# Patient Record
Sex: MALE | Race: BLACK OR AFRICAN AMERICAN | Employment: UNEMPLOYED | URBAN - METROPOLITAN AREA
[De-identification: names, ages, dates, MRNs, and addresses within clinical notes are randomized per-mention and may not be internally consistent; named-entity substitution may affect disease eponyms.]

---

## 2017-02-14 ENCOUNTER — HOSPITAL ENCOUNTER (OUTPATIENT)
Dept: LAB | Age: 57
Discharge: HOME | End: 2017-02-14

## 2017-02-14 PROCEDURE — 99001 SPECIMEN HANDLING PT-LAB: CPT | Performed by: INTERNAL MEDICINE

## 2017-05-02 ENCOUNTER — HOSPITAL ENCOUNTER (OUTPATIENT)
Dept: LAB | Age: 57
Discharge: HOME | End: 2017-05-02

## 2017-05-02 LAB — SENTARA SPECIMEN COL,SENBCF: NORMAL

## 2017-05-02 PROCEDURE — 99001 SPECIMEN HANDLING PT-LAB: CPT | Performed by: ORTHOPAEDIC SURGERY

## 2017-05-26 ENCOUNTER — OFFICE VISIT (OUTPATIENT)
Dept: ORTHOPEDIC SURGERY | Age: 57
End: 2017-05-26

## 2017-05-26 VITALS
DIASTOLIC BLOOD PRESSURE: 92 MMHG | BODY MASS INDEX: 37.74 KG/M2 | RESPIRATION RATE: 18 BRPM | HEIGHT: 69 IN | WEIGHT: 254.8 LBS | TEMPERATURE: 98.2 F | SYSTOLIC BLOOD PRESSURE: 157 MMHG | HEART RATE: 104 BPM

## 2017-05-26 DIAGNOSIS — M43.16 SPONDYLOLISTHESIS OF LUMBAR REGION: ICD-10-CM

## 2017-05-26 DIAGNOSIS — M71.30 SYNOVIAL CYST: ICD-10-CM

## 2017-05-26 DIAGNOSIS — M48.061 LUMBAR SPINAL STENOSIS: Primary | ICD-10-CM

## 2017-05-26 DIAGNOSIS — M51.26 HNP (HERNIATED NUCLEUS PULPOSUS), LUMBAR: ICD-10-CM

## 2017-05-26 NOTE — PROGRESS NOTES
[address]  [address], [address]  [address]  Phone: [phone]  Fax: [phone]  INITIAL CONSULTATION  Patient: Hemal Sands                MRN: [id number]       SSN: [id number]  YOB: 1960        AGE: 64 y.o. SEX: male  Body mass index is 37.63 kg/(m^2). PCP: Terese Gould MD  05/26/17    Chief Complaint   Patient presents with    Back Pain     back pain eval    Referral / Consult         HISTORY OF PRESENT ILLNESS, RADIOGRAPHS, and PLAN:         HISTORY OF PRESENT ILLNESS:  Mr. Lucía Dodson is seen today at the request of Dr. Lázaro Lowry and Dr. Stephen Fry. Mr. Lucía Dodson is a 59-year-old gentleman with a history of disability from a heart attack in 2008. He has morbid obesity at 255 pounds and 59 in height. He has severe, primarily back pain, bilateral low back pain. He has some aching left lower extremity pain. The pain has been progressive and unresponsive to conservative treatment, physical therapy, medications, and epidural steroids. He comes in today looking for further interventions. He is currently on Norco and Gabapentin. He has had no bowel or bladder dysfunction, or fevers, chills, or night sweats. He is on multiple medications, which are in the chart. He has hypertension, diabetes, GERD, depression, osteoarthritis of the knee and hip, and positive PPD. He does not smoke or drink. PHYSICAL EXAMINATION:  His physical exam demonstrates a morbidly obese male in moderate distress. He has negative straight leg raise, negative femoral stress test, and good strength of his EHL, tib/ant, hamstrings, and quads. He has limited painful range of motion of his lumbar spine and mild pain with range of motion of the hips. He has a supple neck and normal upper extremities. He has normal reflexes of the Achilles and patella. He has 2+ pulses and soft abdomen.       RADIOGRAPHS:  X-rays of the lumbar spine demonstrate a grade I mobile, degenerative spondylolisthesis at L4-5, degenerative collapse at L5-S1. MRI of the lumbar spine demonstrates synovitis at L4-5 with spinal stenosis, synovitis with left-sided synovial cyst, small, as well as a left-sided L5-S1 disc protrusion. ASSESSMENT/PLAN: We have a patient with primarily mechanical low back pain and degenerative spondylolisthesis at L4-5 and some stenotic symptoms. He has some left-sided radicular symptoms due, I believe, to disc protrusion at L5-S1, as well as synovitis, left greater than right at L4-5. He is morbidly obese. I discussed the matter at length with him. He has failed conservative treatment. He would like surgical intervention. He is a difficult surgical candidate. Because of his obesity, classically, I would want to perform an L4 to the sacrum posterior decompression and instrumented fusion. It will be difficult on his surgery from a posterior approach just from his size and obesity. Given the nature of his symptoms with his primary mechanical back pain, which I think is primarily coming from the L4-5 segment, I think a minimally invasive approach that may be prudent for him would be an isolated L4-5 extreme lateral interbody fusion. I think this could allow us to provide him stability and decompression of the L4-5 segment, which would resolve the majority of his symptoms. If that resolved his symptoms well enough, we could leave well enough alone at that. If not, we could proceed with a posterior decompression with instrumentation. I discussed the risks, benefits, complications, and alternatives to surgery. The patient wishes to proceed. We will proceed with an extreme lateral interbody and fusion at L4-5 once the appropriate approvals and clearances take place. cc: Melchor Zuniga M.D.            Past Medical History:   Diagnosis Date    Arthritis     Coronary atherosclerosis of native coronary artery     S/P PCI with GE in 12/09    Diabetes ([facility] [address]     IDDM    Electrolyte and fluid disorders not elsewhere classified     Essential hypertension, benign     GERD (gastroesophageal reflux disease)     Heroin abuse 05/26/16    Psychiatrist Dr. González Thurston History of heart attack     Hyperlipidemia     Intermediate coronary syndrome [facility])     MDD (major depressive disorder) 5/26/16    Psychiatrist Dr. González Thurston Myocardial infarction [facility])     Obesity, unspecified     Old myocardial infarction     Other and unspecified hyperlipidemia     Pancreatitis     Positive PPD     Sleep apnea     Transfusion history     Before 1992       Family History   Problem Relation Age of Onset    Heart Attack Father     Coronary Artery Disease Mother     Diabetes Mother     Cancer Sister      breast cancer and lung cancer       Current Outpatient Prescriptions   Medication Sig Dispense Refill    diclofenac EC (VOLTAREN) 75 mg EC tablet Take 1 Tab by mouth two (2) times a day. 60 Tab 5    gabapentin (NEURONTIN) 600 mg tablet Take 1 Tab by mouth three (3) times daily. (Patient taking differently: Take 800 mg by mouth three (3) times daily.) 90 Tab 1    HYDROcodone-acetaminophen (NORCO) 7.5-325 mg per tablet Take 1 Tab by mouth every eight (8) hours as needed for Pain. Max Daily Amount: 3 Tabs. 10 Tab 0    insulin aspart (NOVOLOG FLEXPEN) 100 unit/mL flexpen 55 Units by SubCUTAneous route.  Polyethylene Glycol 3350 powd 1 Cap by Does Not Apply route.  insulin detemir (LEVEMIR) 100 unit/mL injection 63 Units by SubCUTAneous route nightly.  cyclobenzaprine (FLEXERIL) 10 mg tablet Take  by mouth three (3) times daily as needed for Muscle Spasm(s).  doxazosin (CARDURA) 2 mg tablet Take 2 mg by mouth daily.  amLODIPine (NORVASC) 10 mg tablet Take  by mouth daily.  simvastatin (ZOCOR) 10 mg tablet Take  by mouth nightly.       hydrochlorothiazide (HYDRODIURIL) 50 mg tablet Take 25 mg by mouth daily.      levothyroxine (SYNTHROID) 75 mcg tablet Take  by mouth Daily (before breakfast).  furosemide (LASIX) 40 mg tablet Take  by mouth daily.  ranitidine (ZANTAC) 150 mg tablet Take 150 mg by mouth two (2) times a day.  quinapril (ACCUPRIL) 40 mg tablet Take 40 mg by mouth nightly.  metFORMIN (GLUCOPHAGE) 1,000 mg tablet Take 1,000 mg by mouth two (2) times daily (with meals).  omeprazole (PRILOSEC) 20 mg capsule Take 20 mg by mouth daily.  traZODone (DESYREL) 150 mg tablet Take 150 mg by mouth nightly.  clopidogrel (PLAVIX) 75 mg tablet Take  by mouth daily.  ondansetron hcl (ZOFRAN, AS HYDROCHLORIDE,) 4 mg tablet Take 1 Tab by mouth every eight (8) hours as needed for Nausea. 20 Each 0    ondansetron (ZOFRAN ODT) 4 mg disintegrating tablet Take 1 Tab by mouth every eight (8) hours as needed for Nausea. 15 Tab 0    cloNIDine HCl (CATAPRES) 0.1 mg tablet Take 1 Tab by mouth two (2) times daily as needed. Indications: OPIOID WITHDRAWAL SYMPTOMS 6 Tab 0    ALPRAZolam (XANAX) 1 mg tablet Take  by mouth.  DULoxetine (CYMBALTA) 60 mg capsule Take 1 Cap by mouth daily.  30 Cap 2       Allergies   Allergen Reactions    Compazine [Prochlorperazine] Anaphylaxis     \"Tightness around throat\"       Past Surgical History:   Procedure Laterality Date    CARDIAC SURG PROCEDURE UNLIST      2 [facility]    HX CORONARY STENT PLACEMENT         Past Medical History:   Diagnosis Date    Arthritis     Coronary atherosclerosis of native coronary artery     S/P PCI with GE in 12/09    Diabetes ([facility] [address]     IDDM    Electrolyte and fluid disorders not elsewhere classified     Essential hypertension, benign     GERD (gastroesophageal reflux disease)     Heroin abuse 05/26/16    Psychiatrist Dr. Ramila Villalobos History of heart attack     Hyperlipidemia     Intermediate coronary syndrome ([facility] [address]     MDD (major depressive disorder) 5/26/16    Psychiatrist  Vicky Wright     Myocardial infarction ([facility] [address]     Obesity, unspecified     Old myocardial infarction     Other and unspecified hyperlipidemia     Pancreatitis     Positive PPD     Sleep apnea     Transfusion history     Before 1992       Social History     Social History    Marital status:      Spouse name: N/A    Number of children: N/A    Years of education: N/A     Occupational History    Not on file. Social History Main Topics    Smoking status: Never Smoker    Smokeless tobacco: Not on file    Alcohol use No    Drug use: No    Sexual activity: Not on file     Other Topics Concern    Not on file     Social History Narrative       REVIEW OF SYSTEMS:   CONSTITUTIONAL SYMPTOMS:  Negative. EYES:  Negative. EARS, NOSE, THROAT AND MOUTH:  Negative. CARDIOVASCULAR:  Negative. RESPIRATORY:  Negative. GENITOURINARY: Negative. GASTROINTESTINAL:  Negative. INTEGUMENTARY (SKIN AND/OR BREAST):  Negative. MUSCULOSKELETAL: Per HPI.   ENDOCRINE/RHEUMATOLOGIC:  Negative. NEUROLOGICAL:  Per HPI. HEMATOLOGIC/LYMPHATIC:  Negative. ALLERGIC/IMMUNOLOGIC:  Negative. PSYCHIATRIC:  Negative. PHYSICAL EXAMINATION:   Visit Vitals    BP (!) 157/92    Pulse (!) 104    Temp 98.2 °F (36.8 °C) (Oral)    Resp 18    Ht 5' 9\" (1.753 m)    Wt 254 lb 12.8 oz (115.6 kg)    BMI 37.63 kg/m2    PAIN SCALE: 7/10    CONSTITUTIONAL: The patient is in no apparent distress and is alert and oriented x 3. NEUROLOGICAL: Alert and oriented x 3. Ambulation without assistive device. FWB. EXTREMITIES:  See musculoskeletal.  MUSCULOSKELETAL:   Head and Neck:  Negative for misalignment, asymmetry, crepitation, defects, tenderness masses or effusions.  Left Upper Extremity: Inspection, percussion and palpation preformed. Ritters sign is negative.  Right Upper Extremity: Inspection, percussion and palpation preformed. Ritters sign is negative.    Spine, Ribs and Pelvis: Back pain with radiating LLE pain. Inspection, percussion and palpation preformed. Negative for misalignment, asymmetry, crepitation, defects, tenderness masses or effusions.  Left Lower Extremity: Radiating pain. Intermittent mild paresthesia. Inspection, percussion and palpation preformed. Negative straight leg raise.  Right Lower Extremity: Inspection, percussion and palpation preformed. Negative straight leg raise. SPINE EXAM:     Lumbar spine: No rash, ecchymosis, or gross obliquity. No focal atrophy is noted. ASSESSMENT    ICD-10-CM ICD-9-CM    1. Lumbar spinal stenosis M48.06 724.02 AMB POC XRAY, SPINE, LUMBOSACRAL; 4+   2. HNP (herniated nucleus pulposus), lumbar M51.26 722.10 AMB POC XRAY, SPINE, LUMBOSACRAL; 4+   3. Spondylolisthesis of lumbar region M43.16 738.4 AMB POC XRAY, SPINE, LUMBOSACRAL; 4+   4. Synovial cyst M71.30 727.40 AMB POC XRAY, SPINE, LUMBOSACRAL; 4+       Written by Qian Gracia, as dictated by Vanesa Flynn MD.    I, Dr. Vanesa Flynn MD, confirm that all documentation is accurate.

## 2017-05-26 NOTE — PROGRESS NOTES
[address] [facility] [facility]   Pre-Surgical Worksheet    Patient: Avtar Ngo                         MRN: [id number]     Age:  64 y.o.,      Sex: male    YOB: 1960           CESAR: May 26, 2017  PCP: Mary Weaver MD    Allergies   Allergen Reactions    Compazine [Prochlorperazine] Anaphylaxis     \"Tightness around throat\"         ICD-10-CM ICD-9-CM    1. Lumbar spinal stenosis M48.06 724.02 AMB POC XRAY, SPINE, LUMBOSACRAL; 4+   2. HNP (herniated nucleus pulposus), lumbar M51.26 722.10 AMB POC XRAY, SPINE, LUMBOSACRAL; 4+   3. Spondylolisthesis of lumbar region M43.16 738.4 AMB POC XRAY, SPINE, LUMBOSACRAL; 4+   4. Synovial cyst M71.30 727.40 AMB POC XRAY, SPINE, LUMBOSACRAL; 4+       Surgery: L4/5 XLIF. Pain Assessment   Pain Assessment  5/26/2017   Location of Pain Back   Location Modifiers -   Severity of Pain 7   Quality of Pain Aching; Throbbing   Quality of Pain Comment -   Duration of Pain Persistent   Frequency of Pain Constant   Aggravating Factors -   Aggravating Factors Comment -   Limiting Behavior -   Relieving Factors -   Result of Injury -       Visit Vitals    BP (!) 157/92    Pulse (!) 104    Temp 98.2 °F (36.8 °C) (Oral)    Resp 18    Ht 5' 9\" (1.753 m)    Wt 254 lb 12.8 oz (115.6 kg)    BMI 37.63 kg/m2       ADL Limits: Patient states pain stays at a 9. Has difficulty completing tasks without extended breaks, or assistance. Spine Surgery?: No: ? When ? Christine Pretty Where? .    Spinal Injections?: Yes  When 2017. Where Dr. Bob Gay. Physical Therapy?: No: ? When ? [facility] Pretty Where? .    NSAID's?: Yes    Pain Medications?: Yes  Type: Percocet. In Pain Management: NO, Where: ?    Current Outpatient Prescriptions   Medication Sig    diclofenac EC (VOLTAREN) 75 mg EC tablet Take 1 Tab by mouth two (2) times a day.  gabapentin (NEURONTIN) 600 mg tablet Take 1 Tab by mouth three (3) times daily.  (Patient taking differently: Take 800 mg by mouth three (3) times daily.)  HYDROcodone-acetaminophen (NORCO) 7.5-325 mg per tablet Take 1 Tab by mouth every eight (8) hours as needed for Pain. Max Daily Amount: 3 Tabs.  insulin aspart (NOVOLOG FLEXPEN) 100 unit/mL flexpen 55 Units by SubCUTAneous route.  Polyethylene Glycol 3350 powd 1 Cap by Does Not Apply route.  insulin detemir (LEVEMIR) 100 unit/mL injection 63 Units by SubCUTAneous route nightly.  cyclobenzaprine (FLEXERIL) 10 mg tablet Take  by mouth three (3) times daily as needed for Muscle Spasm(s).  doxazosin (CARDURA) 2 mg tablet Take 2 mg by mouth daily.  amLODIPine (NORVASC) 10 mg tablet Take  by mouth daily.  simvastatin (ZOCOR) 10 mg tablet Take  by mouth nightly.  hydrochlorothiazide (HYDRODIURIL) 50 mg tablet Take 25 mg by mouth daily.  levothyroxine (SYNTHROID) 75 mcg tablet Take  by mouth Daily (before breakfast).  furosemide (LASIX) 40 mg tablet Take  by mouth daily.  ranitidine (ZANTAC) 150 mg tablet Take 150 mg by mouth two (2) times a day.  quinapril (ACCUPRIL) 40 mg tablet Take 40 mg by mouth nightly.  metFORMIN (GLUCOPHAGE) 1,000 mg tablet Take 1,000 mg by mouth two (2) times daily (with meals).  omeprazole (PRILOSEC) 20 mg capsule Take 20 mg by mouth daily.  traZODone (DESYREL) 150 mg tablet Take 150 mg by mouth nightly.  clopidogrel (PLAVIX) 75 mg tablet Take  by mouth daily.  ondansetron hcl (ZOFRAN, AS HYDROCHLORIDE,) 4 mg tablet Take 1 Tab by mouth every eight (8) hours as needed for Nausea.  ondansetron (ZOFRAN ODT) 4 mg disintegrating tablet Take 1 Tab by mouth every eight (8) hours as needed for Nausea.  cloNIDine HCl (CATAPRES) 0.1 mg tablet Take 1 Tab by mouth two (2) times daily as needed. Indications: OPIOID WITHDRAWAL SYMPTOMS    ALPRAZolam (XANAX) 1 mg tablet Take  by mouth.  DULoxetine (CYMBALTA) 60 mg capsule Take 1 Cap by mouth daily. No current facility-administered medications for this visit.         Past Medical History: Diagnosis Date    Arthritis     Coronary atherosclerosis of native coronary artery     S/P PCI with GE in 12/09    Diabetes ([facility] [address]     IDDM    Electrolyte and fluid disorders not elsewhere classified     Essential hypertension, benign     GERD (gastroesophageal reflux disease)     Heroin abuse 05/26/16    Psychiatrist Dr. Liberty Mccarthy History of heart attack     Hyperlipidemia     Intermediate coronary syndrome [facility])     MDD (major depressive disorder) 5/26/16    Psychiatrist Dr. Liberty Mccarthy Myocardial infarction [facility])     Obesity, unspecified     Old myocardial infarction     Other and unspecified hyperlipidemia     Pancreatitis     Positive PPD     Sleep apnea     Transfusion history     Before 1992       Past Surgical History:   Procedure Laterality Date    CARDIAC SURG PROCEDURE UNLIST      [facility]    HX CORONARY STENT PLACEMENT         Social History     Social History    Marital status:      Spouse name: N/A    Number of children: N/A    Years of education: N/A     Social History Main Topics    Smoking status: Never Smoker    Smokeless tobacco: None    Alcohol use No    Drug use: No    Sexual activity: Not Asked     Other Topics Concern    None     Social History Narrative

## 2017-05-30 ENCOUNTER — TELEPHONE (OUTPATIENT)
Dept: ORTHOPEDIC SURGERY | Age: 57
End: 2017-05-30

## 2017-05-30 NOTE — TELEPHONE ENCOUNTER
Pt wife Hallie Stern called in states that the pt PCP is Mallory Cao.      Pt can be reached at [phone]

## 2017-05-31 ENCOUNTER — OFFICE VISIT (OUTPATIENT)
Dept: CARDIOLOGY CLINIC | Age: 57
End: 2017-05-31

## 2017-05-31 VITALS
HEART RATE: 91 BPM | DIASTOLIC BLOOD PRESSURE: 95 MMHG | SYSTOLIC BLOOD PRESSURE: 155 MMHG | HEIGHT: 69 IN | BODY MASS INDEX: 37.62 KG/M2 | WEIGHT: 254 LBS

## 2017-05-31 DIAGNOSIS — Z95.5 HISTORY OF CORONARY ARTERY STENT PLACEMENT: ICD-10-CM

## 2017-05-31 DIAGNOSIS — I10 ESSENTIAL HYPERTENSION: Chronic | ICD-10-CM

## 2017-05-31 DIAGNOSIS — Z01.810 PRE-OPERATIVE CARDIOVASCULAR EXAMINATION: ICD-10-CM

## 2017-05-31 DIAGNOSIS — Z79.4 TYPE 2 DIABETES MELLITUS WITHOUT COMPLICATION, WITH LONG-TERM CURRENT USE OF INSULIN (HCC): Chronic | ICD-10-CM

## 2017-05-31 DIAGNOSIS — E11.9 TYPE 2 DIABETES MELLITUS WITHOUT COMPLICATION, WITH LONG-TERM CURRENT USE OF INSULIN (HCC): Chronic | ICD-10-CM

## 2017-05-31 DIAGNOSIS — I25.10 CORONARY ARTERY DISEASE INVOLVING NATIVE CORONARY ARTERY OF NATIVE HEART WITHOUT ANGINA PECTORIS: Primary | ICD-10-CM

## 2017-05-31 RX ORDER — CARVEDILOL 6.25 MG/1
6.25 TABLET ORAL 2 TIMES DAILY WITH MEALS
Qty: 60 TAB | Refills: 6 | Status: ON HOLD | OUTPATIENT
Start: 2017-05-31 | End: 2017-09-10

## 2017-05-31 NOTE — MR AVS SNAPSHOT
Visit Information Date & Time Provider Department Dept. Phone Encounter #  
 5/31/2017 11:15 AM Wanda Mohamud MD [facility] [address] [place] [address] [id number] Follow-up Instructions Return for F/u after tests, follow up with Dr Marine Gupta. Your Appointments 6/14/2017  1:15 PM  
PHYSICAL PRE OP with Balwinder Le MD  
[facility]ists at [facility] (--) Appt Note: pre op clearance,  
 [address] [address]  
[address] [facility] [address] [id number]  
  
    
 7/3/2017 10:30 AM  
POST OP with Leatha Mccormick NP  
[facility] ([address] [place] [address]) Appt Note: surg 6-19  
 [address] [place] [address]  
[phone]  
  
   
 [address]  
  
    
 8/4/2017  1:15 PM  
POST OP with Kindra Larkin MD  
[facility] [address] [place] [address]) Appt Note: 6 wk fu surg 6-19  
 [address] [place] [address]  
[phone]  
  
   
 [address] [facility] [address] Upcoming Health Maintenance Date Due Hepatitis C Screening 1960 FOOT EXAM Q1 9/5/1970 EYE EXAM RETINAL OR DILATED Q1 9/5/1970 Pneumococcal 19-64 Medium Risk (1 of 1 - PPSV23) 9/5/1979 DTaP/Tdap/Td series (1 - Tdap) 9/5/1981 FOBT Q 1 YEAR AGE 50-75 9/5/2010 HEMOGLOBIN A1C Q6M 2/15/2017 INFLUENZA AGE 9 TO ADULT 8/1/2017 MICROALBUMIN Q1 8/15/2017 LIPID PANEL Q1 8/15/2017 Allergies as of 5/31/2017  Review Complete On: 5/31/2017 By: Wanda Mohamud MD  
  
 Severity Noted Reaction Type Reactions Compazine [Prochlorperazine] High   Anaphylaxis \"Tightness around throat\" Current Immunizations  Never Reviewed No immunizations on file. Not reviewed this visit You Were Diagnosed With   
  
 Codes Comments Coronary artery disease involving native coronary artery of native heart without angina pectoris    -  Primary ICD-10-CM: I25.10 ICD-9-CM: 414.01 History of coronary artery stent placement     ICD-10-CM: Z95.5 ICD-9-CM: V45.82 om1 
4/2009 and 12/2009 Essential hypertension     ICD-10-CM: I10 
ICD-9-CM: 401.9 Type 2 diabetes mellitus without complication, with long-term current use of insulin (HCC)     ICD-10-CM: E11.9, Z79.4 ICD-9-CM: 250.00, V58.67 Pre-operative cardiovascular examination     ICD-10-CM: Z01.810 ICD-9-CM: V72.81 Vitals BP Pulse Height(growth percentile) Weight(growth percentile) BMI Smoking Status (!) 155/95 91 5' 9\" (1.753 m) 254 lb (115.2 kg) 37.51 kg/m2 Never Smoker Vitals History BMI and BSA Data Body Mass Index Body Surface Area  
 37.51 kg/m 2 2.37 m 2 Preferred Pharmacy Pharmacy Name [facility] [address] [phone] Your Updated Medication List  
  
   
This list is accurate as of: 5/31/17 11:56 AM.  Always use your most recent med list.  
  
  
  
  
 carvedilol 6.25 mg tablet Commonly known as:  Yvonne Mckeon Take 1 Tab by mouth two (2) times daily (with meals). clopidogrel 75 mg Tab Commonly known as:  PLAVIX Take  by mouth daily. cyclobenzaprine 10 mg tablet Commonly known as:  FLEXERIL Take  by mouth three (3) times daily as needed for Muscle Spasm(s). diclofenac EC 75 mg EC tablet Commonly known as:  VOLTAREN Take 1 Tab by mouth two (2) times a day. doxazosin 2 mg tablet Commonly known as:  CARDURA Take 2 mg by mouth daily. DULoxetine 60 mg capsule Commonly known as:  CYMBALTA Take 1 Cap by mouth daily. furosemide 40 mg tablet Commonly known as:  LASIX Take  by mouth daily. gabapentin 600 mg tablet Commonly known as:  NEURONTIN Take 1 Tab by mouth three (3) times daily. HYDROcodone-acetaminophen 7.5-325 mg per tablet Commonly known as:  Jenna Dus Take 1 Tab by mouth every eight (8) hours as needed for Pain. Max Daily Amount: 3 Tabs. LEVEMIR 100 unit/mL injection Generic drug:  insulin detemir 63 Units by SubCUTAneous route nightly. levothyroxine 75 mcg tablet Commonly known as:  SYNTHROID Take  by mouth Daily (before breakfast). metFORMIN 1,000 mg tablet Commonly known as:  GLUCOPHAGE Take 1,000 mg by mouth two (2) times daily (with meals). NovoLOG Flexpen 100 unit/mL Inpn Generic drug:  insulin aspart 55 Units by SubCUTAneous route. omeprazole 20 mg capsule Commonly known as:  PRILOSEC Take 20 mg by mouth daily. ondansetron 4 mg disintegrating tablet Commonly known as:  ZOFRAN ODT Take 1 Tab by mouth every eight (8) hours as needed for Nausea. ondansetron hcl 4 mg tablet Commonly known as:  ZOFRAN (AS HYDROCHLORIDE) Take 1 Tab by mouth every eight (8) hours as needed for Nausea. Polyethylene Glycol 3350 Powd 1 Cap by Does Not Apply route. quinapril 40 mg tablet Commonly known as:  ACCUPRIL Take 40 mg by mouth nightly. raNITIdine 150 mg tablet Commonly known as:  ZANTAC Take 150 mg by mouth two (2) times a day. simvastatin 10 mg tablet Commonly known as:  ZOCOR Take  by mouth nightly. traZODone 150 mg tablet Commonly known as:  Irish Never Take 150 mg by mouth nightly. Prescriptions Sent to Pharmacy Refills  
 carvedilol (COREG) 6.25 mg tablet 6 Sig: Take 1 Tab by mouth two (2) times daily (with meals). Class: Normal  
 [facility] [address] Ph #: [phone] Route: Oral  
  
Follow-up Instructions Return for F/u after tests, follow up with Dr Jozef Lancaster. To-Do List   
 06/07/2017 Nursing:  SCHEDULE NUCLEAR STUDY Please provide this summary of care documentation to your next provider. Your primary care clinician is listed as Evans Yap. If you have any questions after today's visit, please call [phone].

## 2017-05-31 NOTE — LETTER
Dominga Coles 1960 5/31/2017 Dear Oscar Sky., MD 
 
I had the pleasure of evaluating  Mr. Deborah Devlin in office today. Below are the relevant portions of my assessment and plan of care. ICD-10-CM ICD-9-CM 1. Coronary artery disease involving native coronary artery of native heart without angina pectoris I25.10 414.01 SCHEDULE NUCLEAR STUDY 2. History of coronary artery stent placement Z95.5 V45.82 SCHEDULE NUCLEAR STUDY  
 om1 
4/2009 and 12/2009 3. Essential hypertension I10 401.9 4. Type 2 diabetes mellitus without complication, with long-term current use of insulin (HCC) E11.9 250.00   
 Z79.4 V58.67 5. Pre-operative cardiovascular examination Z01.810 V72.81 Current Outpatient Prescriptions Medication Sig Dispense Refill  carvedilol (COREG) 6.25 mg tablet Take 1 Tab by mouth two (2) times daily (with meals). 60 Tab 6  
 ondansetron (ZOFRAN ODT) 4 mg disintegrating tablet Take 1 Tab by mouth every eight (8) hours as needed for Nausea. 15 Tab 0  
 DULoxetine (CYMBALTA) 60 mg capsule Take 1 Cap by mouth daily. 30 Cap 2  
 diclofenac EC (VOLTAREN) 75 mg EC tablet Take 1 Tab by mouth two (2) times a day. 60 Tab 5  
 gabapentin (NEURONTIN) 600 mg tablet Take 1 Tab by mouth three (3) times daily. (Patient taking differently: Take 800 mg by mouth three (3) times daily.) 90 Tab 1  
 HYDROcodone-acetaminophen (NORCO) 7.5-325 mg per tablet Take 1 Tab by mouth every eight (8) hours as needed for Pain. Max Daily Amount: 3 Tabs. 10 Tab 0  
 insulin aspart (NOVOLOG FLEXPEN) 100 unit/mL flexpen 55 Units by SubCUTAneous route.  Polyethylene Glycol 3350 powd 1 Cap by Does Not Apply route.  insulin detemir (LEVEMIR) 100 unit/mL injection 63 Units by SubCUTAneous route nightly.  cyclobenzaprine (FLEXERIL) 10 mg tablet Take  by mouth three (3) times daily as needed for Muscle Spasm(s).  doxazosin (CARDURA) 2 mg tablet Take 2 mg by mouth daily.  simvastatin (ZOCOR) 10 mg tablet Take  by mouth nightly.  levothyroxine (SYNTHROID) 75 mcg tablet Take  by mouth Daily (before breakfast).  furosemide (LASIX) 40 mg tablet Take  by mouth daily.  ranitidine (ZANTAC) 150 mg tablet Take 150 mg by mouth two (2) times a day.  quinapril (ACCUPRIL) 40 mg tablet Take 40 mg by mouth nightly.  metFORMIN (GLUCOPHAGE) 1,000 mg tablet Take 1,000 mg by mouth two (2) times daily (with meals).  omeprazole (PRILOSEC) 20 mg capsule Take 20 mg by mouth daily.  traZODone (DESYREL) 150 mg tablet Take 150 mg by mouth nightly.  clopidogrel (PLAVIX) 75 mg tablet Take  by mouth daily.  ondansetron hcl (ZOFRAN, AS HYDROCHLORIDE,) 4 mg tablet Take 1 Tab by mouth every eight (8) hours as needed for Nausea. 20 Each 0 Orders Placed This Encounter  SCHEDULE NUCLEAR STUDY  
  lexiscan stress test  
  Standing Status:   Future Standing Expiration Date:   5/31/2018  carvedilol (COREG) 6.25 mg tablet Sig: Take 1 Tab by mouth two (2) times daily (with meals). Dispense:  60 Tab Refill:  6 If you have questions, please do not hesitate to call me. I look forward to following Mr. Darylene Champ along with you. Sincerely, Antonio Osborne MD

## 2017-05-31 NOTE — PROGRESS NOTES
HISTORY OF PRESENT ILLNESS  Falguni Cleaning is a 64 y.o. male. HPI Comments: Patient with cad,old mi,pci in 2009 here for evaluation for back surgery  Followed by dr Chau Mendoza in past  On follow up patient denies any chest pains,sob, palpitation or other significant symptoms. New Patient   The history is provided by the patient. Pertinent negatives include no chest pain, no abdominal pain, no headaches and no shortness of breath. Hypertension   The history is provided by the patient. This is a chronic problem. The problem occurs constantly. The problem has not changed since onset. Pertinent negatives include no chest pain, no abdominal pain, no headaches and no shortness of breath. Pre-op Exam   The history is provided by the patient. This is a new problem. Pertinent negatives include no chest pain, no abdominal pain, no headaches and no shortness of breath. Review of Systems   Constitutional: Negative for chills and fever. HENT: Negative for nosebleeds. Eyes: Negative for blurred vision and double vision. Respiratory: Negative for cough, hemoptysis, sputum production, shortness of breath and wheezing. Cardiovascular: Negative for chest pain, palpitations, orthopnea, claudication, leg swelling and PND. Gastrointestinal: Negative for abdominal pain, heartburn, nausea and vomiting. Musculoskeletal: Positive for back pain. Negative for myalgias. Skin: Negative for rash. Neurological: Negative for dizziness, weakness and headaches. Endo/Heme/Allergies: Does not bruise/bleed easily.      Family History   Problem Relation Age of Onset    Heart Attack Father     Coronary Artery Disease Mother     Diabetes Mother     Cancer Sister      breast cancer and lung cancer       Past Medical History:   Diagnosis Date    Arthritis     Coronary atherosclerosis of native coronary artery     S/P PCI with GE in 12/09    Diabetes ([facility] [address]     IDDM    Electrolyte and fluid disorders not elsewhere classified     Essential hypertension, benign     GERD (gastroesophageal reflux disease)     Heroin abuse 05/26/16    Psychiatrist Dr. Carlee Shah History of heart attack     Hyperlipidemia     Intermediate coronary syndrome [facility])     MDD (major depressive disorder) 5/26/16    Psychiatrist Dr. Carlee Shah Myocardial infarction [facility])     Obesity, unspecified     Old myocardial infarction     Other and unspecified hyperlipidemia     Pancreatitis     Positive PPD     Sleep apnea     Transfusion history     Before 1992       Past Surgical History:   Procedure Laterality Date    CARDIAC SURG PROCEDURE UNLIST      2 stents Dr. Nelson Read Carid[facility]    HX CORONARY STENT PLACEMENT         Social History   Substance Use Topics    Smoking status: Never Smoker    Smokeless tobacco: Not on file    Alcohol use No       Allergies   Allergen Reactions    Compazine [Prochlorperazine] Anaphylaxis     \"Tightness around throat\"       Prior to Admission medications    Medication Sig Start Date End Date Taking? Authorizing Provider   carvedilol (COREG) 6.25 mg tablet Take 1 Tab by mouth two (2) times daily (with meals). 5/31/17  Yes Wesley Hughes MD   ondansetron (ZOFRAN ODT) 4 mg disintegrating tablet Take 1 Tab by mouth every eight (8) hours as needed for Nausea. 8/16/16  Yes Chantel Paz MD   DULoxetine (CYMBALTA) 60 mg capsule Take 1 Cap by mouth daily. 8/15/16  Yes Sudhir Omer MD   diclofenac EC (VOLTAREN) 75 mg EC tablet Take 1 Tab by mouth two (2) times a day. 7/26/16  Yes Jose Dotson MD   gabapentin (NEURONTIN) 600 mg tablet Take 1 Tab by mouth three (3) times daily. Patient taking differently: Take 800 mg by mouth three (3) times daily. 7/26/16  Yes Jose Dotson MD   HYDROcodone-acetaminophen [facility]) 7.5-325 mg per tablet Take 1 Tab by mouth every eight (8) hours as needed for Pain. Max Daily Amount: 3 Tabs.  7/24/16  Yes Mc Cooper NP   insulin aspart (NOVOLOG FLEXPEN) 100 unit/mL flexpen 55 Units by SubCUTAneous route. Yes Historical Provider   Polyethylene Glycol 3350 powd 1 Cap by Does Not Apply route. Yes Historical Provider   insulin detemir (LEVEMIR) 100 unit/mL injection 63 Units by SubCUTAneous route nightly. Yes Historical Provider   cyclobenzaprine (FLEXERIL) 10 mg tablet Take  by mouth three (3) times daily as needed for Muscle Spasm(s). Yes Historical Provider   doxazosin (CARDURA) 2 mg tablet Take 2 mg by mouth daily. Yes Historical Provider   simvastatin (ZOCOR) 10 mg tablet Take  by mouth nightly. Yes Historical Provider   levothyroxine (SYNTHROID) 75 mcg tablet Take  by mouth Daily (before breakfast). Yes Historical Provider   furosemide (LASIX) 40 mg tablet Take  by mouth daily. Yes Historical Provider   ranitidine (ZANTAC) 150 mg tablet Take 150 mg by mouth two (2) times a day. Yes Historical Provider   quinapril (ACCUPRIL) 40 mg tablet Take 40 mg by mouth nightly. Yes Historical Provider   metFORMIN (GLUCOPHAGE) 1,000 mg tablet Take 1,000 mg by mouth two (2) times daily (with meals). Yes Historical Provider   omeprazole (PRILOSEC) 20 mg capsule Take 20 mg by mouth daily. Yes Historical Provider   traZODone (DESYREL) 150 mg tablet Take 150 mg by mouth nightly. Yes Historical Provider   clopidogrel (PLAVIX) 75 mg tablet Take  by mouth daily. Yes Historical Provider   ondansetron hcl (ZOFRAN, AS HYDROCHLORIDE,) 4 mg tablet Take 1 Tab by mouth every eight (8) hours as needed for Nausea. 8/10/15  Yes Nancy Arizmendi MD         Visit Vitals    BP (!) 155/95    Pulse 91    Ht 5' 9\" (1.753 m)    Wt 115.2 kg (254 lb)    BMI 37.51 kg/m2         Physical Exam   Constitutional: He is oriented to person, place, and time. He appears well-developed and well-nourished. HENT:   Head: Normocephalic and atraumatic. Eyes: Conjunctivae are normal.   Neck: Neck supple. No JVD present. No tracheal deviation present.  No thyromegaly present. Cardiovascular: Normal rate, regular rhythm and normal heart sounds. Exam reveals no gallop and no friction rub. No murmur heard. Pulmonary/Chest: Breath sounds normal. No respiratory distress. He has no wheezes. He has no rales. He exhibits no tenderness. Abdominal: Soft. There is no tenderness. Musculoskeletal: He exhibits no edema. Neurological: He is alert and oriented to person, place, and time. Skin: Skin is warm and dry. Psychiatric: He has a normal mood and affect. Mr. Mike Perry has a reminder for a \"due or due soon\" health maintenance. I have asked that he contact his primary care provider for follow-up on this health maintenance. CARDIOLOGY STUDIES 4/1/2010 12/1/2009 11/1/2009 4/1/2009 4/1/2008   Myocardial Perfusion Scan Result abn scan, reversible ischemia in inferoapical area & mild fixed defect in anterobasal area, EF 42% abn scan, reversible ischemia in inferolateral area - - -   Cardiac Cath Result LVEDP 17, EF 55% - - - -   Cardiac Cath with PCI Result - 30% in-stent restenosis of native circumflex, 90% OM-1, Cypher stent on OM-1 - - 99% in midcircumflex involving bifurcation with OM-1, Vision stent in mid circumflex;   Echocardiogram - Complete Result - - EF 65% EF 55% -         Assessment         ICD-10-CM ICD-9-CM    1. Coronary artery disease involving native coronary artery of native heart without angina pectoris I25.10 414.01 SCHEDULE NUCLEAR STUDY   2. History of coronary artery stent placement Z95.5 V45.82 SCHEDULE NUCLEAR STUDY    om1  4/2009 and 12/2009   3. Essential hypertension I10 401.9    4. Type 2 diabetes mellitus without complication, with long-term current use of insulin (HCC) E11.9 250.00     Z79.4 V58.67    5.  Pre-operative cardiovascular examination Z01.810 V72.81      5/2017-will set up nuclear stress test to assess for ischemia-clearence based on that  Dr Pearson [place] old patient  Add coreg  Medications Discontinued During This Encounter Medication Reason    hydrochlorothiazide (HYDRODIURIL) 50 mg tablet Not A Current Medication    amLODIPine (NORVASC) 10 mg tablet Not A Current Medication    ALPRAZolam (XANAX) 1 mg tablet Not A Current Medication    cloNIDine HCl (CATAPRES) 0.1 mg tablet Not A Current Medication       Orders Placed This Encounter    SCHEDULE NUCLEAR STUDY     lexiscan stress test     Standing Status:   Future     Standing Expiration Date:   5/31/2018    carvedilol (COREG) 6.25 mg tablet     Sig: Take 1 Tab by mouth two (2) times daily (with meals). Dispense:  60 Tab     Refill:  6       Follow-up Disposition:  Return for F/u after tests, follow up with Dr José Luis Cat.

## 2017-06-07 ENCOUNTER — CLINICAL SUPPORT (OUTPATIENT)
Dept: CARDIOLOGY CLINIC | Age: 57
End: 2017-06-07

## 2017-06-07 DIAGNOSIS — I25.10 CORONARY ARTERY DISEASE INVOLVING NATIVE CORONARY ARTERY OF NATIVE HEART WITHOUT ANGINA PECTORIS: Primary | ICD-10-CM

## 2017-06-07 DIAGNOSIS — Z95.5 HISTORY OF CORONARY ARTERY STENT PLACEMENT: ICD-10-CM

## 2017-06-07 DIAGNOSIS — I10 ESSENTIAL HYPERTENSION: Chronic | ICD-10-CM

## 2017-06-07 NOTE — PROGRESS NOTES
[facility]  [address], [address] [place] [address], [place], [address] [place] [address]  [phone] [place]  [phone] [place]       Name: Hemal Sands         MRN#: [id number]        YOB: 1960   Gender: male Ht:5'9 \"Wt:254 lbs       . Date of Rest/Stress Images: 6/7/2017   Referring Physician: Joselo Pham MD  Ordering Physician: Cristian Yoo. Darshan Cervantes MD, [facility] [place]  Technologist: Dora Uribe. ROSAURA Hatch, C.N.M.T  Diagnosis:  1. Coronary artery disease involving native coronary artery of native heart without angina pectoris    2. History of coronary artery stent placement    3. Essential hypertension          Rest/Stress Myoview SPECT Myocardial Perfusion Imaging with  Lexiscan Stress and gated SPECT Imaging      PROCEDURE:      Myocardial perfusion imaging was performed at rest approximately 30 mins following the intravenous injection,(Right hand ) of 11.9 mCi of Tc99m Myoview for evaluation of myocardial function and perfusion at rest.    Baseline Data:    Baseline EKG reveals sinus rhythm, poor R-wave progression. Baseline heart rate is 69. Baseline blood pressure is 130/72. Procedure: The patient was injected with 0.4 mg IV Lexiscan over a 30 second period. The patient had no significant symptoms. Heart rate increased from baseline to a heart rate of 90. Blood pressure increased to 162/98. Electrocardiogram showed no significant ST-T changes or arrhythmia during the procedure. Diagnosis:   1. Negative EKG portion of Lexiscan stress test.   2. Nuclear imaging report to follow. Pharmacological:  Patient was injected with . 4 mg/mL with Lexiscan intravenously over a period 10 to 20 sec. After pharmacologic stress, the patient was injected intravenously with 35.8 mCi of Tc99m Myoview. Gating post stress tomographic imaging performed approximately 45 minutes post tracer injection.  The data was reconstructed in the short, horizontal long and vertical long axis views and tomographic slices were generated. NUCLEAR IMAGING:    Findings:   1. Stress images reveal normal Myoview distrubution in all the LV segments in short axis, vertical and horizontal long axis views. 2. Resting images have a normal uptake. 3. Gated images reveal normal wall motion and the ejection fraction is calculated to be 64%. Conclusion:   1. Normal perfusion scan. 2. Normal wall motion and ejection fraction. 3. Low risk scan. Thank you for the referral.    E-signed and Interpreting Physician:    Aleksandr Buckley.  Gato Garg MD, [facility] [place]     Date of interpretation: 6/7/2017  Date of final report: 6/7/2017

## 2017-06-12 ENCOUNTER — HOSPITAL ENCOUNTER (OUTPATIENT)
Dept: PREADMISSION TESTING | Age: 57
Discharge: HOME | End: 2017-06-12
Payer: MEDICAID

## 2017-06-12 DIAGNOSIS — Z01.818 PRE-OP EXAM: ICD-10-CM

## 2017-06-12 LAB
ALBUMIN SERPL BCP-MCNC: 3.7 G/DL (ref 3.4–5)
ALBUMIN/GLOB SERPL: 0.9 {RATIO} (ref 0.8–1.7)
ALP SERPL-CCNC: 127 U/L (ref 45–117)
ALT SERPL-CCNC: 44 U/L (ref 16–61)
ANION GAP BLD CALC-SCNC: 8 MMOL/L (ref 3–18)
AST SERPL W P-5'-P-CCNC: 39 U/L (ref 15–37)
BILIRUB SERPL-MCNC: 0.3 MG/DL (ref 0.2–1)
BUN SERPL-MCNC: 17 MG/DL (ref 7–18)
BUN/CREAT SERPL: 10 (ref 12–20)
CALCIUM SERPL-MCNC: 9.1 MG/DL (ref 8.5–10.1)
CHLORIDE SERPL-SCNC: 101 MMOL/L (ref 100–108)
CO2 SERPL-SCNC: 29 MMOL/L (ref 21–32)
CREAT SERPL-MCNC: 1.63 MG/DL (ref 0.6–1.3)
ERYTHROCYTE [DISTWIDTH] IN BLOOD BY AUTOMATED COUNT: 12.8 % (ref 11.6–14.5)
GLOBULIN SER CALC-MCNC: 4.1 G/DL (ref 2–4)
GLUCOSE SERPL-MCNC: 279 MG/DL (ref 74–99)
HCT VFR BLD AUTO: 40.8 % (ref 36–48)
HGB BLD-MCNC: 13.1 G/DL (ref 13–16)
MCH RBC QN AUTO: 30.1 PG (ref 24–34)
MCHC RBC AUTO-ENTMCNC: 32.1 G/DL (ref 31–37)
MCV RBC AUTO: 93.8 FL (ref 74–97)
PLATELET # BLD AUTO: 259 K/UL (ref 135–420)
PMV BLD AUTO: 12.2 FL (ref 9.2–11.8)
POTASSIUM SERPL-SCNC: 3.6 MMOL/L (ref 3.5–5.5)
PROT SERPL-MCNC: 7.8 G/DL (ref 6.4–8.2)
RBC # BLD AUTO: 4.35 M/UL (ref 4.7–5.5)
SODIUM SERPL-SCNC: 138 MMOL/L (ref 136–145)
WBC # BLD AUTO: 10 K/UL (ref 4.6–13.2)

## 2017-06-12 PROCEDURE — 85027 COMPLETE CBC AUTOMATED: CPT | Performed by: ORTHOPAEDIC SURGERY

## 2017-06-12 PROCEDURE — 80053 COMPREHEN METABOLIC PANEL: CPT | Performed by: ORTHOPAEDIC SURGERY

## 2017-06-12 PROCEDURE — 36415 COLL VENOUS BLD VENIPUNCTURE: CPT | Performed by: ORTHOPAEDIC SURGERY

## 2017-06-12 PROCEDURE — 86900 BLOOD TYPING SEROLOGIC ABO: CPT | Performed by: ORTHOPAEDIC SURGERY

## 2017-06-12 RX ORDER — OXYCODONE AND ACETAMINOPHEN 7.5; 325 MG/1; MG/1
1 TABLET ORAL
COMMUNITY
End: 2017-07-05 | Stop reason: ALTCHOICE

## 2017-06-13 ENCOUNTER — TELEPHONE (OUTPATIENT)
Dept: ORTHOPEDIC SURGERY | Age: 57
End: 2017-06-13

## 2017-06-13 LAB
ABO + RH BLD: NORMAL
BLOOD GROUP ANTIBODIES SERPL: NORMAL
SPECIMEN EXP DATE BLD: NORMAL

## 2017-06-13 NOTE — TELEPHONE ENCOUNTER
Rene Nye (not on [employer]) called in states that the pt needs pain medication for now and after Sx. Pt has upcoming Sx 06-19-/17 with Dr. Pal Cornell.   Pt can be reached at [phone]

## 2017-06-14 NOTE — TELEPHONE ENCOUNTER
Pt has called back today and understands that he is the only one that can be spoken to since no one is on the [address] [place] [address] St. Pt states he would like to speak to Dr. Pal Cornell prior to Monday 06/19/17 when he has [address].      Pt can be reached at [phone]

## 2017-06-15 ENCOUNTER — TELEPHONE (OUTPATIENT)
Dept: CARDIOLOGY CLINIC | Age: 57
End: 2017-06-15

## 2017-06-15 NOTE — TELEPHONE ENCOUNTER
Dr. Yung Simmons called from Dr. Teresa Clifford office stating that patient is scheduled to have back surgery on 6/19/17. Patient saw Dr. Haris Espinoza last on 5/31/17 and had nuclear done and was to follow up with you after testing patient follow up is scheduled for august.Can patient be cleared from the nuclear or does patient need to be brought into the office. Please Advise.  Truman Mejia [phone] (ph), [phone] (fax)

## 2017-06-15 NOTE — TELEPHONE ENCOUNTER
Cleared from cardiac view with the understanding that patient will have mild risk for this surgery,   this clearance could have been done by message earlier on the day of test

## 2017-06-15 NOTE — TELEPHONE ENCOUNTER
Returned call to pt. Pt is requesting enough Percocet to get him through until he has surgery which is scheduled for 06/19/2017. [facility] 7.5-325mg [facility].  Filled as follows:    06/05/2017  Written on 06/04/2017 OXYCODON-ACETAMINOPHEN 7.5-325 45.0 15/day  JR BOW     05/04/2017  Written on 05/04/2017 OXYCODON-ACETAMINOPHEN 7.5-325 90.0 30/day  JR BOW      03/31/2017  Written on 03/31/2017 OXYCODON-ACETAMINOPHEN 7.5-325 90.0 30/day   JR BOW      03/02/2017  Written on 03/02/2017 OXYCODON-ACETAMINOPHEN 7.5-325 90.0 30/day  JR BOW     02/02/2017 Written on 02/02/2017 OXYCODON-ACETAMINOPHEN 7.5-325 90.0 30/day Cyndi Lilly

## 2017-06-15 NOTE — LETTER
6/15/2017 1:50 PM 
 
Mr. Hemal Sands [address] Dear Dr. Kumari Duty: 
 
Re: Hemal Sands : 1960 Mr. Lucía Dodson is cleared from a cardiac standpoint with mild risk for back surgery scheduled on 17. If you have any questions or any further assistance is needed please contact our office. Sincerely, Luis Angel Humphrey MD 
  
cc:  Joselo Pham MD

## 2017-06-15 NOTE — TELEPHONE ENCOUNTER
It appears he should have enough medication to get him to Monday 6/19. Please let him know we will give him a rx prior to discharge from the hospital for post op pain.

## 2017-06-17 ENCOUNTER — ANESTHESIA EVENT (OUTPATIENT)
Dept: SURGERY | Age: 57
DRG: 304 | End: 2017-06-17
Payer: MEDICAID

## 2017-06-19 ENCOUNTER — APPOINTMENT (OUTPATIENT)
Dept: GENERAL RADIOLOGY | Age: 57
DRG: 304 | End: 2017-06-19
Attending: ORTHOPAEDIC SURGERY
Payer: MEDICAID

## 2017-06-19 ENCOUNTER — ANESTHESIA (OUTPATIENT)
Dept: SURGERY | Age: 57
DRG: 304 | End: 2017-06-19
Payer: MEDICAID

## 2017-06-19 ENCOUNTER — HOSPITAL ENCOUNTER (INPATIENT)
Age: 57
LOS: 2 days | Discharge: HOME HEALTH | DRG: 304 | End: 2017-06-21
Attending: ORTHOPAEDIC SURGERY | Admitting: ORTHOPAEDIC SURGERY
Payer: MEDICAID

## 2017-06-19 PROBLEM — M48.061 SPINAL STENOSIS AT L4-L5 LEVEL: Status: ACTIVE | Noted: 2017-06-19

## 2017-06-19 LAB
AMPHET UR QL SCN: NEGATIVE
ANION GAP BLD CALC-SCNC: 10 MMOL/L (ref 3–18)
BARBITURATES UR QL SCN: NEGATIVE
BENZODIAZ UR QL: NEGATIVE
BUN SERPL-MCNC: 12 MG/DL (ref 7–18)
BUN/CREAT SERPL: 10 (ref 12–20)
CALCIUM SERPL-MCNC: 9 MG/DL (ref 8.5–10.1)
CANNABINOIDS UR QL SCN: NEGATIVE
CHLORIDE SERPL-SCNC: 103 MMOL/L (ref 100–108)
CO2 SERPL-SCNC: 26 MMOL/L (ref 21–32)
COCAINE UR QL SCN: NEGATIVE
CREAT SERPL-MCNC: 1.24 MG/DL (ref 0.6–1.3)
GLUCOSE BLD STRIP.AUTO-MCNC: 235 MG/DL (ref 70–110)
GLUCOSE BLD STRIP.AUTO-MCNC: 276 MG/DL (ref 70–110)
GLUCOSE BLD STRIP.AUTO-MCNC: 306 MG/DL (ref 70–110)
GLUCOSE BLD STRIP.AUTO-MCNC: 323 MG/DL (ref 70–110)
GLUCOSE SERPL-MCNC: 293 MG/DL (ref 74–99)
HDSCOM,HDSCOM: ABNORMAL
METHADONE UR QL: NEGATIVE
OPIATES UR QL: POSITIVE
PCP UR QL: NEGATIVE
POTASSIUM SERPL-SCNC: 3.9 MMOL/L (ref 3.5–5.5)
SODIUM SERPL-SCNC: 139 MMOL/L (ref 136–145)

## 2017-06-19 PROCEDURE — 76210000006 HC OR PH I REC 0.5 TO 1 HR: Performed by: ORTHOPAEDIC SURGERY

## 2017-06-19 PROCEDURE — C1763 CONN TISS, NON-HUMAN: HCPCS | Performed by: ORTHOPAEDIC SURGERY

## 2017-06-19 PROCEDURE — 80307 DRUG TEST PRSMV CHEM ANLYZR: CPT | Performed by: ANESTHESIOLOGY

## 2017-06-19 PROCEDURE — 0SG00A0 FUSION OF LUMBAR VERTEBRAL JOINT WITH INTERBODY FUSION DEVICE, ANTERIOR APPROACH, ANTERIOR COLUMN, OPEN APPROACH: ICD-10-PCS | Performed by: ORTHOPAEDIC SURGERY

## 2017-06-19 PROCEDURE — 77030013079 HC BLNKT BAIR HGGR 3M -A: Performed by: ANESTHESIOLOGY

## 2017-06-19 PROCEDURE — 77030012890: Performed by: ORTHOPAEDIC SURGERY

## 2017-06-19 PROCEDURE — 74011250636 HC RX REV CODE- 250/636

## 2017-06-19 PROCEDURE — 74011250636 HC RX REV CODE- 250/636: Performed by: NURSE ANESTHETIST, CERTIFIED REGISTERED

## 2017-06-19 PROCEDURE — 74011000250 HC RX REV CODE- 250: Performed by: ORTHOPAEDIC SURGERY

## 2017-06-19 PROCEDURE — 77030003029 HC SUT VCRL J&J -B: Performed by: ORTHOPAEDIC SURGERY

## 2017-06-19 PROCEDURE — 77030027703 HC MAXCESS 4 KT DISP NUVA -H: Performed by: ORTHOPAEDIC SURGERY

## 2017-06-19 PROCEDURE — 0ST20ZZ RESECTION OF LUMBAR VERTEBRAL DISC, OPEN APPROACH: ICD-10-PCS | Performed by: ORTHOPAEDIC SURGERY

## 2017-06-19 PROCEDURE — 74011250636 HC RX REV CODE- 250/636: Performed by: ANESTHESIOLOGY

## 2017-06-19 PROCEDURE — 74011250637 HC RX REV CODE- 250/637: Performed by: ORTHOPAEDIC SURGERY

## 2017-06-19 PROCEDURE — 74011636637 HC RX REV CODE- 636/637: Performed by: ANESTHESIOLOGY

## 2017-06-19 PROCEDURE — 80048 BASIC METABOLIC PNL TOTAL CA: CPT | Performed by: ORTHOPAEDIC SURGERY

## 2017-06-19 PROCEDURE — 97161 PT EVAL LOW COMPLEX 20 MIN: CPT

## 2017-06-19 PROCEDURE — 74011250636 HC RX REV CODE- 250/636: Performed by: NURSE PRACTITIONER

## 2017-06-19 PROCEDURE — 77030008283: Performed by: ORTHOPAEDIC SURGERY

## 2017-06-19 PROCEDURE — 77030030409 HC DIL SPN KT M5 DISP NUVA -G: Performed by: ORTHOPAEDIC SURGERY

## 2017-06-19 PROCEDURE — 77030020782 HC GWN BAIR PAWS FLX 3M -B: Performed by: ORTHOPAEDIC SURGERY

## 2017-06-19 PROCEDURE — 77030032490 HC SLV COMPR SCD KNE COVD -B: Performed by: ORTHOPAEDIC SURGERY

## 2017-06-19 PROCEDURE — 36415 COLL VENOUS BLD VENIPUNCTURE: CPT | Performed by: ORTHOPAEDIC SURGERY

## 2017-06-19 PROCEDURE — 77030020268 HC MISC GENERAL SUPPLY: Performed by: ORTHOPAEDIC SURGERY

## 2017-06-19 PROCEDURE — 74011250637 HC RX REV CODE- 250/637: Performed by: ANESTHESIOLOGY

## 2017-06-19 PROCEDURE — 77030008683 HC TU ET CUF COVD -A: Performed by: ANESTHESIOLOGY

## 2017-06-19 PROCEDURE — 77030011640 HC PAD GRND REM COVD -A: Performed by: ORTHOPAEDIC SURGERY

## 2017-06-19 PROCEDURE — 74011636637 HC RX REV CODE- 636/637: Performed by: ORTHOPAEDIC SURGERY

## 2017-06-19 PROCEDURE — 74011000250 HC RX REV CODE- 250

## 2017-06-19 PROCEDURE — 74011250636 HC RX REV CODE- 250/636: Performed by: ORTHOPAEDIC SURGERY

## 2017-06-19 PROCEDURE — 77030027138 HC INCENT SPIROMETER -A

## 2017-06-19 PROCEDURE — 77030018836 HC SOL IRR NACL ICUM -A: Performed by: ORTHOPAEDIC SURGERY

## 2017-06-19 PROCEDURE — 76060000034 HC ANESTHESIA 1.5 TO 2 HR: Performed by: ORTHOPAEDIC SURGERY

## 2017-06-19 PROCEDURE — 77030028990 HC ADH TISS DERMFLX CHMP -B: Performed by: ORTHOPAEDIC SURGERY

## 2017-06-19 PROCEDURE — 74011636637 HC RX REV CODE- 636/637: Performed by: NURSE ANESTHETIST, CERTIFIED REGISTERED

## 2017-06-19 PROCEDURE — 76010000153 HC OR TIME 1.5 TO 2 HR: Performed by: ORTHOPAEDIC SURGERY

## 2017-06-19 PROCEDURE — 65270000029 HC RM PRIVATE

## 2017-06-19 PROCEDURE — 77030034850: Performed by: ORTHOPAEDIC SURGERY

## 2017-06-19 PROCEDURE — 82962 GLUCOSE BLOOD TEST: CPT

## 2017-06-19 DEVICE — IMPLANTABLE DEVICE: Type: IMPLANTABLE DEVICE | Site: SPINE LUMBAR | Status: FUNCTIONAL

## 2017-06-19 DEVICE — GRAFT BONE SUB 5ML PUTTY CA PHOS SYNTH GRAN BIOABSRB ATTRAX: Type: IMPLANTABLE DEVICE | Site: SPINE LUMBAR | Status: FUNCTIONAL

## 2017-06-19 RX ORDER — MAGNESIUM SULFATE 100 %
4 CRYSTALS MISCELLANEOUS AS NEEDED
Status: DISCONTINUED | OUTPATIENT
Start: 2017-06-19 | End: 2017-06-21 | Stop reason: HOSPADM

## 2017-06-19 RX ORDER — SODIUM CHLORIDE 0.9 % (FLUSH) 0.9 %
5-10 SYRINGE (ML) INJECTION AS NEEDED
Status: DISCONTINUED | OUTPATIENT
Start: 2017-06-19 | End: 2017-06-21 | Stop reason: HOSPADM

## 2017-06-19 RX ORDER — MIDAZOLAM HYDROCHLORIDE 1 MG/ML
INJECTION, SOLUTION INTRAMUSCULAR; INTRAVENOUS AS NEEDED
Status: DISCONTINUED | OUTPATIENT
Start: 2017-06-19 | End: 2017-06-19 | Stop reason: HOSPADM

## 2017-06-19 RX ORDER — HYDROMORPHONE HYDROCHLORIDE 1 MG/ML
2 INJECTION, SOLUTION INTRAMUSCULAR; INTRAVENOUS; SUBCUTANEOUS
Status: DISCONTINUED | OUTPATIENT
Start: 2017-06-19 | End: 2017-06-20

## 2017-06-19 RX ORDER — SODIUM CHLORIDE, SODIUM LACTATE, POTASSIUM CHLORIDE, CALCIUM CHLORIDE 600; 310; 30; 20 MG/100ML; MG/100ML; MG/100ML; MG/100ML
50 INJECTION, SOLUTION INTRAVENOUS CONTINUOUS
Status: DISCONTINUED | OUTPATIENT
Start: 2017-06-19 | End: 2017-06-19 | Stop reason: HOSPADM

## 2017-06-19 RX ORDER — ONDANSETRON 2 MG/ML
INJECTION INTRAMUSCULAR; INTRAVENOUS AS NEEDED
Status: DISCONTINUED | OUTPATIENT
Start: 2017-06-19 | End: 2017-06-19 | Stop reason: HOSPADM

## 2017-06-19 RX ORDER — HYDROMORPHONE HYDROCHLORIDE 2 MG/ML
INJECTION, SOLUTION INTRAMUSCULAR; INTRAVENOUS; SUBCUTANEOUS AS NEEDED
Status: DISCONTINUED | OUTPATIENT
Start: 2017-06-19 | End: 2017-06-19 | Stop reason: HOSPADM

## 2017-06-19 RX ORDER — ONDANSETRON 4 MG/1
4 TABLET, FILM COATED ORAL
Status: DISCONTINUED | OUTPATIENT
Start: 2017-06-19 | End: 2017-06-21 | Stop reason: HOSPADM

## 2017-06-19 RX ORDER — DEXTROSE 50 % IN WATER (D50W) INTRAVENOUS SYRINGE
25-50 AS NEEDED
Status: DISCONTINUED | OUTPATIENT
Start: 2017-06-19 | End: 2017-06-21 | Stop reason: HOSPADM

## 2017-06-19 RX ORDER — ONDANSETRON 2 MG/ML
4 INJECTION INTRAMUSCULAR; INTRAVENOUS ONCE
Status: COMPLETED | OUTPATIENT
Start: 2017-06-19 | End: 2017-06-19

## 2017-06-19 RX ORDER — CARVEDILOL 6.25 MG/1
6.25 TABLET ORAL 2 TIMES DAILY WITH MEALS
Status: DISCONTINUED | OUTPATIENT
Start: 2017-06-19 | End: 2017-06-21 | Stop reason: HOSPADM

## 2017-06-19 RX ORDER — SUCCINYLCHOLINE CHLORIDE 20 MG/ML
INJECTION INTRAMUSCULAR; INTRAVENOUS AS NEEDED
Status: DISCONTINUED | OUTPATIENT
Start: 2017-06-19 | End: 2017-06-19 | Stop reason: HOSPADM

## 2017-06-19 RX ORDER — PROPOFOL 10 MG/ML
INJECTION, EMULSION INTRAVENOUS AS NEEDED
Status: DISCONTINUED | OUTPATIENT
Start: 2017-06-19 | End: 2017-06-19 | Stop reason: HOSPADM

## 2017-06-19 RX ORDER — INSULIN LISPRO 100 [IU]/ML
INJECTION, SOLUTION INTRAVENOUS; SUBCUTANEOUS EVERY 6 HOURS
Status: DISCONTINUED | OUTPATIENT
Start: 2017-06-19 | End: 2017-06-20

## 2017-06-19 RX ORDER — CYCLOBENZAPRINE HCL 10 MG
10 TABLET ORAL
Status: DISCONTINUED | OUTPATIENT
Start: 2017-06-19 | End: 2017-06-21 | Stop reason: HOSPADM

## 2017-06-19 RX ORDER — GABAPENTIN 400 MG/1
400 CAPSULE ORAL 3 TIMES DAILY
Status: DISCONTINUED | OUTPATIENT
Start: 2017-06-19 | End: 2017-06-21 | Stop reason: HOSPADM

## 2017-06-19 RX ORDER — NALOXONE HYDROCHLORIDE 0.4 MG/ML
0.4 INJECTION, SOLUTION INTRAMUSCULAR; INTRAVENOUS; SUBCUTANEOUS AS NEEDED
Status: DISCONTINUED | OUTPATIENT
Start: 2017-06-19 | End: 2017-06-21 | Stop reason: HOSPADM

## 2017-06-19 RX ORDER — SODIUM CHLORIDE 0.9 % (FLUSH) 0.9 %
5-10 SYRINGE (ML) INJECTION EVERY 8 HOURS
Status: DISCONTINUED | OUTPATIENT
Start: 2017-06-19 | End: 2017-06-21 | Stop reason: HOSPADM

## 2017-06-19 RX ORDER — FAMOTIDINE 20 MG/1
20 TABLET, FILM COATED ORAL ONCE
Status: COMPLETED | OUTPATIENT
Start: 2017-06-19 | End: 2017-06-19

## 2017-06-19 RX ORDER — SODIUM CHLORIDE 0.9 % (FLUSH) 0.9 %
5-10 SYRINGE (ML) INJECTION EVERY 8 HOURS
Status: DISCONTINUED | OUTPATIENT
Start: 2017-06-19 | End: 2017-06-19 | Stop reason: HOSPADM

## 2017-06-19 RX ORDER — DEXAMETHASONE SODIUM PHOSPHATE 4 MG/ML
INJECTION, SOLUTION INTRA-ARTICULAR; INTRALESIONAL; INTRAMUSCULAR; INTRAVENOUS; SOFT TISSUE AS NEEDED
Status: DISCONTINUED | OUTPATIENT
Start: 2017-06-19 | End: 2017-06-19 | Stop reason: HOSPADM

## 2017-06-19 RX ORDER — OXYCODONE AND ACETAMINOPHEN 10; 325 MG/1; MG/1
1 TABLET ORAL
Status: DISCONTINUED | OUTPATIENT
Start: 2017-06-19 | End: 2017-06-21 | Stop reason: HOSPADM

## 2017-06-19 RX ORDER — GLYCOPYRROLATE 0.2 MG/ML
INJECTION INTRAMUSCULAR; INTRAVENOUS AS NEEDED
Status: DISCONTINUED | OUTPATIENT
Start: 2017-06-19 | End: 2017-06-19 | Stop reason: HOSPADM

## 2017-06-19 RX ORDER — CEFAZOLIN SODIUM 2 G/50ML
2 SOLUTION INTRAVENOUS ONCE
Status: COMPLETED | OUTPATIENT
Start: 2017-06-19 | End: 2017-06-19

## 2017-06-19 RX ORDER — FENTANYL CITRATE 50 UG/ML
INJECTION, SOLUTION INTRAMUSCULAR; INTRAVENOUS AS NEEDED
Status: DISCONTINUED | OUTPATIENT
Start: 2017-06-19 | End: 2017-06-19 | Stop reason: HOSPADM

## 2017-06-19 RX ORDER — LEVOTHYROXINE SODIUM 75 UG/1
75 TABLET ORAL
Status: DISCONTINUED | OUTPATIENT
Start: 2017-06-20 | End: 2017-06-21 | Stop reason: HOSPADM

## 2017-06-19 RX ORDER — VANCOMYCIN HYDROCHLORIDE 1 G/20ML
INJECTION, POWDER, LYOPHILIZED, FOR SOLUTION INTRAVENOUS AS NEEDED
Status: DISCONTINUED | OUTPATIENT
Start: 2017-06-19 | End: 2017-06-19 | Stop reason: HOSPADM

## 2017-06-19 RX ORDER — HYDROMORPHONE HYDROCHLORIDE 1 MG/ML
1 INJECTION, SOLUTION INTRAMUSCULAR; INTRAVENOUS; SUBCUTANEOUS
Status: DISCONTINUED | OUTPATIENT
Start: 2017-06-19 | End: 2017-06-19

## 2017-06-19 RX ORDER — LIDOCAINE HYDROCHLORIDE 20 MG/ML
INJECTION, SOLUTION EPIDURAL; INFILTRATION; INTRACAUDAL; PERINEURAL AS NEEDED
Status: DISCONTINUED | OUTPATIENT
Start: 2017-06-19 | End: 2017-06-19 | Stop reason: HOSPADM

## 2017-06-19 RX ORDER — SODIUM CHLORIDE 0.9 % (FLUSH) 0.9 %
5-10 SYRINGE (ML) INJECTION AS NEEDED
Status: DISCONTINUED | OUTPATIENT
Start: 2017-06-19 | End: 2017-06-19 | Stop reason: HOSPADM

## 2017-06-19 RX ORDER — LORAZEPAM 2 MG/ML
1 INJECTION INTRAMUSCULAR
Status: DISCONTINUED | OUTPATIENT
Start: 2017-06-19 | End: 2017-06-21 | Stop reason: HOSPADM

## 2017-06-19 RX ORDER — INSULIN LISPRO 100 [IU]/ML
INJECTION, SOLUTION INTRAVENOUS; SUBCUTANEOUS ONCE
Status: COMPLETED | OUTPATIENT
Start: 2017-06-19 | End: 2017-06-19

## 2017-06-19 RX ORDER — BUPIVACAINE HYDROCHLORIDE AND EPINEPHRINE 5; 5 MG/ML; UG/ML
INJECTION, SOLUTION EPIDURAL; INTRACAUDAL; PERINEURAL AS NEEDED
Status: DISCONTINUED | OUTPATIENT
Start: 2017-06-19 | End: 2017-06-19 | Stop reason: HOSPADM

## 2017-06-19 RX ORDER — EPHEDRINE SULFATE/0.9% NACL/PF 25 MG/5 ML
SYRINGE (ML) INTRAVENOUS AS NEEDED
Status: DISCONTINUED | OUTPATIENT
Start: 2017-06-19 | End: 2017-06-19 | Stop reason: HOSPADM

## 2017-06-19 RX ORDER — DOXAZOSIN 1 MG/1
2 TABLET ORAL DAILY
Status: DISCONTINUED | OUTPATIENT
Start: 2017-06-20 | End: 2017-06-21 | Stop reason: HOSPADM

## 2017-06-19 RX ORDER — QUINAPRIL 10 MG/1
40 TABLET ORAL
Status: DISCONTINUED | OUTPATIENT
Start: 2017-06-19 | End: 2017-06-21 | Stop reason: HOSPADM

## 2017-06-19 RX ORDER — FUROSEMIDE 40 MG/1
40 TABLET ORAL DAILY
Status: DISCONTINUED | OUTPATIENT
Start: 2017-06-20 | End: 2017-06-21 | Stop reason: HOSPADM

## 2017-06-19 RX ORDER — MAGNESIUM SULFATE 100 %
4 CRYSTALS MISCELLANEOUS AS NEEDED
Status: DISCONTINUED | OUTPATIENT
Start: 2017-06-19 | End: 2017-06-19 | Stop reason: HOSPADM

## 2017-06-19 RX ORDER — HYDROMORPHONE HYDROCHLORIDE 2 MG/ML
0.5 INJECTION, SOLUTION INTRAMUSCULAR; INTRAVENOUS; SUBCUTANEOUS
Status: DISCONTINUED | OUTPATIENT
Start: 2017-06-19 | End: 2017-06-19 | Stop reason: HOSPADM

## 2017-06-19 RX ORDER — ALBUTEROL SULFATE 0.83 MG/ML
2.5 SOLUTION RESPIRATORY (INHALATION) AS NEEDED
Status: DISCONTINUED | OUTPATIENT
Start: 2017-06-19 | End: 2017-06-19 | Stop reason: HOSPADM

## 2017-06-19 RX ORDER — CLOPIDOGREL BISULFATE 75 MG/1
75 TABLET ORAL DAILY
Status: DISCONTINUED | OUTPATIENT
Start: 2017-06-20 | End: 2017-06-21 | Stop reason: HOSPADM

## 2017-06-19 RX ORDER — ONDANSETRON 2 MG/ML
4 INJECTION INTRAMUSCULAR; INTRAVENOUS
Status: DISCONTINUED | OUTPATIENT
Start: 2017-06-19 | End: 2017-06-21 | Stop reason: HOSPADM

## 2017-06-19 RX ORDER — DIAZEPAM 5 MG/1
5 TABLET ORAL
Status: DISCONTINUED | OUTPATIENT
Start: 2017-06-19 | End: 2017-06-21 | Stop reason: HOSPADM

## 2017-06-19 RX ORDER — METFORMIN HYDROCHLORIDE 500 MG/1
1000 TABLET ORAL 2 TIMES DAILY WITH MEALS
Status: DISCONTINUED | OUTPATIENT
Start: 2017-06-19 | End: 2017-06-20

## 2017-06-19 RX ORDER — CEFAZOLIN SODIUM 2 G/50ML
2 SOLUTION INTRAVENOUS EVERY 8 HOURS
Status: DISCONTINUED | OUTPATIENT
Start: 2017-06-19 | End: 2017-06-21 | Stop reason: HOSPADM

## 2017-06-19 RX ORDER — DIPHENHYDRAMINE HYDROCHLORIDE 50 MG/ML
12.5 INJECTION, SOLUTION INTRAMUSCULAR; INTRAVENOUS
Status: DISCONTINUED | OUTPATIENT
Start: 2017-06-19 | End: 2017-06-19 | Stop reason: HOSPADM

## 2017-06-19 RX ORDER — KETOROLAC TROMETHAMINE 30 MG/ML
INJECTION, SOLUTION INTRAMUSCULAR; INTRAVENOUS AS NEEDED
Status: DISCONTINUED | OUTPATIENT
Start: 2017-06-19 | End: 2017-06-19 | Stop reason: HOSPADM

## 2017-06-19 RX ORDER — NALOXONE HYDROCHLORIDE 0.4 MG/ML
0.4 INJECTION, SOLUTION INTRAMUSCULAR; INTRAVENOUS; SUBCUTANEOUS AS NEEDED
Status: DISCONTINUED | OUTPATIENT
Start: 2017-06-19 | End: 2017-06-19 | Stop reason: HOSPADM

## 2017-06-19 RX ORDER — DEXTROSE 50 % IN WATER (D50W) INTRAVENOUS SYRINGE
25-50 AS NEEDED
Status: DISCONTINUED | OUTPATIENT
Start: 2017-06-19 | End: 2017-06-19 | Stop reason: HOSPADM

## 2017-06-19 RX ORDER — DIPHENHYDRAMINE HYDROCHLORIDE 50 MG/ML
12.5 INJECTION, SOLUTION INTRAMUSCULAR; INTRAVENOUS
Status: DISCONTINUED | OUTPATIENT
Start: 2017-06-19 | End: 2017-06-21 | Stop reason: HOSPADM

## 2017-06-19 RX ADMIN — GABAPENTIN 400 MG: 400 CAPSULE ORAL at 21:18

## 2017-06-19 RX ADMIN — HYDROMORPHONE HYDROCHLORIDE 2 MG: 1 INJECTION, SOLUTION INTRAMUSCULAR; INTRAVENOUS; SUBCUTANEOUS at 22:25

## 2017-06-19 RX ADMIN — GLYCOPYRROLATE 0.2 MG: 0.2 INJECTION INTRAMUSCULAR; INTRAVENOUS at 11:45

## 2017-06-19 RX ADMIN — MIDAZOLAM HYDROCHLORIDE 2 MG: 1 INJECTION, SOLUTION INTRAMUSCULAR; INTRAVENOUS at 11:45

## 2017-06-19 RX ADMIN — Medication 20 MG: at 12:24

## 2017-06-19 RX ADMIN — FAMOTIDINE 20 MG: 20 TABLET, FILM COATED ORAL at 10:40

## 2017-06-19 RX ADMIN — SODIUM CHLORIDE, SODIUM LACTATE, POTASSIUM CHLORIDE, AND CALCIUM CHLORIDE: 600; 310; 30; 20 INJECTION, SOLUTION INTRAVENOUS at 11:45

## 2017-06-19 RX ADMIN — SODIUM CHLORIDE, SODIUM LACTATE, POTASSIUM CHLORIDE, AND CALCIUM CHLORIDE: 600; 310; 30; 20 INJECTION, SOLUTION INTRAVENOUS at 12:39

## 2017-06-19 RX ADMIN — FENTANYL CITRATE 100 MCG: 50 INJECTION, SOLUTION INTRAMUSCULAR; INTRAVENOUS at 11:53

## 2017-06-19 RX ADMIN — FENTANYL CITRATE 50 MCG: 50 INJECTION, SOLUTION INTRAMUSCULAR; INTRAVENOUS at 12:48

## 2017-06-19 RX ADMIN — LIDOCAINE HYDROCHLORIDE 100 MG: 20 INJECTION, SOLUTION EPIDURAL; INFILTRATION; INTRACAUDAL; PERINEURAL at 11:53

## 2017-06-19 RX ADMIN — Medication 10 ML: at 21:14

## 2017-06-19 RX ADMIN — SODIUM CHLORIDE, SODIUM LACTATE, POTASSIUM CHLORIDE, AND CALCIUM CHLORIDE 50 ML/HR: 600; 310; 30; 20 INJECTION, SOLUTION INTRAVENOUS at 14:13

## 2017-06-19 RX ADMIN — HYDROMORPHONE HYDROCHLORIDE 1 MG: 2 INJECTION, SOLUTION INTRAMUSCULAR; INTRAVENOUS; SUBCUTANEOUS at 12:27

## 2017-06-19 RX ADMIN — CEFAZOLIN SODIUM 2 G: 2 SOLUTION INTRAVENOUS at 18:10

## 2017-06-19 RX ADMIN — ONDANSETRON 4 MG: 2 INJECTION INTRAMUSCULAR; INTRAVENOUS at 14:35

## 2017-06-19 RX ADMIN — PROPOFOL 100 MG: 10 INJECTION, EMULSION INTRAVENOUS at 12:00

## 2017-06-19 RX ADMIN — Medication 10 MG: at 12:22

## 2017-06-19 RX ADMIN — KETOROLAC TROMETHAMINE 30 MG: 30 INJECTION, SOLUTION INTRAMUSCULAR; INTRAVENOUS at 13:33

## 2017-06-19 RX ADMIN — PROPOFOL 100 MG: 10 INJECTION, EMULSION INTRAVENOUS at 13:23

## 2017-06-19 RX ADMIN — OXYCODONE HYDROCHLORIDE AND ACETAMINOPHEN 1 TABLET: 10; 325 TABLET ORAL at 16:16

## 2017-06-19 RX ADMIN — OXYCODONE HYDROCHLORIDE AND ACETAMINOPHEN 1 TABLET: 10; 325 TABLET ORAL at 21:12

## 2017-06-19 RX ADMIN — PROPOFOL 200 MG: 10 INJECTION, EMULSION INTRAVENOUS at 11:53

## 2017-06-19 RX ADMIN — ONDANSETRON 4 MG: 2 INJECTION INTRAMUSCULAR; INTRAVENOUS at 11:45

## 2017-06-19 RX ADMIN — INSULIN LISPRO 12 UNITS: 100 INJECTION, SOLUTION INTRAVENOUS; SUBCUTANEOUS at 10:48

## 2017-06-19 RX ADMIN — METFORMIN HYDROCHLORIDE 1000 MG: 500 TABLET, FILM COATED ORAL at 18:01

## 2017-06-19 RX ADMIN — FENTANYL CITRATE 50 MCG: 50 INJECTION, SOLUTION INTRAMUSCULAR; INTRAVENOUS at 13:23

## 2017-06-19 RX ADMIN — HYDROMORPHONE HYDROCHLORIDE 0.5 MG: 2 INJECTION, SOLUTION INTRAMUSCULAR; INTRAVENOUS; SUBCUTANEOUS at 14:27

## 2017-06-19 RX ADMIN — Medication 10 ML: at 10:40

## 2017-06-19 RX ADMIN — Medication 10 ML: at 18:11

## 2017-06-19 RX ADMIN — HYDROMORPHONE HYDROCHLORIDE 0.5 MG: 2 INJECTION, SOLUTION INTRAMUSCULAR; INTRAVENOUS; SUBCUTANEOUS at 14:19

## 2017-06-19 RX ADMIN — HYDROMORPHONE HYDROCHLORIDE 1 MG: 1 INJECTION, SOLUTION INTRAMUSCULAR; INTRAVENOUS; SUBCUTANEOUS at 18:01

## 2017-06-19 RX ADMIN — FENTANYL CITRATE 50 MCG: 50 INJECTION, SOLUTION INTRAMUSCULAR; INTRAVENOUS at 13:49

## 2017-06-19 RX ADMIN — CARVEDILOL 6.25 MG: 6.25 TABLET, FILM COATED ORAL at 16:15

## 2017-06-19 RX ADMIN — DEXAMETHASONE SODIUM PHOSPHATE 8 MG: 4 INJECTION, SOLUTION INTRA-ARTICULAR; INTRALESIONAL; INTRAMUSCULAR; INTRAVENOUS; SOFT TISSUE at 11:59

## 2017-06-19 RX ADMIN — GABAPENTIN 400 MG: 400 CAPSULE ORAL at 16:15

## 2017-06-19 RX ADMIN — SODIUM CHLORIDE, SODIUM LACTATE, POTASSIUM CHLORIDE, AND CALCIUM CHLORIDE 50 ML/HR: 600; 310; 30; 20 INJECTION, SOLUTION INTRAVENOUS at 10:40

## 2017-06-19 RX ADMIN — INSULIN LISPRO 8 UNITS: 100 INJECTION, SOLUTION INTRAVENOUS; SUBCUTANEOUS at 17:52

## 2017-06-19 RX ADMIN — INSULIN LISPRO 6 UNITS: 100 INJECTION, SOLUTION INTRAVENOUS; SUBCUTANEOUS at 14:10

## 2017-06-19 RX ADMIN — HYDROMORPHONE HYDROCHLORIDE 0.5 MG: 2 INJECTION, SOLUTION INTRAMUSCULAR; INTRAVENOUS; SUBCUTANEOUS at 14:12

## 2017-06-19 RX ADMIN — CEFAZOLIN SODIUM 2 G: 2 SOLUTION INTRAVENOUS at 11:45

## 2017-06-19 RX ADMIN — TRAZODONE HYDROCHLORIDE 150 MG: 100 TABLET ORAL at 21:18

## 2017-06-19 RX ADMIN — LORAZEPAM 1 MG: 2 INJECTION INTRAMUSCULAR; INTRAVENOUS at 20:10

## 2017-06-19 RX ADMIN — SUCCINYLCHOLINE CHLORIDE 140 MG: 20 INJECTION INTRAMUSCULAR; INTRAVENOUS at 11:53

## 2017-06-19 NOTE — ANESTHESIA PREPROCEDURE EVALUATION
Anesthetic History   No history of anesthetic complications            Review of Systems / Medical History  Patient summary reviewed and pertinent labs reviewed    Pulmonary  Within defined limits      Sleep apnea: CPAP           Neuro/Psych   Within defined limits           Cardiovascular    Hypertension          Past MI, CAD and cardiac stents    Exercise tolerance: >4 METS     GI/Hepatic/Renal  Within defined limits   GERD: poorly controlled           Endo/Other    Diabetes: type 2, using insulin    Morbid obesity and arthritis     Other Findings   Comments:   Risk Factors for Postoperative nausea/vomiting:       History of postoperative nausea/vomiting? NO       Female? NO       Motion sickness? NO       Intended opioid administration for postoperative analgesia? YES      Smoking Abstinence  Current Smoker? NO  Elective Surgery? YES  Seen preoperatively by anesthesiologist or proxy prior to day of surgery? YES  Pt abstained from smoking 24 hours prior to anesthesia?  YES               Physical Exam    Airway  Mallampati: II  TM Distance: 4 - 6 cm  Neck ROM: normal range of motion   Mouth opening: Normal     Cardiovascular    Rhythm: regular  Rate: normal         Dental    Dentition: Poor dentition     Pulmonary  Breath sounds clear to auscultation               Abdominal  GI exam deferred       Other Findings            Anesthetic Plan    ASA: 3  Anesthesia type: general          Induction: Intravenous  Anesthetic plan and risks discussed with: Patient

## 2017-06-19 NOTE — PROGRESS NOTES
Patient arrived from [facility] in stable condition at [phone], no apparent distress noted, no neurological deficit. Assist patient to the bathroom. Patient tolerate activity well. Patient denies any acute distress, SOB and chest pain. Patient complain of lower back pain, will medicate for pain. Patient in stable condition, vitals WNL. Instruct patient to use the ICS, patient demonstrate appropriately. Call bell within reach, will continue to monitor.

## 2017-06-19 NOTE — PROGRESS NOTES
Problem: Mobility Impaired (Adult and Pediatric)  Goal: *Acute Goals and Plan of Care (Insert Text)  STGs to be addressed within 3 days:  1. Bed mobility: Supine to sit to supine S with HR for meals. 2. Activity tolerance: Tolerate up in chair 1-2 hrs for ADLs. 3. Transfers: Sit to stand to chair S with LRAD for ADLs. LTGs to be addressed within 7 days:  1. Standing/Ambulation Balance: Increase to Good with LRAD for safe transfers and gait. 2. Ambulation: Ambulate > 200 ft. S with LRAD for home mobility. 3. Patient Education: Independent with HEP for home safety. 4. Stairs: Up/Down 3 steps CGA with HR for home entry. Outcome: Progressing Towards Goal  PHYSICAL THERAPY EVALUATION     Patient: Seda Jovel (38 y.o. male)  Date: 6/19/2017  Primary Diagnosis: Lumbar spinal stenosis [M48.06]  Spondylolisthesis, lumbar region [M43.16]  Procedure(s) (LRB):  L4/5 EXTREME LATERAL INTERBODY FUSION/C-ARM/NUVASIVE (N/A) Day of Surgery   Precautions:   Fall, Back      ASSESSMENT :  Based on the objective data described below, the patient presents to PT s/p L4-L5 extreme lateral interbody fusion with decreased functional mobility with regard to bed mobility, transfers, gait, and overall tolerance for activity. Patient reports that he was independent with ADL's PTA, occ difficulties with donning/doffing socks/shoes, ambulatory without assistive device. Patient reports that L LE PTA was more painful than R LE. Today, patient received sidelying, transitioned into sitting EoB min A vc's for maintaining log roll. Patient transitioned into standing CGA, able to ambulate ~ 25 ft CGA with RW, vc's for maintaining CoG over Bernadette within RW. Patient returned to sidelying position, placed pillow between legs for comfort. Patient would benefit from PT to address above impairments and assist with discharge planning. Patient will benefit from skilled intervention to address the above impairments.   Patients rehabilitation potential is considered to be Good  Factors which may influence rehabilitation potential include:   [X]         None noted  [ ]         Mental ability/status  [ ]         Medical condition  [ ]         Home/family situation and support systems  [ ]         Safety awareness  [ ]         Pain tolerance/management  [ ]         Other:        PLAN :  Recommendations and Planned Interventions:  [X]           Bed Mobility Training             [X]    Neuromuscular Re-Education  [X]           Transfer Training                   [ ]    Orthotic/Prosthetic Training  [X]           Gait Training                          [ ]    Modalities  [X]           Therapeutic Exercises          [ ]    Edema Management/Control  [X]           Therapeutic Activities            [X]    Patient and Family Training/Education  [ ]           Other (comment):     Frequency/Duration: Patient will be followed by physical therapy twice daily to address goals. Discharge Recommendations: Home Health  Further Equipment Recommendations for Discharge: To be determined       SUBJECTIVE:   Patient stated What do I need to do?       OBJECTIVE DATA SUMMARY:       Past Medical History:   Diagnosis Date    Arthritis      Coronary atherosclerosis of native coronary artery       S/P PCI with GE in 12/09    Diabetes ([facility] [address]       IDDM    Electrolyte and fluid disorders not elsewhere classified      Essential hypertension, benign      GERD (gastroesophageal reflux disease)      Heroin abuse 05/26/16     Psychiatrist Dr. Genny Hill History of heart attack      Hyperlipidemia      Intermediate coronary syndrome [facility])      MDD (major depressive disorder) 5/26/16     Psychiatrist Dr. Genny Hill Myocardial infarction [facility])      Obesity, unspecified      Old myocardial infarction      Other and unspecified hyperlipidemia      Pancreatitis      Positive PPD      Sleep apnea       uses Cpap machine    Transfusion history Before 1992     Past Surgical History:   Procedure Laterality Date    HX APPENDECTOMY        HX CORONARY STENT PLACEMENT   2010     2 stents     Barriers to Learning/Limitations: None  Compensate with: N/A  Prior Level of Function/Home Situation:   Home Situation  Home Environment: Private residence  # Steps to Enter: 3  Rails to Enter: Yes  Hand Rails : Right  One/Two Story Residence: One story  Living Alone: No  Support Systems: Family member(s)  Patient Expects to be Discharged to[de-identified] Private residence  Current DME Used/Available at Home: [place], straight, Walker, rolling, CPAP  Tub or Shower Type: Tub/Shower combination  Critical Behavior:  Neurologic State: Alert; Appropriate for age  Psychosocial  Patient Behaviors: Calm; Cooperative  Family  Behaviors: Appropriate for situation;Calm; Cooperative;Supportive  Purposeful Interaction: Yes  Pt Identified Daily Priority: Clinical issues (comment)  Caring Interventions: Reassure  Reassure: Informing; Acceptance;Caring rounds  Strength:    Strength: Within functional limits (B LE)  Tone & Sensation:   Tone: Normal (B LE )  Sensation: Intact (B LE intact to LT)   Range Of Motion:  AROM: Within functional limits (B LE)  Functional Mobility:  Bed Mobility:  Rolling: Contact guard assistance  Supine to Sit: Minimum assistance; Additional time  Sit to Supine: Minimum assistance  Scooting: Contact guard assistance  Transfers:  Sit to Stand: Contact guard assistance  Stand to Sit: Contact guard assistance  Balance:   Sitting: Intact  Standing: Impaired; With support (with RW)  Standing - Static: Good;Constant support (with RW)  Standing - Dynamic : Fair (with RW)  Ambulation/Gait Training:  Distance (ft): 25 Feet (ft)  Assistive Device: Walker, rolling  Ambulation - Level of Assistance: Contact guard assistance  Base of Support: Widened  Speed/Little: Pace decreased (<100 feet/min)  Interventions: Verbal cues; Safety awareness training  Stairs:  Stairs - Level of Assistance: (N/A)  Pain:  Pain Scale 1: Numeric (0 - 10)  Pain Intensity 1: 7 (Pre/post)  Pain Location 1: Back  Pain Orientation 1: Lateral;Lower  Pain Description 1: Aching  Pain Intervention(s) 1: Repositioned  Activity Tolerance:   Good  Please refer to the flowsheet for vital signs taken during this treatment. After treatment:   [ ] Patient left in no apparent distress sitting up in chair  [ ] Patient left sitting on EOB  [X] Patient left in no apparent distress in bed  [ ] Patient declined to be OOB at this time due to   [X] Call bell left within reach  [X] Nursing notified(MARGOTH Salinas)  [X] Caregiver present  [ ] Bed alarm activated      COMMUNICATION/EDUCATION:   [X]         Fall prevention education was provided and the patient/caregiver indicated understanding. [X]         Patient/family have participated as able in goal setting and plan of care. [X]         Patient/family agree to work toward stated goals and plan of care. [ ]         Patient understands intent and goals of therapy, but is neutral about his/her participation. [ ]         Patient is unable to participate in goal setting and plan of care. Thank you for this referral.  Thomas Macdonald, PT   Time Calculation: 13 mins      G-codes:  Mobility  Current  CJ= 20-39%   Goal  CI= 1-19%. The severity rating is based on the Level of Assistance required for Functional Mobility and ADLs.      Eval Complexity: History: MEDIUM  Complexity : 1-2 comorbidities / personal factors will impact the outcome/ POC Exam:MEDIUM Complexity : 3 Standardized tests and measures addressing body structure, function, activity limitation and / or participation in recreation  Presentation: LOW Complexity : Stable, uncomplicated Overall Complexity:LOW

## 2017-06-19 NOTE — ANESTHESIA POSTPROCEDURE EVALUATION
Post-Anesthesia Evaluation and Assessment    Patient: Yang Beasley MRN: [id number]  SSN: [id number]    YOB: 1960  Age: 64 y.o. Sex: male       Cardiovascular Function/Vital Signs  Visit Vitals    /84    Pulse 75    Temp 36.4 °C (97.6 °F)    Resp 13    Ht 5' 9\" (1.753 m)    Wt 115.2 kg (254 lb)    SpO2 96%    BMI 37.51 kg/m2       Patient is status post general anesthesia for Procedure(s):  L4/5 EXTREME LATERAL INTERBODY FUSION/C-ARM/NUVASIVE. Nausea/Vomiting: None    Postoperative hydration reviewed and adequate. Pain:  Pain Scale 1: Numeric (0 - 10) (06/19/17 1436)  Pain Intensity 1: 3 (06/19/17 1436)   Managed    Neurological Status:   Neuro (WDL): Within Defined Limits (06/19/17 1351)   At baseline    Mental Status and Level of Consciousness: Arousable    Pulmonary Status:   O2 Device: Nasal cannula (06/19/17 1359)   Adequate oxygenation and airway patent    Complications related to anesthesia: None    Post-anesthesia assessment completed.  No concerns    Signed By: Lorena Rosales MD     June 19, 2017

## 2017-06-19 NOTE — IP AVS SNAPSHOT
[facility] ([place] 
 
 
 [address] Patient: Gayle Irwin MRN: [id number] DSL:[id number] You are allergic to the following Allergen Reactions Compazine (Prochlorperazine) Anaphylaxis \"Tightness around throat\" Recent Documentation Height Weight BMI Smoking Status 1.753 m 115.2 kg 37.51 kg/m2 Never Smoker Emergency Contacts Name Discharge Info Relation Home Work Mobile [address] CAREGIVER [3] Sister [23] [phone] Magda Sanford  Sister [23] [phone] About your hospitalization You were admitted on:  June 19, 2017 You last received care in the:  [facility] [address] You were discharged on:  June 21, 2017 Unit phone number:  [phone] Why you were hospitalized Your primary diagnosis was:  Not on File Your diagnoses also included:  Spinal Stenosis At L4-L5 Level, Lumbar Spinal Stenosis, Spondylolisthesis Of Lumbar Region Providers Seen During Your Hospitalizations Provider Role Specialty Primary office phone Jac Dang MD Attending Provider Orthopedic Surgery [phone] Your Primary Care Physician (PCP) Primary Care Physician Office Phone Office Fax [address] [phone] Follow-up Information Follow up With Details Comments Contact Info Milady Wood MD  Spoke with the office the patient can do a walk in appt on next Wednesday from 09:30 until 1:00pm Dr. Wing Camacho is out of the office next so they donot have any appts. [address] [facility] [address] [place] [id number] 
[phone] Romelia Holguin NP On 7/5/2017 July 5, 2017 @ 09:30 with Romelia Holguin NP. . [address] [facility] [address] [phone] Your Appointments Wednesday July 05, 2017  9:30 AM EDT Follow Up with Romelia Holguin NP  
 [facility] ([address]) [address] [facility] [address]  
[phone] Thursday August 03, 2017  8:15 AM EDT Office Visit with Lorraine Barnes MD  
[facility]) [address] [facility], [address] [facility] [address]  
[phone] Friday August 04, 2017  1:15 PM EDT  
POST OP with Conrado Solares MD  
[address] [facility], [address] [facility] [address]) [address] [facility] [address]  
[phone] Current Discharge Medication List  
  
CONTINUE these medications which have CHANGED Dose & Instructions Dispensing Information Comments Morning Noon Evening Bedtime  
 gabapentin 600 mg tablet Commonly known as:  NEURONTIN What changed:  how much to take Your last dose was: Your next dose is:    
   
   
 Dose:  600 mg Take 1 Tab by mouth three (3) times daily. Quantity:  90 Tab Refills:  1  
     
   
   
   
  
 * PERCOCET 7.5-325 mg per tablet Generic drug:  oxyCODONE-acetaminophen What changed:  Another medication with the same name was added. Make sure you understand how and when to take each. Your last dose was: Your next dose is:    
   
   
 Dose:  1 Tab Take 1 Tab by mouth every four (4) hours as needed for Pain. Refills:  0  
     
   
   
   
  
 * oxyCODONE-acetaminophen  mg per tablet Commonly known as:  PERCOCET 10 What changed: You were already taking a medication with the same name, and this prescription was added. Make sure you understand how and when to take each. Your last dose was: Your next dose is:    
   
   
 Dose:  1 Tab Take 1 Tab by mouth every six (6) hours as needed. Max Daily Amount: 4 Tabs. Quantity:  60 Tab Refills:  0  
     
   
   
   
  
 * Notice:   This list has 2 medication(s) that are the same as other medications prescribed for you. Read the directions carefully, and ask your doctor or other care provider to review them with you. CONTINUE these medications which have NOT CHANGED Dose & Instructions Dispensing Information Comments Morning Noon Evening Bedtime  
 carvedilol 6.25 mg tablet Commonly known as:  Manuelitoej Monzon Your last dose was: Your next dose is:    
   
   
 Dose:  6.25 mg Take 1 Tab by mouth two (2) times daily (with meals). Quantity:  60 Tab Refills:  6  
     
   
   
   
  
 clopidogrel 75 mg Tab Commonly known as:  PLAVIX Your last dose was: Your next dose is: Take  by mouth daily. Refills:  0  
     
   
   
   
  
 cyclobenzaprine 10 mg tablet Commonly known as:  FLEXERIL Your last dose was: Your next dose is: Take  by mouth three (3) times daily as needed for Muscle Spasm(s). Refills:  0  
     
   
   
   
  
 doxazosin 2 mg tablet Commonly known as:  CARDURA Your last dose was: Your next dose is:    
   
   
 Dose:  2 mg Take 2 mg by mouth daily. Refills:  0  
     
   
   
   
  
 furosemide 40 mg tablet Commonly known as:  LASIX Your last dose was: Your next dose is: Take  by mouth daily. Refills:  0 LEVEMIR 100 unit/mL injection Generic drug:  insulin detemir Your last dose was: Your next dose is:    
   
   
 Dose:  63 Units 63 Units by SubCUTAneous route nightly. Refills:  0  
     
   
   
   
  
 levothyroxine 75 mcg tablet Commonly known as:  SYNTHROID Your last dose was: Your next dose is: Take  by mouth Daily (before breakfast). Refills:  0  
     
   
   
   
  
 metFORMIN 1,000 mg tablet Commonly known as:  GLUCOPHAGE Your last dose was: Your next dose is:    
   
   
 Dose:  1000 mg Take 1,000 mg by mouth two (2) times daily (with meals). Refills:  0 NovoLOG Flexpen 100 unit/mL Inpn Generic drug:  insulin aspart Your last dose was: Your next dose is:    
   
   
 Dose:  65 Units 65 Units by SubCUTAneous route Before breakfast, lunch, and dinner. Refills:  0  
     
   
   
   
  
 omeprazole 20 mg capsule Commonly known as:  PRILOSEC Your last dose was: Your next dose is:    
   
   
 Dose:  20 mg Take 20 mg by mouth daily. Refills:  0  
     
   
   
   
  
 ondansetron hcl 4 mg tablet Commonly known as:  ZOFRAN (AS HYDROCHLORIDE) Your last dose was: Your next dose is:    
   
   
 Dose:  4 mg Take 1 Tab by mouth every eight (8) hours as needed for Nausea. Quantity:  20 Each Refills:  0 Polyethylene Glycol 3350 Powd Your last dose was: Your next dose is:    
   
   
 Dose:  1 Cap 1 Cap by Does Not Apply route. Refills:  0  
     
   
   
   
  
 quinapril 40 mg tablet Commonly known as:  ACCUPRIL Your last dose was: Your next dose is:    
   
   
 Dose:  40 mg Take 40 mg by mouth nightly. Refills:  0  
     
   
   
   
  
 raNITIdine 150 mg tablet Commonly known as:  ZANTAC Your last dose was: Your next dose is:    
   
   
 Dose:  150 mg Take 150 mg by mouth two (2) times a day. Refills:  0  
     
   
   
   
  
 simvastatin 10 mg tablet Commonly known as:  ZOCOR Your last dose was: Your next dose is: Take  by mouth nightly. Refills:  0  
     
   
   
   
  
 traZODone 150 mg tablet Commonly known as:  Viraj Burt Your last dose was: Your next dose is:    
   
   
 Dose:  150 mg Take 150 mg by mouth nightly. Refills:  0 STOP taking these medications   
 diclofenac EC 75 mg EC tablet Commonly known as:  VOLTAREN Where to Get Your Medications Information on where to get these meds will be given to you by the nurse or doctor. ! Ask your nurse or doctor about these medications  
  oxyCODONE-acetaminophen  mg per tablet Discharge Instructions Lumbar Spinal Stenosis: Care Instructions Your Care Instructions Stenosis in the spine is a narrowing of the canal that is around the spinal cord and nerve roots in your back. It can happen as part of aging. Sometimes bone and other tissue grow into this canal and press on the nerves that branch out from the spinal cord. This can cause pain, numbness, and weakness. When it happens in the lower part of your back, it is called lumbar spinal stenosis. It can cause problems in the legs, feet, and rear end (buttocks). You may be able to get relief from the symptoms of spinal stenosis by taking pain medicine. Your doctor may suggest physical therapy and exercises to keep your spine strong and flexible. Some people try steroid shots to reduce swelling. If pain and numbness in your legs are still so bad that you cannot do your normal activities, you may need surgery. Follow-up care is a key part of your treatment and safety. Be sure to make and go to all appointments, and call your doctor if you are having problems. It's also a good idea to know your test results and keep a list of the medicines you take. How can you care for yourself at home? · Take an over-the-counter pain medicine, such as acetaminophen (Tylenol), ibuprofen (Advil, Motrin), or naproxen (Aleve). Be safe with medicines. Read and follow all instructions on the label. · Do not take two or more pain medicines at the same time unless the doctor told you to. Many pain medicines have acetaminophen, which is Tylenol. Too much acetaminophen (Tylenol) can be harmful. · Stay at a healthy weight. Being overweight puts extra strain on your spine. · Change positions often when you sit or stand. This can ease pain.  It may also reduce pressure on the spinal cord and its nerves. · Avoid doing things that make your symptoms worse. Walking downhill and standing for a long time may cause pain. · Stretch and strengthen your back muscles as your doctor or physical therapist recommends. If your doctor says it is okay to do them, these exercises may help. ¨ Lie on your back with your knees bent. Gently pull one bent knee to your chest. Put that foot back on the floor, and then pull the other knee to your chest. 
¨ Do pelvic tilts. Lie on your back with your knees bent. Tighten your stomach muscles. Pull your belly button (navel) in and up toward your ribs. You should feel like your back is pressing to the floor and your hips and pelvis are slightly lifting off the floor. Hold for 6 seconds while breathing smoothly. ¨ Stand with your back flat against a wall. Slowly slide down until your knees are slightly bent. Hold for 10 seconds, then slide back up the wall. · Remove or change anything in your house that may cause you to fall. Keep walkways clear of clutter, electrical cords, and throw rugs. When should you call for help? Call 911 anytime you think you may need emergency care. For example, call if: 
· You are unable to move a leg at all. Call your doctor now or seek immediate medical care if: 
· You have new or worse symptoms in your legs, belly, or buttocks. Symptoms may include: ¨ Numbness or tingling. ¨ Weakness. ¨ Pain. · You lose bladder or bowel control. Watch closely for changes in your health, and be sure to contact your doctor if: 
· You are not getting better as expected. Where can you learn more? Go to http://kaila-carlitos.info/. Janee Wisdom in the search box to learn more about \"Lumbar Spinal Stenosis: Care Instructions. \" Current as of: March 21, 2017 Content Version: 11.3 © 2606-3757 [employer].  Care instructions adapted under license by [address] (which disclaims liability or warranty for this information). If you have questions about a medical condition or this instruction, always ask your healthcare professional. [address] any warranty or liability for your use of this information. Patient armband removed and shredded MyChart Activation Thank you for requesting access to Aqua Access. Please follow the instructions below to securely access and download your online medical record. Aqua Access allows you to send messages to your doctor, view your test results, renew your prescriptions, schedule appointments, and more. How Do I Sign Up? 1. In your internet browser, go to [email or web address] 
2. Click on the First Time User? Click Here link in the Sign In box. You will be redirect to the New Member Sign Up page. 3. Enter your Aqua Access Access Code exactly as it appears below. You will not need to use this code after youve completed the sign-up process. If you do not sign up before the expiration date, you must request a new code. Aqua Access Access Code: Activation code not generated Current Aqua Access Status: Patient Declined (This is the date your [employer] access code will ) 4. Enter the last four digits of your Social Security Number (xxxx) and Date of Birth (mm/dd/yyyy) as indicated and click Submit. You will be taken to the next sign-up page. 5. Create a Aqua Access ID. This will be your Aqua Access login ID and cannot be changed, so think of one that is secure and easy to remember. 6. Create a Aqua Access password. You can change your password at any time. 7. Enter your Password Reset Question and Answer. This can be used at a later time if you forget your password. 8. Enter your e-mail address. You will receive e-mail notification when new information is available in [address] 9. Click Sign Up. You can now view and download portions of your medical record. 10. Click the Download Summary menu link to download a portable copy of your medical information. Additional Information If you have questions, please visit the Frequently Asked Questions section of the [employer] website at [email or web address] Remember, MyChart is NOT to be used for urgent needs. For medical emergencies, dial 911. DISCHARGE SUMMARY from Nurse The following personal items are in your possession at time of discharge: 
 
Dental Appliances: None Visual Aid: None Home Medications: None Jewelry: None Clothing: Pants, Shirt, Footwear, Socks, Undergarments Other Valuables: None PATIENT INSTRUCTIONS: 
 
After general anesthesia or intravenous sedation, for 24 hours or while taking prescription Narcotics: · Limit your activities · Do not drive and operate hazardous machinery · Do not make important personal or business decisions · Do  not drink alcoholic beverages · If you have not urinated within 8 hours after discharge, please contact your surgeon on call. Report the following to your surgeon: 
· Excessive pain, swelling, redness or odor of or around the surgical area · Temperature over 100.5 · Nausea and vomiting lasting longer than 4 hours or if unable to take medications · Any signs of decreased circulation or nerve impairment to extremity: change in color, persistent  numbness, tingling, coldness or increase pain · Any questions What to do at Home: 
Recommended activity: Activity as tolerated If you experience any of the following symptoms uncontrolled pain,drainage and swelling from site, numbness or weakness in limbs, short of breath, chest pains, nausea, vomiting, diarrhea please follow up with PCP. *  Please give a list of your current medications to your Primary Care Provider.  
 
*  Please update this list whenever your medications are discontinued, doses are 
 changed, or new medications (including over-the-counter products) are added. *  Please carry medication information at all times in case of emergency situations. These are general instructions for a healthy lifestyle: No smoking/ No tobacco products/ Avoid exposure to second hand smoke Surgeon General's Warning:  Quitting smoking now greatly reduces serious risk to your health. Obesity, smoking, and sedentary lifestyle greatly increases your risk for illness A healthy diet, regular physical exercise & weight monitoring are important for maintaining a healthy lifestyle You may be retaining fluid if you have a history of heart failure or if you experience any of the following symptoms:  Weight gain of 3 pounds or more overnight or 5 pounds in a week, increased swelling in our hands or feet or shortness of breath while lying flat in bed. Please call your doctor as soon as you notice any of these symptoms; do not wait until your next office visit. Recognize signs and symptoms of STROKE: 
 
F-face looks uneven A-arms unable to move or move unevenly S-speech slurred or non-existent T-time-call 911 as soon as signs and symptoms begin-DO NOT go Back to bed or wait to see if you get better-TIME IS BRAIN. Warning Signs of HEART ATTACK Call 911 if you have these symptoms: 
? Chest discomfort. Most heart attacks involve discomfort in the center of the chest that lasts more than a few minutes, or that goes away and comes back. It can feel like uncomfortable pressure, squeezing, fullness, or pain. ? Discomfort in other areas of the upper body. Symptoms can include pain or discomfort in one or both arms, the back, neck, jaw, or stomach. ? Shortness of breath with or without chest discomfort. ? Other signs may include breaking out in a cold sweat, nausea, or lightheadedness. Don't wait more than five minutes to call [address]!  Fast action can save your life. Calling 911 is almost always the fastest way to get lifesaving treatment. Emergency Medical Services staff can begin treatment when they arrive  up to an hour sooner than if someone gets to the hospital by car. The discharge information has been reviewed with the patient. The patient verbalized understanding. Discharge medications reviewed with the patient and appropriate educational materials and side effects teaching were provided. Discharge Orders None General Information Please provide this summary of care documentation to your next provider. Patient Signature:  ____________________________________________________________ Date:  ____________________________________________________________  
  
[facility] Signature:  ____________________________________________________________ Date:  ____________________________________________________________

## 2017-06-19 NOTE — PROGRESS NOTES
Patient noncompliance with his back precaution. Refuse to call for assistance, walk while SDC hook on SCD machine. Patient has bee educated several times on fall precaution, family were educated as well.

## 2017-06-19 NOTE — OP NOTES
[address]    Name:  Cat Issa  MR#:  [id number]  :  1960  Account #:  [de-identified]  Date of Adm:  2017  Date of Surgery:  2017      ESTIMATED BLOOD LOSS: 5-10 mL. SPECIMENS REMOVED: None. ANESTHESIA:      SURGEON: Vitaly Vazquez MD    PREOPERATIVE DIAGNOSIS: Degenerative spinal stenosis,  degenerative spondylolisthesis, morbid obesity. POSTOPERATIVE DIAGNOSIS: Degenerative spinal stenosis,  degenerative spondylolisthesis, morbid obesity. PROCEDURES PERFORMED: Extreme lateral interbody fusion at L4-  5, PEEK cage NuVasive NeuroVision intraoperative monitoring,  demineralized bone matrix. FINDINGS: We were able to restore disk space height, alignment,  lordosis, and stability to the segment. DESCRIPTION OF PROCEDURE: Following induction of endotracheal  anesthesia, the patient was placed in a lateral decubitus position, left  side up. The patient was prepped and draped in the usual fashion. An  incision was made overlying the L4-L5 disk space utilizing fluoroscopic  guidance. Blunt finger dissection was taken down to the retroperitoneal  space. Palpation of the psoas muscle and the L4-L5 interval was done. Graded dilation tubes and a guide ere placed into the disk space with  fluoroscopic guidance with NeuroVision intraoperative monitoring. Graded dilation was done with placement of the MaXcess tabletop  fixed  retractor. Direct visualization of the disk space was done with probing  of the interval to ensure no neurologic elements were in the field. Clean runs were had throughout the procedure. Under fluoroscopic  guidance, an annulotomy was done, a radical diskectomy done across  the 4-5 disk space with endplate preparation and debulking of the L4-5  disk.  Once this radical diskectomy was done with endplate preparation,  graded trials and sizers were done and ultimately a 12 mm by 50  lordotic implant was selected, which best created fit, fill, restoration of  lordosis, disk space height. It was filled with demineralized bone  matrix, tamped firmly into place with excellent restoration of anatomic  parameters. It was tightly filled and had great inherent stability, I did  not feel supplemental instrumentation would be required. Vancomycin  powder was placed within the incision and the skin closed with a  subcuticular suture and Dermabond. Sterile dressing placed upon the  wound. All counts were correct. Estimated blood loss 5-10 mL. There  were no complications. There were no specimens. The patient went  to recovery in good and stable condition.         Frandy Cheung MD    [facility] / [facility]  D:  06/19/2017   13:20  T:  06/19/2017   13:57  Job #:  [id number]

## 2017-06-19 NOTE — PROGRESS NOTES
Bedside and Verbal shift change report given to [facility] (oncoming nurse) by Ruel Gonzalez RN (offgoing nurse). Report included the following information SBAR, Kardex, MAR and Recent Results.     SITUATION:    Code Status: Prior   Reason for Admission: Lumbar spinal stenosis [M48.06]   Spondylolisthesis, lumbar region [facility] day: 0   Problem List:       Hospital Problems  Date Reviewed: 6/19/2017          Codes Class Noted POA    Spinal stenosis at L4-L5 level ICD-10-CM: M48.06  ICD-9-CM: 724.02  6/19/2017 Unknown        Lumbar spinal stenosis ICD-10-CM: M48.06  ICD-9-CM: 724.02  5/26/2017 Unknown        Spondylolisthesis of lumbar region ICD-10-CM: M43.16  ICD-9-CM: 738.4  5/26/2017 Unknown              BACKGROUND:    Past Medical History:   Past Medical History:   Diagnosis Date    Arthritis     Coronary atherosclerosis of native coronary artery     S/P PCI with GE in 12/09    Diabetes ([facility] [address]     IDDM    Electrolyte and fluid disorders not elsewhere classified     Essential hypertension, benign     GERD (gastroesophageal reflux disease)     Heroin abuse 05/26/16    Psychiatrist Dr. Jasso Crane History of heart attack     Hyperlipidemia     Intermediate coronary syndrome ([facility] [address]     MDD (major depressive disorder) 5/26/16    Psychiatrist Dr. Jasso Crane Myocardial infarction [facility])     Obesity, unspecified     Old myocardial infarction     Other and unspecified hyperlipidemia     Pancreatitis     Positive PPD     Sleep apnea     uses Cpap machine    Transfusion history     Before 1992         Patient taking anticoagulants no     ASSESSMENT:    Changes in Assessment Throughout Shift: NO     Patient has Central Line: no Reasons if yes: NO   Patient has Van Cath: yes Reasons if yes: YES, Surgical procedure      Last Vitals:     Vitals:    06/19/17 1436 06/19/17 1448 06/19/17 1521 06/19/17 1615   BP:  151/89 142/88 142/88   Pulse: 75 77  77   Resp: 13 15 16 Temp:  97.3 °F (36.3 °C) 97.3 °F (36.3 °C)    SpO2: 96% 94% 94%    Weight:       Height:            IV and DRAINS (will only show if present)   Peripheral IV 06/19/17 Right Forearm-Site Assessment: Clean, dry, & intact     WOUND (if present)   Wound Type:  none, surgical procedure   Dressing present Dressing Present : No   Wound Concerns/Notes:  none     PAIN    Pain Assessment    Pain Intensity 1: 10 (06/19/17 1802)    Pain Location 1: Back    Pain Intervention(s) 1: Repositioned    Patient Stated Pain Goal: 4  o Interventions for Pain:  none, MEDS  o Intervention effective: yes  o Time of last intervention: SEE MAR   o Reassessment Completed: yes      Last 3 Weights:  Last 3 Recorded Weights in this Encounter    06/12/17 1405 06/19/17 1033   Weight: 113.4 kg (250 lb) 115.2 kg (254 lb)     Weight change:      INTAKE/OUPUT    Current Shift:      Last three shifts: 06/18 0701 - 06/19 1900  In: 1600 [I.V.:1600]  Out: 1700 [Urine:1650]     LAB RESULTS   No results for input(s): WBC, HGB, HCT, PLT, HGBEXT, HCTEXT, PLTEXT in the last 72 hours. Recent Labs      06/19/17   1631   NA  139   K  3.9   GLU  293*   BUN  12   CREA  1.24   CA  9.0       RECOMMENDATIONS AND DISCHARGE PLANNING     1. Pending tests/procedures/ Plan of Care or Other Needs: LABS     2. Discharge plan for patient and Needs/Barriers: HOME    3. Estimated Discharge Date: 6/21/17 Posted on Whiteboard in [facility]: yes      4. The patient's care plan was reviewed with the oncoming nurse. \"HEALS\" SAFETY CHECK      Fall Risk    Total Score: 3    Safety Measures: Safety Measures: Bed/Chair-Wheels locked, Call light within reach, Fall prevention (comment), Gripper socks, Bed in low position    A safety check occurred in the patient's room between off going nurse and oncoming nurse listed above.     The safety check included the below items  Area Items   H  High Alert Medications - Verify all high alert medication drips (heparin, PCA, etc.)   E  Equipment - Suction is set up for ALL patients (with steven)  - Red plugs utilized for all equipment (IV pumps, etc.)  - WOWs wiped down at end of shift.  - Room stocked with oxygen, suction, and other unit-specific supplies   A  Alarms - Bed alarm is set for fall risk patients  - Ensure chair alarm is in place and activated if patient is up in a chair   L  Lines - Check IV for any infiltration  - Van bag is empty if patient has a Van   - Tubing and IV bags are labeled   S  Safety   - Room is clean, patient is clean, and equipment is clean. - Hallways are clear from equipment besides carts. - Fall bracelet on for fall risk patients  - Ensure room is clear and free of clutter  - Suction is set up for ALL patients (with steven)  - Hallways are clear from equipment besides carts.    - Isolation precautions followed, supplies available outside room, sign posted     Sofiya Tesfaye RN

## 2017-06-19 NOTE — PROGRESS NOTES
OR today  L4/5 XLIF  VSS  States left leg pain improved  Dressing dry and intact  + sleep apnea, but denies use of CPAP at home  Neuro intact    Devan Hardy, NP

## 2017-06-19 NOTE — PERIOP NOTES
TRANSFER - OUT REPORT:    Verbal report given to Sofiya RN on [employer]  being transferred to  for routine post - op       Report consisted of patients Situation, Background, Assessment and   Recommendations(SBAR). Information from the following report(s) SBAR and MAR was reviewed with the receiving nurse. Lines:   Peripheral IV 06/19/17 Right Forearm (Active)   Site Assessment Clean, dry, & intact 6/19/2017  1:48 PM   Phlebitis Assessment 0 6/19/2017  1:48 PM   Infiltration Assessment 0 6/19/2017  1:48 PM   Dressing Status Clean, dry, & intact 6/19/2017  1:48 PM   Dressing Type Tape;Transparent 6/19/2017  1:48 PM   Hub Color/Line Status Pink; Infusing 6/19/2017  1:48 PM        Opportunity for questions and clarification was provided.       Patient transported with:   O2 @ 2 liters

## 2017-06-19 NOTE — BRIEF OP NOTE
BRIEF OPERATIVE NOTE    Date of Procedure: 6/19/2017   Preoperative Diagnosis: Lumbar spinal stenosis [M48.06]  Spondylolisthesis, lumbar region [M43.16]  Postoperative Diagnosis: * No post-op diagnosis entered *    Procedure(s):  L4/5 EXTREME LATERAL INTERBODY FUSION/C-ARM/NUVASIVE  Surgeon(s) and Role:     * Jaqueline Tee MD - Primary         Assistant Staff:       Surgical Staff:  Circ-1: Giovany Arriola RN  Circ-Relief: Nikole Gonzáles RN  Radiology Technician: Kayla Farfan  Scrub Tech-1: Meg Kidd  Surg Asst-1: Shania Strauss  Event Time In   Incision Start 1230   Incision Close      Anesthesia: General   Estimated Blood Loss: 5  Specimens: * No specimens in log *   Findings: stenosis   Complications: 0  Implants:   Implant Name Type Inv.  Item Serial No.  Lot No. LRB No. Used Action   GRAFT PTTY BNE SYNTH 5CC -- ATTRAX - [id number]   GRAFT PTTY BNE SYNTH 5CC -- ATTRAX   NUVASIVE [id number] N/A 1 Implanted

## 2017-06-19 NOTE — INTERVAL H&P NOTE
H&P Update:  Steff Henson was seen and examined. History and physical has been reviewed. The patient has been examined.  There have been no significant clinical changes since the completion of the originally dated History and Physical.    Signed By: Megha Kasper MD     June 19, 2017 10:54 AM

## 2017-06-20 ENCOUNTER — APPOINTMENT (OUTPATIENT)
Dept: CT IMAGING | Age: 57
DRG: 304 | End: 2017-06-20
Attending: ORTHOPAEDIC SURGERY
Payer: MEDICAID

## 2017-06-20 LAB
ALBUMIN SERPL BCP-MCNC: 3.4 G/DL (ref 3.4–5)
ALBUMIN/GLOB SERPL: 0.8 {RATIO} (ref 0.8–1.7)
ALP SERPL-CCNC: 82 U/L (ref 45–117)
ALT SERPL-CCNC: 34 U/L (ref 16–61)
ANION GAP BLD CALC-SCNC: 11 MMOL/L (ref 3–18)
AST SERPL W P-5'-P-CCNC: 30 U/L (ref 15–37)
BILIRUB SERPL-MCNC: 0.7 MG/DL (ref 0.2–1)
BUN SERPL-MCNC: 15 MG/DL (ref 7–18)
BUN/CREAT SERPL: 12 (ref 12–20)
CALCIUM SERPL-MCNC: 8.9 MG/DL (ref 8.5–10.1)
CHLORIDE SERPL-SCNC: 100 MMOL/L (ref 100–108)
CO2 SERPL-SCNC: 31 MMOL/L (ref 21–32)
CREAT SERPL-MCNC: 1.27 MG/DL (ref 0.6–1.3)
ERYTHROCYTE [DISTWIDTH] IN BLOOD BY AUTOMATED COUNT: 12.6 % (ref 11.6–14.5)
EST. AVERAGE GLUCOSE BLD GHB EST-MCNC: 220 MG/DL
GLOBULIN SER CALC-MCNC: 4.2 G/DL (ref 2–4)
GLUCOSE BLD STRIP.AUTO-MCNC: 243 MG/DL (ref 70–110)
GLUCOSE BLD STRIP.AUTO-MCNC: 249 MG/DL (ref 70–110)
GLUCOSE BLD STRIP.AUTO-MCNC: 259 MG/DL (ref 70–110)
GLUCOSE BLD STRIP.AUTO-MCNC: 303 MG/DL (ref 70–110)
GLUCOSE SERPL-MCNC: 287 MG/DL (ref 74–99)
HBA1C MFR BLD: 9.3 % (ref 4.2–5.6)
HCT VFR BLD AUTO: 40.6 % (ref 36–48)
HGB BLD-MCNC: 13.1 G/DL (ref 13–16)
MCH RBC QN AUTO: 29.6 PG (ref 24–34)
MCHC RBC AUTO-ENTMCNC: 32.3 G/DL (ref 31–37)
MCV RBC AUTO: 91.6 FL (ref 74–97)
PLATELET # BLD AUTO: 259 K/UL (ref 135–420)
PMV BLD AUTO: 12.1 FL (ref 9.2–11.8)
POTASSIUM SERPL-SCNC: 3.9 MMOL/L (ref 3.5–5.5)
PROT SERPL-MCNC: 7.6 G/DL (ref 6.4–8.2)
RBC # BLD AUTO: 4.43 M/UL (ref 4.7–5.5)
SODIUM SERPL-SCNC: 142 MMOL/L (ref 136–145)
WBC # BLD AUTO: 15.8 K/UL (ref 4.6–13.2)

## 2017-06-20 PROCEDURE — 74177 CT ABD & PELVIS W/CONTRAST: CPT

## 2017-06-20 PROCEDURE — 85027 COMPLETE CBC AUTOMATED: CPT | Performed by: ORTHOPAEDIC SURGERY

## 2017-06-20 PROCEDURE — 65270000029 HC RM PRIVATE

## 2017-06-20 PROCEDURE — 74011250637 HC RX REV CODE- 250/637: Performed by: ORTHOPAEDIC SURGERY

## 2017-06-20 PROCEDURE — 83036 HEMOGLOBIN GLYCOSYLATED A1C: CPT | Performed by: NURSE PRACTITIONER

## 2017-06-20 PROCEDURE — 97165 OT EVAL LOW COMPLEX 30 MIN: CPT

## 2017-06-20 PROCEDURE — 74011636637 HC RX REV CODE- 636/637: Performed by: ORTHOPAEDIC SURGERY

## 2017-06-20 PROCEDURE — 74011250636 HC RX REV CODE- 250/636: Performed by: ORTHOPAEDIC SURGERY

## 2017-06-20 PROCEDURE — 36415 COLL VENOUS BLD VENIPUNCTURE: CPT | Performed by: ORTHOPAEDIC SURGERY

## 2017-06-20 PROCEDURE — 74011636320 HC RX REV CODE- 636/320: Performed by: ORTHOPAEDIC SURGERY

## 2017-06-20 PROCEDURE — 82962 GLUCOSE BLOOD TEST: CPT

## 2017-06-20 PROCEDURE — 80053 COMPREHEN METABOLIC PANEL: CPT | Performed by: ORTHOPAEDIC SURGERY

## 2017-06-20 RX ORDER — INSULIN LISPRO 100 [IU]/ML
INJECTION, SOLUTION INTRAVENOUS; SUBCUTANEOUS
Status: DISCONTINUED | OUTPATIENT
Start: 2017-06-20 | End: 2017-06-21 | Stop reason: HOSPADM

## 2017-06-20 RX ADMIN — MEPERIDINE HYDROCHLORIDE 100 MG: 100 INJECTION, SOLUTION INTRAMUSCULAR; INTRAVENOUS; SUBCUTANEOUS at 07:36

## 2017-06-20 RX ADMIN — MEPERIDINE HYDROCHLORIDE 100 MG: 100 INJECTION, SOLUTION INTRAMUSCULAR; INTRAVENOUS; SUBCUTANEOUS at 11:38

## 2017-06-20 RX ADMIN — GABAPENTIN 400 MG: 400 CAPSULE ORAL at 10:47

## 2017-06-20 RX ADMIN — GABAPENTIN 400 MG: 400 CAPSULE ORAL at 16:35

## 2017-06-20 RX ADMIN — OXYCODONE HYDROCHLORIDE AND ACETAMINOPHEN 1 TABLET: 10; 325 TABLET ORAL at 23:06

## 2017-06-20 RX ADMIN — INSULIN LISPRO 12 UNITS: 100 INJECTION, SOLUTION INTRAVENOUS; SUBCUTANEOUS at 11:36

## 2017-06-20 RX ADMIN — HYDROMORPHONE HYDROCHLORIDE 2 MG: 1 INJECTION, SOLUTION INTRAMUSCULAR; INTRAVENOUS; SUBCUTANEOUS at 01:58

## 2017-06-20 RX ADMIN — Medication 10 ML: at 14:08

## 2017-06-20 RX ADMIN — CYCLOBENZAPRINE HYDROCHLORIDE 10 MG: 10 TABLET, FILM COATED ORAL at 10:47

## 2017-06-20 RX ADMIN — Medication 10 ML: at 06:25

## 2017-06-20 RX ADMIN — GABAPENTIN 400 MG: 400 CAPSULE ORAL at 23:06

## 2017-06-20 RX ADMIN — ONDANSETRON 4 MG: 2 INJECTION INTRAMUSCULAR; INTRAVENOUS at 01:49

## 2017-06-20 RX ADMIN — OXYCODONE HYDROCHLORIDE AND ACETAMINOPHEN 1 TABLET: 10; 325 TABLET ORAL at 19:27

## 2017-06-20 RX ADMIN — DOXAZOSIN 2 MG: 1 TABLET ORAL at 10:51

## 2017-06-20 RX ADMIN — FUROSEMIDE 40 MG: 40 TABLET ORAL at 10:49

## 2017-06-20 RX ADMIN — ONDANSETRON 4 MG: 2 INJECTION INTRAMUSCULAR; INTRAVENOUS at 19:17

## 2017-06-20 RX ADMIN — CEFAZOLIN SODIUM 2 G: 2 SOLUTION INTRAVENOUS at 10:49

## 2017-06-20 RX ADMIN — INSULIN LISPRO 6 UNITS: 100 INJECTION, SOLUTION INTRAVENOUS; SUBCUTANEOUS at 00:02

## 2017-06-20 RX ADMIN — HYDROMORPHONE HYDROCHLORIDE 2 MG: 1 INJECTION, SOLUTION INTRAMUSCULAR; INTRAVENOUS; SUBCUTANEOUS at 05:54

## 2017-06-20 RX ADMIN — MEPERIDINE HYDROCHLORIDE 100 MG: 100 INJECTION, SOLUTION INTRAMUSCULAR; INTRAVENOUS; SUBCUTANEOUS at 16:32

## 2017-06-20 RX ADMIN — LORAZEPAM 1 MG: 2 INJECTION INTRAMUSCULAR; INTRAVENOUS at 03:54

## 2017-06-20 RX ADMIN — CARVEDILOL 6.25 MG: 6.25 TABLET, FILM COATED ORAL at 16:35

## 2017-06-20 RX ADMIN — ONDANSETRON 4 MG: 2 INJECTION INTRAMUSCULAR; INTRAVENOUS at 23:10

## 2017-06-20 RX ADMIN — ONDANSETRON 4 MG: 2 INJECTION INTRAMUSCULAR; INTRAVENOUS at 11:15

## 2017-06-20 RX ADMIN — CARVEDILOL 6.25 MG: 6.25 TABLET, FILM COATED ORAL at 10:51

## 2017-06-20 RX ADMIN — CEFAZOLIN SODIUM 2 G: 2 SOLUTION INTRAVENOUS at 03:00

## 2017-06-20 RX ADMIN — INSULIN LISPRO 6 UNITS: 100 INJECTION, SOLUTION INTRAVENOUS; SUBCUTANEOUS at 21:41

## 2017-06-20 RX ADMIN — LEVOTHYROXINE SODIUM 75 MCG: 75 TABLET ORAL at 10:47

## 2017-06-20 RX ADMIN — QUINAPRIL 40 MG: 10 TABLET ORAL at 23:06

## 2017-06-20 RX ADMIN — TRAZODONE HYDROCHLORIDE 150 MG: 100 TABLET ORAL at 23:06

## 2017-06-20 RX ADMIN — INSULIN LISPRO 6 UNITS: 100 INJECTION, SOLUTION INTRAVENOUS; SUBCUTANEOUS at 16:35

## 2017-06-20 RX ADMIN — IOPAMIDOL 97 ML: 612 INJECTION, SOLUTION INTRAVENOUS at 08:27

## 2017-06-20 RX ADMIN — CLOPIDOGREL BISULFATE 75 MG: 75 TABLET ORAL at 10:47

## 2017-06-20 RX ADMIN — INSULIN LISPRO 6 UNITS: 100 INJECTION, SOLUTION INTRAVENOUS; SUBCUTANEOUS at 06:15

## 2017-06-20 NOTE — DIABETES MGMT
Diabetes Patient/Family Education Record    Factors That  May Influence Patients Ability  to Learn or  Comply With  Recommendations:    []   Language barrier    []   Cultural needs   []   Motivation    []   Cognitive limitation    []   Physical   []   Education    []   Physiological factors   []   Hearing/vision/speaking impairment   []   Jainism beliefs    []   Financial factors   []  Other:   [x]  No factors identified at this time. Person Instructed:   [x]   Patient   []   Family   []  Other     Preference for Learning:   [x]   Verbal   [x]   Written   []  Demonstration     Level of Comprehension & Competence:    [x]  Good                                      [] Fair                                     []  Poor                             []  Needs Reinforcement   [x]  Teachback completed    Education Component:   [x]  Medication management, including how to administer insulin (if appropriate) and potential medication interactions:  Patient stated that he is taking the following diabetes meds at home:  Levemir insulin 60 units daily at bedtime. Aspart (novolog) 60 units 3x daily before meals. [x]  Nutritional management including the role of carbohydrate intake   []  Exercise   [x]  Signs, symptoms, and treatment of hyperglycemia and hypoglycemia   [x] Treatment of hyperglycemia and hypoglycemia   [x]  Importance of blood glucose monitoring and how to obtain a blood glucose meter    []  Instruction on use of blood glucose meter   [x]  Discuss the importance of HbA1C monitoring and provide patient with results: 7.7% (08/15/2016). Requested A1C 06/20/2017 and pending result.    []  Sick day guidelines   []  Proper use and disposal of lancets, needles, syringes or insulin pens (if appropriate)   []  Potential long-term complications (retinopathy, kidney disease, neuropathy, heart disease, stroke, vascular disease, foot care)   [x] Provide emergency contact number and contact number for more information [x]  Goal:  Patient/family will demonstrate understanding of Diabetes Self Management Skills by: 06/27/2017  Plan for post-discharge education or self-management support:    [x] Outpatient class schedule provided            [] Patient Declined    [] Scheduled for outpatient classes (date) _______         Prosper Verma RN

## 2017-06-20 NOTE — DIABETES MGMT
Glycemic Control Plan of Care    BG above target range. Recommendation(s):  1.) [employer], [address], and obtained order to add levemir insulin 30 units daily, first dose 06/20/2017. Cont monitoring and adjust insulin dose if BG still above target range. 2.) Modified correctional lispro insulin to very resistant dose. Assessment:   Patient is 64year old with past medical history including type 2 diabetes mellitus, hypertension, MI, CAD with cardiac stents, morbid obesity, GERD, and sleep apnea on Cpap - was admitted on 06/19/2017 for procedure. Noted:  Lumbar spinal stenosis status post l4/5 extreme lateral interbody fusion on 06/19/2017. Type 2 diabetes mellitus pending A1C result. Completed assessment of home diabetes management with patient. Patient experiencing nausea and vomiting. Most recent blood glucose values:    Results for Rajinder Sandoval (MRN [id number]) as of 6/20/2017 11:55   Ref. Range 6/19/2017 10:38 6/19/2017 14:04 6/19/2017 17:32 6/19/2017 22:20   GLUCOSE,FAST - POC Latest Ref Range: 70 - 110 mg/dL 306 (H) 235 (H) 323 (H) 276 (H)     Results for Rajinder Sandoval (MRN [id number]) as of 6/20/2017 11:55   Ref. Range 6/20/2017 06:02 6/20/2017 11:31   GLUCOSE,FAST - POC Latest Ref Range: 70 - 110 mg/dL 259 (H) 303 (H)     Current A1C: 7.7% (08/15/2016). Pending result of A1C ordered 06/20/2017/    Current hospital diabetes medications:  Basal levemir insulin 30 units daily, first dose ordered 06/20/2017  Correctional lispro insulin ACHS. Very resistant dose. Total daily dose insulin requirement previous day: 06/19/2017  Lispro: 26 units    Home diabetes medications: Patient stated on 06/20/2017 that he is taking the following diabetes meds at home:  Levemir insulin 60 units daily at bedtime. Aspart (novolog) 60 units 3x daily before meals. Diet: Diabetic consistent carb regular.     Goals:  Blood glucose will be within target range of  mg/dL by 06/23/2017    Education:  __X_  Refer to Diabetes Education Record: 06/20/2017             ___  Education not indicated at this time    Renita Ramírez RN

## 2017-06-20 NOTE — PROGRESS NOTES
Attempted PT, pt in pain, receiving meds from nurse. Will continue to follow.  Jhonatan Del Rosario, PTA

## 2017-06-20 NOTE — PROGRESS NOTES
Problem: Self Care Deficits Care Plan (Adult)  Goal: *Acute Goals and Plan of Care (Insert Text)  Occupational Therapy Goals  Initiated 6/20/2017 within 7 day(s). 1. Patient will perform lower body bathing with supervision/set-up with AE  2. Patient will perform lower body dressing with supervision/set-up with AE  Outcome: Progressing Towards Goal  OCCUPATIONAL THERAPY EVALUATION     Patient: Ilir Watt (72 y.o. male)  Date: 6/20/2017  Primary Diagnosis: Lumbar spinal stenosis [M48.06]  Spondylolisthesis, lumbar region [M43.16]  Procedure(s) (LRB):  L4/5 EXTREME LATERAL INTERBODY FUSION/C-ARM/NUVASIVE (N/A) 1 Day Post-Op   Precautions:   Fall, Spinal      ASSESSMENT :  Based on the objective data described below, the patient was mod I with bed mobility. Patient educated on log roll technique, patient was able to implement with min verbal cues. Patient needed supervision with functional mobility/simulated BSC transfer. Patient had WFL BUE AROM and  strength. Patient educated on lumbar precautions as it relates to self care tasks; patient educated of use/function of AE for LE self care tasks to adhere to spinal precautions; patient receptive, but requests to complete AE training tomorrow, secondary to pain. Patient left comfortable in no distress in bed. Patient will benefit from skilled intervention to address the above impairments.   Patients rehabilitation potential is considered to be Good  Factors which may influence rehabilitation potential include:   [ ]             None noted  [ ]             Mental ability/status  [X]             Medical condition  [ ]             Home/family situation and support systems  [ ]             Safety awareness  [ ]             Pain tolerance/management  [ ]             Other:        PLAN :  Recommendations and Planned Interventions:  [ ]               Self Care Training                  [ ]        Therapeutic Activities  [ ]               Functional Mobility Training    [ ]        Cognitive Retraining  [ ]               Therapeutic Exercises           [ ]        Endurance Activities  [ ]               Balance Training                   [ ]        Neuromuscular Re-Education  [ ]               Visual/Perceptual Training     [ ]   Home Safety Training  [X]               Patient Education                 [ ]        Family Training/Education  [X]               Other (comment): AE training     Frequency/Duration: Patient will be followed by occupational therapy 1-2 times per day/4-7 days per week to address goals. Discharge Recommendations: Home Health  Further Equipment Recommendations for Discharge: shower chair       PATIENT COMPLEXITY      Eval Complexity: History: LOW Complexity : Brief history review ; Examination: LOW Complexity : 1-3 performance deficits relating to physical, cognitive , or psychosocial skils that result in activity limitations and / or participation restrictions ; Decision Making:LOW Complexity : No comorbidities that affect functional and no verbal or physical assistance needed to complete eval tasks  Assessment: Low Complexity       G-CODES:      Self Care  Current  CJ= 20-39%   Goal  CI= 1-19%. The severity rating is based on the Level of Assistance required for Functional Mobility and ADLs. SUBJECTIVE:   Patient stated I can.       OBJECTIVE DATA SUMMARY:       Past Medical History:   Diagnosis Date    Arthritis      Coronary atherosclerosis of native coronary artery       S/P PCI with GE in 12/09    Diabetes ([facility] [address]       IDDM    Electrolyte and fluid disorders not elsewhere classified      Essential hypertension, benign      GERD (gastroesophageal reflux disease)      Heroin abuse 05/26/16     Psychiatrist Dr. Stephy Sharif History of heart attack      Hyperlipidemia      Intermediate coronary syndrome [facility])      MDD (major depressive disorder) 5/26/16     Psychiatrist Dr. Octavio Bosch infarction ([facility] [address]      Obesity, unspecified      Old myocardial infarction      Other and unspecified hyperlipidemia      Pancreatitis      Positive PPD      Sleep apnea       uses Cpap machine    Transfusion history       Before 1992     Past Surgical History:   Procedure Laterality Date    HX APPENDECTOMY        HX CORONARY STENT PLACEMENT   2010     2 stents     Prior Level of Function/Home Situation: Patient reported he was independent in basic self care tasks and functional mobility PTA. Home Situation  Home Environment: Private residence  # Steps to Enter: 3  Rails to Enter: Yes  Hand Rails : Right  One/Two Story Residence: One story  Living Alone: No  Support Systems: Family member(s)  Patient Expects to be Discharged to[de-identified] Private residence  Current DME Used/Available at Home: None  Tub or Shower Type: Tub/Shower combination  [X]  Right hand dominant          [ ]  Left hand dominant  Cognitive/Behavioral Status:  Neurologic State: Alert  Orientation Level: Oriented X4  Cognition: Follows commands     Skin: No skin changes noted     Edema: No edema noted     Vision/Perceptual:       Acuity: Within Defined Limits       Coordination:  Coordination: Within functional limits (UBEs)       Balance:  Sitting: Intact  Standing: Impaired; Without support  Standing - Static: Good  Standing - Dynamic : Fair     Strength:  Strength:  Within functional limits ( strength)     Tone & Sensation:  Tone: Normal (BUEs)  Sensation: Intact (BUEs)     Range of Motion:  AROM: Within functional limits (BUEs)     Functional Mobility and Transfers for ADLs:  Bed Mobility:  Rolling: Modified independent (log roll technique)  Supine to Sit: Modified independent  Sit to Supine: Modified independent  Scooting: Modified independent  Transfers:  Sit to Stand: Supervision              Toilet Transfer : Supervision (simulated BSC transfer)     ADL Assessment:(clicnail judgement based on functional mobility)  Feeding: Independent Oral Facial Hygiene/Grooming: Supervision     Bathing: Maximum assistance (LE's not assessed secondary to spinal precautions)     Upper Body Dressing: Supervision     Lower Body Dressing: Maximum assistance (LE';s not assessed secondary to spinal precautions)     Toileting: Supervision      Pain:  Pt reports 7/10 pain or discomfort prior to treatment. Pt reports 7/10 pain or discomfort post treatment. Activity Tolerance:   Fair     Please refer to the flowsheet for vital signs taken during this treatment. After treatment:   [ ] Patient left in no apparent distress sitting up in chair  [X] Patient left in no apparent distress in bed  [X] Call bell left within reach  [ ] Nursing notified  [ ] Caregiver present  [ ] Bed alarm activated      COMMUNICATION/EDUCATION:   [ ] Home safety education was provided and the patient/caregiver indicated understanding. [X] Patient/family have participated as able in goal setting and plan of care. [X] Patient/family agree to work toward stated goals and plan of care. [ ] Patient understands intent and goals of therapy, but is neutral about his/her participation. [ ] Patient is unable to participate in goal setting and plan of care.      Thank you for this referral.  Rayray Pro, OTR/L  Time Calculation: 17 mins

## 2017-06-20 NOTE — ROUTINE PROCESS
Bedside and Verbal shift change report given to Madison GORDON (oncoming nurse) by Jade Salinas RN (offgoing nurse). Report included the following information SBAR, Kardex, MAR and Recent Results.     SITUATION:    Code Status: Prior   Reason for Admission: Lumbar spinal stenosis [M48.06]   Spondylolisthesis, lumbar region [facility] day: 1   Problem List:       Hospital Problems  Date Reviewed: 6/19/2017          Codes Class Noted POA    Spinal stenosis at L4-L5 level ICD-10-CM: M48.06  ICD-9-CM: 724.02  6/19/2017 Unknown        Lumbar spinal stenosis ICD-10-CM: M48.06  ICD-9-CM: 724.02  5/26/2017 Unknown        Spondylolisthesis of lumbar region ICD-10-CM: M43.16  ICD-9-CM: 738.4  5/26/2017 Unknown              BACKGROUND:    Past Medical History:   Past Medical History:   Diagnosis Date    Arthritis     Coronary atherosclerosis of native coronary artery     S/P PCI with GE in 12/09    Diabetes ([facility] [address]     IDDM    Electrolyte and fluid disorders not elsewhere classified     Essential hypertension, benign     GERD (gastroesophageal reflux disease)     Heroin abuse 05/26/16    Psychiatrist Dr. Carlee Shah History of heart attack     Hyperlipidemia     Intermediate coronary syndrome ([facility] [address]     MDD (major depressive disorder) 5/26/16    Psychiatrist Dr. Carlee Shah Myocardial infarction [facility])     Obesity, unspecified     Old myocardial infarction     Other and unspecified hyperlipidemia     Pancreatitis     Positive PPD     Sleep apnea     uses Cpap machine    Transfusion history     Before 1992         Patient taking anticoagulants no     ASSESSMENT:    Changes in Assessment Throughout Shift: no     Patient has Central Line: no Reasons if yes: no   Patient has Van Cath: no Reasons if yes: no      Last Vitals:     Vitals:    06/19/17 1615 06/19/17 2021 06/19/17 2319 06/20/17 0358   BP: 142/88 144/88 (!) 149/96 (!) 154/92   Pulse: 77 91 90 84   Resp:  18 18 18 Temp:  98.8 °F (37.1 °C) 98.4 °F (36.9 °C) 98.7 °F (37.1 °C)   SpO2:  93% 94% 94%   Weight:       Height:            IV and DRAINS (will only show if present)   Peripheral IV 06/19/17 Right Forearm-Site Assessment: Clean, dry, & intact     WOUND (if present)   Wound Type:  none   Dressing present Dressing Present : No   Wound Concerns/Notes:  none     PAIN    Pain Assessment    Pain Intensity 1: 10 (06/20/17 0736)    Pain Location 1: Back    Pain Intervention(s) 1: Medication (see MAR)    Patient Stated Pain Goal: 4  o Interventions for Pain:  none  o Intervention effective: no  o Time of last intervention: 0554  o Reassessment Completed: no      Last 3 Weights:  Last 3 Recorded Weights in this Encounter    06/12/17 1405 06/19/17 1033   Weight: 113.4 kg (250 lb) 115.2 kg (254 lb)     Weight change:      INTAKE/OUPUT    Current Shift:      Last three shifts: 06/18 1901 - 06/20 0700  In: 1950 [P.O.:350; I.V.:1600]  Out: 3150 [Urine:3100]     LAB RESULTS     Recent Labs      06/20/17   0405   WBC  15.8*   HGB  13.1   HCT  40.6   PLT  259        Recent Labs      06/19/17   1631   NA  139   K  3.9   GLU  293*   BUN  12   CREA  1.24   CA  9.0       RECOMMENDATIONS AND DISCHARGE PLANNING     1. Pending tests/procedures/ Plan of Care or Other Needs: Ct Scan,     2. Discharge plan for patient and Needs/Barriers: home    3. Estimated Discharge Date: 6/2117 Posted on Whiteboard in Patients Room: no      4. The patient's care plan was reviewed with the oncoming nurse. \"HEALS\" SAFETY CHECK      Fall Risk    Total Score: 3    Safety Measures: Safety Measures: Bed in low position, Call light within reach    A safety check occurred in the patient's room between off going nurse and oncoming nurse listed above.     The safety check included the below items  Area Items   H  High Alert Medications - Verify all high alert medication drips (heparin, PCA, etc.)   E  Equipment - Suction is set up for ALL patients (with steven)  - Red plugs utilized for all equipment (IV pumps, etc.)  - WOWs wiped down at end of shift.  - Room stocked with oxygen, suction, and other unit-specific supplies   A  Alarms - Bed alarm is set for fall risk patients  - Ensure chair alarm is in place and activated if patient is up in a chair   L  Lines - Check IV for any infiltration  - Van bag is empty if patient has a Van   - Tubing and IV bags are labeled   S  Safety   - Room is clean, patient is clean, and equipment is clean. - Hallways are clear from equipment besides carts. - Fall bracelet on for fall risk patients  - Ensure room is clear and free of clutter  - Suction is set up for ALL patients (with steven)  - Hallways are clear from equipment besides carts.    - Isolation precautions followed, supplies available outside room, sign posted     Apple Randall RN

## 2017-06-20 NOTE — PROGRESS NOTES
vss afeb 154/94 p 84  Co severe pain in back and in lower abdomen  Abdomen soft non tender +bs  Back no spasm  neuro intact   Ambulating  voiding  Wants demerol not dilaudid  Wbc 15K    Plan  Make npo  Unusual amount of pain  Objectively looks well  Will check lytes  Check CT abd/pelvis

## 2017-06-20 NOTE — PROGRESS NOTES
PO day #1  Extreme lateral interbody fusion at L4-  5, PEEK cage [employer] intraoperative monitoring,  demineralized bone matrix. VSS  C/o nausea with some improvement   CT abdomen okay  States abdominal pain improved, but still has not eaten yet  States left leg pain improved  DM team managing DM  PT deferred by pt, will try again tomorrow.   Neuro intact    jO Wallace, NP

## 2017-06-20 NOTE — PROGRESS NOTES
Bedside and Verbal shift change report given to Rachael Holder RN (oncoming nurse) by Reema Ward (offgoing nurse). Report included the following information SBAR, Kardex, MAR and Recent Results. SITUATION:    Code Status: Prior   Reason for Admission: Lumbar spinal stenosis [M48.06]   Spondylolisthesis, lumbar region [facility] day: 1   Problem List:       Hospital Problems  Date Reviewed: 6/19/2017          Codes Class Noted POA    Spinal stenosis at L4-L5 level ICD-10-CM: M48.06  ICD-9-CM: 724.02  6/19/2017 Unknown        Lumbar spinal stenosis ICD-10-CM: M48.06  ICD-9-CM: 724.02  5/26/2017 Unknown        Spondylolisthesis of lumbar region ICD-10-CM: M43.16  ICD-9-CM: 738.4  5/26/2017 Unknown              BACKGROUND:    Past Medical History:   Past Medical History:   Diagnosis Date    Arthritis     Coronary atherosclerosis of native coronary artery     S/P PCI with GE in 12/09    Diabetes ([facility] [address]     IDDM    Electrolyte and fluid disorders not elsewhere classified     Essential hypertension, benign     GERD (gastroesophageal reflux disease)     Heroin abuse 05/26/16    Psychiatrist Dr. Zeeshan Oakley History of heart attack     Hyperlipidemia     Intermediate coronary syndrome ([facility] [address]     MDD (major depressive disorder) 5/26/16    Psychiatrist Dr. Zeeshan Oakley Myocardial infarction [facility])     Obesity, unspecified     Old myocardial infarction     Other and unspecified hyperlipidemia     Pancreatitis     Positive PPD     Sleep apnea     uses Cpap machine    Transfusion history     Before 1992         Patient taking anticoagulants yes     ASSESSMENT:    Changes in Assessment Throughout Shift: no     Patient has Central Line: no Reasons if yes: .  Patient has Van Cath: no Reasons if yes: .       Last Vitals:     Vitals:    06/20/17 0358 06/20/17 0926 06/20/17 1134 06/20/17 1618   BP: (!) 154/92 146/89 139/85 159/87   Pulse: 84 79 74 75   Resp: 18 18 18 18   Temp: 98.7 °F (37.1 °C) 98.7 °F (37.1 °C) 97.4 °F (36.3 °C) 99.7 °F (37.6 °C)   SpO2: 94% 93% 94% 95%   Weight:       Height:            IV and DRAINS (will only show if present)   Peripheral IV 06/19/17 Right Forearm-Site Assessment: Clean, dry, & intact     WOUND (if present)   Wound Type:  none   Dressing present Dressing Present : Yes   Wound Concerns/Notes:  none     PAIN    Pain Assessment    Pain Intensity 1: 0 (06/20/17 1732)    Pain Location 1: Back, Abdomen    Pain Intervention(s) 1: Medication (see MAR)    Patient Stated Pain Goal: 4  o Interventions for Pain:  See MAR  o Intervention effective: yes  o Time of last intervention: See MAR   o Reassessment Completed: yes      Last 3 Weights:  Last 3 Recorded Weights in this Encounter    06/12/17 1405 06/19/17 1033   Weight: 113.4 kg (250 lb) 115.2 kg (254 lb)     Weight change:      INTAKE/OUPUT    Current Shift: 06/20 0701 - 06/20 1900  In: -   Out: 200 [Urine:200]    Last three shifts: 06/18 1901 - 06/20 0700  In: 1950 [P.O.:350; I.V.:1600]  Out: 3150 [Urine:3100]     LAB RESULTS     Recent Labs      06/20/17   0405   WBC  15.8*   HGB  13.1   HCT  40.6   PLT  259        Recent Labs      06/20/17   1010  06/19/17   1631   NA  142  139   K  3.9  3.9   GLU  287*  293*   BUN  15  12   CREA  1.27  1.24   CA  8.9  9.0       RECOMMENDATIONS AND DISCHARGE PLANNING     1. Pending tests/procedures/ Plan of Care or Other Needs: no     2. Discharge plan for patient and Needs/Barriers: home    3. Estimated Discharge Date: 6-21-17 Posted on Whiteboard in [facility]: yes      4. The patient's care plan was reviewed with the oncoming nurse. \"HEALS\" SAFETY CHECK      Fall Risk    Total Score: 3    Safety Measures: Safety Measures: Bed in low position, Call light within reach    A safety check occurred in the patient's room between off going nurse and oncoming nurse listed above.     The safety check included the below items  Area Items   H  High Alert Medications - Verify all high alert medication drips (heparin, PCA, etc.)   E  Equipment - Suction is set up for ALL patients (with steven)  - Red plugs utilized for all equipment (IV pumps, etc.)  - WOWs wiped down at end of shift.  - Room stocked with oxygen, suction, and other unit-specific supplies   A  Alarms - Bed alarm is set for fall risk patients  - Ensure chair alarm is in place and activated if patient is up in a chair   L  Lines - Check IV for any infiltration  - Van bag is empty if patient has a Van   - Tubing and IV bags are labeled   S  Safety   - Room is clean, patient is clean, and equipment is clean. - Hallways are clear from equipment besides carts. - Fall bracelet on for fall risk patients  - Ensure room is clear and free of clutter  - Suction is set up for ALL patients (with steven)  - Hallways are clear from equipment besides carts.    - Isolation precautions followed, supplies available outside room, sign posted     Dearl Cervantes

## 2017-06-20 NOTE — ROUTINE PROCESS
2112 Patient ambulates to the bathroom twice to move his bowels but no BM passing gas only. Patient complaining 10/10 incisional and back pain, medicated with percocet 10-325mg po. Dressing to left flank dry and clean. Patient able to move all extremities. Will continues to monitor. 2200 Patient still complaining a lot of pain 10/10 ,pain med no effective, Dr Brandon Lora notified ,new order received. 2230 Patient had dilaudid 2mg iv for pain. 0150 patient vomited 150cc of yellow liquids ,gave zofran 4 mg iv and medicated with dilaudid 2mg for pain.

## 2017-06-20 NOTE — PROGRESS NOTES
Rounded on patient. Discussed with patient the importance of ambulating with assistance. Reinforced using incentive spirometer and doing ankle pumps when up in chair. Encouraged patient to take pain medication so that they can get OOB and ambulate to help recovery. Patient NPO due to abdominal pain per Dr Petra Marquez. Patient and wife informed and all fluids removed from patient reach. Tatiana JUSTIN notified that patient NPO now and would like some pain medication. Patient verbalizing understanding. Dressing dry and intact. Call light in reach. Patient asked if there is anything they need or questions they have.       Orthopedic

## 2017-06-21 ENCOUNTER — HOME HEALTH ADMISSION (OUTPATIENT)
Dept: HOME HEALTH SERVICES | Facility: HOME HEALTH | Age: 57
End: 2017-06-21
Payer: MEDICAID

## 2017-06-21 VITALS
OXYGEN SATURATION: 94 % | HEIGHT: 69 IN | HEART RATE: 76 BPM | BODY MASS INDEX: 37.62 KG/M2 | WEIGHT: 254 LBS | SYSTOLIC BLOOD PRESSURE: 128 MMHG | DIASTOLIC BLOOD PRESSURE: 82 MMHG | RESPIRATION RATE: 18 BRPM | TEMPERATURE: 99 F

## 2017-06-21 LAB — GLUCOSE BLD STRIP.AUTO-MCNC: 281 MG/DL (ref 70–110)

## 2017-06-21 PROCEDURE — 82962 GLUCOSE BLOOD TEST: CPT

## 2017-06-21 PROCEDURE — 74011636637 HC RX REV CODE- 636/637: Performed by: NURSE PRACTITIONER

## 2017-06-21 PROCEDURE — 97116 GAIT TRAINING THERAPY: CPT

## 2017-06-21 PROCEDURE — 74011636637 HC RX REV CODE- 636/637: Performed by: ORTHOPAEDIC SURGERY

## 2017-06-21 PROCEDURE — 74011250636 HC RX REV CODE- 250/636: Performed by: ORTHOPAEDIC SURGERY

## 2017-06-21 PROCEDURE — 97535 SELF CARE MNGMENT TRAINING: CPT

## 2017-06-21 PROCEDURE — 74011250637 HC RX REV CODE- 250/637: Performed by: ORTHOPAEDIC SURGERY

## 2017-06-21 RX ORDER — OXYCODONE AND ACETAMINOPHEN 10; 325 MG/1; MG/1
1 TABLET ORAL
Qty: 60 TAB | Refills: 0 | Status: SHIPPED | OUTPATIENT
Start: 2017-06-21 | End: 2017-07-05 | Stop reason: SDUPTHER

## 2017-06-21 RX ADMIN — INSULIN LISPRO 9 UNITS: 100 INJECTION, SOLUTION INTRAVENOUS; SUBCUTANEOUS at 08:57

## 2017-06-21 RX ADMIN — OXYCODONE HYDROCHLORIDE AND ACETAMINOPHEN 1 TABLET: 10; 325 TABLET ORAL at 06:48

## 2017-06-21 RX ADMIN — DOXAZOSIN 2 MG: 1 TABLET ORAL at 08:55

## 2017-06-21 RX ADMIN — OXYCODONE HYDROCHLORIDE AND ACETAMINOPHEN 1 TABLET: 10; 325 TABLET ORAL at 09:35

## 2017-06-21 RX ADMIN — FUROSEMIDE 40 MG: 40 TABLET ORAL at 08:55

## 2017-06-21 RX ADMIN — Medication 10 ML: at 05:23

## 2017-06-21 RX ADMIN — GABAPENTIN 400 MG: 400 CAPSULE ORAL at 08:55

## 2017-06-21 RX ADMIN — OXYCODONE HYDROCHLORIDE AND ACETAMINOPHEN 1 TABLET: 10; 325 TABLET ORAL at 02:50

## 2017-06-21 RX ADMIN — CLOPIDOGREL BISULFATE 75 MG: 75 TABLET ORAL at 08:55

## 2017-06-21 RX ADMIN — INSULIN DETEMIR 30 UNITS: 100 INJECTION, SOLUTION SUBCUTANEOUS at 09:02

## 2017-06-21 RX ADMIN — CEFAZOLIN SODIUM 2 G: 2 SOLUTION INTRAVENOUS at 03:00

## 2017-06-21 RX ADMIN — LEVOTHYROXINE SODIUM 75 MCG: 75 TABLET ORAL at 08:55

## 2017-06-21 RX ADMIN — CARVEDILOL 6.25 MG: 6.25 TABLET, FILM COATED ORAL at 08:55

## 2017-06-21 NOTE — PROGRESS NOTES
Rounded on patient. Discussed with patient the importance of ambulating with assistance. Reinforced using incentive spirometer and doing ankle pumps when up in chair. Encouraged patient to take pain medication so that they can get OOB and ambulate to help recovery. Reviewed the importance of eating a healthy diet and not skipping meals for healing. Patient verbalizing understanding. Discussed with patient importance of eating small meals while sitting up to help with digestion due to feeling of fullness and some abdominal pain reported post eating. Patient encouraged to sit up for an hour after eating. Wife at bedside verbalizing understanding. Dressing dry and intact. Call light in reach. Patient asked if there is anything they need or questions they have.       Orthopedic

## 2017-06-21 NOTE — PROGRESS NOTES
vss afeb  Incisional pain  Abdominal pain resolving- cw patient chronic recurrent abdominal issues- ie gastroparesis related to dm  Walking well  Dc home fu 2 weeks

## 2017-06-21 NOTE — DISCHARGE INSTRUCTIONS
Lumbar Spinal Stenosis: Care Instructions  Your Care Instructions    Stenosis in the spine is a narrowing of the canal that is around the spinal cord and nerve roots in your back. It can happen as part of aging. Sometimes bone and other tissue grow into this canal and press on the nerves that branch out from the spinal cord. This can cause pain, numbness, and weakness. When it happens in the lower part of your back, it is called lumbar spinal stenosis. It can cause problems in the legs, feet, and rear end (buttocks). You may be able to get relief from the symptoms of spinal stenosis by taking pain medicine. Your doctor may suggest physical therapy and exercises to keep your spine strong and flexible. Some people try steroid shots to reduce swelling. If pain and numbness in your legs are still so bad that you cannot do your normal activities, you may need surgery. Follow-up care is a key part of your treatment and safety. Be sure to make and go to all appointments, and call your doctor if you are having problems. It's also a good idea to know your test results and keep a list of the medicines you take. How can you care for yourself at home? · Take an over-the-counter pain medicine, such as acetaminophen (Tylenol), ibuprofen (Advil, Motrin), or naproxen (Aleve). Be safe with medicines. Read and follow all instructions on the label. · Do not take two or more pain medicines at the same time unless the doctor told you to. Many pain medicines have acetaminophen, which is Tylenol. Too much acetaminophen (Tylenol) can be harmful. · Stay at a healthy weight. Being overweight puts extra strain on your spine. · Change positions often when you sit or stand. This can ease pain. It may also reduce pressure on the spinal cord and its nerves. · Avoid doing things that make your symptoms worse. Walking downhill and standing for a long time may cause pain.   · Stretch and strengthen your back muscles as your doctor or physical therapist recommends. If your doctor says it is okay to do them, these exercises may help. ¨ Lie on your back with your knees bent. Gently pull one bent knee to your chest. Put that foot back on the floor, and then pull the other knee to your chest.  ¨ Do pelvic tilts. Lie on your back with your knees bent. Tighten your stomach muscles. Pull your belly button (navel) in and up toward your ribs. You should feel like your back is pressing to the floor and your hips and pelvis are slightly lifting off the floor. Hold for 6 seconds while breathing smoothly. ¨ Stand with your back flat against a wall. Slowly slide down until your knees are slightly bent. Hold for 10 seconds, then slide back up the wall. · Remove or change anything in your house that may cause you to fall. Keep walkways clear of clutter, electrical cords, and throw rugs. When should you call for help? Call 911 anytime you think you may need emergency care. For example, call if:  · You are unable to move a leg at all. Call your doctor now or seek immediate medical care if:  · You have new or worse symptoms in your legs, belly, or buttocks. Symptoms may include:  ¨ Numbness or tingling. ¨ Weakness. ¨ Pain. · You lose bladder or bowel control. Watch closely for changes in your health, and be sure to contact your doctor if:  · You are not getting better as expected. Where can you learn more? Go to http://kaila-carlitos.info/. Piper Altman in the search box to learn more about \"Lumbar Spinal Stenosis: Care Instructions. \"  Current as of: March 21, 2017  Content Version: 11.3  © 3032-3772 [employer]. Care instructions adapted under license by [employer] (which disclaims liability or warranty for this information).  If you have questions about a medical condition or this instruction, always ask your healthcare professional. [place] [address] any warranty or liability for your use of this information. Patient armband removed and shredded  MyChart Activation    Thank you for requesting access to Spire. Please follow the instructions below to securely access and download your online medical record. Spire allows you to send messages to your doctor, view your test results, renew your prescriptions, schedule appointments, and more. How Do I Sign Up? 1. In your internet browser, go to [email or web address]  2. Click on the First Time User? Click Here link in the Sign In box. You will be redirect to the New Member Sign Up page. 3. Enter your Spire Access Code exactly as it appears below. You will not need to use this code after youve completed the sign-up process. If you do not sign up before the expiration date, you must request a new code. Abacastt Access Code: Activation code not generated  Current Spire Status: Patient Declined (This is the date your Spire access code will )    4. Enter the last four digits of your Social Security Number (xxxx) and Date of Birth (mm/dd/yyyy) as indicated and click Submit. You will be taken to the next sign-up page. 5. Create a Spire ID. This will be your Spire login ID and cannot be changed, so think of one that is secure and easy to remember. 6. Create a Spire password. You can change your password at any time. 7. Enter your Password Reset Question and Answer. This can be used at a later time if you forget your password. 8. Enter your e-mail address. You will receive e-mail notification when new information is available in [address]. 9. Click Sign Up. You can now view and download portions of your medical record. 10. Click the Download Summary menu link to download a portable copy of your medical information. Additional Information    If you have questions, please visit the Frequently Asked Questions section of the Spire website at [email or web address] Remember, Spire is NOT to be used for urgent needs. For medical emergencies, dial 911. DISCHARGE SUMMARY from Nurse    The following personal items are in your possession at time of discharge:    Dental Appliances: None  Visual Aid: None     Home Medications: None  Jewelry: None  Clothing: Pants, Shirt, Footwear, Socks, Undergarments  Other Valuables: None             PATIENT INSTRUCTIONS:    After general anesthesia or intravenous sedation, for 24 hours or while taking prescription Narcotics:  · Limit your activities  · Do not drive and operate hazardous machinery  · Do not make important personal or business decisions  · Do  not drink alcoholic beverages  · If you have not urinated within 8 hours after discharge, please contact your surgeon on call. Report the following to your surgeon:  · Excessive pain, swelling, redness or odor of or around the surgical area  · Temperature over 100.5  · Nausea and vomiting lasting longer than 4 hours or if unable to take medications  · Any signs of decreased circulation or nerve impairment to extremity: change in color, persistent  numbness, tingling, coldness or increase pain  · Any questions        What to do at Home:  Recommended activity: Activity as tolerated    If you experience any of the following symptoms uncontrolled pain,drainage and swelling from site, numbness or weakness in limbs, short of breath, chest pains, nausea, vomiting, diarrhea please follow up with PCP. *  Please give a list of your current medications to your Primary Care Provider. *  Please update this list whenever your medications are discontinued, doses are      changed, or new medications (including over-the-counter products) are added. *  Please carry medication information at all times in case of emergency situations.           These are general instructions for a healthy lifestyle:    No smoking/ No tobacco products/ Avoid exposure to second hand smoke    Surgeon General's Warning:  Quitting smoking now greatly reduces serious risk to your health. Obesity, smoking, and sedentary lifestyle greatly increases your risk for illness    A healthy diet, regular physical exercise & weight monitoring are important for maintaining a healthy lifestyle    You may be retaining fluid if you have a history of heart failure or if you experience any of the following symptoms:  Weight gain of 3 pounds or more overnight or 5 pounds in a week, increased swelling in our hands or feet or shortness of breath while lying flat in bed. Please call your doctor as soon as you notice any of these symptoms; do not wait until your next office visit. Recognize signs and symptoms of STROKE:    F-face looks uneven    A-arms unable to move or move unevenly    S-speech slurred or non-existent    T-time-call 911 as soon as signs and symptoms begin-DO NOT go       Back to bed or wait to see if you get better-TIME IS BRAIN. Warning Signs of HEART ATTACK     Call 911 if you have these symptoms:   Chest discomfort. Most heart attacks involve discomfort in the center of the chest that lasts more than a few minutes, or that goes away and comes back. It can feel like uncomfortable pressure, squeezing, fullness, or pain.  Discomfort in other areas of the upper body. Symptoms can include pain or discomfort in one or both arms, the back, neck, jaw, or stomach.  Shortness of breath with or without chest discomfort.  Other signs may include breaking out in a cold sweat, nausea, or lightheadedness. Don't wait more than five minutes to call 911 - MINUTES MATTER! Fast action can save your life. Calling 911 is almost always the fastest way to get lifesaving treatment. Emergency Medical Services staff can begin treatment when they arrive -- up to an hour sooner than if someone gets to the hospital by car. The discharge information has been reviewed with the patient. The patient verbalized understanding.     Discharge medications reviewed with the patient and appropriate educational materials and side effects teaching were provided.

## 2017-06-21 NOTE — ROUTINE PROCESS
Bedside and Verbal shift change report given to Pastora Kinsey RN (oncoming nurse) by Jasvir Alvarez RN (offgoing nurse). Report included the following information SBAR, Kardex, MAR and Recent Results.     SITUATION:    Code Status: Prior   Reason for Admission: Lumbar spinal stenosis [M48.06]   Spondylolisthesis, lumbar region [facility] day: 1   Problem List:       Hospital Problems  Date Reviewed: 6/19/2017          Codes Class Noted POA    Spinal stenosis at L4-L5 level ICD-10-CM: M48.06  ICD-9-CM: 724.02  6/19/2017 Unknown        Lumbar spinal stenosis ICD-10-CM: M48.06  ICD-9-CM: 724.02  5/26/2017 Unknown        Spondylolisthesis of lumbar region ICD-10-CM: M43.16  ICD-9-CM: 738.4  5/26/2017 Unknown              BACKGROUND:    Past Medical History:   Past Medical History:   Diagnosis Date    Arthritis     Coronary atherosclerosis of native coronary artery     S/P PCI with GE in 12/09    Diabetes ([facility] [address]     IDDM    Electrolyte and fluid disorders not elsewhere classified     Essential hypertension, benign     GERD (gastroesophageal reflux disease)     Heroin abuse 05/26/16    Psychiatrist Dr. Bhumi Thompson History of heart attack     Hyperlipidemia     Intermediate coronary syndrome ([facility] [address]     MDD (major depressive disorder) 5/26/16    Psychiatrist Dr. Bhumi Thompson [facility]ocardial infarction [facility])     Obesity, unspecified     Old myocardial infarction     Other and unspecified hyperlipidemia     Pancreatitis     Positive PPD     Sleep apnea     uses Cpap machine    Transfusion history     Before 1992         Patient taking anticoagulants yes     ASSESSMENT:    Changes in Assessment Throughout Shift: nausea     Patient has Central Line: no Reasons if yes:    Patient has Van Cath: no Reasons if yes:       Last Vitals:     Vitals:    06/20/17 0926 06/20/17 1134 06/20/17 1618 06/20/17 2032   BP: 146/89 139/85 159/87 (!) 154/92   Pulse: 79 74 75 65   Resp: 18 18 18 16 Temp: 98.7 °F (37.1 °C) 97.4 °F (36.3 °C) 99.7 °F (37.6 °C) 100.3 °F (37.9 °C)   SpO2: 93% 94% 95% 93%   Weight:       Height:            IV and DRAINS (will only show if present)   Peripheral IV 06/19/17 Right Forearm-Site Assessment: Clean, dry, & intact     WOUND (if present)   Wound Type:  back   Dressing present Dressing Present : Yes   Wound Concerns/Notes:  none     PAIN    Pain Assessment    Pain Intensity 1: 0 (06/20/17 2032)    Pain Location 1: Back    Pain Intervention(s) 1: Medication (see MAR)    Patient Stated Pain Goal: 2  o Interventions for Pain:  See mar  o Intervention effective: yes  o Time of last intervention: see mar   o Reassessment Completed: yes      Last 3 Weights:  Last 3 Recorded Weights in this Encounter    06/12/17 1405 06/19/17 1033   Weight: 113.4 kg (250 lb) 115.2 kg (254 lb)     Weight change:      INTAKE/OUPUT    Current Shift: 06/20 1901 - 06/21 0700  In: 120 [P.O.:120]  Out: 200 [Urine:200]    Last three shifts: 06/19 0701 - 06/20 1900  In: 1950 [P.O.:350; I.V.:1600]  Out: 3350 [Urine:3300]     LAB RESULTS     Recent Labs      06/20/17   0405   WBC  15.8*   HGB  13.1   HCT  40.6   PLT  259        Recent Labs      06/20/17   1010  06/19/17   1631   NA  142  139   K  3.9  3.9   GLU  287*  293*   BUN  15  12   CREA  1.27  1.24   CA  8.9  9.0       RECOMMENDATIONS AND DISCHARGE PLANNING     1. Pending tests/procedures/ Plan of Care or Other Needs: none     2. Discharge plan for patient and Needs/Barriers: home    3. Estimated Discharge Date: 1 day Posted on Whiteboard in [facility]: yes      4. The patient's care plan was reviewed with the oncoming nurse. \"HEALS\" SAFETY CHECK      Fall Risk    Total Score: 3    Safety Measures: Safety Measures: Bed/Chair-Wheels locked, Bed in low position, Call light within reach, Side rails X 3    A safety check occurred in the patient's room between off going nurse and oncoming nurse listed above.     The safety check included the below items  Area Items   H  High Alert Medications - Verify all high alert medication drips (heparin, PCA, etc.)   E  Equipment - Suction is set up for ALL patients (with steven)  - Red plugs utilized for all equipment (IV pumps, etc.)  - WOWs wiped down at end of shift.  - Room stocked with oxygen, suction, and other unit-specific supplies   A  Alarms - Bed alarm is set for fall risk patients  - Ensure chair alarm is in place and activated if patient is up in a chair   L  Lines - Check IV for any infiltration  - Van bag is empty if patient has a Van   - Tubing and IV bags are labeled   S  Safety   - Room is clean, patient is clean, and equipment is clean. - Hallways are clear from equipment besides carts. - Fall bracelet on for fall risk patients  - Ensure room is clear and free of clutter  - Suction is set up for ALL patients (with steven)  - Hallways are clear from equipment besides carts.    - Isolation precautions followed, supplies available outside room, sign posted     Yenifer Hill RN

## 2017-06-21 NOTE — PROGRESS NOTES
Pt alert and oriented. Vitals within normal limits. Lungs clears. Bowel sounds present. No nausea or vomiting this morning. Drsg to left flank is dry, clean and intact. Pt has no numbness or tingling to upper or lower extremities. Pt is stable. Call bell within reach. Family at bedside.

## 2017-06-21 NOTE — ROUTINE PROCESS
Pt was given discharge instruction and prescriptions. Verbalizes understanding, Iv is taken out, armband removed and shredded.

## 2017-06-21 NOTE — DISCHARGE SUMMARY
Discharge  Summary     Patient: Dominga Coles MRN: [id number]  SSN: [id number]    YOB: 1960  Age: 64 y.o.   Sex: male       Admit Date: 6/19/2017    Discharge Date: 6/21/2017      Admission Diagnoses: Lumbar spinal stenosis [M48.06]  Spondylolisthesis, lumbar region [M43.16]    Discharge Diagnoses:   Problem List as of 6/21/2017  Date Reviewed: 6/19/2017          Codes Class Noted - Resolved    Spinal stenosis at L4-L5 level ICD-10-CM: M48.06  ICD-9-CM: 724.02  6/19/2017 - Present        Coronary artery disease involving native coronary artery of native heart without angina pectoris ICD-10-CM: I25.10  ICD-9-CM: 414.01  5/31/2017 - Present        History of coronary artery stent placement ICD-10-CM: Z95.5  ICD-9-CM: V45.82  5/31/2017 - Present    Overview Signed 5/31/2017 11:54 AM by Dorian Joyce MD     om1  4/2009 and 12/2009             Synovial cyst ICD-10-CM: M71.30  ICD-9-CM: 727.40  5/26/2017 - Present        HNP (herniated nucleus pulposus), lumbar ICD-10-CM: M51.26  ICD-9-CM: 722.10  5/26/2017 - Present        Lumbar spinal stenosis ICD-10-CM: M48.06  ICD-9-CM: 724.02  5/26/2017 - Present        Spondylolisthesis of lumbar region ICD-10-CM: M43.16  ICD-9-CM: 738.4  5/26/2017 - Present        Positive PPD ICD-10-CM: R76.11  ICD-9-CM: 795.51  Unknown - Present        History of heart attack ICD-10-CM: I25.2  ICD-9-CM: 807  Unknown - Present        Pain in left lower leg ICD-10-CM: M79.662  ICD-9-CM: 729.5  7/24/2016 - Present        Primary osteoarthritis of left knee ICD-10-CM: M17.12  ICD-9-CM: 715.16  7/24/2016 - Present        Localized adiposity of abdomen ICD-10-CM: E65  ICD-9-CM: 278.1  7/24/2016 - Present        [facility] (Chronic) ICD-10-CM: E11.9  ICD-9-CM: 250.00  8/14/2015 - Present        Hypertension (Chronic) ICD-10-CM: I10  ICD-9-CM: 401.9  8/14/2015 - Present        GERD (gastroesophageal reflux disease) (Chronic) ICD-10-CM: K21.9  ICD-9-CM: 530.81  8/14/2015 - Present        Depression (Chronic) ICD-10-CM: F32.9  ICD-9-CM: 975  8/14/2015 - Present               Discharge Condition: Good    Procedure: Extreme lateral interbody fusion at L4-  5, PEEK cage [employer] intraoperative monitoring,  demineralized bone matrix. Hospital Course: Reported unusually high pain in low-back and abdomen post-op w/ WBC 15.8, Electrolytes checked-Negative. Abdominal CT ordered, showed probable atelectasis, adrenal nodule (chronic), small inguinal hernia, non-specific esophagitis, and Hepatic steatosis. Hx. Of Gastroparesis and nausea from poorly controlled DM. Otherwise, tolerated surgical intervention well. VSS Throughout. Neuro intact . Incision dry and intact, tolerating PO intake, voiding adequately. Ambulatory with physical therapy . Disposition: home    Discharge Medications:   Cannot display discharge medications since this patient is not currently admitted. Follow-up Appointments   Procedures    FOLLOW UP VISIT Appointment in: Two Weeks     Standing Status:   Standing     Number of Occurrences:   1     Order Specific Question:   Appointment in     Answer:    Two Weeks       Signed By: Leatha Mccormick NP     June 21, 2017

## 2017-06-21 NOTE — PROGRESS NOTES
Problem: Self Care Deficits Care Plan (Adult)  Goal: *Acute Goals and Plan of Care (Insert Text)  Occupational Therapy Goals  Initiated 6/20/2017 within 7 day(s). 1. Patient will perform lower body bathing with supervision/set-up with AE  2. Patient will perform lower body dressing with supervision/set-up with AE   Outcome: Resolved/Met Date Met:  06/21/17  OCCUPATIONAL THERAPY TREATMENT/DISCHARGE     Patient: Jane Stephenson (89 y.o. male)  Date: 6/21/2017  Diagnosis: Lumbar spinal stenosis [M48.06]  Spondylolisthesis, lumbar region [M43.16] <principal problem not specified>  Procedure(s) (LRB):  L4/5 EXTREME LATERAL INTERBODY FUSION/C-ARM/NUVASIVE (N/A) 2 Days Post-Op  Precautions: Fall, Spinal  Chart, occupational therapy assessment, plan of care, and goals were reviewed. ASSESSMENT:  Pt is sitting up at the EOB upon entry getting ready to don shorts. Pt was educated on the importance of following spinal precautions during self-care routine, pt verbalized understanding. Pt was able to recall all spinal precautions, required min VCs t relate them to ADLs. Pt was educated on AE for LB ADLs, issued reacher, sock aid, long handled sponge, long handled shoe horn for the patient. Pt was able to perform all aspects of LB ADLs w/Supervised A for proper use of the AE, while following spinal precautions. Pt is pleased w/his level of independence for LB ADLs, reports he will use the AE to ensure following precautions. Pt left sitting at EOB w/family member present. Pt has met all his goals, we will DC from skilled OT at this time. Progression toward goals:  [X]          Improving appropriately and progressing toward goals  [ ]          Improving slowly and progressing toward goals  [ ]          Not making progress toward goals and plan of care will be adjusted       PLAN:  Patient will be discharged from occupational therapy at this time.   Rationale for discharge:  [X] Goals Achieved  [ ] Andie Delacruz  [ ] Patient not participating in therapy  [ ] Other:  Discharge Recommendations:  Home Health  Further Equipment Recommendations for Discharge:  shower chair         G-CODES:      Self Care  Current  CI= 1-19%   Goal  CI= 1-19%   D/C  CI= 1-19%. The severity rating is based on the Other Levels of Assistance for ADLs and functional mobility      SUBJECTIVE:   Patient stated I was wandering how to put this on without bending. I see now.       OBJECTIVE DATA SUMMARY:   Cognitive/Behavioral Status:  Neurologic State: Alert  Orientation Level: Oriented X4  Cognition: Follows commands  Safety/Judgement: Awareness of environment, Fall prevention  Functional Mobility and Transfers for ADLs:              Transfers:  Sit to Stand: Modified independent   Balance:  Sitting: Intact  Standing: Impaired; With support  Standing - Static: Good  Standing - Dynamic : Fair  ADL Intervention:   Lower Body Bathing  Perineal  : Supervision/set-up  Lower Body : Supervision/set-up  Adaptive Equipment: Long handled sponge  Lower Body Dressing Assistance  Pants With Button/Zipper: Supervision/set-up  Socks: Supervision/set-up  Slip on Shoes with Back: Supervision/set-up  Adaptive Equipment Used: Reacher;Long handled shoe horn;Sock aid  Pain:  Pt reports 5/10 pain or discomfort prior to treatment. Pt reports 5/10 pain or discomfort post treatment. Activity Tolerance:    Fair  Please refer to the flowsheet for vital signs taken during this treatment.   After treatment:   [ ]  Patient left in no apparent distress sitting up in chair  [X]  Patient left in no apparent distress in bed  [X]  Call bell left within reach  [X]  Nursing notified  [X]  Caregiver present  [ ]  Bed alarm activated     ALEJA Rubalcava  Time Calculation: 15 mins

## 2017-06-21 NOTE — ROUTINE PROCESS
Bedside and Verbal shift change report given to [facility] (oncoming nurse) by Kevin Lamb RN (offgoing nurse). Report included the following information SBAR, Kardex, MAR and Recent Results.     SITUATION:    Code Status: Prior   Reason for Admission: Lumbar spinal stenosis [M48.06]   Spondylolisthesis, lumbar region [facility] day: 2   Problem List:       Hospital Problems  Date Reviewed: 6/19/2017          Codes Class Noted POA    Spinal stenosis at L4-L5 level ICD-10-CM: M48.06  ICD-9-CM: 724.02  6/19/2017 Unknown        Lumbar spinal stenosis ICD-10-CM: M48.06  ICD-9-CM: 724.02  5/26/2017 Unknown        Spondylolisthesis of lumbar region ICD-10-CM: M43.16  ICD-9-CM: 738.4  5/26/2017 Unknown              BACKGROUND:    Past Medical History:   Past Medical History:   Diagnosis Date    Arthritis     Coronary atherosclerosis of native coronary artery     S/P PCI with GE in 12/09    Diabetes ([facility]     IDDM    Electrolyte and fluid disorders not elsewhere classified     Essential hypertension, benign     GERD (gastroesophageal reflux disease)     Heroin abuse 05/26/16    Psychiatrist Dr. Bhumi Thompson History of heart attack     Hyperlipidemia     Intermediate coronary syndrome ([facility]     MDD (major depressive disorder) 5/26/16    Psychiatrist Dr. Bhumi Thompson Myocardial infarction [facility])     Obesity, unspecified     Old myocardial infarction     Other and unspecified hyperlipidemia     Pancreatitis     Positive PPD     Sleep apnea     uses Cpap machine    Transfusion history     Before 1992         Patient taking anticoagulants no     ASSESSMENT:    Changes in Assessment Throughout Shift: no     Patient has Central Line: no Reasons if yes: no   Patient has Van Cath: no Reasons if yes: no      Last Vitals:     Vitals:    06/20/17 1618 06/20/17 2032 06/21/17 0000 06/21/17 0400   BP: 159/87 (!) 154/92 146/84 165/85   Pulse: 75 65 71 64   Resp: 18 16 18 18 Temp: 99.7 °F (37.6 °C) 100.3 °F (37.9 °C) 99 °F (37.2 °C) 99.7 °F (37.6 °C)   SpO2: 95% 93% 91% 99%   Weight:       Height:            IV and DRAINS (will only show if present)   Peripheral IV 06/19/17 Right Forearm-Site Assessment: Clean, dry, & intact     WOUND (if present)   Wound Type:  none   Dressing present Dressing Present : Yes   Wound Concerns/Notes:  none     PAIN    Pain Assessment    Pain Intensity 1: 5 (06/21/17 0648)    Pain Location 1: Abdomen, Back    Pain Intervention(s) 1: Medication (see MAR)    Patient Stated Pain Goal: 2  o Interventions for Pain:  none  o Intervention effective: no  o Time of last intervention: no   o Reassessment Completed: no      Last 3 Weights:  Last 3 Recorded Weights in this Encounter    06/12/17 1405 06/19/17 1033   Weight: 113.4 kg (250 lb) 115.2 kg (254 lb)     Weight change:      INTAKE/OUPUT    Current Shift:      Last three shifts: 06/19 1901 - 06/21 0700  In: 470 [P.O.:470]  Out: 1850 [Urine:1850]     LAB RESULTS     Recent Labs      06/20/17   0405   WBC  15.8*   HGB  13.1   HCT  40.6   PLT  259        Recent Labs      06/20/17   1010  06/19/17   1631   NA  142  139   K  3.9  3.9   GLU  287*  293*   BUN  15  12   CREA  1.27  1.24   CA  8.9  9.0       RECOMMENDATIONS AND DISCHARGE PLANNING     1. Pending tests/procedures/ Plan of Care or Other Needs: PT OT     2. Discharge plan for patient and Needs/Barriers: Home    3. Estimated Discharge Date: 6/21/17 Posted on Whiteboard in Patients Room: no      4. The patient's care plan was reviewed with the oncoming nurse. \"HEALS\" SAFETY CHECK      Fall Risk    Total Score: 3    Safety Measures: Safety Measures: Bed in low position, Call light within reach    A safety check occurred in the patient's room between off going nurse and oncoming nurse listed above.     The safety check included the below items  Area Items   H  High Alert Medications - Verify all high alert medication drips (heparin, PCA, etc.) E  Equipment - Suction is set up for ALL patients (with steven)  - Red plugs utilized for all equipment (IV pumps, etc.)  - WOWs wiped down at end of shift.  - Room stocked with oxygen, suction, and other unit-specific supplies   A  Alarms - Bed alarm is set for fall risk patients  - Ensure chair alarm is in place and activated if patient is up in a chair   L  Lines - Check IV for any infiltration  - Van bag is empty if patient has a Van   - Tubing and IV bags are labeled   S  Safety   - Room is clean, patient is clean, and equipment is clean. - Hallways are clear from equipment besides carts. - Fall bracelet on for fall risk patients  - Ensure room is clear and free of clutter  - Suction is set up for ALL patients (with steven)  - Hallways are clear from equipment besides carts.    - Isolation precautions followed, supplies available outside room, sign posted     Deloise Gowers, RN

## 2017-06-21 NOTE — DIABETES MGMT
Glycemic Control Plan of Care    BG above target range. Patient received 30 units of levemir this morning. Noted patient for discharge to home today. He is on levemir insulin 60 units daily at bedtime prior to admission. Recommendation(s):  1.) Discussed insulin with patient and he verbalized understanding to only take 30 units of levemir this evening since he received 30 units this morning before his disch. Assessment:   Patient is 64year old with past medical history including type 2 diabetes mellitus, hypertension, MI, CAD with cardiac stents, morbid obesity, GERD, and sleep apnea on Cpap - was admitted on 06/19/2017 for procedure. Noted:  Lumbar spinal stenosis status post l4/5 extreme lateral interbody fusion on 06/19/2017. Type 2 diabetes mellitus pending A1C result. Patient stated that he is still feeling nauseous and noted that he was not able to eat much of his breakfast this morning. Noted patient consumed <25% of the regular diet. Discussed A1C of 9.3% (6/20/2017) with patient and he verbalized understanding that it is elevated. The recommended A1C for most people with diabetes is <7%. He is planning to follow-up with his primary medical provider post disch and review his diabetes treatment plan. Most recent blood glucose values:    Results for [facility] (MRN [id number]) as of 6/21/2017 09:31   Ref. Range 6/20/2017 06:02 6/20/2017 11:31 6/20/2017 16:22 6/20/2017 21:39   GLUCOSE,FAST - POC Latest Ref Range: 70 - 110 mg/dL 259 (H) 303 (H) 249 (H) 243 (H)     Results for Merline [facility]ico (MRN [id number]) as of 6/21/2017 09:31   Ref. Range 6/21/2017 06:55   GLUCOSE,FAST - POC Latest Ref Range: 70 - 110 mg/dL 281 (H)     Current A1C: 9.3% (06/20/2017) is equivalent to average blood glucose of 220 mg/dL during the past 2-3 months    Current hospital diabetes medications:  Basal levemir insulin 30 units daily. Correctional lispro insulin ACHS. Very resistant dose.     Total daily dose insulin requirement previous day: 06/20/2017  Levemir: None. Lispro: 36 units    Home diabetes medications: Patient stated on 06/20/2017 that he is taking the following diabetes meds at home:  Levemir insulin 60 units daily at bedtime. Aspart (novolog) 60 units 3x daily before meals. Diet: Regular.     Goals:  Blood glucose will be within target range of  mg/dL by 06/23/2017    Education:  __X_  Refer to Diabetes Education Record: 06/20/2017             ___  Education not indicated at this time    Chriss Lowry RN

## 2017-06-21 NOTE — PROGRESS NOTES
Problem: Mobility Impaired (Adult and Pediatric)  Goal: *Acute Goals and Plan of Care (Insert Text)  STGs to be addressed within 3 days:  1. Bed mobility: Supine to sit to supine S with HR for meals. 2. Activity tolerance: Tolerate up in chair 1-2 hrs for ADLs. 3. Transfers: Sit to stand to chair S with LRAD for ADLs. LTGs to be addressed within 7 days:  1. Standing/Ambulation Balance: Increase to Good with LRAD for safe transfers and gait. 2. Ambulation: Ambulate > 200 ft. S with LRAD for home mobility. 3. Patient Education: Independent with HEP for home safety. 4. Stairs: Up/Down 3 steps CGA with HR for home entry. Outcome: Progressing Towards Goal  PHYSICAL THERAPY TREATMENT     Patient: Seda Jovel (89 y.o. male)  Date: 6/21/2017  Diagnosis: Lumbar spinal stenosis [M48.06]  Spondylolisthesis, lumbar region [M43.16] <principal problem not specified>  Procedure(s) (LRB):  L4/5 EXTREME LATERAL INTERBODY FUSION/C-ARM/NUVASIVE (N/A) 2 Days Post-Op  Precautions: Fall, Spinal  Chart, physical therapy assessment, plan of care and goals were reviewed. ASSESSMENT:  Pt demonstrates good bed mobility, transfers and independent gait. Pt able to verbalize lumbar precautions, educated pt and spouse on safety measures in the home, car transfers, strategies for compliance with precautions, body mechanics. Progression toward goals:  [X]      Improving appropriately and progressing toward goals  [ ]      Improving slowly and progressing toward goals  [ ]      Not making progress toward goals and plan of care will be adjusted       PLAN:  Patient continues to benefit from skilled intervention to address the above impairments. Continue treatment per established plan of care. Discharge Recommendations:  Home Health  Further Equipment Recommendations for Discharge:  N/A       SUBJECTIVE:   Patient stated I am feeling better today.       OBJECTIVE DATA SUMMARY:   Critical Behavior:  Neurologic State: Alert  Orientation Level: Oriented X4  Cognition: Follows commands  Safety/Judgement: Awareness of environment, Fall prevention  Functional Mobility Training:  Bed Mobility:  Rolling: Modified independent  Supine to Sit: Modified independent  Scooting: Modified independent  Transfers:  Sit to Stand: Modified independent  Stand to Sit: Modified independent  Balance:  Sitting: Intact  Standing: Intact  Standing - Static: Good  Standing - Dynamic : Fair  Ambulation/Gait Training:  Distance (ft): 300 Feet (ft)  Ambulation - Level of Assistance: Independent  Gait Abnormalities: Decreased step clearance;Trunk sway increased  Base of Support: Widened  Speed/Little: Pace decreased (<100 feet/min)  Stairs:  Number of Stairs Trained: 4  Stairs - Level of Assistance: Modified independent  Therapeutic Exercises: Ankle pumps  Pain:  Pt pain was reported as  5 pre-treatment. Pt pain was reported as 5 post-treatment. Intervention: position, pt just had meds     Activity Tolerance:   Good   Please refer to the flowsheet for vital signs taken during this treatment.   After treatment:   [X] Patient left in no apparent distress sitting up in chair  [ ] Patient left in no apparent distress in bed  [X] Call bell left within reach  [ ] Nursing notified  [X] Caregiver present  [ ] Bed alarm activated      Sylwia Kruse PTA   Time Calculation: 16 mins

## 2017-06-21 NOTE — HOME CARE
[facility] received referral for PT - [facility]** - temporary address verified - client will be staying with his sister Juni Peraza post discharge - DME: per client, he has a glucometer and CPAP at home - no PT recommendations noted prior to discharge - OT has recommended a shower chair that patient will have to pay for out of pocket due to [facility] will not [facility] it - client told this nurse he has been walking without an AD. Client discharged and referral processed for [facility] 6/22/17 - ENMANUEL Guerrier LPN

## 2017-06-21 NOTE — PROGRESS NOTES
2030-Pt with nausea and emesis. Bowel sounds absent. Taking PO meds for back/incisional pain. Pt has residual numbness to feet , states pre-op condition R/T diabetes.

## 2017-06-21 NOTE — PROGRESS NOTES
Care Management Interventions  PCP Verified by CM: Yes  Mode of Transport at Discharge: BLS  Transition of Care Consult (CM Consult): [address]: Yes  MyChart Signup: No  Discharge Durable Medical Equipment: No  Physical Therapy Consult: Yes  Occupational Therapy Consult: Yes  Speech Therapy Consult: No  Current Support Network: Other (Lives with someone. )  Confirm Follow Up Transport: Family  Plan discussed with Pt/Family/Caregiver: Yes  Freedom of Choice Offered: Yes  Discharge Location  Discharge Placement: Home with home health     Late Entry:  6/20/17  63 y/o male admitted with lumbar spinal stenosis. Pt states he has a CPAP Machine but hasn't needed any HH in the past. He states he lives with someone and plans on going to his sister's house at discharge. CM to follow.

## 2017-06-21 NOTE — PROGRESS NOTES
Care Management Interventions  PCP Verified by CM: Yes  Mode of Transport at Discharge: BLS  Transition of Care Consult (CM Consult): [address]: Yes  MyChart Signup: No  Discharge Durable Medical Equipment: No  Physical Therapy Consult: Yes  Occupational Therapy Consult: Yes  Speech Therapy Consult: No  Current Support Network: Other (Lives with someone. )  Confirm Follow Up Transport: Family  Plan discussed with Pt/Family/Caregiver: Yes  Freedom of Choice Offered: Yes  Discharge Location  Discharge Placement: Home with home health     [address] given to pt and he chose [facility]. [facility] notified. He will be staying at his sister's house. His sister is Dion Alejandro. Her address is:  [address] [place] [address]. Her phone number is [phone]. [facility] aware that he will be staying with his sister.

## 2017-06-21 NOTE — ROUTINE PROCESS
0000 Patient asleep but easily aroused, complaining 6/10 back and abdominal pain, and nausea passing flautus. Patient medicated with percocet 1 tab and zofran 4 mg for nausea. 0600 Patient ambulated around the hallway. Percocet 1 tab was given for pain. Neuro intact.

## 2017-06-22 ENCOUNTER — HOME CARE VISIT (OUTPATIENT)
Dept: SCHEDULING | Facility: HOME HEALTH | Age: 57
End: 2017-06-22
Payer: MEDICAID

## 2017-06-22 PROCEDURE — G0151 HHCP-SERV OF PT,EA 15 MIN: HCPCS

## 2017-06-22 PROCEDURE — 400013 HH SOC

## 2017-06-23 ENCOUNTER — HOME CARE VISIT (OUTPATIENT)
Dept: HOME HEALTH SERVICES | Facility: HOME HEALTH | Age: 57
End: 2017-06-23
Payer: MEDICAID

## 2017-06-23 VITALS
TEMPERATURE: 98.3 F | HEART RATE: 56 BPM | DIASTOLIC BLOOD PRESSURE: 80 MMHG | SYSTOLIC BLOOD PRESSURE: 140 MMHG | OXYGEN SATURATION: 98 %

## 2017-06-24 ENCOUNTER — HOME CARE VISIT (OUTPATIENT)
Dept: SCHEDULING | Facility: HOME HEALTH | Age: 57
End: 2017-06-24
Payer: MEDICAID

## 2017-06-24 PROCEDURE — G0157 HHC PT ASSISTANT EA 15: HCPCS

## 2017-06-26 ENCOUNTER — HOME CARE VISIT (OUTPATIENT)
Dept: HOME HEALTH SERVICES | Facility: HOME HEALTH | Age: 57
End: 2017-06-26
Payer: MEDICAID

## 2017-06-26 PROCEDURE — G0157 HHC PT ASSISTANT EA 15: HCPCS

## 2017-06-27 ENCOUNTER — HOME CARE VISIT (OUTPATIENT)
Dept: SCHEDULING | Facility: HOME HEALTH | Age: 57
End: 2017-06-27
Payer: MEDICAID

## 2017-06-27 PROCEDURE — G0157 HHC PT ASSISTANT EA 15: HCPCS

## 2017-06-27 PROCEDURE — A6255 ABSORPT DRG >16<=48 IN W/BDR: HCPCS

## 2017-06-28 ENCOUNTER — HOME CARE VISIT (OUTPATIENT)
Dept: SCHEDULING | Facility: HOME HEALTH | Age: 57
End: 2017-06-28
Payer: MEDICAID

## 2017-06-28 PROCEDURE — G0157 HHC PT ASSISTANT EA 15: HCPCS

## 2017-06-29 ENCOUNTER — HOME CARE VISIT (OUTPATIENT)
Dept: SCHEDULING | Facility: HOME HEALTH | Age: 57
End: 2017-06-29
Payer: MEDICAID

## 2017-06-29 VITALS
TEMPERATURE: 97.6 F | HEART RATE: 82 BPM | OXYGEN SATURATION: 96 % | SYSTOLIC BLOOD PRESSURE: 144 MMHG | RESPIRATION RATE: 20 BRPM | DIASTOLIC BLOOD PRESSURE: 72 MMHG

## 2017-06-29 PROCEDURE — G0299 HHS/HOSPICE OF RN EA 15 MIN: HCPCS

## 2017-06-29 PROCEDURE — G0157 HHC PT ASSISTANT EA 15: HCPCS

## 2017-06-30 ENCOUNTER — HOME CARE VISIT (OUTPATIENT)
Dept: SCHEDULING | Facility: HOME HEALTH | Age: 57
End: 2017-06-30
Payer: MEDICAID

## 2017-06-30 VITALS — OXYGEN SATURATION: 93 % | DIASTOLIC BLOOD PRESSURE: 80 MMHG | SYSTOLIC BLOOD PRESSURE: 138 MMHG | HEART RATE: 78 BPM

## 2017-06-30 VITALS — HEART RATE: 80 BPM | SYSTOLIC BLOOD PRESSURE: 136 MMHG | OXYGEN SATURATION: 98 % | DIASTOLIC BLOOD PRESSURE: 78 MMHG

## 2017-07-01 VITALS — OXYGEN SATURATION: 92 % | SYSTOLIC BLOOD PRESSURE: 140 MMHG | HEART RATE: 80 BPM | DIASTOLIC BLOOD PRESSURE: 78 MMHG

## 2017-07-05 ENCOUNTER — OFFICE VISIT (OUTPATIENT)
Dept: ORTHOPEDIC SURGERY | Age: 57
End: 2017-07-05

## 2017-07-05 VITALS
SYSTOLIC BLOOD PRESSURE: 160 MMHG | HEART RATE: 80 BPM | RESPIRATION RATE: 14 BRPM | WEIGHT: 250 LBS | TEMPERATURE: 97.5 F | BODY MASS INDEX: 37.03 KG/M2 | DIASTOLIC BLOOD PRESSURE: 90 MMHG | HEIGHT: 69 IN

## 2017-07-05 DIAGNOSIS — Z98.1 S/P LUMBAR FUSION: ICD-10-CM

## 2017-07-05 DIAGNOSIS — Z51.81 ENCOUNTER FOR THERAPEUTIC DRUG MONITORING: ICD-10-CM

## 2017-07-05 DIAGNOSIS — E11.41 DIABETIC MONONEUROPATHY ASSOCIATED WITH TYPE 2 DIABETES MELLITUS (HCC): ICD-10-CM

## 2017-07-05 DIAGNOSIS — Z98.1 S/P LUMBAR FUSION: Primary | ICD-10-CM

## 2017-07-05 DIAGNOSIS — M79.2 NEURITIS: ICD-10-CM

## 2017-07-05 PROBLEM — E11.40 DIABETIC NEUROPATHY (HCC): Status: ACTIVE | Noted: 2017-07-05

## 2017-07-05 RX ORDER — PREGABALIN 75 MG/1
CAPSULE ORAL
Qty: 120 CAP | Refills: 0 | Status: SHIPPED | OUTPATIENT
Start: 2017-07-05 | End: 2019-12-27

## 2017-07-05 RX ORDER — NALOXONE HYDROCHLORIDE 2 MG/.4ML
2 INJECTION, SOLUTION INTRAMUSCULAR; SUBCUTANEOUS
Qty: 2 DEVICE | Refills: 0 | Status: ON HOLD | OUTPATIENT
Start: 2017-07-05 | End: 2017-09-10

## 2017-07-05 RX ORDER — OXYCODONE AND ACETAMINOPHEN 10; 325 MG/1; MG/1
1 TABLET ORAL
Qty: 120 TAB | Refills: 0 | Status: SHIPPED | OUTPATIENT
Start: 2017-07-05 | End: 2017-09-11

## 2017-07-05 NOTE — PROGRESS NOTES
[address]  [address], [address]  [place], [address] [place] [address] Street  Phone: [phone]  Fax: [phone]  PROGRESS NOTE  Patient: Amy Romeo                MRN: [id number]       SSN: [id number]  YOB: 1960        AGE: 64 y.o. SEX: male  Body mass index is 36.92 kg/(m^2). PCP: Rolf Patino MD  07/05/17    Chief Complaint   Patient presents with    Back Pain    Surgical Follow-up       HISTORY OF PRESENT ILLNESS:  Amy Romeo is a 64 y.o.  male with history of low-back and BLE pain with the LLE worse than RLE. Pain is aching, stabbing and throbbing and radiating to BLE in the L4 distribution from back to thighs to foot. Patient had XLIF L4/5 surgery on 6/19/17. Pain is worse with standing, walking and affects recreational activities. Pain is better with relaxation and pain medication. Pt states he has been using Percocet 10/325mg 4x day and Gabapentin 800mg TID with minimal, moderate relief. He states that his Gabapentin has not been working. He is a diabetic and also has diabetic neuropathy in addition to his radicular pain. He is going to wean off the Gabapentin over the next 1-2 weeks and then taper up on the Lyrica. Pain in his BLE has not changed since surgery. Reports that his BLE pain is the same and has not gotten worse or better. He is a diabetic w/ his sugars in the 200s, we will avoid steroids for now. He is a long-term pain management patient that was taking Norco 7.5/325 4xday given by his PCP until he started seeing Dr. Rui Montero another orthopedic Dr. Since his surgery Dr. Rui Montero has DC him from his pain management. We will continue to increase his pain medication to the 10/325mg until he is recovered from his surgery at which time he will be referred to pain management or go back to his PCP for his pain medications. We will get a UDS and PA today.  Patient denies any bladder/bowel dysfunction, new onset weakness, or other neurological deficits. Pt has been prescribed Percocet 10/325mg 4xday at this visit. Pt is disabled and does not work. Pt wishes to continue with evaluation of postoperative care. ASSESSMENT   Mari Merida was seen today for back pain and surgical follow-up. Diagnoses and all orders for this visit:    S/P lumbar fusion  -     DRUG SCREEN UR - W/ CONFIRM; Future    Diabetic mononeuropathy associated with type 2 diabetes mellitus ([facility] [address]    Neuritis    Encounter for therapeutic drug monitoring  -     DRUG SCREEN UR - W/ CONFIRM; Future    Other orders  -     naloxone (EVZIO) 2 mg/0.4 mL auto-injector; 0.4 mL by IntraMUSCular route once as needed for Overdose for up to 1 dose. -     oxyCODONE-acetaminophen (PERCOCET 10)  mg per tablet; Take 1 Tab by mouth every six (6) hours as needed. Max Daily Amount: 4 Tabs. -     pregabalin (LYRICA) 75 mg capsule; 1 cap bid x 1 week, then 2 caps bid       IMPRESSION AND PLAN:.     1) Pt was given information on lumbar post-operative and wound care. 2) Reviewed activity and to avoid NSAIDs x 3 months  3) UDS, Narcan, and PA  4) Taper off Neurontin, start Lyrica  4) Mr. Pamalee Opitz has a reminder for a \"due or due soon\" health maintenance. I have asked that he contact his primary care provider, Gavino Echevarria MD, for follow-up on this health maintenance. 5) We have informed the patient to notify us for immediate appointment if he has any worsening neurogical symptoms or if an emergency situation presents, then call 911  6)  demonstrated consistency with prescribing. 7) Pt will follow-up in 4 weeks. SUBJECTIVE    Smoking Status Non-smoker    Pain Scale: 10 - Worst pain ever/10    Pain Assessment  7/5/2017   Location of Pain Back;Leg   Location Modifiers Left;Right   Severity of Pain 10   Quality of Pain Throbbing; Sharp;Dull;Aching;Burning   Quality of Pain Comment -   Duration of Pain -   Frequency of Pain Constant   Aggravating Factors - Aggravating Factors Comment -   Limiting Behavior -   Relieving Factors -   Result of Injury -           REVIEW OF SYSTEMS  Constitutional: Negative for fever, chills, or weight change. Respiratory: Negative for cough or shortness of breath. Cardiovascular: Negative for chest pain or palpitations. Gastrointestinal: Negative for incontinence, acid reflux, change in bowel habits, or constipation. Genitourinary: Negative for incontinence, dysuria and flank pain. Musculoskeletal: Positive for low- back and  pain. See HPI. Skin: Negative for rash. Neurological:BLE radiculopathy. See HPI. Endo/Heme/Allergies: Negative. Psychiatric/Behavioral: Negative. PHYSICAL EXAMINATION  Visit Vitals    /90 (BP 1 Location: Left arm, BP Patient Position: Sitting)    Pulse 80    Temp 97.5 °F (36.4 °C) (Oral)    Resp 14    Ht 5' 9\" (1.753 m)    Wt 250 lb (113.4 kg)    BMI 36.92 kg/m2         Accompanied by Self. Constitutional:  Well developed, well nourished, in no acute distress. Psychiatric: Affect and mood are appropriate. Integumentary: No rashes or abrasions noted on exposed areas. Wound: Left lower abdomen/flank Incision healing well, no drainage, no erythema, no hernia, no seroma, no swelling, no dehiscence, incision well approximated. Cardiovascular/Peripheral Vascular: +2 radial & pedal pulses. No peripheral edema is noted. Lymphatic:  No evidence of lymphedema. No cervical lymphadenopathy. SPINE/MUSCULOSKELETAL EXAM    Lumbar spine:  No rash, ecchymosis, or gross obliquity. No fasciculations. No focal atrophy is noted. Range of motion is decreased with flexion, extension . Tenderness to palpation at the left sciatic notch. Sensation grossly intact to light touch.     Straight leg raise positive on left      MOTOR:     Hip Flex Quads Hamstrings Ankle DF EHL Ankle PF   Right +4/5 +4/5 +4/5 +4/5 +4/5 +4/5   Left +4/5 +4/5 +4/5 +4/5 +4/5 +4/5         Mild antalgic gait, favors left    Ambulation without assistive device. FWB. IMAGING: will need x-rays at next visit    PAST MEDICAL HISTORY   Past Medical History:   Diagnosis Date    Arthritis     Coronary atherosclerosis of native coronary artery     S/P PCI with GE in 12/09    Diabetes ([facility] [address]     IDDM    Electrolyte and fluid disorders not elsewhere classified     Essential hypertension, benign     GERD (gastroesophageal reflux disease)     Heroin abuse 05/26/16    Psychiatrist Dr. Jerzy Villalta History of heart attack     Hyperlipidemia     Intermediate coronary syndrome [facility])     MDD (major depressive disorder) 5/26/16    Psychiatrist Dr. Thang Cristina infarction [facility])     Obesity, unspecified     Old myocardial infarction     Other and unspecified hyperlipidemia     Pancreatitis     Positive PPD     Sleep apnea     uses Cpap machine    Transfusion history     Before 1992       Past Surgical History:   Procedure Laterality Date    HX APPENDECTOMY      HX CORONARY STENT PLACEMENT  2010    2 stents   . MEDICATIONS      Current Outpatient Prescriptions   Medication Sig Dispense Refill    naloxone (EVZIO) 2 mg/0.4 mL auto-injector 0.4 mL by IntraMUSCular route once as needed for Overdose for up to 1 dose. 2 Device 0    oxyCODONE-acetaminophen (PERCOCET 10)  mg per tablet Take 1 Tab by mouth every six (6) hours as needed. Max Daily Amount: 4 Tabs. 120 Tab 0    pregabalin (LYRICA) 75 mg capsule 1 cap bid x 1 week, then 2 caps bid 120 Cap 0    hydroCHLOROthiazide (HYDRODIURIL) 50 mg tablet Take 50 mg by mouth daily.  amitriptyline (ELAVIL) 50 mg tablet Take 50 mg by mouth nightly.  carvedilol (COREG) 6.25 mg tablet Take 1 Tab by mouth two (2) times daily (with meals). 60 Tab 6    insulin aspart (NOVOLOG FLEXPEN) 100 unit/mL flexpen 65 Units by SubCUTAneous route Before breakfast, lunch, and dinner.       Polyethylene Glycol 3350 powd Take 1 Cap by mouth as needed (for constipation ).  insulin detemir (LEVEMIR) 100 unit/mL injection 63 Units by SubCUTAneous route nightly.  cyclobenzaprine (FLEXERIL) 10 mg tablet Take  by mouth three (3) times daily as needed for Muscle Spasm(s).  doxazosin (CARDURA) 2 mg tablet Take 2 mg by mouth daily.  simvastatin (ZOCOR) 10 mg tablet Take 10 mg by mouth nightly.  levothyroxine (SYNTHROID) 75 mcg tablet Take  by mouth Daily (before breakfast).  furosemide (LASIX) 40 mg tablet Take 40 mg by mouth daily.  ranitidine (ZANTAC) 150 mg tablet Take 150 mg by mouth two (2) times a day.  quinapril (ACCUPRIL) 40 mg tablet Take 40 mg by mouth nightly.  metFORMIN (GLUCOPHAGE) 1,000 mg tablet Take 1,000 mg by mouth two (2) times daily (with meals).  traZODone (DESYREL) 150 mg tablet Take 150 mg by mouth nightly.  clopidogrel (PLAVIX) 75 mg tablet Take 75 mg by mouth daily.  ondansetron hcl (ZOFRAN, AS HYDROCHLORIDE,) 4 mg tablet Take 1 Tab by mouth every eight (8) hours as needed for Nausea. 20 Each 0    omeprazole (PRILOSEC) 20 mg capsule Take 20 mg by mouth daily. ALLERGIES    Allergies   Allergen Reactions    Compazine [Prochlorperazine] Anaphylaxis     \"Tightness around throat\"          SOCIAL HISTORY    Social History     Social History    Marital status:      Spouse name: N/A    Number of children: N/A    Years of education: N/A     Occupational History    Not on file.      Social History Main Topics    Smoking status: Never Smoker    Smokeless tobacco: Never Used    Alcohol use No    Drug use: No    Sexual activity: Not on file     Other Topics Concern    Not on file     Social History Narrative       FAMILY HISTORY    Family History   Problem Relation Age of Onset    Heart Attack Father     Coronary Artery Disease Mother     Diabetes Mother     Cancer Sister      breast cancer and lung cancer         Jose Reinoso NP

## 2017-07-05 NOTE — PATIENT INSTRUCTIONS
Lumbar Spinal Fusion: What to Expect at Home  Your Recovery    After surgery, you can expect your back to feel stiff and sore. You may have trouble sitting or standing in one position for very long and may need pain medicine in the weeks after your surgery. It may take 4 to 6 weeks to get back to doing simple activities, such as light housework. It may take 6 months to a year for your back to get better completely. You may need to wear a back brace while your back heals. And your doctor may have you go to physical therapy. If your job doesn't require physical labor, you will probably be able to go back to work after 1 or 2 months. If your job involves light physical labor, it may take 3 to 6 months. Most people whose jobs involved heavy labor can never return to those jobs. This care sheet gives you a general idea about how long it will take for you to recover. But each person recovers at a different pace. Follow the steps below to get better as quickly as possible. How can you care for yourself at home? Activity  · Rest when you feel tired. Getting enough sleep will help you recover. · Try to walk each day. Start by walking a little more than you did the day before. Bit by bit, increase the amount you walk. Walking boosts blood flow and helps prevent pneumonia and constipation. Walking may also decrease your muscle soreness after surgery. · If advised by your doctor, you may need to avoid lifting anything that would cause excessive strain on your back. This may include a child, heavy grocery bags and milk containers, a heavy briefcase or backpack, cat litter or dog food bags, or a vacuum . · Avoid strenuous activities, such as bicycle riding, jogging, weight lifting, or aerobic exercise, until your doctor says it is okay. · Do not drive for 2 to 4 weeks after your surgery or until your doctor says it is okay.   · Avoid riding in a car for more than 30 minutes at a time for 2 to 4 weeks after surgery. If you must ride in a car for a longer distance, stop often to walk and stretch your legs. · Try to change your position about every 30 minutes while sitting or standing. This will help decrease your back pain while you are healing. · You will probably need to take at least 4 to 6 weeks off from work. It depends on the type of work you do and how you feel. · You may have sex as soon as you feel able, but avoid positions that put stress on your back or cause pain. Diet  · You can eat your normal diet. If your stomach is upset, try bland, low-fat foods like plain rice, broiled chicken, toast, and yogurt. · Drink plenty of fluids (unless your doctor tells you not to). · You may notice that your bowel movements are not regular right after your surgery. This is common. Try to avoid constipation and straining with bowel movements. You may want to take a fiber supplement every day. If you have not had a bowel movement after a couple of days, ask your doctor about taking a mild laxative. Medicines  · Be safe with medicines. Take pain medicines exactly as directed. ¨ If the doctor gave you a prescription medicine for pain, take it as prescribed. ¨ If you are not taking a prescription pain medicine, ask your doctor if you can take an over-the-counter medicine. · If your doctor prescribed antibiotics, take them as directed. Do not stop taking them just because you feel better. You need to take the full course of antibiotics. · If you think your pain medicine is making you sick to your stomach:  ¨ Take your medicine after meals (unless your doctor has told you not to). ¨ Ask your doctor for a different pain medicine. Incision care  · You will be given specific instructions about how to care for the cuts (incisions) the doctor made. The instructions will depend on the type of materials used to close the cut. Exercise  · Do back exercises as instructed by your doctor.   · Your doctor may advise you to work with a physical therapist to improve the strength and flexibility of your back. Other instructions  · To reduce stiffness and help sore muscles, use a warm water bottle, a heating pad set on low, or a warm cloth on your back. Do not put heat right over the incision. Do not go to sleep with a heating pad on your skin. Follow-up care is a key part of your treatment and safety. Be sure to make and go to all appointments, and call your doctor if you are having problems. It's also a good idea to know your test results and keep a list of the medicines you take. When should you call for help? Call 911 anytime you think you may need emergency care. For example, call if:  · You passed out (lost consciousness). · You have sudden chest pain and shortness of breath, or you cough up blood. · You are unable to move a leg at all. Call your doctor now or seek immediate medical care if:  · You have pain that does not get better after you take pain pills. · You have new or worse symptoms in your legs or buttocks. Symptoms may include:  ¨ Numbness or tingling. ¨ Weakness. ¨ Pain. · You lose bladder or bowel control. · You have loose stitches, or your incision comes open. · You have blood or fluid draining from the incision. · You have signs of infection, such as:  ¨ Increased pain, swelling, warmth, or redness. ¨ Pus draining from the incision. ¨ A fever. Watch closely for any changes in your health, and be sure to contact your doctor if:  · You do not have a bowel movement after taking a laxative. · You are not getting better as expected. Where can you learn more? Go to http://kaila-carlitos.info/. Enter Q736 in the search box to learn more about \"Lumbar Spinal Fusion: What to Expect at Home. \"  Current as of: March 21, 2017  Content Version: 11.3  © 8343-0444 [employer], [employer].  Care instructions adapted under license by [employer] (which disclaims liability or warranty for this information). If you have questions about a medical condition or this instruction, always ask your healthcare professional. [place] [address] any warranty or liability for your use of this information.

## 2017-07-06 ENCOUNTER — TELEPHONE (OUTPATIENT)
Dept: ORTHOPEDIC SURGERY | Age: 57
End: 2017-07-06

## 2017-07-06 NOTE — TELEPHONE ENCOUNTER
Radha Ngo (Key: [id number])    [employer] has received your information, and the request will be reviewed. You may close this dialog, return to your dashboard, and perform other tasks. You will receive an electronic determination in [employer]. You can see the latest determination by locating this request on your dashboard or by reopening this request. Lizbeth Hall will also receive a faxed copy of the determination. If you have any questions please contact [employer] at [phone]. If you need assistance, please chat with [employer] or call us at [phone].

## 2017-07-07 NOTE — TELEPHONE ENCOUNTER
Received a fax from [email or web address] stating a prior auth request for Lyrica has been approved as of 07/06/2017. The approval is good until 07/06/2018.

## 2017-07-11 ENCOUNTER — DOCUMENTATION ONLY (OUTPATIENT)
Dept: ORTHOPEDIC SURGERY | Age: 57
End: 2017-07-11

## 2017-07-11 NOTE — LETTER
7/12/2017 9:40 AM 
 
Mr. Genoveva Greene [facility] [address] 
 
 
 
 
 
07/12/17 Genoveva Jimenez [facility] [address] Dear Mr. Genoveva Salcido We have received the results of your recent drug screening done in our office on 7/5/17. The results have come back with evidence of strong opioid  drugs in your system that we did not prescribe; therefore we will no longer provide you with any controlled substances, including opioid pain medication. We are enclosing a copy of the urine drug screen that was done in our office. We will be happy to continue to treat you for your spine health needs; however, should you choose to find a new physician we will provide you with a copy of your medical records. You will be required to sign a release for us to do so. Do not hesitate to contact our office should you have any questions.  
 
 
Thank you, 
 
 
 
 
Sunshine Lovett MD

## 2017-07-11 NOTE — PROGRESS NOTES
Reviewed UDS done on 7/5/17 which demonstrates no percocet and positive for methadone and morphine. Called pt to discuss, but he was not home. Left message with wife to have him call here and she agreed to do so.

## 2017-07-12 ENCOUNTER — TELEPHONE (OUTPATIENT)
Dept: ORTHOPEDIC SURGERY | Age: 57
End: 2017-07-12

## 2017-07-12 DIAGNOSIS — Z98.1 S/P LUMBAR FUSION: Primary | ICD-10-CM

## 2017-07-12 NOTE — TELEPHONE ENCOUNTER
From previous encounter:    July 7, 2017   Me        1:43 PM   Note      Received a fax from [employer] stating a prior auth request for Lyrica has been approved as of 07/06/2017. The approval is good until 07/06/2018.

## 2017-07-12 NOTE — TELEPHONE ENCOUNTER
Pt seen 07/05/2017 by ADOLFO Alston. XLIF L4/5 06/19/2017. Upcoming appt 08/04/2017 with Dr. Edel Cantor.

## 2017-07-12 NOTE — PROGRESS NOTES
I called the patient again and spoke with him. I informed him of his UDS being negative for percocet and positive for methadone, codeine and morphine. He states he was taking pain medication that his aunt gave him for pain. I explained to him that taking other's medication is dangerous and that taking methadone can be deadly. I also explained to him that we would not be able to give him further opioid pain medication due to this and he verbalized understanding. He asked if he should still come to his appt and I told him, yes, that we would continue his spine care, but just could not give him further opioids. He verbalized understanding.

## 2017-07-12 NOTE — TELEPHONE ENCOUNTER
Mr. Saloni He is requesting a toilet extension as there toilet is very low and he has difficulty getting up and down. Or a potty chair.  Just had surgery 06/19/17,

## 2017-07-12 NOTE — TELEPHONE ENCOUNTER
Mr. Seymour Luna is requesting a toilet extension as there toilet is very low and he has difficulty getting up and down. Or a potty chair. Just had surgery 06/19/17,  Please call his insurance to get authorization. Also will need to get authorization for the Lyrica.  Please call him back at [phone]

## 2017-07-13 NOTE — TELEPHONE ENCOUNTER
Called pt and informed that Lyrica was approved by insurance and approval letter was faxed to [facility]. Pt also informed the NP has put in an order for the toilet extension. Called [facility] to inquire if MARIA DE JESUS Zendejas is accepted, confirmed. Faxed to [facility] at [phone], fax confirmation received.

## 2017-07-14 NOTE — TELEPHONE ENCOUNTER
Darling Hoover from [facility] states they do not carry the toilet extension and she has forwarded the order to [facility]. Darling Hoover can be reached at [phone] if need be.

## 2017-09-07 ENCOUNTER — APPOINTMENT (OUTPATIENT)
Dept: GENERAL RADIOLOGY | Age: 57
DRG: 351 | End: 2017-09-07
Attending: EMERGENCY MEDICINE
Payer: MEDICAID

## 2017-09-07 ENCOUNTER — HOSPITAL ENCOUNTER (INPATIENT)
Age: 57
LOS: 4 days | Discharge: HOME | DRG: 351 | End: 2017-09-11
Attending: EMERGENCY MEDICINE | Admitting: INTERNAL MEDICINE
Payer: MEDICAID

## 2017-09-07 ENCOUNTER — APPOINTMENT (OUTPATIENT)
Dept: CT IMAGING | Age: 57
DRG: 351 | End: 2017-09-07
Attending: EMERGENCY MEDICINE
Payer: MEDICAID

## 2017-09-07 DIAGNOSIS — R11.2 NAUSEA AND VOMITING, INTRACTABILITY OF VOMITING NOT SPECIFIED, UNSPECIFIED VOMITING TYPE: Primary | ICD-10-CM

## 2017-09-07 DIAGNOSIS — E86.0 DEHYDRATION: ICD-10-CM

## 2017-09-07 PROBLEM — J98.11 ATELECTASIS: Status: ACTIVE | Noted: 2017-09-07

## 2017-09-07 PROBLEM — E87.6 HYPOKALEMIA: Status: ACTIVE | Noted: 2017-09-07

## 2017-09-07 PROBLEM — E03.9 ACQUIRED HYPOTHYROIDISM: Status: ACTIVE | Noted: 2017-09-07

## 2017-09-07 PROBLEM — K31.84 GASTROPARESIS: Status: ACTIVE | Noted: 2017-09-07

## 2017-09-07 PROBLEM — N17.9 ARF (ACUTE RENAL FAILURE) (HCC): Status: ACTIVE | Noted: 2017-09-07

## 2017-09-07 PROBLEM — R10.9 ABDOMINAL PAIN: Status: ACTIVE | Noted: 2017-09-07

## 2017-09-07 LAB
ALBUMIN SERPL-MCNC: 3.9 G/DL (ref 3.4–5)
ALBUMIN/GLOB SERPL: 0.7 {RATIO} (ref 0.8–1.7)
ALP SERPL-CCNC: 146 U/L (ref 45–117)
ALT SERPL-CCNC: 25 U/L (ref 16–61)
ANION GAP SERPL CALC-SCNC: 12 MMOL/L (ref 3–18)
APPEARANCE UR: ABNORMAL
AST SERPL-CCNC: 15 U/L (ref 15–37)
BACTERIA URNS QL MICRO: ABNORMAL /HPF
BASOPHILS # BLD: 0 K/UL (ref 0–0.1)
BASOPHILS NFR BLD: 0 % (ref 0–2)
BILIRUB DIRECT SERPL-MCNC: 0.2 MG/DL (ref 0–0.2)
BILIRUB SERPL-MCNC: 0.5 MG/DL (ref 0.2–1)
BILIRUB UR QL: NEGATIVE
BUN SERPL-MCNC: 21 MG/DL (ref 7–18)
BUN/CREAT SERPL: 14 (ref 12–20)
CALCIUM SERPL-MCNC: 9.7 MG/DL (ref 8.5–10.1)
CHLORIDE SERPL-SCNC: 100 MMOL/L (ref 100–108)
CK MB CFR SERPL CALC: 0.7 % (ref 0–4)
CK MB SERPL-MCNC: 1.7 NG/ML (ref 5–25)
CK SERPL-CCNC: 253 U/L (ref 39–308)
CO2 SERPL-SCNC: 26 MMOL/L (ref 21–32)
COLOR UR: YELLOW
CREAT SERPL-MCNC: 1.51 MG/DL (ref 0.6–1.3)
DIFFERENTIAL METHOD BLD: ABNORMAL
EOSINOPHIL # BLD: 0 K/UL (ref 0–0.4)
EOSINOPHIL NFR BLD: 0 % (ref 0–5)
EPITH CASTS URNS QL MICRO: ABNORMAL /LPF (ref 0–5)
ERYTHROCYTE [DISTWIDTH] IN BLOOD BY AUTOMATED COUNT: 13.6 % (ref 11.6–14.5)
GLOBULIN SER CALC-MCNC: 5.6 G/DL (ref 2–4)
GLUCOSE SERPL-MCNC: 288 MG/DL (ref 74–99)
GLUCOSE UR STRIP.AUTO-MCNC: >1000 MG/DL
HCT VFR BLD AUTO: 39.2 % (ref 36–48)
HGB BLD-MCNC: 13 G/DL (ref 13–16)
HGB UR QL STRIP: NEGATIVE
KETONES UR QL STRIP.AUTO: 40 MG/DL
LEUKOCYTE ESTERASE UR QL STRIP.AUTO: NEGATIVE
LIPASE SERPL-CCNC: 96 U/L (ref 73–393)
LYMPHOCYTES # BLD: 1.2 K/UL (ref 0.9–3.6)
LYMPHOCYTES NFR BLD: 6 % (ref 21–52)
MAGNESIUM SERPL-MCNC: 1.8 MG/DL (ref 1.6–2.6)
MCH RBC QN AUTO: 30.2 PG (ref 24–34)
MCHC RBC AUTO-ENTMCNC: 33.2 G/DL (ref 31–37)
MCV RBC AUTO: 91 FL (ref 74–97)
MONOCYTES # BLD: 0.6 K/UL (ref 0.05–1.2)
MONOCYTES NFR BLD: 3 % (ref 3–10)
NEUTS SEG # BLD: 18.5 K/UL (ref 1.8–8)
NEUTS SEG NFR BLD: 91 % (ref 40–73)
NITRITE UR QL STRIP.AUTO: NEGATIVE
PH UR STRIP: >8.5 [PH] (ref 5–8)
PLATELET # BLD AUTO: 350 K/UL (ref 135–420)
PMV BLD AUTO: 11 FL (ref 9.2–11.8)
POTASSIUM SERPL-SCNC: 3.4 MMOL/L (ref 3.5–5.5)
PROT SERPL-MCNC: 9.5 G/DL (ref 6.4–8.2)
PROT UR STRIP-MCNC: 30 MG/DL
RBC # BLD AUTO: 4.31 M/UL (ref 4.7–5.5)
RBC #/AREA URNS HPF: NEGATIVE /HPF (ref 0–5)
SODIUM SERPL-SCNC: 138 MMOL/L (ref 136–145)
SP GR UR REFRACTOMETRY: 1.03 (ref 1–1.03)
TROPONIN I SERPL-MCNC: 0.07 NG/ML (ref 0–0.04)
UROBILINOGEN UR QL STRIP.AUTO: 1 EU/DL (ref 0.2–1)
WBC # BLD AUTO: 20.3 K/UL (ref 4.6–13.2)
WBC URNS QL MICRO: ABNORMAL /HPF (ref 0–4)

## 2017-09-07 PROCEDURE — 96361 HYDRATE IV INFUSION ADD-ON: CPT

## 2017-09-07 PROCEDURE — 99285 EMERGENCY DEPT VISIT HI MDM: CPT

## 2017-09-07 PROCEDURE — 93005 ELECTROCARDIOGRAM TRACING: CPT

## 2017-09-07 PROCEDURE — 74177 CT ABD & PELVIS W/CONTRAST: CPT

## 2017-09-07 PROCEDURE — 83735 ASSAY OF MAGNESIUM: CPT | Performed by: EMERGENCY MEDICINE

## 2017-09-07 PROCEDURE — 74011636320 HC RX REV CODE- 636/320: Performed by: EMERGENCY MEDICINE

## 2017-09-07 PROCEDURE — 85025 COMPLETE CBC W/AUTO DIFF WBC: CPT | Performed by: EMERGENCY MEDICINE

## 2017-09-07 PROCEDURE — 74011250636 HC RX REV CODE- 250/636: Performed by: EMERGENCY MEDICINE

## 2017-09-07 PROCEDURE — 80076 HEPATIC FUNCTION PANEL: CPT | Performed by: EMERGENCY MEDICINE

## 2017-09-07 PROCEDURE — 74011000250 HC RX REV CODE- 250: Performed by: EMERGENCY MEDICINE

## 2017-09-07 PROCEDURE — 74011000250 HC RX REV CODE- 250: Performed by: INTERNAL MEDICINE

## 2017-09-07 PROCEDURE — 84484 ASSAY OF TROPONIN QUANT: CPT | Performed by: EMERGENCY MEDICINE

## 2017-09-07 PROCEDURE — 71010 XR CHEST SNGL V: CPT

## 2017-09-07 PROCEDURE — 96374 THER/PROPH/DIAG INJ IV PUSH: CPT

## 2017-09-07 PROCEDURE — 65270000029 HC RM PRIVATE

## 2017-09-07 PROCEDURE — 74011250636 HC RX REV CODE- 250/636: Performed by: INTERNAL MEDICINE

## 2017-09-07 PROCEDURE — 83690 ASSAY OF LIPASE: CPT | Performed by: EMERGENCY MEDICINE

## 2017-09-07 PROCEDURE — 74011250636 HC RX REV CODE- 250/636

## 2017-09-07 PROCEDURE — 96375 TX/PRO/DX INJ NEW DRUG ADDON: CPT

## 2017-09-07 PROCEDURE — 81001 URINALYSIS AUTO W/SCOPE: CPT | Performed by: EMERGENCY MEDICINE

## 2017-09-07 PROCEDURE — 80048 BASIC METABOLIC PNL TOTAL CA: CPT | Performed by: EMERGENCY MEDICINE

## 2017-09-07 PROCEDURE — 96376 TX/PRO/DX INJ SAME DRUG ADON: CPT

## 2017-09-07 PROCEDURE — 87040 BLOOD CULTURE FOR BACTERIA: CPT | Performed by: EMERGENCY MEDICINE

## 2017-09-07 RX ORDER — INSULIN LISPRO 100 [IU]/ML
INJECTION, SOLUTION INTRAVENOUS; SUBCUTANEOUS
Status: DISCONTINUED | OUTPATIENT
Start: 2017-09-08 | End: 2017-09-11 | Stop reason: HOSPADM

## 2017-09-07 RX ORDER — MORPHINE SULFATE 4 MG/ML
4 INJECTION, SOLUTION INTRAMUSCULAR; INTRAVENOUS
Status: COMPLETED | OUTPATIENT
Start: 2017-09-07 | End: 2017-09-07

## 2017-09-07 RX ORDER — INSULIN LISPRO 100 [IU]/ML
INJECTION, SOLUTION INTRAVENOUS; SUBCUTANEOUS EVERY 6 HOURS
Status: DISCONTINUED | OUTPATIENT
Start: 2017-09-08 | End: 2017-09-07

## 2017-09-07 RX ORDER — METOCLOPRAMIDE 10 MG/1
10 TABLET ORAL
Status: DISCONTINUED | OUTPATIENT
Start: 2017-09-07 | End: 2017-09-07

## 2017-09-07 RX ORDER — METOCLOPRAMIDE HYDROCHLORIDE 5 MG/ML
10 INJECTION INTRAMUSCULAR; INTRAVENOUS
Status: DISCONTINUED | OUTPATIENT
Start: 2017-09-07 | End: 2017-09-08

## 2017-09-07 RX ORDER — METOCLOPRAMIDE HYDROCHLORIDE 5 MG/ML
10 INJECTION INTRAMUSCULAR; INTRAVENOUS
Status: COMPLETED | OUTPATIENT
Start: 2017-09-07 | End: 2017-09-07

## 2017-09-07 RX ORDER — DIPHENHYDRAMINE HYDROCHLORIDE 50 MG/ML
25 INJECTION, SOLUTION INTRAMUSCULAR; INTRAVENOUS ONCE
Status: COMPLETED | OUTPATIENT
Start: 2017-09-07 | End: 2017-09-07

## 2017-09-07 RX ORDER — LORAZEPAM 2 MG/ML
1 INJECTION INTRAMUSCULAR ONCE
Status: COMPLETED | OUTPATIENT
Start: 2017-09-07 | End: 2017-09-07

## 2017-09-07 RX ORDER — ONDANSETRON 2 MG/ML
4 INJECTION INTRAMUSCULAR; INTRAVENOUS
Status: COMPLETED | OUTPATIENT
Start: 2017-09-07 | End: 2017-09-07

## 2017-09-07 RX ORDER — FAMOTIDINE 10 MG/ML
20 INJECTION INTRAVENOUS
Status: COMPLETED | OUTPATIENT
Start: 2017-09-07 | End: 2017-09-07

## 2017-09-07 RX ORDER — MORPHINE SULFATE 2 MG/ML
2 INJECTION, SOLUTION INTRAMUSCULAR; INTRAVENOUS
Status: DISCONTINUED | OUTPATIENT
Start: 2017-09-07 | End: 2017-09-08

## 2017-09-07 RX ORDER — MAGNESIUM SULFATE 100 %
16 CRYSTALS MISCELLANEOUS AS NEEDED
Status: DISCONTINUED | OUTPATIENT
Start: 2017-09-07 | End: 2017-09-11 | Stop reason: HOSPADM

## 2017-09-07 RX ORDER — ONDANSETRON 2 MG/ML
INJECTION INTRAMUSCULAR; INTRAVENOUS
Status: COMPLETED
Start: 2017-09-07 | End: 2017-09-07

## 2017-09-07 RX ORDER — FAMOTIDINE 10 MG/ML
20 INJECTION INTRAVENOUS EVERY 12 HOURS
Status: DISCONTINUED | OUTPATIENT
Start: 2017-09-07 | End: 2017-09-09

## 2017-09-07 RX ORDER — LORAZEPAM 2 MG/ML
INJECTION INTRAMUSCULAR
Status: COMPLETED
Start: 2017-09-07 | End: 2017-09-07

## 2017-09-07 RX ORDER — DEXTROSE 50 % IN WATER (D50W) INTRAVENOUS SYRINGE
25-50 AS NEEDED
Status: DISCONTINUED | OUTPATIENT
Start: 2017-09-07 | End: 2017-09-11 | Stop reason: HOSPADM

## 2017-09-07 RX ADMIN — LORAZEPAM 1 MG: 2 INJECTION INTRAMUSCULAR at 21:24

## 2017-09-07 RX ADMIN — LORAZEPAM 1 MG: 2 INJECTION INTRAMUSCULAR; INTRAVENOUS at 21:24

## 2017-09-07 RX ADMIN — METRONIDAZOLE 250 MG: 500 INJECTION, SOLUTION INTRAVENOUS at 23:00

## 2017-09-07 RX ADMIN — Medication 2 MG: at 23:50

## 2017-09-07 RX ADMIN — IOPAMIDOL 80 ML: 612 INJECTION, SOLUTION INTRAVENOUS at 20:56

## 2017-09-07 RX ADMIN — FAMOTIDINE 20 MG: 10 INJECTION, SOLUTION INTRAVENOUS at 18:47

## 2017-09-07 RX ADMIN — FAMOTIDINE 20 MG: 10 INJECTION, SOLUTION INTRAVENOUS at 23:50

## 2017-09-07 RX ADMIN — ONDANSETRON 4 MG: 2 INJECTION INTRAMUSCULAR; INTRAVENOUS at 20:11

## 2017-09-07 RX ADMIN — ONDANSETRON 4 MG: 2 SOLUTION INTRAMUSCULAR; INTRAVENOUS at 20:11

## 2017-09-07 RX ADMIN — METOCLOPRAMIDE 10 MG: 5 INJECTION, SOLUTION INTRAMUSCULAR; INTRAVENOUS at 20:37

## 2017-09-07 RX ADMIN — SODIUM CHLORIDE 1000 ML: 900 INJECTION, SOLUTION INTRAVENOUS at 18:47

## 2017-09-07 RX ADMIN — ONDANSETRON 4 MG: 2 INJECTION INTRAMUSCULAR; INTRAVENOUS at 18:48

## 2017-09-07 RX ADMIN — SODIUM CHLORIDE 1000 ML: 900 INJECTION, SOLUTION INTRAVENOUS at 20:37

## 2017-09-07 RX ADMIN — METOCLOPRAMIDE 10 MG: 5 INJECTION, SOLUTION INTRAMUSCULAR; INTRAVENOUS at 23:50

## 2017-09-07 RX ADMIN — Medication 4 MG: at 18:48

## 2017-09-07 RX ADMIN — DIPHENHYDRAMINE HYDROCHLORIDE 25 MG: 50 INJECTION, SOLUTION INTRAMUSCULAR; INTRAVENOUS at 20:36

## 2017-09-07 NOTE — IP AVS SNAPSHOT
[address] Patient: Mariano Pruett MRN: [id number] ZMC:[id number] Current Discharge Medication List  
  
START taking these medications Dose & Instructions Dispensing Information Comments Morning Noon Evening Bedtime  
 acetaminophen 325 mg tablet Commonly known as:  TYLENOL Your last dose was: Your next dose is:    
   
   
 Dose:  650 mg Take 2 Tabs by mouth every six (6) hours as needed. Indications: Pain, take it with bentyl Quantity:  30 Tab Refills:  0  
     
   
   
   
  
 albuterol 90 mcg/actuation inhaler Commonly known as:  PROVENTIL HFA, VENTOLIN HFA, PROAIR HFA Your last dose was: Your next dose is:    
   
   
 2 puffs every 6 hours x 5 days then every 6 hours as needed for shortness of breath and/or wheezing Quantity:  1 Inhaler Refills:  0  
     
   
   
   
  
 amoxicillin-clavulanate 875-125 mg per tablet Commonly known as:  AUGMENTIN Your last dose was: Your next dose is:    
   
   
 Dose:  1 Tab Take 1 Tab by mouth every twelve (12) hours for 5 days. Quantity:  10 Tab Refills:  0  
     
   
   
   
  
 dicyclomine 10 mg capsule Commonly known as:  BENTYL Your last dose was: Your next dose is:    
   
   
 Dose:  10 mg Take 1 Cap by mouth three (3) times daily as needed. Indications: abdominal pain, take it with tylenol Quantity:  20 Cap Refills:  0  
     
   
   
   
  
 pantoprazole 40 mg tablet Commonly known as:  PROTONIX Your last dose was: Your next dose is:    
   
   
 Dose:  40 mg Take 1 Tab by mouth Daily (before breakfast). Quantity:  30 Tab Refills:  0  
     
   
   
   
  
 polyethylene glycol 17 gram packet Commonly known as:  Lawerance Amas Replaces:  Polyethylene Glycol 3350 Powd Your last dose was: Your next dose is:    
   
   
 Dose:  17 g Take 1 Packet by mouth daily. Quantity:  30 Packet Refills:  0 CONTINUE these medications which have CHANGED Dose & Instructions Dispensing Information Comments Morning Noon Evening Bedtime  
 hydroCHLOROthiazide 50 mg tablet Commonly known as:  HYDRODIURIL What changed:  how much to take Your last dose was: Your next dose is:    
   
   
 Dose:  25 mg Take 0.5 Tabs by mouth daily. Quantity:  15 Tab Refills:  0  
     
   
   
   
  
 insulin detemir 100 unit/mL injection Commonly known as:  LEVEMIR What changed:  how much to take Your last dose was: Your next dose is:    
   
   
 Dose:  30 Units 30 Units by SubCUTAneous route nightly. Quantity:  10 mL Refills:  0  
     
   
   
   
  
 quinapril 10 mg tablet Commonly known as:  ACCUPRIL What changed:   
- medication strength 
- how much to take Your last dose was: Your next dose is:    
   
   
 Dose:  10 mg Take 1 Tab by mouth nightly. Quantity:  30 Tab Refills:  0 CONTINUE these medications which have NOT CHANGED Dose & Instructions Dispensing Information Comments Morning Noon Evening Bedtime  
 amitriptyline 50 mg tablet Commonly known as:  ELAVIL Your last dose was: Your next dose is:    
   
   
 Dose:  50 mg Take 50 mg by mouth nightly. Refills:  0  
     
   
   
   
  
 amLODIPine 10 mg tablet Commonly known as:  Erenest Ozzy Your last dose was: Your next dose is:    
   
   
 Dose:  10 mg Take 10 mg by mouth daily. Refills:  0  
     
   
   
   
  
 clopidogrel 75 mg Tab Commonly known as:  PLAVIX Your last dose was: Your next dose is:    
   
   
 Dose:  75 mg Take 75 mg by mouth daily. Refills:  0  
     
   
   
   
  
 metFORMIN 1,000 mg tablet Commonly known as:  GLUCOPHAGE Your last dose was: Your next dose is: Dose:  1000 mg Take 1,000 mg by mouth two (2) times daily (with meals). Refills:  0  
     
   
   
   
  
 pregabalin 75 mg capsule Commonly known as:  Winthrop Jewel Your last dose was: Your next dose is:    
   
   
 1 cap bid x 1 week, then 2 caps bid Quantity:  120 Cap Refills:  0  
     
   
   
   
  
 simvastatin 10 mg tablet Commonly known as:  ZOCOR Your last dose was: Your next dose is:    
   
   
 Dose:  10 mg Take 10 mg by mouth nightly. Refills:  0 STOP taking these medications   
 levothyroxine 75 mcg tablet Commonly known as:  SYNTHROID NovoLOG Flexpen 100 unit/mL Inpn Generic drug:  insulin aspart  
   
  
 omeprazole 20 mg capsule Commonly known as:  PRILOSEC  
   
  
 ondansetron hcl 4 mg tablet Commonly known as:  ZOFRAN (AS HYDROCHLORIDE) oxyCODONE-acetaminophen  mg per tablet Commonly known as:  PERCOCET 10 Polyethylene Glycol 3350 Powd Replaced by:  polyethylene glycol 17 gram packet  
   
  
 raNITIdine 150 mg tablet Commonly known as:  ZANTAC Where to Get Your Medications Information on where to get these meds will be given to you by the nurse or doctor. ! Ask your nurse or doctor about these medications  
  acetaminophen 325 mg tablet  
 albuterol 90 mcg/actuation inhaler  
 amoxicillin-clavulanate 875-125 mg per tablet  
 dicyclomine 10 mg capsule  
 hydroCHLOROthiazide 50 mg tablet  
 insulin detemir 100 unit/mL injection  
 pantoprazole 40 mg tablet  
 polyethylene glycol 17 gram packet  
 quinapril 10 mg tablet

## 2017-09-07 NOTE — ED PROVIDER NOTES
HPI Comments: 5:59 PM Margaret Severance is a 62 y.o. male with a history of IDDM presenting to the ED via EMS with complaints of vomiting that started 2 days ago along with sweats, fever, and chills. The patient reported of having similar symptoms before. He also reports abdominal pain that feels similar to that of when he had pancreatitis. Notes PSHx ofappendectomy. Took medication yesterday for DM but not today. The patient does not use alcohol. No other complaints at this time. PCP: Sabas Cole MD      The history is provided by the patient. Past Medical History:   Diagnosis Date    Arthritis     Coronary atherosclerosis of native coronary artery     S/P PCI with GE in 12/09    Diabetes ([facility] [address]     IDDM    Electrolyte and fluid disorders not elsewhere classified     Essential hypertension, benign     GERD (gastroesophageal reflux disease)     Heroin abuse 05/26/16    Psychiatrist Dr. Farzana Vides History of heart attack     Hyperlipidemia     Intermediate coronary syndrome [facility])     MDD (major depressive disorder) 5/26/16    Psychiatrist Dr. Farzana Vides Myocardial infarction [facility])     Obesity, unspecified     Old myocardial infarction     Other and unspecified hyperlipidemia     Pancreatitis     Positive PPD     Sleep apnea     uses Cpap machine    Transfusion history     Before 1992       Past Surgical History:   Procedure Laterality Date    HX APPENDECTOMY      HX CORONARY STENT PLACEMENT  2010    2 stents         Family History:   Problem Relation Age of Onset    Heart Attack Father     Coronary Artery Disease Mother     Diabetes Mother     Cancer Sister      breast cancer and lung cancer       Social History     Social History    Marital status:      Spouse name: N/A    Number of children: N/A    Years of education: N/A     Occupational History    Not on file.      Social History Main Topics    Smoking status: Never Smoker  Smokeless tobacco: Never Used    Alcohol use No    Drug use: No    Sexual activity: Not on file     Other Topics Concern    Not on file     Social History Narrative         ALLERGIES: Compazine [prochlorperazine]    Review of Systems   Constitutional: Positive for chills, diaphoresis and fever. Negative for fatigue. HENT: Negative for congestion, sore throat and trouble swallowing. Eyes: Negative for pain, redness, itching and visual disturbance. Respiratory: Negative for cough, chest tightness and shortness of breath. Cardiovascular: Negative for chest pain, palpitations and leg swelling. Gastrointestinal: Positive for abdominal pain, nausea and vomiting. Negative for diarrhea. Genitourinary: Negative for decreased urine volume, dysuria and flank pain. Musculoskeletal: Negative for arthralgias, back pain, gait problem and myalgias. Skin: Negative for color change, rash and wound. Neurological: Negative for dizziness, weakness, numbness and headaches. Vitals:    09/07/17 1830 09/07/17 1832 09/07/17 1845 09/07/17 1915   BP:    140/87   Pulse: (!) 105  (!) 107 95   Resp: 21  24 23   Temp:    99 °F (37.2 °C)   SpO2: 100% 97%  99%            Physical Exam   Constitutional: He appears well-developed and well-nourished. No distress. Moderate distress   HENT:   Head: Normocephalic and atraumatic. Mouth/Throat: Oropharynx is clear and moist.   Eyes: Conjunctivae and EOM are normal. Pupils are equal, round, and reactive to light. Neck: Normal range of motion. Neck supple. Cardiovascular: Normal rate, regular rhythm and normal heart sounds. No murmur heard. Pulmonary/Chest: Effort normal and breath sounds normal.   Abdominal: Soft. Bowel sounds are normal. He exhibits no distension. There is tenderness in the periumbilical area. There is no rigidity and no guarding. Musculoskeletal: Normal range of motion. He exhibits no edema or deformity.    Lymphadenopathy:     He has no cervical adenopathy. Neurological: He is alert. He exhibits normal muscle tone. Coordination normal.   Skin: Skin is warm and dry. No rash noted. No erythema. Psychiatric: He has a normal mood and affect. His behavior is normal.   Nursing note and vitals reviewed. MDM  Number of Diagnoses or Management Options  Dehydration:   Nausea and vomiting, intractability of vomiting not specified, unspecified vomiting type:   Diagnosis management comments: Intractable nausea and vomiting, ? Diabetic gastroparesis. Nonischemic EKG, no CP or SOB, therefore slight trpn elevation likely from dehydration and renal insufficiency. No acute findings on CT. No evidence of focal bacterial infection.   Will require admission for continued IVF and IV antiemetics    ED Course       Procedures      Vitals:  Patient Vitals for the past 12 hrs:   Temp Pulse Resp BP SpO2   09/07/17 1915 99 °F (37.2 °C) 95 23 140/87 99 %   09/07/17 1845 - (!) 107 24 - -   09/07/17 1832 - - - - 97 %   09/07/17 1830 - (!) 105 21 - 100 %   09/07/17 1815 - (!) 104 16 - 100 %   09/07/17 1805 98.6 °F (37 °C) - - - -   09/07/17 1756 - (!) 103 20 (!) 136/110 97 %       Medications Ordered:  Medications   sodium chloride 0.9 % bolus infusion 1,000 mL (1,000 mL IntraVENous New Bag 9/7/17 2037)   sodium chloride 0.9 % bolus infusion 1,000 mL (0 mL IntraVENous IV Completed 9/7/17 2038)   ondansetron (ZOFRAN) injection 4 mg (4 mg IntraVENous Given 9/7/17 1848)   morphine injection 4 mg (4 mg IntraVENous Given 9/7/17 1848)   famotidine (PF) (PEPCID) injection 20 mg (20 mg IntraVENous Given 9/7/17 1847)   ondansetron (ZOFRAN) injection 4 mg (4 mg IntraVENous Given 9/7/17 2011)   metoclopramide HCl (REGLAN) injection 10 mg (10 mg IntraVENous Given 9/7/17 2037)   diphenhydrAMINE (BENADRYL) injection 25 mg (25 mg IntraVENous Given 9/7/17 2036)   iopamidol (ISOVUE 300) 61 % contrast injection  mL (80 mL IntraVENous Given 9/7/17 2056)   LORazepam (ATIVAN) injection 1 mg (1 mg IntraVENous Given 9/7/17 2124)       Lab Findings:  Recent Results (from the past 12 hour(s))   EKG, 12 LEAD, SUBSEQUENT    Collection Time: 09/07/17  6:11 PM   Result Value Ref Range    Ventricular Rate 103 BPM    Atrial Rate 89 BPM    QRS Duration 78 ms    Q-T Interval 474 ms    QTC Calculation (Bezet) 620 ms    Calculated R Axis 7 degrees    Calculated T Axis 27 degrees    Diagnosis       Accelerated Junctional rhythm  Inferior infarct (cited on or before 27-OCT-2010)  Abnormal ECG  When compared with ECG of 16-AUG-2016 09:37,  Junctional rhythm has replaced Sinus rhythm  Minimal criteria for Anterior infarct are no longer present  Nonspecific T wave abnormality, worse in Anterolateral leads  QT has lengthened     CBC WITH AUTOMATED DIFF    Collection Time: 09/07/17  6:32 PM   Result Value Ref Range    WBC 20.3 (H) 4.6 - 13.2 K/uL    RBC 4.31 (L) 4.70 - 5.50 M/uL    HGB 13.0 13.0 - 16.0 g/dL    HCT 39.2 36.0 - 48.0 %    MCV 91.0 74.0 - 97.0 FL    MCH 30.2 24.0 - 34.0 PG    MCHC 33.2 31.0 - 37.0 g/dL    RDW 13.6 11.6 - 14.5 %    PLATELET 741 468 - 429 K/uL    MPV 11.0 9.2 - 11.8 FL    NEUTROPHILS 91 (H) 40 - 73 %    LYMPHOCYTES 6 (L) 21 - 52 %    MONOCYTES 3 3 - 10 %    EOSINOPHILS 0 0 - 5 %    BASOPHILS 0 0 - 2 %    ABS. NEUTROPHILS 18.5 (H) 1.8 - 8.0 K/UL    ABS. LYMPHOCYTES 1.2 0.9 - 3.6 K/UL    ABS. MONOCYTES 0.6 0.05 - 1.2 K/UL    ABS. EOSINOPHILS 0.0 0.0 - 0.4 K/UL    ABS.  BASOPHILS 0.0 0.0 - 0.1 K/UL    DF AUTOMATED     METABOLIC PANEL, BASIC    Collection Time: 09/07/17  6:32 PM   Result Value Ref Range    Sodium 138 136 - 145 mmol/L    Potassium 3.4 (L) 3.5 - 5.5 mmol/L    Chloride 100 100 - 108 mmol/L    CO2 26 21 - 32 mmol/L    Anion gap 12 3.0 - 18 mmol/L    Glucose 288 (H) 74 - 99 mg/dL    BUN 21 (H) 7.0 - 18 MG/DL    Creatinine 1.51 (H) 0.6 - 1.3 MG/DL    BUN/Creatinine ratio 14 12 - 20      GFR est AA 58 (L) >60 ml/min/1.73m2    GFR est non-AA 48 (L) >60 ml/min/1.73m2    Calcium 9.7 8.5 - 10.1 MG/DL   LIPASE    Collection Time: 09/07/17  6:32 PM   Result Value Ref Range    Lipase 96 73 - 393 U/L   HEPATIC FUNCTION PANEL    Collection Time: 09/07/17  6:32 PM   Result Value Ref Range    Protein, total 9.5 (H) 6.4 - 8.2 g/dL    Albumin 3.9 3.4 - 5.0 g/dL    Globulin 5.6 (H) 2.0 - 4.0 g/dL    A-G Ratio 0.7 (L) 0.8 - 1.7      Bilirubin, total 0.5 0.2 - 1.0 MG/DL    Bilirubin, direct 0.2 0.0 - 0.2 MG/DL    Alk. phosphatase 146 (H) 45 - 117 U/L    AST (SGOT) 15 15 - 37 U/L    ALT (SGPT) 25 16 - 61 U/L   MAGNESIUM    Collection Time: 09/07/17  6:32 PM   Result Value Ref Range    Magnesium 1.8 1.6 - 2.6 mg/dL   CARDIAC PANEL,(CK, CKMB & TROPONIN)    Collection Time: 09/07/17  6:32 PM   Result Value Ref Range     39 - 308 U/L    CK - MB 1.7 <3.6 ng/ml    CK-MB Index 0.7 0.0 - 4.0 %    Troponin-I, Qt. 0.07 (H) 0.0 - 0.045 NG/ML   URINALYSIS W/ RFLX MICROSCOPIC    Collection Time: 09/07/17  7:00 PM   Result Value Ref Range    Color YELLOW      Appearance CLOUDY      Specific gravity 1.029 1.005 - 1.030      pH (UA) >8.5 (H) 5.0 - 8.0    Protein 30 (A) NEG mg/dL    Glucose >1000 (A) NEG mg/dL    Ketone 40 (A) NEG mg/dL    Bilirubin NEGATIVE  NEG      Blood NEGATIVE  NEG      Urobilinogen 1.0 0.2 - 1.0 EU/dL    Nitrites NEGATIVE  NEG      Leukocyte Esterase NEGATIVE  NEG     URINE MICROSCOPIC ONLY    Collection Time: 09/07/17  7:00 PM   Result Value Ref Range    WBC 0 to 2 0 - 4 /hpf    RBC NEGATIVE  0 - 5 /hpf    Epithelial cells FEW 0 - 5 /lpf    Bacteria FEW (A) NEG /hpf       EKG Interpretation by ED physician:    18:12: Sinus tach 103, no acute ST changes. Questionable prolonged QTC. X-ray, CT or radiology findings or impressions:  CT ABD PELV W CONT   Final Result      CT A/P:  1. Small fatty containing periumbilical hernia appears stable with no  inflammation or bowel obstruction. No acute abnormality to explain patient's  symptom.   2. Ectopic upper pelvic right kidney shows again 1 cm exophytic cystic lesion,  not clear seen as simple cyst. This lesion was not seen on prior CT in 2013. Recommend renal ultrasound correlation on nonurgent base. 3. Interval resolution of extensive retroperitoneal air foci since the prior CT. 4. Hepatic steatosis seems to be improved. 5. Nodular bilateral adrenal thickening appears stable over 11 years as benign  hyperplasia or adenomas. 6. Improved bibasilar atelectasis. Progress notes, consult notes, or additional procedure notes:    9:51 PM No acute surgical findings on CT. Patient feeling improved but still vomiting. Suspect leukocytosis due to vomiting/dehydration vs possible viral GI infection. Likely has component of gastroparesis. Discussed with hospitalist Dr. Isaak Sousa, will accept admission to general medical bed      Reevaluation of the patient:   Feeling improved after antiemetics    Diagnosis:   1. Nausea and vomiting, intractability of vomiting not specified, unspecified vomiting type    2. Dehydration        Disposition: Admit    Follow-up Information     None           Patient's Medications   Start Taking    No medications on file   Continue Taking    AMITRIPTYLINE (ELAVIL) 50 MG TABLET    Take 50 mg by mouth nightly. CARVEDILOL (COREG) 6.25 MG TABLET    Take 1 Tab by mouth two (2) times daily (with meals). CLOPIDOGREL (PLAVIX) 75 MG TABLET    Take 75 mg by mouth daily. CYCLOBENZAPRINE (FLEXERIL) 10 MG TABLET    Take  by mouth three (3) times daily as needed for Muscle Spasm(s). DOXAZOSIN (CARDURA) 2 MG TABLET    Take 2 mg by mouth daily. FUROSEMIDE (LASIX) 40 MG TABLET    Take 40 mg by mouth daily. HYDROCHLOROTHIAZIDE (HYDRODIURIL) 50 MG TABLET    Take 50 mg by mouth daily. INSULIN ASPART (NOVOLOG FLEXPEN) 100 UNIT/ML FLEXPEN    65 Units by SubCUTAneous route Before breakfast, lunch, and dinner. INSULIN DETEMIR (LEVEMIR) 100 UNIT/ML INJECTION    63 Units by SubCUTAneous route nightly.     LEVOTHYROXINE (SYNTHROID) 75 MCG TABLET    Take  by mouth Daily (before breakfast). METFORMIN (GLUCOPHAGE) 1,000 MG TABLET    Take 1,000 mg by mouth two (2) times daily (with meals). NALOXONE (EVZIO) 2 MG/0.4 ML AUTO-INJECTOR    0.4 mL by IntraMUSCular route once as needed for Overdose for up to 1 dose. OMEPRAZOLE (PRILOSEC) 20 MG CAPSULE    Take 20 mg by mouth daily. ONDANSETRON HCL (ZOFRAN, AS HYDROCHLORIDE,) 4 MG TABLET    Take 1 Tab by mouth every eight (8) hours as needed for Nausea. OXYCODONE-ACETAMINOPHEN (PERCOCET 10)  MG PER TABLET    Take 1 Tab by mouth every six (6) hours as needed. Max Daily Amount: 4 Tabs. POLYETHYLENE GLYCOL 3350 POWD    Take 1 Cap by mouth as needed (for constipation ). PREGABALIN (LYRICA) 75 MG CAPSULE    1 cap bid x 1 week, then 2 caps bid    QUINAPRIL (ACCUPRIL) 40 MG TABLET    Take 40 mg by mouth nightly. RANITIDINE (ZANTAC) 150 MG TABLET    Take 150 mg by mouth two (2) times a day. SIMVASTATIN (ZOCOR) 10 MG TABLET    Take 10 mg by mouth nightly. TRAZODONE (DESYREL) 150 MG TABLET    Take 150 mg by mouth nightly. These Medications have changed    No medications on file   Stop Taking    No medications on file       [place] [facility] acting as a scribe for and in the presence of Andrez Clark MD      September 07, 2017 at 6:07 PM       Provider Attestation:      I personally performed the services described in the documentation, reviewed the documentation, as recorded by the scribe in my presence, and it accurately and completely records my words and actions.  September 07, 2017 at 6:07 PM - Andrez Clark MD

## 2017-09-07 NOTE — ED TRIAGE NOTES
Patient arrived via EMS c/o abdominal pain, nausea and vomiting x2 days. Patient states he has not been able to eat much. Patient allergies updated in chart.

## 2017-09-07 NOTE — ED NOTES
Bedside shift change report given to Shelly Veras (oncoming nurse) by Marilee Ferreira (offgoing nurse). Report included the following information SBAR, ED Summary, Intake/Output and Med Rec Status.

## 2017-09-07 NOTE — IP AVS SNAPSHOT
Deedee Dickinson 
 
 
 [address] [facility] [address] [facility] Patient: Wei Guzman MRN: [id number] PVL:[id number]/[id number] You are allergic to the following Allergen Reactions Compazine (Prochlorperazine) Anaphylaxis \"Tightness around throat\" Recent Documentation Height Weight BMI Smoking Status 1.753 m 98.9 kg 32.21 kg/m2 Never Smoker Unresulted Labs Order Current Status CULTURE, BLOOD Preliminary result CULTURE, BLOOD Preliminary result Emergency Contacts Name Discharge Info Relation Home Work Mobile [address] CAREGIVER [3] Sister [23] [phone] [facility] DISCHARGE CAREGIVER [3] Sister [23] [phone] Rosa Miller  Other Relative [6] [phone] About your hospitalization You were admitted on:  September 7, 2017 You last received care in the:  [address] [place] [address] You were discharged on:  September 11, 2017 Unit phone number:  [phone] Why you were hospitalized Your primary diagnosis was:  Not on File Your diagnoses also included:  Nausea And Vomiting, Diabetes (Hcc), Gastroparesis, Hypertension, Diabetic Neuropathy (Hcc), Hypokalemia, Arf (Acute Renal Failure) (Hcc), Atelectasis, Abdominal Pain, Acquired Hypothyroidism Providers Seen During Your Hospitalizations Provider Role Specialty Primary office phone Marco Antonio Suero MD Attending Provider Emergency Medicine [phone] Aleksey Abreu MD Attending Provider Internal Medicine [phone] Farrukh Miller MD Attending Provider Internal Medicine [phone] Your Primary Care Physician (PCP) Primary Care Physician Office Phone Office Fax Alicia Holguin, [address] [place] [address] [phone] Follow-up Information Follow up With Details Comments Contact Info Milan Benitez MD On 9/13/2017 @1100am [address] [facility] [address] 
 [address] [address] 
[phone] Your Appointments Thursday September 14, 2017  9:30 AM EDT Office Visit with Lisbet Delatorre MD  
[facility]) [address] [place], [place] [address] [phone] Current Discharge Medication List  
  
START taking these medications Dose & Instructions Dispensing Information Comments Morning Noon Evening Bedtime  
 acetaminophen 325 mg tablet Commonly known as:  TYLENOL Your last dose was: Your next dose is:    
   
   
 Dose:  650 mg Take 2 Tabs by mouth every six (6) hours as needed. Indications: Pain, take it with bentyl Quantity:  30 Tab Refills:  0  
     
   
   
   
  
 albuterol 90 mcg/actuation inhaler Commonly known as:  PROVENTIL HFA, VENTOLIN HFA, PROAIR HFA Your last dose was: Your next dose is:    
   
   
 2 puffs every 6 hours x 5 days then every 6 hours as needed for shortness of breath and/or wheezing Quantity:  1 Inhaler Refills:  0  
     
   
   
   
  
 amoxicillin-clavulanate 875-125 mg per tablet Commonly known as:  AUGMENTIN Your last dose was: Your next dose is:    
   
   
 Dose:  1 Tab Take 1 Tab by mouth every twelve (12) hours for 5 days. Quantity:  10 Tab Refills:  0  
     
   
   
   
  
 dicyclomine 10 mg capsule Commonly known as:  BENTYL Your last dose was: Your next dose is:    
   
   
 Dose:  10 mg Take 1 Cap by mouth three (3) times daily as needed. Indications: abdominal pain, take it with tylenol Quantity:  20 Cap Refills:  0  
     
   
   
   
  
 pantoprazole 40 mg tablet Commonly known as:  PROTONIX Your last dose was: Your next dose is:    
   
   
 Dose:  40 mg Take 1 Tab by mouth Daily (before breakfast). Quantity:  30 Tab Refills:  0  
     
   
   
   
  
 polyethylene glycol 17 gram packet Commonly known as:  Edith Salas Replaces:  Polyethylene Glycol 3350 Powd Your last dose was: Your next dose is:    
   
   
 Dose:  17 g Take 1 Packet by mouth daily. Quantity:  30 Packet Refills:  0 CONTINUE these medications which have CHANGED Dose & Instructions Dispensing Information Comments Morning Noon Evening Bedtime  
 hydroCHLOROthiazide 50 mg tablet Commonly known as:  HYDRODIURIL What changed:  how much to take Your last dose was: Your next dose is:    
   
   
 Dose:  25 mg Take 0.5 Tabs by mouth daily. Quantity:  15 Tab Refills:  0  
     
   
   
   
  
 insulin detemir 100 unit/mL injection Commonly known as:  LEVEMIR What changed:  how much to take Your last dose was: Your next dose is:    
   
   
 Dose:  30 Units 30 Units by SubCUTAneous route nightly. Quantity:  10 mL Refills:  0  
     
   
   
   
  
 quinapril 10 mg tablet Commonly known as:  ACCUPRIL What changed:   
- medication strength 
- how much to take Your last dose was: Your next dose is:    
   
   
 Dose:  10 mg Take 1 Tab by mouth nightly. Quantity:  30 Tab Refills:  0 CONTINUE these medications which have NOT CHANGED Dose & Instructions Dispensing Information Comments Morning Noon Evening Bedtime  
 amitriptyline 50 mg tablet Commonly known as:  ELAVIL Your last dose was: Your next dose is:    
   
   
 Dose:  50 mg Take 50 mg by mouth nightly. Refills:  0  
     
   
   
   
  
 amLODIPine 10 mg tablet Commonly known as:  [place] Cordial Your last dose was: Your next dose is:    
   
   
 Dose:  10 mg Take 10 mg by mouth daily. Refills:  0  
     
   
   
   
  
 clopidogrel 75 mg Tab Commonly known as:  PLAVIX Your last dose was: Your next dose is:    
   
   
 Dose:  75 mg Take 75 mg by mouth daily. Refills:  0  
     
   
   
   
  
 metFORMIN 1,000 mg tablet Commonly known as:  GLUCOPHAGE Your last dose was: Your next dose is:    
   
   
 Dose:  1000 mg Take 1,000 mg by mouth two (2) times daily (with meals). Refills:  0  
     
   
   
   
  
 pregabalin 75 mg capsule Commonly known as:  [place]ory [place] Your last dose was: Your next dose is:    
   
   
 1 cap bid x 1 week, then 2 caps bid Quantity:  120 Cap Refills:  0  
     
   
   
   
  
 simvastatin 10 mg tablet Commonly known as:  ZOCOR Your last dose was: Your next dose is:    
   
   
 Dose:  10 mg Take 10 mg by mouth nightly. Refills:  0 STOP taking these medications   
 levothyroxine 75 mcg tablet Commonly known as:  SYNTHROID NovoLOG Flexpen 100 unit/mL Inpn Generic drug:  insulin aspart  
   
  
 omeprazole 20 mg capsule Commonly known as:  PRILOSEC  
   
  
 ondansetron hcl 4 mg tablet Commonly known as:  ZOFRAN (AS HYDROCHLORIDE) oxyCODONE-acetaminophen  mg per tablet Commonly known as:  PERCOCET 10 Polyethylene Glycol 3350 Powd Replaced by:  polyethylene glycol 17 gram packet  
   
  
 raNITIdine 150 mg tablet Commonly known as:  ZANTAC Where to Get Your Medications Information on where to get these meds will be given to you by the nurse or doctor. ! Ask your nurse or doctor about these medications  
  acetaminophen 325 mg tablet  
 albuterol 90 mcg/actuation inhaler  
 amoxicillin-clavulanate 875-125 mg per tablet  
 dicyclomine 10 mg capsule  
 hydroCHLOROthiazide 50 mg tablet  
 insulin detemir 100 unit/mL injection  
 pantoprazole 40 mg tablet  
 polyethylene glycol 17 gram packet  
 quinapril 10 mg tablet Discharge Instructions Abdominal Pain: Care Instructions Your Care Instructions Abdominal pain has many possible causes.  Some aren't serious and get better on their own in a few days. Others need more testing and treatment. If your pain continues or gets worse, you need to be rechecked and may need more tests to find out what is wrong. You may need surgery to correct the problem. Don't ignore new symptoms, such as fever, nausea and vomiting, urination problems, pain that gets worse, and dizziness. These may be signs of a more serious problem. Your doctor may have recommended a follow-up visit in the next 8 to 12 hours. If you are not getting better, you may need more tests or treatment. The doctor has checked you carefully, but problems can develop later. If you notice any problems or new symptoms, get medical treatment right away. Follow-up care is a key part of your treatment and safety. Be sure to make and go to all appointments, and call your doctor if you are having problems. It's also a good idea to know your test results and keep a list of the medicines you take. How can you care for yourself at home? · Rest until you feel better. · To prevent dehydration, drink plenty of fluids, enough so that your urine is light yellow or clear like water. Choose water and other caffeine-free clear liquids until you feel better. If you have kidney, heart, or liver disease and have to limit fluids, talk with your doctor before you increase the amount of fluids you drink. · If your stomach is upset, eat mild foods, such as rice, dry toast or crackers, bananas, and applesauce. Try eating several small meals instead of two or three large ones. · Wait until 48 hours after all symptoms have gone away before you have spicy foods, alcohol, and drinks that contain caffeine. · Do not eat foods that are high in fat. · Avoid anti-inflammatory medicines such as aspirin, ibuprofen (Advil, Motrin), and naproxen (Aleve). These can cause stomach upset. Talk to your doctor if you take daily aspirin for another health problem. When should you call for help? Call 911 anytime you think you may need emergency care. For example, call if: 
· You passed out (lost consciousness). · You pass maroon or very bloody stools. · You vomit blood or what looks like coffee grounds. · You have new, severe belly pain. Call your doctor now or seek immediate medical care if: 
· Your pain gets worse, especially if it becomes focused in one area of your belly. · You have a new or higher fever. · Your stools are black and look like tar, or they have streaks of blood. · You have unexpected vaginal bleeding. · You have symptoms of a urinary tract infection. These may include: 
¨ Pain when you urinate. ¨ Urinating more often than usual. 
¨ Blood in your urine. · You are dizzy or lightheaded, or you feel like you may faint. Watch closely for changes in your health, and be sure to contact your doctor if: 
· You are not getting better after 1 day (24 hours). Where can you learn more? Go to http://kaila-carlitos.info/. Enter F386 in the search box to learn more about \"Abdominal Pain: Care Instructions. \" Current as of: March 20, 2017 Content Version: 11.3 © 3040-7646 [employer]. Care instructions adapted under license by [employer] (which disclaims liability or warranty for this information). If you have questions about a medical condition or this instruction, always ask your healthcare professional. [place] [address] any warranty or liability for your use of this information. Dehydration: Care Instructions Your Care Instructions Dehydration happens when your body loses too much fluid. This might happen when you do not drink enough water or you lose large amounts of fluids from your body because of diarrhea, vomiting, or sweating. Severe dehydration can be life-threatening.  
Water and minerals called electrolytes help put your body fluids back in balance. Learn the early signs of fluid loss, and drink more fluids to prevent dehydration. Follow-up care is a key part of your treatment and safety. Be sure to make and go to all appointments, and call your doctor if you are having problems. It's also a good idea to know your test results and keep a list of the medicines you take. How can you care for yourself at home? · To prevent dehydration, drink plenty of fluids, enough so that your urine is light yellow or clear like water. Choose water and other caffeine-free clear liquids until you feel better. If you have kidney, heart, or liver disease and have to limit fluids, talk with your doctor before you increase the amount of fluids you drink. · If you do not feel like eating or drinking, try taking small sips of water, sports drinks, or other rehydration drinks. · Get plenty of rest. 
To prevent dehydration · Add more fluids to your diet and daily routine, unless your doctor has told you not to. · During hot weather, drink more fluids. Drink even more fluids if you exercise a lot. Stay away from drinks with alcohol or caffeine. · Watch for the symptoms of dehydration. These include: ¨ A dry, sticky mouth. ¨ Dark yellow urine, and not much of it. ¨ Dry and sunken eyes. ¨ Feeling very tired. · Learn what problems can lead to dehydration. These include: ¨ Diarrhea, fever, and vomiting. ¨ Any illness with a fever, such as pneumonia or the flu. ¨ Activities that cause heavy sweating, such as endurance races and heavy outdoor work in hot or humid weather. ¨ Alcohol or drug abuse or withdrawal. 
¨ Certain medicines, such as cold and allergy pills (antihistamines), diet pills (diuretics), and laxatives. ¨ Certain diseases, such as diabetes, cancer, and heart or kidney disease. When should you call for help? Call 911 anytime you think you may need emergency care. For example, call if: 
· You passed out (lost consciousness). Call your doctor now or seek immediate medical care if: 
· You are confused and cannot think clearly. · You are dizzy or lightheaded, or you feel like you may faint. · You have signs of needing more fluids. You have sunken eyes and a dry mouth, and you pass only a little dark urine. · You cannot keep fluids down. Watch closely for changes in your health, and be sure to contact your doctor if: 
· You are not making tears. · Your skin is very dry and sags slowly back into place after you pinch it. · Your mouth and eyes are very dry. Where can you learn more? Go to http://kaila-carlitos.info/. Enter Y474 in the search box to learn more about \"Dehydration: Care Instructions. \" Current as of: March 20, 2017 Content Version: 11.3 © 0952-0374 [employer]. Care instructions adapted under license by [employer] (which disclaims liability or warranty for this information). If you have questions about a medical condition or this instruction, always ask your healthcare professional. [place] [address] any warranty or liability for your use of this information. Electrolyte Imbalance: Care Instructions Your Care Instructions Electrolytes are minerals in your blood. They include sodium, potassium, calcium, and magnesium. When they are not at the right levels, you can feel very ill. You may not know what is causing it, but you know something is wrong. You may feel weak or numb, have muscle spasms, or twitch. Your heart may beat fast. Symptoms are different with each mineral. Too much is as bad as too little. Minerals help keep your body working as it should. Vomiting, diarrhea, and fever can cause you to lose minerals. A problem with your kidneys can tip a mineral out of balance. So can taking certain medicines. Your doctor may do more tests. He or she may change your medicine and diet.  If you are low in one or more minerals, they may be given through a tube into your vein (IV). Your doctor may have you take or drink special fluids or pills. If your kidneys are failing, your blood may be filtered. This is called dialysis. It can put the minerals back in balance. Follow-up care is a key part of your treatment and safety. Be sure to make and go to all appointments, and call your doctor if you are having problems. It's also a good idea to know your test results and keep a list of the medicines you take. How can you care for yourself at home? · Take your medicines exactly as prescribed. Call your doctor if you have any problems with your medicines. You will get more details on the specific medicines your doctor prescribes. · Do not take any medicine without talking to your doctor first. This includes prescription, over-the-counter, and herbal medicines. · If you have kidney, heart, or liver disease and have to limit fluids, talk with your doctor before you increase the amount of fluids you drink. · Your doctor or dietitian may give you a diet plan to help balance your minerals. Follow the diet carefully. When should you call for help? Call 911 anytime you think you may need emergency care. For example, call if: 
· You passed out (lost consciousness). · Your heart seems to be speeding up and then slowing down or skipping beats. Call your doctor now or seek immediate medical care if: 
· You are very tired and have no energy. · You have trouble thinking or concentrating. Watch closely for changes in your health, and be sure to contact your doctor if: 
· You want advice on what to do to keep your minerals in balance. · You do not get better as expected. Where can you learn more? Go to http://kaila-carlitos.info/. Enter K257 in the search box to learn more about \"Electrolyte Imbalance: Care Instructions. \" Current as of: July 26, 2016 Content Version: 11.3 © 4910-7090 [employer].  Care instructions adapted under license by Michael Mcgowan (which disclaims liability or warranty for this information). If you have questions about a medical condition or this instruction, always ask your healthcare professional. [address] any warranty or liability for your use of this information. Discharge Orders Procedure Order Date Status Priority Quantity Spec Type Associated Dx DIET DIABETIC CONSISTENT CARB Regular; AHA-LOW-CHOL FAT 09/11/17 1542 Normal Routine 1 Questions: Texture:  Regular Cardiac:  AHA-LOW-CHOL FAT METABOLIC PANEL, BASIC 56/32/19 1546 Future Routine 1 Blood Comments:  On 9/15/17. Call report to PCP Reason: hypokalemia Webcentrix Announcement We are excited to announce that we are making your provider's discharge notes available to you in Webcentrix. You will see these notes when they are completed and signed by the physician that discharged you from your recent hospital stay. If you have any questions or concerns about any information you see in Webcentrix, please call the Health Information Department where you were seen or reach out to your Primary Care Provider for more information about your plan of care. Introducing [facility]! Dear Navya Raymond: Thank you for requesting a Webcentrix account. Our records indicate that you have previously registered for a Webcentrix account but its currently inactive. Please call our Webcentrix support line at [phone]. Additional Information If you have questions, please visit the Frequently Asked Questions section of the Webcentrix website at https://[employer]/. Remember, Webcentrix is NOT to be used for urgent needs. For medical emergencies, dial 911. Now available from your iPhone and Android! General Information Please provide this summary of care documentation to your next provider.  
  
  
    
    
 Patient Signature: ____________________________________________________________ Date:  ____________________________________________________________  
  
Krista Oka Provider Signature:  ____________________________________________________________ Date:  ____________________________________________________________

## 2017-09-08 ENCOUNTER — APPOINTMENT (OUTPATIENT)
Dept: GENERAL RADIOLOGY | Age: 57
DRG: 351 | End: 2017-09-08
Attending: INTERNAL MEDICINE
Payer: MEDICAID

## 2017-09-08 LAB
ANION GAP SERPL CALC-SCNC: 7 MMOL/L (ref 3–18)
ATRIAL RATE: 89 BPM
BUN SERPL-MCNC: 21 MG/DL (ref 7–18)
BUN/CREAT SERPL: 18 (ref 12–20)
CALCIUM SERPL-MCNC: 8.2 MG/DL (ref 8.5–10.1)
CALCULATED R AXIS, ECG10: 7 DEGREES
CALCULATED T AXIS, ECG11: 27 DEGREES
CHLORIDE SERPL-SCNC: 106 MMOL/L (ref 100–108)
CO2 SERPL-SCNC: 28 MMOL/L (ref 21–32)
CREAT SERPL-MCNC: 1.15 MG/DL (ref 0.6–1.3)
DIAGNOSIS, 93000: NORMAL
ERYTHROCYTE [DISTWIDTH] IN BLOOD BY AUTOMATED COUNT: 13.9 % (ref 11.6–14.5)
GLUCOSE BLD STRIP.AUTO-MCNC: 135 MG/DL (ref 70–110)
GLUCOSE BLD STRIP.AUTO-MCNC: 160 MG/DL (ref 70–110)
GLUCOSE BLD STRIP.AUTO-MCNC: 162 MG/DL (ref 70–110)
GLUCOSE BLD STRIP.AUTO-MCNC: 178 MG/DL (ref 70–110)
GLUCOSE BLD STRIP.AUTO-MCNC: 196 MG/DL (ref 70–110)
GLUCOSE SERPL-MCNC: 188 MG/DL (ref 74–99)
HBA1C MFR BLD: 6.6 % (ref 4.2–5.6)
HCT VFR BLD AUTO: 33.6 % (ref 36–48)
HGB BLD-MCNC: 10.6 G/DL (ref 13–16)
MCH RBC QN AUTO: 29 PG (ref 24–34)
MCHC RBC AUTO-ENTMCNC: 31.5 G/DL (ref 31–37)
MCV RBC AUTO: 92.1 FL (ref 74–97)
PLATELET # BLD AUTO: 318 K/UL (ref 135–420)
PMV BLD AUTO: 11.1 FL (ref 9.2–11.8)
POTASSIUM SERPL-SCNC: 3.5 MMOL/L (ref 3.5–5.5)
Q-T INTERVAL, ECG07: 474 MS
QRS DURATION, ECG06: 78 MS
QTC CALCULATION (BEZET), ECG08: 620 MS
RBC # BLD AUTO: 3.65 M/UL (ref 4.7–5.5)
SODIUM SERPL-SCNC: 141 MMOL/L (ref 136–145)
TSH SERPL DL<=0.05 MIU/L-ACNC: 4.05 UIU/ML (ref 0.36–3.74)
VENTRICULAR RATE, ECG03: 103 BPM
WBC # BLD AUTO: 18 K/UL (ref 4.6–13.2)

## 2017-09-08 PROCEDURE — 85027 COMPLETE CBC AUTOMATED: CPT | Performed by: INTERNAL MEDICINE

## 2017-09-08 PROCEDURE — 80048 BASIC METABOLIC PNL TOTAL CA: CPT | Performed by: INTERNAL MEDICINE

## 2017-09-08 PROCEDURE — 93005 ELECTROCARDIOGRAM TRACING: CPT

## 2017-09-08 PROCEDURE — 65660000004 HC RM CVT STEPDOWN

## 2017-09-08 PROCEDURE — 71010 XR CHEST SNGL V: CPT

## 2017-09-08 PROCEDURE — 74011636637 HC RX REV CODE- 636/637: Performed by: INTERNAL MEDICINE

## 2017-09-08 PROCEDURE — 36415 COLL VENOUS BLD VENIPUNCTURE: CPT | Performed by: INTERNAL MEDICINE

## 2017-09-08 PROCEDURE — 74011250636 HC RX REV CODE- 250/636: Performed by: INTERNAL MEDICINE

## 2017-09-08 PROCEDURE — 74011000250 HC RX REV CODE- 250: Performed by: INTERNAL MEDICINE

## 2017-09-08 PROCEDURE — 84443 ASSAY THYROID STIM HORMONE: CPT | Performed by: INTERNAL MEDICINE

## 2017-09-08 PROCEDURE — 74011250637 HC RX REV CODE- 250/637: Performed by: INTERNAL MEDICINE

## 2017-09-08 PROCEDURE — 83036 HEMOGLOBIN GLYCOSYLATED A1C: CPT | Performed by: INTERNAL MEDICINE

## 2017-09-08 PROCEDURE — 74000 XR ABD (KUB): CPT

## 2017-09-08 PROCEDURE — 82962 GLUCOSE BLOOD TEST: CPT

## 2017-09-08 PROCEDURE — 74011000258 HC RX REV CODE- 258: Performed by: INTERNAL MEDICINE

## 2017-09-08 RX ORDER — ONDANSETRON 2 MG/ML
4 INJECTION INTRAMUSCULAR; INTRAVENOUS
Status: DISCONTINUED | OUTPATIENT
Start: 2017-09-08 | End: 2017-09-11 | Stop reason: HOSPADM

## 2017-09-08 RX ORDER — METRONIDAZOLE 500 MG/100ML
500 INJECTION, SOLUTION INTRAVENOUS EVERY 8 HOURS
Status: DISCONTINUED | OUTPATIENT
Start: 2017-09-08 | End: 2017-09-08

## 2017-09-08 RX ORDER — METOCLOPRAMIDE HYDROCHLORIDE 5 MG/ML
5 INJECTION INTRAMUSCULAR; INTRAVENOUS EVERY 6 HOURS
Status: DISCONTINUED | OUTPATIENT
Start: 2017-09-08 | End: 2017-09-08

## 2017-09-08 RX ORDER — METOCLOPRAMIDE HYDROCHLORIDE 5 MG/ML
5 INJECTION INTRAMUSCULAR; INTRAVENOUS EVERY 6 HOURS
Status: DISCONTINUED | OUTPATIENT
Start: 2017-09-08 | End: 2017-09-09

## 2017-09-08 RX ORDER — CLONIDINE HYDROCHLORIDE 0.1 MG/1
0.1 TABLET ORAL 2 TIMES DAILY
Status: DISCONTINUED | OUTPATIENT
Start: 2017-09-08 | End: 2017-09-09

## 2017-09-08 RX ORDER — HYDROMORPHONE HYDROCHLORIDE 1 MG/ML
1 INJECTION, SOLUTION INTRAMUSCULAR; INTRAVENOUS; SUBCUTANEOUS
Status: DISCONTINUED | OUTPATIENT
Start: 2017-09-08 | End: 2017-09-09

## 2017-09-08 RX ORDER — SODIUM CHLORIDE 9 MG/ML
100 INJECTION, SOLUTION INTRAVENOUS CONTINUOUS
Status: DISCONTINUED | OUTPATIENT
Start: 2017-09-08 | End: 2017-09-09

## 2017-09-08 RX ORDER — IPRATROPIUM BROMIDE AND ALBUTEROL SULFATE 2.5; .5 MG/3ML; MG/3ML
3 SOLUTION RESPIRATORY (INHALATION)
Status: DISCONTINUED | OUTPATIENT
Start: 2017-09-08 | End: 2017-09-08

## 2017-09-08 RX ORDER — LEVOFLOXACIN 5 MG/ML
750 INJECTION, SOLUTION INTRAVENOUS EVERY 24 HOURS
Status: DISCONTINUED | OUTPATIENT
Start: 2017-09-08 | End: 2017-09-08

## 2017-09-08 RX ADMIN — INSULIN DETEMIR 30 UNITS: 100 INJECTION, SOLUTION SUBCUTANEOUS at 00:30

## 2017-09-08 RX ADMIN — SODIUM CHLORIDE 100 ML/HR: 900 INJECTION, SOLUTION INTRAVENOUS at 16:00

## 2017-09-08 RX ADMIN — FAMOTIDINE 20 MG: 10 INJECTION, SOLUTION INTRAVENOUS at 08:44

## 2017-09-08 RX ADMIN — CLONIDINE HYDROCHLORIDE 0.1 MG: 0.1 TABLET ORAL at 19:54

## 2017-09-08 RX ADMIN — HYDROMORPHONE HYDROCHLORIDE 1 MG: 1 INJECTION, SOLUTION INTRAMUSCULAR; INTRAVENOUS; SUBCUTANEOUS at 16:59

## 2017-09-08 RX ADMIN — INSULIN LISPRO 2 UNITS: 100 INJECTION, SOLUTION INTRAVENOUS; SUBCUTANEOUS at 12:12

## 2017-09-08 RX ADMIN — INSULIN DETEMIR 20 UNITS: 100 INJECTION, SOLUTION SUBCUTANEOUS at 22:34

## 2017-09-08 RX ADMIN — ONDANSETRON 4 MG: 2 INJECTION INTRAMUSCULAR; INTRAVENOUS at 12:33

## 2017-09-08 RX ADMIN — FAMOTIDINE 20 MG: 10 INJECTION, SOLUTION INTRAVENOUS at 20:49

## 2017-09-08 RX ADMIN — Medication 2 MG: at 08:41

## 2017-09-08 RX ADMIN — METRONIDAZOLE 250 MG: 500 INJECTION, SOLUTION INTRAVENOUS at 12:35

## 2017-09-08 RX ADMIN — Medication 2 MG: at 04:05

## 2017-09-08 RX ADMIN — Medication 2 MG: at 12:14

## 2017-09-08 RX ADMIN — LEVOTHYROXINE SODIUM ANHYDROUS 38 MCG: 100 INJECTION, POWDER, LYOPHILIZED, FOR SOLUTION INTRAVENOUS at 04:06

## 2017-09-08 RX ADMIN — PIPERACILLIN AND TAZOBACTAM 3.38 G: 3; .375 INJECTION, POWDER, LYOPHILIZED, FOR SOLUTION INTRAVENOUS; PARENTERAL at 19:53

## 2017-09-08 RX ADMIN — INSULIN LISPRO 2 UNITS: 100 INJECTION, SOLUTION INTRAVENOUS; SUBCUTANEOUS at 08:45

## 2017-09-08 RX ADMIN — INSULIN LISPRO 2 UNITS: 100 INJECTION, SOLUTION INTRAVENOUS; SUBCUTANEOUS at 16:30

## 2017-09-08 RX ADMIN — ONDANSETRON 4 MG: 2 INJECTION INTRAMUSCULAR; INTRAVENOUS at 04:32

## 2017-09-08 RX ADMIN — METOCLOPRAMIDE 5 MG: 5 INJECTION, SOLUTION INTRAMUSCULAR; INTRAVENOUS at 20:06

## 2017-09-08 RX ADMIN — HYDROMORPHONE HYDROCHLORIDE 1 MG: 1 INJECTION, SOLUTION INTRAMUSCULAR; INTRAVENOUS; SUBCUTANEOUS at 20:49

## 2017-09-08 RX ADMIN — SODIUM CHLORIDE 100 ML/HR: 900 INJECTION, SOLUTION INTRAVENOUS at 19:49

## 2017-09-08 RX ADMIN — METRONIDAZOLE 250 MG: 500 INJECTION, SOLUTION INTRAVENOUS at 06:26

## 2017-09-08 NOTE — DIABETES MGMT
Diabetes Patient/Family Education Record    Factors That  May Influence Patients Ability  to Learn or  Comply With  Recommendations:    []   Language barrier    []   Cultural needs   []   Motivation    []   Cognitive limitation    []   Physical   [x]   Education    []   Physiological factors   []   Hearing/vision/speaking impairment   []   Sikh beliefs    []   Financial factors   []  Other:   []  No factors identified at this time. Person Instructed:   [x]   Patient   []   Family   []  Other     Preference for Learning:   [x]   Verbal   []   Written   []  Demonstration     Level of Comprehension & Competence:    [x]  Good                                      [] Fair                                     []  Poor                             []  Needs Reinforcement   [x]  Teachback completed    Education Component:   [x]  Medication management, including how to administer insulin (if appropriate) and potential medication interactions: Patient stated that he is on Levemir insulin 63 units daily at bedtime at home. Metformin 1000 mg twice daily with food. [x]  Nutritional management including the role of carbohydrate intake: Patient stated that he is following nutrition recommendation for diabetes most of the time: serving size/portion control of carbs (starches, fruits, dairy), and it is rare if has small sweets. [x]  Exercise: Patient is able to tolerate walking exercise when not feeling sick. [x]  Signs, symptoms, and treatment of hyperglycemia and hypoglycemia   [] Treatment of hyperglycemia and hypoglycemia   [x]  Importance of blood glucose monitoring and how to obtain a blood glucose meter: Patient stated that he has BG meter and testing supplies at home. He is not checking blood sugar regularly as recommended by primary medical provider. Discussed importance of home BG checks and encouraged patient. He agreed.    []  Instruction on use of blood glucose meter   [x]  Discuss the importance of HbA1C monitoring and provide patient with results: 6.6% (09/08/2017) is equivalent to average blood glucose of 143 mg/dL during the past 2-3 months.   []  Sick day guidelines   []  Proper use and disposal of lancets, needles, syringes or insulin pens (if appropriate)   []  Potential long-term complications (retinopathy, kidney disease, neuropathy, heart disease, stroke, vascular disease, foot care)   [x] Provide emergency contact number and contact number for more information    [x]  Goal:  Patient/family will demonstrate understanding of Diabetes Self Management Skills by: 09/15/2017  Plan for post-discharge education or self-management support:    [] Outpatient class schedule provided            [x] Patient Declined: Patient not interested at this time.     [] Scheduled for outpatient classes (date) _______         Lenny Quintero RN

## 2017-09-08 NOTE — DIABETES MGMT
Glycemic Control Plan of Care    No POC BG report for 2017. POC BG report on 2017 at time of review: 178, 196 mg/dL. Patient is currently on Levemir insulin 30 units daily at bedtime. Patient stated that he is on Levemir insulin 63 units daily at bedtime (home). Discussed BG monitoring with patient and insulin dose adjustment as needed based. Recommendation(s):  1.) Continue monitoring and adjust basal levemir dose as needed if POC BG above target range. 2.) Modify correctional lispro insulin to very resistant dose when two POC BG readings > 200 mg/dL within 24 hour period. 3.) Change diet to diabetic consistent carb when ready to advance to solid. Assessment:  Patient is 62year old with past medical history including insulin dependent diabetes mellitus, diabetic neuropathy, gastroparesis, GERD, hypertension, CAD with stent, and hypothyroidism - was admitted on 2017 with c/o nausea, vomiting for the past 2 days, and abdominal pain. Noted:  Gastroparesis. IDDM with current A1C of 6.6% (2017)    Most recent blood glucose values:    No POC BG 2017    Results for Felipe  (MRN [id number]) as of 2017 11:43   Ref. Range 2017 00:32 2017 07:59   GLUCOSE,FAST - POC Latest Ref Range: 70 - 110 mg/dL 178 (H) 196 (H)     Current A1C: 6.6% (2017) is equivalent to average blood glucose of 143 mg/dL during the past 2-3 months. Current hospital diabetes medications:  Basal levemir insulin 30 units daily at bedtime. Correctional lispro insulin ACHS. Normal sensitivity dose. Total daily dose insulin requirement previous day: 2017  Lantus: (30 units HS dose not given until after midnight, 2017). Home diabetes medications: As stated by patient on 2017  Levemir insulin 63 units daily at bedtime. Metformin 1000 mg twice daily with food. Diet: Clear liquid; no concentrated sweets.     Goals:  Blood glucose will be within target range of  mg/dL by 09/11/2017    Education:  _X__  Refer to Diabetes Education Record: 09/08/2017             ___  Education not indicated at this time    Rea Lester RN

## 2017-09-08 NOTE — ED NOTES
Hourly rounding-Pt resting on stretcher, side rails up, call bell in reach, vitals stable, pt updated on plan of care, pt continues to have N/V

## 2017-09-08 NOTE — PROGRESS NOTES
SUBJECTIVE:    Lower abdominal pain and intermittent nausea and vomiting for last 3 days. No chest pain or SOB. Cough present. No back pain. Was on percocet at home per patient and lives with sister. OBJECTIVE:    Visit Vitals    BP (!) 154/94 (BP 1 Location: Left arm, BP Patient Position: At rest)    Pulse (!) 120    Temp 98.3 °F (36.8 °C)    Resp 17    Wt 113.4 kg (250 lb)    SpO2 99%    BMI 36.92 kg/m2     HEENT: No pallor. No sinus tenderness  Neck; no JVD  CVS: RRR, HR is 90s on my exam  RS: Diminished BS in bases, no wheezes  Chest: no subcutaneous emphysema   GI: tenderness periumbilical area, ND, BS+  Extremities: no pedal edema  CNS: Moves all extremities  General: Awake, NAD    ASSESSMENT:    1. Abdominal pain and nausea with vomiting ? Unknown etiology  2. pneumoperitoneum on CXR  3. Right kidney lesion  4. Leukocytosis  5. HTN  6. DM  7. Recent lumbar surgery    PLAN:    Add Zosyn  CT report reviewed - nothing acute to explain his symptoms, normal lipase.    Surgery consulted as well as GI  Pulmonology consulted for abnormal CXR  Will repeat CXR and KUB  Asked nursing to move patient close to unit and check an EKG  IVF, NPO, IV Reglan and Pepcid  Clonidine BID    Total time greater than 35 minutes    CMP:   Lab Results   Component Value Date/Time     09/08/2017 06:10 AM    K 3.5 09/08/2017 06:10 AM     09/08/2017 06:10 AM    CO2 28 09/08/2017 06:10 AM    AGAP 7 09/08/2017 06:10 AM     (H) 09/08/2017 06:10 AM    BUN 21 (H) 09/08/2017 06:10 AM    CREA 1.15 09/08/2017 06:10 AM    GFRAA >60 09/08/2017 06:10 AM    GFRNA >60 09/08/2017 06:10 AM    CA 8.2 (L) 09/08/2017 06:10 AM    MG 1.8 09/07/2017 06:32 PM    ALB 3.9 09/07/2017 06:32 PM    TP 9.5 (H) 09/07/2017 06:32 PM    GLOB 5.6 (H) 09/07/2017 06:32 PM    AGRAT 0.7 (L) 09/07/2017 06:32 PM    SGOT 15 09/07/2017 06:32 PM    ALT 25 09/07/2017 06:32 PM     CBC:   Lab Results   Component Value Date/Time    WBC 18.0 (H) 09/08/2017 06:10 AM    HGB 10.6 (L) 09/08/2017 06:10 AM    HCT 33.6 (L) 09/08/2017 06:10 AM     09/08/2017 06:10 AM

## 2017-09-08 NOTE — ED NOTES
Hourly rounding-Pt resting on stretcher, side rails up, call bell in reach, vitals stable, pt updated on plan of care, no issues or complaints at this time. Pt has stopped vomiting was able to drink gingerale without vomiting, pt resting on hospital bed at this time.

## 2017-09-08 NOTE — ED NOTES
Hourly rounding-Pt resting on stretcher, side rails up, call bell in reach, vitals stable, pt updated on plan of care, pt having N/V again

## 2017-09-08 NOTE — H&P
History & Physical    Patient: Henny Gillis MRN: [id number]  CSN: [id number]    YOB: 1960  Age: 62 y.o. Sex: male      DOA: 9/7/2017    Chief Complaint:   Chief Complaint   Patient presents with    Vomiting    Nausea    Abdominal Pain          HPI:     Henny Gillis is a 62 y.o.  male presented to ER with c/o intractable N/V x 2 days. He states he woke up in a cold sweat and then experienced emesis without blood ever since. No prior h/o of similar events. PMHx is sig for DM Type II prior VuP6q=7.3 in June 2017. Upon arrival to ER he was tachycardiac. Labs sig for leukocytosis 20K with left shift,  ARF Cr=1.5 (baseline Cr =1.27), glucose 288 without acidemia and UA clr. CT abd sig for small fatty containing periumbilical hernia appears stable with no inflammation or bowel obstruction. No acute abnormality to explain patient's symptom. He has been cultured and started on empiric ABx Flagyl.      Past Medical History:   Diagnosis Date    Arthritis     Coronary atherosclerosis of native coronary artery     S/P PCI with GE in 12/09    Diabetes ([facility] [address]     IDDM    Electrolyte and fluid disorders not elsewhere classified     Essential hypertension, benign     GERD (gastroesophageal reflux disease)     Heroin abuse 05/26/16    Psychiatrist Dr. Aguilar Peres History of heart attack     Hyperlipidemia     Intermediate coronary syndrome [facility])     MDD (major depressive disorder) 5/26/16    Psychiatrist Dr. Aguilar Peres Myocardial infarction [facility])     Obesity, unspecified     Old myocardial infarction     Other and unspecified hyperlipidemia     Pancreatitis     Positive PPD     Sleep apnea     uses Cpap machine    Transfusion history     Before 1992       Past Surgical History:   Procedure Laterality Date    HX APPENDECTOMY      HX CORONARY STENT PLACEMENT  2010    2 stents       Family History   Problem Relation Age of Onset    Heart Attack Father     Coronary Artery Disease Mother     Diabetes Mother     Cancer Sister      breast cancer and lung cancer       Social History     Social History    Marital status:      Spouse name: N/A    Number of children: N/A    Years of education: N/A     Social History Main Topics    Smoking status: Never Smoker    Smokeless tobacco: Never Used    Alcohol use No    Drug use: No    Sexual activity: Not on file     Other Topics Concern    Not on file     Social History Narrative       Prior to Admission medications    Medication Sig Start Date End Date Taking? Authorizing Provider   naloxone (EVZIO) 2 mg/0.4 mL auto-injector 0.4 mL by IntraMUSCular route once as needed for Overdose for up to 1 dose. 7/5/17   Naomi Silver NP   oxyCODONE-acetaminophen (PERCOCET 10)  mg per tablet Take 1 Tab by mouth every six (6) hours as needed. Max Daily Amount: 4 Tabs. 7/5/17   Naomi Silver NP   pregabalin (LYRICA) 75 mg capsule 1 cap bid x 1 week, then 2 caps bid 7/5/17   Naomi Silver NP   hydroCHLOROthiazide (HYDRODIURIL) 50 mg tablet Take 50 mg by mouth daily. Trevon Jackson MD   amitriptyline (ELAVIL) 50 mg tablet Take 50 mg by mouth nightly. Trevon Jackson MD   carvedilol (COREG) 6.25 mg tablet Take 1 Tab by mouth two (2) times daily (with meals). 5/31/17   Sahara Chahal MD   insulin aspart (NOVOLOG FLEXPEN) 100 unit/mL flexpen 65 Units by SubCUTAneous route Before breakfast, lunch, and dinner. Historical Provider   Polyethylene Glycol 3350 powd Take 1 Cap by mouth as needed (for constipation ). Historical Provider   insulin detemir (LEVEMIR) 100 unit/mL injection 63 Units by SubCUTAneous route nightly. Historical Provider   cyclobenzaprine (FLEXERIL) 10 mg tablet Take  by mouth three (3) times daily as needed for Muscle Spasm(s). Historical Provider   doxazosin (CARDURA) 2 mg tablet Take 2 mg by mouth daily.     Historical Provider   simvastatin (ZOCOR) 10 mg tablet Take 10 mg by mouth nightly. Historical Provider   levothyroxine (SYNTHROID) 75 mcg tablet Take  by mouth Daily (before breakfast). Historical Provider   furosemide (LASIX) 40 mg tablet Take 40 mg by mouth daily. Historical Provider   ranitidine (ZANTAC) 150 mg tablet Take 150 mg by mouth two (2) times a day. Historical Provider   quinapril (ACCUPRIL) 40 mg tablet Take 40 mg by mouth nightly. Historical Provider   metFORMIN (GLUCOPHAGE) 1,000 mg tablet Take 1,000 mg by mouth two (2) times daily (with meals). Historical Provider   omeprazole (PRILOSEC) 20 mg capsule Take 20 mg by mouth daily. Historical Provider   traZODone (DESYREL) 150 mg tablet Take 150 mg by mouth nightly. Historical Provider   clopidogrel (PLAVIX) 75 mg tablet Take 75 mg by mouth daily. Historical Provider   ondansetron hcl (ZOFRAN, AS HYDROCHLORIDE,) 4 mg tablet Take 1 Tab by mouth every eight (8) hours as needed for Nausea. 8/10/15   Karen Aguilar MD       Allergies   Allergen Reactions    Compazine [Prochlorperazine] Anaphylaxis     \"Tightness around throat\"         Review of Systems  GENERAL: Patient alert, awake and oriented times 3, able to communicate full sentences and not in distress. HEENT: No change in vision, no earache, tinnitus, sore throat or sinus congestion. NECK: No pain or stiffness. PULMONARY: No shortness of breath, cough or wheeze. Cardiovascular: no pnd or orthopnea, no CP  GASTROINTESTINAL: No abdominal pain, +nausea, vomiting no diarrhea, melena or bright red blood per rectum. GENITOURINARY: No urinary frequency, urgency, hesitancy or dysuria. MUSCULOSKELETAL: No joint or muscle pain, no back pain, no recent trauma. DERMATOLOGIC: No rash, no itching, no lesions. ENDOCRINE: No polyuria, polydipsia, no heat or cold intolerance. No recent change in weight. HEMATOLOGICAL: No anemia or easy bruising or bleeding.    NEUROLOGIC: No headache, seizures, numbness, tingling or weakness. Physical Exam:     Physical Exam:  Visit Vitals    BP (!) 165/97 (BP 1 Location: Left arm, BP Patient Position: At rest)    Pulse 82    Temp 98.7 °F (37.1 °C)    Resp 16    Wt 113.4 kg (250 lb)    SpO2 97%    BMI 36.92 kg/m2      O2 Device: Room air    Temp (24hrs), Av.8 °F (37.1 °C), Min:98.6 °F (37 °C), Max:99 °F (37.2 °C)     190 -  0700  In: 2937 [P.O.:480; I.V.:1050]  Out: 600 [Urine:600]        General:  Alert, cooperative, no distress, appears stated age. Head: Normocephalic, without obvious abnormality, atraumatic. Eyes:  Conjunctivae/corneas clear. PERRL, EOMs intact. Nose: Nares normal. No drainage or sinus tenderness. Neck: Supple, symmetrical, trachea midline, no adenopathy, thyroid: no enlargement, no carotid bruit and no JVD. Lungs:   Clear to auscultation bilaterally. Heart:  Regular rate and rhythm, S1, S2 normal.     Abdomen: Obese, Soft, non-tender. Bowel sounds normal.    Extremities: Extremities normal, atraumatic, no cyanosis or edema. Pulses: 2+ and symmetric all extremities. Skin:  No rashes or lesions   Neurologic: AAOx3, No focal motor or sensory deficit. Labs Reviewed:    Lab results reviewed. For significant abnormal values and values requiring intervention, see assessment and plan. CT and EKG    Procedures/imaging: see electronic medical records for all procedures/Xrays and details which were not copied into this note but were reviewed prior to creation of Plan      Assessment/Plan     Active Problems:    Diabetes ([facility] [address] (2015)      Hypertension (2015)      Diabetic neuropathy ([address] (2017)      Nausea and vomiting (2017)      Gastroparesis (2017)      Hypokalemia (2017)      ARF (acute renal failure) ([address] (2017)      Atelectasis (2017)      Abdominal pain (2017)      Acquired hypothyroidism (2017)    Pt's symptoms are consistent with gastroparesis.  Cont IVF hydration, reglan/zofrn and tight glucose control. Resumed half of his home dose of Levemir. Ordered clr liquid diet until his symptoms resolve. Follow BS with accuchecks qac/qhs with SSI coverage. Resume Levemir 60 units when tolerating po diet. Added PPI and converted synthroid to IV formulation. Holding all other po medication. Replaced K, Mg WNL. Cont ABx for now and f/u cultures. DVT/GI Prophylaxis: SCD's    Discussed with patient at bedside about hospital admission and my plan care, he understood and agree with my plan care.     Dakota Davis MD  9/8/2017 6:00 AM

## 2017-09-08 NOTE — ED NOTES
Received bedside report from Valerie Arias, [facility]. Pt alert and oriented actively vomiting at this time, c/o 10/10 abdominal pain.

## 2017-09-08 NOTE — ROUTINE PROCESS
Bedside and Verbal shift change report given to Jose Layton RN  (oncoming nurse) by Brandin Lester RN (offgoing nurse). Report included the following information SBAR, Kardex, ED Summary, Procedure Summary, Intake/Output, MAR and Recent Results.

## 2017-09-08 NOTE — ED NOTES
Hourly rounding-Pt resting on stretcher, side rails up, call bell in reach, vitals stable, pt updated on plan of care, npt continues to have N/V

## 2017-09-08 NOTE — ED NOTES
TRANSFER - OUT REPORT:    Verbal report given to Rose Valdez RN (name) on Tere Pompa  being transferred to CVT step down overflow (unit) for routine progression of care       Report consisted of patients Situation, Background, Assessment and   Recommendations(SBAR). Information from the following report(s) SBAR was reviewed with the receiving nurse. Lines:   Peripheral IV 09/07/17 Right Antecubital (Active)   Site Assessment Clean, dry, & intact 9/7/2017  6:32 PM   Phlebitis Assessment 0 9/7/2017  6:32 PM   Infiltration Assessment 0 9/7/2017  6:32 PM   Dressing Status Clean, dry, & intact 9/7/2017  6:32 PM   Dressing Type 4 X 4;Tape;Transparent 9/7/2017  6:32 PM   Hub Color/Line Status Pink;Flushed;Patent 9/7/2017  6:32 PM   Action Taken Blood drawn 9/7/2017  6:32 PM        Opportunity for questions and clarification was provided.       Patient transported with:   [employer]

## 2017-09-08 NOTE — ED NOTES
Hourly rounding-Pt resting on stretcher, side rails up, call bell in reach, vitals stable, pt updated on plan of care, pt continues to have N/V, doctor made aware.

## 2017-09-08 NOTE — PROGRESS NOTES
Care Management Interventions  PCP Verified by CM: Yes (DR. Danelle Jewell)  Palliative Care Consult (Criteria: CHF and RRAT>21): No  Reason for No Palliative Care Consult: Other (see comment) (NO ORDER)  Mode of Transport at Discharge: Other (see comment) (FAMILY WILL TRANSPORT)  Transition of Care Consult (CM Consult): Discharge Planning  [facility] ([address]- [phone])  Confirm Follow Up Transport: Family  Plan discussed with Pt/Family/Caregiver: Yes (PT LIVES WITH FAMILY WITH A PLAN TO RETURN TO THIS ARRANGEMENT AT HIS DISCHARGE)  Discharge Location  Discharge Placement: Home with family assistance  Pt gave verbal permission to have treating Chris And medical care team to speak with his sister, with whom he lives with Lakeisha Ross [phone].

## 2017-09-08 NOTE — PROGRESS NOTES
2064  Received report. Patient resting in bed with no acute distress noted. Denies pain. Call light within reach. 1102  No acute distress noted. 0840   C/o intermittent,aching abdominal pain. Pain given. 0908   At this time, patient asleep. 1230  Patient c/o N/V. 10 ml of emesis noted. Zofran given IV.    1340   No acute distress noted. Call light within reach. Bedside and Verbal shift change report given to Radha [address] [address] (oncoming nurse) by César Hancock (offgoing nurse). Report included the following information SBAR and Kardex.

## 2017-09-08 NOTE — CONSULTS
[facility]  Pulmonary, Critical Care, and Sleep Medicine    Initial Patient Consult    Name: Santos Sarmiento MRN: [id number]   : 1960 Hospital: [address] [place] [address]    Date: 2017        IMPRESSION:   · Likely Pneumomediastinum on CXR- boerhaave's syndrome-secondary to retching   · Likely Aspiration Pneumonia- lungs with diffuse rhonchi, pt notes choking on vomit, areas of possible pneumonia/atelectasis on CT   · Intractable Nausea/Vomitting with leukocytosis- gastroparesis? · Acute Kidney Injury- resolved   · Type II Diabetes- previously uncontrolled   · CAD s/p PCI   · Hypertension   · GERD   · Hx of CHRISTINA- uses CPAP at home  · Hx of Heroin Abuse   · Obesity   · Depression        RECOMMENDATIONS:   · Monitor pneumomediastinum closely, Repeat CXR- pending read. · PRN oxygen to maintain oxygen saturation > 92%. · Avoid CPAP or Bipap use. Avoid cough stimulation and retching. D/C duo-nebz. · Continue ABX per primary team. Follow-up blood cultures. · Aspiration Precautions. HOB > 30 degrees. · Gastroparesis management per primary team.   · Monitor closely for acute decompensation. · Diet per primary team.   · DVT, SUP Prophylaxis   · Discussed with Dr. Paty Martinez, Thank you for the consultation. Will monitor closely. Subjective: This patient has been seen and evaluated at the request of Dr. Joe Lemon for possible pneumomediastinum. Santos Sarmiento is a 62 y.o.  male with pmhx of DM, CAD, heroin abuse, HTN, and CHRISTINA who presented to the [facility] ER with intractable N/V x 2 days. Pt notes associated \"cold sweats\" and fevers that awakened him from sleep. (+) weakness. Pt notes choking on his vomit today and has developed a cough with green sputum production. Still experiencing cold sweats. Notes 6/10 abdominal pain that does not radiate. Nothing makes it better or worse.  Denies hemoptysis, chest pain, BURTON, shortness of breath at rest, palpitations, hematemesis, peripheral edema, syncope, dizziness, hematuria, dysuria, diarrhea, rashes, constipation, melena, PRBPR, and weight loss. Denies sick contacts, recent travel, or raw food consumption. Denies tobacco, drug, and alcohol use. Upon arrival to the ER, pt was tachycardic and has a leukocytosis, SHYLA, hyperglycemia. Pt was given IVF and started on levaquin and flagyl. CT Abd showed small fatty containing periumbilical hernia appears stable with no inflammation or bowel obstruction. Pt has no questions or concerns at this time. Dr. Guillermo Rodriguez spoke with sisters. Past Medical History:   Diagnosis Date    Arthritis     Coronary atherosclerosis of native coronary artery     S/P PCI with GE in 12/09    Diabetes ([facility] [address]     IDDM    Electrolyte and fluid disorders not elsewhere classified     Essential hypertension, benign     GERD (gastroesophageal reflux disease)     Heroin abuse 05/26/16    Psychiatrist Dr. Henny Saucedo History of heart attack     Hyperlipidemia     Intermediate coronary syndrome [facility])     MDD (major depressive disorder) 5/26/16    Psychiatrist Dr. Morley Hallmark infarction [facility])     Obesity, unspecified     Old myocardial infarction     Other and unspecified hyperlipidemia     Pancreatitis     Positive PPD     Sleep apnea     uses Cpap machine    Transfusion history     Before 1992      Past Surgical History:   Procedure Laterality Date    HX APPENDECTOMY      HX CORONARY STENT PLACEMENT  2010    2 stents      Prior to Admission medications    Medication Sig Start Date End Date Taking? Authorizing Provider   naloxone (EVZIO) 2 mg/0.4 mL auto-injector 0.4 mL by IntraMUSCular route once as needed for Overdose for up to 1 dose. 7/5/17   Cliff Thao NP   oxyCODONE-acetaminophen (PERCOCET 10)  mg per tablet Take 1 Tab by mouth every six (6) hours as needed. Max Daily Amount: 4 Tabs.  7/5/17   Cliff Thao NP   pregabalin (LYRICA) 75 mg capsule 1 cap bid x 1 week, then 2 caps bid 7/5/17   Juana Del Cid NP   hydroCHLOROthiazide (HYDRODIURIL) 50 mg tablet Take 50 mg by mouth daily. Macy Khan MD   amitriptyline (ELAVIL) 50 mg tablet Take 50 mg by mouth nightly. Macy Khan MD   carvedilol (COREG) 6.25 mg tablet Take 1 Tab by mouth two (2) times daily (with meals). 5/31/17   Tila Carias MD   insulin aspart (NOVOLOG FLEXPEN) 100 unit/mL flexpen 65 Units by SubCUTAneous route Before breakfast, lunch, and dinner. Historical Provider   Polyethylene Glycol 3350 powd Take 1 Cap by mouth as needed (for constipation ). Historical Provider   insulin detemir (LEVEMIR) 100 unit/mL injection 63 Units by SubCUTAneous route nightly. Historical Provider   cyclobenzaprine (FLEXERIL) 10 mg tablet Take  by mouth three (3) times daily as needed for Muscle Spasm(s). Historical Provider   doxazosin (CARDURA) 2 mg tablet Take 2 mg by mouth daily. Historical Provider   simvastatin (ZOCOR) 10 mg tablet Take 10 mg by mouth nightly. Historical Provider   levothyroxine (SYNTHROID) 75 mcg tablet Take  by mouth Daily (before breakfast). Historical Provider   furosemide (LASIX) 40 mg tablet Take 40 mg by mouth daily. Historical Provider   ranitidine (ZANTAC) 150 mg tablet Take 150 mg by mouth two (2) times a day. Historical Provider   quinapril (ACCUPRIL) 40 mg tablet Take 40 mg by mouth nightly. Historical Provider   metFORMIN (GLUCOPHAGE) 1,000 mg tablet Take 1,000 mg by mouth two (2) times daily (with meals). Historical Provider   omeprazole (PRILOSEC) 20 mg capsule Take 20 mg by mouth daily. Historical Provider   traZODone (DESYREL) 150 mg tablet Take 150 mg by mouth nightly. Historical Provider   clopidogrel (PLAVIX) 75 mg tablet Take 75 mg by mouth daily. Historical Provider   ondansetron hcl (ZOFRAN, AS HYDROCHLORIDE,) 4 mg tablet Take 1 Tab by mouth every eight (8) hours as needed for Nausea. 8/10/15   Britney Garcia MD     Allergies   Allergen Reactions    Compazine [Prochlorperazine] Anaphylaxis     \"Tightness around throat\"      Social History   Substance Use Topics    Smoking status: Never Smoker    Smokeless tobacco: Never Used    Alcohol use No      Family History   Problem Relation Age of Onset    Heart Attack Father     Coronary Artery Disease Mother     Diabetes Mother     Cancer Sister      breast cancer and lung cancer        Current Facility-Administered Medications   Medication Dose Route Frequency    metroNIDAZOLE 250 mg in sodium chloride IVPB  250 mg IntraVENous Q6H    insulin detemir (LEVEMIR) injection 30 Units  30 Units SubCUTAneous QHS    insulin lispro (HUMALOG) injection   SubCUTAneous AC&HS    famotidine (PF) (PEPCID) injection 20 mg  20 mg IntraVENous Q12H    levothyroxine (SYNTHROID) injection 38 mcg  38 mcg IntraVENous DAILY       Review of Systems:  Constitutional: positive for fevers, chills and sweats, negative for fatigue, anorexia and weight loss  Eyes: negative for visual disturbance, irritation and redness  Ears, nose, mouth, throat, and face: negative for hearing loss, nasal congestion, epistaxis and voice change  Respiratory: positive for cough or sputum, negative for hemoptysis, wheezing or dyspnea on exertion  Cardiovascular: negative for chest pain, chest pressure/discomfort, palpitations, irregular heart beats, orthopnea, lower extremity edema  Gastrointestinal: positive for reflux symptoms, nausea, vomiting and abdominal pain, negative for melena, diarrhea and constipation  Genitourinary:negative for frequency, dysuria and hematuria  Integument/breast: negative for rash, skin lesion(s) and pruritus  Hematologic/lymphatic: negative for easy bruising, bleeding and petechiae  Musculoskeletal:negative for arthralgias, stiff joints, neck pain and back pain  Neurological: negative for headaches, dizziness, paresthesia and gait problems  Behavioral/Psych: positive for depression, negative for anxiety  Endocrine: positive for diabetic symptoms including polyuria, polydipsia and polyphagia    Objective:   Vital Signs:    Visit Vitals    BP (!) 154/94 (BP 1 Location: Left arm, BP Patient Position: At rest)    Pulse (!) 120    Temp 98.3 °F (36.8 °C)    Resp 17    Wt 113.4 kg (250 lb)    SpO2 99%    BMI 36.92 kg/m2       O2 Device: Room air       Temp (24hrs), Av.6 °F (37 °C), Min:98.2 °F (36.8 °C), Max:99 °F (37.2 °C)       Intake/Output:   Last shift:       07 -  1900  In: 120 [P.O.:120]  Out: 475 [Urine:475]  Last 3 shifts:  190 -  0700  In: 2758 [P.O.:480; I.V.:1050]  Out: 600 [Urine:600]    Intake/Output Summary (Last 24 hours) at 17 1500  Last data filed at 17 1332   Gross per 24 hour   Intake             1650 ml   Output             1075 ml   Net              575 ml      Physical Exam:   General:  A&O x 4 in NAD. Appears uncomfortable. Head:  Normocephalic, without obvious abnormality, atraumatic. Eyes:  Conjunctivae/corneas clear. PERRL, EOMs intact. Nose: Nares normal. Septum midline. Mucosa normal. No drainage or sinus tenderness. Throat: Lips, mucosa, and tongue normal. Teeth and gums normal.   Neck: Supple, symmetrical, trachea midline, no adenopathy. Lungs:   Symmetric rise and fall of chest with no increased WOB. Diffuse harsh expiratory rhonchi with loud upper airway rhonchi. Partially clear with cough. No rales or wheezing. Chest wall:  No tenderness or deformity. Heart:  Regular rate and rhythm, S1, S2 normal, no murmur, click, rub or gallop. Abdomen:   Soft, TTP in LLQ and RLL. No rebound tenderness. Bowel sounds normal. No masses,  No organomegaly. Extremities: Extremities normal, atraumatic, no cyanosis or edema. Pulses: 2+ and symmetric all extremities.    Skin: Skin color, texture, turgor normal. No rashes or lesions   Lymph nodes: Cervical, supraclavicular, and axillary nodes normal.   Neurologic: Grossly nonfocal     Data review:     Recent Results (from the past 24 hour(s))   EKG, 12 LEAD, SUBSEQUENT    Collection Time: 09/07/17  6:11 PM   Result Value Ref Range    Ventricular Rate 103 BPM    Atrial Rate 89 BPM    QRS Duration 78 ms    Q-T Interval 474 ms    QTC Calculation (Bezet) 620 ms    Calculated R Axis 7 degrees    Calculated T Axis 27 degrees    Diagnosis       Sinus tachycardia  Inferior infarct (cited on or before 27-OCT-2010)  Abnormal ECG  When compared with ECG of 16-AUG-2016 09:37,  Junctional rhythm has replaced Sinus rhythm  Minimal criteria for Anterior infarct are no longer present  Nonspecific T wave abnormality, worse in Anterolateral leads  QT has lengthened  Confirmed by Jeniffer Medel ([id number]) on 9/8/2017 8:19:58 AM     CBC WITH AUTOMATED DIFF    Collection Time: 09/07/17  6:32 PM   Result Value Ref Range    WBC 20.3 (H) 4.6 - 13.2 K/uL    RBC 4.31 (L) 4.70 - 5.50 M/uL    HGB 13.0 13.0 - 16.0 g/dL    HCT 39.2 36.0 - 48.0 %    MCV 91.0 74.0 - 97.0 FL    MCH 30.2 24.0 - 34.0 PG    MCHC 33.2 31.0 - 37.0 g/dL    RDW 13.6 11.6 - 14.5 %    PLATELET 192 543 - 671 K/uL    MPV 11.0 9.2 - 11.8 FL    NEUTROPHILS 91 (H) 40 - 73 %    LYMPHOCYTES 6 (L) 21 - 52 %    MONOCYTES 3 3 - 10 %    EOSINOPHILS 0 0 - 5 %    BASOPHILS 0 0 - 2 %    ABS. NEUTROPHILS 18.5 (H) 1.8 - 8.0 K/UL    ABS. LYMPHOCYTES 1.2 0.9 - 3.6 K/UL    ABS. MONOCYTES 0.6 0.05 - 1.2 K/UL    ABS. EOSINOPHILS 0.0 0.0 - 0.4 K/UL    ABS.  BASOPHILS 0.0 0.0 - 0.1 K/UL    DF AUTOMATED     METABOLIC PANEL, BASIC    Collection Time: 09/07/17  6:32 PM   Result Value Ref Range    Sodium 138 136 - 145 mmol/L    Potassium 3.4 (L) 3.5 - 5.5 mmol/L    Chloride 100 100 - 108 mmol/L    CO2 26 21 - 32 mmol/L    Anion gap 12 3.0 - 18 mmol/L    Glucose 288 (H) 74 - 99 mg/dL    BUN 21 (H) 7.0 - 18 MG/DL    Creatinine 1.51 (H) 0.6 - 1.3 MG/DL    BUN/Creatinine ratio 14 12 - 20      GFR est AA 58 (L) >60 ml/min/1.73m2    GFR est non-AA 48 (L) >60 ml/min/1.73m2    Calcium 9.7 8.5 - 10.1 MG/DL   LIPASE    Collection Time: 09/07/17  6:32 PM   Result Value Ref Range    Lipase 96 73 - 393 U/L   HEPATIC FUNCTION PANEL    Collection Time: 09/07/17  6:32 PM   Result Value Ref Range    Protein, total 9.5 (H) 6.4 - 8.2 g/dL    Albumin 3.9 3.4 - 5.0 g/dL    Globulin 5.6 (H) 2.0 - 4.0 g/dL    A-G Ratio 0.7 (L) 0.8 - 1.7      Bilirubin, total 0.5 0.2 - 1.0 MG/DL    Bilirubin, direct 0.2 0.0 - 0.2 MG/DL    Alk.  phosphatase 146 (H) 45 - 117 U/L    AST (SGOT) 15 15 - 37 U/L    ALT (SGPT) 25 16 - 61 U/L   MAGNESIUM    Collection Time: 09/07/17  6:32 PM   Result Value Ref Range    Magnesium 1.8 1.6 - 2.6 mg/dL   CARDIAC PANEL,(CK, CKMB & TROPONIN)    Collection Time: 09/07/17  6:32 PM   Result Value Ref Range     39 - 308 U/L    CK - MB 1.7 <3.6 ng/ml    CK-MB Index 0.7 0.0 - 4.0 %    Troponin-I, Qt. 0.07 (H) 0.0 - 0.045 NG/ML   URINALYSIS W/ RFLX MICROSCOPIC    Collection Time: 09/07/17  7:00 PM   Result Value Ref Range    Color YELLOW      Appearance CLOUDY      Specific gravity 1.029 1.005 - 1.030      pH (UA) >8.5 (H) 5.0 - 8.0    Protein 30 (A) NEG mg/dL    Glucose >1000 (A) NEG mg/dL    Ketone 40 (A) NEG mg/dL    Bilirubin NEGATIVE  NEG      Blood NEGATIVE  NEG      Urobilinogen 1.0 0.2 - 1.0 EU/dL    Nitrites NEGATIVE  NEG      Leukocyte Esterase NEGATIVE  NEG     URINE MICROSCOPIC ONLY    Collection Time: 09/07/17  7:00 PM   Result Value Ref Range    WBC 0 to 2 0 - 4 /hpf    RBC NEGATIVE  0 - 5 /hpf    Epithelial cells FEW 0 - 5 /lpf    Bacteria FEW (A) NEG /hpf   CULTURE, BLOOD    Collection Time: 09/07/17 11:00 PM   Result Value Ref Range    Special Requests: NO SPECIAL REQUESTS      Culture result: NO GROWTH AFTER 4 HOURS     CULTURE, BLOOD    Collection Time: 09/07/17 11:20 PM   Result Value Ref Range    Special Requests: NO SPECIAL REQUESTS      Culture result: NO GROWTH AFTER 4 HOURS     GLUCOSE, POC    Collection Time: 09/08/17 12:32 AM Result Value Ref Range    Glucose (POC) 178 (H) 70 - 110 mg/dL   CBC W/O DIFF    Collection Time: 09/08/17  6:10 AM   Result Value Ref Range    WBC 18.0 (H) 4.6 - 13.2 K/uL    RBC 3.65 (L) 4.70 - 5.50 M/uL    HGB 10.6 (L) 13.0 - 16.0 g/dL    HCT 33.6 (L) 36.0 - 48.0 %    MCV 92.1 74.0 - 97.0 FL    MCH 29.0 24.0 - 34.0 PG    MCHC 31.5 31.0 - 37.0 g/dL    RDW 13.9 11.6 - 14.5 %    PLATELET 172 814 - 334 K/uL    MPV 11.1 9.2 - 42.5 FL   METABOLIC PANEL, BASIC    Collection Time: 09/08/17  6:10 AM   Result Value Ref Range    Sodium 141 136 - 145 mmol/L    Potassium 3.5 3.5 - 5.5 mmol/L    Chloride 106 100 - 108 mmol/L    CO2 28 21 - 32 mmol/L    Anion gap 7 3.0 - 18 mmol/L    Glucose 188 (H) 74 - 99 mg/dL    BUN 21 (H) 7.0 - 18 MG/DL    Creatinine 1.15 0.6 - 1.3 MG/DL    BUN/Creatinine ratio 18 12 - 20      GFR est AA >60 >60 ml/min/1.73m2    GFR est non-AA >60 >60 ml/min/1.73m2    Calcium 8.2 (L) 8.5 - 10.1 MG/DL   HEMOGLOBIN A1C W/O EAG    Collection Time: 09/08/17  6:10 AM   Result Value Ref Range    Hemoglobin A1c 6.6 (H) 4.2 - 5.6 %   TSH 3RD GENERATION    Collection Time: 09/08/17  6:10 AM   Result Value Ref Range    TSH 4.05 (H) 0.36 - 3.74 uIU/mL   GLUCOSE, POC    Collection Time: 09/08/17  7:59 AM   Result Value Ref Range    Glucose (POC) 196 (H) 70 - 110 mg/dL   GLUCOSE, POC    Collection Time: 09/08/17 12:02 PM   Result Value Ref Range    Glucose (POC) 162 (H) 70 - 110 mg/dL     Imaging:  I have personally reviewed the patients radiographs and have reviewed the reports:  CT Abd/Pelvis 09/07/17:   1. Small fatty containing periumbilical hernia appears stable with no  inflammation or bowel obstruction. No acute abnormality to explain patient's  symptom. 2. Ectopic upper pelvic right kidney shows again 1 cm exophytic cystic lesion,  not clear seen as simple cyst. This lesion was not seen on prior CT in 2013. Recommend renal ultrasound correlation on nonurgent base.   3. Interval resolution of extensive retroperitoneal air foci since the prior CT. 4. Hepatic steatosis seems to be improved. 5. Nodular bilateral adrenal thickening appears stable over 11 years as benign  hyperplasia or adenomas. 6. Improved bibasilar atelectasis  CXR 09/07/17: Impression: No convincing acute finding. However there is a nonspecific lucency  overlying the right upper mediastinum that is much more apparent. There is a  chance this may represent pneumomediastinum depending on how vigorously the  patient has been vomiting. Please correlate clinically. A follow-up study is  recommended to ensure resolution. If there is high clinical suspicion for  pneumomediastinum, a noncontrast chest CT could definitively evaluate.     Corby Garcia PA-C

## 2017-09-09 ENCOUNTER — APPOINTMENT (OUTPATIENT)
Dept: ULTRASOUND IMAGING | Age: 57
DRG: 351 | End: 2017-09-09
Attending: INTERNAL MEDICINE
Payer: MEDICAID

## 2017-09-09 LAB
ALBUMIN SERPL-MCNC: 2.9 G/DL (ref 3.4–5)
ALBUMIN/GLOB SERPL: 0.6 {RATIO} (ref 0.8–1.7)
ALP SERPL-CCNC: 109 U/L (ref 45–117)
ALT SERPL-CCNC: 20 U/L (ref 16–61)
ANION GAP SERPL CALC-SCNC: 8 MMOL/L (ref 3–18)
AST SERPL-CCNC: 19 U/L (ref 15–37)
BASOPHILS # BLD: 0 K/UL (ref 0–0.1)
BASOPHILS NFR BLD: 0 % (ref 0–2)
BILIRUB SERPL-MCNC: 0.6 MG/DL (ref 0.2–1)
BUN SERPL-MCNC: 17 MG/DL (ref 7–18)
BUN/CREAT SERPL: 16 (ref 12–20)
CALCIUM SERPL-MCNC: 8.3 MG/DL (ref 8.5–10.1)
CHLORIDE SERPL-SCNC: 106 MMOL/L (ref 100–108)
CO2 SERPL-SCNC: 26 MMOL/L (ref 21–32)
CREAT SERPL-MCNC: 1.09 MG/DL (ref 0.6–1.3)
DIFFERENTIAL METHOD BLD: ABNORMAL
EOSINOPHIL # BLD: 0.1 K/UL (ref 0–0.4)
EOSINOPHIL NFR BLD: 0 % (ref 0–5)
ERYTHROCYTE [DISTWIDTH] IN BLOOD BY AUTOMATED COUNT: 13.5 % (ref 11.6–14.5)
GLOBULIN SER CALC-MCNC: 4.6 G/DL (ref 2–4)
GLUCOSE BLD STRIP.AUTO-MCNC: 147 MG/DL (ref 70–110)
GLUCOSE BLD STRIP.AUTO-MCNC: 151 MG/DL (ref 70–110)
GLUCOSE BLD STRIP.AUTO-MCNC: 173 MG/DL (ref 70–110)
GLUCOSE BLD STRIP.AUTO-MCNC: 186 MG/DL (ref 70–110)
GLUCOSE SERPL-MCNC: 154 MG/DL (ref 74–99)
HCT VFR BLD AUTO: 33.1 % (ref 36–48)
HGB BLD-MCNC: 10.6 G/DL (ref 13–16)
LYMPHOCYTES # BLD: 2.2 K/UL (ref 0.9–3.6)
LYMPHOCYTES NFR BLD: 17 % (ref 21–52)
MAGNESIUM SERPL-MCNC: 2 MG/DL (ref 1.6–2.6)
MCH RBC QN AUTO: 29.3 PG (ref 24–34)
MCHC RBC AUTO-ENTMCNC: 32 G/DL (ref 31–37)
MCV RBC AUTO: 91.4 FL (ref 74–97)
MONOCYTES # BLD: 1.1 K/UL (ref 0.05–1.2)
MONOCYTES NFR BLD: 8 % (ref 3–10)
NEUTS SEG # BLD: 9.7 K/UL (ref 1.8–8)
NEUTS SEG NFR BLD: 75 % (ref 40–73)
PLATELET # BLD AUTO: 277 K/UL (ref 135–420)
PMV BLD AUTO: 10.8 FL (ref 9.2–11.8)
POTASSIUM SERPL-SCNC: 3.1 MMOL/L (ref 3.5–5.5)
PROT SERPL-MCNC: 7.5 G/DL (ref 6.4–8.2)
RBC # BLD AUTO: 3.62 M/UL (ref 4.7–5.5)
SODIUM SERPL-SCNC: 140 MMOL/L (ref 136–145)
WBC # BLD AUTO: 13.1 K/UL (ref 4.6–13.2)

## 2017-09-09 PROCEDURE — 74011250636 HC RX REV CODE- 250/636: Performed by: INTERNAL MEDICINE

## 2017-09-09 PROCEDURE — 74011636637 HC RX REV CODE- 636/637: Performed by: INTERNAL MEDICINE

## 2017-09-09 PROCEDURE — 74011250637 HC RX REV CODE- 250/637: Performed by: INTERNAL MEDICINE

## 2017-09-09 PROCEDURE — 74011000258 HC RX REV CODE- 258: Performed by: INTERNAL MEDICINE

## 2017-09-09 PROCEDURE — 82962 GLUCOSE BLOOD TEST: CPT

## 2017-09-09 PROCEDURE — 85025 COMPLETE CBC W/AUTO DIFF WBC: CPT | Performed by: INTERNAL MEDICINE

## 2017-09-09 PROCEDURE — 65660000000 HC RM CCU STEPDOWN

## 2017-09-09 PROCEDURE — 77030027138 HC INCENT SPIROMETER -A

## 2017-09-09 PROCEDURE — 74011000250 HC RX REV CODE- 250: Performed by: INTERNAL MEDICINE

## 2017-09-09 PROCEDURE — 36415 COLL VENOUS BLD VENIPUNCTURE: CPT | Performed by: INTERNAL MEDICINE

## 2017-09-09 PROCEDURE — 76770 US EXAM ABDO BACK WALL COMP: CPT

## 2017-09-09 PROCEDURE — 83735 ASSAY OF MAGNESIUM: CPT | Performed by: INTERNAL MEDICINE

## 2017-09-09 PROCEDURE — 80053 COMPREHEN METABOLIC PANEL: CPT | Performed by: INTERNAL MEDICINE

## 2017-09-09 PROCEDURE — 94640 AIRWAY INHALATION TREATMENT: CPT

## 2017-09-09 RX ORDER — IPRATROPIUM BROMIDE AND ALBUTEROL SULFATE 2.5; .5 MG/3ML; MG/3ML
3 SOLUTION RESPIRATORY (INHALATION)
Status: DISCONTINUED | OUTPATIENT
Start: 2017-09-09 | End: 2017-09-09

## 2017-09-09 RX ORDER — POTASSIUM CHLORIDE 20 MEQ/1
40 TABLET, EXTENDED RELEASE ORAL EVERY 4 HOURS
Status: COMPLETED | OUTPATIENT
Start: 2017-09-09 | End: 2017-09-09

## 2017-09-09 RX ORDER — QUINAPRIL 10 MG/1
10 TABLET ORAL
Status: DISCONTINUED | OUTPATIENT
Start: 2017-09-09 | End: 2017-09-11 | Stop reason: HOSPADM

## 2017-09-09 RX ORDER — IPRATROPIUM BROMIDE AND ALBUTEROL SULFATE 2.5; .5 MG/3ML; MG/3ML
3 SOLUTION RESPIRATORY (INHALATION)
Status: DISCONTINUED | OUTPATIENT
Start: 2017-09-09 | End: 2017-09-10

## 2017-09-09 RX ORDER — OXYCODONE AND ACETAMINOPHEN 5; 325 MG/1; MG/1
1-2 TABLET ORAL
Status: DISCONTINUED | OUTPATIENT
Start: 2017-09-09 | End: 2017-09-11

## 2017-09-09 RX ORDER — SIMVASTATIN 10 MG/1
10 TABLET, FILM COATED ORAL
Status: DISCONTINUED | OUTPATIENT
Start: 2017-09-09 | End: 2017-09-11 | Stop reason: HOSPADM

## 2017-09-09 RX ORDER — HYDROMORPHONE HYDROCHLORIDE 2 MG/ML
1 INJECTION, SOLUTION INTRAMUSCULAR; INTRAVENOUS; SUBCUTANEOUS
Status: DISCONTINUED | OUTPATIENT
Start: 2017-09-09 | End: 2017-09-10

## 2017-09-09 RX ORDER — CARVEDILOL 3.12 MG/1
3.12 TABLET ORAL 2 TIMES DAILY WITH MEALS
Status: DISCONTINUED | OUTPATIENT
Start: 2017-09-09 | End: 2017-09-10

## 2017-09-09 RX ORDER — METOCLOPRAMIDE 5 MG/1
5 TABLET ORAL
Status: DISPENSED | OUTPATIENT
Start: 2017-09-09 | End: 2017-09-10

## 2017-09-09 RX ORDER — QUINAPRIL 10 MG/1
10 TABLET ORAL
Status: DISCONTINUED | OUTPATIENT
Start: 2017-09-09 | End: 2017-09-09 | Stop reason: SDUPTHER

## 2017-09-09 RX ORDER — DICYCLOMINE HYDROCHLORIDE 10 MG/1
10 CAPSULE ORAL
Status: DISCONTINUED | OUTPATIENT
Start: 2017-09-09 | End: 2017-09-11 | Stop reason: HOSPADM

## 2017-09-09 RX ORDER — PANTOPRAZOLE SODIUM 40 MG/1
40 TABLET, DELAYED RELEASE ORAL
Status: DISCONTINUED | OUTPATIENT
Start: 2017-09-09 | End: 2017-09-11 | Stop reason: HOSPADM

## 2017-09-09 RX ORDER — CLOPIDOGREL BISULFATE 75 MG/1
75 TABLET ORAL DAILY
Status: DISCONTINUED | OUTPATIENT
Start: 2017-09-10 | End: 2017-09-11 | Stop reason: HOSPADM

## 2017-09-09 RX ORDER — LEVOTHYROXINE SODIUM 75 UG/1
75 TABLET ORAL
Status: DISCONTINUED | OUTPATIENT
Start: 2017-09-10 | End: 2017-09-11 | Stop reason: HOSPADM

## 2017-09-09 RX ORDER — METOCLOPRAMIDE HYDROCHLORIDE 5 MG/ML
10 INJECTION INTRAMUSCULAR; INTRAVENOUS EVERY 6 HOURS
Status: COMPLETED | OUTPATIENT
Start: 2017-09-09 | End: 2017-09-09

## 2017-09-09 RX ADMIN — HYDROMORPHONE HYDROCHLORIDE 1 MG: 2 INJECTION, SOLUTION INTRAMUSCULAR; INTRAVENOUS; SUBCUTANEOUS at 17:41

## 2017-09-09 RX ADMIN — ONDANSETRON 4 MG: 2 INJECTION INTRAMUSCULAR; INTRAVENOUS at 17:49

## 2017-09-09 RX ADMIN — QUINAPRIL 10 MG: 10 TABLET ORAL at 22:05

## 2017-09-09 RX ADMIN — SODIUM CHLORIDE 100 ML/HR: 900 INJECTION, SOLUTION INTRAVENOUS at 06:24

## 2017-09-09 RX ADMIN — CLONIDINE HYDROCHLORIDE 0.1 MG: 0.1 TABLET ORAL at 10:02

## 2017-09-09 RX ADMIN — INSULIN LISPRO 2 UNITS: 100 INJECTION, SOLUTION INTRAVENOUS; SUBCUTANEOUS at 17:45

## 2017-09-09 RX ADMIN — HYDROMORPHONE HYDROCHLORIDE 1 MG: 1 INJECTION, SOLUTION INTRAMUSCULAR; INTRAVENOUS; SUBCUTANEOUS at 07:19

## 2017-09-09 RX ADMIN — INSULIN DETEMIR 30 UNITS: 100 INJECTION, SOLUTION SUBCUTANEOUS at 22:06

## 2017-09-09 RX ADMIN — IPRATROPIUM BROMIDE AND ALBUTEROL SULFATE 3 ML: .5; 3 SOLUTION RESPIRATORY (INHALATION) at 14:06

## 2017-09-09 RX ADMIN — PIPERACILLIN AND TAZOBACTAM 3.38 G: 3; .375 INJECTION, POWDER, LYOPHILIZED, FOR SOLUTION INTRAVENOUS; PARENTERAL at 01:43

## 2017-09-09 RX ADMIN — PIPERACILLIN AND TAZOBACTAM 3.38 G: 3; .375 INJECTION, POWDER, LYOPHILIZED, FOR SOLUTION INTRAVENOUS; PARENTERAL at 10:03

## 2017-09-09 RX ADMIN — SIMVASTATIN 10 MG: 10 TABLET, FILM COATED ORAL at 22:05

## 2017-09-09 RX ADMIN — POTASSIUM CHLORIDE 40 MEQ: 20 TABLET, EXTENDED RELEASE ORAL at 14:09

## 2017-09-09 RX ADMIN — LEVOTHYROXINE SODIUM ANHYDROUS 38 MCG: 100 INJECTION, POWDER, LYOPHILIZED, FOR SOLUTION INTRAVENOUS at 10:03

## 2017-09-09 RX ADMIN — PIPERACILLIN AND TAZOBACTAM 3.38 G: 3; .375 INJECTION, POWDER, LYOPHILIZED, FOR SOLUTION INTRAVENOUS; PARENTERAL at 14:12

## 2017-09-09 RX ADMIN — METOCLOPRAMIDE HYDROCHLORIDE 5 MG: 5 TABLET ORAL at 17:44

## 2017-09-09 RX ADMIN — CARVEDILOL 3.12 MG: 3.12 TABLET, FILM COATED ORAL at 17:44

## 2017-09-09 RX ADMIN — PANTOPRAZOLE SODIUM 40 MG: 40 TABLET, DELAYED RELEASE ORAL at 10:02

## 2017-09-09 RX ADMIN — INSULIN LISPRO 2 UNITS: 100 INJECTION, SOLUTION INTRAVENOUS; SUBCUTANEOUS at 01:00

## 2017-09-09 RX ADMIN — HYDROMORPHONE HYDROCHLORIDE 1 MG: 1 INJECTION, SOLUTION INTRAMUSCULAR; INTRAVENOUS; SUBCUTANEOUS at 03:33

## 2017-09-09 RX ADMIN — METOCLOPRAMIDE 5 MG: 5 INJECTION, SOLUTION INTRAMUSCULAR; INTRAVENOUS at 06:21

## 2017-09-09 RX ADMIN — ONDANSETRON 4 MG: 2 INJECTION INTRAMUSCULAR; INTRAVENOUS at 00:46

## 2017-09-09 RX ADMIN — ONDANSETRON 4 MG: 2 INJECTION INTRAMUSCULAR; INTRAVENOUS at 07:21

## 2017-09-09 RX ADMIN — METOCLOPRAMIDE 10 MG: 5 INJECTION, SOLUTION INTRAMUSCULAR; INTRAVENOUS at 12:02

## 2017-09-09 RX ADMIN — POTASSIUM CHLORIDE 40 MEQ: 20 TABLET, EXTENDED RELEASE ORAL at 17:42

## 2017-09-09 RX ADMIN — HYDROMORPHONE HYDROCHLORIDE 1 MG: 1 INJECTION, SOLUTION INTRAMUSCULAR; INTRAVENOUS; SUBCUTANEOUS at 12:01

## 2017-09-09 RX ADMIN — HYDROMORPHONE HYDROCHLORIDE 1 MG: 2 INJECTION, SOLUTION INTRAMUSCULAR; INTRAVENOUS; SUBCUTANEOUS at 22:05

## 2017-09-09 RX ADMIN — HYDROMORPHONE HYDROCHLORIDE 1 MG: 1 INJECTION, SOLUTION INTRAMUSCULAR; INTRAVENOUS; SUBCUTANEOUS at 00:46

## 2017-09-09 RX ADMIN — LACTULOSE 20 G: 20 SOLUTION ORAL at 17:44

## 2017-09-09 RX ADMIN — LACTULOSE 20 G: 20 SOLUTION ORAL at 14:11

## 2017-09-09 RX ADMIN — METOCLOPRAMIDE 5 MG: 5 INJECTION, SOLUTION INTRAMUSCULAR; INTRAVENOUS at 01:42

## 2017-09-09 RX ADMIN — PIPERACILLIN AND TAZOBACTAM 3.38 G: 3; .375 INJECTION, POWDER, LYOPHILIZED, FOR SOLUTION INTRAVENOUS; PARENTERAL at 20:53

## 2017-09-09 NOTE — PROGRESS NOTES
[facility]. Pulmonary, Critical Care, and Sleep Medicine    Name: Margaret Severance MRN: [id number]   : 1960 Hospital: [address] [place] [address] Dr   Date: 2017  Admission Date: 2017     Chart and notes reviewed. Data reviewed. I have evaluated all findings. [x]I have reviewed the flowsheet and previous days notes. - Patient doing well. - Denies any N/V.  - No chest pain or SOB. ROS:A comprehensive review of systems was negative except for that written in the HPI. Events and notes from last 24 hours reviewed. Care plan discussed on multidisciplinary rounds.   Patient Active Problem List   Diagnosis Code    Diabetes ([facility] [address] E11.9    Hypertension I10    GERD (gastroesophageal reflux disease) K21.9    Depression F32.9    Pain in left lower leg M79.662    Primary osteoarthritis of left knee M17.12    Localized adiposity of abdomen E65    Positive PPD R76.11    History of heart attack I25.2    Synovial cyst M71.30    HNP (herniated nucleus pulposus), lumbar M51.26    Lumbar spinal stenosis M48.06    Spondylolisthesis of lumbar region M43.16    Coronary artery disease involving native coronary artery of native heart without angina pectoris I25.10    History of coronary artery stent placement Z95.5    Spinal stenosis at L4-L5 level M48.06    S/P lumbar fusion Z98.1    Diabetic neuropathy (HCC) E11.40    Neuritis M79.2    Nausea and vomiting R11.2    Gastroparesis K31.84    Hypokalemia E87.6    ARF (acute renal failure) (HCC) N17.9    Atelectasis J98.11    Abdominal pain R10.9    Acquired hypothyroidism E03.9       Vital Signs:  Visit Vitals    BP (!) 153/92    Pulse 60    Temp 98.4 °F (36.9 °C)    Resp 17    Ht 5' 9\" (1.753 m)    Wt 111.4 kg (245 lb 11.2 oz)    SpO2 96%    BMI 36.28 kg/m2       O2 Device: Room air   O2 Flow Rate (L/min): 2 l/min   Temp (24hrs), Av.5 °F (36.9 °C), Min:98 °F (36.7 °C), Max:99.7 °F (37.6 °C) Intake/Output:   Last shift:      09/09 0701 - 09/09 1900  In: 360 [P.O.:360]  Out: 500 [Urine:500]  Last 3 shifts: 09/07 1901 - 09/09 0700  In: 2724 [P.O.:1020; I.V.:2350]  Out: 2375 [Urine:2375]    Intake/Output Summary (Last 24 hours) at 09/09/17 1432  Last data filed at 09/09/17 1321   Gross per 24 hour   Intake             1960 ml   Output             1500 ml   Net              460 ml      Ventilator Settings:                Current Facility-Administered Medications   Medication Dose Route Frequency    pantoprazole (PROTONIX) tablet 40 mg  40 mg Oral ACB    potassium chloride (K-DUR, KLOR-CON) SR tablet 40 mEq  40 mEq Oral Q4H    metoclopramide HCl (REGLAN) tablet 5 mg  5 mg Oral TID WITH MEALS    [START ON 9/10/2017] levothyroxine (SYNTHROID) tablet 75 mcg  75 mcg Oral ACB    [START ON 9/10/2017] clopidogrel (PLAVIX) tablet 75 mg  75 mg Oral DAILY    carvedilol (COREG) tablet 3.125 mg  3.125 mg Oral BID WITH MEALS    simvastatin (ZOCOR) tablet 10 mg  10 mg Oral QHS    insulin detemir (LEVEMIR) injection 30 Units  30 Units SubCUTAneous QHS    quinapril (ACCUPRIL) tablet 10 mg  10 mg Oral QHS    lactulose (CHRONULAC) solution 20 g  30 mL Oral Q4H    . Please Enter Patient's Height   1 Each Other Rx Dosing/Monitoring    piperacillin-tazobactam (ZOSYN) 3.375 g in 0.9% sodium chloride (MBP/ADV) 100 mL MBP  3.375 g IntraVENous Q6H    insulin lispro (HUMALOG) injection   SubCUTAneous AC&HS       Telemetry:     Physical Exam:   General: in no apparent distress, well developed and well nourished, non-toxic, in no respiratory distress and acyanotic, alert, oriented times 3 and afebrile   HEENT: Normal, PERRLA, EOMI, fundi benign   Neck: No abnormally enlarged lymph nodes.    Chest: normal, increased AP diameter   Lungs: normal air entry, no dullness/ tenderness/ rash   Heart: Regular rate and rhythm   Abdomen: non distended, bowel sounds normoactive, tympanic, abdomen is soft without significant tenderness, masses, organomegaly or guarding   Extremity: negative   Neuro: alert   Skin: Skin color, texture, turgor normal. No rashes or lesions   DATA:  MAR reviewed and pertinent medications noted or modified as needed    Labs:  Recent Labs      09/09/17   0453  09/08/17   0610  09/07/17   1832   WBC  13.1  18.0*  20.3*   HGB  10.6*  10.6*  13.0   HCT  33.1*  33.6*  39.2   PLT  277  318  350     Recent Labs      09/09/17   0453  09/08/17   0610  09/07/17   1832   NA  140  141  138   K  3.1*  3.5  3.4*   CL  106  106  100   CO2  26  28  26   GLU  154*  188*  288*   BUN  17  21*  21*   CREA  1.09  1.15  1.51*   CA  8.3*  8.2*  9.7   MG  2.0   --   1.8   ALB  2.9*   --   3.9   SGOT  19   --   15   ALT  20   --   25     No results for input(s): PH, PCO2, PO2, HCO3, FIO2 in the last 72 hours. No results for input(s): FIO2I, IFO2, HCO3I, IHCO3, HCOPOC, PCO2I, PCOPOC, IPHI, PHI, PHPOC, PO2I, PO2POC in the last 72 hours. No lab exists for component: IPOC2  Imaging:  [x]                           I have personally reviewed the patients radiographs and reports      IMPRESSION:   -  Pneumomediastinum on CXR - however not very convinced of this finding on repeat CT chest.   -  Possible aspiration pneumonitis. -  Acute hypoxic respiratory failure secondary to above. -  Acute Kidney Injury- resolved   - Type II Diabetes- previously uncontrolled   - CAD s/p PCI   - Hypertension   - GERD   - Hx of CHRISTINA- uses CPAP at home  - Hx of Heroin Abuse   - Obesity   - Depression        PLAN:   - Weaned off the oxygen completely. - Noted resolution of pneumomediastinum on follow up CXR.   - Discontinued nebz. PRNs only if needed. - Consider discontinuing Zosyn and start Augmentin and complete a course of 7 days in total.    - Aspiration precautions   - Other management as per the primary team.   - Pulmonary medicine will sign off.        Best practice :    Glycemic control  IHI ICU bundles:    MVentilated patients- VAP bundle , aim to keep peak plateau pressure 17-53PR H2O  Sress ulcer prophylaxis  DVT prophylaxis  Need for Lines, cardoso assessed. Goals of care were discussed with the patient.        Claudette Mins, MD

## 2017-09-09 NOTE — CONSULTS
[facility]   [email or web address]      Impression:   1. Mild anemia, no overt blood loss. On Plavix. 2. Abd pain, n/v - chronic, recurrent   3. No evidence for mediastinal free air seen on mult f/u imaging      Plan:     1. PPI daily along w antiemetics. No EGD until off Plavix, can consider as OP. Chief Complaint: n/v      HPI:  Hansel Ruth is a 62 y.o. male who is being seen on consult for mild anemia and n/v. Denies bleeding. Has had EGD in past he says, unclear of findings. PMH:   Past Medical History:   Diagnosis Date    Arthritis     Coronary atherosclerosis of native coronary artery     S/P PCI with GE in 12/09    Diabetes ([facility] [address]     IDDM    Electrolyte and fluid disorders not elsewhere classified     Essential hypertension, benign     GERD (gastroesophageal reflux disease)     Heroin abuse 05/26/16    Psychiatrist Dr. Melo Bynum History of heart attack     Hyperlipidemia     Intermediate coronary syndrome [facility])     MDD (major depressive disorder) 5/26/16    Psychiatrist Dr. Melo Bynum Myocardial infarction [facility])     Obesity, unspecified     Old myocardial infarction     Other and unspecified hyperlipidemia     Pancreatitis     Positive PPD     Sleep apnea     uses Cpap machine    Transfusion history     Before 1992       PSH:   Past Surgical History:   Procedure Laterality Date    HX APPENDECTOMY      HX CORONARY STENT PLACEMENT  2010    2 stents       Social HX:   Social History     Social History    Marital status:      Spouse name: N/A    Number of children: N/A    Years of education: N/A     Occupational History    Not on file.      Social History Main Topics    Smoking status: Never Smoker    Smokeless tobacco: Never Used    Alcohol use No    Drug use: No    Sexual activity: Not on file     Other Topics Concern    Not on file     Social History Narrative       FHX:   Family History   Problem Relation Age of Onset    Heart Attack Father     Coronary Artery Disease Mother     Diabetes Mother     Cancer Sister      breast cancer and lung cancer       Allergy:   Allergies   Allergen Reactions    Compazine [Prochlorperazine] Anaphylaxis     \"Tightness around throat\"       Home Medications:     Prescriptions Prior to Admission   Medication Sig    naloxone (EVZIO) 2 mg/0.4 mL auto-injector 0.4 mL by IntraMUSCular route once as needed for Overdose for up to 1 dose.  oxyCODONE-acetaminophen (PERCOCET 10)  mg per tablet Take 1 Tab by mouth every six (6) hours as needed. Max Daily Amount: 4 Tabs.  pregabalin (LYRICA) 75 mg capsule 1 cap bid x 1 week, then 2 caps bid    hydroCHLOROthiazide (HYDRODIURIL) 50 mg tablet Take 50 mg by mouth daily.  amitriptyline (ELAVIL) 50 mg tablet Take 50 mg by mouth nightly.  carvedilol (COREG) 6.25 mg tablet Take 1 Tab by mouth two (2) times daily (with meals).  insulin aspart (NOVOLOG FLEXPEN) 100 unit/mL flexpen 65 Units by SubCUTAneous route Before breakfast, lunch, and dinner.  Polyethylene Glycol 3350 powd Take 1 Cap by mouth as needed (for constipation ).  insulin detemir (LEVEMIR) 100 unit/mL injection 63 Units by SubCUTAneous route nightly.  cyclobenzaprine (FLEXERIL) 10 mg tablet Take  by mouth three (3) times daily as needed for Muscle Spasm(s).  doxazosin (CARDURA) 2 mg tablet Take 2 mg by mouth daily.  simvastatin (ZOCOR) 10 mg tablet Take 10 mg by mouth nightly.  levothyroxine (SYNTHROID) 75 mcg tablet Take  by mouth Daily (before breakfast).  furosemide (LASIX) 40 mg tablet Take 40 mg by mouth daily.  ranitidine (ZANTAC) 150 mg tablet Take 150 mg by mouth two (2) times a day.  quinapril (ACCUPRIL) 40 mg tablet Take 40 mg by mouth nightly.  metFORMIN (GLUCOPHAGE) 1,000 mg tablet Take 1,000 mg by mouth two (2) times daily (with meals).     omeprazole (PRILOSEC) 20 mg capsule Take 20 mg by mouth daily.    traZODone (DESYREL) 150 mg tablet Take 150 mg by mouth nightly.  clopidogrel (PLAVIX) 75 mg tablet Take 75 mg by mouth daily.  ondansetron hcl (ZOFRAN, AS HYDROCHLORIDE,) 4 mg tablet Take 1 Tab by mouth every eight (8) hours as needed for Nausea. Review of Systems:     Constitutional: No fevers, chills, weight loss, fatigue. Skin: No rashes, pruritis, jaundice, ulcerations, erythema. HENT: No headaches, nosebleeds, sinus pressure, rhinorrhea, sore throat. Eyes: No visual changes, blurred vision, eye pain, photophobia, jaundice. Cardiovascular: No chest pain, heart palpitations. Respiratory: No cough, SOB, wheezing, chest discomfort, orthopnea. Gastrointestinal: n/v   Genitourinary: No dysuria, bleeding, discharge, pyuria. Musculoskeletal: No weakness, arthralgias, wasting. Endo: No sweats. Heme: No bruising, easy bleeding. Allergies: As noted. Neurological: Cranial nerves intact. Alert and oriented. Gait not assessed. Psychiatric:  No anxiety, depression, hallucinations. Visit Vitals    BP (!) 155/100    Pulse 70    Temp 98.6 °F (37 °C)    Resp 16    Ht 5' 9\" (1.753 m)    Wt 111.4 kg (245 lb 11.2 oz)    SpO2 94%    BMI 36.28 kg/m2       Physical Assessment:     constitutional: appearance: well developed, normal habitus, no deformities, in no acute distress. skin: inspection: no rashes, ulcers, icterus or other lesions; no clubbing or telangiectasias. palpation: no induration or subcutaneos nodules. eyes: inspection: normal conjunctivae and lids; no jaundice pupils: symmetrical, normoreactive to light, normal accommodation and size. ENMT: mouth: normal oral mucosa,lips and gums; good dentition. respiratory: effort: normal chest excursion; no intercostal retraction or accessory muscle use. cardiovascular: abdominal aorta: normal size and position; no bruits. palpation: PMI of normal size and position; normal rhythm; no thrill or murmurs. abdominal: abdomen: normal consistency; no tenderness or masses. hernias: no hernias appreciated. liver: normal size and consistency. spleen: not palpable. rectal: hemoccult/guaiac: not performed. musculoskeletal: digits and nails: not assessed. neurologic: cranial nerves: II-XII normal.   psychiatric: judgement/insight: within normal limits. memory: within normal limits for recent and remote events. mood and affect: no evidence of depression, anxiety or agitation. orientation: oriented to time, space and person. Basic Metabolic Profile   Recent Labs      09/09/17   0453   NA  140   K  3.1*   CL  106   CO2  26   BUN  17   GLU  154*   CA  8.3*   MG  2.0         CBC w/Diff    Recent Labs      09/09/17   0453   WBC  13.1   RBC  3.62*   HGB  10.6*   HCT  33.1*   MCV  91.4   MCH  29.3   MCHC  32.0   RDW  13.5   PLT  277    Recent Labs      09/09/17   0453   GRANS  75*   LYMPH  17*   EOS  0        Hepatic Function   Recent Labs      09/09/17 0453  09/07/17   1832   ALB  2.9*  3.9   TP  7.5  9.5*   TBILI  0.6  0.5   SGOT  19  15   AP  109  146*   LPSE   --   96          Rocky Baker MD, M.D. [facility], [address] [place] [address]  [email or web address]

## 2017-09-09 NOTE — PROGRESS NOTES
SUBJECTIVE:    Feeling better. Sitting up in chair. No nausea or vomiting. Had a BM. Off and on abdominal spasms. No CP/SOB. OBJECTIVE:    Visit Vitals    BP (!) 153/92    Pulse 60    Temp 98.4 °F (36.9 °C)    Resp 17    Ht 5' 9\" (1.753 m)    Wt 111.4 kg (245 lb 11.2 oz)    SpO2 98%    BMI 36.28 kg/m2     CVS: RRR. RS: Diminished BS in bases, no wheezes  GI: NO tenderness periumbilical area, ND, BS+  Extremities: no pedal edema  CNS: Moves all extremities  General: Awake, NAD    ASSESSMENT:    1. Abdominal pain and nausea with vomiting ? Unknown etiology, clinically resolving  2. pneumoperitoneum on CXR, resolved   3. Right kidney lesion  4. Leukocytosis, resolved  5. HTN  6. DM  7. Recent lumbar surgery  8. Hypothyroidism  9. GERD  10. HYPOKALEMIA   11.  MILD PROTEIN MALNUTRITION     PLAN:    Advanced diet  Surgery and GI input noted  Restart home medications  D/c oxygen and check spo2 on RA  Add nebs and IS  Replete K  PT/OT/Dietician consult  Transfer patient to medical floor  [facility] for pain   Talked to sisters    Total time greater than 30 minutes    CMP:   Lab Results   Component Value Date/Time     09/09/2017 04:53 AM    K 3.1 (L) 09/09/2017 04:53 AM     09/09/2017 04:53 AM    CO2 26 09/09/2017 04:53 AM    AGAP 8 09/09/2017 04:53 AM     (H) 09/09/2017 04:53 AM    BUN 17 09/09/2017 04:53 AM    CREA 1.09 09/09/2017 04:53 AM    GFRAA >60 09/09/2017 04:53 AM    GFRNA >60 09/09/2017 04:53 AM    CA 8.3 (L) 09/09/2017 04:53 AM    MG 2.0 09/09/2017 04:53 AM    ALB 2.9 (L) 09/09/2017 04:53 AM    TP 7.5 09/09/2017 04:53 AM    GLOB 4.6 (H) 09/09/2017 04:53 AM    AGRAT 0.6 (L) 09/09/2017 04:53 AM    SGOT 19 09/09/2017 04:53 AM    ALT 20 09/09/2017 04:53 AM     CBC:   Lab Results   Component Value Date/Time    WBC 13.1 09/09/2017 04:53 AM    HGB 10.6 (L) 09/09/2017 04:53 AM    HCT 33.1 (L) 09/09/2017 04:53 AM     09/09/2017 04:53 AM

## 2017-09-09 NOTE — PROGRESS NOTES
Surgery  Looks better  AF VSS  Moving his bowels  Min abd pain  Doubt intra-abdominal pathology  CT and CXR images reviewed   available as needed

## 2017-09-09 NOTE — PROGRESS NOTES
Pt c/o abdominal pain and it's too early for pain meds. Dr Noris Jerez made aware and no further order rec'd. Explained to pt pain meds will be given in due time. Repositioned pt for comfort. 2150 Pt's O2 Sat 87% O2 2L NC applied and Sat 96%. Will continue to monitor pt.    0715 Medicated for abdominal pain and nausea as ordered. Will continue to monitor pt.    0920 Bedside shift change report given to [id number] Darwin Vo (oncoming nurse) by Angela Baron (offgoing nurse). Report given with SBAR, Kardex, Intake/Output, MAR and Recent Results.

## 2017-09-10 ENCOUNTER — APPOINTMENT (OUTPATIENT)
Dept: CT IMAGING | Age: 57
DRG: 351 | End: 2017-09-10
Attending: INTERNAL MEDICINE
Payer: MEDICAID

## 2017-09-10 LAB
ANION GAP SERPL CALC-SCNC: 9 MMOL/L (ref 3–18)
ATRIAL RATE: 61 BPM
BASOPHILS # BLD: 0 K/UL (ref 0–0.1)
BASOPHILS NFR BLD: 0 % (ref 0–2)
BUN SERPL-MCNC: 16 MG/DL (ref 7–18)
BUN/CREAT SERPL: 16 (ref 12–20)
CALCIUM SERPL-MCNC: 8.7 MG/DL (ref 8.5–10.1)
CALCULATED P AXIS, ECG09: 77 DEGREES
CALCULATED R AXIS, ECG10: -12 DEGREES
CALCULATED T AXIS, ECG11: -9 DEGREES
CHLORIDE SERPL-SCNC: 102 MMOL/L (ref 100–108)
CO2 SERPL-SCNC: 27 MMOL/L (ref 21–32)
CREAT SERPL-MCNC: 0.99 MG/DL (ref 0.6–1.3)
CREAT SERPL-MCNC: 1.03 MG/DL (ref 0.6–1.3)
DIAGNOSIS, 93000: NORMAL
DIFFERENTIAL METHOD BLD: ABNORMAL
EOSINOPHIL # BLD: 0.2 K/UL (ref 0–0.4)
EOSINOPHIL NFR BLD: 2 % (ref 0–5)
ERYTHROCYTE [DISTWIDTH] IN BLOOD BY AUTOMATED COUNT: 13.1 % (ref 11.6–14.5)
GLUCOSE BLD STRIP.AUTO-MCNC: 102 MG/DL (ref 70–110)
GLUCOSE BLD STRIP.AUTO-MCNC: 103 MG/DL (ref 70–110)
GLUCOSE BLD STRIP.AUTO-MCNC: 118 MG/DL (ref 70–110)
GLUCOSE BLD STRIP.AUTO-MCNC: 128 MG/DL (ref 70–110)
GLUCOSE SERPL-MCNC: 145 MG/DL (ref 74–99)
HCT VFR BLD AUTO: 35.3 % (ref 36–48)
HGB BLD-MCNC: 11.2 G/DL (ref 13–16)
LYMPHOCYTES # BLD: 2 K/UL (ref 0.9–3.6)
LYMPHOCYTES NFR BLD: 15 % (ref 21–52)
MCH RBC QN AUTO: 29.2 PG (ref 24–34)
MCHC RBC AUTO-ENTMCNC: 31.7 G/DL (ref 31–37)
MCV RBC AUTO: 91.9 FL (ref 74–97)
MONOCYTES # BLD: 0.9 K/UL (ref 0.05–1.2)
MONOCYTES NFR BLD: 7 % (ref 3–10)
NEUTS SEG # BLD: 10.5 K/UL (ref 1.8–8)
NEUTS SEG NFR BLD: 76 % (ref 40–73)
P-R INTERVAL, ECG05: 218 MS
PLATELET # BLD AUTO: 270 K/UL (ref 135–420)
PMV BLD AUTO: 10.6 FL (ref 9.2–11.8)
POTASSIUM SERPL-SCNC: 3.6 MMOL/L (ref 3.5–5.5)
Q-T INTERVAL, ECG07: 416 MS
QRS DURATION, ECG06: 86 MS
QTC CALCULATION (BEZET), ECG08: 418 MS
RBC # BLD AUTO: 3.84 M/UL (ref 4.7–5.5)
SODIUM SERPL-SCNC: 138 MMOL/L (ref 136–145)
VENTRICULAR RATE, ECG03: 61 BPM
WBC # BLD AUTO: 13.7 K/UL (ref 4.6–13.2)

## 2017-09-10 PROCEDURE — 65660000000 HC RM CCU STEPDOWN

## 2017-09-10 PROCEDURE — 74011250637 HC RX REV CODE- 250/637: Performed by: INTERNAL MEDICINE

## 2017-09-10 PROCEDURE — 74011000250 HC RX REV CODE- 250: Performed by: INTERNAL MEDICINE

## 2017-09-10 PROCEDURE — 74177 CT ABD & PELVIS W/CONTRAST: CPT

## 2017-09-10 PROCEDURE — 97161 PT EVAL LOW COMPLEX 20 MIN: CPT

## 2017-09-10 PROCEDURE — 36415 COLL VENOUS BLD VENIPUNCTURE: CPT | Performed by: INTERNAL MEDICINE

## 2017-09-10 PROCEDURE — 94640 AIRWAY INHALATION TREATMENT: CPT

## 2017-09-10 PROCEDURE — 74011636637 HC RX REV CODE- 636/637: Performed by: INTERNAL MEDICINE

## 2017-09-10 PROCEDURE — 74011250636 HC RX REV CODE- 250/636: Performed by: INTERNAL MEDICINE

## 2017-09-10 PROCEDURE — 74011250636 HC RX REV CODE- 250/636: Performed by: HOSPITALIST

## 2017-09-10 PROCEDURE — 74011250636 HC RX REV CODE- 250/636

## 2017-09-10 PROCEDURE — 74011000258 HC RX REV CODE- 258: Performed by: INTERNAL MEDICINE

## 2017-09-10 PROCEDURE — 80048 BASIC METABOLIC PNL TOTAL CA: CPT | Performed by: INTERNAL MEDICINE

## 2017-09-10 PROCEDURE — 74011636320 HC RX REV CODE- 636/320: Performed by: INTERNAL MEDICINE

## 2017-09-10 PROCEDURE — 82962 GLUCOSE BLOOD TEST: CPT

## 2017-09-10 PROCEDURE — 85025 COMPLETE CBC W/AUTO DIFF WBC: CPT | Performed by: INTERNAL MEDICINE

## 2017-09-10 RX ORDER — AMLODIPINE BESYLATE 10 MG/1
10 TABLET ORAL DAILY
COMMUNITY
End: 2019-12-27

## 2017-09-10 RX ORDER — SODIUM CHLORIDE 9 MG/ML
75 INJECTION, SOLUTION INTRAVENOUS CONTINUOUS
Status: DISCONTINUED | OUTPATIENT
Start: 2017-09-10 | End: 2017-09-11

## 2017-09-10 RX ORDER — IPRATROPIUM BROMIDE AND ALBUTEROL SULFATE 2.5; .5 MG/3ML; MG/3ML
3 SOLUTION RESPIRATORY (INHALATION)
Status: DISCONTINUED | OUTPATIENT
Start: 2017-09-10 | End: 2017-09-11

## 2017-09-10 RX ORDER — METOCLOPRAMIDE 10 MG/1
10 TABLET ORAL 3 TIMES DAILY
Status: COMPLETED | OUTPATIENT
Start: 2017-09-10 | End: 2017-09-11

## 2017-09-10 RX ORDER — HYDROMORPHONE HYDROCHLORIDE 2 MG/ML
2 INJECTION, SOLUTION INTRAMUSCULAR; INTRAVENOUS; SUBCUTANEOUS
Status: DISCONTINUED | OUTPATIENT
Start: 2017-09-10 | End: 2017-09-11

## 2017-09-10 RX ORDER — HYDROCHLOROTHIAZIDE 50 MG/1
50 TABLET ORAL DAILY
Status: ON HOLD | COMMUNITY
End: 2017-09-11

## 2017-09-10 RX ORDER — HYDROMORPHONE HYDROCHLORIDE 1 MG/ML
INJECTION, SOLUTION INTRAMUSCULAR; INTRAVENOUS; SUBCUTANEOUS
Status: COMPLETED
Start: 2017-09-10 | End: 2017-09-10

## 2017-09-10 RX ORDER — AMLODIPINE BESYLATE 5 MG/1
5 TABLET ORAL DAILY
Status: DISCONTINUED | OUTPATIENT
Start: 2017-09-10 | End: 2017-09-11 | Stop reason: HOSPADM

## 2017-09-10 RX ADMIN — HYDROMORPHONE HYDROCHLORIDE 1 MG: 2 INJECTION, SOLUTION INTRAMUSCULAR; INTRAVENOUS; SUBCUTANEOUS at 01:55

## 2017-09-10 RX ADMIN — PIPERACILLIN AND TAZOBACTAM 3.38 G: 3; .375 INJECTION, POWDER, LYOPHILIZED, FOR SOLUTION INTRAVENOUS; PARENTERAL at 01:55

## 2017-09-10 RX ADMIN — QUINAPRIL 10 MG: 10 TABLET ORAL at 21:44

## 2017-09-10 RX ADMIN — HYDROMORPHONE HYDROCHLORIDE 2 MG: 2 INJECTION, SOLUTION INTRAMUSCULAR; INTRAVENOUS; SUBCUTANEOUS at 12:48

## 2017-09-10 RX ADMIN — METOCLOPRAMIDE HYDROCHLORIDE 5 MG: 5 TABLET ORAL at 09:06

## 2017-09-10 RX ADMIN — METOCLOPRAMIDE 10 MG: 10 TABLET ORAL at 17:03

## 2017-09-10 RX ADMIN — HYDROMORPHONE HYDROCHLORIDE 2 MG: 2 INJECTION, SOLUTION INTRAMUSCULAR; INTRAVENOUS; SUBCUTANEOUS at 22:52

## 2017-09-10 RX ADMIN — INSULIN DETEMIR 30 UNITS: 100 INJECTION, SOLUTION SUBCUTANEOUS at 21:45

## 2017-09-10 RX ADMIN — OXYCODONE HYDROCHLORIDE AND ACETAMINOPHEN 1 TABLET: 5; 325 TABLET ORAL at 09:27

## 2017-09-10 RX ADMIN — AMLODIPINE BESYLATE 5 MG: 5 TABLET ORAL at 17:03

## 2017-09-10 RX ADMIN — HYDROMORPHONE HYDROCHLORIDE 2 MG: 2 INJECTION, SOLUTION INTRAMUSCULAR; INTRAVENOUS; SUBCUTANEOUS at 18:57

## 2017-09-10 RX ADMIN — IPRATROPIUM BROMIDE AND ALBUTEROL SULFATE 3 ML: .5; 3 SOLUTION RESPIRATORY (INHALATION) at 22:22

## 2017-09-10 RX ADMIN — LEVOTHYROXINE SODIUM 75 MCG: 75 TABLET ORAL at 07:40

## 2017-09-10 RX ADMIN — PANTOPRAZOLE SODIUM 40 MG: 40 TABLET, DELAYED RELEASE ORAL at 09:06

## 2017-09-10 RX ADMIN — PIPERACILLIN AND TAZOBACTAM 3.38 G: 3; .375 INJECTION, POWDER, LYOPHILIZED, FOR SOLUTION INTRAVENOUS; PARENTERAL at 21:44

## 2017-09-10 RX ADMIN — IOPAMIDOL 100 ML: 612 INJECTION, SOLUTION INTRAVENOUS at 15:04

## 2017-09-10 RX ADMIN — HYDROMORPHONE HYDROCHLORIDE 1 MG: 1 INJECTION, SOLUTION INTRAMUSCULAR; INTRAVENOUS; SUBCUTANEOUS at 02:54

## 2017-09-10 RX ADMIN — DIATRIZOATE MEGLUMINE AND DIATRIZOATE SODIUM 30 ML: 600; 100 SOLUTION ORAL; RECTAL at 12:49

## 2017-09-10 RX ADMIN — OXYCODONE HYDROCHLORIDE AND ACETAMINOPHEN 1 TABLET: 5; 325 TABLET ORAL at 09:06

## 2017-09-10 RX ADMIN — CLOPIDOGREL BISULFATE 75 MG: 75 TABLET ORAL at 09:06

## 2017-09-10 RX ADMIN — HYDROMORPHONE HYDROCHLORIDE 2 MG: 2 INJECTION, SOLUTION INTRAMUSCULAR; INTRAVENOUS; SUBCUTANEOUS at 05:55

## 2017-09-10 RX ADMIN — DICYCLOMINE HYDROCHLORIDE 10 MG: 10 CAPSULE ORAL at 17:53

## 2017-09-10 RX ADMIN — SIMVASTATIN 10 MG: 10 TABLET, FILM COATED ORAL at 21:44

## 2017-09-10 RX ADMIN — SODIUM CHLORIDE 75 ML/HR: 900 INJECTION, SOLUTION INTRAVENOUS at 17:02

## 2017-09-10 RX ADMIN — METOCLOPRAMIDE 10 MG: 10 TABLET ORAL at 12:49

## 2017-09-10 RX ADMIN — PIPERACILLIN AND TAZOBACTAM 3.38 G: 3; .375 INJECTION, POWDER, LYOPHILIZED, FOR SOLUTION INTRAVENOUS; PARENTERAL at 09:09

## 2017-09-10 RX ADMIN — PIPERACILLIN AND TAZOBACTAM 3.38 G: 3; .375 INJECTION, POWDER, LYOPHILIZED, FOR SOLUTION INTRAVENOUS; PARENTERAL at 17:02

## 2017-09-10 NOTE — PROGRESS NOTES
[facility] [place] 1947, [address] [facility]  [email or web address]         Impression/Plan:  1. Mild anemia, no overt blood loss. On Plavix. 2. Abd pain, n/v - chronic, recurrent        Chief Complaint: none      Subjective:  Seems minimally improved, no bleeding. Still w n/v despite Reglan.         Eyes: conjunctiva normal, EOM normal   Neck: ROM normal, supple and trachea normal   Cardiovascular: heart normal, intact distal pulses, normal rate and regular rhythm   Pulmonary/Chest Wall: breath sounds normal and effort normal   Abdominal: appearance normal, bowel sounds normal and soft, non-acute, non-tender                Visit Vitals    /88 (BP 1 Location: Left arm, BP Patient Position: At rest)    Pulse (!) 53    Temp 98 °F (36.7 °C)    Resp 19    Ht 5' 9\" (1.753 m)    Wt 111.4 kg (245 lb 11.2 oz)    SpO2 98%    BMI 36.28 kg/m2           Intake/Output Summary (Last 24 hours) at 09/10/17 0939  Last data filed at 09/10/17 3348   Gross per 24 hour   Intake             1000 ml   Output             1050 ml   Net              -50 ml       CBC w/Diff    Lab Results   Component Value Date/Time    WBC 13.1 09/09/2017 04:53 AM    RBC 3.62 (L) 09/09/2017 04:53 AM    HGB 10.6 (L) 09/09/2017 04:53 AM    HCT 33.1 (L) 09/09/2017 04:53 AM    MCV 91.4 09/09/2017 04:53 AM    MCH 29.3 09/09/2017 04:53 AM    MCHC 32.0 09/09/2017 04:53 AM    RDW 13.5 09/09/2017 04:53 AM     09/09/2017 04:53 AM    Lab Results   Component Value Date/Time    GRANS 75 (H) 09/09/2017 04:53 AM    LYMPH 17 (L) 09/09/2017 04:53 AM    EOS 0 09/09/2017 04:53 AM    BASOS 0 09/09/2017 04:53 AM      Basic Metabolic Profile   Recent Labs      09/09/17   0453   NA  140   K  3.1*   CL  106   CO2  26   BUN  17   CA  8.3*   MG  2.0        Hepatic Function    Lab Results   Component Value Date/Time    ALB 2.9 (L) 09/09/2017 04:53 AM    TP 7.5 09/09/2017 04:53 AM     09/09/2017 04:53 AM    Lab Results   Component Value Date/Time    SGOT 19 09/09/2017 04:53 AM        [email or web address](CULT:3)                 )Zofia Galvan MD, MD  [facility] 1947, [facility]  [email or web address]

## 2017-09-10 NOTE — PROGRESS NOTES
Problem: Mobility Impaired (Adult and Pediatric)  Goal: *Acute Goals and Plan of Care (Insert Text)  Physical Therapy Goals  Initiated 9/10/2017 and to be accomplished within 7 day(s)  2. Patient will transfer from bed to chair and chair to bed with modified independence using the least restrictive device. 3. Patient will perform sit to stand with independence. 4. Patient will ambulate with modified independence for 200 feet with the least restrictive device. 5. Patient will ascend/descend 4 stairs with unilateral handrail(s) and LRAD with modified independence. PHYSICAL THERAPY EVALUATION     Patient: Geovanna Chao (46 y.o. male)  Date: 9/10/2017  Primary Diagnosis: Nausea and vomiting        Precautions:          ASSESSMENT :  Based on the objective data described below, the patient presents with decreased endurance and functional activities s/p admission for abdominal pain and nausea/vomiting. Pt had recent lumbar fusion in June 2017 but does not complain of back pain. Pt received in supine in bed and agreeable to PT session. Sister present for history taking but left during treatment session. Pt able to complete bed mobility independently with good gross strength. Pt ambulated x50 feet with rolling walker and CGA, pt requested to return to room after 25 feet. Pt taken with transport services for abdominal x-ray upon returning to room. Pt would benefit from ambulation trial with cane and from stair training before returning home     Patient will benefit from skilled intervention to address the above impairments.   Patients rehabilitation potential is considered to be Good  Factors which may influence rehabilitation potential include:   [ ]         None noted  [ ]         Mental ability/status  [ ]         Medical condition  [ ]         Home/family situation and support systems  [ ]         Safety awareness  [ ]         Pain tolerance/management  [ ]         Other:        PLAN :  Recommendations and Planned Interventions:  [X]           Bed Mobility Training             [ ]    Neuromuscular Re-Education  [X]           Transfer Training                   [ ]    Orthotic/Prosthetic Training  [X]           Gait Training                          [ ]    Modalities  [X]           Therapeutic Exercises          [ ]    Edema Management/Control  [X]           Therapeutic Activities            [X]    Patient and Family Training/Education  [ ]           Other (comment):     Frequency/Duration: Patient will be followed by physical therapy 1-2 times per day/4-7 times per week to address goals. Discharge Recommendations: Home Health and Outpatient  Further Equipment Recommendations for Discharge: cane vs rolling walker       SUBJECTIVE:   Patient stated Can I go back now? Leia Martell      OBJECTIVE DATA SUMMARY:       Past Medical History:   Diagnosis Date    Arthritis      Coronary atherosclerosis of native coronary artery       S/P PCI with GE in 12/09    Diabetes ([facility] [address]       IDDM    Electrolyte and fluid disorders not elsewhere classified      Essential hypertension, benign      GERD (gastroesophageal reflux disease)      Heroin abuse 05/26/16     Psychiatrist Dr. Ashley Perez History of heart attack      Hyperlipidemia      Intermediate coronary syndrome ([facility] [address]      MDD (major depressive disorder) 5/26/16     Psychiatrist Dr. Ashley Perez Myocardial infarction [facility])      Obesity, unspecified      Old myocardial infarction      Other and unspecified hyperlipidemia      Pancreatitis      Positive PPD      Sleep apnea       uses Cpap machine    Transfusion history       Before 1992     Past Surgical History:   Procedure Laterality Date    HX APPENDECTOMY        HX CORONARY STENT PLACEMENT   2010     2 stents     Barriers to Learning/Limitations: None  Compensate with: N/A  Prior Level of Function/Home Situation: Pt reports living with his sister who is available to help if needed.  He lives in a 1 story house with 3 LARA with bilateral handrails. He reports using a cane intermittently, that he should be using it more. He is driving but does not work at this time. Strength:    Strength: Within functional limits without complaints of abdominal or back pain  Tone & Sensation:   Tone: Normal  Sensation: Intact   Range Of Motion:  AROM: Within functional limits  Functional Mobility:  Bed Mobility:  Rolling: Independent  Supine to Sit: Independent   Good speed and quality of movement     Transfers:  Sit to Stand: Contact guard assistance  Stand to Sit: Stand-by asssistance   Needing verbal cues to wait for PT, eager to sit up and stand  Balance:   Sitting: Intact  Standing: Impaired; Without support  Standing - Static: Good  Standing - Dynamic : Fair  Ambulation/Gait Training:  Distance (ft): 50 Feet (ft)  Assistive Device: Walker, rolling  Ambulation - Level of Assistance: Contact guard assistance   Good mechanics during ambulation, may benefit more from cane for AD  Stairs:   Did not have time due to pt required for transport to x-ray        Pain:  Pain Scale 1: Numeric (0 - 10)      0/10 though reported he knew he would start hurting soon           Activity Tolerance:   Fair activity tolerance wanting to return to bed after 25 feet of ambulation. Please refer to the flowsheet for vital signs taken during this treatment. After treatment:   [X] Patient left with transport personnel and nursing assistant for transport to x-ray  [ ] Patient left sitting on EOB  [ ] Patient left in no apparent distress in bed  [ ] Call bell left within reach  [ ] Nursing notified)  [ ] Caregiver present  [ ] Bed alarm activated         COMMUNICATION/EDUCATION:   [X]         Fall prevention education was provided and the patient/caregiver indicated understanding. [X]         Patient/family have participated as able in goal setting and plan of care.   [X]         Patient/family agree to work toward stated goals and plan of care.  [ ]         Patient understands intent and goals of therapy, but is neutral about his/her participation. [ ]         Patient is unable to participate in goal setting and plan of care. Thank you for this referral.  Yaritza Cannon, PT   Time Calculation: 14 mins      Eval Complexity: History: MEDIUM  Complexity : 1-2 comorbidities / personal factors will impact the outcome/ POC Exam:MEDIUM Complexity : 3 Standardized tests and measures addressing body structure, function, activity limitation and / or participation in recreation  Presentation: LOW Complexity : Stable, uncomplicated  Overall Complexity:LOW       Mobility  Current  CJ= 20-39%   Goal  CI= 1-19%. The severity rating is based on the Level of Assistance required for Functional Mobility and ADLs.

## 2017-09-10 NOTE — PROGRESS NOTES
PT needs new creatinine for CT abd/pelvis with contrast ordered today. Per Radiology protocol labs must be within 24 hours of contrast injection.

## 2017-09-10 NOTE — PROGRESS NOTES
Problem: Falls - Risk of  Goal: *Absence of Falls  Document Basil Fall Risk and appropriate interventions in the flowsheet.    Outcome: Progressing Towards Goal  Fall Risk Interventions:              Medication Interventions: Patient to call before getting OOB     Elimination Interventions: Call light in reach

## 2017-09-10 NOTE — ROUTINE PROCESS
Bedside shift report given to [facility] on coming nurse by Samantha Reich RN including SBAR, KARDEX and SBAR

## 2017-09-10 NOTE — PROGRESS NOTES
[facility]. Pulmonary, Critical Care, and Sleep Medicine    Name: Bill Jara MRN: [id number]   : 1960 Hospital: [facility]   Date: 9/10/2017  Admission Date: 2017     Chart and notes reviewed. Data reviewed. I have evaluated all findings. [x]I have reviewed the flowsheet and previous days notes. - Patient is complaining of abdominal pain. - Denies any N/V.  - No chest pain or SOB. ROS:A comprehensive review of systems was negative except for that written in the HPI. Events and notes from last 24 hours reviewed. Care plan discussed on multidisciplinary rounds.   Patient Active Problem List   Diagnosis Code    Diabetes ([facility] [address] E11.9    Hypertension I10    GERD (gastroesophageal reflux disease) K21.9    Depression F32.9    Pain in left lower leg M79.662    Primary osteoarthritis of left knee M17.12    Localized adiposity of abdomen E65    Positive PPD R76.11    History of heart attack I25.2    Synovial cyst M71.30    HNP (herniated nucleus pulposus), lumbar M51.26    Lumbar spinal stenosis M48.06    Spondylolisthesis of lumbar region M43.16    Coronary artery disease involving native coronary artery of native heart without angina pectoris I25.10    History of coronary artery stent placement Z95.5    Spinal stenosis at L4-L5 level M48.06    S/P lumbar fusion Z98.1    Diabetic neuropathy (HCC) E11.40    Neuritis M79.2    Nausea and vomiting R11.2    Gastroparesis K31.84    Hypokalemia E87.6    ARF (acute renal failure) (HCC) N17.9    Atelectasis J98.11    Abdominal pain R10.9    Acquired hypothyroidism E03.9       Vital Signs:  Visit Vitals    /88 (BP 1 Location: Left arm, BP Patient Position: At rest)    Pulse (!) 53    Temp 98 °F (36.7 °C)    Resp 19    Ht 5' 9\" (1.753 m)    Wt 111.4 kg (245 lb 11.2 oz)    SpO2 98%    BMI 36.28 kg/m2       O2 Device: Room air   O2 Flow Rate (L/min): 2 l/min   Temp (24hrs), Av.7 °F (37.1 °C), Min:98 °F (36.7 °C), Max:99.8 °F (37.7 °C)       Intake/Output:   Last shift:         Last 3 shifts:  1901 - 09/10 0700  In: 2360 [P.O.:660; I.V.:1700]  Out: 1750 [Urine:1750]    Intake/Output Summary (Last 24 hours) at 09/10/17 1004  Last data filed at 09/10/17 7252   Gross per 24 hour   Intake              900 ml   Output             1050 ml   Net             -150 ml      Ventilator Settings:                Current Facility-Administered Medications   Medication Dose Route Frequency    0.9% sodium chloride infusion  75 mL/hr IntraVENous CONTINUOUS    metoclopramide HCl (REGLAN) tablet 10 mg  10 mg Oral TID    diatrizoate meglumine-d.sodium (MD-GASTROVIEW,GASTROGRAFIN) 66-10 % contrast solution 30 mL  30 mL Oral ONCE    pantoprazole (PROTONIX) tablet 40 mg  40 mg Oral ACB    metoclopramide HCl (REGLAN) tablet 5 mg  5 mg Oral TID WITH MEALS    levothyroxine (SYNTHROID) tablet 75 mcg  75 mcg Oral ACB    clopidogrel (PLAVIX) tablet 75 mg  75 mg Oral DAILY    carvedilol (COREG) tablet 3.125 mg  3.125 mg Oral BID WITH MEALS    simvastatin (ZOCOR) tablet 10 mg  10 mg Oral QHS    insulin detemir (LEVEMIR) injection 30 Units  30 Units SubCUTAneous QHS    quinapril (ACCUPRIL) tablet 10 mg  10 mg Oral QHS    . Please Enter Patient's Height   1 Each Other Rx Dosing/Monitoring    piperacillin-tazobactam (ZOSYN) 3.375 g in 0.9% sodium chloride (MBP/ADV) 100 mL MBP  3.375 g IntraVENous Q6H    insulin lispro (HUMALOG) injection   SubCUTAneous AC&HS       Telemetry:     Physical Exam:   General: in no apparent distress, well developed and well nourished, non-toxic, in no respiratory distress and acyanotic, alert, oriented times 3 and afebrile   HEENT: Normal, PERRLA, EOMI, fundi benign   Neck: No abnormally enlarged lymph nodes.    Chest: normal, increased AP diameter   Lungs: normal air entry, no dullness/ tenderness/ rash   Heart: Regular rate and rhythm   Abdomen: lower abdominal pain    Extremity: negative   Neuro: alert   Skin: Skin color, texture, turgor normal. No rashes or lesions   DATA:  MAR reviewed and pertinent medications noted or modified as needed    Labs:  Recent Labs      09/09/17   0453  09/08/17   0610  09/07/17   1832   WBC  13.1  18.0*  20.3*   HGB  10.6*  10.6*  13.0   HCT  33.1*  33.6*  39.2   PLT  277  318  350     Recent Labs      09/09/17   0453  09/08/17   0610  09/07/17   1832   NA  140  141  138   K  3.1*  3.5  3.4*   CL  106  106  100   CO2  26  28  26   GLU  154*  188*  288*   BUN  17  21*  21*   CREA  1.09  1.15  1.51*   CA  8.3*  8.2*  9.7   MG  2.0   --   1.8   ALB  2.9*   --   3.9   SGOT  19   --   15   ALT  20   --   25     No results for input(s): PH, PCO2, PO2, HCO3, FIO2 in the last 72 hours. No results for input(s): FIO2I, IFO2, HCO3I, IHCO3, HCOPOC, PCO2I, PCOPOC, IPHI, PHI, PHPOC, PO2I, PO2POC in the last 72 hours. No lab exists for component: IPOC2  Imaging:  [x]                           I have personally reviewed the patients radiographs and reports      IMPRESSION:   -  Pneumomediastinum on CXR - however not very convinced of this finding on repeat CXR   -  Possible aspiration pneumonitis. -  Acute hypoxic respiratory failure secondary to above. -  Acute Kidney Injury- resolved   - Type II Diabetes- previously uncontrolled   - CAD s/p PCI   - Hypertension   - GERD   - Hx of CHRISTINA- uses CPAP at home  - Hx of Heroin Abuse   - Obesity   - Depression        PLAN:   - Weaned off the oxygen completely. - Noted resolution of pneumomediastinum on follow up CXR.   - Discontinued nebz. PRNs only if needed. - Continue Zosyn for now. - Aspiration precautions   - Other management as per the primary team.   - Pulmonary medicine will sign off. Best practice :    Sress ulcer prophylaxis  DVT prophylaxis  Need for Lines, cardoso assessed. Goals of care were discussed with the patient.        Miriam Dawson MD

## 2017-09-10 NOTE — PROGRESS NOTES
SUBJECTIVE:    Feeling okay. Off and on abdominal spasms. No CP/SOB. No N/V.     OBJECTIVE:    Visit Vitals    /88 (BP 1 Location: Left arm, BP Patient Position: At rest)    Pulse (!) 53    Temp 98 °F (36.7 °C)    Resp 19    Ht 5' 9\" (1.753 m)    Wt 111.4 kg (245 lb 11.2 oz)    SpO2 98%    BMI 36.28 kg/m2     CVS: RRR. RS: Diminished BS in bases, no wheezes  GI: NO tenderness periumbilical area, ND, BS+  Extremities: no pedal edema  CNS: Moves all extremities  General: Awake, NAD    ASSESSMENT:    1. Abdominal pain and nausea with vomiting ? Unknown etiology, clinically resolving  2. pneumoperitoneum on CXR, resolved   3. Right kidney lesion  4. Leukocytosis, better  5. HTN  6. DM  7. Recent lumbar surgery  8. Hypothyroidism  9. GERD  10. HYPOKALEMIA   11. MILD PROTEIN MALNUTRITION   12.  Sinus bradycardia    PLAN:    CT with PO contrast  Will d/c coreg if HR is still running low by tomorrow  Cont current management    Total time greater than 30 minutes    CMP:   Lab Results   Component Value Date/Time     09/10/2017 10:15 AM    K 3.6 09/10/2017 10:15 AM     09/10/2017 10:15 AM    CO2 27 09/10/2017 10:15 AM    AGAP 9 09/10/2017 10:15 AM     (H) 09/10/2017 10:15 AM    BUN 16 09/10/2017 10:15 AM    CREA 0.99 09/10/2017 10:15 AM    GFRAA >60 09/10/2017 10:15 AM    GFRNA >60 09/10/2017 10:15 AM    CA 8.7 09/10/2017 10:15 AM     CBC:   Lab Results   Component Value Date/Time    WBC 13.7 (H) 09/10/2017 10:15 AM    HGB 11.2 (L) 09/10/2017 10:15 AM    HCT 35.3 (L) 09/10/2017 10:15 AM     09/10/2017 10:15 AM

## 2017-09-11 ENCOUNTER — APPOINTMENT (OUTPATIENT)
Dept: CT IMAGING | Age: 57
DRG: 351 | End: 2017-09-11
Attending: INTERNAL MEDICINE
Payer: MEDICAID

## 2017-09-11 VITALS
RESPIRATION RATE: 18 BRPM | DIASTOLIC BLOOD PRESSURE: 102 MMHG | WEIGHT: 218.1 LBS | HEART RATE: 81 BPM | HEIGHT: 69 IN | OXYGEN SATURATION: 97 % | SYSTOLIC BLOOD PRESSURE: 154 MMHG | BODY MASS INDEX: 32.3 KG/M2 | TEMPERATURE: 99 F

## 2017-09-11 LAB
ANION GAP SERPL CALC-SCNC: 9 MMOL/L (ref 3–18)
BASOPHILS # BLD: 0 K/UL (ref 0–0.1)
BASOPHILS NFR BLD: 0 % (ref 0–2)
BUN SERPL-MCNC: 13 MG/DL (ref 7–18)
BUN/CREAT SERPL: 13 (ref 12–20)
CALCIUM SERPL-MCNC: 8.1 MG/DL (ref 8.5–10.1)
CHLORIDE SERPL-SCNC: 103 MMOL/L (ref 100–108)
CO2 SERPL-SCNC: 26 MMOL/L (ref 21–32)
CREAT SERPL-MCNC: 0.98 MG/DL (ref 0.6–1.3)
DIFFERENTIAL METHOD BLD: ABNORMAL
EOSINOPHIL # BLD: 0.3 K/UL (ref 0–0.4)
EOSINOPHIL NFR BLD: 2 % (ref 0–5)
ERYTHROCYTE [DISTWIDTH] IN BLOOD BY AUTOMATED COUNT: 13.1 % (ref 11.6–14.5)
GLUCOSE BLD STRIP.AUTO-MCNC: 112 MG/DL (ref 70–110)
GLUCOSE BLD STRIP.AUTO-MCNC: 119 MG/DL (ref 70–110)
GLUCOSE SERPL-MCNC: 87 MG/DL (ref 74–99)
HCT VFR BLD AUTO: 34.4 % (ref 36–48)
HGB BLD-MCNC: 11 G/DL (ref 13–16)
LYMPHOCYTES # BLD: 2.8 K/UL (ref 0.9–3.6)
LYMPHOCYTES NFR BLD: 22 % (ref 21–52)
MCH RBC QN AUTO: 29.3 PG (ref 24–34)
MCHC RBC AUTO-ENTMCNC: 32 G/DL (ref 31–37)
MCV RBC AUTO: 91.5 FL (ref 74–97)
MONOCYTES # BLD: 1 K/UL (ref 0.05–1.2)
MONOCYTES NFR BLD: 8 % (ref 3–10)
NEUTS SEG # BLD: 8.5 K/UL (ref 1.8–8)
NEUTS SEG NFR BLD: 68 % (ref 40–73)
PLATELET # BLD AUTO: 256 K/UL (ref 135–420)
PMV BLD AUTO: 11.1 FL (ref 9.2–11.8)
POTASSIUM SERPL-SCNC: 3.1 MMOL/L (ref 3.5–5.5)
RBC # BLD AUTO: 3.76 M/UL (ref 4.7–5.5)
SODIUM SERPL-SCNC: 138 MMOL/L (ref 136–145)
T4 FREE SERPL-MCNC: 1.3 NG/DL (ref 0.7–1.5)
TSH SERPL DL<=0.05 MIU/L-ACNC: 4.54 UIU/ML (ref 0.36–3.74)
WBC # BLD AUTO: 12.7 K/UL (ref 4.6–13.2)

## 2017-09-11 PROCEDURE — 36415 COLL VENOUS BLD VENIPUNCTURE: CPT | Performed by: INTERNAL MEDICINE

## 2017-09-11 PROCEDURE — 84443 ASSAY THYROID STIM HORMONE: CPT | Performed by: INTERNAL MEDICINE

## 2017-09-11 PROCEDURE — 85025 COMPLETE CBC W/AUTO DIFF WBC: CPT | Performed by: INTERNAL MEDICINE

## 2017-09-11 PROCEDURE — 82962 GLUCOSE BLOOD TEST: CPT

## 2017-09-11 PROCEDURE — 84439 ASSAY OF FREE THYROXINE: CPT | Performed by: INTERNAL MEDICINE

## 2017-09-11 PROCEDURE — 97116 GAIT TRAINING THERAPY: CPT

## 2017-09-11 PROCEDURE — 74011250636 HC RX REV CODE- 250/636: Performed by: INTERNAL MEDICINE

## 2017-09-11 PROCEDURE — 74011250637 HC RX REV CODE- 250/637: Performed by: INTERNAL MEDICINE

## 2017-09-11 PROCEDURE — 80048 BASIC METABOLIC PNL TOTAL CA: CPT | Performed by: INTERNAL MEDICINE

## 2017-09-11 PROCEDURE — 94640 AIRWAY INHALATION TREATMENT: CPT

## 2017-09-11 PROCEDURE — 74011000250 HC RX REV CODE- 250: Performed by: INTERNAL MEDICINE

## 2017-09-11 PROCEDURE — 74011000258 HC RX REV CODE- 258: Performed by: INTERNAL MEDICINE

## 2017-09-11 PROCEDURE — 74011250636 HC RX REV CODE- 250/636: Performed by: HOSPITALIST

## 2017-09-11 RX ORDER — IPRATROPIUM BROMIDE AND ALBUTEROL SULFATE 2.5; .5 MG/3ML; MG/3ML
3 SOLUTION RESPIRATORY (INHALATION)
Status: DISCONTINUED | OUTPATIENT
Start: 2017-09-11 | End: 2017-09-11 | Stop reason: HOSPADM

## 2017-09-11 RX ORDER — POLYETHYLENE GLYCOL 3350 17 G/17G
17 POWDER, FOR SOLUTION ORAL DAILY
Qty: 30 PACKET | Refills: 0 | Status: SHIPPED | OUTPATIENT
Start: 2017-09-11

## 2017-09-11 RX ORDER — PANTOPRAZOLE SODIUM 40 MG/1
40 TABLET, DELAYED RELEASE ORAL
Qty: 30 TAB | Refills: 0 | Status: SHIPPED | OUTPATIENT
Start: 2017-09-11 | End: 2018-03-29

## 2017-09-11 RX ORDER — QUINAPRIL 10 MG/1
10 TABLET ORAL
Qty: 30 TAB | Refills: 0 | Status: SHIPPED | OUTPATIENT
Start: 2017-09-11 | End: 2017-09-14 | Stop reason: SDUPTHER

## 2017-09-11 RX ORDER — ACETAMINOPHEN 325 MG/1
650 TABLET ORAL
Status: DISCONTINUED | OUTPATIENT
Start: 2017-09-11 | End: 2017-09-11 | Stop reason: HOSPADM

## 2017-09-11 RX ORDER — DICYCLOMINE HYDROCHLORIDE 10 MG/1
10 CAPSULE ORAL
Qty: 20 CAP | Refills: 0 | Status: SHIPPED | OUTPATIENT
Start: 2017-09-11 | End: 2018-03-24

## 2017-09-11 RX ORDER — ACETAMINOPHEN 325 MG/1
650 TABLET ORAL
Qty: 30 TAB | Refills: 0 | Status: ON HOLD
Start: 2017-09-11 | End: 2018-03-29

## 2017-09-11 RX ORDER — AMOXICILLIN AND CLAVULANATE POTASSIUM 875; 125 MG/1; MG/1
1 TABLET, FILM COATED ORAL EVERY 12 HOURS
Status: DISCONTINUED | OUTPATIENT
Start: 2017-09-11 | End: 2017-09-11 | Stop reason: HOSPADM

## 2017-09-11 RX ORDER — POTASSIUM CHLORIDE 20 MEQ/1
40 TABLET, EXTENDED RELEASE ORAL
Status: COMPLETED | OUTPATIENT
Start: 2017-09-11 | End: 2017-09-11

## 2017-09-11 RX ORDER — AMOXICILLIN AND CLAVULANATE POTASSIUM 875; 125 MG/1; MG/1
1 TABLET, FILM COATED ORAL EVERY 12 HOURS
Qty: 10 TAB | Refills: 0 | Status: SHIPPED | OUTPATIENT
Start: 2017-09-11 | End: 2017-09-16

## 2017-09-11 RX ORDER — HYDROCHLOROTHIAZIDE 50 MG/1
25 TABLET ORAL DAILY
Qty: 15 TAB | Refills: 0 | Status: SHIPPED
Start: 2017-09-11 | End: 2019-12-27

## 2017-09-11 RX ORDER — POTASSIUM CHLORIDE 20 MEQ/1
40 TABLET, EXTENDED RELEASE ORAL ONCE
Status: COMPLETED | OUTPATIENT
Start: 2017-09-11 | End: 2017-09-11

## 2017-09-11 RX ORDER — POTASSIUM CHLORIDE 1.5 G/1.77G
40 POWDER, FOR SOLUTION ORAL
Status: DISCONTINUED | OUTPATIENT
Start: 2017-09-11 | End: 2017-09-11 | Stop reason: HOSPADM

## 2017-09-11 RX ORDER — ALBUTEROL SULFATE 90 UG/1
AEROSOL, METERED RESPIRATORY (INHALATION)
Qty: 1 INHALER | Refills: 0 | Status: SHIPPED | OUTPATIENT
Start: 2017-09-11 | End: 2021-02-11

## 2017-09-11 RX ADMIN — METOCLOPRAMIDE 10 MG: 10 TABLET ORAL at 09:48

## 2017-09-11 RX ADMIN — PIPERACILLIN AND TAZOBACTAM 3.38 G: 3; .375 INJECTION, POWDER, LYOPHILIZED, FOR SOLUTION INTRAVENOUS; PARENTERAL at 09:48

## 2017-09-11 RX ADMIN — AMLODIPINE BESYLATE 5 MG: 5 TABLET ORAL at 09:48

## 2017-09-11 RX ADMIN — CLOPIDOGREL BISULFATE 75 MG: 75 TABLET ORAL at 09:47

## 2017-09-11 RX ADMIN — IPRATROPIUM BROMIDE AND ALBUTEROL SULFATE 3 ML: .5; 3 SOLUTION RESPIRATORY (INHALATION) at 10:30

## 2017-09-11 RX ADMIN — HYDROMORPHONE HYDROCHLORIDE 2 MG: 2 INJECTION, SOLUTION INTRAMUSCULAR; INTRAVENOUS; SUBCUTANEOUS at 10:49

## 2017-09-11 RX ADMIN — PIPERACILLIN AND TAZOBACTAM 3.38 G: 3; .375 INJECTION, POWDER, LYOPHILIZED, FOR SOLUTION INTRAVENOUS; PARENTERAL at 15:28

## 2017-09-11 RX ADMIN — HYDROMORPHONE HYDROCHLORIDE 2 MG: 2 INJECTION, SOLUTION INTRAMUSCULAR; INTRAVENOUS; SUBCUTANEOUS at 02:51

## 2017-09-11 RX ADMIN — PIPERACILLIN AND TAZOBACTAM 3.38 G: 3; .375 INJECTION, POWDER, LYOPHILIZED, FOR SOLUTION INTRAVENOUS; PARENTERAL at 02:51

## 2017-09-11 RX ADMIN — POTASSIUM CHLORIDE 40 MEQ: 20 TABLET, EXTENDED RELEASE ORAL at 09:56

## 2017-09-11 RX ADMIN — LEVOTHYROXINE SODIUM 75 MCG: 75 TABLET ORAL at 07:30

## 2017-09-11 RX ADMIN — HYDROMORPHONE HYDROCHLORIDE 2 MG: 2 INJECTION, SOLUTION INTRAMUSCULAR; INTRAVENOUS; SUBCUTANEOUS at 06:54

## 2017-09-11 RX ADMIN — PANTOPRAZOLE SODIUM 40 MG: 40 TABLET, DELAYED RELEASE ORAL at 09:47

## 2017-09-11 RX ADMIN — POTASSIUM CHLORIDE 40 MEQ: 20 TABLET, EXTENDED RELEASE ORAL at 15:27

## 2017-09-11 NOTE — ROUTINE PROCESS
Bedside shift report given to Natanael Gunn RN on coming nurse by Taylor Fabian RN including SBAR, KARDEX and SBAR

## 2017-09-11 NOTE — PROGRESS NOTES
[email or web address]  [phone]       Impression  1. Intractable N/V-abdominal pain-resolved. Abdominal CT scan. No intra abdominal abnormalities  2. DM  3. CAD s/p PCI  4. GERD- 11/2010 EGD grade 2 esophagitis  5. CHRISTINA- CPAP  6. Anemia- no overt GI blood loss. On plavix    Plan:  1. Cont PPI  2. Cont to monitor H/H transfuse as indicated. Continue to monitor for overt GI hemorrhage  Await further recommendation from attending    Chief Complaint: N/V     Subjective:  Pt uneventful overnight.  Pt denies abdominal pain        Eyes: conjunctiva normal, EOM normal   ENT: Mucous membranes moist, no lesion or thrush   Cardiovascular:  normal rate and regular rhythm, no murmur   Pulmonary/Chest Wall: breath sounds normal and effort normal   Abdominal:  soft, non-acute, non-tender, no appreciable mass or hepatosplenomegaly, no appreciable ascites       Visit Vitals    /90 (BP 1 Location: Left arm, BP Patient Position: At rest)    Pulse 61    Temp 98.1 °F (36.7 °C)    Resp 18    Ht 5' 9\" (1.753 m)    Wt 98.9 kg (218 lb 1.6 oz)    SpO2 98%    BMI 32.21 kg/m2           Intake/Output Summary (Last 24 hours) at 09/11/17 1115  Last data filed at 09/11/17 0647   Gross per 24 hour   Intake              400 ml   Output              625 ml   Net             -225 ml       CBC w/Diff    Lab Results   Component Value Date/Time    WBC 12.7 09/11/2017 03:56 AM    RBC 3.76 (L) 09/11/2017 03:56 AM    HGB 11.0 (L) 09/11/2017 03:56 AM    HCT 34.4 (L) 09/11/2017 03:56 AM    MCV 91.5 09/11/2017 03:56 AM    MCH 29.3 09/11/2017 03:56 AM    MCHC 32.0 09/11/2017 03:56 AM    RDW 13.1 09/11/2017 03:56 AM     09/11/2017 03:56 AM    Lab Results   Component Value Date/Time    GRANS 68 09/11/2017 03:56 AM    LYMPH 22 09/11/2017 03:56 AM    EOS 2 09/11/2017 03:56 AM    BASOS 0 09/11/2017 03:56 AM      Basic Metabolic Profile   Recent Labs      09/11/17   0356   09/09/17   0453   NA  138   < >  140   K  3.1*   < >  3.1*   CL  103   < > 106   CO2  26   < >  26   BUN  13   < >  17   CA  8.1*   < >  8.3*   MG   --    --   2.0    < > = values in this interval not displayed. Hepatic Function    Lab Results   Component Value Date/Time    ALB 2.9 (L) 09/09/2017 04:53 AM    TP 7.5 09/09/2017 04:53 AM     09/09/2017 04:53 AM    Lab Results   Component Value Date/Time    SGOT 19 09/09/2017 04:53 AM          Sahil Liz, NP  [facility], [facility]  [email or web address]

## 2017-09-11 NOTE — PROGRESS NOTES
Problem: Mobility Impaired (Adult and Pediatric)  Goal: *Acute Goals and Plan of Care (Insert Text)  Physical Therapy Goals  Initiated 9/10/2017 and to be accomplished within 7 day(s)  2. Patient will transfer from bed to chair and chair to bed with modified independence using the least restrictive device. 3. Patient will perform sit to stand with modified independence. 4. Patient will ambulate with modified independence for 200 feet with the least restrictive device. 5. Patient will ascend/descend 4 stairs with unilateral handrail(s) and LRAD with modified independence. Outcome: Resolved/Met Date Met:  09/11/17  PHYSICAL THERAPY TREATMENT/DISCHARGE     Patient: Naun Taylor (30 y.o. male)  Date: 9/11/2017  Diagnosis: Nausea and vomiting <principal problem not specified>       Precautions:  Fall  Chart, physical therapy assessment, plan of care and goals were reviewed. ASSESSMENT:  Pt presents today alert and agreeable to therapy and was supine in bed. Pt transferred OOB and ambulated 210ft independently though demonstrates antalgic gait. Pt balance is intact and experiences no LOB. PT inquired if use of RW offers patient any increased stability of decrease in pain and pt reports he prefers ambulating without walker. Pt navigated 4 steps with HR at modified independence and transferred back to sitting EOB in room at conclusion of session. Pt has met all goals and is at functional baseline and is agreeable to D/C from PT at this time. Progression toward goals:  [X]      Goals met  [ ]      Improving appropriately and progressing toward goals  [ ]      Improving slowly and progressing toward goals  [ ]      Not making progress toward goals and plan of care will be adjusted       PLAN:  Patient will be discharged from physical therapy at this time.   Rationale for discharge:  [X] Goals Achieved  [ ] Latrelle Pace  [ ] Patient not participating in therapy  [ ] Other:  Discharge Recommendations: Outpatient  Further Equipment Recommendations for Discharge:  N/A       G-CODES:   Mobility  Current  CI= 1-19%   Goal  CI= 1-19%. The severity rating is based on the Level of Assistance required for Functional Mobility and ADLs. SUBJECTIVE:   Patient stated I'm doing fine. I get looser as I walk.       OBJECTIVE DATA SUMMARY:   Critical Behavior:  Neurologic State: Alert  Orientation Level: Oriented X4  Cognition: Appropriate decision making     Functional Mobility Training:  Bed Mobility:  Rolling: Independent  Supine to Sit: Independent   Transfers:  Sit to Stand: Modified independent  Stand to Sit: Modified independent      Balance:  Sitting: Intact  Standing: Intact  Ambulation/Gait Training:  Distance (ft): 210 Feet (ft)  Assistive Device:  (None)  Ambulation - Level of Assistance: Independent   Gait Abnormalities: Antalgic;Decreased step clearance (Pt reports cramping in R leg)   Speed/Little: Slow   Interventions: Verbal cues   Stairs:  Number of Stairs Trained: 4  Stairs - Level of Assistance: Modified independent              Rail Use: Both     Pain:  Pt reports 0/10 pain or discomfort prior to treatment. Pt reports 0/10 pain or discomfort post treatment. Activity Tolerance:   Pt tolerated activity well and denied dizziness, SOB, or chest pain. Please refer to the flowsheet for vital signs taken during this treatment.   After treatment:   [ ] Patient left in no apparent distress sitting up in chair  [X] Patient left in no apparent distress in bed  [X] Call bell left within reach  [ ] Nursing notified  [ ] Caregiver present  [ ] Bed alarm activated  Maria Del Carmen Guzman PT   Time Calculation: 9 mins

## 2017-09-11 NOTE — DISCHARGE SUMMARY
PATIENT DISCHARGE INSTRUCTIONS      PATIENT DISCHARGE INSTRUCTIONS    [facility] / [id number] : 1960    Admitted 2017 Discharged: 2017     Dictated # [id number]    · It is important that you take the medication exactly as they are prescribed. · Keep your medication in the bottles provided by the pharmacist and keep a list of the medication names, dosages, and times to be taken in your wallet. · Do not take other medications without consulting your doctor. What to do at Home    Recommended Diet: Cardiac Diet and Diabetic Diet    Recommended Activity: Activity as tolerated    Current Discharge Medication List      START taking these medications    Details   dicyclomine (BENTYL) 10 mg capsule Take 1 Cap by mouth three (3) times daily as needed. Indications: abdominal pain, take it with tylenol  Qty: 20 Cap, Refills: 0      pantoprazole (PROTONIX) 40 mg tablet Take 1 Tab by mouth Daily (before breakfast). Qty: 30 Tab, Refills: 0      albuterol (PROVENTIL HFA, VENTOLIN HFA, PROAIR HFA) 90 mcg/actuation inhaler 2 puffs every 6 hours x 5 days then every 6 hours as needed for shortness of breath and/or wheezing  Qty: 1 Inhaler, Refills: 0      acetaminophen (TYLENOL) 325 mg tablet Take 2 Tabs by mouth every six (6) hours as needed. Indications: Pain, take it with bentyl  Qty: 30 Tab, Refills: 0      amoxicillin-clavulanate (AUGMENTIN) 875-125 mg per tablet Take 1 Tab by mouth every twelve (12) hours for 5 days. Qty: 10 Tab, Refills: 0      polyethylene glycol (MIRALAX) 17 gram packet Take 1 Packet by mouth daily. Qty: 30 Packet, Refills: 0         CONTINUE these medications which have CHANGED    Details   insulin detemir (LEVEMIR) 100 unit/mL injection 30 Units by SubCUTAneous route nightly. Qty: 10 mL, Refills: 0      hydroCHLOROthiazide (HYDRODIURIL) 50 mg tablet Take 0.5 Tabs by mouth daily.   Qty: 15 Tab, Refills: 0      quinapril (ACCUPRIL) 10 mg tablet Take 1 Tab by mouth nightly. Qty: 30 Tab, Refills: 0         CONTINUE these medications which have NOT CHANGED    Details   amLODIPine (NORVASC) 10 mg tablet Take 10 mg by mouth daily. pregabalin (LYRICA) 75 mg capsule 1 cap bid x 1 week, then 2 caps bid  Qty: 120 Cap, Refills: 0      amitriptyline (ELAVIL) 50 mg tablet Take 50 mg by mouth nightly. simvastatin (ZOCOR) 10 mg tablet Take 10 mg by mouth nightly. metFORMIN (GLUCOPHAGE) 1,000 mg tablet Take 1,000 mg by mouth two (2) times daily (with meals). clopidogrel (PLAVIX) 75 mg tablet Take 75 mg by mouth daily.          STOP taking these medications       oxyCODONE-acetaminophen (PERCOCET 10)  mg per tablet Comments:   Reason for Stopping:         insulin aspart (NOVOLOG FLEXPEN) 100 unit/mL flexpen Comments:   Reason for Stopping:         Polyethylene Glycol 3350 powd Comments:   Reason for Stopping:         levothyroxine (SYNTHROID) 75 mcg tablet Comments:   Reason for Stopping:         ranitidine (ZANTAC) 150 mg tablet Comments:   Reason for Stopping:         omeprazole (PRILOSEC) 20 mg capsule Comments:   Reason for Stopping:         ondansetron hcl (ZOFRAN, AS HYDROCHLORIDE,) 4 mg tablet Comments:   Reason for Stopping:                 Signed By: Prerna Fields MD     September 11, 2017

## 2017-09-11 NOTE — ROUTINE PROCESS
IDR/SLIDR Summary          Patient: Ana Henry MRN: [id number]    Age: 62 y.o. YOB: 1960 Room/Bed: Formerly named [facility]/   Admit Diagnosis: Nausea and vomiting  Principal Diagnosis: <principal problem not specified>   Goals: decrease in pain  Readmission: NO  Quality Measure: Not applicable  VTE Prophylaxis: Not needed  Influenza Vaccine screening completed? NO  Pneumococcal Vaccine screening completed? NO  Mobility needs: No   Nutrition plan:No  Consults:Case Management    Financial concerns:No  Escalated to ? NO  RRAT Score: 19   Interventions:Home Health  Testing due for pt today?  NO  LOS: 4 days Expected length of stay 6 days  Discharge plan: home   PCP: Daniella Mcbride MD  Transportation needs: No    Days before discharge:two or more days before discharge   Discharge disposition: Home    Signed:     Severino James  9/11/2017  3:18 AM

## 2017-09-11 NOTE — PROGRESS NOTES
SUBJECTIVE:    Walking in room without any issues. No more pain. No N/V/D. No chest pain or SOB. OBJECTIVE:    Visit Vitals    BP (!) 154/102 (BP 1 Location: Left arm, BP Patient Position: At rest)    Pulse 81    Temp 99 °F (37.2 °C)    Resp 18    Ht 5' 9\" (1.753 m)    Wt 98.9 kg (218 lb 1.6 oz)    SpO2 97%    BMI 32.21 kg/m2     CVS: RRR. RS: Diminished BS in bases, no wheezes  GI: NO tenderness periumbilical area, ND, BS+  Extremities: no pedal edema  CNS: Moves all extremities  General: Awake, NAD    ASSESSMENT:    1. Abdominal pain and nausea with vomiting ? Unknown etiology, clinically resolving  2. pneumoperitoneum on CXR, resolved   3. Right kidney lesion  4. Leukocytosis, better  5. HTN  6. DM  7. Recent lumbar surgery  8. Hypothyroidism  9. GERD  10. HYPOKALEMIA   11. MILD PROTEIN MALNUTRITION   12. Sinus bradycardia    PLAN:    CT with PO contrast REPORT REVIEWED  Cont current management  Discharge patient home. Total time greater than 30 minutes    CMP:   Lab Results   Component Value Date/Time     09/11/2017 03:56 AM    K 3.1 (L) 09/11/2017 03:56 AM     09/11/2017 03:56 AM    CO2 26 09/11/2017 03:56 AM    AGAP 9 09/11/2017 03:56 AM    GLU 87 09/11/2017 03:56 AM    BUN 13 09/11/2017 03:56 AM    CREA 0.98 09/11/2017 03:56 AM    GFRAA >60 09/11/2017 03:56 AM    GFRNA >60 09/11/2017 03:56 AM    CA 8.1 (L) 09/11/2017 03:56 AM     CBC:   Lab Results   Component Value Date/Time    WBC 12.7 09/11/2017 03:56 AM    HGB 11.0 (L) 09/11/2017 03:56 AM    HCT 34.4 (L) 09/11/2017 03:56 AM     09/11/2017 03:56 AM     Current Discharge Medication List      START taking these medications    Details   dicyclomine (BENTYL) 10 mg capsule Take 1 Cap by mouth three (3) times daily as needed. Indications: abdominal pain, take it with tylenol  Qty: 20 Cap, Refills: 0      pantoprazole (PROTONIX) 40 mg tablet Take 1 Tab by mouth Daily (before breakfast).   Qty: 30 Tab, Refills: 0 albuterol (PROVENTIL HFA, VENTOLIN HFA, PROAIR HFA) 90 mcg/actuation inhaler 2 puffs every 6 hours x 5 days then every 6 hours as needed for shortness of breath and/or wheezing  Qty: 1 Inhaler, Refills: 0      acetaminophen (TYLENOL) 325 mg tablet Take 2 Tabs by mouth every six (6) hours as needed. Indications: Pain, take it with bentyl  Qty: 30 Tab, Refills: 0      amoxicillin-clavulanate (AUGMENTIN) 875-125 mg per tablet Take 1 Tab by mouth every twelve (12) hours for 5 days. Qty: 10 Tab, Refills: 0      polyethylene glycol (MIRALAX) 17 gram packet Take 1 Packet by mouth daily. Qty: 30 Packet, Refills: 0         CONTINUE these medications which have CHANGED    Details   insulin detemir (LEVEMIR) 100 unit/mL injection 30 Units by SubCUTAneous route nightly. Qty: 10 mL, Refills: 0      hydroCHLOROthiazide (HYDRODIURIL) 50 mg tablet Take 0.5 Tabs by mouth daily. Qty: 15 Tab, Refills: 0      quinapril (ACCUPRIL) 10 mg tablet Take 1 Tab by mouth nightly. Qty: 30 Tab, Refills: 0         CONTINUE these medications which have NOT CHANGED    Details   amLODIPine (NORVASC) 10 mg tablet Take 10 mg by mouth daily. pregabalin (LYRICA) 75 mg capsule 1 cap bid x 1 week, then 2 caps bid  Qty: 120 Cap, Refills: 0      amitriptyline (ELAVIL) 50 mg tablet Take 50 mg by mouth nightly. simvastatin (ZOCOR) 10 mg tablet Take 10 mg by mouth nightly. metFORMIN (GLUCOPHAGE) 1,000 mg tablet Take 1,000 mg by mouth two (2) times daily (with meals). clopidogrel (PLAVIX) 75 mg tablet Take 75 mg by mouth daily.          STOP taking these medications       oxyCODONE-acetaminophen (PERCOCET 10)  mg per tablet Comments:   Reason for Stopping:         insulin aspart (NOVOLOG FLEXPEN) 100 unit/mL flexpen Comments:   Reason for Stopping:         Polyethylene Glycol 3350 powd Comments:   Reason for Stopping:         levothyroxine (SYNTHROID) 75 mcg tablet Comments:   Reason for Stopping:         ranitidine (ZANTAC) 150 mg tablet Comments:   Reason for Stopping:         omeprazole (PRILOSEC) 20 mg capsule Comments:   Reason for Stopping:         ondansetron hcl (ZOFRAN, AS HYDROCHLORIDE,) 4 mg tablet Comments:   Reason for Stopping:

## 2017-09-11 NOTE — DISCHARGE INSTRUCTIONS
Abdominal Pain: Care Instructions  Your Care Instructions    Abdominal pain has many possible causes. Some aren't serious and get better on their own in a few days. Others need more testing and treatment. If your pain continues or gets worse, you need to be rechecked and may need more tests to find out what is wrong. You may need surgery to correct the problem. Don't ignore new symptoms, such as fever, nausea and vomiting, urination problems, pain that gets worse, and dizziness. These may be signs of a more serious problem. Your doctor may have recommended a follow-up visit in the next 8 to 12 hours. If you are not getting better, you may need more tests or treatment. The doctor has checked you carefully, but problems can develop later. If you notice any problems or new symptoms, get medical treatment right away. Follow-up care is a key part of your treatment and safety. Be sure to make and go to all appointments, and call your doctor if you are having problems. It's also a good idea to know your test results and keep a list of the medicines you take. How can you care for yourself at home? · Rest until you feel better. · To prevent dehydration, drink plenty of fluids, enough so that your urine is light yellow or clear like water. Choose water and other caffeine-free clear liquids until you feel better. If you have kidney, heart, or liver disease and have to limit fluids, talk with your doctor before you increase the amount of fluids you drink. · If your stomach is upset, eat mild foods, such as rice, dry toast or crackers, bananas, and applesauce. Try eating several small meals instead of two or three large ones. · Wait until 48 hours after all symptoms have gone away before you have spicy foods, alcohol, and drinks that contain caffeine. · Do not eat foods that are high in fat. · Avoid anti-inflammatory medicines such as aspirin, ibuprofen (Advil, Motrin), and naproxen (Aleve).  These can cause stomach upset. Talk to your doctor if you take daily aspirin for another health problem. When should you call for help? Call 911 anytime you think you may need emergency care. For example, call if:  · You passed out (lost consciousness). · You pass maroon or very bloody stools. · You vomit blood or what looks like coffee grounds. · You have new, severe belly pain. Call your doctor now or seek immediate medical care if:  · Your pain gets worse, especially if it becomes focused in one area of your belly. · You have a new or higher fever. · Your stools are black and look like tar, or they have streaks of blood. · You have unexpected vaginal bleeding. · You have symptoms of a urinary tract infection. These may include:  ¨ Pain when you urinate. ¨ Urinating more often than usual.  ¨ Blood in your urine. · You are dizzy or lightheaded, or you feel like you may faint. Watch closely for changes in your health, and be sure to contact your doctor if:  · You are not getting better after 1 day (24 hours). Where can you learn more? Go to http://k[employer]ace[employer] Logisticscarlitos.info/. Enter I179 in the search box to learn more about \"Abdominal Pain: Care Instructions. \"  Current as of: March 20, 2017  Content Version: 11.3  © 2909-7883 [employer]. Care instructions adapted under license by [employer] (which disclaims liability or warranty for this information). If you have questions about a medical condition or this instruction, always ask your healthcare professional. [place] [address] any warranty or liability for your use of this information. Dehydration: Care Instructions  Your Care Instructions  Dehydration happens when your body loses too much fluid. This might happen when you do not drink enough water or you lose large amounts of fluids from your body because of diarrhea, vomiting, or sweating. Severe dehydration can be life-threatening.   Water and minerals called electrolytes help put your body fluids back in balance. Learn the early signs of fluid loss, and drink more fluids to prevent dehydration. Follow-up care is a key part of your treatment and safety. Be sure to make and go to all appointments, and call your doctor if you are having problems. It's also a good idea to know your test results and keep a list of the medicines you take. How can you care for yourself at home? · To prevent dehydration, drink plenty of fluids, enough so that your urine is light yellow or clear like water. Choose water and other caffeine-free clear liquids until you feel better. If you have kidney, heart, or liver disease and have to limit fluids, talk with your doctor before you increase the amount of fluids you drink. · If you do not feel like eating or drinking, try taking small sips of water, sports drinks, or other rehydration drinks. · Get plenty of rest.  To prevent dehydration  · Add more fluids to your diet and daily routine, unless your doctor has told you not to. · During hot weather, drink more fluids. Drink even more fluids if you exercise a lot. Stay away from drinks with alcohol or caffeine. · Watch for the symptoms of dehydration. These include:  ¨ A dry, sticky mouth. ¨ Dark yellow urine, and not much of it. ¨ Dry and sunken eyes. ¨ Feeling very tired. · Learn what problems can lead to dehydration. These include:  ¨ Diarrhea, fever, and vomiting. ¨ Any illness with a fever, such as pneumonia or the flu. ¨ Activities that cause heavy sweating, such as endurance races and heavy outdoor work in hot or humid weather. ¨ Alcohol or drug abuse or withdrawal.  ¨ Certain medicines, such as cold and allergy pills (antihistamines), diet pills (diuretics), and laxatives. ¨ Certain diseases, such as diabetes, cancer, and heart or kidney disease. When should you call for help? Call 911 anytime you think you may need emergency care.  For example, call if:  · You passed out (lost consciousness). Call your doctor now or seek immediate medical care if:  · You are confused and cannot think clearly. · You are dizzy or lightheaded, or you feel like you may faint. · You have signs of needing more fluids. You have sunken eyes and a dry mouth, and you pass only a little dark urine. · You cannot keep fluids down. Watch closely for changes in your health, and be sure to contact your doctor if:  · You are not making tears. · Your skin is very dry and sags slowly back into place after you pinch it. · Your mouth and eyes are very dry. Where can you learn more? Go to http://kaila-carlitos.info/. Enter X877 in the search box to learn more about \"Dehydration: Care Instructions. \"  Current as of: March 20, 2017  Content Version: 11.3  © 5075-1721 [employer]. Care instructions adapted under license by [employer] (which disclaims liability or warranty for this information). If you have questions about a medical condition or this instruction, always ask your healthcare professional. [address] any warranty or liability for your use of this information. Electrolyte Imbalance: Care Instructions  Your Care Instructions  Electrolytes are minerals in your blood. They include sodium, potassium, calcium, and magnesium. When they are not at the right levels, you can feel very ill. You may not know what is causing it, but you know something is wrong. You may feel weak or numb, have muscle spasms, or twitch. Your heart may beat fast. Symptoms are different with each mineral. Too much is as bad as too little. Minerals help keep your body working as it should. Vomiting, diarrhea, and fever can cause you to lose minerals. A problem with your kidneys can tip a mineral out of balance. So can taking certain medicines. Your doctor may do more tests. He or she may change your medicine and diet.  If you are low in one or more minerals, they may be given through a tube into your vein (IV). Your doctor may have you take or drink special fluids or pills. If your kidneys are failing, your blood may be filtered. This is called dialysis. It can put the minerals back in balance. Follow-up care is a key part of your treatment and safety. Be sure to make and go to all appointments, and call your doctor if you are having problems. It's also a good idea to know your test results and keep a list of the medicines you take. How can you care for yourself at home? · Take your medicines exactly as prescribed. Call your doctor if you have any problems with your medicines. You will get more details on the specific medicines your doctor prescribes. · Do not take any medicine without talking to your doctor first. This includes prescription, over-the-counter, and herbal medicines. · If you have kidney, heart, or liver disease and have to limit fluids, talk with your doctor before you increase the amount of fluids you drink. · Your doctor or dietitian may give you a diet plan to help balance your minerals. Follow the diet carefully. When should you call for help? Call 911 anytime you think you may need emergency care. For example, call if:  · You passed out (lost consciousness). · Your heart seems to be speeding up and then slowing down or skipping beats. Call your doctor now or seek immediate medical care if:  · You are very tired and have no energy. · You have trouble thinking or concentrating. Watch closely for changes in your health, and be sure to contact your doctor if:  · You want advice on what to do to keep your minerals in balance. · You do not get better as expected. Where can you learn more? Go to http://kaila-carlitos.info/. Enter Y249 in the search box to learn more about \"Electrolyte Imbalance: Care Instructions. \"  Current as of: July 26, 2016  Content Version: 11.3  © 4970-2482 [employer].  Care instructions adapted under license by [employer] (which disclaims liability or warranty for this information). If you have questions about a medical condition or this instruction, always ask your healthcare professional. [address] any warranty or liability for your use of this information.

## 2017-09-11 NOTE — PROGRESS NOTES
Patient discharged home today with family. Iv and tele box removed. Discharge instructions reviewed with patient, prescriptions and lab orders given. Patient education on abdominal pain and electrolyte imbalance with teach back, patient accepted information. Patient left with no issues or complaints at time of discharge.

## 2017-09-11 NOTE — PROGRESS NOTES
[facility]  Pulmonary, Critical Care, and Sleep Medicine    Name: Ba García MRN: [id number]   : 1960 Hospital: [address] [place] [address] Dr   Date: 2017        IMPRESSION:   · No evidence of mediastinal pathology after 2 days of vomiting, abnormal lucency on CXR at apex  · Mild bibasilar atelectasis on CT, likely obesity-related and is chronic  · ? history of CHRISTINA, obesity; patient did not recall CPAP, I did not see a prior polysomnography in the chart dating back to ; narcotic meds are likely to worsen this. · Positive PPD  · History of retroperitoneal air 2017 after L4-L5 interbody disc spacer placement, resolved on current abd CT.      Patient Active Problem List   Diagnosis Code    Diabetes ([facility] [address] E11.9    Hypertension I10    GERD (gastroesophageal reflux disease) K21.9    Depression F32.9    Pain in left lower leg M79.662    Primary osteoarthritis of left knee M17.12    Localized adiposity of abdomen E65    Positive PPD R76.11    History of heart attack I25.2    Synovial cyst M71.30    HNP (herniated nucleus pulposus), lumbar M51.26    Lumbar spinal stenosis M48.06    Spondylolisthesis of lumbar region M43.16    Coronary artery disease involving native coronary artery of native heart without angina pectoris I25.10    History of coronary artery stent placement Z95.5    Spinal stenosis at L4-L5 level M48.06    S/P lumbar fusion Z98.1    Diabetic neuropathy ([facility] E11.40    Neuritis M79.2    Nausea and vomiting R11.2    Gastroparesis K31.84    Hypokalemia E87.6    ARF (acute renal failure) ([facility]) N17.9    Atelectasis J98.11    Abdominal pain R10.9    Acquired hypothyroidism E03.9      PLAN:   · Complete current antibiotic course x total of 10 days IV/PO  · Incentive spirometry, mobilize for atelectasis management  · GI follow up and EGD as outpatient per Dr. Evon Tenorio for possible esophageal stricture  · CT chest without contrast today for additional evaluation of R apical lucency, history of +PPD  · Discuss CHRISTINA further with patient to determine whether he would be a candidate for PSG/CPAP     Subjective/Interval History:   Patient denied any further vomiting since . No fevers, chills or sweats. Has chronic reflux, and will be following up with GI. Denies cough or dyspnea. Is not sure of CPAP or CHRISTINA    ROS:Pertinent items are noted in HPI. Objective:   Vital Signs:    Visit Vitals    /88 (BP 1 Location: Left arm, BP Patient Position: At rest)    Pulse (!) 59    Temp 98.6 °F (37 °C)    Resp 18    Ht 5' 9\" (1.753 m)    Wt 98.9 kg (218 lb 1.6 oz)    SpO2 98%    BMI 32.21 kg/m2       O2 Device: Room air   O2 Flow Rate (L/min): 2 l/min   Temp (24hrs), Av.6 °F (37 °C), Min:97.6 °F (36.4 °C), Max:99.4 °F (37.4 °C)       Intake/Output:   Last shift:         Last 3 shifts:  1901 -  0700  In: 700 [I.V.:700]  Out: 1175 [Urine:1175]    Intake/Output Summary (Last 24 hours) at 17 1401  Last data filed at 17 0647   Gross per 24 hour   Intake              400 ml   Output              625 ml   Net             -225 ml        Physical Exam:    General: in no apparent distress, obese   HEENT: Normal; large tongue   Neck: No abnormally enlarged lymph nodes.    Chest: normal, with no air crunch noted with palpation   Lungs: normal air entry, no wheezes, rales or ronchi   Heart: Regular rate and rhythm   Abdomen: abdomen is soft and nondistended   Extremity: negative   Neuro: alert, poor historian, seemed to have (?selective) difficulty with recall (Pain med contract  noted) Skin: Skin color, texture, turgor normal. No rashes or lesions    DATA:  Labs:  Recent Labs      17   0356  09/10/17   1015  17   0453   WBC  12.7  13.7*  13.1   HGB  11.0*  11.2*  10.6*   HCT  34.4*  35.3*  33.1*   PLT  256  270  277     Recent Labs      17   0356  09/10/17   1015  17   0453   NA  138  138  140   K  3.1* 3.6  3.1*   CL  103  102  106   CO2  26  27  26   GLU  87  145*  154*   BUN  13  16  17   CREA  0.98  1.03  0.99  1.09   CA  8.1*  8.7  8.3*   MG   --    --   2.0   ALB   --    --   2.9*   SGOT   --    --   19   ALT   --    --   20     No results for input(s): PH, PCO2, PO2, HCO3, FIO2 in the last 72 hours. Imaging:  [x]I have personally reviewed the patients radiographs    Moderate complexity decision making was performed during the evaluation of this patient at low to moderate risk for decompensation with multiple organ involvement     Above mentioned total time spent on reviewing the case/medical record/data/notes/EMR/patient examination/documentation/coordinating care with nurse/consultants, exclusive of procedures with complex decision making performed and > 50% time spent in face to face evaluation.     Ba Mack MD   9/11/2017 2:24 PM

## 2017-09-12 NOTE — DISCHARGE SUMMARY
[address]  [address] [place] [place] SUMMARY    Name:  Vik Wilson  MR#:  [id number]  :  1960  Account #:  [de-identified]  Date of Adm:  2017  Date of Discharge:  2017      DISPOSITION: Discharged to home. DISCHARGE CONDITION: Stable. DISCHARGE DIAGNOSES:  1. Abdominal pain and nausea with vomiting, now resolved clinically. 2. Abdominal spasm. 3. Pneumoperitoneum. 4. Questionable narcotic pain medication seeking behavior. 5. Right kidney lesions. 6. Leukocytosis, resolved. 7. Hypertension. 8. Diabetes mellitus. 9. Recent lumbar surgery. 10. Hypothyroidism. 11. Gastroesophageal reflux disease. 12. Hypokalemia. 13. Sinus bradycardia, resolved. 14. Mild protein calorie malnutrition. DISCHARGE MEDICATIONS:  1. Bentyl 10 mg 3 times a day as needed for abdominal pain/spasm  and was advised to take it with Tylenol. 2. Tylenol 500 mg every 6 hours p.r.n. for pain. Needs to take it with  Bentyl. 3. Protonix 40 mg daily. 4. Augmentin 875 mg b.i.d. for 5 days. 5. MiraLax 1 packet p.o. daily, hold it for diarrhea. 6. Albuterol inhaler 2 puffs every 6 hours for 5 days and then every 6  hours as needed for shortness of breath. 7. Insulin Levemir 30 units subcutaneous daily. 8. Metformin 1000 mg b.i.d.  9. Accupril 10 mg daily. 10. Hydrochlorothiazide 25 mg daily. 11. Norvasc 10 mg daily. 12. Lyrica 75 mg b.i.d. if he was taking it. 13. Amitriptyline 50 mg daily. 14. Zocor 10 mg daily, if he was taking it. 15. Plavix 75 mg daily. IMAGING AND PROCEDURES:  1. CT abdomen and pelvis with contrast done in the emergency room  reported the patient having small fatty umbilical hernia, ectopic upper  pelvic right kidney, interval resolution of extensive retroperitoneal air,  hepatic steatosis, and improved bibasilar atelectasis. 2. Chest x-ray was done in the emergency room, showed some  pneumoperitoneum.   3. Repeat chest x-ray was done next day, showed no  pneumoperitoneum, other than bibasilar atelectasis. 4. KUB was done which showed normal bowel gas patten. 5. CT abdomen and pelvis with and without contrast as well as p.o.  contrast was done. Again on 09/10/2017 reported no acute  intraabdominal abnormality. Bibasilar atelectasis present, stable  adrenal nodularity. Small umbilical hernia without incarceration. 6. Renal ultrasound was done, showed normal size kidney. No  hydronephrosis. 7. Blood culture x2 remained negative. CONSULTANTS:  1. GI with Dr. Dontrell Joyce and group. 2. Pulmonary with Dr. Danielle Anaya and group. 3. Surgery with Dr. Sherly Nye and group. HOSPITAL COURSE: The patient was admitted to the hospital with a  complaint of abdominal pain, nausea and vomiting. Please refer to  hospital admission H and P done by Dr. Jefferson Vargas for further details. The  patient had a CT abdomen and pelvis done in the emergency room,  did not show any specific etiology that can explain his abdominal pain. However, the patient was found out to have some bibasilar atelectasis  and some pneumoperitoneum and some free air in the abdomen  initially. Surgery was consulted, KUB and chest x-ray were repeated. KUB did not show any free air as well as the chest x-ray did not show  any pneumoperitoneum. Pulmonary was also consulted. The patient  was continued on some pain medications and IV fluid. He had some  leukocytosis which was monitored and had a blood culture done, which  was negative. He did remain afebrile. He was treated with Zosyn and  will be changed to Augmentin on the day of discharge. The patient was  seen also by GI, did not recommend any intervention at this point other  than continuing PPI and outpatient followup. The patient was tolerating  diet and medications very well. His abdominal pain was fine.  It seems  like patient had abdominal spasm; however, to rule out completely we  did another CT scan with the p.o. contrast, which did not show any  etiology that can explain his pain. The patient will be discharged home  on Bentyl/Tylenol combinations that can help with his spasm. He will  be continued on PPI. He will be given albuterol inhaler and antibiotic  for bibasilar atelectasis. He was saturating 96 to 98% on room air. He was walking without any  problem. He will be discharged home with the above-mentioned  medications. The patient had some hypokalemia, which will be treated  appropriately. DISCHARGE INSTRUCTIONS:  1. DIET: ADA and cardiac diet. 2. Activity as tolerated. 3. Follow with the PCP in 3-5 days. 4. Follow with Dr. Andreia Damico and group in 2 weeks. 5. Follow with Dr. Meagan Aguilar and group in 2-3 weeks. 6. The patient does not know half of his medication, so I have told him  to continue whatever he was taking at home and needs to talk to his  primary care physician. He verbalized understanding about it. Total time greater than 35 minutes.         MD WILEY Peralta / Rodney  D:  09/11/2017   16:33  T:  09/12/2017   10:06  Job #:  [id number]

## 2017-09-14 ENCOUNTER — OFFICE VISIT (OUTPATIENT)
Dept: CARDIOLOGY CLINIC | Age: 57
End: 2017-09-14

## 2017-09-14 VITALS
HEIGHT: 69 IN | DIASTOLIC BLOOD PRESSURE: 87 MMHG | WEIGHT: 217 LBS | BODY MASS INDEX: 32.14 KG/M2 | SYSTOLIC BLOOD PRESSURE: 142 MMHG | HEART RATE: 64 BPM

## 2017-09-14 DIAGNOSIS — I10 ESSENTIAL HYPERTENSION: Chronic | ICD-10-CM

## 2017-09-14 DIAGNOSIS — E78.5 HYPERLIPIDEMIA, UNSPECIFIED HYPERLIPIDEMIA TYPE: ICD-10-CM

## 2017-09-14 DIAGNOSIS — Z95.5 HISTORY OF CORONARY ARTERY STENT PLACEMENT: ICD-10-CM

## 2017-09-14 DIAGNOSIS — I25.2 HISTORY OF HEART ATTACK: ICD-10-CM

## 2017-09-14 DIAGNOSIS — I25.10 CORONARY ARTERY DISEASE INVOLVING NATIVE CORONARY ARTERY OF NATIVE HEART WITHOUT ANGINA PECTORIS: Primary | ICD-10-CM

## 2017-09-14 LAB
BACTERIA SPEC CULT: NORMAL
BACTERIA SPEC CULT: NORMAL
SERVICE CMNT-IMP: NORMAL
SERVICE CMNT-IMP: NORMAL

## 2017-09-14 RX ORDER — QUINAPRIL 20 MG/1
20 TABLET ORAL
Qty: 90 TAB | Refills: 1 | Status: SHIPPED | OUTPATIENT
Start: 2017-09-14 | End: 2018-03-24

## 2017-09-14 NOTE — PROGRESS NOTES
HISTORY OF PRESENT ILLNESS  Nahun Islas is a 62 y.o. male. HPI Comments: F/u  cad,old mi,pci in 2009, hld, htn    On follow up patient denies any chest pains,sob, palpitation or other significant symptoms. Hypertension   The history is provided by the patient. This is a chronic problem. The problem occurs constantly. The problem has not changed since onset. Review of Systems   Constitutional: Negative for chills and fever. HENT: Negative for nosebleeds. Eyes: Negative for blurred vision and double vision. Respiratory: Negative for cough, hemoptysis, sputum production and wheezing. Cardiovascular: Negative for palpitations, orthopnea, claudication, leg swelling and PND. Gastrointestinal: Negative for heartburn, nausea and vomiting. Musculoskeletal: Positive for back pain. Negative for myalgias. Skin: Negative for rash. Neurological: Negative for dizziness and weakness. Endo/Heme/Allergies: Does not bruise/bleed easily.      Family History   Problem Relation Age of Onset    Heart Attack Father     Coronary Artery Disease Mother     Diabetes Mother     Cancer Sister      breast cancer and lung cancer       Past Medical History:   Diagnosis Date    Arthritis     Coronary atherosclerosis of native coronary artery     S/P PCI with GE in 12/09    Diabetes ([facility] [address]     IDDM    Electrolyte and fluid disorders not elsewhere classified     Essential hypertension, benign     GERD (gastroesophageal reflux disease)     Heroin abuse 05/26/16    Psychiatrist Dr. Aba Gonzales History of heart attack     Hyperlipidemia     Intermediate coronary syndrome [facility])     MDD (major depressive disorder) 5/26/16    Psychiatrist Dr. Aba Gonzales Myocardial infarction [facility])     Obesity, unspecified     Old myocardial infarction     Other and unspecified hyperlipidemia     Pancreatitis     Positive PPD     Sleep apnea     uses Cpap machine    Transfusion history Before 1992       Past Surgical History:   Procedure Laterality Date    HX APPENDECTOMY      HX CORONARY STENT PLACEMENT  2010    2 stents       Social History   Substance Use Topics    Smoking status: Never Smoker    Smokeless tobacco: Never Used    Alcohol use No       Allergies   Allergen Reactions    Compazine [Prochlorperazine] Anaphylaxis     \"Tightness around throat\"       Prior to Admission medications    Medication Sig Start Date End Date Taking? Authorizing Provider   insulin detemir (LEVEMIR) 100 unit/mL injection 30 Units by SubCUTAneous route nightly. 9/11/17  Yes Carol Hurt MD   hydroCHLOROthiazide (HYDRODIURIL) 50 mg tablet Take 0.5 Tabs by mouth daily. 9/11/17  Yes Carol Hurt MD   dicyclomine (BENTYL) 10 mg capsule Take 1 Cap by mouth three (3) times daily as needed. Indications: abdominal pain, take it with tylenol 9/11/17  Yes Carol Hurt MD   pantoprazole (PROTONIX) 40 mg tablet Take 1 Tab by mouth Daily (before breakfast). 9/11/17  Yes Carol Hurt MD   albuterol (PROVENTIL HFA, VENTOLIN HFA, PROAIR HFA) 90 mcg/actuation inhaler 2 puffs every 6 hours x 5 days then every 6 hours as needed for shortness of breath and/or wheezing 9/11/17  Yes Carol Hurt MD   acetaminophen (TYLENOL) 325 mg tablet Take 2 Tabs by mouth every six (6) hours as needed. Indications: Pain, take it with bentyl 9/11/17  Yes Carol Hurt MD   amoxicillin-clavulanate (AUGMENTIN) 875-125 mg per tablet Take 1 Tab by mouth every twelve (12) hours for 5 days. 9/11/17 9/16/17 Yes Carol Hurt MD   quinapril (ACCUPRIL) 10 mg tablet Take 1 Tab by mouth nightly. 9/11/17  Yes Carol Hurt MD   polyethylene glycol (MIRALAX) 17 gram packet Take 1 Packet by mouth daily. 9/11/17  Yes Carol Hurt MD   amLODIPine (NORVASC) 10 mg tablet Take 10 mg by mouth daily.    Yes Historical Provider   pregabalin (LYRICA) 75 mg capsule 1 cap bid x 1 week, then 2 caps bid 7/5/17  Yes Phi Lara, NP amitriptyline (ELAVIL) 50 mg tablet Take 50 mg by mouth nightly. Yes Macy Khan MD   simvastatin (ZOCOR) 10 mg tablet Take 10 mg by mouth nightly. Yes Historical Provider   metFORMIN (GLUCOPHAGE) 1,000 mg tablet Take 1,000 mg by mouth two (2) times daily (with meals). Yes Historical Provider   clopidogrel (PLAVIX) 75 mg tablet Take 75 mg by mouth daily. Yes Historical Provider         Visit Vitals    /87    Pulse 64    Ht 5' 9\" (1.753 m)    Wt 98.4 kg (217 lb)    BMI 32.05 kg/m2         Physical Exam   Constitutional: He is oriented to person, place, and time. He appears well-developed and well-nourished. obese   HENT:   Head: Normocephalic and atraumatic. Eyes: Conjunctivae are normal.   Neck: Neck supple. No JVD present. No tracheal deviation present. No thyromegaly present. Cardiovascular: Normal rate, regular rhythm and normal heart sounds. Exam reveals no gallop and no friction rub. No murmur heard. Pulmonary/Chest: Breath sounds normal. No respiratory distress. He has no wheezes. He has no rales. He exhibits no tenderness. Abdominal: Soft. There is no tenderness. Musculoskeletal: He exhibits no edema. Neurological: He is alert and oriented to person, place, and time. Skin: Skin is warm and dry. Psychiatric: He has a normal mood and affect. Mr. Saloni He has a reminder for a \"due or due soon\" health maintenance. I have asked that he contact his primary care provider for follow-up on this health maintenance.     CARDIOLOGY STUDIES 4/1/2010 12/1/2009 11/1/2009 4/1/2009 4/1/2008   Myocardial Perfusion Scan Result abn scan, reversible ischemia in inferoapical area & mild fixed defect in anterobasal area, EF 42% abn scan, reversible ischemia in inferolateral area - - -   Cardiac Cath Result LVEDP 17, EF 55% - - - -   Cardiac Cath with PCI Result - 30% in-stent restenosis of native circumflex, 90% OM-1, Cypher stent on OM-1 - - 99% in midcircumflex involving bifurcation with OM-1, Vision stent in mid circumflex;   Echocardiogram - Complete Result - - EF 65% EF 55% -   Some recent data might be hidden         Assessment         Diagnoses and all orders for this visit:    1. Coronary artery disease involving native coronary artery of native heart without angina pectoris  Comments:  Stable symptoms      2. Essential hypertension  Comments:  9/17 incr acei    Orders:  -     quinapril (ACCUPRIL) 20 mg tablet; Take 1 Tab by mouth nightly. Indications: hypertension  -     METABOLIC PANEL, BASIC; Future    3. History of heart attack  Comments:  2009 approx; s/p PCI      4. History of coronary artery stent placement  Comments:  om1  4/2009 and 12/2009    5. Hyperlipidemia, unspecified hyperlipidemia type  Comments:  [facility] lipids  Orders:  -     LIPID PANEL; Future  -     HEPATIC FUNCTION PANEL; Future        Pertinent laboratory and test data reviewed and discussed with patient.   See patient instructions also for other medical advice given    Medications Discontinued During This Encounter   Medication Reason    quinapril (ACCUPRIL) 10 mg tablet Reorder       Follow-up Disposition:  Return in about 6 months (around 3/14/2018), or if symptoms worsen or fail to improve, for post test.

## 2017-09-14 NOTE — LETTER
Ana Henry 1960 9/14/2017 Dear Daniella France., MD 
 
I had the pleasure of evaluating  Mr. Inga Kohli in office today. Below are the relevant portions of my assessment and plan of care. ICD-10-CM ICD-9-CM 1. Coronary artery disease involving native coronary artery of native heart without angina pectoris I25.10 414.01 Stable symptoms 2. Essential hypertension I10 401.9 quinapril (ACCUPRIL) 20 mg tablet METABOLIC PANEL, BASIC  
 8/46 incr acei 3. History of heart attack I25.2 412   
 2009 approx; s/p PCI 4. History of coronary artery stent placement Z95.5 V45.82   
 om1 
4/2009 and 12/2009 5. Hyperlipidemia, unspecified hyperlipidemia type E78.5 272.4 LIPID PANEL  
   HEPATIC FUNCTION PANEL  
 chk lipids Current Outpatient Prescriptions Medication Sig Dispense Refill  quinapril (ACCUPRIL) 20 mg tablet Take 1 Tab by mouth nightly. Indications: hypertension 90 Tab 1  
 insulin detemir (LEVEMIR) 100 unit/mL injection 30 Units by SubCUTAneous route nightly. 10 mL 0  
 hydroCHLOROthiazide (HYDRODIURIL) 50 mg tablet Take 0.5 Tabs by mouth daily. 15 Tab 0  
 dicyclomine (BENTYL) 10 mg capsule Take 1 Cap by mouth three (3) times daily as needed. Indications: abdominal pain, take it with tylenol 20 Cap 0  
 pantoprazole (PROTONIX) 40 mg tablet Take 1 Tab by mouth Daily (before breakfast). 30 Tab 0  
 albuterol (PROVENTIL HFA, VENTOLIN HFA, PROAIR HFA) 90 mcg/actuation inhaler 2 puffs every 6 hours x 5 days then every 6 hours as needed for shortness of breath and/or wheezing 1 Inhaler 0  
 acetaminophen (TYLENOL) 325 mg tablet Take 2 Tabs by mouth every six (6) hours as needed. Indications: Pain, take it with bentyl 30 Tab 0  
 amoxicillin-clavulanate (AUGMENTIN) 875-125 mg per tablet Take 1 Tab by mouth every twelve (12) hours for 5 days. 10 Tab 0  
 polyethylene glycol (MIRALAX) 17 gram packet Take 1 Packet by mouth daily.  30 Packet 0  
  amLODIPine (NORVASC) 10 mg tablet Take 10 mg by mouth daily.  pregabalin (LYRICA) 75 mg capsule 1 cap bid x 1 week, then 2 caps bid 120 Cap 0  
 amitriptyline (ELAVIL) 50 mg tablet Take 50 mg by mouth nightly.  simvastatin (ZOCOR) 10 mg tablet Take 10 mg by mouth nightly.  metFORMIN (GLUCOPHAGE) 1,000 mg tablet Take 1,000 mg by mouth two (2) times daily (with meals).  clopidogrel (PLAVIX) 75 mg tablet Take 75 mg by mouth daily. Orders Placed This Encounter  METABOLIC PANEL, BASIC Standing Status:   Future Standing Expiration Date:   12/15/2017  LIPID PANEL Standing Status:   Future Standing Expiration Date:   12/15/2017  
 HEPATIC FUNCTION PANEL Standing Status:   Future Standing Expiration Date:   12/15/2017  quinapril (ACCUPRIL) 20 mg tablet Sig: Take 1 Tab by mouth nightly. Indications: hypertension Dispense:  90 Tab Refill:  1 If you have questions, please do not hesitate to call me. I look forward to following Cornel Todd Onur along with you. Sincerely, Yue Perez MD

## 2017-09-14 NOTE — PROGRESS NOTES
6/17 Nuclear Stress Test   Narrative   1. Negative EKG portion of Lexiscan stress test.   2. Nuclear imaging report to follow. Impression   Findings:   1. Stress images reveal normal Myoview distrubution in all the LV segments in short axis, vertical and horizontal long axis views. 2. Resting images have a normal uptake. 3. Gated images reveal normal wall motion and the ejection fraction is calculated to be 64%. Conclusion:   1. Normal perfusion scan. 2. Normal wall motion and ejection fraction. 3. Low risk scan.

## 2017-09-14 NOTE — MR AVS SNAPSHOT
Visit Information Date & Time Provider Department Dept. Phone Encounter #  
 9/14/2017  9:30 AM Bryanna Monsalve MD [facility] [id number] Follow-up Instructions Return in about 6 months (around 3/14/2018), or if symptoms worsen or fail to improve, for post test.  
  
Your Appointments 3/1/2018  9:00 AM  
ESTABLISHED PATIENT with Bryanna Monsalve MD  
[facility]) Appt Note: 6 months post labs [address] [facility], [address] [facility] [address]  
[address], [address] [place] [address] Upcoming Health Maintenance Date Due Hepatitis C Screening 1960 FOOT EXAM Q1 9/5/1970 EYE EXAM RETINAL OR DILATED Q1 9/5/1970 Pneumococcal 19-64 Highest Risk (1 of 3 - PCV13) 9/5/1979 DTaP/Tdap/Td series (1 - Tdap) 9/5/1981 FOBT Q 1 YEAR AGE 50-75 9/5/2010 INFLUENZA AGE 9 TO ADULT 8/1/2017 MICROALBUMIN Q1 8/15/2017 LIPID PANEL Q1 8/15/2017 HEMOGLOBIN A1C Q6M 3/8/2018 Allergies as of 9/14/2017  Review Complete On: 9/14/2017 By: Bryanna Monsalve MD  
  
 Severity Noted Reaction Type Reactions Compazine [Prochlorperazine] High   Anaphylaxis \"Tightness around throat\" Current Immunizations  Never Reviewed No immunizations on file. Not reviewed this visit You Were Diagnosed With   
  
 Codes Comments Coronary artery disease involving native coronary artery of native heart without angina pectoris    -  Primary ICD-10-CM: I25.10 ICD-9-CM: 414.01 Stable symptoms Essential hypertension     ICD-10-CM: I10 
ICD-9-CM: 401.9 9/17 incr acei History of heart attack     ICD-10-CM: I25.2 ICD-9-CM: 412 2009 approx; s/p PCI History of coronary artery stent placement     ICD-10-CM: Z95.5 ICD-9-CM: V45.82 om1 
4/2009 and 12/2009 Hyperlipidemia, unspecified hyperlipidemia type     ICD-10-CM: E78.5 ICD-9-CM: 272.4 chk lipids Vitals BP Pulse Height(growth percentile) Weight(growth percentile) BMI Smoking Status 142/87 64 5' 9\" (1.753 m) 217 lb (98.4 kg) 32.05 kg/m2 Never Smoker Vitals History BMI and BSA Data Body Mass Index Body Surface Area 32.05 kg/m 2 2.19 m 2 Preferred Pharmacy Pharmacy Name [facility], [address] [place] [address] [phone] Your Updated Medication List  
  
   
This list is accurate as of: 9/14/17 10:32 AM.  Always use your most recent med list.  
  
  
  
  
 acetaminophen 325 mg tablet Commonly known as:  TYLENOL Take 2 Tabs by mouth every six (6) hours as needed. Indications: Pain, take it with bentyl  
  
 albuterol 90 mcg/actuation inhaler Commonly known as:  PROVENTIL HFA, VENTOLIN HFA, PROAIR HFA  
2 puffs every 6 hours x 5 days then every 6 hours as needed for shortness of breath and/or wheezing  
  
 amitriptyline 50 mg tablet Commonly known as:  ELAVIL Take 50 mg by mouth nightly. amLODIPine 10 mg tablet Commonly known as:  Shantell [place] Take 10 mg by mouth daily. amoxicillin-clavulanate 875-125 mg per tablet Commonly known as:  AUGMENTIN Take 1 Tab by mouth every twelve (12) hours for 5 days. clopidogrel 75 mg Tab Commonly known as:  PLAVIX Take 75 mg by mouth daily. dicyclomine 10 mg capsule Commonly known as:  BENTYL Take 1 Cap by mouth three (3) times daily as needed. Indications: abdominal pain, take it with tylenol  
  
 hydroCHLOROthiazide 50 mg tablet Commonly known as:  HYDRODIURIL Take 0.5 Tabs by mouth daily. insulin detemir 100 unit/mL injection Commonly known as:  LEVEMIR  
30 Units by SubCUTAneous route nightly. metFORMIN 1,000 mg tablet Commonly known as:  GLUCOPHAGE Take 1,000 mg by mouth two (2) times daily (with meals). pantoprazole 40 mg tablet Commonly known as:  PROTONIX Take 1 Tab by mouth Daily (before breakfast). polyethylene glycol 17 gram packet Commonly known as:  Dejon [place] Take 1 Packet by mouth daily. pregabalin 75 mg capsule Commonly known as:  LYRICA  
1 cap bid x 1 week, then 2 caps bid  
  
 quinapril 20 mg tablet Commonly known as:  ACCUPRIL Take 1 Tab by mouth nightly. Indications: hypertension  
  
 simvastatin 10 mg tablet Commonly known as:  ZOCOR Take 10 mg by mouth nightly. Prescriptions Sent to Pharmacy Refills  
 quinapril (ACCUPRIL) 20 mg tablet 1 Sig: Take 1 Tab by mouth nightly. Indications: hypertension Class: Normal  
 [facility] [address] #: [phone] Route: Oral  
  
Follow-up Instructions Return in about 6 months (around 3/14/2018), or if symptoms worsen or fail to improve, for post test.  
  
To-Do List   
 Around 09/24/2017 Lab:  HEPATIC FUNCTION PANEL Around 09/24/2017 Lab:  LIPID PANEL Around 09/24/2017 Lab:  METABOLIC PANEL, BASIC Patient Instructions Medications Discontinued During This Encounter Medication Reason  quinapril (ACCUPRIL) 10 mg tablet Reorder Orders Placed This Encounter  METABOLIC PANEL, BASIC Standing Status:   Future Standing Expiration Date:   12/15/2017  LIPID PANEL Standing Status:   Future Standing Expiration Date:   12/15/2017  
 HEPATIC FUNCTION PANEL Standing Status:   Future Standing Expiration Date:   12/15/2017  quinapril (ACCUPRIL) 20 mg tablet Sig: Take 1 Tab by mouth nightly. Indications: hypertension Dispense:  90 Tab Refill:  1 Coronary Artery Disease: Care Instructions Your Care Instructions The heart is a muscle, and like any muscle, it needs blood to work well.  Coronary artery disease occurs when the arteries that bring oxygen-rich blood to your heart have a buildup of plaquedeposits of fats and other substances. Plaque can reduce blood flow to the heart muscle. This can cause angina symptoms such as chest pain or pressure. A heart attack can happen if blood flow is completely blocked. You can do a lot to improve your health and prevent a heart attack. Eating healthy food, not smoking, getting regular exercise, and taking your medicine are the main things you can do every day to stay healthy. Follow-up care is a key part of your treatment and safety. Be sure to make and go to all appointments, and call your doctor if you are having problems. It's also a good idea to know your test results and keep a list of the medicines you take. How can you care for yourself at home? Medicines · Be safe with medicines. Take your medicines exactly as prescribed. Call your doctor if you think you are having a problem with your medicine. You will get more details on the specific medicines your doctor prescribes. You may need several medicines. ¨ Angiotensin-converting enzyme (ACE) inhibitors, angiotensin II receptor blockers (ARBs), beta-blockers, and statins can help prevent a heart attack. ACE inhibitors, ARBs, and beta-blockers help lower your blood pressure. Statins help lower cholesterol, which is a type of fat that can clog your arteries. ¨ Nitrates can help make chest pain happen less often. ¨ Aspirin and other blood thinners help prevent heart attacks and strokes. · If your doctor has given you nitroglycerin for angina symptoms (such as chest pain or pressure) keep it with you at all times. If you have symptoms, sit down and rest, and take the first dose of nitroglycerin as directed. If your symptoms get worse or are not getting better within 5 minutes, call 911 right away. Stay on the phone. The emergency  will give you further instructions. · Be sure to tell your doctor about any angina symptoms that you have had, even if they went away. · Do not take any over-the-counter medicines, vitamins, or herbal products without talking to your doctor first. 
Lifestyle Ask your doctor if a cardiac rehab program is right for you. Cardiac rehab can help you make lifestyle changes. In cardiac rehab, a team of health professionals provides education and support to help you make new, healthy habits. · Do not smoke. Avoid secondhand smoke too. Smoking can increase your risk of a heart attack or stroke. If you need help quitting, talk to your doctor about stop-smoking programs and medicines. These can increase your chances of quitting for good. · Eat a heart-healthy diet that is high in fiber and low in saturated fat and sodium. ¨ Learn what a serving is. A \"serving\" and a \"portion\" are not always the same thing. Make sure that you are not eating larger portions than recommended. For example, a serving of pasta is ½ cup. A serving size of meat is 2 to 3 ounces; a 3-ounce serving is about the size of a deck of cards. ¨ Eat a variety of grain products every day. Include whole-grain foods such as oats, whole wheat bread, and brown rice. ¨ Eat fish, skinless poultry, lean meats, and soy products such as tofu instead of high-fat meats. Cut out all visible fat when you prepare meat. Eat at least 2 servings of fish a week. ¨ Eat a variety of fruit and vegetables every day. They have lots of nutrients that help protect against heart disease, and they have littleif anyfat. Keep carrots, celery, and other veggies handy for snacks. Buy fruit that is in season and store it where you can see it so that you will be tempted to eat it. Cook dishes that have a lot of veggies in them, such as stir-fried dishes and soups. ¨ Read food labels and try to avoid saturated fat and trans fat. They increase your risk of heart disease. Bake, broil, grill, or steam foods instead of frying them. Use olive or canola oil when you cook.  Try cholesterol-lowering spreads, such as Benecol or Take Control. ¨ Limit sodium. Your doctor may recommend that you limit sodium to less than 1,500 mg a day. ¨ Limit processed foods, including cookies and crackers. ¨ Limit drinks and foods with added sugar. ¨ Choose low-fat or fat-free milk and dairy products. ¨ Limit alcohol to 2 drinks a day for men and 1 drink a day for women. Too much alcohol can cause health problems. · If your doctor recommends it, get more exercise. Walking is a good choice. Bit by bit, increase the amount you walk every day. Try for at least 30 minutes on most days of the week. You also may want to swim, bike, or do other activities. · Stay at a healthy weight. Lose weight if you need to. · Talk to your family, friends, or a therapist about your feelings. It is normal to feel upset about having this disease and to feel afraid of having a heart attack. Talking openly about your feelings can help you cope. If you think you have symptoms of depression, talk to your doctor. · Avoid colds and flu. Get a pneumococcal vaccine shot. If you have had one before, ask your doctor whether you need another dose. Get a flu vaccine every year. If you must be around people with colds or flu, wash your hands often. When should you call for help? Call 911 anytime you think you may need emergency care. For example, call if: 
· You have symptoms of a heart attack. These may include: ¨ Chest pain or pressure, or a strange feeling in the chest. 
¨ Sweating. ¨ Shortness of breath. ¨ Nausea or vomiting. ¨ Pain, pressure, or a strange feeling in the back, neck, jaw, or upper belly or in one or both shoulders or arms. ¨ Lightheadedness or sudden weakness. ¨ A fast or irregular heartbeat. After you call 911, the  may tell you to chew 1 adult-strength or 2 to 4 low-dose aspirin. Wait for an ambulance. Do not try to drive yourself. · You have angina symptoms (such as chest pain or pressure) that do not go away with rest or are not getting better within 5 minutes after you take a dose of nitroglycerin. · You passed out (lost consciousness). Call your doctor now or seek immediate medical care if: 
· You are having angina symptoms, such as chest pain or pressure, more often than usual, or they are different or worse than usual. 
· You have new or increased shortness of breath. · You are dizzy or lightheaded, or you feel like you may faint. Watch closely for changes in your health, and be sure to contact your doctor if you have any problems. Where can you learn more? Go to http://kaila-carlitos.info/. Enter U808 in the search box to learn more about \"Coronary Artery Disease: Care Instructions. \" Current as of: December 28, 2016 Content Version: 11.3 © 0855-9084 [employer]. Care instructions adapted under license by [employer] (which disclaims liability or warranty for this information). If you have questions about a medical condition or this instruction, always ask your healthcare professional. [place] [address] any warranty or liability for your use of this information. Introducing [facility]! Dear Farhan Vigil: Thank you for requesting a [employer] account. Our records indicate that you have previously registered for a [employer] account but its currently inactive. Please call our [employer] support line at [phone]. Additional Information If you have questions, please visit the Frequently Asked Questions section of the [employer] website at [email or web address] Remember, [employer] is NOT to be used for urgent needs. For medical emergencies, dial 911. Now available from your iPhone and Android! Please provide this summary of care documentation to your next provider. Your primary care clinician is listed as Clair Wayne. If you have any questions after today's visit, please call [phone].

## 2017-09-14 NOTE — PATIENT INSTRUCTIONS
Medications Discontinued During This Encounter   Medication Reason    quinapril (ACCUPRIL) 10 mg tablet Reorder       Orders Placed This Encounter    METABOLIC PANEL, BASIC     Standing Status:   Future     Standing Expiration Date:   12/15/2017    LIPID PANEL     Standing Status:   Future     Standing Expiration Date:   12/15/2017    HEPATIC FUNCTION PANEL     Standing Status:   Future     Standing Expiration Date:   12/15/2017    quinapril (ACCUPRIL) 20 mg tablet     Sig: Take 1 Tab by mouth nightly. Indications: hypertension     Dispense:  90 Tab     Refill:  1          Coronary Artery Disease: Care Instructions  Your Care Instructions    The heart is a muscle, and like any muscle, it needs blood to work well. Coronary artery disease occurs when the arteries that bring oxygen-rich blood to your heart have a buildup of plaque--deposits of fats and other substances. Plaque can reduce blood flow to the heart muscle. This can cause angina symptoms such as chest pain or pressure. A heart attack can happen if blood flow is completely blocked. You can do a lot to improve your health and prevent a heart attack. Eating healthy food, not smoking, getting regular exercise, and taking your medicine are the main things you can do every day to stay healthy. Follow-up care is a key part of your treatment and safety. Be sure to make and go to all appointments, and call your doctor if you are having problems. It's also a good idea to know your test results and keep a list of the medicines you take. How can you care for yourself at home? Medicines  · Be safe with medicines. Take your medicines exactly as prescribed. Call your doctor if you think you are having a problem with your medicine. You will get more details on the specific medicines your doctor prescribes. You may need several medicines.   ¨ Angiotensin-converting enzyme (ACE) inhibitors, angiotensin II receptor blockers (ARBs), beta-blockers, and statins can help prevent a heart attack. ACE inhibitors, ARBs, and beta-blockers help lower your blood pressure. Statins help lower cholesterol, which is a type of fat that can clog your arteries. ¨ Nitrates can help make chest pain happen less often. ¨ Aspirin and other blood thinners help prevent heart attacks and strokes. · If your doctor has given you nitroglycerin for angina symptoms (such as chest pain or pressure) keep it with you at all times. If you have symptoms, sit down and rest, and take the first dose of nitroglycerin as directed. If your symptoms get worse or are not getting better within 5 minutes, call 911 right away. Stay on the phone. The emergency  will give you further instructions. · Be sure to tell your doctor about any angina symptoms that you have had, even if they went away. · Do not take any over-the-counter medicines, vitamins, or herbal products without talking to your doctor first.  Lifestyle  Ask your doctor if a cardiac rehab program is right for you. Cardiac rehab can help you make lifestyle changes. In cardiac rehab, a team of health professionals provides education and support to help you make new, healthy habits. · Do not smoke. Avoid secondhand smoke too. Smoking can increase your risk of a heart attack or stroke. If you need help quitting, talk to your doctor about stop-smoking programs and medicines. These can increase your chances of quitting for good. · Eat a heart-healthy diet that is high in fiber and low in saturated fat and sodium. ¨ Learn what a serving is. A \"serving\" and a \"portion\" are not always the same thing. Make sure that you are not eating larger portions than recommended. For example, a serving of pasta is ½ cup. A serving size of meat is 2 to 3 ounces; a 3-ounce serving is about the size of a deck of cards. ¨ Eat a variety of grain products every day. Include whole-grain foods such as oats, whole wheat bread, and brown rice.   ¨ Eat fish, skinless poultry, lean meats, and soy products such as tofu instead of high-fat meats. Cut out all visible fat when you prepare meat. Eat at least 2 servings of fish a week. ¨ Eat a variety of fruit and vegetables every day. They have lots of nutrients that help protect against heart disease, and they have little--if any--fat. Keep carrots, celery, and other veggies handy for snacks. Buy fruit that is in season and store it where you can see it so that you will be tempted to eat it. Cook dishes that have a lot of veggies in them, such as stir-fried dishes and soups. ¨ Read food labels and try to avoid saturated fat and trans fat. They increase your risk of heart disease. Bake, broil, grill, or steam foods instead of frying them. Use olive or canola oil when you cook. Try cholesterol-lowering spreads, such as Benecol or Take Control. ¨ Limit sodium. Your doctor may recommend that you limit sodium to less than 1,500 mg a day. ¨ Limit processed foods, including cookies and crackers. ¨ Limit drinks and foods with added sugar. ¨ Choose low-fat or fat-free milk and dairy products. ¨ Limit alcohol to 2 drinks a day for men and 1 drink a day for women. Too much alcohol can cause health problems. · If your doctor recommends it, get more exercise. Walking is a good choice. Bit by bit, increase the amount you walk every day. Try for at least 30 minutes on most days of the week. You also may want to swim, bike, or do other activities. · Stay at a healthy weight. Lose weight if you need to. · Talk to your family, friends, or a therapist about your feelings. It is normal to feel upset about having this disease and to feel afraid of having a heart attack. Talking openly about your feelings can help you cope. If you think you have symptoms of depression, talk to your doctor. · Avoid colds and flu. Get a pneumococcal vaccine shot. If you have had one before, ask your doctor whether you need another dose. Get a flu vaccine every year.  If you must be around people with colds or flu, wash your hands often. When should you call for help? Call 911 anytime you think you may need emergency care. For example, call if:  · You have symptoms of a heart attack. These may include:  ¨ Chest pain or pressure, or a strange feeling in the chest.  ¨ Sweating. ¨ Shortness of breath. ¨ Nausea or vomiting. ¨ Pain, pressure, or a strange feeling in the back, neck, jaw, or upper belly or in one or both shoulders or arms. ¨ Lightheadedness or sudden weakness. ¨ A fast or irregular heartbeat. After you call 911, the  may tell you to chew 1 adult-strength or 2 to 4 low-dose aspirin. Wait for an ambulance. Do not try to drive yourself. · You have angina symptoms (such as chest pain or pressure) that do not go away with rest or are not getting better within 5 minutes after you take a dose of nitroglycerin. · You passed out (lost consciousness). Call your doctor now or seek immediate medical care if:  · You are having angina symptoms, such as chest pain or pressure, more often than usual, or they are different or worse than usual.  · You have new or increased shortness of breath. · You are dizzy or lightheaded, or you feel like you may faint. Watch closely for changes in your health, and be sure to contact your doctor if you have any problems. Where can you learn more? Go to http://kaila-carlitos.info/. Enter S740 in the search box to learn more about \"Coronary Artery Disease: Care Instructions. \"  Current as of: December 28, 2016  Content Version: 11.3  © 2989-1261 [employer]. Care instructions adapted under license by [employer] (which disclaims liability or warranty for this information). If you have questions about a medical condition or this instruction, always ask your healthcare professional. [address] any warranty or liability for your use of this information.

## 2017-09-24 DIAGNOSIS — E78.5 HYPERLIPIDEMIA, UNSPECIFIED HYPERLIPIDEMIA TYPE: ICD-10-CM

## 2017-09-24 DIAGNOSIS — I10 ESSENTIAL HYPERTENSION: Chronic | ICD-10-CM

## 2018-03-14 ENCOUNTER — HOSPITAL ENCOUNTER (OUTPATIENT)
Dept: LAB | Age: 58
Discharge: HOME | End: 2018-03-14

## 2018-03-14 PROCEDURE — 99001 SPECIMEN HANDLING PT-LAB: CPT | Performed by: INTERNAL MEDICINE

## 2018-03-20 ENCOUNTER — HOSPITAL ENCOUNTER (INPATIENT)
Age: 58
LOS: 3 days | Discharge: HOME | DRG: 710 | End: 2018-03-24
Attending: EMERGENCY MEDICINE | Admitting: INTERNAL MEDICINE
Payer: MEDICAID

## 2018-03-20 DIAGNOSIS — Z09 S/P UMBILICAL HERNIA REPAIR, FOLLOW-UP EXAM: Primary | ICD-10-CM

## 2018-03-20 LAB — LACTATE BLD-SCNC: 5.8 MMOL/L (ref 0.4–2)

## 2018-03-20 PROCEDURE — 99285 EMERGENCY DEPT VISIT HI MDM: CPT

## 2018-03-20 PROCEDURE — 96374 THER/PROPH/DIAG INJ IV PUSH: CPT

## 2018-03-20 PROCEDURE — 83690 ASSAY OF LIPASE: CPT

## 2018-03-20 PROCEDURE — 74011250636 HC RX REV CODE- 250/636: Performed by: PHYSICIAN ASSISTANT

## 2018-03-20 PROCEDURE — 85025 COMPLETE CBC W/AUTO DIFF WBC: CPT

## 2018-03-20 PROCEDURE — 87804 INFLUENZA ASSAY W/OPTIC: CPT

## 2018-03-20 PROCEDURE — 96375 TX/PRO/DX INJ NEW DRUG ADDON: CPT

## 2018-03-20 PROCEDURE — 93005 ELECTROCARDIOGRAM TRACING: CPT

## 2018-03-20 PROCEDURE — 36415 COLL VENOUS BLD VENIPUNCTURE: CPT | Performed by: INTERNAL MEDICINE

## 2018-03-20 PROCEDURE — 96361 HYDRATE IV INFUSION ADD-ON: CPT

## 2018-03-20 PROCEDURE — 83605 ASSAY OF LACTIC ACID: CPT

## 2018-03-20 PROCEDURE — 83036 HEMOGLOBIN GLYCOSYLATED A1C: CPT | Performed by: INTERNAL MEDICINE

## 2018-03-20 PROCEDURE — 80053 COMPREHEN METABOLIC PANEL: CPT

## 2018-03-20 PROCEDURE — 82553 CREATINE MB FRACTION: CPT

## 2018-03-20 PROCEDURE — 87040 BLOOD CULTURE FOR BACTERIA: CPT

## 2018-03-20 RX ORDER — ONDANSETRON 2 MG/ML
4 INJECTION INTRAMUSCULAR; INTRAVENOUS
Status: COMPLETED | OUTPATIENT
Start: 2018-03-20 | End: 2018-03-20

## 2018-03-20 RX ORDER — SODIUM CHLORIDE 0.9 % (FLUSH) 0.9 %
5-10 SYRINGE (ML) INJECTION AS NEEDED
Status: DISCONTINUED | OUTPATIENT
Start: 2018-03-20 | End: 2018-03-24 | Stop reason: HOSPADM

## 2018-03-20 RX ORDER — MORPHINE SULFATE 2 MG/ML
2 INJECTION, SOLUTION INTRAMUSCULAR; INTRAVENOUS
Status: COMPLETED | OUTPATIENT
Start: 2018-03-20 | End: 2018-03-20

## 2018-03-20 RX ADMIN — Medication 2 MG: at 23:54

## 2018-03-20 RX ADMIN — ONDANSETRON 4 MG: 2 INJECTION INTRAMUSCULAR; INTRAVENOUS at 23:34

## 2018-03-20 RX ADMIN — SODIUM CHLORIDE 1000 ML: 900 INJECTION, SOLUTION INTRAVENOUS at 23:37

## 2018-03-20 NOTE — IP AVS SNAPSHOT
[address] [facility] 
 
 
 [address] [facility] [place] [address] [place] [address] Patient: Yung Hurley MRN: [id number] SDI:[id number] About your hospitalization You were admitted on:  March 21, 2018 You last received care in the:  [facility] [id number] [place] [address] You were discharged on:  March 24, 2018 Why you were hospitalized Your primary diagnosis was:  Not on File Your diagnoses also included:  Abdominal Pain, Sepsis (Hcc) Follow-up Information Follow up With Details Comments Contact Info Charmaine Evans MD   [address] [facility] [address] [place] [address] 
[phone] Jakob Goldberg MD On 4/5/2018 @1:00PM [address] [address] [place] [address] 
[phone] Your Scheduled Appointments Monday April 09, 2018 11:15 AM EDT Office Visit with Arnol Chase MD  
[facility]) [address] [place] [address], [place] [address]  
[phone] Discharge Orders None A check hilary indicates which time of day the medication should be taken. My Medications START taking these medications Instructions Each Dose to Equal  
 Morning Noon Evening Bedtime  
 ondansetron 4 mg disintegrating tablet Commonly known as:  ZOFRAN ODT Your last dose was: Your next dose is: Take 1 Tab by mouth every eight (8) hours as needed for Nausea. 4 mg OTHER Your last dose was: Your next dose is:    
   
   
 Check CBC, CMP, Mg in 3 days, results to PCP immediately, Diagnosis- DM2  
     
   
   
   
  
 OTHER Your last dose was: Your next dose is: No lifting weights > 15 lbs for two weeks. OTHER Your last dose was: Your next dose is:    
   
   
 Incentive spirometry- use as directed oxyCODONE-acetaminophen 7.5-325 mg per tablet Commonly known as:  PERCOCET 7.5 Your last dose was: Your next dose is: Take 1 Tab by mouth every four (4) hours as needed. Max Daily Amount: 6 Tabs. Indications: Pain 1 Tab CHANGE how you take these medications Instructions Each Dose to Equal  
 Morning Noon Evening Bedtime  
 insulin detemir U-100 100 unit/mL injection Commonly known as:  LEVEMIR U-100 INSULIN What changed:  how much to take Your last dose was: Your next dose is:    
   
   
 15 Units by SubCUTAneous route nightly. 15 Units CONTINUE taking these medications Instructions Each Dose to Equal  
 Morning Noon Evening Bedtime  
 acetaminophen 325 mg tablet Commonly known as:  TYLENOL Your last dose was: Your next dose is: Take 2 Tabs by mouth every six (6) hours as needed. Indications: Pain, take it with bentyl 650 mg  
    
   
   
   
  
 albuterol 90 mcg/actuation inhaler Commonly known as:  PROVENTIL HFA, VENTOLIN HFA, PROAIR HFA Your last dose was: Your next dose is:    
   
   
 2 puffs every 6 hours x 5 days then every 6 hours as needed for shortness of breath and/or wheezing  
     
   
   
   
  
 amitriptyline 50 mg tablet Commonly known as:  ELAVIL Your last dose was: Your next dose is: Take 50 mg by mouth nightly. 50 mg  
    
   
   
   
  
 amLODIPine 10 mg tablet Commonly known as:  Tylene Leno Your last dose was: Your next dose is: Take 10 mg by mouth daily. 10 mg  
    
   
   
   
  
 clopidogrel 75 mg Tab Commonly known as:  PLAVIX Your last dose was: Your next dose is: Take 75 mg by mouth daily. 75 mg  
    
   
   
   
  
 hydroCHLOROthiazide 50 mg tablet Commonly known as:  HYDRODIURIL Your last dose was: Your next dose is: Take 0.5 Tabs by mouth daily. 25 mg  
    
   
   
   
  
 pantoprazole 40 mg tablet Commonly known as:  PROTONIX Your last dose was: Your next dose is: Take 1 Tab by mouth Daily (before breakfast). 40 mg  
    
   
   
   
  
 polyethylene glycol 17 gram packet Commonly known as:  Krishna Arayao Your last dose was: Your next dose is: Take 1 Packet by mouth daily. 17 g  
    
   
   
   
  
 pregabalin 75 mg capsule Commonly known as:  Farshad Kim Your last dose was: Your next dose is:    
   
   
 1 cap bid x 1 week, then 2 caps bid  
     
   
   
   
  
 simvastatin 10 mg tablet Commonly known as:  ZOCOR Your last dose was: Your next dose is: Take 10 mg by mouth nightly. 10 mg  
    
   
   
   
  
  
STOP taking these medications   
 dicyclomine 10 mg capsule Commonly known as:  BENTYL  
   
  
 metFORMIN 1,000 mg tablet Commonly known as:  GLUCOPHAGE  
   
  
 quinapril 20 mg tablet Commonly known as:  ACCUPRIL Where to Get Your Medications Information on where to get these meds will be given to you by the nurse or doctor. ! Ask your nurse or doctor about these medications  
  insulin detemir U-100 100 unit/mL injection  
 ondansetron 4 mg disintegrating tablet OTHER  
 OTHER  
 OTHER  
 oxyCODONE-acetaminophen 7.5-325 mg per tablet Discharge Instructions Patient armband removed and given to patient to take home. Patient was informed of the privacy risks if armband lost or stolen DISCHARGE SUMMARY from Nurse PATIENT INSTRUCTIONS: 
 
 
F-face looks uneven A-arms unable to move or move unevenly S-speech slurred or non-existent T-time-call 911 as soon as signs and symptoms begin-DO NOT go Back to bed or wait to see if you get better-TIME IS BRAIN. Warning Signs of HEART ATTACK Call 911 if you have these symptoms: 
? Chest discomfort. Most heart attacks involve discomfort in the center of the chest that lasts more than a few minutes, or that goes away and comes back. It can feel like uncomfortable pressure, squeezing, fullness, or pain. ? Discomfort in other areas of the upper body. Symptoms can include pain or discomfort in one or both arms, the back, neck, jaw, or stomach. ? Shortness of breath with or without chest discomfort. ? Other signs may include breaking out in a cold sweat, nausea, or lightheadedness. Don't wait more than five minutes to call [address]! Fast action can save your life. Calling 911 is almost always the fastest way to get lifesaving treatment. Emergency Medical Services staff can begin treatment when they arrive  up to an hour sooner than if someone gets to the hospital by car. The discharge information has been reviewed with the patient. The patient verbalized understanding. Discharge medications reviewed with the patient and appropriate educational materials and side effects teaching were provided. ___________________________________________________________________________________________________________________________________ Crunch Accountinghart Activation Thank you for requesting access to [employer]The[employer]. Please follow the instructions below to securely access and download your online medical record. BeTheBeast allows you to send messages to your doctor, view your test results, renew your prescriptions, schedule appointments, and more. How Do I Sign Up? 1. In your internet browser, go to [email or web address] 
2. Click on the First Time User? Click Here link in the Sign In box.  You will be redirect to the New Member Sign Up page. 3. Enter your Revert.IO Access Code exactly as it appears below. You will not need to use this code after youve completed the sign-up process. If you do not sign up before the expiration date, you must request a new code. Revert.IO Access Code: [email or web address] Expires: 2018  8:58 AM (This is the date your Revert.IO access code will ) 4. Enter the last four digits of your Social Security Number (xxxx) and Date of Birth (mm/dd/yyyy) as indicated and click Submit. You will be taken to the next sign-up page. 5. Create a United Keyst ID. This will be your Revert.IO login ID and cannot be changed, so think of one that is secure and easy to remember. 6. Create a Revert.IO password. You can change your password at any time. 7. Enter your Password Reset Question and Answer. This can be used at a later time if you forget your password. 8. Enter your e-mail address. You will receive e-mail notification when new information is available in [address] 9. Click Sign Up. You can now view and download portions of your medical record. 10. Click the Download Summary menu link to download a portable copy of your medical information. Additional Information If you have questions, please visit the Frequently Asked Questions section of the Revert.IO website at [email or web address] [employer]. Enforcer e[employer]aching/Jail Education Solutionshart/. Remember, Revert.IO is NOT to be used for urgent needs. For medical emergencies, dial 911. Abdominal Pain: Care Instructions Your Care Instructions Abdominal pain has many possible causes. Some aren't serious and get better on their own in a few days. Others need more testing and treatment. If your pain continues or gets worse, you need to be rechecked and may need more tests to find out what is wrong. You may need surgery to correct the problem.  
Don't ignore new symptoms, such as fever, nausea and vomiting, urination problems, pain that gets worse, and dizziness. These may be signs of a more serious problem. Your doctor may have recommended a follow-up visit in the next 8 to 12 hours. If you are not getting better, you may need more tests or treatment. The doctor has checked you carefully, but problems can develop later. If you notice any problems or new symptoms, get medical treatment right away. Follow-up care is a key part of your treatment and safety. Be sure to make and go to all appointments, and call your doctor if you are having problems. It's also a good idea to know your test results and keep a list of the medicines you take. How can you care for yourself at home? · Rest until you feel better. · To prevent dehydration, drink plenty of fluids, enough so that your urine is light yellow or clear like water. Choose water and other caffeine-free clear liquids until you feel better. If you have kidney, heart, or liver disease and have to limit fluids, talk with your doctor before you increase the amount of fluids you drink. · If your stomach is upset, eat mild foods, such as rice, dry toast or crackers, bananas, and applesauce. Try eating several small meals instead of two or three large ones. · Wait until 48 hours after all symptoms have gone away before you have spicy foods, alcohol, and drinks that contain caffeine. · Do not eat foods that are high in fat. · Avoid anti-inflammatory medicines such as aspirin, ibuprofen (Advil, Motrin), and naproxen (Aleve). These can cause stomach upset. Talk to your doctor if you take daily aspirin for another health problem. When should you call for help? Call 911 anytime you think you may need emergency care. For example, call if: 
? · You passed out (lost consciousness). ? · You pass maroon or very bloody stools. ? · You vomit blood or what looks like coffee grounds. ? · You have new, severe belly pain. ?Call your doctor now or seek immediate medical care if: ? · Your pain gets worse, especially if it becomes focused in one area of your belly. ? · You have a new or higher fever. ? · Your stools are black and look like tar, or they have streaks of blood. ? · You have unexpected vaginal bleeding. ? · You have symptoms of a urinary tract infection. These may include: 
¨ Pain when you urinate. ¨ Urinating more often than usual. 
¨ Blood in your urine. ? · You are dizzy or lightheaded, or you feel like you may faint. ? Watch closely for changes in your health, and be sure to contact your doctor if: 
? · You are not getting better after 1 day (24 hours). Where can you learn more? Go to http://kaila-carlitos.info/. Enter Q875 in the search box to learn more about \"Abdominal Pain: Care Instructions. \" Current as of: March 20, 2017 Content Version: 11.4 © 6562-9672 Inspire. Care instructions adapted under license by [employer] (which disclaims liability or warranty for this information). If you have questions about a medical condition or this instruction, always ask your healthcare professional. [place] [address] any warranty or liability for your use of this information. Introducing [facility]! Carmelita Fothergill introduces Primedic patient portal. Now you can access parts of your medical record, email your doctor's office, and request medication refills online. 1. In your internet browser, go to [email or web address] 2. Click on the First Time User? Click Here link in the Sign In box. You will see the New Member Sign Up page. 3. Enter your Primedic Access Code exactly as it appears below. You will not need to use this code after youve completed the sign-up process. If you do not sign up before the expiration date, you must request a new code. · Primedic Access Code: [email or web address] Expires: 6/12/2018  8:58 AM 
 
 4. Enter the last four digits of your Social Security Number (xxxx) and Date of Birth (mm/dd/yyyy) as indicated and click Submit. You will be taken to the next sign-up page. 5. Create a Sensory Medical ID. This will be your [employer] login ID and cannot be changed, so think of one that is secure and easy to remember. 6. Create a S[employer] Medical password. You can change your password at any time. 7. Enter your Password Reset Question and Answer. This can be used at a later time if you forget your password. 8. Enter your e-mail address. You will receive e-mail notification when new information is available in [address] 9. Click Sign Up. You can now view and download portions of your medical record. 10. Click the Download Summary menu link to download a portable copy of your medical information. If you have questions, please visit the Frequently Asked Questions section of the S[employer] website. Remember, [employer] is NOT to be used for urgent needs. For medical emergencies, dial 911. Now available from your iPhone and Android! Unresulted Labs-Please follow up with your PCP about these lab tests Order Current Status CULTURE, BLOOD Preliminary result CULTURE, BLOOD Preliminary result CULTURE, BLOOD Preliminary result CULTURE, BLOOD Preliminary result Providers Seen During Your Hospitalization Provider Specialty Primary office phone Xavi Crisostomo MD Emergency Medicine [phone] Almaz Bradley MD Internal Medicine [phone] Your Primary Care Physician (PCP) Primary Care Physician Office Phone Office Fax Maribel Wilson, [address] [place] [address] [phone] You are allergic to the following Allergen Reactions Compazine (Prochlorperazine) Anaphylaxis \"Tightness around throat\" Recent Documentation Height Weight BMI Smoking Status 1.75 m 106.1 kg 34.66 kg/m2 Never Smoker Emergency Contacts Name Discharge Info Relation Home Work Mobile [address] CAREGIVER [3] Sister [23] [phone] [facility] DISCHARGE CAREGIVER [3] Sister [23] [phone] Patient Belongings The following personal items are in your possession at time of discharge: 
  Dental Appliances: None         Home Medications: None   Jewelry: None  Clothing: Jacket/Coat, Pants, Shirt, Socks, Undergarments, Footwear    Other Valuables: Cell Phone Discharge Instructions Attachments/References LAPAROSCOPY: POST-OP (ENGLISH) Patient Handouts Laparoscopy: What to Expect at [facility] Your Recovery After laparoscopic surgery, you are likely to have pain for the next several days. You may also feel like you have the flu. You may have a low fever and feel tired and sick to your stomach. This is common. You should feel better after 1 to 2 weeks. This care sheet gives you a general idea about how long it will take for you to recover. But each person recovers at a different pace. Follow the steps below to get better as quickly as possible. How can you care for yourself at home? Activity ? · Rest when you feel tired. Getting enough sleep will help you recover. ? · Try to walk each day. Start by walking a little more than you did the day before. Bit by bit, increase the amount you walk. Walking boosts blood flow and helps prevent pneumonia and constipation. ? · Avoid strenuous activities, such as bicycle riding, jogging, weight lifting, or aerobic exercise, until your doctor says it is okay. ? · Avoid lifting anything that would make you strain. This may include a child, heavy grocery bags and milk containers, a heavy briefcase or backpack, cat litter or dog food bags, or a vacuum . ? · You may also have pain in your shoulder. The pain usually lasts about 1 or 2 days.   
? · You may drive when you are no longer taking pain medicine and can quickly move your foot from the gas pedal to the brake. You must also be able to sit comfortably for a long period of time, even if you do not plan to go far. You might get caught in traffic. ? · You will probably need to take 2 weeks off from work. It depends on the type of work you do and how you feel. ? · You may shower 24 to 48 hours after surgery, if your doctor okays it. Pat the cut (incision) dry. Do not take a bath for the first 2 weeks, or until your doctor tells you it is okay. Diet ? · If your stomach is upset, try bland, low-fat foods such as plain rice, broiled chicken, toast, and yogurt. ? · Drink plenty of fluids (enough so that your urine is light yellow or clear like water) to prevent dehydration. Choose water and other caffeine-free clear liquids. If you have kidney, heart, or liver disease and have to limit fluids, talk with your doctor before you increase the amount of fluids you drink. ? · You may notice that your bowel movements are not regular right after your surgery. This is common. Avoid constipation and straining with bowel movements. You may want to take a fiber supplement every day. If you have not had a bowel movement after a couple of days, ask your doctor about taking a mild laxative. Medicines ? · Your doctor will tell you if and when you can restart your medicines. He or she will also give you instructions about taking any new medicines. ? · If you take blood thinners, such as warfarin (Coumadin), clopidogrel (Plavix), or aspirin, be sure to talk to your doctor. He or she will tell you if and when to start taking those medicines again. Make sure that you understand exactly what your doctor wants you to do. ? · Take pain medicines exactly as directed. ¨ If the doctor gave you a prescription medicine for pain, take it as prescribed. ¨ If you are not taking a prescription pain medicine, ask your doctor if you can take an over-the-counter medicine. ? · If your doctor prescribed antibiotics, take them as directed. Do not stop taking them just because you feel better. You need to take the full course of antibiotics. ? · If you think your pain medicine is making you sick to your stomach: 
¨ Take your medicine after meals (unless your doctor has told you not to). ¨ Ask your doctor for a different pain medicine. Incision care ? · If you have strips of tape on the incision, leave the tape on for a week or until it falls off.  
? · Wash the area daily with warm, soapy water and pat it dry. Don't use hydrogen peroxide or alcohol, which can slow healing. You may cover the area with a gauze bandage if it weeps or rubs against clothing. Change the bandage every day. Follow-up care is a key part of your treatment and safety. Be sure to make and go to all appointments, and call your doctor if you are having problems. It's also a good idea to know your test results and keep a list of the medicines you take. When should you call for help? Call 911 anytime you think you may need emergency care. For example, call if: 
? · You passed out (lost consciousness). ? · You are short of breath. ?Call your doctor now or seek immediate medical care if: 
? · You have pain that does not get better after you take pain medicine. ? · You have loose stitches, or your incision comes open. ? · Bright red blood has soaked through your bandage. ? · You have signs of infection, such as: 
¨ Increased pain, swelling, warmth, or redness. ¨ Red streaks leading from the incision. ¨ Pus draining from the incision. ¨ A fever. ? · You are sick to your stomach or cannot keep fluids down. ? · You have signs of a blood clot in your leg (called a deep vein thrombosis), such as: 
¨ Pain in your calf, back of the knee, thigh, or groin. ¨ Redness and swelling in your leg or groin. ? · You cannot pass stools or gas. ?Watch closely for any changes in your health, and be sure to contact your doctor if you have any problems. Where can you learn more? Go to http://kaila-carlitos.info/. Enter Y039 in the search box to learn more about \"Laparoscopy: What to Expect at Home. \" Current as of: May 12, 2017 Content Version: 11.4 © 2855-3073 HealthDarwin, [employer]. Care instructions adapted under license by [facility] (which disclaims liability or warranty for this information). If you have questions about a medical condition or this instruction, always ask your healthcare professional. [address] any warranty or liability for your use of this information. Please provide this summary of care documentation to your next provider. Signatures-by signing, you are acknowledging that this After Visit Summary has been reviewed with you and you have received a copy. Patient Signature:  ____________________________________________________________ Date:  ____________________________________________________________  
  
Kathi Gamboa Provider Signature:  ____________________________________________________________ Date:  ____________________________________________________________

## 2018-03-20 NOTE — IP AVS SNAPSHOT
[facility] 
 
 
 [address] Patient: Beverly Devries MRN: [id number] G:3/5/3104 A check hilary indicates which time of day the medication should be taken. My Medications START taking these medications Instructions Each Dose to Equal  
 Morning Noon Evening Bedtime  
 ondansetron 4 mg disintegrating tablet Commonly known as:  ZOFRAN ODT Your last dose was: Your next dose is: Take 1 Tab by mouth every eight (8) hours as needed for Nausea. 4 mg OTHER Your last dose was: Your next dose is:    
   
   
 Check CBC, CMP, Mg in 3 days, results to PCP immediately, Diagnosis- DM2  
     
   
   
   
  
 OTHER Your last dose was: Your next dose is: No lifting weights > 15 lbs for two weeks. OTHER Your last dose was: Your next dose is:    
   
   
 Incentive spirometry- use as directed  
     
   
   
   
  
 oxyCODONE-acetaminophen 7.5-325 mg per tablet Commonly known as:  PERCOCET 7.5 Your last dose was: Your next dose is: Take 1 Tab by mouth every four (4) hours as needed. Max Daily Amount: 6 Tabs. Indications: Pain 1 Tab CHANGE how you take these medications Instructions Each Dose to Equal  
 Morning Noon Evening Bedtime  
 insulin detemir U-100 100 unit/mL injection Commonly known as:  LEVEMIR U-100 INSULIN What changed:  how much to take Your last dose was: Your next dose is:    
   
   
 15 Units by SubCUTAneous route nightly. 15 Units CONTINUE taking these medications Instructions Each Dose to Equal  
 Morning Noon Evening Bedtime  
 acetaminophen 325 mg tablet Commonly known as:  TYLENOL Your last dose was: Your next dose is: Take 2 Tabs by mouth every six (6) hours as needed. Indications: Pain, take it with bentyl 650 mg  
    
   
   
   
  
 albuterol 90 mcg/actuation inhaler Commonly known as:  PROVENTIL HFA, VENTOLIN HFA, PROAIR HFA Your last dose was: Your next dose is:    
   
   
 2 puffs every 6 hours x 5 days then every 6 hours as needed for shortness of breath and/or wheezing  
     
   
   
   
  
 amitriptyline 50 mg tablet Commonly known as:  ELAVIL Your last dose was: Your next dose is: Take 50 mg by mouth nightly. 50 mg  
    
   
   
   
  
 amLODIPine 10 mg tablet Commonly known as:  Bulmaro Free Your last dose was: Your next dose is: Take 10 mg by mouth daily. 10 mg  
    
   
   
   
  
 clopidogrel 75 mg Tab Commonly known as:  PLAVIX Your last dose was: Your next dose is: Take 75 mg by mouth daily. 75 mg  
    
   
   
   
  
 hydroCHLOROthiazide 50 mg tablet Commonly known as:  HYDRODIURIL Your last dose was: Your next dose is: Take 0.5 Tabs by mouth daily. 25 mg  
    
   
   
   
  
 pantoprazole 40 mg tablet Commonly known as:  PROTONIX Your last dose was: Your next dose is: Take 1 Tab by mouth Daily (before breakfast). 40 mg  
    
   
   
   
  
 polyethylene glycol 17 gram packet Commonly known as:  [place] Ou Your last dose was: Your next dose is: Take 1 Packet by mouth daily. 17 g  
    
   
   
   
  
 pregabalin 75 mg capsule Commonly known as:  Glosridhar Bacca Your last dose was: Your next dose is:    
   
   
 1 cap bid x 1 week, then 2 caps bid  
     
   
   
   
  
 simvastatin 10 mg tablet Commonly known as:  ZOCOR Your last dose was: Your next dose is: Take 10 mg by mouth nightly. 10 mg  
    
   
   
   
  
  
STOP taking these medications dicyclomine 10 mg capsule Commonly known as:  BENTYL  
   
  
 metFORMIN 1,000 mg tablet Commonly known as:  GLUCOPHAGE  
   
  
 quinapril 20 mg tablet Commonly known as:  ACCUPRIL Where to Get Your Medications Information on where to get these meds will be given to you by the nurse or doctor. ! Ask your nurse or doctor about these medications  
  insulin detemir U-100 100 unit/mL injection  
 ondansetron 4 mg disintegrating tablet OTHER  
 OTHER  
 OTHER  
 oxyCODONE-acetaminophen 7.5-325 mg per tablet

## 2018-03-21 ENCOUNTER — APPOINTMENT (OUTPATIENT)
Dept: CT IMAGING | Age: 58
DRG: 710 | End: 2018-03-21
Attending: PHYSICIAN ASSISTANT
Payer: MEDICAID

## 2018-03-21 ENCOUNTER — APPOINTMENT (OUTPATIENT)
Dept: GENERAL RADIOLOGY | Age: 58
DRG: 710 | End: 2018-03-21
Attending: INTERNAL MEDICINE
Payer: MEDICAID

## 2018-03-21 ENCOUNTER — APPOINTMENT (OUTPATIENT)
Dept: GENERAL RADIOLOGY | Age: 58
DRG: 710 | End: 2018-03-21
Attending: PHYSICIAN ASSISTANT
Payer: MEDICAID

## 2018-03-21 PROBLEM — A41.9 SEPSIS (HCC): Status: ACTIVE | Noted: 2018-03-21

## 2018-03-21 LAB
ALBUMIN SERPL-MCNC: 4.1 G/DL (ref 3.4–5)
ALBUMIN/GLOB SERPL: 0.8 {RATIO} (ref 0.8–1.7)
ALP SERPL-CCNC: 124 U/L (ref 45–117)
ALT SERPL-CCNC: 43 U/L (ref 16–61)
AMPHET UR QL SCN: NEGATIVE
ANION GAP SERPL CALC-SCNC: 17 MMOL/L (ref 3–18)
APPEARANCE UR: CLEAR
AST SERPL-CCNC: 19 U/L (ref 15–37)
ATRIAL RATE: 96 BPM
BACTERIA URNS QL MICRO: NEGATIVE /HPF
BARBITURATES UR QL SCN: NEGATIVE
BASOPHILS # BLD: 0 K/UL (ref 0–0.1)
BASOPHILS NFR BLD: 0 % (ref 0–3)
BENZODIAZ UR QL: NEGATIVE
BILIRUB SERPL-MCNC: 0.5 MG/DL (ref 0.2–1)
BILIRUB UR QL: NEGATIVE
BUN SERPL-MCNC: 17 MG/DL (ref 7–18)
BUN/CREAT SERPL: 9 (ref 12–20)
CALCIUM SERPL-MCNC: 9.9 MG/DL (ref 8.5–10.1)
CALCULATED P AXIS, ECG09: 19 DEGREES
CALCULATED R AXIS, ECG10: -2 DEGREES
CALCULATED T AXIS, ECG11: -23 DEGREES
CANNABINOIDS UR QL SCN: NEGATIVE
CHLORIDE SERPL-SCNC: 94 MMOL/L (ref 100–108)
CK MB CFR SERPL CALC: 0.8 % (ref 0–4)
CK MB SERPL-MCNC: 1.5 NG/ML (ref 5–25)
CK SERPL-CCNC: 180 U/L (ref 39–308)
CO2 SERPL-SCNC: 27 MMOL/L (ref 21–32)
COCAINE UR QL SCN: NEGATIVE
COLOR UR: YELLOW
CREAT SERPL-MCNC: 1.91 MG/DL (ref 0.6–1.3)
DIAGNOSIS, 93000: NORMAL
DIFFERENTIAL METHOD BLD: ABNORMAL
EOSINOPHIL # BLD: 0 K/UL (ref 0–0.4)
EOSINOPHIL NFR BLD: 0 % (ref 0–5)
EPITH CASTS URNS QL MICRO: NORMAL /LPF (ref 0–5)
ERYTHROCYTE [DISTWIDTH] IN BLOOD BY AUTOMATED COUNT: 13.5 % (ref 11.6–14.5)
EST. AVERAGE GLUCOSE BLD GHB EST-MCNC: 180 MG/DL
FLUAV AG NPH QL IA: NEGATIVE
FLUBV AG NOSE QL IA: NEGATIVE
GLOBULIN SER CALC-MCNC: 5.4 G/DL (ref 2–4)
GLUCOSE BLD STRIP.AUTO-MCNC: 192 MG/DL (ref 70–110)
GLUCOSE BLD STRIP.AUTO-MCNC: 255 MG/DL (ref 70–110)
GLUCOSE BLD STRIP.AUTO-MCNC: 263 MG/DL (ref 70–110)
GLUCOSE BLD STRIP.AUTO-MCNC: 319 MG/DL (ref 70–110)
GLUCOSE SERPL-MCNC: 379 MG/DL (ref 74–99)
GLUCOSE UR STRIP.AUTO-MCNC: >1000 MG/DL
HBA1C MFR BLD: 7.9 % (ref 4.2–5.6)
HCT VFR BLD AUTO: 46 % (ref 36–48)
HDSCOM,HDSCOM: ABNORMAL
HGB BLD-MCNC: 14.9 G/DL (ref 13–16)
HGB UR QL STRIP: NEGATIVE
KETONES UR QL STRIP.AUTO: 40 MG/DL
LACTATE BLD-SCNC: 1.7 MMOL/L (ref 0.4–2)
LACTATE BLD-SCNC: 3.6 MMOL/L (ref 0.4–2)
LACTATE SERPL-SCNC: 1.9 MMOL/L (ref 0.4–2)
LACTATE SERPL-SCNC: 3.2 MMOL/L (ref 0.4–2)
LEUKOCYTE ESTERASE UR QL STRIP.AUTO: NEGATIVE
LIPASE SERPL-CCNC: 172 U/L (ref 73–393)
LYMPHOCYTES # BLD: 1.2 K/UL (ref 0.8–3.5)
LYMPHOCYTES NFR BLD: 7 % (ref 20–51)
MCH RBC QN AUTO: 29.4 PG (ref 24–34)
MCHC RBC AUTO-ENTMCNC: 32.4 G/DL (ref 31–37)
MCV RBC AUTO: 90.9 FL (ref 74–97)
METHADONE UR QL: NEGATIVE
MONOCYTES # BLD: 0.5 K/UL (ref 0–1)
MONOCYTES NFR BLD: 3 % (ref 2–9)
NEUTS BAND NFR BLD MANUAL: 3 % (ref 0–5)
NEUTS SEG # BLD: 14.9 K/UL (ref 1.8–8)
NEUTS SEG NFR BLD: 87 % (ref 42–75)
NITRITE UR QL STRIP.AUTO: NEGATIVE
OPIATES UR QL: POSITIVE
P-R INTERVAL, ECG05: 212 MS
PCP UR QL: NEGATIVE
PH UR STRIP: 8 [PH] (ref 5–8)
PLATELET # BLD AUTO: 331 K/UL (ref 135–420)
PLATELET COMMENTS,PCOM: ABNORMAL
PMV BLD AUTO: 12.1 FL (ref 9.2–11.8)
POTASSIUM SERPL-SCNC: 3.5 MMOL/L (ref 3.5–5.5)
PROT SERPL-MCNC: 9.5 G/DL (ref 6.4–8.2)
PROT UR STRIP-MCNC: 30 MG/DL
Q-T INTERVAL, ECG07: 352 MS
QRS DURATION, ECG06: 80 MS
QTC CALCULATION (BEZET), ECG08: 444 MS
RBC # BLD AUTO: 5.06 M/UL (ref 4.7–5.5)
RBC #/AREA URNS HPF: NEGATIVE /HPF (ref 0–5)
RBC MORPH BLD: ABNORMAL
SODIUM SERPL-SCNC: 138 MMOL/L (ref 136–145)
SP GR UR REFRACTOMETRY: >1.03 (ref 1–1.03)
TROPONIN I SERPL-MCNC: <0.02 NG/ML (ref 0–0.04)
UROBILINOGEN UR QL STRIP.AUTO: 0.2 EU/DL (ref 0.2–1)
VENTRICULAR RATE, ECG03: 96 BPM
WBC # BLD AUTO: 17.1 K/UL (ref 4.6–13.2)
WBC MORPH BLD: ABNORMAL
WBC URNS QL MICRO: NORMAL /HPF (ref 0–5)

## 2018-03-21 PROCEDURE — 74011636637 HC RX REV CODE- 636/637: Performed by: EMERGENCY MEDICINE

## 2018-03-21 PROCEDURE — 83605 ASSAY OF LACTIC ACID: CPT | Performed by: EMERGENCY MEDICINE

## 2018-03-21 PROCEDURE — 80307 DRUG TEST PRSMV CHEM ANLYZR: CPT | Performed by: INTERNAL MEDICINE

## 2018-03-21 PROCEDURE — 74176 CT ABD & PELVIS W/O CONTRAST: CPT

## 2018-03-21 PROCEDURE — 74022 RADEX COMPL AQT ABD SERIES: CPT

## 2018-03-21 PROCEDURE — 74011250636 HC RX REV CODE- 250/636: Performed by: PHYSICIAN ASSISTANT

## 2018-03-21 PROCEDURE — 83605 ASSAY OF LACTIC ACID: CPT

## 2018-03-21 PROCEDURE — 82962 GLUCOSE BLOOD TEST: CPT

## 2018-03-21 PROCEDURE — 81001 URINALYSIS AUTO W/SCOPE: CPT

## 2018-03-21 PROCEDURE — 74011000250 HC RX REV CODE- 250: Performed by: EMERGENCY MEDICINE

## 2018-03-21 PROCEDURE — 74011250636 HC RX REV CODE- 250/636: Performed by: INTERNAL MEDICINE

## 2018-03-21 PROCEDURE — 74011250636 HC RX REV CODE- 250/636: Performed by: EMERGENCY MEDICINE

## 2018-03-21 PROCEDURE — 74011636637 HC RX REV CODE- 636/637: Performed by: INTERNAL MEDICINE

## 2018-03-21 PROCEDURE — 87086 URINE CULTURE/COLONY COUNT: CPT | Performed by: INTERNAL MEDICINE

## 2018-03-21 PROCEDURE — 83605 ASSAY OF LACTIC ACID: CPT | Performed by: INTERNAL MEDICINE

## 2018-03-21 PROCEDURE — 74011000250 HC RX REV CODE- 250: Performed by: PHYSICIAN ASSISTANT

## 2018-03-21 PROCEDURE — 74011250636 HC RX REV CODE- 250/636

## 2018-03-21 PROCEDURE — 71045 X-RAY EXAM CHEST 1 VIEW: CPT

## 2018-03-21 PROCEDURE — 84154 ASSAY OF PSA FREE: CPT | Performed by: INTERNAL MEDICINE

## 2018-03-21 PROCEDURE — 65660000000 HC RM CCU STEPDOWN

## 2018-03-21 PROCEDURE — 74011000258 HC RX REV CODE- 258: Performed by: INTERNAL MEDICINE

## 2018-03-21 PROCEDURE — 96376 TX/PRO/DX INJ SAME DRUG ADON: CPT

## 2018-03-21 PROCEDURE — 96361 HYDRATE IV INFUSION ADD-ON: CPT

## 2018-03-21 PROCEDURE — 96375 TX/PRO/DX INJ NEW DRUG ADDON: CPT

## 2018-03-21 RX ORDER — FENTANYL 25 UG/1
1 PATCH TRANSDERMAL
Status: DISCONTINUED | OUTPATIENT
Start: 2018-03-21 | End: 2018-03-21

## 2018-03-21 RX ORDER — MORPHINE SULFATE 10 MG/ML
5 INJECTION, SOLUTION INTRAMUSCULAR; INTRAVENOUS
Status: DISCONTINUED | OUTPATIENT
Start: 2018-03-21 | End: 2018-03-22 | Stop reason: RX

## 2018-03-21 RX ORDER — LEVOFLOXACIN 5 MG/ML
750 INJECTION, SOLUTION INTRAVENOUS EVERY 24 HOURS
Status: DISCONTINUED | OUTPATIENT
Start: 2018-03-21 | End: 2018-03-24

## 2018-03-21 RX ORDER — FENTANYL CITRATE 50 UG/ML
50 INJECTION, SOLUTION INTRAMUSCULAR; INTRAVENOUS
Status: DISCONTINUED | OUTPATIENT
Start: 2018-03-21 | End: 2018-03-21

## 2018-03-21 RX ORDER — SODIUM CHLORIDE 0.9 % (FLUSH) 0.9 %
5-10 SYRINGE (ML) INJECTION AS NEEDED
Status: DISCONTINUED | OUTPATIENT
Start: 2018-03-21 | End: 2018-03-24 | Stop reason: HOSPADM

## 2018-03-21 RX ORDER — ACETAMINOPHEN 325 MG/1
650 TABLET ORAL
Status: DISCONTINUED | OUTPATIENT
Start: 2018-03-21 | End: 2018-03-24 | Stop reason: HOSPADM

## 2018-03-21 RX ORDER — MAGNESIUM SULFATE 100 %
4 CRYSTALS MISCELLANEOUS AS NEEDED
Status: DISCONTINUED | OUTPATIENT
Start: 2018-03-21 | End: 2018-03-24 | Stop reason: HOSPADM

## 2018-03-21 RX ORDER — MORPHINE SULFATE 2 MG/ML
4 INJECTION, SOLUTION INTRAMUSCULAR; INTRAVENOUS ONCE
Status: COMPLETED | OUTPATIENT
Start: 2018-03-21 | End: 2018-03-21

## 2018-03-21 RX ORDER — ONDANSETRON 2 MG/ML
INJECTION INTRAMUSCULAR; INTRAVENOUS
Status: COMPLETED
Start: 2018-03-21 | End: 2018-03-21

## 2018-03-21 RX ORDER — HEPARIN SODIUM 5000 [USP'U]/ML
5000 INJECTION, SOLUTION INTRAVENOUS; SUBCUTANEOUS EVERY 8 HOURS
Status: DISCONTINUED | OUTPATIENT
Start: 2018-03-21 | End: 2018-03-24 | Stop reason: HOSPADM

## 2018-03-21 RX ORDER — MORPHINE SULFATE 10 MG/ML
5 INJECTION, SOLUTION INTRAMUSCULAR; INTRAVENOUS
Status: DISCONTINUED | OUTPATIENT
Start: 2018-03-21 | End: 2018-03-21 | Stop reason: SDUPTHER

## 2018-03-21 RX ORDER — NALOXONE HYDROCHLORIDE 0.4 MG/ML
0.4 INJECTION, SOLUTION INTRAMUSCULAR; INTRAVENOUS; SUBCUTANEOUS AS NEEDED
Status: DISCONTINUED | OUTPATIENT
Start: 2018-03-21 | End: 2018-03-24 | Stop reason: HOSPADM

## 2018-03-21 RX ORDER — DEXTROSE 50 % IN WATER (D50W) INTRAVENOUS SYRINGE
25-50 AS NEEDED
Status: DISCONTINUED | OUTPATIENT
Start: 2018-03-21 | End: 2018-03-24 | Stop reason: HOSPADM

## 2018-03-21 RX ORDER — FAMOTIDINE 10 MG/ML
20 INJECTION INTRAVENOUS EVERY 12 HOURS
Status: DISCONTINUED | OUTPATIENT
Start: 2018-03-21 | End: 2018-03-21

## 2018-03-21 RX ORDER — INSULIN LISPRO 100 [IU]/ML
INJECTION, SOLUTION INTRAVENOUS; SUBCUTANEOUS
Status: DISCONTINUED | OUTPATIENT
Start: 2018-03-21 | End: 2018-03-24 | Stop reason: HOSPADM

## 2018-03-21 RX ORDER — SODIUM CHLORIDE 0.9 % (FLUSH) 0.9 %
5-10 SYRINGE (ML) INJECTION EVERY 8 HOURS
Status: DISCONTINUED | OUTPATIENT
Start: 2018-03-21 | End: 2018-03-24 | Stop reason: HOSPADM

## 2018-03-21 RX ORDER — ONDANSETRON 2 MG/ML
4 INJECTION INTRAMUSCULAR; INTRAVENOUS
Status: DISCONTINUED | OUTPATIENT
Start: 2018-03-21 | End: 2018-03-24 | Stop reason: HOSPADM

## 2018-03-21 RX ORDER — MORPHINE SULFATE 10 MG/ML
5 INJECTION, SOLUTION INTRAMUSCULAR; INTRAVENOUS
Status: DISCONTINUED | OUTPATIENT
Start: 2018-03-21 | End: 2018-03-21

## 2018-03-21 RX ORDER — MORPHINE SULFATE 4 MG/ML
4 INJECTION INTRAVENOUS
Status: COMPLETED | OUTPATIENT
Start: 2018-03-21 | End: 2018-03-21

## 2018-03-21 RX ORDER — MORPHINE SULFATE 4 MG/ML
INJECTION INTRAVENOUS
Status: DISPENSED
Start: 2018-03-21 | End: 2018-03-21

## 2018-03-21 RX ORDER — DIPHENHYDRAMINE HYDROCHLORIDE 50 MG/ML
12.5 INJECTION, SOLUTION INTRAMUSCULAR; INTRAVENOUS
Status: DISCONTINUED | OUTPATIENT
Start: 2018-03-21 | End: 2018-03-24 | Stop reason: HOSPADM

## 2018-03-21 RX ORDER — HYDROMORPHONE HYDROCHLORIDE 1 MG/ML
1 INJECTION, SOLUTION INTRAMUSCULAR; INTRAVENOUS; SUBCUTANEOUS
Status: DISCONTINUED | OUTPATIENT
Start: 2018-03-21 | End: 2018-03-21

## 2018-03-21 RX ORDER — SODIUM CHLORIDE 9 MG/ML
100 INJECTION, SOLUTION INTRAVENOUS CONTINUOUS
Status: DISCONTINUED | OUTPATIENT
Start: 2018-03-21 | End: 2018-03-23

## 2018-03-21 RX ADMIN — ONDANSETRON 4 MG: 2 INJECTION, SOLUTION INTRAMUSCULAR; INTRAVENOUS at 21:30

## 2018-03-21 RX ADMIN — Medication 4 MG: at 20:25

## 2018-03-21 RX ADMIN — SODIUM CHLORIDE 20 MG: 9 INJECTION INTRAMUSCULAR; INTRAVENOUS; SUBCUTANEOUS at 20:27

## 2018-03-21 RX ADMIN — INSULIN LISPRO 8 UNITS: 100 INJECTION, SOLUTION INTRAVENOUS; SUBCUTANEOUS at 12:51

## 2018-03-21 RX ADMIN — INSULIN LISPRO 6 UNITS: 100 INJECTION, SOLUTION INTRAVENOUS; SUBCUTANEOUS at 08:23

## 2018-03-21 RX ADMIN — INSULIN LISPRO 3 UNITS: 100 INJECTION, SOLUTION INTRAVENOUS; SUBCUTANEOUS at 23:00

## 2018-03-21 RX ADMIN — MORPHINE SULFATE 5 MG: 10 INJECTION INTRAMUSCULAR; INTRAVENOUS; SUBCUTANEOUS at 22:27

## 2018-03-21 RX ADMIN — FENTANYL CITRATE 50 MCG: 50 INJECTION, SOLUTION INTRAMUSCULAR; INTRAVENOUS at 15:51

## 2018-03-21 RX ADMIN — ONDANSETRON 4 MG: 2 INJECTION INTRAMUSCULAR; INTRAVENOUS at 21:30

## 2018-03-21 RX ADMIN — HEPARIN SODIUM 5000 UNITS: 5000 INJECTION, SOLUTION INTRAVENOUS; SUBCUTANEOUS at 15:55

## 2018-03-21 RX ADMIN — INSULIN LISPRO 9 UNITS: 100 INJECTION, SOLUTION INTRAVENOUS; SUBCUTANEOUS at 18:48

## 2018-03-21 RX ADMIN — Medication 10 ML: at 13:13

## 2018-03-21 RX ADMIN — MORPHINE SULFATE 5 MG: 10 INJECTION INTRAMUSCULAR; INTRAVENOUS; SUBCUTANEOUS at 12:00

## 2018-03-21 RX ADMIN — INSULIN DETEMIR 10 UNITS: 100 INJECTION, SOLUTION SUBCUTANEOUS at 22:57

## 2018-03-21 RX ADMIN — VANCOMYCIN HYDROCHLORIDE 2250 MG: 1 INJECTION, POWDER, LYOPHILIZED, FOR SOLUTION INTRAVENOUS at 10:21

## 2018-03-21 RX ADMIN — SODIUM CHLORIDE 1000 MG: 900 INJECTION, SOLUTION INTRAVENOUS at 15:59

## 2018-03-21 RX ADMIN — LEVOFLOXACIN 750 MG: 5 INJECTION, SOLUTION INTRAVENOUS at 08:24

## 2018-03-21 RX ADMIN — MORPHINE SULFATE 5 MG: 10 INJECTION INTRAMUSCULAR; INTRAVENOUS; SUBCUTANEOUS at 08:21

## 2018-03-21 RX ADMIN — SODIUM CHLORIDE 20 MG: 9 INJECTION INTRAMUSCULAR; INTRAVENOUS; SUBCUTANEOUS at 12:02

## 2018-03-21 RX ADMIN — SODIUM CHLORIDE 100 ML/HR: 900 INJECTION, SOLUTION INTRAVENOUS at 08:00

## 2018-03-21 RX ADMIN — Medication 10 ML: at 08:30

## 2018-03-21 RX ADMIN — PIPERACILLIN SODIUM AND TAZOBACTAM SODIUM 4.5 G: 4; .5 INJECTION, POWDER, LYOPHILIZED, FOR SOLUTION INTRAVENOUS at 11:52

## 2018-03-21 RX ADMIN — PIPERACILLIN SODIUM AND TAZOBACTAM SODIUM 4.5 G: 4; .5 INJECTION, POWDER, LYOPHILIZED, FOR SOLUTION INTRAVENOUS at 22:56

## 2018-03-21 RX ADMIN — Medication 10 ML: at 22:57

## 2018-03-21 RX ADMIN — MORPHINE SULFATE 4 MG: 4 INJECTION INTRAVENOUS at 00:18

## 2018-03-21 RX ADMIN — PIPERACILLIN SODIUM AND TAZOBACTAM SODIUM 4.5 G: 4; .5 INJECTION, POWDER, LYOPHILIZED, FOR SOLUTION INTRAVENOUS at 17:21

## 2018-03-21 RX ADMIN — SODIUM CHLORIDE 3402 ML: 900 INJECTION, SOLUTION INTRAVENOUS at 00:03

## 2018-03-21 RX ADMIN — Medication 1 G: at 00:12

## 2018-03-21 RX ADMIN — HEPARIN SODIUM 5000 UNITS: 5000 INJECTION, SOLUTION INTRAVENOUS; SUBCUTANEOUS at 08:24

## 2018-03-21 RX ADMIN — HEPARIN SODIUM 5000 UNITS: 5000 INJECTION, SOLUTION INTRAVENOUS; SUBCUTANEOUS at 23:00

## 2018-03-21 RX ADMIN — MORPHINE SULFATE 5 MG: 10 INJECTION INTRAMUSCULAR; INTRAVENOUS; SUBCUTANEOUS at 18:49

## 2018-03-21 NOTE — DIABETES MGMT
Diabetes Patient/Family Education Record    Factors That  May Influence Patients Ability  to Learn or  Comply with Recommendations   []   Language barrier    []   Cultural needs   []   Motivation    []   Cognitive limitation    []   Physical   [x]   Education: Patient also accepted diabetes class schedule and plan to attend. []   Physiological factors   []   Hearing/vision/speaking impairment   []   Sikhism beliefs    []   Financial factors   []  Other:   []  No factors identified at this time. Person Instructed:   [x]   Patient   []   Family   []  Other     Preference for Learning:   [x]   Verbal   [x]   Written   []  Demonstration     Level of Comprehension & Competence:    [x]  Good                                      [] Fair                                     []  Poor                             []  Needs Reinforcement   [x]  Teachback completed    Education Component:   [x]  Medication management, including how to administer insulin (if appropriate) and potential medication interactions: Patient stated that he is taking the following prescribed diabetes meds at home:  Levemir insulin 30 units daily at bedtime. Assessed patient's ability to draw and inject insulin - and no issues and concerns identified after patient verbally described correct procedure. Patient also verbalized understanding that this is a long acting insulin which must be taken daily. Metformin 1000 mg twice daily with food. [x]  Nutritional management: Educated patient about the importance of eating 3 meals daily and serving size/portion control of carbs (starches, fruits, dairy) and limiting intake of concentrated sweets including regular beverages and snacks like cookies, pies, cakes, ice cream.   [x]  Exercise: Patient stated that he's able to tolerate walking exercise when not feeling sick.    [x]  Signs, symptoms, and treatment of hyperglycemia and hypoglycemia   [x] Prevention, recognition and treatment of hyperglycemia and hypoglycemia   [x]  Importance of blood glucose monitoring and how to obtain a blood glucose meter: Patient stated that he has BG meter and testing supplies, but is not checking his blood sugar regularly as recommended by his medical provider. Educated patient about the importance of home BG monitoring and encouraged it. Patient verbalized understanding and agreed. []  Instruction on use of the blood glucose meter   [x]  Discuss the importance of HbA1C monitorin.9% (2018) is equivalent to average blood glucose of 180 mg/dL during the past 2-3 months. Educated patient about A1c and he verbalized understanding. Encouraged patient to continue to follow his diabetes treatment to help achieve and maintain recommended A1c of <7%. Also discussed list of potential long-term complications due to uncontrolled diabetes. See list below.    []  Sick day guidelines   []  Proper use and disposal of lancets, needles, syringes or insulin pens (if appropriate)   [x]  Potential long-term complications (retinopathy, kidney disease, neuropathy, foot care)   [x] Information about whom to contact in case of emergency or for more information    [x]  Goal:  Patient/family will demonstrate understanding of Diabetes Self Management Skills by: 2018  Plan for post-discharge education or self-management support:    [x] Outpatient class schedule provided            [] Patient Declined    [] Scheduled for outpatient classes (date) _______     Trina Palm RN  pgr [phone]

## 2018-03-21 NOTE — PROGRESS NOTES
[facility] Hospitalist Group  Progress Note    Patient: Tri Iraheta Age: 62 y.o. : 1960 MR#: [id number] SSN: [id number]  Date: 3/21/2018     Subjective:     Pt reports coming to hospital for acute onset abd pain after one hour of having a BM yesterday. Had associated nausea and vomiting. Per pt, vomitus was green liquid. Has no nausea or vomiting since admission, however has severe abd pain not relieved with Morphine. Denies any chest pain, SOB. Assessment/Plan:   1. Abdominal pain: CT scan neg, GI consulted. Surgery consulted. Check mesenteric US. Pain control. Per pharmacy, Dilaudid is not available, equivalent is Fentanyl, however was discontinued d/t SE, and being on 4th floor. Restart Morphine at 5mg (increased from 4mg). CT abd/pelvis in the am.  Follow renal function. 2. Sepsis of unknown origin: Cont Levaquin/Zosyn/Vanco, follow blood and urine cx  3. Nausea/Vomiting: cont. Zofran  4. Lactic Acidosis: cont IVF  5. Diabetes Type 2: Cont. SSI and Levemir  6. Hx of Umbillical Hernia/Groin hernia: Follow pelvic US  7. Hx of pneumoperitoneum  8. Hypertension: cont BP meds  9. Abdominal spasm: pain control. 10. SHYLA: likely contrast induced: Cont IV hydration, check BMP in the am    Goals of care:  Full code  Disposition:   [x] Case management referral    Case discussed with:  [x]Patient  []Family  [x]Nursing  [x]Case Management  DVT Prophylaxis:  []Lovenox  [x]Hep SQ  []SCDs  []Coumadin   []On Heparin gtt    Objective:   VS:   Visit Vitals    /86 (BP 1 Location: Right arm, BP Patient Position: Head of bed elevated (Comment degrees))    Pulse 75    Temp 99.2 °F (37.3 °C)    Resp 18    Ht 5' 8.9\" (1.75 m)    Wt 113.4 kg (250 lb)    SpO2 93%    BMI 37.03 kg/m2      Tmax/24hrs: Temp (24hrs), Av °F (37.2 °C), Min:98.4 °F (36.9 °C), Max:99.5 °F (37.5 °C)    Intake/Output Summary (Last 24 hours) at 18 1645  Last data filed at 18 1620   Gross per 24 hour   Intake                0 ml   Output              200 ml   Net             -200 ml       General:  Awake, alert, NAD  Cardiovascular:  RRR  Pulmonary:  CTA  GI:  Lower mid abd TTP, normal BS. All 4 quadrants and epigastrium are non-tender. No umbilical hernia noted. Extremities: no edema or cyanosis      Labs:    Recent Results (from the past 24 hour(s))   HEMOGLOBIN A1C WITH EAG    Collection Time: 03/20/18 11:15 PM   Result Value Ref Range    Hemoglobin A1c 7.9 (H) 4.2 - 5.6 %    Est. average glucose 180 mg/dL   INFLUENZA A & B AG (RAPID TEST)    Collection Time: 03/20/18 11:21 PM   Result Value Ref Range    Influenza A Antigen NEGATIVE  NEG      Influenza B Antigen NEGATIVE  NEG     POC LACTIC ACID    Collection Time: 03/20/18 11:36 PM   Result Value Ref Range    Lactic Acid (POC) 5.8 (HH) 0.4 - 2.0 mmol/L   CBC WITH AUTOMATED DIFF    Collection Time: 03/20/18 11:40 PM   Result Value Ref Range    WBC 17.1 (H) 4.6 - 13.2 K/uL    RBC 5.06 4.70 - 5.50 M/uL    HGB 14.9 13.0 - 16.0 g/dL    HCT 46.0 36.0 - 48.0 %    MCV 90.9 74.0 - 97.0 FL    MCH 29.4 24.0 - 34.0 PG    MCHC 32.4 31.0 - 37.0 g/dL    RDW 13.5 11.6 - 14.5 %    PLATELET 441 403 - 596 K/uL    MPV 12.1 (H) 9.2 - 11.8 FL    NEUTROPHILS 87 (H) 42 - 75 %    BAND NEUTROPHILS 3 0 - 5 %    LYMPHOCYTES 7 (L) 20 - 51 %    MONOCYTES 3 2 - 9 %    EOSINOPHILS 0 0 - 5 %    BASOPHILS 0 0 - 3 %    ABS. NEUTROPHILS 14.9 (H) 1.8 - 8.0 K/UL    ABS. LYMPHOCYTES 1.2 0.8 - 3.5 K/UL    ABS. MONOCYTES 0.5 0 - 1.0 K/UL    ABS. EOSINOPHILS 0.0 0.0 - 0.4 K/UL    ABS.  BASOPHILS 0.0 0.0 - 0.1 K/UL    PLATELET COMMENTS LARGE PLATELETS      RBC COMMENTS POLYCHROMASIA  1+        WBC COMMENTS REACTIVE LYMPHS      DF MANUAL     METABOLIC PANEL, COMPREHENSIVE    Collection Time: 03/20/18 11:40 PM   Result Value Ref Range    Sodium 138 136 - 145 mmol/L    Potassium 3.5 3.5 - 5.5 mmol/L    Chloride 94 (L) 100 - 108 mmol/L    CO2 27 21 - 32 mmol/L    Anion gap 17 3.0 - 18 mmol/L    Glucose 379 (H) 74 - 99 mg/dL    BUN 17 7.0 - 18 MG/DL    Creatinine 1.91 (H) 0.6 - 1.3 MG/DL    BUN/Creatinine ratio 9 (L) 12 - 20      GFR est AA 44 (L) >60 ml/min/1.73m2    GFR est non-AA 37 (L) >60 ml/min/1.73m2    Calcium 9.9 8.5 - 10.1 MG/DL    Bilirubin, total 0.5 0.2 - 1.0 MG/DL    ALT (SGPT) 43 16 - 61 U/L    AST (SGOT) 19 15 - 37 U/L    Alk. phosphatase 124 (H) 45 - 117 U/L    Protein, total 9.5 (H) 6.4 - 8.2 g/dL    Albumin 4.1 3.4 - 5.0 g/dL    Globulin 5.4 (H) 2.0 - 4.0 g/dL    A-G Ratio 0.8 0.8 - 1.7     LIPASE    Collection Time: 03/20/18 11:40 PM   Result Value Ref Range    Lipase 172 73 - 393 U/L   CARDIAC PANEL,(CK, CKMB & TROPONIN)    Collection Time: 03/20/18 11:40 PM   Result Value Ref Range     39 - 308 U/L    CK - MB 1.5 <3.6 ng/ml    CK-MB Index 0.8 0.0 - 4.0 %    Troponin-I, Qt. <0.02 0.0 - 0.045 NG/ML   CULTURE, BLOOD    Collection Time: 03/20/18 11:40 PM   Result Value Ref Range    Special Requests: NO SPECIAL REQUESTS      Culture result: NO GROWTH AFTER 6 HOURS     EKG, 12 LEAD, INITIAL    Collection Time: 03/20/18 11:43 PM   Result Value Ref Range    Ventricular Rate 96 BPM    Atrial Rate 96 BPM    P-R Interval 212 ms    QRS Duration 80 ms    Q-T Interval 352 ms    QTC Calculation (Bezet) 444 ms    Calculated P Axis 19 degrees    Calculated R Axis -2 degrees    Calculated T Axis -23 degrees    Diagnosis       Sinus rhythm with 1st degree AV block  Low voltage QRS  Inferior infarct (cited on or before 27-OCT-2010)  Abnormal ECG  When compared with ECG of 08-SEP-2017 21:07,  Vent.  rate has increased BY  35 BPM  Confirmed by Timbo Dixon MD, [facility] ([id number]) on 3/21/2018 10:57:01 AM     CULTURE, BLOOD    Collection Time: 03/21/18 12:00 AM   Result Value Ref Range    Special Requests: NO SPECIAL REQUESTS      Culture result: NO GROWTH AFTER 6 HOURS     URINALYSIS W/ RFLX MICROSCOPIC    Collection Time: 03/21/18 12:51 AM   Result Value Ref Range    Color YELLOW      Appearance CLEAR Specific gravity >1.030 (H) 1.005 - 1.030    pH (UA) 8.0 5.0 - 8.0      Protein 30 (A) NEG mg/dL    Glucose >1000 (A) NEG mg/dL    Ketone 40 (A) NEG mg/dL    Bilirubin NEGATIVE  NEG      Blood NEGATIVE  NEG      Urobilinogen 0.2 0.2 - 1.0 EU/dL    Nitrites NEGATIVE  NEG      Leukocyte Esterase NEGATIVE  NEG     URINE MICROSCOPIC ONLY    Collection Time: 03/21/18 12:51 AM   Result Value Ref Range    WBC 0 to 1 0 - 5 /hpf    RBC NEGATIVE  0 - 5 /hpf    Epithelial cells FEW 0 - 5 /lpf    Bacteria NEGATIVE  NEG /hpf   DRUG SCREEN, URINE    Collection Time: 03/21/18 12:51 AM   Result Value Ref Range    BENZODIAZEPINES NEGATIVE  NEG      BARBITURATES NEGATIVE  NEG      THC (TH-CANNABINOL) NEGATIVE  NEG      OPIATES POSITIVE (A) NEG      PCP(PHENCYCLIDINE) NEGATIVE  NEG      COCAINE NEGATIVE  NEG      AMPHETAMINES NEGATIVE  NEG      METHADONE NEGATIVE  NEG      HDSCOM (NOTE)    POC LACTIC ACID    Collection Time: 03/21/18  2:44 AM   Result Value Ref Range    Lactic Acid (POC) 3.6 (HH) 0.4 - 2.0 mmol/L   POC LACTIC ACID    Collection Time: 03/21/18  6:43 AM   Result Value Ref Range    Lactic Acid (POC) 1.7 0.4 - 2.0 mmol/L   GLUCOSE, POC    Collection Time: 03/21/18  8:14 AM   Result Value Ref Range    Glucose (POC) 263 (H) 70 - 110 mg/dL   LACTIC ACID    Collection Time: 03/21/18 10:35 AM   Result Value Ref Range    Lactic acid 3.2 (HH) 0.4 - 2.0 MMOL/L   GLUCOSE, POC    Collection Time: 03/21/18 12:44 PM   Result Value Ref Range    Glucose (POC) 319 (H) 70 - 110 mg/dL       Signed By: Jesus Ca PA-C     March 21, 2018 4:45 PM

## 2018-03-21 NOTE — PROGRESS NOTES
Care Management Interventions  PCP Verified by CM: Yes (Dr. Queenie Velez)  Mode of Transport at Discharge: Other (see comment) (family)  Transition of Care Consult (CM Consult): Discharge Planning  Current Support Network: Relative's Home (lives with sister)  Confirm Follow Up Transport: Family     Pt is 62 y. o admitted for Sepsis, Abdominal Pain. Pt is alert and oriented and alone in room. Pt reports he is on disability and lives with his sister Agustin Cos [phone]. Pt states he is independent with ADLs and able to ambulate without problems even though he has a cane at home from when he had back surgery last June. Pt states he hopes to go home after discharge from the hospital and he has a ride home after discharge.

## 2018-03-21 NOTE — PROGRESS NOTES
Sepsis Re-Assessment Documentation:       Vital Signs  Level of Consciousness: Alert  Temp: 98.4 °F (36.9 °C)  Temp Source: Oral  Pulse (Heart Rate): 87  Heart Rate Source: Monitor  Resp Rate: 17  BP: (!) 158/96  MAP (Monitor): 118  MAP (Calculated): 117  BP 1 Location: Right arm  BP 1 Method: Automatic  BP Patient Position: Head of bed elevated (Comment degrees)  MEWS Score: 1  Capillary refill- normal  Peripheral pulses are 1+  Awake, alert, oriented, follows commands  Cardiovascular:  S1S2+, RRR  Pulmonary:  CTA b/l  GI:  Soft, BS+, + tenderness to palpation in lower abdomen, ND  Extremities:  No edema    Lily Simental MD

## 2018-03-21 NOTE — DIABETES MGMT
Glycemic Control Plan of Care    POC BG report on 03/20/2018: None. POC BG report on 03/21/2018 at time of review: 263 mg/dL. Completed assessment of home diabetes management and education with patient, 03/21/2018. Patient reported home diabetes meds: levemir insulin 30 units daily at bedtime and metformin 1000 mg BID with food. IVF: NS at 100 ml/hr, continuous infusion until d/c'd. Patient on clear liquid diet at time of review. Noted pending surg consult. Recommendation(s):  1.) Patient is currently on correctional lispro insulin. Called Dr. Ashley Noel and reported that patient is on (home) Levemir insulin 30 units daily at bedtime. 2.) Modify clear liquid diet to include no concentrated sweets. Obtained order from Dr. Ashley Noel.    Assessment:  Patient is 62year old with past medical history including IDDM, gastroparesis,  hypertension, GERD, positive PPD, heart attack, CAD with stent, acute renal failure, hypothyroidism, umbilical hernia, and pneumoperitoneum 9/2017 - was admitted on 03/20/2018 with report of periumbilical pain associated with nausea and vomiting. Noted:  Abdominal pain. Sepsis. IDDM with current A1c of 7.9% (03/20/2018)    Most recent blood glucose values: [facility] report 03/2018: 379 mg/dL. No POC BG report for 03/20/2018. Results for Laila Stevenson (MRN [id number]) as of 3/21/2018 10:39   Ref. Range 3/21/2018 08:14   GLUCOSE,FAST - POC Latest Ref Range: 70 - 110 mg/dL 263 (H)     Current A1C: 7.9% (03/20/2018) is equivalent to average blood glucose of 180 mg/dL during the past 2-3 months. Current hospital diabetes medications:  Correctional lispro insulin ACHS. Normal sensitivity dose. Ordered starting 03/21/2018. Total daily dose insulin requirement previous day: 03/20/2018  None. Home diabetes medications: Patient reported on 03/21/2018:  Levemir insulin 30 units daily at bedtime. Metformin 1000 mg twice daily with food.     Diet: Clear liquid; no concentrated sweets. Goals:  Blood glucose will be within target range of  mg/dL by 03/24/2018.     Education:  _X__  Refer to Diabetes Education Record: 03/21/2018             ___  Education not indicated at this time    Babs Valdovinos RN [facility]  Pager: [phone]

## 2018-03-21 NOTE — CONSULTS
[email or web address]  [phone]    Impression:   1. Abdominal pain-   -Ct abdomen- no acute abdomen or pelvic process   -11/2010- EGD- grade 2 esophagitis. Medium HH  -11/2010 colo- polyp. Overdue for colonoscopy. May be done OP  2. Intractable Nausea and vomiting-   3. Leukocytosis  4. DM  5. H/O pneumoperitoneum last september      Plan:     1. Cont supportive measures  2. Ordered  Mesenteric doppler, concerned for vascular insufficiency    Chief Complaint: abdominal pain, nausea and vomiting      HPI:  Kilo Simon is a 62 y.o. male who is being seen on consult for acute onset lower abdominal pain with nausea and vomiting. Pt states pain started yesterday after BM. Presently nausea and vomiting resolved. Full history noted above. PMH:   Past Medical History:   Diagnosis Date    Arthritis     Coronary atherosclerosis of native coronary artery     S/P PCI with GE in 12/09    Diabetes ([facility] [address]     IDDM    Electrolyte and fluid disorders not elsewhere classified     Essential hypertension, benign     GERD (gastroesophageal reflux disease)     Heroin abuse 05/26/16    Psychiatrist Dr. Tasha Ochoa History of heart attack     Hyperlipidemia     Intermediate coronary syndrome [facility])     MDD (major depressive disorder) 5/26/16    Psychiatrist Dr. Tasha Ochoa Myocardial infarction     Obesity, unspecified     Old myocardial infarction     Other and unspecified hyperlipidemia     Pancreatitis     Positive PPD     Sleep apnea     uses Cpap machine    Transfusion history     Before 1992       PSH:   Past Surgical History:   Procedure Laterality Date    HX APPENDECTOMY      HX CORONARY STENT PLACEMENT  2010    2 stents       Social HX:   Social History     Social History    Marital status:      Spouse name: N/A    Number of children: N/A    Years of education: N/A     Occupational History    Not on file.      Social History Main Topics    Smoking status: Never Smoker  Smokeless tobacco: Never Used    Alcohol use No    Drug use: No    Sexual activity: Not on file     Other Topics Concern    Not on file     Social History Narrative       FHX:   Family History   Problem Relation Age of Onset    Heart Attack Father     Coronary Artery Disease Mother     Diabetes Mother     Cancer Sister      breast cancer and lung cancer       Allergy:   Allergies   Allergen Reactions    Compazine [Prochlorperazine] Anaphylaxis     \"Tightness around throat\"       Home Medications:     Prescriptions Prior to Admission   Medication Sig    quinapril (ACCUPRIL) 20 mg tablet Take 1 Tab by mouth nightly. Indications: hypertension    insulin detemir (LEVEMIR) 100 unit/mL injection 30 Units by SubCUTAneous route nightly.  hydroCHLOROthiazide (HYDRODIURIL) 50 mg tablet Take 0.5 Tabs by mouth daily.  dicyclomine (BENTYL) 10 mg capsule Take 1 Cap by mouth three (3) times daily as needed. Indications: abdominal pain, take it with tylenol    pantoprazole (PROTONIX) 40 mg tablet Take 1 Tab by mouth Daily (before breakfast).  albuterol (PROVENTIL HFA, VENTOLIN HFA, PROAIR HFA) 90 mcg/actuation inhaler 2 puffs every 6 hours x 5 days then every 6 hours as needed for shortness of breath and/or wheezing    acetaminophen (TYLENOL) 325 mg tablet Take 2 Tabs by mouth every six (6) hours as needed. Indications: Pain, take it with bentyl    polyethylene glycol (MIRALAX) 17 gram packet Take 1 Packet by mouth daily.  amLODIPine (NORVASC) 10 mg tablet Take 10 mg by mouth daily.  pregabalin (LYRICA) 75 mg capsule 1 cap bid x 1 week, then 2 caps bid    amitriptyline (ELAVIL) 50 mg tablet Take 50 mg by mouth nightly.  simvastatin (ZOCOR) 10 mg tablet Take 10 mg by mouth nightly.  metFORMIN (GLUCOPHAGE) 1,000 mg tablet Take 1,000 mg by mouth two (2) times daily (with meals).  clopidogrel (PLAVIX) 75 mg tablet Take 75 mg by mouth daily.        Review of Systems:     A fourteen point review of systems was obtained, and was negative except per HPI. Visit Vitals    BP (!) 158/96 (BP 1 Location: Right arm, BP Patient Position: Head of bed elevated (Comment degrees))    Pulse 87    Temp 98.4 °F (36.9 °C)    Resp 17    Wt 113.4 kg (250 lb)    SpO2 93%    BMI 36.92 kg/m2       Physical Assessment:     constitutional: appearance: well developed, well nourished, in no acute distress. skin: no rashes, jaundice  eyes: inspection: normal conjunctivae and lids; no scleral icterus pupils: equal, round, reactive to light; extraocular movements intact  ENMT: mouth: mucous membranes moist, no thrush  neck:  no masses or tenderness; normal range of motion  respiratory: breath sounds clear, no wheeze/rales/rhonchi  cardiovascular: normal rate, regular rhythm without murmur  abdominal: soft, bowel sounds present, non-tender to palpation without rebound or guarding; no appreciable mass; no appreciable hepatosplenomegaly; no appreciable fluid wave  extremities: no clubbing, cyanosis, edema  neurologic:  Cranial nerves II-XII grossly intact; no asterixis   psychiatric:  oriented to time, space and person. Basic Metabolic Profile   Recent Labs      03/20/18   2340   NA  138   K  3.5   CL  94*   CO2  27   BUN  17   GLU  379*   CA  9.9         CBC w/Diff    Recent Labs      03/20/18   2340   WBC  17.1*   RBC  5.06   HGB  14.9   HCT  46.0   MCV  90.9   MCH  29.4   MCHC  32.4   RDW  13.5   PLT  331    Recent Labs      03/20/18   2340   GRANS  87*   LYMPH  7*   EOS  0        Hepatic Function   Recent Labs      03/20/18   2340   ALB  4.1   TP  9.5*   TBILI  0.5   SGOT  19   AP  124*   LPSE  [address], NP  [facility] 1947, [facility]  [email or web address]

## 2018-03-21 NOTE — H&P
History & Physical    Patient: Denson Krabbe MRN: [id number]  CSN: [id number]    YOB: 1960  Age: 62 y.o. Sex: male      DOA: 3/20/2018    Chief Complaint: No chief complaint on file. HPI:     Denson Krabbe is a 62 y.o.   male who presents with acute onset of abdominal pain periumbilical assoiated with nausea and vomiting  Denies any trauma has hx of diabetes poorly controlled no nidus  Hx of pneumoperitoneum last September  Patient states since 1990's has intermittent abdominal pain   Known umbilical hernia   No change in bowel habit or diet  No constipation  In ER CT done without IV contrast creatine elevated  No focal findings  Intractable pain and nausea required several rounds of Morphine/zofran to get comfortable       Past Medical History:   Diagnosis Date    Arthritis     Coronary atherosclerosis of native coronary artery     S/P PCI with GE in 12/09    Diabetes ([facility] [address]     IDDM    Electrolyte and fluid disorders not elsewhere classified     Essential hypertension, benign     GERD (gastroesophageal reflux disease)     Heroin abuse 05/26/16    Psychiatrist Dr. Brigida Kessler History of heart attack     Hyperlipidemia     Intermediate coronary syndrome ([facility] [address]     MDD (major depressive disorder) 5/26/16    Psychiatrist Dr. Brigida Kessler Myocardial infarction     Obesity, unspecified     Old myocardial infarction     Other and unspecified hyperlipidemia     Pancreatitis     Positive PPD     Sleep apnea     uses Cpap machine    Transfusion history     Before 1992       Past Surgical History:   Procedure Laterality Date    HX APPENDECTOMY      HX CORONARY STENT PLACEMENT  2010    2 stents       Family History   Problem Relation Age of Onset    Heart Attack Father     Coronary Artery Disease Mother     Diabetes Mother     Cancer Sister      breast cancer and lung cancer       Social History     Social History    Marital status:      Spouse name: N/A    Number of children: N/A    Years of education: N/A     Social History Main Topics    Smoking status: Never Smoker    Smokeless tobacco: Never Used    Alcohol use No    Drug use: No    Sexual activity: Not Asked     Other Topics Concern    None     Social History Narrative       Prior to Admission medications    Medication Sig Start Date End Date Taking? Authorizing Provider   quinapril (ACCUPRIL) 20 mg tablet Take 1 Tab by mouth nightly. Indications: hypertension 9/14/17   Kameron Fermin MD   insulin detemir (LEVEMIR) 100 unit/mL injection 30 Units by SubCUTAneous route nightly. 9/11/17   Florentino Martin MD   hydroCHLOROthiazide (HYDRODIURIL) 50 mg tablet Take 0.5 Tabs by mouth daily. 9/11/17   Florentino Martin MD   dicyclomine (BENTYL) 10 mg capsule Take 1 Cap by mouth three (3) times daily as needed. Indications: abdominal pain, take it with tylenol 9/11/17   Florentino Martin MD   pantoprazole (PROTONIX) 40 mg tablet Take 1 Tab by mouth Daily (before breakfast). 9/11/17   Florentino Martin MD   albuterol (PROVENTIL HFA, VENTOLIN HFA, PROAIR HFA) 90 mcg/actuation inhaler 2 puffs every 6 hours x 5 days then every 6 hours as needed for shortness of breath and/or wheezing 9/11/17   Flroentino Martin MD   acetaminophen (TYLENOL) 325 mg tablet Take 2 Tabs by mouth every six (6) hours as needed. Indications: Pain, take it with bentyl 9/11/17   Florentino Martin MD   polyethylene glycol (MIRALAX) 17 gram packet Take 1 Packet by mouth daily. 9/11/17   Florentino Martin MD   amLODIPine (NORVASC) 10 mg tablet Take 10 mg by mouth daily. Historical Provider   pregabalin (LYRICA) 75 mg capsule 1 cap bid x 1 week, then 2 caps bid 7/5/17   Klever Devries NP   amitriptyline (ELAVIL) 50 mg tablet Take 50 mg by mouth nightly. Maki Farmer MD   simvastatin (ZOCOR) 10 mg tablet Take 10 mg by mouth nightly.     Historical Provider   metFORMIN (GLUCOPHAGE) 1,000 mg tablet Take 1,000 mg by mouth two (2) times daily (with meals). Historical Provider   clopidogrel (PLAVIX) 75 mg tablet Take 75 mg by mouth daily. Historical Provider       Allergies   Allergen Reactions    Compazine [Prochlorperazine] Anaphylaxis     \"Tightness around throat\"         Review of Systems  GENERAL: Patient alert, awake and oriented times 3, able to communicate full sentences and not in distress. HEENT: No change in vision, no earache, tinnitus, sore throat or sinus congestion. NECK: No pain or stiffness. PULMONARY: No shortness of breath, cough or wheeze. Cardiovascular: no pnd or orthopnea, no CP  GASTROINTESTINAL:+ abdominal pain, +nausea, +vomiting + diarrhea, melena or bright red blood per rectum. GENITOURINARY: No urinary frequency, urgency, hesitancy or dysuria. MUSCULOSKELETAL: No joint or muscle pain, no back pain, no recent trauma. DERMATOLOGIC: No rash, no itching, no lesions. ENDOCRINE: No polyuria, polydipsia, no heat or cold intolerance. No recent change in weight. HEMATOLOGICAL: No anemia or easy bruising or bleeding. NEUROLOGIC: No headache, seizures, numbness, tingling or weakness. Physical Exam:     Physical Exam:  Visit Vitals    /90 (BP 1 Location: Right arm, BP Patient Position: At rest)    Pulse 96    Temp 99.5 °F (37.5 °C)    Resp 29    Wt 113.4 kg (250 lb)    SpO2 100%    BMI 36.92 kg/m2           Temp (24hrs), Av.5 °F (37.5 °C), Min:99.5 °F (37.5 °C), Max:99.5 °F (37.5 °C)             General:  Alert, cooperative, no distress, appears stated age. Head: Normocephalic, without obvious abnormality, atraumatic. Eyes:  Conjunctivae/corneas clear. PERRL, EOMs intact. Nose: Nares normal. No drainage or sinus tenderness. Neck: Supple, symmetrical, trachea midline, no adenopathy, thyroid: no enlargement, no carotid bruit and no JVD. Lungs:   Clear to auscultation bilaterally.    Heart:  Regular rate and rhythm, S1, S2 normal. Abdomen: Soft, non-tender. Bowel sounds normal. umbillical hernia   ? Right groin hernia surgical scar weakness and tender    Extremities: Extremities normal, atraumatic, no cyanosis or edema. Pulses: 2+ and symmetric all extremities. Skin:  No rashes or lesions   Neurologic: AAOx3, No focal motor or sensory deficit. Labs Reviewed:  Recent Results (from the past 24 hour(s))   INFLUENZA A & B AG (RAPID TEST)    Collection Time: 03/20/18 11:21 PM   Result Value Ref Range    Influenza A Antigen NEGATIVE  NEG      Influenza B Antigen NEGATIVE  NEG     POC LACTIC ACID    Collection Time: 03/20/18 11:36 PM   Result Value Ref Range    Lactic Acid (POC) 5.8 (HH) 0.4 - 2.0 mmol/L   CBC WITH AUTOMATED DIFF    Collection Time: 03/20/18 11:40 PM   Result Value Ref Range    WBC 17.1 (H) 4.6 - 13.2 K/uL    RBC 5.06 4.70 - 5.50 M/uL    HGB 14.9 13.0 - 16.0 g/dL    HCT 46.0 36.0 - 48.0 %    MCV 90.9 74.0 - 97.0 FL    MCH 29.4 24.0 - 34.0 PG    MCHC 32.4 31.0 - 37.0 g/dL    RDW 13.5 11.6 - 14.5 %    PLATELET 480 496 - 877 K/uL    MPV 12.1 (H) 9.2 - 11.8 FL    NEUTROPHILS 87 (H) 42 - 75 %    BAND NEUTROPHILS 3 0 - 5 %    LYMPHOCYTES 7 (L) 20 - 51 %    MONOCYTES 3 2 - 9 %    EOSINOPHILS 0 0 - 5 %    BASOPHILS 0 0 - 3 %    ABS. NEUTROPHILS 14.9 (H) 1.8 - 8.0 K/UL    ABS. LYMPHOCYTES 1.2 0.8 - 3.5 K/UL    ABS. MONOCYTES 0.5 0 - 1.0 K/UL    ABS. EOSINOPHILS 0.0 0.0 - 0.4 K/UL    ABS.  BASOPHILS 0.0 0.0 - 0.1 K/UL    PLATELET COMMENTS LARGE PLATELETS      RBC COMMENTS POLYCHROMASIA  1+        WBC COMMENTS REACTIVE LYMPHS      DF MANUAL     METABOLIC PANEL, COMPREHENSIVE    Collection Time: 03/20/18 11:40 PM   Result Value Ref Range    Sodium 138 136 - 145 mmol/L    Potassium 3.5 3.5 - 5.5 mmol/L    Chloride 94 (L) 100 - 108 mmol/L    CO2 27 21 - 32 mmol/L    Anion gap 17 3.0 - 18 mmol/L    Glucose 379 (H) 74 - 99 mg/dL    BUN 17 7.0 - 18 MG/DL    Creatinine 1.91 (H) 0.6 - 1.3 MG/DL    BUN/Creatinine ratio 9 (L) 12 - 20 GFR est AA 44 (L) >60 ml/min/1.73m2    GFR est non-AA 37 (L) >60 ml/min/1.73m2    Calcium 9.9 8.5 - 10.1 MG/DL    Bilirubin, total 0.5 0.2 - 1.0 MG/DL    ALT (SGPT) 43 16 - 61 U/L    AST (SGOT) 19 15 - 37 U/L    Alk. phosphatase 124 (H) 45 - 117 U/L    Protein, total 9.5 (H) 6.4 - 8.2 g/dL    Albumin 4.1 3.4 - 5.0 g/dL    Globulin 5.4 (H) 2.0 - 4.0 g/dL    A-G Ratio 0.8 0.8 - 1.7     LIPASE    Collection Time: 03/20/18 11:40 PM   Result Value Ref Range    Lipase 172 73 - 393 U/L   CARDIAC PANEL,(CK, CKMB & TROPONIN)    Collection Time: 03/20/18 11:40 PM   Result Value Ref Range     39 - 308 U/L    CK - MB 1.5 <3.6 ng/ml    CK-MB Index 0.8 0.0 - 4.0 %    Troponin-I, Qt. <0.02 0.0 - 0.045 NG/ML   EKG, 12 LEAD, INITIAL    Collection Time: 03/20/18 11:43 PM   Result Value Ref Range    Ventricular Rate 96 BPM    Atrial Rate 96 BPM    P-R Interval 212 ms    QRS Duration 80 ms    Q-T Interval 352 ms    QTC Calculation (Bezet) 444 ms    Calculated P Axis 19 degrees    Calculated R Axis -2 degrees    Calculated T Axis -23 degrees    Diagnosis       Sinus rhythm with 1st degree AV block  Low voltage QRS  Inferior infarct (cited on or before 27-OCT-2010)  Abnormal ECG  When compared with ECG of 08-SEP-2017 21:07,  Vent.  rate has increased BY  35 BPM     URINALYSIS W/ RFLX MICROSCOPIC    Collection Time: 03/21/18 12:51 AM   Result Value Ref Range    Color YELLOW      Appearance CLEAR      Specific gravity >1.030 (H) 1.005 - 1.030    pH (UA) 8.0 5.0 - 8.0      Protein 30 (A) NEG mg/dL    Glucose >1000 (A) NEG mg/dL    Ketone 40 (A) NEG mg/dL    Bilirubin NEGATIVE  NEG      Blood NEGATIVE  NEG      Urobilinogen 0.2 0.2 - 1.0 EU/dL    Nitrites NEGATIVE  NEG      Leukocyte Esterase NEGATIVE  NEG     URINE MICROSCOPIC ONLY    Collection Time: 03/21/18 12:51 AM   Result Value Ref Range    WBC 0 to 1 0 - 5 /hpf    RBC NEGATIVE  0 - 5 /hpf    Epithelial cells FEW 0 - 5 /lpf    Bacteria NEGATIVE  NEG /hpf   POC LACTIC ACID Collection Time: 03/21/18  2:44 AM   Result Value Ref Range    Lactic Acid (POC) 3.6 (HH) 0.4 - 2.0 mmol/L     All lab results for the last 24 hours reviewed. Recent Results (from the past 24 hour(s))   INFLUENZA A & B AG (RAPID TEST)    Collection Time: 03/20/18 11:21 PM   Result Value Ref Range    Influenza A Antigen NEGATIVE  NEG      Influenza B Antigen NEGATIVE  NEG     POC LACTIC ACID    Collection Time: 03/20/18 11:36 PM   Result Value Ref Range    Lactic Acid (POC) 5.8 (HH) 0.4 - 2.0 mmol/L   CBC WITH AUTOMATED DIFF    Collection Time: 03/20/18 11:40 PM   Result Value Ref Range    WBC 17.1 (H) 4.6 - 13.2 K/uL    RBC 5.06 4.70 - 5.50 M/uL    HGB 14.9 13.0 - 16.0 g/dL    HCT 46.0 36.0 - 48.0 %    MCV 90.9 74.0 - 97.0 FL    MCH 29.4 24.0 - 34.0 PG    MCHC 32.4 31.0 - 37.0 g/dL    RDW 13.5 11.6 - 14.5 %    PLATELET 179 907 - 699 K/uL    MPV 12.1 (H) 9.2 - 11.8 FL    NEUTROPHILS 87 (H) 42 - 75 %    BAND NEUTROPHILS 3 0 - 5 %    LYMPHOCYTES 7 (L) 20 - 51 %    MONOCYTES 3 2 - 9 %    EOSINOPHILS 0 0 - 5 %    BASOPHILS 0 0 - 3 %    ABS. NEUTROPHILS 14.9 (H) 1.8 - 8.0 K/UL    ABS. LYMPHOCYTES 1.2 0.8 - 3.5 K/UL    ABS. MONOCYTES 0.5 0 - 1.0 K/UL    ABS. EOSINOPHILS 0.0 0.0 - 0.4 K/UL    ABS.  BASOPHILS 0.0 0.0 - 0.1 K/UL    PLATELET COMMENTS LARGE PLATELETS      RBC COMMENTS POLYCHROMASIA  1+        WBC COMMENTS REACTIVE LYMPHS      DF MANUAL     METABOLIC PANEL, COMPREHENSIVE    Collection Time: 03/20/18 11:40 PM   Result Value Ref Range    Sodium 138 136 - 145 mmol/L    Potassium 3.5 3.5 - 5.5 mmol/L    Chloride 94 (L) 100 - 108 mmol/L    CO2 27 21 - 32 mmol/L    Anion gap 17 3.0 - 18 mmol/L    Glucose 379 (H) 74 - 99 mg/dL    BUN 17 7.0 - 18 MG/DL    Creatinine 1.91 (H) 0.6 - 1.3 MG/DL    BUN/Creatinine ratio 9 (L) 12 - 20      GFR est AA 44 (L) >60 ml/min/1.73m2    GFR est non-AA 37 (L) >60 ml/min/1.73m2    Calcium 9.9 8.5 - 10.1 MG/DL    Bilirubin, total 0.5 0.2 - 1.0 MG/DL    ALT (SGPT) 43 16 - 61 U/L    AST (SGOT) 19 15 - 37 U/L    Alk. phosphatase 124 (H) 45 - 117 U/L    Protein, total 9.5 (H) 6.4 - 8.2 g/dL    Albumin 4.1 3.4 - 5.0 g/dL    Globulin 5.4 (H) 2.0 - 4.0 g/dL    A-G Ratio 0.8 0.8 - 1.7     LIPASE    Collection Time: 03/20/18 11:40 PM   Result Value Ref Range    Lipase 172 73 - 393 U/L   CARDIAC PANEL,(CK, CKMB & TROPONIN)    Collection Time: 03/20/18 11:40 PM   Result Value Ref Range     39 - 308 U/L    CK - MB 1.5 <3.6 ng/ml    CK-MB Index 0.8 0.0 - 4.0 %    Troponin-I, Qt. <0.02 0.0 - 0.045 NG/ML   EKG, 12 LEAD, INITIAL    Collection Time: 03/20/18 11:43 PM   Result Value Ref Range    Ventricular Rate 96 BPM    Atrial Rate 96 BPM    P-R Interval 212 ms    QRS Duration 80 ms    Q-T Interval 352 ms    QTC Calculation (Bezet) 444 ms    Calculated P Axis 19 degrees    Calculated R Axis -2 degrees    Calculated T Axis -23 degrees    Diagnosis       Sinus rhythm with 1st degree AV block  Low voltage QRS  Inferior infarct (cited on or before 27-OCT-2010)  Abnormal ECG  When compared with ECG of 08-SEP-2017 21:07,  Vent.  rate has increased BY  35 BPM     URINALYSIS W/ RFLX MICROSCOPIC    Collection Time: 03/21/18 12:51 AM   Result Value Ref Range    Color YELLOW      Appearance CLEAR      Specific gravity >1.030 (H) 1.005 - 1.030    pH (UA) 8.0 5.0 - 8.0      Protein 30 (A) NEG mg/dL    Glucose >1000 (A) NEG mg/dL    Ketone 40 (A) NEG mg/dL    Bilirubin NEGATIVE  NEG      Blood NEGATIVE  NEG      Urobilinogen 0.2 0.2 - 1.0 EU/dL    Nitrites NEGATIVE  NEG      Leukocyte Esterase NEGATIVE  NEG     URINE MICROSCOPIC ONLY    Collection Time: 03/21/18 12:51 AM   Result Value Ref Range    WBC 0 to 1 0 - 5 /hpf    RBC NEGATIVE  0 - 5 /hpf    Epithelial cells FEW 0 - 5 /lpf    Bacteria NEGATIVE  NEG /hpf   POC LACTIC ACID    Collection Time: 03/21/18  2:44 AM   Result Value Ref Range    Lactic Acid (POC) 3.6 (HH) 0.4 - 2.0 mmol/L    and EKG    Procedures/imaging: see electronic medical records for all procedures/Xrays and details which were not copied into this note but were reviewed prior to creation of Plan  CT Results  (Last 48 hours)               03/21/18 0527  CT ABD PELV WO CONT Final result    Impression:  Impression:       No CT evidence of an acute abdominal or pelvic process. Chronic incidental findings are stable and include hepatic steatosis, right   adrenal nodule, fat-containing umbilical hernia and an exophytic right renal   cyst.       Stable postoperative appearance at L4-5. Narrative:  CT abdomen and pelvis without contrast           HISTORY: Abdominal pain with nausea and vomiting. COMPARISON: 9/10/2017       TECHNIQUE: 5mm contiguous axial images from the lungs bases to the anal verge. No intravenous contrast was administered. All CT scans at this facility are performed using dose optimization technique as   appropriate to a performed exam, to include automated exposure control,   adjustment of the MA and/or kV according to patient size (including appropriate   matching for site-specific examinations) or use of  iterative reconstruction   technique. ABDOMEN FINDINGS: Thin linear strands of subsegmental atelectasis at the lung   bases. Hepatic steatosis. Gallbladder, spleen and pancreas are unremarkable. Stable right adrenal nodule. Left adrenal is mildly thickened but without focal   nodularity. Ptotic position of the right kidney. Small exophytic cyst is stable. No renal calculi or hydronephrosis. Ureters are nondilated. Normal caliber   aorta. No pneumoperitoneum or ascites. Bowel is unremarkable. Small   fat-containing umbilical hernia. PELVIC FINDINGS:  Bladder looks normal. No pelvic mass or adenopathy. No free   fluid. Bowel is unremarkable. No acute osseous abnormality. L4-5 interbody   fusion device has subsided into the partially collapsed L5 vertebral body. No   evidence of solid osseous fusion across the disc space.  Disc space narrowing and vacuum phenomenon at L5-S1. Assessment/Plan     Active Problems:    Abdominal pain (9/7/2017)Nausea Vomitting? IVF and zofran  ?etiology      Sepsis ([address] (3/21/2018) Broad Abx  Unknown source pan culture     DM  Sliding Scale Insulin    Umbillical Hernia/Groin hernia  Check ultrasound   surgical consult called in will see later on today  recomends repeat CT with IV contrast once hydrated        DVT/GI Prophylaxis: Hep SQ and  Discussed with patient at bedside about hospital admission and my plan care, who understood and agree with my plan care.     Tiffany Obrien MD  3/21/2018 5:45 AM

## 2018-03-21 NOTE — ED PROVIDER NOTES
EMERGENCY DEPARTMENT HISTORY AND PHYSICAL EXAM    Date: 3/20/2018  Patient Name: Chiara Helton    History of Presenting Illness     CC: Abdominal pain       History Provided By: Patient    Chief Complaint: Nausea, vomiting, abdominal pain   Duration: 1 day   Timing:  Acute  Location: Periumbilical   Quality: Cramping  Severity: 10 out of 10  Modifying Factors: none   Associated Symptoms: none       Additional History (Context): Chiara Helton is a 62 y.o. male presents via ems with a history of chronic abdominal pain, pancreatitis, MI, GERD, hyperlipidemia, MDD, DM, sleep apnea, who presents for a 1 day history of chills n/v and abdominal pain. He states it started suddenly after a BM, has tried nothing for this. PCP: Brody Bhatia MD    Current Facility-Administered Medications   Medication Dose Route Frequency Provider Last Rate Last Dose    sodium chloride 0.9 % bolus infusion 1,000 mL  1,000 mL IntraVENous ONCE [place], [place]        ondansetron [facility]) injection 4 mg  4 mg IntraVENous NOW [place]         Current Outpatient Prescriptions   Medication Sig Dispense Refill    quinapril (ACCUPRIL) 20 mg tablet Take 1 Tab by mouth nightly. Indications: hypertension 90 Tab 1    insulin detemir (LEVEMIR) 100 unit/mL injection 30 Units by SubCUTAneous route nightly. 10 mL 0    hydroCHLOROthiazide (HYDRODIURIL) 50 mg tablet Take 0.5 Tabs by mouth daily. 15 Tab 0    dicyclomine (BENTYL) 10 mg capsule Take 1 Cap by mouth three (3) times daily as needed. Indications: abdominal pain, take it with tylenol 20 Cap 0    pantoprazole (PROTONIX) 40 mg tablet Take 1 Tab by mouth Daily (before breakfast).  30 Tab 0    albuterol (PROVENTIL HFA, VENTOLIN HFA, PROAIR HFA) 90 mcg/actuation inhaler 2 puffs every 6 hours x 5 days then every 6 hours as needed for shortness of breath and/or wheezing 1 Inhaler 0    acetaminophen (TYLENOL) 325 mg tablet Take 2 Tabs by mouth every six (6) hours as needed. Indications: Pain, take it with bentyl 30 Tab 0    polyethylene glycol (MIRALAX) 17 gram packet Take 1 Packet by mouth daily. 30 Packet 0    amLODIPine (NORVASC) 10 mg tablet Take 10 mg by mouth daily.  pregabalin (LYRICA) 75 mg capsule 1 cap bid x 1 week, then 2 caps bid 120 Cap 0    amitriptyline (ELAVIL) 50 mg tablet Take 50 mg by mouth nightly.  simvastatin (ZOCOR) 10 mg tablet Take 10 mg by mouth nightly.  metFORMIN (GLUCOPHAGE) 1,000 mg tablet Take 1,000 mg by mouth two (2) times daily (with meals).  clopidogrel (PLAVIX) 75 mg tablet Take 75 mg by mouth daily.          Past History     Past Medical History:  Past Medical History:   Diagnosis Date    Arthritis     Coronary atherosclerosis of native coronary artery     S/P PCI with GE in 12/09    Diabetes ([facility] [address]     IDDM    Electrolyte and fluid disorders not elsewhere classified     Essential hypertension, benign     GERD (gastroesophageal reflux disease)     Heroin abuse 05/26/16    Psychiatrist Dr. Kristel Chamberlain History of heart attack     Hyperlipidemia     Intermediate coronary syndrome [facility])     MDD (major depressive disorder) 5/26/16    Psychiatrist Dr. Kristel Chamberlain Myocardial infarction     Obesity, unspecified     Old myocardial infarction     Other and unspecified hyperlipidemia     Pancreatitis     Positive PPD     Sleep apnea     uses Cpap machine    Transfusion history     Before 1992       Past Surgical History:  Past Surgical History:   Procedure Laterality Date    HX APPENDECTOMY      HX CORONARY STENT PLACEMENT  2010    2 stents       Family History:  Family History   Problem Relation Age of Onset    Heart Attack Father     Coronary Artery Disease Mother     Diabetes Mother     Cancer Sister      breast cancer and lung cancer       Social History:  Social History   Substance Use Topics    Smoking status: Never Smoker    Smokeless tobacco: Never Used    Alcohol use No Allergies: Allergies   Allergen Reactions    Compazine [Prochlorperazine] Anaphylaxis     \"Tightness around throat\"         Review of Systems   Review of Systems   Constitutional: Positive for chills. Negative for fever. HENT: Negative for congestion, rhinorrhea and sore throat. Respiratory: Negative for cough and shortness of breath. Cardiovascular: Negative for chest pain. Gastrointestinal: Positive for abdominal pain, nausea and vomiting. Negative for blood in stool, constipation and diarrhea. Genitourinary: Negative for dysuria, frequency and hematuria. Musculoskeletal: Negative for back pain and myalgias. Skin: Negative for rash and wound. Neurological: Negative for dizziness and headaches. All other systems reviewed and are negative. All Other Systems Negative  Physical Exam   There were no vitals filed for this visit. Physical Exam   Constitutional: He is oriented to person, place, and time. He appears well-developed and well-nourished. No distress. HENT:   Head: Normocephalic and atraumatic. Eyes: Conjunctivae are normal.   Neck: Normal range of motion. Neck supple. Cardiovascular: Normal rate, regular rhythm and normal heart sounds. Pulmonary/Chest: Effort normal and breath sounds normal. No respiratory distress. He exhibits no tenderness. Abdominal: Soft. Bowel sounds are normal. He exhibits no distension. There is tenderness in the periumbilical area and left lower quadrant. There is no rigidity, no rebound, no guarding, no CVA tenderness, no tenderness at McBurney's point and negative Orellana's sign. Musculoskeletal: Normal range of motion. He exhibits no edema or deformity. Neurological: He is alert and oriented to person, place, and time. Skin: Skin is warm and dry. He is not diaphoretic. Psychiatric: He has a normal mood and affect. His behavior is normal.   Nursing note and vitals reviewed.         Diagnostic Study Results     Labs -  Lactic elevated at 5.8, WBC 17.1     Radiologic Studies -   No orders to display     CT Results  (Last 48 hours)               03/26/18 1255  CT ABD PELV W CONT Final result    Impression:  IMPRESSION:       No CT finding for an acute intra-abdominal or pelvic process. Small amount of fluid, fat stranding and tiny foci of air at the umbilicus which   may be related to postsurgical change. Superimposed infection in this area not   excluded. No bowel obstruction. Hepatic steatosis. Stable right adrenal nodule. Stable right renal cyst.       Narrative:  CT ABDOMEN/PELVIS WITH CONTRAST       CPT CODE: 20619,66300       HISTORY: One hour of abdominal pain and vomiting. History of coronary artery   disease and coronary artery stent. History of appendectomy. History of liver   wedge biopsy and umbilical repair 5/77/1073. COMPARISON: 3/21/2018. TECHNIQUE: 5 mm helical scans were obtained of the abdomen and pelvis after   uneventful administration of intravenous contrast. 100 cc of Isovue-300 was   given. Coronal and sagittal reformations. CT dose reduction was achieved through   use of a standardized protocol tailored for this examination and automatic   exposure control for dose modulation. Marge Mad FINDINGS:        The visualized lung bases are clear. Small hiatal hernia       Diffuse low-attenuation of the liver suggests hepatic steatosis. No   splenomegaly. Homogeneous enhancement the pancreas. The gallbladder is without stones or sludge. Stable right adrenal nodule. No left adrenal nodule. The kidneys enhance symmetrically. Low-lying right kidney. Exophytic structure   of the mid right kidney measures 1.1 cm stable with water attenuation compatible   with a cyst.  No hydronephrosis. No free intraperitoneal air or free fluid. Reduction of fat in the umbilical hernia.  There is thick walled low-attenuation   area at the base of the umbilicus measuring 2.7 x 3.7 cm on image 6 T2. This is   also associated with tiny foci of air. Bandlike subcutaneous fat stranding in   the left and right ventricle subcutaneous fat. No abdominal or pelvic lymphadenopathy. The small and large bowel are normal in caliber. Appendix not visualized in   keeping with reported surgical resection. The bladder is under distended and therefore incompletely evaluated. No   prostatic enlargement. No calcifications in the abdominal aorta. No aneurysmal dilatation. Degenerative change in the spine and facets. Postsurgical change. CXR Results  (Last 48 hours)    None            Medical Decision Making   I am the first provider for this patient. I reviewed the vital signs, available nursing notes, past medical history, past surgical history, family history and social history. Vital Signs-Reviewed the patient's vital signs. Pulse Oximetry Analysis -  100 % RA    Records Reviewed: Nursing Notes and Old Medical Records    Procedures:  Procedures none     Provider Notes (Medical Decision Making):     Differential: Gastroparesis, appendicitis, SBO, pyelonephritis, UTI, hernia     Plan: Will order basic labs, lipase, pain meds, fluids, lactic and ct of abd and pelvis       MED RECONCILIATION:  Current Facility-Administered Medications   Medication Dose Route Frequency    sodium chloride 0.9 % bolus infusion 1,000 mL  1,000 mL IntraVENous ONCE    ondansetron (ZOFRAN) injection 4 mg  4 mg IntraVENous NOW     Current Outpatient Prescriptions   Medication Sig    quinapril (ACCUPRIL) 20 mg tablet Take 1 Tab by mouth nightly. Indications: hypertension    insulin detemir (LEVEMIR) 100 unit/mL injection 30 Units by SubCUTAneous route nightly.  hydroCHLOROthiazide (HYDRODIURIL) 50 mg tablet Take 0.5 Tabs by mouth daily.  dicyclomine (BENTYL) 10 mg capsule Take 1 Cap by mouth three (3) times daily as needed.  Indications: abdominal pain, take it with tylenol    pantoprazole (PROTONIX) 40 mg tablet Take 1 Tab by mouth Daily (before breakfast).  albuterol (PROVENTIL HFA, VENTOLIN HFA, PROAIR HFA) 90 mcg/actuation inhaler 2 puffs every 6 hours x 5 days then every 6 hours as needed for shortness of breath and/or wheezing    acetaminophen (TYLENOL) 325 mg tablet Take 2 Tabs by mouth every six (6) hours as needed. Indications: Pain, take it with bentyl    polyethylene glycol (MIRALAX) 17 gram packet Take 1 Packet by mouth daily.  amLODIPine (NORVASC) 10 mg tablet Take 10 mg by mouth daily.  pregabalin (LYRICA) 75 mg capsule 1 cap bid x 1 week, then 2 caps bid    amitriptyline (ELAVIL) 50 mg tablet Take 50 mg by mouth nightly.  simvastatin (ZOCOR) 10 mg tablet Take 10 mg by mouth nightly.  metFORMIN (GLUCOPHAGE) 1,000 mg tablet Take 1,000 mg by mouth two (2) times daily (with meals).  clopidogrel (PLAVIX) 75 mg tablet Take 75 mg by mouth daily. Disposition:  Admit    Discussed concern for Sepsis with unknown source with Dr. Rona Hunter who agrees with admission. Diagnosis     Clinical Impression:   1. Sepsis with unknown source  2. Abdominal pain, generalized  3.  Nausea with vomiting

## 2018-03-21 NOTE — PROGRESS NOTES
06:15  Patient admitted in computer to [address] [facility] 474. Patient currently in ED. Per ED charge, Carlos Magaña, report is being given to 4N RN, Negro Torerz.

## 2018-03-21 NOTE — PROGRESS NOTES
03/21/18 1200   Critical Result Types   Type of Critical Result Laboratory   Critical Lab Result Types   Critical Lab Value Lactic Acid   Lactic Acid Value 3.2   Notification Information   Notified By (Name) [address] [place]    Time of Critical Result Notification 3102   Verbal Readback Provided Yes

## 2018-03-21 NOTE — ROUTINE PROCESS
TRANSFER - IN REPORT:    Verbal report received from [place], RN(name) on Eric Buchanan  being received from ED(unit) for routine progression of care      Report consisted of patients Situation, Background, Assessment and   Recommendations(SBAR). Information from the following report(s) SBAR, Kardex, ED Summary, MAR, Recent Results and Cardiac Rhythm NSR was reviewed with the receiving nurse. Opportunity for questions and clarification was provided. Assessment completed upon patients arrival to unit and care assumed.

## 2018-03-22 ENCOUNTER — ANESTHESIA EVENT (OUTPATIENT)
Dept: SURGERY | Age: 58
DRG: 710 | End: 2018-03-22
Payer: MEDICAID

## 2018-03-22 ENCOUNTER — APPOINTMENT (OUTPATIENT)
Dept: ULTRASOUND IMAGING | Age: 58
DRG: 710 | End: 2018-03-22
Attending: INTERNAL MEDICINE
Payer: MEDICAID

## 2018-03-22 ENCOUNTER — ANESTHESIA (OUTPATIENT)
Dept: SURGERY | Age: 58
DRG: 710 | End: 2018-03-22
Payer: MEDICAID

## 2018-03-22 LAB
ALBUMIN SERPL-MCNC: 3.1 G/DL (ref 3.4–5)
ALBUMIN/GLOB SERPL: 0.8 {RATIO} (ref 0.8–1.7)
ALP SERPL-CCNC: 76 U/L (ref 45–117)
ALT SERPL-CCNC: 26 U/L (ref 16–61)
ANION GAP SERPL CALC-SCNC: 7 MMOL/L (ref 3–18)
AST SERPL-CCNC: 17 U/L (ref 15–37)
BACTERIA SPEC CULT: NORMAL
BASOPHILS # BLD: 0 K/UL (ref 0–0.1)
BASOPHILS NFR BLD: 0 % (ref 0–2)
BILIRUB SERPL-MCNC: 0.5 MG/DL (ref 0.2–1)
BUN SERPL-MCNC: 13 MG/DL (ref 7–18)
BUN/CREAT SERPL: 11 (ref 12–20)
CALCIUM SERPL-MCNC: 8.2 MG/DL (ref 8.5–10.1)
CHLORIDE SERPL-SCNC: 105 MMOL/L (ref 100–108)
CO2 SERPL-SCNC: 30 MMOL/L (ref 21–32)
CREAT SERPL-MCNC: 1.2 MG/DL (ref 0.6–1.3)
DATE LAST DOSE: ABNORMAL
DIFFERENTIAL METHOD BLD: ABNORMAL
EOSINOPHIL # BLD: 0 K/UL (ref 0–0.4)
EOSINOPHIL NFR BLD: 0 % (ref 0–5)
ERYTHROCYTE [DISTWIDTH] IN BLOOD BY AUTOMATED COUNT: 13.7 % (ref 11.6–14.5)
GLOBULIN SER CALC-MCNC: 3.9 G/DL (ref 2–4)
GLUCOSE BLD STRIP.AUTO-MCNC: 146 MG/DL (ref 70–110)
GLUCOSE BLD STRIP.AUTO-MCNC: 196 MG/DL (ref 70–110)
GLUCOSE BLD STRIP.AUTO-MCNC: 204 MG/DL (ref 70–110)
GLUCOSE SERPL-MCNC: 182 MG/DL (ref 74–99)
HCT VFR BLD AUTO: 35.9 % (ref 36–48)
HGB BLD-MCNC: 11 G/DL (ref 13–16)
INR PPP: 1.2 (ref 0.8–1.2)
LIPASE SERPL-CCNC: 81 U/L (ref 73–393)
LYMPHOCYTES # BLD: 2 K/UL (ref 0.9–3.6)
LYMPHOCYTES NFR BLD: 13 % (ref 21–52)
MAGNESIUM SERPL-MCNC: 1.7 MG/DL (ref 1.6–2.6)
MCH RBC QN AUTO: 28.9 PG (ref 24–34)
MCHC RBC AUTO-ENTMCNC: 30.6 G/DL (ref 31–37)
MCV RBC AUTO: 94.2 FL (ref 74–97)
MONOCYTES # BLD: 1 K/UL (ref 0.05–1.2)
MONOCYTES NFR BLD: 7 % (ref 3–10)
NEUTS SEG # BLD: 12.1 K/UL (ref 1.8–8)
NEUTS SEG NFR BLD: 80 % (ref 40–73)
PLATELET # BLD AUTO: 251 K/UL (ref 135–420)
PMV BLD AUTO: 11 FL (ref 9.2–11.8)
POTASSIUM SERPL-SCNC: 3.1 MMOL/L (ref 3.5–5.5)
POTASSIUM SERPL-SCNC: 3.6 MMOL/L (ref 3.5–5.5)
PROT SERPL-MCNC: 7 G/DL (ref 6.4–8.2)
PROTHROMBIN TIME: 14.4 SEC (ref 11.5–15.2)
PSA FREE MFR SERPL: 27.5 %
PSA FREE SERPL-MCNC: 0.22 NG/ML
PSA SERPL-MCNC: 0.8 NG/ML (ref 0–4)
RBC # BLD AUTO: 3.81 M/UL (ref 4.7–5.5)
REPORTED DOSE,DOSE: ABNORMAL UNITS
REPORTED DOSE/TIME,TMG: 2030
SERVICE CMNT-IMP: NORMAL
SODIUM SERPL-SCNC: 142 MMOL/L (ref 136–145)
VANCOMYCIN SERPL-MCNC: 10 UG/ML (ref 5–40)
VANCOMYCIN TROUGH SERPL-MCNC: 6.1 UG/ML (ref 10–20)
WBC # BLD AUTO: 15 K/UL (ref 4.6–13.2)

## 2018-03-22 PROCEDURE — 74011250636 HC RX REV CODE- 250/636: Performed by: PHYSICIAN ASSISTANT

## 2018-03-22 PROCEDURE — 36415 COLL VENOUS BLD VENIPUNCTURE: CPT | Performed by: INTERNAL MEDICINE

## 2018-03-22 PROCEDURE — 74011636637 HC RX REV CODE- 636/637: Performed by: EMERGENCY MEDICINE

## 2018-03-22 PROCEDURE — 85025 COMPLETE CBC W/AUTO DIFF WBC: CPT | Performed by: INTERNAL MEDICINE

## 2018-03-22 PROCEDURE — 74011250637 HC RX REV CODE- 250/637: Performed by: SURGERY

## 2018-03-22 PROCEDURE — 80202 ASSAY OF VANCOMYCIN: CPT | Performed by: EMERGENCY MEDICINE

## 2018-03-22 PROCEDURE — 74011250637 HC RX REV CODE- 250/637: Performed by: INTERNAL MEDICINE

## 2018-03-22 PROCEDURE — 84132 ASSAY OF SERUM POTASSIUM: CPT | Performed by: INTERNAL MEDICINE

## 2018-03-22 PROCEDURE — 76705 ECHO EXAM OF ABDOMEN: CPT

## 2018-03-22 PROCEDURE — 74011250636 HC RX REV CODE- 250/636: Performed by: SURGERY

## 2018-03-22 PROCEDURE — 80202 ASSAY OF VANCOMYCIN: CPT | Performed by: INTERNAL MEDICINE

## 2018-03-22 PROCEDURE — 85610 PROTHROMBIN TIME: CPT | Performed by: INTERNAL MEDICINE

## 2018-03-22 PROCEDURE — 82962 GLUCOSE BLOOD TEST: CPT

## 2018-03-22 PROCEDURE — 74011250636 HC RX REV CODE- 250/636: Performed by: EMERGENCY MEDICINE

## 2018-03-22 PROCEDURE — 83735 ASSAY OF MAGNESIUM: CPT | Performed by: INTERNAL MEDICINE

## 2018-03-22 PROCEDURE — 74011000258 HC RX REV CODE- 258: Performed by: INTERNAL MEDICINE

## 2018-03-22 PROCEDURE — 74011250636 HC RX REV CODE- 250/636: Performed by: INTERNAL MEDICINE

## 2018-03-22 PROCEDURE — 83690 ASSAY OF LIPASE: CPT | Performed by: INTERNAL MEDICINE

## 2018-03-22 PROCEDURE — 65660000000 HC RM CCU STEPDOWN

## 2018-03-22 PROCEDURE — 74011000250 HC RX REV CODE- 250: Performed by: EMERGENCY MEDICINE

## 2018-03-22 PROCEDURE — 93975 VASCULAR STUDY: CPT

## 2018-03-22 RX ORDER — MORPHINE SULFATE 4 MG/ML
5 INJECTION INTRAVENOUS
Status: DISCONTINUED | OUTPATIENT
Start: 2018-03-22 | End: 2018-03-24 | Stop reason: HOSPADM

## 2018-03-22 RX ORDER — POTASSIUM CHLORIDE 7.45 MG/ML
10 INJECTION INTRAVENOUS
Status: COMPLETED | OUTPATIENT
Start: 2018-03-22 | End: 2018-03-22

## 2018-03-22 RX ORDER — INSULIN GLARGINE 100 [IU]/ML
10 INJECTION, SOLUTION SUBCUTANEOUS
Status: DISCONTINUED | OUTPATIENT
Start: 2018-03-22 | End: 2018-03-24 | Stop reason: HOSPADM

## 2018-03-22 RX ORDER — SODIUM CHLORIDE 9 MG/ML
100 INJECTION, SOLUTION INTRAVENOUS CONTINUOUS
Status: DISCONTINUED | OUTPATIENT
Start: 2018-03-22 | End: 2018-03-24 | Stop reason: HOSPADM

## 2018-03-22 RX ORDER — POTASSIUM CHLORIDE 20 MEQ/1
20 TABLET, EXTENDED RELEASE ORAL
Status: COMPLETED | OUTPATIENT
Start: 2018-03-22 | End: 2018-03-22

## 2018-03-22 RX ORDER — MAGNESIUM SULFATE 1 G/100ML
1 INJECTION INTRAVENOUS ONCE
Status: DISCONTINUED | OUTPATIENT
Start: 2018-03-22 | End: 2018-03-22

## 2018-03-22 RX ORDER — MAGNESIUM SULFATE 1 G/100ML
1 INJECTION INTRAVENOUS ONCE
Status: COMPLETED | OUTPATIENT
Start: 2018-03-22 | End: 2018-03-22

## 2018-03-22 RX ADMIN — HEPARIN SODIUM 5000 UNITS: 5000 INJECTION, SOLUTION INTRAVENOUS; SUBCUTANEOUS at 23:01

## 2018-03-22 RX ADMIN — POTASSIUM CHLORIDE 10 MEQ: 7.46 INJECTION, SOLUTION INTRAVENOUS at 11:52

## 2018-03-22 RX ADMIN — SODIUM CHLORIDE 1000 MG: 900 INJECTION, SOLUTION INTRAVENOUS at 22:03

## 2018-03-22 RX ADMIN — MORPHINE SULFATE 5 MG: 4 INJECTION INTRAVENOUS at 10:40

## 2018-03-22 RX ADMIN — SODIUM CHLORIDE 100 ML/HR: 900 INJECTION, SOLUTION INTRAVENOUS at 19:34

## 2018-03-22 RX ADMIN — PIPERACILLIN SODIUM AND TAZOBACTAM SODIUM 4.5 G: 4; .5 INJECTION, POWDER, LYOPHILIZED, FOR SOLUTION INTRAVENOUS at 20:52

## 2018-03-22 RX ADMIN — HEPARIN SODIUM 5000 UNITS: 5000 INJECTION, SOLUTION INTRAVENOUS; SUBCUTANEOUS at 17:15

## 2018-03-22 RX ADMIN — HEPARIN SODIUM 5000 UNITS: 5000 INJECTION, SOLUTION INTRAVENOUS; SUBCUTANEOUS at 06:47

## 2018-03-22 RX ADMIN — INSULIN LISPRO 6 UNITS: 100 INJECTION, SOLUTION INTRAVENOUS; SUBCUTANEOUS at 12:08

## 2018-03-22 RX ADMIN — LEVOFLOXACIN 750 MG: 5 INJECTION, SOLUTION INTRAVENOUS at 06:47

## 2018-03-22 RX ADMIN — PIPERACILLIN SODIUM AND TAZOBACTAM SODIUM 4.5 G: 4; .5 INJECTION, POWDER, LYOPHILIZED, FOR SOLUTION INTRAVENOUS at 10:42

## 2018-03-22 RX ADMIN — MORPHINE SULFATE 5 MG: 4 INJECTION INTRAVENOUS at 04:47

## 2018-03-22 RX ADMIN — INSULIN LISPRO 3 UNITS: 100 INJECTION, SOLUTION INTRAVENOUS; SUBCUTANEOUS at 22:06

## 2018-03-22 RX ADMIN — SODIUM CHLORIDE 100 ML/HR: 900 INJECTION, SOLUTION INTRAVENOUS at 00:50

## 2018-03-22 RX ADMIN — PIPERACILLIN SODIUM AND TAZOBACTAM SODIUM 4.5 G: 4; .5 INJECTION, POWDER, LYOPHILIZED, FOR SOLUTION INTRAVENOUS at 17:28

## 2018-03-22 RX ADMIN — SODIUM CHLORIDE 20 MG: 9 INJECTION INTRAMUSCULAR; INTRAVENOUS; SUBCUTANEOUS at 10:37

## 2018-03-22 RX ADMIN — Medication 10 ML: at 22:12

## 2018-03-22 RX ADMIN — MORPHINE SULFATE 5 MG: 4 INJECTION INTRAVENOUS at 17:13

## 2018-03-22 RX ADMIN — MORPHINE SULFATE 5 MG: 4 INJECTION INTRAVENOUS at 22:57

## 2018-03-22 RX ADMIN — SODIUM CHLORIDE 20 MG: 9 INJECTION INTRAMUSCULAR; INTRAVENOUS; SUBCUTANEOUS at 22:04

## 2018-03-22 RX ADMIN — ONDANSETRON 4 MG: 2 INJECTION, SOLUTION INTRAMUSCULAR; INTRAVENOUS at 22:05

## 2018-03-22 RX ADMIN — Medication 10 ML: at 06:53

## 2018-03-22 RX ADMIN — ACETAMINOPHEN 650 MG: 325 TABLET ORAL at 00:30

## 2018-03-22 RX ADMIN — PIPERACILLIN SODIUM AND TAZOBACTAM SODIUM 4.5 G: 4; .5 INJECTION, POWDER, LYOPHILIZED, FOR SOLUTION INTRAVENOUS at 04:49

## 2018-03-22 RX ADMIN — MORPHINE SULFATE 5 MG: 4 INJECTION INTRAVENOUS at 13:17

## 2018-03-22 RX ADMIN — VANCOMYCIN HYDROCHLORIDE 2000 MG: 10 INJECTION, POWDER, LYOPHILIZED, FOR SOLUTION INTRAVENOUS at 13:09

## 2018-03-22 RX ADMIN — Medication 10 ML: at 13:12

## 2018-03-22 RX ADMIN — SODIUM CHLORIDE 100 ML/HR: 900 INJECTION, SOLUTION INTRAVENOUS at 20:54

## 2018-03-22 RX ADMIN — POTASSIUM CHLORIDE 10 MEQ: 7.46 INJECTION, SOLUTION INTRAVENOUS at 14:13

## 2018-03-22 RX ADMIN — POTASSIUM CHLORIDE 10 MEQ: 7.46 INJECTION, SOLUTION INTRAVENOUS at 13:12

## 2018-03-22 RX ADMIN — POTASSIUM CHLORIDE 20 MEQ: 20 TABLET, EXTENDED RELEASE ORAL at 10:37

## 2018-03-22 RX ADMIN — MAGNESIUM SULFATE IN DEXTROSE 1 G: 10 INJECTION, SOLUTION INTRAVENOUS at 11:49

## 2018-03-22 RX ADMIN — MORPHINE SULFATE 5 MG: 4 INJECTION INTRAVENOUS at 19:31

## 2018-03-22 RX ADMIN — INSULIN GLARGINE 10 UNITS: 100 INJECTION, SOLUTION SUBCUTANEOUS at 22:03

## 2018-03-22 RX ADMIN — DIPHENHYDRAMINE HYDROCHLORIDE 12.5 MG: 50 INJECTION, SOLUTION INTRAMUSCULAR; INTRAVENOUS at 22:05

## 2018-03-22 RX ADMIN — MORPHINE SULFATE 5 MG: 4 INJECTION INTRAVENOUS at 02:45

## 2018-03-22 RX ADMIN — MORPHINE SULFATE 5 MG: 4 INJECTION INTRAVENOUS at 00:31

## 2018-03-22 RX ADMIN — MORPHINE SULFATE 5 MG: 4 INJECTION INTRAVENOUS at 20:53

## 2018-03-22 NOTE — CONSULTS
Surgery  Consult dictated #[id number]    Scheduled for Diagnostic laparoscopy tomorrow afternoon if mesenteric ultrasound study in unrewarding. I will also plan on a laparoscopic liver Bx (steatosis seen on multiple liver imaging) and umbilical hernia repair with mesh. I do NOT believe this is the cause of his chronic pain. I believe his dehydration has been resolved with intravenous infusions as he Lactic acid level is now normal.    Follow up labs ordered for tomorrow already.

## 2018-03-22 NOTE — PROGRESS NOTES
conducted an initial consultation and Spiritual Assessment for Abeba Moran, who is a 62 y. o.,male. Patients Primary Language is: [place]. According to the patients EMR Sikhism Affiliation is: Scot Talley.     The reason the Patient came to the hospital is:   Patient Active Problem List    Diagnosis Date Noted    Sepsis ([facility] [address] 03/21/2018    Nausea and vomiting 09/07/2017    Gastroparesis 09/07/2017    Hypokalemia 09/07/2017    ARF (acute renal failure) ([address] 09/07/2017    Atelectasis 09/07/2017    Abdominal pain 09/07/2017    Acquired hypothyroidism 09/07/2017    S/P lumbar fusion 07/05/2017    Diabetic neuropathy ([facility] [address] 07/05/2017    Neuritis 07/05/2017    Spinal stenosis at L4-L5 level 06/19/2017    Coronary artery disease involving native coronary artery of native heart without angina pectoris 05/31/2017    History of coronary artery stent placement 05/31/2017    Synovial cyst 05/26/2017    HNP (herniated nucleus pulposus), lumbar 05/26/2017    Lumbar spinal stenosis 05/26/2017    Spondylolisthesis of lumbar region 05/26/2017    Positive PPD     History of heart attack     Pain in left lower leg 07/24/2016    Primary osteoarthritis of left knee 07/24/2016    Localized adiposity of abdomen 07/24/2016    Diabetes ([facility] [address] 08/14/2015    Hypertension 08/14/2015    GERD (gastroesophageal reflux disease) 08/14/2015    Depression 08/14/2015        The  provided the following Interventions:  Initiated a relationship of care and support. Explored issues of red, belief, spirituality and Hinduism/ritual needs while hospitalized. Listened empathically to patient who shared stories about life, career, and health challenges. Provided information about Spiritual Care Services. Offered prayer and assurance of continued prayers on patient's behalf. Chart reviewed.     The following outcomes where achieved:  Patient shared limited information about both their medical narrative and spiritual journey/beliefs.  confirmed Patient's Jain Affiliation. Patient processed feeling about current hospitalization. Patient expressed gratitude for 's visit. Assessment:  Patient does not have any Adventism/cultural needs that will affect patients preferences in health care. There are no spiritual or Adventism issues which require intervention at this time. Plan:  Chaplains will continue to follow and will provide pastoral care on an as needed/requested basis.  recommends bedside caregivers page  on duty if patient shows signs of acute spiritual or emotional distress.       Eduarda Niño, [address] [place] [address]  Spiritual Care  [phone]

## 2018-03-22 NOTE — PROGRESS NOTES
Problem: Falls - Risk of  Goal: *Absence of Falls  Document Basil Fall Risk and appropriate interventions in the flowsheet.    Outcome: Progressing Towards Goal  Fall Risk Interventions:            Medication Interventions: Evaluate medications/consider consulting pharmacy, Patient to call before getting OOB, Teach patient to arise slowly

## 2018-03-22 NOTE — PROGRESS NOTES
Dr. Yina Marshall was informed of patient's c/o pain, he states \"20/10\". New order was received and he said that he will come to see patient when he has time.

## 2018-03-22 NOTE — PROGRESS NOTES
Had spoken to Dr. Tiffanie Davies regarding Fentanyl patch being D/C'd. She was told that Dr. Jarvis Lopez was going to increase the frequency of Morphine 5 mg IV to every 2 hours PRN but did not do so when told that there was an order for Fentanyl patch. Dr. Tiffanie Davies said to change Morphine 5 mg IV every 2 hours PRN.

## 2018-03-22 NOTE — ROUTINE PROCESS
Bedside shift change report [facility] (oncoming nurse) by Antwan Oropeza RN   (offgoing nurse). Report included the following information SBAR, Kardex, MAR and Recent Results.

## 2018-03-22 NOTE — PROCEDURES
[facility]  *** FINAL REPORT ***    Name: Jordan Ward  MRN: [id number]    Inpatient  : 05 Sep 1960  HIS Order #: [id number]  [address] [facility] Visit #: [id number]  Date: 22 Mar 2018    TYPE OF TEST: Visceral Arterial Duplex    REASON FOR TEST  Abdominal pain, Hyperlipidemia, NOS, Hypertension, unspecified    Aortic PSV:  97.0 cm/s  Diameter AP: 1.8 cm   TV: 1.8 cm    Mesenteric:-                  Prox   Mid   Dist Ratio Stenosis          Aneurysm                  ----- ----- ----- ----- ----------------- ------------  SMA:            222.0 128.0  89.0  2.3 Normal  Celiac:         126.0               1.3  Hepatic:        123.0               1.3  Splenic:         79.0               0. 8  PATRICIA:                                    Not visualized  :    INTERPRETATION/FINDINGS  Duplex images were obtained using 2-D gray scale, color flow, and  spectral Doppler analysis. MESENTERIC:  1. No significant stenosis identified in the celiac, splenic, hepatic  or superior mesenteric arteries. 2. Unable to visualize the inferior mesenteric artery. 3. No significant plaquing, focal stenosis or aneurysm noted in the  abdominal aorta. ADDITIONAL COMMENTS    I have personally reviewed the data relevant to the interpretation of  this  study. TECHNOLOGIST: IVANA Ryan RVS  Signed: 2018 09:25 AM    PHYSICIAN: Valdo Delacruz D.O.   Signed: 2018 04:37 PM

## 2018-03-22 NOTE — PERIOP NOTES
Received Report from [address] [place] [address] rn, and I gave Report to Andie Hurtado (PACU)

## 2018-03-22 NOTE — ROUTINE PROCESS
Bedside and Verbal shift change report given to Milan Ashby RN (oncoming nurse) by Carlyn Salazar RN (offgoing nurse). Report included the following information SBAR, Kardex, MAR and Recent Results.     SITUATION:  Code Status: Full Code  Reason for Admission: Sepsis [facility])  Abdominal pain  [address] day: 1  Problem List:       Hospital Problems  Date Reviewed: 9/14/2017          Codes Class Noted POA    Sepsis ([facility] ICD-10-CM: A41.9  ICD-9-CM: 038.9, 995.91  3/21/2018 Unknown        Abdominal pain ICD-10-CM: R10.9  ICD-9-CM: 789.00  9/7/2017 Unknown              BACKGROUND:   Past Medical History:   Past Medical History:   Diagnosis Date    Arthritis     Coronary atherosclerosis of native coronary artery     S/P PCI with GE in 12/09    Diabetes ([facility] [address]     IDDM    Electrolyte and fluid disorders not elsewhere classified     Essential hypertension, benign     GERD (gastroesophageal reflux disease)     Heroin abuse 05/26/16    Psychiatrist Dr. Kristel Chamberlain History of heart attack     Hyperlipidemia     Intermediate coronary syndrome ([facility] [address]     MDD (major depressive disorder) 5/26/16    Psychiatrist Dr. Kristel Chamberlain Myocardial infarction     Obesity, unspecified     Old myocardial infarction     Other and unspecified hyperlipidemia     Pancreatitis     Positive PPD     Sleep apnea     uses Cpap machine    Transfusion history     Before 1992      Patient taking anticoagulants yes    Patient has a defibrillator: no    History of shots YES for example, flu, pneumonia, tetanus   Isolation History NO for example, MRSA, CDiff    ASSESSMENT:  Changes in Assessment Throughout Shift: none  Significant Changes in 24 hours (for example, RR/code, fall)  Patient has Central Line: no   Patient has Van Cath: no   PT  IV Patency  OR Checklist  Pending Tests    Last Vitals:  Vitals w/ MEWS Score (last day)     Date/Time MEWS Score Pulse Resp Temp BP Level of Consciousness SpO2 03/22/18 0300 1 68 17 99.2 °F (37.3 °C) 153/85 Alert 94 %    03/21/18 2353 1 73 16 100.2 °F (37.9 °C) 151/84 Alert 98 %    03/21/18 2001 1 70 17 98.6 °F (37 °C) (!)  159/91 Alert 99 %    03/21/18 1855 -- -- -- -- -- Alert --    03/21/18 1616 1 75 18 99.2 °F (37.3 °C) 170/86 Alert 93 %    03/21/18 1552 -- -- -- -- -- Alert --    03/21/18 1245 1 82 17 99.2 °F (37.3 °C) 158/90 Alert 93 %    03/21/18 1201 -- -- -- -- -- Alert --    03/21/18 0821 -- -- -- -- -- Alert --    03/21/18 0800 1 87 17 98.4 °F (36.9 °C) (!)  158/96 Alert 93 %    03/21/18 0530 0 93 14 98.8 °F (37.1 °C) (!)  168/103 Alert 92 %    03/21/18 0515 -- 94 12 -- (!)  161/104 -- 92 %    03/21/18 0500 -- 95 15 -- (!)  163/98 -- 90 %    03/21/18 0445 -- 93 11 -- (!)  172/102 -- 94 %    03/21/18 0430 -- 93 -- -- (!)  164/103 -- 92 %    03/21/18 0415 -- 88 -- -- (!)  155/96 -- 92 %    03/21/18 0400 -- 93 22 -- 133/89 -- 93 %            PAIN    Pain Assessment    Pain Intensity 1: 10 (03/22/18 0447)    Pain Location 1: Abdomen    Pain Intervention(s) 1: Medication (see MAR)    Patient Stated Pain Goal: 0  Intervention effective: yes  Time of last intervention: 0445 Reassessment Completed: yes   Other actions taken for pain: Distraction    Last 3 Weights:  Last 3 Recorded Weights in this Encounter    03/20/18 2349 03/21/18 2032   Weight: 113.4 kg (250 lb) 109 kg (240 lb 6.4 oz)   Weight change: -4.355 kg (-9 lb 9.6 oz)    INTAKE/OUPUT    Current Shift: 03/21 1901 - 03/22 0700  In: 30 [P.O.:30]  Out: 1900 [Urine:1900]    Last three shifts: 03/20 0701 - 03/21 1900  In: 1600 [I.V.:1600]  Out: 1200 [Urine:1200]    RECOMMENDATIONS AND DISCHARGE PLANNING  Patient needs and requests: Pain Control    Pending tests/procedures: Diagnostic Laparoscopy     Discharge plan for patient: Home    Discharge planning Needs or Barriers: none    Estimated Discharge Date: 3/26/2018 Posted on Whiteboard in Patients Room: yes       \"HEALS\" SAFETY CHECK  A safety check occurred in the patient's room between off going nurse and oncoming nurse listed above. The safety check included the below items:    H  High Alert Medications Verify all high alert medication drips (heparin, PCA, etc.)  E  Equipment Suction is set up for ALL patients (with steven)  Red plugs utilized for all equipment (IV pumps, etc.)  WOWs wiped down at end of shift. Room stocked with oxygen, suction, and other unit-specific supplies  A  Alarms Bed alarm is set for fall risk patients  Ensure chair alarm is in place and activated if patient is up in a chair  L  Lines Check IV for any infiltration  Van bag is empty if patient has a Van   Tubing and IV bags are labeled  S  Safety  Room is clean, patient is clean, and equipment is clean. Hallways are clear from equipment besides carts. Fall bracelet on for fall risk patients  Ensure room is clear and free of clutter  Suction is set up for ALL patients (with steven)  Hallways are clear from equipment besides carts.    Isolation precautions followed, supplies available outside room, sign posted    Anirudh Bryson RN

## 2018-03-22 NOTE — ROUTINE PROCESS
8863 assumed care of pt, after bedside verbal report was given by off going nurse, pt resting in bed awake, pt's sister at bedside, no acute distress noted, will monitor for changes    0842 pt off unit for vascular study and ultra sound,    1034 pt back from test, no acute distress noted, will continue to monitor     1208 pt sitting up in the bed awake with family at bedside, no distress noted, will monitor     1518 pt resting in bed quietly, no changes noted    1951 Bedside and Verbal shift change report given to Ghassan peter RN (oncoming nurse) by MARGOTH Clarke (offgoing nurse). Report included the following information SBAR, Kardex and MAR.

## 2018-03-22 NOTE — PROGRESS NOTES
Surgeon    Pt going to ultrasound now. I told him I will do a Lap liver Bx too because of his obesity and steatosis on imaging. I answered all his questions. I see his Cr has improved to 1.20. He has hypokalemia K 3.1 and three doses of KCL iv 10 mmole ordered. I have order single K dur 20 mmole too. Do not think the 30 mmole is enough. Wt was 240.4 labs  => BMI 36.5,  This plus uncontrolled diabetes is Morbid obesity.   1 gram Mag sulfate too for level 1.7- low normal.

## 2018-03-22 NOTE — PROGRESS NOTES
[email or web address]  [phone]       Impression  1. Abdominal pain-   -Ct abdomen- no acute abdomen or pelvic process   -11/2010- EGD- grade 2 esophagitis. Medium HH  -11/2010 colo- polyp. Overdue for colonoscopy. May be done OP  -mesenteric doppler- No significant stenosis identified in the celiac, splenic, hepatic  or superior mesenteric arteries. Unable to visualize the inferior mesenteric artery. No significant plaquing, focal stenosis or aneurysm noted in the  abdominal aorta. 2. Intractable Nausea and vomiting-   3. Leukocytosis  4. DM  5. H/O pneumoperitoneum last september     Plan:  1. Pt for diagnostic laparoscopy, laparoscopic liver biopsy today  2. Cont supportive care    Chief Complaint: abdominal pain    Subjective:  Pt uneventful overnight.  Pt abdominal pain improved        Eyes: conjunctiva normal, EOM normal   ENT: Mucous membranes moist, no lesion or thrush   Cardiovascular:  normal rate and regular rhythm, no murmur   Pulmonary/Chest Wall: breath sounds normal and effort normal   Abdominal:  soft, non-acute, non-tender, no appreciable mass or hepatosplenomegaly, no appreciable ascites       Visit Vitals    BP (!) 167/106 (BP 1 Location: Left arm, BP Patient Position: At rest)  Comment: nurse Williemae Gitelman was notified    Pulse 84    Temp 98.4 °F (36.9 °C)    Resp 18    Ht 5' 8.9\" (1.75 m)    Wt 109 kg (240 lb 6.4 oz)    SpO2 95%    BMI 35.61 kg/m2           Intake/Output Summary (Last 24 hours) at 03/22/18 1117  Last data filed at 03/22/18 0901   Gross per 24 hour   Intake             1480 ml   Output             2900 ml   Net            -1420 ml       CBC w/Diff    Lab Results   Component Value Date/Time    WBC 15.0 (H) 03/22/2018 04:30 AM    RBC 3.81 (L) 03/22/2018 04:30 AM    HGB 11.0 (L) 03/22/2018 04:30 AM    HCT 35.9 (L) 03/22/2018 04:30 AM    MCV 94.2 03/22/2018 04:30 AM    MCH 28.9 03/22/2018 04:30 AM    MCHC 30.6 (L) 03/22/2018 04:30 AM    RDW 13.7 03/22/2018 04:30 AM     03/22/2018 04:30 AM    Lab Results   Component Value Date/Time    GRANS 80 (H) 03/22/2018 04:30 AM    LYMPH 13 (L) 03/22/2018 04:30 AM    EOS 0 03/22/2018 04:30 AM    BANDS 3 03/20/2018 11:40 PM    BASOS 0 03/22/2018 04:30 AM      Basic Metabolic Profile   Recent Labs      03/22/18   0430   NA  142   K  3.1*   CL  105   CO2  30   BUN  13   CA  8.2*   MG  1.7        Hepatic Function    Lab Results   Component Value Date/Time    ALB 3.1 (L) 03/22/2018 04:30 AM    TP 7.0 03/22/2018 04:30 AM    AP 76 03/22/2018 04:30 AM    Lab Results   Component Value Date/Time    SGOT 17 03/22/2018 04:30 AM          Angelica Moe NP  [facility] [id number]  [email or web address]

## 2018-03-22 NOTE — PROGRESS NOTES
Progress Note    Patient: Beverly Devries MRN: [id number]  SSN: [id number]    YOB: 1960  Age: 62 y.o. Sex: male      Admit Date: 3/20/2018    LOS: 1 day Surgeon    Subjective:     Diagnostic laparoscopy for this afternoon postponed until tomorrow afternoon. Mr. Sahara Larose was case 2 of 2 to be done my me this afternoon. I have been bumped by an emergency case from the ER. I do NOT want to do his surgery at midnight. He is unchanged. I order a diabetic diet for him this evening, he says he cannot eat it because of the abdominal pain. He will drink ginger ale only. Objective:     Vitals:    03/22/18 0300 03/22/18 0841 03/22/18 1136 03/22/18 1600   BP: 153/85 (!) 167/106 161/88 (!) 165/98   Pulse: 68 84 68 77   Resp: 17 18 20 18   Temp: 99.2 °F (37.3 °C) 98.4 °F (36.9 °C) 98.6 °F (37 °C) 98.5 °F (36.9 °C)   SpO2: 94% 95% 96% 97%   Weight:       Height:            Intake and Output:  Current Shift: 03/22 0701 - 03/22 1900  In: 240 [P.O.:240]  Out: 300 [Urine:300]  Last three shifts: 03/20 1901 - 03/22 0700  In: 1630 [P.O.:30; I.V.:1600]  Out: 3100 [Urine:3100]    Physical Exam:   Unchanged      Lab/Data Review:  Recent Results (from the past 12 hour(s))   GLUCOSE, POC    Collection Time: 03/22/18 11:40 AM   Result Value Ref Range    Glucose (POC) 204 (H) 70 - 110 mg/dL   VANCOMYCIN, TROUGH    Collection Time: 03/22/18 12:47 PM   Result Value Ref Range    Vancomycin,trough 6.1 (L) 10.0 - 20.0 ug/mL    Reported dose date: 20180323      Reported dose time: 2030      Reported dose: 1000 MG UNITS   POTASSIUM    Collection Time: 03/22/18  3:45 PM   Result Value Ref Range    Potassium 3.6 3.5 - 5.5 mmol/L   GLUCOSE, POC    Collection Time: 03/22/18  4:01 PM   Result Value Ref Range    Glucose (POC) 146 (H) 70 - 110 mg/dL         Ref.  Range 3/22/2018 04:30   WBC Latest Ref Range: 4.6 - 13.2 K/uL 15.0 (H)   RBC Latest Ref Range: 4.70 - 5.50 M/uL 3.81 (L)   HGB Latest Ref Range: 13.0 - 16.0 g/dL 11.0 (L)   HCT Latest Ref Range: 36.0 - 48.0 % 35.9 (L)   MCV Latest Ref Range: 74.0 - 97.0 FL 94.2   MCH Latest Ref Range: 24.0 - 34.0 PG 28.9   MCHC Latest Ref Range: 31.0 - 37.0 g/dL 30.6 (L)   RDW Latest Ref Range: 11.6 - 14.5 % 13.7   PLATELET Latest Ref Range: 135 - 420 K/uL 251   MPV Latest Ref Range: 9.2 - 11.8 FL 11.0   NEUTROPHILS Latest Ref Range: 40 - 73 % 80 (H)   LYMPHOCYTES Latest Ref Range: 21 - 52 % 13 (L)   MONOCYTES Latest Ref Range: 3 - 10 % 7   EOSINOPHILS Latest Ref Range: 0 - 5 % 0   BASOPHILS Latest Ref Range: 0 - 2 % 0   DF Latest Units:   AUTOMATED   ABS. NEUTROPHILS Latest Ref Range: 1.8 - 8.0 K/UL 12.1 (H)   ABS. LYMPHOCYTES Latest Ref Range: 0.9 - 3.6 K/UL 2.0   ABS. MONOCYTES Latest Ref Range: 0.05 - 1.2 K/UL 1.0   ABS. EOSINOPHILS Latest Ref Range: 0.0 - 0.4 K/UL 0.0   ABS.  BASOPHILS Latest Ref Range: 0.0 - 0.1 K/UL 0.0   INR Latest Ref Range: 0.8 - 1.2   1.2   Prothrombin time Latest Ref Range: 11.5 - 15.2 sec 14.4   Sodium Latest Ref Range: 136 - 145 mmol/L 142   Potassium Latest Ref Range: 3.5 - 5.5 mmol/L 3.1 (L)   Chloride Latest Ref Range: 100 - 108 mmol/L 105   CO2 Latest Ref Range: 21 - 32 mmol/L 30   Anion gap Latest Ref Range: 3.0 - 18 mmol/L 7   Glucose Latest Ref Range: 74 - 99 mg/dL 182 (H)   BUN Latest Ref Range: 7.0 - 18 MG/DL 13   Creatinine Latest Ref Range: 0.6 - 1.3 MG/DL 1.20   BUN/Creatinine ratio Latest Ref Range: 12 - 20   11 (L)   Calcium Latest Ref Range: 8.5 - 10.1 MG/DL 8.2 (L)   Magnesium Latest Ref Range: 1.6 - 2.6 mg/dL 1.7   GFR est non-AA Latest Ref Range: >60 ml/min/1.73m2 >60   GFR est AA Latest Ref Range: >60 ml/min/1.73m2 >60   Bilirubin, total Latest Ref Range: 0.2 - 1.0 MG/DL 0.5   Protein, total Latest Ref Range: 6.4 - 8.2 g/dL 7.0   Albumin Latest Ref Range: 3.4 - 5.0 g/dL 3.1 (L)   Globulin Latest Ref Range: 2.0 - 4.0 g/dL 3.9   A-G Ratio Latest Ref Range: 0.8 - 1.7   0.8   ALT (SGPT) Latest Ref Range: 16 - 61 U/L 26   AST Latest Ref Range: 15 - 37 U/L 17   Alk. phosphatase Latest Ref Range: 45 - 117 U/L 76   Lipase Latest Ref Range: 73 - 393 U/L 81     INTERPRETATION/FINDINGS  Duplex images were obtained using 2-D gray scale, color flow, and  spectral Doppler analysis. MESENTERIC:  1. No significant stenosis identified in the celiac, splenic, hepatic  or superior mesenteric arteries. 2. Unable to visualize the inferior mesenteric artery. 3. No significant plaquing, focal stenosis or aneurysm noted in the  abdominal aorta.       Assessment:     Active Problems:    Abdominal pain (9/7/2017)      Sepsis ([address] (3/21/2018)        Plan:     NPO after midnight  Chemistry panel in AM to check K after replacement.     Signed By: Max Kunz MD     March 22, 2018

## 2018-03-22 NOTE — CONSULTS
[address] [facility]    Alma Calzada  MR#: [id number]  : 1960  ACCOUNT #: [de-identified]   DATE OF SERVICE: 2018    CONSULTATION SUBMITTED BY HOSPITALIST:  Dr. Wagner De Luna. PRIMARY CARE PHYSICIAN:  Dr. Louise Sainz.    Nurse practitioner, Darin Bear, gastroenterology and liver specialist in [facility]  and her supervising physician. I do not see in her note who that is. I do not know which gastroenterologist to send it to. REASON FOR CONSULTATION:  Chronic abdominal pain, lactic acidosis. HISTORY OF PRESENT ILLNESS:  The patient is an unfortunate 59-year-old morbidly obese gentleman who was admitted early this morning to [facility] after presenting to the emergency room with abdominal pain. He had an abdominal CAT scan without IV contrast due to his elevated creatinine of 1.91 (baseline is around 1.0). It was with oral contrast; however, that CAT scan reportedly showed no signs of bowel obstruction and no acute changes. However, I was called by the admitting hospitalist, due to this abdominal pain as well as an unexplained lactic acid level of 5.8, initially last evening 2018 at 23:36. He also had a leukocytosis of 17.1 thousand, and he was empirically started on intravenous Zosyn and vancomycin. Upon speaking with the patient, he reports that he has been \"admitted over 100 times to this hospital and [facility] for abdominal pain. \"  The records I could review do not support this. However, he reports that this same pain has been going on literally for years and years. It is sometimes associated with nausea and vomiting. He has not been having any fevers. He has no hematochezia. The pain is generally steady and persistent in his lower central abdomen. His only past surgery has been an open appendectomy as well as back surgery last year for chronic pain, which he reports was only partially successful.   Records show that indeed Dr. Adelia Hess did do an extreme lateral interbody fusion at L4-L5, PEEK cage  [employer] neuro vision intraoperative monitoring, demineralized bone matrix. This was performed on 06/19/2017. His diagnoses were degenerative spinal stenosis, degenerative spine spondylolisthesis and morbid obesity. Review of the records also show multiple abdominal CAT scans in the past, going as far back as 2010. In the past, in 2017, there were three separate CAT scans in June and 2 in September all for persistent lower pelvic pain. They note no acute abnormalities. They all note a small fat containing umbilical hernia, stable adrenal nodularity, a ptotic right kidney with a simple appearing renal cyst as well as a \"slight extension of prostatic tissue into base of bladder  similar to previous studies\". I honestly am unsure of what that exactly means as patient reports no bladder or prostatic surgery. PAST MEDICAL HISTORY:  Quite extensive and includes the arthritis of his spine, coronary artery atherosclerotic disease, uncontrolled insulin-dependent diabetes, reflux disease, history of heroin abuse and history of major depressive disorder under the care of psychiatrist, Dr. Brayden Olivier. He has had a myocardial infarction. He is morbidly obese. He has hyperlipidemia, a history of pancreatitis. Positive PPD. Sleep apnea. He does use a CPAP machine. PAST SURGICAL HISTORY:  Includes the spine surgery as mentioned above. He had a coronary stent in 2010 as well as an open appendectomy in the distant past.    SOCIAL HISTORY:  He is . He has never smoked. He does not drink alcohol. He is on disability for psychiatric and pain problems.     CURRENT MEDICATIONS:  Includes Percocet 7.5/325 three times a day, quinapril 20 mg a day, Lantus 30 units nightly, hydrochlorothiazide 25 mg a day, Bentyl three times a day, Protonix 40 mg a day, albuterol inhaler, MiraLax daily, amlodipine 10 mg a day, Lyrica 75 mg twice a day, Elavil 50 mg at night, Zocor 10 mg at night, Glucophage 1000 mg twice a day and Plavix 75 mg daily. REVIEW OF SYSTEMS:  He reports no weight change. He reports he was not weighed upon admission to this hospital and they simply asked him how much he weighed. He reports no headaches. No swallowing problems. He does have intermittent nausea, no vomiting. Abdominal pain is constant always in the lower abdomen. It is sometimes made worse with overeating. It is not made better with bowel movements. He reports no blood in his stools. PHYSICAL EXAMINATION:  GENERAL:  He is obese appearing, but in no acute distress gentleman. VITAL SIGNS:  Most recent blood pressure was 170/86, pulse 75, temperature was 99.2, respirations 18, 93% on room air. HEAD AND NECK:  Extraocular movement of his eyes. Pupils are normal.  Open mouth exam:  Only the soft and hard palate could be seen. NECK:  Short and thick. There is no adenopathy. LUNGS:  Clear. HEART:  Rate is regular. ABDOMEN:  Shows a well-healed Tu-Narinder incision in the right lower quadrant. He has obvious widened umbilicus with a small, nontender hernia which is felt. He has some mild tenderness in his lower suprapubic area with no rebound, no guarding. No obvious inguinal hernias. On inguinal exam, his testes are bilaterally descended. He has a normal uncircumcised penis. EXTREMITIES:  His lower extremities show no peripheral edema. He has very thin narrow legs for his wide abdomen, which is quite protuberant and obese. LABORATORY DATA:  From today,  his point of care glucose has ranged from 119 to 319, most recently 255. Lactic acid has been decreasing with IV hydration. It was 3.6 early this morning and 4 hours later it was normal at 1.7. There has been no repeat CBC since his admission last evening. He had a white blood cell count of 17.1, hematocrit 46.   I note that this hematocrit is markedly elevated since his previous laboratory studies in September 2017 when his hematocrit was 34.4. I imagine he was markedly dehydrated at that time. His chemistries likewise from last evening showed a glucose of 397, a creatinine of 1.91. His baseline is closer to 1.0, BUN was 17, CO2 was 27. Protein was 4.1, although this might be falsely elevated if he was dehydrated. His liver function tests were normal with an AST of 19 an ALT of 43. Lipase is normal at 172. Patient has also been consulted by the gastroenterology service and they have scheduled him for a mesenteric ultrasound test tomorrow. The CAT scans that I have reviewed they also indicate hepatic steatosis. ASSESSMENT AND PLAN:  This is a gentleman with  chronic abdominal pain with a new acute flare associated with marked dehydration, lactic acidosis and acute kidney injury. This is resolving as his lactate is now normal with IV hydration. He does have a small umbilical hernia defect; however, this is not the cause of his pain. I have been asked to consult regarding possible vascular compromise to his intestines as a cause of this chronic abdominal pain. The only way I can assess this is with the diagnostic laparoscopy. Apparently, this has never been done in the past on this gentleman. It may or may not reveal anything rewarding. I will plan on a concurrent umbilical hernia repair with mesh as well as a laparoscopic liver biopsy given his steatosis on imaging. He may well have steatohepatitis as well due to his morbid obesity. I had a talk with him about his obesity and I have recommended he eat no rice, bread, pasta or potatoes whatsoever. I have also recommended he drink no fruit juice as this is very sugar laden. He particularly is fond of apple juice. I told him in no edin terms that his diabetes is under very poor control and that he needs to lose weight.   I do see an A1c done during this admission was markedly elevated at 7.9 indicating poor glucose control. I have also ordered he be weighed by a bedside scale and documented in the chart. He understands these things and desires to proceed. We have also talked about the possible risks and complications of any laparoscopic surgery including bleeding, infection, damage to organs and bowel upon entry of trocars. He is also aware that no cause of his pain may be found. We have talked about the need to convert to an open operation in an emergency procedure as well. With all this in mind, he desires to proceed with the laparoscopic diagnostic procedure. I will of course await the results of the mesenteric arteriogram studies. If they are abnormal, then I will consider other options prior to any surgery. ADDENDUM:   Wt 240.4    Ht 5' 8\"  BMI 36.5  Per ADA recommendations metabolic/bariatric surgery is recommended for morbid obesity and uncontrolled diabetes.     MD MARYAN Kidd / NAVA  D: 03/21/2018 20:54     T: 03/21/2018 21:53  JOB #: [id number]  CC: Giovanny Martinez MD  CC: Shelley Hamilton MD  CC: Carrie Barcenas MD  CC: Marleny PARTIDA-C

## 2018-03-22 NOTE — PROGRESS NOTES
[facility]  Progress Note    Patient: Evelyn Kim Age: 62 y.o. : 1960 MR#: [id number] SSN: [id number]  Date: 3/22/2018     Subjective:     Pt reports feeling much better today. States his pain is controlled. Denies any fever, chills, nausea or vomiting. Addendum: surgery rescheduled to tomorrow d/t Emergent surgery in ED. Assessment/Plan:   1. Abdominal pain: CT scan neg, GI consulted. Surgery consulted with plan for diagnostic laparoscopy, Lap liver Bx, with umbilical hernia repair tomorrow. Mesenteric US w/ no stenosis or plaquing noted. Pain control. 2. Sepsis of unknown origin: Cont Levaquin/Zosyn/Vanco, follow blood and urine cx - ngtd  3. Nausea/Vomiting: resolved. cont. Zofran prn  4. Lactic Acidosis: resolved with IVF  5. Diabetes Type 2: Cont. SSI and Levemir  6. Hx of Umbillical Hernia/Groin hernia: abd US reviewed. No umbilical hernia entrapment. 7. Hx of pneumoperitoneum  8. Hypertension: cont BP meds  9. Abdominal spasm: pain control. 10. SHYLA: likely contrast induced: resolved. Cont IV hydration. Add  Goals of care: Full code   Disposition:  [x]PT/OT ordered   [x] Case management referral    Case discussed with:  [x]Patient  [x]Family  [x]Nursing  [x]Case Management  DVT Prophylaxis:  []Lovenox  [x]Hep SQ  []SCDs  []Coumadin   []On Heparin gtt    Objective:   VS:   Visit Vitals    BP (!) 165/98 (BP 1 Location: Left arm, BP Patient Position: Sitting)    Pulse 77    Temp 98.5 °F (36.9 °C)    Resp 18    Ht 5' 8.9\" (1.75 m)    Wt 109 kg (240 lb 6.4 oz)    SpO2 97%    BMI 35.61 kg/m2      Tmax/24hrs: Temp (24hrs), Av.9 °F (37.2 °C), Min:98.4 °F (36.9 °C), Max:100.2 °F (37.9 °C)    Intake/Output Summary (Last 24 hours) at 18 1652  Last data filed at 18 1600   Gross per 24 hour   Intake           431.67 ml   Output             2200 ml   Net         -1768.33 ml       General:  Awake, alert, NAD  Cardiovascular: RRR  Pulmonary:  CTA  GI:  mildly tender Lower mid abd, normal BS. All 4 quadrants and epigastrium are non-tender. No umbilical hernia noted. Extremities: no edema or cyanosis      Labs:    Recent Results (from the past 24 hour(s))   LACTIC ACID    Collection Time: 03/21/18  6:30 PM   Result Value Ref Range    Lactic acid 1.9 0.4 - 2.0 MMOL/L   GLUCOSE, POC    Collection Time: 03/21/18  6:35 PM   Result Value Ref Range    Glucose (POC) 255 (H) 70 - 110 mg/dL   GLUCOSE, POC    Collection Time: 03/21/18  9:33 PM   Result Value Ref Range    Glucose (POC) 192 (H) 70 - 110 mg/dL   LIPASE    Collection Time: 03/22/18  4:30 AM   Result Value Ref Range    Lipase 81 73 - 237 U/L   METABOLIC PANEL, COMPREHENSIVE    Collection Time: 03/22/18  4:30 AM   Result Value Ref Range    Sodium 142 136 - 145 mmol/L    Potassium 3.1 (L) 3.5 - 5.5 mmol/L    Chloride 105 100 - 108 mmol/L    CO2 30 21 - 32 mmol/L    Anion gap 7 3.0 - 18 mmol/L    Glucose 182 (H) 74 - 99 mg/dL    BUN 13 7.0 - 18 MG/DL    Creatinine 1.20 0.6 - 1.3 MG/DL    BUN/Creatinine ratio 11 (L) 12 - 20      GFR est AA >60 >60 ml/min/1.73m2    GFR est non-AA >60 >60 ml/min/1.73m2    Calcium 8.2 (L) 8.5 - 10.1 MG/DL    Bilirubin, total 0.5 0.2 - 1.0 MG/DL    ALT (SGPT) 26 16 - 61 U/L    AST (SGOT) 17 15 - 37 U/L    Alk.  phosphatase 76 45 - 117 U/L    Protein, total 7.0 6.4 - 8.2 g/dL    Albumin 3.1 (L) 3.4 - 5.0 g/dL    Globulin 3.9 2.0 - 4.0 g/dL    A-G Ratio 0.8 0.8 - 1.7     CBC WITH AUTOMATED DIFF    Collection Time: 03/22/18  4:30 AM   Result Value Ref Range    WBC 15.0 (H) 4.6 - 13.2 K/uL    RBC 3.81 (L) 4.70 - 5.50 M/uL    HGB 11.0 (L) 13.0 - 16.0 g/dL    HCT 35.9 (L) 36.0 - 48.0 %    MCV 94.2 74.0 - 97.0 FL    MCH 28.9 24.0 - 34.0 PG    MCHC 30.6 (L) 31.0 - 37.0 g/dL    RDW 13.7 11.6 - 14.5 %    PLATELET 384 312 - 430 K/uL    MPV 11.0 9.2 - 11.8 FL    NEUTROPHILS 80 (H) 40 - 73 %    LYMPHOCYTES 13 (L) 21 - 52 %    MONOCYTES 7 3 - 10 %    EOSINOPHILS 0 0 - 5 % BASOPHILS 0 0 - 2 %    ABS. NEUTROPHILS 12.1 (H) 1.8 - 8.0 K/UL    ABS. LYMPHOCYTES 2.0 0.9 - 3.6 K/UL    ABS. MONOCYTES 1.0 0.05 - 1.2 K/UL    ABS. EOSINOPHILS 0.0 0.0 - 0.4 K/UL    ABS.  BASOPHILS 0.0 0.0 - 0.1 K/UL    DF AUTOMATED     VANCOMYCIN, RANDOM    Collection Time: 03/22/18  4:30 AM   Result Value Ref Range    Vancomycin, random 10.0 5.0 - 40.0 UG/ML   MAGNESIUM    Collection Time: 03/22/18  4:30 AM   Result Value Ref Range    Magnesium 1.7 1.6 - 2.6 mg/dL   PROTHROMBIN TIME + INR    Collection Time: 03/22/18  4:30 AM   Result Value Ref Range    Prothrombin time 14.4 11.5 - 15.2 sec    INR 1.2 0.8 - 1.2     GLUCOSE, POC    Collection Time: 03/22/18 11:40 AM   Result Value Ref Range    Glucose (POC) 204 (H) 70 - 110 mg/dL   VANCOMYCIN, TROUGH    Collection Time: 03/22/18 12:47 PM   Result Value Ref Range    Vancomycin,trough 6.1 (L) 10.0 - 20.0 ug/mL    Reported dose date: [id number]      Reported dose time: 2030      Reported dose: 1000 MG UNITS   GLUCOSE, POC    Collection Time: 03/22/18  4:01 PM   Result Value Ref Range    Glucose (POC) 146 (H) 70 - 110 mg/dL       Signed By: Isidro Chung PA-C     March 22, 2018 4:52 PM

## 2018-03-22 NOTE — DIABETES MGMT
Glycemic Control Plan of Care    POC BG range on 03/21/2018: 192-319 mg/dL. POC BG report on 03/22/2018 at time of review: 204 mg/dL. 03/21/2018: Patient reported home diabetes meds: levemir insulin 30 units daily at bedtime and metformin 1000 mg BID with food. Patient is currently NPO for diagnostic laparoscopy, laparoscopic liver biopsy later today. Noted order for levemir insulin 10 units daily at bedtime. I verified this with patient and stated that he did not bring his levemir insulin to this hospital admission. Also verified with pharmacy that levemir is not in the hospital formulary. Recommendation(s):  1.) Obtained verbal order from Dr. Brian Orr to change levemir to lantus insulin. 2.) Continue monitoring and consider increasing basal lantus as need to 20 units if BG still above target range. 3.) Completed assessment of home diabetes management and education with patient, 03/21/2018. Assessment:  Patient is 62year old with past medical history including IDDM, gastroparesis,  hypertension, GERD, positive PPD, heart attack, CAD with stent, acute renal failure, hypothyroidism, umbilical hernia, and pneumoperitoneum 9/2017 - was admitted on 03/20/2018 with report of periumbilical pain associated with nausea and vomiting. Noted:  Abdominal pain. Sepsis. IDDM with current A1c of 7.9% (03/20/2018)    Most recent blood glucose values: FBG report 03/2018: 379 mg/dL. Results for Serafin Vitale (MRN [id number]) as of 3/22/2018 13:37   Ref. Range 3/21/2018 08:14 3/21/2018 12:44 3/21/2018 18:35 3/21/2018 21:33   GLUCOSE,FAST - POC Latest Ref Range: 70 - 110 mg/dL 263 (H) 319 (H) 255 (H) 192 (H)     Results for Serafin Vitale (MRN [id number]) as of 3/22/2018 13:37   Ref. Range 3/22/2018 11:40   GLUCOSE,FAST - POC Latest Ref Range: 70 - 110 mg/dL 204 (H)     Current A1C: 7.9% (03/20/2018) is equivalent to average blood glucose of 180 mg/dL during the past 2-3 months.     Current hospital diabetes medications:  Lantus insulin 10 units daily at bedtime. Correctional lispro insulin ACHS. Very resistant dose. Total daily dose insulin requirement previous day: 03/21/2018  Levemir: 10 units (verified that patient likely received lantus insulin instead of levemir insulin). Lispro: 26 units    Home diabetes medications: Patient reported on 03/21/2018:  Levemir insulin 30 units daily at bedtime. Metformin 1000 mg twice daily with food. Diet: NPO for procedure. Goals:  Blood glucose will be within target range of  mg/dL by 03/25/2018.     Education:  _X__  Refer to Diabetes Education Record: 03/21/2018             ___  Education not indicated at this time    Sheree Houston RN [facility]  Pager: [phone]

## 2018-03-23 LAB
ANION GAP SERPL CALC-SCNC: 8 MMOL/L (ref 3–18)
BASOPHILS # BLD: 0 K/UL (ref 0–0.06)
BASOPHILS NFR BLD: 0 % (ref 0–2)
BUN SERPL-MCNC: 13 MG/DL (ref 7–18)
BUN/CREAT SERPL: 11 (ref 12–20)
CALCIUM SERPL-MCNC: 8.1 MG/DL (ref 8.5–10.1)
CHLORIDE SERPL-SCNC: 104 MMOL/L (ref 100–108)
CO2 SERPL-SCNC: 28 MMOL/L (ref 21–32)
CREAT SERPL-MCNC: 1.17 MG/DL (ref 0.6–1.3)
DIFFERENTIAL METHOD BLD: ABNORMAL
EOSINOPHIL # BLD: 0.1 K/UL (ref 0–0.4)
EOSINOPHIL NFR BLD: 0 % (ref 0–5)
ERYTHROCYTE [DISTWIDTH] IN BLOOD BY AUTOMATED COUNT: 13.3 % (ref 11.6–14.5)
GLUCOSE BLD STRIP.AUTO-MCNC: 126 MG/DL (ref 70–110)
GLUCOSE BLD STRIP.AUTO-MCNC: 127 MG/DL (ref 70–110)
GLUCOSE BLD STRIP.AUTO-MCNC: 149 MG/DL (ref 70–110)
GLUCOSE BLD STRIP.AUTO-MCNC: 150 MG/DL (ref 70–110)
GLUCOSE SERPL-MCNC: 160 MG/DL (ref 74–99)
HCT VFR BLD AUTO: 35.3 % (ref 36–48)
HGB BLD-MCNC: 11.2 G/DL (ref 13–16)
LYMPHOCYTES # BLD: 2.2 K/UL (ref 0.9–3.6)
LYMPHOCYTES NFR BLD: 20 % (ref 21–52)
MAGNESIUM SERPL-MCNC: 2 MG/DL (ref 1.6–2.6)
MCH RBC QN AUTO: 29.5 PG (ref 24–34)
MCHC RBC AUTO-ENTMCNC: 31.7 G/DL (ref 31–37)
MCV RBC AUTO: 92.9 FL (ref 74–97)
MONOCYTES # BLD: 1.1 K/UL (ref 0.05–1.2)
MONOCYTES NFR BLD: 10 % (ref 3–10)
NEUTS SEG # BLD: 7.9 K/UL (ref 1.8–8)
NEUTS SEG NFR BLD: 70 % (ref 40–73)
PLATELET # BLD AUTO: 229 K/UL (ref 135–420)
PMV BLD AUTO: 10.6 FL (ref 9.2–11.8)
POTASSIUM SERPL-SCNC: 3.1 MMOL/L (ref 3.5–5.5)
RBC # BLD AUTO: 3.8 M/UL (ref 4.7–5.5)
SODIUM SERPL-SCNC: 140 MMOL/L (ref 136–145)
WBC # BLD AUTO: 11.3 K/UL (ref 4.6–13.2)

## 2018-03-23 PROCEDURE — 74011250636 HC RX REV CODE- 250/636: Performed by: SURGERY

## 2018-03-23 PROCEDURE — 74011636637 HC RX REV CODE- 636/637: Performed by: EMERGENCY MEDICINE

## 2018-03-23 PROCEDURE — 0FB14ZX EXCISION OF RIGHT LOBE LIVER, PERCUTANEOUS ENDOSCOPIC APPROACH, DIAGNOSTIC: ICD-10-PCS | Performed by: SURGERY

## 2018-03-23 PROCEDURE — 0WUF4JZ SUPPLEMENT ABDOMINAL WALL WITH SYNTHETIC SUBSTITUTE, PERCUTANEOUS ENDOSCOPIC APPROACH: ICD-10-PCS | Performed by: SURGERY

## 2018-03-23 PROCEDURE — C9290 INJ, BUPIVACAINE LIPOSOME: HCPCS | Performed by: SURGERY

## 2018-03-23 PROCEDURE — 77030035051: Performed by: SURGERY

## 2018-03-23 PROCEDURE — 36415 COLL VENOUS BLD VENIPUNCTURE: CPT | Performed by: EMERGENCY MEDICINE

## 2018-03-23 PROCEDURE — 74011000250 HC RX REV CODE- 250: Performed by: EMERGENCY MEDICINE

## 2018-03-23 PROCEDURE — 83735 ASSAY OF MAGNESIUM: CPT | Performed by: PHYSICIAN ASSISTANT

## 2018-03-23 PROCEDURE — 74011000250 HC RX REV CODE- 250

## 2018-03-23 PROCEDURE — 77030009411 HC ELECTRD PRB J&J -C: Performed by: SURGERY

## 2018-03-23 PROCEDURE — 74011250637 HC RX REV CODE- 250/637: Performed by: SURGERY

## 2018-03-23 PROCEDURE — 77030011640 HC PAD GRND REM COVD -A: Performed by: SURGERY

## 2018-03-23 PROCEDURE — 80048 BASIC METABOLIC PNL TOTAL CA: CPT | Performed by: EMERGENCY MEDICINE

## 2018-03-23 PROCEDURE — 77030013252 HC APPL DUPLOSPRY BAXT -B: Performed by: SURGERY

## 2018-03-23 PROCEDURE — 77030010507 HC ADH SKN DERMBND J&J -B: Performed by: SURGERY

## 2018-03-23 PROCEDURE — 85025 COMPLETE CBC W/AUTO DIFF WBC: CPT | Performed by: PHYSICIAN ASSISTANT

## 2018-03-23 PROCEDURE — 77030018846 HC SOL IRR STRL H20 ICUM -A: Performed by: SURGERY

## 2018-03-23 PROCEDURE — 77030035030 HC NDL INSUF VERES 150ML DISP COVD -B: Performed by: SURGERY

## 2018-03-23 PROCEDURE — 76060000034 HC ANESTHESIA 1.5 TO 2 HR: Performed by: SURGERY

## 2018-03-23 PROCEDURE — 76210000063 HC OR PH I REC FIRST 0.5 HR: Performed by: SURGERY

## 2018-03-23 PROCEDURE — 77030002933 HC SUT MCRYL J&J -A: Performed by: SURGERY

## 2018-03-23 PROCEDURE — 88313 SPECIAL STAINS GROUP 2: CPT | Performed by: SURGERY

## 2018-03-23 PROCEDURE — 77030010515 HC APPL ENDOCLP LIG J&J -B: Performed by: SURGERY

## 2018-03-23 PROCEDURE — 74011250636 HC RX REV CODE- 250/636: Performed by: INTERNAL MEDICINE

## 2018-03-23 PROCEDURE — 74011250636 HC RX REV CODE- 250/636

## 2018-03-23 PROCEDURE — 77030034668 HC DEV CLOS PUNC DISP ANCH -B: Performed by: SURGERY

## 2018-03-23 PROCEDURE — 77030020782 HC GWN BAIR PAWS FLX 3M -B: Performed by: SURGERY

## 2018-03-23 PROCEDURE — 77030020255 HC SOL INJ LR 1000ML BG: Performed by: SURGERY

## 2018-03-23 PROCEDURE — 77030035045 HC TRCR ENDOSC VRSPRT BLDLSS COVD -B: Performed by: SURGERY

## 2018-03-23 PROCEDURE — 65660000000 HC RM CCU STEPDOWN

## 2018-03-23 PROCEDURE — 77030035048 HC TRCR ENDOSC OPTCL COVD -B: Performed by: SURGERY

## 2018-03-23 PROCEDURE — 77030003665 HC NDL SPN BBMI -A: Performed by: SURGERY

## 2018-03-23 PROCEDURE — 77030013189 HC SLV COMPR SCD HUNT -B: Performed by: SURGERY

## 2018-03-23 PROCEDURE — 74011250636 HC RX REV CODE- 250/636: Performed by: EMERGENCY MEDICINE

## 2018-03-23 PROCEDURE — 82962 GLUCOSE BLOOD TEST: CPT

## 2018-03-23 PROCEDURE — 77030034154 HC SHR COAG HARM ACE J&J -F: Performed by: SURGERY

## 2018-03-23 PROCEDURE — 77030008023 HC RELD SUT ENDOSC COVD -B: Performed by: SURGERY

## 2018-03-23 PROCEDURE — 74011000258 HC RX REV CODE- 258: Performed by: INTERNAL MEDICINE

## 2018-03-23 PROCEDURE — 77030018876 HC APPL CLP LIG4 COVD -B: Performed by: SURGERY

## 2018-03-23 PROCEDURE — 74011000258 HC RX REV CODE- 258: Performed by: SURGERY

## 2018-03-23 PROCEDURE — 77030013532 HC SEAL FBRN TISSL RDYTUSE 4ML BAXT -C: Performed by: SURGERY

## 2018-03-23 PROCEDURE — 88305 TISSUE EXAM BY PATHOLOGIST: CPT | Performed by: SURGERY

## 2018-03-23 PROCEDURE — 77030009932 HC PRB FT CTRL J&J -B: Performed by: SURGERY

## 2018-03-23 PROCEDURE — 88307 TISSUE EXAM BY PATHOLOGIST: CPT | Performed by: SURGERY

## 2018-03-23 PROCEDURE — 77030025927 HC STPLR FIX SECURSTRP J&J -F: Performed by: SURGERY

## 2018-03-23 PROCEDURE — 76010000153 HC OR TIME 1.5 TO 2 HR: Performed by: SURGERY

## 2018-03-23 PROCEDURE — C1781 MESH (IMPLANTABLE): HCPCS | Performed by: SURGERY

## 2018-03-23 PROCEDURE — 77030034850: Performed by: SURGERY

## 2018-03-23 PROCEDURE — 77030002912 HC SUT ETHBND J&J -A: Performed by: SURGERY

## 2018-03-23 DEVICE — MESH VENTRALEX ST MED --: Type: IMPLANTABLE DEVICE | Site: ABDOMEN | Status: FUNCTIONAL

## 2018-03-23 RX ORDER — SODIUM CHLORIDE 0.9 % (FLUSH) 0.9 %
5-10 SYRINGE (ML) INJECTION EVERY 8 HOURS
Status: DISCONTINUED | OUTPATIENT
Start: 2018-03-23 | End: 2018-03-23 | Stop reason: HOSPADM

## 2018-03-23 RX ORDER — NEOSTIGMINE METHYLSULFATE 5 MG/5 ML
SYRINGE (ML) INTRAVENOUS AS NEEDED
Status: DISCONTINUED | OUTPATIENT
Start: 2018-03-23 | End: 2018-03-23 | Stop reason: HOSPADM

## 2018-03-23 RX ORDER — AMLODIPINE BESYLATE 10 MG/1
10 TABLET ORAL DAILY
Status: DISCONTINUED | OUTPATIENT
Start: 2018-03-24 | End: 2018-03-24 | Stop reason: HOSPADM

## 2018-03-23 RX ORDER — ROCURONIUM BROMIDE 10 MG/ML
INJECTION, SOLUTION INTRAVENOUS AS NEEDED
Status: DISCONTINUED | OUTPATIENT
Start: 2018-03-23 | End: 2018-03-23 | Stop reason: HOSPADM

## 2018-03-23 RX ORDER — POTASSIUM CHLORIDE 20 MEQ/1
20 TABLET, EXTENDED RELEASE ORAL
Status: COMPLETED | OUTPATIENT
Start: 2018-03-23 | End: 2018-03-23

## 2018-03-23 RX ORDER — SODIUM CHLORIDE, SODIUM LACTATE, POTASSIUM CHLORIDE, CALCIUM CHLORIDE 600; 310; 30; 20 MG/100ML; MG/100ML; MG/100ML; MG/100ML
INJECTION, SOLUTION INTRAVENOUS
Status: DISCONTINUED | OUTPATIENT
Start: 2018-03-23 | End: 2018-03-23 | Stop reason: HOSPADM

## 2018-03-23 RX ORDER — SODIUM CHLORIDE, SODIUM LACTATE, POTASSIUM CHLORIDE, CALCIUM CHLORIDE 600; 310; 30; 20 MG/100ML; MG/100ML; MG/100ML; MG/100ML
75 INJECTION, SOLUTION INTRAVENOUS CONTINUOUS
Status: DISCONTINUED | OUTPATIENT
Start: 2018-03-23 | End: 2018-03-23 | Stop reason: HOSPADM

## 2018-03-23 RX ORDER — ONDANSETRON 2 MG/ML
INJECTION INTRAMUSCULAR; INTRAVENOUS AS NEEDED
Status: DISCONTINUED | OUTPATIENT
Start: 2018-03-23 | End: 2018-03-23 | Stop reason: HOSPADM

## 2018-03-23 RX ORDER — SODIUM CHLORIDE 0.9 % (FLUSH) 0.9 %
5-10 SYRINGE (ML) INJECTION AS NEEDED
Status: DISCONTINUED | OUTPATIENT
Start: 2018-03-23 | End: 2018-03-23 | Stop reason: HOSPADM

## 2018-03-23 RX ORDER — SODIUM CHLORIDE 9 MG/ML
INJECTION, SOLUTION INTRAVENOUS
Status: DISCONTINUED | OUTPATIENT
Start: 2018-03-23 | End: 2018-03-23 | Stop reason: HOSPADM

## 2018-03-23 RX ORDER — MORPHINE SULFATE 10 MG/ML
INJECTION, SOLUTION INTRAMUSCULAR; INTRAVENOUS AS NEEDED
Status: DISCONTINUED | OUTPATIENT
Start: 2018-03-23 | End: 2018-03-23 | Stop reason: HOSPADM

## 2018-03-23 RX ORDER — OXYCODONE AND ACETAMINOPHEN 7.5; 325 MG/1; MG/1
1 TABLET ORAL
Status: DISCONTINUED | OUTPATIENT
Start: 2018-03-23 | End: 2018-03-24 | Stop reason: HOSPADM

## 2018-03-23 RX ORDER — MIDAZOLAM HYDROCHLORIDE 1 MG/ML
INJECTION, SOLUTION INTRAMUSCULAR; INTRAVENOUS AS NEEDED
Status: DISCONTINUED | OUTPATIENT
Start: 2018-03-23 | End: 2018-03-23 | Stop reason: HOSPADM

## 2018-03-23 RX ORDER — FENTANYL CITRATE 50 UG/ML
INJECTION, SOLUTION INTRAMUSCULAR; INTRAVENOUS AS NEEDED
Status: DISCONTINUED | OUTPATIENT
Start: 2018-03-23 | End: 2018-03-23 | Stop reason: HOSPADM

## 2018-03-23 RX ORDER — KETOROLAC TROMETHAMINE 30 MG/ML
INJECTION, SOLUTION INTRAMUSCULAR; INTRAVENOUS AS NEEDED
Status: DISCONTINUED | OUTPATIENT
Start: 2018-03-23 | End: 2018-03-23 | Stop reason: HOSPADM

## 2018-03-23 RX ORDER — GLYCOPYRROLATE 0.2 MG/ML
INJECTION INTRAMUSCULAR; INTRAVENOUS AS NEEDED
Status: DISCONTINUED | OUTPATIENT
Start: 2018-03-23 | End: 2018-03-23 | Stop reason: HOSPADM

## 2018-03-23 RX ORDER — PROPOFOL 10 MG/ML
INJECTION, EMULSION INTRAVENOUS AS NEEDED
Status: DISCONTINUED | OUTPATIENT
Start: 2018-03-23 | End: 2018-03-23 | Stop reason: HOSPADM

## 2018-03-23 RX ORDER — INSULIN LISPRO 100 [IU]/ML
INJECTION, SOLUTION INTRAVENOUS; SUBCUTANEOUS ONCE
Status: DISCONTINUED | OUTPATIENT
Start: 2018-03-23 | End: 2018-03-23 | Stop reason: HOSPADM

## 2018-03-23 RX ORDER — MORPHINE SULFATE 10 MG/ML
5 INJECTION, SOLUTION INTRAMUSCULAR; INTRAVENOUS
Status: COMPLETED | OUTPATIENT
Start: 2018-03-23 | End: 2018-03-24

## 2018-03-23 RX ORDER — LIDOCAINE HYDROCHLORIDE 20 MG/ML
INJECTION, SOLUTION EPIDURAL; INFILTRATION; INTRACAUDAL; PERINEURAL AS NEEDED
Status: DISCONTINUED | OUTPATIENT
Start: 2018-03-23 | End: 2018-03-23 | Stop reason: HOSPADM

## 2018-03-23 RX ORDER — SODIUM CHLORIDE, SODIUM LACTATE, POTASSIUM CHLORIDE, CALCIUM CHLORIDE 600; 310; 30; 20 MG/100ML; MG/100ML; MG/100ML; MG/100ML
100 INJECTION, SOLUTION INTRAVENOUS CONTINUOUS
Status: DISCONTINUED | OUTPATIENT
Start: 2018-03-23 | End: 2018-03-24 | Stop reason: HOSPADM

## 2018-03-23 RX ADMIN — INSULIN GLARGINE 10 UNITS: 100 INJECTION, SOLUTION SUBCUTANEOUS at 22:16

## 2018-03-23 RX ADMIN — ONDANSETRON 4 MG: 2 INJECTION INTRAMUSCULAR; INTRAVENOUS at 19:52

## 2018-03-23 RX ADMIN — POTASSIUM CHLORIDE 20 MEQ: 20 TABLET, EXTENDED RELEASE ORAL at 09:27

## 2018-03-23 RX ADMIN — POTASSIUM CHLORIDE 20 MEQ: 20 TABLET, EXTENDED RELEASE ORAL at 12:12

## 2018-03-23 RX ADMIN — MORPHINE SULFATE 5 MG: 4 INJECTION INTRAVENOUS at 16:44

## 2018-03-23 RX ADMIN — Medication 10 ML: at 22:18

## 2018-03-23 RX ADMIN — MORPHINE SULFATE 5 MG: 4 INJECTION INTRAVENOUS at 10:15

## 2018-03-23 RX ADMIN — LEVOFLOXACIN 750 MG: 5 INJECTION, SOLUTION INTRAVENOUS at 06:00

## 2018-03-23 RX ADMIN — KETOROLAC TROMETHAMINE 30 MG: 30 INJECTION, SOLUTION INTRAMUSCULAR; INTRAVENOUS at 19:52

## 2018-03-23 RX ADMIN — MORPHINE SULFATE 5 MG: 4 INJECTION INTRAVENOUS at 08:23

## 2018-03-23 RX ADMIN — PIPERACILLIN SODIUM AND TAZOBACTAM SODIUM 4.5 G: 4; .5 INJECTION, POWDER, LYOPHILIZED, FOR SOLUTION INTRAVENOUS at 02:44

## 2018-03-23 RX ADMIN — SODIUM CHLORIDE 1000 MG: 900 INJECTION, SOLUTION INTRAVENOUS at 09:27

## 2018-03-23 RX ADMIN — PIPERACILLIN SODIUM AND TAZOBACTAM SODIUM 4.5 G: 4; .5 INJECTION, POWDER, LYOPHILIZED, FOR SOLUTION INTRAVENOUS at 22:06

## 2018-03-23 RX ADMIN — MORPHINE SULFATE 5 MG: 4 INJECTION INTRAVENOUS at 12:12

## 2018-03-23 RX ADMIN — SODIUM CHLORIDE 100 ML/HR: 900 INJECTION, SOLUTION INTRAVENOUS at 06:42

## 2018-03-23 RX ADMIN — SODIUM CHLORIDE, SODIUM LACTATE, POTASSIUM CHLORIDE, CALCIUM CHLORIDE: 600; 310; 30; 20 INJECTION, SOLUTION INTRAVENOUS at 18:54

## 2018-03-23 RX ADMIN — MORPHINE SULFATE 4 MG: 10 INJECTION, SOLUTION INTRAMUSCULAR; INTRAVENOUS at 19:30

## 2018-03-23 RX ADMIN — SODIUM CHLORIDE 20 MG: 9 INJECTION INTRAMUSCULAR; INTRAVENOUS; SUBCUTANEOUS at 22:07

## 2018-03-23 RX ADMIN — Medication 10 ML: at 14:00

## 2018-03-23 RX ADMIN — PROPOFOL 200 MG: 10 INJECTION, EMULSION INTRAVENOUS at 19:02

## 2018-03-23 RX ADMIN — SODIUM CHLORIDE 20 MG: 9 INJECTION INTRAMUSCULAR; INTRAVENOUS; SUBCUTANEOUS at 07:54

## 2018-03-23 RX ADMIN — MORPHINE SULFATE 5 MG: 4 INJECTION INTRAVENOUS at 00:58

## 2018-03-23 RX ADMIN — GLYCOPYRROLATE 0.4 MG: 0.2 INJECTION INTRAMUSCULAR; INTRAVENOUS at 20:00

## 2018-03-23 RX ADMIN — POTASSIUM CHLORIDE 20 MEQ: 20 TABLET, EXTENDED RELEASE ORAL at 10:07

## 2018-03-23 RX ADMIN — FENTANYL CITRATE 100 MCG: 50 INJECTION, SOLUTION INTRAMUSCULAR; INTRAVENOUS at 19:23

## 2018-03-23 RX ADMIN — SODIUM CHLORIDE 1000 MG: 900 INJECTION, SOLUTION INTRAVENOUS at 22:45

## 2018-03-23 RX ADMIN — PIPERACILLIN SODIUM AND TAZOBACTAM SODIUM 4.5 G: 4; .5 INJECTION, POWDER, LYOPHILIZED, FOR SOLUTION INTRAVENOUS at 10:16

## 2018-03-23 RX ADMIN — MORPHINE SULFATE 5 MG: 4 INJECTION INTRAVENOUS at 06:00

## 2018-03-23 RX ADMIN — MORPHINE SULFATE 5 MG: 4 INJECTION INTRAVENOUS at 02:43

## 2018-03-23 RX ADMIN — SODIUM CHLORIDE, SODIUM LACTATE, POTASSIUM CHLORIDE, AND CALCIUM CHLORIDE 100 ML/HR: 600; 310; 30; 20 INJECTION, SOLUTION INTRAVENOUS at 09:32

## 2018-03-23 RX ADMIN — MIDAZOLAM HYDROCHLORIDE 4 MG: 1 INJECTION, SOLUTION INTRAMUSCULAR; INTRAVENOUS at 18:50

## 2018-03-23 RX ADMIN — SODIUM CHLORIDE: 9 INJECTION, SOLUTION INTRAVENOUS at 20:15

## 2018-03-23 RX ADMIN — Medication 3 MG: at 20:00

## 2018-03-23 RX ADMIN — ROCURONIUM BROMIDE 30 MG: 10 INJECTION, SOLUTION INTRAVENOUS at 19:02

## 2018-03-23 RX ADMIN — INSULIN LISPRO 3 UNITS: 100 INJECTION, SOLUTION INTRAVENOUS; SUBCUTANEOUS at 22:17

## 2018-03-23 RX ADMIN — MORPHINE SULFATE 5 MG: 4 INJECTION INTRAVENOUS at 22:20

## 2018-03-23 RX ADMIN — PROPOFOL 50 MG: 10 INJECTION, EMULSION INTRAVENOUS at 19:43

## 2018-03-23 RX ADMIN — HEPARIN SODIUM 5000 UNITS: 5000 INJECTION, SOLUTION INTRAVENOUS; SUBCUTANEOUS at 22:09

## 2018-03-23 RX ADMIN — Medication 10 ML: at 06:04

## 2018-03-23 RX ADMIN — MORPHINE SULFATE 3 MG: 10 INJECTION, SOLUTION INTRAMUSCULAR; INTRAVENOUS at 20:13

## 2018-03-23 RX ADMIN — FENTANYL CITRATE 100 MCG: 50 INJECTION, SOLUTION INTRAMUSCULAR; INTRAVENOUS at 18:54

## 2018-03-23 RX ADMIN — LIDOCAINE HYDROCHLORIDE 100 MG: 20 INJECTION, SOLUTION EPIDURAL; INFILTRATION; INTRACAUDAL; PERINEURAL at 19:02

## 2018-03-23 NOTE — PROGRESS NOTES
[facility] Hospitalist Group  Progress Note    Patient: Brooklynn Liz Age: 62 y.o. : 1960 MR#: [id number] SSN: [id number]  Date: 3/23/2018     Subjective:     Pt reports \"5/10\" pain in the mid lower abdomen. Reports nausea and vomiting yesterday after drinking ginger ale. No N/V today. Denies any chest pain, SOB. Assessment/Plan:   1. Abdominal pain: CT scan neg, GI consulted. Surgery consulted with plan for diagnostic laparoscopy, Lap liver Bx, with umbilical hernia repair today. Mesenteric US w/ no stenosis or plaquing noted. Pain control. 2. Sepsis of unknown origin: Cont Levaquin/Zosyn/Vanco, follow blood and urine cx - ngtd  3. Nausea/Vomiting: resolved. cont. Zofran prn  4. Lactic Acidosis: resolved with IVF  5. Diabetes Type 2: Cont. SSI and Levemir  6. Hx of Umbillical Hernia/Groin hernia: abd US reviewed. No umbilical hernia entrapment. 7. Hx of pneumoperitoneum  8. Hypertension: worsening with IVF, start home dose Amlodipine in the am  9. Abdominal spasm: morphine prn  10. SHYLA: likely contrast induced: resolved. Cont IV hydration     Goals of care: Full code   Disposition:  [x]PT/OT ordered   [x] Case management referral    Case discussed with:  [x]Patient  [x]Family  [x]Nursing  [x]Case Management  DVT Prophylaxis:  []Lovenox  [x]Hep SQ  []SCDs  []Coumadin   []On Heparin gtt    Objective:   VS:   Visit Vitals    BP (!) 160/107 (BP 1 Location: Left arm, BP Patient Position: At rest)  Comment: nurse Idania Mrash was notified    Pulse (!) 58    Temp 97.3 °F (36.3 °C)    Resp 18    Ht 5' 8.9\" (1.75 m)    Wt 106.1 kg (234 lb)    SpO2 96%    BMI 34.66 kg/m2      Tmax/24hrs: Temp (24hrs), Av.3 °F (36.8 °C), Min:97.3 °F (36.3 °C), Max:99.1 °F (37.3 °C)    Intake/Output Summary (Last 24 hours) at 18 1616  Last data filed at 18 1333   Gross per 24 hour   Intake             2530 ml   Output              750 ml   Net             1780 ml       General:  Awake, alert, NAD  Cardiovascular:  RRR  Pulmonary:  CTA  GI: tender Lower mid abd, normal BS. All 4 quadrants and epigastrium are non-tender. No umbilical hernia noted. Extremities: no edema or cyanosis      Labs:    Recent Results (from the past 24 hour(s))   GLUCOSE, POC    Collection Time: 03/22/18 10:02 PM   Result Value Ref Range    Glucose (POC) 196 (H) 70 - 079 mg/dL   METABOLIC PANEL, BASIC    Collection Time: 03/23/18  4:07 AM   Result Value Ref Range    Sodium 140 136 - 145 mmol/L    Potassium 3.1 (L) 3.5 - 5.5 mmol/L    Chloride 104 100 - 108 mmol/L    CO2 28 21 - 32 mmol/L    Anion gap 8 3.0 - 18 mmol/L    Glucose 160 (H) 74 - 99 mg/dL    BUN 13 7.0 - 18 MG/DL    Creatinine 1.17 0.6 - 1.3 MG/DL    BUN/Creatinine ratio 11 (L) 12 - 20      GFR est AA >60 >60 ml/min/1.73m2    GFR est non-AA >60 >60 ml/min/1.73m2    Calcium 8.1 (L) 8.5 - 10.1 MG/DL   GLUCOSE, POC    Collection Time: 03/23/18  8:39 AM   Result Value Ref Range    Glucose (POC) 127 (H) 70 - 110 mg/dL   CBC WITH AUTOMATED DIFF    Collection Time: 03/23/18 10:36 AM   Result Value Ref Range    WBC 11.3 4.6 - 13.2 K/uL    RBC 3.80 (L) 4.70 - 5.50 M/uL    HGB 11.2 (L) 13.0 - 16.0 g/dL    HCT 35.3 (L) 36.0 - 48.0 %    MCV 92.9 74.0 - 97.0 FL    MCH 29.5 24.0 - 34.0 PG    MCHC 31.7 31.0 - 37.0 g/dL    RDW 13.3 11.6 - 14.5 %    PLATELET 541 241 - 946 K/uL    MPV 10.6 9.2 - 11.8 FL    NEUTROPHILS 70 40 - 73 %    LYMPHOCYTES 20 (L) 21 - 52 %    MONOCYTES 10 3 - 10 %    EOSINOPHILS 0 0 - 5 %    BASOPHILS 0 0 - 2 %    ABS. NEUTROPHILS 7.9 1.8 - 8.0 K/UL    ABS. LYMPHOCYTES 2.2 0.9 - 3.6 K/UL    ABS. MONOCYTES 1.1 0.05 - 1.2 K/UL    ABS. EOSINOPHILS 0.1 0.0 - 0.4 K/UL    ABS.  BASOPHILS 0.0 0.0 - 0.06 K/UL    DF AUTOMATED     MAGNESIUM    Collection Time: 03/23/18 10:36 AM   Result Value Ref Range    Magnesium 2.0 1.6 - 2.6 mg/dL   GLUCOSE, POC    Collection Time: 03/23/18 11:47 AM   Result Value Ref Range    Glucose (POC) 149 (H) 70 - 110 mg/dL GLUCOSE, POC    Collection Time: 03/23/18  3:46 PM   Result Value Ref Range    Glucose (POC) 126 (H) 70 - 110 mg/dL       Signed By: Ricky Carrera PA-C     March 23, 2018 4:16 PM

## 2018-03-23 NOTE — PROGRESS NOTES
[email or web address]  [phone]       Impression  1. Abdominal pain-   -Ct abdomen- no acute abdomen or pelvic process   -11/2010- EGD- grade 2 esophagitis. Medium HH  -11/2010 colo- polyp. Overdue for colonoscopy. May be done OP  -mesenteric doppler- No significant stenosis identified in the celiac, splenic, hepatic  or superior mesenteric arteries. Unable to visualize the inferior mesenteric artery. No significant plaquing, focal stenosis or aneurysm noted in the  abdominal aorta. 2. Intractable Nausea and vomiting-   3. Leukocytosis  4. DM  5. H/O pneumoperitoneum last september     Plan:  1. Pt for diagnostic laparoscopy, laparoscopic liver biopsy today  2. Cont supportive care    Chief Complaint: abdominal pain    Subjective:  Pt uneventful overnight.  Pt abdominal pain has improved        Eyes: conjunctiva normal, EOM normal   ENT: Mucous membranes moist, no lesion or thrush   Cardiovascular:  normal rate and regular rhythm, no murmur   Pulmonary/Chest Wall: breath sounds normal and effort normal   Abdominal:  soft, non-acute, non-tender, no appreciable mass or hepatosplenomegaly, no appreciable ascites       Visit Vitals    /78 (BP 1 Location: Left arm, BP Patient Position: At rest)    Pulse 69    Temp 98.7 °F (37.1 °C)    Resp 20    Ht 5' 8.9\" (1.75 m)    Wt 109 kg (240 lb 6.4 oz)    SpO2 97%    BMI 35.61 kg/m2           Intake/Output Summary (Last 24 hours) at 03/23/18 1125  Last data filed at 03/23/18 6032   Gross per 24 hour   Intake             2530 ml   Output              650 ml   Net             1880 ml       CBC w/Diff    Lab Results   Component Value Date/Time    WBC 15.0 (H) 03/22/2018 04:30 AM    RBC 3.81 (L) 03/22/2018 04:30 AM    HGB 11.0 (L) 03/22/2018 04:30 AM    HCT 35.9 (L) 03/22/2018 04:30 AM    MCV 94.2 03/22/2018 04:30 AM    MCH 28.9 03/22/2018 04:30 AM    MCHC 30.6 (L) 03/22/2018 04:30 AM    RDW 13.7 03/22/2018 04:30 AM     03/22/2018 04:30 AM    Lab Results Component Value Date/Time    GRANS 80 (H) 03/22/2018 04:30 AM    LYMPH 13 (L) 03/22/2018 04:30 AM    EOS 0 03/22/2018 04:30 AM    BANDS 3 03/20/2018 11:40 PM    BASOS 0 03/22/2018 04:30 AM      Basic Metabolic Profile   Recent Labs      03/23/18   0407   03/22/18   0430   NA  140   --   142   K  3.1*   < >  3.1*   CL  104   --   105   CO2  28   --   30   BUN  13   --   13   CA  8.1*   --   8.2*   MG   --    --   1.7    < > = values in this interval not displayed. Hepatic Function    Lab Results   Component Value Date/Time    ALB 3.1 (L) 03/22/2018 04:30 AM    TP 7.0 03/22/2018 04:30 AM    AP 76 03/22/2018 04:30 AM    Lab Results   Component Value Date/Time    SGOT 17 03/22/2018 04:30 AM          Juanita Musa NP  [facility], [facility]  [email or web address]

## 2018-03-23 NOTE — ANESTHESIA PREPROCEDURE EVALUATION
Anesthetic History     PONV          Review of Systems / Medical History  Patient summary reviewed and pertinent labs reviewed    Pulmonary  Within defined limits      Sleep apnea: CPAP           Neuro/Psych         Psychiatric history     Cardiovascular  Within defined limits  Hypertension          CAD and cardiac stents    Exercise tolerance: >4 METS     GI/Hepatic/Renal  Within defined limits   GERD           Endo/Other  Within defined limits  Diabetes: type 2  Hypothyroidism  Obesity and arthritis     Other Findings   Comments: Documentation of current medication  Current medications obtained, documented and obtained? YES      Risk Factors for Postoperative nausea/vomiting:       History of postoperative nausea/vomiting? NO       Female? NO       Motion sickness? NO       Intended opioid administration for postoperative analgesia? YES      Smoking Abstinence:  Current Smoker? NO  Elective Surgery? YES  Seen preoperatively by anesthesiologist or proxy prior to day of surgery? YES  Pt abstained from smoking 24 hours prior to anesthesia?  YES    Preventive care/screening for High Blood Pressure:  Aged 18 years and older: YES  Screened for high blood pressure: YES  Patients with high blood pressure referred to primary care provider   for BP management: YES                     Physical Exam    Airway  Mallampati: II  TM Distance: 4 - 6 cm  Neck ROM: normal range of motion   Mouth opening: Normal     Cardiovascular    Rhythm: regular  Rate: normal         Dental    Dentition: Poor dentition  Comments: Right upper tooth decayed, teeth in poor condition overall   Pulmonary  Breath sounds clear to auscultation               Abdominal  GI exam deferred       Other Findings            Anesthetic Plan    ASA: 3  Anesthesia type: general          Induction: Intravenous  Anesthetic plan and risks discussed with: Patient

## 2018-03-23 NOTE — PROGRESS NOTES
Problem: Falls - Risk of  Goal: *Absence of Falls  Document Basil Fall Risk and appropriate interventions in the flowsheet.    Outcome: Progressing Towards Goal  Fall Risk Interventions:            Medication Interventions: Evaluate medications/consider consulting pharmacy, Patient to call before getting OOB         History of Falls Interventions: Door open when patient unattended

## 2018-03-23 NOTE — DIABETES MGMT
Glycemic Control Plan of Care    Home diabetes meds: Levemir 30 units daily at bedtime and Metformin 1000 mg BID. Completed assessment of home diabetes management and education with patient, 03/21/2018. POC BG range on 03/22/2018: 146-204 mg/dL. POC BG report on 03/23/2018 at time of review: 127, 149 mg/dL. Patient is currently NPO for diagnostic laparoscopy, laparoscopic liver biopsy later today. Recommendation(s):  1.) Continue on current insulin orders/dose: basal and correctional.  2.) Continue to monitor blood glucose pattern and adjust basal lantus insulin dose as needed. Assessment:  Patient is 62year old with past medical history including IDDM, gastroparesis,  hypertension, GERD, positive PPD, heart attack, CAD with stent, acute renal failure, hypothyroidism, umbilical hernia, and pneumoperitoneum 9/2017 - was admitted on 03/20/2018 with report of periumbilical pain associated with nausea and vomiting. Noted:  Abdominal pain. Sepsis. IDDM with current A1c of 7.9% (03/20/2018)    Most recent blood glucose values: FBG report 03/2018: 379 mg/dL. Results for Robert Cease (MRN [id number]) as of 3/23/2018 12:26   Ref. Range 3/22/2018 11:40 3/22/2018 16:01 3/22/2018 22:02   GLUCOSE,FAST - POC Latest Ref Range: 70 - 110 mg/dL 204 (H) 146 (H) 196 (H)     Results for Robert Cease (MRN [id number]) as of 3/23/2018 12:26   Ref. Range 3/23/2018 08:39 3/23/2018 11:47   GLUCOSE,FAST - POC Latest Ref Range: 70 - 110 mg/dL 127 (H) 149 (H)     Current A1C: 7.9% (03/20/2018) is equivalent to average blood glucose of 180 mg/dL during the past 2-3 months. Current hospital diabetes medications:  Lantus insulin 10 units daily at bedtime. Correctional lispro insulin ACHS. Very resistant dose.     Total daily dose insulin requirement previous day: 03/22/2018  Lantus: 10 units  Lispro: 9 units  TDD: 19 of insulin    Home diabetes medications: Patient reported on 03/21/2018:  Shantel Lindsey insulin 30 units daily at bedtime. Metformin 1000 mg twice daily with food. Diet: NPO for procedure. Goals:  Blood glucose will be within target range of  mg/dL by 03/26/2018.     Education:  _X__  Refer to Diabetes Education Record: 03/21/2018             ___  Education not indicated at this time    Cyril Tellez RN [facility]  Pager: [phone]

## 2018-03-23 NOTE — ROUTINE PROCESS
Bedside and Verbal shift change report given to Ameena RN (oncoming nurse) by Santiago Sood RN (offgoing nurse). Report included the following information SBAR, Kardex, MAR and Recent Results.     SITUATION:  Code Status: Full Code  Reason for Admission: Sepsis [facility])  Abdominal pain  [address] day: 2  Problem List:       Hospital Problems  Date Reviewed: 3/23/2018          Codes Class Noted POA    Sepsis ([facility] ICD-10-CM: A41.9  ICD-9-CM: 038.9, 995.91  3/21/2018 Unknown        Abdominal pain ICD-10-CM: R10.9  ICD-9-CM: 789.00  9/7/2017 Unknown              BACKGROUND:   Past Medical History:   Past Medical History:   Diagnosis Date    Arthritis     Coronary atherosclerosis of native coronary artery     S/P PCI with GE in 12/09    Diabetes ([facility]     IDDM    Electrolyte and fluid disorders not elsewhere classified     Essential hypertension, benign     GERD (gastroesophageal reflux disease)     Heroin abuse 05/26/16    Psychiatrist Dr. Hilda Soto History of heart attack     Hyperlipidemia     Intermediate coronary syndrome ([facility]     MDD (major depressive disorder) 5/26/16    Psychiatrist Dr. Hilda Soto Myocardial infarction     Obesity, unspecified     Old myocardial infarction     Other and unspecified hyperlipidemia     Pancreatitis     Positive PPD     Sleep apnea     uses Cpap machine    Transfusion history     Before 1992      Patient taking anticoagulants yes    Patient has a defibrillator: no    History of shots YES for example, flu, pneumonia, tetanus   Isolation History NO for example, MRSA, CDiff    ASSESSMENT:  Changes in Assessment Throughout Shift: none  Significant Changes in 24 hours (for example, RR/code, fall)  Patient has Central Line: no   Patient has Van Cath: no   PT  IV Patency  OR Checklist  Pending Tests    Last Vitals:  Vitals w/ MEWS Score (last day)     Date/Time MEWS Score Pulse Resp Temp BP Level of Consciousness SpO2 03/23/18 1645 -- -- -- -- -- Alert --    03/23/18 1525 1 (!)  58 18 97.3 °F (36.3 °C) (!)  160/107 Alert 96 %    03/23/18 1116 1 69 20 98.7 °F (37.1 °C) 147/78 Alert 97 %    03/23/18 1020 -- -- -- -- -- Alert --    03/23/18 0856 1 (!)  59 18 97.9 °F (36.6 °C) 132/77 Alert 97 %    03/23/18 0412 1 66 20 98.6 °F (37 °C) (!)  150/98 Alert 95 %    03/22/18 2300 1 60 20 98 °F (36.7 °C) 164/61 Alert 96 %    03/22/18 2101 1 74 18 99.1 °F (37.3 °C) (!)  157/97 Alert 96 %    03/22/18 1600 1 77 18 98.5 °F (36.9 °C) (!)  165/98 Alert 97 %    03/22/18 1136 1 68 20 98.6 °F (37 °C) 161/88 Alert 96 %    03/22/18 0841 1 84 18 98.4 °F (36.9 °C) (!)  167/106 Alert 95 %    03/22/18 0300 1 68 17 99.2 °F (37.3 °C) 153/85 Alert 94 %            PAIN    Pain Assessment    Pain Intensity 1: 5 (03/23/18 1645)    Pain Location 1: Abdomen    Pain Intervention(s) 1: Medication (see MAR)    Patient Stated Pain Goal: 0  Intervention effective: yes  Time of last intervention: 0445 Reassessment Completed: yes   Other actions taken for pain: Distraction    Last 3 Weights:  Last 3 Recorded Weights in this Encounter    03/20/18 2349 03/21/18 2032 03/23/18 1211   Weight: 113.4 kg (250 lb) 109 kg (240 lb 6.4 oz) 106.1 kg (234 lb)   Weight change:     INTAKE/OUPUT    Current Shift:      Last three shifts: 03/22 0701 - 03/23 1900  In: 7197 [P.O.:240; I.V.:2290]  Out: 1325 [Urine:1325]    RECOMMENDATIONS AND DISCHARGE PLANNING  Patient needs and requests: Pain Control    Pending tests/procedures: Diagnostic Laparoscopy     Discharge plan for patient: Home    Discharge planning Needs or Barriers: none    Estimated Discharge Date: 3/26/2018 Posted on Whiteboard in Patients Room: yes       \"HEALS\" SAFETY CHECK  A safety check occurred in the patient's room between off going nurse and oncoming nurse listed above.     The safety check included the below items:    H  High Alert Medications Verify all high alert medication drips (heparin, PCA, etc.)  E  Equipment Suction is set up for ALL patients (with steven)  Red plugs utilized for all equipment (IV pumps, etc.)  WOWs wiped down at end of shift. Room stocked with oxygen, suction, and other unit-specific supplies  A  Alarms Bed alarm is set for fall risk patients  Ensure chair alarm is in place and activated if patient is up in a chair  L  Lines Check IV for any infiltration  Van bag is empty if patient has a Van   Tubing and IV bags are labeled  S  Safety  Room is clean, patient is clean, and equipment is clean. Hallways are clear from equipment besides carts. Fall bracelet on for fall risk patients  Ensure room is clear and free of clutter  Suction is set up for ALL patients (with steven)  Hallways are clear from equipment besides carts.    Isolation precautions followed, supplies available outside room, sign posted    Carson Ball RN

## 2018-03-23 NOTE — PROGRESS NOTES
Surgeon    No change in lower abdominal pain  Says he cannot eat but continues to drink up until MN ok no vomiting    Patient Vitals for the past 12 hrs:   Temp Pulse Resp BP SpO2   03/23/18 0412 98.6 °F (37 °C) 66 20 (!) 150/98 95 %   03/22/18 2300 98 °F (36.7 °C) 60 20 164/61 96 %   03/22/18 2101 99.1 °F (37.3 °C) 74 18 (!) 157/97 96 %     Recent Results (from the past 24 hour(s))   GLUCOSE, POC    Collection Time: 03/22/18 11:40 AM   Result Value Ref Range    Glucose (POC) 204 (H) 70 - 110 mg/dL   VANCOMYCIN, TROUGH    Collection Time: 03/22/18 12:47 PM   Result Value Ref Range    Vancomycin,trough 6.1 (L) 10.0 - 20.0 ug/mL    Reported dose date: 20180323      Reported dose time: 2030      Reported dose: 1000 MG UNITS   POTASSIUM    Collection Time: 03/22/18  3:45 PM   Result Value Ref Range    Potassium 3.6 3.5 - 5.5 mmol/L   GLUCOSE, POC    Collection Time: 03/22/18  4:01 PM   Result Value Ref Range    Glucose (POC) 146 (H) 70 - 110 mg/dL   GLUCOSE, POC    Collection Time: 03/22/18 10:02 PM   Result Value Ref Range    Glucose (POC) 196 (H) 70 - 724 mg/dL   METABOLIC PANEL, BASIC    Collection Time: 03/23/18  4:07 AM   Result Value Ref Range    Sodium 140 136 - 145 mmol/L    Potassium 3.1 (L) 3.5 - 5.5 mmol/L    Chloride 104 100 - 108 mmol/L    CO2 28 21 - 32 mmol/L    Anion gap 8 3.0 - 18 mmol/L    Glucose 160 (H) 74 - 99 mg/dL    BUN 13 7.0 - 18 MG/DL    Creatinine 1.17 0.6 - 1.3 MG/DL    BUN/Creatinine ratio 11 (L) 12 - 20      GFR est AA >60 >60 ml/min/1.73m2    GFR est non-AA >60 >60 ml/min/1.73m2    Calcium 8.1 (L) 8.5 - 10.1 MG/DL     Abdomen is soft no guarding, mild tender lower abdomen    hypokalemia-  Replace orally kDur 20 meq X 3 doses with sip water Q 2 hrs.   No acidosis  Cr now normal.    Change NS to LR stay at 100 ml/hr

## 2018-03-24 VITALS
SYSTOLIC BLOOD PRESSURE: 118 MMHG | HEIGHT: 69 IN | TEMPERATURE: 97.2 F | BODY MASS INDEX: 34.66 KG/M2 | DIASTOLIC BLOOD PRESSURE: 73 MMHG | HEART RATE: 95 BPM | RESPIRATION RATE: 18 BRPM | OXYGEN SATURATION: 97 % | WEIGHT: 234 LBS

## 2018-03-24 LAB
ANION GAP SERPL CALC-SCNC: 8 MMOL/L (ref 3–18)
BASOPHILS # BLD: 0 K/UL (ref 0–0.1)
BASOPHILS NFR BLD: 0 % (ref 0–2)
BUN SERPL-MCNC: 14 MG/DL (ref 7–18)
BUN/CREAT SERPL: 11 (ref 12–20)
CALCIUM SERPL-MCNC: 7.7 MG/DL (ref 8.5–10.1)
CHLORIDE SERPL-SCNC: 105 MMOL/L (ref 100–108)
CO2 SERPL-SCNC: 27 MMOL/L (ref 21–32)
CREAT SERPL-MCNC: 1.25 MG/DL (ref 0.6–1.3)
DIFFERENTIAL METHOD BLD: ABNORMAL
EOSINOPHIL # BLD: 0.1 K/UL (ref 0–0.4)
EOSINOPHIL NFR BLD: 1 % (ref 0–5)
ERYTHROCYTE [DISTWIDTH] IN BLOOD BY AUTOMATED COUNT: 13.2 % (ref 11.6–14.5)
GLUCOSE BLD STRIP.AUTO-MCNC: 107 MG/DL (ref 70–110)
GLUCOSE BLD STRIP.AUTO-MCNC: 167 MG/DL (ref 70–110)
GLUCOSE BLD STRIP.AUTO-MCNC: 169 MG/DL (ref 70–110)
GLUCOSE SERPL-MCNC: 155 MG/DL (ref 74–99)
HCT VFR BLD AUTO: 36.1 % (ref 36–48)
HGB BLD-MCNC: 10.7 G/DL (ref 13–16)
LYMPHOCYTES # BLD: 2.7 K/UL (ref 0.9–3.6)
LYMPHOCYTES NFR BLD: 24 % (ref 21–52)
MCH RBC QN AUTO: 28.3 PG (ref 24–34)
MCHC RBC AUTO-ENTMCNC: 29.6 G/DL (ref 31–37)
MCV RBC AUTO: 95.5 FL (ref 74–97)
MONOCYTES # BLD: 0.9 K/UL (ref 0.05–1.2)
MONOCYTES NFR BLD: 8 % (ref 3–10)
NEUTS SEG # BLD: 7.7 K/UL (ref 1.8–8)
NEUTS SEG NFR BLD: 67 % (ref 40–73)
PLATELET # BLD AUTO: 223 K/UL (ref 135–420)
PMV BLD AUTO: 11.1 FL (ref 9.2–11.8)
POTASSIUM SERPL-SCNC: 3.2 MMOL/L (ref 3.5–5.5)
RBC # BLD AUTO: 3.78 M/UL (ref 4.7–5.5)
SODIUM SERPL-SCNC: 140 MMOL/L (ref 136–145)
WBC # BLD AUTO: 11.4 K/UL (ref 4.6–13.2)

## 2018-03-24 PROCEDURE — 85025 COMPLETE CBC W/AUTO DIFF WBC: CPT | Performed by: EMERGENCY MEDICINE

## 2018-03-24 PROCEDURE — 74011000250 HC RX REV CODE- 250: Performed by: EMERGENCY MEDICINE

## 2018-03-24 PROCEDURE — 74011250637 HC RX REV CODE- 250/637: Performed by: PHYSICIAN ASSISTANT

## 2018-03-24 PROCEDURE — 80048 BASIC METABOLIC PNL TOTAL CA: CPT | Performed by: EMERGENCY MEDICINE

## 2018-03-24 PROCEDURE — 36415 COLL VENOUS BLD VENIPUNCTURE: CPT | Performed by: EMERGENCY MEDICINE

## 2018-03-24 PROCEDURE — 74011250637 HC RX REV CODE- 250/637: Performed by: SURGERY

## 2018-03-24 PROCEDURE — 74011250636 HC RX REV CODE- 250/636: Performed by: EMERGENCY MEDICINE

## 2018-03-24 PROCEDURE — 74011250636 HC RX REV CODE- 250/636: Performed by: NURSE ANESTHETIST, CERTIFIED REGISTERED

## 2018-03-24 PROCEDURE — 74011000258 HC RX REV CODE- 258: Performed by: INTERNAL MEDICINE

## 2018-03-24 PROCEDURE — 74011636637 HC RX REV CODE- 636/637: Performed by: EMERGENCY MEDICINE

## 2018-03-24 PROCEDURE — 82962 GLUCOSE BLOOD TEST: CPT

## 2018-03-24 PROCEDURE — 74011250636 HC RX REV CODE- 250/636: Performed by: INTERNAL MEDICINE

## 2018-03-24 RX ORDER — SODIUM CHLORIDE 0.9 % (FLUSH) 0.9 %
5-10 SYRINGE (ML) INJECTION AS NEEDED
Status: DISCONTINUED | OUTPATIENT
Start: 2018-03-24 | End: 2018-03-24 | Stop reason: HOSPADM

## 2018-03-24 RX ORDER — FENTANYL CITRATE 50 UG/ML
50 INJECTION, SOLUTION INTRAMUSCULAR; INTRAVENOUS
Status: DISCONTINUED | OUTPATIENT
Start: 2018-03-24 | End: 2018-03-24 | Stop reason: HOSPADM

## 2018-03-24 RX ORDER — DEXTROSE 50 % IN WATER (D50W) INTRAVENOUS SYRINGE
25-50 AS NEEDED
Status: DISCONTINUED | OUTPATIENT
Start: 2018-03-24 | End: 2018-03-24 | Stop reason: HOSPADM

## 2018-03-24 RX ORDER — SODIUM CHLORIDE, SODIUM LACTATE, POTASSIUM CHLORIDE, CALCIUM CHLORIDE 600; 310; 30; 20 MG/100ML; MG/100ML; MG/100ML; MG/100ML
125 INJECTION, SOLUTION INTRAVENOUS CONTINUOUS
Status: DISCONTINUED | OUTPATIENT
Start: 2018-03-24 | End: 2018-03-24 | Stop reason: HOSPADM

## 2018-03-24 RX ORDER — OXYCODONE AND ACETAMINOPHEN 7.5; 325 MG/1; MG/1
1 TABLET ORAL
Qty: 12 TAB | Refills: 0 | Status: ON HOLD | OUTPATIENT
Start: 2018-03-24 | End: 2018-03-29

## 2018-03-24 RX ORDER — ONDANSETRON 4 MG/1
4 TABLET, ORALLY DISINTEGRATING ORAL
Qty: 6 TAB | Refills: 0 | Status: SHIPPED | OUTPATIENT
Start: 2018-03-24 | End: 2019-12-27

## 2018-03-24 RX ORDER — DIPHENHYDRAMINE HYDROCHLORIDE 50 MG/ML
12.5 INJECTION, SOLUTION INTRAMUSCULAR; INTRAVENOUS
Status: DISCONTINUED | OUTPATIENT
Start: 2018-03-24 | End: 2018-03-24 | Stop reason: HOSPADM

## 2018-03-24 RX ORDER — MAGNESIUM SULFATE 100 %
4 CRYSTALS MISCELLANEOUS AS NEEDED
Status: DISCONTINUED | OUTPATIENT
Start: 2018-03-24 | End: 2018-03-24 | Stop reason: HOSPADM

## 2018-03-24 RX ORDER — POTASSIUM CHLORIDE 20 MEQ/1
20 TABLET, EXTENDED RELEASE ORAL
Status: COMPLETED | OUTPATIENT
Start: 2018-03-24 | End: 2018-03-24

## 2018-03-24 RX ORDER — INSULIN LISPRO 100 [IU]/ML
INJECTION, SOLUTION INTRAVENOUS; SUBCUTANEOUS ONCE
Status: DISCONTINUED | OUTPATIENT
Start: 2018-03-24 | End: 2018-03-24 | Stop reason: HOSPADM

## 2018-03-24 RX ORDER — ONDANSETRON 2 MG/ML
4 INJECTION INTRAMUSCULAR; INTRAVENOUS ONCE
Status: DISCONTINUED | OUTPATIENT
Start: 2018-03-24 | End: 2018-03-24 | Stop reason: HOSPADM

## 2018-03-24 RX ADMIN — Medication 10 ML: at 06:08

## 2018-03-24 RX ADMIN — PIPERACILLIN SODIUM AND TAZOBACTAM SODIUM 4.5 G: 4; .5 INJECTION, POWDER, LYOPHILIZED, FOR SOLUTION INTRAVENOUS at 03:50

## 2018-03-24 RX ADMIN — SODIUM CHLORIDE 20 MG: 9 INJECTION INTRAMUSCULAR; INTRAVENOUS; SUBCUTANEOUS at 08:13

## 2018-03-24 RX ADMIN — LEVOFLOXACIN 750 MG: 5 INJECTION, SOLUTION INTRAVENOUS at 06:06

## 2018-03-24 RX ADMIN — HEPARIN SODIUM 5000 UNITS: 5000 INJECTION, SOLUTION INTRAVENOUS; SUBCUTANEOUS at 06:07

## 2018-03-24 RX ADMIN — AMLODIPINE BESYLATE 10 MG: 10 TABLET ORAL at 08:14

## 2018-03-24 RX ADMIN — SODIUM CHLORIDE 1000 MG: 900 INJECTION, SOLUTION INTRAVENOUS at 09:46

## 2018-03-24 RX ADMIN — MORPHINE SULFATE 5 MG: 10 INJECTION INTRAMUSCULAR; INTRAVENOUS; SUBCUTANEOUS at 11:51

## 2018-03-24 RX ADMIN — Medication 10 ML: at 13:00

## 2018-03-24 RX ADMIN — MORPHINE SULFATE 5 MG: 4 INJECTION INTRAVENOUS at 02:36

## 2018-03-24 RX ADMIN — PIPERACILLIN SODIUM AND TAZOBACTAM SODIUM 4.5 G: 4; .5 INJECTION, POWDER, LYOPHILIZED, FOR SOLUTION INTRAVENOUS at 08:14

## 2018-03-24 RX ADMIN — MORPHINE SULFATE 5 MG: 10 INJECTION INTRAMUSCULAR; INTRAVENOUS; SUBCUTANEOUS at 08:11

## 2018-03-24 RX ADMIN — POTASSIUM CHLORIDE 20 MEQ: 20 TABLET, EXTENDED RELEASE ORAL at 11:52

## 2018-03-24 RX ADMIN — INSULIN LISPRO 3 UNITS: 100 INJECTION, SOLUTION INTRAVENOUS; SUBCUTANEOUS at 12:59

## 2018-03-24 NOTE — OP NOTES
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 POSTOPERATIVE DIAGNOSES:   1. Nephroptosis on the right- this may be cause of chronic intermittent pain, no other source found. 2. Hepatic steatosis. 3. Umbilical hernia. 4. Morbid obesity. PROCEDURE PERFORMED:  1. Diagnostic laparoscopy. 2.  Laparoscopic umbilical hernia repair with mesh. 3.  Laparoscopic wedge biopsy of the liver. ANESTHESIA:  General endotracheal.    SURGEON:  Nehal Kwan MD    ASSISTANT:  Jami Verma. SPECIMEN SUBMITTED:  Wedge biopsy of liver. ESTIMATED BLOOD LOSS: 5 mL. COMPLICATIONS:  None. IMPLANTS:  Mesh Ventralex, standard medium, 2.5 inch diameter. INDICATION FOR PROCEDURE:  The patient was admitted on 18 with lower abdominal pain. He had a leukocytosis and was dehydrated with an elevated lactic acid. This has since corrected with IV hydration. However, he reports a longstanding over many, many years of intermittent low pelvic pains. He has had multiple abdominal CT scans in the past which have showed hepatic steatosis. This gentleman is morbidly obese with a weight of 240.4 pounds and a BMI of 36.5. He has poorly controlled diabetes. The CT scan also showed a known nephroptosis of the right kidney low in his pelvis. I was asked to evaluate him for possible bowel ischemia. He has undergone a mesenteric ultrasound which shows normal celiac and SMA and they could not see his PATRICIA signal flow. The CT scans also showed a small fat-containing umbilical hernia, but this was not the area of his pain nor was it an area of tenderness on physical exam.    DESCRIPTION OF PROCEDURE:  After consent was obtained, he was brought to main operating room.   He has been on broad spectrum antibiotics by his medical team; specifically, he has been on Levaquin, Zosyn and vancomycin. All blood cultures have had no growth and urine culture on admission had no growth as well. Once on the operating room table, sequential compressive devices were applied and activated in the lower extremities. General endotracheal anesthesia was administered. An orogastric tube was placed as was a Van catheter. His abdomen was prepped and draped in the usual sterile fashion. Surgical timeout was completed. Given his known small umbilical hernia, a small amount of Exparel, long-acting liposome Marcaine was infiltrated at the superior rim of his umbilicus. A small vertical midline incision was made through the superior rim of the umbilicus and a blunt tipped clamp was placed through the fascial defect and in the abdomen. We then placed a 12 mm bladeless trocar into his abdomen. No Veress needle was needed. Pneumoperitoneum was then rapidly created. Inspection with a 10 mm 30-degree laparoscope showed no evidence of insertion trauma. Three additional 5 mm bladeless trocars were inserted, 1 on the right, 1 on the left laterally and 1 superiorly in the midline. First step was to inspect his liver which did appear abnormal with a pink yellow discoloration all consistent with hepatic steatosis. His gallbladder, however, was shiv's egg blue, which is, of course, normal.  The next step was to lift his very heavy fatty omentum into the upper abdomen, exposing the small bowel. There were no adhesions in the right lower quadrant related to his past right lower quadrant open appendectomy scar. We found the cecum and terminal ileum and hand-over-hand ran his small bowel in its entirety from the terminal ileum to the ligament of Treitz. There were no abnormalities. There were no Meckel's. There was no evidence of creeping fat to suggest an inflammatory bowel disease.   What we also did see was a very large fat bulge in the pelvis retroperitoneum, all consistent with nephroptosis which was known from his CT scans. Pictures were taken with the marking ruler in place. The marking ruler was then removed from the abdomen. We also inspected his groins and found no significant inguinal hernias. The next step was to perform a laparoscopic wedge biopsy of liver to assess his hepatic steatosis and possible hepatic steatohepatitis. Using a Harmonic scalpel, a small edge of the right lateral lobe of the liver was taken immediately next to the falciform ligament. This was removed and submitted in formalin for permanent processing. Next, using Harmonic scalpel, the peritoneal fat around the umbilicus was removed using the Harmonic scalpel. This was to allow good placement and fixation of the mesh directly to the fascia. The medium-sized Bard Davol Ventralex mesh, this is the 2.5 inch diameter mesh, was folded and inserted into the abdomen with the strap attached. The strap was then grasped by an instrument from the umbilical site. To do this, we switched to a 5 mm 30-degree laparoscope and pulled so that the mesh abutted the anterior abdominal wall. Then, using a SecureStrap tacker, which is dissolvable tacks, this was affixed to the anterior abdominal wall circumferentially. Pneumoperitoneum was then released and 2 interrupted zero Ethibond sutures were used to fashion the strap to the anterior abdominal wall. The excess strap was then cut off. Pneumoperitoneum was released by the remaining 5 mm trocars and they too were removed. All subcutaneous tissue was irrigated with saline. All skin incisions were closed with 4-0 Monocryl suture. Dermabond was used to dress the incisions. The remainder of the Exparel was infiltrated around the umbilical hernia site. He tolerated the procedure well with no complications. Blood loss was thought to be 5 mL. There were no complications.       DAINA MADRIGAL Dorothy Acevedo MD       [address] / [address]  D: 03/23/2018 20:44     T: 03/24/2018 07:59  JOB #: [id number]  CC: Osmin Islas MD  CC: Darwin Gregg MD

## 2018-03-24 NOTE — BRIEF OP NOTE
BRIEF OPERATIVE NOTE    Date of Procedure: 3/23/2018   Preoperative Diagnosis: ABDOMINAL PAIN low in pelvis, fatty liver on CT imaging, small umbilical hernia, morbid obesity  Postoperative Diagnosis: Nephroptosis on right,Hepatic steatosis, umbilical hernia,  morbid obesity  Procedure(s):  DIAGNOSTIC LAPAROSCOPY  Laparoscopic UMBILICAL HERNIA REPAIR with mesh  Lap LIVER wedge BIOPSY  Surgeon(s) and Role:     * Jakob Goldberg MD - Primary         Assistant Staff: None      Surgical Staff:  Circ-1: Leeann Singleton RN  Scrub Tech-1: Antoine Lara  Surg Asst-1: Lucero Conti  Event Time In   Incision Start 1919   Incision Close 2018     Anesthesia: General   Estimated Blood Loss: 5 ml,   1000 ml IV fluids  Specimens:   ID Type Source Tests Collected by Time Destination   1 : Liver biopsy Preservative Liver  Jakob Goldberg MD 3/23/2018 1949 Pathology      Findings: yellowish pink liver c/w hepatic steatosis, normal gallbladder, small bowel normal in it's entire length,no inguinal hernias, small ubilical hernia containg fat, large fat mound in right retroperitoneum due to right nephroptosis,  Pictures taken   Complications: None. Implants:   Implant Name Type Inv. Item Serial No.  Lot No. LRB No. Used Action   MESH [employer] --  - [id number]   [address] [id number] N/A 1 Implanted     I question whether or not the right nephroptosis is causing his intermittent pelvic pains and a urology consult is recommended. I am aware this is a controversial condition.     OP note  [id number]

## 2018-03-24 NOTE — ROUTINE PROCESS
Bedside and Verbal shift change report given to MARGOTH Goff (oncoming nurse) by Lake Guy RN (offgoing nurse). Report included the following information SBAR, Kardex, MAR and Recent Results.     SITUATION:  Code Status: Full Code  Reason for Admission: Sepsis [facility])  Abdominal pain  [address] day: 3  Problem List:       Hospital Problems  Date Reviewed: 3/23/2018          Codes Class Noted POA    Sepsis ([facility] ICD-10-CM: A41.9  ICD-9-CM: 038.9, 995.91  3/21/2018 Unknown        Abdominal pain ICD-10-CM: R10.9  ICD-9-CM: 789.00  9/7/2017 Unknown              BACKGROUND:   Past Medical History:   Past Medical History:   Diagnosis Date    Arthritis     Coronary atherosclerosis of native coronary artery     S/P PCI with GE in 12/09    Diabetes ([facility]     IDDM    Electrolyte and fluid disorders not elsewhere classified     Essential hypertension, benign     GERD (gastroesophageal reflux disease)     Heroin abuse 05/26/16    Psychiatrist Dr. Jordy Vásquez History of heart attack     Hyperlipidemia     Intermediate coronary syndrome ([facility]     MDD (major depressive disorder) 5/26/16    Psychiatrist Dr. Jordy Vásquez Myocardial infarction     Obesity, unspecified     Old myocardial infarction     Other and unspecified hyperlipidemia     Pancreatitis     Positive PPD     Sleep apnea     uses Cpap machine    Transfusion history     Before 1992      Patient taking anticoagulants yes    Patient has a defibrillator: no    History of shots YES for example, flu, pneumonia, tetanus   Isolation History NO for example, MRSA, CDiff    ASSESSMENT:  Changes in Assessment Throughout Shift: none  Significant Changes in 24 hours (for example, RR/code, fall)  Patient has Central Line: no   Patient has Van Cath: no   PT  IV Patency  OR Checklist  Pending Tests    Last Vitals:  Vitals w/ MEWS Score (last day)     Date/Time MEWS Score Pulse Resp Temp BP Level of Consciousness SpO2 03/24/18 0811 -- -- -- -- -- Alert --    03/24/18 0400 1 (!)  54 20 97.8 °F (36.6 °C) 127/76 Alert 96 %    03/24/18 0236 -- -- -- -- -- Alert --    03/24/18 0000 1 61 18 96.7 °F (35.9 °C) 144/82 Alert 97 %    03/23/18 2220 -- -- -- -- -- Alert --    03/23/18 2127 -- 69 18 98.6 °F (37 °C) 135/83 -- 90 %    03/23/18 2104 -- 72 14 -- 143/88 -- 93 %    03/23/18 2055 -- 78 18 -- 133/80 -- 93 %    03/23/18 2044 -- 80 19 -- 151/90 -- 98 %    03/23/18 2037 -- 79 20 98.4 °F (36.9 °C) 149/86 -- 99 %    03/23/18 2034 -- 88 28 98.4 °F (36.9 °C) 149/86 -- 98 %    03/23/18 1645 -- -- -- -- -- Alert --    03/23/18 1525 1 (!)  58 18 97.3 °F (36.3 °C) (!)  160/107 Alert 96 %    03/23/18 1116 1 69 20 98.7 °F (37.1 °C) 147/78 Alert 97 %    03/23/18 1020 -- -- -- -- -- Alert --    03/23/18 0856 1 (!)  59 18 97.9 °F (36.6 °C) 132/77 Alert 97 %    03/23/18 0412 1 66 20 98.6 °F (37 °C) (!)  150/98 Alert 95 %            PAIN    Pain Assessment    Pain Intensity 1: 7 (03/24/18 0811)    Pain Location 1: Abdomen    Pain Intervention(s) 1: Medication (see MAR)    Patient Stated Pain Goal: 0  Intervention effective: yes  Time of last intervention: 0445 Reassessment Completed: yes   Other actions taken for pain: Distraction    Last 3 Weights:  Last 3 Recorded Weights in this Encounter    03/20/18 2349 03/21/18 2032 03/23/18 1211   Weight: 113.4 kg (250 lb) 109 kg (240 lb 6.4 oz) 106.1 kg (234 lb)   Weight change:     INTAKE/OUPUT    Current Shift:      Last three shifts: 03/22 1901 - 03/24 0700  In: 3290 [I.V.:3290]  Out: 1280 [Urine:1275]    RECOMMENDATIONS AND DISCHARGE PLANNING  Patient needs and requests: Pain Control    Pending tests/procedures: Diagnostic Laparoscopy     Discharge plan for patient: Home    Discharge planning Needs or Barriers: none    Estimated Discharge Date: 3/26/2018 Posted on Whiteboard in Patients Room: yes       \"HEALS\" SAFETY CHECK  A safety check occurred in the patient's room between off going nurse and oncoming nurse listed above. The safety check included the below items:    H  High Alert Medications Verify all high alert medication drips (heparin, PCA, etc.)  E  Equipment Suction is set up for ALL patients (with steven)  Red plugs utilized for all equipment (IV pumps, etc.)  WOWs wiped down at end of shift. Room stocked with oxygen, suction, and other unit-specific supplies  A  Alarms Bed alarm is set for fall risk patients  Ensure chair alarm is in place and activated if patient is up in a chair  L  Lines Check IV for any infiltration  Van bag is empty if patient has a Van   Tubing and IV bags are labeled  S  Safety  Room is clean, patient is clean, and equipment is clean. Hallways are clear from equipment besides carts. Fall bracelet on for fall risk patients  Ensure room is clear and free of clutter  Suction is set up for ALL patients (with steven)  Hallways are clear from equipment besides carts.    Isolation precautions followed, supplies available outside room, sign posted    Grayson Wisdom RN

## 2018-03-24 NOTE — PROGRESS NOTES
Progress Note    Patient: Scott Jovel MRN: [id number]  SSN: [id number]    YOB: 1960  Age: 62 y.o.   Sex: male      Admit Date: 3/20/2018    LOS: 3 days  POD#1 s/p Diagnostic laperoscopy, Lap liver wedge Bx and Lap umbilical hernia repair with mesh    Subjective:     No new complaints, pain no too bad    Objective:     Vitals:    03/23/18 2127 03/24/18 0000 03/24/18 0400 03/24/18 0800   BP: 135/83 144/82 127/76 144/89   Pulse: 69 61 (!) 54 (!) 55   Resp: 18 18 20 18   Temp: 98.6 °F (37 °C) 96.7 °F (35.9 °C) 97.8 °F (36.6 °C) 97.5 °F (36.4 °C)   SpO2: 90% 97% 96% 98%   Weight:       Height:            Intake and Output:  Current Shift:    Last three shifts: 03/22 1901 - 03/24 0700  In: 3290 [I.V.:3290]  Out: 1280 [Urine:1275]    Physical Exam:   Looks well  Abdomen is soft and all trochar sites clean and dry with expected mild tenderness by each site    Lab/Data Review:  Recent Results (from the past 12 hour(s))   METABOLIC PANEL, BASIC    Collection Time: 03/24/18  3:47 AM   Result Value Ref Range    Sodium 140 136 - 145 mmol/L    Potassium 3.2 (L) 3.5 - 5.5 mmol/L    Chloride 105 100 - 108 mmol/L    CO2 27 21 - 32 mmol/L    Anion gap 8 3.0 - 18 mmol/L    Glucose 155 (H) 74 - 99 mg/dL    BUN 14 7.0 - 18 MG/DL    Creatinine 1.25 0.6 - 1.3 MG/DL    BUN/Creatinine ratio 11 (L) 12 - 20      GFR est AA >60 >60 ml/min/1.73m2    GFR est non-AA 60 (L) >60 ml/min/1.73m2    Calcium 7.7 (L) 8.5 - 10.1 MG/DL   CBC WITH AUTOMATED DIFF    Collection Time: 03/24/18  3:47 AM   Result Value Ref Range    WBC 11.4 4.6 - 13.2 K/uL    RBC 3.78 (L) 4.70 - 5.50 M/uL    HGB 10.7 (L) 13.0 - 16.0 g/dL    HCT 36.1 36.0 - 48.0 %    MCV 95.5 74.0 - 97.0 FL    MCH 28.3 24.0 - 34.0 PG    MCHC 29.6 (L) 31.0 - 37.0 g/dL    RDW 13.2 11.6 - 14.5 %    PLATELET 285 690 - 779 K/uL    MPV 11.1 9.2 - 11.8 FL    NEUTROPHILS 67 40 - 73 %    LYMPHOCYTES 24 21 - 52 %    MONOCYTES 8 3 - 10 %    EOSINOPHILS 1 0 - 5 %    BASOPHILS 0 0 - 2 %    ABS. NEUTROPHILS 7.7 1.8 - 8.0 K/UL    ABS. LYMPHOCYTES 2.7 0.9 - 3.6 K/UL    ABS. MONOCYTES 0.9 0.05 - 1.2 K/UL    ABS. EOSINOPHILS 0.1 0.0 - 0.4 K/UL    ABS. BASOPHILS 0.0 0.0 - 0.1 K/UL    DF AUTOMATED     GLUCOSE, POC    Collection Time: 03/24/18  8:47 AM   Result Value Ref Range    Glucose (POC) 107 70 - 110 mg/dL        Pathology on liver Bx is pending    Assessment:     Active Problems:    Abdominal pain (9/7/2017)      Sepsis ([address] (3/21/2018)    I believe his chronic abdominal pain may be related to his right ptotic kidney-I have recommended an outpatient urology consult handout provided I am aware surgical treatment is controversial- intraoperative pictures showed to patient and I explained the finding. Morbid obesity- metabolic/bariatric surgery is recommended, ADA recommendations handout provided too. He has been asked to call his insurance provider and ask if he has coverage. We can talk more at outpatient follow up. No complication from surgery. WBC normal, normal differential- no ongoing infection    Hypokalimia    Plan:     Ok to go home from my standpoint. No diet restrictions, I do recommend he lose weight. Business card provided, follow up in my office in 2 weeks. No lifting > 15 lbs for two weeks. OK to shower starting tonight. Prescription for small supply of Percocet on chart. Stop all antibiotics  On dose of KCL 20 meq Anjelica Rizo and explained my accessment/recommendations.     Signed By: Martha Shahid MD     March 24, 2018

## 2018-03-24 NOTE — PROGRESS NOTES
Patient sitting in bed in NAD, awake, alert, feels better. Tolerated diet, wants to go home. Sister at bedside. O2 sats on RA with ambulation were 93-95%. D/w patient and surgeon. D/w Dr Millie Sharpe earlier today. D/w RN.  Home today

## 2018-03-24 NOTE — ANESTHESIA POSTPROCEDURE EVALUATION
Post-Anesthesia Evaluation and Assessment    Patient: Urvashi Blackwood MRN: [id number]  SSN: [id number]    YOB: 1960  Age: 62 y.o. Sex: male       Cardiovascular Function/Vital Signs  Visit Vitals    /80    Pulse 78    Temp 36.9 °C (98.4 °F)    Resp 18    Ht 5' 8.9\" (1.75 m)    Wt 106.1 kg (234 lb)    SpO2 93%    BMI 34.66 kg/m2       Patient is status post general anesthesia for Procedure(s):  DIAGNOSTIC LAPAROSCOPY/UMBILICAL HERNIA REPAIR/LIVER BIOPSY/PT . Nausea/Vomiting: None    Postoperative hydration reviewed and adequate. Pain:  Pain Scale 1: Visual (03/23/18 2034)  Pain Intensity 1: 0 (03/23/18 2034)   Managed    Neurological Status:   Neuro (WDL): Within Defined Limits (03/23/18 2034)  Neuro  LUE Motor Response: Purposeful (03/23/18 2034)  LLE Motor Response: Purposeful (03/23/18 2034)  RUE Motor Response: Purposeful (03/23/18 2034)  RLE Motor Response: Purposeful (03/23/18 2034)   At baseline    Mental Status and Level of Consciousness: Arousable    Pulmonary Status:   O2 Device: Room air (03/23/18 2047)   Adequate oxygenation and airway patent    Complications related to anesthesia: None    Post-anesthesia assessment completed.  No concerns    Signed By: Huy Obregon MD     March 23, 2018

## 2018-03-24 NOTE — PROGRESS NOTES
Problem: Falls - Risk of  Goal: *Absence of Falls  Document Basil Fall Risk and appropriate interventions in the flowsheet.    Outcome: Progressing Towards Goal  Fall Risk Interventions:            Medication Interventions: Patient to call before getting OOB         History of Falls Interventions: Door open when patient unattended

## 2018-03-24 NOTE — DISCHARGE INSTRUCTIONS
Patient armband removed and given to patient to take home. Patient was informed of the privacy risks if armband lost or stolen  DISCHARGE SUMMARY from Nurse    PATIENT INSTRUCTIONS:    After general anesthesia or intravenous sedation, for 24 hours or while taking prescription Narcotics:  · Limit your activities  · Do not drive and operate hazardous machinery  · Do not make important personal or business decisions  · Do  not drink alcoholic beverages  · If you have not urinated within 8 hours after discharge, please contact your surgeon on call. Report the following to your surgeon:  · Excessive pain, swelling, redness or odor of or around the surgical area  · Temperature over 100.5  · Nausea and vomiting lasting longer than 4 hours or if unable to take medications  · Any signs of decreased circulation or nerve impairment to extremity: change in color, persistent  numbness, tingling, coldness or increase pain  · Any questions    What to do at Home:  Recommended activity: Activity as tolerated and no driving for today    If you experience any of the following symptoms Nausea,vomiting, diarrhea, chest pain, shortness of breath  , please follow up with PCP. *  Please give a list of your current medications to your Primary Care Provider. *  Please update this list whenever your medications are discontinued, doses are      changed, or new medications (including over-the-counter products) are added. *  Please carry medication information at all times in case of emergency situations. These are general instructions for a healthy lifestyle:    No smoking/ No tobacco products/ Avoid exposure to second hand smoke  Surgeon General's Warning:  Quitting smoking now greatly reduces serious risk to your health.     Obesity, smoking, and sedentary lifestyle greatly increases your risk for illness    A healthy diet, regular physical exercise & weight monitoring are important for maintaining a healthy lifestyle    You may be retaining fluid if you have a history of heart failure or if you experience any of the following symptoms:  Weight gain of 3 pounds or more overnight or 5 pounds in a week, increased swelling in our hands or feet or shortness of breath while lying flat in bed. Please call your doctor as soon as you notice any of these symptoms; do not wait until your next office visit. Recognize signs and symptoms of STROKE:    F-face looks uneven    A-arms unable to move or move unevenly    S-speech slurred or non-existent    T-time-call 911 as soon as signs and symptoms begin-DO NOT go       Back to bed or wait to see if you get better-TIME IS BRAIN. Warning Signs of HEART ATTACK     Call 911 if you have these symptoms:   Chest discomfort. Most heart attacks involve discomfort in the center of the chest that lasts more than a few minutes, or that goes away and comes back. It can feel like uncomfortable pressure, squeezing, fullness, or pain.  Discomfort in other areas of the upper body. Symptoms can include pain or discomfort in one or both arms, the back, neck, jaw, or stomach.  Shortness of breath with or without chest discomfort.  Other signs may include breaking out in a cold sweat, nausea, or lightheadedness. Don't wait more than five minutes to call 911 - MINUTES MATTER! Fast action can save your life. Calling 911 is almost always the fastest way to get lifesaving treatment. Emergency Medical Services staff can begin treatment when they arrive -- up to an hour sooner than if someone gets to the hospital by car. The discharge information has been reviewed with the patient. The patient verbalized understanding. Discharge medications reviewed with the patient and appropriate educational materials and side effects teaching were provided.   ___________________________________________________________________________________________________________________________________    MyChart Activation    Thank you for requesting access to [facility]. Please follow the instructions below to securely access and download your online medical record. [facility] allows you to send messages to your doctor, view your test results, renew your prescriptions, schedule appointments, and more. How Do I Sign Up? 1. In your internet browser, go to [email or web address]  2. Click on the First Time User? Click Here link in the Sign In box. You will be redirect to the New Member Sign Up page. 3. Enter your [facility] Access Code exactly as it appears below. You will not need to use this code after youve completed the sign-up process. If you do not sign up before the expiration date, you must request a new code. [facility] Access Code: [email or web address]  Expires: 2018  8:58 AM (This is the date your [facility] access code will )    4. Enter the last four digits of your Social Security Number (xxxx) and Date of Birth (mm/dd/yyyy) as indicated and click Submit. You will be taken to the next sign-up page. 5. Create a [facility] ID. This will be your [facility] login ID and cannot be changed, so think of one that is secure and easy to remember. 6. Create a [facility] password. You can change your password at any time. 7. Enter your Password Reset Question and Answer. This can be used at a later time if you forget your password. 8. Enter your e-mail address. You will receive e-mail notification when new information is available in [address] 9. Click Sign Up. You can now view and download portions of your medical record. 10. Click the Download Summary menu link to download a portable copy of your medical information. Additional Information    If you have questions, please visit the Frequently Asked Questions section of the [facility] website at [email or web address] Remember, [facility] is NOT to be used for urgent needs. For medical emergencies, dial 911.         Abdominal Pain: Care Instructions  Your Care Instructions    Abdominal pain has many possible causes. Some aren't serious and get better on their own in a few days. Others need more testing and treatment. If your pain continues or gets worse, you need to be rechecked and may need more tests to find out what is wrong. You may need surgery to correct the problem. Don't ignore new symptoms, such as fever, nausea and vomiting, urination problems, pain that gets worse, and dizziness. These may be signs of a more serious problem. Your doctor may have recommended a follow-up visit in the next 8 to 12 hours. If you are not getting better, you may need more tests or treatment. The doctor has checked you carefully, but problems can develop later. If you notice any problems or new symptoms, get medical treatment right away. Follow-up care is a key part of your treatment and safety. Be sure to make and go to all appointments, and call your doctor if you are having problems. It's also a good idea to know your test results and keep a list of the medicines you take. How can you care for yourself at home? · Rest until you feel better. · To prevent dehydration, drink plenty of fluids, enough so that your urine is light yellow or clear like water. Choose water and other caffeine-free clear liquids until you feel better. If you have kidney, heart, or liver disease and have to limit fluids, talk with your doctor before you increase the amount of fluids you drink. · If your stomach is upset, eat mild foods, such as rice, dry toast or crackers, bananas, and applesauce. Try eating several small meals instead of two or three large ones. · Wait until 48 hours after all symptoms have gone away before you have spicy foods, alcohol, and drinks that contain caffeine. · Do not eat foods that are high in fat. · Avoid anti-inflammatory medicines such as aspirin, ibuprofen (Advil, Motrin), and naproxen (Aleve). These can cause stomach upset.  Talk to your doctor if you take daily aspirin for another health problem. When should you call for help? Call 911 anytime you think you may need emergency care. For example, call if:  ? · You passed out (lost consciousness). ? · You pass maroon or very bloody stools. ? · You vomit blood or what looks like coffee grounds. ? · You have new, severe belly pain. ?Call your doctor now or seek immediate medical care if:  ? · Your pain gets worse, especially if it becomes focused in one area of your belly. ? · You have a new or higher fever. ? · Your stools are black and look like tar, or they have streaks of blood. ? · You have unexpected vaginal bleeding. ? · You have symptoms of a urinary tract infection. These may include:  ¨ Pain when you urinate. ¨ Urinating more often than usual.  ¨ Blood in your urine. ? · You are dizzy or lightheaded, or you feel like you may faint. ? Watch closely for changes in your health, and be sure to contact your doctor if:  ? · You are not getting better after 1 day (24 hours). Where can you learn more? Go to http://kaila-carlitos.info/. Enter B333 in the search box to learn more about \"Abdominal Pain: Care Instructions. \"  Current as of: March 20, 2017  Content Version: 11.4  © 1923-5646 [employer]. Care instructions adapted under license by [employer] (which disclaims liability or warranty for this information). If you have questions about a medical condition or this instruction, always ask your healthcare professional. [place] [address] any warranty or liability for your use of this information.

## 2018-03-24 NOTE — DISCHARGE SUMMARY
[facility]  [facility]       Patient: Scott Jovel Age: 62 y.o. : 1960 MR#: [id number] SSN: [id number]  PCP on record: Benji Villa MD  Admit date: 3/20/2018  Discharge date: 3/24/2018    Disposition:    []Home   []Home with Home Health   []SNF/NH   []Rehab   [x]Home with family   []Alternate Facility:____________________    Discharge Diagnoses:                             1. Abdominal pain  2. Sepsis of unknown origin  3. Nausea/Vomiting  4. Lactic Acidosis  5. Diabetes Type 2  6. Hx of Umbillical Hernia/Groin hernia  7. Hx of pneumoperitoneum  8. Hypertension  9. Abdominal spasm  10. SHYLA  11. Hypokalemia  12. Morbid Obesity  13. Hepatic Steatosis    Discharge Medications:           Edit or Add Discharge Orders           My Medications       STOP taking these medications      dicyclomine 10 mg capsule   Commonly known as: BENTYL            metFORMIN 1,000 mg tablet   Commonly known as: GLUCOPHAGE            quinapril 20 mg tablet   Commonly known as: ACCUPRIL              TAKE these medications as instructed         Instructions Each Dose to Equal    Morning Noon Evening     acetaminophen 325 mg tablet   Commonly known as: TYLENOL       Your last dose was:        Your next dose is:             Take 2 Tabs by mouth every six (6) hours as needed. Indications: Pain, take it with bentyl    650 mg                             albuterol 90 mcg/actuation inhaler   Commonly known as: PROVENTIL HFA, VENTOLIN HFA, PROAIR HFA       Your last dose was:        Your next dose is:             2 puffs every 6 hours x 5 days then every 6 hours as needed for shortness of breath and/or wheezing                              amitriptyline 50 mg tablet   Commonly known as: ELAVIL       Your last dose was:        Your next dose is:             Take 50 mg by mouth nightly.     50 mg                             amLODIPine 10 mg tablet   Commonly known as: NORVASC     Your last dose was:        Your next dose is:             Take 10 mg by mouth daily. 10 mg                             clopidogrel 75 mg Tab   Commonly known as: PLAVIX       Your last dose was:        Your next dose is:             Take 75 mg by mouth daily. 75 mg                             hydroCHLOROthiazide 50 mg tablet   Commonly known as: HYDRODIURIL       Your last dose was:        Your next dose is:             Take 0.5 Tabs by mouth daily. 25 mg                             insulin detemir U-100 100 unit/mL injection   Commonly known as: LEVEMIR U-100 INSULIN       Your last dose was:        Your next dose is:             15 Units by SubCUTAneous route nightly. 15 Units                             ondansetron 4 mg disintegrating tablet   Commonly known as: ZOFRAN ODT       Your last dose was:        Your next dose is:             Take 1 Tab by mouth every eight (8) hours as needed for Nausea. 4 mg                             OTHER       Your last dose was:        Your next dose is:             Check CBC, CMP, Mg in 3 days, results to PCP immediately, Diagnosis- DM2                              OTHER       Your last dose was:        Your next dose is:             No lifting weights > 15 lbs for two weeks.                              OTHER       Your last dose was:        Your next dose is:             Incentive spirometry- use as directed                              oxyCODONE-acetaminophen 7.5-325 mg per tablet   Commonly known as: PERCOCET 7.5       Your last dose was:        Your next dose is:             Take 1 Tab by mouth every four (4) hours as needed. Max Daily Amount: 6 Tabs. Indications: Pain    1 Tab                             pantoprazole 40 mg tablet   Commonly known as: PROTONIX       Your last dose was:        Your next dose is:             Take 1 Tab by mouth Daily (before breakfast).     40 mg                             polyethylene glycol 17 gram packet   Commonly known as: MIRALAX       Your last dose was:        Your next dose is:             Take 1 Packet by mouth daily. 17 g                             pregabalin 75 mg capsule   Commonly known as: LYRICA       Your last dose was:        Your next dose is:             1 cap bid x 1 week, then 2 caps bid                              simvastatin 10 mg tablet   Commonly known as: ZOCOR       Your last dose was:        Your next dose is:             Take 10 mg by mouth                       Consults:  General Surgery, Gastroenterology  -   Procedures: Diagnostic laperoscopy, Lap liver wedge Bx and Lap umbilical hernia repair with mesh;      -     Significant Diagnostic Studies: CT abdomen and pelvis without contrast   IMPRESSION  Impression:     No CT evidence of an acute abdominal or pelvic process.     Chronic incidental findings are stable and include hepatic steatosis, right  adrenal nodule, fat-containing umbilical hernia and an exophytic right renal  cyst.     Stable postoperative appearance at L4-5.  [facility] Course by Problem   1. Abdominal pain: Pt was admitted due to acute on chronic abd pain with nausea/vomiting after a BM. CT abd/pelvis scan done and neg for acute process, GI followed while inpatient. Mesenteric US w/ no stenosis or plaquing noted. Surgery consulted and diagnostic laparoscopy, Lap liver Bx, with umbilical hernia repair surgery done while inpt on 3/23/18. In surgery, Nephroptosis on the right kidney low in his pelvis was found and was deemed for possible reason for cause of chronic intermittent pain, and no other source was found. Pt was advised to follow up with Urology as OP. 2. Sepsis of unknown origin: Pt was managed with Levaquin/Zosyn/Vanco,  blood and urine cx  were neg to date. Per surgery recs, all abx were stopped at time of discharge. 3. Nausea/Vomiting: managed with Zofran prn. No nausea or vomiting at time of discharge. 4. Lactic Acidosis POA: resolved with IVF while inpt  5. Diabetes Type 2: managed with SSI and Levemir  6. Hx of Umbillical Hernia/Groin hernia: abd US reviewed. No umbilical hernia entrapment. Umbilical hernia repaired with mesh at time of surgery. 7. Hx of pneumoperitoneum  8. Hypertension: worsening with IVF while inpt, start home dose Amlodipine and BP improved. 9. Abdominal spasm: Pt pain was managed with morphine prn  10. SHYLA: likely contrast induced: resolved with IV hydration  11. Hypokalemia: K+ was replaced while inpt. Mg wnl. 12. Morbid Obesity 106kg: pt was advised to loose weight. 13. Hepatic Steatosis: laparoscopic wedge biopsy of liver was obtained during surgery on 3/23/18. Today's examination of the patient revealed:     Subjective:   Pt reports some pain in the mid lower abdomen. No N/V today. Denies any chest pain, SOB. Per nursing, pt tolerated his food with mild abd pain but no N/V afterwards.      Objective:   VS:   Visit Vitals    /73 (BP 1 Location: Right arm, BP Patient Position: Head of bed elevated (Comment degrees))    Pulse 95    Temp 97.2 °F (36.2 °C)    Resp 18    Ht 5' 8.9\" (1.75 m)    Wt 106.1 kg (234 lb)    SpO2 97%    BMI 34.66 kg/m2      Tmax/24hrs: Temp (24hrs), Av °F (36.7 °C), Min:96.7 °F (35.9 °C), Max:99.2 °F (37.3 °C)     Input/Output:   Intake/Output Summary (Last 24 hours) at 18 1545  Last data filed at 18 2019   Gross per 24 hour   Intake             1000 ml   Output              530 ml   Net              470 ml       General:  Awake, alert, NAD  Cardiovascular:  RRR  Pulmonary:  CTA  GI: tender Lower mid abd, normal BS. All 4 quadrants and epigastrium are non-tender. No umbilical hernia noted. Laparoscopic sites are clean and intact.    Extremities: no edema or cyanosis     Labs:    Recent Results (from the past 24 hour(s))   GLUCOSE, POC    Collection Time: 18  3:46 PM   Result Value Ref Range    Glucose (POC) 126 (H) 70 - 110 mg/dL   GLUCOSE, POC    Collection Time: 03/23/18  9:23 PM   Result Value Ref Range    Glucose (POC) 150 (H) 70 - 120 mg/dL   METABOLIC PANEL, BASIC    Collection Time: 03/24/18  3:47 AM   Result Value Ref Range    Sodium 140 136 - 145 mmol/L    Potassium 3.2 (L) 3.5 - 5.5 mmol/L    Chloride 105 100 - 108 mmol/L    CO2 27 21 - 32 mmol/L    Anion gap 8 3.0 - 18 mmol/L    Glucose 155 (H) 74 - 99 mg/dL    BUN 14 7.0 - 18 MG/DL    Creatinine 1.25 0.6 - 1.3 MG/DL    BUN/Creatinine ratio 11 (L) 12 - 20      GFR est AA >60 >60 ml/min/1.73m2    GFR est non-AA 60 (L) >60 ml/min/1.73m2    Calcium 7.7 (L) 8.5 - 10.1 MG/DL   CBC WITH AUTOMATED DIFF    Collection Time: 03/24/18  3:47 AM   Result Value Ref Range    WBC 11.4 4.6 - 13.2 K/uL    RBC 3.78 (L) 4.70 - 5.50 M/uL    HGB 10.7 (L) 13.0 - 16.0 g/dL    HCT 36.1 36.0 - 48.0 %    MCV 95.5 74.0 - 97.0 FL    MCH 28.3 24.0 - 34.0 PG    MCHC 29.6 (L) 31.0 - 37.0 g/dL    RDW 13.2 11.6 - 14.5 %    PLATELET 603 418 - 785 K/uL    MPV 11.1 9.2 - 11.8 FL    NEUTROPHILS 67 40 - 73 %    LYMPHOCYTES 24 21 - 52 %    MONOCYTES 8 3 - 10 %    EOSINOPHILS 1 0 - 5 %    BASOPHILS 0 0 - 2 %    ABS. NEUTROPHILS 7.7 1.8 - 8.0 K/UL    ABS. LYMPHOCYTES 2.7 0.9 - 3.6 K/UL    ABS. MONOCYTES 0.9 0.05 - 1.2 K/UL    ABS. EOSINOPHILS 0.1 0.0 - 0.4 K/UL    ABS. BASOPHILS 0.0 0.0 - 0.1 K/UL    DF AUTOMATED     GLUCOSE, POC    Collection Time: 03/24/18  8:47 AM   Result Value Ref Range    Glucose (POC) 107 70 - 110 mg/dL   GLUCOSE, POC    Collection Time: 03/24/18 11:28 AM   Result Value Ref Range    Glucose (POC) 169 (H) 70 - 110 mg/dL   GLUCOSE, POC    Collection Time: 03/24/18 12:45 PM   Result Value Ref Range    Glucose (POC) 167 (H) 70 - 110 mg/dL     Additional Data Reviewed:     Condition:   Follow-up Appointments:   1. Your PCP: Maki Shay MD, within 7-10days  2. Your General Surgeon: Shruti Gonzalez MD within 1-2 weeks  3.  Your Urologist: Peter Singh MD within 1-2 weeks    >30 minutes spent coordinating this discharge (review instructions/follow-up, prescriptions, preparing report for sign off)    Signed:  Ajay Alan PA-C  3/24/2018  3:45 PM

## 2018-03-24 NOTE — PROGRESS NOTES
[id number]  [phone]       Impression  1. Abdominal pain- Ct abdomen- no acute abdomen or pelvic process. S/p laparoscopy with liver bx-no obvious source seen. -11/2010- EGD- grade 2 esophagitis. Medium HH  -11/2010 colo- polyp. Overdue for colonoscopy. May be done OP-pt aware of need for this.   -mesenteric doppler- No significant stenosis identified in the celiac, splenic, hepatic  or superior mesenteric arteries. Unable to visualize the inferior mesenteric artery. No significant plaquing, focal stenosis or aneurysm noted in the  abdominal aorta. 2. Intractable Nausea and vomiting-improved. Can consider op gastric emptying study  3. Leukocytosis  4. DM  5. Fatty liver-s/p liver bx at the time of laparoscopy yesterday. Plan:  1. Stable for discharge from my perspective. Have given my card to patient to make follow up appointment. He is aware that he is overdue for a colonoscopy for polyp surveillance. Will sign off. Chief Complaint: abdominal pain    Subjective:  Pt uneventful overnight.  Pt abdominal pain has improved        Eyes: conjunctiva normal, EOM normal   ENT: Mucous membranes moist, no lesion or thrush   Cardiovascular:  normal rate and regular rhythm, no murmur   Pulmonary/Chest Wall: breath sounds normal and effort normal   Abdominal:  soft, non-acute, non-tender, no appreciable mass or hepatosplenomegaly, no appreciable ascites       Visit Vitals    /85 (BP 1 Location: Right arm, BP Patient Position: Head of bed elevated (Comment degrees))    Pulse 67    Temp 99.2 °F (37.3 °C)    Resp 18    Ht 5' 8.9\" (1.75 m)    Wt 106.1 kg (234 lb)    SpO2 95%    BMI 34.66 kg/m2           Intake/Output Summary (Last 24 hours) at 03/24/18 1340  Last data filed at 03/23/18 2019   Gross per 24 hour   Intake             1000 ml   Output              530 ml   Net              470 ml       CBC w/Diff    Lab Results   Component Value Date/Time    WBC 11.4 03/24/2018 03:47 AM    RBC 3.78 (L) 03/24/2018 03:47 AM    HGB 10.7 (L) 03/24/2018 03:47 AM    HCT 36.1 03/24/2018 03:47 AM    MCV 95.5 03/24/2018 03:47 AM    MCH 28.3 03/24/2018 03:47 AM    MCHC 29.6 (L) 03/24/2018 03:47 AM    RDW 13.2 03/24/2018 03:47 AM     03/24/2018 03:47 AM    Lab Results   Component Value Date/Time    GRANS 67 03/24/2018 03:47 AM    LYMPH 24 03/24/2018 03:47 AM    EOS 1 03/24/2018 03:47 AM    BANDS 3 03/20/2018 11:40 PM    BASOS 0 03/24/2018 03:47 AM      Basic Metabolic Profile   Recent Labs      03/24/18   0347  03/23/18   1036   NA  140   --    K  3.2*   --    CL  105   --    CO2  27   --    BUN  14   --    CA  7.7*   --    MG   --   2.0        Hepatic Function    Lab Results   Component Value Date/Time    ALB 3.1 (L) 03/22/2018 04:30 AM    TP 7.0 03/22/2018 04:30 AM    AP 76 03/22/2018 04:30 AM    Lab Results   Component Value Date/Time    SGOT 17 03/22/2018 04:30 AM          Siria Reinoso MD  [facility]  [email or web address]

## 2018-03-26 ENCOUNTER — HOSPITAL ENCOUNTER (EMERGENCY)
Age: 58
Discharge: HOME | End: 2018-03-26
Attending: EMERGENCY MEDICINE
Payer: MEDICAID

## 2018-03-26 ENCOUNTER — APPOINTMENT (OUTPATIENT)
Dept: CT IMAGING | Age: 58
End: 2018-03-26
Attending: EMERGENCY MEDICINE
Payer: MEDICAID

## 2018-03-26 ENCOUNTER — APPOINTMENT (OUTPATIENT)
Dept: GENERAL RADIOLOGY | Age: 58
End: 2018-03-26
Attending: EMERGENCY MEDICINE
Payer: MEDICAID

## 2018-03-26 VITALS
HEIGHT: 69 IN | TEMPERATURE: 98.7 F | WEIGHT: 235 LBS | DIASTOLIC BLOOD PRESSURE: 92 MMHG | BODY MASS INDEX: 34.8 KG/M2 | OXYGEN SATURATION: 97 % | SYSTOLIC BLOOD PRESSURE: 164 MMHG | RESPIRATION RATE: 18 BRPM | HEART RATE: 88 BPM

## 2018-03-26 DIAGNOSIS — G89.29 CHRONIC ABDOMINAL PAIN: Primary | ICD-10-CM

## 2018-03-26 DIAGNOSIS — R10.9 CHRONIC ABDOMINAL PAIN: Primary | ICD-10-CM

## 2018-03-26 DIAGNOSIS — R11.2 NON-INTRACTABLE VOMITING WITH NAUSEA, UNSPECIFIED VOMITING TYPE: ICD-10-CM

## 2018-03-26 LAB
ALBUMIN SERPL-MCNC: 3.5 G/DL (ref 3.4–5)
ALBUMIN/GLOB SERPL: 0.7 {RATIO} (ref 0.8–1.7)
ALP SERPL-CCNC: 89 U/L (ref 45–117)
ALT SERPL-CCNC: 29 U/L (ref 16–61)
ANION GAP SERPL CALC-SCNC: 12 MMOL/L (ref 3–18)
APPEARANCE UR: CLEAR
AST SERPL-CCNC: 19 U/L (ref 15–37)
BASOPHILS # BLD: 0 K/UL (ref 0–0.1)
BASOPHILS NFR BLD: 0 % (ref 0–2)
BILIRUB SERPL-MCNC: 0.7 MG/DL (ref 0.2–1)
BILIRUB UR QL: NEGATIVE
BUN SERPL-MCNC: 12 MG/DL (ref 7–18)
BUN/CREAT SERPL: 8 (ref 12–20)
CALCIUM SERPL-MCNC: 9.4 MG/DL (ref 8.5–10.1)
CHLORIDE SERPL-SCNC: 98 MMOL/L (ref 100–108)
CO2 SERPL-SCNC: 28 MMOL/L (ref 21–32)
COLOR UR: YELLOW
CREAT SERPL-MCNC: 1.53 MG/DL (ref 0.6–1.3)
DIFFERENTIAL METHOD BLD: ABNORMAL
EOSINOPHIL # BLD: 0.2 K/UL (ref 0–0.4)
EOSINOPHIL NFR BLD: 1 % (ref 0–5)
ERYTHROCYTE [DISTWIDTH] IN BLOOD BY AUTOMATED COUNT: 13.1 % (ref 11.6–14.5)
GLOBULIN SER CALC-MCNC: 4.7 G/DL (ref 2–4)
GLUCOSE SERPL-MCNC: 270 MG/DL (ref 74–99)
GLUCOSE UR STRIP.AUTO-MCNC: 500 MG/DL
HCT VFR BLD AUTO: 43.3 % (ref 36–48)
HGB BLD-MCNC: 14 G/DL (ref 13–16)
HGB UR QL STRIP: NEGATIVE
KETONES UR QL STRIP.AUTO: 40 MG/DL
LACTATE BLD-SCNC: 2.3 MMOL/L (ref 0.4–2)
LACTATE BLD-SCNC: 3.1 MMOL/L (ref 0.4–2)
LEUKOCYTE ESTERASE UR QL STRIP.AUTO: NEGATIVE
LIPASE SERPL-CCNC: 239 U/L (ref 73–393)
LYMPHOCYTES # BLD: 1.6 K/UL (ref 0.9–3.6)
LYMPHOCYTES NFR BLD: 12 % (ref 21–52)
MAGNESIUM SERPL-MCNC: 1.8 MG/DL (ref 1.6–2.6)
MCH RBC QN AUTO: 29.6 PG (ref 24–34)
MCHC RBC AUTO-ENTMCNC: 32.3 G/DL (ref 31–37)
MCV RBC AUTO: 91.5 FL (ref 74–97)
MONOCYTES # BLD: 1.1 K/UL (ref 0.05–1.2)
MONOCYTES NFR BLD: 8 % (ref 3–10)
NEUTS SEG # BLD: 11.1 K/UL (ref 1.8–8)
NEUTS SEG NFR BLD: 79 % (ref 40–73)
NITRITE UR QL STRIP.AUTO: NEGATIVE
PH UR STRIP: >8.5 [PH] (ref 5–8)
PLATELET # BLD AUTO: 289 K/UL (ref 135–420)
PMV BLD AUTO: 11.5 FL (ref 9.2–11.8)
POTASSIUM SERPL-SCNC: 2.8 MMOL/L (ref 3.5–5.5)
PROT SERPL-MCNC: 8.2 G/DL (ref 6.4–8.2)
PROT UR STRIP-MCNC: NEGATIVE MG/DL
RBC # BLD AUTO: 4.73 M/UL (ref 4.7–5.5)
SODIUM SERPL-SCNC: 138 MMOL/L (ref 136–145)
SP GR UR REFRACTOMETRY: 1.01 (ref 1–1.03)
UROBILINOGEN UR QL STRIP.AUTO: 0.2 EU/DL (ref 0.2–1)
WBC # BLD AUTO: 14.1 K/UL (ref 4.6–13.2)

## 2018-03-26 PROCEDURE — 96365 THER/PROPH/DIAG IV INF INIT: CPT

## 2018-03-26 PROCEDURE — 96375 TX/PRO/DX INJ NEW DRUG ADDON: CPT

## 2018-03-26 PROCEDURE — 74011250636 HC RX REV CODE- 250/636: Performed by: EMERGENCY MEDICINE

## 2018-03-26 PROCEDURE — 74022 RADEX COMPL AQT ABD SERIES: CPT

## 2018-03-26 PROCEDURE — 83690 ASSAY OF LIPASE: CPT

## 2018-03-26 PROCEDURE — 96366 THER/PROPH/DIAG IV INF ADDON: CPT

## 2018-03-26 PROCEDURE — 96361 HYDRATE IV INFUSION ADD-ON: CPT

## 2018-03-26 PROCEDURE — 74177 CT ABD & PELVIS W/CONTRAST: CPT

## 2018-03-26 PROCEDURE — 81003 URINALYSIS AUTO W/O SCOPE: CPT

## 2018-03-26 PROCEDURE — 99285 EMERGENCY DEPT VISIT HI MDM: CPT

## 2018-03-26 PROCEDURE — 74011636320 HC RX REV CODE- 636/320: Performed by: EMERGENCY MEDICINE

## 2018-03-26 PROCEDURE — 74011250637 HC RX REV CODE- 250/637: Performed by: EMERGENCY MEDICINE

## 2018-03-26 PROCEDURE — 83735 ASSAY OF MAGNESIUM: CPT

## 2018-03-26 PROCEDURE — 83605 ASSAY OF LACTIC ACID: CPT

## 2018-03-26 PROCEDURE — 96376 TX/PRO/DX INJ SAME DRUG ADON: CPT

## 2018-03-26 PROCEDURE — 85025 COMPLETE CBC W/AUTO DIFF WBC: CPT

## 2018-03-26 PROCEDURE — 80053 COMPREHEN METABOLIC PANEL: CPT

## 2018-03-26 RX ORDER — ONDANSETRON 2 MG/ML
4 INJECTION INTRAMUSCULAR; INTRAVENOUS
Status: COMPLETED | OUTPATIENT
Start: 2018-03-26 | End: 2018-03-26

## 2018-03-26 RX ORDER — MORPHINE SULFATE 4 MG/ML
4 INJECTION INTRAVENOUS
Status: COMPLETED | OUTPATIENT
Start: 2018-03-26 | End: 2018-03-26

## 2018-03-26 RX ORDER — POTASSIUM CHLORIDE 7.45 MG/ML
10 INJECTION INTRAVENOUS
Status: COMPLETED | OUTPATIENT
Start: 2018-03-26 | End: 2018-03-26

## 2018-03-26 RX ORDER — MORPHINE SULFATE 2 MG/ML
8 INJECTION, SOLUTION INTRAMUSCULAR; INTRAVENOUS
Status: COMPLETED | OUTPATIENT
Start: 2018-03-26 | End: 2018-03-26

## 2018-03-26 RX ORDER — POTASSIUM CHLORIDE 20 MEQ/1
40 TABLET, EXTENDED RELEASE ORAL
Status: COMPLETED | OUTPATIENT
Start: 2018-03-26 | End: 2018-03-26

## 2018-03-26 RX ADMIN — POTASSIUM CHLORIDE 40 MEQ: 20 TABLET, EXTENDED RELEASE ORAL at 13:53

## 2018-03-26 RX ADMIN — MORPHINE SULFATE 4 MG: 4 INJECTION INTRAVENOUS at 13:55

## 2018-03-26 RX ADMIN — SODIUM CHLORIDE 1000 ML: 900 INJECTION, SOLUTION INTRAVENOUS at 07:57

## 2018-03-26 RX ADMIN — IOPAMIDOL 100 ML: 612 INJECTION, SOLUTION INTRAVENOUS at 12:49

## 2018-03-26 RX ADMIN — ONDANSETRON 4 MG: 2 INJECTION, SOLUTION INTRAMUSCULAR; INTRAVENOUS at 11:03

## 2018-03-26 RX ADMIN — POTASSIUM CHLORIDE 10 MEQ: 7.46 INJECTION, SOLUTION INTRAVENOUS at 09:07

## 2018-03-26 RX ADMIN — MORPHINE SULFATE 4 MG: 4 INJECTION INTRAVENOUS at 07:50

## 2018-03-26 RX ADMIN — Medication 8 MG: at 11:48

## 2018-03-26 RX ADMIN — ONDANSETRON 4 MG: 2 INJECTION, SOLUTION INTRAMUSCULAR; INTRAVENOUS at 07:49

## 2018-03-26 NOTE — ED PROVIDER NOTES
EMERGENCY DEPARTMENT HISTORY AND PHYSICAL EXAM    7:23 AM      Date: 3/26/2018  Patient Name: Yung Hurley    History of Presenting Illness     Chief Complaint   Patient presents with    Abdominal Pain    Vomiting         History Provided By: Patient    Chief Complaint: Abdominal Pain   Duration: 2 Hours  Timing:  Constant and Worsening  Location: Diffused throughout abdomen   Quality: Sharp  Severity: 10 out of 10  Modifying Factors: Pain worsens with palpation   Associated Symptoms: nausea and vomiting      Additional History (Context): Yung Hurley is a 62 y.o. male with PMHx significant for HTN, DM, HLD, MI, GERD, appendectomy and heroin abuse presenting to the ED with c/o constant, worsening sharp diffused abdominal pain the began 2 hours ago. Pt was admitted for similar episode 6 days ago and was discharged from the hospital 2 days ago for same sx. States they performed an laparoscopy and did not find any abnormalities. Notes he has not had any recurrent sx since discharged until 2 hours ago, however reports he has not filled the medications he was prescribed at discharge. Denies any CP, SOB, fever, chill, diarrhea, hematemesis or urinary sx. Associated sx include nausea and vomiting. Severity is 10/10. Notes pain worsens with palpation. Last BM was today PTA. No other sx or complaints given at this time. PCP: Charmaine Evans MD    Current Outpatient Prescriptions   Medication Sig Dispense Refill    oxyCODONE-acetaminophen (PERCOCET 7.5) 7.5-325 mg per tablet Take 1 Tab by mouth every four (4) hours as needed. Max Daily Amount: 6 Tabs. Indications: Pain 12 Tab 0    ondansetron (ZOFRAN ODT) 4 mg disintegrating tablet Take 1 Tab by mouth every eight (8) hours as needed for Nausea. 6 Tab 0    OTHER Check CBC, CMP, Mg in 3 days, results to PCP immediately, Diagnosis- DM2 1 Each 0    OTHER No lifting weights > 15 lbs for two weeks.  1 Each 0    insulin detemir U-100 (LEVEMIR U-100 INSULIN) 100 unit/mL injection 15 Units by SubCUTAneous route nightly. 10 mL 0    OTHER Incentive spirometry- use as directed 1 Each 0    hydroCHLOROthiazide (HYDRODIURIL) 50 mg tablet Take 0.5 Tabs by mouth daily. 15 Tab 0    pantoprazole (PROTONIX) 40 mg tablet Take 1 Tab by mouth Daily (before breakfast). 30 Tab 0    albuterol (PROVENTIL HFA, VENTOLIN HFA, PROAIR HFA) 90 mcg/actuation inhaler 2 puffs every 6 hours x 5 days then every 6 hours as needed for shortness of breath and/or wheezing 1 Inhaler 0    acetaminophen (TYLENOL) 325 mg tablet Take 2 Tabs by mouth every six (6) hours as needed. Indications: Pain, take it with bentyl 30 Tab 0    polyethylene glycol (MIRALAX) 17 gram packet Take 1 Packet by mouth daily. 30 Packet 0    amLODIPine (NORVASC) 10 mg tablet Take 10 mg by mouth daily.  pregabalin (LYRICA) 75 mg capsule 1 cap bid x 1 week, then 2 caps bid 120 Cap 0    amitriptyline (ELAVIL) 50 mg tablet Take 50 mg by mouth nightly.  simvastatin (ZOCOR) 10 mg tablet Take 10 mg by mouth nightly.  clopidogrel (PLAVIX) 75 mg tablet Take 75 mg by mouth daily.          Past History     Past Medical History:  Past Medical History:   Diagnosis Date    Arthritis     Coronary atherosclerosis of native coronary artery     S/P PCI with GE in 12/09    Diabetes ([facility] [address]     IDDM    Electrolyte and fluid disorders not elsewhere classified     Essential hypertension, benign     GERD (gastroesophageal reflux disease)     Heroin abuse 05/26/16    Psychiatrist Dr. Brigida Kessler History of heart attack     Hyperlipidemia     Intermediate coronary syndrome [facility])     MDD (major depressive disorder) 5/26/16    Psychiatrist Dr. Brigida Kessler Myocardial infarction     Obesity, unspecified     Old myocardial infarction     Other and unspecified hyperlipidemia     Pancreatitis     Positive PPD     Sleep apnea     uses Cpap machine    Transfusion history     Before 1992 Past Surgical History:  Past Surgical History:   Procedure Laterality Date    HX APPENDECTOMY      HX CORONARY STENT PLACEMENT  2010    2 stents       Family History:  Family History   Problem Relation Age of Onset    Heart Attack Father     Coronary Artery Disease Mother     Diabetes Mother     Cancer Sister      breast cancer and lung cancer       Social History:  Social History   Substance Use Topics    Smoking status: Never Smoker    Smokeless tobacco: Never Used    Alcohol use No       Allergies: Allergies   Allergen Reactions    Compazine [Prochlorperazine] Anaphylaxis     \"Tightness around throat\"         Review of Systems       Review of Systems   Constitutional: Negative for activity change, chills and fever. HENT: Negative for congestion, ear pain, sore throat and trouble swallowing. Eyes: Negative for visual disturbance. Respiratory: Negative for cough, shortness of breath and wheezing. Cardiovascular: Negative for chest pain, palpitations and leg swelling. Gastrointestinal: Positive for abdominal pain, nausea and vomiting. Negative for diarrhea. Genitourinary: Negative for decreased urine volume, dysuria, frequency and urgency. Musculoskeletal: Negative for arthralgias and joint swelling. Skin: Negative for rash. Neurological: Negative for weakness, numbness and headaches. Psychiatric/Behavioral: Negative for agitation and confusion. All other systems reviewed and are negative. Physical Exam     Visit Vitals    BP (!) 164/92 (BP 1 Location: Left arm, BP Patient Position: Standing)    Pulse 88    Temp 98.7 °F (37.1 °C)    Resp 18    Ht 5' 9\" (1.753 m)    Wt 106.6 kg (235 lb)    SpO2 97%    BMI 34.7 kg/m2         Physical Exam   Constitutional: He is oriented to person, place, and time. He appears well-developed and well-nourished. He is cooperative. No distress.    Obese  male wearing sweatpants in moderate distress, with emesis bag at bedside   HENT:   Head: Normocephalic and atraumatic. Mouth/Throat: Oropharynx is clear and moist. No oropharyngeal exudate. Eyes: Conjunctivae and EOM are normal. Right eye exhibits no discharge. Left eye exhibits no discharge. No scleral icterus. Neck: Normal range of motion and phonation normal. Neck supple. No JVD present. No thyromegaly present. Cardiovascular: Normal rate, regular rhythm, S1 normal, S2 normal, normal heart sounds and intact distal pulses. Exam reveals no gallop and no friction rub. No murmur heard. Pulmonary/Chest: Effort normal and breath sounds normal. No accessory muscle usage. No respiratory distress. He has no wheezes. He has no rhonchi. He has no rales. Abdominal: Soft. Normal appearance and bowel sounds are normal. He exhibits no distension and no pulsatile midline mass. There is tenderness. There is no rebound and no guarding. Obese abdomen   Soft, non distended   Old RLQ surgical incision, well healed   Small port incision with dermabond above the umbilicus, no erythema or drainage   Diffused abdominal tenderness to palpation   No guarding or rebound   Musculoskeletal: Normal range of motion. He exhibits no edema or deformity. Lymphadenopathy:        Head (right side): No submandibular adenopathy present. He has no cervical adenopathy. Neurological: He is alert and oriented to person, place, and time. Moves all extremities. No obvious focal deficits or facial asymmetry. Skin: Skin is warm and dry. No rash noted. Psychiatric: He has a normal mood and affect. His speech is normal and behavior is normal.   Nursing note and vitals reviewed.         Diagnostic Study Results     Labs -  Recent Results (from the past 12 hour(s))   POC LACTIC ACID    Collection Time: 03/26/18  7:49 AM   Result Value Ref Range    Lactic Acid (POC) 3.1 (HH) 0.4 - 2.0 mmol/L   POC LACTIC ACID    Collection Time: 03/26/18 11:24 AM   Result Value Ref Range    Lactic Acid (POC) 2.3 (HH) 0.4 - 2.0 mmol/L   URINALYSIS W/ RFLX MICROSCOPIC    Collection Time: 03/26/18 11:25 AM   Result Value Ref Range    Color YELLOW      Appearance CLEAR      Specific gravity 1.012 1.005 - 1.030      pH (UA) >8.5 (H) 5.0 - 8.0    Protein NEGATIVE  NEG mg/dL    Glucose 500 (A) NEG mg/dL    Ketone 40 (A) NEG mg/dL    Bilirubin NEGATIVE  NEG      Blood NEGATIVE  NEG      Urobilinogen 0.2 0.2 - 1.0 EU/dL    Nitrites NEGATIVE  NEG      Leukocyte Esterase NEGATIVE  NEG         Radiologic Studies -   Xr Abd Acute W 1 V Chest    Result Date: 3/26/2018  Abdomen, supine and upright AP views with upright AP view of chest, 3 views: INDICATION: Abdominal pain and vomiting. History of CAD, hypertension, hyperlipidemia, obesity, diabetes, and sleep apnea. COMPARISON STUDY: Chest x-ray on 3/21/2018, 9/8/2017. X-ray of abdomen on 9/8/2017. FINDINGS: On the chest x-ray lungs are mildly hypoventilated but otherwise clear. Cardiac size is borderline. Pulmonary vascularity is not engorged. CP angles are sharp bilaterally. No evidence of pneumoperitoneum. On the supine AP view of abdomen and there is demonstration of moderate gas or air in stomach. No obvious abnormal gas in intestine. On the upright view there is no abnormal fluid level in large or small bowel. Single prominent fluid level is noted in stomach. No evidence of pneumoperitoneum. IMPRESSION: Moderate air or gas in stomach with prominent fluid level. No abnormal gas or fluid level in small or large bowel. There is no definable acute cardiac pulmonary process. Clear lungs bilaterally. Ct Abd Pelv W Cont    Result Date: 3/26/2018  CT ABDOMEN/PELVIS WITH CONTRAST CPT CODE: 71591,08055 HISTORY: One hour of abdominal pain and vomiting. History of coronary artery disease and coronary artery stent. History of appendectomy. History of liver wedge biopsy and umbilical repair 8/85/4191. COMPARISON: 3/21/2018.  TECHNIQUE: 5 mm helical scans were obtained of the abdomen and pelvis after uneventful administration of intravenous contrast. 100 cc of Isovue-300 was given. Coronal and sagittal reformations. CT dose reduction was achieved through use of a standardized protocol tailored for this examination and automatic exposure control for dose modulation. Miri Mustard FINDINGS: The visualized lung bases are clear. Small hiatal hernia Diffuse low-attenuation of the liver suggests hepatic steatosis. No splenomegaly. Homogeneous enhancement the pancreas. The gallbladder is without stones or sludge. Stable right adrenal nodule. No left adrenal nodule. The kidneys enhance symmetrically. Low-lying right kidney. Exophytic structure of the mid right kidney measures 1.1 cm stable with water attenuation compatible with a cyst.  No hydronephrosis. No free intraperitoneal air or free fluid. Reduction of fat in the umbilical hernia. There is thick walled low-attenuation area at the base of the umbilicus measuring 2.7 x 3.7 cm on image 6 T2. This is also associated with tiny foci of air. Bandlike subcutaneous fat stranding in the left and right ventricle subcutaneous fat. No abdominal or pelvic lymphadenopathy. The small and large bowel are normal in caliber. Appendix not visualized in keeping with reported surgical resection. The bladder is under distended and therefore incompletely evaluated. No prostatic enlargement. No calcifications in the abdominal aorta. No aneurysmal dilatation. Degenerative change in the spine and facets. Postsurgical change. IMPRESSION: No CT finding for an acute intra-abdominal or pelvic process. Small amount of fluid, fat stranding and tiny foci of air at the umbilicus which may be related to postsurgical change. Superimposed infection in this area not excluded. No bowel obstruction. Hepatic steatosis. Stable right adrenal nodule. Stable right renal cyst.        Medical Decision Making   I am the first provider for this patient.     I reviewed the vital signs, available nursing notes, past medical history, past surgical history, family history and social history. Vital Signs-Reviewed the patient's vital signs. Pulse Oximetry Analysis -  100% on room air, stable     Cardiac Monitor:  Rate: 89   Rhythm:  Normal Sinus Rhythm     Records Reviewed: Nursing Notes and Old Medical Records (Time of Review: 7:23 AM)      Provider Notes (Medical Decision Making):   ASSESSMENT / PLAN:          57y/o AAM, PMHx of obesity, DM, HTN, CAD (s/p PCI), appendectomy many years ago, Chronic Abd Pain who presents via EMS for abd pain and n/v that started this am. Was discharged 2dys ago form this hospital after large workup of same condition including EGD, CT Scan Abd/pelvis, Mesenteric US, and exploratory lapraroscopy. Really no source found. Had small umbilical hernia which was repaired. He has a low lying kidney in his right side and the thought was maybe this is cause of the chronic pain, but it was unclear so he was scheduled to see Urology as oupt to discuss further options. DC home w/some pain meds and antiemtics which he hasnt filled. He denies any etoh or NSAID use, woke up with this pain this am. Vomited several times, no blood, some clear/green fluid. Had normal BM this am.         On arrival obese AAM, appears uncomfortable but nontoxic, pleasant. Vitals normal other than /94. HEENT, lung, CV beinign. Abd obese but soft, normal BS, well healed RLQ appyscar, small port in mid/upper abd w/dermabond, c/d/I, no erythema. Diffse abd pain mild/mod, no rebound/guarding, no masses or pulsations. This really seems like continued chronic unexplained abd pain. DDx wide though to include gastritis, pancreatitis, hepatitis, gastroparesis, uti. Didhave recent surgery, so obstruction, adhesions, bowel perf possible but seems less likely.    -CBC, CMP, Lipase, Lactate  -UA  -Acute Series (low threshould for CT imaging)  -IV Zofran  -IV Morphine  -Reassess  -May need conitjued outpt f/u with Urology and possibly GI to get gastric emptying study as well. Donell Soto MD  EM-IM Physician          ED Course: Progress Notes, Reevaluation, and Consults:    11:40 AM: WBCs of 14, rest of CBC normal. CMP with a potassium of 2.8 (will repleat PO and IV). Mild elevation of glucose at 270. LFTs and Lipase normal. Creatinine at baseline 1.5. UA shows no signs of infection. XR abd acute shows air fluid level in the stomach. Lactate 3.1, went down to 2.3 after IV fluids. Pt still has pain, will continue with IV pain medications and CT abd pelv with PO and IV contrast.     1:46 PM: CT abd pelv is unrevealing, shows some small inflammatory changes at his surgical site, but nothing else acute. Lactate has decrease. Pt is sitting up at bedside, states he is feeling considerably better and no longer has any nausea or vomiting. I think this is likely an exacerbation of his chronic pain and is clearly related to his surgery. He is not tender in this area not does skin show any signs of infection. Pt will f/u with PCP tomorrow and with the surgeon next week and is scheduling GI and urology f/u as well. He has prescriptions for Zofran and Percocet that he has not filled yet. Pt is comfortable being discharged home, both sisters at bedside are also comfortable with plan. Diagnosis     Clinical Impression:   1. Chronic abdominal pain    2.  Non-intractable vomiting with nausea, unspecified vomiting type        Disposition: Discharge     Follow-up Information     Follow up With Details Comments Contact Kristie Max., MD Call in 1 day  [facility] [address]      [facility] EMERGENCY DEPT  As needed, If symptoms worsen [address] [address]  [phone]           Discharge Medication List as of 3/26/2018  1:48 PM      CONTINUE these medications which have NOT CHANGED    Details   oxyCODONE-acetaminophen (PERCOCET 7.5) 7.5-325 mg per tablet Take 1 Tab by mouth every four (4) hours as needed. Max Daily Amount: 6 Tabs. Indications: Pain, Print, Disp-12 Tab, R-0      ondansetron (ZOFRAN ODT) 4 mg disintegrating tablet Take 1 Tab by mouth every eight (8) hours as needed for Nausea. , Print, Disp-6 Tab, R-0      !! OTHER Check CBC, CMP, Mg in 3 days, results to PCP immediately, Diagnosis- DM2, Print, Disp-1 Each, R-0      !! OTHER No lifting weights > 15 lbs for two weeks. , Print, Disp-1 Each, R-0      insulin detemir U-100 (LEVEMIR U-100 INSULIN) 100 unit/mL injection 15 Units by SubCUTAneous route nightly., No Print, Disp-10 mL, R-0      !! OTHER Incentive spirometry- use as directed, Print, Disp-1 Each, R-0      hydroCHLOROthiazide (HYDRODIURIL) 50 mg tablet Take 0.5 Tabs by mouth daily. , No Print, Disp-15 Tab, R-0      pantoprazole (PROTONIX) 40 mg tablet Take 1 Tab by mouth Daily (before breakfast). , Print, Disp-30 Tab, R-0      albuterol (PROVENTIL HFA, VENTOLIN HFA, PROAIR HFA) 90 mcg/actuation inhaler 2 puffs every 6 hours x 5 days then every 6 hours as needed for shortness of breath and/or wheezing, Print, Disp-1 Inhaler, R-0      acetaminophen (TYLENOL) 325 mg tablet Take 2 Tabs by mouth every six (6) hours as needed. Indications: Pain, take it with bentyl, No Print, Disp-30 Tab, R-0      polyethylene glycol (MIRALAX) 17 gram packet Take 1 Packet by mouth daily. , Print, Disp-30 Packet, R-0      amLODIPine (NORVASC) 10 mg tablet Take 10 mg by mouth daily. , Historical Med      pregabalin (LYRICA) 75 mg capsule 1 cap bid x 1 week, then 2 caps bid, Print, Disp-120 Cap, R-0      amitriptyline (ELAVIL) 50 mg tablet Take 50 mg by mouth nightly., Historical Med      simvastatin (ZOCOR) 10 mg tablet Take 10 mg by mouth nightly., Historical Med      clopidogrel (PLAVIX) 75 mg tablet Take 75 mg by mouth daily. , Historical Med       !! - Potential duplicate medications found. Please discuss with provider. _______________________________    Attestations:  Eugenia [address]annon [address] acting as a scribe for and in the presence of Emely Rao MD      March 26, 2018 at [facility]       Provider Attestation:      I personally performed the services described in the documentation, reviewed the documentation, as recorded by the scribe in my presence, and it accurately and completely records my words and actions.  March 26, 2018 at 7:23 AM - Emely Rao MD    _______________________________

## 2018-03-26 NOTE — ED TRIAGE NOTES
Pt arrived via EMS alert & oriented times 4. Pt c/o abd pain & vomiting that started 1 hour ago. Pt had a similar episode 6 days ago seen here & no diagnosis.

## 2018-03-26 NOTE — DISCHARGE INSTRUCTIONS
Abdominal Pain: Care Instructions  Your Care Instructions    Abdominal pain has many possible causes. Some aren't serious and get better on their own in a few days. Others need more testing and treatment. If your pain continues or gets worse, you need to be rechecked and may need more tests to find out what is wrong. You may need surgery to correct the problem. Don't ignore new symptoms, such as fever, nausea and vomiting, urination problems, pain that gets worse, and dizziness. These may be signs of a more serious problem. Your doctor may have recommended a follow-up visit in the next 8 to 12 hours. If you are not getting better, you may need more tests or treatment. The doctor has checked you carefully, but problems can develop later. If you notice any problems or new symptoms, get medical treatment right away. Follow-up care is a key part of your treatment and safety. Be sure to make and go to all appointments, and call your doctor if you are having problems. It's also a good idea to know your test results and keep a list of the medicines you take. How can you care for yourself at home? · Rest until you feel better. · To prevent dehydration, drink plenty of fluids, enough so that your urine is light yellow or clear like water. Choose water and other caffeine-free clear liquids until you feel better. If you have kidney, heart, or liver disease and have to limit fluids, talk with your doctor before you increase the amount of fluids you drink. · If your stomach is upset, eat mild foods, such as rice, dry toast or crackers, bananas, and applesauce. Try eating several small meals instead of two or three large ones. · Wait until 48 hours after all symptoms have gone away before you have spicy foods, alcohol, and drinks that contain caffeine. · Do not eat foods that are high in fat. · Avoid anti-inflammatory medicines such as aspirin, ibuprofen (Advil, Motrin), and naproxen (Aleve).  These can cause stomach upset. Talk to your doctor if you take daily aspirin for another health problem. When should you call for help? Call 911 anytime you think you may need emergency care. For example, call if:  ? · You passed out (lost consciousness). ? · You pass maroon or very bloody stools. ? · You vomit blood or what looks like coffee grounds. ? · You have new, severe belly pain. ?Call your doctor now or seek immediate medical care if:  ? · Your pain gets worse, especially if it becomes focused in one area of your belly. ? · You have a new or higher fever. ? · Your stools are black and look like tar, or they have streaks of blood. ? · You have unexpected vaginal bleeding. ? · You have symptoms of a urinary tract infection. These may include:  ¨ Pain when you urinate. ¨ Urinating more often than usual.  ¨ Blood in your urine. ? · You are dizzy or lightheaded, or you feel like you may faint. ? Watch closely for changes in your health, and be sure to contact your doctor if:  ? · You are not getting better after 1 day (24 hours). Where can you learn more? Go to http://kaila-carlitos.info/. Enter S536 in the search box to learn more about \"Abdominal Pain: Care Instructions. \"  Current as of: March 20, 2017  Content Version: 11.4  © 7457-0756 "[employer]. Care instructions adapted under license by [employer] (which disclaims liability or warranty for this information). If you have questions about a medical condition or this instruction, always ask your healthcare professional. [place] [address] any warranty or liability for your use of this information. Chronic Pain: Care Instructions  Your Care Instructions    Chronic pain is pain that lasts a long time (months or even years) and may or may not have a clear cause. It is different from acute pain, which usually does have a clear cause-like an injury or illness-and gets better over time. Chronic pain:  · Lasts over time but may vary from day to day. · Does not go away despite efforts to end it. · May disrupt your sleep and lead to fatigue. · May cause depression or anxiety. · May make your muscles tense, causing more pain. · Can disrupt your work, hobbies, home life, and relationships with friends and family. Chronic pain is a very real condition. It is not just in your head. Treatment can help and usually includes several methods used together, such as medicines, physical therapy, exercise, and other treatments. Learning how to relax and changing negative thought patterns can also help you cope. Chronic pain is complex. Taking an active role in your treatment will help you better manage your pain. Tell your doctor if you have trouble dealing with your pain. You may have to try several things before you find what works best for you. Follow-up care is a key part of your treatment and safety. Be sure to make and go to all appointments, and call your doctor if you are having problems. It's also a good idea to know your test results and keep a list of the medicines you take. How can you care for yourself at home? · Pace yourself. Break up large jobs into smaller tasks. Save harder tasks for days when you have less pain, or go back and forth between hard tasks and easier ones. Take rest breaks. · Relax, and reduce stress. Relaxation techniques such as deep breathing or meditation can help. · Keep moving. Gentle, daily exercise can help reduce pain over the long run. Try low- or no-impact exercises such as walking, swimming, and stationary biking. Do stretches to stay flexible. · Try heat, cold packs, and massage. · Get enough sleep. Chronic pain can make you tired and drain your energy. Talk with your doctor if you have trouble sleeping because of pain. · Think positive. Your thoughts can affect your pain level.  Do things that you enjoy to distract yourself when you have pain instead of focusing on the pain. See a movie, read a book, listen to music, or spend time with a friend. · If you think you are depressed, talk to your doctor about treatment. · Keep a daily pain diary. Record how your moods, thoughts, sleep patterns, activities, and medicine affect your pain. You may find that your pain is worse during or after certain activities or when you are feeling a certain emotion. Having a record of your pain can help you and your doctor find the best ways to treat your pain. · Take pain medicines exactly as directed. ¨ If the doctor gave you a prescription medicine for pain, take it as prescribed. ¨ If you are not taking a prescription pain medicine, ask your doctor if you can take an over-the-counter medicine. Reducing constipation caused by pain medicine  · Include fruits, vegetables, beans, and whole grains in your diet each day. These foods are high in fiber. · Drink plenty of fluids, enough so that your urine is light yellow or clear like water. If you have kidney, heart, or liver disease and have to limit fluids, talk with your doctor before you increase the amount of fluids you drink. · If your doctor recommends it, get more exercise. Walking is a good choice. Bit by bit, increase the amount you walk every day. Try for at least 30 minutes on most days of the week. · Schedule time each day for a bowel movement. A daily routine may help. Take your time and do not strain when having a bowel movement. When should you call for help? Call your doctor now or seek immediate medical care if:  ? · Your pain gets worse or is out of control. ? · You feel down or blue, or you do not enjoy things like you once did. You may be depressed, which is common in people with chronic pain. Depression can be treated. ? · You have vomiting or cramps for more than 2 hours. ? Watch closely for changes in your health, and be sure to contact your doctor if:  ? · You cannot sleep because of pain.    ? · You are very worried or anxious about your pain. ? · You have trouble taking your pain medicine. ? · You have any concerns about your pain medicine. ? · You have trouble with bowel movements, such as:  ¨ No bowel movement in 3 days. ¨ Blood in the anal area, in your stool, or on the toilet paper. ¨ Diarrhea for more than 24 hours. Where can you learn more? Go to http://kaila-carlitos.info/. Enter N004 in the search box to learn more about \"Chronic Pain: Care Instructions. \"  Current as of: October 14, 2016  Content Version: 11.4  © 0213-2393 [employer]. Care instructions adapted under license by [employer] (which disclaims liability or warranty for this information). If you have questions about a medical condition or this instruction, always ask your healthcare professional. [address] any warranty or liability for your use of this information.

## 2018-03-27 ENCOUNTER — HOSPITAL ENCOUNTER (INPATIENT)
Age: 58
LOS: 1 days | Discharge: HOME | DRG: 243 | End: 2018-03-29
Attending: EMERGENCY MEDICINE | Admitting: HOSPITALIST
Payer: MEDICAID

## 2018-03-27 ENCOUNTER — APPOINTMENT (OUTPATIENT)
Dept: GENERAL RADIOLOGY | Age: 58
DRG: 243 | End: 2018-03-27
Attending: EMERGENCY MEDICINE
Payer: MEDICAID

## 2018-03-27 ENCOUNTER — APPOINTMENT (OUTPATIENT)
Dept: CT IMAGING | Age: 58
DRG: 243 | End: 2018-03-27
Attending: EMERGENCY MEDICINE
Payer: MEDICAID

## 2018-03-27 DIAGNOSIS — Z09 S/P UMBILICAL HERNIA REPAIR, FOLLOW-UP EXAM: ICD-10-CM

## 2018-03-27 LAB
ALBUMIN SERPL-MCNC: 3.5 G/DL (ref 3.4–5)
ALBUMIN/GLOB SERPL: 0.7 {RATIO} (ref 0.8–1.7)
ALP SERPL-CCNC: 88 U/L (ref 45–117)
ALT SERPL-CCNC: 26 U/L (ref 16–61)
AMPHET UR QL SCN: NEGATIVE
ANION GAP SERPL CALC-SCNC: 13 MMOL/L (ref 3–18)
APPEARANCE UR: CLEAR
AST SERPL-CCNC: 16 U/L (ref 15–37)
BACTERIA SPEC CULT: NORMAL
BACTERIA URNS QL MICRO: NEGATIVE /HPF
BARBITURATES UR QL SCN: NEGATIVE
BASOPHILS # BLD: 0 K/UL (ref 0–0.1)
BASOPHILS NFR BLD: 0 % (ref 0–2)
BENZODIAZ UR QL: NEGATIVE
BILIRUB DIRECT SERPL-MCNC: 0.1 MG/DL (ref 0–0.2)
BILIRUB SERPL-MCNC: 0.5 MG/DL (ref 0.2–1)
BILIRUB UR QL: NEGATIVE
BUN SERPL-MCNC: 14 MG/DL (ref 7–18)
BUN/CREAT SERPL: 10 (ref 12–20)
CALCIUM SERPL-MCNC: 8.7 MG/DL (ref 8.5–10.1)
CANNABINOIDS UR QL SCN: NEGATIVE
CHLORIDE SERPL-SCNC: 95 MMOL/L (ref 100–108)
CK MB CFR SERPL CALC: 0.9 % (ref 0–4)
CK MB SERPL-MCNC: 1.3 NG/ML (ref 5–25)
CK SERPL-CCNC: 138 U/L (ref 39–308)
CO2 SERPL-SCNC: 27 MMOL/L (ref 21–32)
COCAINE UR QL SCN: NEGATIVE
COLOR UR: YELLOW
CREAT SERPL-MCNC: 1.46 MG/DL (ref 0.6–1.3)
DIFFERENTIAL METHOD BLD: ABNORMAL
EOSINOPHIL # BLD: 0 K/UL (ref 0–0.4)
EOSINOPHIL NFR BLD: 0 % (ref 0–5)
EPITH CASTS URNS QL MICRO: NORMAL /LPF (ref 0–5)
ERYTHROCYTE [DISTWIDTH] IN BLOOD BY AUTOMATED COUNT: 13.3 % (ref 11.6–14.5)
GLOBULIN SER CALC-MCNC: 4.8 G/DL (ref 2–4)
GLUCOSE SERPL-MCNC: 276 MG/DL (ref 74–99)
GLUCOSE UR STRIP.AUTO-MCNC: >1000 MG/DL
HCT VFR BLD AUTO: 41.5 % (ref 36–48)
HDSCOM,HDSCOM: ABNORMAL
HGB BLD-MCNC: 13.2 G/DL (ref 13–16)
HGB UR QL STRIP: NEGATIVE
KETONES UR QL STRIP.AUTO: 80 MG/DL
LACTATE BLD-SCNC: 1.9 MMOL/L (ref 0.4–2)
LACTATE BLD-SCNC: 2.5 MMOL/L (ref 0.4–2)
LEUKOCYTE ESTERASE UR QL STRIP.AUTO: NEGATIVE
LIPASE SERPL-CCNC: 123 U/L (ref 73–393)
LYMPHOCYTES # BLD: 1.6 K/UL (ref 0.9–3.6)
LYMPHOCYTES NFR BLD: 9 % (ref 21–52)
MAGNESIUM SERPL-MCNC: 1.8 MG/DL (ref 1.6–2.6)
MCH RBC QN AUTO: 29.3 PG (ref 24–34)
MCHC RBC AUTO-ENTMCNC: 31.8 G/DL (ref 31–37)
MCV RBC AUTO: 92.2 FL (ref 74–97)
METHADONE UR QL: NEGATIVE
MONOCYTES # BLD: 1.2 K/UL (ref 0.05–1.2)
MONOCYTES NFR BLD: 7 % (ref 3–10)
NEUTS SEG # BLD: 13.9 K/UL (ref 1.8–8)
NEUTS SEG NFR BLD: 84 % (ref 40–73)
NITRITE UR QL STRIP.AUTO: NEGATIVE
OPIATES UR QL: POSITIVE
PCP UR QL: NEGATIVE
PH UR STRIP: >8.5 [PH] (ref 5–8)
PLATELET # BLD AUTO: 320 K/UL (ref 135–420)
PMV BLD AUTO: 11.7 FL (ref 9.2–11.8)
POTASSIUM SERPL-SCNC: 2.6 MMOL/L (ref 3.5–5.5)
PROT SERPL-MCNC: 8.3 G/DL (ref 6.4–8.2)
PROT UR STRIP-MCNC: 30 MG/DL
RBC # BLD AUTO: 4.5 M/UL (ref 4.7–5.5)
RBC #/AREA URNS HPF: NORMAL /HPF (ref 0–5)
SERVICE CMNT-IMP: NORMAL
SODIUM SERPL-SCNC: 135 MMOL/L (ref 136–145)
SP GR UR REFRACTOMETRY: >1.03 (ref 1–1.03)
TROPONIN I SERPL-MCNC: <0.02 NG/ML (ref 0–0.04)
UROBILINOGEN UR QL STRIP.AUTO: 0.2 EU/DL (ref 0.2–1)
WBC # BLD AUTO: 16.7 K/UL (ref 4.6–13.2)
WBC URNS QL MICRO: NORMAL /HPF (ref 0–4)

## 2018-03-27 PROCEDURE — 83605 ASSAY OF LACTIC ACID: CPT

## 2018-03-27 PROCEDURE — 80076 HEPATIC FUNCTION PANEL: CPT | Performed by: PHYSICIAN ASSISTANT

## 2018-03-27 PROCEDURE — 83690 ASSAY OF LIPASE: CPT | Performed by: PHYSICIAN ASSISTANT

## 2018-03-27 PROCEDURE — 96365 THER/PROPH/DIAG IV INF INIT: CPT

## 2018-03-27 PROCEDURE — 83735 ASSAY OF MAGNESIUM: CPT | Performed by: PHYSICIAN ASSISTANT

## 2018-03-27 PROCEDURE — 87040 BLOOD CULTURE FOR BACTERIA: CPT | Performed by: EMERGENCY MEDICINE

## 2018-03-27 PROCEDURE — 74022 RADEX COMPL AQT ABD SERIES: CPT

## 2018-03-27 PROCEDURE — 74011000250 HC RX REV CODE- 250: Performed by: EMERGENCY MEDICINE

## 2018-03-27 PROCEDURE — 96361 HYDRATE IV INFUSION ADD-ON: CPT

## 2018-03-27 PROCEDURE — 93005 ELECTROCARDIOGRAM TRACING: CPT

## 2018-03-27 PROCEDURE — 74177 CT ABD & PELVIS W/CONTRAST: CPT

## 2018-03-27 PROCEDURE — 96375 TX/PRO/DX INJ NEW DRUG ADDON: CPT

## 2018-03-27 PROCEDURE — 96376 TX/PRO/DX INJ SAME DRUG ADON: CPT

## 2018-03-27 PROCEDURE — 80307 DRUG TEST PRSMV CHEM ANLYZR: CPT | Performed by: EMERGENCY MEDICINE

## 2018-03-27 PROCEDURE — 99285 EMERGENCY DEPT VISIT HI MDM: CPT

## 2018-03-27 PROCEDURE — 80048 BASIC METABOLIC PNL TOTAL CA: CPT | Performed by: PHYSICIAN ASSISTANT

## 2018-03-27 PROCEDURE — 81001 URINALYSIS AUTO W/SCOPE: CPT | Performed by: PHYSICIAN ASSISTANT

## 2018-03-27 PROCEDURE — 74011250636 HC RX REV CODE- 250/636: Performed by: EMERGENCY MEDICINE

## 2018-03-27 PROCEDURE — 74011636320 HC RX REV CODE- 636/320: Performed by: EMERGENCY MEDICINE

## 2018-03-27 PROCEDURE — 74011250637 HC RX REV CODE- 250/637: Performed by: EMERGENCY MEDICINE

## 2018-03-27 PROCEDURE — 85025 COMPLETE CBC W/AUTO DIFF WBC: CPT | Performed by: PHYSICIAN ASSISTANT

## 2018-03-27 PROCEDURE — 82553 CREATINE MB FRACTION: CPT | Performed by: EMERGENCY MEDICINE

## 2018-03-27 RX ORDER — MORPHINE SULFATE 4 MG/ML
4 INJECTION, SOLUTION INTRAMUSCULAR; INTRAVENOUS
Status: DISCONTINUED | OUTPATIENT
Start: 2018-03-27 | End: 2018-03-27

## 2018-03-27 RX ORDER — MORPHINE SULFATE 2 MG/ML
4 INJECTION, SOLUTION INTRAMUSCULAR; INTRAVENOUS
Status: COMPLETED | OUTPATIENT
Start: 2018-03-27 | End: 2018-03-27

## 2018-03-27 RX ORDER — MORPHINE SULFATE 4 MG/ML
4 INJECTION INTRAVENOUS
Status: DISCONTINUED | OUTPATIENT
Start: 2018-03-27 | End: 2018-03-28

## 2018-03-27 RX ORDER — POTASSIUM CHLORIDE 1.5 G/1.77G
20 POWDER, FOR SOLUTION ORAL 2 TIMES DAILY WITH MEALS
Status: DISCONTINUED | OUTPATIENT
Start: 2018-03-27 | End: 2018-03-29 | Stop reason: HOSPADM

## 2018-03-27 RX ORDER — POTASSIUM CHLORIDE 7.45 MG/ML
10 INJECTION INTRAVENOUS
Status: DISPENSED | OUTPATIENT
Start: 2018-03-27 | End: 2018-03-27

## 2018-03-27 RX ORDER — SODIUM CHLORIDE 9 MG/ML
125 INJECTION, SOLUTION INTRAVENOUS CONTINUOUS
Status: DISCONTINUED | OUTPATIENT
Start: 2018-03-27 | End: 2018-03-29 | Stop reason: HOSPADM

## 2018-03-27 RX ORDER — FAMOTIDINE 10 MG/ML
20 INJECTION INTRAVENOUS
Status: COMPLETED | OUTPATIENT
Start: 2018-03-27 | End: 2018-03-27

## 2018-03-27 RX ORDER — POTASSIUM CHLORIDE 7.45 MG/ML
10 INJECTION INTRAVENOUS
Status: COMPLETED | OUTPATIENT
Start: 2018-03-27 | End: 2018-03-28

## 2018-03-27 RX ORDER — ONDANSETRON 2 MG/ML
4 INJECTION INTRAMUSCULAR; INTRAVENOUS
Status: COMPLETED | OUTPATIENT
Start: 2018-03-27 | End: 2018-03-27

## 2018-03-27 RX ADMIN — SODIUM CHLORIDE 125 ML/HR: 900 INJECTION, SOLUTION INTRAVENOUS at 16:10

## 2018-03-27 RX ADMIN — Medication 4 MG: at 18:40

## 2018-03-27 RX ADMIN — Medication 4 MG: at 20:47

## 2018-03-27 RX ADMIN — POTASSIUM CHLORIDE 20 MEQ: 1.5 POWDER, FOR SOLUTION ORAL at 18:42

## 2018-03-27 RX ADMIN — Medication 2 G: at 16:51

## 2018-03-27 RX ADMIN — IOPAMIDOL 100 ML: 612 INJECTION, SOLUTION INTRAVENOUS at 22:49

## 2018-03-27 RX ADMIN — ONDANSETRON 4 MG: 2 SOLUTION INTRAMUSCULAR; INTRAVENOUS at 16:04

## 2018-03-27 RX ADMIN — MORPHINE SULFATE 4 MG: 4 INJECTION INTRAVENOUS at 17:58

## 2018-03-27 RX ADMIN — SODIUM CHLORIDE 1000 ML: 900 INJECTION, SOLUTION INTRAVENOUS at 16:02

## 2018-03-27 RX ADMIN — POTASSIUM CHLORIDE 10 MEQ: 7.46 INJECTION, SOLUTION INTRAVENOUS at 23:10

## 2018-03-27 RX ADMIN — FAMOTIDINE 20 MG: 10 INJECTION, SOLUTION INTRAVENOUS at 16:06

## 2018-03-27 RX ADMIN — DIATRIZOATE MEGLUMINE AND DIATRIZOATE SODIUM 30 ML: 660; 100 LIQUID ORAL; RECTAL at 20:56

## 2018-03-27 RX ADMIN — MORPHINE SULFATE 4 MG: 4 INJECTION INTRAVENOUS at 23:17

## 2018-03-27 NOTE — ED NOTES
I performed a brief evaluation, including history and physical, of the patient here in triage and I have determined that pt will need further treatment and evaluation from the main side ER physician. I have placed initial orders to help in expediting patients care.      March 27, 2018 at 3:37 PM - DEVORA Zepeda        Visit Vitals    /83    Pulse 86    Temp 98.8 °F (37.1 °C)    Resp 18    Ht 5' 9\" (1.753 m)    Wt 106.6 kg (235 lb)    SpO2 98%    BMI 34.7 kg/m2

## 2018-03-27 NOTE — ED NOTES
Verbal report given to Dorinda Blanco RN (name) on Bobo Buckley being transferred to N/A (unit) for routine progression of care    Report consisted of patient's Situation, Background, Assessment and Recommendations (SBAR)    Information from the following report(s)  SBAR, ED Summary, MAR and Recent Results was reviewed with the receiving nurse. Opportunity for questions and clarification was provided.     Patient transported with:  N/A    Last Filed Values:  Temp: 98.8 °F (37.1 °C) (03/27/18 1535)  Pulse (Heart Rate): 86 (03/27/18 1535)  Resp Rate: 18 (03/27/18 1535)  O2 Sat (%): 98 % (03/27/18 1535)  BP: 137/83 (03/27/18 1535)  MAP (Calculated): 101 (03/27/18 1535)  Level of Consciousness: Alert (03/27/18 1535)      Lab Results   Component Value Date/Time    WBC 16.7 (H) 03/27/2018 03:58 PM       Repeat LA:  Time Due n/a already completed    Blood Cultures Drawn:  yes    Fluid Resuscitation:  Total needed 1000, Status completed, amount 1000    All Antibiotics Started:  yes, Dose Due 1000    VS x 2 post-fluid resuscitation:   no    Vasopressor Infusion:  no    Provider Reassessment needed and notified:  yes , Due now     Additional Interventions/Comments:  None

## 2018-03-27 NOTE — IP AVS SNAPSHOT
[address] [facility] 
 
 
 [address] [facility] [address] [place] [address] Patient: Tere Pompa MRN: [id number] [id number] About your hospitalization You were admitted on:  March 28, 2018 You last received care in the:  [facility] [id number] [place] [address] You were discharged on:  March 29, 2018 Why you were hospitalized Your primary diagnosis was:  Not on File Your diagnoses also included:  Vomiting, Chronic Abdominal Pain Follow-up Information Follow up With Details Comments Contact Info Evelyn Michelle MD  The  will call you tomorrow with an appointment date and time. You must follow up within 5-7 days. 600 [facility] [address] [facility] [id number] [phone] Andrews Carty MD  The  will call you tomorrow with an appointment date and time. You must follow up in 1-2 weeks. [address] [place] [address] [address] 200 [facility] 
[phone] Your Scheduled Appointments Monday April 09, 2018 11:15 AM EDT Office Visit with Annette Hdz MD  
[facility] On license of [facility]) [address] [facility], [address] [place] [address]  
[phone] Discharge Orders None A check hilary indicates which time of day the medication should be taken. My Medications START taking these medications Instructions Each Dose to Equal  
 Morning Noon Evening Bedtime  
 amoxicillin 500 mg capsule Commonly known as:  AMOXIL Your last dose was: Your next dose is: Take 2 Caps by mouth every twelve (12) hours for 14 days. 1000 mg  
    
   
   
   
  
 levoFLOXacin 750 mg tablet Commonly known as:  Deshaun Ruiz Start taking on:  3/30/2018 Your last dose was: Your next dose is: Take 1 Tab by mouth every twenty-four (24) hours for 14 days. 750 mg  
    
   
   
   
  
 metoclopramide HCl 5 mg tablet Commonly known as:  REGLAN Your last dose was: Your next dose is: Take 1 Tab by mouth Before breakfast, lunch, dinner and at bedtime for 14 days. 5 mg  
    
   
   
   
  
 metroNIDAZOLE 500 mg tablet Commonly known as:  FLAGYL Your last dose was: Your next dose is: Take 1 Tab by mouth every twelve (12) hours for 14 days. 500 mg  
    
   
   
   
  
 potassium chloride 20 mEq packet Commonly known as:  KLOR-CON Your last dose was: Your next dose is: Take 1 Packet by mouth two (2) times daily (with meals) for 7 days. 20 mEq  
    
   
   
   
  
 sucralfate 100 mg/mL suspension Commonly known as:  Harrison Sleek Your last dose was: Your next dose is: Take 10 mL by mouth Before breakfast, lunch, dinner and at bedtime. 1 g CHANGE how you take these medications Instructions Each Dose to Equal  
 Morning Noon Evening Bedtime  
 pantoprazole 40 mg tablet Commonly known as:  PROTONIX What changed:  when to take this Your last dose was: Your next dose is: Take 1 Tab by mouth Before breakfast and dinner. 40 mg CONTINUE taking these medications Instructions Each Dose to Equal  
 Morning Noon Evening Bedtime  
 acetaminophen 325 mg tablet Commonly known as:  TYLENOL Your last dose was: Your next dose is: Take 2 Tabs by mouth every six (6) hours as needed. Indications: Pain, take it with bentyl; do not exceed 3 g tylenol daily 650 mg  
    
   
   
   
  
 albuterol 90 mcg/actuation inhaler Commonly known as:  PROVENTIL HFA, VENTOLIN HFA, PROAIR HFA Your last dose was: Your next dose is:    
   
   
 2 puffs every 6 hours x 5 days then every 6 hours as needed for shortness of breath and/or wheezing  
     
   
   
   
  
 amitriptyline 50 mg tablet Commonly known as:  ELAVIL Your last dose was: Your next dose is: Take 50 mg by mouth nightly. 50 mg  
    
   
   
   
  
 amLODIPine 10 mg tablet Commonly known as:  Zeina Gan Your last dose was: Your next dose is: Take 10 mg by mouth daily. 10 mg  
    
   
   
   
  
 clopidogrel 75 mg Tab Commonly known as:  PLAVIX Your last dose was: Your next dose is: Take 75 mg by mouth daily. 75 mg  
    
   
   
   
  
 hydroCHLOROthiazide 50 mg tablet Commonly known as:  HYDRODIURIL Your last dose was: Your next dose is: Take 0.5 Tabs by mouth daily. 25 mg  
    
   
   
   
  
 insulin detemir U-100 100 unit/mL injection Commonly known as:  LEVEMIR U-100 INSULIN Your last dose was: Your next dose is:    
   
   
 15 Units by SubCUTAneous route nightly. 15 Units  
    
   
   
   
  
 ondansetron 4 mg disintegrating tablet Commonly known as:  ZOFRAN ODT Your last dose was: Your next dose is: Take 1 Tab by mouth every eight (8) hours as needed for Nausea. 4 mg OTHER Your last dose was: Your next dose is: No lifting weights > 15 lbs for two weeks. OTHER Your last dose was: Your next dose is:    
   
   
 Incentive spirometry- use as directed  
     
   
   
   
  
 oxyCODONE-acetaminophen 7.5-325 mg per tablet Commonly known as:  PERCOCET 7.5 Your last dose was: Your next dose is: Take 1 Tab by mouth every four (4) hours as needed. Max Daily Amount: 6 Tabs. Indications: Pain 1 Tab  
    
   
   
   
  
 polyethylene glycol 17 gram packet Commonly known as:  Yelena Cancel Your last dose was: Your next dose is: Take 1 Packet by mouth daily. 17 g  
    
   
   
   
  
 pregabalin 75 mg capsule Commonly known as:  Fausto Casanova Your last dose was: Your next dose is:    
   
   
 1 cap bid x 1 week, then 2 caps bid  
     
   
   
   
  
 simvastatin 10 mg tablet Commonly known as:  ZOCOR Your last dose was: Your next dose is: Take 10 mg by mouth nightly. 10 mg  
    
   
   
   
  
  
STOP taking these medications OTHER Where to Get Your Medications Information on where to get these meds will be given to you by the nurse or doctor. ! Ask your nurse or doctor about these medications  
  acetaminophen 325 mg tablet  
 amoxicillin 500 mg capsule  
 levoFLOXacin 750 mg tablet  
 metoclopramide HCl 5 mg tablet  
 metroNIDAZOLE 500 mg tablet  
 oxyCODONE-acetaminophen 7.5-325 mg per tablet  
 pantoprazole 40 mg tablet  
 potassium chloride 20 mEq packet  
 sucralfate 100 mg/mL suspension Opioid Education Prescription Opioids: What You Need to Know: 
 
 
 
F-face looks uneven A-arms unable to move or move unevenly S-speech slurred or non-existent T-time-call 911 as soon as signs and symptoms begin-DO NOT go Back to bed or wait to see if you get better-TIME IS BRAIN. Warning Signs of HEART ATTACK Call 911 if you have these symptoms: 
? Chest discomfort.  Most heart attacks involve discomfort in the center of the chest that lasts more than a few minutes, or that goes away and comes back. It can feel like uncomfortable pressure, squeezing, fullness, or pain. ? Discomfort in other areas of the upper body. Symptoms can include pain or discomfort in one or both arms, the back, neck, jaw, or stomach. ? Shortness of breath with or without chest discomfort. ? Other signs may include breaking out in a cold sweat, nausea, or lightheadedness. Don't wait more than five minutes to call [address]! Fast action can save your life. Calling 911 is almost always the fastest way to get lifesaving treatment. Emergency Medical Services staff can begin treatment when they arrive  up to an hour sooner than if someone gets to the hospital by car. The discharge information has been reviewed with the patient. The patient verbalized understanding. Discharge medications reviewed with the patient and appropriate educational materials and side effects teaching were provided. Patient armband removed and shredded 
 
___________________________________________________________________________________________________________________________________ Abdominal Pain: Care Instructions Your Care Instructions Abdominal pain has many possible causes. Some aren't serious and get better on their own in a few days. Others need more testing and treatment. If your pain continues or gets worse, you need to be rechecked and may need more tests to find out what is wrong. You may need surgery to correct the problem. Don't ignore new symptoms, such as fever, nausea and vomiting, urination problems, pain that gets worse, and dizziness. These may be signs of a more serious problem. Your doctor may have recommended a follow-up visit in the next 8 to 12 hours. If you are not getting better, you may need more tests or treatment. The doctor has checked you carefully, but problems can develop later.  If you notice any problems or new symptoms, get medical treatment right away. Follow-up care is a key part of your treatment and safety. Be sure to make and go to all appointments, and call your doctor if you are having problems. It's also a good idea to know your test results and keep a list of the medicines you take. How can you care for yourself at home? · Rest until you feel better. · To prevent dehydration, drink plenty of fluids, enough so that your urine is light yellow or clear like water. Choose water and other caffeine-free clear liquids until you feel better. If you have kidney, heart, or liver disease and have to limit fluids, talk with your doctor before you increase the amount of fluids you drink. · If your stomach is upset, eat mild foods, such as rice, dry toast or crackers, bananas, and applesauce. Try eating several small meals instead of two or three large ones. · Wait until 48 hours after all symptoms have gone away before you have spicy foods, alcohol, and drinks that contain caffeine. · Do not eat foods that are high in fat. · Avoid anti-inflammatory medicines such as aspirin, ibuprofen (Advil, Motrin), and naproxen (Aleve). These can cause stomach upset. Talk to your doctor if you take daily aspirin for another health problem. When should you call for help? Call 911 anytime you think you may need emergency care. For example, call if: 
? · You passed out (lost consciousness). ? · You pass maroon or very bloody stools. ? · You vomit blood or what looks like coffee grounds. ? · You have new, severe belly pain. ?Call your doctor now or seek immediate medical care if: 
? · Your pain gets worse, especially if it becomes focused in one area of your belly. ? · You have a new or higher fever. ? · Your stools are black and look like tar, or they have streaks of blood. ? · You have unexpected vaginal bleeding. ? · You have symptoms of a urinary tract infection. These may include: ¨ Pain when you urinate. ¨ Urinating more often than usual. 
¨ Blood in your urine. ? · You are dizzy or lightheaded, or you feel like you may faint. ? Watch closely for changes in your health, and be sure to contact your doctor if: 
? · You are not getting better after 1 day (24 hours). Where can you learn more? Go to http://kaila-carlitos.info/. Enter D678 in the search box to learn more about \"Abdominal Pain: Care Instructions. \" Current as of: March 20, 2017 Content Version: 11.4 © 3664-9075 [employer] Nines. Care instructions adapted under license by Open Range Communications (which disclaims liability or warranty for this information). If you have questions about a medical condition or this instruction, always ask your healthcare professional. [address] any warranty or liability for your use of this information. Nausea and Vomiting: Care Instructions Your Care Instructions When you are nauseated, you may feel weak and sweaty and notice a lot of saliva in your mouth. Nausea often leads to vomiting. Most of the time you do not need to worry about nausea and vomiting, but they can be signs of other illnesses. Two common causes of nausea and vomiting are stomach flu and food poisoning. Nausea and vomiting from viral stomach flu will usually start to improve within 24 hours. Nausea and vomiting from food poisoning may last from 12 to 48 hours. The doctor has checked you carefully, but problems can develop later. If you notice any problems or new symptoms, get medical treatment right away. Follow-up care is a key part of your treatment and safety. Be sure to make and go to all appointments, and call your doctor if you are having problems. It's also a good idea to know your test results and keep a list of the medicines you take. How can you care for yourself at home?  
· To prevent dehydration, drink plenty of fluids, enough so that your urine is light yellow or clear like water. Choose water and other caffeine-free clear liquids until you feel better. If you have kidney, heart, or liver disease and have to limit fluids, talk with your doctor before you increase the amount of fluids you drink. · Rest in bed until you feel better. · When you are able to eat, try clear soups, mild foods, and liquids until all symptoms are gone for 12 to 48 hours. Other good choices include dry toast, crackers, cooked cereal, and gelatin dessert, such as Jell-O. When should you call for help? Call 911 anytime you think you may need emergency care. For example, call if: 
? · You passed out (lost consciousness). ?Call your doctor now or seek immediate medical care if: 
? · You have symptoms of dehydration, such as: ¨ Dry eyes and a dry mouth. ¨ Passing only a little dark urine. ¨ Feeling thirstier than usual.  
? · You have new or worsening belly pain. ? · You have a new or higher fever. ? · You vomit blood or what looks like coffee grounds. ? Watch closely for changes in your health, and be sure to contact your doctor if: 
? · You have ongoing nausea and vomiting. ? · Your vomiting is getting worse. ? · Your vomiting lasts longer than 2 days. ? · You are not getting better as expected. Where can you learn more? Go to http://kaila-carlitos.info/. Enter 25 [id number] in the search box to learn more about \"Nausea and Vomiting: Care Instructions. \" Current as of: March 20, 2017 Content Version: 11.4 © 4561-8779 [employer]bo [employer]. Care instructions adapted under license by FreeBrie (which disclaims liability or warranty for this information). If you have questions about a medical condition or this instruction, always ask your healthcare professional. [place] [address] any warranty or liability for your use of this information. Discharge Instructions Patient: Roxanne Castillo MRN: [id number]  CSN: [id number] YOB: 1960  Age: 62 y.o. Sex: male DOA: 3/27/2018 LOS:  LOS: 1 day   Discharge Date: DIET:  Diabetic Diet ACTIVITY: Activity as tolerated, no restrictions ADDITIONAL INFORMATION: If you experience any of the following symptoms but not limited to Fever, chills, nausea, vomiting, diarrhea, change in mentation, falling, bleeding, shortness of breath, chest pain, please call your primary care physician or return to the emergency room if you cannot get hold of your doctor:  
 
FOLLOW UP CARE: 
pcp 5-7 days GI in 2 weeks Terell Lau NP 
3/29/2018 10:17 AM 
 
 
 
 
 
 
  
  
  
Telemedicine Clinic Announcement We are excited to announce that we are making your provider's discharge notes available to you in [facility]. You will see these notes when they are completed and signed by the physician that discharged you from your recent hospital stay. If you have any questions or concerns about any information you see in Tele[facility]icine Clinic, please call the Health Information Department where you were seen or reach out to your Primary Care Provider for more information about your plan of care. Introducing [facility]! Barbi Delgado introduces Tele[facility]icine Clinic patient portal. Now you can access parts of your medical record, email your doctor's office, and request medication refills online. 1. In your internet browser, go to [email or web address] 2. Click on the First Time User? Click Here link in the Sign In box. You will see the New Member Sign Up page. 3. Enter your Telemedicine Clinic Access Code exactly as it appears below. You will not need to use this code after youve completed the sign-up process. If you do not sign up before the expiration date, you must request a new code. · [facility] Access Code: [email or web address] Expires: 6/12/2018  8:58 AM 
 
4.  Enter the last four digits of your Social Security Number (xxxx) and Date of Birth (mm/dd/yyyy) as indicated and click Submit. You will be taken to the next sign-up page. 5. Create a SquadMailt ID. This will be your Sudiksha login ID and cannot be changed, so think of one that is secure and easy to remember. 6. Create a SquadMailt password. You can change your password at any time. 7. Enter your Password Reset Question and Answer. This can be used at a later time if you forget your password. 8. Enter your e-mail address. You will receive e-mail notification when new information is available in [address] 9. Click Sign Up. You can now view and download portions of your medical record. 10. Click the Download Summary menu link to download a portable copy of your medical information. If you have questions, please visit the Frequently Asked Questions section of the Sudiksha website. Remember, Sudiksha is NOT to be used for urgent needs. For medical emergencies, dial 911. Now available from your iPhone and Android! Introducing Harish Harden As a Kaila Song patient, I wanted to make you aware of our electronic visit tool called Harish VicentewilliamLevel 5 Networks. HUNT Mobile Ads 24/7 allows you to connect within minutes with a medical provider 24 hours a day, seven days a week via a mobile device or tablet or logging into a secure website from your computer. You can access Harish Harden from anywhere in the [place]. A virtual visit might be right for you when you have a simple condition and feel like you just dont want to get out of bed, or cant get away from work for an appointment, when your regular Harkraymond Absio provider is not available (evenings, weekends or holidays), or when youre out of town and need minor care. Electronic visits cost only $49 and if the HUNT Mobile Ads 24/7 provider determines a prescription is needed to treat your condition, one can be electronically transmitted to a nearby pharmacy*. Please take a moment to enroll today if you have not already done so. The enrollment process is free and takes just a few minutes. To enroll, please download the Soompi 24/7 jon to your tablet or phone, or visit [email or web address] to enroll on your computer. And, as an [address] [place] [address] patient with a [facility] account, the results of your visits will be scanned into your electronic medical record and your primary care provider will be able to view the scanned results. We urge you to continue to see your regular Soompi provider for your ongoing medical care. And while your primary care provider may not be the one available when you seek a OpenSesame virtual visit, the peace of mind you get from getting a real diagnosis real time can be priceless. For more information on OpenSesame, view our Frequently Asked Questions (FAQs) at [email or web address]. Sincerely, 
 
Paola Garcia MD 
Chief Medical Officer [employer] *:  certain medications cannot be prescribed via OpenSesame Unresulted Labs-Please follow up with your PCP about these lab tests Order Current Status CULTURE, BLOOD Preliminary result CULTURE, BLOOD Preliminary result H PYLORI ABS, IGA AND IGG Preliminary result Providers Seen During Your Hospitalization Provider Specialty Primary office phone Brionna Aguayo MD Emergency Medicine [phone] Karen Argueta MD Internal Medicine [phone] Rene Chase MD Family Practice [phone] Immunizations Administered for This Admission Name Date Influenza Vaccine (Quad) PF 3/29/2018 Your Primary Care Physician (PCP) Primary Care Physician Office Phone Office Fax Jennifer Davies, [address] [place] [address] [phone] You are allergic to the following Allergen Reactions Compazine (Prochlorperazine) Anaphylaxis \"Tightness around throat\" Recent Documentation Height Weight BMI Smoking Status 1.753 m 101.9 kg 33.17 kg/m2 Never Smoker Emergency Contacts Name Discharge Info Relation Home Work Mobile [address] CAREGIVER [3] Sister [23] [phone] [facility] DISCHARGE CAREGIVER [3] Sister [23] [phone] Patient Belongings The following personal items are in your possession at time of discharge: 
  Dental Appliances: None  Visual Aid: None      Home Medications: None   Jewelry: None  Clothing: Pants, Shirt, Footwear, At bedside    Other Valuables: Cell Phone Discharge Instructions Attachments/References GASTROPARESIS (ENGLISH) INFECTION: H. PYLORI BACTERIAL (ENGLISH) AMOXICILLIN (BY MOUTH) (ENGLISH) LEVOFLOXACIN (BY MOUTH) (ENGLISH) METOCLOPRAMIDE (BY MOUTH) (ENGLISH) METRONIDAZOLE (BY MOUTH) (ENGLISH) MEFS - POTASSIUM CHLORIDE (K-DUR, K-SOL, K-TAB, BOB MUR) - (BY MOUTH) (ENGLISH) SUCRALFATE (BY MOUTH) (ENGLISH) Patient Handouts Gastroparesis: Care Instructions Your Care Instructions When you have gastroparesis, your stomach takes a lot longer to empty. This delay can cause belly pain, bloating, and belching. It also can cause hiccups, heartburn, nausea or vomiting. You may not feel like eating. These symptoms may come and go. They most often occur during and after meals. You may feel full after only a few bites of food. This condition occurs when the nerves to the stomach don't work properly. Diabetes is the most common cause of this nerve damage. Gastroparesis can make it harder to control your blood sugar levels. But keeping your blood sugar levels under control may help with your symptoms. Parkinson's disease, stroke, and some medicines can also cause this condition. Home treatment can often help. Follow-up care is a key part of your treatment and safety.  Be sure to make and go to all appointments, and call your doctor if you are having problems. It's also a good idea to know your test results and keep a list of the medicines you take. How can you care for yourself at home? · Eat several small meals each day rather than three large meals. · Eat foods that are low in fiber and fat. · If your doctor suggests it, take medicines that help the stomach empty more quickly. These are called motility agents. When should you call for help? Call your doctor now or seek immediate medical care if: 
? · You are vomiting. ? · You have new or worse belly pain. ? · You have a fever. ? · You cannot pass stools or gas. ? Watch closely for changes in your health, and be sure to contact your doctor if you have any problems. Where can you learn more? Go to http://kaila-carlitos.info/. Enter M106 in the search box to learn more about \"Gastroparesis: Care Instructions. \" Current as of: May 12, 2017 Content Version: 11.4 © 0415-0887 Genetics Squared. Care instructions adapted under license by CleverSet (which disclaims liability or warranty for this information). If you have questions about a medical condition or this instruction, always ask your healthcare professional. [place] [address] any warranty or liability for your use of this information. H. Pylori Bacterial Infection: Care Instructions Your Care Instructions Your test shows the presence of Helicobacter pylori ( H. pylori), a kind of bacterium that lives in the lining of the stomach. Many people have H. pylori in their stomachs and do not develop problems. But sometimes H. pylori causes an upset stomach or a sore (ulcer) in the stomach lining. Most stomach ulcers are caused by H. pylori. Symptoms of an ulcer include gnawing or burning pain in the belly that can last minutes or hours.  Eating food or taking antacids helps relieve the pain, but the symptoms may come back after a while. Antibiotic medicine can cure an H. pylori infection. Follow-up care is a key part of your treatment and safety. Be sure to make and go to all appointments, and call your doctor if you are having problems. It's also a good idea to know your test results and keep a list of the medicines you take. How can you care for yourself at home? · Take your antibiotics as directed. Do not stop taking them just because you feel better. You need to take the full course of antibiotics. · If your doctor prescribes other medicine, take it exactly as prescribed. Call your doctor if you think you are having a problem with your medicine. You will get more details on the specific medicine your doctor prescribes. · Eat a healthy, balanced diet. ¨ Eat smaller meals, and eat more often. Be sure to eat at least three meals a day. ¨ Avoid heavily spiced or greasy foods. ¨ Do not drink beverages that have caffeine if they bother your stomach. These include coffee, tea, and soda. · Do not smoke. Smoking slows the healing of your ulcer and can make an ulcer come back. If you need help quitting, talk to your doctor about stop-smoking programs and medicines. These can increase your chances of quitting for good. · Limit how much alcohol you drink. Alcohol can slow healing of an ulcer and can make your symptoms worse. · Wash your hands after going to the bathroom. · Avoid aspirin, ibuprofen, or other anti-inflammatory medicines, because they can irritate the stomach. If you need pain medicine, try acetaminophen (Tylenol). When should you call for help? Call 911 anytime you think you may need emergency care. For example, call if: 
? · You vomit blood or what looks like coffee grounds. ? · Your stools are maroon or very bloody. ?Call your doctor now or seek immediate medical care if: 
? · You have new or worse belly pain. ? · You are vomiting. ? · Your stools are black and look like tar, or they have streaks of blood. ? Watch closely for changes in your health, and be sure to contact your doctor if: 
? · You do not get better as expected. Where can you learn more? Go to http://kaila-carlitos.info/. Enter P628 in the search box to learn more about \"H. Pylori Bacterial Infection: Care Instructions. \" Current as of: May 12, 2017 Content Version: 11.4 © 5179-7370 [employer]. Care instructions adapted under license by [employer] (which disclaims liability or warranty for this information). If you have questions about a medical condition or this instruction, always ask your healthcare professional. [address] any warranty or liability for your use of this information. Amoxicillin (By mouth) Amoxicillin (n-hvl-r-REGGIE-in) Treats infections or stomach ulcers. This medicine is a penicillin antibiotic. Brand Name(s): Moxatag, Omeclamox-John, Prevpac, Triple Therapy There may be other brand names for this medicine. When This Medicine Should Not Be Used: This medicine is not right for everyone. You should not use it if you had an allergic reaction to amoxicillin, any type of penicillin, or a cephalosporin antibiotic. How to Use This Medicine:  
Capsule, Liquid, Tablet, Chewable Tablet, Long Acting Tablet · Your doctor will tell you how much medicine to use. Do not use more than directed. · Chewable tablet: You must chew the tablet before you swallow it. You may crush the tablet and mix the medicine with a small amount of food to make it easier to swallow. · Oral liquid: Shake well just before each use. · Measure the oral liquid medicine with a marked measuring spoon, oral syringe, or medicine cup. You may mix the oral liquid with a baby formula, milk, fruit juice, water, ginger ale, or another cold drink. Be sure your child drinks all of the mixture right away. · Tablet for suspension: Place the tablet in a small drinking glass, and add 2 teaspoons of water. Do not use any other liquid. Gently stir or swirl the water in the glass until the tablet is completely dissolved. Drink all of this mixture right away. Add more water to the glass and drink all of it to make sure you get all of the medicine. Do not chew or swallow the tablet for suspension. · Take all of the medicine in your prescription to clear up your infection, even if you feel better after the first few doses. · Take a dose as soon as you remember. If it is almost time for your next dose, wait until then and take a regular dose. Do not take extra medicine to make up for a missed dose. · Store the tablets, capsules, and tablets for suspension at room temperature, away from heat, moisture, and direct light. · Store the oral liquid in the refrigerator. Do not freeze. Throw away any unused medicine after 14 days. Drugs and Foods to Avoid: Ask your doctor or pharmacist before using any other medicine, including over-the-counter medicines, vitamins, and herbal products. · Some medicines can affect how amoxicillin works. Tell your doctor if you are also using any of the following: ¨ Allopurinol ¨ Probenecid ¨ Birth control pills ¨ A blood thinner Warnings While Using This Medicine: · Tell your doctor if you are pregnant or breastfeeding, or if you have kidney disease, allergies, or a condition called phenylketonuria (PKU). Tell your doctor if you are on dialysis. · This medicine can cause diarrhea. Call your doctor if the diarrhea becomes severe, does not stop, or is bloody. Do not take any medicine to stop diarrhea until you have talked to your doctor. Diarrhea can occur 2 months or more after you stop taking this medicine. · Tell any doctor or dentist who treats you that you are using this medicine. This medicine may affect certain medical test results. · Call your doctor if your symptoms do not improve or if they get worse. · Use this medicine to treat only the infection your doctor has prescribed it for. Do not use this medicine for any infection or condition that has not been checked by a doctor. This medicine will not treat the flu or the common cold. · Keep all medicine out of the reach of children. Never share your medicine with anyone. Possible Side Effects While Using This Medicine:  
Call your doctor right away if you notice any of these side effects: · Allergic reaction: Itching or hives, swelling in your face or hands, swelling or tingling in your mouth or throat, chest tightness, trouble breathing · Blistering, peeling, or red skin rash · Diarrhea that may contain blood, stomach cramps, fever If you notice these less serious side effects, talk with your doctor: · Mild diarrhea, nausea, or vomiting · Mild skin rash If you notice other side effects that you think are caused by this medicine, tell your doctor. Call your doctor for medical advice about side effects. You may report side effects to FDA at 9-260-FDA-4562 © 2017 [address] Information is for End User's use only and may not be sold, redistributed or otherwise used for commercial purposes. The above information is an  only. It is not intended as medical advice for individual conditions or treatments. Talk to your doctor, nurse or pharmacist before following any medical regimen to see if it is safe and effective for you. Levofloxacin (By mouth) Levofloxacin (cvs-uvc-VBXP-a-sin) Treats infections. This medicine is a quinolone antibiotic. Brand Name(s): Levaquin There may be other brand names for this medicine. When This Medicine Should Not Be Used: This medicine is not right for everyone. Do not use it if you had an allergic reaction to levofloxacin or to similar medicines. How to Use This Medicine:  
Liquid, Tablet · Your doctor will tell you how much medicine to use. Do not use more than directed. Take your medicine at the same time each day. · Tablet: Take it with or without food. · Liquid: Take it 1 hour before or 2 hours after you eat. Measure the oral liquid medicine with a marked measuring spoon, oral syringe, or medicine cup. · Take all of the medicine in your prescription to clear up your infection, even if you feel better after the first few doses. · Drink extra fluids so you will urinate more often and help prevent kidney problems. · This medicine should come with a Medication Guide. Ask your pharmacist for a copy if you do not have one. · Missed dose: Take a dose as soon as you remember. If it is almost time for your next dose, wait until then and take a regular dose. Do not take extra medicine to make up for a missed dose. · Store the medicine in a closed container at room temperature, away from heat, moisture, and direct light. Drugs and Foods to Avoid: Ask your doctor or pharmacist before using any other medicine, including over-the-counter medicines, vitamins, and herbal products. · Some foods and medicines can affect how levofloxacin works. Tell your doctor if you are using any of the following: ¨ Theophylline ¨ Blood thinner (including warfarin) ¨ Diabetes medicine ¨ Medicine for heart rhythm problems (including amiodarone, procainamide, quinidine, sotalol) ¨ NSAID pain or arthritis medicine (including aspirin, celecoxib, diclofenac, ibuprofen, naproxen) ¨ Steroid medicine (including hydrocortisone, methylprednisolone, prednisone) · Take levofloxacin at least 2 hours before or 2 hours after you take antacids that contain magnesium or aluminum, zinc, or iron supplements, sucralfate, or didanosine. Warnings While Using This Medicine: · Tell your doctor if you are pregnant or breastfeeding, or if you have kidney disease, liver disease, diabetes, heart disease, myasthenia gravis, or a history of heart rhythm problems (such as QT prolongation) or seizures. Tell your doctor if you have ever had tendon or joint problems, including rheumatoid arthritis, or if you have received a transplant. · This medicine may cause the following problems: 
¨ Tendinitis and tendon rupture (may happen after treatment ends) ¨ Liver damage ¨ Nerve damage in the arms or legs ¨ Heart rhythm changes ¨ Changes in blood sugar levels · This medicine may make you feel dizzy or lightheaded. Do not drive or do anything else that could be dangerous until you know how this medicine affects you. · This medicine can cause diarrhea. Call your doctor if the diarrhea becomes severe, does not stop, or is bloody. Do not take any medicine to stop diarrhea until you have talked to your doctor. Diarrhea can occur 2 months or more after you stop taking this medicine. · Tell any doctor or dentist who treats you that you are using this medicine. This medicine may affect certain medical test results. · This medicine may make your skin more sensitive to sunlight. Wear sunscreen. Do not use sunlamps or tanning beds. · Call your doctor if your symptoms do not improve or if they get worse. · Keep all medicine out of the reach of children. Never share your medicine with anyone. Possible Side Effects While Using This Medicine:  
Call your doctor right away if you notice any of these side effects: · Allergic reaction: Itching or hives, swelling in your face or hands, swelling or tingling in your mouth or throat, chest tightness, trouble breathing · Blistering, peeling, red skin rash · Change in how much or how often you urinate · Dark urine or pale stools, nausea, vomiting, loss of appetite, stomach pain, yellow skin or eyes · Diarrhea that may contain blood · Fainting, dizziness, or lightheadedness · Fast, slow, or uneven heartbeat, chest pain · Numbness, tingling, or burning pain in your hands, arms, legs, or feet · Pain, stiffness, swelling, or bruises around your ankle, leg, shoulder, or other joint · Seizures, severe headache, unusual thoughts or behaviors, trouble sleeping, feeling anxious, confused, or depressed, seeing, hearing, or feeling things that are not there · Unusual bleeding, bruising, or weakness If you notice these less serious side effects, talk with your doctor: · Mild headache or nausea If you notice other side effects that you think are caused by this medicine, tell your doctor. Call your doctor for medical advice about side effects. You may report side effects to FDA at 8-393-AAL-3907 © 2017 [address] Information is for End User's use only and may not be sold, redistributed or otherwise used for commercial purposes. The above information is an  only. It is not intended as medical advice for individual conditions or treatments. Talk to your doctor, nurse or pharmacist before following any medical regimen to see if it is safe and effective for you. Metoclopramide (By mouth) Metoclopramide (met-oh-KLOE-pra-mide) Relieves symptoms of gastroesophageal reflux disease (GERD). Also relieves symptoms of gastroparesis in patients with diabetes. Brand Name(s): , Reglan There may be other brand names for this medicine. When This Medicine Should Not Be Used: You should not use this medicine if you have had an allergic reaction to metoclopramide. You should not use this medicine if you have epilepsy (seizures), bleeding or a blockage in the stomach or intestines, or a pheochromocytoma (adrenal gland tumor). How to Use This Medicine:  
Liquid, Tablet, Dissolving Tablet · Take your medicine as directed. Your dose may need to be changed several times to find what works best for you. · Take this medicine on an empty stomach, 30 minutes before each meal and at bedtime, unless your doctor tells you differently. · Make sure your hands are dry before you handle the disintegrating tablet. Peel back the foil from the blister pack, then remove the tablet. Do not push the tablet through the foil. Place the tablet in your mouth. After it has melted, swallow or take a drink of water. · Measure the oral liquid medicine with a marked measuring spoon, oral syringe, or medicine cup. · This medicine is not for long-term use. · This medicine should come with a Medication Guide. Ask your pharmacist for a copy if you do not have one. If a dose is missed: · Take a dose as soon as you remember. If it is almost time for your next dose, wait until then and take a regular dose. Do not take extra medicine to make up for a missed dose. How to Store and Dispose of This Medicine: · Store the medicine in a closed container at room temperature, away from heat, moisture, and direct light. Do not freeze. · Ask your pharmacist, doctor, or health caregiver about the best way to dispose of any outdated medicine or medicine no longer needed. · Keep all medicine out of the reach of children. Never share your medicine with anyone. Drugs and Foods to Avoid: Ask your doctor or pharmacist before using any other medicine, including over-the-counter medicines, vitamins, and herbal products. · Make sure your doctor knows if you are also using acetaminophen (Tylenol®), cyclosporine (Gengraf®, Neoral®, Sandimmune®), digoxin (Lanoxin®), levodopa ([place] Florina), or tetracycline (Sumycin®). Tell your doctor if you are also using an MAO inhibitor [MAOI] (such as isocarboxazid, selegiline, tranylcypromine, Eldepryl®, Marplan®, Nardil®, or Parnate®), narcotic pain killers, or medicine for depression. · Tell your doctor if you use anything else that makes you sleepy. Some examples are allergy medicine, narcotic pain medicine, and alcohol. · If you use insulin for diabetes, ask your doctor if you need to adjust your dose while using metoclopramide. · Do not drink alcohol while you are using this medicine. Warnings While Using This Medicine: · Make sure your doctor knows if you are pregnant or breastfeeding, or if you have kidney disease, liver disease, heart disease, congestive heart failure, heart rhythm problems, diabetes, Parkinson's disease, high blood pressure, or a history of depression, or had recent surgery on your stomach. · This medicine may cause tardive dyskinesia (a movement disorder). Check with your doctor right away if you have any of the following symptoms while taking this medicine: lip smacking or puckering, puffing of the cheeks, rapid or worm-like movements of the tongue, uncontrolled chewing movements, or uncontrolled movements of the arms and legs. The risk of tardive dyskinesia is higher if you take this medicine longer than 12 weeks. Treatment for longer than 12 weeks should be avoided in all but rare cases. · This medicine may make you dizzy or drowsy. Avoid driving, using machines, or doing anything else that could be dangerous if you are not alert. · Check with your doctor right away if you have any of the following symptoms while using this medicine: convulsions (seizures); difficulty with breathing; a fast heartbeat; a high fever; high or low blood pressure; increased sweating; loss of bladder control; severe muscle stiffness; unusually pale skin; or tiredness. These could be symptoms of a serious condition called neuroleptic malignant syndrome (NMS). · Do not stop using this medicine suddenly. Your doctor will need to slowly decrease your dose before you stop it completely. · Your doctor will check your progress and the effects of this medicine at regular visits. Keep all appointments. Possible Side Effects While Using This Medicine:  
Call your doctor right away if you notice any of these side effects: · Allergic reaction: Itching or hives, swelling in your face or hands, swelling or tingling in your mouth or throat, chest tightness, trouble breathing · Depression or thoughts of hurting oneself. · Fast, slow, or uneven heartbeat. · Lightheadedness or fainting. · Problems with balance or walking. · Seizures. · Severe muscle stiffness, tremors, or twitching. · Swelling in your hands, arms, legs, or feet. · Trouble breathing. · Twitching or muscle movements you cannot control. · Uncontrolled movement of your face, tongue, eyes, neck, or head. · Yellowing of your skin or the whites of your eyes If you notice these less serious side effects, talk with your doctor: · Breast swelling or tenderness. · Constipation, diarrhea, nausea, or stomach cramps. · Headache. · Irregular menstrual periods. · Problems having sex. · Restlessness, confusion, or trouble sleeping. · Skin rash or itching. If you notice other side effects that you think are caused by this medicine, tell your doctor. Call your doctor for medical advice about side effects. You may report side effects to FDA at 9-687-FDA-7527 © 2017 [address] Information is for End User's use only and may not be sold, redistributed or otherwise used for commercial purposes. The above information is an  only. It is not intended as medical advice for individual conditions or treatments. Talk to your doctor, nurse or pharmacist before following any medical regimen to see if it is safe and effective for you. Metronidazole (By mouth) Metronidazole (met-lashell-LISA-da-zole) Treats bacterial infections. Brand Name(s): Flagyl, Flagyl 375 There may be other brand names for this medicine. When This Medicine Should Not Be Used: This medicine is not right for everyone. Do not use if you had an allergic reaction to metronidazole or similar medicines. Do not use this medicine to treat trichomoniasis if you are in the first 3 months of pregnancy. How to Use This Medicine: Capsule, Tablet, Long Acting Tablet · Take this medicine as directed, and take it at the same time each day. · Capsule or Tablet: Take with food or milk to avoid stomach upset. · Extended-release tablet: Take it on an empty stomach, 1 hour before or 2 hours after a meal. 
· Swallow the extended-release tablet whole. Do not crush, break, or chew it. · Take all of the medicine in your prescription to clear up your infection, even if you feel better after the first few doses. · Missed dose: Take a dose as soon as you remember. If it is almost time for your next dose, wait until then and take a regular dose. Do not take extra medicine to make up for a missed dose. · Store the medicine in a closed container at room temperature, away from heat, moisture, and direct light. Drugs and Foods to Avoid: Ask your doctor or pharmacist before using any other medicine, including over-the-counter medicines, vitamins, and herbal products. · Do not use this medicine if you have taken disulfiram within the last 2 weeks. Do not drink alcohol or use medicine that contains alcohol or propylene glycol while using this medicine and for at least 3 days after you have finished metronidazole treatment. · Some foods and medicines can affect how metronidazole works. Tell your doctor if you are using busulfan, cimetidine, lithium, phenobarbital, phenytoin, or warfarin or another blood thinner. Warnings While Using This Medicine: · Tell your doctor if you are pregnant or breastfeeding, or if you have kidney disease, liver disease, blood or bone marrow problems, oral thrush, yeast infection, or a history of seizures. · This medicine may cause the following problems: 
¨ Brain or nervous system problems, including seizures, meningitis, or vision problems · Trichomoniasis treatment:  Your doctor may want to also treat your sexual partner, even if he or she has no symptoms.  Also, you may want to use a condom during sexual intercourse. These measures will help keep you from getting the infection back again from your partner. If you have any questions, ask your doctor. · Call your doctor if your symptoms do not improve or if they get worse. · Your doctor will do lab tests at regular visits to check on the effects of this medicine. Keep all appointments. · Tell any doctor or dentist who treats you that you are using this medicine. This medicine may affect certain medical test results. · Keep all medicine out of the reach of children. Never share your medicine with anyone. Possible Side Effects While Using This Medicine:  
Call your doctor right away if you notice any of these side effects: · Allergic reaction: Itching or hives, swelling in your face or hands, swelling or tingling in your mouth or throat, chest tightness, trouble breathing · Confusion, drowsiness, fever, headache, loss of appetite, nausea or vomiting, stiff neck or back · Dizziness, problems with muscle control, clumsiness, shakiness, trouble talking · Fever, chills, cough, sore throat, and body aches · Numbness, tingling, or burning pain in your hands, arms, legs, or feet · Seizures If you notice these less serious side effects, talk with your doctor: · Mild nausea · Unusual or unpleasant taste in your mouth If you notice other side effects that you think are caused by this medicine, tell your doctor. Call your doctor for medical advice about side effects. You may report side effects to FDA at 4-644-FDA-4606 © 2017 [facility] Information is for End User's use only and may not be sold, redistributed or otherwise used for commercial purposes. The above information is an  only. It is not intended as medical advice for individual conditions or treatments. Talk to your doctor, nurse or pharmacist before following any medical regimen to see if it is safe and effective for you. Potassium Chloride (K-Dur, K-Sol, K-Tab, Santy Mur) - (By mouth) Why this medicine is used:  
Prevents and treats low potassium levels in the blood. Contact a nurse or doctor right away if you have: · Uneven heartbeat, trouble breathing · Confusion, weakness, numbness in your hands, feet, or lips · Bloody or black, tarry stools Common side effects: · Mild nausea, diarrhea, gas © 2017 [facility] Information is for End User's use only and may not be sold, redistributed or otherwise used for commercial purposes. Sucralfate (By mouth) Sucralfate (nst-AEMV-jhhk) Treats ulcers. Brand Name(s): Carafate There may be other brand names for this medicine. When This Medicine Should Not Be Used: This medicine is not right for everyone. Do not use it if you had an allergic reaction to sucralfate. How to Use This Medicine:  
Liquid, Tablet · Your doctor will tell you how much medicine to use. Do not use more than directed. · It is best to take this medicine on an empty stomach. · Do not stop taking the medicine unless your doctor tells you to, even if you feel better. · Tablet: Swallow with a glass of water. Take it 1 hour before meals and at bedtime. · Oral liquid: Measure the oral liquid medicine with a marked measuring spoon, oral syringe, or medicine cup. Shake it well before each use. · Missed dose: Take a dose as soon as you remember. If it is almost time for your next dose, wait until then and take a regular dose. Do not take extra medicine to make up for a missed dose. · Store the medicine in a closed container at room temperature, away from heat, moisture, and direct light. Do not freeze the oral liquid. Drugs and Foods to Avoid: Ask your doctor or pharmacist before using any other medicine, including over-the-counter medicines, vitamins, and herbal products. · Some medicines can affect how sucralfate works.  Tell your doctor if you are using any of the following: ¨ Cimetidine, digoxin, levothyroxine, phenytoin, quinidine, ranitidine, theophylline ¨ Medicine to treat infection (including fluoroquinolones, ketoconazole, tetracycline) · Take antacids more than one-half hour before or after taking sucralfate oral liquid. · Take cimetidine, ciprofloxacin, digoxin, norfloxacin, ofloxacin, or ranitidine 2 hours before you take sucralfate oral liquid. Warnings While Using This Medicine: · Tell your doctor if you are pregnant or breastfeeding, or if you have kidney disease or diabetes. · This medicine may cause the following problems: ¨ Blood clot in the lungs or brain, which could be life-threatening ¨ High blood sugar · Your doctor will check your progress and the effects of this medicine at regular visits. Keep all appointments. · Keep all medicine out of the reach of children. Never share your medicine with anyone. Possible Side Effects While Using This Medicine:  
Call your doctor right away if you notice any of these side effects: · Allergic reaction: Itching or hives, swelling in your face or hands, swelling or tingling in your mouth or throat, chest tightness, trouble breathing · Chest pain, trouble breathing, coughing up blood · Increased hunger or thirst, change in how much or how often you urinate, unusual weight loss · Numbness or weakness in your arm or leg, or on one side of your body · Pain in your lower leg (calf) · Sudden or severe headache, problems with vision, speech, or walking If you notice these less serious side effects, talk with your doctor: · Constipation · Dizziness · Dry mouth · Mild stomach cramps, diarrhea · Stomach upset, passing gas If you notice other side effects that you think are caused by this medicine, tell your doctor. Call your doctor for medical advice about side effects. You may report side effects to FDA at 6-127-BZE-1951 © 2017 [address] Information is for End User's use only and may not be sold, redistributed or otherwise used for commercial purposes. The above information is an  only. It is not intended as medical advice for individual conditions or treatments. Talk to your doctor, nurse or pharmacist before following any medical regimen to see if it is safe and effective for you. Please provide this summary of care documentation to your next provider. Signatures-by signing, you are acknowledging that this After Visit Summary has been reviewed with you and you have received a copy. Patient Signature:  ____________________________________________________________ Date:  ____________________________________________________________  
  
Ervin Brigido Provider Signature:  ____________________________________________________________ Date:  ____________________________________________________________

## 2018-03-27 NOTE — IP AVS SNAPSHOT
[address] Patient: Henny Gillis MRN: [id number] [facility]:[id number] A check hilary indicates which time of day the medication should be taken. My Medications START taking these medications Instructions Each Dose to Equal  
 Morning Noon Evening Bedtime  
 amoxicillin 500 mg capsule Commonly known as:  AMOXIL Your last dose was: Your next dose is: Take 2 Caps by mouth every twelve (12) hours for 14 days. 1000 mg  
    
   
   
   
  
 levoFLOXacin 750 mg tablet Commonly known as:  Lethea Model Start taking on:  3/30/2018 Your last dose was: Your next dose is: Take 1 Tab by mouth every twenty-four (24) hours for 14 days. 750 mg  
    
   
   
   
  
 metoclopramide HCl 5 mg tablet Commonly known as:  REGLAN Your last dose was: Your next dose is: Take 1 Tab by mouth Before breakfast, lunch, dinner and at bedtime for 14 days. 5 mg  
    
   
   
   
  
 metroNIDAZOLE 500 mg tablet Commonly known as:  FLAGYL Your last dose was: Your next dose is: Take 1 Tab by mouth every twelve (12) hours for 14 days. 500 mg  
    
   
   
   
  
 potassium chloride 20 mEq packet Commonly known as:  KLOR-CON Your last dose was: Your next dose is: Take 1 Packet by mouth two (2) times daily (with meals) for 7 days. 20 mEq  
    
   
   
   
  
 sucralfate 100 mg/mL suspension Commonly known as:  Cirilo Dec Your last dose was: Your next dose is: Take 10 mL by mouth Before breakfast, lunch, dinner and at bedtime. 1 g CHANGE how you take these medications Instructions Each Dose to Equal  
 Morning Noon Evening Bedtime  
 pantoprazole 40 mg tablet Commonly known as:  PROTONIX What changed:  when to take this Your last dose was: Your next dose is: Take 1 Tab by mouth Before breakfast and dinner. 40 mg CONTINUE taking these medications Instructions Each Dose to Equal  
 Morning Noon Evening Bedtime  
 acetaminophen 325 mg tablet Commonly known as:  TYLENOL Your last dose was: Your next dose is: Take 2 Tabs by mouth every six (6) hours as needed. Indications: Pain, take it with bentyl; do not exceed 3 g tylenol daily 650 mg  
    
   
   
   
  
 albuterol 90 mcg/actuation inhaler Commonly known as:  PROVENTIL HFA, VENTOLIN HFA, PROAIR HFA Your last dose was: Your next dose is:    
   
   
 2 puffs every 6 hours x 5 days then every 6 hours as needed for shortness of breath and/or wheezing  
     
   
   
   
  
 amitriptyline 50 mg tablet Commonly known as:  ELAVIL Your last dose was: Your next dose is: Take 50 mg by mouth nightly. 50 mg  
    
   
   
   
  
 amLODIPine 10 mg tablet Commonly known as:  Myles Kay Your last dose was: Your next dose is: Take 10 mg by mouth daily. 10 mg  
    
   
   
   
  
 clopidogrel 75 mg Tab Commonly known as:  PLAVIX Your last dose was: Your next dose is: Take 75 mg by mouth daily. 75 mg  
    
   
   
   
  
 hydroCHLOROthiazide 50 mg tablet Commonly known as:  HYDRODIURIL Your last dose was: Your next dose is: Take 0.5 Tabs by mouth daily. 25 mg  
    
   
   
   
  
 insulin detemir U-100 100 unit/mL injection Commonly known as:  LEVEMIR U-100 INSULIN Your last dose was: Your next dose is:    
   
   
 15 Units by SubCUTAneous route nightly. 15 Units  
    
   
   
   
  
 ondansetron 4 mg disintegrating tablet Commonly known as:  ZOFRAN ODT Your last dose was: Your next dose is: Take 1 Tab by mouth every eight (8) hours as needed for Nausea. 4 mg OTHER Your last dose was: Your next dose is: No lifting weights > 15 lbs for two weeks. OTHER Your last dose was: Your next dose is:    
   
   
 Incentive spirometry- use as directed  
     
   
   
   
  
 oxyCODONE-acetaminophen 7.5-325 mg per tablet Commonly known as:  PERCOCET 7.5 Your last dose was: Your next dose is: Take 1 Tab by mouth every four (4) hours as needed. Max Daily Amount: 6 Tabs. Indications: Pain 1 Tab  
    
   
   
   
  
 polyethylene glycol 17 gram packet Commonly known as:  Oralee Donath Your last dose was: Your next dose is: Take 1 Packet by mouth daily. 17 g  
    
   
   
   
  
 pregabalin 75 mg capsule Commonly known as:  Charlesetta Oms Your last dose was: Your next dose is:    
   
   
 1 cap bid x 1 week, then 2 caps bid  
     
   
   
   
  
 simvastatin 10 mg tablet Commonly known as:  ZOCOR Your last dose was: Your next dose is: Take 10 mg by mouth nightly. 10 mg  
    
   
   
   
  
  
STOP taking these medications OTHER Where to Get Your Medications Information on where to get these meds will be given to you by the nurse or doctor. ! Ask your nurse or doctor about these medications  
  acetaminophen 325 mg tablet  
 amoxicillin 500 mg capsule  
 levoFLOXacin 750 mg tablet  
 metoclopramide HCl 5 mg tablet  
 metroNIDAZOLE 500 mg tablet  
 oxyCODONE-acetaminophen 7.5-325 mg per tablet  
 pantoprazole 40 mg tablet  
 potassium chloride 20 mEq packet  
 sucralfate 100 mg/mL suspension

## 2018-03-27 NOTE — ED PROVIDER NOTES
EMERGENCY DEPARTMENT HISTORY AND PHYSICAL EXAM    3:56 PM      Date: 3/27/2018  Patient Name: Ron Foster    History of Presenting Illness     Chief Complaint   Patient presents with    Abdominal Pain    Vomiting         History Provided By: Patient    Chief Complaint: abdominal pain   Duration:  Weeks  Timing:  Constant  Location: abdomen  Quality: Sharp  Severity: Severe  Modifying Factors: says pressing on the abdomen exacerbates the pain. Associated Symptoms: Nausea and vomiting      Additional History (Context): Ron Foster is a 62 y.o. male with pacreatitis, hyperlipidemia, MI (08), GERD, HLD, heroin abuse, HTN, and arthiritis. who presents with intermittent chronic abdominal pain with nausea and vomiting that has been bothering him for the past 2 weeks. The pt  reports that he has been dealing with these exact symptoms since the 90s; he has spells of months or even a full year with out pain then it will come back suddenly. The pt had laprascopic surgery done by Dr. Brian Staples; says he was told his right kidney sit lower and \" some yellow on his liver\". Also was seen in the ED yesterday for the same sx but still has not had any improvement. He has zofran at home that he has been using with out relief. Says whatever he eats or drinks come right back up. Had a normal BM this morning. Reports he gets pain meication for his chronic back pain. He gets 56 percocets monthly form his PCP; says his PCP cut back on his pain medication from 120 a month some months ago; says his pain did get worse after that but it was never this bad. Hx of appendectomy. Hx of MI in 08; had some stents placed. Denies any other symptoms. No other complaints or concerns in the ED.     PCP: Stephanie Holt., MD    Current Facility-Administered Medications   Medication Dose Route Frequency Provider Last Rate Last Dose    0.9% sodium chloride infusion  125 mL/hr IntraVENous CONTINUOUS Media MD Will 125 mL/hr at 03/27/18 1610 125 mL/hr at 03/27/18 1610    morphine 4 mg  4 mg IntraVENous Q2H PRN Kelly Moore MD   4 mg at 03/27/18 1758    cefepime (MAXIPIME) 2 g in sterile water (preservative free) 10 mL IV syringe  2 g IntraVENous Q12H Kelly Moore MD   2 g at 03/27/18 1651    potassium chloride (KLOR-CON) packet 20 mEq  20 mEq Oral BID WITH MEALS Kelly Moore MD   20 mEq at 03/27/18 1842    potassium chloride 10 mEq in 100 ml IVPB  10 mEq IntraVENous Arturo Salomon MD        morphine injection 4 mg  4 mg IntraVENous NOW Kelly Moore MD        diatrizoate meglumine-d.sodium (MD-GASTROVIEW,GASTROGRAFIN) 66-10 % contrast solution 30 mL  30 mL Oral ONCE Kelly Moore MD         Current Outpatient Prescriptions   Medication Sig Dispense Refill    oxyCODONE-acetaminophen (PERCOCET 7.5) 7.5-325 mg per tablet Take 1 Tab by mouth every four (4) hours as needed. Max Daily Amount: 6 Tabs. Indications: Pain 12 Tab 0    ondansetron (ZOFRAN ODT) 4 mg disintegrating tablet Take 1 Tab by mouth every eight (8) hours as needed for Nausea. 6 Tab 0    OTHER Check CBC, CMP, Mg in 3 days, results to PCP immediately, Diagnosis- DM2 1 Each 0    OTHER No lifting weights > 15 lbs for two weeks. 1 Each 0    insulin detemir U-100 (LEVEMIR U-100 INSULIN) 100 unit/mL injection 15 Units by SubCUTAneous route nightly. 10 mL 0    OTHER Incentive spirometry- use as directed 1 Each 0    hydroCHLOROthiazide (HYDRODIURIL) 50 mg tablet Take 0.5 Tabs by mouth daily. 15 Tab 0    pantoprazole (PROTONIX) 40 mg tablet Take 1 Tab by mouth Daily (before breakfast). 30 Tab 0    albuterol (PROVENTIL HFA, VENTOLIN HFA, PROAIR HFA) 90 mcg/actuation inhaler 2 puffs every 6 hours x 5 days then every 6 hours as needed for shortness of breath and/or wheezing 1 Inhaler 0    acetaminophen (TYLENOL) 325 mg tablet Take 2 Tabs by mouth every six (6) hours as needed.  Indications: Pain, take it with bentyl 30 Tab 0    polyethylene glycol (MIRALAX) 17 gram packet Take 1 Packet by mouth daily. 30 Packet 0    amLODIPine (NORVASC) 10 mg tablet Take 10 mg by mouth daily.  pregabalin (LYRICA) 75 mg capsule 1 cap bid x 1 week, then 2 caps bid 120 Cap 0    amitriptyline (ELAVIL) 50 mg tablet Take 50 mg by mouth nightly.  simvastatin (ZOCOR) 10 mg tablet Take 10 mg by mouth nightly.  clopidogrel (PLAVIX) 75 mg tablet Take 75 mg by mouth daily. Past History     Past Medical History:  Past Medical History:   Diagnosis Date    Arthritis     Coronary atherosclerosis of native coronary artery     S/P PCI with GE in 12/09    Diabetes ([facility] [address]     IDDM    Electrolyte and fluid disorders not elsewhere classified     Essential hypertension, benign     GERD (gastroesophageal reflux disease)     Heroin abuse 05/26/16    Psychiatrist Dr. Carmen Jade History of heart attack     Hyperlipidemia     Intermediate coronary syndrome [facility])     MDD (major depressive disorder) 5/26/16    Psychiatrist Dr. Carmen Jade Myocardial infarction     Obesity, unspecified     Old myocardial infarction     Other and unspecified hyperlipidemia     Pancreatitis     Positive PPD     Sleep apnea     uses Cpap machine    Transfusion history     Before 1992       Past Surgical History:  Past Surgical History:   Procedure Laterality Date    HX APPENDECTOMY      HX CORONARY STENT PLACEMENT  2010    2 stents       Family History:  Family History   Problem Relation Age of Onset    Heart Attack Father     Coronary Artery Disease Mother     Diabetes Mother     Cancer Sister      breast cancer and lung cancer       Social History:  Social History   Substance Use Topics    Smoking status: Never Smoker    Smokeless tobacco: Never Used    Alcohol use No       Allergies:   Allergies   Allergen Reactions    Compazine [Prochlorperazine] Anaphylaxis     \"Tightness around throat\"         Review of Systems Review of Systems   Constitutional: Negative for activity change, fatigue and fever. HENT: Negative for congestion and rhinorrhea. Eyes: Negative for visual disturbance. Respiratory: Negative for shortness of breath. Cardiovascular: Negative for chest pain and palpitations. Gastrointestinal: Positive for abdominal pain, nausea and vomiting. Negative for diarrhea. Genitourinary: Negative for dysuria and hematuria. Musculoskeletal: Negative for back pain. Skin: Negative for rash. Neurological: Negative for dizziness, weakness and light-headedness. All other systems reviewed and are negative. Physical Exam     Visit Vitals    /83    Pulse 86    Temp 98.8 °F (37.1 °C)    Resp 18    Ht 5' 9\" (1.753 m)    Wt 106.6 kg (235 lb)    SpO2 98%    BMI 34.7 kg/m2         Physical Exam   Constitutional: He is oriented to person, place, and time. He appears well-developed and well-nourished. No distress. HENT:   Head: Normocephalic and atraumatic. Right Ear: External ear normal.   Left Ear: External ear normal.   Nose: Nose normal.   Mouth/Throat: Oropharynx is clear and moist.   Eyes: Conjunctivae and EOM are normal. No scleral icterus. Neck: Normal range of motion. Neck supple. No JVD present. No tracheal deviation present. No thyromegaly present. Cardiovascular: Normal rate, regular rhythm, normal heart sounds and intact distal pulses. Exam reveals no gallop and no friction rub. No murmur heard. Pulmonary/Chest: Effort normal and breath sounds normal. He exhibits no tenderness. Abdominal: Soft. Bowel sounds are normal. He exhibits no distension. There is tenderness. There is no rebound and no guarding. Mild distension, infraumbilical pain, ecchymosis at sx sites, no guarding   Musculoskeletal: Normal range of motion. He exhibits no edema or tenderness. Lymphadenopathy:     He has no cervical adenopathy.    Neurological: He is alert and oriented to person, place, and time. No cranial nerve deficit. Coordination normal.   No sensory loss, Gait normal, Motor 5/5   Skin: Skin is warm and dry. Psychiatric: He has a normal mood and affect. His behavior is normal. Judgment and thought content normal.   Nursing note and vitals reviewed. Diagnostic Study Results     Labs -  Recent Results (from the past 12 hour(s))   CBC WITH AUTOMATED DIFF    Collection Time: 03/27/18  3:58 PM   Result Value Ref Range    WBC 16.7 (H) 4.6 - 13.2 K/uL    RBC 4.50 (L) 4.70 - 5.50 M/uL    HGB 13.2 13.0 - 16.0 g/dL    HCT 41.5 36.0 - 48.0 %    MCV 92.2 74.0 - 97.0 FL    MCH 29.3 24.0 - 34.0 PG    MCHC 31.8 31.0 - 37.0 g/dL    RDW 13.3 11.6 - 14.5 %    PLATELET 039 182 - 248 K/uL    MPV 11.7 9.2 - 11.8 FL    NEUTROPHILS 84 (H) 40 - 73 %    LYMPHOCYTES 9 (L) 21 - 52 %    MONOCYTES 7 3 - 10 %    EOSINOPHILS 0 0 - 5 %    BASOPHILS 0 0 - 2 %    ABS. NEUTROPHILS 13.9 (H) 1.8 - 8.0 K/UL    ABS. LYMPHOCYTES 1.6 0.9 - 3.6 K/UL    ABS. MONOCYTES 1.2 0.05 - 1.2 K/UL    ABS. EOSINOPHILS 0.0 0.0 - 0.4 K/UL    ABS.  BASOPHILS 0.0 0.0 - 0.1 K/UL    DF AUTOMATED     METABOLIC PANEL, BASIC    Collection Time: 03/27/18  3:58 PM   Result Value Ref Range    Sodium 135 (L) 136 - 145 mmol/L    Potassium 2.6 (LL) 3.5 - 5.5 mmol/L    Chloride 95 (L) 100 - 108 mmol/L    CO2 27 21 - 32 mmol/L    Anion gap 13 3.0 - 18 mmol/L    Glucose 276 (H) 74 - 99 mg/dL    BUN 14 7.0 - 18 MG/DL    Creatinine 1.46 (H) 0.6 - 1.3 MG/DL    BUN/Creatinine ratio 10 (L) 12 - 20      GFR est AA >60 >60 ml/min/1.73m2    GFR est non-AA 50 (L) >60 ml/min/1.73m2    Calcium 8.7 8.5 - 10.1 MG/DL   LIPASE    Collection Time: 03/27/18  3:58 PM   Result Value Ref Range    Lipase 123 73 - 393 U/L   HEPATIC FUNCTION PANEL    Collection Time: 03/27/18  3:58 PM   Result Value Ref Range    Protein, total 8.3 (H) 6.4 - 8.2 g/dL    Albumin 3.5 3.4 - 5.0 g/dL    Globulin 4.8 (H) 2.0 - 4.0 g/dL    A-G Ratio 0.7 (L) 0.8 - 1.7      Bilirubin, total 0.5 0.2 - 1.0 MG/DL    Bilirubin, direct 0.1 0.0 - 0.2 MG/DL    Alk.  phosphatase 88 45 - 117 U/L    AST (SGOT) 16 15 - 37 U/L    ALT (SGPT) 26 16 - 61 U/L   MAGNESIUM    Collection Time: 03/27/18  3:58 PM   Result Value Ref Range    Magnesium 1.8 1.6 - 2.6 mg/dL   CARDIAC PANEL,(CK, CKMB & TROPONIN)    Collection Time: 03/27/18  3:58 PM   Result Value Ref Range     39 - 308 U/L    CK - MB 1.3 <3.6 ng/ml    CK-MB Index 0.9 0.0 - 4.0 %    Troponin-I, Qt. <0.02 0.0 - 0.045 NG/ML   URINALYSIS W/ RFLX MICROSCOPIC    Collection Time: 03/27/18  4:00 PM   Result Value Ref Range    Color YELLOW      Appearance CLEAR      Specific gravity >1.030 (H) 1.005 - 1.030    pH (UA) >8.5 (H) 5.0 - 8.0    Protein 30 (A) NEG mg/dL    Glucose >1000 (A) NEG mg/dL    Ketone 80 (A) NEG mg/dL    Bilirubin NEGATIVE  NEG      Blood NEGATIVE  NEG      Urobilinogen 0.2 0.2 - 1.0 EU/dL    Nitrites NEGATIVE  NEG      Leukocyte Esterase NEGATIVE  NEG     DRUG SCREEN, URINE    Collection Time: 03/27/18  4:00 PM   Result Value Ref Range    BENZODIAZEPINES NEGATIVE  NEG      BARBITURATES NEGATIVE  NEG      THC (TH-CANNABINOL) NEGATIVE  NEG      OPIATES POSITIVE (A) NEG      PCP(PHENCYCLIDINE) NEGATIVE  NEG      COCAINE NEGATIVE  NEG      AMPHETAMINES NEGATIVE  NEG      METHADONE NEGATIVE  NEG      HDSCOM (NOTE)    POC LACTIC ACID    Collection Time: 03/27/18  4:00 PM   Result Value Ref Range    Lactic Acid (POC) 2.5 (HH) 0.4 - 2.0 mmol/L   URINE MICROSCOPIC ONLY    Collection Time: 03/27/18  4:00 PM   Result Value Ref Range    WBC 0 to 2 0 - 4 /hpf    RBC NONE 0 - 5 /hpf    Epithelial cells FEW 0 - 5 /lpf    Bacteria NEGATIVE  NEG /hpf   EKG, 12 LEAD, INITIAL    Collection Time: 03/27/18  4:11 PM   Result Value Ref Range    Ventricular Rate 80 BPM    Atrial Rate 80 BPM    P-R Interval 198 ms    QRS Duration 82 ms    Q-T Interval 384 ms    QTC Calculation (Bezet) 442 ms    Calculated P Axis 16 degrees    Calculated R Axis -15 degrees    Calculated T Axis 142 degrees    Diagnosis       Poor data quality, interpretation may be adversely affected  Normal sinus rhythm with sinus arrhythmia  Inferior infarct (cited on or before 27-OCT-2010)  T wave abnormality, consider lateral ischemia  Abnormal ECG  When compared with ECG of 20-MAR-2018 23:43,  Nonspecific T wave abnormality, improved in Inferior leads  Inverted T waves have replaced nonspecific T wave abnormality in Lateral   leads     POC LACTIC ACID    Collection Time: 03/27/18  7:08 PM   Result Value Ref Range    Lactic Acid (POC) 1.9 0.4 - 2.0 mmol/L       Radiologic Studies -   XR ABD ACUTE W 1 V CHEST    (Results Pending)   CT ABD PELV W CONT    (Results Pending)     XR preliminary reading done by Dr. Vanesa Ho MD; pt xray shows non specific bowel gas pattern. Medical Decision Making   I am the first provider for this patient. I reviewed the vital signs, available nursing notes, past medical history, past surgical history, family history and social history. Vital Signs-Reviewed the patient's vital signs. Pulse Oximetry Analysis -  98 on room air (Interpretation)normal    EKG: Interpreted by the EP. Time Interpreted: 16:11    Rate: 80   Rhythm: Normal Sinus Rhythm LAD   Interpretation: T wave inversion inferiorly. Flat T waves anteriorly. No STEMI    Records Reviewed: Nursing Notes and Old Medical Records (Time of Review: 3:56 PM)    ED Course: Progress Notes, Reevaluation, and Consults:  6:54 PM, 3/27/2018   Consult:  Discussed care with Dr. Judie Garces, surgery. Standard discussion; including history of patients chief complaint, available diagnostic results, and treatment course. Asked that Dr. Darling Bailey be called directly regarding the case. 6:55 PM Attempted to call Dr. Darling Bailey on his cell phone; no answer. No return call yet but given the rising WBC, lactate elevation, and continued NV will proceed with CT AP while awaiting call back.  Fe Iniguez DO 7:56 PM      7:55 PM - I suspect that this patient has an active infection. 7:55 PM - The patient met criteria for severe sepsis at this time. PROVIDER SEPSIS PHYSICAL EXAM EVAL  Vital signs reviewed (see nursing documentation for further details):  Vitals:    03/27/18 1535 03/27/18 1700   BP: 137/83    Pulse: 86    Resp: 18    Temp: 98.8 °F (37.1 °C)    SpO2: 98% 98%       Cardiac exam:Regular Rate    Pulmonary exam:Normal    Peripheral pulses:Diminished    Capillary refill:Normal    Skin exam:pink    Exam performed byEmeterio Canales MD    Provider Notes (Medical Decision Making): Pt is a 58yo male with a hx of recurrent abdominal pain, chronic pain with gradual decrease in opiate prescriptions, depression, CAD with stenting, opiate dependence presents with persistent nausea and vomiting. Pt was seen yesterday and had a CT AP noting some fluid/inflammation around the umbilicus. It was thought to be mostly post surgical.  Pt was discharged from the hospital after a 4 day stay on 3/24. He has not been able to tolerate PO since then per patient. He denies any diarrhea, fever or chills. His lactate is elevated today and has been elevated during previous visits. Will initiate supportive care, hydrate, empiric abx for now while awaiting the full evaluation, AXR then reevaluate. Susan Jean DO 4:17 PM    8:28 PM : Pt care transferred to Dr. Azeb Cunningham  ,ED provider. History of patient complaint(s), available diagnostic reports and current treatment plan has been discussed thoroughly. Bedside rounding on patient occured : yes . Intended disposition of patient : TBD  Pending diagnostics reports and/or labs (please list): CT AP  I was the primary provider for this patient. Diagnosis       Follow-up Information     None           Patient's Medications   Start Taking    No medications on file   Continue Taking    ACETAMINOPHEN (TYLENOL) 325 MG TABLET    Take 2 Tabs by mouth every six (6) hours as needed. Indications: Pain, take it with bentyl    ALBUTEROL (PROVENTIL HFA, VENTOLIN HFA, PROAIR HFA) 90 MCG/ACTUATION INHALER    2 puffs every 6 hours x 5 days then every 6 hours as needed for shortness of breath and/or wheezing    AMITRIPTYLINE (ELAVIL) 50 MG TABLET    Take 50 mg by mouth nightly. AMLODIPINE (NORVASC) 10 MG TABLET    Take 10 mg by mouth daily. CLOPIDOGREL (PLAVIX) 75 MG TABLET    Take 75 mg by mouth daily. HYDROCHLOROTHIAZIDE (HYDRODIURIL) 50 MG TABLET    Take 0.5 Tabs by mouth daily. INSULIN DETEMIR U-100 (LEVEMIR U-100 INSULIN) 100 UNIT/ML INJECTION    15 Units by SubCUTAneous route nightly. ONDANSETRON (ZOFRAN ODT) 4 MG DISINTEGRATING TABLET    Take 1 Tab by mouth every eight (8) hours as needed for Nausea. OTHER    Check CBC, CMP, Mg in 3 days, results to PCP immediately, Diagnosis- DM2    OTHER    No lifting weights > 15 lbs for two weeks. OTHER    Incentive spirometry- use as directed    OXYCODONE-ACETAMINOPHEN (PERCOCET 7.5) 7.5-325 MG PER TABLET    Take 1 Tab by mouth every four (4) hours as needed. Max Daily Amount: 6 Tabs. Indications: Pain    PANTOPRAZOLE (PROTONIX) 40 MG TABLET    Take 1 Tab by mouth Daily (before breakfast). POLYETHYLENE GLYCOL (MIRALAX) 17 GRAM PACKET    Take 1 Packet by mouth daily. PREGABALIN (LYRICA) 75 MG CAPSULE    1 cap bid x 1 week, then 2 caps bid    SIMVASTATIN (ZOCOR) 10 MG TABLET    Take 10 mg by mouth nightly. These Medications have changed    No medications on file   Stop Taking    No medications on file     _______________________________    Attestations:  [address] acting as a scribe for and in the presence of Isma Maxwell MD      March 27, 2018 at 3:56 PM       Provider Attestation:      I personally performed the services described in the documentation, reviewed the documentation, as recorded by the scribe in my presence, and it accurately and completely records my words and actions.  March 27, 2018 at 3:56 PM - Vera Brennan MD    _______________________________

## 2018-03-28 PROBLEM — G89.29 CHRONIC ABDOMINAL PAIN: Status: ACTIVE | Noted: 2018-03-28

## 2018-03-28 PROBLEM — R10.9 CHRONIC ABDOMINAL PAIN: Status: ACTIVE | Noted: 2018-03-28

## 2018-03-28 PROBLEM — R11.10 VOMITING: Status: ACTIVE | Noted: 2018-03-28

## 2018-03-28 LAB
ANION GAP SERPL CALC-SCNC: 12 MMOL/L (ref 3–18)
ATRIAL RATE: 80 BPM
BASOPHILS # BLD: 0 K/UL (ref 0–0.06)
BASOPHILS NFR BLD: 0 % (ref 0–2)
BUN SERPL-MCNC: 11 MG/DL (ref 7–18)
BUN/CREAT SERPL: 10 (ref 12–20)
CALCIUM SERPL-MCNC: 8.4 MG/DL (ref 8.5–10.1)
CALCULATED P AXIS, ECG09: 16 DEGREES
CALCULATED R AXIS, ECG10: -15 DEGREES
CALCULATED T AXIS, ECG11: 142 DEGREES
CHLORIDE SERPL-SCNC: 101 MMOL/L (ref 100–108)
CO2 SERPL-SCNC: 27 MMOL/L (ref 21–32)
CREAT SERPL-MCNC: 1.11 MG/DL (ref 0.6–1.3)
DIAGNOSIS, 93000: NORMAL
DIFFERENTIAL METHOD BLD: ABNORMAL
EOSINOPHIL # BLD: 0.1 K/UL (ref 0–0.4)
EOSINOPHIL NFR BLD: 0 % (ref 0–5)
ERYTHROCYTE [DISTWIDTH] IN BLOOD BY AUTOMATED COUNT: 13.7 % (ref 11.6–14.5)
GLUCOSE BLD STRIP.AUTO-MCNC: 170 MG/DL (ref 70–110)
GLUCOSE BLD STRIP.AUTO-MCNC: 225 MG/DL (ref 70–110)
GLUCOSE SERPL-MCNC: 204 MG/DL (ref 74–99)
HCT VFR BLD AUTO: 36.8 % (ref 36–48)
HGB BLD-MCNC: 11.9 G/DL (ref 13–16)
LYMPHOCYTES # BLD: 1.8 K/UL (ref 0.9–3.6)
LYMPHOCYTES NFR BLD: 14 % (ref 21–52)
MAGNESIUM SERPL-MCNC: 1.9 MG/DL (ref 1.6–2.6)
MCH RBC QN AUTO: 29.8 PG (ref 24–34)
MCHC RBC AUTO-ENTMCNC: 32.3 G/DL (ref 31–37)
MCV RBC AUTO: 92 FL (ref 74–97)
MONOCYTES # BLD: 1.1 K/UL (ref 0.05–1.2)
MONOCYTES NFR BLD: 8 % (ref 3–10)
NEUTS SEG # BLD: 10.1 K/UL (ref 1.8–8)
NEUTS SEG NFR BLD: 78 % (ref 40–73)
P-R INTERVAL, ECG05: 198 MS
PLATELET # BLD AUTO: 275 K/UL (ref 135–420)
PMV BLD AUTO: 11.3 FL (ref 9.2–11.8)
POTASSIUM SERPL-SCNC: 3 MMOL/L (ref 3.5–5.5)
Q-T INTERVAL, ECG07: 384 MS
QRS DURATION, ECG06: 82 MS
QTC CALCULATION (BEZET), ECG08: 442 MS
RBC # BLD AUTO: 4 M/UL (ref 4.7–5.5)
SODIUM SERPL-SCNC: 140 MMOL/L (ref 136–145)
VENTRICULAR RATE, ECG03: 80 BPM
WBC # BLD AUTO: 13.1 K/UL (ref 4.6–13.2)

## 2018-03-28 PROCEDURE — 82962 GLUCOSE BLOOD TEST: CPT

## 2018-03-28 PROCEDURE — 83735 ASSAY OF MAGNESIUM: CPT | Performed by: NURSE PRACTITIONER

## 2018-03-28 PROCEDURE — 74011000250 HC RX REV CODE- 250: Performed by: EMERGENCY MEDICINE

## 2018-03-28 PROCEDURE — 74011250637 HC RX REV CODE- 250/637: Performed by: NURSE PRACTITIONER

## 2018-03-28 PROCEDURE — 36415 COLL VENOUS BLD VENIPUNCTURE: CPT | Performed by: NURSE PRACTITIONER

## 2018-03-28 PROCEDURE — 80048 BASIC METABOLIC PNL TOTAL CA: CPT | Performed by: NURSE PRACTITIONER

## 2018-03-28 PROCEDURE — 96366 THER/PROPH/DIAG IV INF ADDON: CPT

## 2018-03-28 PROCEDURE — 74011250637 HC RX REV CODE- 250/637: Performed by: INTERNAL MEDICINE

## 2018-03-28 PROCEDURE — 86677 HELICOBACTER PYLORI ANTIBODY: CPT | Performed by: INTERNAL MEDICINE

## 2018-03-28 PROCEDURE — 65270000029 HC RM PRIVATE

## 2018-03-28 PROCEDURE — 74011250636 HC RX REV CODE- 250/636: Performed by: EMERGENCY MEDICINE

## 2018-03-28 PROCEDURE — 85025 COMPLETE CBC W/AUTO DIFF WBC: CPT | Performed by: NURSE PRACTITIONER

## 2018-03-28 PROCEDURE — 96367 TX/PROPH/DG ADDL SEQ IV INF: CPT

## 2018-03-28 PROCEDURE — 74011250637 HC RX REV CODE- 250/637: Performed by: EMERGENCY MEDICINE

## 2018-03-28 PROCEDURE — 74011250636 HC RX REV CODE- 250/636: Performed by: HOSPITALIST

## 2018-03-28 PROCEDURE — 74011250637 HC RX REV CODE- 250/637: Performed by: HOSPITALIST

## 2018-03-28 RX ORDER — ACETAMINOPHEN 325 MG/1
650 TABLET ORAL
Status: DISCONTINUED | OUTPATIENT
Start: 2018-03-28 | End: 2018-03-29 | Stop reason: HOSPADM

## 2018-03-28 RX ORDER — CLOPIDOGREL BISULFATE 75 MG/1
75 TABLET ORAL DAILY
Status: DISCONTINUED | OUTPATIENT
Start: 2018-03-28 | End: 2018-03-29 | Stop reason: HOSPADM

## 2018-03-28 RX ORDER — AMLODIPINE BESYLATE 10 MG/1
10 TABLET ORAL DAILY
Status: DISCONTINUED | OUTPATIENT
Start: 2018-03-28 | End: 2018-03-29 | Stop reason: HOSPADM

## 2018-03-28 RX ORDER — PANTOPRAZOLE SODIUM 40 MG/1
40 TABLET, DELAYED RELEASE ORAL
Status: DISCONTINUED | OUTPATIENT
Start: 2018-03-28 | End: 2018-03-29 | Stop reason: HOSPADM

## 2018-03-28 RX ORDER — ONDANSETRON 2 MG/ML
4 INJECTION INTRAMUSCULAR; INTRAVENOUS
Status: DISCONTINUED | OUTPATIENT
Start: 2018-03-28 | End: 2018-03-29 | Stop reason: HOSPADM

## 2018-03-28 RX ORDER — HYDROCHLOROTHIAZIDE 25 MG/1
25 TABLET ORAL DAILY
Status: DISCONTINUED | OUTPATIENT
Start: 2018-03-28 | End: 2018-03-29 | Stop reason: HOSPADM

## 2018-03-28 RX ORDER — INSULIN GLARGINE 100 [IU]/ML
15 INJECTION, SOLUTION SUBCUTANEOUS
Status: DISCONTINUED | OUTPATIENT
Start: 2018-03-28 | End: 2018-03-29 | Stop reason: HOSPADM

## 2018-03-28 RX ORDER — AMITRIPTYLINE HYDROCHLORIDE 50 MG/1
50 TABLET, FILM COATED ORAL
Status: DISCONTINUED | OUTPATIENT
Start: 2018-03-28 | End: 2018-03-29 | Stop reason: HOSPADM

## 2018-03-28 RX ORDER — MORPHINE SULFATE 4 MG/ML
4 INJECTION INTRAVENOUS
Status: DISCONTINUED | OUTPATIENT
Start: 2018-03-28 | End: 2018-03-29 | Stop reason: HOSPADM

## 2018-03-28 RX ORDER — SIMVASTATIN 10 MG/1
10 TABLET, FILM COATED ORAL
Status: DISCONTINUED | OUTPATIENT
Start: 2018-03-28 | End: 2018-03-29 | Stop reason: HOSPADM

## 2018-03-28 RX ORDER — HEPARIN SODIUM 5000 [USP'U]/ML
5000 INJECTION, SOLUTION INTRAVENOUS; SUBCUTANEOUS EVERY 8 HOURS
Status: DISCONTINUED | OUTPATIENT
Start: 2018-03-28 | End: 2018-03-29 | Stop reason: HOSPADM

## 2018-03-28 RX ORDER — SODIUM CHLORIDE 9 MG/ML
100 INJECTION, SOLUTION INTRAVENOUS CONTINUOUS
Status: DISCONTINUED | OUTPATIENT
Start: 2018-03-28 | End: 2018-03-28

## 2018-03-28 RX ORDER — SUCRALFATE 1 G/10ML
1 SUSPENSION ORAL
Status: DISCONTINUED | OUTPATIENT
Start: 2018-03-28 | End: 2018-03-29 | Stop reason: HOSPADM

## 2018-03-28 RX ORDER — PANTOPRAZOLE SODIUM 40 MG/1
40 TABLET, DELAYED RELEASE ORAL
Status: DISCONTINUED | OUTPATIENT
Start: 2018-03-28 | End: 2018-03-28

## 2018-03-28 RX ORDER — PREGABALIN 75 MG/1
75 CAPSULE ORAL 2 TIMES DAILY
Status: DISCONTINUED | OUTPATIENT
Start: 2018-03-28 | End: 2018-03-29 | Stop reason: HOSPADM

## 2018-03-28 RX ADMIN — HEPARIN SODIUM 5000 UNITS: 5000 INJECTION, SOLUTION INTRAVENOUS; SUBCUTANEOUS at 10:28

## 2018-03-28 RX ADMIN — CLOPIDOGREL BISULFATE 75 MG: 75 TABLET ORAL at 10:27

## 2018-03-28 RX ADMIN — POTASSIUM CHLORIDE 10 MEQ: 7.46 INJECTION, SOLUTION INTRAVENOUS at 01:37

## 2018-03-28 RX ADMIN — SODIUM CHLORIDE 100 ML/HR: 900 INJECTION, SOLUTION INTRAVENOUS at 09:09

## 2018-03-28 RX ADMIN — MORPHINE SULFATE 4 MG: 4 INJECTION INTRAVENOUS at 05:08

## 2018-03-28 RX ADMIN — PREGABALIN 75 MG: 75 CAPSULE ORAL at 09:00

## 2018-03-28 RX ADMIN — Medication 2 G: at 05:08

## 2018-03-28 RX ADMIN — POTASSIUM CHLORIDE 10 MEQ: 7.46 INJECTION, SOLUTION INTRAVENOUS at 00:07

## 2018-03-28 RX ADMIN — PREGABALIN 75 MG: 75 CAPSULE ORAL at 18:03

## 2018-03-28 RX ADMIN — AMLODIPINE BESYLATE 10 MG: 10 TABLET ORAL at 10:27

## 2018-03-28 RX ADMIN — PANTOPRAZOLE SODIUM 40 MG: 40 TABLET, DELAYED RELEASE ORAL at 16:47

## 2018-03-28 RX ADMIN — SUCRALFATE 1 G: 1 SUSPENSION ORAL at 16:49

## 2018-03-28 RX ADMIN — HYDROCHLOROTHIAZIDE 25 MG: 25 TABLET ORAL at 10:27

## 2018-03-28 RX ADMIN — MORPHINE SULFATE 4 MG: 4 INJECTION INTRAVENOUS at 14:47

## 2018-03-28 RX ADMIN — POTASSIUM CHLORIDE 20 MEQ: 1.5 POWDER, FOR SOLUTION ORAL at 16:49

## 2018-03-28 RX ADMIN — PANTOPRAZOLE SODIUM 40 MG: 40 TABLET, DELAYED RELEASE ORAL at 10:27

## 2018-03-28 RX ADMIN — MORPHINE SULFATE 4 MG: 4 INJECTION INTRAVENOUS at 09:08

## 2018-03-28 RX ADMIN — HEPARIN SODIUM 5000 UNITS: 5000 INJECTION, SOLUTION INTRAVENOUS; SUBCUTANEOUS at 14:46

## 2018-03-28 RX ADMIN — POTASSIUM CHLORIDE 20 MEQ: 1.5 POWDER, FOR SOLUTION ORAL at 10:27

## 2018-03-28 RX ADMIN — Medication 2 G: at 16:51

## 2018-03-28 NOTE — CONSULTS
[email or web address]  [phone]    Impression:   1. Abdominal pain-  2. Lactic acidosis  -Ct abdomen- no acute abdomen or pelvic process   -11/2010- EGD- grade 2 esophagitis. Medium HH  -11/2010 colo- polyp. Overdue for colonoscopy. May be done OP  -mesenteric doppler- No significant stenosis identified in the celiac, splenic, hepatic  or superior mesenteric arteries. Unable to visualize the inferior mesenteric artery. No significant plaquing, focal stenosis or aneurysm noted in the  abdominal aorta. -3/23/18- Diagnostic laparoscopy- Nephroptosis on right,Hepatic steatosis, umbilical hernia,  morbid obesity  -umbilical hernia repair with mesh. Liver biopsy   -3/28/18 CT scan- Esophageal thickening, suggestive of esophagitis. This could be the source  of the abdominal pain. Similar appearance of umbilical region rim-enhancing hypodensity. Post-surgical change vs. infectious process. 2. Intractable Nausea and vomiting-   3. Leukocytosis- WBC 16.7 today  4. DM  5. H/O pneumoperitoneum last september     Plan:     1. Cont PPI. Add carafate  2. Cont supportive care  Await further recommendation from attending    Chief Complaint: abdominal pain      HPI:  Charu Em is a 62 y.o. male who is being seen on consult for abdominal pain. Pt admitted for chronic abdominal pain and lactic acidosis. Pt was recently d/c from hospital for this. Pt states lower abdominal pain persists associated with nausea and vomiting. Pt states percocet at home ineffective. Pt cont to c/o severe abdominal pain not controlled on morphine.  Full history listed above    PMH:   Past Medical History:   Diagnosis Date    Arthritis     Coronary atherosclerosis of native coronary artery     S/P PCI with GE in 12/09    Diabetes ([facility] [address]     IDDM    Electrolyte and fluid disorders not elsewhere classified     Essential hypertension, benign     GERD (gastroesophageal reflux disease)     Heroin abuse 05/26/16    Psychiatrist Dr. Rosa Sloan Raymond Felder History of heart attack     Hyperlipidemia     Intermediate coronary syndrome ([facility] [address]     MDD (major depressive disorder) 5/26/16    Psychiatrist Dr. Felipe Muhammad Myocardial infarction     Obesity, unspecified     Old myocardial infarction     Other and unspecified hyperlipidemia     Pancreatitis     Positive PPD     Sleep apnea     uses Cpap machine    Transfusion history     Before 1992       PSH:   Past Surgical History:   Procedure Laterality Date    HX APPENDECTOMY      HX CORONARY STENT PLACEMENT  2010    2 stents       Social HX:   Social History     Social History    Marital status:      Spouse name: N/A    Number of children: N/A    Years of education: N/A     Occupational History    Not on file. Social History Main Topics    Smoking status: Never Smoker    Smokeless tobacco: Never Used    Alcohol use No    Drug use: No    Sexual activity: Not on file     Other Topics Concern    Not on file     Social History Narrative       FHX:   Family History   Problem Relation Age of Onset    Heart Attack Father     Coronary Artery Disease Mother     Diabetes Mother     Cancer Sister      breast cancer and lung cancer       Allergy:   Allergies   Allergen Reactions    Compazine [Prochlorperazine] Anaphylaxis     \"Tightness around throat\"       Home Medications:     Prescriptions Prior to Admission   Medication Sig    oxyCODONE-acetaminophen (PERCOCET 7.5) 7.5-325 mg per tablet Take 1 Tab by mouth every four (4) hours as needed. Max Daily Amount: 6 Tabs. Indications: Pain    ondansetron (ZOFRAN ODT) 4 mg disintegrating tablet Take 1 Tab by mouth every eight (8) hours as needed for Nausea.  OTHER Check CBC, CMP, Mg in 3 days, results to PCP immediately, Diagnosis- DM2    OTHER No lifting weights > 15 lbs for two weeks.  insulin detemir U-100 (LEVEMIR U-100 INSULIN) 100 unit/mL injection 15 Units by SubCUTAneous route nightly.     OTHER Incentive spirometry- use as directed    hydroCHLOROthiazide (HYDRODIURIL) 50 mg tablet Take 0.5 Tabs by mouth daily.  pantoprazole (PROTONIX) 40 mg tablet Take 1 Tab by mouth Daily (before breakfast).  albuterol (PROVENTIL HFA, VENTOLIN HFA, PROAIR HFA) 90 mcg/actuation inhaler 2 puffs every 6 hours x 5 days then every 6 hours as needed for shortness of breath and/or wheezing    acetaminophen (TYLENOL) 325 mg tablet Take 2 Tabs by mouth every six (6) hours as needed. Indications: Pain, take it with bentyl    polyethylene glycol (MIRALAX) 17 gram packet Take 1 Packet by mouth daily.  amLODIPine (NORVASC) 10 mg tablet Take 10 mg by mouth daily.  pregabalin (LYRICA) 75 mg capsule 1 cap bid x 1 week, then 2 caps bid    amitriptyline (ELAVIL) 50 mg tablet Take 50 mg by mouth nightly.  simvastatin (ZOCOR) 10 mg tablet Take 10 mg by mouth nightly.  clopidogrel (PLAVIX) 75 mg tablet Take 75 mg by mouth daily. Review of Systems:     A fourteen point review of systems was obtained, and was negative except per HPI. Visit Vitals    /84 (BP 1 Location: Left arm, BP Patient Position: At rest)    Pulse 71    Temp 97.8 °F (36.6 °C)    Resp 16    Ht 5' 9\" (1.753 m)    Wt 106.6 kg (235 lb)    SpO2 98%    BMI 34.7 kg/m2       Physical Assessment:     constitutional: appearance: well developed, well nourished, in no acute distress.    skin: no rashes, jaundice  eyes: inspection: normal conjunctivae and lids; no scleral icterus pupils: equal, round, reactive to light; extraocular movements intact  ENMT: mouth: mucous membranes moist, no thrush  neck:  no masses or tenderness; normal range of motion  respiratory: breath sounds clear, no wheeze/rales/rhonchi  cardiovascular: normal rate, regular rhythm without murmur  abdominal: soft, bowel sounds present, non-tender to palpation without rebound or guarding; no appreciable mass; no appreciable hepatosplenomegaly; no appreciable fluid wave  extremities: no clubbing, cyanosis, edema  neurologic:  Cranial nerves II-XII grossly intact; no asterixis   psychiatric:  oriented to time, space and person. Basic Metabolic Profile   Recent Labs      03/27/18   1558   NA  135*   K  2.6*   CL  95*   CO2  27   BUN  14   GLU  276*   CA  8.7   MG  1.8         CBC w/Diff    Recent Labs      03/27/18   1558   WBC  16.7*   RBC  4.50*   HGB  13.2   HCT  41.5   MCV  92.2   MCH  29.3   MCHC  31.8   RDW  13.3   PLT  320    Recent Labs      03/27/18   1558   GRANS  84*   LYMPH  9*   EOS  0        Hepatic Function   Recent Labs      03/27/18   1558   ALB  3.5   TP  8.3*   TBILI  0.5   SGOT  16   AP  88   LPSE  [address] [facility], NP  [facility]  [email or web address]

## 2018-03-28 NOTE — ED NOTES
8:28 PM : Pt care transferred from Dr. Luz Owens  ,ED provider. History of patient complaint(s), available diagnostic reports and current treatment plan has been discussed thoroughly. Bedside rounding on patient occured : yes . Intended disposition of patient : TBD  Pending diagnostics reports and/or labs (please list): CT AP    CT Abd/Pelv with contrast  1. Esophageal thickening, suggestive of esophagitis. This could be the source  of the abdominal pain. 2.  Similar appearance of umbilical region rim-enhancing hypodensity. Post-surgical change vs. infectious process. 3.  Stable adrenal nodules. 4.  Stable right renal cyst.  As interpreted by radiology. Consult:  Discussed care with Dr. Sonia Kumari, Hospitalist. Standard discussion; including history of patients chief complaint, available diagnostic results, and treatment course. Agrees to admit. 2:14 AM, 3/27/2018     Scribe Attestation     Maylin Vogel acting as a scribe for and in the presence of Bart Watters MD      March 27, 2018 at 9:16 PM       Provider Attestation:      I personally performed the services described in the documentation, reviewed the documentation, as recorded by the scribe in my presence, and it accurately and completely records my words and actions.  March 27, 2018 at 9:16 PM - Bart Watters MD

## 2018-03-28 NOTE — ROUTINE PROCESS
Bedside and Verbal shift change report given to Godfrey Nguyen RN (oncoming nurse) by Jose R Arellano RN (offgoing nurse). Report included the following information SBAR, Kardex, MAR and Recent Results.     SITUATION:  Code Status: Full Code  Reason for Admission: Vomiting  Chronic abdominal pain  Hospital day: 0  Problem List:       Hospital Problems  Date Reviewed: 3/23/2018          Codes Class Noted POA    Vomiting ICD-10-CM: R11.10  ICD-9-CM: 787.03  3/28/2018 Unknown        Chronic abdominal pain ICD-10-CM: R10.9, G89.29  ICD-9-CM: 789.00, 338.29  3/28/2018 Unknown              BACKGROUND:   Past Medical History:   Past Medical History:   Diagnosis Date    Arthritis     Coronary atherosclerosis of native coronary artery     S/P PCI with GE in 12/09    Diabetes ([facility] [address]     IDDM    Electrolyte and fluid disorders not elsewhere classified     Essential hypertension, benign     GERD (gastroesophageal reflux disease)     Heroin abuse 05/26/16    Psychiatrist Dr. Sheree Dempsey History of heart attack     Hyperlipidemia     Intermediate coronary syndrome ([facility] [address]     MDD (major depressive disorder) 5/26/16    Psychiatrist Dr. Sheree Dempsey Myocardial infarction     Obesity, unspecified     Old myocardial infarction     Other and unspecified hyperlipidemia     Pancreatitis     Positive PPD     Sleep apnea     uses Cpap machine    Transfusion history     Before 1992      Patient taking anticoagulants yes    Patient has a defibrillator: no    History of shots YES for example, flu, pneumonia, tetanus   Isolation History NO for example, MRSA, CDiff    ASSESSMENT:  Changes in Assessment Throughout Shift: None  Significant Changes in 24 hours (for example, RR/code, fall)  Patient has Central Line: no Reasons if yes: N/A  Patient has Van Cath: no Reasons if yes: N/A   Mobility Issues  PT  IV Patency  OR Checklist  Pending Tests    Last Vitals:  Vitals w/ MEWS Score (last day)     Date/Time MEWS Score Pulse Resp Temp BP Level of Consciousness SpO2    03/28/18 0421 0 74 12 98.7 °F (37.1 °C) 156/86 Alert 93 %    03/28/18 0245 -- 71 11 -- 150/80 -- --    03/28/18 0230 -- 71 8 -- 151/81 -- --    03/28/18 0200 -- 72 13 -- 149/90 -- --    03/28/18 0145 -- 70 (!)  7 -- (!)  143/95 -- --    03/28/18 0115 -- 69 8 -- 152/79 -- --    03/28/18 0100 -- 69 9 -- 141/81 -- --    03/28/18 0045 -- 70 10 -- 149/76 -- --    03/28/18 0030 -- 71 9 -- 145/75 -- --    03/28/18 0000 -- 73 9 -- (!)  155/95 -- --    03/27/18 2345 -- 72 11 -- 141/69 -- --    03/27/18 2315 -- 72 9 -- 151/87 -- --    03/27/18 2052 1 71 20 99.9 °F (37.7 °C) (!)  164/96 Alert 99 %    03/27/18 1700 -- -- -- -- -- -- 98 %    03/27/18 1535 1 86 18 98.8 °F (37.1 °C) 137/83 Alert 98 %            PAIN    Pain Assessment    Pain Intensity 1: 8 (03/28/18 0508)    Pain Location 1: Abdomen    Pain Intervention(s) 1: Medication (see MAR)    Patient Stated Pain Goal: 0  Intervention effective: yes  Time of last intervention: 0508 Reassessment Completed: yes   Other actions taken for pain: None    Last 3 Weights:  Last 3 Recorded Weights in this Encounter    03/27/18 1535   Weight: 106.6 kg (235 lb)   Weight change:     INTAKE/OUPUT    Current Shift:      Last three shifts:      RECOMMENDATIONS AND DISCHARGE PLANNING  Patient needs and requests: Pain control    Pending tests/procedures: TBD     Discharge plan for patient: Home    Discharge planning Needs or Barriers: TBD    Estimated Discharge Date: TBD Posted on Whiteboard in Patients Room: no       \"HEALS\" SAFETY CHECK  A safety check occurred in the patient's room between off going nurse and oncoming nurse listed above. The safety check included the below items:    H  High Alert Medications Verify all high alert medication drips (heparin, PCA, etc.)  E  Equipment Suction is set up for ALL patients (with yanker)  Red plugs utilized for all equipment (IV pumps, etc.)  WOWs wiped down at end of shift.   Room stocked with oxygen, suction, and other unit-specific supplies  A  Alarms Bed alarm is set for fall risk patients  Ensure chair alarm is in place and activated if patient is up in a chair  L  Lines Check IV for any infiltration  Van bag is empty if patient has a Van   Tubing and IV bags are labeled  S  Safety  Room is clean, patient is clean, and equipment is clean. Hallways are clear from equipment besides carts. Fall bracelet on for fall risk patients  Ensure room is clear and free of clutter  Suction is set up for ALL patients (with steven)  Hallways are clear from equipment besides carts.    Isolation precautions followed, supplies available outside room, sign posted    Jarvis Del Real RN

## 2018-03-28 NOTE — PROGRESS NOTES
Patient resting in bed with eyes closed. Respirations even and unlabored. Side rails up times 2. Call bell in reach. Will monitor.

## 2018-03-28 NOTE — PROGRESS NOTES
Patient given po meds per orders. Tolerated without distress. Call bell in reach. Side rails up times 2. Will monitor.

## 2018-03-28 NOTE — H&P
HISTORY & PHYSICAL            Patient: Nahun Isals MRN: [id number]  CSN: [id number]    YOB: 1960  Age: 62 y.o. Sex: male    DOA: 3/27/2018 LOS:  LOS: 0 days        DOA: 3/27/2018        Assessment/Plan     Active Problems:    Vomiting (3/28/2018)      Chronic abdominal pain (3/28/2018)        Plan:  1. Chronic Abd Pain - pain control with IV Morphine , pt appears to be drug seeker , recently had surg done - Laproscopy -- by Dr. Oleta Claude for chronic abd pain but no clear etiology - was seen in the ER 3/26 - discharged & now comes again   2. Lactic acidosis - unclear etiology - IVF , monitor   3. N&V - Zofran prn   4. T2DM - resume insulin, monitor BS   5. HTN - home meds resumed, monitor BP   6.  Diabetic neuropathy - resume lyrica   DVT Px - Heparin   FC           Severity of Signs & Symptoms -- Moderate  Risk of adverse events -- Moderate  Current Medical Rx Plan - As Above   Patient history & comorbidities - Per HPI  Discharge Plan -- Home            HPI:     Nahun Islas is a 62 y.o. male who is being admitted 2y to chronic abd pain & lactic acidosis -- pt was recently operated on Dr Oleta Claude - for this chronic Abd pain - no clear etiology -- was seen in the ER on 3/26 for pain again , given IV meds & discharged   Now comes back again for the same sx -- lactic acid slightly elevated, unclear etiology -- IVF , pain control   Will admit for further eval       Past Medical History:   Diagnosis Date    Arthritis     Coronary atherosclerosis of native coronary artery     S/P PCI with GE in 12/09    Diabetes ([facility] [address]     IDDM    Electrolyte and fluid disorders not elsewhere classified     Essential hypertension, benign     GERD (gastroesophageal reflux disease)     Heroin abuse 05/26/16    Psychiatrist Dr. Troncoso Level History of heart attack     Hyperlipidemia     Intermediate coronary syndrome ([facility] [address]     MDD (major depressive disorder) 5/26/16    Psychiatrist  Vicky Wright     Myocardial infarction     Obesity, unspecified     Old myocardial infarction     Other and unspecified hyperlipidemia     Pancreatitis     Positive PPD     Sleep apnea     uses Cpap machine    Transfusion history     Before 1992       Past Surgical History:   Procedure Laterality Date    HX APPENDECTOMY      HX CORONARY STENT PLACEMENT  2010    2 stents       Family History   Problem Relation Age of Onset    Heart Attack Father     Coronary Artery Disease Mother     Diabetes Mother     Cancer Sister      breast cancer and lung cancer       Social History     Social History    Marital status:      Spouse name: N/A    Number of children: N/A    Years of education: N/A     Social History Main Topics    Smoking status: Never Smoker    Smokeless tobacco: Never Used    Alcohol use No    Drug use: No    Sexual activity: Not on file     Other Topics Concern    Not on file     Social History Narrative       Prior to Admission medications    Medication Sig Start Date End Date Taking? Authorizing Provider   oxyCODONE-acetaminophen (PERCOCET 7.5) 7.5-325 mg per tablet Take 1 Tab by mouth every four (4) hours as needed. Max Daily Amount: 6 Tabs. Indications: Pain 3/24/18   Joi Sanchez MD   ondansetron (ZOFRAN ODT) 4 mg disintegrating tablet Take 1 Tab by mouth every eight (8) hours as needed for Nausea. 3/24/18   Anjum Rockwell MD   OTHER Check CBC, CMP, Mg in 3 days, results to PCP immediately, Diagnosis- DM2 3/24/18   MD MAREK Baker No lifting weights > 15 lbs for two weeks. 3/24/18   Anjum Rockwell MD   insulin detemir U-100 (LEVEMIR U-100 INSULIN) 100 unit/mL injection 15 Units by SubCUTAneous route nightly. 3/24/18   MD MAREK Baker Incentive spirometry- use as directed 3/24/18   Anjum Rockwell MD   hydroCHLOROthiazide (HYDRODIURIL) 50 mg tablet Take 0.5 Tabs by mouth daily.  9/11/17   Scot Pitts MD   pantoprazole (PROTONIX) 40 mg tablet Take 1 Tab by mouth Daily (before breakfast). 9/11/17   Leana Gray MD   albuterol (PROVENTIL HFA, VENTOLIN HFA, PROAIR HFA) 90 mcg/actuation inhaler 2 puffs every 6 hours x 5 days then every 6 hours as needed for shortness of breath and/or wheezing 9/11/17   Leana Gray MD   acetaminophen (TYLENOL) 325 mg tablet Take 2 Tabs by mouth every six (6) hours as needed. Indications: Pain, take it with bentyl 9/11/17   Leana Gray MD   polyethylene glycol (MIRALAX) 17 gram packet Take 1 Packet by mouth daily. 9/11/17   Leana Gray MD   amLODIPine (NORVASC) 10 mg tablet Take 10 mg by mouth daily. Historical Provider   pregabalin (LYRICA) 75 mg capsule 1 cap bid x 1 week, then 2 caps bid 7/5/17   Ina Iglesias NP   amitriptyline (ELAVIL) 50 mg tablet Take 50 mg by mouth nightly. Scarlett Araujo MD   simvastatin (ZOCOR) 10 mg tablet Take 10 mg by mouth nightly. Historical Provider   clopidogrel (PLAVIX) 75 mg tablet Take 75 mg by mouth daily. Historical Provider       Allergies   Allergen Reactions    Compazine [Prochlorperazine] Anaphylaxis     \"Tightness around throat\"       Review of Systems  A comprehensive review of systems was negative except for that written in the History of Present Illness. Physical Exam:      Visit Vitals    /84 (BP 1 Location: Left arm, BP Patient Position: At rest)    Pulse 71    Temp 97.8 °F (36.6 °C)    Resp 16    Ht 5' 9\" (1.753 m)    Wt 106.6 kg (235 lb)    SpO2 98%    BMI 34.7 kg/m2       Physical Exam:    Gen: In general, this is an obese male in no acute distress  HEENT: Sclerae nonicteric. Oral mucous membranes moist. Dentition poor  Neck: Supple with midline trachea. CV: RRR without murmur or rub appreciated. Resp:Respirations are unlabored without use of accessory muscles. Lung fields bilaterally without wheezes or rhonchi.    Abd: Soft, nontender, nondistended, small port incision above umbilicus with dermabond Extrem: Extremities are warm, without cyanosis or clubbing. No pitting pretibial edema. Skin: Warm, no visible rashes. Neuro: Patient is alert, oriented, and cooperative. No obvious focal defects. Moves all 4 extremities. Labs Reviewed:    Recent Results (from the past 24 hour(s))   CULTURE, BLOOD    Collection Time: 03/27/18  3:38 PM   Result Value Ref Range    Special Requests: NO SPECIAL REQUESTS      Culture result: NO GROWTH AFTER 14 HOURS     CBC WITH AUTOMATED DIFF    Collection Time: 03/27/18  3:58 PM   Result Value Ref Range    WBC 16.7 (H) 4.6 - 13.2 K/uL    RBC 4.50 (L) 4.70 - 5.50 M/uL    HGB 13.2 13.0 - 16.0 g/dL    HCT 41.5 36.0 - 48.0 %    MCV 92.2 74.0 - 97.0 FL    MCH 29.3 24.0 - 34.0 PG    MCHC 31.8 31.0 - 37.0 g/dL    RDW 13.3 11.6 - 14.5 %    PLATELET 866 966 - 300 K/uL    MPV 11.7 9.2 - 11.8 FL    NEUTROPHILS 84 (H) 40 - 73 %    LYMPHOCYTES 9 (L) 21 - 52 %    MONOCYTES 7 3 - 10 %    EOSINOPHILS 0 0 - 5 %    BASOPHILS 0 0 - 2 %    ABS. NEUTROPHILS 13.9 (H) 1.8 - 8.0 K/UL    ABS. LYMPHOCYTES 1.6 0.9 - 3.6 K/UL    ABS. MONOCYTES 1.2 0.05 - 1.2 K/UL    ABS. EOSINOPHILS 0.0 0.0 - 0.4 K/UL    ABS.  BASOPHILS 0.0 0.0 - 0.1 K/UL    DF AUTOMATED     METABOLIC PANEL, BASIC    Collection Time: 03/27/18  3:58 PM   Result Value Ref Range    Sodium 135 (L) 136 - 145 mmol/L    Potassium 2.6 (LL) 3.5 - 5.5 mmol/L    Chloride 95 (L) 100 - 108 mmol/L    CO2 27 21 - 32 mmol/L    Anion gap 13 3.0 - 18 mmol/L    Glucose 276 (H) 74 - 99 mg/dL    BUN 14 7.0 - 18 MG/DL    Creatinine 1.46 (H) 0.6 - 1.3 MG/DL    BUN/Creatinine ratio 10 (L) 12 - 20      GFR est AA >60 >60 ml/min/1.73m2    GFR est non-AA 50 (L) >60 ml/min/1.73m2    Calcium 8.7 8.5 - 10.1 MG/DL   LIPASE    Collection Time: 03/27/18  3:58 PM   Result Value Ref Range    Lipase 123 73 - 393 U/L   HEPATIC FUNCTION PANEL    Collection Time: 03/27/18  3:58 PM   Result Value Ref Range    Protein, total 8.3 (H) 6.4 - 8.2 g/dL    Albumin 3.5 3.4 - 5.0 g/dL Globulin 4.8 (H) 2.0 - 4.0 g/dL    A-G Ratio 0.7 (L) 0.8 - 1.7      Bilirubin, total 0.5 0.2 - 1.0 MG/DL    Bilirubin, direct 0.1 0.0 - 0.2 MG/DL    Alk.  phosphatase 88 45 - 117 U/L    AST (SGOT) 16 15 - 37 U/L    ALT (SGPT) 26 16 - 61 U/L   MAGNESIUM    Collection Time: 03/27/18  3:58 PM   Result Value Ref Range    Magnesium 1.8 1.6 - 2.6 mg/dL   CULTURE, BLOOD    Collection Time: 03/27/18  3:58 PM   Result Value Ref Range    Special Requests: NO SPECIAL REQUESTS      Culture result: NO GROWTH AFTER 14 HOURS     CARDIAC PANEL,(CK, CKMB & TROPONIN)    Collection Time: 03/27/18  3:58 PM   Result Value Ref Range     39 - 308 U/L    CK - MB 1.3 <3.6 ng/ml    CK-MB Index 0.9 0.0 - 4.0 %    Troponin-I, Qt. <0.02 0.0 - 0.045 NG/ML   URINALYSIS W/ RFLX MICROSCOPIC    Collection Time: 03/27/18  4:00 PM   Result Value Ref Range    Color YELLOW      Appearance CLEAR      Specific gravity >1.030 (H) 1.005 - 1.030    pH (UA) >8.5 (H) 5.0 - 8.0    Protein 30 (A) NEG mg/dL    Glucose >1000 (A) NEG mg/dL    Ketone 80 (A) NEG mg/dL    Bilirubin NEGATIVE  NEG      Blood NEGATIVE  NEG      Urobilinogen 0.2 0.2 - 1.0 EU/dL    Nitrites NEGATIVE  NEG      Leukocyte Esterase NEGATIVE  NEG     DRUG SCREEN, URINE    Collection Time: 03/27/18  4:00 PM   Result Value Ref Range    BENZODIAZEPINES NEGATIVE  NEG      BARBITURATES NEGATIVE  NEG      THC (TH-CANNABINOL) NEGATIVE  NEG      OPIATES POSITIVE (A) NEG      PCP(PHENCYCLIDINE) NEGATIVE  NEG      COCAINE NEGATIVE  NEG      AMPHETAMINES NEGATIVE  NEG      METHADONE NEGATIVE  NEG      HDSCOM (NOTE)    POC LACTIC ACID    Collection Time: 03/27/18  4:00 PM   Result Value Ref Range    Lactic Acid (POC) 2.5 (HH) 0.4 - 2.0 mmol/L   URINE MICROSCOPIC ONLY    Collection Time: 03/27/18  4:00 PM   Result Value Ref Range    WBC 0 to 2 0 - 4 /hpf    RBC NONE 0 - 5 /hpf    Epithelial cells FEW 0 - 5 /lpf    Bacteria NEGATIVE  NEG /hpf   EKG, 12 LEAD, INITIAL    Collection Time: 03/27/18  4:11 PM Result Value Ref Range    Ventricular Rate 80 BPM    Atrial Rate 80 BPM    P-R Interval 198 ms    QRS Duration 82 ms    Q-T Interval 384 ms    QTC Calculation (Bezet) 442 ms    Calculated P Axis 16 degrees    Calculated R Axis -15 degrees    Calculated T Axis 142 degrees    Diagnosis       Poor data quality, interpretation may be adversely affected  Normal sinus rhythm with sinus arrhythmia  Inferior infarct (cited on or before 27-OCT-2010)  T wave abnormality, consider lateral ischemia  Abnormal ECG  When compared with ECG of 20-MAR-2018 23:43,  Nonspecific T wave abnormality, improved in Inferior leads  Inverted T waves have replaced nonspecific T wave abnormality in Lateral   leads     POC LACTIC ACID    Collection Time: 03/27/18  7:08 PM   Result Value Ref Range    Lactic Acid (POC) 1.9 0.4 - 2.0 mmol/L   GLUCOSE, POC    Collection Time: 03/28/18  7:57 AM   Result Value Ref Range    Glucose (POC) 170 (H) 70 - 110 mg/dL       Imaging Reviewed:    CT Abd & Pelvis     IMPRESSION:     1.  Esophageal thickening, suggestive of esophagitis. This could be the source  of the abdominal pain.     2. Similar appearance of umbilical region rim-enhancing hypodensity.    Post-surgical change vs. infectious process.     3.  Stable adrenal nodules.      4.  Stable right renal cyst.          Zina Faust MD  3/28/2018, 5:46 AM

## 2018-03-28 NOTE — DIABETES MGMT
Glycemic Control Plan of Care    IDDM with current A1c of 7.9% (2018). See most recent assessment of home diabetes management and education from prior admission, 2018. Home diabetes med: Levemir 15 QHS. Seen patient this afternoon and stated that he did not take prescribed lantus insulin at home. Attempted to further assess but he would not explain why he elected not to take diabetes meds at home. IVF: NS at 125 ml/hr, continuous infusion until d/c'd. Recommendation(s):  1.) Continue inpatient glycemic monitoring and intervention. Assessment:  Patient is 62year old with past medical history including IDDM, gastroparesis, hypertension, GERD, positive PPD, heart attack, CAD with stenting, acute renal failure hypothyroidism, umbilical hernia, and pneumoperitoneum 2017. Patient recently discharged on 3/24/2018 after diagnostic laparoscopy, laparoscopic liver biopsy and laparoscopic umbilical hernia repair with mesh. He was readmitted on 2018 with report of abdominal pain and vomiting. Noted:  Abdominal pain, associated leukocytosis suggestive of infectious process. Nausea and vomiting suspect element of gastroparesis. IDDM with current A1c of 7.9% (2018)    Most recent blood glucose values:    No POC BG report for 2018. Results for Felipe  (MRN [id number]) as of 3/28/2018 15:07   Ref. Range 3/28/2018 07:57 3/28/2018 11:54   GLUCOSE,FAST - POC Latest Ref Range: 70 - 110 mg/dL 170 (H) 225 (H)     Current A1C: 7.9% (2018) is equivalent to average blood glucose of 180 mg/dL during the past 2-3 months. Current hospital diabetes medications:  Basal lantus insulin 15 units daily at bedtime. Total daily dose insulin requirement previous day: 2018  None. Home diabetes medications: Patient stated on 2018:  Levemir insulin 15 units daily at bedtime. Diet: Clear liquid.     Goals:  Blood glucose will be within target range of  mg/dL by 03/31/2018.     Education:  _X__  Refer to Diabetes Education Record: See recent hosp admission notes including educ entered on 03/21/2018.             ___  Education not indicated at this time    Maribeth Vinson RN [facility]  Pager: [phone]

## 2018-03-28 NOTE — ROUTINE PROCESS
TRANSFER - OUT REPORT:    Verbal report given to Esther Loyd RN on Tere Pompa  being transferred to [facility] (unit) for routine progression of care       Report consisted of patients Situation, Background, Assessment and   Recommendations(SBAR). Information from the following report(s) SBAR and ED Summary was reviewed with the receiving nurse. Lines:   Peripheral IV 03/27/18 Right Antecubital (Active)   Site Assessment Clean, dry, & intact 3/28/2018  2:55 AM   Phlebitis Assessment 0 3/28/2018  2:55 AM   Infiltration Assessment 0 3/28/2018  2:55 AM   Dressing Status Clean, dry, & intact 3/28/2018  2:55 AM   Dressing Type Transparent 3/28/2018  2:55 AM   Hub Color/Line Status Pink;Flushed;Patent 3/28/2018  2:55 AM        Opportunity for questions and clarification was provided.       Patient transported with:   [facility]

## 2018-03-29 VITALS
WEIGHT: 224.6 LBS | SYSTOLIC BLOOD PRESSURE: 124 MMHG | HEIGHT: 69 IN | RESPIRATION RATE: 18 BRPM | DIASTOLIC BLOOD PRESSURE: 76 MMHG | HEART RATE: 73 BPM | BODY MASS INDEX: 33.27 KG/M2 | TEMPERATURE: 99.3 F | OXYGEN SATURATION: 96 %

## 2018-03-29 LAB
ALBUMIN SERPL-MCNC: 3.1 G/DL (ref 3.4–5)
ALBUMIN/GLOB SERPL: 0.7 {RATIO} (ref 0.8–1.7)
ALP SERPL-CCNC: 78 U/L (ref 45–117)
ALT SERPL-CCNC: 21 U/L (ref 16–61)
ANION GAP SERPL CALC-SCNC: 10 MMOL/L (ref 3–18)
APTT PPP: 28.5 SEC (ref 23–36.4)
AST SERPL-CCNC: 17 U/L (ref 15–37)
BILIRUB SERPL-MCNC: 0.5 MG/DL (ref 0.2–1)
BUN SERPL-MCNC: 9 MG/DL (ref 7–18)
BUN/CREAT SERPL: 8 (ref 12–20)
CALCIUM SERPL-MCNC: 8.3 MG/DL (ref 8.5–10.1)
CHLORIDE SERPL-SCNC: 100 MMOL/L (ref 100–108)
CHOLEST SERPL-MCNC: 138 MG/DL
CO2 SERPL-SCNC: 27 MMOL/L (ref 21–32)
CREAT SERPL-MCNC: 1.19 MG/DL (ref 0.6–1.3)
ERYTHROCYTE [DISTWIDTH] IN BLOOD BY AUTOMATED COUNT: 13.5 % (ref 11.6–14.5)
GLOBULIN SER CALC-MCNC: 4.3 G/DL (ref 2–4)
GLUCOSE BLD STRIP.AUTO-MCNC: 140 MG/DL (ref 70–110)
GLUCOSE BLD STRIP.AUTO-MCNC: 173 MG/DL (ref 70–110)
GLUCOSE BLD STRIP.AUTO-MCNC: 184 MG/DL (ref 70–110)
GLUCOSE SERPL-MCNC: 146 MG/DL (ref 74–99)
HCT VFR BLD AUTO: 39.3 % (ref 36–48)
HDLC SERPL-MCNC: 38 MG/DL (ref 40–60)
HDLC SERPL: 3.6 {RATIO} (ref 0–5)
HGB BLD-MCNC: 12.6 G/DL (ref 13–16)
LDLC SERPL CALC-MCNC: 63.4 MG/DL (ref 0–100)
LIPID PROFILE,FLP: ABNORMAL
MCH RBC QN AUTO: 29.9 PG (ref 24–34)
MCHC RBC AUTO-ENTMCNC: 32.1 G/DL (ref 31–37)
MCV RBC AUTO: 93.3 FL (ref 74–97)
PLATELET # BLD AUTO: 281 K/UL (ref 135–420)
PMV BLD AUTO: 11.2 FL (ref 9.2–11.8)
POTASSIUM SERPL-SCNC: 2.6 MMOL/L (ref 3.5–5.5)
PROT SERPL-MCNC: 7.4 G/DL (ref 6.4–8.2)
RBC # BLD AUTO: 4.21 M/UL (ref 4.7–5.5)
SODIUM SERPL-SCNC: 137 MMOL/L (ref 136–145)
TRIGL SERPL-MCNC: 183 MG/DL (ref ?–150)
VLDLC SERPL CALC-MCNC: 36.6 MG/DL
WBC # BLD AUTO: 11.2 K/UL (ref 4.6–13.2)

## 2018-03-29 PROCEDURE — 74011250637 HC RX REV CODE- 250/637: Performed by: INTERNAL MEDICINE

## 2018-03-29 PROCEDURE — 74011250637 HC RX REV CODE- 250/637: Performed by: HOSPITALIST

## 2018-03-29 PROCEDURE — 74011250637 HC RX REV CODE- 250/637: Performed by: EMERGENCY MEDICINE

## 2018-03-29 PROCEDURE — 90686 IIV4 VACC NO PRSV 0.5 ML IM: CPT | Performed by: HOSPITALIST

## 2018-03-29 PROCEDURE — 82962 GLUCOSE BLOOD TEST: CPT

## 2018-03-29 PROCEDURE — 80061 LIPID PANEL: CPT | Performed by: HOSPITALIST

## 2018-03-29 PROCEDURE — 74011250636 HC RX REV CODE- 250/636: Performed by: HOSPITALIST

## 2018-03-29 PROCEDURE — 74011000250 HC RX REV CODE- 250: Performed by: EMERGENCY MEDICINE

## 2018-03-29 PROCEDURE — 74011636637 HC RX REV CODE- 636/637: Performed by: HOSPITALIST

## 2018-03-29 PROCEDURE — 74011250637 HC RX REV CODE- 250/637: Performed by: NURSE PRACTITIONER

## 2018-03-29 PROCEDURE — 74011250636 HC RX REV CODE- 250/636: Performed by: EMERGENCY MEDICINE

## 2018-03-29 PROCEDURE — 85027 COMPLETE CBC AUTOMATED: CPT | Performed by: HOSPITALIST

## 2018-03-29 PROCEDURE — 36415 COLL VENOUS BLD VENIPUNCTURE: CPT | Performed by: HOSPITALIST

## 2018-03-29 PROCEDURE — 74011000258 HC RX REV CODE- 258: Performed by: HOSPITALIST

## 2018-03-29 PROCEDURE — 74011000250 HC RX REV CODE- 250: Performed by: HOSPITALIST

## 2018-03-29 PROCEDURE — 85730 THROMBOPLASTIN TIME PARTIAL: CPT | Performed by: HOSPITALIST

## 2018-03-29 PROCEDURE — 90471 IMMUNIZATION ADMIN: CPT

## 2018-03-29 PROCEDURE — 80053 COMPREHEN METABOLIC PANEL: CPT | Performed by: HOSPITALIST

## 2018-03-29 RX ORDER — LEVOFLOXACIN 750 MG/1
750 TABLET ORAL EVERY 24 HOURS
Qty: 14 TAB | Refills: 0 | Status: SHIPPED | OUTPATIENT
Start: 2018-03-30 | End: 2018-04-13

## 2018-03-29 RX ORDER — OXYCODONE AND ACETAMINOPHEN 7.5; 325 MG/1; MG/1
1 TABLET ORAL
Qty: 20 TAB | Refills: 0 | Status: ON HOLD | OUTPATIENT
Start: 2018-03-29 | End: 2019-12-27 | Stop reason: SDUPTHER

## 2018-03-29 RX ORDER — POTASSIUM CHLORIDE 7.45 MG/ML
10 INJECTION INTRAVENOUS
Status: DISCONTINUED | OUTPATIENT
Start: 2018-03-29 | End: 2018-03-29 | Stop reason: SDUPTHER

## 2018-03-29 RX ORDER — METOCLOPRAMIDE 5 MG/1
5 TABLET ORAL
Qty: 56 TAB | Refills: 0 | Status: SHIPPED | OUTPATIENT
Start: 2018-03-29 | End: 2018-04-12

## 2018-03-29 RX ORDER — LEVOFLOXACIN 750 MG/1
750 TABLET ORAL EVERY 24 HOURS
Status: DISCONTINUED | OUTPATIENT
Start: 2018-03-29 | End: 2018-03-29 | Stop reason: HOSPADM

## 2018-03-29 RX ORDER — SUCRALFATE 1 G/10ML
1 SUSPENSION ORAL
Qty: 414 ML | Refills: 0 | Status: SHIPPED | OUTPATIENT
Start: 2018-03-29 | End: 2019-12-27

## 2018-03-29 RX ORDER — ACETAMINOPHEN 325 MG/1
650 TABLET ORAL
Qty: 30 TAB | Refills: 0 | Status: SHIPPED
Start: 2018-03-29 | End: 2022-03-04

## 2018-03-29 RX ORDER — AMOXICILLIN 250 MG/1
1000 CAPSULE ORAL EVERY 12 HOURS
Status: DISCONTINUED | OUTPATIENT
Start: 2018-03-29 | End: 2018-03-29 | Stop reason: HOSPADM

## 2018-03-29 RX ORDER — METRONIDAZOLE 500 MG/1
500 TABLET ORAL EVERY 12 HOURS
Qty: 28 TAB | Refills: 0 | Status: SHIPPED | OUTPATIENT
Start: 2018-03-29 | End: 2018-04-12

## 2018-03-29 RX ORDER — PANTOPRAZOLE SODIUM 40 MG/1
40 TABLET, DELAYED RELEASE ORAL
Qty: 60 TAB | Refills: 0 | Status: SHIPPED | OUTPATIENT
Start: 2018-03-29

## 2018-03-29 RX ORDER — AMOXICILLIN 500 MG/1
1000 CAPSULE ORAL EVERY 12 HOURS
Qty: 56 CAP | Refills: 0 | Status: SHIPPED | OUTPATIENT
Start: 2018-03-29 | End: 2018-04-12

## 2018-03-29 RX ORDER — POTASSIUM CHLORIDE 1.5 G/1.77G
20 POWDER, FOR SOLUTION ORAL 2 TIMES DAILY WITH MEALS
Qty: 14 PACKET | Refills: 0 | Status: SHIPPED | OUTPATIENT
Start: 2018-03-29 | End: 2018-04-05

## 2018-03-29 RX ORDER — METRONIDAZOLE 500 MG/1
500 TABLET ORAL EVERY 12 HOURS
Status: DISCONTINUED | OUTPATIENT
Start: 2018-03-29 | End: 2018-03-29 | Stop reason: HOSPADM

## 2018-03-29 RX ADMIN — MORPHINE SULFATE 4 MG: 4 INJECTION INTRAVENOUS at 00:28

## 2018-03-29 RX ADMIN — PREGABALIN 75 MG: 75 CAPSULE ORAL at 17:18

## 2018-03-29 RX ADMIN — LIDOCAINE HYDROCHLORIDE: 10 INJECTION, SOLUTION EPIDURAL; INFILTRATION; INTRACAUDAL; PERINEURAL at 11:49

## 2018-03-29 RX ADMIN — LIDOCAINE HYDROCHLORIDE: 10 INJECTION, SOLUTION EPIDURAL; INFILTRATION; INTRACAUDAL; PERINEURAL at 10:27

## 2018-03-29 RX ADMIN — MORPHINE SULFATE 4 MG: 4 INJECTION INTRAVENOUS at 04:34

## 2018-03-29 RX ADMIN — INFLUENZA VIRUS VACCINE 0.5 ML: 15; 15; 15; 15 SUSPENSION INTRAMUSCULAR at 17:18

## 2018-03-29 RX ADMIN — AMLODIPINE BESYLATE 10 MG: 10 TABLET ORAL at 08:46

## 2018-03-29 RX ADMIN — SUCRALFATE 1 G: 1 SUSPENSION ORAL at 10:45

## 2018-03-29 RX ADMIN — METRONIDAZOLE 500 MG: 500 TABLET ORAL at 14:24

## 2018-03-29 RX ADMIN — SIMVASTATIN 10 MG: 10 TABLET, FILM COATED ORAL at 00:28

## 2018-03-29 RX ADMIN — MORPHINE SULFATE 4 MG: 4 INJECTION INTRAVENOUS at 08:47

## 2018-03-29 RX ADMIN — HEPARIN SODIUM 5000 UNITS: 5000 INJECTION, SOLUTION INTRAVENOUS; SUBCUTANEOUS at 00:29

## 2018-03-29 RX ADMIN — SODIUM CHLORIDE 125 ML/HR: 900 INJECTION, SOLUTION INTRAVENOUS at 02:43

## 2018-03-29 RX ADMIN — LIDOCAINE HYDROCHLORIDE: 10 INJECTION, SOLUTION EPIDURAL; INFILTRATION; INTRACAUDAL; PERINEURAL at 12:36

## 2018-03-29 RX ADMIN — INSULIN GLARGINE 15 UNITS: 100 INJECTION, SOLUTION SUBCUTANEOUS at 00:30

## 2018-03-29 RX ADMIN — SUCRALFATE 1 G: 1 SUSPENSION ORAL at 15:40

## 2018-03-29 RX ADMIN — HEPARIN SODIUM 5000 UNITS: 5000 INJECTION, SOLUTION INTRAVENOUS; SUBCUTANEOUS at 17:18

## 2018-03-29 RX ADMIN — LIDOCAINE HYDROCHLORIDE: 10 INJECTION, SOLUTION EPIDURAL; INFILTRATION; INTRACAUDAL; PERINEURAL at 09:36

## 2018-03-29 RX ADMIN — Medication 2 G: at 17:23

## 2018-03-29 RX ADMIN — PREGABALIN 75 MG: 75 CAPSULE ORAL at 08:46

## 2018-03-29 RX ADMIN — POTASSIUM CHLORIDE 20 MEQ: 1.5 POWDER, FOR SOLUTION ORAL at 10:41

## 2018-03-29 RX ADMIN — HYDROCHLOROTHIAZIDE 25 MG: 25 TABLET ORAL at 08:46

## 2018-03-29 RX ADMIN — Medication 2 G: at 04:35

## 2018-03-29 RX ADMIN — ONDANSETRON 4 MG: 2 INJECTION, SOLUTION INTRAMUSCULAR; INTRAVENOUS at 04:34

## 2018-03-29 RX ADMIN — AMITRIPTYLINE HYDROCHLORIDE 50 MG: 25 TABLET, FILM COATED ORAL at 00:26

## 2018-03-29 RX ADMIN — ONDANSETRON 4 MG: 2 INJECTION, SOLUTION INTRAMUSCULAR; INTRAVENOUS at 10:46

## 2018-03-29 RX ADMIN — CLOPIDOGREL BISULFATE 75 MG: 75 TABLET ORAL at 10:41

## 2018-03-29 RX ADMIN — MORPHINE SULFATE 4 MG: 4 INJECTION INTRAVENOUS at 13:16

## 2018-03-29 RX ADMIN — LEVOFLOXACIN 750 MG: 750 TABLET, FILM COATED ORAL at 13:10

## 2018-03-29 RX ADMIN — LIDOCAINE HYDROCHLORIDE: 10 INJECTION, SOLUTION EPIDURAL; INFILTRATION; INTRACAUDAL; PERINEURAL at 08:44

## 2018-03-29 RX ADMIN — LIDOCAINE HYDROCHLORIDE: 10 INJECTION, SOLUTION EPIDURAL; INFILTRATION; INTRACAUDAL; PERINEURAL at 06:54

## 2018-03-29 RX ADMIN — AMOXICILLIN 1000 MG: 250 CAPSULE ORAL at 15:40

## 2018-03-29 RX ADMIN — HEPARIN SODIUM 5000 UNITS: 5000 INJECTION, SOLUTION INTRAVENOUS; SUBCUTANEOUS at 08:47

## 2018-03-29 RX ADMIN — PANTOPRAZOLE SODIUM 40 MG: 40 TABLET, DELAYED RELEASE ORAL at 16:49

## 2018-03-29 RX ADMIN — PANTOPRAZOLE SODIUM 40 MG: 40 TABLET, DELAYED RELEASE ORAL at 08:46

## 2018-03-29 RX ADMIN — SUCRALFATE 1 G: 1 SUSPENSION ORAL at 00:27

## 2018-03-29 NOTE — DISCHARGE SUMMARY
[facility]  Discharge Summary       Patient: Dwaine Otero Age: 62 y.o. : 1960 MR#: [id number] SSN: [id number]  PCP on record: Farhad Kang MD  Admit date: 3/27/2018  Discharge date: 3/29/2018    Disposition:    []Home   []Home with Home Health   []SNF/NH   []Rehab   [x]Home with family   []Alternate Facility:____________________    Discharge Diagnoses:                             1. Abdominal pain   2. Nausea and vomiting    3. History of esophagitis (Grade 2)  4. Leukocytosis   5. Type 2 DM  6. Personal history of colon polyps  7. Hypokalemia     Discharge Medications:     Current Discharge Medication List      START taking these medications    Details   sucralfate (CARAFATE) 100 mg/mL suspension Take 10 mL by mouth Before breakfast, lunch, dinner and at bedtime. Qty: 414 mL, Refills: 0      potassium chloride (KLOR-CON) 20 mEq packet Take 1 Packet by mouth two (2) times daily (with meals) for 7 days. Qty: 14 Packet, Refills: 0      amoxicillin (AMOXIL) 500 mg capsule Take 2 Caps by mouth every twelve (12) hours for 14 days. Qty: 56 Cap, Refills: 0      levoFLOXacin (LEVAQUIN) 750 mg tablet Take 1 Tab by mouth every twenty-four (24) hours for 14 days. Qty: 14 Tab, Refills: 0      metroNIDAZOLE (FLAGYL) 500 mg tablet Take 1 Tab by mouth every twelve (12) hours for 14 days. Qty: 28 Tab, Refills: 0      metoclopramide HCl (REGLAN) 5 mg tablet Take 1 Tab by mouth Before breakfast, lunch, dinner and at bedtime for 14 days. Qty: 56 Tab, Refills: 0         CONTINUE these medications which have CHANGED    Details   oxyCODONE-acetaminophen (PERCOCET 7.5) 7.5-325 mg per tablet Take 1 Tab by mouth every four (4) hours as needed. Max Daily Amount: 6 Tabs.  Indications: Pain  Qty: 20 Tab, Refills: 0    Associated Diagnoses: S/P umbilical hernia repair, follow-up exam      pantoprazole (PROTONIX) 40 mg tablet Take 1 Tab by mouth Before breakfast and dinner. Qty: 60 Tab, Refills: 0      acetaminophen (TYLENOL) 325 mg tablet Take 2 Tabs by mouth every six (6) hours as needed. Indications: Pain, take it with bentyl; do not exceed 3 g tylenol daily  Qty: 30 Tab, Refills: 0         CONTINUE these medications which have NOT CHANGED    Details   ondansetron (ZOFRAN ODT) 4 mg disintegrating tablet Take 1 Tab by mouth every eight (8) hours as needed for Nausea. Qty: 6 Tab, Refills: 0      OTHER No lifting weights > 15 lbs for two weeks. Qty: 1 Each, Refills: 0      insulin detemir U-100 (LEVEMIR U-100 INSULIN) 100 unit/mL injection 15 Units by SubCUTAneous route nightly. Qty: 10 mL, Refills: 0      OTHER Incentive spirometry- use as directed  Qty: 1 Each, Refills: 0      hydroCHLOROthiazide (HYDRODIURIL) 50 mg tablet Take 0.5 Tabs by mouth daily. Qty: 15 Tab, Refills: 0      albuterol (PROVENTIL HFA, VENTOLIN HFA, PROAIR HFA) 90 mcg/actuation inhaler 2 puffs every 6 hours x 5 days then every 6 hours as needed for shortness of breath and/or wheezing  Qty: 1 Inhaler, Refills: 0      polyethylene glycol (MIRALAX) 17 gram packet Take 1 Packet by mouth daily. Qty: 30 Packet, Refills: 0      amLODIPine (NORVASC) 10 mg tablet Take 10 mg by mouth daily. pregabalin (LYRICA) 75 mg capsule 1 cap bid x 1 week, then 2 caps bid  Qty: 120 Cap, Refills: 0      amitriptyline (ELAVIL) 50 mg tablet Take 50 mg by mouth nightly. simvastatin (ZOCOR) 10 mg tablet Take 10 mg by mouth nightly. clopidogrel (PLAVIX) 75 mg tablet Take 75 mg by mouth daily.      - Potential duplicate medications found. Please discuss with provider. Consults:    - Gastroenterology    Procedures:  -   None    Significant Diagnostic Studies:   -  XR ABD ACUTE W 1 V CHEST on 03/27/2018  IMPRESSION:     1. No clearly acute findings. See details above. CT ABD PELV W CONT on 03/27/2018  IMPRESSION:     1.  Esophageal thickening, suggestive of esophagitis.   This could be the source  of the abdominal pain.     2. Similar appearance of umbilical region rim-enhancing hypodensity. Post-surgical change vs. infectious process.     3.  Stable adrenal nodules.      4.  Stable right renal cyst.    Hospital Course by Problem   1. Abdominal pain - Unclear etiology with recent extensive GI workup while inpatient including laparascopic surgery, r/o mesenteric ischemia. Gastroenterology followed along with patient this admission as well with recommendation for outpatient EGD which would require off plavix for 5 day duration. Follow up as outpatient for coordination, encouraged patient to continue with plavix until directed to hold per GI.    2. Nausea and vomiting - Potential element of gastroparesis and potential infectious process of which plan was reviewed with Dr. Kevin Mohr with plan to trial resumption of low dose reglan and antibiotic course for H. Pylori treatment provided to patient. 3. History of esophagitis (Grade 2) - Per EGD 11/2010 and CT scan suggestive of on 03/27. Continue carafate and PPI BID per GI recommendations. Likely not cause of abdominal pain per GI. 4. Leukocytosis - admitted with WBC of 16.7 and patient was placed on cefepime, WBC trending down to 13.1 and 11.2 subsequently. Blood cultures remain without growth. Will continue treatment for H.pylori upon discharge. 5. DM - A1c 7.9% on 3/20/18, patient was continued on home regimen on discharge. Blood sugars at goal despite poor intake. 6. Personal history of colon polyps - due for surveilance, follow with GI as outpatient  7. Hypokalemia - noted while inpatient with potassium of 2.6 on 03/26. Patient received replacement while admitted with 50meq of IV and 20meq of oral replacement on day of discharge. Continue replacement orally on discharge and follow up in outpatient setting for recheck of BMP as appropriate.       Today's examination of the patient revealed:     Subjective:   Reports lower abdominal pain ongoing for the past couple years, reports worsening over the past couple weeks. States concerned to eat as worried will worsen abd pain. Denies nausea, no constipation. Partial relief (short term) with morphine. States he takes percocet PRN for back pain. Objective:   VS:   Visit Vitals    /76 (BP 1 Location: Left arm, BP Patient Position: At rest)    Pulse 73    Temp 99.3 °F (37.4 °C)    Resp 18    Ht 5' 9\" (1.753 m)    Wt 101.9 kg (224 lb 9.6 oz)    SpO2 96%    BMI 33.17 kg/m2      Tmax/24hrs: Temp (24hrs), Av.8 °F (37.1 °C), Min:98.3 °F (36.8 °C), Max:99.3 °F (37.4 °C)     Input/Output:   Intake/Output Summary (Last 24 hours) at 18 1605  Last data filed at 18 1049   Gross per 24 hour   Intake                0 ml   Output             2550 ml   Net            -2550 ml       General:  Alert, NAD  Cardiovascular:  RRR  Pulmonary:  LSC throughout; respiratory effort WNL  GI:  +BS in all four quadrants, soft, non-tender  Extremities:  No edema; 2+ dorsalis pedis pulses bilaterally  Additional:      Labs:    Recent Results (from the past 24 hour(s))   GLUCOSE, POC    Collection Time: 18 12:03 AM   Result Value Ref Range    Glucose (POC) 140 (H) 70 - 615 mg/dL   METABOLIC PANEL, COMPREHENSIVE    Collection Time: 18  2:23 AM   Result Value Ref Range    Sodium 137 136 - 145 mmol/L    Potassium 2.6 (LL) 3.5 - 5.5 mmol/L    Chloride 100 100 - 108 mmol/L    CO2 27 21 - 32 mmol/L    Anion gap 10 3.0 - 18 mmol/L    Glucose 146 (H) 74 - 99 mg/dL    BUN 9 7.0 - 18 MG/DL    Creatinine 1.19 0.6 - 1.3 MG/DL    BUN/Creatinine ratio 8 (L) 12 - 20      GFR est AA >60 >60 ml/min/1.73m2    GFR est non-AA >60 >60 ml/min/1.73m2    Calcium 8.3 (L) 8.5 - 10.1 MG/DL    Bilirubin, total 0.5 0.2 - 1.0 MG/DL    ALT (SGPT) 21 16 - 61 U/L    AST (SGOT) 17 15 - 37 U/L    Alk.  phosphatase 78 45 - 117 U/L    Protein, total 7.4 6.4 - 8.2 g/dL    Albumin 3.1 (L) 3.4 - 5.0 g/dL    Globulin 4.3 (H) 2.0 - 4.0 g/dL A-G Ratio 0.7 (L) 0.8 - 1.7     LIPID PANEL    Collection Time: 03/29/18  2:23 AM   Result Value Ref Range    LIPID PROFILE          Cholesterol, total 138 <200 MG/DL    Triglyceride 183 (H) <150 MG/DL    HDL Cholesterol 38 (L) 40 - 60 MG/DL    LDL, calculated 63.4 0 - 100 MG/DL    VLDL, calculated 36.6 MG/DL    CHOL/HDL Ratio 3.6 0 - 5.0     CBC W/O DIFF    Collection Time: 03/29/18  2:23 AM   Result Value Ref Range    WBC 11.2 4.6 - 13.2 K/uL    RBC 4.21 (L) 4.70 - 5.50 M/uL    HGB 12.6 (L) 13.0 - 16.0 g/dL    HCT 39.3 36.0 - 48.0 %    MCV 93.3 74.0 - 97.0 FL    MCH 29.9 24.0 - 34.0 PG    MCHC 32.1 31.0 - 37.0 g/dL    RDW 13.5 11.6 - 14.5 %    PLATELET 084 999 - 589 K/uL    MPV 11.2 9.2 - 11.8 FL   PTT    Collection Time: 03/29/18  2:23 AM   Result Value Ref Range    aPTT 28.5 23.0 - 36.4 SEC   GLUCOSE, POC    Collection Time: 03/29/18  8:11 AM   Result Value Ref Range    Glucose (POC) 184 (H) 70 - 110 mg/dL   GLUCOSE, POC    Collection Time: 03/29/18 12:12 PM   Result Value Ref Range    Glucose (POC) 173 (H) 70 - 110 mg/dL     Additional Data Reviewed:     Condition:   Follow-up Appointments:   pcp 5-7 days GI in 1-2 weeks    >30 minutes spent coordinating this discharge (review instructions/follow-up, prescriptions, preparing report for sign off)    Signed:  Judah Franklin NP  3/29/2018  4:05 PM

## 2018-03-29 NOTE — PROGRESS NOTES
Care Management Interventions  PCP Verified by CM: Yes  Current Support Network: [place], Lives with Caregiver, Family Lives Nearby  Confirm Follow Up Transport: Family  Plan discussed with Pt/Family/Caregiver: Yes     Pt is a 62year old male admitted for vomiting chronic abdominal pain. Pt states that he resides with his sister Cherelle Matta ([phone]) and his plan is to return home at discharge. Pt indicates being totally independent with ADLs and ambulation. Family will assist with transportation home.

## 2018-03-29 NOTE — ROUTINE PROCESS
Bedside and Verbal shift change report given to Corby Sterling RN (oncoming nurse) by Darryn Del Toro RN (offgoing nurse). Report included the following information SBAR, Kardex, MAR and Recent Results.     SITUATION:  Code Status: Full Code  Reason for Admission: Vomiting  Chronic abdominal pain  Hospital day: 1  Problem List:       Hospital Problems  Date Reviewed: 3/23/2018          Codes Class Noted POA    Vomiting ICD-10-CM: R11.10  ICD-9-CM: 787.03  3/28/2018 Unknown        Chronic abdominal pain ICD-10-CM: R10.9, G89.29  ICD-9-CM: 789.00, 338.29  3/28/2018 Unknown              BACKGROUND:   Past Medical History:   Past Medical History:   Diagnosis Date    Arthritis     Coronary atherosclerosis of native coronary artery     S/P PCI with GE in 12/09    Diabetes ([facility] [address]     IDDM    Electrolyte and fluid disorders not elsewhere classified     Essential hypertension, benign     GERD (gastroesophageal reflux disease)     Heroin abuse 05/26/16    Psychiatrist Dr. Aleah Baldwin History of heart attack     Hyperlipidemia     Intermediate coronary syndrome ([facility] [address]     MDD (major depressive disorder) 5/26/16    Psychiatrist Dr. Aleah Baldwin Myocardial infarction     Obesity, unspecified     Old myocardial infarction     Other and unspecified hyperlipidemia     Pancreatitis     Positive PPD     Sleep apnea     uses Cpap machine    Transfusion history     Before 1992      Patient taking anticoagulants yes    Patient has a defibrillator: no    History of shots YES for example, flu, pneumonia, tetanus   Isolation History NO for example, MRSA, CDiff    ASSESSMENT:  Changes in Assessment Throughout Shift: None  Significant Changes in 24 hours (for example, RR/code, fall)  Patient has Central Line: no Reasons if yes: N/A  Patient has Van Cath: no Reasons if yes: N/A   Mobility Issues  PT  IV Patency  OR Checklist  Pending Tests    Last Vitals:  Vitals w/ MEWS Score (last day)     Date/Time MEWS Score Pulse Resp Temp BP Level of Consciousness SpO2    03/29/18 0415 1 82 18 99.1 °F (37.3 °C) (!)  150/97 Alert 99 %    03/29/18 0013 1 65 18 98.9 °F (37.2 °C) 137/90 Alert 95 %    03/28/18 2000 1 70 18 98.5 °F (36.9 °C) 140/85 Alert 97 %    03/28/18 1556 1 76 18 98.4 °F (36.9 °C) 166/82 Alert 96 %    03/28/18 1157 1 75 16 99.1 °F (37.3 °C) 131/84 Alert 99 %    03/28/18 0758 1 71 16 97.8 °F (36.6 °C) 150/84 Alert 98 %    03/28/18 0421 0 74 12 98.7 °F (37.1 °C) 156/86 Alert 93 %    03/28/18 0245 -- 71 11 -- 150/80 -- --    03/28/18 0230 -- 71 8 -- 151/81 -- --    03/28/18 0200 -- 72 13 -- 149/90 -- --    03/28/18 0145 -- 70 (!)  7 -- (!)  143/95 -- --    03/28/18 0115 -- 69 8 -- 152/79 -- --    03/28/18 0100 -- 69 9 -- 141/81 -- --    03/28/18 0045 -- 70 10 -- 149/76 -- --    03/28/18 0030 -- 71 9 -- 145/75 -- --    03/28/18 0000 -- 73 9 -- (!)  155/95 -- --            PAIN    Pain Assessment    Pain Intensity 1: 2 (03/29/18 0515)    Pain Location 1: Abdomen    Pain Intervention(s) 1: Medication (see MAR)    Patient Stated Pain Goal: 0  Intervention effective: yes  Time of last intervention: 0434 Reassessment Completed: yes   Other actions taken for pain: None    Last 3 Weights:  Last 3 Recorded Weights in this Encounter    03/27/18 1535   Weight: 106.6 kg (235 lb)   Weight change:     INTAKE/OUPUT    Current Shift: 03/28 1901 - 03/29 0700  In: -   Out: 1700 [Urine:1700]    Last three shifts:      RECOMMENDATIONS AND DISCHARGE PLANNING  Patient needs and requests: Pain control, nausea    Pending tests/procedures: Hold Plavix for EGD(5 days)    Discharge plan for patient: TBD    Discharge planning Needs or Barriers: TBD    Estimated Discharge Date: TBD Posted on Whiteboard in Patients Room: no       \"HEALS\" SAFETY CHECK  A safety check occurred in the patient's room between off going nurse and oncoming nurse listed above.     The safety check included the below items:    H  High Alert Medications Verify all high alert medication drips (heparin, PCA, etc.)  E  Equipment Suction is set up for ALL patients (with steven)  Red plugs utilized for all equipment (IV pumps, etc.)  WOWs wiped down at end of shift. Room stocked with oxygen, suction, and other unit-specific supplies  A  Alarms Bed alarm is set for fall risk patients  Ensure chair alarm is in place and activated if patient is up in a chair  L  Lines Check IV for any infiltration  Van bag is empty if patient has a Van   Tubing and IV bags are labeled  S  Safety  Room is clean, patient is clean, and equipment is clean. Hallways are clear from equipment besides carts. Fall bracelet on for fall risk patients  Ensure room is clear and free of clutter  Suction is set up for ALL patients (with steven)  Hallways are clear from equipment besides carts.    Isolation precautions followed, supplies available outside room, sign posted    Nickola Krabbe, RN

## 2018-03-29 NOTE — DISCHARGE INSTRUCTIONS
DISCHARGE SUMMARY from Nurse    PATIENT INSTRUCTIONS:    After general anesthesia or intravenous sedation, for 24 hours or while taking prescription Narcotics:  · Limit your activities  · Do not drive and operate hazardous machinery  · Do not make important personal or business decisions  · Do  not drink alcoholic beverages  · If you have not urinated within 8 hours after discharge, please contact your surgeon on call. Report the following to your surgeon:  · Excessive pain, swelling, redness or odor of or around the surgical area  · Temperature over 100.5  · Nausea and vomiting lasting longer than 4 hours or if unable to take medications  · Any signs of decreased circulation or nerve impairment to extremity: change in color, persistent  numbness, tingling, coldness or increase pain  · Any questions    What to do at Home:  Recommended activity: Activity as tolerated. If you experience any of the following symptoms chest pain, difficulty breathing, changes in mental status, increased weakness, please call 911. *  Please give a list of your current medications to your Primary Care Provider. *  Please update this list whenever your medications are discontinued, doses are      changed, or new medications (including over-the-counter products) are added. *  Please carry medication information at all times in case of emergency situations. These are general instructions for a healthy lifestyle:    No smoking/ No tobacco products/ Avoid exposure to second hand smoke  Surgeon General's Warning:  Quitting smoking now greatly reduces serious risk to your health.     Obesity, smoking, and sedentary lifestyle greatly increases your risk for illness    A healthy diet, regular physical exercise & weight monitoring are important for maintaining a healthy lifestyle    You may be retaining fluid if you have a history of heart failure or if you experience any of the following symptoms:  Weight gain of 3 pounds or more overnight or 5 pounds in a week, increased swelling in our hands or feet or shortness of breath while lying flat in bed. Please call your doctor as soon as you notice any of these symptoms; do not wait until your next office visit. Recognize signs and symptoms of STROKE:    F-face looks uneven    A-arms unable to move or move unevenly    S-speech slurred or non-existent    T-time-call 911 as soon as signs and symptoms begin-DO NOT go       Back to bed or wait to see if you get better-TIME IS BRAIN. Warning Signs of HEART ATTACK     Call 911 if you have these symptoms:   Chest discomfort. Most heart attacks involve discomfort in the center of the chest that lasts more than a few minutes, or that goes away and comes back. It can feel like uncomfortable pressure, squeezing, fullness, or pain.  Discomfort in other areas of the upper body. Symptoms can include pain or discomfort in one or both arms, the back, neck, jaw, or stomach.  Shortness of breath with or without chest discomfort.  Other signs may include breaking out in a cold sweat, nausea, or lightheadedness. Don't wait more than five minutes to call 911 - MINUTES MATTER! Fast action can save your life. Calling 911 is almost always the fastest way to get lifesaving treatment. Emergency Medical Services staff can begin treatment when they arrive -- up to an hour sooner than if someone gets to the hospital by car. The discharge information has been reviewed with the patient. The patient verbalized understanding. Discharge medications reviewed with the patient and appropriate educational materials and side effects teaching were provided. Patient armband removed and shredded    ___________________________________________________________________________________________________________________________________       Abdominal Pain: Care Instructions  Your Care Instructions    Abdominal pain has many possible causes.  Some aren't serious and get better on their own in a few days. Others need more testing and treatment. If your pain continues or gets worse, you need to be rechecked and may need more tests to find out what is wrong. You may need surgery to correct the problem. Don't ignore new symptoms, such as fever, nausea and vomiting, urination problems, pain that gets worse, and dizziness. These may be signs of a more serious problem. Your doctor may have recommended a follow-up visit in the next 8 to 12 hours. If you are not getting better, you may need more tests or treatment. The doctor has checked you carefully, but problems can develop later. If you notice any problems or new symptoms, get medical treatment right away. Follow-up care is a key part of your treatment and safety. Be sure to make and go to all appointments, and call your doctor if you are having problems. It's also a good idea to know your test results and keep a list of the medicines you take. How can you care for yourself at home? · Rest until you feel better. · To prevent dehydration, drink plenty of fluids, enough so that your urine is light yellow or clear like water. Choose water and other caffeine-free clear liquids until you feel better. If you have kidney, heart, or liver disease and have to limit fluids, talk with your doctor before you increase the amount of fluids you drink. · If your stomach is upset, eat mild foods, such as rice, dry toast or crackers, bananas, and applesauce. Try eating several small meals instead of two or three large ones. · Wait until 48 hours after all symptoms have gone away before you have spicy foods, alcohol, and drinks that contain caffeine. · Do not eat foods that are high in fat. · Avoid anti-inflammatory medicines such as aspirin, ibuprofen (Advil, Motrin), and naproxen (Aleve). These can cause stomach upset. Talk to your doctor if you take daily aspirin for another health problem. When should you call for help?   Call [phone] anytime you think you may need emergency care. For example, call if:  ? · You passed out (lost consciousness). ? · You pass maroon or very bloody stools. ? · You vomit blood or what looks like coffee grounds. ? · You have new, severe belly pain. ?Call your doctor now or seek immediate medical care if:  ? · Your pain gets worse, especially if it becomes focused in one area of your belly. ? · You have a new or higher fever. ? · Your stools are black and look like tar, or they have streaks of blood. ? · You have unexpected vaginal bleeding. ? · You have symptoms of a urinary tract infection. These may include:  ¨ Pain when you urinate. ¨ Urinating more often than usual.  ¨ Blood in your urine. ? · You are dizzy or lightheaded, or you feel like you may faint. ? Watch closely for changes in your health, and be sure to contact your doctor if:  ? · You are not getting better after 1 day (24 hours). Where can you learn more? Go to http://kaila-carlitos.info/. Enter A427 in the search box to learn more about \"Abdominal Pain: Care Instructions. \"  Current as of: March 20, 2017  Content Version: 11.4  © 3743-2822 [employer]. Care instructions adapted under license by [employer] (which disclaims liability or warranty for this information). If you have questions about a medical condition or this instruction, always ask your healthcare professional. [place] [address] any warranty or liability for your use of this information. Nausea and Vomiting: Care Instructions  Your Care Instructions    When you are nauseated, you may feel weak and sweaty and notice a lot of saliva in your mouth. Nausea often leads to vomiting. Most of the time you do not need to worry about nausea and vomiting, but they can be signs of other illnesses. Two common causes of nausea and vomiting are stomach flu and food poisoning.  Nausea and vomiting from viral stomach flu will usually start to improve within 24 hours. Nausea and vomiting from food poisoning may last from 12 to 48 hours. The doctor has checked you carefully, but problems can develop later. If you notice any problems or new symptoms, get medical treatment right away. Follow-up care is a key part of your treatment and safety. Be sure to make and go to all appointments, and call your doctor if you are having problems. It's also a good idea to know your test results and keep a list of the medicines you take. How can you care for yourself at home? · To prevent dehydration, drink plenty of fluids, enough so that your urine is light yellow or clear like water. Choose water and other caffeine-free clear liquids until you feel better. If you have kidney, heart, or liver disease and have to limit fluids, talk with your doctor before you increase the amount of fluids you drink. · Rest in bed until you feel better. · When you are able to eat, try clear soups, mild foods, and liquids until all symptoms are gone for 12 to 48 hours. Other good choices include dry toast, crackers, cooked cereal, and gelatin dessert, such as Jell-O. When should you call for help? Call 911 anytime you think you may need emergency care. For example, call if:  ? · You passed out (lost consciousness). ?Call your doctor now or seek immediate medical care if:  ? · You have symptoms of dehydration, such as:  ¨ Dry eyes and a dry mouth. ¨ Passing only a little dark urine. ¨ Feeling thirstier than usual.   ? · You have new or worsening belly pain. ? · You have a new or higher fever. ? · You vomit blood or what looks like coffee grounds. ? Watch closely for changes in your health, and be sure to contact your doctor if:  ? · You have ongoing nausea and vomiting. ? · Your vomiting is getting worse. ? · Your vomiting lasts longer than 2 days. ? · You are not getting better as expected. Where can you learn more?   Go to http://franck.info/. Enter 25 [id number] in the search box to learn more about \"Nausea and Vomiting: Care Instructions. \"  Current as of: March 20, 2017  Content Version: 11.4  © 8580-6808 [employer]. Care instructions adapted under license by [employer] (which disclaims liability or warranty for this information). If you have questions about a medical condition or this instruction, always ask your healthcare professional. [place] [address] any warranty or liability for your use of this information. Discharge Instructions    Patient: Ryan Fay MRN: [id number]  CSN: [id number]    YOB: 1960  Age: 62 y.o.   Sex: male    DOA: 3/27/2018 LOS:  LOS: 1 day   Discharge Date:      DIET:  Diabetic Diet    ACTIVITY: Activity as tolerated, no restrictions    ADDITIONAL INFORMATION: If you experience any of the following symptoms but not limited to Fever, chills, nausea, vomiting, diarrhea, change in mentation, falling, bleeding, shortness of breath, chest pain, please call your primary care physician or return to the emergency room if you cannot get hold of your doctor:     FOLLOW UP CARE:  pcp 5-7 days GI in 2 weeks      Patricia Gann NP  3/29/2018 10:17 AM

## 2018-03-29 NOTE — ROUTINE PROCESS
3710 assumed care of pt after bedside verbal report was given by off going nurse, pt resting in bed awake, normal saline infusing at 125 ml/hr, no acute distress, will monitor    0847 pt given morphine 4 mg IVP for pain, will monitor (see MAR)    1236 pt resting in bed quietly, no acute distress noted, will monitor     1830 pt discharged to home, instructions given to pt on medications, follow up appointment, activity

## 2018-03-29 NOTE — PROGRESS NOTES
[email or web address]  [phone]       Impression:  1. Abdominal pain - intractable with unclear etiology; recent laparoscopy without identifiable etiology; mesenteric doppler 3/22/18 without significant narrowing; CT on admission suggestive of esophagitis as well as unchanged rim-enhancing hypodensity in umbilical region - post-surgical change vs. infectious process. It would be unusual for esophagitis to cause lower abdominal pain. Associated leukocytosis is suggestive of infectious process. 2. Nausea and vomiting - suspect at least an element of gastroparesis +/- infectious process  3. History of esophagitis (Grade 2) - EGD 11/2010  4. Leukocytosis - improved on cefepime  5. DM - A1c 7.9% on 3/20/18  6. Lactic acidosis - improved quickly with hydration, suspect dehydration as precipitating factor  7. Personal history of colon polyps - due for surveillance  8. NAFLD - no cirrhosis or fibrosis on intra-operative liver biopsy 3/23/18  9. Chronic antiplatelet therapy on Plavix  10. H pylori serology positive (IgG positive, IgA pending)     Plan:  1. Optimize reflux therapy -- increased PPI to BID dosing, addition of carafate  2. Will start therapy for H pylori (positive IgG) - amoxicillin 1000 mg BID + metronidazole 500 mg BID + levofloxacin 750 mg daily -- all x 14 days  3. Maintain hydration  4. Continue empiric antibiotic therapy  5. Manuel maier    Gail Siegelbhavesh would proceed with EGD, however his Plavix therapy precludes intervention, limiting effectiveness of procedure and increasing risk. Optimizing reflux therapy is our best option in this setting. Will require EGD and colonoscopy as outpatient, after holding Plavix x 5 days. Should symptoms persist without identifiable alternate etiology, consider diagnostic EGD. OK with discharge from GI perspective. Discussed completing 14 day course of therapy for H pylori with patient and following up in our office in 2-4 weeks.      Susan Jean MD  [facility] [address] [place] [address]  Cell - [phone]  [email or web address]      Chief Complaint: abdominal pain    Subjective:  Feeling better, requesting to go home; mild lower abdominal pain, triggered by eating, but tolerating diet        Eyes: conjunctiva normal, EOM normal   ENT: Mucous membranes moist, no lesion or thrush   Cardiovascular:  normal rate and regular rhythm, no murmur   Pulmonary/Chest Wall: breath sounds normal and effort normal   Abdominal:  obese, soft, non-acute, non-tender, no appreciable mass or hepatosplenomegaly, no appreciable ascites       Visit Vitals    /89 (BP 1 Location: Left arm, BP Patient Position: At rest)    Pulse 73    Temp 98.3 °F (36.8 °C)    Resp 18    Ht 5' 9\" (1.753 m)    Wt 101.9 kg (224 lb 9.6 oz)    SpO2 97%    BMI 33.17 kg/m2           Intake/Output Summary (Last 24 hours) at 03/29/18 1041  Last data filed at 03/29/18 0813   Gross per 24 hour   Intake                0 ml   Output             2325 ml   Net            -2325 ml       CBC w/Diff    Lab Results   Component Value Date/Time    WBC 11.2 03/29/2018 02:23 AM    RBC 4.21 (L) 03/29/2018 02:23 AM    HGB 12.6 (L) 03/29/2018 02:23 AM    HCT 39.3 03/29/2018 02:23 AM    MCV 93.3 03/29/2018 02:23 AM    MCH 29.9 03/29/2018 02:23 AM    MCHC 32.1 03/29/2018 02:23 AM    RDW 13.5 03/29/2018 02:23 AM     03/29/2018 02:23 AM    Lab Results   Component Value Date/Time    GRANS 78 (H) 03/28/2018 10:00 AM    LYMPH 14 (L) 03/28/2018 10:00 AM    EOS 0 03/28/2018 10:00 AM    BANDS 3 03/20/2018 11:40 PM    BASOS 0 03/28/2018 10:00 AM      Basic Metabolic Profile   Recent Labs      03/29/18   0223  03/28/18   1000   NA  137  140   K  2.6*  3.0*   CL  100  101   CO2  27  27   BUN  9  11   CA  8.3*  8.4*   MG   --   1.9        Hepatic Function    Lab Results   Component Value Date/Time    ALB 3.1 (L) 03/29/2018 02:23 AM    TP 7.4 03/29/2018 02:23 AM    AP 78 03/29/2018 02:23 AM    Lab Results   Component Value Date/Time    SGOT 17 03/29/2018 02:23 AM          Abbie Bruno MD  [facility] [address]  cell - [phone]  [email or web address]

## 2018-03-29 NOTE — PROGRESS NOTES
Follow up with patient - he is noted to have chronic abdominal pain with concern for esophagitis on CT. Patient had extensive workup upon last hospitalization. GI consulted and appreciated, add carafate. Recheck leukocytes improved, afebrile. Will follow for further clinical progress. Would have to be off plavix x 5 days to pursue endoscopy s/p PCI in 2009 with chronic plavix. Follow up with patient on symptoms / labs / fever curve for further workup as appropriate.    Signed By: Sukh Villegas NP     March 28, 2018

## 2018-03-29 NOTE — DIABETES MGMT
Glycemic Control Plan of Care    IDDM with current A1c of 7.9% (03/20/2018). See most recent assessment of home diabetes management and education from prior admission, 03/21/2018.  03/28/2018: Home diabetes med: Levemir 15 QHS. Patient reported that he did not take prescribed lantus insulin at home. Attempted to further assess but he would not explain why he elected not to take diabetes meds at home. Educated patient and encouraged medication compliance to prevent complications of diabetes. POC BG range on 03/28/2018: 170-225 mg/dL. POC BG report on 03/29/2018 at time of review: 140, 184 mg/dL. Noted patient for disch to home today, 03/9/2018. Recommendation(s):  1.) Continue inpatient glycemic monitoring and intervention until disch. Assessment:  Patient is 62year old with past medical history including IDDM, gastroparesis, hypertension, GERD, positive PPD, heart attack, CAD with stenting, acute renal failure hypothyroidism, umbilical hernia, and pneumoperitoneum 9/2017. Patient recently discharged on 3/24/2018 after diagnostic laparoscopy, laparoscopic liver biopsy and laparoscopic umbilical hernia repair with mesh. He was readmitted on 03/27/2018 with report of abdominal pain and vomiting. Noted:  Abdominal pain, associated leukocytosis suggestive of infectious process. Nausea and vomiting suspect element of gastroparesis. IDDM with current A1c of 7.9% (03/20/2018)    Most recent blood glucose values:    Results for Kath Hernandez (MRN [id number]) as of 3/29/2018 11:31   Ref. Range 3/28/2018 07:57 3/28/2018 11:54   GLUCOSE,FAST - POC Latest Ref Range: 70 - 110 mg/dL 170 (H) 225 (H)     Results for Kath Hernandez (MRN [id number]) as of 3/29/2018 11:31   Ref.  Range 3/29/2018 00:03 3/29/2018 08:11   GLUCOSE,FAST - POC Latest Ref Range: 70 - 110 mg/dL 140 (H) 184 (H)     Current A1C: 7.9% (03/20/2018) is equivalent to average blood glucose of 180 mg/dL during the past 2-3 months. Current hospital diabetes medications:  Basal lantus insulin 15 units daily at bedtime. Total daily dose insulin requirement previous day: 03/28/2018  Lantus: 15 units    Home diabetes medications: Patient stated on 03/28/2018:  Levemir insulin 15 units daily at bedtime. Diet: Diabetic consistent carb regular; 2 Gm NA. Goals:  Blood glucose will be within target range of  mg/dL by 04/01/2018.     Education:  _X__  Refer to Diabetes Education Record: See recent hosp admission notes including educ entered on 03/21/2018.             ___  Education not indicated at this time    Marisabel Tapia RN [facility]  Pager: [phone]

## 2018-03-30 LAB
H PYLORI IGA SER-ACNC: 9.9 UNITS (ref 0–8.9)
H PYLORI IGG SER IA-ACNC: 1.09 INDEX VALUE (ref 0–0.79)

## 2018-08-06 ENCOUNTER — HOSPITAL ENCOUNTER (OUTPATIENT)
Dept: LAB | Age: 58
Discharge: HOME | End: 2018-08-06

## 2018-08-06 PROCEDURE — 99001 SPECIMEN HANDLING PT-LAB: CPT | Performed by: INTERNAL MEDICINE

## 2019-03-29 ENCOUNTER — HOSPITAL ENCOUNTER (OUTPATIENT)
Dept: LAB | Age: 59
Discharge: HOME | End: 2019-03-29

## 2019-03-29 LAB — XX-LABCORP SPECIMEN COL,LCBCF: NORMAL

## 2019-03-29 PROCEDURE — 99001 SPECIMEN HANDLING PT-LAB: CPT

## 2019-04-15 ENCOUNTER — HOSPITAL ENCOUNTER (OUTPATIENT)
Dept: LAB | Age: 59
Discharge: HOME | End: 2019-04-15

## 2019-04-15 LAB — XX-LABCORP SPECIMEN COL,LCBCF: NORMAL

## 2019-04-15 PROCEDURE — 99001 SPECIMEN HANDLING PT-LAB: CPT

## 2019-06-26 ENCOUNTER — HOSPITAL ENCOUNTER (OUTPATIENT)
Dept: LAB | Age: 59
Discharge: HOME | End: 2019-06-26

## 2019-06-26 LAB — XX-LABCORP SPECIMEN COL,LCBCF: NORMAL

## 2019-06-26 PROCEDURE — 99001 SPECIMEN HANDLING PT-LAB: CPT

## 2019-08-05 ENCOUNTER — HOSPITAL ENCOUNTER (OUTPATIENT)
Dept: GENERAL RADIOLOGY | Age: 59
Discharge: HOME | End: 2019-08-05
Payer: MEDICAID

## 2019-08-05 DIAGNOSIS — Z11.1 ENCOUNTER FOR PPD TEST: ICD-10-CM

## 2019-08-05 PROCEDURE — 71046 X-RAY EXAM CHEST 2 VIEWS: CPT

## 2019-09-12 ENCOUNTER — APPOINTMENT (OUTPATIENT)
Dept: GENERAL RADIOLOGY | Age: 59
End: 2019-09-12
Attending: NURSE PRACTITIONER
Payer: MEDICAID

## 2019-09-12 ENCOUNTER — HOSPITAL ENCOUNTER (EMERGENCY)
Age: 59
Discharge: LEFT BEFORE DISCHARGE | End: 2019-09-13
Attending: EMERGENCY MEDICINE
Payer: MEDICAID

## 2019-09-12 VITALS
SYSTOLIC BLOOD PRESSURE: 135 MMHG | HEART RATE: 77 BPM | OXYGEN SATURATION: 95 % | RESPIRATION RATE: 18 BRPM | TEMPERATURE: 98.4 F | WEIGHT: 252 LBS | BODY MASS INDEX: 37.21 KG/M2 | DIASTOLIC BLOOD PRESSURE: 78 MMHG

## 2019-09-12 DIAGNOSIS — Z53.20 LEFT BEFORE TREATMENT COMPLETED: Primary | ICD-10-CM

## 2019-09-12 PROCEDURE — 75810000275 HC EMERGENCY DEPT VISIT NO LEVEL OF CARE

## 2019-09-12 RX ORDER — CEPHALEXIN 250 MG/1
500 CAPSULE ORAL
Status: DISCONTINUED | OUTPATIENT
Start: 2019-09-12 | End: 2019-09-13 | Stop reason: HOSPADM

## 2019-09-13 NOTE — ED TRIAGE NOTES
Pt is a DM. Pt states he looked down today and his toes were bleeding. Pt unsure of what happened. Pt has laceration behind toes on left side.

## 2019-09-18 ENCOUNTER — HOSPITAL ENCOUNTER (OUTPATIENT)
Dept: LAB | Age: 59
Discharge: HOME | End: 2019-09-18

## 2019-09-18 LAB — XX-LABCORP SPECIMEN COL,LCBCF: NORMAL

## 2019-09-18 PROCEDURE — 99001 SPECIMEN HANDLING PT-LAB: CPT

## 2019-11-19 ENCOUNTER — HOSPITAL ENCOUNTER (OUTPATIENT)
Dept: MRI IMAGING | Age: 59
Discharge: HOME | End: 2019-11-19
Attending: PODIATRIST
Payer: MEDICAID

## 2019-11-19 DIAGNOSIS — M86.9 OSTEOMYELITIS OF ANKLE AND FOOT (HCC): ICD-10-CM

## 2019-11-19 PROCEDURE — 73718 MRI LOWER EXTREMITY W/O DYE: CPT

## 2019-12-24 ENCOUNTER — HOSPITAL ENCOUNTER (INPATIENT)
Age: 59
LOS: 3 days | Discharge: HOME | DRG: 422 | End: 2019-12-27
Attending: INTERNAL MEDICINE | Admitting: INTERNAL MEDICINE
Payer: MEDICAID

## 2019-12-24 DIAGNOSIS — Z09 S/P UMBILICAL HERNIA REPAIR, FOLLOW-UP EXAM: ICD-10-CM

## 2019-12-24 PROBLEM — I10 ORTHOSTATIC HYPERTENSION: Status: ACTIVE | Noted: 2019-12-24

## 2019-12-24 PROBLEM — R55 SYNCOPE AND COLLAPSE: Status: ACTIVE | Noted: 2019-12-24

## 2019-12-24 PROCEDURE — 65660000000 HC RM CCU STEPDOWN

## 2019-12-24 PROCEDURE — 74011250636 HC RX REV CODE- 250/636: Performed by: INTERNAL MEDICINE

## 2019-12-24 RX ORDER — MAGNESIUM SULFATE 100 %
4 CRYSTALS MISCELLANEOUS AS NEEDED
Status: DISCONTINUED | OUTPATIENT
Start: 2019-12-24 | End: 2019-12-27 | Stop reason: HOSPADM

## 2019-12-24 RX ORDER — SODIUM CHLORIDE 9 MG/ML
75 INJECTION, SOLUTION INTRAVENOUS CONTINUOUS
Status: DISCONTINUED | OUTPATIENT
Start: 2019-12-25 | End: 2019-12-26

## 2019-12-24 RX ORDER — SIMVASTATIN 10 MG/1
10 TABLET, FILM COATED ORAL
Status: DISCONTINUED | OUTPATIENT
Start: 2019-12-25 | End: 2019-12-27 | Stop reason: HOSPADM

## 2019-12-24 RX ORDER — ONDANSETRON 2 MG/ML
4 INJECTION INTRAMUSCULAR; INTRAVENOUS
Status: DISCONTINUED | OUTPATIENT
Start: 2019-12-24 | End: 2019-12-27 | Stop reason: HOSPADM

## 2019-12-24 RX ORDER — DEXTROSE MONOHYDRATE 100 MG/ML
125-250 INJECTION, SOLUTION INTRAVENOUS AS NEEDED
Status: DISCONTINUED | OUTPATIENT
Start: 2019-12-24 | End: 2019-12-27 | Stop reason: HOSPADM

## 2019-12-24 RX ORDER — NALOXONE HYDROCHLORIDE 0.4 MG/ML
0.4 INJECTION, SOLUTION INTRAMUSCULAR; INTRAVENOUS; SUBCUTANEOUS AS NEEDED
Status: DISCONTINUED | OUTPATIENT
Start: 2019-12-24 | End: 2019-12-27 | Stop reason: HOSPADM

## 2019-12-24 RX ORDER — METHADONE HYDROCHLORIDE 10 MG/1
40 TABLET ORAL
Status: DISCONTINUED | OUTPATIENT
Start: 2019-12-25 | End: 2019-12-27 | Stop reason: HOSPADM

## 2019-12-24 RX ORDER — PANTOPRAZOLE SODIUM 40 MG/1
40 TABLET, DELAYED RELEASE ORAL
Status: DISCONTINUED | OUTPATIENT
Start: 2019-12-25 | End: 2019-12-27 | Stop reason: HOSPADM

## 2019-12-24 RX ORDER — ACETAMINOPHEN 325 MG/1
650 TABLET ORAL
Status: DISCONTINUED | OUTPATIENT
Start: 2019-12-24 | End: 2019-12-27 | Stop reason: HOSPADM

## 2019-12-24 RX ORDER — INSULIN LISPRO 100 [IU]/ML
INJECTION, SOLUTION INTRAVENOUS; SUBCUTANEOUS
Status: DISCONTINUED | OUTPATIENT
Start: 2019-12-25 | End: 2019-12-27 | Stop reason: HOSPADM

## 2019-12-24 RX ORDER — IPRATROPIUM BROMIDE AND ALBUTEROL SULFATE 2.5; .5 MG/3ML; MG/3ML
3 SOLUTION RESPIRATORY (INHALATION)
Status: DISCONTINUED | OUTPATIENT
Start: 2019-12-24 | End: 2019-12-27 | Stop reason: HOSPADM

## 2019-12-24 RX ORDER — OXYCODONE AND ACETAMINOPHEN 5; 325 MG/1; MG/1
1 TABLET ORAL
Status: DISCONTINUED | OUTPATIENT
Start: 2019-12-24 | End: 2019-12-24

## 2019-12-24 RX ORDER — ENOXAPARIN SODIUM 100 MG/ML
40 INJECTION SUBCUTANEOUS EVERY 24 HOURS
Status: DISCONTINUED | OUTPATIENT
Start: 2019-12-25 | End: 2019-12-27 | Stop reason: HOSPADM

## 2019-12-24 RX ORDER — POLYETHYLENE GLYCOL 3350 17 G/17G
17 POWDER, FOR SOLUTION ORAL DAILY PRN
Status: DISCONTINUED | OUTPATIENT
Start: 2019-12-24 | End: 2019-12-27 | Stop reason: HOSPADM

## 2019-12-24 RX ORDER — SODIUM CHLORIDE 9 MG/ML
1000 INJECTION, SOLUTION INTRAVENOUS ONCE
Status: COMPLETED | OUTPATIENT
Start: 2019-12-25 | End: 2019-12-24

## 2019-12-24 RX ORDER — CLOPIDOGREL BISULFATE 75 MG/1
75 TABLET ORAL DAILY
Status: DISCONTINUED | OUTPATIENT
Start: 2019-12-25 | End: 2019-12-27 | Stop reason: HOSPADM

## 2019-12-24 RX ORDER — INSULIN GLARGINE 100 [IU]/ML
10 INJECTION, SOLUTION SUBCUTANEOUS DAILY
Status: DISCONTINUED | OUTPATIENT
Start: 2019-12-25 | End: 2019-12-25

## 2019-12-24 RX ORDER — PREGABALIN 75 MG/1
75 CAPSULE ORAL 2 TIMES DAILY
Status: DISCONTINUED | OUTPATIENT
Start: 2019-12-25 | End: 2019-12-24

## 2019-12-24 RX ORDER — OXYCODONE AND ACETAMINOPHEN 5; 325 MG/1; MG/1
1 TABLET ORAL
Status: DISCONTINUED | OUTPATIENT
Start: 2019-12-24 | End: 2019-12-27 | Stop reason: HOSPADM

## 2019-12-24 RX ORDER — DOCUSATE SODIUM 100 MG/1
100 CAPSULE, LIQUID FILLED ORAL
Status: DISCONTINUED | OUTPATIENT
Start: 2019-12-24 | End: 2019-12-27 | Stop reason: HOSPADM

## 2019-12-24 RX ADMIN — SODIUM CHLORIDE 1000 ML: 900 INJECTION, SOLUTION INTRAVENOUS at 23:59

## 2019-12-25 PROBLEM — I95.1 ORTHOSTATIC HYPOTENSION: Status: ACTIVE | Noted: 2019-12-25

## 2019-12-25 PROBLEM — E11.9 DM (DIABETES MELLITUS) (HCC): Status: ACTIVE | Noted: 2019-12-25

## 2019-12-25 LAB
ALBUMIN SERPL-MCNC: 2.6 G/DL (ref 3.4–5)
ALBUMIN SERPL-MCNC: 2.7 G/DL (ref 3.4–5)
ALBUMIN/GLOB SERPL: 0.6 {RATIO} (ref 0.8–1.7)
ALBUMIN/GLOB SERPL: 0.7 {RATIO} (ref 0.8–1.7)
ALP SERPL-CCNC: 84 U/L (ref 45–117)
ALP SERPL-CCNC: 85 U/L (ref 45–117)
ALT SERPL-CCNC: 32 U/L (ref 16–61)
ALT SERPL-CCNC: 33 U/L (ref 16–61)
ANION GAP SERPL CALC-SCNC: 9 MMOL/L (ref 3–18)
ANION GAP SERPL CALC-SCNC: 9 MMOL/L (ref 3–18)
APTT PPP: 26.2 SEC (ref 23–36.4)
AST SERPL-CCNC: 22 U/L (ref 10–38)
AST SERPL-CCNC: 26 U/L (ref 10–38)
ATRIAL RATE: 77 BPM
BASOPHILS # BLD: 0 K/UL (ref 0–0.1)
BASOPHILS # BLD: 0 K/UL (ref 0–0.1)
BASOPHILS NFR BLD: 0 % (ref 0–2)
BASOPHILS NFR BLD: 0 % (ref 0–2)
BILIRUB SERPL-MCNC: 0.9 MG/DL (ref 0.2–1)
BILIRUB SERPL-MCNC: 1.1 MG/DL (ref 0.2–1)
BUN SERPL-MCNC: 24 MG/DL (ref 7–18)
BUN SERPL-MCNC: 28 MG/DL (ref 7–18)
BUN/CREAT SERPL: 15 (ref 12–20)
BUN/CREAT SERPL: 15 (ref 12–20)
CALCIUM SERPL-MCNC: 7.9 MG/DL (ref 8.5–10.1)
CALCIUM SERPL-MCNC: 8.3 MG/DL (ref 8.5–10.1)
CALCULATED P AXIS, ECG09: 79 DEGREES
CALCULATED R AXIS, ECG10: -2 DEGREES
CALCULATED T AXIS, ECG11: 44 DEGREES
CHLORIDE SERPL-SCNC: 97 MMOL/L (ref 100–111)
CHLORIDE SERPL-SCNC: 97 MMOL/L (ref 100–111)
CK MB CFR SERPL CALC: 1.1 % (ref 0–4)
CK MB CFR SERPL CALC: 1.2 % (ref 0–4)
CK MB CFR SERPL CALC: 1.3 % (ref 0–4)
CK MB SERPL-MCNC: 1 NG/ML (ref 5–25)
CK MB SERPL-MCNC: 1.2 NG/ML (ref 5–25)
CK MB SERPL-MCNC: 1.5 NG/ML (ref 5–25)
CK SERPL-CCNC: 116 U/L (ref 39–308)
CK SERPL-CCNC: 93 U/L (ref 39–308)
CK SERPL-CCNC: 97 U/L (ref 39–308)
CO2 SERPL-SCNC: 30 MMOL/L (ref 21–32)
CO2 SERPL-SCNC: 32 MMOL/L (ref 21–32)
CREAT SERPL-MCNC: 1.59 MG/DL (ref 0.6–1.3)
CREAT SERPL-MCNC: 1.93 MG/DL (ref 0.6–1.3)
DIAGNOSIS, 93000: NORMAL
DIFFERENTIAL METHOD BLD: ABNORMAL
DIFFERENTIAL METHOD BLD: ABNORMAL
EOSINOPHIL # BLD: 0.2 K/UL (ref 0–0.4)
EOSINOPHIL # BLD: 0.2 K/UL (ref 0–0.4)
EOSINOPHIL NFR BLD: 2 % (ref 0–5)
EOSINOPHIL NFR BLD: 2 % (ref 0–5)
ERYTHROCYTE [DISTWIDTH] IN BLOOD BY AUTOMATED COUNT: 12.8 % (ref 11.6–14.5)
ERYTHROCYTE [DISTWIDTH] IN BLOOD BY AUTOMATED COUNT: 12.9 % (ref 11.6–14.5)
EST. AVERAGE GLUCOSE BLD GHB EST-MCNC: 286 MG/DL
GLOBULIN SER CALC-MCNC: 4 G/DL (ref 2–4)
GLOBULIN SER CALC-MCNC: 4.3 G/DL (ref 2–4)
GLUCOSE BLD STRIP.AUTO-MCNC: 283 MG/DL (ref 70–110)
GLUCOSE BLD STRIP.AUTO-MCNC: 326 MG/DL (ref 70–110)
GLUCOSE BLD STRIP.AUTO-MCNC: 340 MG/DL (ref 70–110)
GLUCOSE BLD STRIP.AUTO-MCNC: 347 MG/DL (ref 70–110)
GLUCOSE SERPL-MCNC: 269 MG/DL (ref 74–99)
GLUCOSE SERPL-MCNC: 323 MG/DL (ref 74–99)
HBA1C MFR BLD: 11.6 % (ref 4.2–5.6)
HCT VFR BLD AUTO: 38.6 % (ref 36–48)
HCT VFR BLD AUTO: 40 % (ref 36–48)
HGB BLD-MCNC: 12.4 G/DL (ref 13–16)
HGB BLD-MCNC: 12.5 G/DL (ref 13–16)
INR PPP: 1.2 (ref 0.8–1.2)
LYMPHOCYTES # BLD: 3 K/UL (ref 0.9–3.6)
LYMPHOCYTES # BLD: 3.3 K/UL (ref 0.9–3.6)
LYMPHOCYTES NFR BLD: 33 % (ref 21–52)
LYMPHOCYTES NFR BLD: 34 % (ref 21–52)
MAGNESIUM SERPL-MCNC: 2.1 MG/DL (ref 1.6–2.6)
MCH RBC QN AUTO: 29.3 PG (ref 24–34)
MCH RBC QN AUTO: 30 PG (ref 24–34)
MCHC RBC AUTO-ENTMCNC: 31.3 G/DL (ref 31–37)
MCHC RBC AUTO-ENTMCNC: 32.1 G/DL (ref 31–37)
MCV RBC AUTO: 93.2 FL (ref 74–97)
MCV RBC AUTO: 93.7 FL (ref 74–97)
MONOCYTES # BLD: 1 K/UL (ref 0.05–1.2)
MONOCYTES # BLD: 1.1 K/UL (ref 0.05–1.2)
MONOCYTES NFR BLD: 11 % (ref 3–10)
MONOCYTES NFR BLD: 11 % (ref 3–10)
NEUTS SEG # BLD: 4.8 K/UL (ref 1.8–8)
NEUTS SEG # BLD: 5.5 K/UL (ref 1.8–8)
NEUTS SEG NFR BLD: 53 % (ref 40–73)
NEUTS SEG NFR BLD: 54 % (ref 40–73)
P-R INTERVAL, ECG05: 220 MS
PHOSPHATE SERPL-MCNC: 2.2 MG/DL (ref 2.5–4.9)
PLATELET # BLD AUTO: 239 K/UL (ref 135–420)
PLATELET # BLD AUTO: 247 K/UL (ref 135–420)
PMV BLD AUTO: 11.8 FL (ref 9.2–11.8)
PMV BLD AUTO: 12.2 FL (ref 9.2–11.8)
POTASSIUM SERPL-SCNC: 3.2 MMOL/L (ref 3.5–5.5)
POTASSIUM SERPL-SCNC: 3.4 MMOL/L (ref 3.5–5.5)
PROT SERPL-MCNC: 6.7 G/DL (ref 6.4–8.2)
PROT SERPL-MCNC: 6.9 G/DL (ref 6.4–8.2)
PROTHROMBIN TIME: 14.5 SEC (ref 11.5–15.2)
Q-T INTERVAL, ECG07: 374 MS
QRS DURATION, ECG06: 84 MS
QTC CALCULATION (BEZET), ECG08: 423 MS
RBC # BLD AUTO: 4.14 M/UL (ref 4.7–5.5)
RBC # BLD AUTO: 4.27 M/UL (ref 4.7–5.5)
SODIUM SERPL-SCNC: 136 MMOL/L (ref 136–145)
SODIUM SERPL-SCNC: 138 MMOL/L (ref 136–145)
TROPONIN I SERPL-MCNC: <0.02 NG/ML (ref 0–0.04)
VENTRICULAR RATE, ECG03: 77 BPM
WBC # BLD AUTO: 10.1 K/UL (ref 4.6–13.2)
WBC # BLD AUTO: 8.9 K/UL (ref 4.6–13.2)

## 2019-12-25 PROCEDURE — 74011250637 HC RX REV CODE- 250/637: Performed by: INTERNAL MEDICINE

## 2019-12-25 PROCEDURE — 90686 IIV4 VACC NO PRSV 0.5 ML IM: CPT | Performed by: INTERNAL MEDICINE

## 2019-12-25 PROCEDURE — 83735 ASSAY OF MAGNESIUM: CPT

## 2019-12-25 PROCEDURE — 85025 COMPLETE CBC W/AUTO DIFF WBC: CPT

## 2019-12-25 PROCEDURE — 83036 HEMOGLOBIN GLYCOSYLATED A1C: CPT

## 2019-12-25 PROCEDURE — 85610 PROTHROMBIN TIME: CPT

## 2019-12-25 PROCEDURE — 85730 THROMBOPLASTIN TIME PARTIAL: CPT

## 2019-12-25 PROCEDURE — 82550 ASSAY OF CK (CPK): CPT

## 2019-12-25 PROCEDURE — 36415 COLL VENOUS BLD VENIPUNCTURE: CPT

## 2019-12-25 PROCEDURE — 90471 IMMUNIZATION ADMIN: CPT

## 2019-12-25 PROCEDURE — 74011250637 HC RX REV CODE- 250/637: Performed by: EMERGENCY MEDICINE

## 2019-12-25 PROCEDURE — 80053 COMPREHEN METABOLIC PANEL: CPT

## 2019-12-25 PROCEDURE — 93005 ELECTROCARDIOGRAM TRACING: CPT

## 2019-12-25 PROCEDURE — 74011636637 HC RX REV CODE- 636/637: Performed by: EMERGENCY MEDICINE

## 2019-12-25 PROCEDURE — 65660000000 HC RM CCU STEPDOWN

## 2019-12-25 PROCEDURE — 82962 GLUCOSE BLOOD TEST: CPT

## 2019-12-25 PROCEDURE — 74011636637 HC RX REV CODE- 636/637: Performed by: INTERNAL MEDICINE

## 2019-12-25 PROCEDURE — 74011250636 HC RX REV CODE- 250/636: Performed by: INTERNAL MEDICINE

## 2019-12-25 PROCEDURE — 84100 ASSAY OF PHOSPHORUS: CPT

## 2019-12-25 RX ORDER — INSULIN GLARGINE 100 [IU]/ML
25 INJECTION, SOLUTION SUBCUTANEOUS DAILY
Status: DISCONTINUED | OUTPATIENT
Start: 2019-12-26 | End: 2019-12-26

## 2019-12-25 RX ORDER — INSULIN GLARGINE 100 [IU]/ML
15 INJECTION, SOLUTION SUBCUTANEOUS
Status: COMPLETED | OUTPATIENT
Start: 2019-12-25 | End: 2019-12-25

## 2019-12-25 RX ADMIN — PANTOPRAZOLE SODIUM 40 MG: 40 TABLET, DELAYED RELEASE ORAL at 16:04

## 2019-12-25 RX ADMIN — SIMVASTATIN 10 MG: 10 TABLET, FILM COATED ORAL at 21:33

## 2019-12-25 RX ADMIN — METHADONE HYDROCHLORIDE 40 MG: 10 TABLET ORAL at 16:04

## 2019-12-25 RX ADMIN — ENOXAPARIN SODIUM 40 MG: 40 INJECTION SUBCUTANEOUS at 00:01

## 2019-12-25 RX ADMIN — INSULIN LISPRO 6 UNITS: 100 INJECTION, SOLUTION INTRAVENOUS; SUBCUTANEOUS at 09:49

## 2019-12-25 RX ADMIN — INSULIN LISPRO 8 UNITS: 100 INJECTION, SOLUTION INTRAVENOUS; SUBCUTANEOUS at 21:34

## 2019-12-25 RX ADMIN — INSULIN LISPRO 8 UNITS: 100 INJECTION, SOLUTION INTRAVENOUS; SUBCUTANEOUS at 16:03

## 2019-12-25 RX ADMIN — INSULIN GLARGINE 15 UNITS: 100 INJECTION, SOLUTION SUBCUTANEOUS at 16:03

## 2019-12-25 RX ADMIN — SODIUM CHLORIDE 75 ML/HR: 900 INJECTION, SOLUTION INTRAVENOUS at 05:19

## 2019-12-25 RX ADMIN — PANTOPRAZOLE SODIUM 40 MG: 40 TABLET, DELAYED RELEASE ORAL at 09:47

## 2019-12-25 RX ADMIN — INSULIN GLARGINE 10 UNITS: 100 INJECTION, SOLUTION SUBCUTANEOUS at 09:47

## 2019-12-25 RX ADMIN — INSULIN LISPRO 8 UNITS: 100 INJECTION, SOLUTION INTRAVENOUS; SUBCUTANEOUS at 12:10

## 2019-12-25 RX ADMIN — CLOPIDOGREL BISULFATE 75 MG: 75 TABLET ORAL at 09:47

## 2019-12-25 RX ADMIN — SIMVASTATIN 10 MG: 10 TABLET, FILM COATED ORAL at 00:01

## 2019-12-25 RX ADMIN — INFLUENZA VIRUS VACCINE 0.5 ML: 15; 15; 15; 15 SUSPENSION INTRAMUSCULAR at 06:54

## 2019-12-25 NOTE — CONSULTS
[facility]      CARDIOLOGY CONSULT NOTE    Date of  Admission: 12/24/2019 10:48 PM   Primary Care Physician:  Luciano Babcock MD  Consult requested by : Dr. Diamond Ocean:     1. Syncope: In the setting of hypotension and elevated BUN, likely dehydration and hypovolemia. Improving with IV fluids already with improvement in renal indices. Watch on telemetry. MI is already ruled out. Watch orthostatics. This could also be due to excessive pain medications that he takes. He was advised to taper off as possible. He understands and agrees. 2. CAD: Stable on medications which should be continued. Check a 2D echo. 3. History of PCI: OM1 stent in 2009. 4. Hypertension: Will need medications as the blood pressure goes up. Will avoid diuretics. 5. Hyperlipidemia: Continue statins. 6. Severe obesity: Patient understands the importance of weight loss. We will follow-up with you. Thanks for your kind referral.         Assessment:     Hospital Problems  Date Reviewed: 12/24/2019          Codes Class Noted POA    DM (diabetes mellitus) ([facility] ICD-10-CM: E11.9  ICD-9-CM: 250.00  12/25/2019 Unknown        Orthostatic hypotension ICD-10-CM: I95.1  ICD-9-CM: 458.0  12/25/2019 Unknown        * (Principal) Syncope and collapse ICD-10-CM: R55  ICD-9-CM: 780.2  12/24/2019 Unknown        Coronary artery disease involving native coronary artery of native heart without angina pectoris ICD-10-CM: I25.10  ICD-9-CM: 414.01  5/31/2017 Yes    Overview Signed 9/14/2017 10:20 AM by Sarahy Posey MD     Stable symptoms             Essential hypertension (Chronic) ICD-10-CM: I10  ICD-9-CM: 401.9  8/14/2015 Unknown    Overview Signed 9/14/2017 10:22 AM by Sarahy Posey MD     9/17 incr acei             GERD (gastroesophageal reflux disease) (Chronic) ICD-10-CM: K21.9  ICD-9-CM: 530.81  8/14/2015 Yes                   History of Present Illness:      This is a 61 y.o. male admitted for Syncope and collapse [R55]. 22-year-old severely obese -American male on multiple pain medications admitted with syncopal episodes for 3 days before the admission. Patient states that prior to that he was doing well. He denied any chest pain, shortness of breath, palpitations, pedal edema. He gets dizzy and relatively passes out suddenly for a few seconds. Past history is significant for PCI with a drug-eluting stent in OM1 in 2009.      Past Medical History:     Past Medical History:   Diagnosis Date    Arthritis     Coronary atherosclerosis of native coronary artery     S/P PCI with GE in 12/09    [facility]     IDDM    Electrolyte and fluid disorders not elsewhere classified     Essential hypertension, benign     GERD (gastroesophageal reflux disease)     Heroin abuse ([facility] 05/26/16    Psychiatrist Dr. Connee Najjar History of heart attack     Hyperlipidemia     Intermediate coronary syndrome ([facility]     MDD (major depressive disorder) 5/26/16    Psychiatrist Dr. Connee Najjar [facility]ocardial infarction [facility])     Obesity, unspecified     Old myocardial infarction     Other and unspecified hyperlipidemia     Pancreatitis     Positive PPD     Sleep apnea     uses Cpap machine    Transfusion history     Before 1992      Past Surgical History:   Procedure Laterality Date    HX APPENDECTOMY      HX CORONARY STENT PLACEMENT  2010    2 stents        Social History:     Social History     Socioeconomic History    Marital status:      Spouse name: Not on file    Number of children: Not on file    Years of education: Not on file    Highest education level: Not on file   Tobacco Use    Smoking status: Never Smoker    Smokeless tobacco: Never Used   Substance and Sexual Activity    Alcohol use: No    Drug use: No        Family History:     Family History   Problem Relation Age of Onset    Heart Attack Father     Coronary Artery Disease Mother     Diabetes Mother    Adriana Landaverde Cancer Sister         breast cancer and lung cancer        Medications: Allergies   Allergen Reactions    Compazine [Prochlorperazine] Anaphylaxis     \"Tightness around throat\"        Current Facility-Administered Medications   Medication Dose Route Frequency    albuterol-ipratropium (DUO-NEB) 2.5 MG-0.5 MG/3 ML  3 mL Nebulization Q6H PRN    clopidogrel (PLAVIX) tablet 75 mg  75 mg Oral DAILY    pantoprazole (PROTONIX) tablet 40 mg  40 mg Oral ACB&D    simvastatin (ZOCOR) tablet 10 mg  10 mg Oral QHS    polyethylene glycol (MIRALAX) packet 17 g  17 g Oral DAILY PRN    acetaminophen (TYLENOL) tablet 650 mg  650 mg Oral Q6H PRN    naloxone (NARCAN) injection 0.4 mg  0.4 mg IntraVENous PRN    ondansetron (ZOFRAN) injection 4 mg  4 mg IntraVENous Q6H PRN    docusate sodium (COLACE) capsule 100 mg  100 mg Oral BID PRN    enoxaparin (LOVENOX) injection 40 mg  40 mg SubCUTAneous Q24H    insulin lispro (HUMALOG) injection   SubCUTAneous AC&HS    glucose chewable tablet 16 g  4 Tab Oral PRN    glucagon (GLUCAGEN) injection 1 mg  1 mg IntraMUSCular PRN    dextrose 10% infusion 125-250 mL  125-250 mL IntraVENous PRN    insulin glargine (LANTUS) injection 10 Units  10 Units SubCUTAneous DAILY    0.9% sodium chloride infusion  75 mL/hr IntraVENous CONTINUOUS    [Held by provider] methadone (DOLOPHINE) tablet 40 mg  40 mg Oral 7am    oxyCODONE-acetaminophen (PERCOCET) 5-325 mg per tablet 1 Tab  1 Tab Oral Q8H PRN        Review Of Systems:     No history of stroke, seizures, bleeding disorders. No recent fever cough cold vomiting diarrhea hematuria dysuria skin rashes or headaches. No chronic lung liver or kidney disease known to the patient.      Physical Exam:     Visit Vitals  /74 (BP 1 Location: Left arm, BP Patient Position: At rest)   Pulse 70   Temp 98.6 °F (37 °C)   Resp 18   Ht 5' 9.02\" (1.753 m)   Wt 105.9 kg (233 lb 6.4 oz)   SpO2 95%   BMI 34.45 kg/m²     BP Readings from Last 3 Encounters:   12/25/19 146/74   09/12/19 135/78   03/29/18 124/76     Pulse Readings from Last 3 Encounters:   12/25/19 70   09/12/19 77   03/29/18 73     Wt Readings from Last 3 Encounters:   12/25/19 105.9 kg (233 lb 6.4 oz)   09/12/19 114.3 kg (252 lb)   03/29/18 101.9 kg (224 lb 9.6 oz)           Intake/Output Summary (Last 24 hours) at 12/25/2019 1158  Last data filed at 12/25/2019 2418  Gross per 24 hour   Intake 560 ml   Output 600 ml   Net -40 ml         TELE: Sinus rhythm    General: alert, well developed and in no apparent distress  HENT: Normocephalic, atraumatic. Normal external eye. Neck :  no bruit, JVD difficult to assess due to obesity  Cardiac:  regular rate and rhythm, S1, S2 normal, no murmur, click, rub or gallop  Chest/Lungs:chest clear, no wheezing, rales, normal symmetric air entry  Abdomen: Soft, nontender, no masses  Extremities:  No c/c/e, peripheral pulses present  Neurological: grossly intact. No focal abnormalities, moves all extremities well. Psychiatric : The patient is awake, alert and oriented x3. Superfeedr Guadalupe is interactive and appropriate.      Data Review:     Basic Metabolic Profile   @JolieBox(US:8,FEXZRVLPU:8,QEZIKCFD:3,UI8:3,ITJ:1,XVNDK:8,LRAEAJH:4,CPFNEER:5,XRFDADCKJ:9,PJSBLYGEHB:5)@     CBC w/Diff    @LABRCNTR(WBC:3,HEMOGLOBIN:3,HCT:3,PLATELET:3)@ @MIKE(DDUCD:3,CTMJ:0,KPJNGJLIVNC:5)@     Cardiac Enzymes   @LABRCNTR(cpk:3,ckmb:3,ckmbindx:3,tropquant:3)@     Coagulation   @LABRCNTR(pt:3,inr:3,aptt:3)@     Recent Results (from the past 48 hour(s))   CBC WITH AUTOMATED DIFF    Collection Time: 12/25/19 12:27 AM   Result Value Ref Range    WBC 10.1 4.6 - 13.2 K/uL    RBC 4.14 (L) 4.70 - 5.50 M/uL    HGB 12.4 (L) 13.0 - 16.0 g/dL    HCT 38.6 36.0 - 48.0 %    MCV 93.2 74.0 - 97.0 FL    MCH 30.0 24.0 - 34.0 PG    MCHC 32.1 31.0 - 37.0 g/dL    RDW 12.8 11.6 - 14.5 %    PLATELET 970 820 - 785 K/uL    MPV 11.8 9.2 - 11.8 FL    NEUTROPHILS 54 40 - 73 %    LYMPHOCYTES 33 21 - 52 % MONOCYTES 11 (H) 3 - 10 %    EOSINOPHILS 2 0 - 5 %    BASOPHILS 0 0 - 2 %    ABS. NEUTROPHILS 5.5 1.8 - 8.0 K/UL    ABS. LYMPHOCYTES 3.3 0.9 - 3.6 K/UL    ABS. MONOCYTES 1.1 0.05 - 1.2 K/UL    ABS. EOSINOPHILS 0.2 0.0 - 0.4 K/UL    ABS. BASOPHILS 0.0 0.0 - 0.1 K/UL    DF AUTOMATED     METABOLIC PANEL, COMPREHENSIVE    Collection Time: 12/25/19 12:27 AM   Result Value Ref Range    Sodium 138 136 - 145 mmol/L    Potassium 3.4 (L) 3.5 - 5.5 mmol/L    Chloride 97 (L) 100 - 111 mmol/L    CO2 32 21 - 32 mmol/L    Anion gap 9 3.0 - 18 mmol/L    Glucose 323 (H) 74 - 99 mg/dL    BUN 28 (H) 7.0 - 18 MG/DL    Creatinine 1.93 (H) 0.6 - 1.3 MG/DL    BUN/Creatinine ratio 15 12 - 20      GFR est AA 43 (L) >60 ml/min/1.73m2    GFR est non-AA 36 (L) >60 ml/min/1.73m2    Calcium 8.3 (L) 8.5 - 10.1 MG/DL    Bilirubin, total 1.1 (H) 0.2 - 1.0 MG/DL    ALT (SGPT) 33 16 - 61 U/L    AST (SGOT) 26 10 - 38 U/L    Alk.  phosphatase 84 45 - 117 U/L    Protein, total 6.9 6.4 - 8.2 g/dL    Albumin 2.6 (L) 3.4 - 5.0 g/dL    Globulin 4.3 (H) 2.0 - 4.0 g/dL    A-G Ratio 0.6 (L) 0.8 - 1.7     PROTHROMBIN TIME + INR    Collection Time: 12/25/19 12:27 AM   Result Value Ref Range    Prothrombin time 14.5 11.5 - 15.2 sec    INR 1.2 0.8 - 1.2     PTT    Collection Time: 12/25/19 12:27 AM   Result Value Ref Range    aPTT 26.2 23.0 - 36.4 SEC   CARDIAC PANEL,(CK, CKMB & TROPONIN)    Collection Time: 12/25/19 12:27 AM   Result Value Ref Range     39 - 308 U/L    CK - MB 1.5 <3.6 ng/ml    CK-MB Index 1.3 0.0 - 4.0 %    Troponin-I, QT <0.02 0.0 - 0.045 NG/ML   HEMOGLOBIN A1C WITH EAG    Collection Time: 12/25/19 12:27 AM   Result Value Ref Range    Hemoglobin A1c 11.6 (H) 4.2 - 5.6 %    Est. average glucose 286 mg/dL   MAGNESIUM    Collection Time: 12/25/19 12:27 AM   Result Value Ref Range    Magnesium 2.1 1.6 - 2.6 mg/dL   PHOSPHORUS    Collection Time: 12/25/19 12:27 AM   Result Value Ref Range    Phosphorus 2.2 (L) 2.5 - 4.9 MG/DL   CBC WITH AUTOMATED DIFF    Collection Time: 12/25/19  5:15 AM   Result Value Ref Range    WBC 8.9 4.6 - 13.2 K/uL    RBC 4.27 (L) 4.70 - 5.50 M/uL    HGB 12.5 (L) 13.0 - 16.0 g/dL    HCT 40.0 36.0 - 48.0 %    MCV 93.7 74.0 - 97.0 FL    MCH 29.3 24.0 - 34.0 PG    MCHC 31.3 31.0 - 37.0 g/dL    RDW 12.9 11.6 - 14.5 %    PLATELET 927 113 - 844 K/uL    MPV 12.2 (H) 9.2 - 11.8 FL    NEUTROPHILS 53 40 - 73 %    LYMPHOCYTES 34 21 - 52 %    MONOCYTES 11 (H) 3 - 10 %    EOSINOPHILS 2 0 - 5 %    BASOPHILS 0 0 - 2 %    ABS. NEUTROPHILS 4.8 1.8 - 8.0 K/UL    ABS. LYMPHOCYTES 3.0 0.9 - 3.6 K/UL    ABS. MONOCYTES 1.0 0.05 - 1.2 K/UL    ABS. EOSINOPHILS 0.2 0.0 - 0.4 K/UL    ABS. BASOPHILS 0.0 0.0 - 0.1 K/UL    DF AUTOMATED     METABOLIC PANEL, COMPREHENSIVE    Collection Time: 12/25/19  5:15 AM   Result Value Ref Range    Sodium 136 136 - 145 mmol/L    Potassium 3.2 (L) 3.5 - 5.5 mmol/L    Chloride 97 (L) 100 - 111 mmol/L    CO2 30 21 - 32 mmol/L    Anion gap 9 3.0 - 18 mmol/L    Glucose 269 (H) 74 - 99 mg/dL    BUN 24 (H) 7.0 - 18 MG/DL    Creatinine 1.59 (H) 0.6 - 1.3 MG/DL    BUN/Creatinine ratio 15 12 - 20      GFR est AA 54 (L) >60 ml/min/1.73m2    GFR est non-AA 45 (L) >60 ml/min/1.73m2    Calcium 7.9 (L) 8.5 - 10.1 MG/DL    Bilirubin, total 0.9 0.2 - 1.0 MG/DL    ALT (SGPT) 32 16 - 61 U/L    AST (SGOT) 22 10 - 38 U/L    Alk.  phosphatase 85 45 - 117 U/L    Protein, total 6.7 6.4 - 8.2 g/dL    Albumin 2.7 (L) 3.4 - 5.0 g/dL    Globulin 4.0 2.0 - 4.0 g/dL    A-G Ratio 0.7 (L) 0.8 - 1.7     CARDIAC PANEL,(CK, CKMB & TROPONIN)    Collection Time: 12/25/19  5:15 AM   Result Value Ref Range    CK 97 39 - 308 U/L    CK - MB 1.2 <3.6 ng/ml    CK-MB Index 1.2 0.0 - 4.0 %    Troponin-I, QT <0.02 0.0 - 0.045 NG/ML   GLUCOSE, POC    Collection Time: 12/25/19  7:24 AM   Result Value Ref Range    Glucose (POC) 283 (H) 70 - 110 mg/dL   GLUCOSE, POC    Collection Time: 12/25/19 11:22 AM   Result Value Ref Range    Glucose (POC) 326 (H) 70 - 110 mg/dL         Intake/Output Summary (Last 24 hours) at 12/25/2019 1158  Last data filed at 12/25/2019 2148  Gross per 24 hour   Intake 560 ml   Output 600 ml   Net -40 ml         Cardiographics:     EKG Results     Procedure 720 Value Units Date/Time    EKG, 12 LEAD, SUBSEQUENT [[id number]]     Order Status:  Sent     EKG, 12 LEAD, INITIAL [[id number]]     Order Status:  Sent       EKG done today shows sinus rhythm, old inferior MI, first-degree AV block, diffuse nonspecific T wave changes which are chronic. No acute ST-T changes are seen. XR Results (most recent):  Results from Hospital Encounter encounter on 08/05/19   XR CHEST PA LAT    Narrative EXAMINATION: Chest 2 views    INDICATION: Positive PPD test    COMPARISON: 3/27/2018    FINDINGS: Frontal and lateral views of the chest obtained. Low lung volumes  limits evaluation. No consolidation. Mediastinal silhouette and pulmonary  vasculature unremarkable. Minimal midlung and lower lung streaky densities. No  definite pneumothorax. There is some crowding of the ribs in the right upper  chest, similar to multiple priors. Impression IMPRESSION:    No clearly acute findings. A few streaky densities, likely atelectasis. See  details above.                         Signed By: Adelnia Dominguez MD     December 25, 2019

## 2019-12-25 NOTE — H&P
History & Physical    Patient: Radha Franco MRN: [id number]  CSN: [id number]    YOB: 1960  Age: 61 y.o. Sex: male      DOA: 12/24/2019    Chief Complaint: syncope       HPI:     Radha Franco is a 61 y.o.  male who has PMH of multiple morbidities including CAD s/p stent placement, HTN, DM, chronic pain syndrome and currently on methadone 40 mg daily as he states. Patient originally presented to [facility] ER with a chief complaint of multiple syncopal episodes lasting 10 to 20 seconds each and not preceded by any warning signs. Patient asked to be transferred to [facility] for continuation of care  Patient was hypotensive in ER he received 2 L of IV fluids  Patient seen and examined on arrival, has no complaints, feels dizzy when he stands up and showed orthostatic hypotension especially in diastolic blood pressure. Patient states that he has lost his appetite in the last 2 to 3 days and was not eating much and living on fluids mostly. Patient also states that he takes multiple pain meds including Lyrica, Percocet and methadone. Patient continued to be mildly hypotensive and is receiving another bolus of IV fluids at this time. He states that he develops his syncopal episodes all of a sudden and that he had 3 episodes since yesterday. He adds that it happens to him while he is walking and suddenly finds himself on the floor and his family helps him up really quickly as he seems to gain consciousness was then 5 to 10 seconds after the episode. Denies loss of bowel control and he is alert and oriented immediately after waking up and had no seizure-like activities  Denies CP, SOB, fever, chills, nausea, vomiting, back pain, neck pain, HA, dizziness, lightheadedness, or any other associated sx. No other complaints or concerns at this time.       Past Medical History:   Diagnosis Date    Arthritis     Coronary atherosclerosis of native coronary artery     S/P PCI with GE in 12/09    Diabetes ([facility]     IDDM    Electrolyte and fluid disorders not elsewhere classified     Essential hypertension, benign     GERD (gastroesophageal reflux disease)     Heroin abuse ([facility] 05/26/16    Psychiatrist Dr. Camille Aguila History of heart attack     Hyperlipidemia     Intermediate coronary syndrome [facility])     MDD (major depressive disorder) 5/26/16    Psychiatrist Dr. Camille Aguila Myocardial infarction [facility])     Obesity, unspecified     Old myocardial infarction     Other and unspecified hyperlipidemia     Pancreatitis     Positive PPD     Sleep apnea     uses Cpap machine    Transfusion history     Before 1992       Past Surgical History:   Procedure Laterality Date    HX APPENDECTOMY      HX CORONARY STENT PLACEMENT  2010    2 stents       Family History   Problem Relation Age of Onset    Heart Attack Father     Coronary Artery Disease Mother     Diabetes Mother     Cancer Sister         breast cancer and lung cancer       Social History     Socioeconomic History    Marital status:      Spouse name: Not on file    Number of children: Not on file    Years of education: Not on file    Highest education level: Not on file   Tobacco Use    Smoking status: Never Smoker    Smokeless tobacco: Never Used   Substance and Sexual Activity    Alcohol use: No    Drug use: No       Prior to Admission medications    Medication Sig Start Date End Date Taking? Authorizing Provider   sucralfate (CARAFATE) 100 mg/mL suspension Take 10 mL by mouth Before breakfast, lunch, dinner and at bedtime. 3/29/18   Henny Mckinley NP   oxyCODONE-acetaminophen (PERCOCET 7.5) 7.5-325 mg per tablet Take 1 Tab by mouth every four (4) hours as needed. Max Daily Amount: 6 Tabs. Indications: Pain 3/29/18   Juani GAMING NP   pantoprazole (PROTONIX) 40 mg tablet Take 1 Tab by mouth Before breakfast and dinner.  3/29/18   Henny Mckinley NP acetaminophen (TYLENOL) 325 mg tablet Take 2 Tabs by mouth every six (6) hours as needed. Indications: Pain, take it with bentyl; do not exceed 3 g tylenol daily 3/29/18   Sylvie GAMING NP   ondansetron (ZOFRAN ODT) 4 mg disintegrating tablet Take 1 Tab by mouth every eight (8) hours as needed for Nausea. 3/24/18   Shashank Cruz MD   OTHER No lifting weights > 15 lbs for two weeks. 3/24/18   Shashank Cruz MD   insulin detemir U-100 (LEVEMIR U-100 INSULIN) 100 unit/mL injection 15 Units by SubCUTAneous route nightly. 3/24/18   Shashank Cruz MD   OTHER Incentive spirometry- use as directed 3/24/18   Shashank Cruz MD   hydroCHLOROthiazide (HYDRODIURIL) 50 mg tablet Take 0.5 Tabs by mouth daily. 9/11/17   George Alexander MD   albuterol (PROVENTIL HFA, VENTOLIN HFA, PROAIR HFA) 90 mcg/actuation inhaler 2 puffs every 6 hours x 5 days then every 6 hours as needed for shortness of breath and/or wheezing 9/11/17   George Alexander MD   polyethylene glycol (MIRALAX) 17 gram packet Take 1 Packet by mouth daily. 9/11/17   George Alexander MD   amLODIPine (NORVASC) 10 mg tablet Take 10 mg by mouth daily. Provider, Historical   pregabalin (LYRICA) 75 mg capsule 1 cap bid x 1 week, then 2 caps bid 7/5/17   Jeferson Nunez NP   amitriptyline (ELAVIL) 50 mg tablet Take 50 mg by mouth nightly. Enid Cain MD   simvastatin (ZOCOR) 10 mg tablet Take 10 mg by mouth nightly. Provider, Historical   clopidogrel (PLAVIX) 75 mg tablet Take 75 mg by mouth daily. Provider, Historical       Allergies   Allergen Reactions    Compazine [Prochlorperazine] Anaphylaxis     \"Tightness around throat\"         Review of Systems  GENERAL: Patient alert, awake and oriented times 3, able to communicate full sentences and not in distress. HEENT: No change in vision, no earache, tinnitus, sore throat or sinus congestion. NECK: No pain or stiffness. PULMONARY: No shortness of breath, cough or wheeze. Cardiovascular: no pnd / orthopnea, no CP  GASTROINTESTINAL: No abdominal pain, nausea, vomiting or diarrhea, melena or bright red blood per rectum. Positive for loss of appetite  GENITOURINARY: No urinary frequency, urgency, hesitancy or dysuria. MUSCULOSKELETAL: No joint or muscle pain, no back pain, no recent trauma. DERMATOLOGIC: No rash, no itching, no lesions. ENDOCRINE: No polyuria, polydipsia, no heat or cold intolerance. No recent change in weight. HEMATOLOGICAL: No anemia or easy bruising or bleeding. NEUROLOGIC: No headache, seizures, numbness, tingling or weakness. Physical Exam:     Physical Exam:  Visit Vitals  /79 (BP 1 Location: Right arm, BP Patient Position: At rest;Lying left side)   Pulse 81   Temp 98.3 °F (36.8 °C)   Resp 18   SpO2 93%           Temp (24hrs), Av.4 °F (36.9 °C), Min:98.3 °F (36.8 °C), Max:98.5 °F (36.9 °C)     190 -  0700  In: -   Out: 300 [Urine:300]   No intake/output data recorded. General:  Alert, cooperative, no distress, appears stated age. Head: Normocephalic, without obvious abnormality, atraumatic. Eyes:  Conjunctivae/corneas clear. PERRL, EOMs intact. Nose: Nares normal. No drainage or sinus tenderness. Neck: Supple, symmetrical, trachea midline, no adenopathy, thyroid: no enlargement, no carotid bruit and no JVD. Lungs:   Clear to auscultation bilaterally. Heart:  Regular rate and rhythm, S1, S2 normal.     Abdomen: Soft, non-tender. Bowel sounds normal.    Extremities: Extremities normal, atraumatic, no cyanosis or edema. Pulses: 2+ and symmetric all extremities. Skin:  No rashes or lesions   Neurologic: AAOx3, No focal motor or sensory deficit. Labs Reviewed:    Lab results reviewed. For significant abnormal values and values requiring intervention, see assessment and plan.   CXR and EKG    Procedures/imaging: see electronic medical records for all procedures/Xrays and details which were not copied into this note but were reviewed prior to creation of Plan      Assessment/Plan     Principal Problem:    Syncope and collapse (12/24/2019)    Active Problems:    Essential hypertension (8/14/2015)      Overview: 9/17 incr acei      GERD (gastroesophageal reflux disease) (8/14/2015)      Coronary artery disease involving native coronary artery of native heart without angina pectoris (5/31/2017)      Overview: Stable symptoms      Orthostatic hypertension (12/24/2019)      DM (diabetes mellitus) ([facility] (12/25/2019)       Patient is being admitted for multiple syncopal episodes rule out arrhythmias  History of chronic pain syndrome on multiple meds  Syncopal episode could be secondary to pain meds and hypotension secondary to blood pressure meds and loss of appetite  Has orthostatic hypotension on admission  No EKG changes  History of DM on insulin  History of CAD status post stent placement on Plavix since  History of gastroparesis secondary to diabetes and polyneuropathy    Admitted to telemetry floor  Series of cardiac enzymes  Echo  Cardiology consult in a.m.   Holding Lyrica and decrease frequency of Percocet to every 8 hours  Continue methadone after bringing ID card  IV fluids  Monitor orthostatics every 6 hours while awake  Lantus and SSI and monitor for hypoglycemic episodes  Hold all blood pressure meds at this time and and as needed  Home meds are reconciled    DVT/GI Prophylaxis: Lovenox    Plan of care is discussed in details with Patient/Family at bedside and agreed upon    James Liz MD  12/25/2019 11:08 PM

## 2019-12-25 NOTE — PROGRESS NOTES
[facility]  Progress Note    Patient: Evelyn Rubio Age: 61 y.o. : 1960 MR#: [id number] SSN: [id number]  Date/Time: 2019    Subjective:     Patient sitting in bed in NAD, feels much better. No CP, no sob, no dizziness, no fevers, no headache, no abdominal pain, no rash  Wife at bedside      Assessment/Plan:     - Syncope, likley 2/2 dehydration  - SHYLA at admission ,likely 2/2 diuretics  - CAD  - h/o HTN  - Uncontrolled DM2 with hyperglycemia  - obesity, Body mass index is 34.45 kg/m².    - dyslipidemia  - On chronic methadone, has note from clinic- ( RN has made a copy and placed in Chart )  -Chronic back pain  - CHRISTINA    PLAN  Iv fluids, avoid diuretics  Monitor orthostatics  Cardio following  Counseled patient to not drive until cleared by cardiology- patient verbalized understanding  Resume preadmission methadone  Counseled judicious use of opoid pain meds  Increase lantus, ssi  CPAP QHS  D/w patient and wife  Full code  [address] soon    Case discussed with:  [x]Patient  [x]Family  [x]Nursing  []Case Management  DVT Prophylaxis:  []Lovenox  []Hep SQ  [x]SCDs  []Coumadin   []On Heparin gtt    Objective:   VS:   Visit Vitals  /74 (BP 1 Location: Left arm, BP Patient Position: At rest)   Pulse 70   Temp 98.6 °F (37 °C)   Resp 18   Ht 5' 9.02\" (1.753 m) Comment: previous encounter   Wt 105.9 kg (233 lb 6.4 oz)   SpO2 95%   BMI 34.45 kg/m²      Tmax/24hrs: Temp (24hrs), Av.5 °F (36.9 °C), Min:98.3 °F (36.8 °C), Max:98.6 °F (37 °C)    Input/Output:     Intake/Output Summary (Last 24 hours) at 2019 1354  Last data filed at 2019 6308  Gross per 24 hour   Intake 560 ml   Output 600 ml   Net -40 ml       General:  Awake, alert  Cardiovascular:  S1S2+, RRR  Pulmonary:  CTA b/l  GI:  Soft, BS+, NT, ND, obese  Extremities:  No edema      Labs:    Recent Results (from the past 24 hour(s))   CBC WITH AUTOMATED DIFF    Collection Time: 12/25/19 12:27 AM   Result Value Ref Range    WBC 10.1 4.6 - 13.2 K/uL    RBC 4.14 (L) 4.70 - 5.50 M/uL    HGB 12.4 (L) 13.0 - 16.0 g/dL    HCT 38.6 36.0 - 48.0 %    MCV 93.2 74.0 - 97.0 FL    MCH 30.0 24.0 - 34.0 PG    MCHC 32.1 31.0 - 37.0 g/dL    RDW 12.8 11.6 - 14.5 %    PLATELET 662 475 - 994 K/uL    MPV 11.8 9.2 - 11.8 FL    NEUTROPHILS 54 40 - 73 %    LYMPHOCYTES 33 21 - 52 %    MONOCYTES 11 (H) 3 - 10 %    EOSINOPHILS 2 0 - 5 %    BASOPHILS 0 0 - 2 %    ABS. NEUTROPHILS 5.5 1.8 - 8.0 K/UL    ABS. LYMPHOCYTES 3.3 0.9 - 3.6 K/UL    ABS. MONOCYTES 1.1 0.05 - 1.2 K/UL    ABS. EOSINOPHILS 0.2 0.0 - 0.4 K/UL    ABS. BASOPHILS 0.0 0.0 - 0.1 K/UL    DF AUTOMATED     METABOLIC PANEL, COMPREHENSIVE    Collection Time: 12/25/19 12:27 AM   Result Value Ref Range    Sodium 138 136 - 145 mmol/L    Potassium 3.4 (L) 3.5 - 5.5 mmol/L    Chloride 97 (L) 100 - 111 mmol/L    CO2 32 21 - 32 mmol/L    Anion gap 9 3.0 - 18 mmol/L    Glucose 323 (H) 74 - 99 mg/dL    BUN 28 (H) 7.0 - 18 MG/DL    Creatinine 1.93 (H) 0.6 - 1.3 MG/DL    BUN/Creatinine ratio 15 12 - 20      GFR est AA 43 (L) >60 ml/min/1.73m2    GFR est non-AA 36 (L) >60 ml/min/1.73m2    Calcium 8.3 (L) 8.5 - 10.1 MG/DL    Bilirubin, total 1.1 (H) 0.2 - 1.0 MG/DL    ALT (SGPT) 33 16 - 61 U/L    AST (SGOT) 26 10 - 38 U/L    Alk.  phosphatase 84 45 - 117 U/L    Protein, total 6.9 6.4 - 8.2 g/dL    Albumin 2.6 (L) 3.4 - 5.0 g/dL    Globulin 4.3 (H) 2.0 - 4.0 g/dL    A-G Ratio 0.6 (L) 0.8 - 1.7     PROTHROMBIN TIME + INR    Collection Time: 12/25/19 12:27 AM   Result Value Ref Range    Prothrombin time 14.5 11.5 - 15.2 sec    INR 1.2 0.8 - 1.2     PTT    Collection Time: 12/25/19 12:27 AM   Result Value Ref Range    aPTT 26.2 23.0 - 36.4 SEC   CARDIAC PANEL,(CK, CKMB & TROPONIN)    Collection Time: 12/25/19 12:27 AM   Result Value Ref Range     39 - 308 U/L    CK - MB 1.5 <3.6 ng/ml    CK-MB Index 1.3 0.0 - 4.0 %    Troponin-I, QT <0.02 0.0 - 0.045 NG/ML   HEMOGLOBIN A1C WITH EAG    Collection Time: 12/25/19 12:27 AM   Result Value Ref Range    Hemoglobin A1c 11.6 (H) 4.2 - 5.6 %    Est. average glucose 286 mg/dL   MAGNESIUM    Collection Time: 12/25/19 12:27 AM   Result Value Ref Range    Magnesium 2.1 1.6 - 2.6 mg/dL   PHOSPHORUS    Collection Time: 12/25/19 12:27 AM   Result Value Ref Range    Phosphorus 2.2 (L) 2.5 - 4.9 MG/DL   CBC WITH AUTOMATED DIFF    Collection Time: 12/25/19  5:15 AM   Result Value Ref Range    WBC 8.9 4.6 - 13.2 K/uL    RBC 4.27 (L) 4.70 - 5.50 M/uL    HGB 12.5 (L) 13.0 - 16.0 g/dL    HCT 40.0 36.0 - 48.0 %    MCV 93.7 74.0 - 97.0 FL    MCH 29.3 24.0 - 34.0 PG    MCHC 31.3 31.0 - 37.0 g/dL    RDW 12.9 11.6 - 14.5 %    PLATELET 230 394 - 098 K/uL    MPV 12.2 (H) 9.2 - 11.8 FL    NEUTROPHILS 53 40 - 73 %    LYMPHOCYTES 34 21 - 52 %    MONOCYTES 11 (H) 3 - 10 %    EOSINOPHILS 2 0 - 5 %    BASOPHILS 0 0 - 2 %    ABS. NEUTROPHILS 4.8 1.8 - 8.0 K/UL    ABS. LYMPHOCYTES 3.0 0.9 - 3.6 K/UL    ABS. MONOCYTES 1.0 0.05 - 1.2 K/UL    ABS. EOSINOPHILS 0.2 0.0 - 0.4 K/UL    ABS. BASOPHILS 0.0 0.0 - 0.1 K/UL    DF AUTOMATED     METABOLIC PANEL, COMPREHENSIVE    Collection Time: 12/25/19  5:15 AM   Result Value Ref Range    Sodium 136 136 - 145 mmol/L    Potassium 3.2 (L) 3.5 - 5.5 mmol/L    Chloride 97 (L) 100 - 111 mmol/L    CO2 30 21 - 32 mmol/L    Anion gap 9 3.0 - 18 mmol/L    Glucose 269 (H) 74 - 99 mg/dL    BUN 24 (H) 7.0 - 18 MG/DL    Creatinine 1.59 (H) 0.6 - 1.3 MG/DL    BUN/Creatinine ratio 15 12 - 20      GFR est AA 54 (L) >60 ml/min/1.73m2    GFR est non-AA 45 (L) >60 ml/min/1.73m2    Calcium 7.9 (L) 8.5 - 10.1 MG/DL    Bilirubin, total 0.9 0.2 - 1.0 MG/DL    ALT (SGPT) 32 16 - 61 U/L    AST (SGOT) 22 10 - 38 U/L    Alk.  phosphatase 85 45 - 117 U/L    Protein, total 6.7 6.4 - 8.2 g/dL    Albumin 2.7 (L) 3.4 - 5.0 g/dL    Globulin 4.0 2.0 - 4.0 g/dL    A-G Ratio 0.7 (L) 0.8 - 1.7     CARDIAC PANEL,(CK, CKMB & TROPONIN)    Collection Time: 12/25/19  5:15 AM   Result Value Ref Range    CK 97 39 - 308 U/L    CK - MB 1.2 <3.6 ng/ml    CK-MB Index 1.2 0.0 - 4.0 %    Troponin-I, QT <0.02 0.0 - 0.045 NG/ML   GLUCOSE, POC    Collection Time: 12/25/19  7:24 AM   Result Value Ref Range    Glucose (POC) 283 (H) 70 - 110 mg/dL   GLUCOSE, POC    Collection Time: 12/25/19 11:22 AM   Result Value Ref Range    Glucose (POC) 326 (H) 70 - 110 mg/dL   EKG, 12 LEAD, SUBSEQUENT    Collection Time: 12/25/19 11:57 AM   Result Value Ref Range    Ventricular Rate 77 BPM    Atrial Rate 77 BPM    P-R Interval 220 ms    QRS Duration 84 ms    Q-T Interval 374 ms    QTC Calculation (Bezet) 423 ms    Calculated P Axis 79 degrees    Calculated R Axis -2 degrees    Calculated T Axis 44 degrees    Diagnosis       Sinus rhythm with 1st degree AV block  Low voltage QRS  Possible Inferior infarct (cited on or before 27-OCT-2010)  Abnormal ECG  When compared with ECG of 27-MAR-2018 16:11,  No significant change was found  Confirmed by Janes Berger MD, Baylee Davis ([id number]) on 12/25/2019 12:33:58 PM       Additional Data Reviewed:      Signed By: Ryder Harris MD     December 25, 2019

## 2019-12-26 ENCOUNTER — APPOINTMENT (OUTPATIENT)
Dept: NON INVASIVE DIAGNOSTICS | Age: 59
DRG: 422 | End: 2019-12-26
Attending: INTERNAL MEDICINE
Payer: MEDICAID

## 2019-12-26 LAB
ANION GAP SERPL CALC-SCNC: 8 MMOL/L (ref 3–18)
ATRIAL RATE: 68 BPM
BUN SERPL-MCNC: 15 MG/DL (ref 7–18)
BUN/CREAT SERPL: 10 (ref 12–20)
CALCIUM SERPL-MCNC: 7.9 MG/DL (ref 8.5–10.1)
CALCULATED R AXIS, ECG10: -21 DEGREES
CALCULATED T AXIS, ECG11: 114 DEGREES
CHLORIDE SERPL-SCNC: 99 MMOL/L (ref 100–111)
CO2 SERPL-SCNC: 32 MMOL/L (ref 21–32)
CREAT SERPL-MCNC: 1.5 MG/DL (ref 0.6–1.3)
DIAGNOSIS, 93000: NORMAL
ECHO AO ROOT DIAM: 3.12 CM
ECHO LA VOL BP: 54.23 ML (ref 18–58)
ECHO LA VOL/BSA BIPLANE: 23.8 ML/M2 (ref 16–28)
ECHO LV E' LATERAL VELOCITY: 9.36 CM/S
ECHO LV E' SEPTAL VELOCITY: 7.33 CM/S
ECHO RV TAPSE: 3.18 CM (ref 1.5–2)
GLUCOSE BLD STRIP.AUTO-MCNC: 202 MG/DL (ref 70–110)
GLUCOSE BLD STRIP.AUTO-MCNC: 219 MG/DL (ref 70–110)
GLUCOSE BLD STRIP.AUTO-MCNC: 286 MG/DL (ref 70–110)
GLUCOSE BLD STRIP.AUTO-MCNC: 307 MG/DL (ref 70–110)
GLUCOSE SERPL-MCNC: 266 MG/DL (ref 74–99)
MAGNESIUM SERPL-MCNC: 1.8 MG/DL (ref 1.6–2.6)
P-R INTERVAL, ECG05: 248 MS
POTASSIUM SERPL-SCNC: 2.9 MMOL/L (ref 3.5–5.5)
Q-T INTERVAL, ECG07: 418 MS
QRS DURATION, ECG06: 92 MS
QTC CALCULATION (BEZET), ECG08: 444 MS
SODIUM SERPL-SCNC: 139 MMOL/L (ref 136–145)
TSH SERPL DL<=0.05 MIU/L-ACNC: 2.94 UIU/ML (ref 0.36–3.74)
VENTRICULAR RATE, ECG03: 68 BPM

## 2019-12-26 PROCEDURE — 74011250637 HC RX REV CODE- 250/637: Performed by: INTERNAL MEDICINE

## 2019-12-26 PROCEDURE — 97161 PT EVAL LOW COMPLEX 20 MIN: CPT

## 2019-12-26 PROCEDURE — 93306 TTE W/DOPPLER COMPLETE: CPT

## 2019-12-26 PROCEDURE — 93005 ELECTROCARDIOGRAM TRACING: CPT

## 2019-12-26 PROCEDURE — 65660000000 HC RM CCU STEPDOWN

## 2019-12-26 PROCEDURE — 74011636637 HC RX REV CODE- 636/637: Performed by: EMERGENCY MEDICINE

## 2019-12-26 PROCEDURE — 74011250636 HC RX REV CODE- 250/636: Performed by: INTERNAL MEDICINE

## 2019-12-26 PROCEDURE — 74011000250 HC RX REV CODE- 250: Performed by: INTERNAL MEDICINE

## 2019-12-26 PROCEDURE — 74011250637 HC RX REV CODE- 250/637: Performed by: EMERGENCY MEDICINE

## 2019-12-26 PROCEDURE — 74011000258 HC RX REV CODE- 258: Performed by: INTERNAL MEDICINE

## 2019-12-26 PROCEDURE — 82962 GLUCOSE BLOOD TEST: CPT

## 2019-12-26 PROCEDURE — 83735 ASSAY OF MAGNESIUM: CPT

## 2019-12-26 PROCEDURE — 84443 ASSAY THYROID STIM HORMONE: CPT

## 2019-12-26 PROCEDURE — 80048 BASIC METABOLIC PNL TOTAL CA: CPT

## 2019-12-26 PROCEDURE — 36415 COLL VENOUS BLD VENIPUNCTURE: CPT

## 2019-12-26 RX ORDER — INSULIN GLARGINE 100 [IU]/ML
30 INJECTION, SOLUTION SUBCUTANEOUS DAILY
Status: DISCONTINUED | OUTPATIENT
Start: 2019-12-27 | End: 2019-12-27 | Stop reason: HOSPADM

## 2019-12-26 RX ORDER — METOPROLOL TARTRATE 25 MG/1
25 TABLET, FILM COATED ORAL 2 TIMES DAILY
Status: DISCONTINUED | OUTPATIENT
Start: 2019-12-26 | End: 2019-12-27

## 2019-12-26 RX ORDER — POTASSIUM CHLORIDE 20 MEQ/1
40 TABLET, EXTENDED RELEASE ORAL EVERY 4 HOURS
Status: COMPLETED | OUTPATIENT
Start: 2019-12-26 | End: 2019-12-26

## 2019-12-26 RX ADMIN — PANTOPRAZOLE SODIUM 40 MG: 40 TABLET, DELAYED RELEASE ORAL at 17:52

## 2019-12-26 RX ADMIN — SIMVASTATIN 10 MG: 10 TABLET, FILM COATED ORAL at 22:01

## 2019-12-26 RX ADMIN — ENOXAPARIN SODIUM 40 MG: 40 INJECTION SUBCUTANEOUS at 00:31

## 2019-12-26 RX ADMIN — INSULIN LISPRO 6 UNITS: 100 INJECTION, SOLUTION INTRAVENOUS; SUBCUTANEOUS at 17:58

## 2019-12-26 RX ADMIN — CLOPIDOGREL BISULFATE 75 MG: 75 TABLET ORAL at 09:34

## 2019-12-26 RX ADMIN — POTASSIUM CHLORIDE 40 MEQ: 20 TABLET, EXTENDED RELEASE ORAL at 12:28

## 2019-12-26 RX ADMIN — POTASSIUM CHLORIDE 40 MEQ: 20 TABLET, EXTENDED RELEASE ORAL at 09:34

## 2019-12-26 RX ADMIN — INSULIN GLARGINE 25 UNITS: 100 INJECTION, SOLUTION SUBCUTANEOUS at 12:38

## 2019-12-26 RX ADMIN — INSULIN LISPRO 9 UNITS: 100 INJECTION, SOLUTION INTRAVENOUS; SUBCUTANEOUS at 12:29

## 2019-12-26 RX ADMIN — POTASSIUM CHLORIDE: 2 INJECTION, SOLUTION, CONCENTRATE INTRAVENOUS at 09:35

## 2019-12-26 RX ADMIN — PANTOPRAZOLE SODIUM 40 MG: 40 TABLET, DELAYED RELEASE ORAL at 09:34

## 2019-12-26 RX ADMIN — INSULIN LISPRO 12 UNITS: 100 INJECTION, SOLUTION INTRAVENOUS; SUBCUTANEOUS at 22:01

## 2019-12-26 RX ADMIN — METHADONE HYDROCHLORIDE 40 MG: 10 TABLET ORAL at 09:34

## 2019-12-26 RX ADMIN — INSULIN LISPRO 6 UNITS: 100 INJECTION, SOLUTION INTRAVENOUS; SUBCUTANEOUS at 09:35

## 2019-12-26 RX ADMIN — SODIUM CHLORIDE 75 ML/HR: 900 INJECTION, SOLUTION INTRAVENOUS at 06:28

## 2019-12-26 RX ADMIN — METOPROLOL TARTRATE 25 MG: 25 TABLET ORAL at 17:52

## 2019-12-26 RX ADMIN — METOPROLOL TARTRATE 25 MG: 25 TABLET ORAL at 12:28

## 2019-12-26 NOTE — PROGRESS NOTES
NUTRITION    Nutrition: Diabetic Diet Education    RECOMMENDATIONS / PLAN:     No additional nutritional interventions indicated at this time. Please consult nutrition if further nutrtion intervention is needed. Will re-screen this patient per policy. NUTRITION INTERVENTIONS:     [x] Nutrition counseling/education  [x] Collaboration and referral of nutrition care: RN asked this writer to speak with pt on diabetic diet    ASSESSMENT:     Diet: DIET DIABETIC CONSISTENT CARB Regular  Po intake:  [x]  Good    []  Fair    []  Poor  Weight History:  [] No unintentional weight loss PTA    [x] Had weight change: wt fluctuations; recent weight gain PTA     Nutrition Risk: None identified at this time     Provided Education On:  Diabetic diet education  Person(s) Instructed:  [x]  Patient    [] Family    [] Other:  Education Methods Used:  [x] Explanation    [x] Handout    [] Other:   Patients Readiness:  [x] Serafin Holder    [x] Acceptance    [] Non-Acceptance    [] Refused  Learning Limitations:   [x] None identified    [] Identified:  Level of Comprehension:  [x] Good    [] Needs Reinforcement     Additional Concerns/Comments: Pt with hx of diabetes; A1c 11.9%, having episodes of hyperglycemia. Discussed diabetic diet and CHO counting with pt. Discussed meal planning in regard to CHO counting. Discussed appropriate portions of CHO servings. Educational handouts provided/reviewed with pt. Questions answered to his satisfaction. Pt verbalized understanding the information provided. Also provided class schedule and contact information for outpatient diabetes classes offered by [facility]. Pt receptive to all information provided; reported has meter at home and knows how to use it.  Pt discussed with RN and [facility]      Follow-up education indicated:   [x] No    [] Yes  Discharge needs: [x] None identified    [] Identified and addressed    Sandro Kelsey, [address] [place] [address]  Pager: [phone]

## 2019-12-26 NOTE — PROGRESS NOTES
Problem: Falls - Risk of  Goal: *Absence of Falls  Description  Document Domingo Vera Fall Risk and appropriate interventions in the flowsheet. Note: Fall Risk Interventions:            Medication Interventions: Patient to call before getting OOB         History of Falls Interventions:  Investigate reason for fall

## 2019-12-26 NOTE — PHYSICIAN ADVISORY
[facility]     Physician Advisor Recommendation      The information in this document is a recommendation to be used for utilization review and utilization management purposes only. This recommendation is not an order. The recommendation is made based on the information reviewed at the time of the referral, is pursuant to the [place] [facility] Conditions of Participation (42 CFR Part 482), and is neither a judgment nor an assessment with regard to the appropriateness or quality of clinical care. Nothing in this document may be used to limit, alter, or affect clinical services provided to the patient named below. The provider of services is ultimately responsible for the submission of a claim that has met all requirements for correct coding, billing, and reimbursement. Letter of Status Determination:    Recommend Changing patient status from   INPATIENT to OBSERVATION      Pt Name:  Landry Garcia   MR#  [id number]   [facility]#   [id number]   [address] [place] [address]  202/01  @ [place] [address]277 center   Hospitalization date  12/24/2019 10:48 PM   Current Attending Physician  Sky Smith MD   Principal diagnosis  Syncope and collapse      Clinicals    Landry Garcia is a 61 y.o.  male who has PMH of multiple morbidities including CAD s/p stent placement, HTN, DM, chronic pain syndrome and currently on methadone 40 mg daily as he states. Patient originally presented to [facility] ER with a chief complaint of multiple syncopal episodes lasting 10 to 20 seconds each and not preceded by any warning signs. Patient asked to be transferred to [facility] for continuation of care  Patient was hypotensive in ER he received 2 L of IV fluids  Patient seen and examined on arrival, has no complaints, feels dizzy when he stands up and showed orthostatic hypotension especially in diastolic blood pressure.   Patient states that he has lost his appetite in the last 2 to 3 days and was not eating much and living on fluids mostly. Patient also states that he takes multiple pain meds including Lyrica, Percocet and methadone. Patient continued to be mildly hypotensive and is receiving another bolus of IV fluids at this time. He states that he develops his syncopal episodes all of a sudden and that he had 3 episodes since yesterday. He adds that it happens to him while he is walking and suddenly finds himself on the floor and his family helps him up really quickly as he seems to gain consciousness was then 5 to 10 seconds after the episode. Denies loss of bowel control and he is alert and oriented immediately after waking up and had no seizure-like activities  Denies CP, SOB, fever, chills, nausea, vomiting, back pain, neck pain, HA, dizziness, lightheadedness, or any other associated sx. No other complaints or concerns at this time. Assessment/Plan:      - Syncope, likley 2/2 dehydration  - Orthostatic hypotension  - SHYLA at admission ,likely 2/2 diuretics  - CAD  - h/o HTN  - Uncontrolled DM2 with hyperglycemia  - obesity, Body mass index is 37.21 kg/m².    - dyslipidemia  - On chronic methadone, has note from clinic- ( RN has made a copy and placed in Chart )  -Chronic back pain  - CHRISTINA       Interqual criteria  Apply for syncope    STATUS DETERMINATION  On the basis of review of available clinical data, documentation in the chart  It is our recommendation that the patient's status is changed from INPATIENT to OBSERVATION         The final decision of the patient's hospitalization status depends on the attending physician's judgment                Additional comments     Insurance  Payor: Debbie Irvin / Plan: Graeme Sanchez / Product Type: Managed Care Medicaid /           Veronika Fuentes MBA   Physician [address] [place]ICAID[place] [employer] Phone:     Subscriber: Lucas Al Subscriber#: [id number]    Group#: [id number] Precert#:

## 2019-12-26 NOTE — DIABETES MGMT
GLYCEMIC CONTROL AND NUTRITION    Assessment/Recommendations:  Lab glucose this am 266 mg/dl  Noted basal and corrective insulin coverage ordered for pt  Advanced corrective insulin to very insulin resistant dosing. Will continue inpatient monitoring. Most recent blood glucose values:  Results for Julieta Schaeffer (MRN [id number]) as of 12/26/2019 12:42   Ref. Range 12/25/2019 11:22 12/25/2019 16:02 12/25/2019 20:41 12/26/2019 08:01 12/26/2019 12:11   GLUCOSE,FAST - POC Latest Ref Range: 70 - 110 mg/dL 326 (H) 340 (H) 347 (H) 219 (H) 286 (H)     Current A1C of 11.6 % is equivalent to average blood glucose of 286 mg/dl over the past 2-3 months. Current hospital diabetes medications:   Lantus 25 units daily  Lispro corrective insulin coverage AC&HS  Previous day's insulin requirements:   lantus 25 units  Lispro 30 units corrective insulin coverage  Home diabetes medications:  Per pta med list.  Will confirm with pt. Levemir 15 units every night  Diet:    Diabetic consistent carb  Education:  ____Refer to Diabetes Education Record             ____Education not indicated at this time  Pt not in room at time of visit.   Will follow-up    Marcello Moscoso, [address] [facility] CDE  Ext [phone]

## 2019-12-26 NOTE — PROGRESS NOTES
Reason for Admission:  Syncope and collapse [R55]                 RRAT Score:    15            Plan for utilizing home health:    no                      Likelihood of Readmission:   Moderate                         Do you (patient/family) have any concerns for transition/discharge?  no    Transition of Care Plan:       Initial assessment completed with patient. Cognitive status of patient: oriented to time, place, person and situation. Face sheet information confirmed:  yes. The patient designates Sister Aletha Cotto [phone] to participate in his discharge plan and to receive any needed information. This patient lives in a single family home with patient and sister. Patient is able to navigate steps as needed. Prior to hospitalization, patient was considered to be independent with ADLs/IADLS : yes . Patient has a current ACP document on file: yes  The patient and sister will be available to transport patient home upon discharge. The patient already has none reported, and CPAP medical equipment available in the home. Patient is not currently active with home health. Patient has not stayed in a skilled nursing facility or rehab. This patient is on dialysis :no     Freedom of choice signed: no. Currently, the discharge plan is Home. The patient states that he can obtain his medications from the pharmacy, and take his medications as directed. Patient's current insurance is medicaid      Care Management Interventions  PCP Verified by CM:  Yes  Mode of Transport at Discharge: Self  Physical Therapy Consult: Yes  Occupational Therapy Consult: Yes  Current Support Network: Relative's Home  Confirm Follow Up Transport: Family  The Plan for Transition of Care is Related to the Following Treatment Goals : home  Discharge Location  Discharge Placement: Home        Higinio Real RN BSN  Care Manager  [phone]

## 2019-12-26 NOTE — PROGRESS NOTES
[facility] Hospitalist Group  Progress Note    Patient: Griselda Grier Age: 61 y.o. : 1960 MR#: [id number] SSN: [id number]  Date/Time: 2019    Subjective:     Patient sitting in bed in NAD, awake, alert, denies cp or sob or dizziness    Assessment/Plan:     - Syncope, likley 2/2 dehydration  - Orthostatic hypotension  - SHYLA at admission ,likely 2/2 diuretics  - CAD  - h/o HTN  - Uncontrolled DM2 with hyperglycemia  - obesity, Body mass index is 37.21 kg/m². - dyslipidemia  - On chronic methadone, has note from clinic- ( RN has made a copy and placed in Chart )  -Chronic back pain  - CHRISTINA    PLAN  avoid diuretics  Monitor orthostatics  Cardio following, BB added  Counseled patient to not drive until cleared by cardiology. On preadmission methadone  Lantus, ssi  Patient noncompliant with CPAP- counseled  D/w patient  Full code  Dispo - home when ready  D/w Dr Celso Dumont.  Given orthostasis will continue to monitor inpatient    Case discussed with:  [x]Patient  [x]Family  [x]Nursing  []Case Management  DVT Prophylaxis:  []Lovenox  []Hep SQ  [x]SCDs  []Coumadin   []On Heparin gtt    Objective:   VS:   Visit Vitals  /73   Pulse 81   Temp 97 °F (36.1 °C)   Resp 16   Ht 5' 9\" (1.753 m)   Wt 114.3 kg (252 lb)   SpO2 97%   BMI 37.21 kg/m²      Tmax/24hrs: Temp (24hrs), Av.8 °F (36.6 °C), Min:97 °F (36.1 °C), Max:98.4 °F (36.9 °C)    Input/Output:     Intake/Output Summary (Last 24 hours) at 2019 1416  Last data filed at 2019 1304  Gross per 24 hour   Intake 240 ml   Output 550 ml   Net -310 ml       General:  Awake, alert  Cardiovascular:  S1S2+, RRR  Pulmonary:  CTA b/l  GI:  Soft, BS+, NT, ND, obese  Extremities:  No edema        Labs:    Recent Results (from the past 24 hour(s))   GLUCOSE, POC    Collection Time: 19  4:02 PM   Result Value Ref Range    Glucose (POC) 340 (H) 70 - 110 mg/dL   GLUCOSE, POC    Collection Time: 19  8:41 PM   Result Value Ref Range    Glucose (POC) 347 (H) 70 - 501 mg/dL   METABOLIC PANEL, BASIC    Collection Time: 12/26/19  5:19 AM   Result Value Ref Range    Sodium 139 136 - 145 mmol/L    Potassium 2.9 (LL) 3.5 - 5.5 mmol/L    Chloride 99 (L) 100 - 111 mmol/L    CO2 32 21 - 32 mmol/L    Anion gap 8 3.0 - 18 mmol/L    Glucose 266 (H) 74 - 99 mg/dL    BUN 15 7.0 - 18 MG/DL    Creatinine 1.50 (H) 0.6 - 1.3 MG/DL    BUN/Creatinine ratio 10 (L) 12 - 20      GFR est AA 58 (L) >60 ml/min/1.73m2    GFR est non-AA 48 (L) >60 ml/min/1.73m2    Calcium 7.9 (L) 8.5 - 10.1 MG/DL   MAGNESIUM    Collection Time: 12/26/19  5:19 AM   Result Value Ref Range    Magnesium 1.8 1.6 - 2.6 mg/dL   TSH 3RD GENERATION    Collection Time: 12/26/19  5:19 AM   Result Value Ref Range    TSH 2.94 0.36 - 3.74 uIU/mL   EKG, 12 LEAD, SUBSEQUENT    Collection Time: 12/26/19  7:47 AM   Result Value Ref Range    Ventricular Rate 68 BPM    Atrial Rate 68 BPM    P-R Interval 248 ms    QRS Duration 92 ms    Q-T Interval 418 ms    QTC Calculation (Bezet) 444 ms    Calculated R Axis -21 degrees    Calculated T Axis 114 degrees    Diagnosis       Sinus rhythm with marked sinus arrhythmia with 1st degree AV block  Low voltage QRS  Inferior infarct (cited on or before 27-OCT-2010)  Cannot rule out Anterior infarct , age undetermined  Abnormal ECG  When compared with ECG of 25-DEC-2019 11:57,  Minimal criteria for Anterior infarct are now present  Confirmed by Cassie Cannon ([id number]) on 12/26/2019 8:18:42 AM     GLUCOSE, POC    Collection Time: 12/26/19  8:01 AM   Result Value Ref Range    Glucose (POC) 219 (H) 70 - 110 mg/dL   ECHO ADULT COMPLETE    Collection Time: 12/26/19 11:03 AM   Result Value Ref Range    LA Volume 54.23 18 - 58 mL    LV E' Lateral Velocity 9.36 cm/s    LV E' Septal Velocity 7.33 cm/s    Tapse 3.18 (A) 1.5 - 2.0 cm    Ao Root D 3.12 cm    LA Vol Index 23.80 16 - 28 ml/m2   GLUCOSE, POC    Collection Time: 12/26/19 12:11 PM   Result Value Ref Range Glucose (POC) 286 (H) 70 - 110 mg/dL     Additional Data Reviewed:      Signed By: Juan Lezama MD     December 26, 2019

## 2019-12-26 NOTE — PROGRESS NOTES
OT order received and chart reviewed. Spoke with patient in his room with pt reporting he is at functional baseline for ADLs. Patient reports he is independent with basic self care tasks/ADLs and is ready for d/c home. Educated pt on the role of OT and if there's ever a change in ADL performance to notify his MD to re-order OT, if needed. Skilled OT is not indicated at this time; will complete the order.       Thank you for the referral.    Nathan Grimes MS, OTR/L

## 2019-12-26 NOTE — PROGRESS NOTES
PHYSICAL THERAPY EVALUATION AND DISCHARGE    Patient: Shannan Dow (24 y.o. male)  Date: 12/26/2019  Primary Diagnosis: Syncope and collapse [R55]        Precautions: falls     PLOF: Pt reports he was independent with ADL's and mobility. He lives with family in single level home     ASSESSMENT  Pt received sitting EOB and agreeable to PT evaluation. He reports most of his symptoms( dizziness/lightheadedness) have resolved and is eager to go home. Pt  is mod(I) with functional mobility. Educated pt on changing positions slowly and safety with transfers. Pt denies SOB/ dizziness during session. Based on the objective data described below, the patient presents with good strength, god balance, and no difficulty with mobility. Patient does not require further skilled intervention, therefore will be discharged from therapy caseload. PLAN :  Recommendations and Planned Interventions:   No formal PT needs identified at this time. Discharge Recommendations: None  Further Equipment Recommendations for Discharge: N/A     SUBJECTIVE:   Patient stated I hope I can go home today.  Kaela Whatley    OBJECTIVE DATA SUMMARY:     Past Medical History:   Diagnosis Date    Arthritis     Coronary atherosclerosis of native coronary artery     S/P PCI with GE in 12/09    Diabetes ([facility] [address]     IDDM    Electrolyte and fluid disorders not elsewhere classified     Essential hypertension, benign     GERD (gastroesophageal reflux disease)     Heroin abuse ([facility] [address] 05/26/16    Psychiatrist Dr. Erika Maldonado     History of heart attack     Hyperlipidemia     Intermediate coronary syndrome [facility])     MDD (major depressive disorder) 5/26/16    Psychiatrist Dr. Erika Maldonado     Myocardial infarction [facility])     Obesity, unspecified     Old myocardial infarction     Other and unspecified hyperlipidemia     Pancreatitis     Positive PPD     Sleep apnea     uses Cpap machine    Transfusion history     Before 1992     Past Surgical History: Procedure Laterality Date    HX APPENDECTOMY      HX CORONARY STENT PLACEMENT  2010    2 stents     Barriers to Learning/Limitations: None  Compensate with: N/A  Home Situation:   Home Situation  Home Environment: Private residence  # Steps to Enter: 0  One/Two Story Residence: One story  Living Alone: No  Support Systems: Family member(s)  Patient Expects to be Discharged to[de-identified] Private residence  Current DME Used/Available at Home: None    Strength:    Strength: Within functional limits    Tone & Sensation:   Tone: Normal  Sensation: Intact     Range Of Motion:  AROM: Within functional limits    Functional Mobility:    Transfers:  Sit to Stand: Modified independent  Stand to Sit: Modified independent    Balance:   Sitting: Intact  Standing: Impaired; Without support  Standing - Static: Good  Standing - Dynamic : Good    Ambulation/Gait Training:  Distance (ft): 100 Feet (ft)  Assistive Device: (none)  Ambulation - Level of Assistance: Modified independent  Gait Description (WDL): Exceptions to WDL  Base of Support: Widened       Activity Tolerance:   Good  Please refer to the flowsheet for vital signs taken during this treatment. After treatment:   [x]         Patient left in no apparent distress sitting up EOB bed  []         Patient left in no apparent distress in bed  [x]         Call bell left within reach  []         Nursing notified  []         Caregiver present  []         Bed alarm activated  []         SCDs applied    COMMUNICATION/EDUCATION:   [x]         Role of Physical Therapy in the acute care setting. [x]         Fall prevention education was provided and the patient/caregiver indicated understanding. []         Patient/family have participated as able in goal setting and plan of care. []         Patient/family agree to work toward stated goals and plan of care. []         Patient understands intent and goals of therapy, but is neutral about his/her participation.   []         Patient is unable to participate in goal setting/plan of care: ongoing with therapy staff.  []         Other:     Thank you for this referral.  Toyin Hurst, PT   Time Calculation: 8 mins      Eval Complexity: History: LOW Complexity : Zero comorbidities / personal factors that will impact the outcome / POCExam:LOW Complexity : 1-2 Standardized tests and measures addressing body structure, function, activity limitation and / or participation in recreation  Presentation: LOW Complexity : Stable, uncomplicated  Clinical Decision Making:Low Complexity    Overall Complexity:LOW

## 2019-12-26 NOTE — PROGRESS NOTES
[facility]      CARDIOLOGY PROGRESS NOTE  RECS:  1. Syncope, orthostatic hypotension: Persisting despite volume correction. Supine blood pressure is elevated significantly. Will start low-dose metoprolol and avoid diuretics and vasodilators as possible. 2. CAD: Stable on medications which should be continued. Check a 2D echo. 3. History of PCI: OM1 stent in 2009. 4. Hypertension: Watch as beta-blockers are started. Will avoid diuretics. 5. Hyperlipidemia: Continue statins. 6. Severe obesity: Patient understands the importance of weight loss.       Out of bed and ambulate.       EKG Results     Procedure 720 Value Units Date/Time    EKG, 12 LEAD, SUBSEQUENT [[id number]] Collected:  12/26/19 0747    Order Status:  Completed Updated:  12/26/19 0818     Ventricular Rate 68 BPM      Atrial Rate 68 BPM      P-R Interval 248 ms      QRS Duration 92 ms      Q-T Interval 418 ms      QTC Calculation (Bezet) 444 ms      Calculated R Axis -21 degrees      Calculated T Axis 114 degrees      Diagnosis --     Sinus rhythm with marked sinus arrhythmia with 1st degree AV block  Low voltage QRS  Inferior infarct (cited on or before 27-OCT-2010)  Cannot rule out Anterior infarct , age undetermined  Abnormal ECG  When compared with ECG of 25-DEC-2019 11:57,  Minimal criteria for Anterior infarct are now present  Confirmed by Martinez Bradshaw ([id number]) on 12/26/2019 8:18:42 AM      EKG, 12 LEAD, SUBSEQUENT [[id number]] Collected:  12/25/19 1157    Order Status:  Completed Updated:  12/25/19 1234     Ventricular Rate 77 BPM      Atrial Rate 77 BPM      P-R Interval 220 ms      QRS Duration 84 ms      Q-T Interval 374 ms      QTC Calculation (Bezet) 423 ms      Calculated P Axis 79 degrees      Calculated R Axis -2 degrees      Calculated T Axis 44 degrees      Diagnosis --     Sinus rhythm with 1st degree AV block  Low voltage QRS  Possible Inferior infarct (cited on or before 27-OCT-2010)  Abnormal ECG  When compared with ECG of 27-MAR-2018 16:11,  No significant change was found  Confirmed by Janes Berger MD, Baylee Davis ([id number]) on 12/25/2019 12:33:58 PM      EKG, 12 LEAD, INITIAL [[id number]]     Order Status:  Canceled         XR Results (most recent):  Results from Hospital Encounter encounter on 08/05/19   XR CHEST PA LAT    Narrative EXAMINATION: Chest 2 views    INDICATION: Positive PPD test    COMPARISON: 3/27/2018    FINDINGS: Frontal and lateral views of the chest obtained. Low lung volumes  limits evaluation. No consolidation. Mediastinal silhouette and pulmonary  vasculature unremarkable. Minimal midlung and lower lung streaky densities. No  definite pneumothorax. There is some crowding of the ribs in the right upper  chest, similar to multiple priors. Impression IMPRESSION:    No clearly acute findings. A few streaky densities, likely atelectasis. See  details above.                       ASSESSMENT:  Hospital Problems  Date Reviewed: 12/24/2019          Codes Class Noted POA    DM [facility]di[facility] mellitus[facility] ICD-10-CM: E11.9  ICD-9-CM: 250.00  12/25/2019 Unknown        Orthostatic hypotension ICD-10-CM: I95.1  ICD-9-CM: 458.0  12/25/2019 Unknown        * (Principal) Syncope and collapse ICD-10-CM: R55  ICD-9-CM: 780.2  12/24/2019 Unknown        Coronary artery disease involving native coronary artery of native heart without angina pectoris ICD-10-CM: I25.10  ICD-9-CM: 414.01  5/31/2017 Yes    Overview Signed 9/14/2017 10:20 AM by Abel Soto MD     Stable symptoms             Essential hypertension (Chronic) ICD-10-CM: I10  ICD-9-CM: 401.9  8/14/2015 Unknown    Overview Signed 9/14/2017 10:22 AM by Abel Soto MD     9/17 incr acei             GERD (gastroesophageal reflux disease) (Chronic) ICD-10-CM: K21.9  ICD-9-CM: 530.81  8/14/2015 Yes                SUBJECTIVE:  No chest pain  No shortness of breath    OBJECTIVE:    VS:   Visit Vitals  /77 (BP Patient Position: Supine)   Pulse 79 Temp 97 °F (36.1 °C)   Resp 16   Ht 5' 9.02\" (1.753 m)   Wt 106.4 kg (234 lb 8 oz)   SpO2 96%   BMI 34.61 kg/m²         Intake/Output Summary (Last 24 hours) at 12/26/2019 1014  Last data filed at 12/25/2019 2141  Gross per 24 hour   Intake --   Output 1050 ml   Net -1050 ml     TELE: normal sinus rhythm    General: alert and in no apparent distress  HENT: Normocephalic, atraumatic. Normal external eye. Neck :  no bruit, no JVD, JVD difficult to assess due to obesity  Cardiac:  regular rate and rhythm, S1, S2 normal, no murmur, click, rub or gallop  Chest/Lungs:chest clear, no wheezing, rales, normal symmetric air entry  Abdomen: Soft, nontender, no masses  Extremities:  No c/c/edema, peripheral pulses present      Labs: Results:       Chemistry Recent Labs     12/26/19  0519 12/25/19  0515 12/25/19  0027   * 269* 323*    136 138   K 2.9* 3.2* 3.4*   CL 99* 97* 97*   CO2 32 30 32   BUN 15 24* 28*   CREA 1.50* 1.59* 1.93*   CA 7.9* 7.9* 8.3*   MG 1.8  --  2.1   PHOS  --   --  2.2*   AGAP 8 9 9   BUCR 10* 15 15   AP  --  85 84   TP  --  6.7 6.9   ALB  --  2.7* 2.6*   GLOB  --  4.0 4.3*   AGRAT  --  0.7* 0.6*      CBC w/Diff Recent Labs     12/25/19  0515 12/25/19  0027   WBC 8.9 10.1   RBC 4.27* 4.14*   HGB 12.5* 12.4*   HCT 40.0 38.6    239   GRANS 53 54   LYMPH 34 33   EOS 2 2      Cardiac Enzymes Recent Labs     12/25/19  1329 12/25/19  0515   CPK 93 97   CKND1 1.1 1.2      Coagulation Recent Labs     12/25/19  0027   PTP 14.5   INR 1.2   APTT 26.2       Lipid Panel Lab Results   Component Value Date/Time    Cholesterol, total 138 03/29/2018 02:23 AM    HDL Cholesterol 38 (L) 03/29/2018 02:23 AM    LDL, calculated 63.4 03/29/2018 02:23 AM    VLDL, calculated 36.6 03/29/2018 02:23 AM    Triglyceride 183 (H) 03/29/2018 02:23 AM    CHOL/HDL Ratio 3.6 03/29/2018 02:23 AM      BNP No results for input(s): BNPP in the last 72 hours.    Liver Enzymes Recent Labs     12/25/19  0515   TP 6.7   ALB 2.7*   AP 85   SGOT 22      Digoxin    Thyroid Studies Lab Results   Component Value Date/Time    TSH 2.94 12/26/2019 05:19 AM              Ghazal Eller MD     Contact numbers:   5 PM to 9 AM: Answering service at [phone]   9 AM to 5 PM: Pager [phone]; if no response, please call through answering service or office.   Office number is [phone] or [phone]

## 2019-12-27 VITALS
HEART RATE: 66 BPM | RESPIRATION RATE: 18 BRPM | SYSTOLIC BLOOD PRESSURE: 120 MMHG | BODY MASS INDEX: 35.6 KG/M2 | WEIGHT: 240.4 LBS | TEMPERATURE: 98.4 F | HEIGHT: 69 IN | DIASTOLIC BLOOD PRESSURE: 77 MMHG | OXYGEN SATURATION: 93 %

## 2019-12-27 LAB
ATRIAL RATE: 61 BPM
CALCULATED P AXIS, ECG09: 61 DEGREES
CALCULATED T AXIS, ECG11: 21 DEGREES
DIAGNOSIS, 93000: NORMAL
GLUCOSE BLD STRIP.AUTO-MCNC: 205 MG/DL (ref 70–110)
GLUCOSE BLD STRIP.AUTO-MCNC: 249 MG/DL (ref 70–110)
P-R INTERVAL, ECG05: 256 MS
Q-T INTERVAL, ECG07: 424 MS
QRS DURATION, ECG06: 86 MS
QTC CALCULATION (BEZET), ECG08: 426 MS
VENTRICULAR RATE, ECG03: 61 BPM

## 2019-12-27 PROCEDURE — 82962 GLUCOSE BLOOD TEST: CPT

## 2019-12-27 PROCEDURE — 74011250637 HC RX REV CODE- 250/637: Performed by: EMERGENCY MEDICINE

## 2019-12-27 PROCEDURE — 74011636637 HC RX REV CODE- 636/637: Performed by: EMERGENCY MEDICINE

## 2019-12-27 PROCEDURE — 93005 ELECTROCARDIOGRAM TRACING: CPT

## 2019-12-27 PROCEDURE — 74011250637 HC RX REV CODE- 250/637: Performed by: INTERNAL MEDICINE

## 2019-12-27 PROCEDURE — 74011250636 HC RX REV CODE- 250/636: Performed by: INTERNAL MEDICINE

## 2019-12-27 RX ORDER — METOPROLOL TARTRATE 25 MG/1
25 TABLET, FILM COATED ORAL EVERY 12 HOURS
Qty: 60 TAB | Refills: 0 | Status: SHIPPED | OUTPATIENT
Start: 2019-12-27 | End: 2021-02-05

## 2019-12-27 RX ORDER — OXYCODONE AND ACETAMINOPHEN 7.5; 325 MG/1; MG/1
1 TABLET ORAL
Qty: 6 TAB | Refills: 0 | Status: SHIPPED
Start: 2019-12-27 | End: 2019-12-30

## 2019-12-27 RX ORDER — METOPROLOL TARTRATE 50 MG/1
50 TABLET ORAL EVERY 12 HOURS
Status: DISCONTINUED | OUTPATIENT
Start: 2019-12-27 | End: 2019-12-27 | Stop reason: HOSPADM

## 2019-12-27 RX ADMIN — ENOXAPARIN SODIUM 40 MG: 40 INJECTION SUBCUTANEOUS at 01:55

## 2019-12-27 RX ADMIN — INSULIN LISPRO 6 UNITS: 100 INJECTION, SOLUTION INTRAVENOUS; SUBCUTANEOUS at 08:55

## 2019-12-27 RX ADMIN — METHADONE HYDROCHLORIDE 40 MG: 10 TABLET ORAL at 08:47

## 2019-12-27 RX ADMIN — OXYCODONE HYDROCHLORIDE AND ACETAMINOPHEN 1 TABLET: 5; 325 TABLET ORAL at 01:54

## 2019-12-27 RX ADMIN — INSULIN GLARGINE 30 UNITS: 100 INJECTION, SOLUTION SUBCUTANEOUS at 09:58

## 2019-12-27 RX ADMIN — CLOPIDOGREL BISULFATE 75 MG: 75 TABLET ORAL at 08:47

## 2019-12-27 RX ADMIN — METOPROLOL TARTRATE 25 MG: 25 TABLET ORAL at 08:48

## 2019-12-27 RX ADMIN — INSULIN LISPRO 6 UNITS: 100 INJECTION, SOLUTION INTRAVENOUS; SUBCUTANEOUS at 12:06

## 2019-12-27 RX ADMIN — PANTOPRAZOLE SODIUM 40 MG: 40 TABLET, DELAYED RELEASE ORAL at 08:47

## 2019-12-27 NOTE — DIABETES MGMT
Diabetes Patient/Family Education Record  Factors That  May Influence Patients Ability  to Learn or  Comply with Recommendations   []   Language barrier    []   Cultural needs   []   Motivation    []   Cognitive limitation    []   Physical   []   Education    []   Physiological factors   []   Hearing/vision/speaking impairment   []   Pentecostalism beliefs    []   Financial factors   []  Other:   [x]  No factors identified at this time. Person Instructed:   [x]   Patient   []   Family   []  Other     Preference for Learning:   [x]   Verbal   [x]   Written   []  Demonstration     Level of Comprehension & Competence:    []  Good                                      [x] Fair                                     []  Poor                             []  Needs Reinforcement   [x]  Teachback completed    Education Component:   [x]  Medication management, including how to administer insulin (if appropriate) and potential medication interactions Levemir insulin 80 units twice daily and Novolog flexpen 40 units with meals. Reports following up with Dr. Ruddy Modi for insulin adjustments.     [x]  Nutritional management -obtain usual meal pattern Pt reports he needs to work on his diet, received education from clinical dietitian, reinforced importance of healthy eating   []  Exercise   [x]  Signs, symptoms, and treatment of hyperglycemia and hypoglycemia   [x] Prevention, recognition and treatment of hyperglycemia and hypoglycemia   [x]  Importance of blood glucose monitoring and how to obtain a blood glucose meter    []  Instruction on use of the blood glucose meter   [x]  Discuss the importance of HbA1C monitoring    []  Sick day guidelines   []  Proper use and disposal of lancets, needles, syringes or insulin pens (if appropriate)   [x]  Potential long-term complications (retinopathy, kidney disease, neuropathy, foot care)   [] Information about whom to contact in case of emergency or for more information    [x]  Goal:  Patient/family will demonstrate understanding of Diabetes Self Management Skills by: 01/03/19  Plan for post-discharge education or self-management support:    [x] Outpatient class schedule provided            [] Patient Declined    [] Scheduled for outpatient classes (date) _______  Verify:  Does patient understand how diabetes medications work? yes  Does patient know what their most recent A1c is? 11.6%  Does patient monitor glucose at home? yes  Does patient have difficulty obtaining diabetes medications or testing supplies?  None reported       Dave Blue RD, CDE

## 2019-12-27 NOTE — PROGRESS NOTES
conducted an initial consultation and Spiritual Assessment for Alida Croft, who is a 61 y.o.,male. Patient's Primary Language is: [place]. According to the patient's EMR Jainism Affiliation is: [facility].     The reason the Patient came to the hospital is:   Patient Active Problem List    Diagnosis Date Noted    DM (diabetes mellitus) ([facility] [address] 12/25/2019    Orthostatic hypotension 12/25/2019    Syncope and collapse 12/24/2019    Vomiting 03/28/2018    Chronic abdominal pain 03/28/2018    Sepsis ([facility] [address] 03/21/2018    Nausea and vomiting 09/07/2017    Gastroparesis 09/07/2017    Hypokalemia 09/07/2017    [facility]F [facility]acute renal failure) [facility] 09/07/2017    Atelectasis 09/07/2017    Abdominal pain 09/07/2017    Acquired hypothyroidism 09/07/2017    S/P lumbar fusion 07/05/2017    Diabetic neuropathy (HCC) 07/05/2017    Neuritis 07/05/2017    Spinal stenosis at L4-L5 level 06/19/2017    Coronary artery disease involving native coronary artery of native heart without angina pectoris 05/31/2017    History of coronary artery stent placement 05/31/2017    Synovial cyst 05/26/2017    HNP (herniated nucleus pulposus), lumbar 05/26/2017    Lumbar spinal stenosis 05/26/2017    Spondylolisthesis of lumbar region 05/26/2017    Positive PPD     History of heart attack     Pain in left lower leg 07/24/2016    Primary osteoarthritis of left knee 07/24/2016    Localized adiposity of abdomen 07/24/2016    Diabetes ([facility] [address] 08/14/2015    Essential hypertension 08/14/2015    GERD (gastroesophageal reflux disease) 08/14/2015    Depression 08/14/2015        The  provided the following Interventions:  Initiated a relationship of care and support. Explored issues of red, belief, spirituality and Yazidi/ritual needs while hospitalized. Listened empathically. Provided chaplaincy education. Provided information about Spiritual Care Services.   Offered prayer and assurance of continued prayers on patient's behalf. Chart reviewed. The following outcomes where achieved:  Patient shared limited information about both their medical narrative and spiritual journey/beliefs.  confirmed Patient's Restorationism Affiliation. Patient processed feeling about current hospitalization. Patient expressed gratitude for 's visit. Assessment:  Patient does not have any Episcopal/cultural needs that will affect patient's preferences in health care. There are no spiritual or Episcopal issues which require intervention at this time. Plan:  Chaplains will continue to follow and will provide pastoral care on an as needed/requested basis.  recommends bedside caregivers page  on duty if patient shows signs of acute spiritual or emotional distress.     [id number]   [phone]

## 2019-12-27 NOTE — PROGRESS NOTES
[facility]      CARDIOLOGY PROGRESS NOTE  RECS:  1. Syncope, orthostatic hypotension: Improving but minor changes still persisting. Increase metoprolol to 50 twice daily. Avoid diuretics and vasodilators as possible. 2. CAD: Stable on medications which should be continued. Check a 2D echo. 3. History of PCI: OM1 stent in 2009. 4. Hypertension: Watch as beta-blockers are started. Will avoid diuretics. 5. Hyperlipidemia: Continue statins. 6. Severe obesity: Patient understands the importance of weight loss.       Out of bed and ambulate. Okay for discharge from cardiac standpoint. Follow-up in 2 to 4 weeks in office with me.       EKG Results     Procedure 720 Value Units Date/Time    EKG, 12 LEAD, SUBSEQUENT [[id number]] Collected:  12/27/19 0721    Order Status:  Completed Updated:  12/27/19 0724     Ventricular Rate 61 BPM      Atrial Rate 61 BPM      P-R Interval 256 ms      QRS Duration 86 ms      Q-T Interval 424 ms      QTC Calculation (Bezet) 426 ms      Calculated P Axis 61 degrees      Calculated T Axis 21 degrees      Diagnosis --     Sinus rhythm with marked sinus arrhythmia with 1st degree AV block  Low voltage QRS  Cannot rule out Inferior infarct (cited on or before 27-OCT-2010)  Abnormal ECG  When compared with ECG of 26-DEC-2019 07:47,  Minimal criteria for Anterior infarct are no longer present  Nonspecific T wave abnormality, improved in Anterolateral leads      EKG, 12 LEAD, SUBSEQUENT [[id number]] Collected:  12/26/19 0747    Order Status:  Completed Updated:  12/26/19 0818     Ventricular Rate 68 BPM      Atrial Rate 68 BPM      P-R Interval 248 ms      QRS Duration 92 ms      Q-T Interval 418 ms      QTC Calculation (Bezet) 444 ms      Calculated R Axis -21 degrees      Calculated T Axis 114 degrees      Diagnosis --     Sinus rhythm with marked sinus arrhythmia with 1st degree AV block  Low voltage QRS  Inferior infarct (cited on or before 27-OCT-2010)  Cannot rule out Anterior infarct , age undetermined  Abnormal ECG  When compared with ECG of 25-DEC-2019 11:57,  Minimal criteria for Anterior infarct are now present  Confirmed by Sinai Pacheco ([id number]) on 12/26/2019 8:18:42 AM      EKG, 12 LEAD, SUBSEQUENT [[id number]] Collected:  12/25/19 1157    Order Status:  Completed Updated:  12/25/19 1234     Ventricular Rate 77 BPM      Atrial Rate 77 BPM      P-R Interval 220 ms      QRS Duration 84 ms      Q-T Interval 374 ms      QTC Calculation (Bezet) 423 ms      Calculated P Axis 79 degrees      Calculated R Axis -2 degrees      Calculated T Axis 44 degrees      Diagnosis --     Sinus rhythm with 1st degree AV block  Low voltage QRS  Possible Inferior infarct (cited on or before 27-OCT-2010)  Abnormal ECG  When compared with ECG of 27-MAR-2018 16:11,  No significant change was found  Confirmed by Min Quinn MD, Javad Hand ([id number]) on 12/25/2019 12:33:58 PM      EKG, 12 LEAD, INITIAL [[id number]]     Order Status:  Canceled         XR Results (most recent):  Results from Hospital Encounter encounter on 08/05/19   XR CHEST PA LAT    Narrative EXAMINATION: Chest 2 views    INDICATION: Positive PPD test    COMPARISON: 3/27/2018    FINDINGS: Frontal and lateral views of the chest obtained. Low lung volumes  limits evaluation. No consolidation. Mediastinal silhouette and pulmonary  vasculature unremarkable. Minimal midlung and lower lung streaky densities. No  definite pneumothorax. There is some crowding of the ribs in the right upper  chest, similar to multiple priors. Impression IMPRESSION:    No clearly acute findings. A few streaky densities, likely atelectasis. See  details above.                       ASSESSMENT:  Hospital Problems  Date Reviewed: 12/24/2019          Codes Class Noted POA    DM (diabetes mellitus) ([facility] [address] ICD-10-CM: E11.9  ICD-9-CM: 250.00  12/25/2019 Unknown        Orthostatic hypotension ICD-10-CM: I95.1  ICD-9-CM: 458.0  12/25/2019 Unknown * (Principal) Syncope and collapse ICD-10-CM: R55  ICD-9-CM: 780.2  12/24/2019 Unknown        Coronary artery disease involving native coronary artery of native heart without angina pectoris ICD-10-CM: I25.10  ICD-9-CM: 414.01  5/31/2017 Yes    Overview Signed 9/14/2017 10:20 AM by Rocio Corrales MD     Stable symptoms             Essential hypertension (Chronic) ICD-10-CM: I10  ICD-9-CM: 401.9  8/14/2015 Unknown    Overview Signed 9/14/2017 10:22 AM by Rocio Corrales MD     9/17 incr acei             GERD (gastroesophageal reflux disease) (Chronic) ICD-10-CM: K21.9  ICD-9-CM: 530.81  8/14/2015 Yes                SUBJECTIVE:  No chest pain  No shortness of breath  No dizziness    OBJECTIVE:    VS:   Visit Vitals  /78 (BP 1 Location: Right arm, BP Patient Position: Standing)   Pulse 60   Temp 97.2 °F (36.2 °C)   Resp 18   Ht 5' 9\" (1.753 m)   Wt 109 kg (240 lb 6.4 oz)   SpO2 98%   BMI 35.50 kg/m²         Intake/Output Summary (Last 24 hours) at 12/27/2019 0942  Last data filed at 12/27/2019 0528  Gross per 24 hour   Intake 403 ml   Output 200 ml   Net 203 ml     TELE: normal sinus rhythm    General: alert and in no apparent distress, morbidly obese  HENT: Normocephalic, atraumatic. Normal external eye.   Neck :  no bruit, no JVD, JVD difficult to assess due to obesity  Cardiac:  regular rate and rhythm, S1, S2 normal, no murmur, click, rub or gallop  Chest/Lungs:chest clear, no wheezing, rales, normal symmetric air entry  Abdomen: Soft, nontender, no masses  Extremities:  No c/c/edema, peripheral pulses present      Labs: Results:       Chemistry Recent Labs     12/26/19  0519 12/25/19  0515 12/25/19  0027   * 269* 323*    136 138   K 2.9* 3.2* 3.4*   CL 99* 97* 97*   CO2 32 30 32   BUN 15 24* 28*   CREA 1.50* 1.59* 1.93*   CA 7.9* 7.9* 8.3*   MG 1.8  --  2.1   PHOS  --   --  2.2*   AGAP 8 9 9   BUCR 10* 15 15   AP  --  85 84   TP  --  6.7 6.9   ALB  --  2.7* 2.6*   GLOB  --  4.0 4.3* AGRAT  --  0.7* 0.6*      CBC w/Diff Recent Labs     12/25/19  0515 12/25/19  0027   WBC 8.9 10.1   RBC 4.27* 4.14*   HGB 12.5* 12.4*   HCT 40.0 38.6    239   GRANS 53 54   LYMPH 34 33   EOS 2 2      Cardiac Enzymes Recent Labs     12/25/19  1329 12/25/19  0515   CPK 93 97   CKND1 1.1 1.2      Coagulation Recent Labs     12/25/19  0027   PTP 14.5   INR 1.2   APTT 26.2       Lipid Panel Lab Results   Component Value Date/Time    Cholesterol, total 138 03/29/2018 02:23 AM    HDL Cholesterol 38 (L) 03/29/2018 02:23 AM    LDL, calculated 63.4 03/29/2018 02:23 AM    VLDL, calculated 36.6 03/29/2018 02:23 AM    Triglyceride 183 (H) 03/29/2018 02:23 AM    CHOL/HDL Ratio 3.6 03/29/2018 02:23 AM      BNP No results for input(s): BNPP in the last 72 hours. Liver Enzymes Recent Labs     12/25/19 0515   TP 6.7   ALB 2.7*   AP 85   SGOT 22      Digoxin    Thyroid Studies Lab Results   Component Value Date/Time    TSH 2.94 12/26/2019 05:19 AM              Viviana Rodriguez MD     Contact numbers:   5 PM to 9 AM: Answering service at [phone]   9 AM to 5 PM: Pager [phone]; if no response, please call through answering service or office.   Office number is [phone] or [phone]

## 2019-12-27 NOTE — DISCHARGE INSTRUCTIONS
Admit date   12/24/19    Discharge date   12/27/19    Discharge diagnosis    Syncope relating to dehydration  Uncontrolled DM2 with hyperglycemia    Diet:  Diabetic Diet      DISCHARGE SUMMARY from Nurse PATIENT INSTRUCTIONS:    After general anesthesia or intravenous sedation, for 24 hours or while taking prescription Narcotics:  · Limit your activities  · Do not drive and operate hazardous machinery  · Do not make important personal or business decisions  · Do  not drink alcoholic beverages  · If you have not urinated within 8 hours after discharge, please contact your surgeon on call. Report the following to your surgeon:  · Excessive pain, swelling, redness or odor of or around the surgical area  · Temperature over 100.5  · Nausea and vomiting lasting longer than 4 hours or if unable to take medications  · Any signs of decreased circulation or nerve impairment to extremity: change in color, persistent  numbness, tingling, coldness or increase pain  · Any questions    What to do at Home:    Recommended activity: Activity as tolerated,     If you experience any of the following symptoms chest pain, shortness of breath or dizziness/fainting, please follow up with your primary care provider or return to the ER. *  Please give a list of your current medications to your Primary Care Provider. *  Please update this list whenever your medications are discontinued, doses are      changed, or new medications (including over-the-counter products) are added. *  Please carry medication information at all times in case of emergency situations. These are general instructions for a healthy lifestyle:    No smoking/ No tobacco products/ Avoid exposure to second hand smoke  Surgeon General's Warning:  Quitting smoking now greatly reduces serious risk to your health.     Obesity, smoking, and sedentary lifestyle greatly increases your risk for illness    A healthy diet, regular physical exercise & weight monitoring are important for maintaining a healthy lifestyle    You may be retaining fluid if you have a history of heart failure or if you experience any of the following symptoms:  Weight gain of 3 pounds or more overnight or 5 pounds in a week, increased swelling in our hands or feet or shortness of breath while lying flat in bed. Please call your doctor as soon as you notice any of these symptoms; do not wait until your next office visit. The discharge information has been reviewed with the patient. The patient verbalized understanding. Discharge medications reviewed with the patient and appropriate educational materials and side effects teaching were provided.   ___________________________________________________________________________________________________________________________________

## 2019-12-27 NOTE — DIABETES MGMT
GLYCEMIC CONTROL PLAN OF CARE    Assessment/Recommendations:  Pt is a 61year old male with a past medical history significant for diabetes, hypertension, GERD, hyperlipidemia, pancreatitis, and MI who presented with syncope. Blood glucose elevated above targets, noted Lantus insulin increased. Continue to adjust daily as needed to reach glucose targets. Consider addition of prandial Lispro insulin 3 units TID with meals. Most recent blood glucose values:  12/26/2019 12:11 12/26/2019 16:26 12/26/2019 20:52 12/27/2019 08:29   286 (H) 202 (H) 307 (H) 205 (H)     Current A1C of 11.6% is equivalent to average blood glucose of 286 mg/dl over the past 2-3 months.     Current hospital diabetes medications:   Lantus insulin 30 units daily   Correctional Lispro insulin 4 times daily ACHS    Previous day's insulin requirements:   25 units of Lantus insulin   33 units of Lispro insulin     Home diabetes medications:  Levemir insulin, pt reports taking 80 units BID  Novolog insulin 40 units with meals    Diet: Diabetic consistent carbohydrate      Education:  __x__Refer to Diabetes Education Record             ____Education not indicated at this time      Kushal Laboy RD, CDE

## 2019-12-27 NOTE — ROUTINE PROCESS
Bedside and Verbal shift change report given to [address] (oncoming nurse) by Stephy Dodd (offgoing nurse). Report included the following information SBAR, Kardex, MAR, Recent Results and Cardiac Rhythm SR. Patient alert and oriented, quietly resting with call light in reach.
Bedside and Verbal shift change report given to Brenna Sutherland RN (oncoming nurse) by Tio El RN (offgoing nurse). Report included the following information SBAR, Kardex, Intake/Output, Recent Results and Cardiac Rhythm NSR. Patient alert and resting in bed. No complaints at this time.
Bedside and Verbal shift change report given to Jose F Cueto RN (oncoming nurse) by Dannie Ku RN (offgoing nurse). Report included the following information SBAR, Kardex, Intake/Output and Recent Results.
Bedside and verbal report received from [address] [place] [address] (offgoing nurse). Report included the following information; SBAR, MAR, LABS, Intake/output, Kardex, and summary of care. Patient resting in bed. No noted complaints of pain or sob. Call light and phone in reach. Urinal at bedside. Will continue to monitor.
Bedside and verbal report received from [address] [place] [address] (offgoing nurse). Report included the following information; SBAR, MAR, LABS, Intake/output, Kardex, and summary of care. Patient alert and sitting up in the chair with a visitor. No complaints of pain or sob. Call bell and phone in reach. Will continue to monitor.
TRANSFER - IN REPORT:    Telephone report received from AMBERLY Mantilla RN(name) on Landry Garcia  being received from [email or web address] for routine progression of care      Report consisted of patients Situation, Background, Assessment and   Recommendations(SBAR). Information from the following report(s) SBAR, Kardex, ED Summary, [facility] and Recent Results was reviewed with the receiving nurse. Opportunity for questions and clarification was provided. 7619 Assessment completed upon patients arrival to unit and care assumed. Dr Talha Cage notified of patient's arrival and location. [phone] [facility] Dr Talha Cage in, assessed patient.  Orthostatic B/P recorded and viewed by MD.
Angina of effort    CAD (coronary artery disease) of bypass graft    CAD S/P percutaneous coronary angioplasty    Essential hypertension, benign    Gastric motility disorder    GERD (gastroesophageal reflux disease)    Hx of benign prostatic hypertrophy    Hyperlipidemia, unspecified hyperlipidemia type    Small bowel obstruction due to adhesions

## 2019-12-27 NOTE — DISCHARGE SUMMARY
[address] [place] [address] Dr Wiley Luu [facility], [address]     DISCHARGE SUMMARY    Name: Haley Alcantar MRN: [id number]   Age / Sex: 61 y.o. / male CSN: [id number]   YOB: 1960 Length of Stay: 3 days   Admit Date: 12/24/2019 Discharge Date:        PRIMARY CARE PHYSICIAN: Rene Muir MD      DISCHARGE DIAGNOSES:    - Syncope, likley 2/2 dehydration  - Orthostatic hypotension  - SHYLA at admission ,likely 2/2 diuretics  - CAD  - h/o HTN  - Uncontrolled DM2 with hyperglycemia  - obesity, Body mass index is 37.21 kg/m². - dyslipidemia  - On chronic methadone, has note from clinic- ( RN has made a copy and placed in Chart )  -Chronic back pain  - CHRISTINA      CONSULTS CALLED: cardiology      PROCEDURES DONE: none      [facility]: This is a 68-year-old male with known past medical history of coronary artery disease and hypertension and type 2 diabetes who presented to the ED with syncopal episode. Patient is on chronic methadone and follows with the methadone clinic. Patient was evaluated and was admitted. Patient was noted to be dehydrated and had acute kidney injury. This was felt to be secondary to diuretics. Patient was started on IV fluids. Orthostatics were monitored and patient was noted to be orthostatic. Blood pressures were monitored. Cardiology was consulted. Renal function showed improvement. Patient's orthostatic hypotension also showed improvement. Patient was counseled regarding judicious pain medication use. Patient was counseled regarding compliance with medications and regular follow-up with physicians and following physician instructions. Patient was advised to not drive until cleared by cardiology. Patient was mobilized. Cardiology cleared the patient for discharge. Discharge plans were discussed with the patient and patient was discharged home.       MEDICATIONS ON DISCHARGE:    Current Discharge Medication List      START taking these medications    Details   metoprolol tartrate (LOPRESSOR) 25 mg tablet Take 1 Tab by mouth every twelve (12) hours. Qty: 60 Tab, Refills: 0         CONTINUE these medications which have CHANGED    Details   oxyCODONE-acetaminophen (PERCOCET 7.5) 7.5-325 mg per tablet Take 1 Tab by mouth every eight (8) hours as needed for Pain for up to 3 days. Max Daily Amount: 3 Tabs. Indications: pain  Qty: 6 Tab, Refills: 0    Associated Diagnoses: S/P umbilical hernia repair, follow-up exam      insulin detemir U-100 (LEVEMIR U-100 INSULIN) 100 unit/mL injection 20 Units by SubCUTAneous route two (2) times a day. Qty: 10 mL, Refills: 0      !! OTHER Resume Preadmission Methadone from clinic  Qty: 1 Each, Refills: 0      !! OTHER Graded compression stockings b/l LE- use as directed  Qty: 1 Each, Refills: 0      !! OTHER Check CBC, CMP, Mg in 3 days, results to PCP immediately, Diagnosis- syncope  Qty: 1 Each, Refills: 0      !! OTHER No Driving Until cleared by Cardiology to do so  Qty: 1 Each, Refills: 0       !! - Potential duplicate medications found. Please discuss with provider. CONTINUE these medications which have NOT CHANGED    Details   pantoprazole (PROTONIX) 40 mg tablet Take 1 Tab by mouth Before breakfast and dinner. Qty: 60 Tab, Refills: 0      acetaminophen (TYLENOL) 325 mg tablet Take 2 Tabs by mouth every six (6) hours as needed. Indications: Pain, take it with bentyl; do not exceed 3 g tylenol daily  Qty: 30 Tab, Refills: 0      !! OTHER Incentive spirometry- use as directed  Qty: 1 Each, Refills: 0      albuterol (PROVENTIL HFA, VENTOLIN HFA, PROAIR HFA) 90 mcg/actuation inhaler 2 puffs every 6 hours x 5 days then every 6 hours as needed for shortness of breath and/or wheezing  Qty: 1 Inhaler, Refills: 0      polyethylene glycol (MIRALAX) 17 gram packet Take 1 Packet by mouth daily. Qty: 30 Packet, Refills: 0      simvastatin (ZOCOR) 10 mg tablet Take 10 mg by mouth nightly.       clopidogrel (PLAVIX) 75 mg tablet Take 75 mg by mouth daily. !! - Potential duplicate medications found. Please discuss with provider. STOP taking these medications       sucralfate (CARAFATE) 100 mg/mL suspension Comments:   Reason for Stopping:         ondansetron (ZOFRAN ODT) 4 mg disintegrating tablet Comments:   Reason for Stopping:         hydroCHLOROthiazide (HYDRODIURIL) 50 mg tablet Comments:   Reason for Stopping:         amLODIPine (NORVASC) 10 mg tablet Comments:   Reason for Stopping:         pregabalin (LYRICA) 75 mg capsule Comments:   Reason for Stopping:         amitriptyline (ELAVIL) 50 mg tablet Comments:   Reason for Stopping:                 DISCHARGE VITAL SIGNS:  Visit Vitals  /77 (BP 1 Location: Right arm, BP Patient Position: Sitting)   Pulse 66   Temp 98.4 °F (36.9 °C)   Resp 18   Ht 5' 9\" (1.753 m)   Wt 109 kg (240 lb 6.4 oz)   SpO2 93%   BMI 35.50 kg/m²       DISCHARGE PHYSICAL EXAMINATION:    General:  Awake, alert  Cardiovascular:  S1S2+, RRR  Pulmonary:  CTA b/l  GI:  Soft, BS+, NT, ND, obese  Extremities:  No edema      CONDITION ON DISCHARGE: Stable. DISPOSITION: home      FOLLOW-UP RECOMMENDATIONS:   Follow-up Information     Follow up With Specialties Details Why Omkar Antonio MD Pediatrics   [facility] [id number] [phone]            OTHER INSTRUCTIONS:        TIME SPENT ON DISCHARGE ACTIVITIES: More than 35 minutes. Dragon medical dictation software was used for portions of this report. Unintended errors may occur.       Signed:  Gage Simental MD      12/27/2019

## 2019-12-27 NOTE — PROGRESS NOTES
Problem: Falls - Risk of  Goal: *Absence of Falls  Description  Document Amy Balderas Fall Risk and appropriate interventions in the flowsheet.   Note: Fall Risk Interventions:            Medication Interventions: Evaluate medications/consider consulting pharmacy         History of Falls Interventions: Consult care management for discharge planning

## 2020-02-01 ENCOUNTER — HOSPITAL ENCOUNTER (OUTPATIENT)
Dept: LAB | Age: 60
Discharge: HOME | End: 2020-02-01

## 2020-02-01 LAB — XX-LABCORP SPECIMEN COL,LCBCF: NORMAL

## 2020-02-01 PROCEDURE — 99001 SPECIMEN HANDLING PT-LAB: CPT

## 2020-06-12 ENCOUNTER — HOSPITAL ENCOUNTER (OUTPATIENT)
Dept: LAB | Age: 60
Discharge: HOME | End: 2020-06-12

## 2020-06-12 LAB — XX-LABCORP SPECIMEN COL,LCBCF: NORMAL

## 2020-06-12 PROCEDURE — 99001 SPECIMEN HANDLING PT-LAB: CPT

## 2020-09-01 ENCOUNTER — APPOINTMENT (OUTPATIENT)
Dept: GENERAL RADIOLOGY | Age: 60
DRG: 137 | End: 2020-09-01
Attending: EMERGENCY MEDICINE
Payer: MEDICAID

## 2020-09-01 ENCOUNTER — HOSPITAL ENCOUNTER (INPATIENT)
Age: 60
LOS: 3 days | Discharge: LEFT BEFORE DISCHARGE | DRG: 137 | End: 2020-09-04
Attending: EMERGENCY MEDICINE | Admitting: INTERNAL MEDICINE
Payer: MEDICAID

## 2020-09-01 ENCOUNTER — APPOINTMENT (OUTPATIENT)
Dept: CT IMAGING | Age: 60
DRG: 137 | End: 2020-09-01
Attending: EMERGENCY MEDICINE
Payer: MEDICAID

## 2020-09-01 DIAGNOSIS — J18.9 MULTIFOCAL PNEUMONIA: ICD-10-CM

## 2020-09-01 DIAGNOSIS — R41.82 ALTERED MENTAL STATUS, UNSPECIFIED ALTERED MENTAL STATUS TYPE: Primary | ICD-10-CM

## 2020-09-01 DIAGNOSIS — U07.1 PNEUMONIA DUE TO COVID-19 VIRUS: ICD-10-CM

## 2020-09-01 DIAGNOSIS — J12.82 PNEUMONIA DUE TO COVID-19 VIRUS: ICD-10-CM

## 2020-09-01 DIAGNOSIS — E86.0 DEHYDRATION: ICD-10-CM

## 2020-09-01 DIAGNOSIS — R50.9 ACUTE FEBRILE ILLNESS: ICD-10-CM

## 2020-09-01 LAB
ALBUMIN SERPL-MCNC: 3 G/DL (ref 3.4–5)
ALBUMIN/GLOB SERPL: 0.5 {RATIO} (ref 0.8–1.7)
ALP SERPL-CCNC: 111 U/L (ref 45–117)
ALT SERPL-CCNC: 42 U/L (ref 16–61)
ANION GAP SERPL CALC-SCNC: 9 MMOL/L (ref 3–18)
APPEARANCE UR: CLEAR
AST SERPL-CCNC: 55 U/L (ref 10–38)
ATRIAL RATE: 87 BPM
BACTERIA URNS QL MICRO: NEGATIVE /HPF
BASOPHILS # BLD: 0 K/UL (ref 0–0.06)
BASOPHILS NFR BLD: 0 % (ref 0–3)
BILIRUB SERPL-MCNC: 1.4 MG/DL (ref 0.2–1)
BILIRUB UR QL: ABNORMAL
BUN SERPL-MCNC: 20 MG/DL (ref 7–18)
BUN/CREAT SERPL: 14 (ref 12–20)
CALCIUM SERPL-MCNC: 8.9 MG/DL (ref 8.5–10.1)
CALCULATED P AXIS, ECG09: 80 DEGREES
CALCULATED R AXIS, ECG10: -9 DEGREES
CALCULATED T AXIS, ECG11: -53 DEGREES
CHLORIDE SERPL-SCNC: 97 MMOL/L (ref 100–111)
CO2 SERPL-SCNC: 31 MMOL/L (ref 21–32)
COLOR UR: ABNORMAL
CREAT SERPL-MCNC: 1.45 MG/DL (ref 0.6–1.3)
CRP SERPL-MCNC: 6.2 MG/DL (ref 0–0.3)
D DIMER PPP FEU-MCNC: 1.11 UG/ML(FEU)
DIAGNOSIS, 93000: NORMAL
DIFFERENTIAL METHOD BLD: ABNORMAL
EOSINOPHIL # BLD: 0.1 K/UL (ref 0–0.4)
EOSINOPHIL NFR BLD: 1 % (ref 0–5)
EPITH CASTS URNS QL MICRO: NEGATIVE /LPF (ref 0–5)
ERYTHROCYTE [DISTWIDTH] IN BLOOD BY AUTOMATED COUNT: 13.4 % (ref 11.6–14.5)
EST. AVERAGE GLUCOSE BLD GHB EST-MCNC: 301 MG/DL
FERRITIN SERPL-MCNC: 1213 NG/ML (ref 8–388)
GLOBULIN SER CALC-MCNC: 6.1 G/DL (ref 2–4)
GLUCOSE BLD STRIP.AUTO-MCNC: 248 MG/DL (ref 70–110)
GLUCOSE BLD STRIP.AUTO-MCNC: 359 MG/DL (ref 70–110)
GLUCOSE SERPL-MCNC: 371 MG/DL (ref 74–99)
GLUCOSE UR STRIP.AUTO-MCNC: >1000 MG/DL
HBA1C MFR BLD: 12.1 % (ref 4.2–5.6)
HCT VFR BLD AUTO: 39.8 % (ref 36–48)
HGB BLD-MCNC: 12.7 G/DL (ref 13–16)
HGB UR QL STRIP: NEGATIVE
HYALINE CASTS URNS QL MICRO: ABNORMAL /LPF (ref 0–2)
KETONES UR QL STRIP.AUTO: 40 MG/DL
LACTATE BLD-SCNC: 1.69 MMOL/L (ref 0.4–2)
LACTATE BLD-SCNC: 2.14 MMOL/L (ref 0.4–2)
LEUKOCYTE ESTERASE UR QL STRIP.AUTO: NEGATIVE
LYMPHOCYTES # BLD: 1 K/UL (ref 0.8–3.5)
LYMPHOCYTES NFR BLD: 10 % (ref 20–51)
MCH RBC QN AUTO: 30 PG (ref 24–34)
MCHC RBC AUTO-ENTMCNC: 31.9 G/DL (ref 31–37)
MCV RBC AUTO: 93.9 FL (ref 74–97)
METAMYELOCYTES NFR BLD MANUAL: 1 %
MONOCYTES # BLD: 0.9 K/UL (ref 0–1)
MONOCYTES NFR BLD: 9 % (ref 2–9)
MUCOUS THREADS URNS QL MICRO: ABNORMAL /LPF
NEUTS BAND NFR BLD MANUAL: 7 % (ref 0–5)
NEUTS SEG # BLD: 8 K/UL (ref 1.8–8)
NEUTS SEG NFR BLD: 72 % (ref 42–75)
NITRITE UR QL STRIP.AUTO: NEGATIVE
P-R INTERVAL, ECG05: 192 MS
PH UR STRIP: 5 [PH] (ref 5–8)
PLATELET # BLD AUTO: 265 K/UL (ref 135–420)
PLATELET COMMENTS,PCOM: ABNORMAL
PMV BLD AUTO: 11.8 FL (ref 9.2–11.8)
POTASSIUM SERPL-SCNC: 4 MMOL/L (ref 3.5–5.5)
PROCALCITONIN SERPL-MCNC: 0.15 NG/ML
PROT SERPL-MCNC: 9.1 G/DL (ref 6.4–8.2)
PROT UR STRIP-MCNC: 100 MG/DL
Q-T INTERVAL, ECG07: 334 MS
QRS DURATION, ECG06: 82 MS
QTC CALCULATION (BEZET), ECG08: 401 MS
RBC # BLD AUTO: 4.24 M/UL (ref 4.7–5.5)
RBC #/AREA URNS HPF: NEGATIVE /HPF (ref 0–5)
RBC MORPH BLD: ABNORMAL
SODIUM SERPL-SCNC: 137 MMOL/L (ref 136–145)
SP GR UR REFRACTOMETRY: >1.03 (ref 1–1.03)
UROBILINOGEN UR QL STRIP.AUTO: 4 EU/DL (ref 0.2–1)
VENTRICULAR RATE, ECG03: 87 BPM
WBC # BLD AUTO: 10.1 K/UL (ref 4.6–13.2)
WBC URNS QL MICRO: ABNORMAL /HPF (ref 0–4)

## 2020-09-01 PROCEDURE — 96361 HYDRATE IV INFUSION ADD-ON: CPT

## 2020-09-01 PROCEDURE — 80053 COMPREHEN METABOLIC PANEL: CPT

## 2020-09-01 PROCEDURE — 96365 THER/PROPH/DIAG IV INF INIT: CPT

## 2020-09-01 PROCEDURE — 87040 BLOOD CULTURE FOR BACTERIA: CPT

## 2020-09-01 PROCEDURE — 85025 COMPLETE CBC W/AUTO DIFF WBC: CPT

## 2020-09-01 PROCEDURE — 85379 FIBRIN DEGRADATION QUANT: CPT

## 2020-09-01 PROCEDURE — 74011250636 HC RX REV CODE- 250/636: Performed by: EMERGENCY MEDICINE

## 2020-09-01 PROCEDURE — 74011250637 HC RX REV CODE- 250/637: Performed by: NURSE PRACTITIONER

## 2020-09-01 PROCEDURE — 83605 ASSAY OF LACTIC ACID: CPT

## 2020-09-01 PROCEDURE — 70450 CT HEAD/BRAIN W/O DYE: CPT

## 2020-09-01 PROCEDURE — 87086 URINE CULTURE/COLONY COUNT: CPT

## 2020-09-01 PROCEDURE — 99284 EMERGENCY DEPT VISIT MOD MDM: CPT

## 2020-09-01 PROCEDURE — 82962 GLUCOSE BLOOD TEST: CPT

## 2020-09-01 PROCEDURE — 87635 SARS-COV-2 COVID-19 AMP PRB: CPT

## 2020-09-01 PROCEDURE — 81001 URINALYSIS AUTO W/SCOPE: CPT

## 2020-09-01 PROCEDURE — 65270000029 HC RM PRIVATE

## 2020-09-01 PROCEDURE — 93005 ELECTROCARDIOGRAM TRACING: CPT

## 2020-09-01 PROCEDURE — 82728 ASSAY OF FERRITIN: CPT

## 2020-09-01 PROCEDURE — 74011000258 HC RX REV CODE- 258: Performed by: EMERGENCY MEDICINE

## 2020-09-01 PROCEDURE — 71045 X-RAY EXAM CHEST 1 VIEW: CPT

## 2020-09-01 PROCEDURE — 96367 TX/PROPH/DG ADDL SEQ IV INF: CPT

## 2020-09-01 PROCEDURE — 83036 HEMOGLOBIN GLYCOSYLATED A1C: CPT

## 2020-09-01 PROCEDURE — 80307 DRUG TEST PRSMV CHEM ANLYZR: CPT

## 2020-09-01 PROCEDURE — 96366 THER/PROPH/DIAG IV INF ADDON: CPT

## 2020-09-01 PROCEDURE — 86140 C-REACTIVE PROTEIN: CPT

## 2020-09-01 PROCEDURE — 84145 PROCALCITONIN (PCT): CPT

## 2020-09-01 PROCEDURE — 74011636637 HC RX REV CODE- 636/637: Performed by: NURSE PRACTITIONER

## 2020-09-01 RX ORDER — ACETAMINOPHEN 325 MG/1
650 TABLET ORAL
Status: DISCONTINUED | OUTPATIENT
Start: 2020-09-01 | End: 2020-09-04 | Stop reason: HOSPADM

## 2020-09-01 RX ORDER — PANTOPRAZOLE SODIUM 40 MG/1
40 TABLET, DELAYED RELEASE ORAL
Status: DISCONTINUED | OUTPATIENT
Start: 2020-09-01 | End: 2020-09-04 | Stop reason: HOSPADM

## 2020-09-01 RX ORDER — LEVOFLOXACIN 5 MG/ML
750 INJECTION, SOLUTION INTRAVENOUS EVERY 24 HOURS
Status: DISCONTINUED | OUTPATIENT
Start: 2020-09-01 | End: 2020-09-02

## 2020-09-01 RX ORDER — CLOPIDOGREL BISULFATE 75 MG/1
75 TABLET ORAL DAILY
Status: DISCONTINUED | OUTPATIENT
Start: 2020-09-02 | End: 2020-09-04 | Stop reason: HOSPADM

## 2020-09-01 RX ORDER — POLYETHYLENE GLYCOL 3350 17 G/17G
17 POWDER, FOR SOLUTION ORAL DAILY PRN
Status: DISCONTINUED | OUTPATIENT
Start: 2020-09-01 | End: 2020-09-04 | Stop reason: HOSPADM

## 2020-09-01 RX ORDER — SODIUM CHLORIDE 0.9 % (FLUSH) 0.9 %
5-40 SYRINGE (ML) INJECTION EVERY 8 HOURS
Status: DISCONTINUED | OUTPATIENT
Start: 2020-09-01 | End: 2020-09-04 | Stop reason: HOSPADM

## 2020-09-01 RX ORDER — VANCOMYCIN 2 GRAM/500 ML IN 0.9 % SODIUM CHLORIDE INTRAVENOUS
2000 ONCE
Status: COMPLETED | OUTPATIENT
Start: 2020-09-01 | End: 2020-09-01

## 2020-09-01 RX ORDER — MAGNESIUM SULFATE 100 %
4 CRYSTALS MISCELLANEOUS AS NEEDED
Status: DISCONTINUED | OUTPATIENT
Start: 2020-09-01 | End: 2020-09-04 | Stop reason: HOSPADM

## 2020-09-01 RX ORDER — ONDANSETRON 2 MG/ML
4 INJECTION INTRAMUSCULAR; INTRAVENOUS
Status: DISCONTINUED | OUTPATIENT
Start: 2020-09-01 | End: 2020-09-04 | Stop reason: HOSPADM

## 2020-09-01 RX ORDER — INSULIN GLARGINE 100 [IU]/ML
20 INJECTION, SOLUTION SUBCUTANEOUS 2 TIMES DAILY
Status: DISCONTINUED | OUTPATIENT
Start: 2020-09-01 | End: 2020-09-02

## 2020-09-01 RX ORDER — METOPROLOL TARTRATE 25 MG/1
25 TABLET, FILM COATED ORAL EVERY 12 HOURS
Status: DISCONTINUED | OUTPATIENT
Start: 2020-09-01 | End: 2020-09-04 | Stop reason: HOSPADM

## 2020-09-01 RX ORDER — ENOXAPARIN SODIUM 100 MG/ML
40 INJECTION SUBCUTANEOUS DAILY
Status: DISCONTINUED | OUTPATIENT
Start: 2020-09-02 | End: 2020-09-04 | Stop reason: HOSPADM

## 2020-09-01 RX ORDER — ONDANSETRON 4 MG/1
4 TABLET, ORALLY DISINTEGRATING ORAL
Status: DISCONTINUED | OUTPATIENT
Start: 2020-09-01 | End: 2020-09-04 | Stop reason: HOSPADM

## 2020-09-01 RX ORDER — ATORVASTATIN CALCIUM 10 MG/1
10 TABLET, FILM COATED ORAL
Status: DISCONTINUED | OUTPATIENT
Start: 2020-09-01 | End: 2020-09-04 | Stop reason: HOSPADM

## 2020-09-01 RX ORDER — INSULIN LISPRO 100 [IU]/ML
INJECTION, SOLUTION INTRAVENOUS; SUBCUTANEOUS
Status: DISCONTINUED | OUTPATIENT
Start: 2020-09-01 | End: 2020-09-04 | Stop reason: HOSPADM

## 2020-09-01 RX ORDER — METHADONE HYDROCHLORIDE 10 MG/1
30 TABLET ORAL DAILY
Status: DISCONTINUED | OUTPATIENT
Start: 2020-09-02 | End: 2020-09-04 | Stop reason: HOSPADM

## 2020-09-01 RX ORDER — DEXTROSE 50 % IN WATER (D50W) INTRAVENOUS SYRINGE
25-50 AS NEEDED
Status: DISCONTINUED | OUTPATIENT
Start: 2020-09-01 | End: 2020-09-04 | Stop reason: HOSPADM

## 2020-09-01 RX ORDER — SODIUM CHLORIDE 0.9 % (FLUSH) 0.9 %
5-10 SYRINGE (ML) INJECTION AS NEEDED
Status: DISCONTINUED | OUTPATIENT
Start: 2020-09-01 | End: 2020-09-04 | Stop reason: HOSPADM

## 2020-09-01 RX ORDER — ACETAMINOPHEN 650 MG/1
650 SUPPOSITORY RECTAL
Status: DISCONTINUED | OUTPATIENT
Start: 2020-09-01 | End: 2020-09-04 | Stop reason: HOSPADM

## 2020-09-01 RX ORDER — SODIUM CHLORIDE 0.9 % (FLUSH) 0.9 %
5-40 SYRINGE (ML) INJECTION AS NEEDED
Status: DISCONTINUED | OUTPATIENT
Start: 2020-09-01 | End: 2020-09-04 | Stop reason: HOSPADM

## 2020-09-01 RX ADMIN — LEVOFLOXACIN 750 MG: 5 INJECTION, SOLUTION INTRAVENOUS at 12:12

## 2020-09-01 RX ADMIN — INSULIN GLARGINE 20 UNITS: 100 INJECTION, SOLUTION SUBCUTANEOUS at 18:18

## 2020-09-01 RX ADMIN — VANCOMYCIN HYDROCHLORIDE 2000 MG: 10 INJECTION, POWDER, LYOPHILIZED, FOR SOLUTION INTRAVENOUS at 14:00

## 2020-09-01 RX ADMIN — SODIUM CHLORIDE 270 ML: 900 INJECTION, SOLUTION INTRAVENOUS at 13:30

## 2020-09-01 RX ADMIN — SODIUM CHLORIDE 1000 ML: 900 INJECTION, SOLUTION INTRAVENOUS at 12:16

## 2020-09-01 RX ADMIN — SODIUM CHLORIDE 1000 ML: 900 INJECTION, SOLUTION INTRAVENOUS at 12:30

## 2020-09-01 RX ADMIN — SODIUM CHLORIDE 1000 ML: 900 INJECTION, SOLUTION INTRAVENOUS at 11:35

## 2020-09-01 RX ADMIN — PIPERACILLIN AND TAZOBACTAM 3.38 G: 3; .375 INJECTION, POWDER, LYOPHILIZED, FOR SOLUTION INTRAVENOUS at 18:24

## 2020-09-01 NOTE — ED TRIAGE NOTES
Pt presents lethargic and when asked what brings him in to the ED pt reports \" I don't know my nephew brought me, pt denies any pain chest pain, SOB or difficulty. Pt does admit that the has not been eating for the past 2 weeks.  Pt does admit loss of taste and smell X 2 weeks denies any known sick contacts

## 2020-09-01 NOTE — ED PROVIDER NOTES
EMERGENCY DEPARTMENT HISTORY AND PHYSICAL EXAM      Date: 9/1/2020  Patient Name: Carlos Daugherty    History of Presenting Illness     Chief Complaint   Patient presents with    Anorexia       History Provided By: Patient, Patient's Sister and EMS    Chief Complaint: Malaise, fatigue, anorexia, and fever      Additional History (Context): Carlos Daugherty is a 61 y.o. male who presents with a few days of not eating well and generalized malaise and fatigue, nausea with no vomiting, and today with new fever and new confusion. Patient lives at home with family, normally is awake and alert and interacting appropriately, however now is confused as to the situation. Answers some questions appropriately, however others with nonsensical answers. Denies any chest pain, shortness of breath, sputum, abdominal pain, urinary symptoms, rash, joint pain, muscle aches, headache. PCP: Cele Bal MD    Current Facility-Administered Medications   Medication Dose Route Frequency Provider Last Rate Last Dose    sodium chloride (NS) flush 5-10 mL  5-10 mL IntraVENous PRN Adalgisa Terrellt, MD        piperacillin-tazobactam (ZOSYN) 4.5 g in 0.9% sodium chloride (MBP/ADV) 100 mL MBP  4.5 g IntraVENous Q6H Hildegarde Xandert, MD        levoFLOXacin (LEVAQUIN) 750 mg in D5W IVPB  750 mg IntraVENous Q24H Hildegarde Covert,  mL/hr at 09/01/20 1212 750 mg at 09/01/20 1212    sodium chloride 0.9 % bolus infusion 1,000 mL  1,000 mL IntraVENous ONCE Adalgisa Osorio MD        Followed by   Jose sodium chloride 0.9 % bolus infusion 270 mL  270 mL IntraVENous ONCE Hildegarde Xandert, MD        vancomycin (VANCOCIN) 2000 mg in  ml infusion  2,000 mg IntraVENous ONCE Adalgisa Osorio MD         Current Outpatient Medications   Medication Sig Dispense Refill    methadone HCl (METHADONE PO) Take  by mouth.  metoprolol tartrate (LOPRESSOR) 25 mg tablet Take 1 Tab by mouth every twelve (12) hours.  60 Tab 0    insulin detemir U-100 (LEVEMIR U-100 INSULIN) 100 unit/mL injection 20 Units by SubCUTAneous route two (2) times a day. 10 mL 0    OTHER Resume Preadmission Methadone from clinic 1 Each 0    OTHER Graded compression stockings b/l LE- use as directed 1 Each 0    OTHER Check CBC, CMP, Mg in 3 days, results to PCP immediately, Diagnosis- syncope 1 Each 0    OTHER No Driving Until cleared by Cardiology to do so 1 Each 0    pantoprazole (PROTONIX) 40 mg tablet Take 1 Tab by mouth Before breakfast and dinner. 60 Tab 0    acetaminophen (TYLENOL) 325 mg tablet Take 2 Tabs by mouth every six (6) hours as needed. Indications: Pain, take it with bentyl; do not exceed 3 g tylenol daily 30 Tab 0    OTHER Incentive spirometry- use as directed 1 Each 0    albuterol (PROVENTIL HFA, VENTOLIN HFA, PROAIR HFA) 90 mcg/actuation inhaler 2 puffs every 6 hours x 5 days then every 6 hours as needed for shortness of breath and/or wheezing 1 Inhaler 0    polyethylene glycol (MIRALAX) 17 gram packet Take 1 Packet by mouth daily. 30 Packet 0    simvastatin (ZOCOR) 10 mg tablet Take 10 mg by mouth nightly.  clopidogrel (PLAVIX) 75 mg tablet Take 75 mg by mouth daily.          Past History     Past Medical History:  Past Medical History:   Diagnosis Date    Arthritis     Coronary atherosclerosis of native coronary artery     S/P PCI with GE in 12/09    Diabetes ([facility] [address]     IDDM    Electrolyte and fluid disorders not elsewhere classified     Essential hypertension, benign     GERD (gastroesophageal reflux disease)     Heroin abuse ([facility] [address] 05/26/16    Psychiatrist Dr. Saskia Blake History of heart attack     Hyperlipidemia     Intermediate coronary syndrome [facility])     MDD (major depressive disorder) 5/26/16    Psychiatrist Dr. Saskia Blkae Myocardial infarction [facility])     Obesity, unspecified     Old myocardial infarction     Other and unspecified hyperlipidemia     Pancreatitis     Positive PPD     Sleep apnea uses Cpap machine    Transfusion history     Before 1992       Past Surgical History:  Past Surgical History:   Procedure Laterality Date    HX APPENDECTOMY      HX CORONARY STENT PLACEMENT  2010    2 stents       Family History:  Family History   Problem Relation Age of Onset    Heart Attack Father     Coronary Artery Disease Mother     Diabetes Mother     Cancer Sister         breast cancer and lung cancer       Social History:  Social History     Tobacco Use    Smoking status: Never Smoker    Smokeless tobacco: Never Used   Substance Use Topics    Alcohol use: No    Drug use: No       Allergies: Allergies   Allergen Reactions    Compazine [Prochlorperazine] Anaphylaxis     \"Tightness around throat\"         Review of Systems   Review of Systems   Unable to perform ROS: Mental status change       Physical Exam     Vitals:    09/01/20 1048 09/01/20 1134   BP: 123/87    Pulse: 91    Resp: 19    Temp: (!) 101.8 °F (38.8 °C)    SpO2: 95%    Weight: 108.9 kg (240 lb)    Height: 5' 9\" (1.753 m) 5' 9.02\" (1.753 m)     Physical Exam  Vitals signs and nursing note reviewed. Constitutional:       General: He is not in acute distress. Appearance: He is well-developed. He is obese. He is not diaphoretic. Comments: Somnolent but awakens to gentle voice stimuli. Remains awake and conversant per 10 to 10 seconds, however then drifts off to sleep. Answers some questions appropriately, however with other questions gives nonsensical answers. HENT:      Head: Normocephalic and atraumatic. Nose: Nose normal.      Mouth/Throat:      Mouth: Mucous membranes are moist.      Pharynx: Oropharynx is clear. Eyes:      Extraocular Movements: Extraocular movements intact. Conjunctiva/sclera: Conjunctivae normal.      Pupils: Pupils are equal, round, and reactive to light. Neck:      Musculoskeletal: Normal range of motion and neck supple.    Cardiovascular:      Rate and Rhythm: Normal rate and regular rhythm. Heart sounds: Normal heart sounds. No murmur. Pulmonary:      Effort: Pulmonary effort is normal.      Breath sounds: Normal breath sounds. No wheezing. Abdominal:      General: Bowel sounds are normal.      Palpations: Abdomen is soft. Tenderness: There is no abdominal tenderness. Musculoskeletal: Normal range of motion. General: No tenderness. Skin:     General: Skin is warm and dry. Capillary Refill: Capillary refill takes less than 2 seconds. Neurological:      General: No focal deficit present. Mental Status: He is disoriented. Comments: When asked the date, states that it is the first.  When asked which month, states that it is January. When asked what the year is, states that it is September. Repeats the same answers upon multiple questioning. States that the president of [employer] [facility] is [employer]. Diagnostic Study Results     Labs -     Recent Results (from the past 12 hour(s))   CBC WITH AUTOMATED DIFF    Collection Time: 09/01/20 10:57 AM   Result Value Ref Range    WBC 10.1 4.6 - 13.2 K/uL    RBC 4.24 (L) 4.70 - 5.50 M/uL    HGB 12.7 (L) 13.0 - 16.0 g/dL    HCT 39.8 36.0 - 48.0 %    MCV 93.9 74.0 - 97.0 FL    MCH 30.0 24.0 - 34.0 PG    MCHC 31.9 31.0 - 37.0 g/dL    RDW 13.4 11.6 - 14.5 %    PLATELET 830 627 - 454 K/uL    MPV 11.8 9.2 - 11.8 FL    NEUTROPHILS 72 42 - 75 %    BAND NEUTROPHILS 7 (H) 0 - 5 %    LYMPHOCYTES 10 (L) 20 - 51 %    MONOCYTES 9 2 - 9 %    EOSINOPHILS 1 0 - 5 %    BASOPHILS 0 0 - 3 %    METAMYELOCYTES 1 (H) 0 %    ABS. NEUTROPHILS 8.0 1.8 - 8.0 K/UL    ABS. LYMPHOCYTES 1.0 0.8 - 3.5 K/UL    ABS. MONOCYTES 0.9 0 - 1.0 K/UL    ABS. EOSINOPHILS 0.1 0.0 - 0.4 K/UL    ABS.  BASOPHILS 0.0 0.0 - 0.06 K/UL    DF MANUAL      PLATELET COMMENTS ADEQUATE PLATELETS      RBC COMMENTS STOMATOCYTES  1+       METABOLIC PANEL, COMPREHENSIVE    Collection Time: 09/01/20 10:57 AM   Result Value Ref Range    Sodium 137 136 - 145 mmol/L Potassium 4.0 3.5 - 5.5 mmol/L    Chloride 97 (L) 100 - 111 mmol/L    CO2 31 21 - 32 mmol/L    Anion gap 9 3.0 - 18 mmol/L    Glucose 371 (H) 74 - 99 mg/dL    BUN 20 (H) 7.0 - 18 MG/DL    Creatinine 1.45 (H) 0.6 - 1.3 MG/DL    BUN/Creatinine ratio 14 12 - 20      GFR est AA >60 >60 ml/min/1.73m2    GFR est non-AA 50 (L) >60 ml/min/1.73m2    Calcium 8.9 8.5 - 10.1 MG/DL    Bilirubin, total 1.4 (H) 0.2 - 1.0 MG/DL    ALT (SGPT) 42 16 - 61 U/L    AST (SGOT) 55 (H) 10 - 38 U/L    Alk.  phosphatase 111 45 - 117 U/L    Protein, total 9.1 (H) 6.4 - 8.2 g/dL    Albumin 3.0 (L) 3.4 - 5.0 g/dL    Globulin 6.1 (H) 2.0 - 4.0 g/dL    A-G Ratio 0.5 (L) 0.8 - 1.7     GLUCOSE, POC    Collection Time: 09/01/20 10:58 AM   Result Value Ref Range    Glucose (POC) 359 (H) 70 - 110 mg/dL   POC LACTIC ACID    Collection Time: 09/01/20 11:06 AM   Result Value Ref Range    Lactic Acid (POC) 1.69 0.40 - 2.00 mmol/L   EKG, 12 LEAD, INITIAL    Collection Time: 09/01/20 11:14 AM   Result Value Ref Range    Ventricular Rate 87 BPM    Atrial Rate 87 BPM    P-R Interval 192 ms    QRS Duration 82 ms    Q-T Interval 334 ms    QTC Calculation (Bezet) 401 ms    Calculated P Axis 80 degrees    Calculated R Axis -9 degrees    Calculated T Axis -53 degrees    Diagnosis       Normal sinus rhythm  Low voltage QRS  Inferior infarct (cited on or before 27-OCT-2010)  T wave abnormality, consider lateral ischemia  Abnormal ECG  When compared with ECG of 27-DEC-2019 07:21,  OH interval has decreased  Nonspecific T wave abnormality, worse in Inferior leads  Nonspecific T wave abnormality, worse in Anterolateral leads     URINALYSIS W/ RFLX MICROSCOPIC    Collection Time: 09/01/20  2:25 PM   Result Value Ref Range    Color DARK YELLOW      Appearance CLEAR      Specific gravity >1.030 (H) 1.005 - 1.030    pH (UA) 5.0 5.0 - 8.0      Protein 100 (A) NEG mg/dL    Glucose >1,000 (A) NEG mg/dL    Ketone 40 (A) NEG mg/dL    Bilirubin SMALL (A) NEG      Blood Negative NEG      Urobilinogen 4.0 (H) 0.2 - 1.0 EU/dL    Nitrites Negative NEG      Leukocyte Esterase Negative NEG     POC LACTIC ACID    Collection Time: 09/01/20  2:40 PM   Result Value Ref Range    Lactic Acid (POC) 2.14 (HH) 0.40 - 2.00 mmol/L       Radiologic Studies -   CT HEAD WO CONT   Final Result   IMPRESSION:   1. No hemorrhage identified. No evidence of acute intracranial pathology. XR CHEST PORT   Final Result   IMPRESSION:      Patchy interstitial/streaky opacities suspicious for multifocal pneumonia or   edema. Radiographic follow-up recommended. CT Results  (Last 48 hours)               09/01/20 1313  CT HEAD WO CONT Final result    Impression:  IMPRESSION:   1. No hemorrhage identified. No evidence of acute intracranial pathology. Narrative:  CT HEAD UNENHANCED       CPT code: 42989       INDICATION: Altered mental status. Essential hypertension. No other relevant   clinical information provided. TECHNIQUE: 5 mm collimation axial images obtained from the skull base through   the vertex without administration of nonionic intravenous contrast.        All CT scans at this facility are performed using dose optimization technique as   appropriate to this specific exam, to include automated exposure control,   adjustment of the mA and/or KP according to patient size or use of iterative   reconstruction techniques. COMPARISON: Prior exam 11/19/2015       FINDINGS:    No suggestion of acute hemorrhage. No extraaxial fluid collections. No mass lesion appreciated within the limitations of a noncontrast exam.    No evidence for acute infarct involving any major vascular distribution. MRI much more sensitive for the detection of edema or ischemia in the acute   setting. No midline shift of structures. Partially included paranasal sinuses are clear. Calvarium intact to the extent included.    Suspect component of congenital nonunion of the C1 arch, incompletely included in the field-of-view. CXR Results  (Last 48 hours)               09/01/20 1158  XR CHEST PORT Final result    Impression:  IMPRESSION:       Patchy interstitial/streaky opacities suspicious for multifocal pneumonia or   edema. Radiographic follow-up recommended. Narrative:  EXAMINATION: Chest single view       INDICATION: Sepsis       COMPARISON: 8/5/2019       FINDINGS: Single frontal view the chest obtained. Under penetration limits   evaluation. Mediastinal silhouette normal in size. Perihilar and beyond patchy   interstitial/streaky opacities. No definite pneumothorax identified. No obvious   acute osseous findings. Medical Decision Making   I am the first provider for this patient. I reviewed the vital signs, available nursing notes, past medical history, past surgical history, family history and social history. Vital Signs-Reviewed the patient's vital signs. EKG: Interpreted by the EP. Time Interpreted: 11:14 AM   Rate: 87 bpm   Rhythm: Normal sinus rhythm   Interpretation: Nonspecific T wave changes, borderline low voltage    Records Reviewed: Nursing Notes and Old Medical Records    11:30am - I suspect that this patient has an active infection. 11:30 am - The patient met criteria for severe sepsis at this time. PROVIDER SEPSIS PHYSICAL EXAM EVAL  Vital signs reviewed (see nursing documentation for further details):  Vitals:    09/01/20 1048   BP: 123/87   Pulse: 91   Resp: 19   Temp: (!) 101.8 °F (38.8 °C)   SpO2: 95%       Cardiac exam:Regular Rate    Pulmonary exam:Clear Lungs    Peripheral pulses:Normal    Capillary refill:Normal    Skin exam:pink    Exam performed by Ranjan Garcia MD    ED Course:   Patient remained confused during his emergency department stay, however more awake and alert after fluid bolus and antibiotics.     Critical Care Time:  The services I provided to this patient were to treat and/or prevent clinically significant deterioration that could result in the failure of one or more body systems and/or organ systems due to acute febrile illness, altered mental status, multifocal pneumonia, dehydration. Services included the following:  -reviewing nursing notes and old charts  -vital sign assessments  -direct patient care  -medication orders and management  -interpreting and reviewing diagnostic studies/labs  -re-evaluations  -documentation time    Aggregate critical care time was 50 minutes, which includes only time during which I was engaged in work directly related to the patient's care as described above, whether I was at bedside or elsewhere in the Emergency Department. It did not include time spent performing other reported procedures or the services of residents, students, nurses, or advance practice providers. Geovanna Tan MD  2:51 PM      Disposition:  Admit    Provider Notes (Medical Decision Making):   3 days of malaise and fatigue and anorexia and poor oral intake with associated dehydration. As a multifocal pneumonia on chest x-ray. Met sepsis criteria upon arrival, also given broad-spectrum antibiotics as well as fluid bolus. Will need repeat lactate to ensure that is clearing. Given altered mental status/delirium, will admit for IV antibiotics and close monitoring. Reassuring head CT with no evidence of acute intracranial pathology, nonfocal neurologic exam save his confusion. For Hospitalized Patients:    1. Hospitalization Decision Time:  The decision to hospitalize the patient was made by Dr. Shelby Oneil at 2:45pm on 9/1/2020    Diagnosis     Clinical Impression:   1. Altered mental status, unspecified altered mental status type    2. Acute febrile illness    3. Multifocal pneumonia    4.  Dehydration

## 2020-09-01 NOTE — ED NOTES
Patient ambulated to room 11 without nurse by his side. Continues to have steady gait. Patient was changed into a gown.

## 2020-09-01 NOTE — ED NOTES
Pts nephew Betha Hashimoto; states his sister can be contacted Thalia Almanzar can be contacted at  [phone] and pt lives with her)reports that the pt is talking to him but does not make sense, and has not eaten in 3 days. He reports pt no acting himself.  At baseline pt is active and alert and oriented

## 2020-09-01 NOTE — ED NOTES
Patient seems a bit confused. Speech is garbled. Showing no signs of distress. Patient drifted off to sleep while in room.

## 2020-09-01 NOTE — H&P
History & Physical    Patient: Masha Perez MRN: [id number]  CSN: [id number]    YOB: 1960  Age: 61 y.o. Sex: male      DOA: 9/1/2020  CC: AMS    PCP: Ayad Diaz MD       HPI:     Masha Perez is a 61 y.o. male who presents with AMS from home. Family member brought him to the ER from home. Currently HPI is limited from the patient. He is unable to recall events from today. States 7 or more days of not feeling well with decreased in appetite. Not eating at all. No taste or smell. Patient is speaking clearly but repeating himself. He is having difficulty gathering his thought. Oriented with some confusion on why he is in the ER. In ER sepsis protocol initiated. Fever on arrival 101.8F. Elevated lactic acid 2.14. Elevated liver enzymes. Hyperglycemia on . IVF fluid bolus per protocol. IV abx per protocol. Suspect COVID-19 virus infection. CT head impression no acute process. XR impression multifocal PNA.      Past Medical History:   Diagnosis Date    Arthritis     Coronary atherosclerosis of native coronary artery     S/P PCI with GE in 12/09    Diabetes ([facility] [address]     IDDM    Electrolyte and fluid disorders not elsewhere classified     Essential hypertension, benign     GERD (gastroesophageal reflux disease)     Heroin abuse ([facility] [address] 05/26/16    Psychiatrist Dr. Ernesto Mireles History of heart attack     Hyperlipidemia     Intermediate coronary syndrome [facility])     MDD (major depressive disorder) 5/26/16    Psychiatrist Dr. Santiago Landry infarction [facility])     Obesity, unspecified     Old myocardial infarction     Other and unspecified hyperlipidemia     Pancreatitis     Positive PPD     Sleep apnea     uses Cpap machine    Transfusion history     Before 1992       Past Surgical History:   Procedure Laterality Date    HX APPENDECTOMY      HX CORONARY STENT PLACEMENT  2010    2 stents       Family History   Problem Relation Age of Onset    Heart Attack Father     Coronary Artery Disease Mother     Diabetes Mother     Cancer Sister         breast cancer and lung cancer       Social History     Socioeconomic History    Marital status:      Spouse name: Not on file    Number of children: Not on file    Years of education: Not on file    Highest education level: Not on file   Tobacco Use    Smoking status: Never Smoker    Smokeless tobacco: Never Used   Substance and Sexual Activity    Alcohol use: No    Drug use: No       Prior to Admission medications    Medication Sig Start Date End Date Taking? Authorizing Provider   methadone HCl (METHADONE PO) Take  by mouth. Yes Other, MD Yamil   metoprolol tartrate (LOPRESSOR) 25 mg tablet Take 1 Tab by mouth every twelve (12) hours. 12/27/19   Papo Woods MD   insulin detemir U-100 (LEVEMIR U-100 INSULIN) 100 unit/mL injection 20 Units by SubCUTAneous route two (2) times a day. 12/27/19   Papo Woods MD   OTHER Resume Preadmission Methadone from clinic 12/27/19   Papo Woods MD   OTHER Graded compression stockings b/l LE- use as directed 12/27/19   Papo Woods MD   OTHER Check CBC, CMP, Mg in 3 days, results to PCP immediately, Diagnosis- syncope 12/27/19   Papo Woods MD   OTHER No Driving Until cleared by Cardiology to do so 12/27/19   Papo Woods MD   pantoprazole (PROTONIX) 40 mg tablet Take 1 Tab by mouth Before breakfast and dinner. 3/29/18   Erik Palomino NP   acetaminophen (TYLENOL) 325 mg tablet Take 2 Tabs by mouth every six (6) hours as needed.  Indications: Pain, take it with bentyl; do not exceed 3 g tylenol daily 3/29/18   Erik Palomino NP   OTHER Incentive spirometry- use as directed 3/24/18   Papo Woods MD   albuterol (PROVENTIL HFA, VENTOLIN HFA, PROAIR HFA) 90 mcg/actuation inhaler 2 puffs every 6 hours x 5 days then every 6 hours as needed for shortness of breath and/or wheezing 9/11/17   Sarita Sheehan MD polyethylene glycol (MIRALAX) 17 gram packet Take 1 Packet by mouth daily. 9/11/17   Ángela Brandt MD   simvastatin (ZOCOR) 10 mg tablet Take 10 mg by mouth nightly. Provider, Historical   clopidogrel (PLAVIX) 75 mg tablet Take 75 mg by mouth daily. Provider, Historical       Allergies   Allergen Reactions    Compazine [Prochlorperazine] Anaphylaxis     \"Tightness around throat\"              Physical Exam:      Visit Vitals  /87 (BP 1 Location: Left arm, BP Patient Position: Sitting)   Pulse 91   Temp (!) 101.8 °F (38.8 °C)   Resp 19   Ht 5' 9.02\" (1.753 m)   Wt 108.9 kg (240 lb)   SpO2 95%   BMI 35.43 kg/m²       Physical Exam:  General:  Alert, NAD  Cardiovascular:  RRR  Pulmonary:  LSC throughout; respiratory effort WNL  GI:  +BS in all four quadrants, soft, non-tender  Extremities:  No edema; 2+ dorsalis pedis pulses bilaterally  Neuro: alert and oriented x3  Ambulated well to the bathroom    Lab/Data Review:  Labs: Results:       Chemistry Recent Labs     09/01/20  1057   *      K 4.0   CL 97*   CO2 31   BUN 20*   CREA 1.45*   CA 8.9   AGAP 9   BUCR 14      TP 9.1*   ALB 3.0*   GLOB 6.1*   AGRAT 0.5*      CBC w/Diff Recent Labs     09/01/20  1057   WBC 10.1   RBC 4.24*   HGB 12.7*   HCT 39.8      GRANS 72   LYMPH 10*   EOS 1      Coagulation No results for input(s): PTP, INR, APTT, INREXT in the last 72 hours. Iron/Ferritin No results for input(s): IRON in the last 72 hours. No lab exists for component: TIBCCALC   BNP No results for input(s): BNPP in the last 72 hours. Cardiac Enzymes No results for input(s): CPK, CKND1, TAMERA in the last 72 hours.     No lab exists for component: CKRMB, TROIP   Liver Enzymes Recent Labs     09/01/20  1057   TP 9.1*   ALB 3.0*         Thyroid Studies Lab Results   Component Value Date/Time    TSH 2.94 12/26/2019 05:19 AM          All Micro Results     Procedure Component Value Units Date/Time    CULTURE, URINE [[id number]] Collected:  09/01/20 1425    Order Status:  Completed Specimen:  Urine from Clean catch Updated:  09/01/20 1438    CULTURE, BLOOD 2nd DRAW (required for [facility]) [[id number]] Collected:  09/01/20 1109    Order Status:  Completed Specimen:  Blood Updated:  09/01/20 1254    CULTURE, BLOOD [[id number]] Collected:  09/01/20 1130    Order Status:  Canceled Specimen:  Blood     CULTURE, BLOOD [[id number]] Collected:  09/01/20 1057    Order Status:  Completed Specimen:  Blood Updated:  09/01/20 1108          Imaging Reviewed:  CT Results (most recent):  Results from Hospital Encounter encounter on 09/01/20   CT HEAD WO CONT    Narrative CT HEAD UNENHANCED    CPT code: 45618    INDICATION: Altered mental status. Essential hypertension. No other relevant  clinical information provided. TECHNIQUE: 5 mm collimation axial images obtained from the skull base through  the vertex without administration of nonionic intravenous contrast.      All CT scans at this facility are performed using dose optimization technique as  appropriate to this specific exam, to include automated exposure control,  adjustment of the mA and/or KP according to patient size or use of iterative  reconstruction techniques. COMPARISON: Prior exam 11/19/2015    FINDINGS:   No suggestion of acute hemorrhage. No extraaxial fluid collections. No mass lesion appreciated within the limitations of a noncontrast exam.   No evidence for acute infarct involving any major vascular distribution. MRI much more sensitive for the detection of edema or ischemia in the acute  setting. No midline shift of structures. Partially included paranasal sinuses are clear. Calvarium intact to the extent included. 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 Impression IMPRESSION:  1. No hemorrhage identified. No evidence of acute intracranial pathology.      XR Results (most recent):  Results from Hospital Encounter encounter on 09/01/20   XR CHEST PORT    Narrative EXAMINATION: Chest single view    INDICATION: Sepsis    COMPARISON: 8/5/2019    FINDINGS: Single frontal view the chest obtained. Under penetration limits  evaluation. Mediastinal silhouette normal in size. Perihilar and beyond patchy  interstitial/streaky opacities. No definite pneumothorax identified. No obvious  acute osseous findings. Impression IMPRESSION:    Patchy interstitial/streaky opacities suspicious for multifocal pneumonia or  edema. Radiographic follow-up recommended. Assessment:   Active Problems:    Altered mental status (9/1/2020)      Multifocal pneumonia (9/1/2020)    DMT2 with hyperglycemia. A1c 12.1  Elevated liver enzymes    HTN  DLD  CKD3  Chronic pain  Methadone daily use  Obesity        Plan:   1. COVID-19 pending. Droplet precautions. Monitor inflammatory markers  No steroids at this time. No respiratory distress or need for oxygen. RA currently. 2. Continue IV abx initiated with sepsis protocol. Follow cultures  3. Diabetes management consult. POC glucose, SSI, Lantus  4. Continue home medications. Continue methadone. 5. Monitor metabolic panel and renal function    6. Designated  sister [phone]. Patient was on the phone with his sister and updated on admission.    7. Code status, full code           [facility], NP  9/1/2020, 5:03 PM

## 2020-09-02 LAB
ALBUMIN SERPL-MCNC: 2.9 G/DL (ref 3.4–5)
ALBUMIN/GLOB SERPL: 0.4 {RATIO} (ref 0.8–1.7)
ALP SERPL-CCNC: 109 U/L (ref 45–117)
ALT SERPL-CCNC: 44 U/L (ref 16–61)
AMPHET UR QL SCN: NEGATIVE
ANION GAP SERPL CALC-SCNC: 7 MMOL/L (ref 3–18)
AST SERPL-CCNC: 54 U/L (ref 10–38)
BACTERIA SPEC CULT: NORMAL
BARBITURATES UR QL SCN: NEGATIVE
BASOPHILS # BLD: 0 K/UL (ref 0–0.06)
BASOPHILS NFR BLD: 0 % (ref 0–3)
BENZODIAZ UR QL: NEGATIVE
BILIRUB DIRECT SERPL-MCNC: 0.7 MG/DL (ref 0–0.2)
BILIRUB SERPL-MCNC: 1.2 MG/DL (ref 0.2–1)
BUN SERPL-MCNC: 16 MG/DL (ref 7–18)
BUN/CREAT SERPL: 11 (ref 12–20)
CALCIUM SERPL-MCNC: 8.7 MG/DL (ref 8.5–10.1)
CANNABINOIDS UR QL SCN: NEGATIVE
CHLORIDE SERPL-SCNC: 102 MMOL/L (ref 100–111)
CO2 SERPL-SCNC: 30 MMOL/L (ref 21–32)
COCAINE UR QL SCN: NEGATIVE
CREAT SERPL-MCNC: 1.48 MG/DL (ref 0.6–1.3)
CRP SERPL-MCNC: 8.6 MG/DL (ref 0–0.3)
D DIMER PPP FEU-MCNC: 1.47 UG/ML(FEU)
DIFFERENTIAL METHOD BLD: ABNORMAL
EOSINOPHIL # BLD: 0 K/UL (ref 0–0.4)
EOSINOPHIL NFR BLD: 0 % (ref 0–5)
ERYTHROCYTE [DISTWIDTH] IN BLOOD BY AUTOMATED COUNT: 13.5 % (ref 11.6–14.5)
FERRITIN SERPL-MCNC: 1100 NG/ML (ref 8–388)
GLOBULIN SER CALC-MCNC: 6.6 G/DL (ref 2–4)
GLUCOSE BLD STRIP.AUTO-MCNC: 182 MG/DL (ref 70–110)
GLUCOSE BLD STRIP.AUTO-MCNC: 264 MG/DL (ref 70–110)
GLUCOSE BLD STRIP.AUTO-MCNC: 308 MG/DL (ref 70–110)
GLUCOSE BLD STRIP.AUTO-MCNC: 331 MG/DL (ref 70–110)
GLUCOSE BLD STRIP.AUTO-MCNC: 345 MG/DL (ref 70–110)
GLUCOSE SERPL-MCNC: 335 MG/DL (ref 74–99)
HCT VFR BLD AUTO: 42.3 % (ref 36–48)
HDSCOM,HDSCOM: ABNORMAL
HGB BLD-MCNC: 13.2 G/DL (ref 13–16)
LACTATE SERPL-SCNC: 2.3 MMOL/L (ref 0.4–2)
LDH SERPL L TO P-CCNC: 370 U/L (ref 81–234)
LYMPHOCYTES # BLD: 0.8 K/UL (ref 0.8–3.5)
LYMPHOCYTES NFR BLD: 10 % (ref 20–51)
MCH RBC QN AUTO: 29.8 PG (ref 24–34)
MCHC RBC AUTO-ENTMCNC: 31.2 G/DL (ref 31–37)
MCV RBC AUTO: 95.5 FL (ref 74–97)
METHADONE UR QL: POSITIVE
MONOCYTES # BLD: 0.6 K/UL (ref 0–1)
MONOCYTES NFR BLD: 7 % (ref 2–9)
NEUTS SEG # BLD: 6.6 K/UL (ref 1.8–8)
NEUTS SEG NFR BLD: 83 % (ref 42–75)
OPIATES UR QL: POSITIVE
PCP UR QL: NEGATIVE
PLATELET # BLD AUTO: 280 K/UL (ref 135–420)
PLATELET COMMENTS,PCOM: ABNORMAL
PMV BLD AUTO: 12 FL (ref 9.2–11.8)
POTASSIUM SERPL-SCNC: 4 MMOL/L (ref 3.5–5.5)
PROCALCITONIN SERPL-MCNC: 3.48 NG/ML
PROT SERPL-MCNC: 9.5 G/DL (ref 6.4–8.2)
RBC # BLD AUTO: 4.43 M/UL (ref 4.7–5.5)
RBC MORPH BLD: ABNORMAL
SERVICE CMNT-IMP: NORMAL
SODIUM SERPL-SCNC: 139 MMOL/L (ref 136–145)
WBC # BLD AUTO: 8 K/UL (ref 4.6–13.2)

## 2020-09-02 PROCEDURE — 74011000258 HC RX REV CODE- 258: Performed by: EMERGENCY MEDICINE

## 2020-09-02 PROCEDURE — 74011250636 HC RX REV CODE- 250/636: Performed by: INTERNAL MEDICINE

## 2020-09-02 PROCEDURE — 83605 ASSAY OF LACTIC ACID: CPT

## 2020-09-02 PROCEDURE — 85025 COMPLETE CBC W/AUTO DIFF WBC: CPT

## 2020-09-02 PROCEDURE — 80048 BASIC METABOLIC PNL TOTAL CA: CPT

## 2020-09-02 PROCEDURE — 80076 HEPATIC FUNCTION PANEL: CPT

## 2020-09-02 PROCEDURE — 74011000250 HC RX REV CODE- 250: Performed by: INTERNAL MEDICINE

## 2020-09-02 PROCEDURE — 74011636637 HC RX REV CODE- 636/637: Performed by: INTERNAL MEDICINE

## 2020-09-02 PROCEDURE — 74011250636 HC RX REV CODE- 250/636: Performed by: NURSE PRACTITIONER

## 2020-09-02 PROCEDURE — 36415 COLL VENOUS BLD VENIPUNCTURE: CPT

## 2020-09-02 PROCEDURE — 74011636637 HC RX REV CODE- 636/637: Performed by: NURSE PRACTITIONER

## 2020-09-02 PROCEDURE — 86140 C-REACTIVE PROTEIN: CPT

## 2020-09-02 PROCEDURE — 85379 FIBRIN DEGRADATION QUANT: CPT

## 2020-09-02 PROCEDURE — 74011250636 HC RX REV CODE- 250/636: Performed by: EMERGENCY MEDICINE

## 2020-09-02 PROCEDURE — 82962 GLUCOSE BLOOD TEST: CPT

## 2020-09-02 PROCEDURE — 84145 PROCALCITONIN (PCT): CPT

## 2020-09-02 PROCEDURE — 65270000029 HC RM PRIVATE

## 2020-09-02 PROCEDURE — 83615 LACTATE (LD) (LDH) ENZYME: CPT

## 2020-09-02 PROCEDURE — HZ81ZZZ MEDICATION MANAGEMENT FOR SUBSTANCE ABUSE TREATMENT, METHADONE MAINTENANCE: ICD-10-PCS | Performed by: INTERNAL MEDICINE

## 2020-09-02 PROCEDURE — 74011250637 HC RX REV CODE- 250/637: Performed by: INTERNAL MEDICINE

## 2020-09-02 PROCEDURE — 82728 ASSAY OF FERRITIN: CPT

## 2020-09-02 PROCEDURE — 74011250637 HC RX REV CODE- 250/637: Performed by: NURSE PRACTITIONER

## 2020-09-02 RX ORDER — INSULIN GLARGINE 100 [IU]/ML
25 INJECTION, SOLUTION SUBCUTANEOUS 2 TIMES DAILY
Status: DISCONTINUED | OUTPATIENT
Start: 2020-09-02 | End: 2020-09-03

## 2020-09-02 RX ORDER — VANCOMYCIN HYDROCHLORIDE
1250
Status: DISCONTINUED | OUTPATIENT
Start: 2020-09-02 | End: 2020-09-02

## 2020-09-02 RX ORDER — DOXYCYCLINE 100 MG/1
100 CAPSULE ORAL EVERY 12 HOURS
Status: DISCONTINUED | OUTPATIENT
Start: 2020-09-02 | End: 2020-09-03

## 2020-09-02 RX ADMIN — ENOXAPARIN SODIUM 40 MG: 40 INJECTION SUBCUTANEOUS at 09:37

## 2020-09-02 RX ADMIN — CEFTRIAXONE SODIUM 1 G: 1 INJECTION, POWDER, FOR SOLUTION INTRAMUSCULAR; INTRAVENOUS at 14:56

## 2020-09-02 RX ADMIN — DOXYCYCLINE HYCLATE 100 MG: 100 CAPSULE ORAL at 14:56

## 2020-09-02 RX ADMIN — LEVOFLOXACIN 750 MG: 5 INJECTION, SOLUTION INTRAVENOUS at 13:40

## 2020-09-02 RX ADMIN — METOPROLOL TARTRATE 25 MG: 25 TABLET, FILM COATED ORAL at 21:25

## 2020-09-02 RX ADMIN — INSULIN LISPRO 8 UNITS: 100 INJECTION, SOLUTION INTRAVENOUS; SUBCUTANEOUS at 03:28

## 2020-09-02 RX ADMIN — ATORVASTATIN CALCIUM 10 MG: 10 TABLET, FILM COATED ORAL at 21:25

## 2020-09-02 RX ADMIN — CLOPIDOGREL BISULFATE 75 MG: 75 TABLET ORAL at 09:37

## 2020-09-02 RX ADMIN — PANTOPRAZOLE 40 MG: 40 TABLET, DELAYED RELEASE ORAL at 07:46

## 2020-09-02 RX ADMIN — INSULIN LISPRO 12 UNITS: 100 INJECTION, SOLUTION INTRAVENOUS; SUBCUTANEOUS at 12:00

## 2020-09-02 RX ADMIN — INSULIN GLARGINE 25 UNITS: 100 INJECTION, SOLUTION SUBCUTANEOUS at 18:37

## 2020-09-02 RX ADMIN — DOXYCYCLINE HYCLATE 100 MG: 100 CAPSULE ORAL at 21:25

## 2020-09-02 RX ADMIN — ACETAMINOPHEN 650 MG: 325 TABLET ORAL at 23:30

## 2020-09-02 RX ADMIN — INSULIN LISPRO 8 UNITS: 100 INJECTION, SOLUTION INTRAVENOUS; SUBCUTANEOUS at 06:51

## 2020-09-02 RX ADMIN — PANTOPRAZOLE 40 MG: 40 TABLET, DELAYED RELEASE ORAL at 16:30

## 2020-09-02 RX ADMIN — ONDANSETRON 4 MG: 4 TABLET, ORALLY DISINTEGRATING ORAL at 09:43

## 2020-09-02 RX ADMIN — METHADONE HYDROCHLORIDE 30 MG: 10 TABLET ORAL at 09:36

## 2020-09-02 RX ADMIN — INSULIN LISPRO 3 UNITS: 100 INJECTION, SOLUTION INTRAVENOUS; SUBCUTANEOUS at 21:25

## 2020-09-02 RX ADMIN — PIPERACILLIN AND TAZOBACTAM 3.38 G: 3; .375 INJECTION, POWDER, LYOPHILIZED, FOR SOLUTION INTRAVENOUS at 06:43

## 2020-09-02 RX ADMIN — INSULIN GLARGINE 25 UNITS: 100 INJECTION, SOLUTION SUBCUTANEOUS at 09:36

## 2020-09-02 RX ADMIN — INSULIN LISPRO 9 UNITS: 100 INJECTION, SOLUTION INTRAVENOUS; SUBCUTANEOUS at 16:36

## 2020-09-02 RX ADMIN — SODIUM CHLORIDE 1000 MG: 900 INJECTION, SOLUTION INTRAVENOUS at 03:28

## 2020-09-02 NOTE — CONSULTS
Comprehensive Nutrition Assessment    Type and Reason for Visit: Initial, Positive nutrition screen, Consult    Nutrition Recommendations/Plan:  - Increase frequency of Glucerna Shake to TID. - Monitor diet tolerance and encourage po intake. Nutrition Assessment:  Admitted with AMS. COVID rule out pending and consent obtained for remote assessment via telephone. Reports 0% intake today at breakfast but states he did eat a little bit of lunch and consumed the Glucerna Shake. Malnutrition Assessment:  Malnutrition Status: At risk for malnutrition (specify)(poor intake PTA and since admission)      Nutrition History and Allergies: Hx of CAD, DM, heroin abuse, HLD, MI & pancreatitis. Loss of taste & smell with decreased appetite x 2 weeks PTA. States he was eating \"a little but not much. \" -12# x year per chart. NKFA. Estimated Daily Nutrient Needs:  Energy (kcal):  1538-7798  Protein (g):         Fluid (ml/day):  0373-5186    Nutrition Related Findings:  BM PTA. Trace edema. Some confusion noted. Recent BG levels 308-345 mg/dL, 25 units lantus & SSI. Denies N/V at this time, stating he is feeling \"better\". Last reported emesis event early this morning around 4am      Wounds:    Partial thickness, Diabetic ulcer       Current Nutrition Therapies:   DIET NUTRITIONAL SUPPLEMENTS Lunch, Dinner; Glucerna Shake  DIET DIABETIC WITH OPTIONS Consistent Carb 1800kcal; Regular    Anthropometric Measures:  · Height:  5' 9.02\" (175.3 cm)  · Current Body Wt:  108.9 kg (240 lb)   · Admission Body Wt:  240 lb    · Usual Body Wt:  250 lb(per chart)     · Ideal Body Wt:  160 lbs:  150 %   · Adjusted Body Weight:   ; Weight Adjustment for:     · Adjusted BMI:       · BMI Category:  Obese class 2 (BMI 35.0-39. 9)       Nutrition Diagnosis:   · Inadequate energy intake related to cognitive or neurological impairment(decreased appetite) as evidenced by intake 0-25%, poor intake prior to admission    Nutrition Interventions: Food and/or Nutrient Delivery: Continue current diet, Modify oral nutrition supplement  Nutrition Education and Counseling: Education not indicated  Coordination of Nutrition Care: Continued inpatient monitoring    Goals:  PO nutrition intake will meet >75% of patient estimated nutritional needs within the next 7 days. Nutrition Monitoring and Evaluation:   Food/Nutrient Intake Outcomes: Diet advancement/tolerance, Food and nutrient intake, Supplement intake  Physical Signs/Symptoms Outcomes: Biochemical data, Nausea/vomiting, Meal time behavior, Nutrition focused physical findings    Discharge Planning:     Too soon to determine     Electronically signed by Kushal Vu RD on 9/2/2020 at 3:48 PM    Contact: [phone]

## 2020-09-02 NOTE — DIABETES MGMT
Glycemic Control Plan of Care    Recommendation(s):  Increase lantus from 20 to 25 units BID - order obtained. Corrective lispro was advanced to very insulin resistant. Assessment:  Consult received. Admitted overnight with AMS, sepsis, r/o Covid infection. Spoke with patients sister, Miss Moy Tao, and confirmed his levemir dose. She states he has a glucometer but was monitoring BG \"very seldom\". She also stated he has h/o wound to his left foot for several months and she does not think he kept his follow up appointment. Per notes, patient continues confused - will continue to follow     Most recent blood glucose values:        Current A1C:  12.1% for average  mg/dl     Lab Results   Component Value Date/Time    Hemoglobin A1c 12.1 (H) 09/01/2020 10:57 AM     Current hospital diabetes medications:  Lantus 25 units BID, corrective lispro, very insulin resistant, 4 times daily.     Total daily dose insulin requirement previous day:  Admitted overnight     Home diabetes medications:  levemir 20 units BID     Goals:  Blood glucose will be within target range of  mg/dL by  9/5/2020     Education:  ___  Refer to Diabetes Education Record             _X__  Education not indicated at this time - will follow up when patient more oriented     Sofia Gamboa MPH RN CDE  [phone]

## 2020-09-02 NOTE — PROGRESS NOTES
Reason for Admission:  Multifocal pneumonia [J18.9]  Altered mental status, unspecified altered mental status type [R41.82]                 RUR Score:    14%            Plan for utilizing home health:    TBD, depending on progress                      Likelihood of Readmission:   LOW                         Transition of Care Plan:              Initial assessment completed with sisterMayra. Cognitive status of patient: not assessed. Face sheet information confirmed:  yes. The patient designates sisters Alice Bearden and Princess Albert to participate in his discharge plan and to receive any needed information. This patient lives in a single family home with sister. Patient is able to navigate steps as needed. Prior to hospitalization, patient was considered to be independent with ADLs/IADLS : yes . Patient has a current ACP document on file: no  The sister will be available to transport patient home upon discharge. The patient already has [facility] available in the home. Patient is not currently active with home health. Patient has not stayed in a skilled nursing facility or rehab. This patient is on dialysis :no    Currently, the discharge plan is TBD. The patient states that he can obtain his medications from the pharmacy, and take his medications as directed. Patient's current insurance is Medicaid. Care Management Interventions  PCP Verified by CM: Yes  Mode of Transport at Discharge: Self  Transition of Care Consult (CM Consult): Discharge Planning  Current Support Network:  Other(lives with his sisters)  Confirm Follow Up Transport: Family  Discharge Location  Discharge Placement: Unable to determine at this time        Thomas Isidro RN - Outcomes Manager  [phone]

## 2020-09-02 NOTE — PROGRESS NOTES
Verbal shift change report given to Hyacinth Rubin (oncoming nurse) by Jonny Landry (offgoing nurse). Report included the following information SBAR, OR Summary, Procedure Summary, Intake/Output, MAR, Recent Results and Med Rec Status.

## 2020-09-02 NOTE — CONSULTS
Infectious Disease Consultation Note        Reason: community acquired pneumonia, suspected covid-19 pneumonia    Current abx Prior abx   Ceftriaxone, doxycyline sine 9/1/20      Lines:       Assessment :      61 y.o. male with h/o type uncontrolled type 2 DM (last hgbA1C 12.1 on 9/1/20) , CAD who presented to [facility] on 9/1/20 with AMS     Chest x-ray 9/1-multifocal infiltrates    Labs on 9/3-ferritin 806, CRP 4.6, d-dimer 1.45    Clinical presentation consistent with bilateral pneumonia-community-acquired pneumonia versus COVID-19 pneumonia. History, radiographic features, elevated d-dimer likely suggest COVID-19 pneumonia. Elevated procalcitonin 9/2-monitor for superimposed bacterial infection    Clinically stable. No significant hypoxia. Still with fevers. Uncontrolled type 2 diabetes, elevated inflammatory markers puts patient at high risk of clinical decompensation. Hence will start low-dose steroids. Altered mental status on admission-likely metabolic encephalopathy secondary to pneumonia-improving    Management complicated by lack of IV access. Recommendations:    1. Discontinue ceftriaxone, doxycycline. Start p.o. Cefpodoxime, azithromycin-monitor QTC as patient is on methadone. Risk of prolongation of QTC  2. Obtain procalcitonin today. Monitor inflammatory markers-ferritin, CRP, d-dimer, procalcitonin daily  3. Start Decadron 6 mg p.o. every 24 hours  4. Anticoagulation per primary team  5. Follow-up COVID-19 test results  6. Monitor clinically to determine further plan of care      Thank you for consultation request. Above plan was discussed in details with patient, and dr Sudha Clark. Please call me if any further questions or concerns. Will continue to participate in the care of this patient. HPI:    61 y.o. male with h/o type 2 DM, CAD who presented to [facility] on 9/1/20 with AMS . Family member brought him to the ER from home. Currently HPI is limited from the patient.  He is unable to recall events from today. States 7 or more days of not feeling well with decreased in appetite. Not eating at all. No taste or smell. In ER sepsis protocol initiated. Fever on arrival 101.8F. Elevated lactic acid 2.14. Elevated liver enzymes. Hyperglycemia on . IVF fluid bolus per protocol. IV abx per protocol. Suspect COVID-19 virus infection. CT head impression no acute process. XR impression multifocal PNA. I have been consulted for further recommendations. Patient states that he feels better today compared to day of admission. Wants to go home. Improved appetite. Denies increasing chest pain, shortness of breath. No known contact with anyone with COVID-19 infection. Past Medical History:   Diagnosis Date    Arthritis     Coronary atherosclerosis of native coronary artery     S/P PCI with GE in 12/09    Diabetes ([facility] [address]     IDDM    Electrolyte and fluid disorders not elsewhere classified     Essential hypertension, benign     GERD (gastroesophageal reflux disease)     Heroin abuse ([facility] [address] 05/26/16    Psychiatrist Dr. Jim Solis History of heart attack     Hyperlipidemia     Intermediate coronary syndrome [facility])     MDD (major depressive disorder) 5/26/16    Psychiatrist Dr. Jim Solis Myocardial infarction [facility])     Obesity, unspecified     Old myocardial infarction     Other and unspecified hyperlipidemia     Pancreatitis     Positive PPD     Sleep apnea     uses Cpap machine    Transfusion history     Before 1992       Past Surgical History:   Procedure Laterality Date    HX APPENDECTOMY      HX CORONARY STENT PLACEMENT  2010    2 stents       home Medication List    Details   methadone HCl (METHADONE PO) Take  by mouth.      metoprolol tartrate (LOPRESSOR) 25 mg tablet Take 1 Tab by mouth every twelve (12) hours.   Qty: 60 Tab, Refills: 0      insulin detemir U-100 (LEVEMIR U-100 INSULIN) 100 unit/mL injection 20 Units by SubCUTAneous route two (2) times a day.  Qty: 10 mL, Refills: 0      !! OTHER Resume Preadmission Methadone from clinic  Qty: 1 Each, Refills: 0      !! OTHER Graded compression stockings b/l LE- use as directed  Qty: 1 Each, Refills: 0      !! OTHER Check CBC, CMP, Mg in 3 days, results to PCP immediately, Diagnosis- syncope  Qty: 1 Each, Refills: 0      !! OTHER No Driving Until cleared by Cardiology to do so  Qty: 1 Each, Refills: 0      pantoprazole (PROTONIX) 40 mg tablet Take 1 Tab by mouth Before breakfast and dinner. Qty: 60 Tab, Refills: 0      acetaminophen (TYLENOL) 325 mg tablet Take 2 Tabs by mouth every six (6) hours as needed. Indications: Pain, take it with bentyl; do not exceed 3 g tylenol daily  Qty: 30 Tab, Refills: 0      !! OTHER Incentive spirometry- use as directed  Qty: 1 Each, Refills: 0      albuterol (PROVENTIL HFA, VENTOLIN HFA, PROAIR HFA) 90 mcg/actuation inhaler 2 puffs every 6 hours x 5 days then every 6 hours as needed for shortness of breath and/or wheezing  Qty: 1 Inhaler, Refills: 0      polyethylene glycol (MIRALAX) 17 gram packet Take 1 Packet by mouth daily. Qty: 30 Packet, Refills: 0      simvastatin (ZOCOR) 10 mg tablet Take 10 mg by mouth nightly. clopidogrel (PLAVIX) 75 mg tablet Take 75 mg by mouth daily. !! - Potential duplicate medications found. Please discuss with provider.           Current Facility-Administered Medications   Medication Dose Route Frequency    insulin glargine (LANTUS) injection 25 Units  25 Units SubCUTAneous BID    vancomycin (VANCOCIN) 1250 mg in  ml infusion  1,250 mg IntraVENous Q18H    [START ON 9/3/2020] Vancomycin Lab Information  1 Each Other Once per day on Thu    sodium chloride (NS) flush 5-10 mL  5-10 mL IntraVENous PRN    levoFLOXacin (LEVAQUIN) 750 mg in D5W IVPB  750 mg IntraVENous Q24H    insulin lispro (HUMALOG) injection   SubCUTAneous AC&HS    glucose chewable tablet 16 g  4 Tab Oral PRN    glucagon (GLUCAGEN) injection 1 mg  1 mg IntraMUSCular PRN    dextrose (D50W) injection syrg 12.5-25 g  25-50 mL IntraVENous PRN    piperacillin-tazobactam (ZOSYN) 3.375 g in 0.9% sodium chloride (MBP/ADV) 100 mL MBP  3.375 g IntraVENous Q6H    metoprolol tartrate (LOPRESSOR) tablet 25 mg  25 mg Oral Q12H    pantoprazole (PROTONIX) tablet 40 mg  40 mg Oral ACB&D    atorvastatin (LIPITOR) tablet 10 mg  10 mg Oral QHS    methadone (DOLOPHINE) tablet 30 mg  30 mg Oral DAILY    clopidogreL (PLAVIX) tablet 75 mg  75 mg Oral DAILY    sodium chloride (NS) flush 5-40 mL  5-40 mL IntraVENous Q8H    sodium chloride (NS) flush 5-40 mL  5-40 mL IntraVENous PRN    acetaminophen (TYLENOL) tablet 650 mg  650 mg Oral Q6H PRN    Or    acetaminophen (TYLENOL) suppository 650 mg  650 mg Rectal Q6H PRN    polyethylene glycol (MIRALAX) packet 17 g  17 g Oral DAILY PRN    enoxaparin (LOVENOX) injection 40 mg  40 mg SubCUTAneous DAILY    ondansetron (ZOFRAN ODT) tablet 4 mg  4 mg Oral Q8H PRN    Or    ondansetron (ZOFRAN) injection 4 mg  4 mg IntraVENous Q6H PRN       Allergies: Compazine [prochlorperazine]    Family History   Problem Relation Age of Onset    Heart Attack Father     Coronary Artery Disease Mother     Diabetes Mother     Cancer Sister         breast cancer and lung cancer     Social History     Socioeconomic History    Marital status:      Spouse name: Not on file    Number of children: Not on file    Years of education: Not on file    Highest education level: Not on file   Occupational History    Not on file   Social Needs    Financial resource strain: Not on file    Food insecurity     Worry: Not on file     Inability: Not on file    Transportation needs     Medical: Not on file     Non-medical: Not on file   Tobacco Use    Smoking status: Never Smoker    Smokeless tobacco: Never Used   Substance and Sexual Activity    Alcohol use: No    Drug use: No    Sexual activity: Not on file   Lifestyle    Physical activity Days per week: Not on file     Minutes per session: Not on file    Stress: Not on file   Relationships    Social connections     Talks on phone: Not on file     Gets together: Not on file     Attends Muslim service: Not on file     Active member of club or organization: Not on file     Attends meetings of clubs or organizations: Not on file     Relationship status: Not on file    Intimate partner violence     Fear of current or ex partner: Not on file     Emotionally abused: Not on file     Physically abused: Not on file     Forced sexual activity: Not on file   Other Topics Concern    Not on file   Social History Narrative    Not on file     Social History     Tobacco Use   Smoking Status Never Smoker   Smokeless Tobacco Never Used        Temp (24hrs), Av.5 °F (36.9 °C), Min:97.3 °F (36.3 °C), Max:100 °F (37.8 °C)    Visit Vitals  /85   Pulse 66   Temp 97.6 °F (36.4 °C)   Resp 20   Ht 5' 9.02\" (1.753 m)   Wt 108.9 kg (240 lb)   SpO2 97%   BMI 35.43 kg/m²       ROS: 12 point ROS obtained in details.  Pertinent positives as mentioned in HPI,   otherwise negative    Physical Exam:    General:  Alert, NAD  Cardiovascular:  RRR  Pulmonary:  LSC throughout; respiratory effort WNL  GI:  +BS in all four quadrants, soft, non-tender  Extremities:  No edema; 2+ dorsalis pedis pulses bilaterally  Neuro: alert and oriented x3  Skin: no rash/ulcers noted  Labs: Results:   Chemistry Recent Labs     20  0351 20  1057   * 371*    137   K 4.0 4.0    97*   CO2 30 31   BUN 16 20*   CREA 1.48* 1.45*   CA 8.7 8.9   AGAP 7 9   BUCR 11* 14    111   TP 9.5* 9.1*   ALB 2.9* 3.0*   GLOB 6.6* 6.1*   AGRAT 0.4* 0.5*      CBC w/Diff Recent Labs     20  0351 20  1057   WBC 8.0 10.1   RBC 4.43* 4.24*   HGB 13.2 12.7*   HCT 42.3 39.8    265   GRANS 83* 72   LYMPH 10* 10*   EOS 0 1      Microbiology Recent Labs     20  1109 20  1057   CULT NO GROWTH AFTER 19 HOURS NO GROWTH AFTER 20 HOURS          RADIOLOGY:    All available imaging studies/reports in [facility] for this admission were reviewed    Dr. Aminah Anderson, Infectious Disease Specialist  [phone]  September 2, 2020  1:14 PM

## 2020-09-02 NOTE — PROGRESS NOTES
[facility] Hospitalist Group  Progress Note    Patient: Catarina Box Age: 61 y.o. : 1960 MR#: [id number] SSN: [id number]  Date: 2020     Subjective:   Alert and oriented X 3. With somewhat disorganized thought process. He had X 1 episode of nausea and vomiting. Reporting decreased appetite. States he has not slept or eaten in 13 days. States he does not \"feel right in the head. \" like \"screws are messed up. \"   Denies SOB, CP. Overnight events: fevers resolved. He did not eat breakfast. Uncontrolled glucose readings. Assessment/Plan:     1. Acute metabolic encephalopathy   2. COVID pneumonia vs bacterial pneumonia  3. CKD- at baseline. 4. DMT2 with hyperglycemia. A1c 12.1  5. HTN on BB  6. HLD on statin   7. Chronic back pain  8. Obesity. 9. Hx Heroin abuse on Methadone. 10. Hx major depressive disorder   11. Hx CAD s/p PCI 2019. On DAPT    PLAN  1. Covid 19 results pending. No hypoxia. No tachycardia. No respiratory distress. ID consulted. Patient is currently on vanomycin, zosyn, and levaquin- will de-escalate abx regimen as blood cultures with ngtd, no leukocytosis. No fevers. Continue to follow inflammatory markers which remain elevated. SLP consulted. Continue incentive spirometer, bronchial hygiene protocol. 2.  CT head with no acute pathology. No focal neuro deficits. Patient with hx of psychiatric disorder once followed by psych. Will consult psych. 3. Diabetic educator following. Insulin regimen adjusted today- increased to 25 units BID. Close monitor. Will continue to adjust as warranted. 4. Continue home regimens.      Additional Notes:      Case discussed with:  [x]Patient  [x]Family  [x]Nursing  [x]Case Management  DVT Prophylaxis:  [x]Lovenox  []Hep SQ  []SCDs  []Coumadin   []On Heparin gtt    Objective:   VS:   Visit Vitals  /85   Pulse 66   Temp 97.6 °F (36.4 °C)   Resp 20   Ht 5' 9.02\" (1.753 m)   Wt 108.9 kg (240 lb)   SpO2 97%   BMI 35.43 kg/m²      Tmax/24hrs: Temp (24hrs), Av.5 °F (36.9 °C), Min:97.3 °F (36.3 °C), Max:100 °F (37.8 °C)      Intake/Output Summary (Last 24 hours) at 2020 1201  Last data filed at 2020 0428  Gross per 24 hour   Intake 250 ml   Output    Net 250 ml       General:  Alert, NAD  Cardiovascular:  RRR  Pulmonary:  Diminished BLL  GI:  +BS in all four quadrants, soft, non-tender  Extremities:  No edema; 2+ dorsalis pedis pulses bilaterally  Neuro: alert and oriented x 3    Family contact: 12:31 PM I have discussed plan of care with sister Love Heads who is in agreement with plan of care as outlined. She has no further questions at this time.      Labs:    Recent Results (from the past 24 hour(s))   URINALYSIS W/ RFLX MICROSCOPIC    Collection Time: 20  2:25 PM   Result Value Ref Range    Color DARK YELLOW      Appearance CLEAR      Specific gravity >1.030 (H) 1.005 - 1.030    pH (UA) 5.0 5.0 - 8.0      Protein 100 (A) NEG mg/dL    Glucose >1,000 (A) NEG mg/dL    Ketone 40 (A) NEG mg/dL    Bilirubin SMALL (A) NEG      Blood Negative NEG      Urobilinogen 4.0 (H) 0.2 - 1.0 EU/dL    Nitrites Negative NEG      Leukocyte Esterase Negative NEG     URINE MICROSCOPIC ONLY    Collection Time: 20  2:25 PM   Result Value Ref Range    WBC 0 to 2 0 - 4 /hpf    RBC Negative 0 - 5 /hpf    Epithelial cells Negative 0 - 5 /lpf    Bacteria Negative NEG /hpf    Mucus FEW (A) NEG /lpf    Hyaline cast 1 to 3 0 - 2 /lpf   DRUG SCREEN, URINE    Collection Time: 20  2:25 PM   Result Value Ref Range    BENZODIAZEPINES Negative NEG      BARBITURATES Negative NEG      THC (TH-CANNABINOL) Negative NEG      OPIATES Positive (A) NEG      PCP(PHENCYCLIDINE) Negative NEG      COCAINE Negative NEG      AMPHETAMINES Negative NEG      METHADONE Positive (A) NEG      HDSCOM (NOTE)    POC LACTIC ACID    Collection Time: 20  2:40 PM   Result Value Ref Range    Lactic Acid (POC) 2.14 (HH) 0.40 - 2.00 mmol/L GLUCOSE, POC    Collection Time: 09/01/20  7:20 PM   Result Value Ref Range    Glucose (POC) 248 (H) 70 - 110 mg/dL   SARS-COV-2    Collection Time: 09/01/20  7:32 PM   Result Value Ref Range    SARS-CoV-2 PENDING     Specimen source Nasopharyngeal      Specimen type NP Swab     GLUCOSE, POC    Collection Time: 09/02/20  3:10 AM   Result Value Ref Range    Glucose (POC) 331 (H) 70 - 255 mg/dL   METABOLIC PANEL, BASIC    Collection Time: 09/02/20  3:51 AM   Result Value Ref Range    Sodium 139 136 - 145 mmol/L    Potassium 4.0 3.5 - 5.5 mmol/L    Chloride 102 100 - 111 mmol/L    CO2 30 21 - 32 mmol/L    Anion gap 7 3.0 - 18 mmol/L    Glucose 335 (H) 74 - 99 mg/dL    BUN 16 7.0 - 18 MG/DL    Creatinine 1.48 (H) 0.6 - 1.3 MG/DL    BUN/Creatinine ratio 11 (L) 12 - 20      GFR est AA 59 (L) >60 ml/min/1.73m2    GFR est non-AA 49 (L) >60 ml/min/1.73m2    Calcium 8.7 8.5 - 10.1 MG/DL   HEPATIC FUNCTION PANEL    Collection Time: 09/02/20  3:51 AM   Result Value Ref Range    Protein, total 9.5 (H) 6.4 - 8.2 g/dL    Albumin 2.9 (L) 3.4 - 5.0 g/dL    Globulin 6.6 (H) 2.0 - 4.0 g/dL    A-G Ratio 0.4 (L) 0.8 - 1.7      Bilirubin, total 1.2 (H) 0.2 - 1.0 MG/DL    Bilirubin, direct 0.7 (H) 0.0 - 0.2 MG/DL    Alk. phosphatase 109 45 - 117 U/L    AST (SGOT) 54 (H) 10 - 38 U/L    ALT (SGPT) 44 16 - 61 U/L   CBC WITH AUTOMATED DIFF    Collection Time: 09/02/20  3:51 AM   Result Value Ref Range    WBC 8.0 4.6 - 13.2 K/uL    RBC 4.43 (L) 4.70 - 5.50 M/uL    HGB 13.2 13.0 - 16.0 g/dL    HCT 42.3 36.0 - 48.0 %    MCV 95.5 74.0 - 97.0 FL    MCH 29.8 24.0 - 34.0 PG    MCHC 31.2 31.0 - 37.0 g/dL    RDW 13.5 11.6 - 14.5 %    PLATELET 194 021 - 135 K/uL    MPV 12.0 (H) 9.2 - 11.8 FL    NEUTROPHILS 83 (H) 42 - 75 %    LYMPHOCYTES 10 (L) 20 - 51 %    MONOCYTES 7 2 - 9 %    EOSINOPHILS 0 0 - 5 %    BASOPHILS 0 0 - 3 %    ABS. NEUTROPHILS 6.6 1.8 - 8.0 K/UL    ABS. LYMPHOCYTES 0.8 0.8 - 3.5 K/UL    ABS. MONOCYTES 0.6 0 - 1.0 K/UL    ABS. EOSINOPHILS 0.0 0.0 - 0.4 K/UL    ABS.  BASOPHILS 0.0 0.0 - 0.06 K/UL    DF AUTOMATED      PLATELET COMMENTS ADEQUATE PLATELETS      RBC COMMENTS ANISOCYTOSIS  1+       FERRITIN    Collection Time: 09/02/20  3:51 AM   Result Value Ref Range    Ferritin 1,100 (H) 8 - 388 NG/ML   C REACTIVE PROTEIN, QT    Collection Time: 09/02/20  3:51 AM   Result Value Ref Range    C-Reactive protein 8.6 (H) 0 - 0.3 mg/dL   LD    Collection Time: 09/02/20  3:51 AM   Result Value Ref Range     (H) 81 - 234 U/L   D DIMER    Collection Time: 09/02/20  3:51 AM   Result Value Ref Range    D DIMER 1.47 (H) <0.46 ug/ml(FEU)   PROCALCITONIN    Collection Time: 09/02/20  3:51 AM   Result Value Ref Range    Procalcitonin 3.48 ng/mL   GLUCOSE, POC    Collection Time: 09/02/20  6:48 AM   Result Value Ref Range    Glucose (POC) 308 (H) 70 - 110 mg/dL   GLUCOSE, POC    Collection Time: 09/02/20 10:54 AM   Result Value Ref Range    Glucose (POC) 345 (H) 70 - 110 mg/dL       Signed By: Tori Dodd NP     September 2, 2020

## 2020-09-02 NOTE — PROGRESS NOTES
Physician Progress Note      Severo Mead  CSN #:                  [id number]  :                       1960  ADMIT DATE:       2020 10:53 AM  DISCH DATE:  RESPONDING  PROVIDER #:        Krupa Sanchez NP          QUERY TEXT:    Dear hospitalist,    Pt admitted with pneumonia and has heroin abuse documented. Noted heroin dependence documented on previous admission dated 3/27/18. Pt is currently receiving Methadone. Please clarify opiate usage in the medical record: The medical record reflects the following:  Risk Factors: methadone treatment  Clinical Indicators: previous documentation of opiate dependence dated 3/27/18 ; current record states heroin abuse on Methadone  Treatment: receiving Methadone    Please call me for any questions  Thank you,  Sarita Heart RN, [facility]  [phone]    REFERENCE:  Opioid Use:  Harmful drug/substance use characterized by mental, behavioral, and physical disorders due to drug/substance use when dependency or abuse is not documented  - Use identifies that the patient consumes a drug/substance on a regular basis, taken by his/her own initiative, even though the drug/substance is known to be a detriment to one?s health. - Use shows no obvious clinical manifestations. - Increased absences from work or school  - Concerned about losing access to the drug/substance. Patient states use of the drug/substance helps to alter his/her  mood, emotional feelings, and state of consciousness    Opioid Abuse:  Characterized by a recurring misuse of a drug/substance in excess with identifiable harmful and dysfunctional behaviors and negative consequences for health, psycho-social state, and employment. It lacks the criteria of dependency.   - Recurrent use resulting in failure to fulfill major role obligation at work, home, or school  - Recurrent use in physically hazardous situations  - Recurrent substance related legal problems  Continued use despite persistent or recurrent social oar interpersonal problems caused or exacerbated by substance    Opioid Dependence:  Chronic disorder characterized by use of large amounts of or frequent use of a drug/substance in which the individual becomes physically and mentally dependent upon to function. Long-term consequences are physical, psychological, and behavioral.  Criterion denoting dependence is increased tolerance and continued use despite impairment of health, social life, and job performance. Cessation results in withdrawal symptoms.  - Tolerance (marked increase in amount; marked decrease in effect  - Characteristic withdrawal symptoms; substance taken to relieve withdrawal  - Substance taken in larger amount and for longer period than intended  Persistent desire or repeated unsuccessful attempt to quit  Much time/activity to obtain, use, recover  Important social, occupational, or recreational activities given up or reduced  Use continues despite knowledge of adverse consequences (e.g., failure to fulfill role obligation, use when physically hazardous)  Options provided:  -- Opiate dependence  -- Opiate abuse  -- Other - I will add my own diagnosis  -- Disagree - Not applicable / Not valid  -- Disagree - Clinically unable to determine / Unknown  -- Refer to Clinical Documentation Reviewer    PROVIDER RESPONSE TEXT:    This patient is opiate dependent.     Query created by: Marissa Esparza on 9/2/2020 1:34 PM      Electronically signed by:  Irma Aquino NP 9/2/2020 3:03 PM

## 2020-09-02 NOTE — PROGRESS NOTES
Problem: Diabetes Self-Management  Goal: *Disease process and treatment process  Description: Define diabetes and identify own type of diabetes; list 3 options for treating diabetes. Outcome: Progressing Towards Goal  Goal: *Incorporating nutritional management into lifestyle  Description: Describe effect of type, amount and timing of food on blood glucose; list 3 methods for planning meals. Outcome: Not Progressing Towards Goal  Goal: *Incorporating physical activity into lifestyle  Description: State effect of exercise on blood glucose levels. Outcome: Not Progressing Towards Goal  Goal: *Developing strategies to promote health/change behavior  Description: Define the ABC's of diabetes; identify appropriate screenings, schedule and personal plan for screenings. Outcome: Not Progressing Towards Goal  Goal: *Using medications safely  Description: State effect of diabetes medications on diabetes; name diabetes medication taking, action and side effects. Outcome: Progressing Towards Goal  Goal: *Monitoring blood glucose, interpreting and using results  Description: Identify recommended blood glucose targets  and personal targets. Outcome: Progressing Towards Goal  Goal: *Prevention, detection, treatment of acute complications  Description: List symptoms of hyper- and hypoglycemia; describe how to treat low blood sugar and actions for lowering  high blood glucose level. Outcome: Not Progressing Towards Goal  Goal: *Prevention, detection and treatment of chronic complications  Description: Define the natural course of diabetes and describe the relationship of blood glucose levels to long term complications of diabetes.   Outcome: Not Progressing Towards Goal  Goal: *Developing strategies to address psychosocial issues  Description: Describe feelings about living with diabetes; identify support needed and support network  Outcome: Progressing Towards Goal  Goal: *Insulin pump training  Outcome: Progressing Towards Goal  Goal: *Sick day guidelines  Outcome: Progressing Towards Goal  Goal: *Patient Specific Goal (EDIT GOAL, INSERT TEXT)  Outcome: Progressing Towards Goal     Problem: Patient Education: Go to Patient Education Activity  Goal: Patient/Family Education  Outcome: Progressing Towards Goal     Problem: Falls - Risk of  Goal: *Absence of Falls  Description: Document Jim Koby Fall Risk and appropriate interventions in the flowsheet. Outcome: Progressing Towards Goal  Note: Fall Risk Interventions:            Medication Interventions: Assess postural VS orthostatic hypotension, Patient to call before getting OOB, Teach patient to arise slowly, Bed/chair exit alarm                   Problem: Patient Education: Go to Patient Education Activity  Goal: Patient/Family Education  Outcome: Progressing Towards Goal     Problem: Pressure Injury - Risk of  Goal: *Prevention of pressure injury  Description: Document Jer Scale and appropriate interventions in the flowsheet.   Outcome: Progressing Towards Goal  Note: Pressure Injury Interventions:  Sensory Interventions: Assess changes in LOC, Check visual cues for pain, Keep linens dry and wrinkle-free, Float heels, Minimize linen layers         Activity Interventions: Assess need for specialty bed, Increase time out of bed, Pressure redistribution bed/mattress(bed type)         Nutrition Interventions: Document food/fluid/supplement intake, Offer support with meals,snacks and hydration                     Problem: Patient Education: Go to Patient Education Activity  Goal: Patient/Family Education  Outcome: Progressing Towards Goal     Problem: Nutrition Deficit  Goal: *Optimize nutritional status  Outcome: Not Progressing Towards Goal

## 2020-09-03 LAB
ALBUMIN SERPL-MCNC: 2.3 G/DL (ref 3.4–5)
ALBUMIN/GLOB SERPL: 0.4 {RATIO} (ref 0.8–1.7)
ALP SERPL-CCNC: 83 U/L (ref 45–117)
ALT SERPL-CCNC: 32 U/L (ref 16–61)
ANION GAP SERPL CALC-SCNC: 5 MMOL/L (ref 3–18)
AST SERPL-CCNC: 36 U/L (ref 10–38)
BASOPHILS # BLD: 0 K/UL (ref 0–0.06)
BASOPHILS NFR BLD: 0 % (ref 0–3)
BILIRUB DIRECT SERPL-MCNC: 0.4 MG/DL (ref 0–0.2)
BILIRUB SERPL-MCNC: 0.6 MG/DL (ref 0.2–1)
BUN SERPL-MCNC: 15 MG/DL (ref 7–18)
BUN/CREAT SERPL: 11 (ref 12–20)
CALCIUM SERPL-MCNC: 8.3 MG/DL (ref 8.5–10.1)
CHLORIDE SERPL-SCNC: 107 MMOL/L (ref 100–111)
CO2 SERPL-SCNC: 31 MMOL/L (ref 21–32)
CREAT SERPL-MCNC: 1.32 MG/DL (ref 0.6–1.3)
CRP SERPL-MCNC: 4.6 MG/DL (ref 0–0.3)
D DIMER PPP FEU-MCNC: 1.45 UG/ML(FEU)
DIFFERENTIAL METHOD BLD: ABNORMAL
EOSINOPHIL # BLD: 0.2 K/UL (ref 0–0.4)
EOSINOPHIL NFR BLD: 2 % (ref 0–5)
ERYTHROCYTE [DISTWIDTH] IN BLOOD BY AUTOMATED COUNT: 13.5 % (ref 11.6–14.5)
FERRITIN SERPL-MCNC: 808 NG/ML (ref 8–388)
GLOBULIN SER CALC-MCNC: 5.3 G/DL (ref 2–4)
GLUCOSE BLD STRIP.AUTO-MCNC: 147 MG/DL (ref 70–110)
GLUCOSE BLD STRIP.AUTO-MCNC: 175 MG/DL (ref 70–110)
GLUCOSE BLD STRIP.AUTO-MCNC: 205 MG/DL (ref 70–110)
GLUCOSE BLD STRIP.AUTO-MCNC: 253 MG/DL (ref 70–110)
GLUCOSE SERPL-MCNC: 158 MG/DL (ref 74–99)
HCT VFR BLD AUTO: 36.7 % (ref 36–48)
HGB BLD-MCNC: 11.1 G/DL (ref 13–16)
LDH SERPL L TO P-CCNC: 253 U/L (ref 81–234)
LYMPHOCYTES # BLD: 2 K/UL (ref 0.8–3.5)
LYMPHOCYTES NFR BLD: 21 % (ref 20–51)
MCH RBC QN AUTO: 29 PG (ref 24–34)
MCHC RBC AUTO-ENTMCNC: 30.2 G/DL (ref 31–37)
MCV RBC AUTO: 95.8 FL (ref 74–97)
MONOCYTES # BLD: 1.4 K/UL (ref 0–1)
MONOCYTES NFR BLD: 15 % (ref 2–9)
NEUTS BAND NFR BLD MANUAL: 3 % (ref 0–5)
NEUTS SEG # BLD: 5.7 K/UL (ref 1.8–8)
NEUTS SEG NFR BLD: 59 % (ref 42–75)
PLATELET # BLD AUTO: 308 K/UL (ref 135–420)
PLATELET COMMENTS,PCOM: ABNORMAL
PMV BLD AUTO: 12.1 FL (ref 9.2–11.8)
POTASSIUM SERPL-SCNC: 3.4 MMOL/L (ref 3.5–5.5)
PROCALCITONIN SERPL-MCNC: 0.23 NG/ML
PROT SERPL-MCNC: 7.6 G/DL (ref 6.4–8.2)
RBC # BLD AUTO: 3.83 M/UL (ref 4.7–5.5)
RBC MORPH BLD: ABNORMAL
RBC MORPH BLD: ABNORMAL
SODIUM SERPL-SCNC: 143 MMOL/L (ref 136–145)
WBC # BLD AUTO: 9.3 K/UL (ref 4.6–13.2)

## 2020-09-03 PROCEDURE — 83615 LACTATE (LD) (LDH) ENZYME: CPT

## 2020-09-03 PROCEDURE — 74011636637 HC RX REV CODE- 636/637: Performed by: INTERNAL MEDICINE

## 2020-09-03 PROCEDURE — 74011250637 HC RX REV CODE- 250/637: Performed by: NURSE PRACTITIONER

## 2020-09-03 PROCEDURE — 82728 ASSAY OF FERRITIN: CPT

## 2020-09-03 PROCEDURE — 74011250637 HC RX REV CODE- 250/637: Performed by: INTERNAL MEDICINE

## 2020-09-03 PROCEDURE — 93005 ELECTROCARDIOGRAM TRACING: CPT

## 2020-09-03 PROCEDURE — 85025 COMPLETE CBC W/AUTO DIFF WBC: CPT

## 2020-09-03 PROCEDURE — 80076 HEPATIC FUNCTION PANEL: CPT

## 2020-09-03 PROCEDURE — 74011250636 HC RX REV CODE- 250/636: Performed by: NURSE PRACTITIONER

## 2020-09-03 PROCEDURE — 65270000029 HC RM PRIVATE

## 2020-09-03 PROCEDURE — 85379 FIBRIN DEGRADATION QUANT: CPT

## 2020-09-03 PROCEDURE — 86140 C-REACTIVE PROTEIN: CPT

## 2020-09-03 PROCEDURE — 82962 GLUCOSE BLOOD TEST: CPT

## 2020-09-03 PROCEDURE — 74011250636 HC RX REV CODE- 250/636: Performed by: INTERNAL MEDICINE

## 2020-09-03 PROCEDURE — 80048 BASIC METABOLIC PNL TOTAL CA: CPT

## 2020-09-03 PROCEDURE — 36415 COLL VENOUS BLD VENIPUNCTURE: CPT

## 2020-09-03 PROCEDURE — 84145 PROCALCITONIN (PCT): CPT

## 2020-09-03 RX ORDER — AZITHROMYCIN 250 MG/1
500 TABLET, FILM COATED ORAL EVERY 24 HOURS
Status: DISCONTINUED | OUTPATIENT
Start: 2020-09-03 | End: 2020-09-04 | Stop reason: HOSPADM

## 2020-09-03 RX ORDER — INSULIN LISPRO 100 [IU]/ML
5 INJECTION, SOLUTION INTRAVENOUS; SUBCUTANEOUS
Status: DISCONTINUED | OUTPATIENT
Start: 2020-09-03 | End: 2020-09-04 | Stop reason: HOSPADM

## 2020-09-03 RX ORDER — INSULIN GLARGINE 100 [IU]/ML
25 INJECTION, SOLUTION SUBCUTANEOUS EVERY 12 HOURS
Status: DISCONTINUED | OUTPATIENT
Start: 2020-09-03 | End: 2020-09-04 | Stop reason: HOSPADM

## 2020-09-03 RX ORDER — TRAZODONE HYDROCHLORIDE 50 MG/1
50 TABLET ORAL
Status: DISCONTINUED | OUTPATIENT
Start: 2020-09-03 | End: 2020-09-04 | Stop reason: HOSPADM

## 2020-09-03 RX ORDER — POTASSIUM CHLORIDE 20 MEQ/1
20 TABLET, EXTENDED RELEASE ORAL
Status: COMPLETED | OUTPATIENT
Start: 2020-09-03 | End: 2020-09-03

## 2020-09-03 RX ORDER — CEFPODOXIME PROXETIL 200 MG/1
200 TABLET, FILM COATED ORAL EVERY 12 HOURS
Status: DISCONTINUED | OUTPATIENT
Start: 2020-09-03 | End: 2020-09-04 | Stop reason: HOSPADM

## 2020-09-03 RX ADMIN — POTASSIUM CHLORIDE 20 MEQ: 1500 TABLET, EXTENDED RELEASE ORAL at 08:23

## 2020-09-03 RX ADMIN — ATORVASTATIN CALCIUM 10 MG: 10 TABLET, FILM COATED ORAL at 21:17

## 2020-09-03 RX ADMIN — CLOPIDOGREL BISULFATE 75 MG: 75 TABLET ORAL at 08:23

## 2020-09-03 RX ADMIN — ENOXAPARIN SODIUM 40 MG: 40 INJECTION SUBCUTANEOUS at 08:23

## 2020-09-03 RX ADMIN — CEFPODOXIME PROXETIL 200 MG: 200 TABLET, FILM COATED ORAL at 21:17

## 2020-09-03 RX ADMIN — INSULIN LISPRO 9 UNITS: 100 INJECTION, SOLUTION INTRAVENOUS; SUBCUTANEOUS at 11:30

## 2020-09-03 RX ADMIN — DEXAMETHASONE 6 MG: 2 TABLET ORAL at 14:39

## 2020-09-03 RX ADMIN — INSULIN LISPRO 6 UNITS: 100 INJECTION, SOLUTION INTRAVENOUS; SUBCUTANEOUS at 06:47

## 2020-09-03 RX ADMIN — AZITHROMYCIN MONOHYDRATE 500 MG: 250 TABLET ORAL at 14:39

## 2020-09-03 RX ADMIN — PANTOPRAZOLE 40 MG: 40 TABLET, DELAYED RELEASE ORAL at 06:40

## 2020-09-03 RX ADMIN — PANTOPRAZOLE 40 MG: 40 TABLET, DELAYED RELEASE ORAL at 16:35

## 2020-09-03 RX ADMIN — INSULIN GLARGINE 25 UNITS: 100 INJECTION, SOLUTION SUBCUTANEOUS at 21:17

## 2020-09-03 RX ADMIN — METOPROLOL TARTRATE 25 MG: 25 TABLET, FILM COATED ORAL at 21:17

## 2020-09-03 RX ADMIN — METOPROLOL TARTRATE 25 MG: 25 TABLET, FILM COATED ORAL at 08:23

## 2020-09-03 RX ADMIN — CEFPODOXIME PROXETIL 200 MG: 200 TABLET, FILM COATED ORAL at 15:39

## 2020-09-03 RX ADMIN — DOXYCYCLINE HYCLATE 100 MG: 100 CAPSULE ORAL at 08:23

## 2020-09-03 RX ADMIN — INSULIN LISPRO 3 UNITS: 100 INJECTION, SOLUTION INTRAVENOUS; SUBCUTANEOUS at 21:26

## 2020-09-03 RX ADMIN — METHADONE HYDROCHLORIDE 30 MG: 10 TABLET ORAL at 08:23

## 2020-09-03 RX ADMIN — INSULIN GLARGINE 25 UNITS: 100 INJECTION, SOLUTION SUBCUTANEOUS at 09:00

## 2020-09-03 RX ADMIN — INSULIN LISPRO 5 UNITS: 100 INJECTION, SOLUTION INTRAVENOUS; SUBCUTANEOUS at 16:30

## 2020-09-03 RX ADMIN — ONDANSETRON 4 MG: 4 TABLET, ORALLY DISINTEGRATING ORAL at 03:09

## 2020-09-03 NOTE — DIABETES MGMT
Glycemic Control Plan of Care    T2DM with current A1c 12.1% (9/01/2020). Still pending assessment of home diabetes management and educational needs. Patient in droplets precaution, still pending COVID lab result. Attempted to call him but patient did not answer. Home diabetes medications:    Levemir 20 units BID     Recommendation(s): Add meal time lispro insulin 5 units TID AC. Order obtained. Glycemic assessment:      POC BG 9/02: 331, 308, 345, 264, 182  POC BG 9/03 at time of review: 205, 253  Lab FBG 9/03: 158          Current A1C:  12.1% (9//01/2020) which is equivalent to estimated average blood glucose of 301 mg/dL    Current hospital diabetes medications:    Lantus 25 units BID  Corrective lispro ACHS.  Very insulin resistant dose  Meal time lispro insulin 5 units TID AC    Total daily dose insulin requirement previous day: 9/02:  Lantus: 50 units  Lispro: 40 units  TDD insulin: 90 units    Diet: Diabetic consistent carb 1800kcal; regular; nutr suppl: glucerna shake with all meals    Goals:  Blood glucose will be within target range of  mg/dL by  9/6/2020     Education:  Still pending assessment of home diabetes management and educational needs  ___  Refer to Diabetes Education Record  ___  Education not indicated at this time     Radha Aleman RN [facility]  Pager: [phone]

## 2020-09-03 NOTE — PROGRESS NOTES
Notified Dr. Esteban Cervantes that patient has IV Rocephin due, but does not have IV access. Per Dr. Esteban Cervantes he will get IV antibiotics switched to PO.

## 2020-09-03 NOTE — CONSULTS
[address] [facility] [address]  Consultation Note    Date of Service:  09/03/20    Historian(s): Patient  Referral Source: Hospitalist team    Chief Complaint   Psychiatric eval per patient request    History of Present Illness     Clayton Fu is a 61 y.o. BLACK OR  male  with a history of MI, diabetes mellitus, opioid dependence on maintenance treatment with methadone who was admitted to the hospital for acute mental status changes and is currently being treated for COVID. Psychiatry was consulted because patient mentioned to hospitalist that he was \"not feeling right in the head\" and wanted to speak with a psychiatrist.  Patient was interviewed over the phone. Patient reported that prior to coming to the hospital he had an episode of insomnia that lasted about 10 days. He said he could not sleep well and every time he went to bed he would sleep for less than 30 minutes and wake up and be awake for a few more hours before going back to sleep. He said this kept happening for several days and he could not understand what was happening or why he could not sleep such that he started getting more anxious. He also noticed that his appetite was very poor during that time. He eventually decided to come to the hospital to seek help. Patient reports this has never happened to him before. He denied feeling paranoid or experiencing hallucinations at the time. He denied feeling anxious or panicky. He said he felt a little depressed but he denied any acute stressors. Patient reports that he is feeling a lot better now and has been sleeping fairly well since being in the hospital, however he would like to have a prescription for something to help with sleep when he is discharged in case the incidence repeats itself. Psychiatric Treatment History     The patient denies any prior history of inpatient psychiatric hospitalizations or suicide attempts.   He was admitted once to behavioral health in 2016 but did not stay for up to a day and left AMA. He receives methadone from the methadone clinic in charge plan where he has been going to for about 3 years now. According to records he has a long history of heroin use but he says he has not relapsed on heroin since going to the methadone clinic. He denies use of other recreational drugs or prescription opiates. Allergies      Allergies   Allergen Reactions    Compazine [Prochlorperazine] Anaphylaxis     \"Tightness around throat\"       Medical History     Past Medical History:   Diagnosis Date    Arthritis     Coronary atherosclerosis of native coronary artery     S/P PCI with GE in 12/09    Diabetes ([facility] [address]     IDDM    Electrolyte and fluid disorders not elsewhere classified     Essential hypertension, benign     GERD (gastroesophageal reflux disease)     Heroin abuse ([facility] [address] 05/26/16    Psychiatrist Dr. Zaria Segura History of heart attack     Hyperlipidemia     Intermediate coronary syndrome ([facility] [address]     MDD (major depressive disorder) 5/26/16    Psychiatrist Dr. Zaria Segura Myocardial infarction [facility])     Obesity, unspecified     Old myocardial infarction     Other and unspecified hyperlipidemia     Pancreatitis     Positive PPD     Sleep apnea     uses Cpap machine    Transfusion history     Before 1992       Medication(s)     No current facility-administered medications on file prior to encounter. Current Outpatient Medications on File Prior to Encounter   Medication Sig Dispense Refill    methadone HCl (METHADONE PO) Take  by mouth.  metoprolol tartrate (LOPRESSOR) 25 mg tablet Take 1 Tab by mouth every twelve (12) hours. 60 Tab 0    insulin detemir U-100 (LEVEMIR U-100 INSULIN) 100 unit/mL injection 20 Units by SubCUTAneous route two (2) times a day.  10 mL 0    OTHER Resume Preadmission Methadone from clinic 1 Each 0    OTHER Graded compression stockings b/l LE- use as directed 1 Each 0    OTHER Check CBC, CMP, Mg in 3 days, results to PCP immediately, Diagnosis- syncope 1 Each 0    OTHER No Driving Until cleared by Cardiology to do so 1 Each 0    pantoprazole (PROTONIX) 40 mg tablet Take 1 Tab by mouth Before breakfast and dinner. 60 Tab 0    acetaminophen (TYLENOL) 325 mg tablet Take 2 Tabs by mouth every six (6) hours as needed. Indications: Pain, take it with bentyl; do not exceed 3 g tylenol daily 30 Tab 0    OTHER Incentive spirometry- use as directed 1 Each 0    albuterol (PROVENTIL HFA, VENTOLIN HFA, PROAIR HFA) 90 mcg/actuation inhaler 2 puffs every 6 hours x 5 days then every 6 hours as needed for shortness of breath and/or wheezing 1 Inhaler 0    polyethylene glycol (MIRALAX) 17 gram packet Take 1 Packet by mouth daily. 30 Packet 0    simvastatin (ZOCOR) 10 mg tablet Take 10 mg by mouth nightly.  clopidogrel (PLAVIX) 75 mg tablet Take 75 mg by mouth daily. Family History     Family History   Problem Relation Age of Onset    Heart Attack Father     Coronary Artery Disease Mother     Diabetes Mother     Cancer Sister         breast cancer and lung cancer       Psychiatric Family History  Patient denies family history of mental illness or suicide attempt. Social History     The patient was born and raised in [place]. He went as far as the ninth grade. He is disabled from medical problems. He is  and has no children. He lives with his sister in [place].     Vitals/Labs     Visit Vitals  /86 (BP 1 Location: Right arm, BP Patient Position: Sitting)   Pulse 73   Temp 98.7 °F (37.1 °C)   Resp 18   Ht 5' 9.02\" (1.753 m)   Wt 108.9 kg (240 lb)   SpO2 93%   BMI 35.42 kg/m²       Mental Status Examination     Appearance/Hygiene  unable to assess   Attitude/Behavior/Social Relatedness Appropriate   Musculoskeletal Gait/Station: Not tested  Tone (flaccid, cogwheeling, spastic): not assessed  Psychomotor (hyperkinetic, hypokinetic): calm  Involuntary movements (tics, dyskinesias, akathisa, stereotypies): none   Speech   Rate, rhythm, volume, fluency and articulation are appropriate   Mood   euthymic   Affect    congruent   Thought Process Linear and goal directed   Thought Content and Perceptual Disturbances Denies self-injurious behavior (SIB), suicidal ideation (SI), aggressive behavior or homicidal ideation (HI). Denies auditory and visual hallucinations   Sensorium and Cognition  A&Ox4, attention intact, memory intact, language use appropriate, and fund of knowledge age appropriate   Insight  Limited   Judgment  fair     Assessment and Plan     Psychiatric Diagnoses: Opioid use disorder in remission, patient on maintenance therapy with methadone    Psychosocial and contextual factors: Patient has family support    Recommendations: Patient at this time is not presenting with any acute mental illness that needs treatment. He reports an episode of insomnia which has resolved. He was advised to contact his PCP if he has another episode like that. He could be given a prescription for trazodone 50 mg at bedtime as needed for sleep at time of discharge. Thank you for the consultation. Psychiatry signing off.         Ulices Mckee MD  Psychiatrist  DR. HAN [facility]

## 2020-09-03 NOTE — ROUTINE PROCESS
Bedside shift change report given to Fantasma Ray RN (oncoming nurse) by Maico Anne RN (offgoing nurse). Report included the following information SBAR, Kardex, Intake/Output, MAR, Recent Results and Med Rec Status.

## 2020-09-03 NOTE — PROGRESS NOTES
[facility] Hospitalist Group  Progress Note    Patient: Cesar Chao Age: 61 y.o. : 1960 MR#: [id number] SSN: [id number]  Date: 9/3/2020     Subjective:   Alert and oriented X 3. States he is ready to go home. Has no complaints. States he slept and eat this morning. Overnight events: fevers. Anticipate discharge tomorrow if remains stable overnight. Assessment/Plan:     1. Acute metabolic encephalopathy - resolved. 2. COVID pneumonia vs bacterial pneumonia  3. CKD- at baseline. 4. DMT2 with hyperglycemia. A1c 12.1  5. HTN on BB  6. HLD on statin   7. Chronic back pain  8. Obesity. 9. Hx Heroin abuse on Methadone. 10. Hx major depressive disorder   11. Hx CAD s/p PCI 2019. On DAPT    PLAN  1. Covid 19 results pending. ID following. Ceftriaxone and doxycycline discontinued. started on PO cefpodoxime, azithromycin. blood cultures with ngtd. Continue to follow inflammatory markers. Continue incentive spirometer, bronchial hygiene protocol. 2.  CT head with no acute pathology. No focal neuro deficits. Seen by psych with orders for PRN trazodone and outpatient psych follow up. 3. Diabetic educator following. 5 units insulin added to daily regimen. Continue Levemir BID. Close monitor. Will continue to adjust as warranted. 4. Continue home regimens.      Additional Notes:      Case discussed with:  [x]Patient  [x]Family  [x]Nursing  [x]Case Management  DVT Prophylaxis:  [x]Lovenox  []Hep SQ  []SCDs  []Coumadin   []On Heparin gtt    Objective:   VS:   Visit Vitals  /76 (BP 1 Location: Right arm, BP Patient Position: At rest)   Pulse 60   Temp 97.3 °F (36.3 °C)   Resp 18   Ht 5' 9.02\" (1.753 m)   Wt 108.9 kg (240 lb)   SpO2 94%   BMI 35.42 kg/m²      Tmax/24hrs: Temp (24hrs), Av.3 °F (37.4 °C), Min:97.3 °F (36.3 °C), Max:101.5 °F (38.6 °C)      Intake/Output Summary (Last 24 hours) at 9/3/2020 1555  Last data filed at 9/3/2020 0106  Gross per 24 hour Intake 470 ml   Output 200 ml   Net 270 ml       General:  Alert, NAD  Cardiovascular:  RRR  Pulmonary:  Diminished BLL  GI:  +BS in all four quadrants, soft, non-tender  Extremities:  No edema; 2+ dorsalis pedis pulses bilaterally  Neuro: alert and oriented x 3    Family contact: 3:59 PM I have discussed plan of care with sister Elliott Hernandes who is in agreement with plan of care as outlined. She has no further questions at this time. Labs:    Recent Results (from the past 24 hour(s))   GLUCOSE, POC    Collection Time: 09/02/20  9:10 PM   Result Value Ref Range    Glucose (POC) 182 (H) 70 - 671 mg/dL   METABOLIC PANEL, BASIC    Collection Time: 09/03/20  1:37 AM   Result Value Ref Range    Sodium 143 136 - 145 mmol/L    Potassium 3.4 (L) 3.5 - 5.5 mmol/L    Chloride 107 100 - 111 mmol/L    CO2 31 21 - 32 mmol/L    Anion gap 5 3.0 - 18 mmol/L    Glucose 158 (H) 74 - 99 mg/dL    BUN 15 7.0 - 18 MG/DL    Creatinine 1.32 (H) 0.6 - 1.3 MG/DL    BUN/Creatinine ratio 11 (L) 12 - 20      GFR est AA >60 >60 ml/min/1.73m2    GFR est non-AA 56 (L) >60 ml/min/1.73m2    Calcium 8.3 (L) 8.5 - 10.1 MG/DL   HEPATIC FUNCTION PANEL    Collection Time: 09/03/20  1:37 AM   Result Value Ref Range    Protein, total 7.6 6.4 - 8.2 g/dL    Albumin 2.3 (L) 3.4 - 5.0 g/dL    Globulin 5.3 (H) 2.0 - 4.0 g/dL    A-G Ratio 0.4 (L) 0.8 - 1.7      Bilirubin, total 0.6 0.2 - 1.0 MG/DL    Bilirubin, direct 0.4 (H) 0.0 - 0.2 MG/DL    Alk.  phosphatase 83 45 - 117 U/L    AST (SGOT) 36 10 - 38 U/L    ALT (SGPT) 32 16 - 61 U/L   CBC WITH AUTOMATED DIFF    Collection Time: 09/03/20  1:37 AM   Result Value Ref Range    WBC 9.3 4.6 - 13.2 K/uL    RBC 3.83 (L) 4.70 - 5.50 M/uL    HGB 11.1 (L) 13.0 - 16.0 g/dL    HCT 36.7 36.0 - 48.0 %    MCV 95.8 74.0 - 97.0 FL    MCH 29.0 24.0 - 34.0 PG    MCHC 30.2 (L) 31.0 - 37.0 g/dL    RDW 13.5 11.6 - 14.5 %    PLATELET 560 834 - 690 K/uL    MPV 12.1 (H) 9.2 - 11.8 FL    NEUTROPHILS 59 42 - 75 %    BAND NEUTROPHILS 3 0 - 5 %    LYMPHOCYTES 21 20 - 51 %    MONOCYTES 15 (H) 2 - 9 %    EOSINOPHILS 2 0 - 5 %    BASOPHILS 0 0 - 3 %    ABS. NEUTROPHILS 5.7 1.8 - 8.0 K/UL    ABS. LYMPHOCYTES 2.0 0.8 - 3.5 K/UL    ABS. MONOCYTES 1.4 (H) 0 - 1.0 K/UL    ABS. EOSINOPHILS 0.2 0.0 - 0.4 K/UL    ABS. BASOPHILS 0.0 0.0 - 0.06 K/UL    DF MANUAL      PLATELET COMMENTS ADEQUATE PLATELETS      RBC COMMENTS HYPOCHROMIA  1+        RBC COMMENTS POLYCHROMASIA  1+       FERRITIN    Collection Time: 09/03/20  1:37 AM   Result Value Ref Range    Ferritin 808 (H) 8 - 388 NG/ML   C REACTIVE PROTEIN, QT    Collection Time: 09/03/20  1:37 AM   Result Value Ref Range    C-Reactive protein 4.6 (H) 0 - 0.3 mg/dL   LD    Collection Time: 09/03/20  1:37 AM   Result Value Ref Range     (H) 81 - 234 U/L   D DIMER    Collection Time: 09/03/20  1:37 AM   Result Value Ref Range    D DIMER 1.45 (H) <0.46 ug/ml(FEU)   GLUCOSE, POC    Collection Time: 09/03/20  6:46 AM   Result Value Ref Range    Glucose (POC) 205 (H) 70 - 110 mg/dL   GLUCOSE, POC    Collection Time: 09/03/20 10:22 AM   Result Value Ref Range    Glucose (POC) 253 (H) 70 - 110 mg/dL   EKG, 12 LEAD, SUBSEQUENT    Collection Time: 09/03/20  3:41 PM   Result Value Ref Range    Ventricular Rate 50 BPM    Atrial Rate 54 BPM    QRS Duration 68 ms    Q-T Interval 426 ms    QTC Calculation (Bezet) 388 ms    Calculated R Axis 0 degrees    Calculated T Axis 69 degrees    Diagnosis       Atrial fibrillation with slow ventricular response with a competing   junctional pacemaker  Low voltage QRS  Nonspecific T wave abnormality , probably digitalis effect  Abnormal ECG  When compared with ECG of 01-SEP-2020 11:14,  Atrial fibrillation has replaced Sinus rhythm  Vent.  rate has decreased BY  37 BPM  Nonspecific T wave abnormality no longer evident in Inferior leads  Nonspecific T wave abnormality no longer evident in Anterior leads         Signed By: Lucy Robbins NP     September 3, 2020

## 2020-09-03 NOTE — PROGRESS NOTES
Problem: Diabetes Self-Management  Goal: *Disease process and treatment process  Description: Define diabetes and identify own type of diabetes; list 3 options for treating diabetes. Outcome: Progressing Towards Goal  Goal: *Incorporating nutritional management into lifestyle  Description: Describe effect of type, amount and timing of food on blood glucose; list 3 methods for planning meals. Outcome: Progressing Towards Goal  Goal: *Incorporating physical activity into lifestyle  Description: State effect of exercise on blood glucose levels. Outcome: Progressing Towards Goal  Goal: *Developing strategies to promote health/change behavior  Description: Define the ABC's of diabetes; identify appropriate screenings, schedule and personal plan for screenings. Outcome: Progressing Towards Goal  Goal: *Using medications safely  Description: State effect of diabetes medications on diabetes; name diabetes medication taking, action and side effects. Outcome: Progressing Towards Goal  Goal: *Monitoring blood glucose, interpreting and using results  Description: Identify recommended blood glucose targets  and personal targets. Outcome: Progressing Towards Goal  Goal: *Prevention, detection, treatment of acute complications  Description: List symptoms of hyper- and hypoglycemia; describe how to treat low blood sugar and actions for lowering  high blood glucose level. Outcome: Progressing Towards Goal  Goal: *Prevention, detection and treatment of chronic complications  Description: Define the natural course of diabetes and describe the relationship of blood glucose levels to long term complications of diabetes.   Outcome: Progressing Towards Goal  Goal: *Developing strategies to address psychosocial issues  Description: Describe feelings about living with diabetes; identify support needed and support network  Outcome: Progressing Towards Goal  Goal: *Insulin pump training  Outcome: Progressing Towards Goal  Goal: *Sick day guidelines  Outcome: Progressing Towards Goal  Goal: *Patient Specific Goal (EDIT GOAL, INSERT TEXT)  Outcome: Progressing Towards Goal     Problem: Falls - Risk of  Goal: *Absence of Falls  Description: Document Basil Fall Risk and appropriate interventions in the flowsheet. Outcome: Progressing Towards Goal  Note: Fall Risk Interventions:       Mentation Interventions: Reorient patient    Medication Interventions: Assess postural VS orthostatic hypotension, Patient to call before getting OOB, Teach patient to arise slowly                   Problem: Patient Education: Go to Patient Education Activity  Goal: Patient/Family Education  Outcome: Progressing Towards Goal     Problem: Pressure Injury - Risk of  Goal: *Prevention of pressure injury  Description: Document Jer Scale and appropriate interventions in the flowsheet.   Outcome: Progressing Towards Goal  Note: Pressure Injury Interventions:  Sensory Interventions: Assess changes in LOC, Check visual cues for pain, Keep linens dry and wrinkle-free, Float heels, Minimize linen layers         Activity Interventions: Increase time out of bed, Pressure redistribution bed/mattress(bed type), PT/OT evaluation         Nutrition Interventions: Document food/fluid/supplement intake, Offer support with meals,snacks and hydration                     Problem: Nutrition Deficit  Goal: *Optimize nutritional status  Outcome: Progressing Towards Goal

## 2020-09-03 NOTE — ROUTINE PROCESS
Bedside shift change report given to [address] (oncoming nurse) by Lawyer Chowdhury RN (offgoing nurse). Report included the following information SBAR, Kardex, Procedure Summary, Intake/Output, MAR and Recent Results.

## 2020-09-03 NOTE — ROUTINE PROCESS
Bedside shift change report given to Dee Staton RN (oncoming nurse) by Juvenal Syed RN (offgoing nurse). Report included the following information SBAR, Kardex, Intake/Output, MAR, Recent Results and Med Rec Status.

## 2020-09-04 VITALS
SYSTOLIC BLOOD PRESSURE: 128 MMHG | OXYGEN SATURATION: 96 % | DIASTOLIC BLOOD PRESSURE: 82 MMHG | WEIGHT: 240 LBS | TEMPERATURE: 98.4 F | HEART RATE: 64 BPM | RESPIRATION RATE: 19 BRPM | BODY MASS INDEX: 35.55 KG/M2 | HEIGHT: 69 IN

## 2020-09-04 LAB
ALBUMIN SERPL-MCNC: 2.3 G/DL (ref 3.4–5)
ALBUMIN/GLOB SERPL: 0.5 {RATIO} (ref 0.8–1.7)
ALP SERPL-CCNC: 71 U/L (ref 45–117)
ALT SERPL-CCNC: 28 U/L (ref 16–61)
ANION GAP SERPL CALC-SCNC: 8 MMOL/L (ref 3–18)
AST SERPL-CCNC: 25 U/L (ref 10–38)
ATRIAL RATE: 54 BPM
BASOPHILS # BLD: 0 K/UL (ref 0–0.06)
BASOPHILS NFR BLD: 0 % (ref 0–3)
BILIRUB DIRECT SERPL-MCNC: 0.2 MG/DL (ref 0–0.2)
BILIRUB SERPL-MCNC: 0.7 MG/DL (ref 0.2–1)
BUN SERPL-MCNC: 14 MG/DL (ref 7–18)
BUN/CREAT SERPL: 14 (ref 12–20)
CALCIUM SERPL-MCNC: 8.7 MG/DL (ref 8.5–10.1)
CALCULATED R AXIS, ECG10: 0 DEGREES
CALCULATED T AXIS, ECG11: 69 DEGREES
CHLORIDE SERPL-SCNC: 104 MMOL/L (ref 100–111)
CO2 SERPL-SCNC: 27 MMOL/L (ref 21–32)
CREAT SERPL-MCNC: 1.02 MG/DL (ref 0.6–1.3)
CRP SERPL-MCNC: 4.3 MG/DL (ref 0–0.3)
D DIMER PPP FEU-MCNC: 1.23 UG/ML(FEU)
DIAGNOSIS, 93000: NORMAL
DIFFERENTIAL METHOD BLD: ABNORMAL
EOSINOPHIL # BLD: 0 K/UL (ref 0–0.4)
EOSINOPHIL NFR BLD: 0 % (ref 0–5)
ERYTHROCYTE [DISTWIDTH] IN BLOOD BY AUTOMATED COUNT: 13.2 % (ref 11.6–14.5)
FERRITIN SERPL-MCNC: 755 NG/ML (ref 8–388)
GLOBULIN SER CALC-MCNC: 5.1 G/DL (ref 2–4)
GLUCOSE BLD STRIP.AUTO-MCNC: 184 MG/DL (ref 70–110)
GLUCOSE BLD STRIP.AUTO-MCNC: 334 MG/DL (ref 70–110)
GLUCOSE BLD STRIP.AUTO-MCNC: 394 MG/DL (ref 70–110)
GLUCOSE SERPL-MCNC: 182 MG/DL (ref 74–99)
HCT VFR BLD AUTO: 34.9 % (ref 36–48)
HGB BLD-MCNC: 10.5 G/DL (ref 13–16)
LDH SERPL L TO P-CCNC: 325 U/L (ref 81–234)
LYMPHOCYTES # BLD: 1.7 K/UL (ref 0.8–3.5)
LYMPHOCYTES NFR BLD: 18 % (ref 20–51)
MCH RBC QN AUTO: 28.9 PG (ref 24–34)
MCHC RBC AUTO-ENTMCNC: 30.1 G/DL (ref 31–37)
MCV RBC AUTO: 96.1 FL (ref 74–97)
MONOCYTES # BLD: 1 K/UL (ref 0–1)
MONOCYTES NFR BLD: 10 % (ref 2–9)
NEUTS BAND NFR BLD MANUAL: 3 % (ref 0–5)
NEUTS SEG # BLD: 6.8 K/UL (ref 1.8–8)
NEUTS SEG NFR BLD: 69 % (ref 42–75)
PLATELET # BLD AUTO: 298 K/UL (ref 135–420)
PLATELET COMMENTS,PCOM: ABNORMAL
PMV BLD AUTO: 12.2 FL (ref 9.2–11.8)
POTASSIUM SERPL-SCNC: 3.8 MMOL/L (ref 3.5–5.5)
PROCALCITONIN SERPL-MCNC: 0.25 NG/ML
PROT SERPL-MCNC: 7.4 G/DL (ref 6.4–8.2)
Q-T INTERVAL, ECG07: 426 MS
QRS DURATION, ECG06: 68 MS
QTC CALCULATION (BEZET), ECG08: 388 MS
RBC # BLD AUTO: 3.63 M/UL (ref 4.7–5.5)
RBC MORPH BLD: ABNORMAL
RBC MORPH BLD: ABNORMAL
SARS-COV-2, COV2NT: DETECTED
SODIUM SERPL-SCNC: 139 MMOL/L (ref 136–145)
SOURCE, COVRS: ABNORMAL
SPECIMEN TYPE, XMCV1T: ABNORMAL
VENTRICULAR RATE, ECG03: 50 BPM
WBC # BLD AUTO: 9.5 K/UL (ref 4.6–13.2)

## 2020-09-04 PROCEDURE — 80076 HEPATIC FUNCTION PANEL: CPT

## 2020-09-04 PROCEDURE — 80048 BASIC METABOLIC PNL TOTAL CA: CPT

## 2020-09-04 PROCEDURE — 84145 PROCALCITONIN (PCT): CPT

## 2020-09-04 PROCEDURE — 86140 C-REACTIVE PROTEIN: CPT

## 2020-09-04 PROCEDURE — 74011636637 HC RX REV CODE- 636/637: Performed by: INTERNAL MEDICINE

## 2020-09-04 PROCEDURE — 74011250637 HC RX REV CODE- 250/637: Performed by: NURSE PRACTITIONER

## 2020-09-04 PROCEDURE — 83615 LACTATE (LD) (LDH) ENZYME: CPT

## 2020-09-04 PROCEDURE — 82962 GLUCOSE BLOOD TEST: CPT

## 2020-09-04 PROCEDURE — 85025 COMPLETE CBC W/AUTO DIFF WBC: CPT

## 2020-09-04 PROCEDURE — 82728 ASSAY OF FERRITIN: CPT

## 2020-09-04 PROCEDURE — 85379 FIBRIN DEGRADATION QUANT: CPT

## 2020-09-04 PROCEDURE — 36415 COLL VENOUS BLD VENIPUNCTURE: CPT

## 2020-09-04 PROCEDURE — 74011250637 HC RX REV CODE- 250/637: Performed by: INTERNAL MEDICINE

## 2020-09-04 PROCEDURE — 74011250636 HC RX REV CODE- 250/636: Performed by: NURSE PRACTITIONER

## 2020-09-04 RX ORDER — AZITHROMYCIN 500 MG/1
500 TABLET, FILM COATED ORAL DAILY
Qty: 5 TAB | Refills: 0 | Status: SHIPPED | OUTPATIENT
Start: 2020-09-04 | End: 2020-09-09

## 2020-09-04 RX ORDER — UREA 10 %
2 LOTION (ML) TOPICAL 2 TIMES DAILY
Status: DISCONTINUED | OUTPATIENT
Start: 2020-09-04 | End: 2020-09-04 | Stop reason: HOSPADM

## 2020-09-04 RX ORDER — ONDANSETRON 4 MG/1
4 TABLET, ORALLY DISINTEGRATING ORAL
Qty: 30 TAB | Refills: 0 | Status: SHIPPED | OUTPATIENT
Start: 2020-09-04 | End: 2020-09-26

## 2020-09-04 RX ORDER — DEXAMETHASONE 6 MG/1
6 TABLET ORAL DAILY
Qty: 10 TAB | Refills: 0 | Status: SHIPPED | OUTPATIENT
Start: 2020-09-04 | End: 2020-09-14

## 2020-09-04 RX ORDER — TRAZODONE HYDROCHLORIDE 50 MG/1
50 TABLET ORAL
Qty: 30 TAB | Refills: 0 | Status: SHIPPED | OUTPATIENT
Start: 2020-09-04 | End: 2020-10-04

## 2020-09-04 RX ORDER — UREA 10 %
2 LOTION (ML) TOPICAL 2 TIMES DAILY
Qty: 120 TAB | Refills: 0 | Status: SHIPPED | OUTPATIENT
Start: 2020-09-04 | End: 2020-10-04

## 2020-09-04 RX ADMIN — INSULIN LISPRO 5 UNITS: 100 INJECTION, SOLUTION INTRAVENOUS; SUBCUTANEOUS at 11:30

## 2020-09-04 RX ADMIN — PANTOPRAZOLE 40 MG: 40 TABLET, DELAYED RELEASE ORAL at 08:15

## 2020-09-04 RX ADMIN — INSULIN LISPRO 15 UNITS: 100 INJECTION, SOLUTION INTRAVENOUS; SUBCUTANEOUS at 11:30

## 2020-09-04 RX ADMIN — INSULIN LISPRO 3 UNITS: 100 INJECTION, SOLUTION INTRAVENOUS; SUBCUTANEOUS at 05:33

## 2020-09-04 RX ADMIN — INSULIN LISPRO 5 UNITS: 100 INJECTION, SOLUTION INTRAVENOUS; SUBCUTANEOUS at 07:30

## 2020-09-04 RX ADMIN — Medication 2 TABLET: at 11:25

## 2020-09-04 RX ADMIN — CEFPODOXIME PROXETIL 200 MG: 200 TABLET, FILM COATED ORAL at 08:15

## 2020-09-04 RX ADMIN — METHADONE HYDROCHLORIDE 30 MG: 10 TABLET ORAL at 08:15

## 2020-09-04 RX ADMIN — CLOPIDOGREL BISULFATE 75 MG: 75 TABLET ORAL at 08:15

## 2020-09-04 RX ADMIN — INSULIN GLARGINE 25 UNITS: 100 INJECTION, SOLUTION SUBCUTANEOUS at 09:00

## 2020-09-04 RX ADMIN — ENOXAPARIN SODIUM 40 MG: 40 INJECTION SUBCUTANEOUS at 08:16

## 2020-09-04 NOTE — COMMUNITY CARE MANAGEMENT
Call made to Dr. Phyllis Lea office, voicemail noted to leave contact information, office will contact with follow up information.

## 2020-09-04 NOTE — PROGRESS NOTES
Problem: Diabetes Self-Management  Goal: *Disease process and treatment process  Description: Define diabetes and identify own type of diabetes; list 3 options for treating diabetes. Outcome: Progressing Towards Goal  Goal: *Incorporating nutritional management into lifestyle  Description: Describe effect of type, amount and timing of food on blood glucose; list 3 methods for planning meals. Outcome: Progressing Towards Goal  Goal: *Incorporating physical activity into lifestyle  Description: State effect of exercise on blood glucose levels. Outcome: Progressing Towards Goal  Goal: *Developing strategies to promote health/change behavior  Description: Define the ABC's of diabetes; identify appropriate screenings, schedule and personal plan for screenings. Outcome: Progressing Towards Goal  Goal: *Using medications safely  Description: State effect of diabetes medications on diabetes; name diabetes medication taking, action and side effects. Outcome: Progressing Towards Goal  Goal: *Monitoring blood glucose, interpreting and using results  Description: Identify recommended blood glucose targets  and personal targets. Outcome: Progressing Towards Goal  Goal: *Prevention, detection, treatment of acute complications  Description: List symptoms of hyper- and hypoglycemia; describe how to treat low blood sugar and actions for lowering  high blood glucose level. Outcome: Progressing Towards Goal  Goal: *Prevention, detection and treatment of chronic complications  Description: Define the natural course of diabetes and describe the relationship of blood glucose levels to long term complications of diabetes.   Outcome: Progressing Towards Goal  Goal: *Developing strategies to address psychosocial issues  Description: Describe feelings about living with diabetes; identify support needed and support network  Outcome: Progressing Towards Goal  Goal: *Insulin pump training  Outcome: Progressing Towards Goal  Goal: *Sick day guidelines  Outcome: Progressing Towards Goal  Goal: *Patient Specific Goal (EDIT GOAL, INSERT TEXT)  Outcome: Progressing Towards Goal     Problem: Falls - Risk of  Goal: *Absence of Falls  Description: Document Basil Fall Risk and appropriate interventions in the flowsheet. Outcome: Progressing Towards Goal  Note: Fall Risk Interventions:       Mentation Interventions: More frequent rounding, Room close to nurse's station, Update white board    Medication Interventions: Assess postural VS orthostatic hypotension, Patient to call before getting OOB, Teach patient to arise slowly                   Problem: Pressure Injury - Risk of  Goal: *Prevention of pressure injury  Description: Document Jer Scale and appropriate interventions in the flowsheet.   Outcome: Progressing Towards Goal  Note: Pressure Injury Interventions:  Sensory Interventions: Assess changes in LOC, Check visual cues for pain, Keep linens dry and wrinkle-free, Float heels, Minimize linen layers         Activity Interventions: Increase time out of bed, Pressure redistribution bed/mattress(bed type), PT/OT evaluation    Mobility Interventions: Assess need for specialty bed, Pressure redistribution bed/mattress (bed type), PT/OT evaluation    Nutrition Interventions: Document food/fluid/supplement intake, Offer support with meals,snacks and hydration                     Problem: Nutrition Deficit  Goal: *Optimize nutritional status  Outcome: Progressing Towards Goal

## 2020-09-04 NOTE — DISCHARGE SUMMARY
[facility]  Discharge Summary       Patient: Adolph Grullon Age: 61 y.o. : 1960 MR#: [id number] SSN: [id number]  PCP on record: Lynsey Saravia MD  Admit date: 2020  Discharge date: 2020    Disposition:    [x]Home   []Home with Home Health   []SNF/NH   []Rehab   []Home with family   []Alternate Facility:____________________    Admission Diagnoses:  Multifocal pneumonia [J18.9]  Altered mental status, unspecified altered mental status type [R41.82]    Discharge Diagnoses:                             1. Acute metabolic encephalopathy - resolved. 2. COVID pneumonia. Confirmed positive via [facility].   3. CKD- at baseline. 4.  Uncontrolled DMT2 with hyperglycemia. A1c 12.1  5. HTN on BB  6. HLD on statin   7. Chronic back pain  8. Obesity. 9. Hx Heroin abuse on Methadone. 10. Hx major depressive disorder   11. Hx CAD s/p PCI 2019. On DAPT  12. Insomnia. Seen by psych inpatient. On trazodone as needed. Discharge Medications:     Current Discharge Medication List      START taking these medications    Details   traZODone (DESYREL) 50 mg tablet Take 1 Tab by mouth nightly as needed for Sleep for up to 30 days. Qty: 30 Tab, Refills: 0      ondansetron (ZOFRAN ODT) 4 mg disintegrating tablet Take 1 Tab by mouth every eight (8) hours as needed for Nausea or Vomiting. Qty: 30 Tab, Refills: 0      Lactobacillus Acidoph & Bulgar (FLORANEX) 1 million cell tab tablet Take 2 Tabs by mouth two (2) times a day for 30 days. Qty: 120 Tab, Refills: 0      dexAMETHasone (DECADRON) 6 mg tablet Take 1 Tab by mouth daily for 10 days. Qty: 10 Tab, Refills: 0      azithromycin (ZITHROMAX) 500 mg tab Take 1 Tab by mouth daily for 5 days.   Qty: 5 Tab, Refills: 0    Associated Diagnoses: Pneumonia due to COVID-19 virus         CONTINUE these medications which have CHANGED    Details   !! OTHER Check CBC, CMP, Mg in 3 days, results to PCP immediately, Diagnosis- syncope  Qty: 1 Each, Refills: 0      !! OTHER Graded compression stockings b/l LE- use as directed  Qty: 1 Each, Refills: 0      !! OTHER Incentive spirometry- use as directed  Qty: 1 Each, Refills: 0       !! - Potential duplicate medications found. Please discuss with provider. CONTINUE these medications which have NOT CHANGED    Details   methadone HCl (METHADONE PO) Take  by mouth.      metoprolol tartrate (LOPRESSOR) 25 mg tablet Take 1 Tab by mouth every twelve (12) hours. Qty: 60 Tab, Refills: 0      insulin detemir U-100 (LEVEMIR U-100 INSULIN) 100 unit/mL injection 20 Units by SubCUTAneous route two (2) times a day. Qty: 10 mL, Refills: 0      !! OTHER No Driving Until cleared by Cardiology to do so  Qty: 1 Each, Refills: 0      pantoprazole (PROTONIX) 40 mg tablet Take 1 Tab by mouth Before breakfast and dinner. Qty: 60 Tab, Refills: 0      acetaminophen (TYLENOL) 325 mg tablet Take 2 Tabs by mouth every six (6) hours as needed. Indications: Pain, take it with bentyl; do not exceed 3 g tylenol daily  Qty: 30 Tab, Refills: 0      albuterol (PROVENTIL HFA, VENTOLIN HFA, PROAIR HFA) 90 mcg/actuation inhaler 2 puffs every 6 hours x 5 days then every 6 hours as needed for shortness of breath and/or wheezing  Qty: 1 Inhaler, Refills: 0      polyethylene glycol (MIRALAX) 17 gram packet Take 1 Packet by mouth daily. Qty: 30 Packet, Refills: 0      simvastatin (ZOCOR) 10 mg tablet Take 10 mg by mouth nightly. clopidogrel (PLAVIX) 75 mg tablet Take 75 mg by mouth daily. !! - Potential duplicate medications found. Please discuss with provider.           Consults:    -Infectious disease  -Psychiatry  -Nutrition  -Case management    Procedures:  -   None    Significant Diagnostic Studies:   -    Component  Value  Flag  Ref Range  Units  Status    SARS-CoV-2  Detected  Abnormal    Not Detected     Final    Comment:    (NOTE)   This nucleic acid amplification test was developed and its performance characteristics determined by [employer]. Nucleic acid amplification tests include PCR and TMA. This test has   not been FDA cleared or approved. This test has been authorized by   FDA under an Emergency Use Authorization (EUA). This test is only   authorized for the duration of time the declaration that   circumstances exist justifying the authorization of the emergency use   of in vitro diagnostic tests for detection of SARS-CoV-2 virus and/or   diagnosis of COVID-19 infection under section 564(b)(1) of the Act,   21 U. S.C. 320IKT-7(B) (1), unless the authorization is terminated or   revoked sooner. When diagnostic testing is negative, the possibility of a false   negative result should be considered in the context of a patient's   recent exposures and the presence of clinical signs and symptoms   consistent with COVID-19. An individual without symptoms of COVID-   19 and who is not shedding SARS-CoV-2 virus would expect to have a   negative (not detected) result in this assay. Performed At: [address] [id number]   Sandro Hernandez MD [id number]        Study Result     CT HEAD UNENHANCED     CPT code: 62704     INDICATION: Altered mental status. Essential hypertension. No other relevant  clinical information provided.     TECHNIQUE: 5 mm collimation axial images obtained from the skull base through  the vertex without administration of nonionic intravenous contrast.      All CT scans at this facility are performed using dose optimization technique as  appropriate to this specific exam, to include automated exposure control,  adjustment of the mA and/or KP according to patient size or use of iterative  reconstruction techniques.     COMPARISON: Prior exam 11/19/2015     FINDINGS:   No suggestion of acute hemorrhage. No extraaxial fluid collections.    No mass lesion appreciated within the limitations of a noncontrast exam.   No evidence for acute infarct involving any major vascular distribution. MRI much more sensitive for the detection of edema or ischemia in the acute  setting. No midline shift of structures.       Partially included paranasal sinuses are clear. Calvarium intact to the extent included. 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      IMPRESSION  IMPRESSION:  1. No hemorrhage identified. No evidence of acute intracranial pathology. Study Result     EXAMINATION: Chest single view     INDICATION: Sepsis     COMPARISON: 8/5/2019     FINDINGS: Single frontal view the chest obtained. Under penetration limits  evaluation. Mediastinal silhouette normal in size. Perihilar and beyond patchy  interstitial/streaky opacities. No definite pneumothorax identified. No obvious  acute osseous findings.     IMPRESSION  IMPRESSION:     Patchy interstitial/streaky opacities suspicious for multifocal pneumonia or  edema. Radiographic follow-up recommended. Hospital Course by Problem     Mr. Constanza Sal is a 51-year-old male with past medical history significant for uncontrolled diabetes, hypertension, dyslipidemia, history of heroin abuse on methadone, and chronic back pain who presented to the ED with chief complaint of altered mental status. On arrival he was noted with a temp of one 1.8, elevated lactic acid 2.14, elevated liver enzymes, hypoglycemia of 371. Sepsis protocol was initiated in the ED. Chest x-ray showed multifocal pneumonia. CT head with no acute pathology. COVID-19 was suspected, ID was consulted. There was no hypoxia, tachycardia, respiratory distress. He was initiated on vancomycin, Zosyn, Levaquin which were de-escalated on the following day to Cefpodoxime, azithromycin plus Decadron. He showed clinical signs of improvement. His inflammatory biomarkers were tracked along with his vital signs. His blood cultures had no growth to date. During his hospital course, patient requested psychiatric consult for ongoing insomnia. He has a history of depressive disorder with no psychiatric inpatient services, no prior suicide attempts, suicidal ideation or homicidal ideation. He was seen by psychiatry who recommended PRN trazodone for insomnia with referral for outpatient psychiatric treatment. Today,  he is clinically stable for discharge. He did verbalize a diarrhea in the morning suspected due to to his antibiotic regimen. His COVID test returned positive on day of discharge. Encouraged to quarantine per CDC guidelines. Patient verbalized understanding. Prior to his medication reconciliation being complete, RN notified me of patient's desire to leave immediately. I attempted to speak with patient. He refused to speak with me. He did sign AMA paperwork with the nurse and walked out. I reached out to patient on his cell phone, his nephew answered. Stated he was in the store buying cigarettes however would call me back in a few minutes. I did not receive a call back. They then called his sister Ben Salas [phone] who stated patient was at the bank. I informed the sister that patient had prescriptions at his pharmacy of choice on file. Patient stated she would relay the message.     Today's examination of the patient revealed:     Subjective:     Objective:   VS:   Visit Vitals  /82 (BP 1 Location: Right arm, BP Patient Position: At rest)   Pulse 64   Temp 98.4 °F (36.9 °C)   Resp 19   Ht 5' 9.02\" (1.753 m)   Wt 108.9 kg (240 lb)   SpO2 96%   BMI 35.42 kg/m²      Tmax/24hrs: Temp (24hrs), Av.1 °F (36.7 °C), Min:97 °F (36.1 °C), Max:100.3 °F (37.9 °C)     Input/Output:     Intake/Output Summary (Last 24 hours) at 2020 1501  Last data filed at 9/3/2020 2257  Gross per 24 hour   Intake 480 ml   Output 200 ml   Net 280 ml       General:  Alert, NAD  Cardiovascular:  RRR  Pulmonary:  LSC throughout; respiratory effort WNL  GI:  +BS in all four quadrants, soft, non-tender  Extremities:  No edema; 2+ dorsalis pedis pulses bilaterally      Labs:    Recent Results (from the past 24 hour(s))   EKG, 12 LEAD, SUBSEQUENT    Collection Time: 09/03/20  3:41 PM   Result Value Ref Range    Ventricular Rate 50 BPM    Atrial Rate 54 BPM    QRS Duration 68 ms    Q-T Interval 426 ms    QTC Calculation (Bezet) 388 ms    Calculated R Axis 0 degrees    Calculated T Axis 69 degrees    Diagnosis       Atrial fibrillation with slow ventricular response with a competing   junctional pacemaker  Low voltage QRS  Nonspecific T wave abnormality , probably digitalis effect  Abnormal ECG  When compared with ECG of 01-SEP-2020 11:14,  Atrial fibrillation has replaced Sinus rhythm  Vent.  rate has decreased BY  37 BPM  Nonspecific T wave abnormality no longer evident in Inferior leads  Nonspecific T wave abnormality no longer evident in Anterior leads  Confirmed by Kaden Bhat M.D., [address] [place] [address] ([id number]) on 9/4/2020 10:00:01 AM     GLUCOSE, POC    Collection Time: 09/03/20  3:56 PM   Result Value Ref Range    Glucose (POC) 147 (H) 70 - 110 mg/dL   PROCALCITONIN    Collection Time: 09/03/20  4:40 PM   Result Value Ref Range    Procalcitonin 0.23 ng/mL   GLUCOSE, POC    Collection Time: 09/03/20  9:21 PM   Result Value Ref Range    Glucose (POC) 175 (H) 70 - 910 mg/dL   METABOLIC PANEL, BASIC    Collection Time: 09/04/20  3:27 AM   Result Value Ref Range    Sodium 139 136 - 145 mmol/L    Potassium 3.8 3.5 - 5.5 mmol/L    Chloride 104 100 - 111 mmol/L    CO2 27 21 - 32 mmol/L    Anion gap 8 3.0 - 18 mmol/L    Glucose 182 (H) 74 - 99 mg/dL    BUN 14 7.0 - 18 MG/DL    Creatinine 1.02 0.6 - 1.3 MG/DL    BUN/Creatinine ratio 14 12 - 20      GFR est AA >60 >60 ml/min/1.73m2    GFR est non-AA >60 >60 ml/min/1.73m2    Calcium 8.7 8.5 - 10.1 MG/DL   HEPATIC FUNCTION PANEL    Collection Time: 09/04/20  3:27 AM   Result Value Ref Range    Protein, total 7.4 6.4 - 8.2 g/dL    Albumin 2.3 (L) 3.4 - 5.0 g/dL    Globulin 5.1 (H) 2.0 - 4.0 g/dL    A-G Ratio 0.5 (L) 0.8 - 1.7      Bilirubin, total 0.7 0.2 - 1.0 MG/DL    Bilirubin, direct 0.2 0.0 - 0.2 MG/DL    Alk. phosphatase 71 45 - 117 U/L    AST (SGOT) 25 10 - 38 U/L    ALT (SGPT) 28 16 - 61 U/L   CBC WITH AUTOMATED DIFF    Collection Time: 09/04/20  3:27 AM   Result Value Ref Range    WBC 9.5 4.6 - 13.2 K/uL    RBC 3.63 (L) 4.70 - 5.50 M/uL    HGB 10.5 (L) 13.0 - 16.0 g/dL    HCT 34.9 (L) 36.0 - 48.0 %    MCV 96.1 74.0 - 97.0 FL    MCH 28.9 24.0 - 34.0 PG    MCHC 30.1 (L) 31.0 - 37.0 g/dL    RDW 13.2 11.6 - 14.5 %    PLATELET 118 945 - 600 K/uL    MPV 12.2 (H) 9.2 - 11.8 FL    NEUTROPHILS 69 42 - 75 %    BAND NEUTROPHILS 3 0 - 5 %    LYMPHOCYTES 18 (L) 20 - 51 %    MONOCYTES 10 (H) 2 - 9 %    EOSINOPHILS 0 0 - 5 %    BASOPHILS 0 0 - 3 %    ABS. NEUTROPHILS 6.8 1.8 - 8.0 K/UL    ABS. LYMPHOCYTES 1.7 0.8 - 3.5 K/UL    ABS. MONOCYTES 1.0 0 - 1.0 K/UL    ABS. EOSINOPHILS 0.0 0.0 - 0.4 K/UL    ABS.  BASOPHILS 0.0 0.0 - 0.06 K/UL    DF MANUAL      PLATELET COMMENTS ADEQUATE PLATELETS      RBC COMMENTS POLYCHROMASIA  1+        RBC COMMENTS ANISOCYTOSIS  1+       FERRITIN    Collection Time: 09/04/20  3:27 AM   Result Value Ref Range    Ferritin 755 (H) 8 - 388 NG/ML   C REACTIVE PROTEIN, QT    Collection Time: 09/04/20  3:27 AM   Result Value Ref Range    C-Reactive protein 4.3 (H) 0 - 0.3 mg/dL   LD    Collection Time: 09/04/20  3:27 AM   Result Value Ref Range     (H) 81 - 234 U/L   D DIMER    Collection Time: 09/04/20  3:27 AM   Result Value Ref Range    D DIMER 1.23 (H) <0.46 ug/ml(FEU)   PROCALCITONIN    Collection Time: 09/04/20  3:27 AM   Result Value Ref Range    Procalcitonin 0.25 ng/mL   GLUCOSE, POC    Collection Time: 09/04/20  5:17 AM   Result Value Ref Range    Glucose (POC) 184 (H) 70 - 110 mg/dL   GLUCOSE, POC    Collection Time: 09/04/20  9:03 AM   Result Value Ref Range    Glucose (POC) 394 (H) 70 - 110 mg/dL   GLUCOSE, POC Collection Time: 09/04/20 11:33 AM   Result Value Ref Range    Glucose (POC) 334 (H) 70 - 110 mg/dL     Additional Data Reviewed:     Condition: Stable  Follow-up Appointments:   1. Your PCP: Casper Loyd MD, within 7-10days  2. Follow-up with psychiatry outpatient as directed. 3.  Repeat CBC, CMP, mag in 2 to 3 days.       >30 minutes spent coordinating this discharge (review instructions/follow-up, prescriptions, preparing report for sign off)    Signed:  Momo De Anda NP  9/4/2020  3:01 PM

## 2020-09-04 NOTE — PROGRESS NOTES
Notified Dr. Dianna Nava that patient was treated with 15 units of insulin for a blood sugar of 394.

## 2020-09-04 NOTE — DIABETES MGMT
Glycemic Control Plan of Care    Patient continues on droplet isolation for r/o Covid. Spoke with him via phone and he confirmed his home DM medications:    Levemir 60 units BID  Humalog 30 units with meals  Metformin 1,000 mg BID    He confirms he has a glucometer and monitors BG 2-3 times per day with meals - he adds additional humalog on sliding scale for BG >200. States he is followed by Dr. Hector Boyce - he uses inulin pens and states he has no difficulty obtaining his medications. Currently receiving lantus 25 units BID  Corrective lispro, very insulin resistant, 4 times daily. Mealtime lispro 5 units. FBG this morning 182, 184.   Noted  @ 0900 -     Will provide DM education and follow up with lunch BG -     Your A1C  was for average blood glucose of 301 mg/dl for previous 2-3 months  Lab Results   Component Value Date/Time    Hemoglobin A1c 12.1 (H) 09/01/2020 10:57 AM         Estel Sicard MPH RN CDE  [phone]

## 2020-09-04 NOTE — DIABETES MGMT
Diabetes Patient/Family Education Record  Factors That  May Influence Patients Ability  to Learn or  Comply with Recommendations   []   Language barrier    []   Cultural needs   [x]   Motivation    []   Cognitive limitation    []   Physical   [x]   Education    []   Physiological factors   []   Hearing/vision/speaking impairment   []   Muslim beliefs    []   Financial factors   []  Other:   []  No factors identified at this time. Person Instructed:   [x]   Patient   []   Family   []  Other     Preference for Learning:   [x]   Verbal   [x]   Written - target A1c and BG. DM meal planning and plate method, foot care   []  Demonstration     Level of Comprehension & Competence:    []  Good                                      [x] Fair                                     []  Poor                             [x]  Needs Reinforcement   []  Teachback completed    Education Component:  Patient to discharge home later today and follow up with Dr. Maurice Valentine    [x]  Medication management - uses insulin pens    [x]  Nutritional management -  2-3 meals/day. Reviewed DM diet, meal planning and portions. Patient has been consuming regular soda and apple juice. Discussed sugar free options and to refrain from sugary drinks. Educated about plate method and to consume 3-4 smaller meals throughout the day. Provided printed material for plate method and portions. Reviewed importance of diet, medication and exercise -   [x]  Exercise - stressed 30 minutes of activity per day    [x]  Signs, symptoms, and treatment of hyperglycemia and hypoglycemia - he reports no episodes of hypoglycemia    [x] Prevention, recognition and treatment of hyperglycemia and hypoglycemia   [x]  Importance of blood glucose monitoring -  Monitors BG with meals. Educated about monitoring FBG and 2 hour post-prandial - and provided targets.    []  Instruction on use of the blood glucose meter   [x]  Discuss the importance of HbA1C monitoring -  12.1% for average  mg/dl for past 2-3 months.   Reviewed this with patient and goal of A1c of 7%   []  Sick day guidelines   []  Proper use and disposal of lancets, needles, syringes or insulin pens (if appropriate)   [x]  Potential long-term complications - provided printed material for foot care    [] Information about whom to contact in case of emergency or for more information    []  Goal:  Patient/family will demonstrate understanding of Diabetes Self Management Skills by: (date) _______  Plan for post-discharge education or self-management support:    [] Outpatient class schedule provided            [] Patient Declined    [] Scheduled for outpatient classes (date) _______  Verify:  Does patient understand how diabetes medications work? __no - explained today____  Does patient know what their most recent A1c is? ___yes, reviewed today_____  Does patient monitor glucose at home? ___yes__________  Does patient have difficulty obtaining diabetes medications or testing supplies? __no ___    Patric Burns MPH RN CDE  [phone]

## 2020-09-04 NOTE — ROUTINE PROCESS
Bedside shift change report given to Danelle Mena RN (oncoming nurse) by Devang Yeung RN (offgoing nurse). Report included the following information SBAR, Kardex, Intake/Output, MAR, Recent Results and Med Rec Status.

## 2020-09-04 NOTE — PROGRESS NOTES
Infectious Disease progress Note        Reason: community acquired pneumonia, suspected covid-19 pneumonia    Current abx Prior abx   Ceftriaxone, doxycyline sine 9/1/20      Lines:       Assessment :      61 y.o. male with h/o type uncontrolled type 2 DM (last hgbA1C 12.1 on 9/1/20) , CAD who presented to [facility] on 9/1/20 with AMS     Chest x-ray 9/1-multifocal infiltrates    Labs on 9/3-ferritin 806, CRP 4.6, d-dimer 1.45    Clinical presentation consistent with bilateral pneumonia-community-acquired pneumonia versus COVID-19 pneumonia. History, radiographic features, elevated d-dimer likely suggest COVID-19 pneumonia. Elevated procalcitonin 9/2-monitor for superimposed bacterial infection    Clinically stable. No significant hypoxia. Still with fevers. Uncontrolled type 2 diabetes, elevated inflammatory markers puts patient at high risk of clinical decompensation. Hence will start low-dose steroids. Altered mental status on admission-likely metabolic encephalopathy secondary to pneumonia-improving    Management complicated by lack of IV access. Recommendations:    1. Discontinue  Cefpodoxime. Continue azithromycin for total 5 doses. 2.  continue Decadron 6 mg p.o. every 24 hours x total 10 days  3. Anticoagulation per primary team  4. D/c planning per primary team    Above plan was discussed in details with patient, and dr Raymond Kenyon. Please call me if any further questions or concerns. Will continue to participate in the care of this patient. HPI:    Patient states that he feels better today compared to day of admission. Wants to go home.      home Medication List    Details   methadone HCl (METHADONE PO) Take  by mouth.      metoprolol tartrate (LOPRESSOR) 25 mg tablet Take 1 Tab by mouth every twelve (12) hours. Qty: 60 Tab, Refills: 0      insulin detemir U-100 (LEVEMIR U-100 INSULIN) 100 unit/mL injection 20 Units by SubCUTAneous route two (2) times a day.   Qty: 10 mL, Refills: 0      !! OTHER Resume Preadmission Methadone from clinic  Qty: 1 Each, Refills: 0      !! OTHER Graded compression stockings b/l LE- use as directed  Qty: 1 Each, Refills: 0      !! OTHER Check CBC, CMP, Mg in 3 days, results to PCP immediately, Diagnosis- syncope  Qty: 1 Each, Refills: 0      !! OTHER No Driving Until cleared by Cardiology to do so  Qty: 1 Each, Refills: 0      pantoprazole (PROTONIX) 40 mg tablet Take 1 Tab by mouth Before breakfast and dinner. Qty: 60 Tab, Refills: 0      acetaminophen (TYLENOL) 325 mg tablet Take 2 Tabs by mouth every six (6) hours as needed. Indications: Pain, take it with bentyl; do not exceed 3 g tylenol daily  Qty: 30 Tab, Refills: 0      !! OTHER Incentive spirometry- use as directed  Qty: 1 Each, Refills: 0      albuterol (PROVENTIL HFA, VENTOLIN HFA, PROAIR HFA) 90 mcg/actuation inhaler 2 puffs every 6 hours x 5 days then every 6 hours as needed for shortness of breath and/or wheezing  Qty: 1 Inhaler, Refills: 0      polyethylene glycol (MIRALAX) 17 gram packet Take 1 Packet by mouth daily. Qty: 30 Packet, Refills: 0      simvastatin (ZOCOR) 10 mg tablet Take 10 mg by mouth nightly. clopidogrel (PLAVIX) 75 mg tablet Take 75 mg by mouth daily. !! - Potential duplicate medications found. Please discuss with provider.           Current Facility-Administered Medications   Medication Dose Route Frequency    azithromycin (ZITHROMAX) tablet 500 mg  500 mg Oral Q24H    dexAMETHasone (DECADRON) tablet 6 mg  6 mg Oral Q24H    cefpodoxime (VANTIN) tablet 200 mg  200 mg Oral Q12H    insulin glargine (LANTUS) injection 25 Units  25 Units SubCUTAneous Q12H    insulin lispro (HUMALOG) injection 5 Units  5 Units SubCUTAneous TIDAC    traZODone (DESYREL) tablet 50 mg  50 mg Oral QHS PRN    influenza vaccine 2020-21 (6 mos+)(PF) (FLUARIX/FLULAVAL/FLUZONE QUAD) injection 0.5 mL  0.5 mL IntraMUSCular PRIOR TO DISCHARGE    sodium chloride (NS) flush 5-10 mL  5-10 mL IntraVENous PRN  insulin lispro (HUMALOG) injection   SubCUTAneous AC&HS    glucose chewable tablet 16 g  4 Tab Oral PRN    glucagon (GLUCAGEN) injection 1 mg  1 mg IntraMUSCular PRN    dextrose (D50W) injection syrg 12.5-25 g  25-50 mL IntraVENous PRN    metoprolol tartrate (LOPRESSOR) tablet 25 mg  25 mg Oral Q12H    pantoprazole (PROTONIX) tablet 40 mg  40 mg Oral ACB&D    atorvastatin (LIPITOR) tablet 10 mg  10 mg Oral QHS    methadone (DOLOPHINE) tablet 30 mg  30 mg Oral DAILY    clopidogreL (PLAVIX) tablet 75 mg  75 mg Oral DAILY    sodium chloride (NS) flush 5-40 mL  5-40 mL IntraVENous Q8H    sodium chloride (NS) flush 5-40 mL  5-40 mL IntraVENous PRN    acetaminophen (TYLENOL) tablet 650 mg  650 mg Oral Q6H PRN    Or    acetaminophen (TYLENOL) suppository 650 mg  650 mg Rectal Q6H PRN    polyethylene glycol (MIRALAX) packet 17 g  17 g Oral DAILY PRN    enoxaparin (LOVENOX) injection 40 mg  40 mg SubCUTAneous DAILY    ondansetron (ZOFRAN ODT) tablet 4 mg  4 mg Oral Q8H PRN    Or    ondansetron (ZOFRAN) injection 4 mg  4 mg IntraVENous Q6H PRN       Allergies: Compazine [prochlorperazine]    Temp (24hrs), Av.9 °F (36.6 °C), Min:97 °F (36.1 °C), Max:100.3 °F (37.9 °C)    Visit Vitals  /78 (BP 1 Location: Right arm, BP Patient Position: At rest)   Pulse (!) 53   Temp 98.5 °F (36.9 °C)   Resp 19   Ht 5' 9.02\" (1.753 m)   Wt 108.9 kg (240 lb)   SpO2 95%   BMI 35.42 kg/m²       ROS: 12 point ROS obtained in details.  Pertinent positives as mentioned in HPI,   otherwise negative    Physical Exam:    General:  Alert, NAD  Cardiovascular:  RRR  Pulmonary:  LSC throughout; respiratory effort WNL  GI:  +BS in all four quadrants, soft, non-tender  Extremities:  No edema; 2+ dorsalis pedis pulses bilaterally  Neuro: alert and oriented x3  Skin: no rash/ulcers noted  Labs: Results:   Chemistry Recent Labs     20  0327 20  0137 20  0351   * 158* 335*    143 139   K 3.8 3.4* 4.0  107 102   CO2 27 31 30   BUN 14 15 16   CREA 1.02 1.32* 1.48*   CA 8.7 8.3* 8.7   AGAP 8 5 7   BUCR 14 11* 11*   AP 71 83 109   TP 7.4 7.6 9.5*   ALB 2.3* 2.3* 2.9*   GLOB 5.1* 5.3* 6.6*   AGRAT 0.5* 0.4* 0.4*      CBC w/Diff Recent Labs     09/04/20  0327 09/03/20  0137 09/02/20  0351   WBC 9.5 9.3 8.0   RBC 3.63* 3.83* 4.43*   HGB 10.5* 11.1* 13.2   HCT 34.9* 36.7 42.3    308 280   GRANS 69 59 83*   LYMPH 18* 21 10*   EOS 0 2 0      Microbiology Recent Labs     09/01/20  1425 09/01/20  1109 09/01/20  1057   CULT No growth (<1,000 CFU/ML) NO GROWTH 3 DAYS NO GROWTH 3 DAYS          RADIOLOGY:    All available imaging studies/reports in [facility] for this admission were reviewed    Dr. Roberto Florence, Infectious Disease Specialist  [phone]  September 4, 2020  1:14 PM

## 2020-09-04 NOTE — PROGRESS NOTES
I was on lunch break when charge nurse came and got me and asked if patient was being discharged. I went to talk with the patient and the patient stated that his ride had been here waiting because this morning Dr. Sourav Fermin told him he was being discharged. After speaking with Dr. Sourav Fermin he informed me that he notified the patient that he may be able to leave at some point today. He told me to go ahead and contact Marino Keen NP for discharge summary and prescriptions. Elisha notified me that she was in the process of working on patient's discharge and asked me to find out where the patient's pharmacy is. When I went into the room to get this information the patient stated, \"I am done waiting, what happens if I just leave? \"  I explained to him that if he leaves [facility] insurance will not cover his hospital stay and he will not have the necessary prescriptions he needs. I also informed him that the NP was working on his discharge and it would not take much longer to discharge him. He stated he was not waiting. NP notified. Patient signed AMA paperwork and left. No IV access was in place to be removed.

## 2020-09-04 NOTE — ROUTINE PROCESS
End of Shift Note Bedside and verbal shift change report given to Chrissy Thomas RN (On coming nurse) by Dorothea Jenkins RN (Off going nurse). Report included the following information:  
   --Procedure Summary 
   --MAR, 
   --Recent Results --Med Rec Status SBAR Recommendations: watch for COVID result Issues for Provider to address Activity This Shift 
 
 [] Bed Rest Order 
 [] Refused 
 [] Dangled  
 [] TDWB Ambulating: 
   [x] Bathroom [] BSC [] Room/Hallway Up in Chair for meals []Yes [] No  
Voiding       [x] Yes  [] No 
Van          [] Yes  [] No 
Incontinent [] Yes  [] No 
 
DUE TO VOID POUR        [] Yes [] No 
Purewick    [] Yes [] No 
New Onset [] Yes [] No Straight Cath   []Yes  [] No 
Condom Cath  [] Yes [] No 
MD Called      [] Yes  [] No  
Blood Sugars Managed [x]Yes [] No   
Bowels Moved [] Yes [x] No 
 
Incontinent     [] Yes [] No Passed Gas []Yes [] No 
 
New Onset  []Yes [] No 
  
 
 MD Called []Yes  [] No 
  
CHG Bath Done Before Surgery After Surgery  
  
[] Yes  [x] No 
[] Yes  [x] No   
  
Drain Removed [] Yes  [] No [x] N/A Dressing Changed [] Yes   [] No [x] N/A Nausea/Vomiting [] Yes   [x] No    
Ice Packs Changed [] Yes   [] No  [x] N/A Incentive Spirometer  [x] Yes  [] No     
SCD Pumps On Ankle Pumping  [] Yes   [x] No  
  
[] Yes   [x] No    
  
Telemetry Monitoring [] Yes   [x] No   Rhythm

## 2020-09-04 NOTE — PROGRESS NOTES
Discharge planning    Reviewed chart. CM will continue to monitor for needs. Plan is for home at this time when medically stable.      ALANA VallecilloN, RN  Pager # [phone]  Care Manager

## 2020-09-04 NOTE — PROGRESS NOTES
Held morning dose of metoprolol due to HR of 49. Will notify MD when he rounds. 1002:  Dr. aBr Aw notified.

## 2020-09-24 ENCOUNTER — APPOINTMENT (OUTPATIENT)
Dept: GENERAL RADIOLOGY | Age: 60
DRG: 314 | End: 2020-09-24
Attending: STUDENT IN AN ORGANIZED HEALTH CARE EDUCATION/TRAINING PROGRAM
Payer: MEDICAID

## 2020-09-24 ENCOUNTER — APPOINTMENT (OUTPATIENT)
Dept: GENERAL RADIOLOGY | Age: 60
DRG: 314 | End: 2020-09-24
Attending: EMERGENCY MEDICINE
Payer: MEDICAID

## 2020-09-24 ENCOUNTER — HOSPITAL ENCOUNTER (INPATIENT)
Age: 60
LOS: 2 days | Discharge: HOME HEALTH | DRG: 314 | End: 2020-09-26
Attending: EMERGENCY MEDICINE | Admitting: INTERNAL MEDICINE
Payer: MEDICAID

## 2020-09-24 DIAGNOSIS — E11.628 DIABETIC FOOT INFECTION (HCC): Primary | ICD-10-CM

## 2020-09-24 DIAGNOSIS — L08.9 DIABETIC FOOT INFECTION (HCC): Primary | ICD-10-CM

## 2020-09-24 PROBLEM — S92.919A FRACTURE, TOE: Status: ACTIVE | Noted: 2020-09-24

## 2020-09-24 LAB
ALBUMIN SERPL-MCNC: 2.7 G/DL (ref 3.4–5)
ALBUMIN/GLOB SERPL: 0.5 {RATIO} (ref 0.8–1.7)
ALP SERPL-CCNC: 140 U/L (ref 45–117)
ALT SERPL-CCNC: 38 U/L (ref 16–61)
ANION GAP SERPL CALC-SCNC: 4 MMOL/L (ref 3–18)
APPEARANCE UR: ABNORMAL
AST SERPL-CCNC: 31 U/L (ref 10–38)
BASOPHILS # BLD: 0 K/UL (ref 0–0.1)
BASOPHILS NFR BLD: 0 % (ref 0–2)
BILIRUB SERPL-MCNC: 0.7 MG/DL (ref 0.2–1)
BILIRUB UR QL: NEGATIVE
BUN SERPL-MCNC: 15 MG/DL (ref 7–18)
BUN/CREAT SERPL: 11 (ref 12–20)
CALCIUM SERPL-MCNC: 8.6 MG/DL (ref 8.5–10.1)
CHLORIDE SERPL-SCNC: 99 MMOL/L (ref 100–111)
CO2 SERPL-SCNC: 32 MMOL/L (ref 21–32)
COLOR UR: YELLOW
CREAT SERPL-MCNC: 1.33 MG/DL (ref 0.6–1.3)
DIFFERENTIAL METHOD BLD: ABNORMAL
EOSINOPHIL # BLD: 0.5 K/UL (ref 0–0.4)
EOSINOPHIL NFR BLD: 4 % (ref 0–5)
ERYTHROCYTE [DISTWIDTH] IN BLOOD BY AUTOMATED COUNT: 14.1 % (ref 11.6–14.5)
GLOBULIN SER CALC-MCNC: 5.1 G/DL (ref 2–4)
GLUCOSE BLD STRIP.AUTO-MCNC: 219 MG/DL (ref 70–110)
GLUCOSE SERPL-MCNC: 333 MG/DL (ref 74–99)
GLUCOSE UR STRIP.AUTO-MCNC: >1000 MG/DL
HCT VFR BLD AUTO: 29.1 % (ref 36–48)
HGB BLD-MCNC: 8.9 G/DL (ref 13–16)
HGB UR QL STRIP: NEGATIVE
KETONES UR QL STRIP.AUTO: NEGATIVE MG/DL
LACTATE BLD-SCNC: 1.03 MMOL/L (ref 0.4–2)
LACTATE BLD-SCNC: 2.49 MMOL/L (ref 0.4–2)
LEUKOCYTE ESTERASE UR QL STRIP.AUTO: NEGATIVE
LYMPHOCYTES # BLD: 2 K/UL (ref 0.9–3.6)
LYMPHOCYTES NFR BLD: 17 % (ref 21–52)
MCH RBC QN AUTO: 29.2 PG (ref 24–34)
MCHC RBC AUTO-ENTMCNC: 30.6 G/DL (ref 31–37)
MCV RBC AUTO: 95.4 FL (ref 74–97)
MONOCYTES # BLD: 1 K/UL (ref 0.05–1.2)
MONOCYTES NFR BLD: 9 % (ref 3–10)
NEUTS SEG # BLD: 8.4 K/UL (ref 1.8–8)
NEUTS SEG NFR BLD: 70 % (ref 40–73)
NITRITE UR QL STRIP.AUTO: NEGATIVE
PH UR STRIP: 5.5 [PH] (ref 5–8)
PLATELET # BLD AUTO: 306 K/UL (ref 135–420)
PMV BLD AUTO: 10.9 FL (ref 9.2–11.8)
POTASSIUM SERPL-SCNC: 4 MMOL/L (ref 3.5–5.5)
PROT SERPL-MCNC: 7.8 G/DL (ref 6.4–8.2)
PROT UR STRIP-MCNC: NEGATIVE MG/DL
RBC # BLD AUTO: 3.05 M/UL (ref 4.7–5.5)
SODIUM SERPL-SCNC: 135 MMOL/L (ref 136–145)
SP GR UR REFRACTOMETRY: 1.02 (ref 1–1.03)
UROBILINOGEN UR QL STRIP.AUTO: 1 EU/DL (ref 0.2–1)
WBC # BLD AUTO: 11.9 K/UL (ref 4.6–13.2)

## 2020-09-24 PROCEDURE — 74011250636 HC RX REV CODE- 250/636: Performed by: EMERGENCY MEDICINE

## 2020-09-24 PROCEDURE — 81003 URINALYSIS AUTO W/O SCOPE: CPT

## 2020-09-24 PROCEDURE — 82962 GLUCOSE BLOOD TEST: CPT

## 2020-09-24 PROCEDURE — 74011250637 HC RX REV CODE- 250/637: Performed by: NURSE PRACTITIONER

## 2020-09-24 PROCEDURE — 74011636637 HC RX REV CODE- 636/637: Performed by: NURSE PRACTITIONER

## 2020-09-24 PROCEDURE — 83605 ASSAY OF LACTIC ACID: CPT

## 2020-09-24 PROCEDURE — 87040 BLOOD CULTURE FOR BACTERIA: CPT

## 2020-09-24 PROCEDURE — 96365 THER/PROPH/DIAG IV INF INIT: CPT

## 2020-09-24 PROCEDURE — 73620 X-RAY EXAM OF FOOT: CPT

## 2020-09-24 PROCEDURE — 93005 ELECTROCARDIOGRAM TRACING: CPT

## 2020-09-24 PROCEDURE — 96366 THER/PROPH/DIAG IV INF ADDON: CPT

## 2020-09-24 PROCEDURE — 87086 URINE CULTURE/COLONY COUNT: CPT

## 2020-09-24 PROCEDURE — 71045 X-RAY EXAM CHEST 1 VIEW: CPT

## 2020-09-24 PROCEDURE — 65660000000 HC RM CCU STEPDOWN

## 2020-09-24 PROCEDURE — 99284 EMERGENCY DEPT VISIT MOD MDM: CPT

## 2020-09-24 PROCEDURE — 96367 TX/PROPH/DG ADDL SEQ IV INF: CPT

## 2020-09-24 PROCEDURE — 99223 1ST HOSP IP/OBS HIGH 75: CPT | Performed by: NURSE PRACTITIONER

## 2020-09-24 PROCEDURE — 80053 COMPREHEN METABOLIC PANEL: CPT

## 2020-09-24 PROCEDURE — 85025 COMPLETE CBC W/AUTO DIFF WBC: CPT

## 2020-09-24 PROCEDURE — 74011000258 HC RX REV CODE- 258: Performed by: EMERGENCY MEDICINE

## 2020-09-24 RX ORDER — INSULIN LISPRO 100 [IU]/ML
INJECTION, SOLUTION INTRAVENOUS; SUBCUTANEOUS
Status: DISCONTINUED | OUTPATIENT
Start: 2020-09-24 | End: 2020-09-25

## 2020-09-24 RX ORDER — MAGNESIUM SULFATE 100 %
4 CRYSTALS MISCELLANEOUS AS NEEDED
Status: DISCONTINUED | OUTPATIENT
Start: 2020-09-24 | End: 2020-09-26 | Stop reason: HOSPADM

## 2020-09-24 RX ORDER — SODIUM CHLORIDE 0.9 % (FLUSH) 0.9 %
5-10 SYRINGE (ML) INJECTION AS NEEDED
Status: DISCONTINUED | OUTPATIENT
Start: 2020-09-24 | End: 2020-09-26 | Stop reason: HOSPADM

## 2020-09-24 RX ORDER — FAMOTIDINE 20 MG/1
20 TABLET, FILM COATED ORAL DAILY
Status: DISCONTINUED | OUTPATIENT
Start: 2020-09-25 | End: 2020-09-26 | Stop reason: HOSPADM

## 2020-09-24 RX ORDER — INSULIN GLARGINE 100 [IU]/ML
60 INJECTION, SOLUTION SUBCUTANEOUS 2 TIMES DAILY
Status: DISCONTINUED | OUTPATIENT
Start: 2020-09-25 | End: 2020-09-25

## 2020-09-24 RX ORDER — ACETAMINOPHEN 650 MG/1
650 SUPPOSITORY RECTAL
Status: DISCONTINUED | OUTPATIENT
Start: 2020-09-24 | End: 2020-09-26 | Stop reason: HOSPADM

## 2020-09-24 RX ORDER — SODIUM CHLORIDE 0.9 % (FLUSH) 0.9 %
5-40 SYRINGE (ML) INJECTION EVERY 8 HOURS
Status: DISCONTINUED | OUTPATIENT
Start: 2020-09-24 | End: 2020-09-26 | Stop reason: HOSPADM

## 2020-09-24 RX ORDER — VANCOMYCIN 2 GRAM/500 ML IN 0.9 % SODIUM CHLORIDE INTRAVENOUS
2000 ONCE
Status: COMPLETED | OUTPATIENT
Start: 2020-09-24 | End: 2020-09-24

## 2020-09-24 RX ORDER — SODIUM CHLORIDE 0.9 % (FLUSH) 0.9 %
5-40 SYRINGE (ML) INJECTION AS NEEDED
Status: DISCONTINUED | OUTPATIENT
Start: 2020-09-24 | End: 2020-09-26 | Stop reason: HOSPADM

## 2020-09-24 RX ORDER — METOPROLOL TARTRATE 25 MG/1
25 TABLET, FILM COATED ORAL EVERY 12 HOURS
Status: DISCONTINUED | OUTPATIENT
Start: 2020-09-25 | End: 2020-09-26 | Stop reason: HOSPADM

## 2020-09-24 RX ORDER — ONDANSETRON 4 MG/1
4 TABLET, ORALLY DISINTEGRATING ORAL
Status: DISCONTINUED | OUTPATIENT
Start: 2020-09-24 | End: 2020-09-26 | Stop reason: HOSPADM

## 2020-09-24 RX ORDER — CLOPIDOGREL BISULFATE 75 MG/1
75 TABLET ORAL DAILY
Status: DISCONTINUED | OUTPATIENT
Start: 2020-09-25 | End: 2020-09-26 | Stop reason: HOSPADM

## 2020-09-24 RX ORDER — ONDANSETRON 2 MG/ML
4 INJECTION INTRAMUSCULAR; INTRAVENOUS
Status: DISCONTINUED | OUTPATIENT
Start: 2020-09-24 | End: 2020-09-26 | Stop reason: HOSPADM

## 2020-09-24 RX ORDER — POLYETHYLENE GLYCOL 3350 17 G/17G
17 POWDER, FOR SOLUTION ORAL DAILY PRN
Status: DISCONTINUED | OUTPATIENT
Start: 2020-09-24 | End: 2020-09-26 | Stop reason: HOSPADM

## 2020-09-24 RX ORDER — ATORVASTATIN CALCIUM 10 MG/1
10 TABLET, FILM COATED ORAL
Status: DISCONTINUED | OUTPATIENT
Start: 2020-09-24 | End: 2020-09-26 | Stop reason: HOSPADM

## 2020-09-24 RX ORDER — DEXTROSE 50 % IN WATER (D50W) INTRAVENOUS SYRINGE
25-50 AS NEEDED
Status: DISCONTINUED | OUTPATIENT
Start: 2020-09-24 | End: 2020-09-26 | Stop reason: HOSPADM

## 2020-09-24 RX ORDER — ACETAMINOPHEN 325 MG/1
650 TABLET ORAL
Status: DISCONTINUED | OUTPATIENT
Start: 2020-09-24 | End: 2020-09-26 | Stop reason: HOSPADM

## 2020-09-24 RX ADMIN — Medication 10 ML: at 23:17

## 2020-09-24 RX ADMIN — PIPERACILLIN AND TAZOBACTAM 3.38 G: 3; .375 INJECTION, POWDER, LYOPHILIZED, FOR SOLUTION INTRAVENOUS at 15:01

## 2020-09-24 RX ADMIN — INSULIN LISPRO 4 UNITS: 100 INJECTION, SOLUTION INTRAVENOUS; SUBCUTANEOUS at 23:15

## 2020-09-24 RX ADMIN — ATORVASTATIN CALCIUM 10 MG: 10 TABLET, FILM COATED ORAL at 23:07

## 2020-09-24 RX ADMIN — PIPERACILLIN AND TAZOBACTAM 3.38 G: 3; .375 INJECTION, POWDER, LYOPHILIZED, FOR SOLUTION INTRAVENOUS at 23:07

## 2020-09-24 RX ADMIN — VANCOMYCIN HYDROCHLORIDE 2000 MG: 10 INJECTION, POWDER, LYOPHILIZED, FOR SOLUTION INTRAVENOUS at 15:30

## 2020-09-24 RX ADMIN — SODIUM CHLORIDE 1000 ML: 900 INJECTION, SOLUTION INTRAVENOUS at 17:13

## 2020-09-24 RX ADMIN — SODIUM CHLORIDE 333 ML: 900 INJECTION, SOLUTION INTRAVENOUS at 23:18

## 2020-09-24 RX ADMIN — SODIUM CHLORIDE 1000 ML: 900 INJECTION, SOLUTION INTRAVENOUS at 15:00

## 2020-09-24 NOTE — REMOTE MONITORING
Spoke with primary RN regarding sepsis bundle need- repeat ARIES Kruse MSN, Rosendo Alvarado 20  [phone]

## 2020-09-24 NOTE — H&P
History & Physical    Patient: Romana Muckle MRN: [id number]  CSN: [id number]    YOB: 1960  Age: 61 y.o. Sex: male      DOA: 9/24/2020  CC:follow up from podiatry office    PCP: Esther Mccall MD       HPI:     Romana Muckle is a 61 y.o. male who presents to ER for follow up from podiatry office for left foot/toe fracture and tissue swelling. Follow up from Dr. Isis Brantd, podiatry. Plan for surgical intervention on 9/25. Denies sensation to the toe and denies pain. Upon arrival to ER fever noted 101.1 Sepsis protocol initiated. IV abx initiated. History of  methadone use for opioid abuse. 30 mg daily but last dose of methadone 4-5 days ago. Patient states he is attempting to wean himself off the methadone.      Past Medical History:   Diagnosis Date    Arthritis     Coronary atherosclerosis of native coronary artery     S/P PCI with GE in 12/09    Diabetes [address] [address]     IDDM    Electrolyte and fluid disorders not elsewhere classified     Essential hypertension, benign     GERD (gastroesophageal reflux disease)     Heroin abuse ([facility] [address] 05/26/16    Psychiatrist Dr. Basim Polanco History of heart attack     Hyperlipidemia     Intermediate coronary syndrome [facility])     MDD (major depressive disorder) 5/26/16    Psychiatrist Dr. Basim Polanco Myocardial infarction [facility])     Obesity, unspecified     Old myocardial infarction     Other and unspecified hyperlipidemia     Pancreatitis     Positive PPD     Sleep apnea     uses Cpap machine    Transfusion history     Before 1992       Past Surgical History:   Procedure Laterality Date    HX APPENDECTOMY      HX CORONARY STENT PLACEMENT  2010    2 stents       Family History   Problem Relation Age of Onset    Heart Attack Father     Coronary Artery Disease Mother     Diabetes Mother     Cancer Sister         breast cancer and lung cancer       Social History     Socioeconomic History    Marital status:      Spouse name: Not on file    Number of children: Not on file    Years of education: Not on file    Highest education level: Not on file   Tobacco Use    Smoking status: Never Smoker    Smokeless tobacco: Never Used   Substance and Sexual Activity    Alcohol use: No    Drug use: No       Prior to Admission medications    Medication Sig Start Date End Date Taking? Authorizing Provider   traZODone (DESYREL) 50 mg tablet Take 1 Tab by mouth nightly as needed for Sleep for up to 30 days. 9/4/20 10/4/20  Royal Nichole NP   ondansetron (ZOFRAN ODT) 4 mg disintegrating tablet Take 1 Tab by mouth every eight (8) hours as needed for Nausea or Vomiting. 9/4/20   Royal Nichole NP   Lactobacillus Acidoph & Fanygar [facility]) 1 million cell tab tablet Take 2 Tabs by mouth two (2) times a day for 30 days. 9/4/20 10/4/20  Royal Nichole NP   OTHER Check CBC, CMP, Mg in 3 days, results to PCP immediately, Diagnosis- syncope 9/4/20   Royal Nichole NP   OTHER Graded compression stockings b/l LE- use as directed 9/4/20   Royal Nichole NP   OTHER Incentive spirometry- use as directed 9/4/20   Royal Nichole NP   methadone HCl (METHADONE PO) Take  by mouth. Other, MD Yamil   metoprolol tartrate (LOPRESSOR) 25 mg tablet Take 1 Tab by mouth every twelve (12) hours. 12/27/19   Papo Woods MD   insulin detemir U-100 (LEVEMIR U-100 INSULIN) 100 unit/mL injection 20 Units by SubCUTAneous route two (2) times a day. 12/27/19   Papo Woods MD   OTHER No Driving Until cleared by Cardiology to do so 12/27/19   Papo Woods MD   pantoprazole (PROTONIX) 40 mg tablet Take 1 Tab by mouth Before breakfast and dinner. 3/29/18   Erik Palomino NP   acetaminophen (TYLENOL) 325 mg tablet Take 2 Tabs by mouth every six (6) hours as needed.  Indications: Pain, take it with bentyl; do not exceed 3 g tylenol daily 3/29/18   Erik Palomino NP   albuterol (PROVENTIL HFA, VENTOLIN HFA, PROAIR HFA) 90 mcg/actuation inhaler 2 puffs every 6 hours x 5 days then every 6 hours as needed for shortness of breath and/or wheezing 9/11/17   Bandar Borrero MD   polyethylene glycol (MIRALAX) 17 gram packet Take 1 Packet by mouth daily. 9/11/17   Bandar Borrero MD   simvastatin (ZOCOR) 10 mg tablet Take 10 mg by mouth nightly. Provider, Historical   clopidogrel (PLAVIX) 75 mg tablet Take 75 mg by mouth daily. Provider, Historical       Allergies   Allergen Reactions    Compazine [Prochlorperazine] Anaphylaxis     \"Tightness around throat\"              Physical Exam:      Visit Vitals  BP (!) 149/88 (BP 1 Location: Left arm, BP Patient Position: At rest)   Pulse 99   Temp (!) 101.1 °F (38.4 °C)   Resp 18   Ht 5' 9\" (1.753 m)   Wt 111.1 kg (245 lb)   SpO2 97%   BMI 36.18 kg/m²       Physical Exam:  General:  Alert, NAD  HEENT: normocephalic, EOM, PERRL,   Cardiovascular:  RRR  Pulmonary:  LSC throughout; respiratory effort WNL  GI:  +BS in all four quadrants, soft, non-tender  Extremities:  No edema; 2+ dorsalis pedis pulses bilaterally  Neuro: alert and oriented x3  2nd toe of left foot pale in color with wound. No active bleeding     Lab/Data Review:  Labs: Results:       Chemistry Recent Labs     09/24/20  1307   *   *   K 4.0   CL 99*   CO2 32   BUN 15   CREA 1.33*   CA 8.6   AGAP 4   BUCR 11*   *   TP 7.8   ALB 2.7*   GLOB 5.1*   AGRAT 0.5*      CBC w/Diff Recent Labs     09/24/20  1307   WBC 11.9   RBC 3.05*   HGB 8.9*   HCT 29.1*      GRANS 70   LYMPH 17*   EOS 4      Coagulation No results for input(s): PTP, INR, APTT, INREXT in the last 72 hours. Iron/Ferritin No results for input(s): IRON in the last 72 hours. No lab exists for component: TIBCCALC   BNP No results for input(s): BNPP in the last 72 hours. Cardiac Enzymes No results for input(s): CPK, CKND1, TAMERA in the last 72 hours.     No lab exists for component: CKRMB, TROIP   Liver Enzymes Recent Labs     09/24/20  1307   TP 7.8   ALB 2.7*   *      Thyroid Studies Lab Results   Component Value Date/Time    TSH 2.94 12/26/2019 05:19 AM          All Micro Results     Procedure Component Value Units Date/Time    CULTURE, URINE [[id number]]     Order Status:  Sent Specimen:  Cath Urine     CULTURE, BLOOD [[id number]]     Order Status:  Sent Specimen:  Blood     CULTURE, BLOOD [[id number]]     Order Status:  Sent Specimen:  Blood     CULTURE, BLOOD [[id number]] Collected:  09/24/20 1307    Order Status:  Completed Specimen:  Blood Updated:  09/24/20 1337    CULTURE, BLOOD [[id number]] Collected:  09/24/20 1309    Order Status:  Completed Specimen:  Blood Updated:  09/24/20 1337          Imaging Reviewed:  XR Results (most recent):  Results from Hospital Encounter encounter on 09/24/20   XR FOOT LT AP/LAT    Narrative EXAM: FOOT TWO VIEWS LEFT     CLINICAL HISTORY/INDICATION: Toe injury , left foot pain, diabetic    COMPARISON: None. TECHNIQUE: Two views obtained. FINDINGS:     There is moderate soft tissue swelling over the anterior foot. The toes are held  in flexion and the metatarsophalangeal joints are held in its origin. Fracture  is seen through the distal aspect of the middle phalanx of the third digit. Fracture fragment is displaced in a palmar direction. Mineralization is normal.  There is no radiopaque foreign body. Impression IMPRESSION:    Displaced fracture through the distal aspect of the middle phalanx of the third  digit  Soft tissue swelling over the dorsum of the foot. Assessment:   Active Problems:    Fracture, toe (9/24/2020)    Left foot/toe wound  Sepsis from left foot/toe wound   Fever   Acute anemia   Hyponatremia  SHYLA  Elevated alk phos   DMT2 with hyperglycemia and uncontrolled and with neuropathy   HTN  CAD/MI   HLD  GERD  Methadone use         Plan:   1. Podiatry consult. NPO after midnight. Plan for surgical intervention on 9/25. 2. Continue IV abx.  Follow cultures. Monitor fever   3. Monitor HH  3. Monitor metabolic panel, renal function, and hepatic function  4. POC glucose, SSI, Lantus. Diabetes management consult  5. Patient uses methadone with last dose 4-5 days ago. Patient expressed weaning from the use of methadone. 30 mg Methadone was his last dose. 6. Resume home medication with exceptions  7. Home Plavix on hold. Last dose of Plavix yesterday. 8. Pepcid   9.  Code status, full code           [facility], NP  9/24/2020, 5:24 PM

## 2020-09-24 NOTE — ED PROVIDER NOTES
HPI     Pt is a 62 y/o M w/ PMHx of diabetes, neuropathy, obesity presents to ED for toe wound and admission for surgery. Pt sees a podiatrist who examined pt 2nd toe on L foot, to find decreased sensation and poor vascular supply. Pt had wound explored on 9/23 and was told to come to [facility] on 9/24 for surgery on 9/25. Pt doesn't know much else. Pt has no other complaints.        Past Medical History:   Diagnosis Date    Arthritis     Coronary atherosclerosis of native coronary artery     S/P PCI with GE in 12/09    Diabetes ([facility] [address]     IDDM    Electrolyte and fluid disorders not elsewhere classified     Essential hypertension, benign     GERD (gastroesophageal reflux disease)     [facility] 05/26/[facility]    Psychiatrist Dr. Iris Calvert History of heart attack     Hyperlipidemia     Intermediate coronary syndrome ([facility] [address]     MDD (major depressive disorder) 5/26/16    Psychiatrist Dr. Iris Calvert [facility]ocardial infarction [facility])     Obesity, unspecified     Old myocardial infarction     Other and unspecified hyperlipidemia     Pancreatitis     Positive PPD     Sleep apnea     uses Cpap machine    Transfusion history     Before 1992       Past Surgical History:   Procedure Laterality Date    HX APPENDECTOMY      HX CORONARY STENT PLACEMENT  2010    2 stents         Family History:   Problem Relation Age of Onset    Heart Attack Father     Coronary Artery Disease Mother     Diabetes Mother     Cancer Sister         breast cancer and lung cancer       Social History     Socioeconomic History    Marital status:      Spouse name: Not on file    Number of children: Not on file    Years of education: Not on file    Highest education level: Not on file   Occupational History    Not on file   Social Needs    Financial resource strain: Not on file    Food insecurity     Worry: Not on file     Inability: Not on file    Transportation needs     Medical: Not on file Non-medical: Not on file   Tobacco Use    Smoking status: Never Smoker    Smokeless tobacco: Never Used   Substance and Sexual Activity    Alcohol use: No    Drug use: No    Sexual activity: Not on file   Lifestyle    Physical activity     Days per week: Not on file     Minutes per session: Not on file    Stress: Not on file   Relationships    Social connections     Talks on phone: Not on file     Gets together: Not on file     Attends Spiritism service: Not on file     Active member of club or organization: Not on file     Attends meetings of clubs or organizations: Not on file     Relationship status: Not on file    Intimate partner violence     Fear of current or ex partner: Not on file     Emotionally abused: Not on file     Physically abused: Not on file     Forced sexual activity: Not on file   Other Topics Concern    Not on file   Social History Narrative    Not on file         ALLERGIES: Compazine [prochlorperazine]    Review of Systems   Constitutional: Negative. Respiratory: Negative. Cardiovascular: Negative. Gastrointestinal: Negative. Musculoskeletal:        Toe injury   Neurological: Negative. Psychiatric/Behavioral: Negative. Vitals:    09/24/20 1249   BP: (!) 149/88   Pulse: 99   Resp: 18   Temp: (!) 101.1 °F (38.4 °C)   SpO2: 97%   Weight: 111.1 kg (245 lb)   Height: 5' 9\" (1.753 m)            Physical Exam  Constitutional:       Appearance: Normal appearance. He is normal weight. Cardiovascular:      Rate and Rhythm: Normal rate. Pulmonary:      Effort: Pulmonary effort is normal.   Abdominal:      General: Abdomen is flat. Bowel sounds are normal.      Palpations: Abdomen is soft. Musculoskeletal:      Comments: 2nd toe on L foot is pale, has gaping wound on distal surface. No active bleeding. Neurological:      Mental Status: He is alert.           MDM  Number of Diagnoses or Management Options  Diagnosis management comments: 1300  Sepsis alert called, will get LA, CMP, CBC, BCx x2    1315  LA 2.49, temp 101.1    1320  Pt AOx4, lucid, no distress. States he doesn't know his podiatrist's name but he was told to come to hospital today and to be admitted and would have surgery on 9/25. Pt states he thinks his foot is infected. On L foot, 2nd digit appears swollen, pale, and has a round laceration in the front. Pt states the podiatrist inserted a probe into the toe on 9/23 and he couldn't feel anything. Believes he will need surgery and amputation.     1423  Begin broad-spectrum abx, admit pt to hospital         Procedures

## 2020-09-25 ENCOUNTER — ANESTHESIA EVENT (OUTPATIENT)
Dept: SURGERY | Age: 60
DRG: 314 | End: 2020-09-25
Payer: MEDICAID

## 2020-09-25 ENCOUNTER — ANESTHESIA (OUTPATIENT)
Dept: SURGERY | Age: 60
DRG: 314 | End: 2020-09-25
Payer: MEDICAID

## 2020-09-25 LAB
ALBUMIN SERPL-MCNC: 2.4 G/DL (ref 3.4–5)
ALBUMIN/GLOB SERPL: 0.5 {RATIO} (ref 0.8–1.7)
ALP SERPL-CCNC: 126 U/L (ref 45–117)
ALT SERPL-CCNC: 34 U/L (ref 16–61)
ANION GAP SERPL CALC-SCNC: 4 MMOL/L (ref 3–18)
AST SERPL-CCNC: 38 U/L (ref 10–38)
ATRIAL RATE: 74 BPM
BASOPHILS # BLD: 0 K/UL (ref 0–0.1)
BASOPHILS NFR BLD: 0 % (ref 0–2)
BILIRUB DIRECT SERPL-MCNC: 0.2 MG/DL (ref 0–0.2)
BILIRUB SERPL-MCNC: 0.5 MG/DL (ref 0.2–1)
BUN SERPL-MCNC: 11 MG/DL (ref 7–18)
BUN/CREAT SERPL: 10 (ref 12–20)
CALCIUM SERPL-MCNC: 8 MG/DL (ref 8.5–10.1)
CALCULATED P AXIS, ECG09: 30 DEGREES
CALCULATED R AXIS, ECG10: 8 DEGREES
CALCULATED T AXIS, ECG11: 39 DEGREES
CHLORIDE SERPL-SCNC: 104 MMOL/L (ref 100–111)
CO2 SERPL-SCNC: 30 MMOL/L (ref 21–32)
COVID-19 RAPID TEST, COVR: NOT DETECTED
CREAT SERPL-MCNC: 1.12 MG/DL (ref 0.6–1.3)
DIAGNOSIS, 93000: NORMAL
DIFFERENTIAL METHOD BLD: ABNORMAL
EOSINOPHIL # BLD: 0.5 K/UL (ref 0–0.4)
EOSINOPHIL NFR BLD: 6 % (ref 0–5)
ERYTHROCYTE [DISTWIDTH] IN BLOOD BY AUTOMATED COUNT: 14 % (ref 11.6–14.5)
GLOBULIN SER CALC-MCNC: 4.6 G/DL (ref 2–4)
GLUCOSE BLD STRIP.AUTO-MCNC: 126 MG/DL (ref 70–110)
GLUCOSE BLD STRIP.AUTO-MCNC: 132 MG/DL (ref 70–110)
GLUCOSE BLD STRIP.AUTO-MCNC: 75 MG/DL (ref 70–110)
GLUCOSE BLD STRIP.AUTO-MCNC: 89 MG/DL (ref 70–110)
GLUCOSE SERPL-MCNC: 116 MG/DL (ref 74–99)
HCT VFR BLD AUTO: 28 % (ref 36–48)
HGB BLD-MCNC: 8.2 G/DL (ref 13–16)
LYMPHOCYTES # BLD: 2.2 K/UL (ref 0.9–3.6)
LYMPHOCYTES NFR BLD: 23 % (ref 21–52)
MCH RBC QN AUTO: 28.2 PG (ref 24–34)
MCHC RBC AUTO-ENTMCNC: 29.3 G/DL (ref 31–37)
MCV RBC AUTO: 96.2 FL (ref 74–97)
MONOCYTES # BLD: 0.8 K/UL (ref 0.05–1.2)
MONOCYTES NFR BLD: 8 % (ref 3–10)
NEUTS SEG # BLD: 6 K/UL (ref 1.8–8)
NEUTS SEG NFR BLD: 63 % (ref 40–73)
P-R INTERVAL, ECG05: 216 MS
PLATELET # BLD AUTO: 277 K/UL (ref 135–420)
PMV BLD AUTO: 10.9 FL (ref 9.2–11.8)
POTASSIUM SERPL-SCNC: 3.6 MMOL/L (ref 3.5–5.5)
PROT SERPL-MCNC: 7 G/DL (ref 6.4–8.2)
Q-T INTERVAL, ECG07: 398 MS
QRS DURATION, ECG06: 80 MS
QTC CALCULATION (BEZET), ECG08: 441 MS
RBC # BLD AUTO: 2.91 M/UL (ref 4.7–5.5)
SODIUM SERPL-SCNC: 138 MMOL/L (ref 136–145)
SOURCE, COVRS: NORMAL
SPECIMEN TYPE, XMCV1T: NORMAL
VENTRICULAR RATE, ECG03: 74 BPM
WBC # BLD AUTO: 9.6 K/UL (ref 4.6–13.2)

## 2020-09-25 PROCEDURE — 87147 CULTURE TYPE IMMUNOLOGIC: CPT

## 2020-09-25 PROCEDURE — 85025 COMPLETE CBC W/AUTO DIFF WBC: CPT

## 2020-09-25 PROCEDURE — 74011250636 HC RX REV CODE- 250/636: Performed by: INTERNAL MEDICINE

## 2020-09-25 PROCEDURE — 74011636637 HC RX REV CODE- 636/637: Performed by: NURSE PRACTITIONER

## 2020-09-25 PROCEDURE — 87102 FUNGUS ISOLATION CULTURE: CPT

## 2020-09-25 PROCEDURE — 77030006773 HC BLD SAW OSC BRSM -A: Performed by: PODIATRIST

## 2020-09-25 PROCEDURE — 74011250636 HC RX REV CODE- 250/636: Performed by: NURSE ANESTHETIST, CERTIFIED REGISTERED

## 2020-09-25 PROCEDURE — 74011000250 HC RX REV CODE- 250: Performed by: PODIATRIST

## 2020-09-25 PROCEDURE — 65270000029 HC RM PRIVATE

## 2020-09-25 PROCEDURE — 88307 TISSUE EXAM BY PATHOLOGIST: CPT

## 2020-09-25 PROCEDURE — 87075 CULTR BACTERIA EXCEPT BLOOD: CPT

## 2020-09-25 PROCEDURE — 87077 CULTURE AEROBIC IDENTIFY: CPT

## 2020-09-25 PROCEDURE — 77030012422 HC DRN WND COVD -A: Performed by: PODIATRIST

## 2020-09-25 PROCEDURE — 74011250637 HC RX REV CODE- 250/637: Performed by: NURSE PRACTITIONER

## 2020-09-25 PROCEDURE — 77030011265 HC ELECTRD BLD HEX COVD -A: Performed by: PODIATRIST

## 2020-09-25 PROCEDURE — 88305 TISSUE EXAM BY PATHOLOGIST: CPT

## 2020-09-25 PROCEDURE — 88311 DECALCIFY TISSUE: CPT

## 2020-09-25 PROCEDURE — 2709999900 HC NON-CHARGEABLE SUPPLY: Performed by: PODIATRIST

## 2020-09-25 PROCEDURE — 80076 HEPATIC FUNCTION PANEL: CPT

## 2020-09-25 PROCEDURE — 82962 GLUCOSE BLOOD TEST: CPT

## 2020-09-25 PROCEDURE — 99232 SBSQ HOSP IP/OBS MODERATE 35: CPT | Performed by: FAMILY MEDICINE

## 2020-09-25 PROCEDURE — 80048 BASIC METABOLIC PNL TOTAL CA: CPT

## 2020-09-25 PROCEDURE — 01480 ANES OPEN PX LOWER L/A/F NOS: CPT | Performed by: NURSE ANESTHETIST, CERTIFIED REGISTERED

## 2020-09-25 PROCEDURE — 77030031139 HC SUT VCRL2 J&J -A: Performed by: PODIATRIST

## 2020-09-25 PROCEDURE — 74011000272 HC RX REV CODE- 272: Performed by: PODIATRIST

## 2020-09-25 PROCEDURE — 76010000138 HC OR TIME 0.5 TO 1 HR: Performed by: PODIATRIST

## 2020-09-25 PROCEDURE — 87186 SC STD MICRODIL/AGAR DIL: CPT

## 2020-09-25 PROCEDURE — 87205 SMEAR GRAM STAIN: CPT

## 2020-09-25 PROCEDURE — 87635 SARS-COV-2 COVID-19 AMP PRB: CPT

## 2020-09-25 PROCEDURE — 77030013708 HC HNDPC SUC IRR PULS STRY –B: Performed by: PODIATRIST

## 2020-09-25 PROCEDURE — 74011000250 HC RX REV CODE- 250: Performed by: NURSE ANESTHETIST, CERTIFIED REGISTERED

## 2020-09-25 PROCEDURE — 74011250637 HC RX REV CODE- 250/637: Performed by: PODIATRIST

## 2020-09-25 PROCEDURE — 87185 SC STD ENZYME DETCJ PER NZM: CPT

## 2020-09-25 PROCEDURE — 76060000032 HC ANESTHESIA 0.5 TO 1 HR: Performed by: PODIATRIST

## 2020-09-25 PROCEDURE — 76210000063 HC OR PH I REC FIRST 0.5 HR: Performed by: PODIATRIST

## 2020-09-25 PROCEDURE — 74011250636 HC RX REV CODE- 250/636: Performed by: EMERGENCY MEDICINE

## 2020-09-25 PROCEDURE — 74011000258 HC RX REV CODE- 258: Performed by: EMERGENCY MEDICINE

## 2020-09-25 PROCEDURE — 01480 ANES OPEN PX LOWER L/A/F NOS: CPT | Performed by: ANESTHESIOLOGY

## 2020-09-25 RX ORDER — ONDANSETRON 2 MG/ML
4 INJECTION INTRAMUSCULAR; INTRAVENOUS ONCE
Status: DISCONTINUED | OUTPATIENT
Start: 2020-09-25 | End: 2020-09-25 | Stop reason: HOSPADM

## 2020-09-25 RX ORDER — MAGNESIUM SULFATE 100 %
4 CRYSTALS MISCELLANEOUS AS NEEDED
Status: DISCONTINUED | OUTPATIENT
Start: 2020-09-25 | End: 2020-09-25 | Stop reason: HOSPADM

## 2020-09-25 RX ORDER — INSULIN GLARGINE 100 [IU]/ML
60 INJECTION, SOLUTION SUBCUTANEOUS DAILY
Status: DISCONTINUED | OUTPATIENT
Start: 2020-09-26 | End: 2020-09-25

## 2020-09-25 RX ORDER — SODIUM CHLORIDE 0.9 % (FLUSH) 0.9 %
5-40 SYRINGE (ML) INJECTION AS NEEDED
Status: DISCONTINUED | OUTPATIENT
Start: 2020-09-25 | End: 2020-09-25 | Stop reason: HOSPADM

## 2020-09-25 RX ORDER — INSULIN GLARGINE 100 [IU]/ML
60 INJECTION, SOLUTION SUBCUTANEOUS EVERY 12 HOURS
Status: DISCONTINUED | OUTPATIENT
Start: 2020-09-25 | End: 2020-09-25

## 2020-09-25 RX ORDER — LIDOCAINE HYDROCHLORIDE 10 MG/ML
INJECTION, SOLUTION EPIDURAL; INFILTRATION; INTRACAUDAL; PERINEURAL AS NEEDED
Status: DISCONTINUED | OUTPATIENT
Start: 2020-09-25 | End: 2020-09-25 | Stop reason: HOSPADM

## 2020-09-25 RX ORDER — SODIUM CHLORIDE, SODIUM LACTATE, POTASSIUM CHLORIDE, CALCIUM CHLORIDE 600; 310; 30; 20 MG/100ML; MG/100ML; MG/100ML; MG/100ML
INJECTION, SOLUTION INTRAVENOUS
Status: DISCONTINUED | OUTPATIENT
Start: 2020-09-25 | End: 2020-09-25 | Stop reason: HOSPADM

## 2020-09-25 RX ORDER — BUPIVACAINE HYDROCHLORIDE 5 MG/ML
INJECTION, SOLUTION EPIDURAL; INTRACAUDAL AS NEEDED
Status: DISCONTINUED | OUTPATIENT
Start: 2020-09-25 | End: 2020-09-25 | Stop reason: HOSPADM

## 2020-09-25 RX ORDER — INSULIN LISPRO 100 [IU]/ML
INJECTION, SOLUTION INTRAVENOUS; SUBCUTANEOUS ONCE
Status: DISCONTINUED | OUTPATIENT
Start: 2020-09-25 | End: 2020-09-25 | Stop reason: HOSPADM

## 2020-09-25 RX ORDER — DEXTROSE 50 % IN WATER (D50W) INTRAVENOUS SYRINGE
25-50 AS NEEDED
Status: DISCONTINUED | OUTPATIENT
Start: 2020-09-25 | End: 2020-09-25 | Stop reason: HOSPADM

## 2020-09-25 RX ORDER — VANCOMYCIN/0.9 % SOD CHLORIDE 1.5G/250ML
1500 PLASTIC BAG, INJECTION (ML) INTRAVENOUS EVERY 12 HOURS
Status: DISCONTINUED | OUTPATIENT
Start: 2020-09-25 | End: 2020-09-26 | Stop reason: HOSPADM

## 2020-09-25 RX ORDER — SODIUM CHLORIDE 0.9 % (FLUSH) 0.9 %
5-40 SYRINGE (ML) INJECTION EVERY 8 HOURS
Status: DISCONTINUED | OUTPATIENT
Start: 2020-09-25 | End: 2020-09-25 | Stop reason: HOSPADM

## 2020-09-25 RX ORDER — INSULIN LISPRO 100 [IU]/ML
INJECTION, SOLUTION INTRAVENOUS; SUBCUTANEOUS EVERY 6 HOURS
Status: DISCONTINUED | OUTPATIENT
Start: 2020-09-25 | End: 2020-09-26

## 2020-09-25 RX ORDER — LIDOCAINE HYDROCHLORIDE 20 MG/ML
INJECTION, SOLUTION EPIDURAL; INFILTRATION; INTRACAUDAL; PERINEURAL AS NEEDED
Status: DISCONTINUED | OUTPATIENT
Start: 2020-09-25 | End: 2020-09-25 | Stop reason: HOSPADM

## 2020-09-25 RX ORDER — INSULIN GLARGINE 100 [IU]/ML
60 INJECTION, SOLUTION SUBCUTANEOUS EVERY 12 HOURS
Status: DISCONTINUED | OUTPATIENT
Start: 2020-09-26 | End: 2020-09-26 | Stop reason: HOSPADM

## 2020-09-25 RX ORDER — HYDROCODONE BITARTRATE AND ACETAMINOPHEN 5; 325 MG/1; MG/1
1 TABLET ORAL
Status: DISCONTINUED | OUTPATIENT
Start: 2020-09-25 | End: 2020-09-26 | Stop reason: HOSPADM

## 2020-09-25 RX ORDER — PROPOFOL 10 MG/ML
INJECTION, EMULSION INTRAVENOUS AS NEEDED
Status: DISCONTINUED | OUTPATIENT
Start: 2020-09-25 | End: 2020-09-25 | Stop reason: HOSPADM

## 2020-09-25 RX ORDER — SODIUM CHLORIDE, SODIUM LACTATE, POTASSIUM CHLORIDE, CALCIUM CHLORIDE 600; 310; 30; 20 MG/100ML; MG/100ML; MG/100ML; MG/100ML
50 INJECTION, SOLUTION INTRAVENOUS CONTINUOUS
Status: DISCONTINUED | OUTPATIENT
Start: 2020-09-25 | End: 2020-09-25 | Stop reason: HOSPADM

## 2020-09-25 RX ADMIN — HYDROCODONE BITARTRATE AND ACETAMINOPHEN 1 TABLET: 5; 325 TABLET ORAL at 22:10

## 2020-09-25 RX ADMIN — METOPROLOL TARTRATE 25 MG: 25 TABLET, FILM COATED ORAL at 08:13

## 2020-09-25 RX ADMIN — LIDOCAINE HYDROCHLORIDE 100 MG: 20 INJECTION, SOLUTION EPIDURAL; INFILTRATION; INTRACAUDAL; PERINEURAL at 17:54

## 2020-09-25 RX ADMIN — PROPOFOL 30 MG: 10 INJECTION, EMULSION INTRAVENOUS at 17:51

## 2020-09-25 RX ADMIN — INSULIN GLARGINE 60 UNITS: 100 INJECTION, SOLUTION SUBCUTANEOUS at 08:12

## 2020-09-25 RX ADMIN — METOPROLOL TARTRATE 25 MG: 25 TABLET, FILM COATED ORAL at 22:10

## 2020-09-25 RX ADMIN — Medication 10 ML: at 22:00

## 2020-09-25 RX ADMIN — ATORVASTATIN CALCIUM 10 MG: 10 TABLET, FILM COATED ORAL at 22:10

## 2020-09-25 RX ADMIN — FAMOTIDINE 20 MG: 20 TABLET, FILM COATED ORAL at 08:12

## 2020-09-25 RX ADMIN — SODIUM CHLORIDE, SODIUM LACTATE, POTASSIUM CHLORIDE, AND CALCIUM CHLORIDE: 600; 310; 30; 20 INJECTION, SOLUTION INTRAVENOUS at 17:30

## 2020-09-25 RX ADMIN — PROPOFOL 30 MG: 10 INJECTION, EMULSION INTRAVENOUS at 17:54

## 2020-09-25 RX ADMIN — PROPOFOL 20 MG: 10 INJECTION, EMULSION INTRAVENOUS at 17:58

## 2020-09-25 RX ADMIN — PROPOFOL 80 MG: 10 INJECTION, EMULSION INTRAVENOUS at 17:40

## 2020-09-25 RX ADMIN — MIDAZOLAM HYDROCHLORIDE 2 MG: 2 INJECTION, SOLUTION INTRAMUSCULAR; INTRAVENOUS at 17:32

## 2020-09-25 RX ADMIN — VANCOMYCIN HYDROCHLORIDE 1500 MG: 10 INJECTION, POWDER, LYOPHILIZED, FOR SOLUTION INTRAVENOUS at 16:03

## 2020-09-25 RX ADMIN — SODIUM CHLORIDE 1000 MG: 900 INJECTION, SOLUTION INTRAVENOUS at 04:06

## 2020-09-25 RX ADMIN — PIPERACILLIN AND TAZOBACTAM 3.38 G: 3; .375 INJECTION, POWDER, LYOPHILIZED, FOR SOLUTION INTRAVENOUS at 11:51

## 2020-09-25 RX ADMIN — PROPOFOL 30 MG: 10 INJECTION, EMULSION INTRAVENOUS at 17:48

## 2020-09-25 RX ADMIN — PROPOFOL 20 MG: 10 INJECTION, EMULSION INTRAVENOUS at 18:02

## 2020-09-25 RX ADMIN — PIPERACILLIN AND TAZOBACTAM 3.38 G: 3; .375 INJECTION, POWDER, LYOPHILIZED, FOR SOLUTION INTRAVENOUS at 07:05

## 2020-09-25 RX ADMIN — Medication 10 ML: at 06:00

## 2020-09-25 RX ADMIN — PROPOFOL 40 MG: 10 INJECTION, EMULSION INTRAVENOUS at 17:44

## 2020-09-25 RX ADMIN — PIPERACILLIN AND TAZOBACTAM 3.38 G: 3; .375 INJECTION, POWDER, LYOPHILIZED, FOR SOLUTION INTRAVENOUS at 22:10

## 2020-09-25 NOTE — CONSULTS
Patient Information     Patient Name MRN Margaret Montanez [id number] Female 1946      Telephone Encounter by Pretty Palacio at 2017 11:10 AM     Author:  Pretty Palacio Service:  (none) Author Type:  (none)     Filed:  2017 11:14 AM Encounter Date:  2017 Status:  Signed     :  Pretty Palacio            Phone note started for RN to place refill order. Pretty Palacio LPN......................2017 11:14 AM           Podiatry Consult    Subjective:         Date of Consultation: September 25, 2020     Patient is a 61 y.o.male with PMHx of type II DM seen for left second toe osteomyelitis. Patient was seen in office for draining ulcer on tip of left 2nd toe which probed deep to bone. Patient was sent to ER for hospital admission for amputation of left 2nd toe. Patient denies N/V/C/F.      Patient Active Problem List    Diagnosis Date Noted    Fracture, toe 09/24/2020    Altered mental status 09/01/2020    Multifocal pneumonia 09/01/2020    DM (diabetes mellitus) ([address] 12/25/2019    Orthostatic hypotension 12/25/2019    Syncope and collapse 12/24/2019    Vomiting 03/28/2018    Chronic abdominal pain 03/28/2018    Sepsis ([address] 03/21/2018    Nausea and vomiting 09/07/2017    Gastroparesis 09/07/2017    Hypokalemia 09/07/2017    ARF (acute renal failure) (HCC) 09/07/2017    Atelectasis 09/07/2017    Abdominal pain 09/07/2017    Acquired hypothyroidism 09/07/2017    S/P lumbar fusion 07/05/2017    Diabetic neuropathy (HCC) 07/05/2017    Neuritis 07/05/2017    Spinal stenosis at L4-L5 level 06/19/2017    Coronary artery disease involving native coronary artery of native heart without angina pectoris 05/31/2017    History of coronary artery stent placement 05/31/2017    Synovial cyst 05/26/2017    HNP (herniated nucleus pulposus), lumbar 05/26/2017    Lumbar spinal stenosis 05/26/2017    Spondylolisthesis of lumbar region 05/26/2017    Positive PPD     History of heart attack     Pain in left lower leg 07/24/2016    Primary osteoarthritis of left knee 07/24/2016    Localized adiposity of abdomen 07/24/2016    Diabetes ([address] 08/14/2015    Essential hypertension 08/14/2015    GERD (gastroesophageal reflux disease) 08/14/2015    Depression 08/14/2015     Past Medical History:   Diagnosis Date    Arthritis     Coronary atherosclerosis of native coronary artery     S/P PCI with GE in 12/09    Diabetes [address]     IDDM    Electrolyte and fluid disorders not elsewhere classified     Essential hypertension, benign     GERD (gastroesophageal reflux disease)     Heroin abuse ([facility] [address] 05/26/16    Psychiatrist Dr. Do Hall History of heart attack     Hyperlipidemia     Intermediate coronary syndrome [facility])     MDD (major depressive disorder) 5/26/16    Psychiatrist Dr. Oliver Elaine infarction [facility])     Obesity, unspecified     Old myocardial infarction     Other and unspecified hyperlipidemia     Pancreatitis     Positive PPD     Sleep apnea     uses Cpap machine    Transfusion history     Before 1992      Past Surgical History:   Procedure Laterality Date    HX APPENDECTOMY      HX CORONARY STENT PLACEMENT  2010    2 stents      Family History   Problem Relation Age of Onset    Heart Attack Father     Coronary Artery Disease Mother     Diabetes Mother     Cancer Sister         breast cancer and lung cancer      Social History     Tobacco Use    Smoking status: Never Smoker    Smokeless tobacco: Never Used   Substance Use Topics    Alcohol use: No     Current Facility-Administered Medications   Medication Dose Route Frequency Provider Last Rate Last Dose    vancomycin (VANCOCIN) 1500 mg in  ml infusion  1,500 mg IntraVENous Q12H Lokesh Bahena MD        [START ON 9/26/2020] Vancomycin TROUGH level to be drawn 9/26 at 14:30 -- BEFORE giving the next dose due at 15:00. Thanks!    Other ONCE Lokesh Bahena MD        insulin glargine (LANTUS) injection 60 Units  60 Units SubCUTAneous Q12H Lokesh Bahena MD        sodium chloride (NS) flush 5-10 mL  5-10 mL IntraVENous PRN Sherri Dockery MD        piperacillin-tazobactam (ZOSYN) 3.375 g in 0.9% sodium chloride (MBP/ADV) 100 mL MBP  3.375 g IntraVENous Q6H Sherri Dockery  mL/hr at 09/25/20 1151 3.375 g at 09/25/20 1151    sodium chloride (NS) flush 5-40 mL  5-40 mL IntraVENous Q8H Kevin ADOLFO Frey   10 mL at 09/25/20 0600    sodium chloride (NS) flush 5-40 mL  5-40 mL IntraVENous PRN Panfilo Brown NP        acetaminophen (TYLENOL) tablet 650 mg  650 mg Oral Q6H PRN Panfilo Brown NP        Or    acetaminophen (TYLENOL) suppository 650 mg  650 mg Rectal Q6H PRN Shante Brown NP        polyethylene glycol (MIRALAX) packet 17 g  17 g Oral DAILY PRN Panfilo Brown NP        ondansetron (ZOFRAN ODT) tablet 4 mg  4 mg Oral Q8H PRN Panfilo Brown NP        Or    ondansetron (ZOFRAN) injection 4 mg  4 mg IntraVENous Q6H PRN Panfilo Brown NP        famotidine (PEPCID) tablet 20 mg  20 mg Oral DAILY Shante Brown NP   20 mg at 09/25/20 0323    insulin lispro (HUMALOG) injection   SubCUTAneous AC&HS Debo Miguel NP   4 Units at 09/24/20 2315    glucose chewable tablet 16 g  4 Tab Oral PRN Panfilo Brown NP        glucagon (GLUCAGEN) injection 1 mg  1 mg IntraMUSCular PRN Panfilo Brown NP        dextrose (D50W) injection syrg 12.5-25 g  25-50 mL IntraVENous PRN Panfilo Brown NP        [Held by provider] clopidogreL (PLAVIX) tablet 75 mg  75 mg Oral DAILY Shante Brown NP        metoprolol tartrate (LOPRESSOR) tablet 25 mg  25 mg Oral Q12H Shante Brown NP   25 mg at 09/25/20 0813    atorvastatin (LIPITOR) tablet 10 mg  10 mg Oral QHS Shante Brown NP   10 mg at 09/24/20 2307     Current Outpatient Medications   Medication Sig Dispense Refill    traZODone (DESYREL) 50 mg tablet Take 1 Tab by mouth nightly as needed for Sleep for up to 30 days. 30 Tab 0    ondansetron (ZOFRAN ODT) 4 mg disintegrating tablet Take 1 Tab by mouth every eight (8) hours as needed for Nausea or Vomiting. 30 Tab 0    Lactobacillus Acidoph & Bulgar (FLORANEX) 1 million cell tab tablet Take 2 Tabs by mouth two (2) times a day for 30 days.  120 Tab 0    OTHER Check CBC, CMP, Mg in 3 days, results to PCP immediately, Diagnosis- syncope 1 Each 0    OTHER Graded compression stockings b/l LE- use as directed 1 Each 0    OTHER Incentive spirometry- use as directed 1 Each 0    methadone HCl (METHADONE PO) Take  by mouth.  metoprolol tartrate (LOPRESSOR) 25 mg tablet Take 1 Tab by mouth every twelve (12) hours. 60 Tab 0    insulin detemir U-100 (LEVEMIR U-100 INSULIN) 100 unit/mL injection 20 Units by SubCUTAneous route two (2) times a day. 10 mL 0    OTHER No Driving Until cleared by Cardiology to do so 1 Each 0    pantoprazole (PROTONIX) 40 mg tablet Take 1 Tab by mouth Before breakfast and dinner. 60 Tab 0    acetaminophen (TYLENOL) 325 mg tablet Take 2 Tabs by mouth every six (6) hours as needed. Indications: Pain, take it with bentyl; do not exceed 3 g tylenol daily 30 Tab 0    albuterol (PROVENTIL HFA, VENTOLIN HFA, PROAIR HFA) 90 mcg/actuation inhaler 2 puffs every 6 hours x 5 days then every 6 hours as needed for shortness of breath and/or wheezing 1 Inhaler 0    polyethylene glycol (MIRALAX) 17 gram packet Take 1 Packet by mouth daily. 30 Packet 0    simvastatin (ZOCOR) 10 mg tablet Take 10 mg by mouth nightly.  clopidogrel (PLAVIX) 75 mg tablet Take 75 mg by mouth daily. Allergies   Allergen Reactions    Compazine [Prochlorperazine] Anaphylaxis     \"Tightness around throat\"        Review of Systems:  A comprehensive review of systems was negative except for that written in the History of Present Illness.     Objective:     Patient Vitals for the past 8 hrs:   BP SpO2   20 1130  95 %   20 1100  96 %   20 1030  95 %   20 1000  95 %   20 0730 122/70 94 %   20 0715 (!) 113/57 92 %   20 0700 (!) 132/57 93 %   20 0645 107/83 94 %   20 0630 119/64 94 %   20 0615 (!) 110/59 92 %   20 0600 112/65 94 %   20 0530 122/65 91 %     Temp (24hrs), Av.3 °F (36.8 °C), Min:98.3 °F (36.8 °C), Max:98.3 °F (36.8 °C)      Physical Exam:    Bilateral EDILSON: palpable pedal pulses, left 2nd toe tip ulcer measure 0.7 cm probe deep to bone, purulence noted from ulcer, localized edema present to left 2nd toe, diminished protective sensation noted to both feet. Paresthesia symptoms present to both LE's. Lab Review:   Recent Results (from the past 24 hour(s))   URINALYSIS W/ RFLX MICROSCOPIC    Collection Time: 09/24/20  3:17 PM   Result Value Ref Range    Color YELLOW      Appearance CLOUDY      Specific gravity 1.024 1.005 - 1.030      pH (UA) 5.5 5.0 - 8.0      Protein Negative NEG mg/dL    Glucose >1,000 (A) NEG mg/dL    Ketone Negative NEG mg/dL    Bilirubin Negative NEG      Blood Negative NEG      Urobilinogen 1.0 0.2 - 1.0 EU/dL    Nitrites Negative NEG      Leukocyte Esterase Negative NEG     EKG, 12 LEAD, INITIAL    Collection Time: 09/24/20  4:58 PM   Result Value Ref Range    Ventricular Rate 74 BPM    Atrial Rate 74 BPM    P-R Interval 216 ms    QRS Duration 80 ms    Q-T Interval 398 ms    QTC Calculation (Bezet) 441 ms    Calculated P Axis 30 degrees    Calculated R Axis 8 degrees    Calculated T Axis 39 degrees    Diagnosis       Sinus rhythm with 1st degree AV block with premature atrial complexes  Low voltage QRS  Borderline ECG  When compared with ECG of 03-SEP-2020 15:41,  Sinus rhythm has replaced Atrial fibrillation  Vent.  rate has increased BY  24 BPM  QT has lengthened     POC LACTIC ACID    Collection Time: 09/24/20  6:49 PM   Result Value Ref Range    Lactic Acid (POC) 1.03 0.40 - 2.00 mmol/L   GLUCOSE, POC    Collection Time: 09/24/20 11:13 PM   Result Value Ref Range    Glucose (POC) 219 (H) 70 - 805 mg/dL   METABOLIC PANEL, BASIC    Collection Time: 09/25/20  6:10 AM   Result Value Ref Range    Sodium 138 136 - 145 mmol/L    Potassium 3.6 3.5 - 5.5 mmol/L    Chloride 104 100 - 111 mmol/L    CO2 30 21 - 32 mmol/L    Anion gap 4 3.0 - 18 mmol/L    Glucose 116 (H) 74 - 99 mg/dL    BUN 11 7.0 - 18 MG/DL    Creatinine 1.12 0.6 - 1.3 MG/DL    BUN/Creatinine ratio 10 (L) 12 - 20      GFR est AA >60 >60 ml/min/1.73m2    GFR est non-AA >60 >60 ml/min/1.73m2    Calcium 8.0 (L) 8.5 - 10.1 MG/DL   CBC WITH AUTOMATED DIFF    Collection Time: 09/25/20  6:10 AM   Result Value Ref Range    WBC 9.6 4.6 - 13.2 K/uL    RBC 2.91 (L) 4.70 - 5.50 M/uL    HGB 8.2 (L) 13.0 - 16.0 g/dL    HCT 28.0 (L) 36.0 - 48.0 %    MCV 96.2 74.0 - 97.0 FL    MCH 28.2 24.0 - 34.0 PG    MCHC 29.3 (L) 31.0 - 37.0 g/dL    RDW 14.0 11.6 - 14.5 %    PLATELET 672 461 - 238 K/uL    MPV 10.9 9.2 - 11.8 FL    NEUTROPHILS 63 40 - 73 %    LYMPHOCYTES 23 21 - 52 %    MONOCYTES 8 3 - 10 %    EOSINOPHILS 6 (H) 0 - 5 %    BASOPHILS 0 0 - 2 %    ABS. NEUTROPHILS 6.0 1.8 - 8.0 K/UL    ABS. LYMPHOCYTES 2.2 0.9 - 3.6 K/UL    ABS. MONOCYTES 0.8 0.05 - 1.2 K/UL    ABS. EOSINOPHILS 0.5 (H) 0.0 - 0.4 K/UL    ABS. BASOPHILS 0.0 0.0 - 0.1 K/UL    DF AUTOMATED     HEPATIC FUNCTION PANEL    Collection Time: 09/25/20  6:10 AM   Result Value Ref Range    Protein, total 7.0 6.4 - 8.2 g/dL    Albumin 2.4 (L) 3.4 - 5.0 g/dL    Globulin 4.6 (H) 2.0 - 4.0 g/dL    A-G Ratio 0.5 (L) 0.8 - 1.7      Bilirubin, total 0.5 0.2 - 1.0 MG/DL    Bilirubin, direct 0.2 0.0 - 0.2 MG/DL    Alk. phosphatase 126 (H) 45 - 117 U/L    AST (SGOT) 38 10 - 38 U/L    ALT (SGPT) 34 16 - 61 U/L   GLUCOSE, POC    Collection Time: 09/25/20  8:10 AM   Result Value Ref Range    Glucose (POC) 132 (H) 70 - 110 mg/dL   GLUCOSE, POC    Collection Time: 09/25/20 11:49 AM   Result Value Ref Range    Glucose (POC) 126 (H) 70 - 110 mg/dL       Impression:     Uncontrolled DM with left 2nd toe ulcer with osteomyelitis    Recommendation:     - x-ray of left foot reviewed. No acute changes to 2nd toe noted. However, clinically it is exposed to outside environment with draining purulence consistent with osteomyelitis. - Patient will need amputation of left 2nd toe. Please keep patient NPO. - Plan to take him to OR tonight. - Remain NWB to left forefoot.   - Hold off on abxs.

## 2020-09-25 NOTE — DIABETES MGMT
Glycemic Control Plan of Care    T2DM with current A1c of 12.1% (9/01/2020)  See education notes entered on 9/4/2020 from recent admission. Home diabetes medications: Patient reported on 9/25:  Levemir insulin 60 units BID  Meal time humalog insulin 52 units TID AC  Metformin 1000 mg BID with meals    Seen patient today in ED. He remain NPO since admission and podiatry plan to take him to surgery later tonight. He was placed on lantus 60 units BID and received first dose this morning at 8 AM. Current POC BG readings: 132, 126    Recommendation(s):   1.) hold second dose of lantus insulin 60 units for tonight. Order obtained. Order changed to lantus 60 units every 12 hours starting 9/26.  2.) diabetic consistent carb when patient is ready for solids post op    Glycemic assessment:    POC BG 9/24: 219  POC BG 9/25 at time of review: 132, 126  Lab FBG 9/25: 116        Current A1C: 12.1% (9/01/2020) which is equivalent to estimated average blood glucose of 301 mg/dL during the past 2-3 months    Current hospital diabetes medications:  Basal lantus insulin 60 units every 12 hours, resume 9/26/2020  Correctional lispro insulin every 12 hours.  Normal sensitivity dose    Total daily dose insulin requirement previous day: 9/24:  Lispro: 6 units    Diet: Patient is currently NPO surgery later today    Goals:  Blood glucose will be within target range of  mg/dL by 9/28/2020    Education:  __X_  Refer to Diabetes Education Record: 9/04/2020             ___  Education not indicated at this time    Kristie Beck RN [facility]  Pager: [phone]

## 2020-09-25 NOTE — ED NOTES
Assumed care of patient from Berry RN   Introduced self to pt  Pt alert and oriented x 4   Sitting up in bed   Pt denied problems swallowing   No cough or fever present   Updated whiteboard and explained usage  Connected to monitor SA   Pt lung sounds clear  Pt has left toe swelling   Pt was sent to ED by podiatry  Afebrile  Denied chills or sweating   Pt updated about plan of care   Positioned in bed for comfort  Cliff at bedside   Will continue to monitor and assess

## 2020-09-25 NOTE — ED NOTES
Introduced self to patient  Pt laying bed sleeping  Easily awaked by voice   Alert and oriented x 4   Denied pain   Pt handling secretions   No trouble swallowing   Verified pt using 2 identifiers  Explained use and side effects  Blood glucose checked per sliding scale order  Medicated as ordered  Gave water.    Pt positioned in bed for comfort  Updated about plan of care    Will continue to monitor and assess

## 2020-09-25 NOTE — ED NOTES
Introduced self to patient  Pt laying in bed alert and oriented x4    No noted signs of distress  Breathing equally and unlabored   Verified pt using 2 identifiers  Explained use and side effects  Medicated as ordered  No swelling at site  No signs of pain   Flushed well  Pt positioned in bed for comfort  Updated about plan of care   Pt on monitor SR without ectopy   Will continue to monitor and assess

## 2020-09-25 NOTE — ED NOTES
Received shift report from [facility]. Patient alert and oriented x 4. Patient resting comfortably on stretcher. Patient denies chest pain, shortness of breath, nausea, vomiting, and diarrhea. Side rails up x2. Call bell within reach. No obvious signs of distress noted.

## 2020-09-25 NOTE — ED NOTES
Introduced self to patient  Pt sitting at bedside  No noted signs of distress  Pt c/o pain in foot and leg   Verified pt using 2 identifiers  Explained use and side effects  Medicated as ordered  No swelling at site  No signs of pain   Flushed well  Informed pt needed to get in a gown   Pt refused  Preop RN at bedside   Updated about plan of care   Pt on monitor ST without ectopy   Will continue to monitor and assess

## 2020-09-25 NOTE — ED NOTES
Patient resting comfortably on the stretcher. No new complaints. No obvious signs of distress noted.

## 2020-09-25 NOTE — ROUTINE PROCESS
TRANSFER - IN REPORT: 
 
Verbal report received from Radha RN(name) on Soren Line  being received from ER(unit) for ordered procedure Report consisted of patients Situation, Background, Assessment and  
Recommendations(SBAR). Information from the following report(s) SBAR and Kardex was reviewed with the receiving nurse. Opportunity for questions and clarification was provided. Assessment completed upon patients arrival to unit and care assumed.

## 2020-09-25 NOTE — ANESTHESIA POSTPROCEDURE EVALUATION
Procedure(s):  LEFT FOOT SECOND TOE AMPUTATION. MAC    Anesthesia Post Evaluation      Multimodal analgesia: multimodal analgesia used between 6 hours prior to anesthesia start to PACU discharge  Patient location during evaluation: bedside  Patient participation: complete - patient participated  Level of consciousness: awake  Pain score: 0  Pain management: adequate  Airway patency: patent  Anesthetic complications: no  Cardiovascular status: stable  Respiratory status: acceptable  Hydration status: acceptable  Post anesthesia nausea and vomiting:  controlled  Final Post Anesthesia Temperature Assessment:  Normothermia (36.0-37.5 degrees C)      INITIAL Post-op Vital signs:   Vitals Value Taken Time   /75 9/25/2020  7:27 PM   Temp 36.9 °C (98.5 °F) 9/25/2020  7:10 PM   Pulse 66 9/25/2020  7:39 PM   Resp 18 9/25/2020  7:39 PM   SpO2 96 % 9/25/2020  7:39 PM   Vitals shown include unvalidated device data.

## 2020-09-25 NOTE — ANESTHESIA PREPROCEDURE EVALUATION
Relevant Problems   No relevant active problems       Anesthetic History               Review of Systems / Medical History  Patient summary reviewed and pertinent labs reviewed    Pulmonary        Sleep apnea: CPAP           Neuro/Psych         Psychiatric history (Depression)     Cardiovascular    Hypertension: well controlled          CAD    Exercise tolerance: <4 METS     GI/Hepatic/Renal     GERD: well controlled           Endo/Other    Diabetes: well controlled, type 2  Hypothyroidism: well controlled  Morbid obesity and arthritis     Other Findings              Physical Exam    Airway  Mallampati: III  TM Distance: 4 - 6 cm  Neck ROM: normal range of motion   Mouth opening: Normal     Cardiovascular  Regular rate and rhythm,  S1 and S2 normal,  no murmur, click, rub, or gallop             Dental      Comments: Several teeth chipped   Pulmonary  Breath sounds clear to auscultation               Abdominal  GI exam deferred       Other Findings            Anesthetic Plan    ASA: 3  Anesthesia type: MAC          Induction: Intravenous  Anesthetic plan and risks discussed with: Patient

## 2020-09-26 ENCOUNTER — HOME HEALTH ADMISSION (OUTPATIENT)
Dept: HOME HEALTH SERVICES | Facility: HOME HEALTH | Age: 60
End: 2020-09-26

## 2020-09-26 ENCOUNTER — APPOINTMENT (OUTPATIENT)
Dept: GENERAL RADIOLOGY | Age: 60
DRG: 314 | End: 2020-09-26
Attending: PODIATRIST
Payer: MEDICAID

## 2020-09-26 VITALS
HEIGHT: 69 IN | WEIGHT: 245 LBS | BODY MASS INDEX: 36.29 KG/M2 | DIASTOLIC BLOOD PRESSURE: 64 MMHG | RESPIRATION RATE: 20 BRPM | SYSTOLIC BLOOD PRESSURE: 116 MMHG | TEMPERATURE: 98.3 F | HEART RATE: 63 BPM | OXYGEN SATURATION: 92 %

## 2020-09-26 LAB
ANION GAP SERPL CALC-SCNC: 4 MMOL/L (ref 3–18)
BACTERIA SPEC CULT: NORMAL
BASOPHILS # BLD: 0 K/UL (ref 0–0.1)
BASOPHILS NFR BLD: 0 % (ref 0–2)
BUN SERPL-MCNC: 12 MG/DL (ref 7–18)
BUN/CREAT SERPL: 10 (ref 12–20)
CALCIUM SERPL-MCNC: 8 MG/DL (ref 8.5–10.1)
CHLORIDE SERPL-SCNC: 103 MMOL/L (ref 100–111)
CO2 SERPL-SCNC: 30 MMOL/L (ref 21–32)
CREAT SERPL-MCNC: 1.17 MG/DL (ref 0.6–1.3)
DIFFERENTIAL METHOD BLD: ABNORMAL
EOSINOPHIL # BLD: 0.6 K/UL (ref 0–0.4)
EOSINOPHIL NFR BLD: 6 % (ref 0–5)
ERYTHROCYTE [DISTWIDTH] IN BLOOD BY AUTOMATED COUNT: 14.1 % (ref 11.6–14.5)
GLUCOSE BLD STRIP.AUTO-MCNC: 219 MG/DL (ref 70–110)
GLUCOSE BLD STRIP.AUTO-MCNC: 220 MG/DL (ref 70–110)
GLUCOSE BLD STRIP.AUTO-MCNC: 296 MG/DL (ref 70–110)
GLUCOSE BLD STRIP.AUTO-MCNC: 394 MG/DL (ref 70–110)
GLUCOSE SERPL-MCNC: 195 MG/DL (ref 74–99)
HCT VFR BLD AUTO: 27.3 % (ref 36–48)
HGB BLD-MCNC: 8.3 G/DL (ref 13–16)
IRON SATN MFR SERPL: 20 % (ref 20–50)
IRON SERPL-MCNC: 42 UG/DL (ref 50–175)
LYMPHOCYTES # BLD: 2.2 K/UL (ref 0.9–3.6)
LYMPHOCYTES NFR BLD: 25 % (ref 21–52)
MCH RBC QN AUTO: 29.5 PG (ref 24–34)
MCHC RBC AUTO-ENTMCNC: 30.4 G/DL (ref 31–37)
MCV RBC AUTO: 97.2 FL (ref 74–97)
MONOCYTES # BLD: 0.7 K/UL (ref 0.05–1.2)
MONOCYTES NFR BLD: 9 % (ref 3–10)
NEUTS SEG # BLD: 5.1 K/UL (ref 1.8–8)
NEUTS SEG NFR BLD: 60 % (ref 40–73)
PLATELET # BLD AUTO: 266 K/UL (ref 135–420)
PMV BLD AUTO: 10.8 FL (ref 9.2–11.8)
POTASSIUM SERPL-SCNC: 3.8 MMOL/L (ref 3.5–5.5)
RBC # BLD AUTO: 2.81 M/UL (ref 4.7–5.5)
SERVICE CMNT-IMP: NORMAL
SODIUM SERPL-SCNC: 137 MMOL/L (ref 136–145)
TIBC SERPL-MCNC: 215 UG/DL (ref 250–450)
TSH SERPL DL<=0.05 MIU/L-ACNC: 6.75 UIU/ML (ref 0.36–3.74)
WBC # BLD AUTO: 8.6 K/UL (ref 4.6–13.2)

## 2020-09-26 PROCEDURE — 74011250637 HC RX REV CODE- 250/637: Performed by: INTERNAL MEDICINE

## 2020-09-26 PROCEDURE — 99238 HOSP IP/OBS DSCHRG MGMT 30/<: CPT | Performed by: FAMILY MEDICINE

## 2020-09-26 PROCEDURE — 82962 GLUCOSE BLOOD TEST: CPT

## 2020-09-26 PROCEDURE — 84443 ASSAY THYROID STIM HORMONE: CPT

## 2020-09-26 PROCEDURE — 74011250636 HC RX REV CODE- 250/636: Performed by: INTERNAL MEDICINE

## 2020-09-26 PROCEDURE — 74011636637 HC RX REV CODE- 636/637: Performed by: FAMILY MEDICINE

## 2020-09-26 PROCEDURE — 85025 COMPLETE CBC W/AUTO DIFF WBC: CPT

## 2020-09-26 PROCEDURE — 83540 ASSAY OF IRON: CPT

## 2020-09-26 PROCEDURE — 73630 X-RAY EXAM OF FOOT: CPT

## 2020-09-26 PROCEDURE — 74011000258 HC RX REV CODE- 258: Performed by: EMERGENCY MEDICINE

## 2020-09-26 PROCEDURE — 80048 BASIC METABOLIC PNL TOTAL CA: CPT

## 2020-09-26 PROCEDURE — 36415 COLL VENOUS BLD VENIPUNCTURE: CPT

## 2020-09-26 PROCEDURE — 74011250637 HC RX REV CODE- 250/637: Performed by: NURSE PRACTITIONER

## 2020-09-26 PROCEDURE — 74011250637 HC RX REV CODE- 250/637: Performed by: PODIATRIST

## 2020-09-26 PROCEDURE — 74011636637 HC RX REV CODE- 636/637: Performed by: INTERNAL MEDICINE

## 2020-09-26 PROCEDURE — 74011250636 HC RX REV CODE- 250/636: Performed by: EMERGENCY MEDICINE

## 2020-09-26 PROCEDURE — 0Y6S0Z0 DETACHMENT AT LEFT 2ND TOE, COMPLETE, OPEN APPROACH: ICD-10-PCS | Performed by: PODIATRIST

## 2020-09-26 PROCEDURE — 2709999900 HC NON-CHARGEABLE SUPPLY

## 2020-09-26 RX ORDER — TRAZODONE HYDROCHLORIDE 50 MG/1
50 TABLET ORAL
Status: DISCONTINUED | OUTPATIENT
Start: 2020-09-26 | End: 2020-09-26 | Stop reason: HOSPADM

## 2020-09-26 RX ORDER — INSULIN LISPRO 100 [IU]/ML
INJECTION, SOLUTION INTRAVENOUS; SUBCUTANEOUS
Status: DISCONTINUED | OUTPATIENT
Start: 2020-09-26 | End: 2020-09-26 | Stop reason: HOSPADM

## 2020-09-26 RX ORDER — AMOXICILLIN AND CLAVULANATE POTASSIUM 875; 125 MG/1; MG/1
1 TABLET, FILM COATED ORAL 2 TIMES DAILY
Qty: 20 TAB | Refills: 0 | Status: ON HOLD | OUTPATIENT
Start: 2020-09-26 | End: 2020-11-25 | Stop reason: SDUPTHER

## 2020-09-26 RX ORDER — TRAZODONE HYDROCHLORIDE 50 MG/1
TABLET ORAL
Status: DISPENSED
Start: 2020-09-26 | End: 2020-09-26

## 2020-09-26 RX ORDER — HYDROCODONE BITARTRATE AND ACETAMINOPHEN 5; 325 MG/1; MG/1
1 TABLET ORAL
Qty: 12 TAB | Refills: 0 | Status: SHIPPED | OUTPATIENT
Start: 2020-09-26 | End: 2020-09-29

## 2020-09-26 RX ADMIN — FAMOTIDINE 20 MG: 20 TABLET, FILM COATED ORAL at 08:44

## 2020-09-26 RX ADMIN — ACETAMINOPHEN 650 MG: 325 TABLET ORAL at 01:25

## 2020-09-26 RX ADMIN — PIPERACILLIN AND TAZOBACTAM 3.38 G: 3; .375 INJECTION, POWDER, LYOPHILIZED, FOR SOLUTION INTRAVENOUS at 05:57

## 2020-09-26 RX ADMIN — INSULIN LISPRO 4 UNITS: 100 INJECTION, SOLUTION INTRAVENOUS; SUBCUTANEOUS at 01:04

## 2020-09-26 RX ADMIN — INSULIN LISPRO 6 UNITS: 100 INJECTION, SOLUTION INTRAVENOUS; SUBCUTANEOUS at 08:49

## 2020-09-26 RX ADMIN — INSULIN GLARGINE 60 UNITS: 100 INJECTION, SOLUTION SUBCUTANEOUS at 08:46

## 2020-09-26 RX ADMIN — METOPROLOL TARTRATE 25 MG: 25 TABLET, FILM COATED ORAL at 08:45

## 2020-09-26 RX ADMIN — Medication 10 ML: at 06:00

## 2020-09-26 RX ADMIN — VANCOMYCIN HYDROCHLORIDE 1500 MG: 10 INJECTION, POWDER, LYOPHILIZED, FOR SOLUTION INTRAVENOUS at 03:30

## 2020-09-26 RX ADMIN — INSULIN LISPRO 10 UNITS: 100 INJECTION, SOLUTION INTRAVENOUS; SUBCUTANEOUS at 12:56

## 2020-09-26 RX ADMIN — HYDROCODONE BITARTRATE AND ACETAMINOPHEN 1 TABLET: 5; 325 TABLET ORAL at 09:04

## 2020-09-26 RX ADMIN — TRAZODONE HYDROCHLORIDE 50 MG: 50 TABLET ORAL at 01:25

## 2020-09-26 RX ADMIN — INSULIN LISPRO 4 UNITS: 100 INJECTION, SOLUTION INTRAVENOUS; SUBCUTANEOUS at 06:37

## 2020-09-26 NOTE — ROUTINE PROCESS
TRANSFER - OUT REPORT: 
 
Verbal report given to Adelaida(name) on Alecia Mabry  being transferred to room 465(unit) for routine progression of care Report consisted of patients Situation, Background, Assessment and  
Recommendations(SBAR). Information from the following report(s) SBAR, OR Summary, Intake/Output, MAR and Recent Results was reviewed with the receiving nurse. Lines:  
Peripheral IV 09/24/20 Right Forearm (Active) Site Assessment Clean, dry, & intact 09/25/20 1827 Phlebitis Assessment 0 09/25/20 1827 Infiltration Assessment 0 09/25/20 1827 Dressing Status Clean, dry, & intact 09/25/20 1827 Dressing Type Tape;Transparent 09/25/20 1827 Hub Color/Line Status Green;Capped 09/25/20 1827 Peripheral IV 09/24/20 Left Forearm (Active) Site Assessment Clean, dry, & intact 09/25/20 1827 Phlebitis Assessment 0 09/25/20 1827 Infiltration Assessment 0 09/25/20 1827 Dressing Status Clean, dry, & intact 09/25/20 1827 Dressing Type Tape;Transparent 09/25/20 1827 Hub Color/Line Status Pink; Infusing 09/25/20 1827 Opportunity for questions and clarification was provided. Patient transported with: 
 [employer]

## 2020-09-26 NOTE — DISCHARGE INSTRUCTIONS
Patient Education        Wearing a Walking Boot: Care Instructions  Your Care Instructions     Walking boots protect broken bones and other injuries of the lower leg, ankle, or foot. They prevent more damage and help the area heal. Your doctor may have you use a boot for 1 to 6 weeks. How long you wear it depends on how serious your injury is. Walking boots are removable, and most of them can be adjusted. Your doctor will show you how to remove and adjust your boot. Most walking boots have straps. Some have built-in air cushions that hold the injured area in place. Some boots have settings that control how much your ankle can move. These boots are called controlled ankle movement (CAM) boots. Adjust or remove the boot only when your doctor says it's okay. If your boot has CAM settings, do not change those settings. Follow-up care is a key part of your treatment and safety. Be sure to make and go to all appointments, and call your doctor if you are having problems. It's also a good idea to know your test results and keep a list of the medicines you take. How can you care for yourself at home? General care  · Follow your doctor's instructions about how much weight you can put on your foot and when you can go back to your usual activities. If you were given crutches, use them as directed. · Follow your doctor's instructions about adjusting the fit of the boot. · If the toes on the leg with the boot were not injured, wiggle them every now and then. This helps move the blood and fluids in the injured leg. · Prop up the injured leg on a pillow when you ice it or anytime you sit or lie down during the next 3 days. Try to keep it above the level of your heart. This will help reduce swelling. · If your doctor told you how and when to use ice, follow your doctor's instructions. If you did not get instructions, follow this general advice:  ? Be careful not to get the boot wet.  Ask your doctor if you can take the boot off when you use ice. ? Put ice or cold packs on the leg for 10 to 20 minutes at a time. Put a thin cloth between the ice and your skin. Try to do this every 1 to 2 hours for the next 3 days (when you are awake) or until the swelling goes down. · Keep up your muscle strength and tone as much as you can while protecting your injured leg. Your doctor may want you to tense and relax the muscles protected by the boot. Check with your doctor or your physical or occupational therapist for instructions. Boot and skin care  · If your boot is not to be removed, try blowing cool air from a hair dryer or fan into the boot to help relieve itching. Never stick items under your boot to scratch the skin. · Do not use oils or lotions near your boot. If the skin gets red or sore around the edge of the boot, you may pad the edges with a soft material, such as moleskin. Or you can use tape to cover the edges. · If you're allowed to take your boot off, be sure your skin is dry before you put the boot back on. Be careful not to put the boot on too tightly. · Check the skin under the boot every day. If you are not supposed to remove the boot, check the skin around the edges. Tell your doctor if you see redness or sores. Water and your boot  · Keep your boot dry. Moisture can collect under the boot and cause skin irritation and itching. If you have a wound or have had surgery, moisture under the boot can increase the risk of infection. · If your doctor says to keep your boot on to shower, protect it so that it won't get wet. Put your leg inside a plastic trash bag and tape the top around your leg. · If you can take the boot off when you shower, pat the area dry after you shower. Then put the boot back on.  · If your boot gets a little wet, you can dry it with a hair dryer. Use the cool air setting. When should you call for help? Call your doctor now or seek immediate medical care if:    · You have severe or increasing pain.   · You have problems with your boot. For example:  ? The skin under the boot is burning or stinging, or you have a warm or painful spot under the boot. ? You have numbness or tingling in the foot or toes that doesn't go away when you adjust the tightness of the boot. ? There is a lot of swelling near the boot. (Some swelling is normal.)  ? You have a new fever. ? There is drainage or a bad smell coming from the boot.     · Your leg turns cold or changes color.     · You have trouble moving your toes.     · You have symptoms of a blood clot in your leg (called a deep vein thrombosis). These may include:  ? Pain in the calf, back of the knee, thigh, or groin. ? Redness and swelling in your leg or groin. Watch closely for changes in your health, and be sure to contact your doctor if:    · Your leg, ankle, or foot pain returns or increases after your doctor tells you to stop using the boot.     · You are not getting better as expected. Where can you learn more? Go to http://[employer]carlitos.info/  Enter L531 in the search box to learn more about \"Wearing a Walking Boot: Care Instructions. \"  Current as of: March 2, 2020               Content Version: 12.6  © 4507-5396 [employer]. Care instructions adapted under license by [employer] (which disclaims liability or warranty for this information). If you have questions about a medical condition or this instruction, always ask your healthcare professional. [place] [address] any warranty or liability for your use of this information.        Patient armband removed and shredded  DISCHARGE SUMMARY from Nurse    PATIENT INSTRUCTIONS:    After general anesthesia or intravenous sedation, for 24 hours or while taking prescription Narcotics:  · Limit your activities  · Do not drive and operate hazardous machinery  · Do not make important personal or business decisions  · Do  not drink alcoholic beverages  · If you have not urinated within 8 hours after discharge, please contact your surgeon on call. Report the following to your surgeon:  · Excessive pain, swelling, redness or odor of or around the surgical area  · Temperature over 100.5  · Nausea and vomiting lasting longer than 4 hours or if unable to take medications  · Any signs of decreased circulation or nerve impairment to extremity: change in color, persistent  numbness, tingling, coldness or increase pain  · Any questions    What to do at Home:  Recommended activity: Activity as tolerated, follow Home Health Physical Therapy guidelines for activity. No weight bearing on ball of foot. If you experience any of the following symptoms persistent fever, nausea, vomiting, diarrhea, please follow up with PCP. *  Please give a list of your current medications to your Primary Care Provider. *  Please update this list whenever your medications are discontinued, doses are      changed, or new medications (including over-the-counter products) are added. *  Please carry medication information at all times in case of emergency situations. These are general instructions for a healthy lifestyle:    No smoking/ No tobacco products/ Avoid exposure to second hand smoke  Surgeon General's Warning:  Quitting smoking now greatly reduces serious risk to your health. Obesity, smoking, and sedentary lifestyle greatly increases your risk for illness    A healthy diet, regular physical exercise & weight monitoring are important for maintaining a healthy lifestyle    You may be retaining fluid if you have a history of heart failure or if you experience any of the following symptoms:  Weight gain of 3 pounds or more overnight or 5 pounds in a week, increased swelling in our hands or feet or shortness of breath while lying flat in bed. Please call your doctor as soon as you notice any of these symptoms; do not wait until your next office visit.         The discharge information has been reviewed with the patient. The patient verbalized understanding. Discharge medications reviewed with the patient and appropriate educational materials and side effects teaching were provided. ___________________________________________________________________________________________________________________________________  MyChart Activation    Thank you for requesting access to [employer]. Please follow the instructions below to securely access and download your online medical record. [employer] allows you to send messages to your doctor, view your test results, renew your prescriptions, schedule appointments, and more. How Do I Sign Up? 1. In your internet browser, go to [email or web address]  2. Click on the First Time User? Click Here link in the Sign In box. You will be redirect to the New Member Sign Up page. 3. Enter your [employer] Access Code exactly as it appears below. You will not need to use this code after youve completed the sign-up process. If you do not sign up before the expiration date, you must request a new code. [employer] Access Code: [email or web address]  Expires: 11/10/2020  9:19 AM (This is the date your [employer] access code will )    4. Enter the last four digits of your Social Security Number (xxxx) and Date of Birth (mm/dd/yyyy) as indicated and click Submit. You will be taken to the next sign-up page. 5. Create a [employer] ID. This will be your [employer] login ID and cannot be changed, so think of one that is secure and easy to remember. 6. Create a [employer] password. You can change your password at any time. 7. Enter your Password Reset Question and Answer. This can be used at a later time if you forget your password. 8. Enter your e-mail address. You will receive e-mail notification when new information is available in [address] 9. Click Sign Up. You can now view and download portions of your medical record.   10. Click the Download Summary menu link to download a portable copy of your medical information. Additional Information    If you have questions, please visit the Frequently Asked Questions section of the [employer] website at [email or web address] Remember, [employer] is NOT to be used for urgent needs. For medical emergencies, dial 911.

## 2020-09-26 NOTE — PROGRESS NOTES
Home health orders sent to [address] [place] [address] and Kranthi  was notified.       ALANA SingletaryN RN  Care Management  Pager: [phone]

## 2020-09-26 NOTE — ROUTINE PROCESS
.Bedside shift change report given to  Vidhi RN (oncoming nurse) by  Marimar Devonte RN (offgoing nurse). Report included the following information SBAR, Kardex, Intake/Output and Recent Results.

## 2020-09-26 NOTE — PROGRESS NOTES
Problem: Diabetes Self-Management  Goal: *Disease process and treatment process  Description: Define diabetes and identify own type of diabetes; list 3 options for treating diabetes. Outcome: Resolved/Met  Goal: *Incorporating nutritional management into lifestyle  Description: Describe effect of type, amount and timing of food on blood glucose; list 3 methods for planning meals. Outcome: Resolved/Met  Goal: *Incorporating physical activity into lifestyle  Description: State effect of exercise on blood glucose levels. Outcome: Resolved/Met  Goal: *Developing strategies to promote health/change behavior  Description: Define the ABC's of diabetes; identify appropriate screenings, schedule and personal plan for screenings. Outcome: Resolved/Met  Goal: *Using medications safely  Description: State effect of diabetes medications on diabetes; name diabetes medication taking, action and side effects. Outcome: Resolved/Met  Goal: *Monitoring blood glucose, interpreting and using results  Description: Identify recommended blood glucose targets  and personal targets. Outcome: Resolved/Met  Goal: *Prevention, detection, treatment of acute complications  Description: List symptoms of hyper- and hypoglycemia; describe how to treat low blood sugar and actions for lowering  high blood glucose level. Outcome: Resolved/Met  Goal: *Prevention, detection and treatment of chronic complications  Description: Define the natural course of diabetes and describe the relationship of blood glucose levels to long term complications of diabetes.   Outcome: Resolved/Met  Goal: *Developing strategies to address psychosocial issues  Description: Describe feelings about living with diabetes; identify support needed and support network  Outcome: Resolved/Met  Goal: *Insulin pump training  Outcome: Resolved/Met  Goal: *Sick day guidelines  Outcome: Resolved/Met  Goal: *Patient Specific Goal (EDIT GOAL, INSERT TEXT)  Outcome: Resolved/Met Problem: Patient Education: Go to Patient Education Activity  Goal: Patient/Family Education  Outcome: Resolved/Met

## 2020-09-26 NOTE — PROGRESS NOTES
Progress Note    Patient: Tobi Melara MRN: [id number]  SSN: [id number]    YOB: 1960  Age: 61 y.o. Sex: male      Admit Date: 9/24/2020  1 Day Post-Op     Procedure:   Procedure(s):  LEFT FOOT PARTIAL SECOND TOE AMPUTATION    Subjective:     Patient seen resting quiet and comfortably and no apparent distress. Patient reports no pain to surgical site. Patient wants to go home. He denies N/V/C/F. Status post: osteomyelitis    Objective:     Visit Vitals  BP (!) 100/59 (BP 1 Location: Right arm, BP Patient Position: At rest)   Pulse 67   Temp 98.4 °F (36.9 °C)   Resp 19   Ht 5' 9\" (1.753 m)   Wt 111.1 kg (245 lb)   SpO2 93%   BMI 36.18 kg/m²        Physical Exam:  Bilateral EDILSON: palpable 2/4 DP and PT pulses, 2+ pitting edema to left lower extremity, left 2nd toe partial amputation site intact, skin edges well approximated, skin sutures intact, no active bleeding noted. Right great toe full thickness ulcer measure 2 x 1 x 0.3 cm with fibrous base and maceration. No purulence, edema or erythema noted. Diminished protective sensation noted to both feet. Labs/Radiology Review: images and reports reviewed    Assessment:     Hospital Problems  Date Reviewed: 12/24/2019          Codes Class Noted POA    Fracture, toe ICD-10-CM: V10.291J  ICD-9-CM: 826.0  9/24/2020 Unknown              Plan/Recommendations/Medical Decision Making:     Right foot x-rays reviewed. No acute bony changes noted. No soft tissue gas noted. Hallux malleus deformity with hammer toes 2-5 noted. - Rec local wound care to right great toe with iodosorb gel and dry gauze. Needs to monitor right great toe ulcer closely. Dress with dry gauze to left 2nd toe partial amputation site. - Remain non-weightbearing to left forefoot. PT/OT consulted. Dispense surgical shoe for both feet. Right great toe ulcer also needs to be offloaded dorsally. - f/u OR micro/path. - Patient is requesting to be discharged today.  I explained to him that he needs to wait for OR culture and wait for ID recommendation. However, he wants to go home and would like to f/u OR culture outpatient. I will see him in office on Tuesday morning. Will consult Dr. Chanelle Donovan from [facility] who will make recommendation once OR culture is back. We will discharge him on PO abxs for now. Spoke with Dr. Octavio Jones, he agrees with plan. Medically patient is stable to be discharged.

## 2020-09-26 NOTE — PROGRESS NOTES
Occupational Therapy Note:  Orders received, chart reviewed and OT evaluation attempted and this patient was wearing bilateral surgical shoes and was not receptive to instructions on being non weight bearing to his L forefoot stating \"I'm fine\" will defer his walking NWB to his forefoot to PT. He is dressing himself and demonstrating independence donning his surgical shoes.  Will sign off as per this patient's request. Sarmad Nguyen OTR/L

## 2020-09-26 NOTE — OP NOTES
Operative Report     Patient: Guillermo Florence MRN: [id number]  SSN: [id number]    YOB: 1960  Age: 61 y.o. Sex: male       Indications: The patient was admitted to the hospital for left second toe diabetic ulcer probing deep to bone with draining purulence. Patient was sent to our office from PCP for ulcer. Patient needed partial amputation of infected toe. Patient also has full thickness ulcer down to subcutaneous tissue on right hallux however it appears stable at this time. All risks, benefits, complications of procedure discussed in detail with patient. Complications discussed including but not limited to delayed healing, non-healing of tissue, requirement of additional surgery, proximal amputation, loss of limb. No guarantee made to outcome of procedure. Patient voiced understanding and signed consent. Date of Surgery: 9/25/2020     Preoperative Diagnosis:  OSTEOMYELITIS OF LEFT SECOND TOE DISTAL PHALANX    Postoperative Diagnosis: OSTEOMYELITIS OF LEFT SECOND TOE DISTAL PHALANX    Surgeon(s) and Role:     * Kaela Pugh DPM - Primary      Surgical Assistants: Operating Room Staff    Anesthesia: MAC with Local    Procedure:  Procedure(s):  LEFT FOOT PARTIAL SECOND TOE AMPUTATION    Procedure in Detail:     The patient was brought to the operative room and kept on hospital stretcher in supine position. After IV sedation from department of anesthesia, local block consisting of 10 cc of 1:1 mixture of 1% lidocaine plain and 0.5% marcaine plain administered at digital block to left 2nd toe. Left foot prepped and draped in usual sterile fashion. Attention then directed to left 2nd toe where fish-mouth incision performed at level of PIPJ. Incision deepened to level of bone dorsally and plantarly. Toe was held with towel clamp and disarticulated at PIPJ. Gross specimen sent to histology in formalin container. Also swab culture obtained and sent for aerobic anaerobic analysis.  Extensor and flexor digitorum longus tendons transected as proximal as possible. Surgical site irrigated using pulse lavage with 1000 mL of NSS mixed with  antibiotic. Using bone rongeur, clean margin obtained from proximal phalanx head and sent to histology. Redundant soft tissue resected with # 15 blade as necessary. Skin edges approximated with 4-0 prolene suture using simple interrupted technique. Surgical site dressed with betadine soaked adaptic, 4 x 4, kerlex. The patient tolerated the procedure and anesthesia well. The patient was sent to the recovery room in apparent satisfactory condition. Capillary filling time noted to be intact in remaining digits. Findings: Left 2nd toe distal phalanx noted to be soft and brittle.     Estimated Blood Loss:  Minimal    Drains: None           Specimens:   ID Type Source Tests Collected by Time Destination   1 : LEFT SECOND TOE  Preservative Toe  Tess Javier, [facility] 9/25/2020 1836 Pathology   2 : LEFT SECOND TOE , CLEAN MARGIN Preservative Toe  Zachary Pena, [facility] 9/25/2020 1836 Pathology   1 : Aerobic , Anaerobic C/S  OF LEFT SECOND TOE Wound  CULTURE, ANAEROBIC, CULTURE, WOUND W GRAM STAIN, CULTURE, Shannon Garibay [facility] 9/25/2020 1838 Microbiology               Implants:  * No implants in log *           Complications:  None

## 2020-09-27 ENCOUNTER — HOME CARE VISIT (OUTPATIENT)
Dept: SCHEDULING | Facility: HOME HEALTH | Age: 60
End: 2020-09-27

## 2020-09-28 LAB
BACTERIA SPEC CULT: ABNORMAL
BACTERIA SPEC CULT: ABNORMAL
SERVICE CMNT-IMP: ABNORMAL

## 2020-09-28 RX ORDER — MIDAZOLAM HYDROCHLORIDE 1 MG/ML
INJECTION, SOLUTION INTRAMUSCULAR; INTRAVENOUS AS NEEDED
Status: DISCONTINUED | OUTPATIENT
Start: 2020-09-25 | End: 2020-09-28 | Stop reason: HOSPADM

## 2020-09-28 NOTE — ADDENDUM NOTE
Addendum  created 09/28/20 1408 by Gina Fontaine, MARKIE    Intraprocedure Meds edited, Orders acknowledged in Narrator

## 2020-09-29 LAB
BACTERIA SPEC CULT: ABNORMAL
GRAM STN SPEC: ABNORMAL
GRAM STN SPEC: ABNORMAL
SERVICE CMNT-IMP: ABNORMAL

## 2020-09-29 RX ORDER — BUPIVACAINE HYDROCHLORIDE 2.5 MG/ML
INJECTION, SOLUTION EPIDURAL; INFILTRATION; INTRACAUDAL
Status: DISCONTINUED
Start: 2020-09-29 | End: 2020-09-29 | Stop reason: WASHOUT

## 2020-09-29 RX ORDER — HEPARIN SODIUM 200 [USP'U]/100ML
INJECTION, SOLUTION INTRAVENOUS
Status: DISCONTINUED
Start: 2020-09-29 | End: 2020-09-29 | Stop reason: WASHOUT

## 2020-09-29 RX ORDER — LIDOCAINE HYDROCHLORIDE 10 MG/ML
INJECTION, SOLUTION EPIDURAL; INFILTRATION; INTRACAUDAL; PERINEURAL
Status: DISCONTINUED
Start: 2020-09-29 | End: 2020-09-29 | Stop reason: WASHOUT

## 2020-10-12 NOTE — DISCHARGE SUMMARY
Discharge Summary    Patient: Shruthi Magana MRN: [id number]  CSN: [id number]    YOB: 1960  Age: 61 y.o. Sex: male    DOA: 9/24/2020 LOS:  LOS: 2 days   Discharge Date: 9/26/2020     Admission Diagnoses: Fracture, toe [S92.919A]    Discharge Diagnoses:    Diabetic foot ulcer, L 2nd toe with suspected underlying osteomyelitis status post left second toe amputation  Fever, resolved  SHYLA  Hyponatremia  Anemia  Uncontrolled DM 2 with hyperglycemia, A1c 12.1%  HTN  HLD  GERD  CAD with hx MI  Obesity  Sepsis, ruled out     Discharge Condition: Stable    PHYSICAL EXAM  Visit Vitals  /64 (BP 1 Location: Right arm, BP Patient Position: At rest)   Pulse 63   Temp 98.3 °F (36.8 °C)   Resp 20   Ht 5' 9\" (1.753 m)   Wt 111.1 kg (245 lb)   SpO2 92%   BMI 36.18 kg/m²       General: In NAD. Nontoxic-appearing. HEENT: NCAT. Sclerae anicteric, EOMI. OP clear. Lungs:  Clear, no wheezes. No accessory muscle use. Heart:  RRR. Abdomen: Soft, NT/ND. Extremities: Warm, no  ischemia. Psych:   Mood normal.  Neurologic:  Awake and alert, moves extremities spontaneously. Motor grossly nonfocal.    Hospital Course:  See admission H&P for full details of HPI. Patient was sent from his podiatrist office to the emergency department for evaluation due to diabetic foot ulcer. He subsequently was recommended for left second toe amputation which he underwent without complication. Patient feels well and per discussion with podiatry all infected tissue was removed. Patient is medically stable for discharge home with outpatient podiatry follow-up as advised. Wound cultures are still pending and will be followed up upon patient's outpatient podiatry visit. Consults:   Podiatry    Significant Diagnostic Studies/procedures:  Right foot x-ray:  IMPRESSION:     1.  Persistent soft tissue ulceration. No radiographic evidence of  osteomyelitis. 2.  Calcaneal spurs.       Discharge Medications:     Discharge Medication List as of 9/26/2020  2:23 PM      START taking these medications    Details   amoxicillin-clavulanate (Augmentin) 875-125 mg per tablet Take 1 Tab by mouth two (2) times a day., Normal, Disp-20 Tab,R-0      HYDROcodone-acetaminophen (NORCO) 5-325 mg per tablet Take 1 Tab by mouth every four (4) hours as needed for Pain for up to 3 days. Max Daily Amount: 6 Tabs., Print, Disp-12 Tab,R-0         CONTINUE these medications which have NOT CHANGED    Details   traZODone (DESYREL) 50 mg tablet Take 1 Tab by mouth nightly as needed for Sleep for up to 30 days. , Normal, Disp-30 Tab,R-0      Lactobacillus Acidoph & Bulgar (FLORANEX) 1 million cell tab tablet Take 2 Tabs by mouth two (2) times a day for 30 days. , Normal, Disp-120 Tab,R-0      methadone HCl (METHADONE PO) Take  by mouth., Historical Med      metoprolol tartrate (LOPRESSOR) 25 mg tablet Take 1 Tab by mouth every twelve (12) hours. , Print, Disp-60 Tab, R-0      insulin detemir U-100 (LEVEMIR U-100 INSULIN) 100 unit/mL injection 20 Units by SubCUTAneous route two (2) times a day., No Print, Disp-10 mL, R-0      pantoprazole (PROTONIX) 40 mg tablet Take 1 Tab by mouth Before breakfast and dinner., Print, Disp-60 Tab, R-0      acetaminophen (TYLENOL) 325 mg tablet Take 2 Tabs by mouth every six (6) hours as needed. Indications: Pain, take it with bentyl; do not exceed 3 g tylenol daily, No Print, Disp-30 Tab, R-0      albuterol (PROVENTIL HFA, VENTOLIN HFA, PROAIR HFA) 90 mcg/actuation inhaler 2 puffs every 6 hours x 5 days then every 6 hours as needed for shortness of breath and/or wheezing, Print, Disp-1 Inhaler, R-0      polyethylene glycol (MIRALAX) 17 gram packet Take 1 Packet by mouth daily. , Print, Disp-30 Packet, R-0      simvastatin (ZOCOR) 10 mg tablet Take 10 mg by mouth nightly., Historical Med      clopidogrel (PLAVIX) 75 mg tablet Take 75 mg by mouth daily. , Historical Med         STOP taking these medications       ondansetron (ZOFRAN ODT) 4 mg disintegrating tablet Comments:   Reason for Stopping:         OTHER Comments:   Reason for Stopping:         OTHER Comments:   Reason for Stopping:         OTHER Comments:   Reason for Stopping:         OTHER Comments:   Reason for Stopping:                 Activity: As tolerated. Diet: Cardiac Diet and Diabetic Diet    Disposition:     Follow-up: with PCP in 1 week and podiatry as advised.       Francisco Chand MD

## 2020-10-26 ENCOUNTER — TRANSCRIBE ORDER (OUTPATIENT)
Dept: SCHEDULING | Age: 60
End: 2020-10-26

## 2020-10-26 DIAGNOSIS — M86.172 ACUTE OSTEOMYELITIS OF LEFT ANKLE OR FOOT (HCC): Primary | ICD-10-CM

## 2020-10-26 LAB
BACTERIA SPEC CULT: NORMAL
SERVICE CMNT-IMP: NORMAL

## 2020-11-16 ENCOUNTER — HOSPITAL ENCOUNTER (OUTPATIENT)
Age: 60
Discharge: HOME | End: 2020-11-16
Attending: PODIATRIST
Payer: MEDICAID

## 2020-11-16 DIAGNOSIS — M86.172 ACUTE OSTEOMYELITIS OF LEFT ANKLE OR FOOT (HCC): ICD-10-CM

## 2020-11-16 LAB — CREAT UR-MCNC: 1 MG/DL (ref 0.6–1.3)

## 2020-11-16 PROCEDURE — 74011636320 HC RX REV CODE- 636/320: Performed by: PODIATRIST

## 2020-11-16 PROCEDURE — 73720 MRI LWR EXTREMITY W/O&W/DYE: CPT

## 2020-11-16 PROCEDURE — 82565 ASSAY OF CREATININE: CPT

## 2020-11-16 PROCEDURE — A9575 INJ GADOTERATE MEGLUMI 0.1ML: HCPCS | Performed by: PODIATRIST

## 2020-11-16 RX ADMIN — GADOTERATE MEGLUMINE 24 ML: 376.9 INJECTION INTRAVENOUS at 22:00

## 2020-11-22 ENCOUNTER — HOSPITAL ENCOUNTER (INPATIENT)
Age: 60
LOS: 3 days | Discharge: HOME | DRG: 314 | End: 2020-11-25
Attending: EMERGENCY MEDICINE | Admitting: FAMILY MEDICINE
Payer: MEDICAID

## 2020-11-22 ENCOUNTER — APPOINTMENT (OUTPATIENT)
Dept: GENERAL RADIOLOGY | Age: 60
DRG: 314 | End: 2020-11-22
Attending: STUDENT IN AN ORGANIZED HEALTH CARE EDUCATION/TRAINING PROGRAM
Payer: MEDICAID

## 2020-11-22 DIAGNOSIS — M86.9 OSTEOMYELITIS OF OTHER SITE, UNSPECIFIED TYPE (HCC): Primary | ICD-10-CM

## 2020-11-22 LAB
ANION GAP SERPL CALC-SCNC: 5 MMOL/L (ref 3–18)
APTT PPP: 28.3 SEC (ref 23–36.4)
BASOPHILS # BLD: 0 K/UL (ref 0–0.1)
BASOPHILS NFR BLD: 0 % (ref 0–2)
BUN SERPL-MCNC: 25 MG/DL (ref 7–18)
BUN/CREAT SERPL: 14 (ref 12–20)
CALCIUM SERPL-MCNC: 9 MG/DL (ref 8.5–10.1)
CHLORIDE SERPL-SCNC: 99 MMOL/L (ref 100–111)
CO2 SERPL-SCNC: 32 MMOL/L (ref 21–32)
CREAT SERPL-MCNC: 1.83 MG/DL (ref 0.6–1.3)
DIFFERENTIAL METHOD BLD: ABNORMAL
EOSINOPHIL # BLD: 0.3 K/UL (ref 0–0.4)
EOSINOPHIL NFR BLD: 4 % (ref 0–5)
ERYTHROCYTE [DISTWIDTH] IN BLOOD BY AUTOMATED COUNT: 13.2 % (ref 11.6–14.5)
GLUCOSE SERPL-MCNC: 330 MG/DL (ref 74–99)
HCT VFR BLD AUTO: 35.2 % (ref 36–48)
HGB BLD-MCNC: 11.2 G/DL (ref 13–16)
INR PPP: 1.1 (ref 0.8–1.2)
LACTATE BLD-SCNC: 1.82 MMOL/L (ref 0.4–2)
LYMPHOCYTES # BLD: 2.5 K/UL (ref 0.9–3.6)
LYMPHOCYTES NFR BLD: 27 % (ref 21–52)
MCH RBC QN AUTO: 29.2 PG (ref 24–34)
MCHC RBC AUTO-ENTMCNC: 31.8 G/DL (ref 31–37)
MCV RBC AUTO: 91.9 FL (ref 74–97)
MONOCYTES # BLD: 0.8 K/UL (ref 0.05–1.2)
MONOCYTES NFR BLD: 8 % (ref 3–10)
NEUTS SEG # BLD: 5.7 K/UL (ref 1.8–8)
NEUTS SEG NFR BLD: 61 % (ref 40–73)
PLATELET # BLD AUTO: 248 K/UL (ref 135–420)
PMV BLD AUTO: 10.9 FL (ref 9.2–11.8)
POTASSIUM SERPL-SCNC: 3.7 MMOL/L (ref 3.5–5.5)
PROTHROMBIN TIME: 13.8 SEC (ref 11.5–15.2)
RBC # BLD AUTO: 3.83 M/UL (ref 4.7–5.5)
SODIUM SERPL-SCNC: 136 MMOL/L (ref 136–145)
WBC # BLD AUTO: 9.3 K/UL (ref 4.6–13.2)

## 2020-11-22 PROCEDURE — 99283 EMERGENCY DEPT VISIT LOW MDM: CPT

## 2020-11-22 PROCEDURE — 87040 BLOOD CULTURE FOR BACTERIA: CPT

## 2020-11-22 PROCEDURE — 85730 THROMBOPLASTIN TIME PARTIAL: CPT

## 2020-11-22 PROCEDURE — 71045 X-RAY EXAM CHEST 1 VIEW: CPT

## 2020-11-22 PROCEDURE — 65270000029 HC RM PRIVATE

## 2020-11-22 PROCEDURE — 93005 ELECTROCARDIOGRAM TRACING: CPT

## 2020-11-22 PROCEDURE — 99223 1ST HOSP IP/OBS HIGH 75: CPT | Performed by: FAMILY MEDICINE

## 2020-11-22 PROCEDURE — 80048 BASIC METABOLIC PNL TOTAL CA: CPT

## 2020-11-22 PROCEDURE — 85610 PROTHROMBIN TIME: CPT

## 2020-11-22 PROCEDURE — 83605 ASSAY OF LACTIC ACID: CPT

## 2020-11-22 PROCEDURE — 85025 COMPLETE CBC W/AUTO DIFF WBC: CPT

## 2020-11-22 RX ORDER — SODIUM CHLORIDE, SODIUM LACTATE, POTASSIUM CHLORIDE, CALCIUM CHLORIDE 600; 310; 30; 20 MG/100ML; MG/100ML; MG/100ML; MG/100ML
100 INJECTION, SOLUTION INTRAVENOUS CONTINUOUS
Status: DISCONTINUED | OUTPATIENT
Start: 2020-11-22 | End: 2020-11-25 | Stop reason: HOSPADM

## 2020-11-23 LAB
ANION GAP SERPL CALC-SCNC: 5 MMOL/L (ref 3–18)
ATRIAL RATE: 74 BPM
BUN SERPL-MCNC: 22 MG/DL (ref 7–18)
BUN/CREAT SERPL: 14 (ref 12–20)
CALCIUM SERPL-MCNC: 8.6 MG/DL (ref 8.5–10.1)
CALCULATED P AXIS, ECG09: 83 DEGREES
CALCULATED R AXIS, ECG10: -5 DEGREES
CALCULATED T AXIS, ECG11: 29 DEGREES
CHLORIDE SERPL-SCNC: 102 MMOL/L (ref 100–111)
CO2 SERPL-SCNC: 29 MMOL/L (ref 21–32)
CREAT SERPL-MCNC: 1.62 MG/DL (ref 0.6–1.3)
DIAGNOSIS, 93000: NORMAL
ERYTHROCYTE [DISTWIDTH] IN BLOOD BY AUTOMATED COUNT: 13.3 % (ref 11.6–14.5)
EST. AVERAGE GLUCOSE BLD GHB EST-MCNC: 209 MG/DL
GLUCOSE BLD STRIP.AUTO-MCNC: 212 MG/DL (ref 70–110)
GLUCOSE BLD STRIP.AUTO-MCNC: 281 MG/DL (ref 70–110)
GLUCOSE BLD STRIP.AUTO-MCNC: 304 MG/DL (ref 70–110)
GLUCOSE BLD STRIP.AUTO-MCNC: 324 MG/DL (ref 70–110)
GLUCOSE SERPL-MCNC: 301 MG/DL (ref 74–99)
HBA1C MFR BLD: 8.9 % (ref 4.2–5.6)
HCT VFR BLD AUTO: 34.4 % (ref 36–48)
HGB BLD-MCNC: 10.6 G/DL (ref 13–16)
MCH RBC QN AUTO: 28.9 PG (ref 24–34)
MCHC RBC AUTO-ENTMCNC: 30.8 G/DL (ref 31–37)
MCV RBC AUTO: 93.7 FL (ref 74–97)
P-R INTERVAL, ECG05: 224 MS
PLATELET # BLD AUTO: 217 K/UL (ref 135–420)
PMV BLD AUTO: 10.9 FL (ref 9.2–11.8)
POTASSIUM SERPL-SCNC: 3.5 MMOL/L (ref 3.5–5.5)
Q-T INTERVAL, ECG07: 362 MS
QRS DURATION, ECG06: 78 MS
QTC CALCULATION (BEZET), ECG08: 401 MS
RBC # BLD AUTO: 3.67 M/UL (ref 4.7–5.5)
SODIUM SERPL-SCNC: 136 MMOL/L (ref 136–145)
VENTRICULAR RATE, ECG03: 74 BPM
WBC # BLD AUTO: 9.3 K/UL (ref 4.6–13.2)

## 2020-11-23 PROCEDURE — 80048 BASIC METABOLIC PNL TOTAL CA: CPT

## 2020-11-23 PROCEDURE — 2709999900 HC NON-CHARGEABLE SUPPLY

## 2020-11-23 PROCEDURE — 74011250636 HC RX REV CODE- 250/636: Performed by: FAMILY MEDICINE

## 2020-11-23 PROCEDURE — 83036 HEMOGLOBIN GLYCOSYLATED A1C: CPT

## 2020-11-23 PROCEDURE — 36415 COLL VENOUS BLD VENIPUNCTURE: CPT

## 2020-11-23 PROCEDURE — 65270000029 HC RM PRIVATE

## 2020-11-23 PROCEDURE — 74011250637 HC RX REV CODE- 250/637: Performed by: FAMILY MEDICINE

## 2020-11-23 PROCEDURE — 74011636637 HC RX REV CODE- 636/637: Performed by: FAMILY MEDICINE

## 2020-11-23 PROCEDURE — 99232 SBSQ HOSP IP/OBS MODERATE 35: CPT | Performed by: HOSPITALIST

## 2020-11-23 PROCEDURE — 82962 GLUCOSE BLOOD TEST: CPT

## 2020-11-23 PROCEDURE — 85027 COMPLETE CBC AUTOMATED: CPT

## 2020-11-23 RX ORDER — BISACODYL 5 MG
10 TABLET, DELAYED RELEASE (ENTERIC COATED) ORAL DAILY PRN
Status: DISCONTINUED | OUTPATIENT
Start: 2020-11-23 | End: 2020-11-25 | Stop reason: HOSPADM

## 2020-11-23 RX ORDER — ACETAMINOPHEN 650 MG/1
650 SUPPOSITORY RECTAL
Status: DISCONTINUED | OUTPATIENT
Start: 2020-11-23 | End: 2020-11-25 | Stop reason: HOSPADM

## 2020-11-23 RX ORDER — INSULIN LISPRO 100 [IU]/ML
50 INJECTION, SOLUTION INTRAVENOUS; SUBCUTANEOUS
COMMUNITY
End: 2021-02-09

## 2020-11-23 RX ORDER — ATORVASTATIN CALCIUM 10 MG/1
10 TABLET, FILM COATED ORAL
Status: DISCONTINUED | OUTPATIENT
Start: 2020-11-23 | End: 2020-11-25 | Stop reason: HOSPADM

## 2020-11-23 RX ORDER — INSULIN GLARGINE 100 [IU]/ML
20 INJECTION, SOLUTION SUBCUTANEOUS 2 TIMES DAILY
Status: DISCONTINUED | OUTPATIENT
Start: 2020-11-23 | End: 2020-11-25 | Stop reason: HOSPADM

## 2020-11-23 RX ORDER — ENOXAPARIN SODIUM 100 MG/ML
40 INJECTION SUBCUTANEOUS DAILY
Status: DISCONTINUED | OUTPATIENT
Start: 2020-11-23 | End: 2020-11-25 | Stop reason: HOSPADM

## 2020-11-23 RX ORDER — INSULIN LISPRO 100 [IU]/ML
INJECTION, SOLUTION INTRAVENOUS; SUBCUTANEOUS
Status: DISCONTINUED | OUTPATIENT
Start: 2020-11-23 | End: 2020-11-25 | Stop reason: HOSPADM

## 2020-11-23 RX ORDER — ACETAMINOPHEN 325 MG/1
650 TABLET ORAL
Status: DISCONTINUED | OUTPATIENT
Start: 2020-11-23 | End: 2020-11-25 | Stop reason: HOSPADM

## 2020-11-23 RX ORDER — SODIUM CHLORIDE 0.9 % (FLUSH) 0.9 %
5-40 SYRINGE (ML) INJECTION AS NEEDED
Status: DISCONTINUED | OUTPATIENT
Start: 2020-11-23 | End: 2020-11-25 | Stop reason: HOSPADM

## 2020-11-23 RX ORDER — POLYETHYLENE GLYCOL 3350 17 G/17G
17 POWDER, FOR SOLUTION ORAL DAILY
Status: DISCONTINUED | OUTPATIENT
Start: 2020-11-23 | End: 2020-11-25 | Stop reason: HOSPADM

## 2020-11-23 RX ORDER — METOPROLOL TARTRATE 25 MG/1
25 TABLET, FILM COATED ORAL EVERY 12 HOURS
Status: DISCONTINUED | OUTPATIENT
Start: 2020-11-23 | End: 2020-11-25 | Stop reason: HOSPADM

## 2020-11-23 RX ORDER — ONDANSETRON 2 MG/ML
4 INJECTION INTRAMUSCULAR; INTRAVENOUS
Status: DISCONTINUED | OUTPATIENT
Start: 2020-11-23 | End: 2020-11-25 | Stop reason: HOSPADM

## 2020-11-23 RX ORDER — SODIUM CHLORIDE 0.9 % (FLUSH) 0.9 %
5-40 SYRINGE (ML) INJECTION EVERY 8 HOURS
Status: DISCONTINUED | OUTPATIENT
Start: 2020-11-23 | End: 2020-11-25 | Stop reason: HOSPADM

## 2020-11-23 RX ORDER — OXYCODONE AND ACETAMINOPHEN 5; 325 MG/1; MG/1
1-2 TABLET ORAL
Status: DISCONTINUED | OUTPATIENT
Start: 2020-11-23 | End: 2020-11-25 | Stop reason: HOSPADM

## 2020-11-23 RX ORDER — PANTOPRAZOLE SODIUM 40 MG/1
40 TABLET, DELAYED RELEASE ORAL
Status: DISCONTINUED | OUTPATIENT
Start: 2020-11-23 | End: 2020-11-25 | Stop reason: HOSPADM

## 2020-11-23 RX ORDER — IPRATROPIUM BROMIDE AND ALBUTEROL SULFATE 2.5; .5 MG/3ML; MG/3ML
3 SOLUTION RESPIRATORY (INHALATION)
Status: DISCONTINUED | OUTPATIENT
Start: 2020-11-23 | End: 2020-11-25 | Stop reason: HOSPADM

## 2020-11-23 RX ORDER — DEXTROSE 50 % IN WATER (D50W) INTRAVENOUS SYRINGE
25-50 AS NEEDED
Status: DISCONTINUED | OUTPATIENT
Start: 2020-11-23 | End: 2020-11-25 | Stop reason: HOSPADM

## 2020-11-23 RX ORDER — MAGNESIUM SULFATE 100 %
4 CRYSTALS MISCELLANEOUS AS NEEDED
Status: DISCONTINUED | OUTPATIENT
Start: 2020-11-23 | End: 2020-11-25 | Stop reason: HOSPADM

## 2020-11-23 RX ADMIN — ATORVASTATIN CALCIUM 10 MG: 10 TABLET, FILM COATED ORAL at 03:18

## 2020-11-23 RX ADMIN — ATORVASTATIN CALCIUM 10 MG: 10 TABLET, FILM COATED ORAL at 22:01

## 2020-11-23 RX ADMIN — METOPROLOL TARTRATE 25 MG: 25 TABLET, FILM COATED ORAL at 22:01

## 2020-11-23 RX ADMIN — SODIUM CHLORIDE, SODIUM LACTATE, POTASSIUM CHLORIDE, AND CALCIUM CHLORIDE 100 ML/HR: 600; 310; 30; 20 INJECTION, SOLUTION INTRAVENOUS at 13:55

## 2020-11-23 RX ADMIN — INSULIN LISPRO 12 UNITS: 100 INJECTION, SOLUTION INTRAVENOUS; SUBCUTANEOUS at 07:30

## 2020-11-23 RX ADMIN — Medication 10 ML: at 15:57

## 2020-11-23 RX ADMIN — INSULIN GLARGINE 20 UNITS: 100 INJECTION, SOLUTION SUBCUTANEOUS at 18:35

## 2020-11-23 RX ADMIN — INSULIN LISPRO 9 UNITS: 100 INJECTION, SOLUTION INTRAVENOUS; SUBCUTANEOUS at 12:04

## 2020-11-23 RX ADMIN — OXYCODONE HYDROCHLORIDE AND ACETAMINOPHEN 2 TABLET: 5; 325 TABLET ORAL at 15:51

## 2020-11-23 RX ADMIN — ENOXAPARIN SODIUM 40 MG: 40 INJECTION SUBCUTANEOUS at 15:52

## 2020-11-23 RX ADMIN — INSULIN GLARGINE 20 UNITS: 100 INJECTION, SOLUTION SUBCUTANEOUS at 09:21

## 2020-11-23 RX ADMIN — INSULIN LISPRO 6 UNITS: 100 INJECTION, SOLUTION INTRAVENOUS; SUBCUTANEOUS at 22:00

## 2020-11-23 RX ADMIN — PANTOPRAZOLE SODIUM 40 MG: 40 TABLET, DELAYED RELEASE ORAL at 06:47

## 2020-11-23 RX ADMIN — INSULIN LISPRO 12 UNITS: 100 INJECTION, SOLUTION INTRAVENOUS; SUBCUTANEOUS at 15:53

## 2020-11-23 RX ADMIN — OXYCODONE HYDROCHLORIDE AND ACETAMINOPHEN 2 TABLET: 5; 325 TABLET ORAL at 20:15

## 2020-11-23 RX ADMIN — ACETAMINOPHEN 650 MG: 325 TABLET ORAL at 03:18

## 2020-11-23 RX ADMIN — OXYCODONE HYDROCHLORIDE AND ACETAMINOPHEN 2 TABLET: 5; 325 TABLET ORAL at 04:18

## 2020-11-23 RX ADMIN — PANTOPRAZOLE SODIUM 40 MG: 40 TABLET, DELAYED RELEASE ORAL at 15:51

## 2020-11-23 RX ADMIN — METOPROLOL TARTRATE 25 MG: 25 TABLET, FILM COATED ORAL at 09:21

## 2020-11-23 RX ADMIN — OXYCODONE HYDROCHLORIDE AND ACETAMINOPHEN 2 TABLET: 5; 325 TABLET ORAL at 10:36

## 2020-11-23 RX ADMIN — Medication 10 ML: at 22:00

## 2020-11-23 RX ADMIN — SODIUM CHLORIDE, SODIUM LACTATE, POTASSIUM CHLORIDE, AND CALCIUM CHLORIDE 100 ML/HR: 600; 310; 30; 20 INJECTION, SOLUTION INTRAVENOUS at 03:18

## 2020-11-23 RX ADMIN — Medication 5 ML: at 06:00

## 2020-11-23 NOTE — PROGRESS NOTES
Problem: Falls - Risk of  Goal: *Absence of Falls  Description: Document Ruy Tommyshayla Fall Risk and appropriate interventions in the flowsheet.   Outcome: Progressing Towards Goal  Note: Fall Risk Interventions:     Medication Interventions: Teach patient to arise slowly    Problem: Patient Education: Go to Patient Education Activity  Goal: Patient/Family Education  Outcome: Progressing Towards Goal

## 2020-11-23 NOTE — PROGRESS NOTES
Bedside and Verbal shift change report given to Kaia Baker by [facility]. Report included information from the following: SBAR, Kardex, and MAR.

## 2020-11-23 NOTE — ED PROVIDER NOTES
EMERGENCY DEPARTMENT HISTORY AND PHYSICAL EXAM    7:28 PM      Date: 11/22/2020  Patient Name: Graciela Altamirano    History of Presenting Illness     Chief Complaint   Patient presents with    Toe Pain         History Provided By: Patient  Location/Duration/Severity/Modifying factors   HPI     80-year-old male with past medical history of diabetes, CAD with stent placement 12/09, osteomyelitis of the second left toe with subsequent surgery, presenting with confirmed osteomyelitis of the right large toe via MRI. Patient was instructed by Dr. Tony Bean to go to the ED for admission in prep for surgery tomorrow. Patient currently denies new onset headache, nausea vomiting, fevers, diarrhea, abdominal pain, new rashes, night sweats, dysuria, hematuria. No other review of systems are positive. PCP: Darya Johnson MD    Current Outpatient Medications   Medication Sig Dispense Refill    amoxicillin-clavulanate (Augmentin) 875-125 mg per tablet Take 1 Tab by mouth two (2) times a day. 20 Tab 0    methadone HCl (METHADONE PO) Take  by mouth.  metoprolol tartrate (LOPRESSOR) 25 mg tablet Take 1 Tab by mouth every twelve (12) hours. 60 Tab 0    insulin detemir U-100 (LEVEMIR U-100 INSULIN) 100 unit/mL injection 20 Units by SubCUTAneous route two (2) times a day. 10 mL 0    pantoprazole (PROTONIX) 40 mg tablet Take 1 Tab by mouth Before breakfast and dinner. 60 Tab 0    acetaminophen (TYLENOL) 325 mg tablet Take 2 Tabs by mouth every six (6) hours as needed. Indications: Pain, take it with bentyl; do not exceed 3 g tylenol daily 30 Tab 0    albuterol (PROVENTIL HFA, VENTOLIN HFA, PROAIR HFA) 90 mcg/actuation inhaler 2 puffs every 6 hours x 5 days then every 6 hours as needed for shortness of breath and/or wheezing 1 Inhaler 0    polyethylene glycol (MIRALAX) 17 gram packet Take 1 Packet by mouth daily. 30 Packet 0    simvastatin (ZOCOR) 10 mg tablet Take 10 mg by mouth nightly.       clopidogrel (PLAVIX) 75 mg tablet Take 75 mg by mouth daily. Past History     Past Medical History:  Past Medical History:   Diagnosis Date    Arthritis     Coronary atherosclerosis of native coronary artery     S/P PCI with GE in 12/09    Diabetes ([facility]     IDDM    Electrolyte and fluid disorders not elsewhere classified     Essential hypertension, benign     GERD (gastroesophageal reflux disease)     Heroin abuse ([facility] 05/26/16    Psychiatrist Dr. Rosetta Churchill History of heart attack     Hyperlipidemia     Intermediate coronary syndrome [facility])     MDD (major depressive disorder) 5/26/16    Psychiatrist Dr. Rosetta Churchill Myocardial infarction [facility])     Obesity, unspecified     Old myocardial infarction     Other and unspecified hyperlipidemia     Pancreatitis     Positive PPD     Sleep apnea     uses Cpap machine    Transfusion history     Before 1992       Past Surgical History:  Past Surgical History:   Procedure Laterality Date    HX APPENDECTOMY      HX CORONARY STENT PLACEMENT  2010    2 stents       Family History:  Family History   Problem Relation Age of Onset    Heart Attack Father     Coronary Artery Disease Mother     Diabetes Mother     Cancer Sister         breast cancer and lung cancer       Social History:  Social History     Tobacco Use    Smoking status: Never Smoker    Smokeless tobacco: Never Used   Substance Use Topics    Alcohol use: No    Drug use: No       Allergies: Allergies   Allergen Reactions    Compazine [Prochlorperazine] Anaphylaxis     \"Tightness around throat\"         Review of Systems     Review of Systems   Constitutional: Negative for diaphoresis, fatigue and fever. Gastrointestinal: Negative for abdominal pain, diarrhea, nausea and vomiting. Genitourinary: Negative for dysuria. Musculoskeletal:        Right large toe osteomyelitis   Neurological: Negative for dizziness, light-headedness and headaches.          Physical Exam     Visit Vitals  /74 (BP 1 Location: Left arm, BP Patient Position: Sitting)   Pulse 80   Temp 98.8 °F (37.1 °C)   Resp 16   Ht 5' 9\" (1.753 m)   Wt 108.9 kg (240 lb)   SpO2 94%   BMI 35.44 kg/m²     Physical Exam  Constitutional:       Appearance: Normal appearance. HENT:      Head: Normocephalic. Mouth/Throat:      Mouth: Mucous membranes are moist.      Pharynx: Oropharynx is clear. Eyes:      Extraocular Movements: Extraocular movements intact. Cardiovascular:      Rate and Rhythm: Normal rate and regular rhythm. Pulses: Normal pulses. Heart sounds: Normal heart sounds. Pulmonary:      Effort: Pulmonary effort is normal.      Breath sounds: Normal breath sounds. Abdominal:      General: Abdomen is flat. Bowel sounds are normal.      Palpations: Abdomen is soft. Musculoskeletal:        Feet:    Skin:     General: Skin is warm and dry. Capillary Refill: Capillary refill takes less than 2 seconds. Neurological:      Mental Status: He is alert.            Diagnostic Study Results     Labs -  Recent Results (from the past 12 hour(s))   POC LACTIC ACID    Collection Time: 11/22/20  7:47 PM   Result Value Ref Range    Lactic Acid (POC) 1.82 0.40 - 0.48 mmol/L   METABOLIC PANEL, BASIC    Collection Time: 11/22/20  8:04 PM   Result Value Ref Range    Sodium 136 136 - 145 mmol/L    Potassium 3.7 3.5 - 5.5 mmol/L    Chloride 99 (L) 100 - 111 mmol/L    CO2 32 21 - 32 mmol/L    Anion gap 5 3.0 - 18 mmol/L    Glucose 330 (H) 74 - 99 mg/dL    BUN 25 (H) 7.0 - 18 MG/DL    Creatinine 1.83 (H) 0.6 - 1.3 MG/DL    BUN/Creatinine ratio 14 12 - 20      GFR est AA 46 (L) >60 ml/min/1.73m2    GFR est non-AA 38 (L) >60 ml/min/1.73m2    Calcium 9.0 8.5 - 10.1 MG/DL   CBC WITH AUTOMATED DIFF    Collection Time: 11/22/20  8:04 PM   Result Value Ref Range    WBC 9.3 4.6 - 13.2 K/uL    RBC 3.83 (L) 4.70 - 5.50 M/uL    HGB 11.2 (L) 13.0 - 16.0 g/dL    HCT 35.2 (L) 36.0 - 48.0 %    MCV 91.9 74.0 - 97.0 FL    MCH 29.2 24.0 - 34.0 PG    MCHC 31.8 31.0 - 37.0 g/dL    RDW 13.2 11.6 - 14.5 %    PLATELET 902 951 - 659 K/uL    MPV 10.9 9.2 - 11.8 FL    NEUTROPHILS 61 40 - 73 %    LYMPHOCYTES 27 21 - 52 %    MONOCYTES 8 3 - 10 %    EOSINOPHILS 4 0 - 5 %    BASOPHILS 0 0 - 2 %    ABS. NEUTROPHILS 5.7 1.8 - 8.0 K/UL    ABS. LYMPHOCYTES 2.5 0.9 - 3.6 K/UL    ABS. MONOCYTES 0.8 0.05 - 1.2 K/UL    ABS. EOSINOPHILS 0.3 0.0 - 0.4 K/UL    ABS. BASOPHILS 0.0 0.0 - 0.1 K/UL    DF AUTOMATED     PROTHROMBIN TIME + INR    Collection Time: 11/22/20  8:04 PM   Result Value Ref Range    Prothrombin time 13.8 11.5 - 15.2 sec    INR 1.1 0.8 - 1.2     PTT    Collection Time: 11/22/20  8:04 PM   Result Value Ref Range    aPTT 28.3 23.0 - 36.4 SEC   EKG, 12 LEAD, INITIAL    Collection Time: 11/22/20  8:11 PM   Result Value Ref Range    Ventricular Rate 74 BPM    Atrial Rate 74 BPM    P-R Interval 224 ms    QRS Duration 78 ms    Q-T Interval 362 ms    QTC Calculation (Bezet) 401 ms    Calculated P Axis 83 degrees    Calculated R Axis -5 degrees    Calculated T Axis 29 degrees    Diagnosis       Sinus rhythm with 1st degree AV block with premature atrial complexes  Low voltage QRS  Inferior infarct , age undetermined  Cannot rule out Anterior infarct , age undetermined  Abnormal ECG  When compared with ECG of 24-SEP-2020 16:58,  Minimal criteria for Anterior infarct are now present  Inferior infarct is now present         Radiologic Studies -   XR CHEST PORT    (Results Pending)         Medical Decision Making   I am the first provider for this patient. I reviewed the vital signs, available nursing notes, past medical history, past surgical history, family history and social history. Vital Signs-Reviewed the patient's vital signs. EKG: reviewed by myself and Dr. Kim Ku.   No evidence of acute scheming changes    Records Reviewed: Nursing Notes and Old Medical Records (Time of Review: 7:28 PM)    ED Course: Progress Notes, Reevaluation, and Consults:         Provider Notes (Medical Decision Making):   MDM     79-year-old male with diabetes and prior history of osteomyelitis of the left second toe presenting with osteomyelitis of the right great toe diagnosed recently on MRI imaging. Patient of Dr. Trang Smith. Was instructed to report to the hospital for admission in prep for surgery tomorrow    Review of systems is negative. Physical exam is unremarkable aside from serration of the right great toe with known osteomyelitis as recently reported on MRI. Obtained baseline EKG, baseline chest x-ray. Obtain BMP, CBC, coags, lactate, and blood cultures. Discussed case with hospitalist for admission. Patient to be admitted for surgery tomorrow. Patient status has remained stable and good during ED stay. Procedures    Critical Care Time: ---      Diagnosis     Clinical Impression:   1. Osteomyelitis of other site, unspecified type [facility])        Disposition: admitted    Follow-up Information    None          Patient's Medications   Start Taking    No medications on file   Continue Taking    ACETAMINOPHEN (TYLENOL) 325 MG TABLET    Take 2 Tabs by mouth every six (6) hours as needed. Indications: Pain, take it with bentyl; do not exceed 3 g tylenol daily    ALBUTEROL (PROVENTIL HFA, VENTOLIN HFA, PROAIR HFA) 90 MCG/ACTUATION INHALER    2 puffs every 6 hours x 5 days then every 6 hours as needed for shortness of breath and/or wheezing    AMOXICILLIN-CLAVULANATE (AUGMENTIN) 875-125 MG PER TABLET    Take 1 Tab by mouth two (2) times a day. CLOPIDOGREL (PLAVIX) 75 MG TABLET    Take 75 mg by mouth daily. INSULIN DETEMIR U-100 (LEVEMIR U-100 INSULIN) 100 UNIT/ML INJECTION    20 Units by SubCUTAneous route two (2) times a day. METHADONE HCL (METHADONE PO)    Take  by mouth. METOPROLOL TARTRATE (LOPRESSOR) 25 MG TABLET    Take 1 Tab by mouth every twelve (12) hours.     PANTOPRAZOLE (PROTONIX) 40 MG TABLET    Take 1 Tab by mouth Before breakfast and dinner. POLYETHYLENE GLYCOL (MIRALAX) 17 GRAM PACKET    Take 1 Packet by mouth daily. SIMVASTATIN (ZOCOR) 10 MG TABLET    Take 10 mg by mouth nightly. These Medications have changed    No medications on file   Stop Taking    No medications on file     Disclaimer: Sections of this note are dictated using utilizing voice recognition software. Minor typographical errors may be present. If questions arise, please do not hesitate to contact me or call our department. I personally saw and examined the patient. I have reviewed and agree with the resident's findings, including all diagnostic interpretations, and plans as written. I was present during the key portions of separately billed procedures.     [address], DO

## 2020-11-23 NOTE — DIABETES MGMT
Glycemic Control Plan of Care    Recommend increasing his lantus to 48 units bid - 80% of his home dose     Assessment:  patient is known to our service and received extensive DM education at last admission in Sept. 2020. Note his A1c is significantly improved: 8.9% this admission decreased from 12.1% on 9/1/2020  He states compliance with his home DM medications, has improved his diet and has had an intentional weight loss of 20 lbs. States he had a follow up appointment with PCP, Dr. Nikunj Myers, one week ago - confirms his home insulin doses   Monitors BG daily in the morning - encouraged to also monitor BG two hours after a meal daily -  Reviewed target BG and A1c  Currently NPO for surgery - amputation of right great toe.   Will continue to monitor    Most recent blood glucose values:        Current A1C:  Lab Results   Component Value Date/Time    Hemoglobin A1c 8.9 (H) 11/23/2020 05:29 AM     Current hospital diabetes medications:  Lantus 20 units bid  Corrective lispro, very insulin resistant,  4 times daily     Total daily dose insulin requirement previous day: admitted yesterday evening    Home diabetes medications:  Levemir 60 units bid  Humalog with meals - 50 units     Goals:  Blood glucose will be within target range of  mg/dL by 11/25/2020    Education:  _X__  Refer to Diabetes Education Record             ___  Education not indicated at this time      Marycarmen Gardner MPH RN CDE  Pager [phone]

## 2020-11-23 NOTE — PROGRESS NOTES
[facility]  [facility]    Patient: Pauline Kayser Age: 61 y.o. : 1960 MR#: [id number] SSN: [id number]  Date/Time: 2020     Subjective:     Patient seen and evaluated, sitting up in bed, no acute distress. Assessment/Plan:     1. Right diabetic foot infectionpodiatry consulted, infectious disease consulted, continue broad-spectrum IV antibiotics. 2. SHYLAimproved, continue IVF  3. Type 2 diabetes poorly controlledcontinue Lantus and sliding scale insulin. 4. History of hyperlipidemiacontinue Lipitor  5. History of GERDcontinue Protonix  6. Poor medical compliance  7. History of coronary artery disease with MI  8. Obesity BMI 35.4  DVT prophylaxisLovenox  Full code          Case discussed with:  [x]Patient  []Family  [x]Nursing  []Case Management  DVT Prophylaxis:  [x]Lovenox  []Hep SQ  []SCDs  []Coumadin   []On Heparin gtt    Objective:   VS:   Visit Vitals  /74 (BP 1 Location: Left arm, BP Patient Position: At rest)   Pulse 60   Temp 97.5 °F (36.4 °C)   Resp 16   Ht 5' 9\" (1.753 m)   Wt 112.5 kg (248 lb)   SpO2 95%   BMI 36.62 kg/m²      Tmax/24hrs: Temp (24hrs), Av.1 °F (36.7 °C), Min:97.2 °F (36.2 °C), Max:98.8 °F (37.1 °C)  IOBRIEF    Intake/Output Summary (Last 24 hours) at 2020 1420  Last data filed at 2020 1304  Gross per 24 hour   Intake 191.67 ml   Output 1100 ml   Net -908. 33 ml       General:  Alert, cooperative, no acute distress    HEENT: PERRLA, anicteric sclerae. Pulmonary:  CTA Bilaterally. No Wheezing/Rhonchi/Rales. Cardiovascular: Regular rate and Rhythm. GI:  Soft, Non distended, Non tender. + Bowel sounds. Extremities: Right foot infection secondary to diabetes  Neurologic: Alert and oriented X 3. No acute neuro deficits.   Additional:    Medications:   Current Facility-Administered Medications   Medication Dose Route Frequency    albuterol-ipratropium (DUO-NEB) 2.5 MG-0.5 MG/3 ML  3 mL Nebulization Q4H PRN    pantoprazole (PROTONIX) tablet 40 mg  40 mg Oral ACB&D    metoprolol tartrate (LOPRESSOR) tablet 25 mg  25 mg Oral Q12H    insulin glargine (LANTUS) injection 20 Units  20 Units SubCUTAneous BID    atorvastatin (LIPITOR) tablet 10 mg  10 mg Oral QHS    polyethylene glycol (MIRALAX) packet 17 g  17 g Oral DAILY    sodium chloride (NS) flush 5-40 mL  5-40 mL IntraVENous Q8H    sodium chloride (NS) flush 5-40 mL  5-40 mL IntraVENous PRN    acetaminophen (TYLENOL) tablet 650 mg  650 mg Oral Q6H PRN    Or    acetaminophen (TYLENOL) suppository 650 mg  650 mg Rectal Q6H PRN    enoxaparin (LOVENOX) injection 40 mg  40 mg SubCUTAneous DAILY    bisacodyL (DULCOLAX) tablet 10 mg  10 mg Oral DAILY PRN    ondansetron (ZOFRAN) injection 4 mg  4 mg IntraVENous Q6H PRN    insulin lispro (HUMALOG) injection   SubCUTAneous AC&HS    glucose chewable tablet 16 g  4 Tab Oral PRN    glucagon (GLUCAGEN) injection 1 mg  1 mg IntraMUSCular PRN    dextrose (D50W) injection syrg 12.5-25 g  25-50 mL IntraVENous PRN    oxyCODONE-acetaminophen (PERCOCET) 5-325 mg per tablet 1-2 Tab  1-2 Tab Oral Q4H PRN    lactated Ringers infusion  100 mL/hr IntraVENous CONTINUOUS       Imaging:   XR Results (most recent):  Results from Hospital Encounter encounter on 11/22/20   XR CHEST PORT    Narrative EXAM: XR CHEST PORT    INDICATION: baseline for admission    COMPARISON: September 24, 2020    FINDINGS: A portable AP radiograph of the chest was obtained at 1933 hours. The  patient is on a cardiac monitor. The lungs are clear. The cardiac and  mediastinal contours and pulmonary vascularity are normal.  The bones and soft  tissues are grossly within normal limits. Impression IMPRESSION: Normal chest.        CT Results (most recent):  Results from Hospital Encounter encounter on 09/01/20   CT HEAD WO CONT    Narrative CT HEAD UNENHANCED    CPT code: 73601    INDICATION: Altered mental status.  Essential hypertension. No other relevant  clinical information provided. TECHNIQUE: 5 mm collimation axial images obtained from the skull base through  the vertex without administration of nonionic intravenous contrast.      All CT scans at this facility are performed using dose optimization technique as  appropriate to this specific exam, to include automated exposure control,  adjustment of the mA and/or KP according to patient size or use of iterative  reconstruction techniques. COMPARISON: Prior exam 11/19/2015    FINDINGS:   No suggestion of acute hemorrhage. No extraaxial fluid collections. No mass lesion appreciated within the limitations of a noncontrast exam.   No evidence for acute infarct involving any major vascular distribution. MRI much more sensitive for the detection of edema or ischemia in the acute  setting. No midline shift of structures. Partially included paranasal sinuses are clear. Calvarium intact to the extent included. 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 Impression IMPRESSION:  1. No hemorrhage identified. No evidence of acute intracranial pathology. 12/24/19   ECHO ADULT COMPLETE 12/26/2019 12/26/2019    Narrative · Normal cavity size, wall thickness and systolic function (ejection   fraction normal). Estimated left ventricular ejection fraction is 55 -   60%. Visually measured ejection fraction. No regional wall motion   abnormality noted. Age-appropriate left ventricular diastolic function. · Trace mitral valve regurgitation is present.         Signed by: Frantz Adam MD        Labs:    Recent Results (from the past 48 hour(s))   CULTURE, BLOOD    Collection Time: 11/22/20  7:41 PM    Specimen: Blood   Result Value Ref Range    Special Requests: NO SPECIAL REQUESTS      Culture result: NO GROWTH AFTER 11 HOURS     POC LACTIC ACID    Collection Time: 11/22/20  7:47 PM   Result Value Ref Range Lactic Acid (POC) 1.82 0.40 - 2.00 mmol/L   CULTURE, BLOOD    Collection Time: 11/22/20  7:54 PM    Specimen: Blood   Result Value Ref Range    Special Requests: NO SPECIAL REQUESTS      Culture result: NO GROWTH AFTER 11 HOURS     METABOLIC PANEL, BASIC    Collection Time: 11/22/20  8:04 PM   Result Value Ref Range    Sodium 136 136 - 145 mmol/L    Potassium 3.7 3.5 - 5.5 mmol/L    Chloride 99 (L) 100 - 111 mmol/L    CO2 32 21 - 32 mmol/L    Anion gap 5 3.0 - 18 mmol/L    Glucose 330 (H) 74 - 99 mg/dL    BUN 25 (H) 7.0 - 18 MG/DL    Creatinine 1.83 (H) 0.6 - 1.3 MG/DL    BUN/Creatinine ratio 14 12 - 20      GFR est AA 46 (L) >60 ml/min/1.73m2    GFR est non-AA 38 (L) >60 ml/min/1.73m2    Calcium 9.0 8.5 - 10.1 MG/DL   CBC WITH AUTOMATED DIFF    Collection Time: 11/22/20  8:04 PM   Result Value Ref Range    WBC 9.3 4.6 - 13.2 K/uL    RBC 3.83 (L) 4.70 - 5.50 M/uL    HGB 11.2 (L) 13.0 - 16.0 g/dL    HCT 35.2 (L) 36.0 - 48.0 %    MCV 91.9 74.0 - 97.0 FL    MCH 29.2 24.0 - 34.0 PG    MCHC 31.8 31.0 - 37.0 g/dL    RDW 13.2 11.6 - 14.5 %    PLATELET 129 527 - 156 K/uL    MPV 10.9 9.2 - 11.8 FL    NEUTROPHILS 61 40 - 73 %    LYMPHOCYTES 27 21 - 52 %    MONOCYTES 8 3 - 10 %    EOSINOPHILS 4 0 - 5 %    BASOPHILS 0 0 - 2 %    ABS. NEUTROPHILS 5.7 1.8 - 8.0 K/UL    ABS. LYMPHOCYTES 2.5 0.9 - 3.6 K/UL    ABS. MONOCYTES 0.8 0.05 - 1.2 K/UL    ABS. EOSINOPHILS 0.3 0.0 - 0.4 K/UL    ABS.  BASOPHILS 0.0 0.0 - 0.1 K/UL    DF AUTOMATED     PROTHROMBIN TIME + INR    Collection Time: 11/22/20  8:04 PM   Result Value Ref Range    Prothrombin time 13.8 11.5 - 15.2 sec    INR 1.1 0.8 - 1.2     PTT    Collection Time: 11/22/20  8:04 PM   Result Value Ref Range    aPTT 28.3 23.0 - 36.4 SEC   EKG, 12 LEAD, INITIAL    Collection Time: 11/22/20  8:11 PM   Result Value Ref Range    Ventricular Rate 74 BPM    Atrial Rate 74 BPM    P-R Interval 224 ms    QRS Duration 78 ms    Q-T Interval 362 ms    QTC Calculation (Bezet) 401 ms    Calculated P Axis 83 degrees    Calculated R Axis -5 degrees    Calculated T Axis 29 degrees    Diagnosis       Sinus rhythm with 1st degree AV block with premature atrial complexes  Low voltage QRS  Cannot rule out Inferior infarct , age undetermined  Cannot exclude anterior MI versus lead placement issue.   Abnormal ECG  When compared with ECG of 24-SEP-2020 16:58,  Minimal criteria for Anterior infarct are now present    Confirmed by Ambrose Pichardo MD, Saundra Hahn ([id number]) on 11/23/2020 4:55:18 AM     METABOLIC PANEL, BASIC    Collection Time: 11/23/20  5:29 AM   Result Value Ref Range    Sodium 136 136 - 145 mmol/L    Potassium 3.5 3.5 - 5.5 mmol/L    Chloride 102 100 - 111 mmol/L    CO2 29 21 - 32 mmol/L    Anion gap 5 3.0 - 18 mmol/L    Glucose 301 (H) 74 - 99 mg/dL    BUN 22 (H) 7.0 - 18 MG/DL    Creatinine 1.62 (H) 0.6 - 1.3 MG/DL    BUN/Creatinine ratio 14 12 - 20      GFR est AA 53 (L) >60 ml/min/1.73m2    GFR est non-AA 44 (L) >60 ml/min/1.73m2    Calcium 8.6 8.5 - 10.1 MG/DL   CBC W/O DIFF    Collection Time: 11/23/20  5:29 AM   Result Value Ref Range    WBC 9.3 4.6 - 13.2 K/uL    RBC 3.67 (L) 4.70 - 5.50 M/uL    HGB 10.6 (L) 13.0 - 16.0 g/dL    HCT 34.4 (L) 36.0 - 48.0 %    MCV 93.7 74.0 - 97.0 FL    MCH 28.9 24.0 - 34.0 PG    MCHC 30.8 (L) 31.0 - 37.0 g/dL    RDW 13.3 11.6 - 14.5 %    PLATELET 986 372 - 402 K/uL    MPV 10.9 9.2 - 11.8 FL   HEMOGLOBIN A1C WITH EAG    Collection Time: 11/23/20  5:29 AM   Result Value Ref Range    Hemoglobin A1c 8.9 (H) 4.2 - 5.6 %    Est. average glucose 209 mg/dL   GLUCOSE, POC    Collection Time: 11/23/20  5:46 AM   Result Value Ref Range    Glucose (POC) 304 (H) 70 - 110 mg/dL   GLUCOSE, POC    Collection Time: 11/23/20 11:33 AM   Result Value Ref Range    Glucose (POC) 281 (H) 70 - 110 mg/dL       Signed By: Harshil Painter MD     November 23, 2020      I spent 35 minutes with the patient in face-to-face consultation, of which greater than 50% was spent in counseling and coordination of care as described above    Disclaimer: Sections of this note are dictated using utilizing voice recognition software. Minor typographical errors may be present. If questions arise, please do not hesitate to contact me or call our department.

## 2020-11-23 NOTE — ED NOTES
Patient complaining of right big toe pain due to an infection as the patient is diabetic and per patient is scheduled to have it amputated

## 2020-11-23 NOTE — PROGRESS NOTES
conducted an initial consultation and Spiritual Assessment for Benigno Rowell, who is a 61 y.o.,male. Patients Primary Language is: [place]. According to the patients EMR Quaker Affiliation is: [facility].     The reason the Patient came to the hospital is:   Patient Active Problem List    Diagnosis Date Noted    Osteomyelitis ([facility] [address] 11/22/2020    Fracture, toe 09/24/2020    Altered mental status 09/01/2020    Multifocal pneumonia 09/01/2020    DM (diabetes mellitus) ([facility] [address] 12/25/2019    Orthostatic hypotension 12/25/2019    Syncope and collapse 12/24/2019    Vomiting 03/28/2018    Chronic abdominal pain 03/28/2018    Sepsis ([facility] [address] 03/21/2018    Nausea and vomiting 09/07/2017    Gastroparesis 09/07/2017    Hypokalemia 09/07/2017    [facility]acute [facility] failure[facility] 09/07/2017    Atelectasis 09/07/2017    Abdominal pain 09/07/2017    Acquired hypothyroidism 09/07/2017    S/P lumbar fusion 07/05/2017    Diabetic neuropathy (HCC) 07/05/2017    Neuritis 07/05/2017    Spinal stenosis at L4-L5 level 06/19/2017    Coronary artery disease involving native coronary artery of native heart without angina pectoris 05/31/2017    History of coronary artery stent placement 05/31/2017    Synovial cyst 05/26/2017    HNP (herniated nucleus pulposus), lumbar 05/26/2017    Lumbar spinal stenosis 05/26/2017    Spondylolisthesis of lumbar region 05/26/2017    Positive PPD     History of heart attack     Pain in left lower leg 07/24/2016    Primary osteoarthritis of left knee 07/24/2016    Localized adiposity of abdomen 07/24/2016    Diabetes ([facility] [address] 08/14/2015    Essential hypertension 08/14/2015    GERD (gastroesophageal reflux disease) 08/14/2015    Depression 08/14/2015        The  provided the following Interventions:  Initiated a relationship of care and support. Explored issues of red, belief, spirituality and Worship/ritual needs while hospitalized.   Listened empathically. Patient expressed that he came to the ED per his MD's order. Provided chaplaincy education. Provided information about Spiritual Care Services. Offered prayer and assurance of continued prayers on patient's behalf. Chart reviewed. The following outcomes where achieved:  Patient shared limited information about both his medical narrative and spiritual journey/beliefs.  confirmed Patient's [facility] Affiliation. Patient processed feeling about current hospitalization. Patient expressed gratitude for 's visit. Assessment:  Patient denies any spiritual needs at this time. Patient does not have any Gnosticist/cultural needs that will affect patients preferences in health care. There are no spiritual or Gnosticist issues which require intervention at this time. Plan:  Chaplains will continue to follow and will provide pastoral care on an as needed/requested basis.  recommends bedside caregivers page  on duty if patient shows signs of acute spiritual or emotional distress.     [address] [facility]   [phone]

## 2020-11-23 NOTE — H&P
Hospitalist Admission Note    NAME: David Bird   :  1960   MRN:  [id number]     Date:  2020     Patient PCP: Saturnino Long MD  ________________________________________________________________________    My assessment of this patient's clinical condition and my plan of care is as follows. Assessment / Plan:  1. R diabetic foot infection   2. SHYLA  3. Anemia with low iron stores  4. Uncontrolled DM2 with hyperglycemia - A1c 12  5. Hypertension  6. Hyperlipidemia  7. GERD  8. Obesity, BMI 35.4  9. Recent left diabetic foot ulcer status post second toe amputation   10. CAD with history of MI  6. Poor medical compliance      1. Admit to medical bed. 2. Podiatry evaluation in AM.  Assistance appreciated in advance. 3. IVF, monitor renal indices and electrolytes. 4. Lantus/SSI. Adjust as necessary to optimize glycemic control. 5. Resume home meds as appropriate. 6. Consider IV iron replacement while hospitalized. Defer to rounding team.  7. Supportive care o/w. Further plan pending podiatry recommendations. Code Status: Full  DVT Prophylaxis: Lovenox           Subjective:   REASON FOR ADMISSION: Diabetic foot infection    HISTORY OF PRESENT ILLNESS:     Veronica Davis is a 61 y.o.  male who presented to the emergency department this evening at the request of his podiatrist for anticipated surgical procedure tomorrow morning. Patient is known to our service and was just recently admitted in September of this year for diabetic foot infection. At that time, he was found to have left second toe osteomyelitis and underwent amputation of the digit. He has been undergoing continued outpatient management and had a MRI of the left foot done on 2020.         Past Medical History:   Diagnosis Date    Arthritis     Coronary atherosclerosis of native coronary artery     S/P PCI with GE in     Diabetes ([facility] [address]     IDDM    Electrolyte and fluid disorders not elsewhere classified     Essential hypertension, benign     GERD (gastroesophageal reflux disease)     Heroin abuse ([facility] [address] 05/26/16    Psychiatrist Dr. Bigg Botello History of heart attack     Hyperlipidemia     Intermediate coronary syndrome [facility])     MDD (major depressive disorder) 5/26/16    Psychiatrist Dr. Bigg Botello Myocardial infarction [facility])     Obesity, unspecified     Old myocardial infarction     Other and unspecified hyperlipidemia     Pancreatitis     Positive PPD     Sleep apnea     uses Cpap machine    Transfusion history     Before 1992        Past Surgical History:   Procedure Laterality Date    HX APPENDECTOMY      HX CORONARY STENT PLACEMENT  2010    2 stents       Social History     Tobacco Use    Smoking status: Never Smoker    Smokeless tobacco: Never Used   Substance Use Topics    Alcohol use: No        Family History   Problem Relation Age of Onset    Heart Attack Father     Coronary Artery Disease Mother     Diabetes Mother     Cancer Sister         breast cancer and lung cancer     Allergies   Allergen Reactions    Compazine [Prochlorperazine] Anaphylaxis     \"Tightness around throat\"        Prior to Admission medications    Medication Sig Start Date End Date Taking? Authorizing Provider   amoxicillin-clavulanate (Augmentin) 875-125 mg per tablet Take 1 Tab by mouth two (2) times a day. 9/26/20   Adonis Alves MD   methadone HCl (METHADONE PO) Take  by mouth. Yamil Schaeffer MD   metoprolol tartrate (LOPRESSOR) 25 mg tablet Take 1 Tab by mouth every twelve (12) hours. 12/27/19   Dara Brown MD   insulin detemir U-100 (LEVEMIR U-100 INSULIN) 100 unit/mL injection 20 Units by SubCUTAneous route two (2) times a day. 12/27/19   Dara Brown MD   pantoprazole (PROTONIX) 40 mg tablet Take 1 Tab by mouth Before breakfast and dinner.  3/29/18   Lian Ceballos NP   acetaminophen (TYLENOL) 325 mg tablet Take 2 Tabs by mouth every six (6) hours as needed. Indications: Pain, take it with bentyl; do not exceed 3 g tylenol daily 3/29/18   Shelby Lal NP   albuterol (PROVENTIL HFA, VENTOLIN HFA, PROAIR HFA) 90 mcg/actuation inhaler 2 puffs every 6 hours x 5 days then every 6 hours as needed for shortness of breath and/or wheezing 9/11/17   Estefany Win MD   polyethylene glycol (MIRALAX) 17 gram packet Take 1 Packet by mouth daily. 9/11/17   Estefany Win MD   simvastatin (ZOCOR) 10 mg tablet Take 10 mg by mouth nightly. Provider, Historical   clopidogrel (PLAVIX) 75 mg tablet Take 75 mg by mouth daily. Provider, Historical       REVIEW OF SYSTEMS:     Total of 12 systems reviewed as follows:       POSITIVE= bolded text  Negative = text not underlined  General:  fever, chills, sweats, generalized weakness, weight loss/gain,      loss of appetite, malaise  Eyes:    blurred vision, eye pain, loss of vision, double vision  ENT:    rhinorrhea, pharyngitis   Respiratory:   cough, sputum production, SOB, BURTON, wheezing, pleuritic pain   Cardiology:   chest pain, palpitations, orthopnea, PND, edema, syncope   Gastrointestinal:  abdominal pain , N/V, diarrhea, dysphagia, constipation, bleeding   Genitourinary:  frequency, urgency, dysuria, hematuria, incontinence   Muskuloskeletal :  arthralgia, myalgia, back pain  Hematology:  easy bruising, nose or gum bleeding, lymphadenopathy   Dermatological: rash, ulceration, pruritis, color change / jaundice  Endocrine:   hot flashes or polydipsia   Neurological:  headache, dizziness, confusion, focal weakness, paresthesia,     Speech difficulties, memory loss, gait difficulty  Psychological: Feelings of anxiety, depression, agitation    Objective:   VITALS:    Visit Vitals  /71   Pulse 69   Temp 98.8 °F (37.1 °C)   Resp 17   Ht 5' 9\" (1.753 m)   Wt 108.9 kg (240 lb)   SpO2 92%   BMI 35.44 kg/m²       PHYSICAL EXAM:    General:    In NAD. Nontoxic-appearing. HEENT: Head NCAT.   Pupils equal round sclerae anicteric, EOMI. OP clear. Neck:  Supple, trachea midline. Lungs:   Clear, no wheezes. Effort nonlabored, no accessory muscle use. Chest wall:  No chest wall tenderness. Chest expansion equal and symmetrical bilaterally. Heart:   RRR. No JVD. Abdomen:   Soft, NT/ND. Bowel sounds normoactive. Extremities: Warm, no edema. No evidence for ischemia. Dorsal R great toe ulcer with drainage. Skin:     Not pale. Not Jaundiced  No rashes   Psych:  Mood normal.  Calm, no agitation. Neurologic: Awake and alert, moves extremities spontaneously. _______________________________________________________________________  Care Plan discussed with:    Comments   Patient X    Family      RN X    Care Manager                    Consultant:      _______________________________________________________________________  Expected  Disposition:   Home  X   HH/PT/OT/RN    SNF/LTC    ARU    ________________________________________________________________________      Tests/Procedures:   MRI R foot:  IMPRESSION:     Soft tissue defect of the distal great toe with osteomyelitis involving the  terminal phalanx of the great toe as well as the mid/distal aspect of the  proximal phalanx of the great toe. Likely septic arthritis at the first  interphalangeal joint.     Subcutaneous edema of the medial forefoot without focal fluid collection to  suggest abscess.         LAB DATA REVIEWED:    Recent Results (from the past 24 hour(s))   POC LACTIC ACID    Collection Time: 11/22/20  7:47 PM   Result Value Ref Range    Lactic Acid (POC) 1.82 0.40 - 3.12 mmol/L   METABOLIC PANEL, BASIC    Collection Time: 11/22/20  8:04 PM   Result Value Ref Range    Sodium 136 136 - 145 mmol/L    Potassium 3.7 3.5 - 5.5 mmol/L    Chloride 99 (L) 100 - 111 mmol/L    CO2 32 21 - 32 mmol/L    Anion gap 5 3.0 - 18 mmol/L    Glucose 330 (H) 74 - 99 mg/dL    BUN 25 (H) 7.0 - 18 MG/DL    Creatinine 1.83 (H) 0.6 - 1.3 MG/DL    BUN/Creatinine ratio 14 12 - 20      GFR est AA 46 (L) >60 ml/min/1.73m2    GFR est non-AA 38 (L) >60 ml/min/1.73m2    Calcium 9.0 8.5 - 10.1 MG/DL   CBC WITH AUTOMATED DIFF    Collection Time: 11/22/20  8:04 PM   Result Value Ref Range    WBC 9.3 4.6 - 13.2 K/uL    RBC 3.83 (L) 4.70 - 5.50 M/uL    HGB 11.2 (L) 13.0 - 16.0 g/dL    HCT 35.2 (L) 36.0 - 48.0 %    MCV 91.9 74.0 - 97.0 FL    MCH 29.2 24.0 - 34.0 PG    MCHC 31.8 31.0 - 37.0 g/dL    RDW 13.2 11.6 - 14.5 %    PLATELET 350 940 - 226 K/uL    MPV 10.9 9.2 - 11.8 FL    NEUTROPHILS 61 40 - 73 %    LYMPHOCYTES 27 21 - 52 %    MONOCYTES 8 3 - 10 %    EOSINOPHILS 4 0 - 5 %    BASOPHILS 0 0 - 2 %    ABS. NEUTROPHILS 5.7 1.8 - 8.0 K/UL    ABS. LYMPHOCYTES 2.5 0.9 - 3.6 K/UL    ABS. MONOCYTES 0.8 0.05 - 1.2 K/UL    ABS. EOSINOPHILS 0.3 0.0 - 0.4 K/UL    ABS.  BASOPHILS 0.0 0.0 - 0.1 K/UL    DF AUTOMATED     PROTHROMBIN TIME + INR    Collection Time: 11/22/20  8:04 PM   Result Value Ref Range    Prothrombin time 13.8 11.5 - 15.2 sec    INR 1.1 0.8 - 1.2     PTT    Collection Time: 11/22/20  8:04 PM   Result Value Ref Range    aPTT 28.3 23.0 - 36.4 SEC   EKG, 12 LEAD, INITIAL    Collection Time: 11/22/20  8:11 PM   Result Value Ref Range    Ventricular Rate 74 BPM    Atrial Rate 74 BPM    P-R Interval 224 ms    QRS Duration 78 ms    Q-T Interval 362 ms    QTC Calculation (Bezet) 401 ms    Calculated P Axis 83 degrees    Calculated R Axis -5 degrees    Calculated T Axis 29 degrees    Diagnosis       Sinus rhythm with 1st degree AV block with premature atrial complexes  Low voltage QRS  Inferior infarct , age undetermined  Cannot rule out Anterior infarct , age undetermined  Abnormal ECG  When compared with ECG of 24-SEP-2020 16:58,  Minimal criteria for Anterior infarct are now present  Inferior infarct is now present         Martín Zimmerman MD  [facility]

## 2020-11-23 NOTE — ED NOTES
TRANSFER - OUT REPORT:    Verbal report given to [facility], RN on Nate Ornelas  being transferred to [address] [facility], [place], Room [phone] for routine progression of care       Report consisted of patients Situation, Background, Assessment and   Recommendations(SBAR). Information from the following report(s) SBAR was reviewed with the receiving nurse. Lines:   Peripheral IV 11/22/20 Right Forearm (Active)   Site Assessment Clean, dry, & intact 11/22/20 2018   Phlebitis Assessment 0 11/22/20 2018   Infiltration Assessment 0 11/22/20 2018   Dressing Status Clean, dry, & intact 11/22/20 2018   Dressing Type 4 X 4;Tape;Transparent 11/22/20 2018   Hub Color/Line Status Pink;Patent; Flushed 11/22/20 2018        Opportunity for questions and clarification was provided.       Patient transported with:  RN  patient's belongings  Via wheelchair

## 2020-11-23 NOTE — PROGRESS NOTES
TRANSFER - IN REPORT:    Verbal report received from Carleen Nj (name) on Tate Marge  being received from ED (unit) for routine progression of care      Report consisted of patients Situation, Background, Assessment and   Recommendations(SBAR). Information from the following report(s) SBAR and MAR was reviewed with the receiving nurse. Opportunity for questions and clarification was provided. Assessment will be completed upon patients arrival to unit and care assumed.

## 2020-11-23 NOTE — DIABETES MGMT
Diabetes Patient/Family Education Record  Factors That  May Influence Patients Ability  to Learn or  Comply with Recommendations   []   Language barrier    []   Cultural needs   []   Motivation    []   Cognitive limitation    []   Physical   []   Education    []   Physiological factors   []   Hearing/vision/speaking impairment   []   Mosque beliefs    []   Financial factors   []  Other:   [x]  No factors identified at this time.      Person Instructed:   [x]   Patient   []   Family   []  Other     Preference for Learning:   [x]   Verbal   [x]   Written - reviewed target BG and A1c  - DM diet and portions    []  Demonstration     Level of Comprehension & Competence:    [x]  Good                                      [] Fair                                     []  Poor                             []  Needs Reinforcement   [x]  Teachback completed    Education Component:    See DM note    [x]  Medication management, including how to administer insulin (if appropriate) and potential medication interactions    [x]  Nutritional management -obtain usual meal pattern         Usually 2 meals/day    [x]  Exercise   [x]  Signs, symptoms, and treatment of hyperglycemia and hypoglycemia   [x] Prevention, recognition and treatment of hyperglycemia and hypoglycemia   []  Importance of blood glucose monitoring and how to obtain a blood glucose meter    []  Instruction on use of the blood glucose meter   [x]  Discuss the importance of HbA1C monitoring     8.9%   []  Sick day guidelines   []  Proper use and disposal of lancets, needles, syringes or insulin pens (if appropriate)   []  Potential long-term complications (retinopathy, kidney disease, neuropathy, foot care)   [x] Information about whom to contact in case of emergency or for more information   Is followed by Dr. Jean Pierre Carias    []  Goal:  Patient/family will demonstrate understanding of Diabetes Self Management Skills by: (date) _______  Plan for post-discharge education or self-management support:    [] Outpatient class schedule provided            [] Patient Declined    [] Scheduled for outpatient classes (date) _______  Verify:  Does patient understand how diabetes medications work? ____yes________________________  Does patient know what their most recent A1c is? ______yes - 8.9%_____________________________  Does patient monitor glucose at home? ___________yes_____________________________  Does patient have difficulty obtaining diabetes medications or testing supplies? ____no_____________       Charlene Encinas MPH RN CDE  Pager [phone]

## 2020-11-23 NOTE — ED TRIAGE NOTES
Patient presents to the ED with complaints of right big toe pain. Patient states per his doctor he is schedule for a big toe amputation due to infection. Patient ambulatory in triage with steady gait.

## 2020-11-24 ENCOUNTER — ANESTHESIA (OUTPATIENT)
Dept: SURGERY | Age: 60
DRG: 314 | End: 2020-11-24
Payer: MEDICAID

## 2020-11-24 ENCOUNTER — ANESTHESIA EVENT (OUTPATIENT)
Dept: SURGERY | Age: 60
DRG: 314 | End: 2020-11-24
Payer: MEDICAID

## 2020-11-24 LAB
COVID-19 RAPID TEST, COVR: NOT DETECTED
GLUCOSE BLD STRIP.AUTO-MCNC: 128 MG/DL (ref 70–110)
GLUCOSE BLD STRIP.AUTO-MCNC: 146 MG/DL (ref 70–110)
GLUCOSE BLD STRIP.AUTO-MCNC: 165 MG/DL (ref 70–110)
GLUCOSE BLD STRIP.AUTO-MCNC: 238 MG/DL (ref 70–110)
GLUCOSE BLD STRIP.AUTO-MCNC: 262 MG/DL (ref 70–110)
SOURCE, COVRS: NORMAL
SPECIMEN TYPE, XMCV1T: NORMAL

## 2020-11-24 PROCEDURE — 2709999900 HC NON-CHARGEABLE SUPPLY

## 2020-11-24 PROCEDURE — 74011000250 HC RX REV CODE- 250: Performed by: PODIATRIST

## 2020-11-24 PROCEDURE — 74011636637 HC RX REV CODE- 636/637: Performed by: FAMILY MEDICINE

## 2020-11-24 PROCEDURE — 74011250636 HC RX REV CODE- 250/636: Performed by: FAMILY MEDICINE

## 2020-11-24 PROCEDURE — 01480 ANES OPEN PX LOWER L/A/F NOS: CPT | Performed by: NURSE ANESTHETIST, CERTIFIED REGISTERED

## 2020-11-24 PROCEDURE — 77030006773 HC BLD SAW OSC BRSM -A: Performed by: PODIATRIST

## 2020-11-24 PROCEDURE — 99232 SBSQ HOSP IP/OBS MODERATE 35: CPT | Performed by: HOSPITALIST

## 2020-11-24 PROCEDURE — 88311 DECALCIFY TISSUE: CPT

## 2020-11-24 PROCEDURE — 88305 TISSUE EXAM BY PATHOLOGIST: CPT

## 2020-11-24 PROCEDURE — 74011250636 HC RX REV CODE- 250/636: Performed by: NURSE ANESTHETIST, CERTIFIED REGISTERED

## 2020-11-24 PROCEDURE — 74011250636 HC RX REV CODE- 250/636: Performed by: INTERNAL MEDICINE

## 2020-11-24 PROCEDURE — 77030002986 HC SUT PROL J&J -A: Performed by: PODIATRIST

## 2020-11-24 PROCEDURE — 77030040922 HC BLNKT HYPOTHRM STRY -A: Performed by: PODIATRIST

## 2020-11-24 PROCEDURE — 2709999900 HC NON-CHARGEABLE SUPPLY: Performed by: PODIATRIST

## 2020-11-24 PROCEDURE — 82962 GLUCOSE BLOOD TEST: CPT

## 2020-11-24 PROCEDURE — 76010000149 HC OR TIME 1 TO 1.5 HR: Performed by: PODIATRIST

## 2020-11-24 PROCEDURE — 77030040361 HC SLV COMPR DVT MDII -B: Performed by: PODIATRIST

## 2020-11-24 PROCEDURE — 0Y6P0Z0 DETACHMENT AT RIGHT 1ST TOE, COMPLETE, OPEN APPROACH: ICD-10-PCS | Performed by: PODIATRIST

## 2020-11-24 PROCEDURE — 87635 SARS-COV-2 COVID-19 AMP PRB: CPT

## 2020-11-24 PROCEDURE — 74011250637 HC RX REV CODE- 250/637: Performed by: FAMILY MEDICINE

## 2020-11-24 PROCEDURE — 74011000272 HC RX REV CODE- 272: Performed by: PODIATRIST

## 2020-11-24 PROCEDURE — 76060000033 HC ANESTHESIA 1 TO 1.5 HR: Performed by: PODIATRIST

## 2020-11-24 PROCEDURE — 01480 ANES OPEN PX LOWER L/A/F NOS: CPT | Performed by: ANESTHESIOLOGY

## 2020-11-24 PROCEDURE — 77030031139 HC SUT VCRL2 J&J -A: Performed by: PODIATRIST

## 2020-11-24 PROCEDURE — 76210000006 HC OR PH I REC 0.5 TO 1 HR: Performed by: PODIATRIST

## 2020-11-24 PROCEDURE — 65270000029 HC RM PRIVATE

## 2020-11-24 PROCEDURE — 77030011265 HC ELECTRD BLD HEX COVD -A: Performed by: PODIATRIST

## 2020-11-24 RX ORDER — ONDANSETRON 2 MG/ML
4 INJECTION INTRAMUSCULAR; INTRAVENOUS ONCE
Status: COMPLETED | OUTPATIENT
Start: 2020-11-24 | End: 2020-11-24

## 2020-11-24 RX ORDER — BUPIVACAINE HYDROCHLORIDE 5 MG/ML
INJECTION, SOLUTION EPIDURAL; INTRACAUDAL AS NEEDED
Status: DISCONTINUED | OUTPATIENT
Start: 2020-11-24 | End: 2020-11-24 | Stop reason: HOSPADM

## 2020-11-24 RX ORDER — SODIUM CHLORIDE 0.9 % (FLUSH) 0.9 %
5-40 SYRINGE (ML) INJECTION AS NEEDED
Status: DISCONTINUED | OUTPATIENT
Start: 2020-11-24 | End: 2020-11-25 | Stop reason: HOSPADM

## 2020-11-24 RX ORDER — VANCOMYCIN/0.9 % SOD CHLORIDE 1.5G/250ML
1500 PLASTIC BAG, INJECTION (ML) INTRAVENOUS
Status: COMPLETED | OUTPATIENT
Start: 2020-11-25 | End: 2020-11-25

## 2020-11-24 RX ORDER — FENTANYL CITRATE 50 UG/ML
INJECTION, SOLUTION INTRAMUSCULAR; INTRAVENOUS AS NEEDED
Status: DISCONTINUED | OUTPATIENT
Start: 2020-11-24 | End: 2020-11-24 | Stop reason: HOSPADM

## 2020-11-24 RX ORDER — LIDOCAINE HYDROCHLORIDE 10 MG/ML
INJECTION, SOLUTION EPIDURAL; INFILTRATION; INTRACAUDAL; PERINEURAL AS NEEDED
Status: DISCONTINUED | OUTPATIENT
Start: 2020-11-24 | End: 2020-11-24 | Stop reason: HOSPADM

## 2020-11-24 RX ORDER — VANCOMYCIN 2 GRAM/500 ML IN 0.9 % SODIUM CHLORIDE INTRAVENOUS
2000 ONCE
Status: COMPLETED | OUTPATIENT
Start: 2020-11-24 | End: 2020-11-25

## 2020-11-24 RX ORDER — SODIUM CHLORIDE, SODIUM LACTATE, POTASSIUM CHLORIDE, CALCIUM CHLORIDE 600; 310; 30; 20 MG/100ML; MG/100ML; MG/100ML; MG/100ML
INJECTION, SOLUTION INTRAVENOUS
Status: DISCONTINUED | OUTPATIENT
Start: 2020-11-24 | End: 2020-11-24 | Stop reason: HOSPADM

## 2020-11-24 RX ORDER — LEVOFLOXACIN 5 MG/ML
500 INJECTION, SOLUTION INTRAVENOUS EVERY 24 HOURS
Status: DISCONTINUED | OUTPATIENT
Start: 2020-11-24 | End: 2020-11-25 | Stop reason: HOSPADM

## 2020-11-24 RX ORDER — PROPOFOL 10 MG/ML
INJECTION, EMULSION INTRAVENOUS AS NEEDED
Status: DISCONTINUED | OUTPATIENT
Start: 2020-11-24 | End: 2020-11-24 | Stop reason: HOSPADM

## 2020-11-24 RX ORDER — FENTANYL CITRATE 50 UG/ML
25 INJECTION, SOLUTION INTRAMUSCULAR; INTRAVENOUS AS NEEDED
Status: DISCONTINUED | OUTPATIENT
Start: 2020-11-24 | End: 2020-11-25 | Stop reason: HOSPADM

## 2020-11-24 RX ORDER — SODIUM CHLORIDE 0.9 % (FLUSH) 0.9 %
5-40 SYRINGE (ML) INJECTION EVERY 8 HOURS
Status: DISCONTINUED | OUTPATIENT
Start: 2020-11-24 | End: 2020-11-25 | Stop reason: HOSPADM

## 2020-11-24 RX ADMIN — METOPROLOL TARTRATE 25 MG: 25 TABLET, FILM COATED ORAL at 08:24

## 2020-11-24 RX ADMIN — INSULIN LISPRO 9 UNITS: 100 INJECTION, SOLUTION INTRAVENOUS; SUBCUTANEOUS at 11:42

## 2020-11-24 RX ADMIN — OXYCODONE HYDROCHLORIDE AND ACETAMINOPHEN 2 TABLET: 5; 325 TABLET ORAL at 21:40

## 2020-11-24 RX ADMIN — FENTANYL CITRATE 25 MCG: 50 INJECTION, SOLUTION INTRAMUSCULAR; INTRAVENOUS at 18:49

## 2020-11-24 RX ADMIN — PROPOFOL 50 MG: 10 INJECTION, EMULSION INTRAVENOUS at 18:49

## 2020-11-24 RX ADMIN — OXYCODONE HYDROCHLORIDE AND ACETAMINOPHEN 2 TABLET: 5; 325 TABLET ORAL at 08:24

## 2020-11-24 RX ADMIN — LEVOFLOXACIN 500 MG: 5 INJECTION, SOLUTION INTRAVENOUS at 16:38

## 2020-11-24 RX ADMIN — ATORVASTATIN CALCIUM 10 MG: 10 TABLET, FILM COATED ORAL at 21:42

## 2020-11-24 RX ADMIN — PROPOFOL 50 MG: 10 INJECTION, EMULSION INTRAVENOUS at 19:00

## 2020-11-24 RX ADMIN — Medication 10 ML: at 21:46

## 2020-11-24 RX ADMIN — PROPOFOL 100 MG: 10 INJECTION, EMULSION INTRAVENOUS at 18:32

## 2020-11-24 RX ADMIN — SODIUM CHLORIDE, SODIUM LACTATE, POTASSIUM CHLORIDE, AND CALCIUM CHLORIDE: 600; 310; 30; 20 INJECTION, SOLUTION INTRAVENOUS at 18:28

## 2020-11-24 RX ADMIN — PANTOPRAZOLE SODIUM 40 MG: 40 TABLET, DELAYED RELEASE ORAL at 08:24

## 2020-11-24 RX ADMIN — PROPOFOL 25 MG: 10 INJECTION, EMULSION INTRAVENOUS at 19:20

## 2020-11-24 RX ADMIN — PROPOFOL 100 MG: 10 INJECTION, EMULSION INTRAVENOUS at 18:46

## 2020-11-24 RX ADMIN — PROPOFOL 25 MG: 10 INJECTION, EMULSION INTRAVENOUS at 19:11

## 2020-11-24 RX ADMIN — ONDANSETRON 4 MG: 2 INJECTION INTRAMUSCULAR; INTRAVENOUS at 21:00

## 2020-11-24 RX ADMIN — OXYCODONE HYDROCHLORIDE AND ACETAMINOPHEN 2 TABLET: 5; 325 TABLET ORAL at 00:29

## 2020-11-24 RX ADMIN — FENTANYL CITRATE 25 MCG: 50 INJECTION, SOLUTION INTRAMUSCULAR; INTRAVENOUS at 18:59

## 2020-11-24 RX ADMIN — SODIUM CHLORIDE, SODIUM LACTATE, POTASSIUM CHLORIDE, AND CALCIUM CHLORIDE 100 ML/HR: 600; 310; 30; 20 INJECTION, SOLUTION INTRAVENOUS at 11:44

## 2020-11-24 RX ADMIN — SODIUM CHLORIDE, SODIUM LACTATE, POTASSIUM CHLORIDE, AND CALCIUM CHLORIDE 100 ML/HR: 600; 310; 30; 20 INJECTION, SOLUTION INTRAVENOUS at 00:31

## 2020-11-24 RX ADMIN — OXYCODONE HYDROCHLORIDE AND ACETAMINOPHEN 2 TABLET: 5; 325 TABLET ORAL at 13:48

## 2020-11-24 RX ADMIN — METOPROLOL TARTRATE 25 MG: 25 TABLET, FILM COATED ORAL at 21:44

## 2020-11-24 RX ADMIN — FENTANYL CITRATE 25 MCG: 50 INJECTION, SOLUTION INTRAMUSCULAR; INTRAVENOUS at 18:41

## 2020-11-24 RX ADMIN — SODIUM CHLORIDE, SODIUM LACTATE, POTASSIUM CHLORIDE, AND CALCIUM CHLORIDE 100 ML/HR: 600; 310; 30; 20 INJECTION, SOLUTION INTRAVENOUS at 16:17

## 2020-11-24 RX ADMIN — FENTANYL CITRATE 25 MCG: 50 INJECTION, SOLUTION INTRAMUSCULAR; INTRAVENOUS at 19:13

## 2020-11-24 RX ADMIN — INSULIN LISPRO 6 UNITS: 100 INJECTION, SOLUTION INTRAVENOUS; SUBCUTANEOUS at 07:30

## 2020-11-24 RX ADMIN — PROPOFOL 50 MG: 10 INJECTION, EMULSION INTRAVENOUS at 19:33

## 2020-11-24 RX ADMIN — VANCOMYCIN HYDROCHLORIDE 2000 MG: 10 INJECTION, POWDER, LYOPHILIZED, FOR SOLUTION INTRAVENOUS at 23:47

## 2020-11-24 RX ADMIN — INSULIN GLARGINE 20 UNITS: 100 INJECTION, SOLUTION SUBCUTANEOUS at 08:25

## 2020-11-24 RX ADMIN — PROPOFOL 50 MG: 10 INJECTION, EMULSION INTRAVENOUS at 19:24

## 2020-11-24 NOTE — PROGRESS NOTES
End of Shift Note     Bedside and verbal shift change report given to  Chen (On coming nurse) by  Derek Taylor (Off going nurse).   Report included the following information:      --Procedure Summary     --MAR,     --Recent Results     --Med Rec Status    SBAR Recommendations:    Issues for Provider to address           Activity This Shift     [] Bed Rest Order   [] Refused   [] Dangled    [] TDWB         Ambulating:     [] Bathroom     [] BSC     [x] Room/Hallway      Up in Chair for meals    []Yes [] No   Voiding       [x] Yes  [] No  Van          [] Yes  [] No  Incontinent [] Yes  [] No    DUE TO VOID POUR        [] Yes [] No  Purewick    [] Yes [] No  New Onset [] Yes [] No Straight Cath   []Yes  [] No  Condom Cath  [] Yes [] No  MD Called      [] Yes  [] No   Blood Sugars Managed [x]Yes [] No    Bowels Moved [] Yes [] No    Incontinent     [] Yes [] No Passed Gas []Yes [] No    New Onset  []Yes [] No        MD Called []Yes  [] No     CHG Bath Done     Before Surgery     After Surgery      [] Yes  [] No  [] Yes  [] No       Drain Removed [] Yes  [] No [x] N/A    Dressing Changed [] Yes   [] No [x] N/A      Nausea/Vomiting [] Yes   [x] No     Ice Packs Changed [] Yes   [] No  [x] N/A    Incentive Spirometer  [] Yes  [x] No      SCD Pumps On     Ankle Pumping  [] Yes   [x] No      [] Yes   [x] No        Telemetry Monitoring [] Yes   [x] No   Rhythm

## 2020-11-24 NOTE — ANESTHESIA PREPROCEDURE EVALUATION
Relevant Problems   No relevant active problems       Anesthetic History     PONV          Review of Systems / Medical History  Patient summary reviewed and pertinent labs reviewed    Pulmonary        Sleep apnea: CPAP           Neuro/Psych         Psychiatric history     Cardiovascular    Hypertension          CAD    Exercise tolerance: >4 METS     GI/Hepatic/Renal     GERD: well controlled           Endo/Other    Diabetes: poorly controlled, type 2  Hypothyroidism: well controlled  Morbid obesity and arthritis     Other Findings              Physical Exam    Airway  Mallampati: III  TM Distance: 4 - 6 cm  Neck ROM: decreased range of motion   Mouth opening: Normal     Cardiovascular    Rhythm: regular  Rate: normal         Dental    Dentition: Poor dentition     Pulmonary  Breath sounds clear to auscultation               Abdominal  GI exam deferred       Other Findings            Anesthetic Plan    ASA: 3  Anesthesia type: MAC            Anesthetic plan and risks discussed with: Patient

## 2020-11-24 NOTE — ROUTINE PROCESS
Bedside shift change report given to Sarmad Boles (oncoming nurse) by Shira Whatley (offgoing nurse). Report included the following information SBAR, Kardex, Intake/Output, MAR and Recent Results.

## 2020-11-24 NOTE — ROUTINE PROCESS
Bedside and Verbal shift change report given to MARGOTH Momin (oncoming nurse) by Josiah Garcia RN (offgoing nurse). Report included the following information SBAR, Kardex, Intake/Output and MAR.

## 2020-11-24 NOTE — PROGRESS NOTES
Problem: Falls - Risk of  Goal: *Absence of Falls  Description: Document Lyman Pipdelisa Fall Risk and appropriate interventions in the flowsheet.   Outcome: Progressing Towards Goal  Note: Fall Risk Interventions:  Mobility Interventions: PT Consult for mobility concerns         Medication Interventions: Teach patient to arise slowly

## 2020-11-24 NOTE — CONSULTS
Podiatry Consult    Subjective:         Date of Consultation: November 24, 2020     Patient is a 61 y.o. male who is being seen for right great toe osteomyelitis. Patient was seen in office for chronic non-healing ulcer. MRI was ordered which confirmed osteomyelitis of great toe distal phalanx as well as part of proximal phalanx. Patient was referred to hospital for admission for procedure. Unfortunately was unable to perform surgery yesterday. Patient also recently had partial left 2nd toe amputation which has healed fine. Denies N/v/C/f.      Patient Active Problem List    Diagnosis Date Noted    Osteomyelitis ([address] 11/22/2020    Fracture, toe 09/24/2020    Altered mental status 09/01/2020    Multifocal pneumonia 09/01/2020    DM (diabetes mellitus) ([address] 12/25/2019    Orthostatic hypotension 12/25/2019    Syncope and collapse 12/24/2019    Vomiting 03/28/2018    Chronic abdominal pain 03/28/2018    Sepsis ([address] 03/21/2018    Nausea and vomiting 09/07/2017    Gastroparesis 09/07/2017    Hypokalemia 09/07/2017    ARF (acute renal failure) (HCC) 09/07/2017    Atelectasis 09/07/2017    Abdominal pain 09/07/2017    Acquired hypothyroidism 09/07/2017    S/P lumbar fusion 07/05/2017    Diabetic neuropathy (HCC) 07/05/2017    Neuritis 07/05/2017    Spinal stenosis at L4-L5 level 06/19/2017    Coronary artery disease involving native coronary artery of native heart without angina pectoris 05/31/2017    History of coronary artery stent placement 05/31/2017    Synovial cyst 05/26/2017    HNP (herniated nucleus pulposus), lumbar 05/26/2017    Lumbar spinal stenosis 05/26/2017    Spondylolisthesis of lumbar region 05/26/2017    Positive PPD     History of heart attack     Pain in left lower leg 07/24/2016    Primary osteoarthritis of left knee 07/24/2016    Localized adiposity of abdomen 07/24/2016    Diabetes ([address] 08/14/2015    Essential hypertension 08/14/2015    GERD (gastroesophageal reflux disease) 08/14/2015    Depression 08/14/2015     Past Medical History:   Diagnosis Date    Arthritis     Coronary atherosclerosis of native coronary artery     S/P PCI with GE in 12/09    Diabetes ([facility]     IDDM    Electrolyte and fluid disorders not elsewhere classified     Essential hypertension, benign     GERD (gastroesophageal reflux disease)     Heroin abuse ([facility] 05/26/16    Psychiatrist Dr. Cinthia Pina History of heart attack     Hyperlipidemia     Intermediate coronary syndrome ([facility]     MDD (major depressive disorder) 5/26/16    Psychiatrist Dr. Cinthia Pina Myocardial infarction [facility])     Obesity, unspecified     Old myocardial infarction     Other and unspecified hyperlipidemia     Pancreatitis     Positive PPD     Sleep apnea     uses Cpap machine    Transfusion history     Before 1992      Past Surgical History:   Procedure Laterality Date    HX APPENDECTOMY      HX CORONARY STENT PLACEMENT  2010    2 stents      Family History   Problem Relation Age of Onset    Heart Attack Father     Coronary Artery Disease Mother     Diabetes Mother     Cancer Sister         breast cancer and lung cancer      Social History     Tobacco Use    Smoking status: Never Smoker    Smokeless tobacco: Never Used   Substance Use Topics    Alcohol use: No     Current Facility-Administered Medications   Medication Dose Route Frequency Provider Last Rate Last Dose    neomycin-polymyxin B  1 mL in sodium chloride irrigation 0.9 % 1,000 mL Irrigation    PRN Bajani, Vihang A, DPM   1 mL at 11/24/20 1551    bupivacaine (PF) (MARCAINE) 0.5 % (5 mg/mL) injection    PRN Bajani, Vihang A, DPM   30 mL at 11/24/20 1551    lidocaine (PF) (XYLOCAINE) 10 mg/mL (1 %) injection    PRN Bajani, Vihang A, DPM   30 mL at 11/24/20 1552    levoFLOXacin (LEVAQUIN) 500 mg in D5W IVPB  500 mg IntraVENous Q24H Stephenie REYES  mL/hr at 11/24/20 1638 500 mg at 11/24/20 1638    albuterol-ipratropium (DUO-NEB) 2.5 MG-0.5 MG/3 ML  3 mL Nebulization Q4H PRN Willie Vasquez MD        pantoprazole (PROTONIX) tablet 40 mg  40 mg Oral ACB&D Willie Vasquez MD   40 mg at 11/24/20 0824    metoprolol tartrate (LOPRESSOR) tablet 25 mg  25 mg Oral Q12H Willie Vasquez MD   25 mg at 11/24/20 0824    insulin glargine (LANTUS) injection 20 Units  20 Units SubCUTAneous BID Willie Vasquez MD   20 Units at 11/24/20 0825    atorvastatin (LIPITOR) tablet 10 mg  10 mg Oral QHS Willie Vasquez MD   10 mg at 11/23/20 2201    polyethylene glycol (MIRALAX) packet 17 g  17 g Oral DAILY Willie Vasquez MD        sodium chloride (NS) flush 5-40 mL  5-40 mL IntraVENous Q8H Willie Vasquez MD   Stopped at 11/24/20 0600    sodium chloride (NS) flush 5-40 mL  5-40 mL IntraVENous PRN Willie Vasquez MD        acetaminophen (TYLENOL) tablet 650 mg  650 mg Oral Q6H PRN Willie Vasquez MD   650 mg at 11/23/20 5355    Or    acetaminophen (TYLENOL) suppository 650 mg  650 mg Rectal Q6H PRN Willie Vasquez MD        enoxaparin (LOVENOX) injection 40 mg  40 mg SubCUTAneous DAILY Willie Vasquez MD   Stopped at 11/24/20 0824    bisacodyL (DULCOLAX) tablet 10 mg  10 mg Oral DAILY PRN Willie Vasquez MD        ondansetron [facility]) injection 4 mg  4 mg IntraVENous Q6H PRN Willie Vasquez MD        insulin lispro (HUMALOG) injection   SubCUTAneous AC&HS Willie Vasquez MD   Stopped at 11/24/20 1630    glucose chewable tablet 16 g  4 Tab Oral PRN Willie Vasquez MD        glucagon (GLUCAGEN) injection 1 mg  1 mg IntraMUSCular PRN Willie Vasquez MD        dextrose (D50W) injection syrg 12.5-25 g  25-50 mL IntraVENous PRN Willie Vasquez MD        oxyCODONE-acetaminophen (PERCOCET) 5-325 mg per tablet 1-2 Tab  1-2 Tab Oral Q4H PRN Willie Vasquez MD   2 Tab at 11/24/20 1348    lactated Ringers infusion  100 mL/hr IntraVENous CONTINUOUS Willie Vsaquez  mL/hr at 11/24/20 1617 100 mL/hr at 20 1617      Allergies   Allergen Reactions    Compazine [Prochlorperazine] Anaphylaxis     \"Tightness around throat\"        Review of Systems:  A comprehensive review of systems was negative except for that written in the History of Present Illness. Objective:     Patient Vitals for the past 8 hrs:   BP Temp Pulse Resp SpO2   20 1556  97.6 °F (36.4 °C) 65 20 99 %   20 1131 133/74 98.9 °F (37.2 °C) (!) 59 16 97 %     Temp (24hrs), Av.1 °F (36.7 °C), Min:97.4 °F (36.3 °C), Max:98.9 °F (37.2 °C)      Physical Exam:    Bilateral EDILSON: palpable 2/4 DP and PT pulses, CFT less than 3 seconds to distal digits, right great toe ulcer probing deep to bone, mild purulence noted. Localized edema present however no erythema noted. diminished protective sensation noted to both feet. Paresthesia symptoms present to both LE's. Lab Review:   Recent Results (from the past 24 hour(s))   GLUCOSE, POC    Collection Time: 20  9:54 PM   Result Value Ref Range    Glucose (POC) 212 (H) 70 - 110 mg/dL   GLUCOSE, POC    Collection Time: 20  5:39 AM   Result Value Ref Range    Glucose (POC) 238 (H) 70 - 110 mg/dL   GLUCOSE, POC    Collection Time: 20 11:42 AM   Result Value Ref Range    Glucose (POC) 262 (H) 70 - 110 mg/dL   SARS-COV-2    Collection Time: 20 12:45 PM   Result Value Ref Range    Specimen source Nasopharyngeal      COVID-19 rapid test Not detected NOTD      Specimen type NP Swab     GLUCOSE, POC    Collection Time: 20  3:47 PM   Result Value Ref Range    Glucose (POC) 165 (H) 70 - 110 mg/dL       Impression:     DM ulcer with osteomyelitis of right great toe    Recommendation:     - Outpatient MRI reviewed. Soft tissue defect of the distal great toe with osteomyelitis involving the  terminal phalanx of the great toe as well as the mid/distal aspect of the proximal phalanx of the great toe. Likely septic arthritis at the first interphalangeal joint.   - Plan for OR tonight for right great toe amputation.  Please keep him NPO after breakfast.   - Remain NWB to right forefoot.

## 2020-11-24 NOTE — PROGRESS NOTES
Reason for Admission:  Osteomyelitis ([facility] [M86.9]                 RUR Score:    22%            Plan for utilizing home health: [facility]                       Likelihood of Readmission:   MODERATE                         Transition of Care Plan:              Initial assessment completed with patient. Cognitive status of patient: oriented to time, place, person and situation. Face sheet information confirmed:  yes. The patient designates sister Minh Gaviria to participate in his discharge plan and to receive any needed information. This patient lives in a single family home with sister. Patient is able to navigate steps as needed. Prior to hospitalization, patient was considered to be independent with ADLs/IADLS : yes . Patient has a current ACP document on file: yes  The sister will be available to transport patient home upon discharge. The patient already has cane and CPAP medical equipment available in the home. Patient is not currently active with home health. Non admit last admission to [facility] due to not being homebound, may be necessary this admission. Patient has not stayed in a skilled nursing facility or rehab. This patient is on dialysis :no      List of available Home Health agencies were provided and reviewed with the patient prior to discharge. Freedom of choice signed: yes, for [facility]. Currently, the discharge plan is Home with [address] [place] [address] Yahir Tyler. The patient states that he can obtain his medications from the pharmacy, and take his medications as directed. Patient's current insurance is [employer]       Care Management Interventions  PCP Verified by CM:  Yes  Palliative Care Criteria Met (RRAT>21 & CHF Dx)?: No  Mode of Transport at Discharge: Self  Transition of Care Consult (CM Consult): [address]: Yes  Current Support Network: Relative's Home  Confirm Follow Up Transport: Family  The Patient and/or Patient Representative was Provided with a Choice of Provider and Agrees with the Discharge Plan?: Yes  Freedom of Choice List was Provided with Basic Dialogue that Supports the Patient's Individualized Plan of Care/Goals, Treatment Preferences and Shares the Quality Data Associated with the Providers?: Yes  Discharge Location  Discharge Placement: Home with home health      TRACY Fleming  Case Management  [phone]

## 2020-11-24 NOTE — PERIOP NOTES
Pt has blood sugar of 165, will hold coverage per dr Ivy Dover, pt received 20 units of lantis this am and also 9 units of humolog at lunchtime.

## 2020-11-24 NOTE — CONSULTS
Infectious Disease Consultation Note        Reason: right foot infection     Current abx Prior abx         Lines:       Assessment :      61 y. o. male with h/o type uncontrolled type 2 DM (last hgbA1C 8.9 on 11/23/20) , CAD who presented to [facility] on 11/22/2020 with right foot infection    Non healing right great toe ulcer x 3 months    MRI right foot 11/16- soft tissue ulcer defect of the distal great toe with osteomyelitis  involving the terminal phalanx of the great toe as well as the mid/distal aspect  of the proximal phalanx of the great toe. Effusion at the interphalangeal joint  worrisome for septic arthritis. soft tissue ulcer defect of the distal great toe with osteomyelitis involving the terminal phalanx of the great toe as well as the mid/distal aspect of the proximal phalanx of the great toe. Effusion at the interphalangeal joint  worrisome for septic arthritis.     Clinical presentation consistent with right great toe distal phalanx chronic osteomyelitis, septic arthritis right great toe interphalangeal joint    Wound cultures 9/2020-Enterobacter, MRSA    Podiatry follow-up appreciated. Plans for right great toe amputation noted.       Recommendations:     1.    Start levofloxacin, vancomycin after surgery  2. Follow-up Intra-Op cultures, findings   3. Hopefully will be able to switch to oral antibiotics after surgery if clean margins achieved at surgery. Will discuss with podiatrist.      Above plan was discussed in details with patient, and dr Emre Penn. Please call me if any further questions or concerns. Will continue to participate in the care of this patient.       Thank you for consultation request.       HPI:      61 y. o. male with h/o type uncontrolled type 2 DM (last hgbA1C 8.9 on 11/23/20) , CAD who presented to [facility] on 11/22/2020 with right foot infection. Patient had a nonhealing ulcer in the right great toe since about 3 months.   Wound cultures in September 2020 revealed Enterobacter, Postoperative Day # 19  s/p yusef patch repair of perforated viscus    Patient seen and examined bedside resting comfortably.  No complaints offered.   Abdominal pain is well controlled.  Denies nausea and vomiting. Tolerating diet. currently NPO for ISAIAS today.  Flatus/BM. +  Denies chest pain, dyspnea, cough.    T(F): 97.9 (06-04-19 @ 05:20), Max: 98.5 (06-03-19 @ 11:50)  HR: 92 (06-04-19 @ 05:20) (81 - 92)  BP: 152/90 (06-04-19 @ 05:20) (143/87 - 163/87)  RR: 18 (06-04-19 @ 05:20) (16 - 18)  SpO2: 96% (06-04-19 @ 05:20) (96% - 100%)    PHYSICAL EXAM:  General: NAD  Neuro:  Alert & oriented x 3  Abdomen: soft, NTND. Normactive BS. Surgical wound healing well. Down to only 1 abdominal drain: IR drain. This was upsized yesterday. Had some bleeding around this catheter. Spoke with IR PA. She will see pt & assess drain.    LABS:                        9.0    17.48 )-----------( 643      ( 04 Jun 2019 05:45 )             27.9     06-04    142  |  115<H>  |  19  ----------------------------<  82  3.7   |  17<L>  |  4.16<H>    Ca    8.0<L>      04 Jun 2019 05:45      Vascular & Interventional Radiology Post-Procedure Note    Pre-Procedure Diagnosis: RUQ abscess s/p IR drain  Post-Procedure Diagnosis: Same as pre.  Indications for Procedure: persistent fluid collection on CT    : Jeff  Assistant(s): n/a    Procedure Details/Findings: Small-moderate residual collection, tube upsized to 12F  Access (if applicable): n/a    Complications: none  Estimated Blood Loss: Minimal  Specimen: none  Contrast: 10cc  Sedation: none  Patient Condition/Disposition: stable, back to floor    Plan:   -follow up tube check and CT in 2 weeks.    I&O's Detail    03 Jun 2019 07:01  -  04 Jun 2019 07:00  --------------------------------------------------------  IN:    lactated ringers.: 75 mL    Solution: 300 mL  Total IN: 375 mL    OUT:    Bulb: 25 mL  Total OUT: 25 mL    Total NET: 350 mL          Impression:  s/p perforated viscus  resolving inta-abdominal collection  increased WBC      Plan:  -continue VTE prophylaxis   - continue IVAB per ID  - continue medical f/u  - continue IR drain & f/u  - ISAISA today to r/o vegetations.  -f/u AM labs  - pt has been tansferred to medical service.  -will discuss with Dr. Murray MRSA.  He was treated with oral antibiotics and followed up with podiatrist.  He continued to have nonhealing ulcer. MRI was obtained which revealed evidence of osteomyelitis, septic arthritis right great toe. Patient has been admitted on 11/22/2020 for surgical intervention. I have been consulted for further antibiotic recommendations. Patient denies any fever or chills throughout this time. He denies any erythema of the right foot or right leg. He denies any recent trauma to the right great toe. Past Medical History:   Diagnosis Date    Arthritis     Coronary atherosclerosis of native coronary artery     S/P PCI with GE in 12/09    Diabetes ([facility]     IDDM    Electrolyte and fluid disorders not elsewhere classified     Essential hypertension, benign     GERD (gastroesophageal reflux disease)     [facility] 05/26/16    Psychiatrist Dr. Julieta Harris History of heart attack     Hyperlipidemia     Intermediate coronary syndrome [facility])     MDD (major depressive disorder) 5/26/16    Psychiatrist Dr. Julieta Harris Myocardial infarction [facility])     Obesity, unspecified     Old myocardial infarction     Other and unspecified hyperlipidemia     Pancreatitis     Positive PPD     Sleep apnea     uses Cpap machine    Transfusion history     Before 1992       Past Surgical History:   Procedure Laterality Date    HX APPENDECTOMY      HX CORONARY STENT PLACEMENT  2010    2 stents       home Medication List    Details   insulin lispro (HumaLOG U-100 Insulin) 100 unit/mL injection 50 Units by SubCUTAneous route three (3) times daily (with meals). Patient typically only consumes 2 meals / day      pantoprazole (PROTONIX) 40 mg tablet Take 1 Tab by mouth Before breakfast and dinner. Qty: 60 Tab, Refills: 0      simvastatin (ZOCOR) 10 mg tablet Take 10 mg by mouth nightly. amoxicillin-clavulanate (Augmentin) 875-125 mg per tablet Take 1 Tab by mouth two (2) times a day.   Qty: 20 Tab, Refills: 0      methadone HCl (METHADONE PO) Take  by mouth.      metoprolol tartrate (LOPRESSOR) 25 mg tablet Take 1 Tab by mouth every twelve (12) hours. Qty: 60 Tab, Refills: 0      insulin detemir U-100 (LEVEMIR U-100 INSULIN) 100 unit/mL injection 20 Units by SubCUTAneous route two (2) times a day. Qty: 10 mL, Refills: 0      acetaminophen (TYLENOL) 325 mg tablet Take 2 Tabs by mouth every six (6) hours as needed. Indications: Pain, take it with bentyl; do not exceed 3 g tylenol daily  Qty: 30 Tab, Refills: 0      albuterol (PROVENTIL HFA, VENTOLIN HFA, PROAIR HFA) 90 mcg/actuation inhaler 2 puffs every 6 hours x 5 days then every 6 hours as needed for shortness of breath and/or wheezing  Qty: 1 Inhaler, Refills: 0      polyethylene glycol (MIRALAX) 17 gram packet Take 1 Packet by mouth daily. Qty: 30 Packet, Refills: 0      clopidogrel (PLAVIX) 75 mg tablet Take 75 mg by mouth daily.              Current Facility-Administered Medications   Medication Dose Route Frequency    albuterol-ipratropium (DUO-NEB) 2.5 MG-0.5 MG/3 ML  3 mL Nebulization Q4H PRN    pantoprazole (PROTONIX) tablet 40 mg  40 mg Oral ACB&D    metoprolol tartrate (LOPRESSOR) tablet 25 mg  25 mg Oral Q12H    insulin glargine (LANTUS) injection 20 Units  20 Units SubCUTAneous BID    atorvastatin (LIPITOR) tablet 10 mg  10 mg Oral QHS    polyethylene glycol (MIRALAX) packet 17 g  17 g Oral DAILY    sodium chloride (NS) flush 5-40 mL  5-40 mL IntraVENous Q8H    sodium chloride (NS) flush 5-40 mL  5-40 mL IntraVENous PRN    acetaminophen (TYLENOL) tablet 650 mg  650 mg Oral Q6H PRN    Or    acetaminophen (TYLENOL) suppository 650 mg  650 mg Rectal Q6H PRN    enoxaparin (LOVENOX) injection 40 mg  40 mg SubCUTAneous DAILY    bisacodyL (DULCOLAX) tablet 10 mg  10 mg Oral DAILY PRN    ondansetron (ZOFRAN) injection 4 mg  4 mg IntraVENous Q6H PRN    insulin lispro (HUMALOG) injection   SubCUTAneous AC&HS    glucose chewable Postoperative Day # 19  s/p yusef patch repair of perforated viscus    Patient seen and examined bedside resting comfortably.  No complaints offered.   Abdominal pain is well controlled.  Denies nausea and vomiting. Tolerating diet. currently NPO for ISAIAS today.  Flatus/BM. +  Denies chest pain, dyspnea, cough.    T(F): 97.9 (06-04-19 @ 05:20), Max: 98.5 (06-03-19 @ 11:50)  HR: 92 (06-04-19 @ 05:20) (81 - 92)  BP: 152/90 (06-04-19 @ 05:20) (143/87 - 163/87)  RR: 18 (06-04-19 @ 05:20) (16 - 18)  SpO2: 96% (06-04-19 @ 05:20) (96% - 100%)    PHYSICAL EXAM:  General: NAD  Neuro:  Alert & oriented x 3  Abdomen: soft, NTND. Normactive BS. Surgical wound healing well. Down to only 1 abdominal drain: IR drain. This was upsized yesterday. Had some bleeding around this catheter. Spoke with IR PA. She will see pt & assess drain.    LABS:                        9.0    17.48 )-----------( 643      ( 04 Jun 2019 05:45 )             27.9     06-04    142  |  115<H>  |  19  ----------------------------<  82  3.7   |  17<L>  |  4.16<H>    Ca    8.0<L>      04 Jun 2019 05:45      Vascular & Interventional Radiology Post-Procedure Note    Pre-Procedure Diagnosis: RUQ abscess s/p IR drain  Post-Procedure Diagnosis: Same as pre.  Indications for Procedure: persistent fluid collection on CT    : Jeff  Assistant(s): n/a    Procedure Details/Findings: Small-moderate residual collection, tube upsized to 12F  Access (if applicable): n/a    Complications: none  Estimated Blood Loss: Minimal  Specimen: none  Contrast: 10cc  Sedation: none  Patient Condition/Disposition: stable, back to floor    Plan:   -follow up tube check and CT in 2 weeks.        Progress Note:   · Provider Specialty	Gastroenterology	    Reason for Admission:   Reason for Admission:  · Reason for Admission	Abdominal pain, vomiting	      · Subjective and Objective: 	    Procedure:           Colonoscopy    Indications:         ABNORMAL CT SCAN R/O MASS    Monitored Anesthesia Care provided by : DR PAOLA Desai Quality : FAIR TO PREP  ____________________________________________________________________________________________________  Procedure:           Pre-Anesthesia Assessment:                       - Prior to the procedure, a History and Physical was performed, and patient                        medications and allergies were reviewed. The patient is competent. The risks                        and benefits of the procedure and the sedation options and risks were                        discussed with the patient. All questions were answered and informed consent                        was obtained. Patient identification and proposed procedure were verified by                        the physician, the nurse and the anesthesiologist in the procedure room.                        Mental Status Examination: alert and oriented. Airway Examination: normal                        oropharyngeal airway and neck mobility. Respiratory Examination: clear to                        auscultation. CV Examination: normal. Prophylactic Antibiotics: The patient                        does not require prophylactic antibiotics.                        Grade Assessment: III - A patient with severe systemic disease. After                        reviewing the risks and benefits, the patient was deemed in satisfactory                        condition to undergo the procedure. The anesthesia plan was to use monitored                        anesthesia care (MAC). Immediately prior to administration of medications,                        the patient was re-assessed for adequacy to receive sedatives. The heart                        rate, respiratory rate, oxygen saturations, blood pressure, adequacy of                        pulmonary ventilation, and response to care were monitored throughout the                        procedure. The physical status of the patient was re-assessed after the   	 	     procedure.                       After I obtained informed consent, the scope was passed under direct vision.                        Throughout the procedure, the patient's blood pressure, pulse, and oxygen                        saturations were monitored continuously. The Colonoscope was introduced                        through the anus and advanced to the terminal ileum, with identification of                        the appendiceal orifice and IC valve. The colonoscopy was performed without                        difficulty. The patient tolerated the procedure well. The quality of the                        bowel preparation was good.  Withdrawal Time :     11 MIN    RECTUM: INTERNAL  HEMORRHOIDS .     SIGMOID: NORMAL    DESCENDING COLON: NORMAL    TRANSVERSE COLON: NORMAL EXTRINSIC ADHESIONS NOTED     ASCENDING COLON: NORMAL    CECUM: NORMAL    TERMINAL ILEUM: DID NOT Enter    The patient tolerated the procedure well.    Assessment and Plan:   · Assessment		  60 y/o male PMHx ETOH abuse, chronic pancreatitis, treated hepatitis C, gout, NOW POD#17 s/p ex-lap, yusef patch repair, post op course complicated by intraabdominal collection s/p IR perc drainage on 5/22, MSSA bacteremia Called to evaluate for focal thickening at hepatic flexure on imaging, Anemia  COLONOSCOPY- REDUNDANT COLO EXTRINSIC ADHESIONS, FAIR TO POOR PREP    Problem/Plan - 1:  ·  Problem: Abnormal CT scan, colon.  Plan: repeat colon in 1 year with aggressive prep.  advance diet.       Electronic Signatures:  Mati Boggs)  (Signed 03-Jun-2019 10:41)  	Authored: Progress Note, Reason for Admission, Subjective and Objective, Assessment and Plan      I&O's Detail    03 Jun 2019 07:01  -  04 Jun 2019 07:00  --------------------------------------------------------  IN:    lactated ringers.: 75 mL    Solution: 300 mL  Total IN: 375 mL    OUT:    Bulb: 25 mL  Total OUT: 25 mL    Total NET: 350 mL          Impression:  s/p perforated viscus  resolving inta-abdominal collection  increased WBC  REDUNDANT COLO EXTRINSIC ADHESIONS on colonoscopy    Plan:  -continue VTE prophylaxis   - continue IVAB per ID  - continue medical f/u  - continue IR drain & f/u  - ISAIAS today to r/o vegetations.  -f/u AM labs  - pt has been tansferred to medical service.  -will discuss with Dr. Murray tablet 16 g  4 Tab Oral PRN    glucagon (GLUCAGEN) injection 1 mg  1 mg IntraMUSCular PRN    dextrose (D50W) injection syrg 12.5-25 g  25-50 mL IntraVENous PRN    oxyCODONE-acetaminophen (PERCOCET) 5-325 mg per tablet 1-2 Tab  1-2 Tab Oral Q4H PRN    lactated Ringers infusion  100 mL/hr IntraVENous CONTINUOUS       Allergies: Compazine [prochlorperazine]    Family History   Problem Relation Age of Onset    Heart Attack Father     Coronary Artery Disease Mother     Diabetes Mother     Cancer Sister         breast cancer and lung cancer     Social History     Socioeconomic History    Marital status:      Spouse name: Not on file    Number of children: Not on file    Years of education: Not on file    Highest education level: Not on file   Occupational History    Not on file   Social Needs    Financial resource strain: Not on file    Food insecurity     Worry: Not on file     Inability: Not on file    Transportation needs     Medical: Not on file     Non-medical: Not on file   Tobacco Use    Smoking status: Never Smoker    Smokeless tobacco: Never Used   Substance and Sexual Activity    Alcohol use: No    Drug use: No    Sexual activity: Not on file   Lifestyle    Physical activity     Days per week: Not on file     Minutes per session: Not on file    Stress: Not on file   Relationships    Social connections     Talks on phone: Not on file     Gets together: Not on file     Attends Congregation service: Not on file     Active member of club or organization: Not on file     Attends meetings of clubs or organizations: Not on file     Relationship status: Not on file    Intimate partner violence     Fear of current or ex partner: Not on file     Emotionally abused: Not on file     Physically abused: Not on file     Forced sexual activity: Not on file   Other Topics Concern    Not on file   Social History Narrative    Not on file     Social History     Tobacco Use   Smoking Status Never Smoker   Smokeless Tobacco Never Used        Temp (24hrs), Av.1 °F (36.7 °C), Min:97.4 °F (36.3 °C), Max:98.9 °F (37.2 °C)    Visit Vitals  /74 (BP 1 Location: Right arm, BP Patient Position: At rest)   Pulse (!) 59   Temp 98.9 °F (37.2 °C)   Resp 16   Ht 5' 9\" (1.753 m)   Wt 112.5 kg (248 lb)   SpO2 97%   BMI 36.62 kg/m²       ROS: 12 point ROS obtained in details. Pertinent positives as mentioned in HPI,   otherwise negative    Physical Exam:    General:          In NAD. Nontoxic-appearing. HEENT:           Head NCAT. Pupils equal round sclerae anicteric, EOMI. OP clear. Neck:               Supple, trachea midline. Lungs:             Clear, no wheezes. Effort nonlabored, no accessory muscle use. Chest wall:      No chest wall tenderness. Chest expansion equal and symmetrical bilaterally. Heart:              RRR. No JVD. Abdomen:        Soft, NT/ND. Bowel sounds normoactive. Extremities:     Warm, no edema. Distal R great toe ulcer with drainage. Unable to palpate dorsalis pedis/posterior tibial pulsation right foot. Skin:                Not pale. Not Jaundiced  No rashes   Psych:             Mood normal.  Calm, no agitation. Neurologic:      Awake and alert, moves extremities spontaneously.       Labs: Results:   Chemistry Recent Labs     20   * 330*    136   K 3.5 3.7    99*   CO2 29 32   BUN 22* 25*   CREA 1.62* 1.83*   CA 8.6 9.0   AGAP 5 5   BUCR 14 14      CBC w/Diff Recent Labs     20   WBC 9.3 9.3   RBC 3.67* 3.83*   HGB 10.6* 11.2*   HCT 34.4* 35.2*    248   GRANS  --  61   LYMPH  --  27   EOS  --  4      Microbiology Recent Labs     20   CULT NO GROWTH 2 DAYS NO GROWTH 2 DAYS          RADIOLOGY:    All available imaging studies/reports in [facility] care for this admission were reviewed    Dr. Yordy Ruiz, Infectious Disease Specialist  [phone]  , 2020  11:37 AM

## 2020-11-24 NOTE — DIABETES MGMT
Glycemic Control Plan of Care    Recommend increasing his lantus  Follow up - now scheduled for surgery this afternoon  Has been NPO after breakfast   Reviewed his recent BG and current insulin doses  Will continue to monitor     Current receiving:  Lantus 20 units bid  Corrective lispro, very insulin resistant,  4 times daily     TDD previous day = 79 units  40 units lantus  39 units lispro   mg/dl           Home diabetes medications:  Levemir 60 units bid  Humalog with meals - 50 units       Ayla Lund MPH RN CDE  Pager [phone]

## 2020-11-24 NOTE — PROGRESS NOTES
[facility]  Progress Note    Patient: David Marge Age: 61 y.o. : 1960 MR#: [id number] SSN: [id number]  Date/Time: 2020     Subjective:     Patient seen and evaluated, lying in bed, no acute distress. Plan for OR today per discussion with Podiatry yesterday       Assessment/Plan:     1. Right diabetic foot infectionpodiatry consulted, infectious disease consulted, continue broad-spectrum IV antibiotics. OR today at 4.30 pm  2. SHYLAimproved, continue IVF   3. Type 2 diabetes poorly controlledcontinue Lantus and sliding scale insulin. 4. History of hyperlipidemiacontinue Lipitor  5. History of GERDcontinue Protonix  6. Poor medical compliance  7. History of coronary artery disease with MI  8. Obesity BMI 35.4  DVT prophylaxisLovenox  Full code          Case discussed with:  [x]Patient  []Family  [x]Nursing  []Case Management  DVT Prophylaxis:  [x]Lovenox  []Hep SQ  []SCDs  []Coumadin   []On Heparin gtt    Objective:   VS:   Visit Vitals  /74 (BP 1 Location: Right arm, BP Patient Position: At rest)   Pulse (!) 59   Temp 98.9 °F (37.2 °C)   Resp 16   Ht 5' 9\" (1.753 m)   Wt 112.5 kg (248 lb)   SpO2 97%   BMI 36.62 kg/m²      Tmax/24hrs: Temp (24hrs), Av.2 °F (36.8 °C), Min:97.4 °F (36.3 °C), Max:98.9 °F (37.2 °C)  IOBRIEF    Intake/Output Summary (Last 24 hours) at 2020 1430  Last data filed at 2020 1145  Gross per 24 hour   Intake 120 ml   Output 4300 ml   Net -4180 ml       General:  Alert, cooperative, no acute distress    HEENT: PERRLA, anicteric sclerae. Pulmonary:  CTA Bilaterally. No Wheezing/Rhonchi/Rales. Cardiovascular: Regular rate and Rhythm. GI:  Soft, Non distended, Non tender. + Bowel sounds. Extremities: Right foot infection secondary to diabetes  Neurologic: Alert and oriented X 3. No acute neuro deficits.   Additional:    Medications:   Current Facility-Administered Medications   Medication Dose Route Frequency    albuterol-ipratropium (DUO-NEB) 2.5 MG-0.5 MG/3 ML  3 mL Nebulization Q4H PRN    pantoprazole (PROTONIX) tablet 40 mg  40 mg Oral ACB&D    metoprolol tartrate (LOPRESSOR) tablet 25 mg  25 mg Oral Q12H    insulin glargine (LANTUS) injection 20 Units  20 Units SubCUTAneous BID    atorvastatin (LIPITOR) tablet 10 mg  10 mg Oral QHS    polyethylene glycol (MIRALAX) packet 17 g  17 g Oral DAILY    sodium chloride (NS) flush 5-40 mL  5-40 mL IntraVENous Q8H    sodium chloride (NS) flush 5-40 mL  5-40 mL IntraVENous PRN    acetaminophen (TYLENOL) tablet 650 mg  650 mg Oral Q6H PRN    Or    acetaminophen (TYLENOL) suppository 650 mg  650 mg Rectal Q6H PRN    enoxaparin (LOVENOX) injection 40 mg  40 mg SubCUTAneous DAILY    bisacodyL (DULCOLAX) tablet 10 mg  10 mg Oral DAILY PRN    ondansetron (ZOFRAN) injection 4 mg  4 mg IntraVENous Q6H PRN    insulin lispro (HUMALOG) injection   SubCUTAneous AC&HS    glucose chewable tablet 16 g  4 Tab Oral PRN    glucagon (GLUCAGEN) injection 1 mg  1 mg IntraMUSCular PRN    dextrose (D50W) injection syrg 12.5-25 g  25-50 mL IntraVENous PRN    oxyCODONE-acetaminophen (PERCOCET) 5-325 mg per tablet 1-2 Tab  1-2 Tab Oral Q4H PRN    lactated Ringers infusion  100 mL/hr IntraVENous CONTINUOUS       Imaging:   XR Results (most recent):  Results from Hospital Encounter encounter on 11/22/20   XR CHEST PORT    Narrative EXAM: XR CHEST PORT    INDICATION: baseline for admission    COMPARISON: September 24, 2020    FINDINGS: A portable AP radiograph of the chest was obtained at 1933 hours. The  patient is on a cardiac monitor. The lungs are clear. The cardiac and  mediastinal contours and pulmonary vascularity are normal.  The bones and soft  tissues are grossly within normal limits.        Impression IMPRESSION: Normal chest.        CT Results (most recent):  Results from Hospital Encounter encounter on 09/01/20   CT HEAD WO CONT    Narrative CT HEAD UNENHANCED    CPT code: 83990    INDICATION: Altered mental status. Essential hypertension. No other relevant  clinical information provided. TECHNIQUE: 5 mm collimation axial images obtained from the skull base through  the vertex without administration of nonionic intravenous contrast.      All CT scans at this facility are performed using dose optimization technique as  appropriate to this specific exam, to include automated exposure control,  adjustment of the mA and/or KP according to patient size or use of iterative  reconstruction techniques. COMPARISON: Prior exam 11/19/2015    FINDINGS:   No suggestion of acute hemorrhage. No extraaxial fluid collections. No mass lesion appreciated within the limitations of a noncontrast exam.   No evidence for acute infarct involving any major vascular distribution. MRI much more sensitive for the detection of edema or ischemia in the acute  setting. No midline shift of structures. Partially included paranasal sinuses are clear. Calvarium intact to the extent included. 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 Impression IMPRESSION:  1. No hemorrhage identified. No evidence of acute intracranial pathology. 12/24/19   ECHO ADULT COMPLETE 12/26/2019 12/26/2019    Narrative · Normal cavity size, wall thickness and systolic function (ejection   fraction normal). Estimated left ventricular ejection fraction is 55 -   60%. Visually measured ejection fraction. No regional wall motion   abnormality noted. Age-appropriate left ventricular diastolic function. · Trace mitral valve regurgitation is present.         Signed by: Marcos Hunter MD        Labs:    Recent Results (from the past 48 hour(s))   CULTURE, BLOOD    Collection Time: 11/22/20  7:41 PM    Specimen: Blood   Result Value Ref Range    Special Requests: NO SPECIAL REQUESTS      Culture result: NO GROWTH 2 DAYS     POC LACTIC ACID    Collection Time: 11/22/20  7:47 PM   Result Value Ref Range    Lactic Acid (POC) 1.82 0.40 - 2.00 mmol/L   CULTURE, BLOOD    Collection Time: 11/22/20  7:54 PM    Specimen: Blood   Result Value Ref Range    Special Requests: NO SPECIAL REQUESTS      Culture result: NO GROWTH 2 DAYS     METABOLIC PANEL, BASIC    Collection Time: 11/22/20  8:04 PM   Result Value Ref Range    Sodium 136 136 - 145 mmol/L    Potassium 3.7 3.5 - 5.5 mmol/L    Chloride 99 (L) 100 - 111 mmol/L    CO2 32 21 - 32 mmol/L    Anion gap 5 3.0 - 18 mmol/L    Glucose 330 (H) 74 - 99 mg/dL    BUN 25 (H) 7.0 - 18 MG/DL    Creatinine 1.83 (H) 0.6 - 1.3 MG/DL    BUN/Creatinine ratio 14 12 - 20      GFR est AA 46 (L) >60 ml/min/1.73m2    GFR est non-AA 38 (L) >60 ml/min/1.73m2    Calcium 9.0 8.5 - 10.1 MG/DL   CBC WITH AUTOMATED DIFF    Collection Time: 11/22/20  8:04 PM   Result Value Ref Range    WBC 9.3 4.6 - 13.2 K/uL    RBC 3.83 (L) 4.70 - 5.50 M/uL    HGB 11.2 (L) 13.0 - 16.0 g/dL    HCT 35.2 (L) 36.0 - 48.0 %    MCV 91.9 74.0 - 97.0 FL    MCH 29.2 24.0 - 34.0 PG    MCHC 31.8 31.0 - 37.0 g/dL    RDW 13.2 11.6 - 14.5 %    PLATELET 576 704 - 793 K/uL    MPV 10.9 9.2 - 11.8 FL    NEUTROPHILS 61 40 - 73 %    LYMPHOCYTES 27 21 - 52 %    MONOCYTES 8 3 - 10 %    EOSINOPHILS 4 0 - 5 %    BASOPHILS 0 0 - 2 %    ABS. NEUTROPHILS 5.7 1.8 - 8.0 K/UL    ABS. LYMPHOCYTES 2.5 0.9 - 3.6 K/UL    ABS. MONOCYTES 0.8 0.05 - 1.2 K/UL    ABS. EOSINOPHILS 0.3 0.0 - 0.4 K/UL    ABS.  BASOPHILS 0.0 0.0 - 0.1 K/UL    DF AUTOMATED     PROTHROMBIN TIME + INR    Collection Time: 11/22/20  8:04 PM   Result Value Ref Range    Prothrombin time 13.8 11.5 - 15.2 sec    INR 1.1 0.8 - 1.2     PTT    Collection Time: 11/22/20  8:04 PM   Result Value Ref Range    aPTT 28.3 23.0 - 36.4 SEC   EKG, 12 LEAD, INITIAL    Collection Time: 11/22/20  8:11 PM   Result Value Ref Range    Ventricular Rate 74 BPM    Atrial Rate 74 BPM    P-R Interval 224 ms    QRS Duration 78 ms    Q-T Interval 362 ms    QTC Calculation (Bezet) 401 ms    Calculated P Axis 83 degrees    Calculated R Axis -5 degrees    Calculated T Axis 29 degrees    Diagnosis       Sinus rhythm with 1st degree AV block with premature atrial complexes  Low voltage QRS  Cannot rule out Inferior infarct , age undetermined  Cannot exclude anterior MI versus lead placement issue.   Abnormal ECG  When compared with ECG of 24-SEP-2020 16:58,  Minimal criteria for Anterior infarct are now present    Confirmed by Brian Rodriguez MD, Zac Dodson ([id number]) on 11/23/2020 3:08:79 AM     METABOLIC PANEL, BASIC    Collection Time: 11/23/20  5:29 AM   Result Value Ref Range    Sodium 136 136 - 145 mmol/L    Potassium 3.5 3.5 - 5.5 mmol/L    Chloride 102 100 - 111 mmol/L    CO2 29 21 - 32 mmol/L    Anion gap 5 3.0 - 18 mmol/L    Glucose 301 (H) 74 - 99 mg/dL    BUN 22 (H) 7.0 - 18 MG/DL    Creatinine 1.62 (H) 0.6 - 1.3 MG/DL    BUN/Creatinine ratio 14 12 - 20      GFR est AA 53 (L) >60 ml/min/1.73m2    GFR est non-AA 44 (L) >60 ml/min/1.73m2    Calcium 8.6 8.5 - 10.1 MG/DL   CBC W/O DIFF    Collection Time: 11/23/20  5:29 AM   Result Value Ref Range    WBC 9.3 4.6 - 13.2 K/uL    RBC 3.67 (L) 4.70 - 5.50 M/uL    HGB 10.6 (L) 13.0 - 16.0 g/dL    HCT 34.4 (L) 36.0 - 48.0 %    MCV 93.7 74.0 - 97.0 FL    MCH 28.9 24.0 - 34.0 PG    MCHC 30.8 (L) 31.0 - 37.0 g/dL    RDW 13.3 11.6 - 14.5 %    PLATELET 582 566 - 515 K/uL    MPV 10.9 9.2 - 11.8 FL   HEMOGLOBIN A1C WITH EAG    Collection Time: 11/23/20  5:29 AM   Result Value Ref Range    Hemoglobin A1c 8.9 (H) 4.2 - 5.6 %    Est. average glucose 209 mg/dL   GLUCOSE, POC    Collection Time: 11/23/20  5:46 AM   Result Value Ref Range    Glucose (POC) 304 (H) 70 - 110 mg/dL   GLUCOSE, POC    Collection Time: 11/23/20 11:33 AM   Result Value Ref Range    Glucose (POC) 281 (H) 70 - 110 mg/dL   GLUCOSE, POC    Collection Time: 11/23/20  3:42 PM   Result Value Ref Range    Glucose (POC) 324 (H) 70 - 110 mg/dL   GLUCOSE, POC Collection Time: 11/23/20  9:54 PM   Result Value Ref Range    Glucose (POC) 212 (H) 70 - 110 mg/dL   GLUCOSE, POC    Collection Time: 11/24/20  5:39 AM   Result Value Ref Range    Glucose (POC) 238 (H) 70 - 110 mg/dL   GLUCOSE, POC    Collection Time: 11/24/20 11:42 AM   Result Value Ref Range    Glucose (POC) 262 (H) 70 - 110 mg/dL   SARS-COV-2    Collection Time: 11/24/20 12:45 PM   Result Value Ref Range    Specimen source Nasopharyngeal      COVID-19 rapid test PENDING     Specimen type NP Swab         Signed By: Oscar Sommers MD     November 24, 2020      I spent 35 minutes with the patient in face-to-face consultation, of which greater than 50% was spent in counseling and coordination of care as described above    Disclaimer: Sections of this note are dictated using utilizing voice recognition software. Minor typographical errors may be present. If questions arise, please do not hesitate to contact me or call our department.

## 2020-11-25 VITALS
TEMPERATURE: 98.7 F | WEIGHT: 248 LBS | DIASTOLIC BLOOD PRESSURE: 76 MMHG | HEART RATE: 60 BPM | HEIGHT: 69 IN | SYSTOLIC BLOOD PRESSURE: 121 MMHG | OXYGEN SATURATION: 98 % | RESPIRATION RATE: 18 BRPM | BODY MASS INDEX: 36.73 KG/M2

## 2020-11-25 LAB
ANION GAP SERPL CALC-SCNC: 5 MMOL/L (ref 3–18)
BUN SERPL-MCNC: 14 MG/DL (ref 7–18)
BUN/CREAT SERPL: 12 (ref 12–20)
CALCIUM SERPL-MCNC: 8.7 MG/DL (ref 8.5–10.1)
CHLORIDE SERPL-SCNC: 106 MMOL/L (ref 100–111)
CO2 SERPL-SCNC: 30 MMOL/L (ref 21–32)
CREAT SERPL-MCNC: 1.2 MG/DL (ref 0.6–1.3)
ERYTHROCYTE [DISTWIDTH] IN BLOOD BY AUTOMATED COUNT: 13.4 % (ref 11.6–14.5)
GLUCOSE BLD STRIP.AUTO-MCNC: 176 MG/DL (ref 70–110)
GLUCOSE BLD STRIP.AUTO-MCNC: 176 MG/DL (ref 70–110)
GLUCOSE SERPL-MCNC: 168 MG/DL (ref 74–99)
HCT VFR BLD AUTO: 33.5 % (ref 36–48)
HGB BLD-MCNC: 10.4 G/DL (ref 13–16)
MCH RBC QN AUTO: 28.7 PG (ref 24–34)
MCHC RBC AUTO-ENTMCNC: 31 G/DL (ref 31–37)
MCV RBC AUTO: 92.3 FL (ref 74–97)
PLATELET # BLD AUTO: 223 K/UL (ref 135–420)
PMV BLD AUTO: 10.9 FL (ref 9.2–11.8)
POTASSIUM SERPL-SCNC: 3.8 MMOL/L (ref 3.5–5.5)
RBC # BLD AUTO: 3.63 M/UL (ref 4.7–5.5)
SODIUM SERPL-SCNC: 141 MMOL/L (ref 136–145)
WBC # BLD AUTO: 11.5 K/UL (ref 4.6–13.2)

## 2020-11-25 PROCEDURE — 74011636637 HC RX REV CODE- 636/637: Performed by: FAMILY MEDICINE

## 2020-11-25 PROCEDURE — 80048 BASIC METABOLIC PNL TOTAL CA: CPT

## 2020-11-25 PROCEDURE — 82962 GLUCOSE BLOOD TEST: CPT

## 2020-11-25 PROCEDURE — 74011250636 HC RX REV CODE- 250/636: Performed by: INTERNAL MEDICINE

## 2020-11-25 PROCEDURE — 74011250636 HC RX REV CODE- 250/636: Performed by: FAMILY MEDICINE

## 2020-11-25 PROCEDURE — 85027 COMPLETE CBC AUTOMATED: CPT

## 2020-11-25 PROCEDURE — 36415 COLL VENOUS BLD VENIPUNCTURE: CPT

## 2020-11-25 PROCEDURE — 2709999900 HC NON-CHARGEABLE SUPPLY

## 2020-11-25 PROCEDURE — 74011250637 HC RX REV CODE- 250/637: Performed by: FAMILY MEDICINE

## 2020-11-25 PROCEDURE — 99239 HOSP IP/OBS DSCHRG MGMT >30: CPT | Performed by: HOSPITALIST

## 2020-11-25 RX ORDER — LEVOFLOXACIN 500 MG/1
500 TABLET, FILM COATED ORAL DAILY
Qty: 10 TAB | Refills: 0 | Status: SHIPPED | OUTPATIENT
Start: 2020-11-25 | End: 2021-02-04

## 2020-11-25 RX ORDER — VANCOMYCIN HYDROCHLORIDE
1250 EVERY 12 HOURS
Status: DISCONTINUED | OUTPATIENT
Start: 2020-11-26 | End: 2020-11-25 | Stop reason: HOSPADM

## 2020-11-25 RX ORDER — OXYCODONE AND ACETAMINOPHEN 5; 325 MG/1; MG/1
1 TABLET ORAL
Qty: 12 TAB | Refills: 0 | Status: SHIPPED | OUTPATIENT
Start: 2020-11-25 | End: 2020-11-28

## 2020-11-25 RX ORDER — AMOXICILLIN AND CLAVULANATE POTASSIUM 875; 125 MG/1; MG/1
1 TABLET, FILM COATED ORAL 2 TIMES DAILY
Qty: 20 TAB | Refills: 0 | Status: SHIPPED | OUTPATIENT
Start: 2020-11-25 | End: 2021-02-04

## 2020-11-25 RX ADMIN — VANCOMYCIN HYDROCHLORIDE 1500 MG: 10 INJECTION, POWDER, LYOPHILIZED, FOR SOLUTION INTRAVENOUS at 12:34

## 2020-11-25 RX ADMIN — Medication 10 ML: at 05:09

## 2020-11-25 RX ADMIN — INSULIN LISPRO 3 UNITS: 100 INJECTION, SOLUTION INTRAVENOUS; SUBCUTANEOUS at 12:27

## 2020-11-25 RX ADMIN — INSULIN GLARGINE 20 UNITS: 100 INJECTION, SOLUTION SUBCUTANEOUS at 08:10

## 2020-11-25 RX ADMIN — INSULIN LISPRO 3 UNITS: 100 INJECTION, SOLUTION INTRAVENOUS; SUBCUTANEOUS at 06:23

## 2020-11-25 RX ADMIN — OXYCODONE HYDROCHLORIDE AND ACETAMINOPHEN 2 TABLET: 5; 325 TABLET ORAL at 08:16

## 2020-11-25 RX ADMIN — ENOXAPARIN SODIUM 40 MG: 40 INJECTION SUBCUTANEOUS at 08:09

## 2020-11-25 RX ADMIN — OXYCODONE HYDROCHLORIDE AND ACETAMINOPHEN 2 TABLET: 5; 325 TABLET ORAL at 01:42

## 2020-11-25 RX ADMIN — METOPROLOL TARTRATE 25 MG: 25 TABLET, FILM COATED ORAL at 08:09

## 2020-11-25 RX ADMIN — PANTOPRAZOLE SODIUM 40 MG: 40 TABLET, DELAYED RELEASE ORAL at 06:23

## 2020-11-25 NOTE — ROUTINE PROCESS
Patient is alert and oriented times times three with no signs or symptoms of distress. Dressing was reinforced is intact.

## 2020-11-25 NOTE — ROUTINE PROCESS
Patient came in from surgery alert and oriented times three with nos igns or symptoms of distress. Dressing is clean dry and intact and patient got up and urinated as soon as he got to the floor

## 2020-11-25 NOTE — OP NOTES
Operative Report     Patient: Rosa Berry MRN: [id number]  SSN: [id number]    YOB: 1960  Age: 61 y.o. Sex: male       Indications: The patient was admitted to the hospital for right great toe osteomyelitis. Patient has chronic non-healing ulcer on dorsal right great toe which was not healing with local wound care. Patient was sent for an MRI which confirmed osteomyelitis of right great toe. Patient will need amputation of right great toe. All risks, benefits, complications of procedure discussed in detail with patient. Possible complications including but not limited to continued infection, swelling, delayed healing, non-healing, requirement of more proximal amputation, loss of limb or death. No guarantee made to outcome of procedure. Patient voiced understanding and signed consent. Date of Surgery: 11/24/2020     Preoperative Diagnosis:  OSTEOMYELITIS OF RIGHT GREAT TOE    Postoperative Diagnosis: OSTEOMYELITIS OF RIGHT GREAT TOE     Surgeon(s) and Role:     * Zachary Pena DPM - Primary      Surgical Assistants: Operating Room Staff    Anesthesia: MAC WITH LOCAL    Procedure:  Procedure(s):  AMPUTATION  OF RIGHT GREAT  TOE AND RESECTION OF SESAMOIDS    Procedure in Detail:     The patient was brought to the operative room and kept of hospital stretcher in supine position. After IV sedation from department of anesthesia, local block consisting of 20 cc of 1:1 mixture of 1% lidocaine plain and 0.5% marcaine plain administered in reich block fashion. Right foot prepped and draped in usual sterile fashion. Attention directed to right great toe where fish-mouth incision performed at base of proximal phalanx. Incision performed with # 10 blade down to bone. Toe clamped with towel clamp and disarticulated at MTPJ level. Extensor and flexor tendons transected as proximal as possible. Amputated toe sent to pathology. Most proximal portion should be considered clean margin.  Attention then directed to plantar first metatarsal and both tibial and fibular sesamoids freed up from surrounding soft tissue and resected out. Tibial sesamoid sent to pathology as clean margin. Surgical site irrigated with normal saline solution. Good amount of bleeding noted at surgical site. Redundant tissue resected as necessary. Subcutaneous tissue approximated with 3-0 vicryl and skin was closed with 3-0 prolene using simple interrupted technique. Surgical site dressed with betadine soaked adaptic, 4 x 4, kerlex. The patient tolerated the procedure and anesthesia well. The patient was sent to the recovery room with stable vitals and intact vascular status to remaining toes.      Estimated Blood Loss:  15 cc    Drains: none           Specimens:   ID Type Source Tests Collected by Time Destination   1 : RIGHT GREAT TOE Preservative Toe  Flaquito Real DPM 11/24/2020 1906 Pathology   2 : RIGHT GREAT TOE, CLEAN MARGIN Preservative Toe  Dmitriy BOWMAN DPM 11/24/2020 1921 Pathology               Implants:  * No implants in log *           Complications:  None

## 2020-11-25 NOTE — ROUTINE PROCESS
TRANSFER - OUT REPORT: 
 
Verbal [facility](unit) for routine progression of care Report consisted of patients Situation, Background, Assessment and  
Recommendations(SBAR). Information from the following report(s) SBAR, OR Summary, Intake/Output, MAR and Recent Results was reviewed with the receiving nurse. Lines:  
Peripheral IV 11/23/20 Anterior;Left;Proximal Forearm (Active) Site Assessment Clean, dry, & intact 11/24/20 1946 Phlebitis Assessment 0 11/24/20 1946 Infiltration Assessment 0 11/24/20 1946 Dressing Status Clean, dry, & intact 11/24/20 1946 Dressing Type Tape;Transparent 11/24/20 1946 Hub Color/Line Status Blue; Infusing 11/24/20 1946 Action Taken Open ports on tubing capped 11/24/20 1450 Alcohol Cap Used Yes 11/24/20 1450 Opportunity for questions and clarification was provided. Patient transported with: 
 Registered Nurse Tech

## 2020-11-25 NOTE — PROGRESS NOTES
Progress Note    Patient: Axel Santamaria MRN: [id number]  SSN: [id number]    YOB: 1960  Age: 61 y.o. Sex: male      Admit Date: 11/22/2020  1 Day Post-Op     Procedure:   Procedure(s):  AMPUTATION  OF RIGHT GREAT  TOE    Subjective:     Patient seen resting quiet and comfortably and no apparent distress. Patient denies any pain to surgical site. Patient had strike through on dressings and was just changed by nurse. Patient denies N/v/c/f. Status post: osteomyelitis    Objective:     Visit Vitals  /76 (BP 1 Location: Right arm, BP Patient Position: At rest)   Pulse 60   Temp 98.7 °F (37.1 °C)   Resp 18   Ht 5' 9\" (1.753 m)   Wt 112.5 kg (248 lb)   SpO2 98%   BMI 36.62 kg/m²        Physical Exam:  Dressings intact to surgical site. CFT less than 3 seconds to remaining toes. Labs/Radiology Review: images and reports reviewed    Assessment:     Hospital Problems  Date Reviewed: 12/24/2019          Codes Class Noted POA    Osteomyelitis [facility]) ICD-10-CM: M86.9  ICD-9-CM: 730.20  11/22/2020 Unknown              Plan/Recommendations/Medical Decision Making:     - Patient is stable to d/c to home. - Antibiotic management per ID physician.   - Patient will need to change dressings every 3 days with dry gauze, kerlex. - Patient needs to remain NWB to right forefoot. Patient already has surgical shoe at home. His wife is bringing it here today. - Will see him in office in 1 week.

## 2020-11-25 NOTE — ANESTHESIA POSTPROCEDURE EVALUATION
Procedure(s):  AMPUTATION  OF RIGHT GREAT  TOE. MAC    Anesthesia Post Evaluation      Multimodal analgesia: multimodal analgesia used between 6 hours prior to anesthesia start to PACU discharge  Patient location during evaluation: bedside  Patient participation: complete - patient participated  Level of consciousness: awake  Pain management: adequate  Airway patency: patent  Anesthetic complications: no  Cardiovascular status: stable  Respiratory status: acceptable  Hydration status: acceptable  Post anesthesia nausea and vomiting:  controlled      INITIAL Post-op Vital signs:   Vitals Value Taken Time   /93 11/24/2020  8:06 PM   Temp 36.6 °C (97.8 °F) 11/24/2020  7:46 PM   Pulse 57 11/24/2020  8:12 PM   Resp 11 11/24/2020  8:12 PM   SpO2 98 % 11/24/2020  8:12 PM   Vitals shown include unvalidated device data.

## 2020-11-25 NOTE — PROGRESS NOTES
Problem: Falls - Risk of  Goal: *Absence of Falls  Description: Document Olivia Craig Fall Risk and appropriate interventions in the flowsheet. 11/25/2020 0037 by Bharat Salmon RN  Outcome: Progressing Towards Goal  Note: Fall Risk Interventions:  Mobility Interventions: Assess mobility with egress test, Communicate number of staff needed for ambulation/transfer, Patient to call before getting OOB, PT Consult for mobility concerns         Medication Interventions: Assess postural VS orthostatic hypotension, Evaluate medications/consider consulting pharmacy, Patient to call before getting OOB, Teach patient to arise slowly                11/25/2020 0036 by Bharat Salmon RN  Outcome: Progressing Towards Goal  Note: Fall Risk Interventions:  Mobility Interventions: Assess mobility with egress test, Communicate number of staff needed for ambulation/transfer, Patient to call before getting OOB, PT Consult for mobility concerns         Medication Interventions: Assess postural VS orthostatic hypotension, Evaluate medications/consider consulting pharmacy, Patient to call before getting OOB, Teach patient to arise slowly                   Problem: Patient Education: Go to Patient Education Activity  Goal: Patient/Family Education  11/25/2020 0037 by Bharat Salmon RN  Outcome: Progressing Towards Goal  11/25/2020 0036 by Bharat Salmon RN  Outcome: Progressing Towards Goal     Problem: Diabetes Self-Management  Goal: *Disease process and treatment process  Description: Define diabetes and identify own type of diabetes; list 3 options for treating diabetes. 11/25/2020 0037 by Bharat Salmon RN  Outcome: Progressing Towards Goal  11/25/2020 0036 by Bharat Salmon RN  Outcome: Progressing Towards Goal  Goal: *Incorporating nutritional management into lifestyle  Description: Describe effect of type, amount and timing of food on blood glucose; list 3 methods for planning meals.   11/25/2020 0037 by Cira Donovan Suasnnah Rondon RN  Outcome: Progressing Towards Goal  11/25/2020 0036 by Carmine Oshea RN  Outcome: Progressing Towards Goal  Goal: *Incorporating physical activity into lifestyle  Description: State effect of exercise on blood glucose levels. 11/25/2020 0037 by Carmine Oshea RN  Outcome: Progressing Towards Goal  11/25/2020 0036 by Carmine Oshea RN  Outcome: Progressing Towards Goal  Goal: *Developing strategies to promote health/change behavior  Description: Define the ABC's of diabetes; identify appropriate screenings, schedule and personal plan for screenings. 11/25/2020 0037 by Carmine Oshea RN  Outcome: Progressing Towards Goal  11/25/2020 0036 by Carmine Oshea RN  Outcome: Progressing Towards Goal  Goal: *Using medications safely  Description: State effect of diabetes medications on diabetes; name diabetes medication taking, action and side effects. 11/25/2020 0037 by Carmine Oshea RN  Outcome: Progressing Towards Goal  11/25/2020 0036 by Carmine Oshea RN  Outcome: Progressing Towards Goal  Goal: *Monitoring blood glucose, interpreting and using results  Description: Identify recommended blood glucose targets  and personal targets. 11/25/2020 0037 by Carmine Oshea RN  Outcome: Progressing Towards Goal  11/25/2020 0036 by Carmine Oshea RN  Outcome: Progressing Towards Goal  Goal: *Prevention, detection, treatment of acute complications  Description: List symptoms of hyper- and hypoglycemia; describe how to treat low blood sugar and actions for lowering  high blood glucose level. 11/25/2020 0037 by Carmine Oshea RN  Outcome: Progressing Towards Goal  11/25/2020 0036 by Carmine Oshea RN  Outcome: Progressing Towards Goal  Goal: *Prevention, detection and treatment of chronic complications  Description: Define the natural course of diabetes and describe the relationship of blood glucose levels to long term complications of diabetes.   11/25/2020 0037 by Carmine Oshea RN  Outcome: Progressing Towards Goal  11/25/2020 0036 by Tanika Nair RN  Outcome: Progressing Towards Goal  Goal: *Developing strategies to address psychosocial issues  Description: Describe feelings about living with diabetes; identify support needed and support network  11/25/2020 0037 by Tanika Nair RN  Outcome: Progressing Towards Goal  11/25/2020 0036 by Tanika Nair RN  Outcome: Progressing Towards Goal  Goal: *Insulin pump training  11/25/2020 0037 by Tanika Nair RN  Outcome: Progressing Towards Goal  11/25/2020 0036 by Tanika Nair RN  Outcome: Progressing Towards Goal  Goal: *Sick day guidelines  11/25/2020 0037 by Tanika Nair RN  Outcome: Progressing Towards Goal  11/25/2020 0036 by Tanika Nair RN  Outcome: Progressing Towards Goal  Goal: *Patient Specific Goal (EDIT GOAL, INSERT TEXT)  11/25/2020 0037 by Tanika Nair RN  Outcome: Progressing Towards Goal  11/25/2020 0036 by Tanika Nair RN  Outcome: Progressing Towards Goal     Problem: Patient Education: Go to Patient Education Activity  Goal: Patient/Family Education  11/25/2020 0037 by Tanika Nair RN  Outcome: Progressing Towards Goal  11/25/2020 0036 by Tanika Nair RN  Outcome: Progressing Towards Goal

## 2020-11-25 NOTE — PROGRESS NOTES
Problem: Falls - Risk of  Goal: *Absence of Falls  Description: Document Kody Taveras Fall Risk and appropriate interventions in the flowsheet. Outcome: Progressing Towards Goal  Note: Fall Risk Interventions:  Mobility Interventions: Assess mobility with egress test, Communicate number of staff needed for ambulation/transfer, Patient to call before getting OOB, PT Consult for mobility concerns         Medication Interventions: Assess postural VS orthostatic hypotension, Evaluate medications/consider consulting pharmacy, Patient to call before getting OOB, Teach patient to arise slowly                   Problem: Patient Education: Go to Patient Education Activity  Goal: Patient/Family Education  Outcome: Progressing Towards Goal     Problem: Diabetes Self-Management  Goal: *Disease process and treatment process  Description: Define diabetes and identify own type of diabetes; list 3 options for treating diabetes. Outcome: Progressing Towards Goal  Goal: *Incorporating nutritional management into lifestyle  Description: Describe effect of type, amount and timing of food on blood glucose; list 3 methods for planning meals. Outcome: Progressing Towards Goal  Goal: *Incorporating physical activity into lifestyle  Description: State effect of exercise on blood glucose levels. Outcome: Progressing Towards Goal  Goal: *Developing strategies to promote health/change behavior  Description: Define the ABC's of diabetes; identify appropriate screenings, schedule and personal plan for screenings. Outcome: Progressing Towards Goal  Goal: *Using medications safely  Description: State effect of diabetes medications on diabetes; name diabetes medication taking, action and side effects. Outcome: Progressing Towards Goal  Goal: *Monitoring blood glucose, interpreting and using results  Description: Identify recommended blood glucose targets  and personal targets.   Outcome: Progressing Towards Goal  Goal: *Prevention, detection, treatment of acute complications  Description: List symptoms of hyper- and hypoglycemia; describe how to treat low blood sugar and actions for lowering  high blood glucose level. Outcome: Progressing Towards Goal  Goal: *Prevention, detection and treatment of chronic complications  Description: Define the natural course of diabetes and describe the relationship of blood glucose levels to long term complications of diabetes.   Outcome: Progressing Towards Goal  Goal: *Developing strategies to address psychosocial issues  Description: Describe feelings about living with diabetes; identify support needed and support network  Outcome: Progressing Towards Goal  Goal: *Insulin pump training  Outcome: Progressing Towards Goal  Goal: *Sick day guidelines  Outcome: Progressing Towards Goal  Goal: *Patient Specific Goal (EDIT GOAL, INSERT TEXT)  Outcome: Progressing Towards Goal     Problem: Patient Education: Go to Patient Education Activity  Goal: Patient/Family Education  Outcome: Progressing Towards Goal

## 2020-11-25 NOTE — PROGRESS NOTES
Infectious Disease progress note        Reason: right foot infection     Current abx Prior abx   Levofloxacin, vancomycin since 11/24      Lines:       Assessment :      61 y. o. male with h/o type uncontrolled type 2 DM (last hgbA1C 8.9 on 11/23/20) , CAD who presented to  [facility] on 11/22/2020 with right foot infection    Non healing right great toe ulcer x 3 months    MRI right foot 11/16- soft tissue ulcer defect of the distal great toe with osteomyelitis  involving the terminal phalanx of the great toe as well as the mid/distal aspect  of the proximal phalanx of the great toe. Effusion at the interphalangeal joint  worrisome for septic arthritis. soft tissue ulcer defect of the distal great toe with osteomyelitis involving the terminal phalanx of the great toe as well as the mid/distal aspect of the proximal phalanx of the great toe. Effusion at the interphalangeal joint  worrisome for septic arthritis.     Clinical presentation consistent with right great toe distal phalanx chronic osteomyelitis, septic arthritis right great toe interphalangeal joint    Wound cultures 9/2020-Enterobacter, MRSA    Podiatry follow-up appreciated. Status post right great toe amputation on 11/24. Intraoperative findings discussed with the podiatrist.  Grossly clean margins achieved at surgery. No suspicion of peripheral arterial disease. Good bleeding noted Intra-Op.       Recommendations:     1. Discontinue intravenous levofloxacin, vancomycin. Start p.o. ciprofloxacin, Augmentin for 10 days  2. Follow podiatry recommendations  3. Patient was advised regarding importance of good glycemic control, compliance with antibiotics    Above plan was discussed in details with patient, and dr Chery Ventura. Please call me if any further questions or concerns. Will continue to participate in the care of this patient.             HPI:    Feels good. Elvis Faith to go home. Patient denies any fever or chills throughout this time.      home Medication List    Details   insulin lispro (HumaLOG U-100 Insulin) 100 unit/mL injection 50 Units by SubCUTAneous route three (3) times daily (with meals). Patient typically only consumes 2 meals / day      pantoprazole (PROTONIX) 40 mg tablet Take 1 Tab by mouth Before breakfast and dinner. Qty: 60 Tab, Refills: 0      simvastatin (ZOCOR) 10 mg tablet Take 10 mg by mouth nightly. amoxicillin-clavulanate (Augmentin) 875-125 mg per tablet Take 1 Tab by mouth two (2) times a day. Qty: 20 Tab, Refills: 0      methadone HCl (METHADONE PO) Take  by mouth.      metoprolol tartrate (LOPRESSOR) 25 mg tablet Take 1 Tab by mouth every twelve (12) hours. Qty: 60 Tab, Refills: 0      insulin detemir U-100 (LEVEMIR U-100 INSULIN) 100 unit/mL injection 20 Units by SubCUTAneous route two (2) times a day. Qty: 10 mL, Refills: 0      acetaminophen (TYLENOL) 325 mg tablet Take 2 Tabs by mouth every six (6) hours as needed. Indications: Pain, take it with bentyl; do not exceed 3 g tylenol daily  Qty: 30 Tab, Refills: 0      albuterol (PROVENTIL HFA, VENTOLIN HFA, PROAIR HFA) 90 mcg/actuation inhaler 2 puffs every 6 hours x 5 days then every 6 hours as needed for shortness of breath and/or wheezing  Qty: 1 Inhaler, Refills: 0      polyethylene glycol (MIRALAX) 17 gram packet Take 1 Packet by mouth daily. Qty: 30 Packet, Refills: 0      clopidogrel (PLAVIX) 75 mg tablet Take 75 mg by mouth daily.              Current Facility-Administered Medications   Medication Dose Route Frequency    levoFLOXacin (LEVAQUIN) 500 mg in D5W IVPB  500 mg IntraVENous Q24H    sodium chloride (NS) flush 5-40 mL  5-40 mL IntraVENous Q8H    sodium chloride (NS) flush 5-40 mL  5-40 mL IntraVENous PRN    fentaNYL citrate (PF) injection 25 mcg  25 mcg IntraVENous PRN    vancomycin (VANCOCIN) 1500 mg in  ml infusion  1,500 mg IntraVENous Q18H    albuterol-ipratropium (DUO-NEB) 2.5 MG-0.5 MG/3 ML  3 mL Nebulization Q4H PRN    pantoprazole (PROTONIX) tablet 40 mg  40 mg Oral ACB&D    metoprolol tartrate (LOPRESSOR) tablet 25 mg  25 mg Oral Q12H    insulin glargine (LANTUS) injection 20 Units  20 Units SubCUTAneous BID    atorvastatin (LIPITOR) tablet 10 mg  10 mg Oral QHS    polyethylene glycol (MIRALAX) packet 17 g  17 g Oral DAILY    sodium chloride (NS) flush 5-40 mL  5-40 mL IntraVENous Q8H    sodium chloride (NS) flush 5-40 mL  5-40 mL IntraVENous PRN    acetaminophen (TYLENOL) tablet 650 mg  650 mg Oral Q6H PRN    Or    acetaminophen (TYLENOL) suppository 650 mg  650 mg Rectal Q6H PRN    enoxaparin (LOVENOX) injection 40 mg  40 mg SubCUTAneous DAILY    bisacodyL (DULCOLAX) tablet 10 mg  10 mg Oral DAILY PRN    ondansetron (ZOFRAN) injection 4 mg  4 mg IntraVENous Q6H PRN    insulin lispro (HUMALOG) injection   SubCUTAneous AC&HS    glucose chewable tablet 16 g  4 Tab Oral PRN    glucagon (GLUCAGEN) injection 1 mg  1 mg IntraMUSCular PRN    dextrose (D50W) injection syrg 12.5-25 g  25-50 mL IntraVENous PRN    oxyCODONE-acetaminophen (PERCOCET) 5-325 mg per tablet 1-2 Tab  1-2 Tab Oral Q4H PRN    lactated Ringers infusion  100 mL/hr IntraVENous CONTINUOUS       Allergies: Compazine [prochlorperazine]    Temp (24hrs), Av.9 °F (36.6 °C), Min:97.1 °F (36.2 °C), Max:98.9 °F (37.2 °C)    Visit Vitals  BP (!) 140/76 (BP 1 Location: Right arm, BP Patient Position: At rest)   Pulse 71   Temp 97.6 °F (36.4 °C)   Resp 16   Ht 5' 9\" (1.753 m)   Wt 112.5 kg (248 lb)   SpO2 97%   BMI 36.62 kg/m²       ROS: 12 point ROS obtained in details. Pertinent positives as mentioned in HPI,   otherwise negative    Physical Exam:    General:          In NAD. Nontoxic-appearing. HEENT:           Head NCAT. Pupils equal round sclerae anicteric, EOMI. OP clear. Neck:               Supple, trachea midline. Lungs:             Clear, no wheezes. Effort nonlabored, no accessory muscle use. Chest wall:      No chest wall tenderness.   Chest expansion equal and symmetrical bilaterally. Heart:              RRR. No JVD. Abdomen:        Soft, NT/ND. Bowel sounds normoactive. Extremities:     Warm, no edema. Right foot surgical dressing not opened  Skin:                Not pale. Not Jaundiced  No rashes   Psych:             Mood normal.  Calm, no agitation. Neurologic:      Awake and alert, moves extremities spontaneously.       Labs: Results:   Chemistry Recent Labs     11/25/20 0546 11/23/20 0529 11/22/20 2004   * 301* 330*    136 136   K 3.8 3.5 3.7    102 99*   CO2 30 29 32   BUN 14 22* 25*   CREA 1.20 1.62* 1.83*   CA 8.7 8.6 9.0   AGAP 5 5 5   BUCR 12 14 14      CBC w/Diff Recent Labs     11/25/20 0546 11/23/20 0529 11/22/20 2004   WBC 11.5 9.3 9.3   RBC 3.63* 3.67* 3.83*   HGB 10.4* 10.6* 11.2*   HCT 33.5* 34.4* 35.2*    217 248   GRANS  --   --  61   LYMPH  --   --  27   EOS  --   --  4      Microbiology Recent Labs     11/22/20 1954 11/22/20  1941   CULT NO GROWTH 3 DAYS NO GROWTH 3 DAYS          RADIOLOGY:    All available imaging studies/reports in [facility] for this admission were reviewed    Dr. Manuela Boxer, Infectious Disease Specialist  [phone]  November 25, 2020  11:37 AM

## 2020-11-25 NOTE — ROUTINE PROCESS
Patient is alert and oriented times three with no signs or symptoms of distress. Dressing has a little blood on it however remains intact.

## 2020-11-25 NOTE — PERIOP NOTES
Patient's wife notified that patient out of surgery and doing well. Will be transferred back to room soon.

## 2020-11-25 NOTE — PROGRESS NOTES
Discharge order noted for today. Orders reviewed. Pt is declining home health services. Met with patient at bedside, explained dressing changes to him (dry gauze over incision with kerlix roll to secure it. Change every three day and as needed). Discussed with bedside RN Lupe Reyez; she will send patient home with some dressing supplied. Family will transport patient home. No further needs identified at this time.  remains available if needed.   Radha Arceo RN - Outcomes Manager  [phone]

## 2020-11-25 NOTE — DISCHARGE SUMMARY
Discharge Summary    Patient: Betina Calzada MRN: [id number]  CSN: [id number]    YOB: 1960  Age: 61 y.o. Sex: male    DOA: 11/22/2020 LOS:  LOS: 3 days   Discharge Date:      Admission Diagnoses: Osteomyelitis ([address] [M86.9]    Discharge Diagnoses:    Right diabetic foot infection with osteomyelitis  SYHLA  Type 2 diabetes poorly controlled  History of hyperlipidemia  History of GERD  History of coronary artery disease  Obesity    Discharge Condition: Stable    Consults: Infectious Disease and Podiatry    PHYSICAL EXAM  Visit Vitals  BP (!) 140/76 (BP 1 Location: Right arm, BP Patient Position: At rest)   Pulse 71   Temp 97.6 °F (36.4 °C)   Resp 16   Ht 5' 9\" (1.753 m)   Wt 112.5 kg (248 lb)   SpO2 97%   BMI 36.62 kg/m²       General: Alert, cooperative, no acute distress    HEENT: PERRLA, EOMI. Anicteric sclerae. Lungs:  CTA Bilaterally. No Wheezing/Rhonchi/Rales. Heart:  Regular rate and Rhythm. Abdomen: Soft, Non distended, Non tender. + Bowel sounds. Extremities: No edema/ cyanosis/ clubbing  Psych:   Good insight. Not anxious or agitated. Neurologic:  AA oriented X 3. Moves all extremities. HPI:  Rajiv Conteh is a 61 y.o.  male who presented to the emergency department this evening at the request of his podiatrist for anticipated surgical procedure tomorrow morning. Patient is known to our service and was just recently admitted in September of this year for diabetic foot infection. At that time, he was found to have left second toe osteomyelitis and underwent amputation of the digit. He has been undergoing continued outpatient management and had a MRI of the left foot done on 11/16/2020.       Hospital Course:   44-year-old -American male presented to the emergency room at the request of his podiatrist for anticipated surgical procedureamputation of right great toe. Patient with right diabetic foot infection.   Patient seen by podiatry and underwent amputation of right great toe with resection of the sesamoids. Infectious disease consulted and patient was started on IV antibiotics and is currently being discharged on p.o. antibiotics. Patient is advised to closely follow-up with podiatry in 1 to 2 weeks post discharge. Patient  will be discharged with home health and wound care. He is advised to be compliant with his medications and diet. Imaging:  XR Results (most recent):  Results from Hospital Encounter encounter on 11/22/20   XR CHEST PORT    Narrative EXAM: XR CHEST PORT    INDICATION: baseline for admission    COMPARISON: September 24, 2020    FINDINGS: A portable AP radiograph of the chest was obtained at 1933 hours. The  patient is on a cardiac monitor. The lungs are clear. The cardiac and  mediastinal contours and pulmonary vascularity are normal.  The bones and soft  tissues are grossly within normal limits. Impression IMPRESSION: Normal chest.          Procedures:   Amputation of right great toe and resection of sesamoids        Discharge Medications:     Current Discharge Medication List      START taking these medications    Details   levoFLOXacin (Levaquin) 500 mg tablet Take 1 Tab by mouth daily. Qty: 10 Tab, Refills: 0      oxyCODONE-acetaminophen (Percocet) 5-325 mg per tablet Take 1 Tab by mouth every six (6) hours as needed for Pain for up to 3 days. Max Daily Amount: 4 Tabs. Qty: 12 Tab, Refills: 0    Associated Diagnoses: Osteomyelitis of other site, unspecified type ([facility] [address]         CONTINUE these medications which have CHANGED    Details   amoxicillin-clavulanate (Augmentin) 875-125 mg per tablet Take 1 Tab by mouth two (2) times a day. Qty: 20 Tab, Refills: 0         CONTINUE these medications which have NOT CHANGED    Details   insulin lispro (HumaLOG U-100 Insulin) 100 unit/mL injection 50 Units by SubCUTAneous route three (3) times daily (with meals).  Patient typically only consumes 2 meals / day pantoprazole (PROTONIX) 40 mg tablet Take 1 Tab by mouth Before breakfast and dinner. Qty: 60 Tab, Refills: 0      simvastatin (ZOCOR) 10 mg tablet Take 10 mg by mouth nightly. methadone HCl (METHADONE PO) Take  by mouth.      metoprolol tartrate (LOPRESSOR) 25 mg tablet Take 1 Tab by mouth every twelve (12) hours. Qty: 60 Tab, Refills: 0      insulin detemir U-100 (LEVEMIR U-100 INSULIN) 100 unit/mL injection 20 Units by SubCUTAneous route two (2) times a day. Qty: 10 mL, Refills: 0      acetaminophen (TYLENOL) 325 mg tablet Take 2 Tabs by mouth every six (6) hours as needed. Indications: Pain, take it with bentyl; do not exceed 3 g tylenol daily  Qty: 30 Tab, Refills: 0      albuterol (PROVENTIL HFA, VENTOLIN HFA, PROAIR HFA) 90 mcg/actuation inhaler 2 puffs every 6 hours x 5 days then every 6 hours as needed for shortness of breath and/or wheezing  Qty: 1 Inhaler, Refills: 0      polyethylene glycol (MIRALAX) 17 gram packet Take 1 Packet by mouth daily. Qty: 30 Packet, Refills: 0      clopidogrel (PLAVIX) 75 mg tablet Take 75 mg by mouth daily.             Current Facility-Administered Medications:     levoFLOXacin (LEVAQUIN) 500 mg in D5W IVPB, 500 mg, IntraVENous, Q24H, Floyd REYES MD, Last Rate: 100 mL/hr at 11/24/20 1638, 500 mg at 11/24/20 1638    sodium chloride (NS) flush 5-40 mL, 5-40 mL, IntraVENous, Q8H, Susana Morocho CRNA, 10 mL at 11/25/20 0509    sodium chloride (NS) flush 5-40 mL, 5-40 mL, IntraVENous, PRN, Susana Morocho CRNA    fentaNYL citrate (PF) injection 25 mcg, 25 mcg, IntraVENous, PRN, Susana Morocho CRNA    vancomycin (VANCOCIN) 1500 mg in  ml infusion, 1,500 mg, IntraVENous, Q18H, Narcisa Schultz MD    albuterol-ipratropium (DUO-NEB) 2.5 MG-0.5 MG/3 ML, 3 mL, Nebulization, Q4H PRN, Christel Claude, MD    pantoprazole (PROTONIX) tablet 40 mg, 40 mg, Oral, ACB&D, Christel Claude, MD, 40 mg at 11/25/20 8791    metoprolol tartrate (LOPRESSOR) tablet 25 mg, 25 mg, Oral, Q12H, Arvin Forrester MD, 25 mg at 11/25/20 0809    insulin glargine (LANTUS) injection 20 Units, 20 Units, SubCUTAneous, BID, Arvin Forrester MD, 20 Units at 11/25/20 0810    atorvastatin (LIPITOR) tablet 10 mg, 10 mg, Oral, QHS, Arvin Forrester MD, 10 mg at 11/24/20 2142    polyethylene glycol (MIRALAX) packet 17 g, 17 g, Oral, DAILY, Giorgio Fermin MD    sodium chloride (NS) flush 5-40 mL, 5-40 mL, IntraVENous, Q8H, Giorgio Fermin MD, 10 mL at 11/25/20 0509    sodium chloride (NS) flush 5-40 mL, 5-40 mL, IntraVENous, PRN, Arvin Forrester MD    acetaminophen (TYLENOL) tablet 650 mg, 650 mg, Oral, Q6H PRN, 650 mg at 11/23/20 0318 **OR** acetaminophen (TYLENOL) suppository 650 mg, 650 mg, Rectal, Q6H PRN, Arvin Forrester MD    enoxaparin (LOVENOX) injection 40 mg, 40 mg, SubCUTAneous, DAILY, Arvin Forrester MD, 40 mg at 11/25/20 0809    bisacodyL (DULCOLAX) tablet 10 mg, 10 mg, Oral, DAILY PRN, Arvin Forrester MD    [facility]ron [facility] injection 4 mg, 4 mg, IntraVENous, Q6H PRN, Arvin Forrester MD    insulin lispro (HUMALOG) injection, , SubCUTAneous, AC&HS, Arvin Forrester MD, 3 Units at 11/25/20 8958    glucose chewable tablet 16 g, 4 Tab, Oral, PRN, Arvin Forrester MD    glucagon (GLUCAGEN) injection 1 mg, 1 mg, IntraMUSCular, PRN, Arvin Forrester MD    dextrose (D50W) injection syrg 12.5-25 g, 25-50 mL, IntraVENous, PRN, Arvin Forrester MD    oxyCODONE-acetaminophen (PERCOCET) 5-325 mg per tablet 1-2 Tab, 1-2 Tab, Oral, Q4H PRN, Arvin Forrester MD, 2 Tab at 11/25/20 0816    lactated Ringers infusion, 100 mL/hr, IntraVENous, CONTINUOUS, Arvin Forrester MD, Last Rate: 100 mL/hr at 11/24/20 1617, 100 mL/hr at 11/24/20 1617  · It is important that you take the medication exactly as they are prescribed.    · Keep your medication in the bottles provided by the pharmacist and keep a list of the medication names, dosages, and times to be taken in your wallet. · Do not take other medications without consulting your doctor. Discharge To:  Home    Minutes spent on discharge: 45 minutes spent coordinating this discharge (review instructions/follow-up, prescriptions, preparing report for sign off)    Miguel Concepcion MD  11/25/2020 11:33 AM

## 2021-02-04 ENCOUNTER — HOSPITAL ENCOUNTER (INPATIENT)
Age: 61
LOS: 5 days | Discharge: HOME HEALTH | DRG: 710 | End: 2021-02-09
Attending: EMERGENCY MEDICINE | Admitting: HOSPITALIST
Payer: MEDICAID

## 2021-02-04 ENCOUNTER — APPOINTMENT (OUTPATIENT)
Dept: GENERAL RADIOLOGY | Age: 61
DRG: 710 | End: 2021-02-04
Attending: PHYSICIAN ASSISTANT
Payer: MEDICAID

## 2021-02-04 DIAGNOSIS — D72.829 LEUKOCYTOSIS, UNSPECIFIED TYPE: ICD-10-CM

## 2021-02-04 DIAGNOSIS — N17.9 ACUTE RENAL INJURY (HCC): Primary | ICD-10-CM

## 2021-02-04 DIAGNOSIS — L97.511 DIABETIC ULCER OF RIGHT FOOT ASSOCIATED WITH DIABETES MELLITUS DUE TO UNDERLYING CONDITION, LIMITED TO BREAKDOWN OF SKIN, UNSPECIFIED PART OF FOOT (HCC): ICD-10-CM

## 2021-02-04 DIAGNOSIS — M86.9 OSTEOMYELITIS OF RIGHT FOOT, UNSPECIFIED TYPE (HCC): ICD-10-CM

## 2021-02-04 DIAGNOSIS — E08.621 DIABETIC ULCER OF RIGHT FOOT ASSOCIATED WITH DIABETES MELLITUS DUE TO UNDERLYING CONDITION, LIMITED TO BREAKDOWN OF SKIN, UNSPECIFIED PART OF FOOT (HCC): ICD-10-CM

## 2021-02-04 PROBLEM — E11.621 DIABETIC ULCER OF HEEL (HCC): Status: ACTIVE | Noted: 2021-02-04

## 2021-02-04 PROBLEM — L97.409 DIABETIC ULCER OF HEEL (HCC): Status: ACTIVE | Noted: 2021-02-04

## 2021-02-04 PROBLEM — T14.8XXA WOUND INFECTION: Status: ACTIVE | Noted: 2021-02-04

## 2021-02-04 PROBLEM — L08.9 WOUND INFECTION: Status: ACTIVE | Noted: 2021-02-04

## 2021-02-04 LAB
ALBUMIN SERPL-MCNC: 2.1 G/DL (ref 3.4–5)
ALBUMIN/GLOB SERPL: 0.3 {RATIO} (ref 0.8–1.7)
ALP SERPL-CCNC: 183 U/L (ref 45–117)
ALT SERPL-CCNC: 22 U/L (ref 16–61)
ANION GAP SERPL CALC-SCNC: 11 MMOL/L (ref 3–18)
AST SERPL-CCNC: 36 U/L (ref 10–38)
BASOPHILS # BLD: 0 K/UL (ref 0–0.06)
BASOPHILS NFR BLD: 0 % (ref 0–3)
BILIRUB SERPL-MCNC: 1.6 MG/DL (ref 0.2–1)
BUN SERPL-MCNC: 48 MG/DL (ref 7–18)
BUN/CREAT SERPL: 17 (ref 12–20)
CALCIUM SERPL-MCNC: 8.5 MG/DL (ref 8.5–10.1)
CHLORIDE SERPL-SCNC: 92 MMOL/L (ref 100–111)
CO2 SERPL-SCNC: 27 MMOL/L (ref 21–32)
CREAT SERPL-MCNC: 2.77 MG/DL (ref 0.6–1.3)
DIFFERENTIAL METHOD BLD: ABNORMAL
EOSINOPHIL # BLD: 0.3 K/UL (ref 0–0.4)
EOSINOPHIL NFR BLD: 1 % (ref 0–5)
ERYTHROCYTE [DISTWIDTH] IN BLOOD BY AUTOMATED COUNT: 15.1 % (ref 11.6–14.5)
GLOBULIN SER CALC-MCNC: 7 G/DL (ref 2–4)
GLUCOSE BLD STRIP.AUTO-MCNC: 270 MG/DL (ref 70–110)
GLUCOSE SERPL-MCNC: 258 MG/DL (ref 74–99)
HCT VFR BLD AUTO: 25.5 % (ref 36–48)
HGB BLD-MCNC: 8.4 G/DL (ref 13–16)
LACTATE BLD-SCNC: 2.38 MMOL/L (ref 0.4–2)
LYMPHOCYTES # BLD: 1.9 K/UL (ref 0.8–3.5)
LYMPHOCYTES NFR BLD: 7 % (ref 20–51)
MAGNESIUM SERPL-MCNC: 2.5 MG/DL (ref 1.6–2.6)
MCH RBC QN AUTO: 28.7 PG (ref 24–34)
MCHC RBC AUTO-ENTMCNC: 32.9 G/DL (ref 31–37)
MCV RBC AUTO: 87 FL (ref 74–97)
MONOCYTES # BLD: 2.5 K/UL (ref 0–1)
MONOCYTES NFR BLD: 9 % (ref 2–9)
NEUTS SEG # BLD: 22.7 K/UL (ref 1.8–8)
NEUTS SEG NFR BLD: 82 % (ref 42–75)
OTHER CELLS NFR BLD MANUAL: 1 %
PLATELET # BLD AUTO: 694 K/UL (ref 135–420)
PLATELET COMMENTS,PCOM: ABNORMAL
PMV BLD AUTO: 10 FL (ref 9.2–11.8)
POTASSIUM SERPL-SCNC: 5 MMOL/L (ref 3.5–5.5)
PROT SERPL-MCNC: 9.1 G/DL (ref 6.4–8.2)
RBC # BLD AUTO: 2.93 M/UL (ref 4.7–5.5)
RBC MORPH BLD: ABNORMAL
RBC MORPH BLD: ABNORMAL
SODIUM SERPL-SCNC: 130 MMOL/L (ref 136–145)
WBC # BLD AUTO: 27.7 K/UL (ref 4.6–13.2)

## 2021-02-04 PROCEDURE — 99284 EMERGENCY DEPT VISIT MOD MDM: CPT

## 2021-02-04 PROCEDURE — 87040 BLOOD CULTURE FOR BACTERIA: CPT

## 2021-02-04 PROCEDURE — 80053 COMPREHEN METABOLIC PANEL: CPT

## 2021-02-04 PROCEDURE — 74011000258 HC RX REV CODE- 258: Performed by: NURSE PRACTITIONER

## 2021-02-04 PROCEDURE — 96365 THER/PROPH/DIAG IV INF INIT: CPT

## 2021-02-04 PROCEDURE — 74011250636 HC RX REV CODE- 250/636: Performed by: NURSE PRACTITIONER

## 2021-02-04 PROCEDURE — 96375 TX/PRO/DX INJ NEW DRUG ADDON: CPT

## 2021-02-04 PROCEDURE — 71045 X-RAY EXAM CHEST 1 VIEW: CPT

## 2021-02-04 PROCEDURE — 93005 ELECTROCARDIOGRAM TRACING: CPT

## 2021-02-04 PROCEDURE — 65270000029 HC RM PRIVATE

## 2021-02-04 PROCEDURE — 85025 COMPLETE CBC W/AUTO DIFF WBC: CPT

## 2021-02-04 PROCEDURE — 96361 HYDRATE IV INFUSION ADD-ON: CPT

## 2021-02-04 PROCEDURE — 83735 ASSAY OF MAGNESIUM: CPT

## 2021-02-04 PROCEDURE — 82962 GLUCOSE BLOOD TEST: CPT

## 2021-02-04 PROCEDURE — 83605 ASSAY OF LACTIC ACID: CPT

## 2021-02-04 PROCEDURE — 73630 X-RAY EXAM OF FOOT: CPT

## 2021-02-04 PROCEDURE — 96366 THER/PROPH/DIAG IV INF ADDON: CPT

## 2021-02-04 RX ORDER — HYDROMORPHONE HYDROCHLORIDE 1 MG/ML
1 INJECTION, SOLUTION INTRAMUSCULAR; INTRAVENOUS; SUBCUTANEOUS ONCE
Status: COMPLETED | OUTPATIENT
Start: 2021-02-04 | End: 2021-02-04

## 2021-02-04 RX ORDER — SODIUM CHLORIDE 0.9 % (FLUSH) 0.9 %
5-10 SYRINGE (ML) INJECTION AS NEEDED
Status: DISCONTINUED | OUTPATIENT
Start: 2021-02-04 | End: 2021-02-09 | Stop reason: HOSPADM

## 2021-02-04 RX ORDER — VANCOMYCIN 2 GRAM/500 ML IN 0.9 % SODIUM CHLORIDE INTRAVENOUS
2000
Status: COMPLETED | OUTPATIENT
Start: 2021-02-04 | End: 2021-02-05

## 2021-02-04 RX ADMIN — VANCOMYCIN HYDROCHLORIDE 2000 MG: 10 INJECTION, POWDER, LYOPHILIZED, FOR SOLUTION INTRAVENOUS at 23:01

## 2021-02-04 RX ADMIN — PIPERACILLIN AND TAZOBACTAM 3.38 G: 3; .375 INJECTION, POWDER, LYOPHILIZED, FOR SOLUTION INTRAVENOUS at 21:14

## 2021-02-04 RX ADMIN — SODIUM CHLORIDE 1000 ML: 900 INJECTION, SOLUTION INTRAVENOUS at 19:49

## 2021-02-04 RX ADMIN — HYDROMORPHONE HYDROCHLORIDE 1 MG: 1 INJECTION, SOLUTION INTRAMUSCULAR; INTRAVENOUS; SUBCUTANEOUS at 21:14

## 2021-02-04 NOTE — ED TRIAGE NOTES
Pt had right 1st toe amputated due to DM last month. Pt now feels his other toes need to be amputated as well, c/o right toes, foot, leg and groin pain. Pt also c/o dizziness x1 week, states unable to stand for more than 10 seconds due to being at risk of falling. Pt also states his sugars have been very high recently    Pt states his MD sent him here to be admitted.

## 2021-02-04 NOTE — ED PROVIDER NOTES
[facility]  Emergency Department Treatment Report        6:38 PM Mojgan Solitario is a 61 y.o. male with a history of DM2, CAD, GERD, HTN who presents to ED with c/o left foot and leg pain 2/2 an amputated toe that he feels is infected and says his doctor's office sent him. Pt has a note in his pocket date 2-1-21 stating that he needed medical admission for osteomyelitis of his left foot with soft tissue involvement. Pt rates the pain in his leg 10/10 and states it is constant and throbbing. Pt also c/o decreased appetite 2/2 elevated blood sugars in the \"300s-400s\"--it was 440 at lunch. He does not take his insulin as he is alone during the day and is afraid that his sugar will drop too low. Also c/o loose stools 2/2 recent ABTs receved from  On 2/1/21. Sent by dr Prachi Collier with podiatry on February 1 it is now February 4    No other complaints, associated symptoms or modifying factors at this time. PCP: Ijeoma Yarbrough MD      The history is provided by the patient. No  was used. Foot Pain   This is a recurrent problem. The current episode started more than 2 days ago. The problem occurs constantly. The problem has been rapidly worsening. The pain is present in the right foot. The quality of the pain is described as aching. The pain is moderate. Pertinent negatives include no back pain. He has tried nothing for the symptoms. There has been no history of extremity trauma.         Past Medical History:   Diagnosis Date    Arthritis     Coronary atherosclerosis of native coronary artery     S/P PCI with GE in 12/09    Diabetes ([facility] [address]     IDDM    Electrolyte and fluid disorders not elsewhere classified     Essential hypertension, benign     GERD (gastroesophageal reflux disease)     Heroin abuse ([facility] [address] 05/26/16    Psychiatrist Dr. Surya Talbot History of heart attack     Hyperlipidemia     Intermediate coronary syndrome ([facility] [address]     MDD (major depressive disorder) 5/26/16    Psychiatrist Dr. Lilia Catalan infarction [facility])     Obesity, unspecified     Old myocardial infarction     Other and unspecified hyperlipidemia     Pancreatitis     Positive PPD     Sleep apnea     uses Cpap machine    Transfusion history     Before 1992       Past Surgical History:   Procedure Laterality Date    HX APPENDECTOMY      HX CORONARY STENT PLACEMENT  2010    2 stents         Family History:   Problem Relation Age of Onset    Heart Attack Father     Coronary Artery Disease Mother     Diabetes Mother     Cancer Sister         breast cancer and lung cancer       Social History     Socioeconomic History    Marital status:      Spouse name: Not on file    Number of children: Not on file    Years of education: Not on file    Highest education level: Not on file   Occupational History    Not on file   Social Needs    Financial resource strain: Not on file    Food insecurity     Worry: Not on file     Inability: Not on file    Transportation needs     Medical: Not on file     Non-medical: Not on file   Tobacco Use    Smoking status: Never Smoker    Smokeless tobacco: Never Used   Substance and Sexual Activity    Alcohol use: No    Drug use: No    Sexual activity: Not on file   Lifestyle    Physical activity     Days per week: Not on file     Minutes per session: Not on file    Stress: Not on file   Relationships    Social connections     Talks on phone: Not on file     Gets together: Not on file     Attends Mandaeism service: Not on file     Active member of club or organization: Not on file     Attends meetings of clubs or organizations: Not on file     Relationship status: Not on file    Intimate partner violence     Fear of current or ex partner: Not on file     Emotionally abused: Not on file     Physically abused: Not on file     Forced sexual activity: Not on file   Other Topics Concern    Not on file   Social History Narrative   • Not on file         ALLERGIES: Compazine [prochlorperazine]    Review of Systems   Constitutional: Positive for fever. Negative for activity change and appetite change.   HENT: Negative for sinus pressure, sinus pain, sneezing, sore throat and trouble swallowing.    Eyes: Negative for photophobia and pain.   Respiratory: Negative for chest tightness.    Cardiovascular: Positive for leg swelling.        Right sided only   Gastrointestinal: Negative for abdominal distention, blood in stool, diarrhea, nausea and vomiting.        2/2 recent ABT usage   Genitourinary: Negative for difficulty urinating and frequency.   Musculoskeletal: Positive for arthralgias, gait problem and myalgias. Negative for back pain.   Skin: Positive for wound. Negative for color change.        Right lower leg in bandage.    Neurological: Negative for dizziness and weakness.   Psychiatric/Behavioral: Negative for decreased concentration. The patient is nervous/anxious.    All other systems reviewed and are negative.      Vitals:    02/04/21 1820   BP: 122/72   Pulse: (!) 107   Resp: 24   Temp: 99.3 °F (37.4 °C)   SpO2: 99%   Weight: 115.7 kg (255 lb)   Height: 5' 9\" (1.753 m)            Physical Exam  Vitals signs and nursing note reviewed.   Constitutional:       General: He is not in acute distress.     Appearance: He is well-developed. He is obese. He is ill-appearing and toxic-appearing. He is not diaphoretic.   HENT:      Head: Normocephalic and atraumatic.      Nose: Nose normal.      Mouth/Throat:      Mouth: Mucous membranes are dry.   Eyes:      Conjunctiva/sclera: Conjunctivae normal.      Pupils: Pupils are equal, round, and reactive to light.   Neck:      Musculoskeletal: Normal range of motion and neck supple. No neck rigidity or muscular tenderness.   Cardiovascular:      Rate and Rhythm: Tachycardia present. Rhythm irregular.      Pulses: Normal pulses.      Heart sounds: Normal heart sounds.      Comments: Firm,  non-pitting edema  Pulmonary:      Effort: Pulmonary effort is normal. No respiratory distress. Breath sounds: Normal breath sounds. No stridor. No wheezing or rales. Abdominal:      General: Bowel sounds are normal. There is no distension. Palpations: Abdomen is soft. Tenderness: There is no abdominal tenderness. There is no guarding. Musculoskeletal:         General: Swelling and tenderness present. Right ankle: He exhibits decreased range of motion and swelling. Right lower leg: He exhibits tenderness and swelling. Feet:    Feet:      Right foot:      Skin integrity: Ulcer, blister, skin breakdown, erythema and warmth present. Comments: No sign of obvious diabetic ulceration to the plantar region proximal to the second toe on the right foot. Purulent discharge is expressed after removing packed  dressing.  + Erythema tender to the touch.  + Dorsalis pedis pulse palpable  Skin:     General: Skin is warm and dry. Capillary Refill: Capillary refill takes less than 2 seconds. Findings: Erythema and lesion present. Neurological:      General: No focal deficit present. Mental Status: He is alert and oriented to person, place, and time. Motor: No weakness. Deep Tendon Reflexes: Reflexes are normal and symmetric. Psychiatric:         Attention and Perception: He is inattentive. Mood and Affect: Mood is anxious. Behavior: Behavior is agitated. Behavior is cooperative. Thought Content: Thought content normal.         Cognition and Memory: Memory is impaired. Judgment: Judgment normal.      Comments: Keeps his eye closed while speaking, says it keeps him not so scared.             MDM  Number of Diagnoses or Management Options  Acute renal injury ([facility] [address]  Diabetic ulcer of right foot associated with diabetes mellitus due to underlying condition, limited to breakdown of skin, unspecified part of foot ([facility] [address]  Leukocytosis, unspecified type  Osteomyelitis of right foot, unspecified type [facility])  Diagnosis management comments: Nurse Practitioner student has seen and evaluated this patient provided history exam and physical.  I have examined and seen this patient and agree with history exam and physical of the nurse practitioner student. Sepsis orders initiated at triage. I will give Zosyn and vancomycin for suspected skin source as sepsis source. Patient only given 1 L of fluid given he has a history of coronary artery disease. Patient given Dilaudid for pain he is improved after Dilaudid. Patient with leukocytosis no bandemia. Patient's improved after treatment. I discussed with podiatry Madeline Bloch to be placed on the treatment team and keep patient n.p.o. after midnight he plans to take him to surgery tomorrow. With Dr. Leoncio Taylor she will admit patient to the hospitalist service.          Amount and/or Complexity of Data Reviewed  Clinical lab tests: ordered and reviewed  Tests in the radiology section of CPT®: ordered and reviewed  Review and summarize past medical records: yes  Discuss the patient with other providers: yes  Independent visualization of images, tracings, or specimens: yes    Risk of Complications, Morbidity, and/or Mortality  Presenting problems: moderate  Diagnostic procedures: moderate  Management options: moderate    Patient Progress  Patient progress: stable         Procedures      Vitals:  Patient Vitals for the past 12 hrs:   Temp Pulse Resp BP SpO2   02/04/21 1820 99.3 °F (37.4 °C) (!) 107 24 122/72 99 %       Medications ordered:   Medications   sodium chloride (NS) flush 5-10 mL (has no administration in time range)   vancomycin (VANCOCIN) 2000 mg in  ml infusion (2,000 mg IntraVENous New Bag 2/4/21 2301)   piperacillin-tazobactam (ZOSYN) 3.375 g in 0.9% sodium chloride (MBP/ADV) 100 mL MBP (3.375 g IntraVENous New Bag 2/4/21 2114)   sodium chloride 0.9 % bolus infusion 1,000 mL (1,000 mL IntraVENous New Bag 2/4/21 1949)   HYDROmorphone (DILAUDID) injection 1 mg (1 mg IntraVENous Given 2/4/21 2114)         Lab findings:  Recent Results (from the past 12 hour(s))   EKG, 12 LEAD, INITIAL    Collection Time: 02/04/21  6:53 PM   Result Value Ref Range    Ventricular Rate 99 BPM    Atrial Rate 98 BPM    QRS Duration 80 ms    Q-T Interval 320 ms    QTC Calculation (Bezet) 410 ms    Calculated R Axis -8 degrees    Calculated T Axis 89 degrees    Diagnosis       Atrial fibrillation  Inferior infarct (cited on or before 22-NOV-2020)  Anterior infarct (cited on or before 22-NOV-2020)  Abnormal ECG  When compared with ECG of 22-NOV-2020 20:11,  Atrial fibrillation has replaced Sinus rhythm  Nonspecific T wave abnormality, worse in Inferior leads     CBC WITH AUTOMATED DIFF    Collection Time: 02/04/21  7:06 PM   Result Value Ref Range    WBC 27.7 (H) 4.6 - 13.2 K/uL    RBC 2.93 (L) 4.70 - 5.50 M/uL    HGB 8.4 (L) 13.0 - 16.0 g/dL    HCT 25.5 (L) 36.0 - 48.0 %    MCV 87.0 74.0 - 97.0 FL    MCH 28.7 24.0 - 34.0 PG    MCHC 32.9 31.0 - 37.0 g/dL    RDW 15.1 (H) 11.6 - 14.5 %    PLATELET 590 (H) 196 - 420 K/uL    MPV 10.0 9.2 - 11.8 FL    NEUTROPHILS 82 (H) 42 - 75 %    LYMPHOCYTES 7 (L) 20 - 51 %    MONOCYTES 9 2 - 9 %    EOSINOPHILS 1 0 - 5 %    BASOPHILS 0 0 - 3 %    OTHER CELL 1 (H) 0      ABS. NEUTROPHILS 22.7 (H) 1.8 - 8.0 K/UL    ABS. LYMPHOCYTES 1.9 0.8 - 3.5 K/UL    ABS. MONOCYTES 2.5 (H) 0 - 1.0 K/UL    ABS. EOSINOPHILS 0.3 0.0 - 0.4 K/UL    ABS.  BASOPHILS 0.0 0.0 - 0.06 K/UL    DF MANUAL      PLATELET COMMENTS Increased Platelets      RBC COMMENTS ANISOCYTOSIS  1+        RBC COMMENTS POLYCHROMASIA  1+       METABOLIC PANEL, COMPREHENSIVE    Collection Time: 02/04/21  7:06 PM   Result Value Ref Range    Sodium 130 (L) 136 - 145 mmol/L    Potassium 5.0 3.5 - 5.5 mmol/L    Chloride 92 (L) 100 - 111 mmol/L    CO2 27 21 - 32 mmol/L    Anion gap 11 3.0 - 18 mmol/L    Glucose 258 (H) 74 - 99 mg/dL BUN 48 (H) 7.0 - 18 MG/DL    Creatinine 2.77 (H) 0.6 - 1.3 MG/DL    BUN/Creatinine ratio 17 12 - 20      GFR est AA 29 (L) >60 ml/min/1.73m2    GFR est non-AA 24 (L) >60 ml/min/1.73m2    Calcium 8.5 8.5 - 10.1 MG/DL    Bilirubin, total 1.6 (H) 0.2 - 1.0 MG/DL    ALT (SGPT) 22 16 - 61 U/L    AST (SGOT) 36 10 - 38 U/L    Alk. phosphatase 183 (H) 45 - 117 U/L    Protein, total 9.1 (H) 6.4 - 8.2 g/dL    Albumin 2.1 (L) 3.4 - 5.0 g/dL    Globulin 7.0 (H) 2.0 - 4.0 g/dL    A-G Ratio 0.3 (L) 0.8 - 1.7     MAGNESIUM    Collection Time: 02/04/21  7:06 PM   Result Value Ref Range    Magnesium 2.5 1.6 - 2.6 mg/dL   GLUCOSE, POC    Collection Time: 02/04/21  7:09 PM   Result Value Ref Range    Glucose (POC) 270 (H) 70 - 110 mg/dL   POC LACTIC ACID    Collection Time: 02/04/21  7:17 PM   Result Value Ref Range    Lactic Acid (POC) 2.38 (HH) 0.40 - 2.00 mmol/L           X-Ray, CT or other radiology findings or impressions:  XR FOOT RT MIN 3 V    (Results Pending)   XR CHEST PORT    (Results Pending)     osteomyelitis of the second digit on the right foot per my read        Progress notes, Consult notes or additional Procedure notes:   Discussed with [facility] podiatry he has to be placed on treatment team he will take patient to the OR tomorrow    Discussed with Dr. Anais Forrest, hospitalist she accepted patient for admission       Disposition:    Diagnosis:   1. Acute renal injury ([address]    2. Diabetic ulcer of right foot associated with diabetes mellitus due to underlying condition, limited to breakdown of skin, unspecified part of foot ([address]    3. Osteomyelitis of right foot, unspecified type ([address]    4. Leukocytosis, unspecified type        Disposition: Admitted             Patient's Medications   Start Taking    No medications on file   Continue Taking    ACETAMINOPHEN (TYLENOL) 325 MG TABLET    Take 2 Tabs by mouth every six (6) hours as needed.  Indications: Pain, take it with bentyl; do not exceed 3 g tylenol daily ALBUTEROL (PROVENTIL HFA, VENTOLIN HFA, PROAIR HFA) 90 MCG/ACTUATION INHALER    2 puffs every 6 hours x 5 days then every 6 hours as needed for shortness of breath and/or wheezing    CLOPIDOGREL (PLAVIX) 75 MG TABLET    Take 75 mg by mouth daily. INSULIN DETEMIR U-100 (LEVEMIR U-100 INSULIN) 100 UNIT/ML INJECTION    20 Units by SubCUTAneous route two (2) times a day. INSULIN LISPRO (HUMALOG U-100 INSULIN) 100 UNIT/ML INJECTION    50 Units by SubCUTAneous route three (3) times daily (with meals). Patient typically only consumes 2 meals / day    METHADONE HCL (METHADONE PO)    Take  by mouth. METOPROLOL TARTRATE (LOPRESSOR) 25 MG TABLET    Take 1 Tab by mouth every twelve (12) hours. PANTOPRAZOLE (PROTONIX) 40 MG TABLET    Take 1 Tab by mouth Before breakfast and dinner. POLYETHYLENE GLYCOL (MIRALAX) 17 GRAM PACKET    Take 1 Packet by mouth daily. SIMVASTATIN (ZOCOR) 10 MG TABLET    Take 10 mg by mouth nightly. These Medications have changed    No medications on file   Stop Taking    AMOXICILLIN-CLAVULANATE (AUGMENTIN) 875-125 MG PER TABLET    Take 1 Tab by mouth two (2) times a day. LEVOFLOXACIN (LEVAQUIN) 500 MG TABLET    Take 1 Tab by mouth daily. NP student Attestation:    I have participated in the care of this patient. I have reviewed all pertinent clinical information, including history, physical exam and plan prior to discharge of the patient from the emergency department. I have supervised the Nurse Practitioner students care and documentation of this patient. Onelia Muñiz ENP-C, FNP-C    Critical Care Time:  The services I provided to this patient were to treat and/or prevent clinically significant deterioration that could result in the failure of one or more body systems and/or organ systems due to sepsis osteomyelitis wound infection.     Services included the following:  -reviewing nursing notes and old charts  -vital sign assessments  -direct patient care  -medication orders and management  -interpreting and reviewing diagnostic studies/labs  -re-evaluations  -documentation time    Aggregate critical care time was 30 minutes, which includes only time during which I was engaged in work directly related to the patient's care as described above, whether I was at bedside or elsewhere in the Emergency Department. ADMISSION NOTE:  11:25 PM  Patient is being admitted to the hospital by her Idris. The results of their tests and reasons for their admission have been discussed with them and/or available family. They convey agreement and understanding for the need to be admitted and for their admission diagnosis. Sepsis Re-Assessment Documentation:     Date: 2/4/2021   Time: 11:26 PM      Vital Signs  Level of Consciousness: Alert  Temp: 99.3 °F (37.4 °C)  Temp Source: Oral  Pulse (Heart Rate): (!) 107  Heart Rate Source: Monitor  Resp Rate: 24  BP: 122/72  MAP (Calculated): 89  MEWS Score: 3    Patient awake alert no distress he is responding well to the pain medicine. Patient's heart rate is down in the 80s. Awake alert no distress          Ankur Sutherland ENP-C,FNP-C      Dragon voice recognition was used to generate this report, which may have resulted in some phonetic based errors in grammar and contents.  Even though attempts were made to correct all the mistakes, some may have been missed, and remained in the body of the document

## 2021-02-04 NOTE — Clinical Note
Status[de-identified] INPATIENT [101]   Type of Bed: Surgical [18]   Inpatient Hospitalization Certified Necessary for the Following Reasons: 3.  Patient receiving treatment that can only be provided in an inpatient setting (further clarification in H&P documentation)   Admitting Diagnosis: Diabetic ulcer of heel ([facility] [[id number]]   Admitting Diagnosis: Sepsis ([facility] [[id number]]   Admitting Diagnosis: Leukocytosis [[id number]]   Admitting Diagnosis: Acute kidney injury [facility] [[id number]]   Admitting Diagnosis: Wound infection [[id number]]   Admitting Diagnosis: Osteomyelitis [facility] [[id number]]   Admitting Physician: Ana Nichols [[id number]]   Attending Physician: Ana Nichols [[id number]]   Estimated Length of Stay: 3-4 Midnights   Discharge Plan[de-identified] Home with Office Follow-up

## 2021-02-05 ENCOUNTER — ANESTHESIA (OUTPATIENT)
Dept: SURGERY | Age: 61
DRG: 710 | End: 2021-02-05
Payer: MEDICAID

## 2021-02-05 ENCOUNTER — ANESTHESIA EVENT (OUTPATIENT)
Dept: SURGERY | Age: 61
DRG: 710 | End: 2021-02-05
Payer: MEDICAID

## 2021-02-05 LAB
ANION GAP SERPL CALC-SCNC: 8 MMOL/L (ref 3–18)
APPEARANCE UR: CLEAR
ATRIAL RATE: 98 BPM
BACTERIA URNS QL MICRO: ABNORMAL /HPF
BASOPHILS # BLD: 0 K/UL (ref 0–0.06)
BASOPHILS NFR BLD: 0 % (ref 0–3)
BILIRUB UR QL: NEGATIVE
BUN SERPL-MCNC: 37 MG/DL (ref 7–18)
BUN/CREAT SERPL: 18 (ref 12–20)
CALCIUM SERPL-MCNC: 7.9 MG/DL (ref 8.5–10.1)
CALCULATED R AXIS, ECG10: -8 DEGREES
CALCULATED T AXIS, ECG11: 89 DEGREES
CHLORIDE SERPL-SCNC: 99 MMOL/L (ref 100–111)
CO2 SERPL-SCNC: 29 MMOL/L (ref 21–32)
COLOR UR: NORMAL
COVID-19 RAPID TEST, COVR: NOT DETECTED
CREAT SERPL-MCNC: 2.06 MG/DL (ref 0.6–1.3)
DIAGNOSIS, 93000: NORMAL
DIFFERENTIAL METHOD BLD: ABNORMAL
EOSINOPHIL # BLD: 0.2 K/UL (ref 0–0.4)
EOSINOPHIL NFR BLD: 1 % (ref 0–5)
ERYTHROCYTE [DISTWIDTH] IN BLOOD BY AUTOMATED COUNT: 14.9 % (ref 11.6–14.5)
EST. AVERAGE GLUCOSE BLD GHB EST-MCNC: 137 MG/DL
GLUCOSE BLD STRIP.AUTO-MCNC: 159 MG/DL (ref 70–110)
GLUCOSE BLD STRIP.AUTO-MCNC: 164 MG/DL (ref 70–110)
GLUCOSE BLD STRIP.AUTO-MCNC: 226 MG/DL (ref 70–110)
GLUCOSE BLD STRIP.AUTO-MCNC: 247 MG/DL (ref 70–110)
GLUCOSE BLD STRIP.AUTO-MCNC: 256 MG/DL (ref 70–110)
GLUCOSE SERPL-MCNC: 189 MG/DL (ref 74–99)
GLUCOSE UR STRIP.AUTO-MCNC: 100 MG/DL
HBA1C MFR BLD: 6.4 % (ref 4.2–5.6)
HCT VFR BLD AUTO: 24.1 % (ref 36–48)
HGB BLD-MCNC: 7.7 G/DL (ref 13–16)
HGB UR QL STRIP: NEGATIVE
INR PPP: 1.5 (ref 0.8–1.2)
KETONES UR QL STRIP.AUTO: NEGATIVE MG/DL
LACTATE BLD-SCNC: 1.48 MMOL/L (ref 0.4–2)
LEUKOCYTE ESTERASE UR QL STRIP.AUTO: NEGATIVE
LYMPHOCYTES # BLD: 2 K/UL (ref 0.8–3.5)
LYMPHOCYTES NFR BLD: 10 % (ref 20–51)
MAGNESIUM SERPL-MCNC: 2.2 MG/DL (ref 1.6–2.6)
MCH RBC QN AUTO: 27.6 PG (ref 24–34)
MCHC RBC AUTO-ENTMCNC: 32 G/DL (ref 31–37)
MCV RBC AUTO: 86.4 FL (ref 74–97)
MONOCYTES # BLD: 1.6 K/UL (ref 0–1)
MONOCYTES NFR BLD: 8 % (ref 2–9)
NEUTS SEG # BLD: 16.3 K/UL (ref 1.8–8)
NEUTS SEG NFR BLD: 81 % (ref 42–75)
NITRITE UR QL STRIP.AUTO: NEGATIVE
PH UR STRIP: 5 [PH]
PHOSPHATE SERPL-MCNC: 4.1 MG/DL (ref 2.5–4.9)
PLATELET # BLD AUTO: 562 K/UL (ref 135–420)
PLATELET COMMENTS,PCOM: ABNORMAL
PMV BLD AUTO: 9.8 FL (ref 9.2–11.8)
POTASSIUM SERPL-SCNC: 3.8 MMOL/L (ref 3.5–5.5)
PROT UR STRIP-MCNC: 30 MG/DL
PROTHROMBIN TIME: 17.5 SEC (ref 11.5–15.2)
Q-T INTERVAL, ECG07: 320 MS
QRS DURATION, ECG06: 80 MS
QTC CALCULATION (BEZET), ECG08: 410 MS
RBC # BLD AUTO: 2.79 M/UL (ref 4.7–5.5)
RBC #/AREA URNS HPF: ABNORMAL /HPF (ref 0–5)
RBC MORPH BLD: ABNORMAL
SARS-COV-2, COV2: NORMAL
SODIUM SERPL-SCNC: 136 MMOL/L (ref 136–145)
SOURCE, COVRS: NORMAL
SP GR UR REFRACTOMETRY: 1.02 (ref 1–1.04)
URATE CRY URNS QL MICRO: ABNORMAL
UROBILINOGEN UR QL STRIP.AUTO: >8 EU/DL
VENTRICULAR RATE, ECG03: 99 BPM
WBC # BLD AUTO: 20.1 K/UL (ref 4.6–13.2)
WBC URNS QL MICRO: ABNORMAL /HPF (ref 0–5)

## 2021-02-05 PROCEDURE — 74011250636 HC RX REV CODE- 250/636: Performed by: NURSE ANESTHETIST, CERTIFIED REGISTERED

## 2021-02-05 PROCEDURE — 74011000258 HC RX REV CODE- 258: Performed by: HOSPITALIST

## 2021-02-05 PROCEDURE — 82962 GLUCOSE BLOOD TEST: CPT

## 2021-02-05 PROCEDURE — 77030040361 HC SLV COMPR DVT MDII -B: Performed by: PODIATRIST

## 2021-02-05 PROCEDURE — 83605 ASSAY OF LACTIC ACID: CPT

## 2021-02-05 PROCEDURE — 87185 SC STD ENZYME DETCJ PER NZM: CPT

## 2021-02-05 PROCEDURE — 88305 TISSUE EXAM BY PATHOLOGIST: CPT

## 2021-02-05 PROCEDURE — 2709999900 HC NON-CHARGEABLE SUPPLY: Performed by: PODIATRIST

## 2021-02-05 PROCEDURE — 74011636637 HC RX REV CODE- 636/637: Performed by: HOSPITALIST

## 2021-02-05 PROCEDURE — 80048 BASIC METABOLIC PNL TOTAL CA: CPT

## 2021-02-05 PROCEDURE — 0QBN0ZZ EXCISION OF RIGHT METATARSAL, OPEN APPROACH: ICD-10-PCS | Performed by: PODIATRIST

## 2021-02-05 PROCEDURE — 74011000250 HC RX REV CODE- 250: Performed by: PODIATRIST

## 2021-02-05 PROCEDURE — 87176 TISSUE HOMOGENIZATION CULTR: CPT

## 2021-02-05 PROCEDURE — 74011000250 HC RX REV CODE- 250: Performed by: HOSPITALIST

## 2021-02-05 PROCEDURE — 77030002986 HC SUT PROL J&J -A: Performed by: PODIATRIST

## 2021-02-05 PROCEDURE — 88311 DECALCIFY TISSUE: CPT

## 2021-02-05 PROCEDURE — 77030019895 HC PCKNG STRP IODO -A: Performed by: PODIATRIST

## 2021-02-05 PROCEDURE — 74011250636 HC RX REV CODE- 250/636: Performed by: HOSPITALIST

## 2021-02-05 PROCEDURE — 87205 SMEAR GRAM STAIN: CPT

## 2021-02-05 PROCEDURE — 83735 ASSAY OF MAGNESIUM: CPT

## 2021-02-05 PROCEDURE — 85610 PROTHROMBIN TIME: CPT

## 2021-02-05 PROCEDURE — 74011636637 HC RX REV CODE- 636/637: Performed by: NURSE ANESTHETIST, CERTIFIED REGISTERED

## 2021-02-05 PROCEDURE — 00400 ANES INTEGUMENTARY SYS NOS: CPT | Performed by: ANESTHESIOLOGY

## 2021-02-05 PROCEDURE — 99223 1ST HOSP IP/OBS HIGH 75: CPT | Performed by: HOSPITALIST

## 2021-02-05 PROCEDURE — 87147 CULTURE TYPE IMMUNOLOGIC: CPT

## 2021-02-05 PROCEDURE — 74011250637 HC RX REV CODE- 250/637: Performed by: HOSPITALIST

## 2021-02-05 PROCEDURE — 74011000250 HC RX REV CODE- 250: Performed by: NURSE ANESTHETIST, CERTIFIED REGISTERED

## 2021-02-05 PROCEDURE — 84100 ASSAY OF PHOSPHORUS: CPT

## 2021-02-05 PROCEDURE — 65270000029 HC RM PRIVATE

## 2021-02-05 PROCEDURE — 74011636637 HC RX REV CODE- 636/637: Performed by: INTERNAL MEDICINE

## 2021-02-05 PROCEDURE — 85025 COMPLETE CBC W/AUTO DIFF WBC: CPT

## 2021-02-05 PROCEDURE — 76060000032 HC ANESTHESIA 0.5 TO 1 HR: Performed by: PODIATRIST

## 2021-02-05 PROCEDURE — 36415 COLL VENOUS BLD VENIPUNCTURE: CPT

## 2021-02-05 PROCEDURE — 00400 ANES INTEGUMENTARY SYS NOS: CPT | Performed by: NURSE ANESTHETIST, CERTIFIED REGISTERED

## 2021-02-05 PROCEDURE — 81001 URINALYSIS AUTO W/SCOPE: CPT

## 2021-02-05 PROCEDURE — 99232 SBSQ HOSP IP/OBS MODERATE 35: CPT | Performed by: INTERNAL MEDICINE

## 2021-02-05 PROCEDURE — 74011250636 HC RX REV CODE- 250/636: Performed by: PODIATRIST

## 2021-02-05 PROCEDURE — 77030011265 HC ELECTRD BLD HEX COVD -A: Performed by: PODIATRIST

## 2021-02-05 PROCEDURE — 2709999900 HC NON-CHARGEABLE SUPPLY

## 2021-02-05 PROCEDURE — 83036 HEMOGLOBIN GLYCOSYLATED A1C: CPT

## 2021-02-05 PROCEDURE — 87075 CULTR BACTERIA EXCEPT BLOOD: CPT

## 2021-02-05 PROCEDURE — 87077 CULTURE AEROBIC IDENTIFY: CPT

## 2021-02-05 PROCEDURE — 77030006773 HC BLD SAW OSC BRSM -A: Performed by: PODIATRIST

## 2021-02-05 PROCEDURE — 87635 SARS-COV-2 COVID-19 AMP PRB: CPT

## 2021-02-05 PROCEDURE — 77030040922 HC BLNKT HYPOTHRM STRY -A: Performed by: PODIATRIST

## 2021-02-05 PROCEDURE — 76210000016 HC OR PH I REC 1 TO 1.5 HR: Performed by: PODIATRIST

## 2021-02-05 PROCEDURE — 88304 TISSUE EXAM BY PATHOLOGIST: CPT

## 2021-02-05 PROCEDURE — 76010000138 HC OR TIME 0.5 TO 1 HR: Performed by: PODIATRIST

## 2021-02-05 PROCEDURE — 74011250637 HC RX REV CODE- 250/637: Performed by: NURSE ANESTHETIST, CERTIFIED REGISTERED

## 2021-02-05 PROCEDURE — 87186 SC STD MICRODIL/AGAR DIL: CPT

## 2021-02-05 PROCEDURE — 77030013708 HC HNDPC SUC IRR PULS STRY –B: Performed by: PODIATRIST

## 2021-02-05 PROCEDURE — 74011000272 HC RX REV CODE- 272: Performed by: PODIATRIST

## 2021-02-05 RX ORDER — SODIUM CHLORIDE 9 MG/ML
INJECTION, SOLUTION INTRAVENOUS
Status: COMPLETED | OUTPATIENT
Start: 2021-02-05 | End: 2021-02-05

## 2021-02-05 RX ORDER — SODIUM CHLORIDE 0.9 % (FLUSH) 0.9 %
5-40 SYRINGE (ML) INJECTION AS NEEDED
Status: DISCONTINUED | OUTPATIENT
Start: 2021-02-05 | End: 2021-02-05 | Stop reason: HOSPADM

## 2021-02-05 RX ORDER — FENTANYL CITRATE 50 UG/ML
INJECTION, SOLUTION INTRAMUSCULAR; INTRAVENOUS AS NEEDED
Status: DISCONTINUED | OUTPATIENT
Start: 2021-02-05 | End: 2021-02-05 | Stop reason: HOSPADM

## 2021-02-05 RX ORDER — INSULIN GLARGINE 100 [IU]/ML
20 INJECTION, SOLUTION SUBCUTANEOUS 2 TIMES DAILY
Status: DISCONTINUED | OUTPATIENT
Start: 2021-02-05 | End: 2021-02-08

## 2021-02-05 RX ORDER — SODIUM CHLORIDE 0.9 % (FLUSH) 0.9 %
5-40 SYRINGE (ML) INJECTION EVERY 8 HOURS
Status: DISCONTINUED | OUTPATIENT
Start: 2021-02-05 | End: 2021-02-09 | Stop reason: HOSPADM

## 2021-02-05 RX ORDER — FAMOTIDINE 20 MG/1
20 TABLET, FILM COATED ORAL ONCE
Status: COMPLETED | OUTPATIENT
Start: 2021-02-05 | End: 2021-02-05

## 2021-02-05 RX ORDER — SODIUM CHLORIDE, SODIUM LACTATE, POTASSIUM CHLORIDE, CALCIUM CHLORIDE 600; 310; 30; 20 MG/100ML; MG/100ML; MG/100ML; MG/100ML
75 INJECTION, SOLUTION INTRAVENOUS CONTINUOUS
Status: DISCONTINUED | OUTPATIENT
Start: 2021-02-05 | End: 2021-02-05 | Stop reason: HOSPADM

## 2021-02-05 RX ORDER — ONDANSETRON 2 MG/ML
4 INJECTION INTRAMUSCULAR; INTRAVENOUS ONCE
Status: COMPLETED | OUTPATIENT
Start: 2021-02-05 | End: 2021-02-05

## 2021-02-05 RX ORDER — SODIUM CHLORIDE 9 MG/ML
75 INJECTION, SOLUTION INTRAVENOUS CONTINUOUS
Status: DISCONTINUED | OUTPATIENT
Start: 2021-02-05 | End: 2021-02-07

## 2021-02-05 RX ORDER — DEXTROSE 50 % IN WATER (D50W) INTRAVENOUS SYRINGE
25-50 AS NEEDED
Status: DISCONTINUED | OUTPATIENT
Start: 2021-02-05 | End: 2021-02-05 | Stop reason: HOSPADM

## 2021-02-05 RX ORDER — PANTOPRAZOLE SODIUM 40 MG/1
40 TABLET, DELAYED RELEASE ORAL
Status: DISCONTINUED | OUTPATIENT
Start: 2021-02-06 | End: 2021-02-09 | Stop reason: HOSPADM

## 2021-02-05 RX ORDER — DEXTROSE 50 % IN WATER (D50W) INTRAVENOUS SYRINGE
25-50 AS NEEDED
Status: DISCONTINUED | OUTPATIENT
Start: 2021-02-05 | End: 2021-02-09 | Stop reason: HOSPADM

## 2021-02-05 RX ORDER — MAGNESIUM SULFATE 100 %
4 CRYSTALS MISCELLANEOUS AS NEEDED
Status: DISCONTINUED | OUTPATIENT
Start: 2021-02-05 | End: 2021-02-05 | Stop reason: HOSPADM

## 2021-02-05 RX ORDER — INSULIN LISPRO 100 [IU]/ML
INJECTION, SOLUTION INTRAVENOUS; SUBCUTANEOUS ONCE
Status: COMPLETED | OUTPATIENT
Start: 2021-02-05 | End: 2021-02-05

## 2021-02-05 RX ORDER — MIDAZOLAM HYDROCHLORIDE 1 MG/ML
INJECTION, SOLUTION INTRAMUSCULAR; INTRAVENOUS AS NEEDED
Status: DISCONTINUED | OUTPATIENT
Start: 2021-02-05 | End: 2021-02-05 | Stop reason: HOSPADM

## 2021-02-05 RX ORDER — OXYCODONE AND ACETAMINOPHEN 5; 325 MG/1; MG/1
1 TABLET ORAL
Status: DISCONTINUED | OUTPATIENT
Start: 2021-02-05 | End: 2021-02-06

## 2021-02-05 RX ORDER — SODIUM CHLORIDE 0.9 % (FLUSH) 0.9 %
5-40 SYRINGE (ML) INJECTION EVERY 8 HOURS
Status: DISCONTINUED | OUTPATIENT
Start: 2021-02-05 | End: 2021-02-05 | Stop reason: HOSPADM

## 2021-02-05 RX ORDER — OXYCODONE AND ACETAMINOPHEN 5; 325 MG/1; MG/1
1 TABLET ORAL
Status: DISCONTINUED | OUTPATIENT
Start: 2021-02-05 | End: 2021-02-05

## 2021-02-05 RX ORDER — INSULIN GLARGINE 100 [IU]/ML
10 INJECTION, SOLUTION SUBCUTANEOUS
Status: DISCONTINUED | OUTPATIENT
Start: 2021-02-05 | End: 2021-02-05

## 2021-02-05 RX ORDER — EPHEDRINE SULFATE/0.9% NACL/PF 50 MG/5 ML
SYRINGE (ML) INTRAVENOUS AS NEEDED
Status: DISCONTINUED | OUTPATIENT
Start: 2021-02-05 | End: 2021-02-05 | Stop reason: HOSPADM

## 2021-02-05 RX ORDER — SODIUM CHLORIDE, SODIUM LACTATE, POTASSIUM CHLORIDE, CALCIUM CHLORIDE 600; 310; 30; 20 MG/100ML; MG/100ML; MG/100ML; MG/100ML
100 INJECTION, SOLUTION INTRAVENOUS CONTINUOUS
Status: DISCONTINUED | OUTPATIENT
Start: 2021-02-05 | End: 2021-02-05 | Stop reason: HOSPADM

## 2021-02-05 RX ORDER — INSULIN LISPRO 100 [IU]/ML
INJECTION, SOLUTION INTRAVENOUS; SUBCUTANEOUS EVERY 6 HOURS
Status: DISCONTINUED | OUTPATIENT
Start: 2021-02-05 | End: 2021-02-06

## 2021-02-05 RX ORDER — HYDROMORPHONE HYDROCHLORIDE 2 MG/ML
0.5 INJECTION, SOLUTION INTRAMUSCULAR; INTRAVENOUS; SUBCUTANEOUS AS NEEDED
Status: DISCONTINUED | OUTPATIENT
Start: 2021-02-05 | End: 2021-02-05 | Stop reason: HOSPADM

## 2021-02-05 RX ORDER — DIPHENHYDRAMINE HYDROCHLORIDE 50 MG/ML
12.5 INJECTION, SOLUTION INTRAMUSCULAR; INTRAVENOUS
Status: DISCONTINUED | OUTPATIENT
Start: 2021-02-05 | End: 2021-02-05 | Stop reason: HOSPADM

## 2021-02-05 RX ORDER — MAGNESIUM SULFATE 100 %
16 CRYSTALS MISCELLANEOUS AS NEEDED
Status: DISCONTINUED | OUTPATIENT
Start: 2021-02-05 | End: 2021-02-09 | Stop reason: HOSPADM

## 2021-02-05 RX ORDER — POLYETHYLENE GLYCOL 3350 17 G/17G
17 POWDER, FOR SOLUTION ORAL DAILY
Status: DISCONTINUED | OUTPATIENT
Start: 2021-02-06 | End: 2021-02-09 | Stop reason: HOSPADM

## 2021-02-05 RX ORDER — ACETAMINOPHEN 650 MG/1
650 SUPPOSITORY RECTAL
Status: DISCONTINUED | OUTPATIENT
Start: 2021-02-05 | End: 2021-02-09 | Stop reason: HOSPADM

## 2021-02-05 RX ORDER — PROPOFOL 10 MG/ML
INJECTION, EMULSION INTRAVENOUS AS NEEDED
Status: DISCONTINUED | OUTPATIENT
Start: 2021-02-05 | End: 2021-02-05 | Stop reason: HOSPADM

## 2021-02-05 RX ORDER — ONDANSETRON 2 MG/ML
4 INJECTION INTRAMUSCULAR; INTRAVENOUS
Status: DISCONTINUED | OUTPATIENT
Start: 2021-02-05 | End: 2021-02-09 | Stop reason: HOSPADM

## 2021-02-05 RX ORDER — FENTANYL CITRATE 50 UG/ML
50 INJECTION, SOLUTION INTRAMUSCULAR; INTRAVENOUS
Status: DISCONTINUED | OUTPATIENT
Start: 2021-02-05 | End: 2021-02-05 | Stop reason: HOSPADM

## 2021-02-05 RX ORDER — ACETAMINOPHEN 325 MG/1
650 TABLET ORAL
Status: DISCONTINUED | OUTPATIENT
Start: 2021-02-05 | End: 2021-02-09 | Stop reason: HOSPADM

## 2021-02-05 RX ORDER — SODIUM CHLORIDE 0.9 % (FLUSH) 0.9 %
5-40 SYRINGE (ML) INJECTION AS NEEDED
Status: DISCONTINUED | OUTPATIENT
Start: 2021-02-05 | End: 2021-02-09 | Stop reason: HOSPADM

## 2021-02-05 RX ORDER — POLYETHYLENE GLYCOL 3350 17 G/17G
17 POWDER, FOR SOLUTION ORAL DAILY PRN
Status: DISCONTINUED | OUTPATIENT
Start: 2021-02-05 | End: 2021-02-09 | Stop reason: HOSPADM

## 2021-02-05 RX ORDER — LIDOCAINE HYDROCHLORIDE 20 MG/ML
INJECTION, SOLUTION EPIDURAL; INFILTRATION; INTRACAUDAL; PERINEURAL AS NEEDED
Status: DISCONTINUED | OUTPATIENT
Start: 2021-02-05 | End: 2021-02-05 | Stop reason: HOSPADM

## 2021-02-05 RX ADMIN — INSULIN LISPRO 6 UNITS: 100 INJECTION, SOLUTION INTRAVENOUS; SUBCUTANEOUS at 12:12

## 2021-02-05 RX ADMIN — ONDANSETRON 4 MG: 2 INJECTION INTRAMUSCULAR; INTRAVENOUS at 20:45

## 2021-02-05 RX ADMIN — ACETAMINOPHEN 650 MG: 325 TABLET ORAL at 12:17

## 2021-02-05 RX ADMIN — PROPOFOL 50 MG: 10 INJECTION, EMULSION INTRAVENOUS at 19:34

## 2021-02-05 RX ADMIN — FAMOTIDINE 20 MG: 10 INJECTION INTRAVENOUS at 09:07

## 2021-02-05 RX ADMIN — Medication 10 MG: at 19:08

## 2021-02-05 RX ADMIN — MIDAZOLAM HYDROCHLORIDE 2 MG: 2 INJECTION, SOLUTION INTRAMUSCULAR; INTRAVENOUS at 18:41

## 2021-02-05 RX ADMIN — PROPOFOL 40 MG: 10 INJECTION, EMULSION INTRAVENOUS at 18:48

## 2021-02-05 RX ADMIN — LIDOCAINE HYDROCHLORIDE 60 MG: 20 INJECTION, SOLUTION EPIDURAL; INFILTRATION; INTRACAUDAL; PERINEURAL at 18:45

## 2021-02-05 RX ADMIN — FAMOTIDINE 20 MG: 20 TABLET, FILM COATED ORAL at 16:28

## 2021-02-05 RX ADMIN — PROPOFOL 50 MG: 10 INJECTION, EMULSION INTRAVENOUS at 19:00

## 2021-02-05 RX ADMIN — INSULIN GLARGINE 20 UNITS: 100 INJECTION, SOLUTION SUBCUTANEOUS at 22:05

## 2021-02-05 RX ADMIN — PROPOFOL 25 MG: 10 INJECTION, EMULSION INTRAVENOUS at 19:26

## 2021-02-05 RX ADMIN — OXYCODONE HYDROCHLORIDE AND ACETAMINOPHEN 1 TABLET: 5; 325 TABLET ORAL at 16:56

## 2021-02-05 RX ADMIN — Medication 10 ML: at 14:00

## 2021-02-05 RX ADMIN — INSULIN LISPRO 6 UNITS: 100 INJECTION, SOLUTION INTRAVENOUS; SUBCUTANEOUS at 16:56

## 2021-02-05 RX ADMIN — PROPOFOL 50 MG: 10 INJECTION, EMULSION INTRAVENOUS at 18:54

## 2021-02-05 RX ADMIN — SODIUM CHLORIDE 125 ML/HR: 900 INJECTION, SOLUTION INTRAVENOUS at 03:20

## 2021-02-05 RX ADMIN — FENTANYL CITRATE 50 MCG: 50 INJECTION, SOLUTION INTRAMUSCULAR; INTRAVENOUS at 18:43

## 2021-02-05 RX ADMIN — INSULIN LISPRO 6 UNITS: 100 INJECTION, SOLUTION INTRAVENOUS; SUBCUTANEOUS at 05:39

## 2021-02-05 RX ADMIN — PROPOFOL 50 MG: 10 INJECTION, EMULSION INTRAVENOUS at 19:15

## 2021-02-05 RX ADMIN — PIPERACILLIN AND TAZOBACTAM 3.38 G: 3; .375 INJECTION, POWDER, LYOPHILIZED, FOR SOLUTION INTRAVENOUS at 22:03

## 2021-02-05 RX ADMIN — HYDROMORPHONE HYDROCHLORIDE 0.5 MG: 2 INJECTION, SOLUTION INTRAMUSCULAR; INTRAVENOUS; SUBCUTANEOUS at 20:30

## 2021-02-05 RX ADMIN — FENTANYL CITRATE 50 MCG: 50 INJECTION, SOLUTION INTRAMUSCULAR; INTRAVENOUS at 20:20

## 2021-02-05 RX ADMIN — PROPOFOL 50 MG: 10 INJECTION, EMULSION INTRAVENOUS at 19:05

## 2021-02-05 RX ADMIN — INSULIN LISPRO 3 UNITS: 100 INJECTION, SOLUTION INTRAVENOUS; SUBCUTANEOUS at 20:05

## 2021-02-05 RX ADMIN — PROPOFOL 25 MG: 10 INJECTION, EMULSION INTRAVENOUS at 19:21

## 2021-02-05 RX ADMIN — PIPERACILLIN AND TAZOBACTAM 3.38 G: 3; .375 INJECTION, POWDER, LYOPHILIZED, FOR SOLUTION INTRAVENOUS at 09:07

## 2021-02-05 RX ADMIN — PIPERACILLIN AND TAZOBACTAM 3.38 G: 3; .375 INJECTION, POWDER, LYOPHILIZED, FOR SOLUTION INTRAVENOUS at 03:20

## 2021-02-05 RX ADMIN — PIPERACILLIN AND TAZOBACTAM 3.38 G: 3; .375 INJECTION, POWDER, LYOPHILIZED, FOR SOLUTION INTRAVENOUS at 15:00

## 2021-02-05 RX ADMIN — PROPOFOL 60 MG: 10 INJECTION, EMULSION INTRAVENOUS at 18:45

## 2021-02-05 RX ADMIN — ACETAMINOPHEN 650 MG: 325 TABLET ORAL at 06:09

## 2021-02-05 RX ADMIN — OXYCODONE HYDROCHLORIDE AND ACETAMINOPHEN 1 TABLET: 5; 325 TABLET ORAL at 09:08

## 2021-02-05 RX ADMIN — SODIUM CHLORIDE, SODIUM LACTATE, POTASSIUM CHLORIDE, AND CALCIUM CHLORIDE 75 ML/HR: 600; 310; 30; 20 INJECTION, SOLUTION INTRAVENOUS at 16:28

## 2021-02-05 NOTE — CONSULTS
Infectious Disease Consultation Note        Reason: Right foot infection    Current abx Prior abx   Zosyn, vancomycin since 2/4/2021      Lines:       Assessment :    61 y. o. male with h/o type uncontrolled type 2 DM (last hgbA1C 8.9 on 11/23/20) , CAD who presented to [facility] on 2/4/2021 with right foot infection     Hospitalization at [facility] November 2020 for right great toe distal phalanx chronic osteomyelitis, septic arthritis right great toe interphalangeal joint   Wound cultures 9/2020-Enterobacter, MRSA  Status post right great toe amputation on 11/24. Intraoperative findings discussed with the podiatrist.  Grossly clean margins achieved at surgery. X ray right foot- Interval amputation of the first ray at the level of the distal metatarsal.  There is some irregularity noted along the distal portion of the first  metatarsal suspect for potential erosion best characterized on the oblique  projections.     clinical picture c/w acute on chronic osteomyelitis right first metatarsal osteomyelitis. Podiatry f/u appreciated. Plans for surgery noted.      Recommendations:     1.  continue pip/tazo, vancomycin  2. Follow podiatry recommendations  3. Patient was advised regarding importance of good glycemic control, compliance with antibiotics     Above plan was discussed in details with patient, and dr Lay Hidalgo, Dr. Elizabet Hollye. Please call me if any further questions or concerns. Will continue to participate in the care of this patient.        Thank you for consultation request.       HPI:     61 y.o. male with past medical hx diabetes, obesity, dyslipidemia admitted to [facility] on 2/4/2021 for right foot infection. Patient is known to me from prior inpatient consultation at [facility]. He was admitted 11/22/20 - 11/25/20 for right diabetic foot infection with osteomyelitis, and underwent amputation of right great toe with resection of the sesamoids.  He was also admitted Sept 2020 for left second toe osteomyelitis and underwent amputation of the digit. Patient was seen in Dr. Joyce Fuchs office on 2/4/2021 for follow up, and directed to the ED. Patient states the site of the toe amputation did not heal well, got reinfected and infection spread to other toes. In the ED, he is noted with low grade temp 99.3, mildly tachycardic and with leukocytosis. He received vanco 2 g and zosyn. Plans for further surgical intervention today. I have been consulted for further recommendations.                  Past Medical History:   Diagnosis Date    Arthritis     Coronary atherosclerosis of native coronary artery     S/P PCI with GE in 12/09    Diabetes ([facility] [address]     IDDM    Electrolyte and fluid disorders not elsewhere classified     Essential hypertension, benign     GERD (gastroesophageal reflux disease)     Heroin abuse ([facility] [address] 05/26/16    Psychiatrist Dr. Jake Smith History of heart attack     Hyperlipidemia     Intermediate coronary syndrome [facility])     MDD (major depressive disorder) 5/26/16    Psychiatrist Dr. Jake Smith Myocardial infarction [facility])     Obesity, unspecified     Old myocardial infarction     Other and unspecified hyperlipidemia     Pancreatitis     Positive PPD     Sleep apnea     uses Cpap machine    Transfusion history     Before 1992       Past Surgical History:   Procedure Laterality Date    HX APPENDECTOMY      HX CORONARY STENT PLACEMENT  2010    2 stents       Home Medication List    Details   insulin lispro (HumaLOG U-100 Insulin) 100 unit/mL injection 50 Units by SubCUTAneous route three (3) times daily (with meals). Patient typically only consumes 2 meals / day      insulin detemir U-100 (LEVEMIR U-100 INSULIN) 100 unit/mL injection 20 Units by SubCUTAneous route two (2) times a day. Qty: 10 mL, Refills: 0      pantoprazole (PROTONIX) 40 mg tablet Take 1 Tab by mouth Before breakfast and dinner.   Qty: 60 Tab, Refills: 0      acetaminophen (TYLENOL) 325 mg tablet Take 2 Tabs by mouth every six (6) hours as needed. Indications: Pain, take it with bentyl; do not exceed 3 g tylenol daily  Qty: 30 Tab, Refills: 0      albuterol (PROVENTIL HFA, VENTOLIN HFA, PROAIR HFA) 90 mcg/actuation inhaler 2 puffs every 6 hours x 5 days then every 6 hours as needed for shortness of breath and/or wheezing  Qty: 1 Inhaler, Refills: 0      polyethylene glycol (MIRALAX) 17 gram packet Take 1 Packet by mouth daily. Qty: 30 Packet, Refills: 0      clopidogrel (PLAVIX) 75 mg tablet Take 75 mg by mouth daily.              Current Facility-Administered Medications   Medication Dose Route Frequency    sodium chloride (NS) flush 5-40 mL  5-40 mL IntraVENous Q8H    sodium chloride (NS) flush 5-40 mL  5-40 mL IntraVENous PRN    acetaminophen (TYLENOL) tablet 650 mg  650 mg Oral Q6H PRN    Or    acetaminophen (TYLENOL) suppository 650 mg  650 mg Rectal Q6H PRN    polyethylene glycol (MIRALAX) packet 17 g  17 g Oral DAILY PRN    ondansetron (ZOFRAN) injection 4 mg  4 mg IntraVENous Q8H PRN    famotidine (PF) (PEPCID) 20 mg in 0.9% sodium chloride 10 mL injection  20 mg IntraVENous BID    piperacillin-tazobactam (ZOSYN) 3.375 g in 0.9% sodium chloride (MBP/ADV) 100 mL MBP  3.375 g IntraVENous Q6H    insulin lispro (HUMALOG) injection   SubCUTAneous Q6H    glucose chewable tablet 16 g  16 g Oral PRN    glucagon (GLUCAGEN) injection 1 mg  1 mg IntraMUSCular PRN    dextrose (D50W) injection syrg 12.5-25 g  25-50 mL IntraVENous PRN    VANCOMYCIN INFORMATION NOTE   Other Rx Dosing/Monitoring    0.9% sodium chloride infusion  125 mL/hr IntraVENous CONTINUOUS    oxyCODONE-acetaminophen (PERCOCET) 5-325 mg per tablet 1 Tab  1 Tab Oral Q8H PRN    sodium chloride (NS) flush 5-10 mL  5-10 mL IntraVENous PRN       Allergies: Compazine [prochlorperazine]    Family History   Problem Relation Age of Onset    Heart Attack Father     Coronary Artery Disease Mother     Diabetes Mother     Cancer Sister         breast cancer and lung cancer     Social History     Socioeconomic History    Marital status:      Spouse name: Not on file    Number of children: Not on file    Years of education: Not on file    Highest education level: Not on file   Occupational History    Not on file   Social Needs    Financial resource strain: Not on file    Food insecurity     Worry: Not on file     Inability: Not on file    Transportation needs     Medical: Not on file     Non-medical: Not on file   Tobacco Use    Smoking status: Never Smoker    Smokeless tobacco: Never Used   Substance and Sexual Activity    Alcohol use: No    Drug use: No    Sexual activity: Not on file   Lifestyle    Physical activity     Days per week: Not on file     Minutes per session: Not on file    Stress: Not on file   Relationships    Social connections     Talks on phone: Not on file     Gets together: Not on file     Attends [employer] service: Not on file     Active member of club or organization: Not on file     Attends meetings of clubs or organizations: Not on file     Relationship status: Not on file    Intimate partner violence     Fear of current or ex partner: Not on file     Emotionally abused: Not on file     Physically abused: Not on file     Forced sexual activity: Not on file   Other Topics Concern    Not on file   Social History Narrative    Not on file     Social History     Tobacco Use   Smoking Status Never Smoker   Smokeless Tobacco Never Used        Temp (24hrs), Av.4 °F (36.9 °C), Min:97.6 °F (36.4 °C), Max:99.3 °F (37.4 °C)    Visit Vitals  BP (!) 104/52 (BP 1 Location: Left upper arm, BP Patient Position: At rest)   Pulse 78   Temp 97.6 °F (36.4 °C)   Resp 16   Ht 5' 9\" (1.753 m)   Wt 115.7 kg (255 lb)   SpO2 95%   BMI 37.66 kg/m²       ROS: 12 point ROS obtained in details. Pertinent positives as mentioned in HPI,   otherwise negative    Physical Exam:    General:          In NAD.  Nontoxic-appearing. HEENT:           Head NCAT.  Pupils equal round sclerae anicteric, EOMI.  OP clear. Neck:               Supple, trachea midline. Lungs:             Clear, no wheezes.  Effort nonlabored, no accessory muscle use. Chest wall:      No chest wall tenderness.  Chest expansion equal and symmetrical bilaterally. Heart:              RRR.  No JVD. Abdomen:        Soft, NT/ND.  Bowel sounds normoactive. Extremities:     Warm, no edema.    Right foot surgical dressing not opened  Skin:                Not pale.  Not Jaundiced  No rashes   Psych:             Mood normal.  Calm, no agitation.   Neurologic:      Awake and alert, moves extremities spontaneously.      Labs: Results:   Chemistry Recent Labs     02/04/21 1906   *   *   K 5.0   CL 92*   CO2 27   BUN 48*   CREA 2.77*   CA 8.5   AGAP 11   BUCR 17   *   TP 9.1*   ALB 2.1*   GLOB 7.0*   AGRAT 0.3*      CBC w/Diff Recent Labs     02/04/21 1906   WBC 27.7*   RBC 2.93*   HGB 8.4*   HCT 25.5*   *   GRANS 82*   LYMPH 7*   EOS 1      Microbiology Recent Labs     02/04/21 2010 02/04/21  1906   CULT NO GROWTH AFTER 12 HOURS NO GROWTH AFTER 13 HOURS          RADIOLOGY:    All available imaging studies/reports in [facility] care for this admission were reviewed    Dr. Octavio Carbone, Infectious Disease Specialist  [phone]  February 5, 2021  10:04 AM

## 2021-02-05 NOTE — DIABETES MGMT
Diabetes Patient/Family Education Record  Factors That  May Influence Patients Ability  to Learn or  Comply with Recommendations   []   Language barrier    []   Cultural needs   []   Motivation    []   Cognitive limitation    []   Physical   []   Education    []   Physiological factors   []   Hearing/vision/speaking impairment   []   Jehovah's witness beliefs    []   Financial factors   []  Other:   [x]  No factors identified at this time. Person Instructed:   [x]   Patient   []   Family   []  Other     Preference for Learning:   [x]   Verbal   [x]   Written - BG targets and when to monitor    []  Demonstration     Level of Comprehension & Competence:    [x]  Good                                      [] Fair                                     []  Poor                             []  Needs Reinforcement   [x]  Teachback completed    Education Component:  See DM note - well known to our service - takes large doses of inulin at home - admitted with recurring DM foot wound   [x]  Medication management - states he has been more diligent with insulin since last admission - compliant with bid levemir - takes humalog with meals but typically only consuming one meal daily - occas. two daily    [x]  Nutritional management  -  One to two meals daily    [x]  Exercise   [x]  Signs, symptoms, and treatment of hyperglycemia and hypoglycemia   [x] Prevention, recognition and treatment of hyperglycemia and hypoglycemia   [x]  Importance of blood glucose monitoring - has meter and supplies  -  Usually monitors BG every other day.   Reviewed monitoring fasting and 2 hours post-prandial daily     []  Instruction on use of the blood glucose meter   [x]  Discuss the importance of HbA1C monitoring   - A1c in Nov 2020 was 8.9% - new A1c pending    []  Sick day guidelines   [x]  Proper use and disposal of lancets, needles, syringes or insulin pens (if appropriate)   [x]  Potential long-term complications (retinopathy, kidney disease, neuropathy, foot care) - admitted with recurrent DM foot wound   [x] Information about whom to contact in case of emergency or for more information    [x]  Goal:  Patient/family will demonstrate understanding of Diabetes Self Management Skills by: (date) _2/8/2021___  Plan for post-discharge education or self-management support:    [] Outpatient class schedule provided            [] Patient Declined    [] Scheduled for outpatient classes (date) _______  Verify:  Does patient understand how diabetes medications work? _____yes_______________________  Does patient know what their most recent A1c is? __________yes_________________________  Does patient monitor glucose at home? ___________yes________________________________  Does patient have difficulty obtaining diabetes medications or testing supplies? ______no___________       Calli Coughlin MPH RN CDE  Pager [phone]

## 2021-02-05 NOTE — H&P
History & Physical    Patient: Darrell Chand MRN: [id number]  [facility]: [id number]    YOB: 1960  Age: 61 y.o. Sex: male      DOA: 2/4/2021    Chief Complaint   Patient presents with    Foot Pain    Dizziness    High Blood Sugar          HPI:     Darrell Chand is a 61 y.o. male with past medical hx diabetes, obesity, dyslipidemia who was admitted 11/22/20 - 11/25/20 for right diabetic foot infection with osteomyelitis, and underwent amputation of right great toe with resection of the sesamoids. He was also admitted   Sept 2020 for left second toe osteomyelitis and underwent amputation of the digit. Patient was seen in Dr. Yaritza Rankin office earlier today for follow up, and directed to the ED. Patient states the site of the toe amputation did not heal well, got reinfected and infection spread to other toes. He denies trauma to the foot. He denies known covid exposure, cough, or change to taste or smell. In the ED, he is noted with low grade temp 99.3, mildly tachycardic and with leukocytosis. He received vanco 2 g and zosyn, NS 1 liter, and dilaudid. Patient relates that normally he can climb a couple of flights of stairs without stopping for shortness of breath, his foot is currently hindering his activity. He denies chest pain at rest or with exertion.          Past Medical History:   Diagnosis Date    Arthritis     Coronary atherosclerosis of native coronary artery     S/P PCI with GE in 12/09    Diabetes ([facility] [address]     IDDM    Electrolyte and fluid disorders not elsewhere classified     Essential hypertension, benign     GERD (gastroesophageal reflux disease)     Heroin abuse ([facility] [address] 05/26/16    Psychiatrist Dr. Erika Salazar History of heart attack     Hyperlipidemia     Intermediate coronary syndrome ([facility] [address]     MDD (major depressive disorder) 5/26/16    Psychiatrist Dr. Erika Salazar Myocardial infarction [facility])     Obesity, unspecified     Old myocardial infarction     Other and unspecified hyperlipidemia     Pancreatitis     Positive PPD     Sleep apnea     uses Cpap machine    Transfusion history     Before 1992       Past Surgical History:   Procedure Laterality Date    HX APPENDECTOMY      HX CORONARY STENT PLACEMENT  2010    2 stents       Family History   Problem Relation Age of Onset    Heart Attack Father     Coronary Artery Disease Mother     Diabetes Mother     Cancer Sister         breast cancer and lung cancer       Social History     Socioeconomic History    Marital status:      Spouse name: Not on file    Number of children: Not on file    Years of education: Not on file    Highest education level: Not on file   Tobacco Use    Smoking status: Never Smoker    Smokeless tobacco: Never Used   Substance and Sexual Activity    Alcohol use: No    Drug use: No       Prior to Admission medications    Medication Sig Start Date End Date Taking? Authorizing Provider   insulin lispro (HumaLOG U-100 Insulin) 100 unit/mL injection 50 Units by SubCUTAneous route three (3) times daily (with meals). Patient typically only consumes 2 meals / day    Provider, Historical   insulin detemir U-100 (LEVEMIR U-100 INSULIN) 100 unit/mL injection 20 Units by SubCUTAneous route two (2) times a day. Patient taking differently: 60 Units by SubCUTAneous route two (2) times a day. 12/27/19   Brandt Sorto MD   pantoprazole (PROTONIX) 40 mg tablet Take 1 Tab by mouth Before breakfast and dinner. 3/29/18   Bryanna Owens NP   acetaminophen (TYLENOL) 325 mg tablet Take 2 Tabs by mouth every six (6) hours as needed.  Indications: Pain, take it with bentyl; do not exceed 3 g tylenol daily 3/29/18   Bryanna Owens NP   albuterol (PROVENTIL HFA, VENTOLIN HFA, PROAIR HFA) 90 mcg/actuation inhaler 2 puffs every 6 hours x 5 days then every 6 hours as needed for shortness of breath and/or wheezing 9/11/17   Bairon Cantu MD   polyethylene glycol (MIRALAX) 17 gram packet Take 1 Packet by mouth daily. 17   Zeina Garcia MD   clopidogrel (PLAVIX) 75 mg tablet Take 75 mg by mouth daily. Provider, Historical       Allergies   Allergen Reactions    Compazine [Prochlorperazine] Anaphylaxis     \"Tightness around throat\"       Review of Systems  GENERAL: No fevers or chills. No sweats. HEENT: No change in vision, no earache, sore throat or sinus congestion. NECK: No pain or stiffness. CARDIOVASCULAR: No chest pain or pressure. No palpitations. PULMONARY: No shortness of breath, cough or wheeze. GASTROINTESTINAL: No abdominal pain, nausea, vomiting or diarrhea, melena or       bright red blood per rectum. GENITOURINARY: No urinary frequency, urgency, hesitancy or dysuria. MUSCULOSKELETAL: + Right foot pain no back pain, no recent trauma. DERMATOLOGIC: No rash, no itching. + lesion to bottom of right foot  ENDOCRINE: No polyuria, polydipsia, no heat or cold intolerance. No recent change in    weight. HEMATOLOGICAL: No anemia or easy bruising or bleeding. NEUROLOGIC: No headache, seizures, numbness, tingling or weakness. PSYCHIATRIC: No depression, anxiety, mood disorder, no loss of interest in normal       activity or change in sleep pattern. Physical Exam:     Physical Exam:  Visit Vitals  /72   Pulse (!) 107   Temp 99.3 °F (37.4 °C)   Resp 24   Ht 5' 9\" (1.753 m)   Wt 115.7 kg (255 lb)   SpO2 99%   BMI 37.66 kg/m²      O2 Device: Room air    Temp (24hrs), Av.3 °F (37.4 °C), Min:99.3 °F (37.4 °C), Max:99.3 °F (37.4 °C)       No intake/output data recorded. No intake/output data recorded. General:   Awake alert and oriented x4. Obese, sitting up in bed no acute distress. Speaking in full sentences on room air. Head:  Normocephalic, without obvious abnormality, atraumatic. Eyes:  Conjunctivae/corneas clear. PERRL, EOMs intact. Nose: Wearing face mask   Throat: Wearing face mask.    Neck: Supple, symmetrical, trachea midline, no adenopathy, thyroid: no enlargement/tenderness/nodules, no JVD. Back:   ROM normal. No CVA tenderness. Lungs:   Clear to auscultation bilaterally. Chest wall:  No tenderness or deformity. Heart:  Regular rate and rhythm, S1, S2 normal, no murmur. Abdomen: Soft, non-tender, obese. Bowel sounds normal.   Extremities: Edema, erythema extending from below right knee to foot. Right hallux amputation site with erythema. 1 cm circular wound to plantar surface of right foot, draining edin pus. +malodorous. Pulses: 2+ and symmetric all extremities. Skin: Skin color, texture, turgor normal. No rashes or lesions   Neurologic: CNII-XII intact. No focal motor or sensory deficit. Labs Reviewed:    Recent Results (from the past 24 hour(s))   EKG, 12 LEAD, INITIAL    Collection Time: 02/04/21  6:53 PM   Result Value Ref Range    Ventricular Rate 99 BPM    Atrial Rate 98 BPM    QRS Duration 80 ms    Q-T Interval 320 ms    QTC Calculation (Bezet) 410 ms    Calculated R Axis -8 degrees    Calculated T Axis 89 degrees    Diagnosis       Atrial fibrillation  Inferior infarct (cited on or before 22-NOV-2020)  Anterior infarct (cited on or before 22-NOV-2020)  Abnormal ECG  When compared with ECG of 22-NOV-2020 20:11,  Atrial fibrillation has replaced Sinus rhythm  Nonspecific T wave abnormality, worse in Inferior leads     CBC WITH AUTOMATED DIFF    Collection Time: 02/04/21  7:06 PM   Result Value Ref Range    WBC 27.7 (H) 4.6 - 13.2 K/uL    RBC 2.93 (L) 4.70 - 5.50 M/uL    HGB 8.4 (L) 13.0 - 16.0 g/dL    HCT 25.5 (L) 36.0 - 48.0 %    MCV 87.0 74.0 - 97.0 FL    MCH 28.7 24.0 - 34.0 PG    MCHC 32.9 31.0 - 37.0 g/dL    RDW 15.1 (H) 11.6 - 14.5 %    PLATELET 336 (H) 732 - 420 K/uL    MPV 10.0 9.2 - 11.8 FL    NEUTROPHILS 82 (H) 42 - 75 %    LYMPHOCYTES 7 (L) 20 - 51 %    MONOCYTES 9 2 - 9 %    EOSINOPHILS 1 0 - 5 %    BASOPHILS 0 0 - 3 %    OTHER CELL 1 (H) 0      ABS.  NEUTROPHILS 22.7 (H) 1.8 - 8.0 K/UL    ABS. LYMPHOCYTES 1.9 0.8 - 3.5 K/UL    ABS. MONOCYTES 2.5 (H) 0 - 1.0 K/UL    ABS. EOSINOPHILS 0.3 0.0 - 0.4 K/UL    ABS. BASOPHILS 0.0 0.0 - 0.06 K/UL    DF MANUAL      PLATELET COMMENTS Increased Platelets      RBC COMMENTS ANISOCYTOSIS  1+        RBC COMMENTS POLYCHROMASIA  1+       METABOLIC PANEL, COMPREHENSIVE    Collection Time: 02/04/21  7:06 PM   Result Value Ref Range    Sodium 130 (L) 136 - 145 mmol/L    Potassium 5.0 3.5 - 5.5 mmol/L    Chloride 92 (L) 100 - 111 mmol/L    CO2 27 21 - 32 mmol/L    Anion gap 11 3.0 - 18 mmol/L    Glucose 258 (H) 74 - 99 mg/dL    BUN 48 (H) 7.0 - 18 MG/DL    Creatinine 2.77 (H) 0.6 - 1.3 MG/DL    BUN/Creatinine ratio 17 12 - 20      GFR est AA 29 (L) >60 ml/min/1.73m2    GFR est non-AA 24 (L) >60 ml/min/1.73m2    Calcium 8.5 8.5 - 10.1 MG/DL    Bilirubin, total 1.6 (H) 0.2 - 1.0 MG/DL    ALT (SGPT) 22 16 - 61 U/L    AST (SGOT) 36 10 - 38 U/L    Alk.  phosphatase 183 (H) 45 - 117 U/L    Protein, total 9.1 (H) 6.4 - 8.2 g/dL    Albumin 2.1 (L) 3.4 - 5.0 g/dL    Globulin 7.0 (H) 2.0 - 4.0 g/dL    A-G Ratio 0.3 (L) 0.8 - 1.7     MAGNESIUM    Collection Time: 02/04/21  7:06 PM   Result Value Ref Range    Magnesium 2.5 1.6 - 2.6 mg/dL   GLUCOSE, POC    Collection Time: 02/04/21  7:09 PM   Result Value Ref Range    Glucose (POC) 270 (H) 70 - 110 mg/dL   POC LACTIC ACID    Collection Time: 02/04/21  7:17 PM   Result Value Ref Range    Lactic Acid (POC) 2.38 (HH) 0.40 - 2.00 mmol/L   URINALYSIS W/ RFLX MICROSCOPIC    Collection Time: 02/05/21 12:20 AM   Result Value Ref Range    Color STRAW      Appearance CLEAR      Specific gravity 1.020 1.003 - 1.040      pH (UA) 5.0      Protein 30 mg/dL    Glucose 100 mg/dL    Ketone Negative mg/dL    Bilirubin Negative      Blood Negative      Urobilinogen >8.0 EU/dL    Nitrites Negative      Leukocyte Esterase Negative     URINE MICROSCOPIC ONLY    Collection Time: 02/05/21 12:20 AM   Result Value Ref Range    WBC 0 to 1 0 - 5 /hpf    RBC NONE 0 - 5 /hpf    Bacteria FEW (A) NEG /hpf    Uric acid crystals 3+ (A) NEG   POC LACTIC ACID    Collection Time: 02/05/21 12:30 AM   Result Value Ref Range    Lactic Acid (POC) 1.48 0.40 - 2.00 mmol/L       Procedures/imaging: see electronic medical records for all procedures/Xrays and details which were not copied into this note but were reviewed prior to creation of Plan        Assessment/Plan     1. Diabetic foot infection. Vanco, zosyn. Npo pending further input from podiatry. Check coags. Long acting insulin held. ID consult requested. Based on functional status and comorbid conditions, patient is at moderate risk for surgery, no preoperative testing recommended. Patient expressed understanding. 2. DM2 with hyperglycemia - ssi / npo / check A1c. Diabetes educator consulted. 3. Obesity Body mass index is 37.66 kg/m². 4. Preserved EF on echo 2019, no RWMA. 5. Hx dyslipidemia. Check flp. 6. greg on ckd 3 - UA negative. Fluids. Check bmp in am.  7. Leukocytosis poa - source m/l #1.  8. Hx osteomyelitis s/p amputation right hallux, and left 2nd digit. 9. Wound cultures 9/2020-Enterobacter, MRSA - contact isolation. 10. Hx cad  11. Hx GERD  12. Avoid chemical dvt prophylaxis pending podiatry input  13. Full code. Admit to surgical floor.        Kelin Velázquez MD  February 5, 2021

## 2021-02-05 NOTE — PROGRESS NOTES
SUBJECTIVE:    Patient is being seen for foot pain, diabetes mellitus management and SHYLA    Patient denies having any headaches or dizziness. No chest pain or shortness of breath or cough. No nausea or vomiting. Right foot pain is present. He states he has been taking insulin off and on. OBJECTIVE:    BP (!) 104/52 (BP 1 Location: Left upper arm, BP Patient Position: At rest)   Pulse 78   Temp 97.6 °F (36.4 °C)   Resp 16   Ht 5' 9\" (1.753 m)   Wt 115.7 kg (255 lb)   SpO2 95%   BMI 37.66 kg/m²     General appearance - alert, well appearing, and in no distress  Eyes - sclera anicteric, no pallor  Nose - no obvious nasal discharge. Neck - supple, no JVD, trachea is midline  Chest -clear air entry noted in bases, no wheezes  Heart - S1 and S2 normal  Abdomen - soft, nontender, nondistended, Bowel sounds present  Neurological - alert, oriented, normal speech, no focal findings noted  Musculoskeletal/skin-erythema present in the right lower extremity below knee, the amputation site on right great toe area has some redness as well as there is a circular wound on the plantar surface of right foot draining pus. Extremities - no pedal edema noted    ASSESSMENT:    1. Infected diabetic foot ulcer with some cellulitis  2. Diabetes mellitus with hyperglycemia  3. Medical noncompliance  4. Dyslipidemia  5. Acute on stage III chronic kidney disease  6. Leukocytosis, improving  7. History of osteomyelitis status post amputation of right hallux and left second digit  8. GERD  9. Coronary artery disease, stable    PLAN:    Continue current management in form of IV antibiotic and IV fluid  We will add low-dose Lantus in addition to sliding scale for #2  We will restart home medications  Monitor WBC  Monitor renal function on IV fluid  Discussed case with podiatrist as well as ID physician  Rapid COVID-19 is negative  Patient is medically stable to proceed for required surgery.     Total time to take care of this patient was 35 minutes and more than 50% of time was spent counseling and coordinating care. Disclaimer: Sections of this note are dictated using utilizing voice recognition software, which may have resulted in some phonetic based errors in grammar and contents. Even though attempts were made to correct all the mistakes, some may have been missed, and remained in the body of the document. If questions arise, please contact our department. Recent Results (from the past 24 hour(s))   EKG, 12 LEAD, INITIAL    Collection Time: 02/04/21  6:53 PM   Result Value Ref Range    Ventricular Rate 99 BPM    Atrial Rate 98 BPM    QRS Duration 80 ms    Q-T Interval 320 ms    QTC Calculation (Bezet) 410 ms    Calculated R Axis -8 degrees    Calculated T Axis 89 degrees    Diagnosis       Atrial fibrillation  Inferior infarct (cited on or before 22-NOV-2020)  Anterior infarct (cited on or before 22-NOV-2020)  Abnormal ECG  When compared with ECG of 22-NOV-2020 20:11,  Atrial fibrillation has replaced Sinus rhythm  Nonspecific T wave abnormality, worse in Inferior leads     CBC WITH AUTOMATED DIFF    Collection Time: 02/04/21  7:06 PM   Result Value Ref Range    WBC 27.7 (H) 4.6 - 13.2 K/uL    RBC 2.93 (L) 4.70 - 5.50 M/uL    HGB 8.4 (L) 13.0 - 16.0 g/dL    HCT 25.5 (L) 36.0 - 48.0 %    MCV 87.0 74.0 - 97.0 FL    MCH 28.7 24.0 - 34.0 PG    MCHC 32.9 31.0 - 37.0 g/dL    RDW 15.1 (H) 11.6 - 14.5 %    PLATELET 404 (H) 221 - 420 K/uL    MPV 10.0 9.2 - 11.8 FL    NEUTROPHILS 82 (H) 42 - 75 %    LYMPHOCYTES 7 (L) 20 - 51 %    MONOCYTES 9 2 - 9 %    EOSINOPHILS 1 0 - 5 %    BASOPHILS 0 0 - 3 %    OTHER CELL 1 (H) 0      ABS. NEUTROPHILS 22.7 (H) 1.8 - 8.0 K/UL    ABS. LYMPHOCYTES 1.9 0.8 - 3.5 K/UL    ABS. MONOCYTES 2.5 (H) 0 - 1.0 K/UL    ABS. EOSINOPHILS 0.3 0.0 - 0.4 K/UL    ABS.  BASOPHILS 0.0 0.0 - 0.06 K/UL    DF MANUAL      PLATELET COMMENTS Increased Platelets      RBC COMMENTS ANISOCYTOSIS  1+        RBC COMMENTS POLYCHROMASIA  1+       METABOLIC PANEL, COMPREHENSIVE    Collection Time: 02/04/21  7:06 PM   Result Value Ref Range    Sodium 130 (L) 136 - 145 mmol/L    Potassium 5.0 3.5 - 5.5 mmol/L    Chloride 92 (L) 100 - 111 mmol/L    CO2 27 21 - 32 mmol/L    Anion gap 11 3.0 - 18 mmol/L    Glucose 258 (H) 74 - 99 mg/dL    BUN 48 (H) 7.0 - 18 MG/DL    Creatinine 2.77 (H) 0.6 - 1.3 MG/DL    BUN/Creatinine ratio 17 12 - 20      GFR est AA 29 (L) >60 ml/min/1.73m2    GFR est non-AA 24 (L) >60 ml/min/1.73m2    Calcium 8.5 8.5 - 10.1 MG/DL    Bilirubin, total 1.6 (H) 0.2 - 1.0 MG/DL    ALT (SGPT) 22 16 - 61 U/L    AST (SGOT) 36 10 - 38 U/L    Alk.  phosphatase 183 (H) 45 - 117 U/L    Protein, total 9.1 (H) 6.4 - 8.2 g/dL    Albumin 2.1 (L) 3.4 - 5.0 g/dL    Globulin 7.0 (H) 2.0 - 4.0 g/dL    A-G Ratio 0.3 (L) 0.8 - 1.7     MAGNESIUM    Collection Time: 02/04/21  7:06 PM   Result Value Ref Range    Magnesium 2.5 1.6 - 2.6 mg/dL   CULTURE, BLOOD    Collection Time: 02/04/21  7:06 PM    Specimen: Blood   Result Value Ref Range    Special Requests: NO SPECIAL REQUESTS      Culture result: NO GROWTH AFTER 13 HOURS     GLUCOSE, POC    Collection Time: 02/04/21  7:09 PM   Result Value Ref Range    Glucose (POC) 270 (H) 70 - 110 mg/dL   POC LACTIC ACID    Collection Time: 02/04/21  7:17 PM   Result Value Ref Range    Lactic Acid (POC) 2.38 (HH) 0.40 - 2.00 mmol/L   CULTURE, BLOOD    Collection Time: 02/04/21  8:10 PM    Specimen: Blood   Result Value Ref Range    Special Requests: NO SPECIAL REQUESTS      Culture result: NO GROWTH AFTER 12 HOURS     URINALYSIS W/ RFLX MICROSCOPIC    Collection Time: 02/05/21 12:20 AM   Result Value Ref Range    Color STRAW      Appearance CLEAR      Specific gravity 1.020 1.003 - 1.040      pH (UA) 5.0      Protein 30 mg/dL    Glucose 100 mg/dL    Ketone Negative mg/dL    Bilirubin Negative      Blood Negative      Urobilinogen >8.0 EU/dL    Nitrites Negative      Leukocyte Esterase Negative     URINE MICROSCOPIC ONLY    Collection Time: 02/05/21 12:20 AM   Result Value Ref Range    WBC 0 to 1 0 - 5 /hpf    RBC NONE 0 - 5 /hpf    Bacteria FEW (A) NEG /hpf    Uric acid crystals 3+ (A) NEG   POC LACTIC ACID    Collection Time: 02/05/21 12:30 AM   Result Value Ref Range    Lactic Acid (POC) 1.48 0.40 - 2.00 mmol/L   GLUCOSE, POC    Collection Time: 02/05/21  5:30 AM   Result Value Ref Range    Glucose (POC) 256 (H) 70 - 110 mg/dL

## 2021-02-05 NOTE — PERIOP NOTES
TRANSFER - IN REPORT:    Verbal report received from [facility]. on Que Cruz  being received from [facility] for routine progression of care      Report consisted of patients Situation, Background, Assessment and   Recommendations(SBAR). Information from the following report(s) Procedure Verification was reviewed with the receiving nurse. Opportunity for questions and clarification was provided. Assessment completed upon patients arrival to unit and care assumed. Rapid Covid-19 to be collected now & resulted before arrival to pre-op dept.

## 2021-02-05 NOTE — REMOTE MONITORING
Called to speak to Primary RN regarding need for repeat LA to meet Sepsis bundle protocol. Desiree Rodriguez NP answered the phone and I informed her of the need for a repeat LA. For any questions or concerns, please feel free to call back at [phone]. Thank you! [facility]. Mari Montilla RN, BSN.

## 2021-02-05 NOTE — PROGRESS NOTES
Reason for Admission:   Diabetic ulcer of heel ([facility] [E43.027, L97.409]  Sepsis ([facility] [A41.9]  Leukocytosis [D72.829]  Acute kidney injury ([facility] [N17.9]  Wound infection [T14. 8XXA, L08.9]  Osteomyelitis ([facility] [M86.9]               RUR Score:     29%             Resources/supports as identified by patient/family:       Top Challenges facing patient (as identified by patient/family and CM): Not at this time    Finances/Medication cost?     Has medicaid  Transportation      family  Support system or lack thereof? Involved family  Living arrangements? With sister   Self-care/ADLs/Cognition? Aaox4, independent        Current Advanced Directive/Advance Care Plan:   no, not interested in signing one. Plan for utilizing home health:    no                      Likelihood of readmission:   HIGH    Transition of Care Plan:                    Initial assessment completed with patient. Cognitive status of patient: oriented to time, place, person and situation. Face sheet information confirmed:  yes. The patient designates sisterIsabella to participate in his discharge plan and to receive any needed information. This patient lives in a single family home with sister. Patient is able to navigate steps as needed. Prior to hospitalization, patient was considered to be independent with ADLs/IADLS : yes . Patient has a current ACP document on file: no     The sister will be available to transport patient home upon discharge. The patient already has [address] [facility] available in the home. Patient is not currently active with home health. Patient has not stayed in a skilled nursing facility or rehab. This patient is on dialysis :no    Currently, the discharge plan is Home. The patient states that he can obtain his medications from the pharmacy, and take his medications as directed. Patient's current insurance is [employer]. .      Care Management Interventions  PCP Verified by CM: Yes  Mode of Transport at Discharge: Self  Transition of Care Consult (CM Consult): Discharge Planning  Current Support Network:  Other(lives with sister)  Confirm Follow Up Transport: Family  Discharge Location  Discharge Placement: Home        Anne Mariegilberto Demi RN - Outcomes Manager  [phone]

## 2021-02-05 NOTE — PROGRESS NOTES
Problem: Risk for Spread of Infection  Goal: Prevent transmission of infectious organism to others  Description: Prevent the transmission of infectious organisms to other patients, staff members, and visitors. Outcome: Progressing Towards Goal     Problem: Patient Education:  Go to Education Activity  Goal: Patient/Family Education  Outcome: Progressing Towards Goal     Problem: Falls - Risk of  Goal: *Absence of Falls  Description: Document E[place] Fall Risk and appropriate interventions in the flowsheet. Outcome: Progressing Towards Goal  Note: Fall Risk Interventions:  Mobility Interventions: Patient to call before getting OOB         Medication Interventions: Patient to call before getting OOB                   Problem: Patient Education: Go to Patient Education Activity  Goal: Patient/Family Education  Outcome: Progressing Towards Goal     Problem: Pressure Injury - Risk of  Goal: *Prevention of pressure injury  Description: Document Jer Scale and appropriate interventions in the flowsheet.   Outcome: Progressing Towards Goal  Note: Pressure Injury Interventions:  Sensory Interventions: Minimize linen layers    Moisture Interventions: Minimize layers    Activity Interventions: Increase time out of bed    Mobility Interventions: PT/OT evaluation    Nutrition Interventions: Document food/fluid/supplement intake                     Problem: Patient Education: Go to Patient Education Activity  Goal: Patient/Family Education  Outcome: Progressing Towards Goal     Problem: Pain  Goal: *Control of Pain  Outcome: Progressing Towards Goal     Problem: Patient Education: Go to Patient Education Activity  Goal: Patient/Family Education  Outcome: Progressing Towards Goal     Problem: Impaired Skin Integrity/Pressure Injury Treatment  Goal: *Improvement of Existing Pressure Injury  Outcome: Progressing Towards Goal  Goal: *Prevention of pressure injury  Description: Document Jer Scale and appropriate interventions in the flowsheet.   Outcome: Progressing Towards Goal  Note: Pressure Injury Interventions:  Sensory Interventions: Minimize linen layers    Moisture Interventions: Minimize layers    Activity Interventions: Increase time out of bed    Mobility Interventions: PT/OT evaluation    Nutrition Interventions: Document food/fluid/supplement intake                     Problem: Patient Education: Go to Patient Education Activity  Goal: Patient/Family Education  Outcome: Progressing Towards Goal     Problem: Patient Education: Go to Patient Education Activity  Goal: Patient/Family Education  Outcome: Progressing Towards Goal

## 2021-02-05 NOTE — CONSULTS
Podiatry History and Physical    Subjective:         Date of Consultation:  February 5, 2021      Patient is a 61 y.o. male who is being seen for right foot cellulitis with osteomyelitis of remaining distal first metatarsal stump. Patient was seen in office on 2/1/2021 and was found to have severe swelling with foul odor drainage and fragmentated first metatarsal head and found to have soft tissue gas in first intermetatarsal space. Patient was recommended to get himself admitted in hospital for OR debridement as well as for IV abxs. Patient, however, waited until yesterday night to come to hospital. Patient previously had right great toe amputation on last admission. Patient denied N/V/C/F.      Patient Active Problem List    Diagnosis Date Noted    Leukocytosis 02/04/2021    Wound infection 02/04/2021    Diabetic ulcer of heel ([address] 02/04/2021    Acute kidney injury ([address] 02/04/2021    Osteomyelitis ([address] 11/22/2020    Fracture, toe 09/24/2020    Altered mental status 09/01/2020    Multifocal pneumonia 09/01/2020    DM (diabetes mellitus) ([address] 12/25/2019    Orthostatic hypotension 12/25/2019    Syncope and collapse 12/24/2019    Vomiting 03/28/2018    Chronic abdominal pain 03/28/2018    Sepsis ([address] 03/21/2018    Nausea and vomiting 09/07/2017    Gastroparesis 09/07/2017    Hypokalemia 09/07/2017    ARF (acute renal failure) (HCC) 09/07/2017    Atelectasis 09/07/2017    Abdominal pain 09/07/2017    Acquired hypothyroidism 09/07/2017    S/P lumbar fusion 07/05/2017    Diabetic neuropathy ([address] 07/05/2017    Neuritis 07/05/2017    Spinal stenosis at L4-L5 level 06/19/2017    Coronary artery disease involving native coronary artery of native heart without angina pectoris 05/31/2017    History of coronary artery stent placement 05/31/2017    Synovial cyst 05/26/2017    HNP (herniated nucleus pulposus), lumbar 05/26/2017    Lumbar spinal stenosis 05/26/2017    Spondylolisthesis of lumbar region 05/26/2017    Positive PPD     History of heart attack     Pain in left lower leg 07/24/2016    Primary osteoarthritis of left knee 07/24/2016    Localized adiposity of abdomen 07/24/2016    Diabetes ([facility] 08/14/2015    Essential hypertension 08/14/2015    GERD (gastroesophageal reflux disease) 08/14/2015    Depression 08/14/2015     Past Medical History:   Diagnosis Date    Arthritis     Coronary atherosclerosis of native coronary artery     S/P PCI with GE in 12/09    Diabetes ([facility]     IDDM    Electrolyte and fluid disorders not elsewhere classified     Essential hypertension, benign     GERD (gastroesophageal reflux disease)     Heroin abuse ([facility] 05/26/16    Psychiatrist Dr. Jenelle Ying History of heart attack     Hyperlipidemia     Intermediate coronary syndrome ([facility]     MDD (major depressive disorder) 5/26/16    Psychiatrist Dr. Jenelle Ying Myocardial infarction [facility])     Obesity, unspecified     Old myocardial infarction     Other and unspecified hyperlipidemia     Pancreatitis     Positive PPD     Sleep apnea     uses Cpap machine    Transfusion history     Before 1992      Family History   Problem Relation Age of Onset    Heart Attack Father     Coronary Artery Disease Mother     Diabetes Mother     Cancer Sister         breast cancer and lung cancer      Social History     Tobacco Use    Smoking status: Never Smoker    Smokeless tobacco: Never Used   Substance Use Topics    Alcohol use: No     Past Surgical History:   Procedure Laterality Date    HX APPENDECTOMY      HX CORONARY STENT PLACEMENT  2010    2 stents      Prior to Admission medications    Medication Sig Start Date End Date Taking? Authorizing Provider   insulin lispro (HumaLOG U-100 Insulin) 100 unit/mL injection 50 Units by SubCUTAneous route three (3) times daily (with meals).  Patient typically only consumes 2 meals / day   Yes Provider, Historical   insulin detemir U-100 (LEVEMIR U-100 INSULIN) 100 unit/mL injection 20 Units by SubCUTAneous route two (2) times a day. Patient taking differently: 60 Units by SubCUTAneous route two (2) times a day. 19  Yes Tuyet Chau MD   pantoprazole (PROTONIX) 40 mg tablet Take 1 Tab by mouth Before breakfast and dinner. 3/29/18   Thais Curry NP   acetaminophen (TYLENOL) 325 mg tablet Take 2 Tabs by mouth every six (6) hours as needed. Indications: Pain, take it with bentyl; do not exceed 3 g tylenol daily 3/29/18   Thais Curry NP   albuterol (PROVENTIL HFA, VENTOLIN HFA, PROAIR HFA) 90 mcg/actuation inhaler 2 puffs every 6 hours x 5 days then every 6 hours as needed for shortness of breath and/or wheezing 17   Diamond Issa MD   polyethylene glycol (MIRALAX) 17 gram packet Take 1 Packet by mouth daily. 17   Diamond Issa MD   clopidogrel (PLAVIX) 75 mg tablet Take 75 mg by mouth daily. Provider, Historical     Allergies   Allergen Reactions    Compazine [Prochlorperazine] Anaphylaxis     \"Tightness around throat\"        Review of Systems:  A comprehensive review of systems was negative except for that written in the HPI. Objective:     Patient Vitals for the past 8 hrs:   BP Temp Pulse Resp SpO2   21 1628 120/62 97.1 °F (36.2 °C) 70 20 100 %   21 1115 104/68 98 °F (36.7 °C) 79 16 98 %     Temp (24hrs), Av °F (36.7 °C), Min:97.1 °F (36.2 °C), Max:99.3 °F (37.4 °C)    Bilateral EDILSON: palpable pedal pulses, right great toe previously amputated, ulcer at submet 1 measure about 2 cm in diameter, probes deep to bone. Purulent drainage expressed from plantar ulcer. Fluctuance noted with palpation of distal medial right forefoot. Erythema and edema present to level of right midfoot. Dry, flaky skin noted to right foot. Diminished protective sensation noted to both feet.      Data Review:   Recent Results (from the past 24 hour(s))   EKG, 12 LEAD, INITIAL    Collection Time: 21  6:53 PM   Result Value Ref Range    Ventricular Rate 99 BPM    Atrial Rate 98 BPM    QRS Duration 80 ms    Q-T Interval 320 ms    QTC Calculation (Bezet) 410 ms    Calculated R Axis -8 degrees    Calculated T Axis 89 degrees    Diagnosis       Sinus rhythm  Inferior infarct (cited on or before 22-NOV-2020)  Anterior infarct (cited on or before 22-NOV-2020)  Abnormal ECG  When compared with ECG of 22-NOV-2020 20:11,  Nonspecific T wave abnormality, worse in Inferior leads  Confirmed by Elo Lara MD, ----- ([id number]) on 2/5/2021 3:03:49 PM     CBC WITH AUTOMATED DIFF    Collection Time: 02/04/21  7:06 PM   Result Value Ref Range    WBC 27.7 (H) 4.6 - 13.2 K/uL    RBC 2.93 (L) 4.70 - 5.50 M/uL    HGB 8.4 (L) 13.0 - 16.0 g/dL    HCT 25.5 (L) 36.0 - 48.0 %    MCV 87.0 74.0 - 97.0 FL    MCH 28.7 24.0 - 34.0 PG    MCHC 32.9 31.0 - 37.0 g/dL    RDW 15.1 (H) 11.6 - 14.5 %    PLATELET 070 (H) 062 - 420 K/uL    MPV 10.0 9.2 - 11.8 FL    NEUTROPHILS 82 (H) 42 - 75 %    LYMPHOCYTES 7 (L) 20 - 51 %    MONOCYTES 9 2 - 9 %    EOSINOPHILS 1 0 - 5 %    BASOPHILS 0 0 - 3 %    OTHER CELL 1 (H) 0      ABS. NEUTROPHILS 22.7 (H) 1.8 - 8.0 K/UL    ABS. LYMPHOCYTES 1.9 0.8 - 3.5 K/UL    ABS. MONOCYTES 2.5 (H) 0 - 1.0 K/UL    ABS. EOSINOPHILS 0.3 0.0 - 0.4 K/UL    ABS.  BASOPHILS 0.0 0.0 - 0.06 K/UL    DF MANUAL      PLATELET COMMENTS Increased Platelets      RBC COMMENTS ANISOCYTOSIS  1+        RBC COMMENTS POLYCHROMASIA  1+       METABOLIC PANEL, COMPREHENSIVE    Collection Time: 02/04/21  7:06 PM   Result Value Ref Range    Sodium 130 (L) 136 - 145 mmol/L    Potassium 5.0 3.5 - 5.5 mmol/L    Chloride 92 (L) 100 - 111 mmol/L    CO2 27 21 - 32 mmol/L    Anion gap 11 3.0 - 18 mmol/L    Glucose 258 (H) 74 - 99 mg/dL    BUN 48 (H) 7.0 - 18 MG/DL    Creatinine 2.77 (H) 0.6 - 1.3 MG/DL    BUN/Creatinine ratio 17 12 - 20      GFR est AA 29 (L) >60 ml/min/1.73m2    GFR est non-AA 24 (L) >60 ml/min/1.73m2    Calcium 8.5 8.5 - 10.1 MG/DL    Bilirubin, total 1.6 (H) 0.2 - 1.0 MG/DL    ALT (SGPT) 22 16 - 61 U/L    AST (SGOT) 36 10 - 38 U/L    Alk.  phosphatase 183 (H) 45 - 117 U/L    Protein, total 9.1 (H) 6.4 - 8.2 g/dL    Albumin 2.1 (L) 3.4 - 5.0 g/dL    Globulin 7.0 (H) 2.0 - 4.0 g/dL    A-G Ratio 0.3 (L) 0.8 - 1.7     MAGNESIUM    Collection Time: 02/04/21  7:06 PM   Result Value Ref Range    Magnesium 2.5 1.6 - 2.6 mg/dL   CULTURE, BLOOD    Collection Time: 02/04/21  7:06 PM    Specimen: Blood   Result Value Ref Range    Special Requests: NO SPECIAL REQUESTS      Culture result: NO GROWTH AFTER 13 HOURS     GLUCOSE, POC    Collection Time: 02/04/21  7:09 PM   Result Value Ref Range    Glucose (POC) 270 (H) 70 - 110 mg/dL   POC LACTIC ACID    Collection Time: 02/04/21  7:17 PM   Result Value Ref Range    Lactic Acid (POC) 2.38 (HH) 0.40 - 2.00 mmol/L   CULTURE, BLOOD    Collection Time: 02/04/21  8:10 PM    Specimen: Blood   Result Value Ref Range    Special Requests: NO SPECIAL REQUESTS      Culture result: NO GROWTH AFTER 12 HOURS     URINALYSIS W/ RFLX MICROSCOPIC    Collection Time: 02/05/21 12:20 AM   Result Value Ref Range    Color STRAW      Appearance CLEAR      Specific gravity 1.020 1.003 - 1.040      pH (UA) 5.0      Protein 30 mg/dL    Glucose 100 mg/dL    Ketone Negative mg/dL    Bilirubin Negative      Blood Negative      Urobilinogen >8.0 EU/dL    Nitrites Negative      Leukocyte Esterase Negative     URINE MICROSCOPIC ONLY    Collection Time: 02/05/21 12:20 AM   Result Value Ref Range    WBC 0 to 1 0 - 5 /hpf    RBC NONE 0 - 5 /hpf    Bacteria FEW (A) NEG /hpf    Uric acid crystals 3+ (A) NEG   POC LACTIC ACID    Collection Time: 02/05/21 12:30 AM   Result Value Ref Range    Lactic Acid (POC) 1.48 0.40 - 2.00 mmol/L   GLUCOSE, POC    Collection Time: 02/05/21  5:30 AM   Result Value Ref Range    Glucose (POC) 256 (H) 70 - 110 mg/dL   SARS-COV-2    Collection Time: 02/05/21  9:53 AM   Result Value Ref Range    SARS-CoV-2 Please find results under separate order     COVID-19 RAPID TEST    Collection Time: 02/05/21  9:53 AM   Result Value Ref Range    Specimen source Nasopharyngeal      COVID-19 rapid test Not detected NOTD     PHOSPHORUS    Collection Time: 02/05/21 11:14 AM   Result Value Ref Range    Phosphorus 4.1 2.5 - 4.9 MG/DL   MAGNESIUM    Collection Time: 02/05/21 11:14 AM   Result Value Ref Range    Magnesium 2.2 1.6 - 2.6 mg/dL   CBC WITH AUTOMATED DIFF    Collection Time: 02/05/21 11:14 AM   Result Value Ref Range    WBC 20.1 (H) 4.6 - 13.2 K/uL    RBC 2.79 (L) 4.70 - 5.50 M/uL    HGB 7.7 (L) 13.0 - 16.0 g/dL    HCT 24.1 (L) 36.0 - 48.0 %    MCV 86.4 74.0 - 97.0 FL    MCH 27.6 24.0 - 34.0 PG    MCHC 32.0 31.0 - 37.0 g/dL    RDW 14.9 (H) 11.6 - 14.5 %    PLATELET 006 (H) 564 - 420 K/uL    MPV 9.8 9.2 - 11.8 FL    NEUTROPHILS 81 (H) 42 - 75 %    LYMPHOCYTES 10 (L) 20 - 51 %    MONOCYTES 8 2 - 9 %    EOSINOPHILS 1 0 - 5 %    BASOPHILS 0 0 - 3 %    ABS. NEUTROPHILS 16.3 (H) 1.8 - 8.0 K/UL    ABS. LYMPHOCYTES 2.0 0.8 - 3.5 K/UL    ABS. MONOCYTES 1.6 (H) 0 - 1.0 K/UL    ABS. EOSINOPHILS 0.2 0.0 - 0.4 K/UL    ABS.  BASOPHILS 0.0 0.0 - 0.06 K/UL    DF MANUAL      PLATELET COMMENTS Increased Platelets      RBC COMMENTS ANISOCYTOSIS  1+        RBC COMMENTS POLYCHROMASIA  1+        RBC COMMENTS TARGET CELLS  1+       METABOLIC PANEL, BASIC    Collection Time: 02/05/21 11:14 AM   Result Value Ref Range    Sodium 136 136 - 145 mmol/L    Potassium 3.8 3.5 - 5.5 mmol/L    Chloride 99 (L) 100 - 111 mmol/L    CO2 29 21 - 32 mmol/L    Anion gap 8 3.0 - 18 mmol/L    Glucose 189 (H) 74 - 99 mg/dL    BUN 37 (H) 7.0 - 18 MG/DL    Creatinine 2.06 (H) 0.6 - 1.3 MG/DL    BUN/Creatinine ratio 18 12 - 20      GFR est AA 40 (L) >60 ml/min/1.73m2    GFR est non-AA 33 (L) >60 ml/min/1.73m2    Calcium 7.9 (L) 8.5 - 10.1 MG/DL   PROTHROMBIN TIME + INR    Collection Time: 02/05/21 11:14 AM   Result Value Ref Range    Prothrombin time 17.5 (H) 11.5 - 15.2 sec    INR 1.5 (H) 0.8 - 1. 2     GLUCOSE, POC    Collection Time: 02/05/21 11:28 AM   Result Value Ref Range    Glucose (POC) 226 (H) 70 - 110 mg/dL   GLUCOSE, POC    Collection Time: 02/05/21  4:27 PM   Result Value Ref Range    Glucose (POC) 247 (H) 70 - 110 mg/dL         Impression:     Right first metatarsal stump osteomyelitis with abscess, soft tissue gas, uncontrolled DM with neuropathy      Recommendation:     - Patient noted to have leukocytosis. Rec urgent I & D with resection of remaining partial first metatarsal.   - Patient is confirmed NPO. Will take him to OR tonight.   - Continue with broad spectrum IV abxs. - Will consult ID for antibiotic management. - Remain NWB to right forefoot.

## 2021-02-05 NOTE — ANESTHESIA PREPROCEDURE EVALUATION
Relevant Problems   No relevant active problems       Anesthetic History               Review of Systems / Medical History  Patient summary reviewed, nursing notes reviewed and pertinent labs reviewed    Pulmonary        Sleep apnea: CPAP           Neuro/Psych         Psychiatric history     Cardiovascular    Hypertension          CAD and cardiac stents    Exercise tolerance: <4 METS     GI/Hepatic/Renal     GERD: well controlled           Endo/Other    Diabetes: poorly controlled, type 2, using insulin    Morbid obesity and arthritis     Other Findings              Physical Exam    Airway  Mallampati: III    Neck ROM: normal range of motion        Cardiovascular    Rhythm: regular  Rate: normal         Dental  No notable dental hx       Pulmonary  Breath sounds clear to auscultation               Abdominal  GI exam deferred       Other Findings            Anesthetic Plan    ASA: 3  Anesthesia type: MAC          Induction: Intravenous  Anesthetic plan and risks discussed with: Patient

## 2021-02-05 NOTE — PERIOP NOTES
Merle Shelton TRANSFER - IN REPORT:    Verbal report received from [facility] RN. on Kathy Barrera  being received from -S for routine progression of care      Report consisted of patients Situation, Background, Assessment and   Recommendations(SBAR). Information from the following report(s) Procedure Verification was reviewed with the receiving nurse. Opportunity for questions and clarification was provided. Assessment completed upon patients arrival to unit and care assumed. Rapid Covid-19 results negative. Contact isolation for MRSA of the wound    Stable for transport.

## 2021-02-05 NOTE — ROUTINE PROCESS
Bedside shift change report given to One Marcelo Lawson and Ying Max RN (oncoming nurse) by Yris Silva RN (offgoing nurse). Report included the following information SBAR, Kardex, Procedure Summary, Intake/Output, MAR and Recent Results.

## 2021-02-05 NOTE — DIABETES MGMT
Glycemic Control Plan of Care    Recommend starting lantus 20 units bid - order obtained   At discharge, recommend switching to [place] U-200     Assessment: admitted with osteomyelitis of his left foot and hyperglycemia - consult received  He is well known to our service and has received DM education and glucometers on multiple admissions  Currently NPO for podiatry  He takes very large doses of insulin at home - often missing mealtime humalog as he typically eats only once daily   He confirmed his current doses - states compliance with levemir bid - has been injecting 85 units - recommend switching to U-200  Checks BG 3-4 times per week -   Will continue to monitor and follow for any discharge needs    Most recent blood glucose values:      Current A1C:  Lab Results   Component Value Date/Time    Hemoglobin A1c 8.9 (H) 11/23/2020 05:29 AM     Current hospital diabetes medications:  Corrective lispro, very insulin resistant, q6h    TTD previous day = admitted overnight - last dose of levemir was 2/4/2021 AM    Home diabetes medications: confirmed with patient   Levemir 85 units bid  Humalog with meals - 60 units     Goals:  Blood glucose will be within target range of  mg/dL by 2/8/2021     Education:  _X__  Refer to Diabetes Education Record             ___  Education not indicated at this time    George Chavez MPH RN CDE  Pager [phone]

## 2021-02-06 ENCOUNTER — APPOINTMENT (OUTPATIENT)
Dept: GENERAL RADIOLOGY | Age: 61
DRG: 710 | End: 2021-02-06
Attending: PODIATRIST
Payer: MEDICAID

## 2021-02-06 LAB
ANION GAP SERPL CALC-SCNC: 8 MMOL/L (ref 3–18)
BASOPHILS # BLD: 0.2 K/UL (ref 0–0.06)
BASOPHILS NFR BLD: 1 % (ref 0–3)
BUN SERPL-MCNC: 26 MG/DL (ref 7–18)
BUN/CREAT SERPL: 15 (ref 12–20)
CALCIUM SERPL-MCNC: 8.6 MG/DL (ref 8.5–10.1)
CHLORIDE SERPL-SCNC: 99 MMOL/L (ref 100–111)
CHOLEST SERPL-MCNC: 126 MG/DL
CO2 SERPL-SCNC: 28 MMOL/L (ref 21–32)
CREAT SERPL-MCNC: 1.71 MG/DL (ref 0.6–1.3)
DIFFERENTIAL METHOD BLD: ABNORMAL
EOSINOPHIL # BLD: 0.4 K/UL (ref 0–0.4)
EOSINOPHIL NFR BLD: 2 % (ref 0–5)
ERYTHROCYTE [DISTWIDTH] IN BLOOD BY AUTOMATED COUNT: 14.9 % (ref 11.6–14.5)
GLUCOSE BLD STRIP.AUTO-MCNC: 131 MG/DL (ref 70–110)
GLUCOSE BLD STRIP.AUTO-MCNC: 162 MG/DL (ref 70–110)
GLUCOSE BLD STRIP.AUTO-MCNC: 197 MG/DL (ref 70–110)
GLUCOSE BLD STRIP.AUTO-MCNC: 199 MG/DL (ref 70–110)
GLUCOSE SERPL-MCNC: 138 MG/DL (ref 74–99)
HCT VFR BLD AUTO: 23.2 % (ref 36–48)
HDLC SERPL-MCNC: 20 MG/DL (ref 40–60)
HDLC SERPL: 6.3 {RATIO} (ref 0–5)
HGB BLD-MCNC: 7.4 G/DL (ref 13–16)
LDLC SERPL CALC-MCNC: 86 MG/DL (ref 0–100)
LIPID PROFILE,FLP: ABNORMAL
LYMPHOCYTES # BLD: 2 K/UL (ref 0.8–3.5)
LYMPHOCYTES NFR BLD: 11 % (ref 20–51)
MCH RBC QN AUTO: 27.6 PG (ref 24–34)
MCHC RBC AUTO-ENTMCNC: 31.9 G/DL (ref 31–37)
MCV RBC AUTO: 86.6 FL (ref 74–97)
MONOCYTES # BLD: 1.9 K/UL (ref 0–1)
MONOCYTES NFR BLD: 10 % (ref 2–9)
NEUTS SEG # BLD: 14 K/UL (ref 1.8–8)
NEUTS SEG NFR BLD: 76 % (ref 42–75)
PLATELET # BLD AUTO: 582 K/UL (ref 135–420)
PLATELET COMMENTS,PCOM: ABNORMAL
PMV BLD AUTO: 9.7 FL (ref 9.2–11.8)
POTASSIUM SERPL-SCNC: 3.7 MMOL/L (ref 3.5–5.5)
RBC # BLD AUTO: 2.68 M/UL (ref 4.7–5.5)
RBC MORPH BLD: ABNORMAL
RBC MORPH BLD: ABNORMAL
SODIUM SERPL-SCNC: 135 MMOL/L (ref 136–145)
TRIGL SERPL-MCNC: 100 MG/DL (ref ?–150)
VANCOMYCIN SERPL-MCNC: 4.1 UG/ML (ref 5–40)
VLDLC SERPL CALC-MCNC: 20 MG/DL
WBC # BLD AUTO: 18.5 K/UL (ref 4.6–13.2)

## 2021-02-06 PROCEDURE — 74011250637 HC RX REV CODE- 250/637: Performed by: INTERNAL MEDICINE

## 2021-02-06 PROCEDURE — 99233 SBSQ HOSP IP/OBS HIGH 50: CPT | Performed by: INTERNAL MEDICINE

## 2021-02-06 PROCEDURE — 82962 GLUCOSE BLOOD TEST: CPT

## 2021-02-06 PROCEDURE — 74011250636 HC RX REV CODE- 250/636: Performed by: INTERNAL MEDICINE

## 2021-02-06 PROCEDURE — 74011636637 HC RX REV CODE- 636/637: Performed by: INTERNAL MEDICINE

## 2021-02-06 PROCEDURE — 74011000258 HC RX REV CODE- 258: Performed by: HOSPITALIST

## 2021-02-06 PROCEDURE — 80061 LIPID PANEL: CPT

## 2021-02-06 PROCEDURE — 74011250636 HC RX REV CODE- 250/636: Performed by: HOSPITALIST

## 2021-02-06 PROCEDURE — 80048 BASIC METABOLIC PNL TOTAL CA: CPT

## 2021-02-06 PROCEDURE — 73630 X-RAY EXAM OF FOOT: CPT

## 2021-02-06 PROCEDURE — 85025 COMPLETE CBC W/AUTO DIFF WBC: CPT

## 2021-02-06 PROCEDURE — 80202 ASSAY OF VANCOMYCIN: CPT

## 2021-02-06 PROCEDURE — 74011250637 HC RX REV CODE- 250/637: Performed by: HOSPITALIST

## 2021-02-06 PROCEDURE — 36415 COLL VENOUS BLD VENIPUNCTURE: CPT

## 2021-02-06 PROCEDURE — 65270000029 HC RM PRIVATE

## 2021-02-06 PROCEDURE — 2709999900 HC NON-CHARGEABLE SUPPLY

## 2021-02-06 RX ORDER — VANCOMYCIN HYDROCHLORIDE
1250 ONCE
Status: COMPLETED | OUTPATIENT
Start: 2021-02-06 | End: 2021-02-06

## 2021-02-06 RX ORDER — INSULIN LISPRO 100 [IU]/ML
INJECTION, SOLUTION INTRAVENOUS; SUBCUTANEOUS
Status: DISCONTINUED | OUTPATIENT
Start: 2021-02-06 | End: 2021-02-09 | Stop reason: HOSPADM

## 2021-02-06 RX ORDER — OXYCODONE AND ACETAMINOPHEN 7.5; 325 MG/1; MG/1
1 TABLET ORAL
Status: DISCONTINUED | OUTPATIENT
Start: 2021-02-06 | End: 2021-02-09 | Stop reason: HOSPADM

## 2021-02-06 RX ADMIN — Medication 10 ML: at 14:00

## 2021-02-06 RX ADMIN — INSULIN LISPRO 3 UNITS: 100 INJECTION, SOLUTION INTRAVENOUS; SUBCUTANEOUS at 06:47

## 2021-02-06 RX ADMIN — OXYCODONE HYDROCHLORIDE AND ACETAMINOPHEN 1 TABLET: 7.5; 325 TABLET ORAL at 10:44

## 2021-02-06 RX ADMIN — PANTOPRAZOLE SODIUM 40 MG: 40 TABLET, DELAYED RELEASE ORAL at 06:48

## 2021-02-06 RX ADMIN — OXYCODONE HYDROCHLORIDE AND ACETAMINOPHEN 1 TABLET: 5; 325 TABLET ORAL at 00:49

## 2021-02-06 RX ADMIN — INSULIN LISPRO 3 UNITS: 100 INJECTION, SOLUTION INTRAVENOUS; SUBCUTANEOUS at 11:35

## 2021-02-06 RX ADMIN — OXYCODONE HYDROCHLORIDE AND ACETAMINOPHEN 1 TABLET: 5; 325 TABLET ORAL at 06:46

## 2021-02-06 RX ADMIN — PIPERACILLIN AND TAZOBACTAM 3.38 G: 3; .375 INJECTION, POWDER, LYOPHILIZED, FOR SOLUTION INTRAVENOUS at 09:52

## 2021-02-06 RX ADMIN — VANCOMYCIN HYDROCHLORIDE 1250 MG: 10 INJECTION, POWDER, LYOPHILIZED, FOR SOLUTION INTRAVENOUS at 15:15

## 2021-02-06 RX ADMIN — PIPERACILLIN AND TAZOBACTAM 3.38 G: 3; .375 INJECTION, POWDER, LYOPHILIZED, FOR SOLUTION INTRAVENOUS at 21:43

## 2021-02-06 RX ADMIN — PIPERACILLIN AND TAZOBACTAM 3.38 G: 3; .375 INJECTION, POWDER, LYOPHILIZED, FOR SOLUTION INTRAVENOUS at 14:27

## 2021-02-06 RX ADMIN — PIPERACILLIN AND TAZOBACTAM 3.38 G: 3; .375 INJECTION, POWDER, LYOPHILIZED, FOR SOLUTION INTRAVENOUS at 02:24

## 2021-02-06 RX ADMIN — INSULIN GLARGINE 20 UNITS: 100 INJECTION, SOLUTION SUBCUTANEOUS at 09:51

## 2021-02-06 RX ADMIN — INSULIN GLARGINE 20 UNITS: 100 INJECTION, SOLUTION SUBCUTANEOUS at 21:43

## 2021-02-06 RX ADMIN — INSULIN LISPRO 3 UNITS: 100 INJECTION, SOLUTION INTRAVENOUS; SUBCUTANEOUS at 21:43

## 2021-02-06 RX ADMIN — Medication 10 ML: at 21:44

## 2021-02-06 RX ADMIN — OXYCODONE HYDROCHLORIDE AND ACETAMINOPHEN 1 TABLET: 7.5; 325 TABLET ORAL at 19:33

## 2021-02-06 NOTE — PROGRESS NOTES
Received orders via phone with readback from Dr. Leslie Foreman for patient to receive Diabetic diet.

## 2021-02-06 NOTE — ROUTINE PROCESS
Bedside shift change report given to Patrick Reagan RN (oncoming nurse) by Patricia Knight RN (offgoing nurse). Report included the following information SBAR, Kardex, Procedure Summary, Intake/Output, MAR, Recent Results and Med Rec Status.

## 2021-02-06 NOTE — PROGRESS NOTES
End of Shift Note     Bedside and verbal shift change report given to Aerial RN (On coming nurse) by Narcisa Narvaez (Off going nurse).   Report included the following information:      --Procedure Summary     --MAR,     --Recent Results     --Med Rec Status

## 2021-02-06 NOTE — ANESTHESIA POSTPROCEDURE EVALUATION
Procedure(s):  RIGHT FOOT INCISION ,  DEBRIDEMENT AND IRRIGATION, RESECTION  OF REMAINING FIRST METATARSAL RESECTION. MAC    Anesthesia Post Evaluation      Multimodal analgesia: multimodal analgesia used between 6 hours prior to anesthesia start to PACU discharge  Patient location during evaluation: PACU  Patient participation: complete - patient participated  Level of consciousness: awake and alert  Pain management: adequate  Airway patency: patent  Anesthetic complications: no  Cardiovascular status: acceptable  Respiratory status: acceptable  Hydration status: acceptable  Post anesthesia nausea and vomiting:  controlled  Final Post Anesthesia Temperature Assessment:  Normothermia (36.0-37.5 degrees C)      INITIAL Post-op Vital signs:   Vitals Value Taken Time   /66 02/05/21 2051   Temp 36.6 °C (97.9 °F) 02/05/21 2051   Pulse 71 02/05/21 2052   Resp 9 02/05/21 2052   SpO2 97 % 02/05/21 2053   Vitals shown include unvalidated device data.

## 2021-02-06 NOTE — PROGRESS NOTES
SUBJECTIVE:    Patient is being seen for foot pain, diabetes mellitus management and SHYLA    Patient states he walked to the bathroom earlier today putting pressure on his right heel. No chest pain or shortness of breath or cough. No headaches or dizziness. He says he takes insulin sometimes at home. No new issues overnight. OBJECTIVE:    /63   Pulse 73   Temp 97.8 °F (36.6 °C)   Resp 16   Ht 5' 9\" (1.753 m)   Wt 115.7 kg (255 lb)   SpO2 95%   BMI 37.66 kg/m²     General appearance - alert, well appearing, and in no distress, atraumatic head. Eyes - sclera anicteric, no pallor  Nose - no obvious nasal discharge. Neck - supple, no JVD, trachea is midline  Chest -clear air entry noted in bases, no wheezes  Heart - S1 and S2 normal  Abdomen - soft, nontender, nondistended, Bowel sounds present  Neurological - alert, oriented, normal speech, no focal findings noted  Musculoskeletal/skin-right foot dressing is in situ with some soakage of blood. Extremities - no pedal edema noted    ASSESSMENT:    1. Infected diabetic foot ulcer with some cellulitis s/p RIGHT FOOT INCISION AND DRAINAGE, DEBRIDEMENT OF NECROTIC TISSUE, RESECTION OF PARTIAL FIRST METATARSAL  2. Diabetes mellitus with hyperglycemia, improving  3. Medical noncompliance  4. Dyslipidemia  5. Acute on stage III chronic kidney disease, improving  6. Leukocytosis, improving  7. History of osteomyelitis status post amputation of right hallux and left second digit  8. GERD  9.   Coronary artery disease, stable    PLAN:    Continue current management in form of IV antibiotic, Lantus and IV fluid  Operative note report reviewed  Surgical culture reports are pending  ID to follow for IV antibiotic choice and duration  Podiatrist to follow for wound care management  Will order CBC for the morning to track WBC  PT and OT have been ordered  We will reduce the dose of IV fluid and monitor renal function    Total time to take care of this patient was 35 minutes and more than 50% of time was spent counseling and coordinating care. Disclaimer: Sections of this note are dictated using utilizing voice recognition software, which may have resulted in some phonetic based errors in grammar and contents. Even though attempts were made to correct all the mistakes, some may have been missed, and remained in the body of the document. If questions arise, please contact our department. Recent Results (from the past 24 hour(s))   GLUCOSE, POC    Collection Time: 02/05/21  4:27 PM   Result Value Ref Range    Glucose (POC) 247 (H) 70 - 110 mg/dL   GLUCOSE, POC    Collection Time: 02/05/21  7:58 PM   Result Value Ref Range    Glucose (POC) 159 (H) 70 - 110 mg/dL   GLUCOSE, POC    Collection Time: 02/05/21  9:36 PM   Result Value Ref Range    Glucose (POC) 164 (H) 70 - 110 mg/dL   CBC WITH AUTOMATED DIFF    Collection Time: 02/06/21  5:01 AM   Result Value Ref Range    WBC 18.5 (H) 4.6 - 13.2 K/uL    RBC 2.68 (L) 4.70 - 5.50 M/uL    HGB 7.4 (L) 13.0 - 16.0 g/dL    HCT 23.2 (L) 36.0 - 48.0 %    MCV 86.6 74.0 - 97.0 FL    MCH 27.6 24.0 - 34.0 PG    MCHC 31.9 31.0 - 37.0 g/dL    RDW 14.9 (H) 11.6 - 14.5 %    PLATELET 790 (H) 887 - 420 K/uL    MPV 9.7 9.2 - 11.8 FL    NEUTROPHILS 76 (H) 42 - 75 %    LYMPHOCYTES 11 (L) 20 - 51 %    MONOCYTES 10 (H) 2 - 9 %    EOSINOPHILS 2 0 - 5 %    BASOPHILS 1 0 - 3 %    ABS. NEUTROPHILS 14.0 (H) 1.8 - 8.0 K/UL    ABS. LYMPHOCYTES 2.0 0.8 - 3.5 K/UL    ABS. MONOCYTES 1.9 (H) 0 - 1.0 K/UL    ABS. EOSINOPHILS 0.4 0.0 - 0.4 K/UL    ABS.  BASOPHILS 0.2 (H) 0.0 - 0.06 K/UL    DF MANUAL      PLATELET COMMENTS Increased Platelets      RBC COMMENTS ANISOCYTOSIS  1+        RBC COMMENTS POLYCHROMASIA  1+       METABOLIC PANEL, BASIC    Collection Time: 02/06/21  5:01 AM   Result Value Ref Range    Sodium 135 (L) 136 - 145 mmol/L    Potassium 3.7 3.5 - 5.5 mmol/L    Chloride 99 (L) 100 - 111 mmol/L    CO2 28 21 - 32 mmol/L    Anion gap 8 3.0 - 18 mmol/L    Glucose 138 (H) 74 - 99 mg/dL    BUN 26 (H) 7.0 - 18 MG/DL    Creatinine 1.71 (H) 0.6 - 1.3 MG/DL    BUN/Creatinine ratio 15 12 - 20      GFR est AA 50 (L) >60 ml/min/1.73m2    GFR est non-AA 41 (L) >60 ml/min/1.73m2    Calcium 8.6 8.5 - 10.1 MG/DL   LIPID PANEL    Collection Time: 02/06/21  5:01 AM   Result Value Ref Range    LIPID PROFILE          Cholesterol, total 126 <200 MG/DL    Triglyceride 100 <150 MG/DL    HDL Cholesterol 20 (L) 40 - 60 MG/DL    LDL, calculated 86 0 - 100 MG/DL    VLDL, calculated 20 MG/DL    CHOL/HDL Ratio 6.3 (H) 0 - 5.0     GLUCOSE, POC    Collection Time: 02/06/21  5:56 AM   Result Value Ref Range    Glucose (POC) 162 (H) 70 - 110 mg/dL   GLUCOSE, POC    Collection Time: 02/06/21 10:30 AM   Result Value Ref Range    Glucose (POC) 199 (H) 70 - 110 mg/dL   VANCOMYCIN, RANDOM    Collection Time: 02/06/21 12:00 PM   Result Value Ref Range    Vancomycin, random 4.1 (L) 5.0 - 40.0 UG/ML

## 2021-02-06 NOTE — ROUTINE PROCESS
Bedside shift change report given to Lisa Briggs RN (oncoming nurse) by Charlotte Verde RN (offgoing nurse). Report included the following information SBAR, Kardex, Procedure Summary, Intake/Output, MAR, Recent Results and Med Rec Status.

## 2021-02-06 NOTE — PERIOP NOTES
TRANSFER - OUT REPORT:    Verbal report given to Ruben(name) on Shahram Mail  being transferred to [facility](unit) for routine post - op       Report consisted of patients Situation, Background, Assessment and   Recommendations(SBAR). Information from the following report(s) Kardex, OR Summary and MAR was reviewed with the receiving nurse. Lines:   Peripheral IV 02/05/21 Anterior;Distal;Left Wrist (Active)   Site Assessment Clean, dry, & intact 02/05/21 2020   Phlebitis Assessment 0 02/05/21 2020   Infiltration Assessment 0 02/05/21 2020   Dressing Status Clean, dry, & intact 02/05/21 2020   Dressing Type Tape;Transparent 02/05/21 2020   Hub Color/Line Status Blue;Capped;Flushed 02/05/21 2020        Opportunity for questions and clarification was provided.       Patient transported with:   [facility]

## 2021-02-07 LAB
ANION GAP SERPL CALC-SCNC: 6 MMOL/L (ref 3–18)
BASOPHILS # BLD: 0.2 K/UL (ref 0–0.06)
BASOPHILS NFR BLD: 1 % (ref 0–3)
BUN SERPL-MCNC: 22 MG/DL (ref 7–18)
BUN/CREAT SERPL: 14 (ref 12–20)
CALCIUM SERPL-MCNC: 8.5 MG/DL (ref 8.5–10.1)
CHLORIDE SERPL-SCNC: 102 MMOL/L (ref 100–111)
CO2 SERPL-SCNC: 30 MMOL/L (ref 21–32)
CREAT SERPL-MCNC: 1.58 MG/DL (ref 0.6–1.3)
DIFFERENTIAL METHOD BLD: ABNORMAL
EOSINOPHIL # BLD: 0.3 K/UL (ref 0–0.4)
EOSINOPHIL NFR BLD: 2 % (ref 0–5)
ERYTHROCYTE [DISTWIDTH] IN BLOOD BY AUTOMATED COUNT: 15.1 % (ref 11.6–14.5)
GLUCOSE BLD STRIP.AUTO-MCNC: 135 MG/DL (ref 70–110)
GLUCOSE BLD STRIP.AUTO-MCNC: 148 MG/DL (ref 70–110)
GLUCOSE BLD STRIP.AUTO-MCNC: 204 MG/DL (ref 70–110)
GLUCOSE BLD STRIP.AUTO-MCNC: 204 MG/DL (ref 70–110)
GLUCOSE SERPL-MCNC: 127 MG/DL (ref 74–99)
HCT VFR BLD AUTO: 25 % (ref 36–48)
HGB BLD-MCNC: 7.8 G/DL (ref 13–16)
LYMPHOCYTES # BLD: 2.1 K/UL (ref 0.8–3.5)
LYMPHOCYTES NFR BLD: 14 % (ref 20–51)
MCH RBC QN AUTO: 28 PG (ref 24–34)
MCHC RBC AUTO-ENTMCNC: 31.2 G/DL (ref 31–37)
MCV RBC AUTO: 89.6 FL (ref 74–97)
MONOCYTES # BLD: 1.1 K/UL (ref 0–1)
MONOCYTES NFR BLD: 7 % (ref 2–9)
NEUTS SEG # BLD: 11.3 K/UL (ref 1.8–8)
NEUTS SEG NFR BLD: 76 % (ref 42–75)
PLATELET # BLD AUTO: 581 K/UL (ref 135–420)
PLATELET COMMENTS,PCOM: ABNORMAL
PMV BLD AUTO: 10 FL (ref 9.2–11.8)
POTASSIUM SERPL-SCNC: 3.4 MMOL/L (ref 3.5–5.5)
RBC # BLD AUTO: 2.79 M/UL (ref 4.7–5.5)
RBC MORPH BLD: ABNORMAL
SODIUM SERPL-SCNC: 138 MMOL/L (ref 136–145)
VANCOMYCIN SERPL-MCNC: 12 UG/ML (ref 5–40)
WBC # BLD AUTO: 15 K/UL (ref 4.6–13.2)

## 2021-02-07 PROCEDURE — 74011636637 HC RX REV CODE- 636/637: Performed by: INTERNAL MEDICINE

## 2021-02-07 PROCEDURE — 74011250637 HC RX REV CODE- 250/637: Performed by: FAMILY MEDICINE

## 2021-02-07 PROCEDURE — 80048 BASIC METABOLIC PNL TOTAL CA: CPT

## 2021-02-07 PROCEDURE — 85025 COMPLETE CBC W/AUTO DIFF WBC: CPT

## 2021-02-07 PROCEDURE — 80202 ASSAY OF VANCOMYCIN: CPT

## 2021-02-07 PROCEDURE — 36415 COLL VENOUS BLD VENIPUNCTURE: CPT

## 2021-02-07 PROCEDURE — 74011250636 HC RX REV CODE- 250/636: Performed by: HOSPITALIST

## 2021-02-07 PROCEDURE — 99232 SBSQ HOSP IP/OBS MODERATE 35: CPT | Performed by: INTERNAL MEDICINE

## 2021-02-07 PROCEDURE — 65270000029 HC RM PRIVATE

## 2021-02-07 PROCEDURE — 77030019895 HC PCKNG STRP IODO -A

## 2021-02-07 PROCEDURE — 2709999900 HC NON-CHARGEABLE SUPPLY

## 2021-02-07 PROCEDURE — 74011250637 HC RX REV CODE- 250/637: Performed by: INTERNAL MEDICINE

## 2021-02-07 PROCEDURE — 74011250636 HC RX REV CODE- 250/636: Performed by: INTERNAL MEDICINE

## 2021-02-07 PROCEDURE — 82962 GLUCOSE BLOOD TEST: CPT

## 2021-02-07 PROCEDURE — 74011000258 HC RX REV CODE- 258: Performed by: HOSPITALIST

## 2021-02-07 RX ORDER — POTASSIUM CHLORIDE 20 MEQ/1
20 TABLET, EXTENDED RELEASE ORAL
Status: COMPLETED | OUTPATIENT
Start: 2021-02-07 | End: 2021-02-07

## 2021-02-07 RX ORDER — VANCOMYCIN 1.75 GRAM/500 ML IN 0.9 % SODIUM CHLORIDE INTRAVENOUS
1750 ONCE
Status: COMPLETED | OUTPATIENT
Start: 2021-02-07 | End: 2021-02-07

## 2021-02-07 RX ORDER — CALCIUM CARB/MAGNESIUM CARB 311-232MG
2.5 TABLET ORAL
Status: DISCONTINUED | OUTPATIENT
Start: 2021-02-07 | End: 2021-02-09 | Stop reason: HOSPADM

## 2021-02-07 RX ORDER — LANOLIN ALCOHOL/MO/W.PET/CERES
3 CREAM (GRAM) TOPICAL
Status: DISCONTINUED | OUTPATIENT
Start: 2021-02-07 | End: 2021-02-07

## 2021-02-07 RX ADMIN — PIPERACILLIN AND TAZOBACTAM 3.38 G: 3; .375 INJECTION, POWDER, LYOPHILIZED, FOR SOLUTION INTRAVENOUS at 15:11

## 2021-02-07 RX ADMIN — OXYCODONE HYDROCHLORIDE AND ACETAMINOPHEN 1 TABLET: 7.5; 325 TABLET ORAL at 09:13

## 2021-02-07 RX ADMIN — PIPERACILLIN AND TAZOBACTAM 3.38 G: 3; .375 INJECTION, POWDER, LYOPHILIZED, FOR SOLUTION INTRAVENOUS at 09:01

## 2021-02-07 RX ADMIN — OXYCODONE HYDROCHLORIDE AND ACETAMINOPHEN 1 TABLET: 7.5; 325 TABLET ORAL at 01:24

## 2021-02-07 RX ADMIN — INSULIN GLARGINE 20 UNITS: 100 INJECTION, SOLUTION SUBCUTANEOUS at 22:05

## 2021-02-07 RX ADMIN — INSULIN LISPRO 6 UNITS: 100 INJECTION, SOLUTION INTRAVENOUS; SUBCUTANEOUS at 11:51

## 2021-02-07 RX ADMIN — INSULIN GLARGINE 20 UNITS: 100 INJECTION, SOLUTION SUBCUTANEOUS at 09:02

## 2021-02-07 RX ADMIN — PANTOPRAZOLE SODIUM 40 MG: 40 TABLET, DELAYED RELEASE ORAL at 09:02

## 2021-02-07 RX ADMIN — Medication 2.5 MG: at 23:16

## 2021-02-07 RX ADMIN — PIPERACILLIN AND TAZOBACTAM 3.38 G: 3; .375 INJECTION, POWDER, LYOPHILIZED, FOR SOLUTION INTRAVENOUS at 22:05

## 2021-02-07 RX ADMIN — VANCOMYCIN HYDROCHLORIDE 1750 MG: 10 INJECTION, POWDER, LYOPHILIZED, FOR SOLUTION INTRAVENOUS at 10:35

## 2021-02-07 RX ADMIN — Medication 10 ML: at 06:04

## 2021-02-07 RX ADMIN — PIPERACILLIN AND TAZOBACTAM 3.38 G: 3; .375 INJECTION, POWDER, LYOPHILIZED, FOR SOLUTION INTRAVENOUS at 03:16

## 2021-02-07 RX ADMIN — OXYCODONE HYDROCHLORIDE AND ACETAMINOPHEN 1 TABLET: 7.5; 325 TABLET ORAL at 18:40

## 2021-02-07 RX ADMIN — Medication 10 ML: at 22:06

## 2021-02-07 RX ADMIN — POTASSIUM CHLORIDE 20 MEQ: 1500 TABLET, EXTENDED RELEASE ORAL at 13:47

## 2021-02-07 NOTE — PROGRESS NOTES
Bedside and Verbal shift change report given to Stephanie Sauceda RN (oncoming nurse) by Zander Lepe RN (offgoing nurse). Report included the following information SBAR, Kardex, OR Summary, Procedure Summary, Intake/Output and MAR.

## 2021-02-07 NOTE — PROGRESS NOTES
Occupational Therapy Note:  Orders received, chart reviewed and there is no weight bearing status in the chart although it is documented that he walked to the bathroom bearing a little weight through his heal. Please update weight bearing status RLE in order for the most thorough and accurate OT evaluation and treatment.  Thank-you for this referral. Jose Thurston, OTR/L

## 2021-02-07 NOTE — PROGRESS NOTES
Problem: Risk for Spread of Infection  Goal: Prevent transmission of infectious organism to others  Description: Prevent the transmission of infectious organisms to other patients, staff members, and visitors. Outcome: Progressing Towards Goal     Problem: Patient Education:  Go to Education Activity  Goal: Patient/Family Education  Outcome: Progressing Towards Goal     Problem: Falls - Risk of  Goal: *Absence of Falls  Description: Document Lisa Blood Fall Risk and appropriate interventions in the flowsheet. Outcome: Progressing Towards Goal  Note: Fall Risk Interventions:  Mobility Interventions: Communicate number of staff needed for ambulation/transfer, Patient to call before getting OOB, Utilize walker, cane, or other assistive device         Medication Interventions: Patient to call before getting OOB, Teach patient to arise slowly                   Problem: Patient Education: Go to Patient Education Activity  Goal: Patient/Family Education  Outcome: Progressing Towards Goal     Problem: Pain  Goal: *Control of Pain  Outcome: Progressing Towards Goal     Problem: Patient Education: Go to Patient Education Activity  Goal: Patient/Family Education  Outcome: Progressing Towards Goal     Problem: Impaired Skin Integrity/Pressure Injury Treatment  Goal: *Improvement of Existing Pressure Injury  Outcome: Progressing Towards Goal  Goal: *Prevention of pressure injury  Description: Document Jer Scale and appropriate interventions in the flowsheet.   Outcome: Progressing Towards Goal  Note: Pressure Injury Interventions:  Sensory Interventions: Assess need for specialty bed, Check visual cues for pain, Minimize linen layers    Moisture Interventions: Absorbent underpads, Minimize layers, Moisture barrier    Activity Interventions: Increase time out of bed, Pressure redistribution bed/mattress(bed type)    Mobility Interventions: HOB 30 degrees or less, Pressure redistribution bed/mattress (bed type)    Nutrition Interventions: Document food/fluid/supplement intake                     Problem: Patient Education: Go to Patient Education Activity  Goal: Patient/Family Education  Outcome: Progressing Towards Goal

## 2021-02-07 NOTE — PROGRESS NOTES
Progress Note    Patient: Laurel Cornelius MRN: [id number]  SSN: [id number]    YOB: 1960  Age: 61 y.o. Sex: male      Admit Date: 2/4/2021  2 Days Post-Op     Procedure:   Procedure(s):  RIGHT FOOT INCISION ,  DEBRIDEMENT AND IRRIGATION, RESECTION  OF REMAINING DISTAL FIRST METATARSAL STUMP    Subjective:     Patient seen resting quiet and comfortably and no apparent distress. Patient noted to have bloody dressings with discharge from wound. Patient has been walking in room in hospital. Patient denies N/V/C/F. Status post: osteomyelitis    Objective:     Visit Vitals  /76   Pulse 77   Temp 97.7 °F (36.5 °C)   Resp 16   Ht 5' 9\" (1.753 m)   Wt 110.9 kg (244 lb 8 oz)   SpO2 100%   BMI 36.11 kg/m²        Physical Exam:  Right EDILSON: Dorsal incision site well approximated. Skin sutures intact. Plantar 2 cm ulcer draining serosenguinous fluid, darkening of skin noted to right foot. Dry, flaky skin noted. Edema noted to be resolving to right foot. Labs/Radiology Review: images and reports reviewed    Assessment:     Hospital Problems  Date Reviewed: 12/24/2019          Codes Class Noted POA    Leukocytosis ICD-10-CM: D03.779  ICD-9-CM: 288.60  2/4/2021 Unknown        Wound infection ICD-10-CM: T14. 8XXA, L08.9  ICD-9-CM: 958.3  2/4/2021 Unknown        Diabetic ulcer of heel ([facility] ICD-10-CM: E11.621, L97.409  ICD-9-CM: 250.80, 707.14  2/4/2021 Unknown        Acute kidney injury [facility]) ICD-10-CM: N17.9  ICD-9-CM: 584.9  2/4/2021 Unknown        Osteomyelitis ([facility] ICD-10-CM: M86.9  ICD-9-CM: 730.20  11/22/2020 Unknown        Sepsis ([facility] ICD-10-CM: A41.9  ICD-9-CM: 038.9, 995.91  3/21/2018 Unknown              Plan/Recommendations/Medical Decision Making:     - post-op x-ray of right foot reviewed. Further proximal amputation of first metatarsal noted. - Instructed to remain strict NWB to right forefoot. Remain in surgical shoe. Patient already has it.   - Continue IV abxs.  ID already consulted. - Dressings changed with irrigated normal saline solution and dressed with iodoform sterile packing strip and dry gauze. - f/u OR culture, pathology. - Will need home nursing to change dressings every 2 days with sterile packing strip and dry gauze.

## 2021-02-07 NOTE — PROGRESS NOTES
SUBJECTIVE:    Patient is being seen for foot pain, diabetes mellitus management and SHYLA    Patient continues to have some bleeding from the right foot. Denies any chest pain or shortness of breath or cough. Right leg pain is present. No nausea or vomiting. He was eating banana and drinking orange juice when I saw him. OBJECTIVE:    /76   Pulse 77   Temp 97.7 °F (36.5 °C)   Resp 16   Ht 5' 9\" (1.753 m)   Wt 110.9 kg (244 lb 8 oz)   SpO2 100%   BMI 36.11 kg/m²     General appearance - alert, well appearing, and in no distress, atraumatic head. Chest -clear air entry noted in bases, no wheezes  Heart - S1 and S2 normal  Abdomen - soft, nontender, nondistended, Bowel sounds present  Neurological - alert, oriented, normal speech, no focal findings noted, sensation to touch are normal in both lower extremities/feet. Musculoskeletal/skin-right foot dressing is in situ with some soakage of blood. Extremities - no pedal edema noted    ASSESSMENT:    1. Infected diabetic foot ulcer with some cellulitis s/p RIGHT FOOT INCISION AND DRAINAGE, DEBRIDEMENT OF NECROTIC TISSUE, RESECTION OF PARTIAL FIRST METATARSAL  2. Diabetes mellitus with hyperglycemia, improving  3. Medical noncompliance  4. Dyslipidemia  5. Acute on stage III chronic kidney disease, improving  6. Leukocytosis, improving  7. History of osteomyelitis status post amputation of right hallux and left second digit  8. GERD  9. Coronary artery disease, stable  10. Mild bleeding from right foot area  11. Anemia of chronic medical disease    PLAN:    Continue current management in form of IV antibiotic, Lantus. Discussed with podiatrist about bleeding from the operative site and he will come and check on it.   We will check iron profile phone #[phone]  Follow wound culture and ID to follow for antibiotic management  PT and OT to follow this patient  Discontinue IV fluid and monitor renal function  Possible discharge home in 1 or 2 days if cleared by ID and podiatrist    Disclaimer: Sections of this note are dictated using utilizing voice recognition software, which may have resulted in some phonetic based errors in grammar and contents. Even though attempts were made to correct all the mistakes, some may have been missed, and remained in the body of the document. If questions arise, please contact our department. Recent Results (from the past 24 hour(s))   GLUCOSE, POC    Collection Time: 02/06/21  3:29 PM   Result Value Ref Range    Glucose (POC) 131 (H) 70 - 110 mg/dL   GLUCOSE, POC    Collection Time: 02/06/21  9:13 PM   Result Value Ref Range    Glucose (POC) 197 (H) 70 - 110 mg/dL   VANCOMYCIN, RANDOM    Collection Time: 02/07/21  4:52 AM   Result Value Ref Range    Vancomycin, random 12.0 5.0 - 88.9 UG/ML   METABOLIC PANEL, BASIC    Collection Time: 02/07/21  4:52 AM   Result Value Ref Range    Sodium 138 136 - 145 mmol/L    Potassium 3.4 (L) 3.5 - 5.5 mmol/L    Chloride 102 100 - 111 mmol/L    CO2 30 21 - 32 mmol/L    Anion gap 6 3.0 - 18 mmol/L    Glucose 127 (H) 74 - 99 mg/dL    BUN 22 (H) 7.0 - 18 MG/DL    Creatinine 1.58 (H) 0.6 - 1.3 MG/DL    BUN/Creatinine ratio 14 12 - 20      GFR est AA 54 (L) >60 ml/min/1.73m2    GFR est non-AA 45 (L) >60 ml/min/1.73m2    Calcium 8.5 8.5 - 10.1 MG/DL   CBC WITH AUTOMATED DIFF    Collection Time: 02/07/21  4:52 AM   Result Value Ref Range    WBC 15.0 (H) 4.6 - 13.2 K/uL    RBC 2.79 (L) 4.70 - 5.50 M/uL    HGB 7.8 (L) 13.0 - 16.0 g/dL    HCT 25.0 (L) 36.0 - 48.0 %    MCV 89.6 74.0 - 97.0 FL    MCH 28.0 24.0 - 34.0 PG    MCHC 31.2 31.0 - 37.0 g/dL    RDW 15.1 (H) 11.6 - 14.5 %    PLATELET 031 (H) 745 - 420 K/uL    MPV 10.0 9.2 - 11.8 FL    NEUTROPHILS 76 (H) 42 - 75 %    LYMPHOCYTES 14 (L) 20 - 51 %    MONOCYTES 7 2 - 9 %    EOSINOPHILS 2 0 - 5 %    BASOPHILS 1 0 - 3 %    ABS. NEUTROPHILS 11.3 (H) 1.8 - 8.0 K/UL    ABS. LYMPHOCYTES 2.1 0.8 - 3.5 K/UL    ABS. MONOCYTES 1.1 (H) 0 - 1.0 K/UL    ABS. EOSINOPHILS 0.3 0.0 - 0.4 K/UL    ABS.  BASOPHILS 0.2 (H) 0.0 - 0.06 K/UL    DF MANUAL      PLATELET COMMENTS Increased Platelets      RBC COMMENTS NORMOCYTIC, NORMOCHROMIC      RBC COMMENTS ANISOCYTOSIS  1+        RBC COMMENTS POLYCHROMASIA  1+       GLUCOSE, POC    Collection Time: 02/07/21  5:44 AM   Result Value Ref Range    Glucose (POC) 135 (H) 70 - 110 mg/dL   GLUCOSE, POC    Collection Time: 02/07/21 11:18 AM   Result Value Ref Range    Glucose (POC) 204 (H) 70 - 110 mg/dL

## 2021-02-08 ENCOUNTER — APPOINTMENT (OUTPATIENT)
Dept: INTERVENTIONAL RADIOLOGY/VASCULAR | Age: 61
DRG: 710 | End: 2021-02-08
Attending: INTERNAL MEDICINE
Payer: MEDICAID

## 2021-02-08 LAB
ANION GAP SERPL CALC-SCNC: 6 MMOL/L (ref 3–18)
BACTERIA SPEC CULT: ABNORMAL
BACTERIA SPEC CULT: ABNORMAL
BASOPHILS # BLD: 0 K/UL (ref 0–0.06)
BASOPHILS NFR BLD: 0 % (ref 0–3)
BUN SERPL-MCNC: 15 MG/DL (ref 7–18)
BUN/CREAT SERPL: 10 (ref 12–20)
CALCIUM SERPL-MCNC: 8.1 MG/DL (ref 8.5–10.1)
CHLORIDE SERPL-SCNC: 103 MMOL/L (ref 100–111)
CO2 SERPL-SCNC: 30 MMOL/L (ref 21–32)
CREAT SERPL-MCNC: 1.45 MG/DL (ref 0.6–1.3)
DIFFERENTIAL METHOD BLD: ABNORMAL
EOSINOPHIL # BLD: 0.1 K/UL (ref 0–0.4)
EOSINOPHIL NFR BLD: 1 % (ref 0–5)
ERYTHROCYTE [DISTWIDTH] IN BLOOD BY AUTOMATED COUNT: 15.2 % (ref 11.6–14.5)
FERRITIN SERPL-MCNC: 912 NG/ML (ref 8–388)
GLUCOSE BLD STRIP.AUTO-MCNC: 121 MG/DL (ref 70–110)
GLUCOSE BLD STRIP.AUTO-MCNC: 130 MG/DL (ref 70–110)
GLUCOSE BLD STRIP.AUTO-MCNC: 152 MG/DL (ref 70–110)
GLUCOSE BLD STRIP.AUTO-MCNC: 179 MG/DL (ref 70–110)
GLUCOSE SERPL-MCNC: 133 MG/DL (ref 74–99)
GRAM STN SPEC: ABNORMAL
HCT VFR BLD AUTO: 24.8 % (ref 36–48)
HGB BLD-MCNC: 7.7 G/DL (ref 13–16)
IRON SATN MFR SERPL: 27 % (ref 20–50)
IRON SERPL-MCNC: 50 UG/DL (ref 50–175)
LYMPHOCYTES # BLD: 3 K/UL (ref 0.8–3.5)
LYMPHOCYTES NFR BLD: 21 % (ref 20–51)
MCH RBC QN AUTO: 27.7 PG (ref 24–34)
MCHC RBC AUTO-ENTMCNC: 31 G/DL (ref 31–37)
MCV RBC AUTO: 89.2 FL (ref 74–97)
MONOCYTES # BLD: 1.5 K/UL (ref 0–1)
MONOCYTES NFR BLD: 10 % (ref 2–9)
MYELOCYTES NFR BLD MANUAL: 1 %
NEUTS SEG # BLD: 9.7 K/UL (ref 1.8–8)
NEUTS SEG NFR BLD: 67 % (ref 42–75)
PLATELET # BLD AUTO: 570 K/UL (ref 135–420)
PLATELET COMMENTS,PCOM: ABNORMAL
PMV BLD AUTO: 9.8 FL (ref 9.2–11.8)
POTASSIUM SERPL-SCNC: 3.8 MMOL/L (ref 3.5–5.5)
RBC # BLD AUTO: 2.78 M/UL (ref 4.7–5.5)
RBC MORPH BLD: ABNORMAL
RBC MORPH BLD: ABNORMAL
SERVICE CMNT-IMP: ABNORMAL
SODIUM SERPL-SCNC: 139 MMOL/L (ref 136–145)
TIBC SERPL-MCNC: 182 UG/DL (ref 250–450)
VANCOMYCIN SERPL-MCNC: 15.7 UG/ML (ref 5–40)
WBC # BLD AUTO: 14.5 K/UL (ref 4.6–13.2)

## 2021-02-08 PROCEDURE — 74011250637 HC RX REV CODE- 250/637: Performed by: INTERNAL MEDICINE

## 2021-02-08 PROCEDURE — 36415 COLL VENOUS BLD VENIPUNCTURE: CPT

## 2021-02-08 PROCEDURE — 97116 GAIT TRAINING THERAPY: CPT

## 2021-02-08 PROCEDURE — 83540 ASSAY OF IRON: CPT

## 2021-02-08 PROCEDURE — 82728 ASSAY OF FERRITIN: CPT

## 2021-02-08 PROCEDURE — 02HV33Z INSERTION OF INFUSION DEVICE INTO SUPERIOR VENA CAVA, PERCUTANEOUS APPROACH: ICD-10-PCS | Performed by: EMERGENCY MEDICINE

## 2021-02-08 PROCEDURE — 2709999900 HC NON-CHARGEABLE SUPPLY

## 2021-02-08 PROCEDURE — 97162 PT EVAL MOD COMPLEX 30 MIN: CPT

## 2021-02-08 PROCEDURE — 80202 ASSAY OF VANCOMYCIN: CPT

## 2021-02-08 PROCEDURE — 85025 COMPLETE CBC W/AUTO DIFF WBC: CPT

## 2021-02-08 PROCEDURE — 65270000029 HC RM PRIVATE

## 2021-02-08 PROCEDURE — 74011000258 HC RX REV CODE- 258: Performed by: INTERNAL MEDICINE

## 2021-02-08 PROCEDURE — 74011636637 HC RX REV CODE- 636/637: Performed by: INTERNAL MEDICINE

## 2021-02-08 PROCEDURE — 74011250636 HC RX REV CODE- 250/636: Performed by: EMERGENCY MEDICINE

## 2021-02-08 PROCEDURE — 97166 OT EVAL MOD COMPLEX 45 MIN: CPT

## 2021-02-08 PROCEDURE — 77001 FLUOROGUIDE FOR VEIN DEVICE: CPT

## 2021-02-08 PROCEDURE — 74011250636 HC RX REV CODE- 250/636: Performed by: HOSPITALIST

## 2021-02-08 PROCEDURE — 82962 GLUCOSE BLOOD TEST: CPT

## 2021-02-08 PROCEDURE — 74011250636 HC RX REV CODE- 250/636: Performed by: INTERNAL MEDICINE

## 2021-02-08 PROCEDURE — 74011000258 HC RX REV CODE- 258: Performed by: HOSPITALIST

## 2021-02-08 PROCEDURE — 97530 THERAPEUTIC ACTIVITIES: CPT

## 2021-02-08 PROCEDURE — 74011636637 HC RX REV CODE- 636/637: Performed by: EMERGENCY MEDICINE

## 2021-02-08 PROCEDURE — 97535 SELF CARE MNGMENT TRAINING: CPT

## 2021-02-08 PROCEDURE — 99232 SBSQ HOSP IP/OBS MODERATE 35: CPT | Performed by: EMERGENCY MEDICINE

## 2021-02-08 PROCEDURE — 80048 BASIC METABOLIC PNL TOTAL CA: CPT

## 2021-02-08 RX ORDER — ONDANSETRON 4 MG/1
4 TABLET, FILM COATED ORAL
Status: DISCONTINUED | OUTPATIENT
Start: 2021-02-08 | End: 2021-02-09 | Stop reason: HOSPADM

## 2021-02-08 RX ORDER — NALOXONE HYDROCHLORIDE 0.4 MG/ML
0.4 INJECTION, SOLUTION INTRAMUSCULAR; INTRAVENOUS; SUBCUTANEOUS AS NEEDED
Status: DISCONTINUED | OUTPATIENT
Start: 2021-02-08 | End: 2021-02-09 | Stop reason: HOSPADM

## 2021-02-08 RX ORDER — VANCOMYCIN 2 GRAM/500 ML IN 0.9 % SODIUM CHLORIDE INTRAVENOUS
2000 ONCE
Status: COMPLETED | OUTPATIENT
Start: 2021-02-08 | End: 2021-02-08

## 2021-02-08 RX ORDER — MORPHINE SULFATE 2 MG/ML
2 INJECTION, SOLUTION INTRAMUSCULAR; INTRAVENOUS
Status: DISCONTINUED | OUTPATIENT
Start: 2021-02-08 | End: 2021-02-09 | Stop reason: HOSPADM

## 2021-02-08 RX ORDER — INSULIN GLARGINE 100 [IU]/ML
20 INJECTION, SOLUTION SUBCUTANEOUS EVERY 12 HOURS
Status: DISCONTINUED | OUTPATIENT
Start: 2021-02-08 | End: 2021-02-09 | Stop reason: HOSPADM

## 2021-02-08 RX ADMIN — PANTOPRAZOLE SODIUM 40 MG: 40 TABLET, DELAYED RELEASE ORAL at 09:29

## 2021-02-08 RX ADMIN — MORPHINE SULFATE 2 MG: 2 INJECTION, SOLUTION INTRAMUSCULAR; INTRAVENOUS at 15:11

## 2021-02-08 RX ADMIN — PIPERACILLIN AND TAZOBACTAM 3.38 G: 3; .375 INJECTION, POWDER, LYOPHILIZED, FOR SOLUTION INTRAVENOUS at 20:28

## 2021-02-08 RX ADMIN — Medication 10 ML: at 22:40

## 2021-02-08 RX ADMIN — MORPHINE SULFATE 2 MG: 2 INJECTION, SOLUTION INTRAMUSCULAR; INTRAVENOUS at 23:06

## 2021-02-08 RX ADMIN — INSULIN LISPRO 3 UNITS: 100 INJECTION, SOLUTION INTRAVENOUS; SUBCUTANEOUS at 16:57

## 2021-02-08 RX ADMIN — INSULIN GLARGINE 20 UNITS: 100 INJECTION, SOLUTION SUBCUTANEOUS at 23:05

## 2021-02-08 RX ADMIN — VANCOMYCIN HYDROCHLORIDE 2000 MG: 10 INJECTION, POWDER, LYOPHILIZED, FOR SOLUTION INTRAVENOUS at 15:12

## 2021-02-08 RX ADMIN — INSULIN LISPRO 3 UNITS: 100 INJECTION, SOLUTION INTRAVENOUS; SUBCUTANEOUS at 11:48

## 2021-02-08 RX ADMIN — ONDANSETRON HYDROCHLORIDE 4 MG: 4 TABLET, FILM COATED ORAL at 11:48

## 2021-02-08 RX ADMIN — Medication 10 ML: at 04:24

## 2021-02-08 RX ADMIN — OXYCODONE HYDROCHLORIDE AND ACETAMINOPHEN 1 TABLET: 7.5; 325 TABLET ORAL at 08:08

## 2021-02-08 RX ADMIN — PIPERACILLIN AND TAZOBACTAM 3.38 G: 3; .375 INJECTION, POWDER, LYOPHILIZED, FOR SOLUTION INTRAVENOUS at 04:23

## 2021-02-08 RX ADMIN — OXYCODONE HYDROCHLORIDE AND ACETAMINOPHEN 1 TABLET: 7.5; 325 TABLET ORAL at 02:52

## 2021-02-08 RX ADMIN — PIPERACILLIN AND TAZOBACTAM 3.38 G: 3; .375 INJECTION, POWDER, LYOPHILIZED, FOR SOLUTION INTRAVENOUS at 14:09

## 2021-02-08 RX ADMIN — INSULIN GLARGINE 20 UNITS: 100 INJECTION, SOLUTION SUBCUTANEOUS at 09:29

## 2021-02-08 RX ADMIN — Medication 10 ML: at 16:03

## 2021-02-08 RX ADMIN — OXYCODONE HYDROCHLORIDE AND ACETAMINOPHEN 1 TABLET: 7.5; 325 TABLET ORAL at 20:27

## 2021-02-08 RX ADMIN — SODIUM CHLORIDE 3 G: 900 INJECTION INTRAVENOUS at 23:05

## 2021-02-08 RX ADMIN — OXYCODONE HYDROCHLORIDE AND ACETAMINOPHEN 1 TABLET: 7.5; 325 TABLET ORAL at 14:09

## 2021-02-08 NOTE — PROGRESS NOTES
1220: Spoke with Interventional Radiology and informed them of STAT order for PICC line. Order noted and IR will try to get PICC line placed today.

## 2021-02-08 NOTE — PROGRESS NOTES
Infectious Disease progress Note        Reason: Right foot infection    Current abx Prior abx   Zosyn, vancomycin since 2/4/2021      Lines:       Assessment :    61 y. o. male with h/o type uncontrolled type 2 DM (last hgbA1C 8.9 on 11/23/20) , CAD who presented to [address] [place] [address] on 2/4/2021 with right foot infection     Hospitalization at [facility] November 2020 for right great toe distal phalanx chronic osteomyelitis, septic arthritis right great toe interphalangeal joint   Wound cultures 9/2020-Enterobacter, MRSA  Status post right great toe amputation on 11/24. Intraoperative findings discussed with the podiatrist.  Grossly clean margins achieved at surgery. X ray right foot- Interval amputation of the first ray at the level of the distal metatarsal.  There is some irregularity noted along the distal portion of the first  metatarsal suspect for potential erosion best characterized on the oblique  projections.     clinical picture c/w acute on chronic osteomyelitis right first metatarsal osteomyelitis. Podiatry f/u appreciated. Status post incision and debridement, partial resection of right first metatarsal on 2/5/2021. Intraoperative findings discussed with podiatrist.  Significant infection noted. Concerns for underlying osteomyelitis. Intra-Op cultures: Methicillin susceptible Staphylococcus aureus, Bacteroides    Antibiotic management complicated due to lack of IV access. Recommendations:     1.   Discontinue pip/tazo, start ampicillin/sulbactam.  Continue vancomycin since recent history of MRSA  2. Follow podiatry recommendations  3. Patient was advised regarding importance of good glycemic control, compliance with antibiotics  4. Obtain PICC line ASAP  5. Patient will need outpatient IV antibiotics    Home health orders on chart (click on \"chart review, other orders, IP home health)     Above plan was discussed in details with patient, and dr Venice Mead, Dr. Chayito Pierre.  Please call me if any further questions or concerns. Will continue to participate in the care of this patient.        HPI:    C/o nausea. Refuses RN to another attempt at peripheral IV.         Home Medication List    Details   insulin lispro (HumaLOG U-100 Insulin) 100 unit/mL injection 50 Units by SubCUTAneous route three (3) times daily (with meals). Patient typically only consumes 2 meals / day      insulin detemir U-100 (LEVEMIR U-100 INSULIN) 100 unit/mL injection 20 Units by SubCUTAneous route two (2) times a day. Qty: 10 mL, Refills: 0      pantoprazole (PROTONIX) 40 mg tablet Take 1 Tab by mouth Before breakfast and dinner. Qty: 60 Tab, Refills: 0      acetaminophen (TYLENOL) 325 mg tablet Take 2 Tabs by mouth every six (6) hours as needed. Indications: Pain, take it with bentyl; do not exceed 3 g tylenol daily  Qty: 30 Tab, Refills: 0      albuterol (PROVENTIL HFA, VENTOLIN HFA, PROAIR HFA) 90 mcg/actuation inhaler 2 puffs every 6 hours x 5 days then every 6 hours as needed for shortness of breath and/or wheezing  Qty: 1 Inhaler, Refills: 0      polyethylene glycol (MIRALAX) 17 gram packet Take 1 Packet by mouth daily. Qty: 30 Packet, Refills: 0      clopidogrel (PLAVIX) 75 mg tablet Take 75 mg by mouth daily.              Current Facility-Administered Medications   Medication Dose Route Frequency    melatonin (rapid dissolve) tablet 2.5 mg  2.5 mg Oral QHS PRN    oxyCODONE-acetaminophen (PERCOCET 7.5) 7.5-325 mg per tablet 1 Tab  1 Tab Oral Q6H PRN    insulin lispro (HUMALOG) injection   SubCUTAneous AC&HS    sodium chloride (NS) flush 5-40 mL  5-40 mL IntraVENous Q8H    sodium chloride (NS) flush 5-40 mL  5-40 mL IntraVENous PRN    acetaminophen (TYLENOL) tablet 650 mg  650 mg Oral Q6H PRN    Or    acetaminophen (TYLENOL) suppository 650 mg  650 mg Rectal Q6H PRN    polyethylene glycol (MIRALAX) packet 17 g  17 g Oral DAILY PRN    ondansetron (ZOFRAN) injection 4 mg  4 mg IntraVENous Q8H PRN    piperacillin-tazobactam (ZOSYN) 3.375 g in 0.9% sodium chloride (MBP/ADV) 100 mL MBP  3.375 g IntraVENous Q6H    glucose chewable tablet 16 g  16 g Oral PRN    glucagon (GLUCAGEN) injection 1 mg  1 mg IntraMUSCular PRN    dextrose (D50W) injection syrg 12.5-25 g  25-50 mL IntraVENous PRN    VANCOMYCIN INFORMATION NOTE   Other Rx Dosing/Monitoring    pantoprazole (PROTONIX) tablet 40 mg  40 mg Oral ACB    polyethylene glycol (MIRALAX) packet 17 g  17 g Oral DAILY    insulin glargine (LANTUS) injection 20 Units  20 Units SubCUTAneous BID    sodium chloride (NS) flush 5-10 mL  5-10 mL IntraVENous PRN       Allergies: Compazine [prochlorperazine]    Temp (24hrs), Av.4 °F (36.3 °C), Min:96.8 °F (36 °C), Max:97.9 °F (36.6 °C)    Visit Vitals  /79   Pulse 71   Temp 97.8 °F (36.6 °C)   Resp 16   Ht 5' 9\" (1.753 m)   Wt 110.9 kg (244 lb 8 oz)   SpO2 100%   BMI 36.11 kg/m²       ROS: 12 point ROS obtained in details. Pertinent positives as mentioned in HPI,   otherwise negative    Physical Exam:    General:          In NAD.  Nontoxic-appearing. HEENT:           Head NCAT.  Pupils equal round sclerae anicteric, EOMI.  OP clear. Neck:               Supple, trachea midline. Lungs:             Clear, no wheezes.  Effort nonlabored, no accessory muscle use. Chest wall:      No chest wall tenderness.  Chest expansion equal and symmetrical bilaterally. Heart:              RRR.  No JVD. Abdomen:        Soft, NT/ND.  Bowel sounds normoactive. Extremities:     Warm, no edema.    Right foot surgical dressing not opened  Skin:                Not pale.  Not Jaundiced  No rashes   Psych:             Mood normal.  Calm, no agitation.   Neurologic:      Awake and alert, moves extremities spontaneously.      Labs: Results:   Chemistry Recent Labs     21  0107 21  0452 21  0501   * 127* 138*    138 135*   K 3.8 3.4* 3.7    102 99*   CO2 30 30 28   BUN 15 22* 26*   CREA 1.45* 1.58* 1.71*   CA 8.1* 8.5 8.6   AGAP 6 6 8 BUCR 10* 14 15      CBC w/Diff Recent Labs     02/08/21  0107 02/07/21  0452 02/06/21  0501   WBC 14.5* 15.0* 18.5*   RBC 2.78* 2.79* 2.68*   HGB 7.7* 7.8* 7.4*   HCT 24.8* 25.0* 23.2*   * 581* 582*   GRANS 67 76* 76*   LYMPH 21 14* 11*   EOS 1 2 2      Microbiology Recent Labs     02/05/21 2049 02/05/21 2046 02/05/21 2039   CULT MODERATE  Preliminary report of probable S. aureus (Negative for antigen detection) confirmation and sensitivities to follow. CHECKING FOR POSSIBLE  2ND ORGANISM   LIGHT  Preliminary report of probable S. aureus (Negative for antigen detection) confirmation and sensitivities to follow. CHECKING FOR POSSIBLE  2ND ORGANISM   MODERATE  Preliminary report of probable S. aureus (Negative for antigen detection) confirmation and sensitivities to follow.     LIGHT  POSSIBLE  2ND ORGANISM            RADIOLOGY:    All available imaging studies/reports in [facility] for this admission were reviewed    Dr. Zahida Sanchez, Infectious Disease Specialist  [phone]  February 8, 2021  10:04 AM

## 2021-02-08 NOTE — DIABETES MGMT
Glycemic Control Plan of Care    T2DM with current A1c level of  6.4% (2/05/2021)  See education notes 2/05/2021. Home diabetes medications: Patient reported on 2/08/2021:  Levemir insulin 85 units two times daily: morning and bedtime. Patient stated that he has been taking 85 units twice daily for the past few years and it's helping prevent high blood sugar. Humalog insulin 65 units before each meal but only if he is eating. Patient was admitted on 2/04/2021 with report of right toes/foot/leg and groin swelling and very high blood sugar. Noted the following assessment:  History of osteomyelitis status post amputation of right hallux and left second digit  Infected diabetic foot ulcer with cellulitis  Status post right foot incision an drainage, debridement of necrotic tissue, and resection of partial first metatarsal.  Hyperglycemia  Acute on stage III kidney disease    Glycemic recommendation(s):   1.) continue on current basal lantus insulin every 12 hours and sliding scale lispro at very resistant dose. 2.) at discharge, consider Tresiba U-200, less insulin volume. Glycemic assessment:    Results for Sonny Vernon (MRN [id number]) as of 2/8/2021 17:35   Ref. Range 2/7/2021 05:44 2/7/2021 11:18 2/7/2021 16:18 2/7/2021 21:42   GLUCOSE,FAST - POC Latest Ref Range: 70 - 110 mg/dL 135 (H) 204 (H) 204 (H) 148 (H)     Results for Sonny Vernon (MRN [id number]) as of 2/8/2021 17:35   Ref. Range 2/8/2021 06:37 2/8/2021 11:39 2/8/2021 16:50   GLUCOSE,FAST - POC Latest Ref Range: 70 - 110 mg/dL 130 (H) 179 (H) 152 (H)     Current A1C: 6.4% (2/05/2021) which is equivalent to estimated average blood glucose of 137 mg/dL during the past 2-3 months    Current hospital diabetes medications:  Basal lantus insulin 20 units every 12 hours  Correctional lispro insulin ACHS.  Very resistant dose    Total daily dose insulin requirement previous day: 2/07:  Lantus: 40 units  Lispro: 6 units  TDD insulin: 46 units    Diet: Diabetic consistent carb regular    Goals:  Blood glucose will be within target range of  mg/dL by 2/11/2011    Education:  ___  Refer to Diabetes Education Record             _X__  Education not indicated at this time    Andrew Nixon RN [facility]  Pager: [phone]

## 2021-02-08 NOTE — PROGRESS NOTES
Problem: Self Care Deficits Care Plan (Adult)  Goal: *Acute Goals and Plan of Care (Insert Text)  Description: Occupational Therapy Goals  Initiated 2/8/2021 within 7 day(s). 1.  Patient will perform bed mobility in preparation for selfcare with modified independence. 2.  Patient will perform grooming/functional activity standing at sink with modified independence, F+ balance and maintaining WB with 100% accuracy. 3.  Patient will perform lower body dressing with supervision/set-up standing. 4.  Patient will perform toilet transfers with supervision/set-up. 5.  Patient will perform all aspects of toileting with supervision/set-up. 6.  Patient will participate in upper extremity therapeutic exercise/activities with independence for 7 minutes. 7.  Patient will utilize energy conservation techniques during functional activities with verbal cues. Prior Level of Function: Patient was independent with self-care and used a cane prn for functional mobility PTA. Outcome: Progressing Towards Goal     OCCUPATIONAL THERAPY EVALUATION    Patient: Jocelyn Johnson (55 y.o. male)  Date: 2/8/2021  Primary Diagnosis: Diabetic ulcer of heel ([address] [D35.083, L97.409]  Sepsis ([address] [A41.9]  Leukocytosis [D72.829]  Acute kidney injury ([address] [N17.9]  Wound infection [T14. 8XXA, L08.9]  Osteomyelitis ([address] [M86.9]  Procedure(s) (LRB):  RIGHT FOOT INCISION ,  DEBRIDEMENT AND IRRIGATION, RESECTION  OF REMAINING FIRST METATARSAL RESECTION (N/A) 3 Days Post-Op   Precautions: Fall, NWB(R forefoot with post op shoe)    ASSESSMENT :  Patient cleared to participate in OT evaluation by RN. Upon entering the room, patient was seated edge of bed, alert, and agreeable to participate in OT evaluation with RN present attempting to get IV started. Patient educated on the role of OT, evaluation process, weight bearing status, post- op shoe application/wear and safety during this admission with patient verbalizing understanding. Patient reports feeling a little nauseated after eating sausage from breakfast tray, this OT went to obtain emesis bag. Patient spitting in bag throughout session, - emesis. Patient educated on energy conservation techniques, pursed lip breathing, and self pacing during daily activities. Patient stand by assist for grooming tasks this session seated EOB, blowing nose, wiping mouth/face. Patient stand by - contact guard assist with lower body dressing and stand by assist for functional transfers 2/2 nauseated feeling/discomfort. Patient contact  guard assist for scooting/sit to supine. RN notified and aware of patient's symptoms. Patient reports having sleeve for bathing and shower chair, patient issued long handled sponge this session for safety at home. Patient with complaints of hospital experience this session from ED, current RN notified of frustrations. Based on the objective data described below, the patient presented with decreased independence, decreased strength, decreased functional balance, and decreased functional mobility, which impede pt's function with basic self-care/ADL tasks. Patient will benefit from skilled intervention to address the above impairments.   Patient's rehabilitation potential is considered to be Fair  Factors which may influence rehabilitation potential include:  []             None noted  []             Mental ability/status  [x]             Medical condition  [x]             Home/family situation and support systems  [x]             Safety awareness  [x]             Pain tolerance/management  []             Other:      PLAN :  Recommendations and Planned Interventions:   [x]               Self Care Training                  [x]      Therapeutic Activities  [x]               Functional Mobility Training   []      Cognitive Retraining  [x]               Therapeutic Exercises           [x]      Endurance Activities  [x]               Balance Training                    [x] Neuromuscular Re-Education  []               Visual/Perceptual Training     [x]      Home Safety Training  [x]               Patient Education                   [x]      Family Training/Education  []               Other (comment):    Frequency/Duration: Patient will be followed by occupational therapy 1-2 times per day/4-7 days per week to address goals.   Discharge Recommendations: Home Health Safety Eval  Further Equipment Recommendations for Discharge: rolling walker     SUBJECTIVE:   Patient stated I dont like to feel  like I cant do nothing because that's not me    OBJECTIVE DATA SUMMARY:     Past Medical History:   Diagnosis Date    Arthritis     Coronary atherosclerosis of native coronary artery     S/P PCI with GE in 12/09    Diabetes ([facility] [address]     IDDM    Electrolyte and fluid disorders not elsewhere classified     Essential hypertension, benign     GERD (gastroesophageal reflux disease)     Heroin abuse ([facility] [address] 05/26/16    Psychiatrist Dr. Chico Bejarano     History of heart attack     Hyperlipidemia     Intermediate coronary syndrome [facility])     MDD (major depressive disorder) 5/26/16    Psychiatrist Dr. Chico Bejarano     Myocardial infarction [facility])     Obesity, unspecified     Old myocardial infarction     Other and unspecified hyperlipidemia     Pancreatitis     Positive PPD     Sleep apnea     uses Cpap machine    Transfusion history     Before 1992     Past Surgical History:   Procedure Laterality Date    HX APPENDECTOMY      HX CORONARY STENT PLACEMENT  2010    2 stents     Barriers to Learning/Limitations: None  Compensate with: visual, verbal, tactile, kinesthetic cues/model    Home Situation:   Home Situation  Home Environment: Private residence  # Steps to Enter: 3  Rails to Enter: Yes  Hand Rails : Bilateral  Wheelchair Ramp: No  One/Two Story Residence: One story  Living Alone: No  Support Systems: Family member(s)(sister)  Patient Expects to be Discharged to[de-identified] Private residence  Current DME Used/Available at Home: None  Tub or Shower Type: Shower  [x]  Right hand dominant   []  Left hand dominant    Cognitive/Behavioral Status:  Neurologic State: Alert  Orientation Level: Oriented to person;Oriented to place;Oriented to situation  Cognition: Follows commands;Decreased attention/concentration  Safety/Judgement: Fall prevention    Skin: Intact  Edema: None noted    Vision/Perceptual:    Acuity: Within Defined Limits;Able to read normal print without difficulty      Coordination: BUE  Coordination: Generally decreased, functional  Fine Motor Skills-Upper: Left Intact; Right Intact    Gross Motor Skills-Upper: Left Intact; Right Intact    Balance:  Sitting: Intact  Standing: Impaired; With support  Standing - Static: Good  Standing - Dynamic : Fair    Strength: BUE  Strength: Generally decreased, functional    Tone & Sensation: BUE  Tone: Normal  Sensation: Intact    Range of Motion: BUE  AROM: Within functional limits  PROM: Generally decreased, functional    Functional Mobility and Transfers for ADLs:  Bed Mobility:  Sit to Supine: Contact guard assistance  Scooting: Contact guard assistance    Transfers:  Sit to Stand: Contact guard assistance  Stand to Sit: Contact guard assistance    ADL Assessment:   Feeding: Modified independent    Oral Facial Hygiene/Grooming: Stand-by assistance    Bathing: Contact guard assistance; Adaptive equipment(issued long handled sponge this session)    Upper Body Dressing: Stand-by assistance    Lower Body Dressing: Contact guard assistance    Toileting: Contact guard assistance    ADL Intervention:  Grooming  Grooming Assistance: Stand-by assistance  Position Performed: Seated edge of bed  Washing Face: Stand-by assistance    Lower Body Dressing Assistance  Dressing Assistance: Stand-by assistance;Contact guard assistance  Socks: Stand-by assistance(LLE)  Shoes with Velcro: Contact guard assistance(RLE post op shoe)  Position Performed: Seated edge of bed;Bending forward method      Cognitive Retraining  Safety/Judgement: Fall prevention    Pain:  Pain level pre-treatment: 0/10   Pain level post-treatment: 0/10   Pain Intervention(s): Medication (see MAR); Response to intervention: Nurse notified, See doc flow    Activity Tolerance:   Fair    Please refer to the flowsheet for vital signs taken during this treatment. After treatment:   [] Patient left in no apparent distress sitting up in chair  [x] Patient left in no apparent distress in bed  [x] Call bell left within reach  [x] Nursing notified  [] Caregiver present  [] Bed alarm activated    COMMUNICATION/EDUCATION:   [x] Role of Occupational Therapy in the acute care setting  [x] Home safety education was provided and the patient/caregiver indicated understanding. [x] Patient/family have participated as able in goal setting and plan of care. [x] Patient/family agree to work toward stated goals and plan of care. [] Patient understands intent and goals of therapy, but is neutral about his/her participation. [] Patient is unable to participate in goal setting and plan of care. Thank you for this referral.  Omar Ballesteros, OTR/L  Time Calculation: 42 mins    Eval Complexity: History: MEDIUM Complexity : Expanded review of history including physical, cognitive and psychosocial  history ; Examination: MEDIUM Complexity : 3-5 performance deficits relating to physical, cognitive , or psychosocial skils that result in activity limitations and / or participation restrictions; Decision Making:MEDIUM Complexity : Patient may present with comorbidities that affect occupational performnce.  Miniml to moderate modification of tasks or assistance (eg, physical or verbal ) with assesment(s) is necessary to enable patient to complete evaluation

## 2021-02-08 NOTE — PROGRESS NOTES
Met with patient at bedside. Pt will need dressing changes every two days. Freedom of choice signed for any local home health agency that accepts his insurance. Referrals sent out via [employer]. Messaged Dr Dejuan Osborne to request a home health order.   Verner Berry RN - Outcomes Manager  [phone]

## 2021-02-08 NOTE — PROGRESS NOTES
Bedside and Verbal shift change report given to [place], RN (oncoming nurse) by Clemente Fernandes RN (offgoing nurse). Report included the following information SBAR, Kardex, OR Summary, Procedure Summary, Intake/Output and MAR.

## 2021-02-08 NOTE — PROGRESS NOTES
conducted an initial consultation and Spiritual Assessment for Queen Kathy, who is a 61 y.o.,male. Patients Primary Language is: [place].    According to the patients EMR [employer] Affiliation is: [facility].     The reason the Patient came to the hospital is:   Patient Active Problem List    Diagnosis Date Noted    Leukocytosis 02/04/2021    Wound infection 02/04/2021    Diabetic ulcer of heel ([address] 02/04/2021    Acute kidney injury ([address] 02/04/2021    Osteomyelitis ([address] 11/22/2020    Fracture, toe 09/24/2020    Altered mental status 09/01/2020    Multifocal pneumonia 09/01/2020    DM (diabetes mellitus) ([address] 12/25/2019    Orthostatic hypotension 12/25/2019    Syncope and collapse 12/24/2019    Vomiting 03/28/2018    Chronic abdominal pain 03/28/2018    Sepsis (HCC) 03/21/2018    Nausea and vomiting 09/07/2017    Gastroparesis 09/07/2017    Hypokalemia 09/07/2017    ARF (acute renal failure) (HCC) 09/07/2017    Atelectasis 09/07/2017    Abdominal pain 09/07/2017    Acquired hypothyroidism 09/07/2017    S/P lumbar fusion 07/05/2017    Diabetic neuropathy (HCC) 07/05/2017    Neuritis 07/05/2017    Spinal stenosis at L4-L5 level 06/19/2017    Coronary artery disease involving native coronary artery of native heart without angina pectoris 05/31/2017    History of coronary artery stent placement 05/31/2017    Synovial cyst 05/26/2017    HNP (herniated nucleus pulposus), lumbar 05/26/2017    Lumbar spinal stenosis 05/26/2017    Spondylolisthesis of lumbar region 05/26/2017    Positive PPD     History of heart attack     Pain in left lower leg 07/24/2016    Primary osteoarthritis of left knee 07/24/2016    Localized adiposity of abdomen 07/24/2016    Diabetes ([address] 08/14/2015    Essential hypertension 08/14/2015    GERD (gastroesophageal reflux disease) 08/14/2015    Depression 08/14/2015        The  provided the following Interventions:  Initiated a relationship of care and support. Explored issues of red, belief, spirituality and Restoration/ritual needs while hospitalized. Listened empathically. Provided information about Spiritual Care Services. Offered prayer and assurance of continued prayers on patient's behalf. Chart reviewed. The following outcomes where achieved:  Patient expressed gratitude for 's visit. Assessment:  Patient does not have any Restoration/cultural needs that will affect patients preferences in health care. Plan:  Chaplains will continue to follow and will provide pastoral care on an as needed/requested basis.  recommends bedside caregivers page  on duty if patient shows signs of acute spiritual or emotional distress.     [address]  [phone])

## 2021-02-08 NOTE — PROGRESS NOTES
Patient is requesting to have med for sleep. MD paged.    New order received: Melatonin 3 mg po PRN at bedtime per Dr. Brady Bañuelos

## 2021-02-08 NOTE — PROGRESS NOTES
0830: Pt IV difficult to flush and painful. RN attempted x2 to start an IV, unsuccessful. Another RN entered room to start IV and patient refused. 1030: Discussed issues with obtaining PIV with Dr. Atif Garcia. Roman Aburto.  She will place PICC line order after discussion with Podiatrist.

## 2021-02-08 NOTE — PROGRESS NOTES
Problem: Risk for Spread of Infection  Goal: Prevent transmission of infectious organism to others  Description: Prevent the transmission of infectious organisms to other patients, staff members, and visitors. Outcome: Progressing Towards Goal     Problem: Patient Education:  Go to Education Activity  Goal: Patient/Family Education  Outcome: Progressing Towards Goal     Problem: Falls - Risk of  Goal: *Absence of Falls  Description: Document David Dorman Fall Risk and appropriate interventions in the flowsheet. Outcome: Progressing Towards Goal  Note: Fall Risk Interventions:  Mobility Interventions: Patient to call before getting OOB         Medication Interventions: Patient to call before getting OOB, Teach patient to arise slowly, Bed/chair exit alarm                   Problem: Patient Education: Go to Patient Education Activity  Goal: Patient/Family Education  Outcome: Progressing Towards Goal     Problem: Pressure Injury - Risk of  Goal: *Prevention of pressure injury  Description: Document Jer Scale and appropriate interventions in the flowsheet.   Outcome: Progressing Towards Goal  Note: Pressure Injury Interventions:  Sensory Interventions: Assess changes in LOC, Check visual cues for pain, Assess need for specialty bed, Keep linens dry and wrinkle-free    Moisture Interventions: Contain wound drainage, Assess need for specialty bed    Activity Interventions: Increase time out of bed, Pressure redistribution bed/mattress(bed type)    Mobility Interventions: HOB 30 degrees or less, Pressure redistribution bed/mattress (bed type)    Nutrition Interventions: Document food/fluid/supplement intake                     Problem: Patient Education: Go to Patient Education Activity  Goal: Patient/Family Education  Outcome: Progressing Towards Goal     Problem: Pain  Goal: *Control of Pain  Outcome: Progressing Towards Goal     Problem: Patient Education: Go to Patient Education Activity  Goal: Patient/Family Education  Outcome: Progressing Towards Goal     Problem: Impaired Skin Integrity/Pressure Injury Treatment  Goal: *Improvement of Existing Pressure Injury  Outcome: Progressing Towards Goal  Goal: *Prevention of pressure injury  Description: Document Jer Scale and appropriate interventions in the flowsheet.   Outcome: Progressing Towards Goal  Note: Pressure Injury Interventions:  Sensory Interventions: Assess changes in LOC, Check visual cues for pain, Assess need for specialty bed, Keep linens dry and wrinkle-free    Moisture Interventions: Contain wound drainage, Assess need for specialty bed    Activity Interventions: Increase time out of bed, Pressure redistribution bed/mattress(bed type)    Mobility Interventions: HOB 30 degrees or less, Pressure redistribution bed/mattress (bed type)    Nutrition Interventions: Document food/fluid/supplement intake                     Problem: Patient Education: Go to Patient Education Activity  Goal: Patient/Family Education  Outcome: Progressing Towards Goal

## 2021-02-08 NOTE — PROGRESS NOTES
End of Shift Note     Bedside and verbal shift change report given to 3 hnerietta (On coming nurse) by  Fareed Lara (Off going nurse).   Report included the following information:      --Procedure Summary     --MAR,     --Recent Results     --Med Rec Status    SBAR Recommendations:    Issues for Provider to address           Activity This Shift     [] Bed Rest Order   [] Refused   [x] Dangled    [] TDWB         Ambulating:     [] Bathroom     [x] BSC     [] Room/Hallway      Up in Chair for meals    []Yes [] No   voiding POUR        [] Yes [] No  Purewick    [] Yes [] No  New Onset [] Yes [] No Straight Cath   []Yes  [] No  Condom Cath  [] Yes [] No  MD Called      [] Yes  [] No   Blood Sugars Managed [x]Yes [] No    Bowels Moved [] Yes [x] No    Incontinent     [] Yes [] No Passed Gas []Yes [] No    New Onset  []Yes [] No        MD Called []Yes  [] No     CHG Bath Done     Before Surgery     After Surgery      [] Yes  [] No  [] Yes  [] No       Drain Removed [] Yes  [] No [x] N/A    Dressing Changed [x] Yes   [] No [] N/A      Nausea/Vomiting [] Yes   [x] No     Ice Packs Changed [] Yes   [] No  [x] N/A    Incentive Spirometer  [] Yes  [x] No      SCD Pumps On     Ankle Pumping  [] Yes   [x] No      [] Yes   [x] No        Telemetry Monitoring [] Yes   [x] No   Rhythm

## 2021-02-08 NOTE — PROGRESS NOTES
SUBJECTIVE:    Patient is sitting in bed in no apparent distress, states that his pain is not well controlled. Had some nausea earlier today    OBJECTIVE:    /84   Pulse 79   Temp 97.4 °F (36.3 °C)   Resp 18   Ht 5' 9\" (1.753 m)   Wt 110.9 kg (244 lb 8 oz)   SpO2 100%   BMI 36.11 kg/m²     General:  Awake, follows commands  Cardiovascular:  S1S2+, RRR  Pulmonary:  CTA b/l  GI:  Soft, BS+, NT, ND  Extremities: Right foot with dressing      ASSESSMENT:    1. Infected diabetic foot ulcer with some cellulitis s/p RIGHT FOOT INCISION AND DRAINAGE, DEBRIDEMENT OF NECROTIC TISSUE, RESECTION OF PARTIAL FIRST METATARSAL  2. Diabetes mellitus with hyperglycemia, improving  3. Medical noncompliance  4. Dyslipidemia  5. Acute on stage III chronic kidney disease, improving  6. Leukocytosis, improving  7. History of osteomyelitis status post amputation of right hallux and left second digit  8. GERD  9. Coronary artery disease, stable  10. Mild bleeding from right foot area  11. Anemia of chronic medical disease    PLAN:  Continue IV antibiotics per ID, follow-up cultures  PICC line placed earlier today  Wound care, pain control  Lantus, sliding scale insulin  Bowel regimen  Counseled compliance  Add SCDs. Patient had some bleeding from surgical site and is not on pharmacologic DVT prophylaxis  Discussed with patient, full code  Dispositionlikely home with home health care tomorrow when cleared by ID  Nonweightbearing to right foot. Postop boot  Discussed with , discussed with MARGOTH Morrison medical dictation software was used for portions of this report. Unintended errors may occur.       Recent Results (from the past 24 hour(s))   GLUCOSE, POC    Collection Time: 02/07/21  9:42 PM   Result Value Ref Range    Glucose (POC) 148 (H) 70 - 110 mg/dL   VANCOMYCIN, RANDOM    Collection Time: 02/08/21  1:07 AM   Result Value Ref Range    Vancomycin, random 15.7 5.0 - 26.4 UG/ML   METABOLIC PANEL, BASIC    Collection Time: 02/08/21  1:07 AM   Result Value Ref Range    Sodium 139 136 - 145 mmol/L    Potassium 3.8 3.5 - 5.5 mmol/L    Chloride 103 100 - 111 mmol/L    CO2 30 21 - 32 mmol/L    Anion gap 6 3.0 - 18 mmol/L    Glucose 133 (H) 74 - 99 mg/dL    BUN 15 7.0 - 18 MG/DL    Creatinine 1.45 (H) 0.6 - 1.3 MG/DL    BUN/Creatinine ratio 10 (L) 12 - 20      GFR est AA >60 >60 ml/min/1.73m2    GFR est non-AA 50 (L) >60 ml/min/1.73m2    Calcium 8.1 (L) 8.5 - 10.1 MG/DL   CBC WITH AUTOMATED DIFF    Collection Time: 02/08/21  1:07 AM   Result Value Ref Range    WBC 14.5 (H) 4.6 - 13.2 K/uL    RBC 2.78 (L) 4.70 - 5.50 M/uL    HGB 7.7 (L) 13.0 - 16.0 g/dL    HCT 24.8 (L) 36.0 - 48.0 %    MCV 89.2 74.0 - 97.0 FL    MCH 27.7 24.0 - 34.0 PG    MCHC 31.0 31.0 - 37.0 g/dL    RDW 15.2 (H) 11.6 - 14.5 %    PLATELET 525 (H) 110 - 420 K/uL    MPV 9.8 9.2 - 11.8 FL    NEUTROPHILS 67 42 - 75 %    LYMPHOCYTES 21 20 - 51 %    MONOCYTES 10 (H) 2 - 9 %    EOSINOPHILS 1 0 - 5 %    BASOPHILS 0 0 - 3 %    MYELOCYTES 1 (H) 0 %    ABS. NEUTROPHILS 9.7 (H) 1.8 - 8.0 K/UL    ABS. LYMPHOCYTES 3.0 0.8 - 3.5 K/UL    ABS. MONOCYTES 1.5 (H) 0 - 1.0 K/UL    ABS. EOSINOPHILS 0.1 0.0 - 0.4 K/UL    ABS.  BASOPHILS 0.0 0.0 - 0.06 K/UL    DF MANUAL      PLATELET COMMENTS Increased Platelets      RBC COMMENTS ANISOCYTOSIS  1+        RBC COMMENTS POLYCHROMASIA  1+       IRON PROFILE    Collection Time: 02/08/21  1:07 AM   Result Value Ref Range    Iron 50 50 - 175 ug/dL    TIBC 182 (L) 250 - 450 ug/dL    Iron % saturation 27 20 - 50 %   FERRITIN    Collection Time: 02/08/21  1:07 AM   Result Value Ref Range    Ferritin 912 (H) 8 - 388 NG/ML   GLUCOSE, POC    Collection Time: 02/08/21  6:37 AM   Result Value Ref Range    Glucose (POC) 130 (H) 70 - 110 mg/dL   GLUCOSE, POC    Collection Time: 02/08/21 11:39 AM   Result Value Ref Range    Glucose (POC) 179 (H) 70 - 110 mg/dL   GLUCOSE, POC    Collection Time: 02/08/21  4:50 PM   Result Value Ref Range Glucose (POC) 152 (H) 70 - 110 mg/dL     Candance Bottcher, MD

## 2021-02-08 NOTE — PROGRESS NOTES
Problem: Falls - Risk of  Goal: *Absence of Falls  Description: Document Jacky Rubio Fall Risk and appropriate interventions in the flowsheet. Outcome: Progressing Towards Goal  Note: Fall Risk Interventions:  Mobility Interventions: Patient to call before getting OOB         Medication Interventions: Patient to call before getting OOB, Teach patient to arise slowly, Bed/chair exit alarm                   Problem: Pressure Injury - Risk of  Goal: *Prevention of pressure injury  Description: Document Jer Scale and appropriate interventions in the flowsheet.   Outcome: Progressing Towards Goal  Note: Pressure Injury Interventions:  Sensory Interventions: Assess changes in LOC, Check visual cues for pain, Assess need for specialty bed, Keep linens dry and wrinkle-free    Moisture Interventions: Contain wound drainage, Assess need for specialty bed    Activity Interventions: Increase time out of bed, Pressure redistribution bed/mattress(bed type)    Mobility Interventions: HOB 30 degrees or less, Pressure redistribution bed/mattress (bed type)    Nutrition Interventions: Document food/fluid/supplement intake                     Problem: Pain  Goal: *Control of Pain  Outcome: Progressing Towards Goal

## 2021-02-08 NOTE — PROGRESS NOTES
Physician Progress Note      Selena Cohen  CSN #:                  [id number]  :                       1960  ADMIT DATE:       2021 6:24 PM  [address] DATE:  RESPONDING  PROVIDER #:        Ange Rajput MD          QUERY TEXT:    Pt admitted with osteomyelitis. Pt noted to have leukocytosis, tachycardia, and SHYLA upon admission. If possible, please document in the progress notes and discharge summary if you are evaluating and /or treating any of the following: The medical record reflects the following:  Risk Factors: 61 y.o. male with infected diabetic foot ulcer with cellulitis  Clinical Indicators: WBC 27.7, 20.1, 18.5, 15, 14.5  Lactic acid 2.38 upon admit  HR upon admit 107, 105  Treatment: Vancomycin, Pip/Tazo; RIGHT FOOT INCISION AND DRAINAGE, DEBRIDEMENT OF NECROTIC TISSUE, RESECTION OF PARTIAL FIRST METATARSAL    Thank you,  Yojana Hays RN, [facility]  [phone]  Options provided:  -- Sepsis, present on admission  -- Severe sepsis, with organ dysfunction of lactic acidosis, present on admission  -- Sepsis was ruled out  -- Other - I will add my own diagnosis  -- Disagree - Not applicable / Not valid  -- Disagree - Clinically unable to determine / Unknown  -- Refer to Clinical Documentation Reviewer    PROVIDER RESPONSE TEXT:    This patient has sepsis which was present on admission.     Query created by: Sharmin Castro on 2021 4:03 PM      Electronically signed by:  Ange Rajput MD 2021 4:22 PM

## 2021-02-09 VITALS
HEIGHT: 69 IN | DIASTOLIC BLOOD PRESSURE: 81 MMHG | TEMPERATURE: 97.1 F | OXYGEN SATURATION: 100 % | WEIGHT: 244.5 LBS | RESPIRATION RATE: 18 BRPM | HEART RATE: 62 BPM | BODY MASS INDEX: 36.21 KG/M2 | SYSTOLIC BLOOD PRESSURE: 132 MMHG

## 2021-02-09 LAB
ANION GAP SERPL CALC-SCNC: 7 MMOL/L (ref 3–18)
BACTERIA SPEC CULT: ABNORMAL
BUN SERPL-MCNC: 14 MG/DL (ref 7–18)
BUN/CREAT SERPL: 12 (ref 12–20)
CALCIUM SERPL-MCNC: 8.5 MG/DL (ref 8.5–10.1)
CHLORIDE SERPL-SCNC: 107 MMOL/L (ref 100–111)
CO2 SERPL-SCNC: 26 MMOL/L (ref 21–32)
CREAT SERPL-MCNC: 1.18 MG/DL (ref 0.6–1.3)
GLUCOSE BLD STRIP.AUTO-MCNC: 104 MG/DL (ref 70–110)
GLUCOSE BLD STRIP.AUTO-MCNC: 136 MG/DL (ref 70–110)
GLUCOSE SERPL-MCNC: 96 MG/DL (ref 74–99)
GRAM STN SPEC: ABNORMAL
POTASSIUM SERPL-SCNC: 3.9 MMOL/L (ref 3.5–5.5)
SERVICE CMNT-IMP: ABNORMAL
SODIUM SERPL-SCNC: 140 MMOL/L (ref 136–145)
VANCOMYCIN SERPL-MCNC: 18.9 UG/ML (ref 5–40)

## 2021-02-09 PROCEDURE — 74011250637 HC RX REV CODE- 250/637: Performed by: INTERNAL MEDICINE

## 2021-02-09 PROCEDURE — 74011636637 HC RX REV CODE- 636/637: Performed by: EMERGENCY MEDICINE

## 2021-02-09 PROCEDURE — 74011250636 HC RX REV CODE- 250/636: Performed by: EMERGENCY MEDICINE

## 2021-02-09 PROCEDURE — 82962 GLUCOSE BLOOD TEST: CPT

## 2021-02-09 PROCEDURE — 80048 BASIC METABOLIC PNL TOTAL CA: CPT

## 2021-02-09 PROCEDURE — 74011250636 HC RX REV CODE- 250/636: Performed by: INTERNAL MEDICINE

## 2021-02-09 PROCEDURE — 74011250637 HC RX REV CODE- 250/637: Performed by: EMERGENCY MEDICINE

## 2021-02-09 PROCEDURE — 74011000258 HC RX REV CODE- 258: Performed by: INTERNAL MEDICINE

## 2021-02-09 PROCEDURE — 99239 HOSP IP/OBS DSCHRG MGMT >30: CPT | Performed by: EMERGENCY MEDICINE

## 2021-02-09 PROCEDURE — 80202 ASSAY OF VANCOMYCIN: CPT

## 2021-02-09 RX ORDER — MAGNESIUM SULFATE 100 %
16 CRYSTALS MISCELLANEOUS AS NEEDED
Qty: 40 TAB | Refills: 0 | Status: SHIPPED | OUTPATIENT
Start: 2021-02-09 | End: 2021-04-12

## 2021-02-09 RX ORDER — INSULIN LISPRO 100 [IU]/ML
INJECTION, SOLUTION INTRAVENOUS; SUBCUTANEOUS
Qty: 1 VIAL | Refills: 0 | Status: SHIPPED | OUTPATIENT
Start: 2021-02-09 | End: 2022-06-06 | Stop reason: SDUPTHER

## 2021-02-09 RX ORDER — CALCIUM CARB/MAGNESIUM CARB 311-232MG
2.5 TABLET ORAL
Qty: 15 TAB | Refills: 0 | Status: SHIPPED | OUTPATIENT
Start: 2021-02-09 | End: 2021-02-11

## 2021-02-09 RX ORDER — VANCOMYCIN/0.9 % SOD CHLORIDE 1.5G/250ML
1500 PLASTIC BAG, INJECTION (ML) INTRAVENOUS EVERY 12 HOURS
Status: DISCONTINUED | OUTPATIENT
Start: 2021-02-09 | End: 2021-02-09 | Stop reason: HOSPADM

## 2021-02-09 RX ORDER — OXYCODONE AND ACETAMINOPHEN 7.5; 325 MG/1; MG/1
1 TABLET ORAL
Qty: 14 TAB | Refills: 0 | Status: SHIPPED | OUTPATIENT
Start: 2021-02-09 | End: 2021-02-14

## 2021-02-09 RX ADMIN — OXYCODONE HYDROCHLORIDE AND ACETAMINOPHEN 1 TABLET: 7.5; 325 TABLET ORAL at 04:36

## 2021-02-09 RX ADMIN — SODIUM CHLORIDE 3 G: 900 INJECTION INTRAVENOUS at 04:37

## 2021-02-09 RX ADMIN — VANCOMYCIN HYDROCHLORIDE 1500 MG: 10 INJECTION, POWDER, LYOPHILIZED, FOR SOLUTION INTRAVENOUS at 11:30

## 2021-02-09 RX ADMIN — MORPHINE SULFATE 2 MG: 2 INJECTION, SOLUTION INTRAMUSCULAR; INTRAVENOUS at 05:47

## 2021-02-09 RX ADMIN — INSULIN GLARGINE 20 UNITS: 100 INJECTION, SOLUTION SUBCUTANEOUS at 08:54

## 2021-02-09 RX ADMIN — Medication 10 ML: at 05:18

## 2021-02-09 RX ADMIN — OXYCODONE HYDROCHLORIDE AND ACETAMINOPHEN 1 TABLET: 7.5; 325 TABLET ORAL at 10:27

## 2021-02-09 RX ADMIN — SODIUM CHLORIDE 3 G: 900 INJECTION INTRAVENOUS at 14:05

## 2021-02-09 RX ADMIN — PANTOPRAZOLE SODIUM 40 MG: 40 TABLET, DELAYED RELEASE ORAL at 08:54

## 2021-02-09 RX ADMIN — Medication 1 CAPSULE: at 08:55

## 2021-02-09 NOTE — PROGRESS NOTES
Infectious Disease progress Note        Reason: Right foot infection    Current abx Prior abx   Zosyn, vancomycin since 2/4/2021      Lines:       Assessment :    61 y. o. male with h/o type uncontrolled type 2 DM (last hgbA1C 8.9 on 11/23/20) , CAD who presented to [address] [place] [address] on 2/4/2021 with right foot infection     Hospitalization at [facility] November 2020 for right great toe distal phalanx chronic osteomyelitis, septic arthritis right great toe interphalangeal joint   Wound cultures 9/2020-Enterobacter, MRSA  Status post right great toe amputation on 11/24. Intraoperative findings discussed with the podiatrist.  Grossly clean margins achieved at surgery. X ray right foot- Interval amputation of the first ray at the level of the distal metatarsal.  There is some irregularity noted along the distal portion of the first  metatarsal suspect for potential erosion best characterized on the oblique  projections.     clinical picture c/w acute on chronic osteomyelitis right first metatarsal osteomyelitis. Podiatry f/u appreciated. Status post incision and debridement, partial resection of right first metatarsal on 2/5/2021. Intraoperative findings discussed with podiatrist.  Significant infection noted. Concerns for underlying osteomyelitis. Intra-Op cultures: Methicillin susceptible Staphylococcus aureus, Bacteroides    Bone biopsy of clean margins reveal acute inflammation suggestive of acute osteomyelitis. Antibiotic management complicated due to lack of IV access. Recommendations:     1.   continue ampicillin/sulbactam.  Continue vancomycin since recent history of MRSA  2. Follow podiatry recommendations  3. Patient was advised regarding importance of good glycemic control, compliance with antibiotics  4. Patient will need outpatient IV antibiotics    Home health orders on chart (click on \"chart review, other orders, IP home health). Discussed with .   She is attempting to arrange for home IV antibiotics through [facility]. RN informed patient's wife/sister regarding this. They were not comfortable with him getting antibiotics at home. I have discussed with  to try to arrange for antibiotics at infusion center. Addendum: 14:20  Was informed by  that antibiotics can be given at outpatient infusion center. Will change antibiotic orders to ertapenem, daptomycin till 3/19/2021 since once daily antibiotic is feasible to give at outpatient infusion center. Orders given to      Above plan was discussed in details with patient, and dr Lazarus Peach. D/w . All questions answered to their full satisfaction. Spent additional 35 minutes in management and evaluation of this patient. >50% time spent in counselling and coordination of care. . Please call me if any further questions or concerns. Will continue to participate in the care of this patient.        HPI:    States that he is feeling good. Wishes to go home. He states that he can do IV antibiotics at home. Denies increasing chest pain, shortness of breath, abdominal pain, nausea         Home Medication List    Details   insulin lispro (HumaLOG U-100 Insulin) 100 unit/mL injection 50 Units by SubCUTAneous route three (3) times daily (with meals). Patient typically only consumes 2 meals / day      insulin detemir U-100 (LEVEMIR U-100 INSULIN) 100 unit/mL injection 20 Units by SubCUTAneous route two (2) times a day. Qty: 10 mL, Refills: 0      pantoprazole (PROTONIX) 40 mg tablet Take 1 Tab by mouth Before breakfast and dinner. Qty: 60 Tab, Refills: 0      acetaminophen (TYLENOL) 325 mg tablet Take 2 Tabs by mouth every six (6) hours as needed.  Indications: Pain, take it with bentyl; do not exceed 3 g tylenol daily  Qty: 30 Tab, Refills: 0      albuterol (PROVENTIL HFA, VENTOLIN HFA, PROAIR HFA) 90 mcg/actuation inhaler 2 puffs every 6 hours x 5 days then every 6 hours as needed for shortness of breath and/or wheezing  Qty: 1 Inhaler, Refills: 0      polyethylene glycol (MIRALAX) 17 gram packet Take 1 Packet by mouth daily. Qty: 30 Packet, Refills: 0      clopidogrel (PLAVIX) 75 mg tablet Take 75 mg by mouth daily.              Current Facility-Administered Medications   Medication Dose Route Frequency    vancomycin (VANCOCIN) 1500 mg in  ml infusion  1,500 mg IntraVENous Q12H    ondansetron hcl (ZOFRAN) tablet 4 mg  4 mg Oral Q6H PRN    lactobacillus sp. 50 billion cpu (BIO-K PLUS) capsule 1 Cap  1 Cap Oral DAILY    morphine injection 2 mg  2 mg IntraVENous Q6H PRN    naloxone (NARCAN) injection 0.4 mg  0.4 mg IntraVENous PRN    insulin glargine (LANTUS) injection 20 Units  20 Units SubCUTAneous Q12H    ampicillin-sulbactam (UNASYN) 3 g in 0.9% sodium chloride (MBP/ADV) 100 mL MBP  3 g IntraVENous Q6H    melatonin (rapid dissolve) tablet 2.5 mg  2.5 mg Oral QHS PRN    oxyCODONE-acetaminophen (PERCOCET 7.5) 7.5-325 mg per tablet 1 Tab  1 Tab Oral Q6H PRN    insulin lispro (HUMALOG) injection   SubCUTAneous AC&HS    sodium chloride (NS) flush 5-40 mL  5-40 mL IntraVENous Q8H    sodium chloride (NS) flush 5-40 mL  5-40 mL IntraVENous PRN    acetaminophen (TYLENOL) tablet 650 mg  650 mg Oral Q6H PRN    Or    acetaminophen (TYLENOL) suppository 650 mg  650 mg Rectal Q6H PRN    polyethylene glycol (MIRALAX) packet 17 g  17 g Oral DAILY PRN    ondansetron (ZOFRAN) injection 4 mg  4 mg IntraVENous Q8H PRN    glucose chewable tablet 16 g  16 g Oral PRN    glucagon (GLUCAGEN) injection 1 mg  1 mg IntraMUSCular PRN    dextrose (D50W) injection syrg 12.5-25 g  25-50 mL IntraVENous PRN    pantoprazole (PROTONIX) tablet 40 mg  40 mg Oral ACB    polyethylene glycol (MIRALAX) packet 17 g  17 g Oral DAILY    sodium chloride (NS) flush 5-10 mL  5-10 mL IntraVENous PRN       Allergies: Compazine [prochlorperazine]    Temp (24hrs), Av.6 °F (36.4 °C), Min:96.9 °F (36.1 °C), Max:99 °F (37.2 °C)    Visit Vitals  /71   Pulse (!) 57   Temp 97.1 °F (36.2 °C)   Resp 18   Ht 5' 9\" (1.753 m)   Wt 110.9 kg (244 lb 8 oz)   SpO2 100%   BMI 36.11 kg/m²       ROS: 12 point ROS obtained in details. Pertinent positives as mentioned in HPI,   otherwise negative    Physical Exam:    General:          In NAD.  Nontoxic-appearing. HEENT:           Head NCAT.  Pupils equal round sclerae anicteric, EOMI.  OP clear. Neck:               Supple, trachea midline. Lungs:             Clear, no wheezes.  Effort nonlabored, no accessory muscle use. Chest wall:      No chest wall tenderness.  Chest expansion equal and symmetrical bilaterally. Heart:              RRR.  No JVD. Abdomen:        Soft, NT/ND.  Bowel sounds normoactive. Extremities:     Warm, no edema.    Right foot surgical dressing not opened  Skin:                Not pale.  Not Jaundiced  No rashes   Psych:             Mood normal.  Calm, no agitation. Neurologic:      Awake and alert, moves extremities spontaneously.      Labs: Results:   Chemistry Recent Labs     02/09/21  0400 02/08/21  0107 02/07/21  0452   GLU 96 133* 127*    139 138   K 3.9 3.8 3.4*    103 102   CO2 26 30 30   BUN 14 15 22*   CREA 1.18 1.45* 1.58*   CA 8.5 8.1* 8.5   AGAP 7 6 6   BUCR 12 10* 14      CBC w/Diff Recent Labs     02/08/21  0107 02/07/21  0452   WBC 14.5* 15.0*   RBC 2.78* 2.79*   HGB 7.7* 7.8*   HCT 24.8* 25.0*   * 581*   GRANS 67 76*   LYMPH 21 14*   EOS 1 2      Microbiology No results for input(s): CULT in the last 72 hours.        RADIOLOGY:    All available imaging studies/reports in [facility] for this admission were reviewed    Dr. Honorio Dumas, Infectious Disease Specialist  [phone]  February 9, 2021  10:04 AM

## 2021-02-09 NOTE — DISCHARGE SUMMARY
[address], [address]     DISCHARGE SUMMARY    Name: Barby Howe MRN: [id number]   Age / Sex: 61 y.o. / male CSN: [id number]   YOB: 1960 Length of Stay: 5 days   Admit Date: 2/4/2021 Discharge Date:        PRIMARY CARE PHYSICIAN: Ron Urias MD      DISCHARGE DIAGNOSES:    1. Infected diabetic foot ulcer with some cellulitis s/p RIGHT FOOT INCISION AND DRAINAGE, DEBRIDEMENT OF NECROTIC TISSUE, RESECTION OF PARTIAL FIRST METATARSAL  2. Diabetes mellitus with hyperglycemia, improving  3. Medical noncompliance  4. Dyslipidemia  5. Acute on stage III chronic kidney disease, improving  6. Leukocytosis, improving  7. History of osteomyelitis status post amputation of right hallux and left second digit  8. GERD  9. Coronary artery disease, stable  10. Mild bleeding from right foot area  11. Anemia of chronic medical disease    CONSULTS CALLED: Podiatry, ID      PROCEDURES DONE: Right foot diabetic ulcer incision and drainage      COURSE IN THE HOSPITAL: This is a 51-year-old male with past medical history of diabetes and chronic kidney disease stage III who presented to the ED with complaints of right foot pain. Patient was evaluated and was admitted. Patient was noted to have an infected diabetic foot ulcer on the right foot. Cultures were obtained. Patient was started on antibiotics. Podiatry was consulted. Patient also had some acute kidney injury and received IV fluids. Renal function showed improvement. Patient underwent incision and drainage and debridement of necrotic tissue and resection of partial first metatarsal on the right foot. Wound care was continued. ID was consulted. ID made recommendations for discharge antibiotics. Discharge plans were discussed with the patient. Patient was mobilized. PT OT also saw the patient. Case management was involved and arrangements were made.   Discharge plans were discussed at length with the patient and he verbalized understanding and agreed. Patient was counseled regarding compliance with medications and regular follow-up with physician and following physician instructions. MEDICATIONS ON DISCHARGE:    Current Discharge Medication List      START taking these medications    Details   glucose 4 gram chewable tablet Take 4 Tabs by mouth as needed for Other (PRN Low blood sugars or symptoms of low blood sugars). Qty: 40 Tab, Refills: 0      lactobacillus sp. 50 billion cpu (BIO-K PLUS) 50 billion cell -375 mg cap capsule Take 1 Cap by mouth daily for 14 days. Qty: 14 Cap, Refills: 0      melatonin, rapid dissolve, 5 mg TbDi tablet Take 0.5 Tabs by mouth nightly as needed (PRN insomnia). Qty: 15 Tab, Refills: 0      oxyCODONE-acetaminophen (PERCOCET 7.5) 7.5-325 mg per tablet Take 1 Tab by mouth every six (6) hours as needed for Pain for up to 5 days. Max Daily Amount: 4 Tabs. Qty: 14 Tab, Refills: 0    Associated Diagnoses: Diabetic ulcer of right foot associated with diabetes mellitus due to underlying condition, limited to breakdown of skin, unspecified part of foot (HCC)      OTHER Daptomycin IV as per ID recommendations  Qty: 1 Each, Refills: 0         CONTINUE these medications which have CHANGED    Details   insulin lispro (HUMALOG) 100 unit/mL injection Check FSBS AC*HS  For sugars between 160 and 200- Give 2 units SQ,  For sugars between 201 and 250, Give 3 units SQ,  For sugars Between 251 and 300, give 5 units SQ,  For Sugars between 301 and 350, give 7 units SQ  For sugars between 351 and 400, give 8 units SQ and contact PCP  Qty: 1 Vial, Refills: 0      insulin detemir U-100 (Levemir U-100 Insulin) 100 unit/mL injection 20 Units by SubCUTAneous route two (2) times a day. Qty: 10 mL, Refills: 0         CONTINUE these medications which have NOT CHANGED    Details   pantoprazole (PROTONIX) 40 mg tablet Take 1 Tab by mouth Before breakfast and dinner.   Qty: 60 Tab, Refills: 0      acetaminophen (TYLENOL) 325 mg tablet Take 2 Tabs by mouth every six (6) hours as needed. Indications: Pain, take it with bentyl; do not exceed 3 g tylenol daily  Qty: 30 Tab, Refills: 0      albuterol (PROVENTIL HFA, VENTOLIN HFA, PROAIR HFA) 90 mcg/actuation inhaler 2 puffs every 6 hours x 5 days then every 6 hours as needed for shortness of breath and/or wheezing  Qty: 1 Inhaler, Refills: 0      polyethylene glycol (MIRALAX) 17 gram packet Take 1 Packet by mouth daily. Qty: 30 Packet, Refills: 0         STOP taking these medications       clopidogrel (PLAVIX) 75 mg tablet Comments:   Reason for Stopping:                 DISCHARGE VITAL SIGNS:  Visit Vitals  /81   Pulse 62   Temp 97.1 °F (36.2 °C)   Resp 18   Ht 5' 9\" (1.753 m)   Wt 110.9 kg (244 lb 8 oz)   SpO2 100%   BMI 36.11 kg/m²         CONDITION ON DISCHARGE: Stable. DISPOSITION: Home with home health care      FOLLOW-UP RECOMMENDATIONS:   Follow-up Information     Follow up With Specialties Details Why Clive Mosquera MD Pediatric Medicine On 2/9/2021 Left a message for the office to call patient with appointment. [address]      Srinivasa Luciano DPM Podiatry On 2/16/2021 Appointment at 2:00 pm [address]  [phone]            OTHER INSTRUCTIONS:        TIME SPENT ON DISCHARGE ACTIVITIES: More than 35 minutes. Dragon medical dictation software was used for portions of this report. Unintended errors may occur.       Signed:  Lberon Bautista MD      2/9/2021

## 2021-02-09 NOTE — DISCHARGE INSTRUCTIONS
DISCHARGE SUMMARY from Nurse    PATIENT INSTRUCTIONS:    After general anesthesia or intravenous sedation, for 24 hours or while taking prescription Narcotics:  · Limit your activities  · Do not drive and operate hazardous machinery  · Do not make important personal or business decisions  · Do  not drink alcoholic beverages  · If you have not urinated within 8 hours after discharge, please contact your surgeon on call. Report the following to your surgeon:  · Excessive pain, swelling, redness or odor of or around the surgical area  · Temperature over 100.5  · Nausea and vomiting lasting longer than 4 hours or if unable to take medications  · Any signs of decreased circulation or nerve impairment to extremity: change in color, persistent  numbness, tingling, coldness or increase pain  · Any questions    What to do at Home:  Recommended activity: No lifting, Driving, or Strenuous exercise until cleared by provider    *  Please give a list of your current medications to your Primary Care Provider. *  Please update this list whenever your medications are discontinued, doses are      changed, or new medications (including over-the-counter products) are added. *  Please carry medication information at all times in case of emergency situations. These are general instructions for a healthy lifestyle:    No smoking/ No tobacco products/ Avoid exposure to second hand smoke  Surgeon General's Warning:  Quitting smoking now greatly reduces serious risk to your health.     Obesity, smoking, and sedentary lifestyle greatly increases your risk for illness    A healthy diet, regular physical exercise & weight monitoring are important for maintaining a healthy lifestyle    You may be retaining fluid if you have a history of heart failure or if you experience any of the following symptoms:  Weight gain of 3 pounds or more overnight or 5 pounds in a week, increased swelling in our hands or feet or shortness of breath while lying flat in bed. Please call your doctor as soon as you notice any of these symptoms; do not wait until your next office visit. The discharge information has been reviewed with the patient. The patient verbalized understanding. Discharge medications reviewed with the patient and appropriate educational materials and side effects teaching were provided.   ___________________________________________________________________________________________________________________________________

## 2021-02-09 NOTE — PROGRESS NOTES
Uploaded home health and infusions orders to [facility], sent out referrals to [facility] as they are one of the few [facility] Dr Shanna Gomez works with. Spoke with Josette George from [facility]. The pharmacy there has concerns about the Daptomycin as the first dose requires someone to stay with the person for an hour afterwards to watch for reaction. They are unable to do this. May need to keep patient on Vanco instead. Spoke with patient at bedside, he states his exwife will help him with his IV infusions. Spoke with Ifeanyi Charles; she verifies that she is willing to be taught and provide support to patient. Spoke with patient at bedside while he was on phone with his mother. Patients mother, sister and ex wife are now all unwilling to be taught to do dressing changes and IV infusions, \"I aint no nurse. I'm not going to be responsible if something happens\". Spoke with Freddie Lala from [facility]; referrals sent out to all their local facilities. She will see if one of them can take him. It will be a definite \"No\" if pt unable to remain on Vanco as Daptomycin is very expensive and would not be cost effective for them. She will get back to us.     Prakash Painter RN - Outcomes Manager  [phone]

## 2021-02-09 NOTE — PROGRESS NOTES
Pt will be going to [facility] for outpatient IV infusions starting tomorrow at 3pm.  Pt knows it is a daily thing that he MUST attend. Patients ex wife is willing to perform dressing changes to foot. Only two home health agencies work with Dr Migdalia [facility] and [facility]). [facility] does not accept patients insurance and [facility] is having staffing issues and cannot accommodate him. Spoke with Nash pass from [facility]; they will accept pt for home wound care services only if Dr Toyin Barnes is willing to sign orders. Did not receive a call back from his office and phones went to [employer] by 3:30 this afternoon. Will call again in am to verify MD is willing to be responsible for patients home wound care orders. Dr Toyin Barnes met with me in my office; states he is willing to sign the orders; wants to make sure his dressings get changed. Per Danita Brown RN, pts dressing was not changed today but he was sent home with dressing supplies.   Violet Mcdonnell RN - Outcomes Manager  [phone]

## 2021-02-09 NOTE — PROGRESS NOTES
Problem: Risk for Spread of Infection  Goal: Prevent transmission of infectious organism to others  Description: Prevent the transmission of infectious organisms to other patients, staff members, and visitors. Outcome: Progressing Towards Goal     Problem: Patient Education:  Go to Education Activity  Goal: Patient/Family Education  Outcome: Progressing Towards Goal     Problem: Falls - Risk of  Goal: *Absence of Falls  Description: Document Ayesha Thibodeaux Fall Risk and appropriate interventions in the flowsheet. Outcome: Progressing Towards Goal  Note: Fall Risk Interventions:  Mobility Interventions: Patient to call before getting OOB         Medication Interventions: Patient to call before getting OOB, Teach patient to arise slowly                   Problem: Patient Education: Go to Patient Education Activity  Goal: Patient/Family Education  Outcome: Progressing Towards Goal     Problem: Pressure Injury - Risk of  Goal: *Prevention of pressure injury  Description: Document Jer Scale and appropriate interventions in the flowsheet.   Outcome: Progressing Towards Goal  Note: Pressure Injury Interventions:  Sensory Interventions: Assess changes in LOC, Check visual cues for pain, Assess need for specialty bed, Keep linens dry and wrinkle-free    Moisture Interventions: Contain wound drainage, Assess need for specialty bed    Activity Interventions: Increase time out of bed, Pressure redistribution bed/mattress(bed type)    Mobility Interventions: HOB 30 degrees or less, Pressure redistribution bed/mattress (bed type)    Nutrition Interventions: Document food/fluid/supplement intake                     Problem: Patient Education: Go to Patient Education Activity  Goal: Patient/Family Education  Outcome: Progressing Towards Goal     Problem: Pain  Goal: *Control of Pain  Outcome: Progressing Towards Goal     Problem: Patient Education: Go to Patient Education Activity  Goal: Patient/Family Education  Outcome: Progressing Towards Goal     Problem: Impaired Skin Integrity/Pressure Injury Treatment  Goal: *Improvement of Existing Pressure Injury  Outcome: Progressing Towards Goal  Goal: *Prevention of pressure injury  Description: Document Jer Scale and appropriate interventions in the flowsheet.   Outcome: Progressing Towards Goal  Note: Pressure Injury Interventions:  Sensory Interventions: Assess changes in LOC, Check visual cues for pain, Assess need for specialty bed, Keep linens dry and wrinkle-free    Moisture Interventions: Contain wound drainage, Assess need for specialty bed    Activity Interventions: Increase time out of bed, Pressure redistribution bed/mattress(bed type)    Mobility Interventions: HOB 30 degrees or less, Pressure redistribution bed/mattress (bed type)    Nutrition Interventions: Document food/fluid/supplement intake                     Problem: Patient Education: Go to Patient Education Activity  Goal: Patient/Family Education  Outcome: Progressing Towards Goal

## 2021-02-09 NOTE — PROGRESS NOTES
Problem: Risk for Spread of Infection  Goal: Prevent transmission of infectious organism to others  Description: Prevent the transmission of infectious organisms to other patients, staff members, and visitors. 2/9/2021 1427 by Yusuf Campbell RN  Outcome: Resolved/Met  2/9/2021 1210 by Yusuf Campbell RN  Outcome: Progressing Towards Goal     Problem: Patient Education:  Go to Education Activity  Goal: Patient/Family Education  Outcome: Resolved/Met     Problem: Falls - Risk of  Goal: *Absence of Falls  Description: Document Laretta Marge Fall Risk and appropriate interventions in the flowsheet. 2/9/2021 1427 by Yusuf Campbell RN  Outcome: Resolved/Met  2/9/2021 1210 by Yusuf Campbell RN  Outcome: Progressing Towards Goal  Note: Fall Risk Interventions:  Mobility Interventions: Patient to call before getting OOB, PT Consult for mobility concerns         Medication Interventions: Patient to call before getting OOB, Teach patient to arise slowly                   Problem: Patient Education: Go to Patient Education Activity  Goal: Patient/Family Education  Outcome: Resolved/Met     Problem: Pressure Injury - Risk of  Goal: *Prevention of pressure injury  Description: Document Jer Scale and appropriate interventions in the flowsheet.   2/9/2021 1427 by Yusuf Campbell RN  Outcome: Resolved/Met  2/9/2021 1210 by Yusuf Campbell RN  Outcome: Progressing Towards Goal  Note: Pressure Injury Interventions:  Sensory Interventions: Assess changes in LOC, Check visual cues for pain, Assess need for specialty bed, Keep linens dry and wrinkle-free    Moisture Interventions: Contain wound drainage, Assess need for specialty bed    Activity Interventions: PT/OT evaluation, Increase time out of bed    Mobility Interventions: PT/OT evaluation    Nutrition Interventions: Offer support with meals,snacks and hydration                     Problem: Patient Education: Go to Patient Education Activity  Goal: Patient/Family Education  Outcome: Resolved/Met     Problem: Pain  Goal: *Control of Pain  2/9/2021 1427 by Michele Silverman RN  Outcome: Resolved/Met  2/9/2021 1210 by Michele Silverman RN  Outcome: Progressing Towards Goal     Problem: Patient Education: Go to Patient Education Activity  Goal: Patient/Family Education  Outcome: Resolved/Met     Problem: Impaired Skin Integrity/Pressure Injury Treatment  Goal: *Improvement of Existing Pressure Injury  Outcome: Resolved/Met  Goal: *Prevention of pressure injury  Description: Document Jer Scale and appropriate interventions in the flowsheet.   Outcome: Resolved/Met     Problem: Patient Education: Go to Patient Education Activity  Goal: Patient/Family Education  Outcome: Resolved/Met     Problem: Patient Education: Go to Patient Education Activity  Goal: Patient/Family Education  Outcome: Resolved/Met     Problem: Patient Education: Go to Patient Education Activity  Goal: Patient/Family Education  Outcome: Resolved/Met     Problem: Patient Education: Go to Patient Education Activity  Goal: Patient/Family Education  Outcome: Resolved/Met

## 2021-02-09 NOTE — PROCEDURES
INTERVENTIONAL RADIOLOGY POST PICC LINE NOTE     2/08/21       7:46 AM     Preoperative Diagnosis:   IV Access    Postoperative Diagnosis:  Same. :  Jessenia Delgadillo PA-C    Assistant:  None. Attending Physician: Dr. Luis Vazquez    Type of Anesthesia:  1% Lidocaine local    Procedure/Description: right brachial  upper extremity PICC Line. Findings:  right brachial 5 Yoruba catheter placed. Tip at SVC/RA junction. OK to use. Length 39 cm. Estimated blood Loss: Minimal    Specimen Removed: None    Drains: None     Complications:  None. Condition:  Stable.     Discharge Plan:  continue present therapy

## 2021-02-09 NOTE — PROGRESS NOTES
Pts family continues to be unwilling to manage patients IV infusions. Pt does not want to go to SNF. Spoke with Dr Deloris Hopkins who is willing to write new orders for daily infusions he could receive at [facility] outpatient infusion center [facility]). Spoke with Eliana Bond at [facility], patient could be seen starting tomorrow around 2-3; they need the order faxed over asap. Spoke with Michael Kwong at [facility]. Their home care does not accept patients insurance. Left message for Rubia Segura at [address] to see if they could manage just his dressing care. Left message for Dr Kj Camejo to see if he is willing to sign home health orders for patient with [facility] as Dr Maria Luisa Glover only works with [facility] and [address] home health agencies.   Tanesha Betancourt RN - Outcomes Manager  [phone]

## 2021-02-09 NOTE — PROGRESS NOTES
Bedside and Verbal shift change report given to [place], RN (oncoming nurse) by Parag Mandujano RN (offgoing nurse). Report included the following information SBAR, Kardex, OR Summary, Procedure Summary, Intake/Output and MAR.

## 2021-02-09 NOTE — PROGRESS NOTES
Problem: Risk for Spread of Infection  Goal: Prevent transmission of infectious organism to others  Description: Prevent the transmission of infectious organisms to other patients, staff members, and visitors. Outcome: Progressing Towards Goal     Problem: Falls - Risk of  Goal: *Absence of Falls  Description: Document Latasha José Miguel Fall Risk and appropriate interventions in the flowsheet. Outcome: Progressing Towards Goal  Note: Fall Risk Interventions:  Mobility Interventions: Patient to call before getting OOB, PT Consult for mobility concerns         Medication Interventions: Patient to call before getting OOB, Teach patient to arise slowly                   Problem: Pressure Injury - Risk of  Goal: *Prevention of pressure injury  Description: Document Jer Scale and appropriate interventions in the flowsheet.   Outcome: Progressing Towards Goal  Note: Pressure Injury Interventions:  Sensory Interventions: Assess changes in LOC, Check visual cues for pain, Assess need for specialty bed, Keep linens dry and wrinkle-free    Moisture Interventions: Contain wound drainage, Assess need for specialty bed    Activity Interventions: PT/OT evaluation, Increase time out of bed    Mobility Interventions: PT/OT evaluation    Nutrition Interventions: Offer support with meals,snacks and hydration                     Problem: Pain  Goal: *Control of Pain  Outcome: Progressing Towards Goal

## 2021-02-10 ENCOUNTER — HOSPITAL ENCOUNTER (OUTPATIENT)
Dept: INFUSION THERAPY | Age: 61
Discharge: HOME | End: 2021-02-10

## 2021-02-10 ENCOUNTER — HOME HEALTH ADMISSION (OUTPATIENT)
Dept: HOME HEALTH SERVICES | Facility: HOME HEALTH | Age: 61
End: 2021-02-10
Payer: MEDICAID

## 2021-02-10 NOTE — HOME CARE
Referral received for home care. Spoke with patient. He declines PT and OT. Will arrange for SN for wound care. Verified patient is staying with sister. [id number] Sumner [facility]. [address] [place] [address]. He has an appointment for infusion at 3pm today. Will schedule visits around outpatient infusion.      Julianna Alvarez RN, BSN  [employer]

## 2021-02-10 NOTE — PROGRESS NOTES
Spoke with Stephanie Hooker from [facility] to let her know Dr Chayito Pierre agreed to sign home health orders for this patient.   Rosalie Flores RN - Outcomes Manager  [phone]

## 2021-02-11 ENCOUNTER — HOSPITAL ENCOUNTER (OUTPATIENT)
Dept: INFUSION THERAPY | Age: 61
Discharge: HOME | End: 2021-02-11
Payer: MEDICAID

## 2021-02-11 VITALS
RESPIRATION RATE: 16 BRPM | SYSTOLIC BLOOD PRESSURE: 119 MMHG | OXYGEN SATURATION: 96 % | TEMPERATURE: 98.1 F | DIASTOLIC BLOOD PRESSURE: 77 MMHG | HEART RATE: 90 BPM

## 2021-02-11 PROCEDURE — 74011000250 HC RX REV CODE- 250: Performed by: INTERNAL MEDICINE

## 2021-02-11 PROCEDURE — 96365 THER/PROPH/DIAG IV INF INIT: CPT

## 2021-02-11 PROCEDURE — 74011250636 HC RX REV CODE- 250/636: Performed by: INTERNAL MEDICINE

## 2021-02-11 PROCEDURE — 96375 TX/PRO/DX INJ NEW DRUG ADDON: CPT

## 2021-02-11 PROCEDURE — 74011000258 HC RX REV CODE- 258: Performed by: INTERNAL MEDICINE

## 2021-02-11 RX ORDER — SODIUM CHLORIDE 0.9 % (FLUSH) 0.9 %
10-40 SYRINGE (ML) INJECTION AS NEEDED
Status: DISCONTINUED | OUTPATIENT
Start: 2021-02-11 | End: 2021-02-15 | Stop reason: HOSPADM

## 2021-02-11 RX ORDER — HEPARIN 100 UNIT/ML
500 SYRINGE INTRAVENOUS AS NEEDED
Status: DISCONTINUED | OUTPATIENT
Start: 2021-02-11 | End: 2021-02-15 | Stop reason: HOSPADM

## 2021-02-11 RX ADMIN — SODIUM CHLORIDE 1 G: 900 INJECTION INTRAVENOUS at 13:18

## 2021-02-11 RX ADMIN — DAPTOMYCIN 650 MG: 500 INJECTION, POWDER, LYOPHILIZED, FOR SOLUTION INTRAVENOUS at 13:13

## 2021-02-11 RX ADMIN — Medication 10 ML: at 13:17

## 2021-02-11 RX ADMIN — Medication 30 ML: at 13:50

## 2021-02-11 RX ADMIN — HEPARIN 500 UNITS: 100 SYRINGE at 13:51

## 2021-02-11 RX ADMIN — Medication 30 ML: at 13:51

## 2021-02-11 RX ADMIN — HEPARIN 500 UNITS: 100 SYRINGE at 13:50

## 2021-02-11 RX ADMIN — Medication 10 ML: at 13:11

## 2021-02-11 NOTE — PROGRESS NOTES
[facility] Progress Note    Date: 2021    Name: Barby Howe    MRN: [id number]         : 1960      Mr. Caleb Mortensen was assessed and education was provided. Mr. Sheng Max vitals were reviewed and patient was observed for 5 minutes prior to treatment. Visit Vitals  /77 (BP 1 Location: Left upper arm, BP Patient Position: At rest;Sitting)   Pulse 90   Temp 98.1 °F (36.7 °C)   Resp 16   SpO2 96%     Printed [facility] patient education sheets reviewed with patient and given to him to take home. All patient questions answered. Daptomycin 650 mg IVP and ertapenem 1 gram IV given via PICC line red port. After antibiotics given, both lumens of PICC flushed with 30 ml NS and 500 units heparin. I advised Mr Caleb Motrensen to walk with his cane. Mr Caleb Mortensen tolerated the infusion, and had no complaints. Patient armband removed and shredded. Mr. Caleb Mortensen was discharged from [place] [address] in stable condition at 1355. He is to return on 2021 at 1300 for his next appointment.     Karen Hartmann RN  2021

## 2021-02-12 ENCOUNTER — HOME CARE VISIT (OUTPATIENT)
Dept: SCHEDULING | Facility: HOME HEALTH | Age: 61
End: 2021-02-12
Payer: MEDICAID

## 2021-02-12 ENCOUNTER — HOSPITAL ENCOUNTER (OUTPATIENT)
Dept: INFUSION THERAPY | Age: 61
Discharge: HOME | End: 2021-02-12
Payer: MEDICAID

## 2021-02-12 VITALS
RESPIRATION RATE: 16 BRPM | DIASTOLIC BLOOD PRESSURE: 82 MMHG | TEMPERATURE: 98.3 F | SYSTOLIC BLOOD PRESSURE: 136 MMHG | HEART RATE: 95 BPM | OXYGEN SATURATION: 98 %

## 2021-02-12 PROCEDURE — 96365 THER/PROPH/DIAG IV INF INIT: CPT

## 2021-02-12 PROCEDURE — 74011000258 HC RX REV CODE- 258: Performed by: INTERNAL MEDICINE

## 2021-02-12 PROCEDURE — 74011250636 HC RX REV CODE- 250/636: Performed by: INTERNAL MEDICINE

## 2021-02-12 PROCEDURE — 400013 HH SOC

## 2021-02-12 PROCEDURE — 74011000250 HC RX REV CODE- 250: Performed by: INTERNAL MEDICINE

## 2021-02-12 PROCEDURE — G0299 HHS/HOSPICE OF RN EA 15 MIN: HCPCS

## 2021-02-12 PROCEDURE — 96375 TX/PRO/DX INJ NEW DRUG ADDON: CPT

## 2021-02-12 RX ORDER — HEPARIN 100 UNIT/ML
500 SYRINGE INTRAVENOUS AS NEEDED
Status: DISCONTINUED | OUTPATIENT
Start: 2021-02-12 | End: 2021-02-16 | Stop reason: HOSPADM

## 2021-02-12 RX ORDER — SODIUM CHLORIDE 0.9 % (FLUSH) 0.9 %
10-40 SYRINGE (ML) INJECTION AS NEEDED
Status: DISCONTINUED | OUTPATIENT
Start: 2021-02-12 | End: 2021-02-16 | Stop reason: HOSPADM

## 2021-02-12 RX ADMIN — Medication 10 ML: at 13:10

## 2021-02-12 RX ADMIN — Medication 20 ML: at 13:44

## 2021-02-12 RX ADMIN — HEPARIN 500 UNITS: 100 SYRINGE at 13:45

## 2021-02-12 RX ADMIN — DAPTOMYCIN 650 MG: 500 INJECTION, POWDER, LYOPHILIZED, FOR SOLUTION INTRAVENOUS at 13:07

## 2021-02-12 RX ADMIN — SODIUM CHLORIDE 1 G: 900 INJECTION INTRAVENOUS at 13:11

## 2021-02-12 RX ADMIN — HEPARIN 500 UNITS: 100 SYRINGE at 13:44

## 2021-02-12 RX ADMIN — Medication 10 ML: at 13:04

## 2021-02-12 RX ADMIN — Medication 20 ML: at 13:45

## 2021-02-12 NOTE — PROGRESS NOTES
[facility] Progress Note Date: 2021 Name: Fouzia Lott MRN: [id number] : 1960 Mr. Ward Palomo was assessed and education was provided. Mr. Darrell Salcido vitals were reviewed and patient was observed for 5 minutes prior to treatment. Visit Vitals /82 (BP 1 Location: Left upper arm, BP Patient Position: At rest;Sitting) Pulse 95 Temp 98.3 °F (36.8 °C) Resp 16 SpO2 98% Daptomycin 650 mg IVP and ertapenem 1 gram IV given via PICC line purple port. After antibiotics given, both lumens of PICC flushed with 30 ml NS and 500 units heparin. I advised Mr Ward Palomo to walk with his cane. Mr Ward Palomo tolerated the infusion, and had no complaints. Patient armband removed and shredded. Mr. Ward Palomo was discharged from [place] [address] in stable condition at [phone]. He is to return on 2021 at 0900 for his next appointment. Tomasa Shah RN 2021

## 2021-02-13 VITALS
SYSTOLIC BLOOD PRESSURE: 130 MMHG | BODY MASS INDEX: 36.14 KG/M2 | DIASTOLIC BLOOD PRESSURE: 80 MMHG | WEIGHT: 244 LBS | RESPIRATION RATE: 20 BRPM | OXYGEN SATURATION: 99 % | HEIGHT: 69 IN | TEMPERATURE: 98 F | HEART RATE: 97 BPM

## 2021-02-14 ENCOUNTER — HOSPITAL ENCOUNTER (OUTPATIENT)
Dept: INFUSION THERAPY | Age: 61
Discharge: HOME | End: 2021-02-14
Payer: MEDICAID

## 2021-02-14 VITALS
OXYGEN SATURATION: 98 % | HEART RATE: 70 BPM | DIASTOLIC BLOOD PRESSURE: 58 MMHG | RESPIRATION RATE: 16 BRPM | TEMPERATURE: 98.2 F | SYSTOLIC BLOOD PRESSURE: 93 MMHG

## 2021-02-14 PROCEDURE — 74011250636 HC RX REV CODE- 250/636: Performed by: INTERNAL MEDICINE

## 2021-02-14 PROCEDURE — 74011000250 HC RX REV CODE- 250: Performed by: INTERNAL MEDICINE

## 2021-02-14 PROCEDURE — 96365 THER/PROPH/DIAG IV INF INIT: CPT

## 2021-02-14 PROCEDURE — 96375 TX/PRO/DX INJ NEW DRUG ADDON: CPT

## 2021-02-14 PROCEDURE — 74011000258 HC RX REV CODE- 258: Performed by: INTERNAL MEDICINE

## 2021-02-14 RX ORDER — HEPARIN 100 UNIT/ML
500 SYRINGE INTRAVENOUS AS NEEDED
Status: DISCONTINUED | OUTPATIENT
Start: 2021-02-14 | End: 2021-02-18 | Stop reason: HOSPADM

## 2021-02-14 RX ORDER — SODIUM CHLORIDE 0.9 % (FLUSH) 0.9 %
10-40 SYRINGE (ML) INJECTION AS NEEDED
Status: DISCONTINUED | OUTPATIENT
Start: 2021-02-14 | End: 2021-02-18 | Stop reason: HOSPADM

## 2021-02-14 RX ADMIN — SODIUM CHLORIDE 1 G: 900 INJECTION INTRAVENOUS at 09:15

## 2021-02-14 RX ADMIN — HEPARIN 500 UNITS: 100 SYRINGE at 09:38

## 2021-02-14 RX ADMIN — Medication 10 ML: at 09:36

## 2021-02-14 RX ADMIN — Medication 10 ML: at 09:46

## 2021-02-14 RX ADMIN — DAPTOMYCIN 650 MG: 500 INJECTION, POWDER, LYOPHILIZED, FOR SOLUTION INTRAVENOUS at 09:33

## 2021-02-14 RX ADMIN — Medication 10 ML: at 09:32

## 2021-02-14 RX ADMIN — HEPARIN 500 UNITS: 100 SYRINGE at 09:47

## 2021-02-14 RX ADMIN — Medication 10 ML: at 09:14

## 2021-02-14 NOTE — PROGRESS NOTES
[facility] Progress Note    Date: 2021    Name: Marlyn Lanes    MRN: [id number]         : 1960      Mr. Antionette Reinoso arrived to [facility] at 4921 ambulatory. No complaints or concerns voiced    Mr. Antionette Reinoso was assessed and education was provided. Mr. Garo Branch vitals were reviewed. Visit Vitals  BP (!) 93/58 (BP 1 Location: Left upper arm, BP Patient Position: At rest)   Pulse 70   Temp 98.2 °F (36.8 °C)   Resp 16   SpO2 98%       Pt with RIGHT upper arm double lumen PICC line. Each lumen flushes without difficulty and positive for blood return. Dressing CDI. No redness, bruising, or swelling noted at site and/ or extremity. Pt denies tenderness. Invanz 1 gm  was administered over 30 minutes as ordered via red lumen followed by normal saline flush. Brisk flush/blood returns after infusion    Daptomycin 650 mg given IVP over 2 minutes via purple lumen followed by N.S flush. Brisk blood returns noted. Mr. Antionette Reinoso tolerated well without complaints. Flushed  lumen of pt's PICC line with heparin per order. New curos caps applied to the end of each lumen, and lumens wrapped with coban. Picc line site and drsg without change. Stockinette applied over site for protection. Mr. Antionette Reinoso was discharged from [place] [address] in stable condition at 0950  He is to return on 2/15/21 at 1500 for his next appointment.     Manuel Mcclelland RN  2021

## 2021-02-15 ENCOUNTER — HOME CARE VISIT (OUTPATIENT)
Dept: HOME HEALTH SERVICES | Facility: HOME HEALTH | Age: 61
End: 2021-02-15
Payer: MEDICAID

## 2021-02-15 ENCOUNTER — HOSPITAL ENCOUNTER (OUTPATIENT)
Dept: INFUSION THERAPY | Age: 61
Discharge: HOME | End: 2021-02-15
Payer: MEDICAID

## 2021-02-15 VITALS
SYSTOLIC BLOOD PRESSURE: 105 MMHG | RESPIRATION RATE: 16 BRPM | DIASTOLIC BLOOD PRESSURE: 67 MMHG | OXYGEN SATURATION: 97 % | TEMPERATURE: 97.6 F | HEART RATE: 73 BPM

## 2021-02-15 LAB
ALBUMIN SERPL-MCNC: 2.1 G/DL (ref 3.4–5)
ALBUMIN/GLOB SERPL: 0.4 {RATIO} (ref 0.8–1.7)
ALP SERPL-CCNC: 149 U/L (ref 45–117)
ALT SERPL-CCNC: 18 U/L (ref 16–61)
ANION GAP SERPL CALC-SCNC: 7 MMOL/L (ref 3–18)
AST SERPL-CCNC: 17 U/L (ref 10–38)
BASOPHILS # BLD: 0 K/UL (ref 0–0.1)
BASOPHILS NFR BLD: 0 % (ref 0–2)
BILIRUB SERPL-MCNC: 0.3 MG/DL (ref 0.2–1)
BUN SERPL-MCNC: 31 MG/DL (ref 7–18)
BUN/CREAT SERPL: 17 (ref 12–20)
CALCIUM SERPL-MCNC: 7.6 MG/DL (ref 8.5–10.1)
CHLORIDE SERPL-SCNC: 107 MMOL/L (ref 100–111)
CK SERPL-CCNC: 94 U/L (ref 39–308)
CO2 SERPL-SCNC: 26 MMOL/L (ref 21–32)
CREAT SERPL-MCNC: 1.84 MG/DL (ref 0.6–1.3)
CRP SERPL-MCNC: 7.4 MG/DL (ref 0–0.3)
DIFFERENTIAL METHOD BLD: ABNORMAL
EOSINOPHIL # BLD: 0.3 K/UL (ref 0–0.4)
EOSINOPHIL NFR BLD: 3 % (ref 0–5)
ERYTHROCYTE [DISTWIDTH] IN BLOOD BY AUTOMATED COUNT: 16.3 % (ref 11.6–14.5)
GLOBULIN SER CALC-MCNC: 5.2 G/DL (ref 2–4)
GLUCOSE SERPL-MCNC: 284 MG/DL (ref 74–99)
HCT VFR BLD AUTO: 23.3 % (ref 36–48)
HGB BLD-MCNC: 7 G/DL (ref 13–16)
LYMPHOCYTES # BLD: 2.2 K/UL (ref 0.9–3.6)
LYMPHOCYTES NFR BLD: 19 % (ref 21–52)
MCH RBC QN AUTO: 27.6 PG (ref 24–34)
MCHC RBC AUTO-ENTMCNC: 30 G/DL (ref 31–37)
MCV RBC AUTO: 91.7 FL (ref 74–97)
MONOCYTES # BLD: 0.8 K/UL (ref 0.05–1.2)
MONOCYTES NFR BLD: 7 % (ref 3–10)
NEUTS SEG # BLD: 8.1 K/UL (ref 1.8–8)
NEUTS SEG NFR BLD: 71 % (ref 40–73)
PLATELET # BLD AUTO: 576 K/UL (ref 135–420)
PMV BLD AUTO: 10.4 FL (ref 9.2–11.8)
POTASSIUM SERPL-SCNC: 4.5 MMOL/L (ref 3.5–5.5)
PROT SERPL-MCNC: 7.3 G/DL (ref 6.4–8.2)
RBC # BLD AUTO: 2.54 M/UL (ref 4.7–5.5)
SODIUM SERPL-SCNC: 140 MMOL/L (ref 136–145)
WBC # BLD AUTO: 11.5 K/UL (ref 4.6–13.2)

## 2021-02-15 PROCEDURE — 86140 C-REACTIVE PROTEIN: CPT

## 2021-02-15 PROCEDURE — 74011000250 HC RX REV CODE- 250: Performed by: INTERNAL MEDICINE

## 2021-02-15 PROCEDURE — 74011250636 HC RX REV CODE- 250/636

## 2021-02-15 PROCEDURE — 74011000258 HC RX REV CODE- 258: Performed by: INTERNAL MEDICINE

## 2021-02-15 PROCEDURE — 96365 THER/PROPH/DIAG IV INF INIT: CPT

## 2021-02-15 PROCEDURE — 82550 ASSAY OF CK (CPK): CPT

## 2021-02-15 PROCEDURE — 96375 TX/PRO/DX INJ NEW DRUG ADDON: CPT

## 2021-02-15 PROCEDURE — 80053 COMPREHEN METABOLIC PANEL: CPT

## 2021-02-15 PROCEDURE — 85025 COMPLETE CBC W/AUTO DIFF WBC: CPT

## 2021-02-15 PROCEDURE — 74011250636 HC RX REV CODE- 250/636: Performed by: INTERNAL MEDICINE

## 2021-02-15 RX ORDER — HEPARIN 100 UNIT/ML
SYRINGE INTRAVENOUS
Status: COMPLETED
Start: 2021-02-15 | End: 2021-02-15

## 2021-02-15 RX ORDER — SODIUM CHLORIDE 0.9 % (FLUSH) 0.9 %
10-40 SYRINGE (ML) INJECTION AS NEEDED
Status: DISCONTINUED | OUTPATIENT
Start: 2021-02-15 | End: 2021-02-19 | Stop reason: HOSPADM

## 2021-02-15 RX ORDER — HEPARIN 100 UNIT/ML
500 SYRINGE INTRAVENOUS AS NEEDED
Status: DISCONTINUED | OUTPATIENT
Start: 2021-02-15 | End: 2021-02-19 | Stop reason: HOSPADM

## 2021-02-15 RX ADMIN — Medication 10 ML: at 15:35

## 2021-02-15 RX ADMIN — HEPARIN 500 UNITS: 100 SYRINGE at 16:08

## 2021-02-15 RX ADMIN — Medication 20 ML: at 16:08

## 2021-02-15 RX ADMIN — SODIUM CHLORIDE 1 G: 900 INJECTION INTRAVENOUS at 15:36

## 2021-02-15 RX ADMIN — DAPTOMYCIN 650 MG: 500 INJECTION, POWDER, LYOPHILIZED, FOR SOLUTION INTRAVENOUS at 15:30

## 2021-02-15 RX ADMIN — Medication 30 ML: at 15:26

## 2021-02-15 RX ADMIN — Medication 20 ML: at 16:09

## 2021-02-15 RX ADMIN — HEPARIN 500 UNITS: 100 SYRINGE at 16:09

## 2021-02-16 ENCOUNTER — HOSPITAL ENCOUNTER (OUTPATIENT)
Dept: INFUSION THERAPY | Age: 61
Discharge: HOME | End: 2021-02-16
Payer: MEDICAID

## 2021-02-16 ENCOUNTER — HOME CARE VISIT (OUTPATIENT)
Dept: HOME HEALTH SERVICES | Facility: HOME HEALTH | Age: 61
End: 2021-02-16
Payer: MEDICAID

## 2021-02-16 VITALS
DIASTOLIC BLOOD PRESSURE: 61 MMHG | OXYGEN SATURATION: 95 % | RESPIRATION RATE: 16 BRPM | TEMPERATURE: 98.5 F | SYSTOLIC BLOOD PRESSURE: 99 MMHG | HEART RATE: 78 BPM

## 2021-02-16 PROCEDURE — 74011250636 HC RX REV CODE- 250/636: Performed by: INTERNAL MEDICINE

## 2021-02-16 PROCEDURE — 96375 TX/PRO/DX INJ NEW DRUG ADDON: CPT

## 2021-02-16 PROCEDURE — 74011000250 HC RX REV CODE- 250: Performed by: INTERNAL MEDICINE

## 2021-02-16 PROCEDURE — 74011000258 HC RX REV CODE- 258: Performed by: INTERNAL MEDICINE

## 2021-02-16 PROCEDURE — 96365 THER/PROPH/DIAG IV INF INIT: CPT

## 2021-02-16 RX ORDER — SODIUM CHLORIDE 0.9 % (FLUSH) 0.9 %
10-40 SYRINGE (ML) INJECTION AS NEEDED
Status: DISCONTINUED | OUTPATIENT
Start: 2021-02-16 | End: 2021-02-20 | Stop reason: HOSPADM

## 2021-02-16 RX ORDER — HEPARIN 100 UNIT/ML
500 SYRINGE INTRAVENOUS ONCE
Status: COMPLETED | OUTPATIENT
Start: 2021-02-16 | End: 2021-02-16

## 2021-02-16 RX ADMIN — Medication 10 ML: at 15:53

## 2021-02-16 RX ADMIN — HEPARIN 500 UNITS: 100 SYRINGE at 15:54

## 2021-02-16 RX ADMIN — HEPARIN 500 UNITS: 100 SYRINGE at 15:55

## 2021-02-16 RX ADMIN — Medication 10 ML: at 15:15

## 2021-02-16 RX ADMIN — SODIUM CHLORIDE 1 G: 900 INJECTION INTRAVENOUS at 15:17

## 2021-02-16 RX ADMIN — Medication 10 ML: at 15:14

## 2021-02-16 RX ADMIN — DAPTOMYCIN 650 MG: 500 INJECTION, POWDER, LYOPHILIZED, FOR SOLUTION INTRAVENOUS at 15:11

## 2021-02-16 RX ADMIN — Medication 10 ML: at 15:09

## 2021-02-16 NOTE — PROGRESS NOTES
[facility] Progress Note    Date: 2021    Name: Desi Jimenez    MRN: [id number]         : 1960      Mr. Nitin Saeed arrived to [place] at 1500 ambulatory. No complaints or concerns voiced    Mr. Nitin Saeed was assessed and education was provided. Mr. Yesica Solorio vitals were reviewed. Visit Vitals  BP 99/61 (BP 1 Location: Left upper arm, BP Patient Position: Sitting)   Pulse 78   Temp 98.5 °F (36.9 °C)   Resp 16   SpO2 95%       Pt with RIGHT upper arm double lumen PICC line. Each lumen flushes without difficulty and positive for blood return. Dressing CDI. No redness, bruising, or swelling noted at site and/ or extremity. Pt denies tenderness. Daptomycin 650 mg given IVP over 2 minutes via red lumen followed by N.S flush. Brisk blood returns noted. Invanz 1 gm  was administered over 30 minutes as ordered via purple lumen followed by normal saline flush. Brisk flush/blood returns after infusion        Mr. Nitin Saeed tolerated well without complaints. Flushed  lumen of pt's PICC line with heparin per order. New curos caps applied to the end of each lumen, and lumens wrapped with coban. Picc line site and drsg without change. Stockinette applied over site for protection. Mr. Nitin Saeed was discharged from [place] [address] in stable condition at 1600. He is to return on 21 at 1500 for his next appointment.     Saman Pulido RN  2021

## 2021-02-17 ENCOUNTER — HOSPITAL ENCOUNTER (OUTPATIENT)
Dept: INFUSION THERAPY | Age: 61
Discharge: HOME | End: 2021-02-17
Payer: MEDICAID

## 2021-02-17 VITALS
TEMPERATURE: 97.6 F | DIASTOLIC BLOOD PRESSURE: 55 MMHG | SYSTOLIC BLOOD PRESSURE: 93 MMHG | RESPIRATION RATE: 16 BRPM | HEART RATE: 68 BPM | OXYGEN SATURATION: 96 %

## 2021-02-17 PROCEDURE — 96365 THER/PROPH/DIAG IV INF INIT: CPT

## 2021-02-17 PROCEDURE — 96375 TX/PRO/DX INJ NEW DRUG ADDON: CPT

## 2021-02-17 PROCEDURE — 74011000250 HC RX REV CODE- 250: Performed by: INTERNAL MEDICINE

## 2021-02-17 PROCEDURE — 74011250636 HC RX REV CODE- 250/636: Performed by: INTERNAL MEDICINE

## 2021-02-17 PROCEDURE — 74011000258 HC RX REV CODE- 258: Performed by: INTERNAL MEDICINE

## 2021-02-17 RX ORDER — HEPARIN 100 UNIT/ML
500 SYRINGE INTRAVENOUS ONCE
Status: COMPLETED | OUTPATIENT
Start: 2021-02-17 | End: 2021-02-17

## 2021-02-17 RX ORDER — SODIUM CHLORIDE 0.9 % (FLUSH) 0.9 %
10-40 SYRINGE (ML) INJECTION AS NEEDED
Status: DISCONTINUED | OUTPATIENT
Start: 2021-02-17 | End: 2021-02-21 | Stop reason: HOSPADM

## 2021-02-17 RX ADMIN — HEPARIN 500 UNITS: 100 SYRINGE at 14:34

## 2021-02-17 RX ADMIN — Medication 20 ML: at 14:32

## 2021-02-17 RX ADMIN — SODIUM CHLORIDE 1 G: 900 INJECTION INTRAVENOUS at 13:54

## 2021-02-17 RX ADMIN — Medication 20 ML: at 13:50

## 2021-02-17 RX ADMIN — DAPTOMYCIN 650 MG: 500 INJECTION, POWDER, LYOPHILIZED, FOR SOLUTION INTRAVENOUS at 14:30

## 2021-02-17 RX ADMIN — HEPARIN 500 UNITS: 100 SYRINGE at 14:33

## 2021-02-17 NOTE — PROGRESS NOTES
[facility] Progress Note    Date: 2021    Name: Delvin Diaz    MRN: [id number]         : 1960      Mr. Chantel Conley arrived to [facility] at [phone] ambulatory. No complaints or concerns voiced    Mr. Chantel Conley was assessed and education was provided. Mr. Eliceo Rinaldi vitals were reviewed. Visit Vitals  BP (!) 93/55 (BP 1 Location: Left upper arm, BP Patient Position: Sitting)   Pulse 68   Temp 97.6 °F (36.4 °C)   Resp 16   SpO2 96%       Pt with RIGHT upper arm double lumen PICC line. Each lumen flushes without difficulty and positive for blood return. Dressing CDI. No redness, bruising, or swelling noted at site and/ or extremity. Pt denies tenderness. Invanz 1 gm  was administered over 30 minutes as ordered via purple lumen followed by normal saline flush. Brisk flush/blood returns after infusion    Daptomycin 650 mg given IVP over 2 minutes via red lumen followed by N.S flush. Brisk blood returns noted. Mr. Chantel Conley tolerated well without complaints. Flushed  lumen of pt's PICC line with heparin per order. New curos caps applied to the end of each lumen, and lumens wrapped with coban. Picc line site and drsg without change. Stockinette applied over site for protection. Mr. Chantel Conley was discharged from [place] [address] in stable condition at 1435. He is to return on 21 at 1500 for his next appointment.     Rea Issa RN  2021

## 2021-02-18 ENCOUNTER — HOME CARE VISIT (OUTPATIENT)
Dept: HOME HEALTH SERVICES | Facility: HOME HEALTH | Age: 61
End: 2021-02-18
Payer: MEDICAID

## 2021-02-18 ENCOUNTER — HOSPITAL ENCOUNTER (OUTPATIENT)
Dept: INFUSION THERAPY | Age: 61
Discharge: HOME | End: 2021-02-18
Payer: MEDICAID

## 2021-02-18 VITALS
HEART RATE: 84 BPM | OXYGEN SATURATION: 96 % | RESPIRATION RATE: 16 BRPM | SYSTOLIC BLOOD PRESSURE: 129 MMHG | DIASTOLIC BLOOD PRESSURE: 73 MMHG | TEMPERATURE: 98.4 F

## 2021-02-18 PROCEDURE — 74011250636 HC RX REV CODE- 250/636: Performed by: INTERNAL MEDICINE

## 2021-02-18 PROCEDURE — 74011000250 HC RX REV CODE- 250: Performed by: INTERNAL MEDICINE

## 2021-02-18 PROCEDURE — 96375 TX/PRO/DX INJ NEW DRUG ADDON: CPT

## 2021-02-18 PROCEDURE — 96365 THER/PROPH/DIAG IV INF INIT: CPT

## 2021-02-18 PROCEDURE — 74011000258 HC RX REV CODE- 258: Performed by: INTERNAL MEDICINE

## 2021-02-18 RX ORDER — HEPARIN 100 UNIT/ML
500 SYRINGE INTRAVENOUS ONCE
Status: COMPLETED | OUTPATIENT
Start: 2021-02-18 | End: 2021-02-18

## 2021-02-18 RX ORDER — SODIUM CHLORIDE 0.9 % (FLUSH) 0.9 %
10-40 SYRINGE (ML) INJECTION AS NEEDED
Status: DISCONTINUED | OUTPATIENT
Start: 2021-02-18 | End: 2021-02-22 | Stop reason: HOSPADM

## 2021-02-18 RX ADMIN — Medication 20 ML: at 14:09

## 2021-02-18 RX ADMIN — DAPTOMYCIN 650 MG: 500 INJECTION, POWDER, LYOPHILIZED, FOR SOLUTION INTRAVENOUS at 14:09

## 2021-02-18 RX ADMIN — HEPARIN 500 UNITS: 100 SYRINGE at 14:13

## 2021-02-18 RX ADMIN — Medication 20 ML: at 14:12

## 2021-02-18 RX ADMIN — SODIUM CHLORIDE 1 G: 900 INJECTION INTRAVENOUS at 13:41

## 2021-02-18 NOTE — PROGRESS NOTES
[facility] Progress Note    Date: 2021    Name: Ramsey Roberts    MRN: [id number]         : 1960      Mr. Cheryle Fuchs arrived to [place] at 1330 ambulatory. No complaints or concerns voiced    Mr. Cheryle Fuchs was assessed and education was provided. Mr. Debbie Simmons vitals were reviewed. Visit Vitals  /73 (BP 1 Location: Left upper arm, BP Patient Position: Sitting)   Pulse 84   Temp 98.4 °F (36.9 °C)   Resp 16   SpO2 96%       Pt with RIGHT upper arm double lumen PICC line. Each lumen flushes without difficulty and positive for blood return. Dressing CDI. No redness, bruising, or swelling noted at site and/ or extremity. Pt denies tenderness. Invanz 1 gm  was administered over 30 minutes as ordered via red lumen followed by normal saline flush. Brisk flush/blood returns after infusion    Daptomycin 650 mg given IVP over 2 minutes via purple lumen followed by N.S flush. Brisk blood returns noted. Mr. Cheryle Fuchs tolerated well without complaints. Flushed  lumen of pt's PICC line with heparin per order. New curos caps applied to the end of each lumen, and lumens wrapped with coban. Picc line site and drsg without change. Stockinette applied over site for protection. Mr. Cheryle Fuchs was discharged from [place] [address] in stable condition at 1415. He is to return on 21 at 1500 for his next appointment.     Vijay Romeor RN  2021

## 2021-02-19 ENCOUNTER — HOSPITAL ENCOUNTER (OUTPATIENT)
Dept: INFUSION THERAPY | Age: 61
Discharge: HOME | End: 2021-02-19
Payer: MEDICAID

## 2021-02-19 VITALS
HEART RATE: 71 BPM | SYSTOLIC BLOOD PRESSURE: 117 MMHG | RESPIRATION RATE: 16 BRPM | DIASTOLIC BLOOD PRESSURE: 71 MMHG | TEMPERATURE: 98.6 F | OXYGEN SATURATION: 97 %

## 2021-02-19 PROCEDURE — 96375 TX/PRO/DX INJ NEW DRUG ADDON: CPT

## 2021-02-19 PROCEDURE — 74011000250 HC RX REV CODE- 250: Performed by: INTERNAL MEDICINE

## 2021-02-19 PROCEDURE — 74011000258 HC RX REV CODE- 258: Performed by: INTERNAL MEDICINE

## 2021-02-19 PROCEDURE — 74011250636 HC RX REV CODE- 250/636: Performed by: INTERNAL MEDICINE

## 2021-02-19 PROCEDURE — 96365 THER/PROPH/DIAG IV INF INIT: CPT

## 2021-02-19 PROCEDURE — 74011250636 HC RX REV CODE- 250/636

## 2021-02-19 RX ORDER — SODIUM CHLORIDE 0.9 % (FLUSH) 0.9 %
10-40 SYRINGE (ML) INJECTION AS NEEDED
Status: DISCONTINUED | OUTPATIENT
Start: 2021-02-19 | End: 2021-02-23 | Stop reason: HOSPADM

## 2021-02-19 RX ORDER — HEPARIN 100 UNIT/ML
500 SYRINGE INTRAVENOUS AS NEEDED
Status: DISCONTINUED | OUTPATIENT
Start: 2021-02-19 | End: 2021-02-23 | Stop reason: HOSPADM

## 2021-02-19 RX ORDER — HEPARIN 100 UNIT/ML
SYRINGE INTRAVENOUS
Status: COMPLETED
Start: 2021-02-19 | End: 2021-02-19

## 2021-02-19 RX ADMIN — Medication 10 ML: at 11:48

## 2021-02-19 RX ADMIN — SODIUM CHLORIDE 1 G: 900 INJECTION INTRAVENOUS at 11:18

## 2021-02-19 RX ADMIN — Medication 20 ML: at 11:53

## 2021-02-19 RX ADMIN — Medication 20 ML: at 11:54

## 2021-02-19 RX ADMIN — HEPARIN 500 UNITS: 100 SYRINGE at 11:53

## 2021-02-19 RX ADMIN — DAPTOMYCIN 650 MG: 500 INJECTION, POWDER, LYOPHILIZED, FOR SOLUTION INTRAVENOUS at 11:49

## 2021-02-19 RX ADMIN — HEPARIN 500 UNITS: 100 SYRINGE at 11:54

## 2021-02-19 RX ADMIN — Medication 10 ML: at 11:14

## 2021-02-19 NOTE — PROGRESS NOTES
[facility] Progress Note Date: 2021 Name: Rad Dickey MRN: [id number] : 1960 Mr. Liz Sow arrived to [place] at 1330 ambulatory. No complaints or concerns voiced Mr. Liz Sow was assessed and education was provided. Mr. Peter Sutton vitals were reviewed. Visit Vitals /71 (BP 1 Location: Right upper arm, BP Patient Position: At rest;Sitting) Pulse 71 Temp 98.6 °F (37 °C) Resp 16 SpO2 97% Pt with RIGHT upper arm double lumen PICC line. Each lumen flushes without difficulty and positive for blood return. Dressing CDI. No redness, bruising, or swelling noted at site and/ or extremity. Pt denies tenderness. Invanz 1 gm  was administered over 30 minutes as ordered via purplelumen followed by normal saline flush. Brisk flush/blood returns after infusion Daptomycin 650 mg given IVP over 2 minutes via purple lumen followed by N.S flush. Brisk blood returns noted. Mr. Liz Sow tolerated well without complaints. Flushed  lumen of pt's PICC line with heparin per order. New curos caps applied to the end of each lumen, and lumens wrapped with coban. Picc line site and drsg without change. Stockinette applied over site for protection. Mr. Liz Sow was discharged from [place] [address] in stable condition at 1155. He is to return on 21 at 0830 for his next appointment. Sulema Castañeda RN 2021

## 2021-02-20 ENCOUNTER — HOME CARE VISIT (OUTPATIENT)
Dept: SCHEDULING | Facility: HOME HEALTH | Age: 61
End: 2021-02-20
Payer: MEDICAID

## 2021-02-20 PROCEDURE — G0299 HHS/HOSPICE OF RN EA 15 MIN: HCPCS

## 2021-02-22 ENCOUNTER — HOME CARE VISIT (OUTPATIENT)
Dept: HOME HEALTH SERVICES | Facility: HOME HEALTH | Age: 61
End: 2021-02-22
Payer: MEDICAID

## 2021-02-22 ENCOUNTER — HOSPITAL ENCOUNTER (OUTPATIENT)
Dept: INFUSION THERAPY | Age: 61
Discharge: HOME | End: 2021-02-22
Payer: MEDICAID

## 2021-02-22 VITALS
SYSTOLIC BLOOD PRESSURE: 125 MMHG | HEART RATE: 82 BPM | DIASTOLIC BLOOD PRESSURE: 76 MMHG | TEMPERATURE: 98 F | RESPIRATION RATE: 16 BRPM | OXYGEN SATURATION: 95 %

## 2021-02-22 LAB
ANION GAP SERPL CALC-SCNC: 7 MMOL/L (ref 3–18)
BASOPHILS # BLD: 0.1 K/UL (ref 0–0.1)
BASOPHILS NFR BLD: 1 % (ref 0–2)
BUN SERPL-MCNC: 21 MG/DL (ref 7–18)
BUN/CREAT SERPL: 14 (ref 12–20)
CALCIUM SERPL-MCNC: 8.7 MG/DL (ref 8.5–10.1)
CHLORIDE SERPL-SCNC: 104 MMOL/L (ref 100–111)
CK SERPL-CCNC: 420 U/L (ref 39–308)
CO2 SERPL-SCNC: 31 MMOL/L (ref 21–32)
CREAT SERPL-MCNC: 1.51 MG/DL (ref 0.6–1.3)
CRP SERPL-MCNC: 2.5 MG/DL (ref 0–0.3)
DIFFERENTIAL METHOD BLD: ABNORMAL
EOSINOPHIL # BLD: 0.2 K/UL (ref 0–0.4)
EOSINOPHIL NFR BLD: 3 % (ref 0–5)
ERYTHROCYTE [DISTWIDTH] IN BLOOD BY AUTOMATED COUNT: 16.1 % (ref 11.6–14.5)
GLUCOSE SERPL-MCNC: 248 MG/DL (ref 74–99)
HCT VFR BLD AUTO: 24.8 % (ref 36–48)
HGB BLD-MCNC: 7.5 G/DL (ref 13–16)
LYMPHOCYTES # BLD: 2.1 K/UL (ref 0.9–3.6)
LYMPHOCYTES NFR BLD: 26 % (ref 21–52)
MCH RBC QN AUTO: 27.4 PG (ref 24–34)
MCHC RBC AUTO-ENTMCNC: 30.2 G/DL (ref 31–37)
MCV RBC AUTO: 90.5 FL (ref 74–97)
MONOCYTES # BLD: 0.5 K/UL (ref 0.05–1.2)
MONOCYTES NFR BLD: 6 % (ref 3–10)
NEUTS SEG # BLD: 5.2 K/UL (ref 1.8–8)
NEUTS SEG NFR BLD: 64 % (ref 40–73)
PLATELET # BLD AUTO: 446 K/UL (ref 135–420)
PLATELET COMMENTS,PCOM: ABNORMAL
PMV BLD AUTO: 10.6 FL (ref 9.2–11.8)
POTASSIUM SERPL-SCNC: 4 MMOL/L (ref 3.5–5.5)
RBC # BLD AUTO: 2.74 M/UL (ref 4.7–5.5)
RBC MORPH BLD: ABNORMAL
SODIUM SERPL-SCNC: 142 MMOL/L (ref 136–145)
WBC # BLD AUTO: 8.1 K/UL (ref 4.6–13.2)

## 2021-02-22 PROCEDURE — 74011250636 HC RX REV CODE- 250/636: Performed by: INTERNAL MEDICINE

## 2021-02-22 PROCEDURE — 96365 THER/PROPH/DIAG IV INF INIT: CPT

## 2021-02-22 PROCEDURE — 77030020847 HC STATLOK BARD -A

## 2021-02-22 PROCEDURE — 74011000258 HC RX REV CODE- 258: Performed by: INTERNAL MEDICINE

## 2021-02-22 PROCEDURE — 82550 ASSAY OF CK (CPK): CPT

## 2021-02-22 PROCEDURE — 80048 BASIC METABOLIC PNL TOTAL CA: CPT

## 2021-02-22 PROCEDURE — 74011000250 HC RX REV CODE- 250: Performed by: INTERNAL MEDICINE

## 2021-02-22 PROCEDURE — 86140 C-REACTIVE PROTEIN: CPT

## 2021-02-22 PROCEDURE — 36415 COLL VENOUS BLD VENIPUNCTURE: CPT

## 2021-02-22 PROCEDURE — 85025 COMPLETE CBC W/AUTO DIFF WBC: CPT

## 2021-02-22 PROCEDURE — 96375 TX/PRO/DX INJ NEW DRUG ADDON: CPT

## 2021-02-22 RX ORDER — HEPARIN 100 UNIT/ML
500 SYRINGE INTRAVENOUS ONCE
Status: COMPLETED | OUTPATIENT
Start: 2021-02-22 | End: 2021-02-22

## 2021-02-22 RX ORDER — SODIUM CHLORIDE 0.9 % (FLUSH) 0.9 %
10-40 SYRINGE (ML) INJECTION AS NEEDED
Status: DISCONTINUED | OUTPATIENT
Start: 2021-02-22 | End: 2021-02-26 | Stop reason: HOSPADM

## 2021-02-22 RX ADMIN — DAPTOMYCIN 650 MG: 500 INJECTION, POWDER, LYOPHILIZED, FOR SOLUTION INTRAVENOUS at 14:53

## 2021-02-22 RX ADMIN — Medication 20 ML: at 14:56

## 2021-02-22 RX ADMIN — HEPARIN 500 UNITS: 100 SYRINGE at 14:57

## 2021-02-22 RX ADMIN — HEPARIN 500 UNITS: 100 SYRINGE at 14:58

## 2021-02-22 RX ADMIN — Medication 20 ML: at 14:14

## 2021-02-22 RX ADMIN — SODIUM CHLORIDE 1 G: 900 INJECTION INTRAVENOUS at 14:17

## 2021-02-22 RX ADMIN — Medication 20 ML: at 14:16

## 2021-02-22 RX ADMIN — Medication 20 ML: at 14:52

## 2021-02-22 NOTE — PROGRESS NOTES
[facility] Progress Note    Date: 2021    Name: Troy Corbett    MRN: [id number]         : 1960      Mr. Shell Langston arrived to [facility] at 1400 ambulatory. No complaints or concerns voiced    Mr. Shell Langston was assessed and education was provided. Mr. Aníabl Cobb vitals were reviewed. Visit Vitals  /76 (BP 1 Location: Left upper arm, BP Patient Position: Sitting)   Pulse 82   Temp 98 °F (36.7 °C)   Resp 16   SpO2 95%       Pt with RIGHT upper arm double lumen PICC line. Each lumen flushes without difficulty and positive for blood return. Dressing CDI. No redness, bruising, or swelling noted at site and/ or extremity. Pt denies tenderness. Dressing/caps changed per protocol. Obtain blood sample from red lumen for weekly labs which where sent to main lab for processing. Invanz 1 gm  was administered over 30 minutes as ordered via red lumen followed by normal saline flush. Brisk flush/blood returns after infusion    Daptomycin 650 mg given IVP over 2 minutes via purple lumen followed by N.S flush. Brisk blood returns noted. Mr. Shell Langston tolerated well without complaints. Flushed  lumen of pt's PICC line with heparin per order. New curos caps applied to the end of each lumen, and lumens wrapped with coban. Stockinette applied over site for protection. Mr. Shell Langston was discharged from [place] [address] in stable condition at 1505. He is to return on 21 at 1430 for his next appointment.     Camille Escudero RN  2021

## 2021-02-23 ENCOUNTER — HOSPITAL ENCOUNTER (OUTPATIENT)
Dept: INFUSION THERAPY | Age: 61
Discharge: HOME | End: 2021-02-23
Payer: MEDICAID

## 2021-02-23 VITALS
SYSTOLIC BLOOD PRESSURE: 121 MMHG | OXYGEN SATURATION: 97 % | TEMPERATURE: 98.6 F | RESPIRATION RATE: 16 BRPM | DIASTOLIC BLOOD PRESSURE: 73 MMHG | HEART RATE: 69 BPM

## 2021-02-23 PROCEDURE — 74011000250 HC RX REV CODE- 250: Performed by: INTERNAL MEDICINE

## 2021-02-23 PROCEDURE — 74011250636 HC RX REV CODE- 250/636: Performed by: INTERNAL MEDICINE

## 2021-02-23 PROCEDURE — 96375 TX/PRO/DX INJ NEW DRUG ADDON: CPT

## 2021-02-23 PROCEDURE — 96365 THER/PROPH/DIAG IV INF INIT: CPT

## 2021-02-23 PROCEDURE — 74011000258 HC RX REV CODE- 258: Performed by: INTERNAL MEDICINE

## 2021-02-23 RX ORDER — SODIUM CHLORIDE 0.9 % (FLUSH) 0.9 %
10-40 SYRINGE (ML) INJECTION AS NEEDED
Status: DISCONTINUED | OUTPATIENT
Start: 2021-02-23 | End: 2021-02-27 | Stop reason: HOSPADM

## 2021-02-23 RX ORDER — HEPARIN 100 UNIT/ML
500 SYRINGE INTRAVENOUS AS NEEDED
Status: DISCONTINUED | OUTPATIENT
Start: 2021-02-23 | End: 2021-02-27 | Stop reason: HOSPADM

## 2021-02-23 RX ADMIN — SODIUM CHLORIDE 1 G: 900 INJECTION INTRAVENOUS at 14:41

## 2021-02-23 RX ADMIN — HEPARIN 500 UNITS: 100 SYRINGE at 15:10

## 2021-02-23 RX ADMIN — Medication 10 ML: at 14:40

## 2021-02-23 RX ADMIN — Medication 20 ML: at 15:10

## 2021-02-23 RX ADMIN — Medication 20 ML: at 15:11

## 2021-02-23 RX ADMIN — Medication 10 ML: at 14:32

## 2021-02-23 RX ADMIN — HEPARIN 500 UNITS: 100 SYRINGE at 15:11

## 2021-02-23 RX ADMIN — DAPTOMYCIN 650 MG: 500 INJECTION, POWDER, LYOPHILIZED, FOR SOLUTION INTRAVENOUS at 14:35

## 2021-02-23 NOTE — PROGRESS NOTES
[facility] Progress Note    Date: 2021    Name: Andreia Rivera    MRN: [id number]         : 1960      Mr. Charles Garcia arrived to [facility] at 1400 ambulatory. No complaints or concerns voiced    Mr. Charles Garcia was assessed and education was provided. Mr. Brenda De vitals were reviewed. Visit Vitals  /73 (BP 1 Location: Left upper arm, BP Patient Position: At rest;Sitting)   Pulse 69   Temp 98.6 °F (37 °C)   Resp 16   SpO2 97%     Mr Charles Garcia reported diarrhea, especially at night. I called Dr Kerry Quinonez and she spoke with this nurse and Mr Charles Garcia. She called a prescription for probiotics to his pharmacy for him to  today. Pt with RIGHT upper arm double lumen PICC line. Each lumen flushes without difficulty and positive for blood return. Dressing CDI. No redness, bruising, or swelling noted at site and/ or extremity. Pt denies tenderness. Invanz 1 gm  was administered over 30 minutes as ordered via red lumen followed by normal saline flush. Brisk flush/blood returns after infusion    Daptomycin 650 mg given IVP over 2 minutes via purple lumen followed by N.S flush. Brisk blood returns noted. Mr. Charles Garcia tolerated well without complaints. Flushed  lumen of pt's PICC line with heparin per order. New curos caps applied to the end of each lumen, and lumens wrapped with coban. Stockinette applied over site for protection. Mr. Charles Garcia was discharged from [place] [address] in stable condition at 1515. He is to return on 21 at 1500 for his next appointment.     Kev Larose RN  2021

## 2021-02-24 ENCOUNTER — HOSPITAL ENCOUNTER (OUTPATIENT)
Dept: INFUSION THERAPY | Age: 61
Discharge: HOME | End: 2021-02-24
Payer: MEDICAID

## 2021-02-24 ENCOUNTER — HOME CARE VISIT (OUTPATIENT)
Dept: SCHEDULING | Facility: HOME HEALTH | Age: 61
End: 2021-02-24
Payer: MEDICAID

## 2021-02-24 VITALS
SYSTOLIC BLOOD PRESSURE: 106 MMHG | HEART RATE: 69 BPM | RESPIRATION RATE: 18 BRPM | OXYGEN SATURATION: 99 % | TEMPERATURE: 98.3 F | DIASTOLIC BLOOD PRESSURE: 68 MMHG

## 2021-02-24 PROCEDURE — 74011000250 HC RX REV CODE- 250: Performed by: INTERNAL MEDICINE

## 2021-02-24 PROCEDURE — 74011250636 HC RX REV CODE- 250/636: Performed by: INTERNAL MEDICINE

## 2021-02-24 PROCEDURE — 96365 THER/PROPH/DIAG IV INF INIT: CPT

## 2021-02-24 PROCEDURE — 74011000258 HC RX REV CODE- 258: Performed by: INTERNAL MEDICINE

## 2021-02-24 PROCEDURE — 96375 TX/PRO/DX INJ NEW DRUG ADDON: CPT

## 2021-02-24 RX ORDER — HEPARIN 100 UNIT/ML
500 SYRINGE INTRAVENOUS ONCE
Status: COMPLETED | OUTPATIENT
Start: 2021-02-24 | End: 2021-02-24

## 2021-02-24 RX ORDER — SODIUM CHLORIDE 0.9 % (FLUSH) 0.9 %
10-40 SYRINGE (ML) INJECTION EVERY 8 HOURS
Status: DISCONTINUED | OUTPATIENT
Start: 2021-02-24 | End: 2021-02-28 | Stop reason: HOSPADM

## 2021-02-24 RX ADMIN — HEPARIN 500 UNITS: 100 SYRINGE at 15:28

## 2021-02-24 RX ADMIN — Medication 20 ML: at 15:27

## 2021-02-24 RX ADMIN — DAPTOMYCIN 650 MG: 500 INJECTION, POWDER, LYOPHILIZED, FOR SOLUTION INTRAVENOUS at 15:26

## 2021-02-24 RX ADMIN — ERTAPENEM SODIUM 1 G: 1 INJECTION, POWDER, LYOPHILIZED, FOR SOLUTION INTRAMUSCULAR; INTRAVENOUS at 14:56

## 2021-02-24 NOTE — PROGRESS NOTES
[facility] Progress Note    Date: 2021    Name: Ramsey Roberts    MRN: [id number]         : 1960      Mr. Cheryle Fuchs arrived to [address] [place] [address] at 1450 ambulatory. Mr. Cheryle Fuchs was assessed and education was provided. Mr. Debbie Simmons vitals were reviewed. Visit Vitals  /68 (BP 1 Location: Left upper arm, BP Patient Position: Sitting)   Pulse 69   Temp 98.3 °F (36.8 °C)   Resp 18   SpO2 99%       Pt with RIGHT upper arm double lumen PICC line. Each lumen flushes without difficulty and positive for blood return. Dressing CDI. No redness, bruising, or swelling noted at site and/ or extremity. Pt denies tenderness. Invanz 1 gm  was administered over 30 minutes as ordered via purple lumen followed by normal saline flush. Brisk flush/blood returns after infusion    Daptomycin 650 mg given IVP over 2 minutes via red lumen followed by N.S flush. Brisk blood returns noted. Mr. Cheryle Fuchs tolerated well without complaints. Flushed  lumen of pt's PICC line with heparin per order. New curos caps applied to the end of each lumen, and lumens wrapped with coban. Picc line site and drsg without change. Stockinette applied over site for protection. Mr. Cheryle Fuchs was discharged from [place] [address] in stable condition at 1530. He is to return on 21 at 1500 for his next appointment.     Binu Mujica RN  2021

## 2021-02-25 ENCOUNTER — HOSPITAL ENCOUNTER (OUTPATIENT)
Dept: INFUSION THERAPY | Age: 61
Discharge: HOME | End: 2021-02-25
Payer: MEDICAID

## 2021-02-25 VITALS
TEMPERATURE: 98.7 F | DIASTOLIC BLOOD PRESSURE: 66 MMHG | OXYGEN SATURATION: 96 % | HEART RATE: 85 BPM | SYSTOLIC BLOOD PRESSURE: 98 MMHG | RESPIRATION RATE: 18 BRPM

## 2021-02-25 PROCEDURE — 74011250636 HC RX REV CODE- 250/636: Performed by: INTERNAL MEDICINE

## 2021-02-25 PROCEDURE — 96365 THER/PROPH/DIAG IV INF INIT: CPT

## 2021-02-25 PROCEDURE — 74011000258 HC RX REV CODE- 258: Performed by: INTERNAL MEDICINE

## 2021-02-25 PROCEDURE — 96375 TX/PRO/DX INJ NEW DRUG ADDON: CPT

## 2021-02-25 PROCEDURE — 74011000250 HC RX REV CODE- 250: Performed by: INTERNAL MEDICINE

## 2021-02-25 RX ORDER — HEPARIN 100 UNIT/ML
500 SYRINGE INTRAVENOUS ONCE
Status: COMPLETED | OUTPATIENT
Start: 2021-02-25 | End: 2021-02-25

## 2021-02-25 RX ORDER — SODIUM CHLORIDE 0.9 % (FLUSH) 0.9 %
10-40 SYRINGE (ML) INJECTION EVERY 8 HOURS
Status: DISCONTINUED | OUTPATIENT
Start: 2021-02-25 | End: 2021-03-01 | Stop reason: HOSPADM

## 2021-02-25 RX ADMIN — Medication 20 ML: at 15:43

## 2021-02-25 RX ADMIN — ERTAPENEM SODIUM 1 G: 1 INJECTION, POWDER, LYOPHILIZED, FOR SOLUTION INTRAMUSCULAR; INTRAVENOUS at 15:12

## 2021-02-25 RX ADMIN — HEPARIN 500 UNITS: 100 SYRINGE at 15:44

## 2021-02-25 RX ADMIN — DAPTOMYCIN 650 MG: 500 INJECTION, POWDER, LYOPHILIZED, FOR SOLUTION INTRAVENOUS at 15:43

## 2021-02-25 NOTE — PROGRESS NOTES
[facility] Progress Note    Date: 2021    Name: Lynsey Watters    MRN: [id number]         : 1960      Mr. Jessie Braun arrived to [facility] at 1500 ambulatory. Pt c/o dizziness in lobby, assisted to chair for his treatment. Mr. Jessie Braun was assessed and education was provided. Mr. Von Davis vitals were reviewed. Visit Vitals  BP 98/66 (BP 1 Location: Right lower arm, BP Patient Position: Sitting)   Pulse 85   Temp 98.7 °F (37.1 °C)   Resp 18   SpO2 96%       Pt with RIGHT upper arm double lumen PICC line. Each lumen flushes without difficulty and positive for blood return. Dressing CDI. No redness, bruising, or swelling noted at site and/ or extremity. Pt denies tenderness. Invanz 1 gm  was administered over 30 minutes as ordered via red lumen followed by normal saline flush. Brisk flush/blood returns after infusion    Daptomycin 650 mg given IVP over 2 minutes via purple lumen followed by N.S flush. Brisk blood returns noted. Mr. Jessie Braun tolerated well without complaints. Flushed  lumen of pt's PICC line with heparin per order. New curos caps applied to the end of each lumen, and lumens wrapped with coban. Picc line site and drsg without change. Stockinette applied over site for protection. After treatment, Mr. Jessie Braun felt dizzy again upon standing. This nurse asked the patient if he had someone that could come pick him up and he insisted that he wanted to drive himself home and states \" I know what it is, my sugar is probably low\". Pt provided with boost shake and cookies. Pt sat for 10 minutes and states \" I feel better, I'll be alright\". Pt insisted that he was okay to drive home and would not let this nurse take him to his vehicle via wheelchair. Pt provided with Boost shakes to take home and ambulated out in no distress. Mr. Jessie Braun was discharged from [place] [address] in stable condition at 1600.   He is to return on 21 at 1500 for his next appointment.     Chen Dinh RN  February 25, 2021

## 2021-02-26 ENCOUNTER — HOME CARE VISIT (OUTPATIENT)
Dept: HOME HEALTH SERVICES | Facility: HOME HEALTH | Age: 61
End: 2021-02-26
Payer: MEDICAID

## 2021-02-26 ENCOUNTER — HOME CARE VISIT (OUTPATIENT)
Dept: SCHEDULING | Facility: HOME HEALTH | Age: 61
End: 2021-02-26
Payer: MEDICAID

## 2021-02-26 ENCOUNTER — HOSPITAL ENCOUNTER (OUTPATIENT)
Dept: INFUSION THERAPY | Age: 61
Discharge: HOME | End: 2021-02-26
Payer: MEDICAID

## 2021-02-26 VITALS
DIASTOLIC BLOOD PRESSURE: 72 MMHG | RESPIRATION RATE: 18 BRPM | HEART RATE: 76 BPM | SYSTOLIC BLOOD PRESSURE: 120 MMHG | OXYGEN SATURATION: 97 % | TEMPERATURE: 97 F

## 2021-02-26 PROCEDURE — 96365 THER/PROPH/DIAG IV INF INIT: CPT

## 2021-02-26 PROCEDURE — 96375 TX/PRO/DX INJ NEW DRUG ADDON: CPT

## 2021-02-26 PROCEDURE — 74011000258 HC RX REV CODE- 258: Performed by: INTERNAL MEDICINE

## 2021-02-26 PROCEDURE — 74011000250 HC RX REV CODE- 250: Performed by: INTERNAL MEDICINE

## 2021-02-26 PROCEDURE — 74011250636 HC RX REV CODE- 250/636: Performed by: INTERNAL MEDICINE

## 2021-02-26 RX ORDER — SODIUM CHLORIDE 0.9 % (FLUSH) 0.9 %
10-40 SYRINGE (ML) INJECTION AS NEEDED
Status: DISCONTINUED | OUTPATIENT
Start: 2021-02-26 | End: 2021-03-02 | Stop reason: HOSPADM

## 2021-02-26 RX ORDER — HEPARIN 100 UNIT/ML
500 SYRINGE INTRAVENOUS ONCE
Status: COMPLETED | OUTPATIENT
Start: 2021-02-26 | End: 2021-02-26

## 2021-02-26 RX ADMIN — Medication 20 ML: at 13:35

## 2021-02-26 RX ADMIN — HEPARIN 500 UNITS: 100 SYRINGE at 14:28

## 2021-02-26 RX ADMIN — DAPTOMYCIN 650 MG: 500 INJECTION, POWDER, LYOPHILIZED, FOR SOLUTION INTRAVENOUS at 14:22

## 2021-02-26 RX ADMIN — Medication 10 ML: at 14:20

## 2021-02-26 RX ADMIN — HEPARIN 500 UNITS: 100 SYRINGE at 14:26

## 2021-02-26 RX ADMIN — Medication 10 ML: at 14:24

## 2021-02-26 RX ADMIN — SODIUM CHLORIDE 1 G: 900 INJECTION INTRAVENOUS at 13:46

## 2021-02-26 NOTE — PROGRESS NOTES
[facility] Progress Note    Date: 2021    Name: Reyes Spears    MRN: [id number]         : 1960      Mr. Piper Ortiz arrived to [facility] at [phone] ambulatory. Pt has no complaints at this time. Mr. Piper Ortiz was assessed and education was provided. Mr. Magaly Connelly vitals were reviewed. Visit Vitals  /72 (BP 1 Location: Left upper arm, BP Patient Position: Sitting)   Pulse 76   Temp 97 °F (36.1 °C)   Resp 18   SpO2 97%       Pt with RIGHT upper arm double lumen PICC line. Each lumen flushes without difficulty and positive for blood return. Dressing CDI. No redness, bruising, or swelling noted at site and/ or extremity. Pt denies tenderness. Invanz 1 gm  was administered over 30 minutes as ordered via red lumen followed by normal saline flush. Brisk flush/blood returns after infusion    Daptomycin 650 mg given IVP over 2 minutes via purple lumen followed by N.S flush. Brisk blood returns noted. Mr. Piper rOtiz tolerated well without complaints. Flushed  lumen of pt's PICC line with heparin per order. New curos caps applied to the end of each lumen, and lumens wrapped with coban. Picc line site and drsg without change. Stockinette applied over site for protection. Pt tolerated infusion well and has no complaints at this time    After treatment, Mr. Piper Ortiz felt dizzy again upon standing. This nurse asked the patient if he had someone that could come pick him up and he insisted that he wanted to drive himself home and states \" I know what it is, my sugar is probably low\". Pt provided with boost shake and cookies. Pt sat for 10 minutes and states \" I feel better, I'll be alright\". Pt insisted that he was okay to drive home and would not let this nurse take him to his vehicle via wheelchair. Pt provided with Boost shakes to take home and ambulated out in no distress. Mr. Piper Ortiz was discharged from [place] [address] at 1430 in stable condition.    He is to return on 2/27/21 at 0830 for his next appointment. Did remind pt that his appointment is in the A.M.     Megan Sawyer RN  February 26, 2021

## 2021-02-27 ENCOUNTER — HOSPITAL ENCOUNTER (OUTPATIENT)
Dept: INFUSION THERAPY | Age: 61
Discharge: HOME | End: 2021-02-27
Payer: MEDICAID

## 2021-02-27 VITALS
SYSTOLIC BLOOD PRESSURE: 101 MMHG | OXYGEN SATURATION: 96 % | TEMPERATURE: 99.3 F | RESPIRATION RATE: 18 BRPM | DIASTOLIC BLOOD PRESSURE: 56 MMHG | HEART RATE: 60 BPM

## 2021-02-27 PROCEDURE — 96365 THER/PROPH/DIAG IV INF INIT: CPT

## 2021-02-27 PROCEDURE — 96375 TX/PRO/DX INJ NEW DRUG ADDON: CPT

## 2021-02-27 PROCEDURE — 74011250636 HC RX REV CODE- 250/636: Performed by: INTERNAL MEDICINE

## 2021-02-27 PROCEDURE — 96374 THER/PROPH/DIAG INJ IV PUSH: CPT

## 2021-02-27 PROCEDURE — 74011000258 HC RX REV CODE- 258: Performed by: INTERNAL MEDICINE

## 2021-02-27 PROCEDURE — 74011000250 HC RX REV CODE- 250: Performed by: INTERNAL MEDICINE

## 2021-02-27 RX ORDER — HEPARIN 100 UNIT/ML
SYRINGE INTRAVENOUS
Status: DISPENSED
Start: 2021-02-27 | End: 2021-02-27

## 2021-02-27 RX ORDER — HEPARIN 100 UNIT/ML
500 SYRINGE INTRAVENOUS ONCE
Status: COMPLETED | OUTPATIENT
Start: 2021-02-27 | End: 2021-02-27

## 2021-02-27 RX ORDER — SODIUM CHLORIDE 0.9 % (FLUSH) 0.9 %
10-40 SYRINGE (ML) INJECTION EVERY 8 HOURS
Status: DISCONTINUED | OUTPATIENT
Start: 2021-02-27 | End: 2021-03-03 | Stop reason: HOSPADM

## 2021-02-27 RX ORDER — HEPARIN 100 UNIT/ML
SYRINGE INTRAVENOUS
Status: DISCONTINUED
Start: 2021-02-27 | End: 2021-02-27 | Stop reason: HOSPADM

## 2021-02-27 RX ADMIN — Medication 30 ML: at 10:22

## 2021-02-27 RX ADMIN — SODIUM CHLORIDE 1 G: 900 INJECTION INTRAVENOUS at 09:50

## 2021-02-27 RX ADMIN — DAPTOMYCIN 650 MG: 500 INJECTION, POWDER, LYOPHILIZED, FOR SOLUTION INTRAVENOUS at 08:42

## 2021-02-27 RX ADMIN — Medication 20 ML: at 08:42

## 2021-02-27 RX ADMIN — HEPARIN 500 UNITS: 100 SYRINGE at 10:23

## 2021-02-27 RX ADMIN — HEPARIN 500 UNITS: 100 SYRINGE at 08:42

## 2021-02-27 NOTE — PROGRESS NOTES
[facility] Progress Note    Date: 2021    Name: Amelia Pérez    MRN: [id number]         : 1960      Mr. Orion Contreras arrived to [facility] at [id number] ambulatory. Pt has no complaints at this time. Patient verbalized he will not be here tomorrow 21 for treatment. Patient stated \"I notified them (referring to staff yesterday and MD). \"      Mr. Orion Contreras was assessed and education was provided. Mr. Stormy Dorsey vitals were reviewed. Visit Vitals  BP (!) 101/56 (BP 1 Location: Left upper arm, BP Patient Position: Sitting)   Pulse 60   Temp 99.3 °F (37.4 °C)   Resp 18   SpO2 96%       Pt with RIGHT upper arm double lumen PICC line. Each lumen flushes without difficulty and positive for blood return. Dressing CDI. No redness, bruising, or swelling noted at site and/ or extremity. Pt denies tenderness. Daptomycin 650 mg given IVP over 2 minutes via purple lumen followed by N.S flush. Brisk blood returns noted. Delay in care due to getting med out of medication system. Invanz 1 gm  was administered over 30 minutes as ordered via red lumen followed by normal saline flush. Brisk flush/blood returns after infusion      Mr. Orion Contreras tolerated well without complaints. Flushed  lumen of pt's PICC line with heparin per order. New curos caps applied to the end of each lumen, and lumens wrapped with coban. Picc line site and drsg without change. Stockinette applied over site for protection. Pt tolerated infusion well and has no complaints at this time    Mr. Orion Contreras was discharged from [place] [address] at 1025 in stable condition. He is to return on 21 at 1500 for his next appointment, patient reminded prior to leaving.         Keely Salomon  2021

## 2021-02-28 ENCOUNTER — HOSPITAL ENCOUNTER (OUTPATIENT)
Dept: INFUSION THERAPY | Age: 61
Discharge: HOME | End: 2021-02-28

## 2021-03-01 ENCOUNTER — HOME CARE VISIT (OUTPATIENT)
Dept: HOME HEALTH SERVICES | Facility: HOME HEALTH | Age: 61
End: 2021-03-01
Payer: MEDICAID

## 2021-03-01 ENCOUNTER — HOSPITAL ENCOUNTER (OUTPATIENT)
Dept: INFUSION THERAPY | Age: 61
Discharge: HOME | End: 2021-03-01
Payer: MEDICAID

## 2021-03-01 VITALS
TEMPERATURE: 98.7 F | OXYGEN SATURATION: 95 % | HEART RATE: 71 BPM | SYSTOLIC BLOOD PRESSURE: 117 MMHG | DIASTOLIC BLOOD PRESSURE: 72 MMHG | RESPIRATION RATE: 18 BRPM

## 2021-03-01 LAB
ALBUMIN SERPL-MCNC: 2.5 G/DL (ref 3.4–5)
ALBUMIN/GLOB SERPL: 0.5 {RATIO} (ref 0.8–1.7)
ALP SERPL-CCNC: 212 U/L (ref 45–117)
ALT SERPL-CCNC: 44 U/L (ref 16–61)
ANION GAP SERPL CALC-SCNC: 6 MMOL/L (ref 3–18)
AST SERPL-CCNC: 44 U/L (ref 10–38)
BASOPHILS # BLD: 0 K/UL (ref 0–0.1)
BASOPHILS NFR BLD: 0 % (ref 0–2)
BILIRUB SERPL-MCNC: 0.2 MG/DL (ref 0.2–1)
BUN SERPL-MCNC: 21 MG/DL (ref 7–18)
BUN/CREAT SERPL: 15 (ref 12–20)
CALCIUM SERPL-MCNC: 8.3 MG/DL (ref 8.5–10.1)
CHLORIDE SERPL-SCNC: 103 MMOL/L (ref 100–111)
CO2 SERPL-SCNC: 32 MMOL/L (ref 21–32)
CREAT SERPL-MCNC: 1.37 MG/DL (ref 0.6–1.3)
DIFFERENTIAL METHOD BLD: ABNORMAL
EOSINOPHIL # BLD: 0.4 K/UL (ref 0–0.4)
EOSINOPHIL NFR BLD: 5 % (ref 0–5)
ERYTHROCYTE [DISTWIDTH] IN BLOOD BY AUTOMATED COUNT: 16.9 % (ref 11.6–14.5)
GLOBULIN SER CALC-MCNC: 5.1 G/DL (ref 2–4)
GLUCOSE SERPL-MCNC: 226 MG/DL (ref 74–99)
HCT VFR BLD AUTO: 24.7 % (ref 36–48)
HGB BLD-MCNC: 7.3 G/DL (ref 13–16)
LYMPHOCYTES # BLD: 2 K/UL (ref 0.9–3.6)
LYMPHOCYTES NFR BLD: 25 % (ref 21–52)
MCH RBC QN AUTO: 27.2 PG (ref 24–34)
MCHC RBC AUTO-ENTMCNC: 29.6 G/DL (ref 31–37)
MCV RBC AUTO: 92.2 FL (ref 74–97)
MONOCYTES # BLD: 0.9 K/UL (ref 0.05–1.2)
MONOCYTES NFR BLD: 12 % (ref 3–10)
NEUTS SEG # BLD: 4.6 K/UL (ref 1.8–8)
NEUTS SEG NFR BLD: 58 % (ref 40–73)
PLATELET # BLD AUTO: 379 K/UL (ref 135–420)
PMV BLD AUTO: 11.4 FL (ref 9.2–11.8)
POTASSIUM SERPL-SCNC: 4.3 MMOL/L (ref 3.5–5.5)
PROT SERPL-MCNC: 7.6 G/DL (ref 6.4–8.2)
RBC # BLD AUTO: 2.68 M/UL (ref 4.7–5.5)
SODIUM SERPL-SCNC: 141 MMOL/L (ref 136–145)
WBC # BLD AUTO: 7.8 K/UL (ref 4.6–13.2)

## 2021-03-01 PROCEDURE — 80053 COMPREHEN METABOLIC PANEL: CPT

## 2021-03-01 PROCEDURE — 36415 COLL VENOUS BLD VENIPUNCTURE: CPT

## 2021-03-01 PROCEDURE — 96365 THER/PROPH/DIAG IV INF INIT: CPT

## 2021-03-01 PROCEDURE — 74011000250 HC RX REV CODE- 250: Performed by: INTERNAL MEDICINE

## 2021-03-01 PROCEDURE — 85025 COMPLETE CBC W/AUTO DIFF WBC: CPT

## 2021-03-01 PROCEDURE — 77030020847 HC STATLOK BARD -A

## 2021-03-01 PROCEDURE — 74011000258 HC RX REV CODE- 258: Performed by: INTERNAL MEDICINE

## 2021-03-01 PROCEDURE — 74011250636 HC RX REV CODE- 250/636: Performed by: INTERNAL MEDICINE

## 2021-03-01 PROCEDURE — 36592 COLLECT BLOOD FROM PICC: CPT

## 2021-03-01 RX ORDER — HEPARIN 100 UNIT/ML
500 SYRINGE INTRAVENOUS ONCE
Status: COMPLETED | OUTPATIENT
Start: 2021-03-01 | End: 2021-03-01

## 2021-03-01 RX ORDER — SODIUM CHLORIDE 0.9 % (FLUSH) 0.9 %
10-40 SYRINGE (ML) INJECTION EVERY 8 HOURS
Status: DISCONTINUED | OUTPATIENT
Start: 2021-03-01 | End: 2021-03-05 | Stop reason: HOSPADM

## 2021-03-01 RX ADMIN — SODIUM CHLORIDE 1 G: 900 INJECTION INTRAVENOUS at 15:40

## 2021-03-01 RX ADMIN — DAPTOMYCIN 650 MG: 500 INJECTION, POWDER, LYOPHILIZED, FOR SOLUTION INTRAVENOUS at 15:34

## 2021-03-01 RX ADMIN — Medication 30 ML: at 15:37

## 2021-03-01 RX ADMIN — Medication 20 ML: at 16:12

## 2021-03-01 RX ADMIN — HEPARIN 500 UNITS: 100 SYRINGE at 15:37

## 2021-03-01 RX ADMIN — HEPARIN 500 UNITS: 100 SYRINGE at 16:12

## 2021-03-01 NOTE — PROGRESS NOTES
[facility] Progress Note    Date: 2021    Name: Reilly Maier    MRN: [id number]         : 1960      Mr. Cheyanne Quinonez arrived to [facility] at  ambulatory. No complaints or concerns voiced. Mr. Cheyanne Quinonez was assessed and education was provided. Mr. Emili Broussard vitals were reviewed. Visit Vitals  /72 (BP 1 Location: Left upper arm, BP Patient Position: Sitting)   Pulse 71   Temp 98.7 °F (37.1 °C)   Resp 18   SpO2 95%       Pt with RIGHT upper arm double lumen PICC line. Each lumen flushes without difficulty and positive for blood return. Dressing CDI. No redness, bruising, or swelling noted at site and/ or extremity. Pt denies tenderness. Dressing Care completed per protocol and end caps changed per protocol. Obtained blood sample from red lumen for weekly labs (cbc, cmp and hepatic panel) which were sent to main lab for processing. Invanz 1 gm  was administered over 30 minutes as ordered via purple lumen followed by normal saline flush. Brisk flush/blood returns after infusion. Daptomycin 650 mg given IVP over 2 minutes via red lumen followed by N.S flush. Brisk blood returns noted. Mr. Cheyanne Quinonez tolerated well without complaints. Flushed  lumen of pt's PICC line with heparin per order. New curos caps applied to the end of each lumen, and lumens wrapped with coban. Stockinette applied over site for protection. Mr. Cheyanne Quinonez was discharged from [place] [address] in stable condition at 1615. He is to return on 3/02/21 at 1500 for his next appointment.     Radha Leak  2021

## 2021-03-02 ENCOUNTER — HOSPITAL ENCOUNTER (OUTPATIENT)
Dept: INFUSION THERAPY | Age: 61
Discharge: HOME | End: 2021-03-02
Payer: MEDICAID

## 2021-03-02 VITALS
OXYGEN SATURATION: 94 % | HEART RATE: 66 BPM | DIASTOLIC BLOOD PRESSURE: 66 MMHG | TEMPERATURE: 98 F | SYSTOLIC BLOOD PRESSURE: 107 MMHG | RESPIRATION RATE: 18 BRPM

## 2021-03-02 LAB
CK SERPL-CCNC: 447 U/L (ref 39–308)
CRP SERPL HS-MCNC: >9.5 MG/L

## 2021-03-02 PROCEDURE — 96365 THER/PROPH/DIAG IV INF INIT: CPT

## 2021-03-02 PROCEDURE — 96375 TX/PRO/DX INJ NEW DRUG ADDON: CPT

## 2021-03-02 PROCEDURE — 74011250636 HC RX REV CODE- 250/636: Performed by: INTERNAL MEDICINE

## 2021-03-02 PROCEDURE — 36415 COLL VENOUS BLD VENIPUNCTURE: CPT

## 2021-03-02 PROCEDURE — 74011000250 HC RX REV CODE- 250: Performed by: INTERNAL MEDICINE

## 2021-03-02 PROCEDURE — 86141 C-REACTIVE PROTEIN HS: CPT

## 2021-03-02 PROCEDURE — 74011000258 HC RX REV CODE- 258: Performed by: INTERNAL MEDICINE

## 2021-03-02 PROCEDURE — 82550 ASSAY OF CK (CPK): CPT

## 2021-03-02 RX ORDER — SODIUM CHLORIDE 0.9 % (FLUSH) 0.9 %
10-40 SYRINGE (ML) INJECTION AS NEEDED
Status: DISCONTINUED | OUTPATIENT
Start: 2021-03-02 | End: 2021-03-06 | Stop reason: HOSPADM

## 2021-03-02 RX ORDER — HEPARIN 100 UNIT/ML
500 SYRINGE INTRAVENOUS AS NEEDED
Status: DISCONTINUED | OUTPATIENT
Start: 2021-03-02 | End: 2021-03-06 | Stop reason: HOSPADM

## 2021-03-02 RX ORDER — HEPARIN 100 UNIT/ML
SYRINGE INTRAVENOUS
Status: DISCONTINUED
Start: 2021-03-02 | End: 2021-03-03 | Stop reason: HOSPADM

## 2021-03-02 RX ADMIN — HEPARIN 500 UNITS: 100 SYRINGE at 15:23

## 2021-03-02 RX ADMIN — Medication 30 ML: at 15:13

## 2021-03-02 RX ADMIN — Medication 10 ML: at 15:47

## 2021-03-02 RX ADMIN — DAPTOMYCIN 650 MG: 500 INJECTION, POWDER, LYOPHILIZED, FOR SOLUTION INTRAVENOUS at 15:19

## 2021-03-02 RX ADMIN — Medication 10 ML: at 15:22

## 2021-03-02 RX ADMIN — HEPARIN 500 UNITS: 100 SYRINGE at 15:48

## 2021-03-02 RX ADMIN — Medication 10 ML: at 15:18

## 2021-03-02 RX ADMIN — SODIUM CHLORIDE 1 G: 900 INJECTION INTRAVENOUS at 15:16

## 2021-03-02 NOTE — PROGRESS NOTES
[facility] Progress Note    Date: 2021    Name: Queen Kathy    MRN: [id number]         : 1960    Daptomycin/ Torrey Baires      Mr. Gaby Craig arrived to [facility] at [phone] ambulatory. Pain to right foot 3/10. Took pain medication today. Mr. Gaby Craig was assessed and education was provided. Mr. Rhodia Apley vitals were reviewed. Visit Vitals  /66 (BP 1 Location: Right lower arm, BP Patient Position: At rest)   Pulse 66   Temp 98 °F (36.7 °C)   Resp 18   SpO2 94%       Pt with RIGHT upper arm double lumen PICC line. Each lumen flushes without difficulty and positive for blood return. Blood sample obtained from red lumen for CPK and CRP. Dressing CDI. No redness, bruising, or swelling noted at site and/ or extremity. Pt denies tenderness. Invanz 1 gm  was administered over 30 minutes as ordered via red lumen followed by normal saline flush. Brisk flush/blood returns after infusion    Daptomycin 650 mg given IVP over 2 minutes via purple lumen followed by N.S flush. Brisk blood returns noted. Mr. Gaby Craig tolerated well without complaints. Flushed  lumen of pt's PICC line with heparin per order. New curos caps applied to the end of each lumen, and lumens wrapped with coban. Picc line site and drsg without change. Stockinette applied over site for protection. Mr. Gaby Craig was discharged from [place] [address] in stable condition at 1550. He is to return on 3/3/21 at 1500 for his next appointment.     Jonelle Lora RN  2021

## 2021-03-03 ENCOUNTER — HOSPITAL ENCOUNTER (OUTPATIENT)
Dept: INFUSION THERAPY | Age: 61
Discharge: HOME | End: 2021-03-03
Payer: MEDICAID

## 2021-03-03 ENCOUNTER — HOME CARE VISIT (OUTPATIENT)
Dept: SCHEDULING | Facility: HOME HEALTH | Age: 61
End: 2021-03-03
Payer: MEDICAID

## 2021-03-03 VITALS
DIASTOLIC BLOOD PRESSURE: 75 MMHG | SYSTOLIC BLOOD PRESSURE: 121 MMHG | TEMPERATURE: 98 F | HEART RATE: 73 BPM | OXYGEN SATURATION: 95 % | RESPIRATION RATE: 16 BRPM

## 2021-03-03 PROCEDURE — 74011000258 HC RX REV CODE- 258: Performed by: INTERNAL MEDICINE

## 2021-03-03 PROCEDURE — 74011250636 HC RX REV CODE- 250/636: Performed by: INTERNAL MEDICINE

## 2021-03-03 PROCEDURE — 74011250636 HC RX REV CODE- 250/636

## 2021-03-03 PROCEDURE — 96375 TX/PRO/DX INJ NEW DRUG ADDON: CPT

## 2021-03-03 PROCEDURE — 96365 THER/PROPH/DIAG IV INF INIT: CPT

## 2021-03-03 PROCEDURE — 74011000250 HC RX REV CODE- 250: Performed by: INTERNAL MEDICINE

## 2021-03-03 RX ORDER — HEPARIN 100 UNIT/ML
SYRINGE INTRAVENOUS
Status: COMPLETED
Start: 2021-03-03 | End: 2021-03-03

## 2021-03-03 RX ORDER — HEPARIN 100 UNIT/ML
500 SYRINGE INTRAVENOUS AS NEEDED
Status: DISCONTINUED | OUTPATIENT
Start: 2021-03-03 | End: 2021-03-07 | Stop reason: HOSPADM

## 2021-03-03 RX ORDER — SODIUM CHLORIDE 0.9 % (FLUSH) 0.9 %
10-40 SYRINGE (ML) INJECTION AS NEEDED
Status: DISCONTINUED | OUTPATIENT
Start: 2021-03-03 | End: 2021-03-07 | Stop reason: HOSPADM

## 2021-03-03 RX ADMIN — Medication 20 ML: at 12:19

## 2021-03-03 RX ADMIN — DAPTOMYCIN 650 MG: 500 INJECTION, POWDER, LYOPHILIZED, FOR SOLUTION INTRAVENOUS at 12:17

## 2021-03-03 RX ADMIN — Medication 10 ML: at 11:38

## 2021-03-03 RX ADMIN — SODIUM CHLORIDE 1 G: 900 INJECTION INTRAVENOUS at 11:45

## 2021-03-03 RX ADMIN — Medication 20 ML: at 12:18

## 2021-03-03 RX ADMIN — HEPARIN 500 UNITS: 100 SYRINGE at 12:20

## 2021-03-03 RX ADMIN — Medication 500 UNITS: at 12:19

## 2021-03-03 NOTE — PROGRESS NOTES
[facility] Progress Note    Date: March 3, 2021    Name: Lynsey Watters    MRN: [id number]         : 1960    Daptomycin/ Ángel Barcenas      Mr. Jessie Braun arrived to [facility] at [phone] ambulatory. Pain to right foot 3/10. Took pain medication today. Mr. Jessie Braun was assessed and education was provided. Mr. Von Davis vitals were reviewed. Visit Vitals  /75 (BP 1 Location: Left upper arm, BP Patient Position: Lying)   Pulse 73   Temp 98 °F (36.7 °C)   Resp 16   SpO2 95%       Pt with RIGHT upper arm double lumen PICC line. Each lumen flushes without difficulty and positive for blood return. Dressing CDI. No redness, bruising, or swelling noted at site and/ or extremity. Pt denies tenderness. Invanz 1 gm  was administered over 30 minutes as ordered via purple lumen followed by normal saline flush. Brisk flush/blood returns after infusion    Daptomycin 650 mg given IVP over 2 minutes via red lumen followed by N.S flush. Brisk blood returns noted. Mr. Jessie Braun tolerated well without complaints. Flushed  lumen of pt's PICC line with heparin per order. New curos caps applied to the end of each lumen, and lumens wrapped with coban. Picc line site and drsg without change. Stockinette applied over site for protection. Mr. Jessie Braun was discharged from [place] [address] in stable condition at 1230. He is to return on 3/4/21 at 1500 for his next appointment.     Lorena Ewing RN  March 3, 2021

## 2021-03-03 NOTE — PROGRESS NOTES
[facility] [place] Progress Note    Date: March 3, 2021    Name: Jessica Song    MRN: [id number]         : 1960    Daptomycin/ Gómez Hoose      Mr. Bharath Foreman arrived to [place] at [phone] ambulatory. Pain to right foot 3/10. Took pain medication today. Mr. Bharath Foreman was assessed and education was provided. Mr. Alina Tsai vitals were reviewed. Visit Vitals  /75 (BP 1 Location: Left upper arm, BP Patient Position: Lying)   Pulse 73   Temp 98 °F (36.7 °C)   Resp 16   SpO2 95%       Pt with RIGHT upper arm double lumen PICC line. Each lumen flushes without difficulty and positive for blood return. Blood sample obtained from red lumen for CPK and CRP. Dressing CDI. No redness, bruising, or swelling noted at site and/ or extremity. Pt denies tenderness. Invanz 1 gm  was administered over 30 minutes as ordered via red lumen followed by normal saline flush. Brisk flush/blood returns after infusion    Daptomycin 650 mg given IVP over 2 minutes via purple lumen followed by N.S flush. Brisk blood returns noted. Mr. Bharath Foreman tolerated well without complaints. Flushed  lumen of pt's PICC line with heparin per order. New curos caps applied to the end of each lumen, and lumens wrapped with coban. Picc line site and drsg without change. Stockinette applied over site for protection. Mr. Bharath Foreman was discharged from [place] [address] in stable condition at 1550. He is to return on 3/4/21 at 1500 for his next appointment.     Alessandra Leon RN  March 3, 2021

## 2021-03-05 ENCOUNTER — HOSPITAL ENCOUNTER (OUTPATIENT)
Dept: INFUSION THERAPY | Age: 61
Discharge: HOME | End: 2021-03-05
Payer: MEDICAID

## 2021-03-05 ENCOUNTER — HOME CARE VISIT (OUTPATIENT)
Dept: SCHEDULING | Facility: HOME HEALTH | Age: 61
End: 2021-03-05
Payer: MEDICAID

## 2021-03-05 VITALS
DIASTOLIC BLOOD PRESSURE: 72 MMHG | HEART RATE: 62 BPM | OXYGEN SATURATION: 96 % | TEMPERATURE: 97.7 F | RESPIRATION RATE: 16 BRPM | SYSTOLIC BLOOD PRESSURE: 120 MMHG

## 2021-03-05 PROCEDURE — 96365 THER/PROPH/DIAG IV INF INIT: CPT

## 2021-03-05 PROCEDURE — 74011000250 HC RX REV CODE- 250: Performed by: INTERNAL MEDICINE

## 2021-03-05 PROCEDURE — 74011250636 HC RX REV CODE- 250/636: Performed by: INTERNAL MEDICINE

## 2021-03-05 PROCEDURE — 74011000258 HC RX REV CODE- 258: Performed by: INTERNAL MEDICINE

## 2021-03-05 PROCEDURE — 96375 TX/PRO/DX INJ NEW DRUG ADDON: CPT

## 2021-03-05 PROCEDURE — 74011250636 HC RX REV CODE- 250/636

## 2021-03-05 RX ORDER — HEPARIN 100 UNIT/ML
500 SYRINGE INTRAVENOUS AS NEEDED
Status: DISCONTINUED | OUTPATIENT
Start: 2021-03-05 | End: 2021-03-09 | Stop reason: HOSPADM

## 2021-03-05 RX ORDER — HEPARIN 100 UNIT/ML
SYRINGE INTRAVENOUS
Status: COMPLETED
Start: 2021-03-05 | End: 2021-03-05

## 2021-03-05 RX ORDER — SODIUM CHLORIDE 0.9 % (FLUSH) 0.9 %
10-40 SYRINGE (ML) INJECTION AS NEEDED
Status: DISCONTINUED | OUTPATIENT
Start: 2021-03-05 | End: 2021-03-09 | Stop reason: HOSPADM

## 2021-03-05 RX ADMIN — SODIUM CHLORIDE 1 G: 900 INJECTION INTRAVENOUS at 15:16

## 2021-03-05 RX ADMIN — Medication 10 ML: at 15:46

## 2021-03-05 RX ADMIN — Medication 10 ML: at 15:21

## 2021-03-05 RX ADMIN — HEPARIN 500 UNITS: 100 SYRINGE at 15:47

## 2021-03-05 RX ADMIN — Medication 10 ML: at 15:13

## 2021-03-05 RX ADMIN — Medication 10 ML: at 15:11

## 2021-03-05 RX ADMIN — DAPTOMYCIN 650 MG: 500 INJECTION, POWDER, LYOPHILIZED, FOR SOLUTION INTRAVENOUS at 15:18

## 2021-03-05 RX ADMIN — HEPARIN 500 UNITS: 100 SYRINGE at 15:22

## 2021-03-05 NOTE — PROGRESS NOTES
[facility] Progress Note    Date: 2021    Name: Nyasia Degroot    MRN: [id number]         : 1960    Daptomycin/ Allen Hamlin      Mr. Cassius Pedro arrived to [facility] at 1500 ambulatory. No pain or concerns voiced    Mr. Cassius Pedro was assessed and education was provided. Mr. Daily Rogers vitals were reviewed. Visit Vitals  /72 (BP 1 Location: Left upper arm, BP Patient Position: At rest)   Pulse 62   Temp 97.7 °F (36.5 °C)   Resp 16   SpO2 96%       Pt with RIGHT upper arm double lumen PICC line. Each lumen flushes without difficulty and positive for blood return. Blood sample obtained from red lumen for CPK and CRP. Dressing CDI. No redness, bruising, or swelling noted at site and/ or extremity. Pt denies tenderness. Invanz 1 gm  was administered over 30 minutes as ordered via red lumen followed by normal saline flush. Brisk flush/blood returns after infusion    Daptomycin 650 mg given IVP over 2 minutes via purple lumen followed by N.S flush. Brisk blood returns noted. Mr. Cassius Pedro tolerated well without complaints. Flushed  lumen of pt's PICC line with heparin per order. New curos caps applied to the end of each lumen, and lumens wrapped with coban. Picc line site and drsg without change. Stockinette applied over site for protection. Mr. Cassius Pedro was discharged from [place] [address] in stable condition at 1550. He is to return on 3/6/21 at 0830 for his next appointment.     Stacy Salamanca RN  2021

## 2021-03-06 ENCOUNTER — HOSPITAL ENCOUNTER (OUTPATIENT)
Dept: INFUSION THERAPY | Age: 61
Discharge: HOME | End: 2021-03-06
Payer: MEDICAID

## 2021-03-06 VITALS
SYSTOLIC BLOOD PRESSURE: 130 MMHG | RESPIRATION RATE: 20 BRPM | TEMPERATURE: 98.8 F | HEART RATE: 76 BPM | DIASTOLIC BLOOD PRESSURE: 81 MMHG | OXYGEN SATURATION: 98 %

## 2021-03-06 PROCEDURE — 96375 TX/PRO/DX INJ NEW DRUG ADDON: CPT

## 2021-03-06 PROCEDURE — 74011000258 HC RX REV CODE- 258: Performed by: INTERNAL MEDICINE

## 2021-03-06 PROCEDURE — 74011250636 HC RX REV CODE- 250/636

## 2021-03-06 PROCEDURE — 74011250636 HC RX REV CODE- 250/636: Performed by: INTERNAL MEDICINE

## 2021-03-06 PROCEDURE — 74011000250 HC RX REV CODE- 250: Performed by: INTERNAL MEDICINE

## 2021-03-06 PROCEDURE — 96365 THER/PROPH/DIAG IV INF INIT: CPT

## 2021-03-06 RX ORDER — SODIUM CHLORIDE 0.9 % (FLUSH) 0.9 %
10-40 SYRINGE (ML) INJECTION AS NEEDED
Status: DISCONTINUED | OUTPATIENT
Start: 2021-03-06 | End: 2021-03-10 | Stop reason: HOSPADM

## 2021-03-06 RX ORDER — HEPARIN 100 UNIT/ML
500 SYRINGE INTRAVENOUS AS NEEDED
Status: DISCONTINUED | OUTPATIENT
Start: 2021-03-06 | End: 2021-03-10 | Stop reason: HOSPADM

## 2021-03-06 RX ORDER — HEPARIN 100 UNIT/ML
SYRINGE INTRAVENOUS
Status: COMPLETED
Start: 2021-03-06 | End: 2021-03-06

## 2021-03-06 RX ADMIN — HEPARIN 500 UNITS: 100 SYRINGE at 09:20

## 2021-03-06 RX ADMIN — SODIUM CHLORIDE 1 G: 900 INJECTION INTRAVENOUS at 08:50

## 2021-03-06 RX ADMIN — Medication 10 ML: at 08:40

## 2021-03-06 RX ADMIN — DAPTOMYCIN 650 MG: 500 INJECTION, POWDER, LYOPHILIZED, FOR SOLUTION INTRAVENOUS at 08:42

## 2021-03-06 RX ADMIN — Medication 10 ML: at 09:22

## 2021-03-06 RX ADMIN — Medication 10 ML: at 08:45

## 2021-03-06 RX ADMIN — Medication 10 ML: at 08:41

## 2021-03-06 RX ADMIN — Medication 10 ML: at 09:20

## 2021-03-06 RX ADMIN — HEPARIN 500 UNITS: 100 SYRINGE at 09:22

## 2021-03-06 NOTE — PROGRESS NOTES
hospitals Progress Note    Date: 2021    Name: Jing Hodges    MRN: [id number]         : 1960    Mr. Erik Quinonez arrived in the [facility] today at [phone], in stable condition, here for Daily IV Daptomycin & Invanz Antibiotic Therapies (daily until 3-19-21). He was assessed and education was provided. Mr. Gemma Jones vitals were reviewed. Visit Vitals  /81 (BP 1 Location: Left upper arm, BP Patient Position: Sitting)   Pulse 76   Temp 98.8 °F (37.1 °C)   Resp 20   SpO2 98%           Weekly PICC Care & Labs Due Every Monday. (NOT Due Today). His Right upper arm double lumen PICC line was noted to be dry and intact. Both lumens flushed very easily with NS & both had a very brisk blood return. Daptomycin (CUBICIN) 650 mg IV was administered over approximately 2 minutes per order and without incident. INVANZ (ertapenem) 1 gram IV was administered over approximately 30 minutes, per order, and without incident. After completion of both antibiotics, both lumens of his PICC line were flushed very well per policy with NS & Heparin, and the PICC line was left completely dry and intact. .    Mr. Erik Quinonez tolerated treatment very well today, and voiced no complaints. Mr. Erik Quinonez was discharged from [place] [address] in stable condition at 0930. He is to return on tomorrow, , 3-7-21 at 0830, for his next appointment, for IV Daptomycin & Invanz antibiotic therapies.      Chani Maher RN  2021  9:08 AM

## 2021-03-07 ENCOUNTER — HOSPITAL ENCOUNTER (OUTPATIENT)
Dept: INFUSION THERAPY | Age: 61
Discharge: HOME | End: 2021-03-07

## 2021-03-07 NOTE — PROGRESS NOTES
No show no call;  Called patient's contact number in computer system and mailbox was full   Provided patient 30 minutes gap time after scheduled appointment

## 2021-03-08 ENCOUNTER — HOME CARE VISIT (OUTPATIENT)
Dept: HOME HEALTH SERVICES | Facility: HOME HEALTH | Age: 61
End: 2021-03-08
Payer: MEDICAID

## 2021-03-08 ENCOUNTER — HOSPITAL ENCOUNTER (OUTPATIENT)
Dept: INFUSION THERAPY | Age: 61
Discharge: HOME | End: 2021-03-08
Payer: MEDICAID

## 2021-03-08 VITALS
TEMPERATURE: 98 F | DIASTOLIC BLOOD PRESSURE: 74 MMHG | SYSTOLIC BLOOD PRESSURE: 116 MMHG | HEART RATE: 77 BPM | RESPIRATION RATE: 16 BRPM | OXYGEN SATURATION: 98 %

## 2021-03-08 LAB
ANION GAP SERPL CALC-SCNC: 5 MMOL/L (ref 3–18)
BASO+EOS+MONOS # BLD AUTO: 0.6 K/UL (ref 0–2.3)
BASO+EOS+MONOS NFR BLD AUTO: 5 % (ref 0.1–17)
BUN SERPL-MCNC: 14 MG/DL (ref 7–18)
BUN/CREAT SERPL: 13 (ref 12–20)
CALCIUM SERPL-MCNC: 8.8 MG/DL (ref 8.5–10.1)
CHLORIDE SERPL-SCNC: 105 MMOL/L (ref 100–111)
CK SERPL-CCNC: 147 U/L (ref 39–308)
CO2 SERPL-SCNC: 30 MMOL/L (ref 21–32)
CREAT SERPL-MCNC: 1.07 MG/DL (ref 0.6–1.3)
CRP SERPL-MCNC: 0.8 MG/DL (ref 0–0.3)
DIFFERENTIAL METHOD BLD: ABNORMAL
ERYTHROCYTE [DISTWIDTH] IN BLOOD BY AUTOMATED COUNT: 16.7 % (ref 11.5–14.5)
GLUCOSE SERPL-MCNC: 144 MG/DL (ref 74–99)
HCT VFR BLD AUTO: 29.8 % (ref 36–48)
HGB BLD-MCNC: 9 G/DL (ref 12–16)
LYMPHOCYTES # BLD: 2.1 K/UL (ref 1.1–5.9)
LYMPHOCYTES NFR BLD: 20 % (ref 14–44)
MAGNESIUM SERPL-MCNC: 1.6 MG/DL (ref 1.6–2.6)
MCH RBC QN AUTO: 27.7 PG (ref 25–35)
MCHC RBC AUTO-ENTMCNC: 30.2 G/DL (ref 31–37)
MCV RBC AUTO: 91.7 FL (ref 78–102)
NEUTS SEG # BLD: 7.5 K/UL (ref 1.8–9.5)
NEUTS SEG NFR BLD: 74 % (ref 40–70)
PLATELET # BLD AUTO: 437 K/UL (ref 135–420)
POTASSIUM SERPL-SCNC: 4.2 MMOL/L (ref 3.5–5.5)
RBC # BLD AUTO: 3.25 M/UL (ref 4.1–5.1)
SODIUM SERPL-SCNC: 140 MMOL/L (ref 136–145)
WBC # BLD AUTO: 10.2 K/UL (ref 4.5–13)

## 2021-03-08 PROCEDURE — 74011250636 HC RX REV CODE- 250/636: Performed by: INTERNAL MEDICINE

## 2021-03-08 PROCEDURE — 86140 C-REACTIVE PROTEIN: CPT

## 2021-03-08 PROCEDURE — 96365 THER/PROPH/DIAG IV INF INIT: CPT

## 2021-03-08 PROCEDURE — 83735 ASSAY OF MAGNESIUM: CPT

## 2021-03-08 PROCEDURE — 77030020847 HC STATLOK BARD -A

## 2021-03-08 PROCEDURE — 36415 COLL VENOUS BLD VENIPUNCTURE: CPT

## 2021-03-08 PROCEDURE — 96375 TX/PRO/DX INJ NEW DRUG ADDON: CPT

## 2021-03-08 PROCEDURE — 85025 COMPLETE CBC W/AUTO DIFF WBC: CPT

## 2021-03-08 PROCEDURE — 74011000250 HC RX REV CODE- 250: Performed by: INTERNAL MEDICINE

## 2021-03-08 PROCEDURE — 85049 AUTOMATED PLATELET COUNT: CPT

## 2021-03-08 PROCEDURE — 80048 BASIC METABOLIC PNL TOTAL CA: CPT

## 2021-03-08 PROCEDURE — 74011000258 HC RX REV CODE- 258: Performed by: INTERNAL MEDICINE

## 2021-03-08 PROCEDURE — 82550 ASSAY OF CK (CPK): CPT

## 2021-03-08 RX ORDER — SODIUM CHLORIDE 0.9 % (FLUSH) 0.9 %
5-10 SYRINGE (ML) INJECTION AS NEEDED
Status: DISCONTINUED | OUTPATIENT
Start: 2021-03-08 | End: 2021-03-12 | Stop reason: HOSPADM

## 2021-03-08 RX ORDER — HEPARIN 100 UNIT/ML
500 SYRINGE INTRAVENOUS ONCE
Status: COMPLETED | OUTPATIENT
Start: 2021-03-08 | End: 2021-03-08

## 2021-03-08 RX ADMIN — HEPARIN 500 UNITS: 100 SYRINGE at 15:45

## 2021-03-08 RX ADMIN — DAPTOMYCIN 650 MG: 500 INJECTION, POWDER, LYOPHILIZED, FOR SOLUTION INTRAVENOUS at 15:04

## 2021-03-08 RX ADMIN — SODIUM CHLORIDE 1 G: 900 INJECTION INTRAVENOUS at 15:15

## 2021-03-08 NOTE — PROGRESS NOTES
\[facility] [place] [facility]\"" Progress Note    Date: 2021    Name: Matilde Mishra    MRN: [id number]         : 1960    Mr. Mili Salas arrived in the [facility] today at 1500, in stable condition, here for Daily IV Daptomycin & Invanz Antibiotic Therapies (daily until 3-19-21). He was assessed and education was provided. Mr. Deshaun Singleton vitals were reviewed. Visit Vitals  /74 (BP 1 Location: Left arm)   Pulse 77   Temp 98 °F (36.7 °C)   Resp 16   SpO2 98%       His Right upper arm double lumen PICC line was noted to be dry and intact. Both lumens flushed very easily with NS & both had a very brisk blood return. Weekly labs drawn per protocol per order and sent to lab without complication. PICC dressing change completed, new biopatch, statlock, microclaves applied per protocol using sterile technique without complication. Daptomycin (CUBICIN) 650 mg IV was administered over approximately 2 minutes per order and without incident. INVANZ (ertapenem) 1 gram IV was administered over approximately 30 minutes, per order, and without incident. After completion of both antibiotics, both lumens of his PICC line were flushed very well per policy with NS & Heparin, and the PICC line was left completely dry and intact. Mr. Mili Salas tolerated treatment very well today, and voiced no complaints. Mr. Mili Salas was discharged from [place] [address] in stable condition at  1600. He is to return on tomorrow, 3-9-21 at 1500, for his next appointment, for IV Daptomycin & Invanz antibiotic therapies.      Tim Pitts RN  2021  9:08 AM

## 2021-03-09 ENCOUNTER — HOME CARE VISIT (OUTPATIENT)
Dept: SCHEDULING | Facility: HOME HEALTH | Age: 61
End: 2021-03-09
Payer: MEDICAID

## 2021-03-09 ENCOUNTER — HOSPITAL ENCOUNTER (OUTPATIENT)
Dept: INFUSION THERAPY | Age: 61
Discharge: HOME | End: 2021-03-09
Payer: MEDICAID

## 2021-03-09 VITALS
DIASTOLIC BLOOD PRESSURE: 71 MMHG | BODY MASS INDEX: 33.61 KG/M2 | OXYGEN SATURATION: 95 % | SYSTOLIC BLOOD PRESSURE: 111 MMHG | HEART RATE: 84 BPM | TEMPERATURE: 99.2 F | RESPIRATION RATE: 20 BRPM | WEIGHT: 227.6 LBS

## 2021-03-09 LAB
ALBUMIN SERPL-MCNC: 2.9 G/DL (ref 3.4–5)
ALBUMIN/GLOB SERPL: 0.5 {RATIO} (ref 0.8–1.7)
ALP SERPL-CCNC: 199 U/L (ref 45–117)
ALT SERPL-CCNC: 25 U/L (ref 16–61)
AST SERPL-CCNC: 15 U/L (ref 10–38)
BILIRUB DIRECT SERPL-MCNC: 0.1 MG/DL (ref 0–0.2)
BILIRUB SERPL-MCNC: 0.5 MG/DL (ref 0.2–1)
GLOBULIN SER CALC-MCNC: 5.3 G/DL (ref 2–4)
PROT SERPL-MCNC: 8.2 G/DL (ref 6.4–8.2)

## 2021-03-09 PROCEDURE — 74011000250 HC RX REV CODE- 250: Performed by: INTERNAL MEDICINE

## 2021-03-09 PROCEDURE — 74011250636 HC RX REV CODE- 250/636: Performed by: INTERNAL MEDICINE

## 2021-03-09 PROCEDURE — 80076 HEPATIC FUNCTION PANEL: CPT

## 2021-03-09 PROCEDURE — 36415 COLL VENOUS BLD VENIPUNCTURE: CPT

## 2021-03-09 PROCEDURE — 96375 TX/PRO/DX INJ NEW DRUG ADDON: CPT

## 2021-03-09 PROCEDURE — 96365 THER/PROPH/DIAG IV INF INIT: CPT

## 2021-03-09 PROCEDURE — 74011000258 HC RX REV CODE- 258: Performed by: INTERNAL MEDICINE

## 2021-03-09 RX ORDER — HEPARIN 100 UNIT/ML
SYRINGE INTRAVENOUS
Status: DISCONTINUED
Start: 2021-03-09 | End: 2021-03-10 | Stop reason: HOSPADM

## 2021-03-09 RX ORDER — SODIUM CHLORIDE 0.9 % (FLUSH) 0.9 %
10-40 SYRINGE (ML) INJECTION AS NEEDED
Status: DISCONTINUED | OUTPATIENT
Start: 2021-03-09 | End: 2021-03-13 | Stop reason: HOSPADM

## 2021-03-09 RX ORDER — HEPARIN 100 UNIT/ML
500 SYRINGE INTRAVENOUS AS NEEDED
Status: DISCONTINUED | OUTPATIENT
Start: 2021-03-09 | End: 2021-03-13 | Stop reason: HOSPADM

## 2021-03-09 RX ADMIN — Medication 10 ML: at 15:55

## 2021-03-09 RX ADMIN — DAPTOMYCIN 650 MG: 500 INJECTION, POWDER, LYOPHILIZED, FOR SOLUTION INTRAVENOUS at 15:18

## 2021-03-09 RX ADMIN — HEPARIN 500 UNITS: 100 SYRINGE at 15:55

## 2021-03-09 RX ADMIN — Medication 10 ML: at 15:10

## 2021-03-09 RX ADMIN — Medication 10 ML: at 15:56

## 2021-03-09 RX ADMIN — HEPARIN 500 UNITS: 100 SYRINGE at 15:56

## 2021-03-09 RX ADMIN — Medication 10 ML: at 15:20

## 2021-03-09 RX ADMIN — SODIUM CHLORIDE 1 G: 900 INJECTION INTRAVENOUS at 15:25

## 2021-03-09 RX ADMIN — Medication 10 ML: at 15:12

## 2021-03-09 NOTE — PROGRESS NOTES
[facility] [place] [facility] Progress Note    Date: 2021    Name: Madi Adams    MRN: [id number]         : 1960    Mr. Jose Fields arrived in the [facility] today at 1500, in stable condition, here for Daily IV Daptomycin & Invanz Antibiotic Therapies (daily until 3-19-21). He was assessed and education was provided. Upon arrival to the [facility] today, Mr. Jose Fields voiced complaints of new onset pain in his LEFT leg, since yesterday evening. No edema was noted, and no redness or abnormal heat to touch was noted. However, Mr. Jose Fileds was instructed to monitor the pain and report it ASAP to his PCP for further evaluation and treatment, especially if the pain persists or worsens, and he verbalized understanding. Mr. Jodi Ortiz vitals were reviewed. Visit Vitals  /71 (BP 1 Location: Left upper arm, BP Patient Position: Sitting)   Pulse 84   Temp 99.2 °F (37.3 °C)   Resp 20   Wt 103.2 kg (227 lb 9.6 oz)   SpO2 95%   BMI 33.61 kg/m²           Weekly PICC Care & Labs Due Every Monday. (NOT Due Today). His Right upper arm double lumen PICC line was noted to be dry and intact. Both lumens flushed very easily with NS & both had a very brisk blood return. However, even though his weekly labs were drawn on yesterday, the Hepatin Function Panel was accidentally omitted, therefore, per pharmacy request, blood for the ordered Hepatic Function Panel was drawn from the red lumen of his PICC line at 1510, and was sent out by  to the [facility] lab for processing. Daptomycin (CUBICIN) 650 mg IV was administered over approximately 2 minutes per order and without incident. INVANZ (ertapenem) 1 gram IV was administered over approximately 30 minutes, per order, and without incident. After completion of both antibiotics, both lumens of his PICC line were flushed very well per policy with NS & Heparin, and the PICC line was left completely dry and intact.          .    Mr. Jose Fields tolerated treatment very well today, and voiced no further complaints. Mr. Erik Quinonez was discharged from [place] [address] in stable condition at 1600. He is to return on tomorrow, Wednesday, 3-10-21 at 1500, for his next appointment, for IV Daptomycin & Invanz antibiotic therapies.       Chani Maher RN  March 9, 2021  3:00 PM

## 2021-03-10 ENCOUNTER — HOME CARE VISIT (OUTPATIENT)
Dept: HOME HEALTH SERVICES | Facility: HOME HEALTH | Age: 61
End: 2021-03-10
Payer: MEDICAID

## 2021-03-11 ENCOUNTER — HOME CARE VISIT (OUTPATIENT)
Dept: SCHEDULING | Facility: HOME HEALTH | Age: 61
End: 2021-03-11
Payer: MEDICAID

## 2021-03-11 ENCOUNTER — HOSPITAL ENCOUNTER (OUTPATIENT)
Dept: INFUSION THERAPY | Age: 61
Discharge: HOME | End: 2021-03-11
Payer: MEDICAID

## 2021-03-11 VITALS
SYSTOLIC BLOOD PRESSURE: 115 MMHG | DIASTOLIC BLOOD PRESSURE: 71 MMHG | RESPIRATION RATE: 18 BRPM | OXYGEN SATURATION: 96 % | HEART RATE: 73 BPM | TEMPERATURE: 98.6 F

## 2021-03-11 PROCEDURE — 74011000250 HC RX REV CODE- 250: Performed by: INTERNAL MEDICINE

## 2021-03-11 PROCEDURE — 96375 TX/PRO/DX INJ NEW DRUG ADDON: CPT

## 2021-03-11 PROCEDURE — 74011000258 HC RX REV CODE- 258: Performed by: INTERNAL MEDICINE

## 2021-03-11 PROCEDURE — 74011250636 HC RX REV CODE- 250/636: Performed by: INTERNAL MEDICINE

## 2021-03-11 PROCEDURE — G0162 HHC RN E&M PLAN SVS, 15 MIN: HCPCS

## 2021-03-11 PROCEDURE — 96365 THER/PROPH/DIAG IV INF INIT: CPT

## 2021-03-11 RX ORDER — HEPARIN 100 UNIT/ML
SYRINGE INTRAVENOUS
Status: DISCONTINUED
Start: 2021-03-11 | End: 2021-03-12 | Stop reason: HOSPADM

## 2021-03-11 RX ORDER — SODIUM CHLORIDE 0.9 % (FLUSH) 0.9 %
10-40 SYRINGE (ML) INJECTION AS NEEDED
Status: DISCONTINUED | OUTPATIENT
Start: 2021-03-11 | End: 2021-03-15 | Stop reason: HOSPADM

## 2021-03-11 RX ORDER — HEPARIN 100 UNIT/ML
500 SYRINGE INTRAVENOUS AS NEEDED
Status: DISCONTINUED | OUTPATIENT
Start: 2021-03-11 | End: 2021-03-15 | Stop reason: HOSPADM

## 2021-03-11 RX ADMIN — HEPARIN 500 UNITS: 100 SYRINGE at 15:48

## 2021-03-11 RX ADMIN — HEPARIN 500 UNITS: 100 SYRINGE at 15:27

## 2021-03-11 RX ADMIN — Medication 10 ML: at 15:47

## 2021-03-11 RX ADMIN — Medication 10 ML: at 15:26

## 2021-03-11 RX ADMIN — SODIUM CHLORIDE 1 G: 900 INJECTION INTRAVENOUS at 15:17

## 2021-03-11 RX ADMIN — Medication 10 ML: at 15:21

## 2021-03-11 RX ADMIN — Medication 10 ML: at 15:15

## 2021-03-11 RX ADMIN — DAPTOMYCIN 650 MG: 500 INJECTION, POWDER, LYOPHILIZED, FOR SOLUTION INTRAVENOUS at 15:23

## 2021-03-11 NOTE — PROGRESS NOTES
[facility] Progress Note    Date: 2021    Name: Laurel oCrnelius    MRN: [id number]         : 1960    Daptomycin/ Karan Gonzalez      Mr. Viviana Hayden arrived to [facility] at  ambulatory. Pain to right foot 3/10. Took pain medication today. Mr. Viviana Hayden was assessed and education was provided. Mr. Charles Gregg vitals were reviewed. Visit Vitals  /71 (BP 1 Location: Left upper arm, BP Patient Position: At rest)   Pulse 73   Temp 98.6 °F (37 °C)   Resp 18   SpO2 96%       Pt with RIGHT upper arm double lumen PICC line. Each lumen flushes without difficulty and positive for blood return. Dressing CDI. No redness, bruising, or swelling noted at site and/ or extremity. Pt denies tenderness. Invanz 1 gm  was administered over 30 minutes as ordered via red lumen followed by normal saline flush. Brisk flush/blood returns after infusion    Daptomycin 650 mg given IVP over 2 minutes via purple lumen followed by N.S flush. Brisk blood returns noted. Mr. Viviana Hayden tolerated well without complaints. Patient Vitals for the past 4 hrs:   Temp Pulse Resp BP SpO2   21 1545  73 18 115/71 96 %   21 1513 98.6 °F (37 °C) 80 18 104/67 96 %         Flushed  lumen of pt's PICC line with heparin per order. New curos caps applied to the end of each lumen, and lumens wrapped with coban. Picc line site and drsg without change. Stockinette applied over site for protection. Reviewed discharge instructions with patient. He verbalized understanding, and declined printed discharge notes. Mr. Viviana Hayden was discharged from [place] [address] in stable condition at 1550. He is to return on 3/12/21 at 1500 for his next appointment.     Shelley Livingston RN  2021

## 2021-03-12 ENCOUNTER — HOSPITAL ENCOUNTER (OUTPATIENT)
Dept: INFUSION THERAPY | Age: 61
Discharge: HOME | End: 2021-03-12
Payer: MEDICAID

## 2021-03-12 VITALS
RESPIRATION RATE: 20 BRPM | SYSTOLIC BLOOD PRESSURE: 106 MMHG | OXYGEN SATURATION: 94 % | TEMPERATURE: 99.5 F | HEART RATE: 84 BPM | DIASTOLIC BLOOD PRESSURE: 72 MMHG

## 2021-03-12 PROCEDURE — 74011250636 HC RX REV CODE- 250/636: Performed by: INTERNAL MEDICINE

## 2021-03-12 PROCEDURE — 74011000250 HC RX REV CODE- 250: Performed by: INTERNAL MEDICINE

## 2021-03-12 PROCEDURE — 74011000258 HC RX REV CODE- 258: Performed by: INTERNAL MEDICINE

## 2021-03-12 PROCEDURE — 96375 TX/PRO/DX INJ NEW DRUG ADDON: CPT

## 2021-03-12 PROCEDURE — 74011250636 HC RX REV CODE- 250/636

## 2021-03-12 PROCEDURE — 96365 THER/PROPH/DIAG IV INF INIT: CPT

## 2021-03-12 RX ORDER — SODIUM CHLORIDE 0.9 % (FLUSH) 0.9 %
10-40 SYRINGE (ML) INJECTION AS NEEDED
Status: DISCONTINUED | OUTPATIENT
Start: 2021-03-12 | End: 2021-03-16 | Stop reason: HOSPADM

## 2021-03-12 RX ORDER — HEPARIN 100 UNIT/ML
SYRINGE INTRAVENOUS
Status: COMPLETED
Start: 2021-03-12 | End: 2021-03-12

## 2021-03-12 RX ORDER — HEPARIN 100 UNIT/ML
500 SYRINGE INTRAVENOUS AS NEEDED
Status: DISCONTINUED | OUTPATIENT
Start: 2021-03-12 | End: 2021-03-16 | Stop reason: HOSPADM

## 2021-03-12 RX ADMIN — Medication 10 ML: at 15:08

## 2021-03-12 RX ADMIN — SODIUM CHLORIDE 1 G: 900 INJECTION INTRAVENOUS at 15:10

## 2021-03-12 RX ADMIN — Medication 10 ML: at 15:46

## 2021-03-12 RX ADMIN — Medication 10 ML: at 15:02

## 2021-03-12 RX ADMIN — HEPARIN 500 UNITS: 100 SYRINGE at 15:45

## 2021-03-12 RX ADMIN — DAPTOMYCIN 650 MG: 500 INJECTION, POWDER, LYOPHILIZED, FOR SOLUTION INTRAVENOUS at 15:05

## 2021-03-12 RX ADMIN — Medication 10 ML: at 15:00

## 2021-03-12 RX ADMIN — Medication 10 ML: at 15:45

## 2021-03-12 RX ADMIN — HEPARIN 500 UNITS: 100 SYRINGE at 15:46

## 2021-03-12 NOTE — PROGRESS NOTES
[facility] [place] Progress Note    Date: 2021    Name: Mirta Marvin    MRN: [id number]         : 1960    Mr. Barby Sherman arrived in the [facility] today at [phone], in stable condition, here for Daily IV Daptomycin & Invanz Antibiotic Therapies (daily until 3-19-21). He was assessed and education was provided. Mr. Jordan Fernandez vitals were reviewed. Visit Vitals  /72 (BP 1 Location: Left upper arm, BP Patient Position: Sitting)   Pulse 84   Temp 99.5 °F (37.5 °C)   Resp 20   SpO2 94%           Weekly PICC Care & Labs Due Every Monday. (NOT Due Today). His Right upper arm double lumen PICC line was noted to be dry and intact. Both lumens flushed very easily with NS & both had a very brisk blood return. Daptomycin (CUBICIN) 650 mg IV was administered over approximately 2 minutes per order and without incident. INVANZ (ertapenem) 1 gram IV was administered over approximately 30 minutes, per order, and without incident. After completion of both antibiotics, both lumens of his PICC line were flushed very well per policy with NS & Heparin, and the PICC line was left completely dry and intact. .    Mr. Barby Sherman tolerated treatment very well today, and voiced no complaints. Mr. Barby Sherman was discharged from [place] [address] in stable condition at 1550. He is to return on tomorrow, Saturday, 3-13-21 at 0800, for his next appointment, for daily IV Daptomycin & Invanz antibiotic therapies.       Tania Jackson RN  2021   3:00 PM

## 2021-03-13 ENCOUNTER — HOSPITAL ENCOUNTER (OUTPATIENT)
Dept: INFUSION THERAPY | Age: 61
Discharge: HOME | End: 2021-03-13
Payer: MEDICAID

## 2021-03-13 VITALS
OXYGEN SATURATION: 95 % | HEART RATE: 93 BPM | RESPIRATION RATE: 18 BRPM | SYSTOLIC BLOOD PRESSURE: 118 MMHG | TEMPERATURE: 98.4 F | DIASTOLIC BLOOD PRESSURE: 74 MMHG

## 2021-03-13 PROCEDURE — 74011250636 HC RX REV CODE- 250/636: Performed by: INTERNAL MEDICINE

## 2021-03-13 PROCEDURE — 96375 TX/PRO/DX INJ NEW DRUG ADDON: CPT

## 2021-03-13 PROCEDURE — 96365 THER/PROPH/DIAG IV INF INIT: CPT

## 2021-03-13 PROCEDURE — 74011000258 HC RX REV CODE- 258: Performed by: INTERNAL MEDICINE

## 2021-03-13 PROCEDURE — 74011000250 HC RX REV CODE- 250: Performed by: INTERNAL MEDICINE

## 2021-03-13 RX ORDER — HEPARIN 100 UNIT/ML
500 SYRINGE INTRAVENOUS AS NEEDED
Status: DISCONTINUED | OUTPATIENT
Start: 2021-03-13 | End: 2021-03-17 | Stop reason: HOSPADM

## 2021-03-13 RX ORDER — HEPARIN 100 UNIT/ML
SYRINGE INTRAVENOUS
Status: DISPENSED
Start: 2021-03-13 | End: 2021-03-13

## 2021-03-13 RX ORDER — SODIUM CHLORIDE 0.9 % (FLUSH) 0.9 %
10-40 SYRINGE (ML) INJECTION AS NEEDED
Status: DISCONTINUED | OUTPATIENT
Start: 2021-03-13 | End: 2021-03-17 | Stop reason: HOSPADM

## 2021-03-13 RX ADMIN — HEPARIN 500 UNITS: 100 SYRINGE at 08:44

## 2021-03-13 RX ADMIN — Medication 10 ML: at 08:10

## 2021-03-13 RX ADMIN — Medication 10 ML: at 08:43

## 2021-03-13 RX ADMIN — SODIUM CHLORIDE 1 G: 900 INJECTION INTRAVENOUS at 08:12

## 2021-03-13 RX ADMIN — Medication 10 ML: at 08:20

## 2021-03-13 RX ADMIN — Medication 10 ML: at 08:16

## 2021-03-13 RX ADMIN — HEPARIN 500 UNITS: 100 SYRINGE at 08:22

## 2021-03-13 RX ADMIN — DAPTOMYCIN 650 MG: 500 INJECTION, POWDER, LYOPHILIZED, FOR SOLUTION INTRAVENOUS at 08:17

## 2021-03-13 NOTE — PROGRESS NOTES
[facility] Progress Note    Date: 2021    Name: Shahram Mail    MRN: [id number]         : 1960    Daptomycin/ Elkemikki Arnold      Mr. Parth Menjivar arrived to [facility] at 0800 ambulatory. No complaints or concerns voiced    Mr. Parth Menjivar was assessed and education was provided. Mr. Severo Quinonez vitals were reviewed. Visit Vitals  /74 (BP 1 Location: Left upper arm, BP Patient Position: At rest)   Pulse 93   Temp 98.4 °F (36.9 °C)   Resp 18   SpO2 95%       Pt with RIGHT upper arm double lumen PICC line. Each lumen flushes without difficulty and positive for blood return. Dressing CDI. No redness, bruising, or swelling noted at site and/ or extremity. Pt denies tenderness. Invanz 1 gm  was administered over 30 minutes as ordered via red lumen followed by normal saline flush. Brisk flush/blood returns after infusion    Daptomycin 650 mg given IVP over 2 minutes via purple lumen followed by N.S flush. Brisk blood returns noted. Mr. Parth Menjivar tolerated well without complaints. Patient Vitals for the past 4 hrs:   Temp Pulse Resp BP SpO2   21 0842  93 18 118/74    21 0800 98.4 °F (36.9 °C) 81 18 109/69 95 %         Flushed  lumen of pt's PICC line with heparin per order. New curos caps applied to the end of each lumen, and lumens wrapped with coban. Picc line site and drsg without change. Stockinette applied over site for protection. Reviewed discharge instructions with patient. He verbalized understanding, and declined printed discharge notes. Mr. Parth Menjivar was discharged from [place] [address] in stable condition at [address] [place] [address]. He is to return on 3/14/21 at 0800 for his next appointment.     Junior Choi RN  2021

## 2021-03-14 ENCOUNTER — HOSPITAL ENCOUNTER (OUTPATIENT)
Dept: INFUSION THERAPY | Age: 61
Discharge: HOME | End: 2021-03-14
Payer: MEDICAID

## 2021-03-14 VITALS
RESPIRATION RATE: 18 BRPM | TEMPERATURE: 98.1 F | HEART RATE: 73 BPM | OXYGEN SATURATION: 99 % | SYSTOLIC BLOOD PRESSURE: 111 MMHG | DIASTOLIC BLOOD PRESSURE: 71 MMHG

## 2021-03-14 PROCEDURE — 96375 TX/PRO/DX INJ NEW DRUG ADDON: CPT

## 2021-03-14 PROCEDURE — 74011000250 HC RX REV CODE- 250: Performed by: INTERNAL MEDICINE

## 2021-03-14 PROCEDURE — 74011000258 HC RX REV CODE- 258: Performed by: INTERNAL MEDICINE

## 2021-03-14 PROCEDURE — 74011250636 HC RX REV CODE- 250/636: Performed by: INTERNAL MEDICINE

## 2021-03-14 PROCEDURE — 96365 THER/PROPH/DIAG IV INF INIT: CPT

## 2021-03-14 RX ORDER — SODIUM CHLORIDE 0.9 % (FLUSH) 0.9 %
5-10 SYRINGE (ML) INJECTION AS NEEDED
Status: DISCONTINUED | OUTPATIENT
Start: 2021-03-14 | End: 2021-03-18 | Stop reason: HOSPADM

## 2021-03-14 RX ORDER — HEPARIN 100 UNIT/ML
500 SYRINGE INTRAVENOUS ONCE
Status: COMPLETED | OUTPATIENT
Start: 2021-03-14 | End: 2021-03-14

## 2021-03-14 RX ADMIN — HEPARIN 500 UNITS: 100 SYRINGE at 08:52

## 2021-03-14 RX ADMIN — SODIUM CHLORIDE 1 G: 900 INJECTION INTRAVENOUS at 08:17

## 2021-03-14 RX ADMIN — DAPTOMYCIN 650 MG: 500 INJECTION, POWDER, LYOPHILIZED, FOR SOLUTION INTRAVENOUS at 08:12

## 2021-03-14 RX ADMIN — Medication 10 ML: at 08:52

## 2021-03-14 NOTE — PROGRESS NOTES
[facility] Progress Note    Date: 2021    Name: Shahram Mail    MRN: [id number]         : 1960     IV Antibiotics    Mr. Parth Menjivar was assessed and education was provided.    His Right upper arm double lumen PICC line was noted to be dry and intact. Both lumens flushed very easily with NS & both had a very brisk blood return. Mr. Severo Quinonez vitals were reviewed. Visit Vitals  /71 (BP 1 Location: Left upper arm, BP Patient Position: Sitting)   Pulse 73   Temp 98.1 °F (36.7 °C)   Resp 18   SpO2 99%       Daptomycin (CUBICIN) 650 mg IV was administered over approximately 2 minutes per order and without incident.      INVANZ (ertapenem) 1 gram IV was administered over approximately 30 minutes, per order, and without incident.      After completion of both antibiotics, both lumens of his PICC line were flushed very well per policy with NS & Heparin, and the PICC line was left completely dry and intact.      Mr. Esteban tolerated treatment very well today, and voiced no complaints. Mr. Parth Menjivar was discharged from [place] [address] in stable condition at [phone]. He is to return on 3/15/21 at 1500 for his next appointment. Armband removed and shredded.      Zhen Fofana RN  2021  8:30 AM

## 2021-03-15 ENCOUNTER — HOSPITAL ENCOUNTER (OUTPATIENT)
Dept: INFUSION THERAPY | Age: 61
Discharge: HOME | End: 2021-03-15
Payer: MEDICAID

## 2021-03-15 VITALS
TEMPERATURE: 98.3 F | SYSTOLIC BLOOD PRESSURE: 119 MMHG | OXYGEN SATURATION: 95 % | DIASTOLIC BLOOD PRESSURE: 74 MMHG | RESPIRATION RATE: 18 BRPM | HEART RATE: 69 BPM

## 2021-03-15 LAB
ALBUMIN SERPL-MCNC: 3 G/DL (ref 3.4–5)
ALBUMIN/GLOB SERPL: 0.6 {RATIO} (ref 0.8–1.7)
ALP SERPL-CCNC: 244 U/L (ref 45–117)
ALT SERPL-CCNC: 40 U/L (ref 16–61)
ANION GAP SERPL CALC-SCNC: 2 MMOL/L (ref 3–18)
AST SERPL-CCNC: 33 U/L (ref 10–38)
BASOPHILS # BLD: 0.1 K/UL (ref 0–0.1)
BASOPHILS NFR BLD: 1 % (ref 0–2)
BILIRUB SERPL-MCNC: 0.3 MG/DL (ref 0.2–1)
BUN SERPL-MCNC: 20 MG/DL (ref 7–18)
BUN/CREAT SERPL: 14 (ref 12–20)
CALCIUM SERPL-MCNC: 8.2 MG/DL (ref 8.5–10.1)
CHLORIDE SERPL-SCNC: 102 MMOL/L (ref 100–111)
CO2 SERPL-SCNC: 32 MMOL/L (ref 21–32)
CREAT SERPL-MCNC: 1.4 MG/DL (ref 0.6–1.3)
DIFFERENTIAL METHOD BLD: ABNORMAL
EOSINOPHIL # BLD: 0.4 K/UL (ref 0–0.4)
EOSINOPHIL NFR BLD: 4 % (ref 0–5)
ERYTHROCYTE [DISTWIDTH] IN BLOOD BY AUTOMATED COUNT: 15.8 % (ref 11.6–14.5)
GLOBULIN SER CALC-MCNC: 5.1 G/DL (ref 2–4)
GLUCOSE SERPL-MCNC: 260 MG/DL (ref 74–99)
HCT VFR BLD AUTO: 29.3 % (ref 36–48)
HGB BLD-MCNC: 9.1 G/DL (ref 13–16)
LYMPHOCYTES # BLD: 2.1 K/UL (ref 0.9–3.6)
LYMPHOCYTES NFR BLD: 25 % (ref 21–52)
MCH RBC QN AUTO: 27.6 PG (ref 24–34)
MCHC RBC AUTO-ENTMCNC: 31.1 G/DL (ref 31–37)
MCV RBC AUTO: 88.8 FL (ref 74–97)
MONOCYTES # BLD: 0.9 K/UL (ref 0.05–1.2)
MONOCYTES NFR BLD: 11 % (ref 3–10)
NEUTS SEG # BLD: 5.1 K/UL (ref 1.8–8)
NEUTS SEG NFR BLD: 59 % (ref 40–73)
PLATELET # BLD AUTO: 376 K/UL (ref 135–420)
PMV BLD AUTO: 11.1 FL (ref 9.2–11.8)
POTASSIUM SERPL-SCNC: 4 MMOL/L (ref 3.5–5.5)
PROT SERPL-MCNC: 8.1 G/DL (ref 6.4–8.2)
RBC # BLD AUTO: 3.3 M/UL (ref 4.7–5.5)
SODIUM SERPL-SCNC: 136 MMOL/L (ref 136–145)
WBC # BLD AUTO: 8.5 K/UL (ref 4.6–13.2)

## 2021-03-15 PROCEDURE — 96365 THER/PROPH/DIAG IV INF INIT: CPT

## 2021-03-15 PROCEDURE — 96375 TX/PRO/DX INJ NEW DRUG ADDON: CPT

## 2021-03-15 PROCEDURE — 74011250636 HC RX REV CODE- 250/636: Performed by: INTERNAL MEDICINE

## 2021-03-15 PROCEDURE — 74011000258 HC RX REV CODE- 258: Performed by: INTERNAL MEDICINE

## 2021-03-15 PROCEDURE — 36415 COLL VENOUS BLD VENIPUNCTURE: CPT

## 2021-03-15 PROCEDURE — 74011000250 HC RX REV CODE- 250: Performed by: INTERNAL MEDICINE

## 2021-03-15 PROCEDURE — 80053 COMPREHEN METABOLIC PANEL: CPT

## 2021-03-15 PROCEDURE — 85025 COMPLETE CBC W/AUTO DIFF WBC: CPT

## 2021-03-15 RX ORDER — HEPARIN 100 UNIT/ML
500 SYRINGE INTRAVENOUS ONCE
Status: COMPLETED | OUTPATIENT
Start: 2021-03-15 | End: 2021-03-15

## 2021-03-15 RX ORDER — SODIUM CHLORIDE 0.9 % (FLUSH) 0.9 %
10-40 SYRINGE (ML) INJECTION AS NEEDED
Status: DISCONTINUED | OUTPATIENT
Start: 2021-03-15 | End: 2021-03-19 | Stop reason: HOSPADM

## 2021-03-15 RX ADMIN — Medication 10 ML: at 15:00

## 2021-03-15 RX ADMIN — Medication 20 ML: at 15:36

## 2021-03-15 RX ADMIN — Medication 20 ML: at 15:32

## 2021-03-15 RX ADMIN — HEPARIN 500 UNITS: 100 SYRINGE at 15:39

## 2021-03-15 RX ADMIN — HEPARIN 500 UNITS: 100 SYRINGE at 15:38

## 2021-03-15 RX ADMIN — Medication 10 ML: at 14:58

## 2021-03-15 RX ADMIN — ERTAPENEM SODIUM 1 G: 1 INJECTION, POWDER, LYOPHILIZED, FOR SOLUTION INTRAMUSCULAR; INTRAVENOUS at 14:59

## 2021-03-15 RX ADMIN — DAPTOMYCIN 650 MG: 500 INJECTION, POWDER, LYOPHILIZED, FOR SOLUTION INTRAVENOUS at 15:34

## 2021-03-15 NOTE — PROGRESS NOTES
[facility] Progress Note    Date: March 15, 2021    Name: Jocelyn Johnson    MRN: [id number]         : 1960    Daptomycin/ Vickki Lowers      Mr. Yazan Baca arrived to [facility] at [phone] ambulatory. No complaints or concerns voiced    Mr. Yazan Baca was assessed and education was provided. Mr. Lindsay Eller vitals were reviewed. Visit Vitals  /74 (BP 1 Location: Left upper arm, BP Patient Position: Sitting)   Pulse 69   Temp 98.3 °F (36.8 °C)   Resp 18   SpO2 95%       Pt with RIGHT upper arm double lumen PICC line. Each lumen flushes without difficulty and positive for blood return. Dressing CDI. No redness, bruising, or swelling noted at site and/ or extremity. Pt denies tenderness. Obtained CBC/CMP and Hepatic panel and sent to main lab for processing. Invanz 1 gm  was administered over 30 minutes as ordered via red lumen followed by normal saline flush. Brisk flush/blood returns after infusion    Daptomycin 650 mg given IVP over 2 minutes via purple lumen followed by N.S flush. Brisk blood returns noted. Mr. Yazan Baca tolerated well without complaints. Flushed  lumen of pt's PICC line with heparin per order. New curos caps applied to the end of each lumen, and lumens wrapped with coban. Picc line site and drsg without change. Stockinette applied over site for protection. Reviewed discharge instructions with patient. He verbalized understanding, and declined printed discharge notes. Mr. Yazan aBca was discharged from [place] [address] in stable condition at 1540. He is to return on 3/16//21 at 1500 for his next appointment.     Ivis Romero RN  March 15, 2021

## 2021-03-17 ENCOUNTER — HOSPITAL ENCOUNTER (OUTPATIENT)
Dept: INFUSION THERAPY | Age: 61
Discharge: HOME | End: 2021-03-17
Payer: MEDICAID

## 2021-03-17 VITALS
TEMPERATURE: 98.3 F | RESPIRATION RATE: 18 BRPM | DIASTOLIC BLOOD PRESSURE: 79 MMHG | BODY MASS INDEX: 34.11 KG/M2 | HEART RATE: 73 BPM | WEIGHT: 231 LBS | SYSTOLIC BLOOD PRESSURE: 136 MMHG | OXYGEN SATURATION: 96 %

## 2021-03-17 LAB
CK SERPL-CCNC: 200 U/L (ref 39–308)
CRP SERPL HS-MCNC: >9.5 MG/L

## 2021-03-17 PROCEDURE — 74011000250 HC RX REV CODE- 250: Performed by: INTERNAL MEDICINE

## 2021-03-17 PROCEDURE — 96375 TX/PRO/DX INJ NEW DRUG ADDON: CPT

## 2021-03-17 PROCEDURE — 74011250636 HC RX REV CODE- 250/636: Performed by: INTERNAL MEDICINE

## 2021-03-17 PROCEDURE — 86141 C-REACTIVE PROTEIN HS: CPT

## 2021-03-17 PROCEDURE — 96365 THER/PROPH/DIAG IV INF INIT: CPT

## 2021-03-17 PROCEDURE — 36415 COLL VENOUS BLD VENIPUNCTURE: CPT

## 2021-03-17 PROCEDURE — 82550 ASSAY OF CK (CPK): CPT

## 2021-03-17 PROCEDURE — 74011250636 HC RX REV CODE- 250/636

## 2021-03-17 PROCEDURE — 74011000258 HC RX REV CODE- 258: Performed by: INTERNAL MEDICINE

## 2021-03-17 PROCEDURE — 77030020847 HC STATLOK BARD -A

## 2021-03-17 RX ORDER — SODIUM CHLORIDE 0.9 % (FLUSH) 0.9 %
10-40 SYRINGE (ML) INJECTION EVERY 8 HOURS
Status: DISCONTINUED | OUTPATIENT
Start: 2021-03-17 | End: 2021-03-21 | Stop reason: HOSPADM

## 2021-03-17 RX ORDER — HEPARIN 100 UNIT/ML
SYRINGE INTRAVENOUS
Status: COMPLETED
Start: 2021-03-17 | End: 2021-03-17

## 2021-03-17 RX ORDER — HEPARIN 100 UNIT/ML
500 SYRINGE INTRAVENOUS ONCE
Status: COMPLETED | OUTPATIENT
Start: 2021-03-17 | End: 2021-03-17

## 2021-03-17 RX ADMIN — Medication 20 ML: at 15:36

## 2021-03-17 RX ADMIN — HEPARIN 500 UNITS: 100 SYRINGE at 15:37

## 2021-03-17 RX ADMIN — ERTAPENEM SODIUM 1 G: 1 INJECTION, POWDER, LYOPHILIZED, FOR SOLUTION INTRAMUSCULAR; INTRAVENOUS at 15:05

## 2021-03-17 RX ADMIN — Medication 500 UNITS: at 15:37

## 2021-03-17 RX ADMIN — DAPTOMYCIN 650 MG: 500 INJECTION, POWDER, LYOPHILIZED, FOR SOLUTION INTRAVENOUS at 15:01

## 2021-03-17 RX ADMIN — Medication 10 ML: at 15:01

## 2021-03-17 NOTE — PROGRESS NOTES
[facility] Progress Note    Date: 2021    Name: Que Cruz    MRN: [id number]         : 1960    Mr. Halley Wing arrived in the [facility] today at 1450, in stable condition, here for Daily IV Daptomycin & Invanz Antibiotic Therapies (daily until 3-19-21). He was assessed and education was provided. Mr. Vanessa Magdaleno vitals were reviewed. Visit Vitals  /79 (BP 1 Location: Left upper arm, BP Patient Position: Sitting)   Pulse 73   Temp 98.3 °F (36.8 °C)   Resp 18   Wt 104.8 kg (231 lb)   SpO2 96%   BMI 34.11 kg/m²       His Right upper arm double lumen PICC line was noted to be dry and intact. Both lumens flushed very easily with NS & both had a very brisk blood return. CRP and CK drawn per protocol per order and sent to lab without complication. PICC dressing change completed, new biopatch, statlock, microclaves applied per protocol using sterile technique without complication. Daptomycin (CUBICIN) 650 mg IV was administered over approximately 2 minutes per order and without incident. INVANZ (ertapenem) 1 gram IV was administered over approximately 30 minutes, per order, and without incident. After completion of both antibiotics, both lumens of his PICC line were flushed very well per policy with NS & Heparin, and the PICC line was left completely dry and intact. Mr. Halley Wing tolerated treatment very well today, and voiced no complaints. Mr. Halley Wing was discharged from [place] [address] in stable condition at 1550. He is to return on tomorrow, 3-18-21 at 1500, for his next appointment, for IV Daptomycin & Invanz antibiotic therapies.      David Neff RN  2021

## 2021-03-18 ENCOUNTER — HOSPITAL ENCOUNTER (OUTPATIENT)
Dept: INFUSION THERAPY | Age: 61
Discharge: HOME | End: 2021-03-18
Payer: MEDICAID

## 2021-03-18 VITALS
TEMPERATURE: 97.8 F | OXYGEN SATURATION: 96 % | HEART RATE: 63 BPM | DIASTOLIC BLOOD PRESSURE: 77 MMHG | RESPIRATION RATE: 18 BRPM | SYSTOLIC BLOOD PRESSURE: 114 MMHG

## 2021-03-18 PROCEDURE — 74011000250 HC RX REV CODE- 250: Performed by: INTERNAL MEDICINE

## 2021-03-18 PROCEDURE — 74011250636 HC RX REV CODE- 250/636: Performed by: INTERNAL MEDICINE

## 2021-03-18 PROCEDURE — 96375 TX/PRO/DX INJ NEW DRUG ADDON: CPT

## 2021-03-18 PROCEDURE — 96365 THER/PROPH/DIAG IV INF INIT: CPT

## 2021-03-18 PROCEDURE — 74011000258 HC RX REV CODE- 258: Performed by: INTERNAL MEDICINE

## 2021-03-18 RX ORDER — SODIUM CHLORIDE 0.9 % (FLUSH) 0.9 %
10-40 SYRINGE (ML) INJECTION AS NEEDED
Status: DISCONTINUED | OUTPATIENT
Start: 2021-03-18 | End: 2021-03-22 | Stop reason: HOSPADM

## 2021-03-18 RX ORDER — HEPARIN 100 UNIT/ML
SYRINGE INTRAVENOUS
Status: DISCONTINUED
Start: 2021-03-18 | End: 2021-03-19 | Stop reason: HOSPADM

## 2021-03-18 RX ORDER — HEPARIN 100 UNIT/ML
500 SYRINGE INTRAVENOUS AS NEEDED
Status: DISCONTINUED | OUTPATIENT
Start: 2021-03-18 | End: 2021-03-22 | Stop reason: HOSPADM

## 2021-03-18 RX ADMIN — Medication 10 ML: at 15:22

## 2021-03-18 RX ADMIN — DAPTOMYCIN 650 MG: 500 INJECTION, POWDER, LYOPHILIZED, FOR SOLUTION INTRAVENOUS at 15:19

## 2021-03-18 RX ADMIN — HEPARIN 500 UNITS: 100 SYRINGE at 15:53

## 2021-03-18 RX ADMIN — HEPARIN 500 UNITS: 100 SYRINGE at 15:24

## 2021-03-18 RX ADMIN — Medication 10 ML: at 15:52

## 2021-03-18 RX ADMIN — Medication 10 ML: at 15:18

## 2021-03-18 RX ADMIN — Medication 10 ML: at 15:15

## 2021-03-18 RX ADMIN — SODIUM CHLORIDE 1 G: 900 INJECTION INTRAVENOUS at 15:20

## 2021-03-18 NOTE — PROGRESS NOTES
[facility] Progress Note    Date: 2021    Name: Robert Christian    MRN: [id number]         : 1960    Daptomycin/ Ling Chelly      Mr. Liliana Raza arrived to [facility] at 1500 ambulatory. No complaints or concerns voiced    Mr. Liliana Raza was assessed and education was provided. Offered care notes to patient, but he declined since he is an existing patient for Dapto/Invanz, and states he knows about his condition and treatment plan. Also reviewed discharge instructions with patient. He verbalized understanding. Mr. Dianna Vasquez vitals were reviewed. Visit Vitals  /77 (BP 1 Location: Left upper arm, BP Patient Position: At rest)   Pulse 63   Temp 97.8 °F (36.6 °C)   Resp 18   SpO2 96%     I called Dr Neil Dia as requested by her on Tuesday, for her to speak to Mr Liliana Raza about his missed appointments, and to extend his ABX treatment date. She spoke to him, and he agreed to extend his treatment to 3/24/21. I also received a verbal order regarding same. Artist Carrie( pharmacist) and Raquel(psr) made aware      Pt with RIGHT upper arm double lumen PICC line. Each lumen flushes without difficulty and positive for blood return. Dressing CDI. No redness, bruising, or swelling noted at site and/ or extremity. Pt denies tenderness. Invanz 1 gm  was administered over 30 minutes as ordered via red lumen followed by normal saline flush. Brisk flush/blood returns after infusion    Daptomycin 650 mg given IVP over 2 minutes via purple lumen followed by N.S flush. Brisk blood returns noted. Mr. Liliana Raza tolerated well without complaints. Patient Vitals for the past 4 hrs:   Temp Pulse Resp BP SpO2   21 1554  63 18 114/77 96 %   21 1500 97.8 °F (36.6 °C) 70 18 110/65 97 %         Flushed  lumen of pt's PICC line with heparin per order. New curos caps applied to the end of each lumen, and lumens wrapped with coban. Picc line site and drsg without change. Stockinette applied over site for protection.     Reviewed discharge instructions with patient. He verbalized understanding, and declined printed discharge notes. Mr. Viviana Hayden was discharged from [place] [address] in stable condition at [phone]. He is to return on 3/19/21 at 1500 for his next appointment.     Shelley Livingston RN  March 18, 2021

## 2021-03-19 ENCOUNTER — HOSPITAL ENCOUNTER (OUTPATIENT)
Dept: INFUSION THERAPY | Age: 61
Discharge: HOME | End: 2021-03-19
Payer: MEDICAID

## 2021-03-19 VITALS
SYSTOLIC BLOOD PRESSURE: 108 MMHG | TEMPERATURE: 98 F | HEART RATE: 64 BPM | DIASTOLIC BLOOD PRESSURE: 70 MMHG | RESPIRATION RATE: 18 BRPM | OXYGEN SATURATION: 99 %

## 2021-03-19 PROCEDURE — 74011250636 HC RX REV CODE- 250/636: Performed by: INTERNAL MEDICINE

## 2021-03-19 PROCEDURE — 74011000258 HC RX REV CODE- 258: Performed by: INTERNAL MEDICINE

## 2021-03-19 PROCEDURE — 96365 THER/PROPH/DIAG IV INF INIT: CPT

## 2021-03-19 PROCEDURE — 96375 TX/PRO/DX INJ NEW DRUG ADDON: CPT

## 2021-03-19 PROCEDURE — 74011000250 HC RX REV CODE- 250: Performed by: INTERNAL MEDICINE

## 2021-03-19 RX ORDER — HEPARIN 100 UNIT/ML
500 SYRINGE INTRAVENOUS AS NEEDED
Status: DISCONTINUED | OUTPATIENT
Start: 2021-03-19 | End: 2021-03-23 | Stop reason: HOSPADM

## 2021-03-19 RX ORDER — SODIUM CHLORIDE 0.9 % (FLUSH) 0.9 %
5-10 SYRINGE (ML) INJECTION AS NEEDED
Status: DISCONTINUED | OUTPATIENT
Start: 2021-03-19 | End: 2021-03-23 | Stop reason: HOSPADM

## 2021-03-19 RX ADMIN — Medication 10 ML: at 15:33

## 2021-03-19 RX ADMIN — SODIUM CHLORIDE 1 G: 900 INJECTION INTRAVENOUS at 15:00

## 2021-03-19 RX ADMIN — DAPTOMYCIN 650 MG: 500 INJECTION, POWDER, LYOPHILIZED, FOR SOLUTION INTRAVENOUS at 14:50

## 2021-03-19 RX ADMIN — Medication 10 ML: at 14:55

## 2021-03-19 RX ADMIN — HEPARIN 500 UNITS: 100 SYRINGE at 15:34

## 2021-03-19 NOTE — PROGRESS NOTES
[facility] Progress Note    Date: 2021    Name: Darrell Chand    MRN: [id number]         : 1960    Mr. Emelyn Zelaya arrived in the [facility] today at [phone] 90, in stable condition, here for Daily IV Daptomycin & Invanz Antibiotic Therapies. He was assessed and education was provided. Mr. Chente Christianson vitals were reviewed. Visit Vitals  /70 (BP 1 Location: Left arm, BP Patient Position: Sitting)   Pulse 64   Temp 98 °F (36.7 °C)   Resp 18   SpO2 99%       His Right upper arm double lumen PICC line was noted to be dry and intact. Both lumens flushed very easily with NS & both had a very brisk blood return. PICC dressing c/d/i not due to be changed. Both lumens flushed with out difficulties. Daptomycin (CUBICIN) 650 mg IV was administered over approximately 2 minutes per order and without incident. INVANZ (ertapenem) 1 gram IV was administered over approximately 30 minutes, per order, and without incident. After completion of both antibiotics, both lumens of his PICC line were flushed very well per policy with NS & Heparin, and the PICC line was left completely dry and intact. Mr. Emelyn Zelaya tolerated treatment very well today, and voiced no complaints. Mr. Emelyn Zelaya was discharged from [place] [address] in stable condition at [phone]. He is to return on tomorrow, 3-20-21 for his next appointment, for IV Daptomycin & Invanz antibiotic therapies.      Danni Cho RN  2021

## 2021-03-20 ENCOUNTER — HOSPITAL ENCOUNTER (OUTPATIENT)
Dept: INFUSION THERAPY | Age: 61
Discharge: HOME | End: 2021-03-20
Payer: MEDICAID

## 2021-03-20 ENCOUNTER — HOSPITAL ENCOUNTER (OUTPATIENT)
Dept: INFUSION THERAPY | Age: 61
End: 2021-03-20
Payer: MEDICAID

## 2021-03-20 VITALS
HEART RATE: 70 BPM | DIASTOLIC BLOOD PRESSURE: 79 MMHG | TEMPERATURE: 98.1 F | RESPIRATION RATE: 18 BRPM | OXYGEN SATURATION: 100 % | SYSTOLIC BLOOD PRESSURE: 121 MMHG

## 2021-03-20 PROCEDURE — 74011000250 HC RX REV CODE- 250: Performed by: INTERNAL MEDICINE

## 2021-03-20 PROCEDURE — 96375 TX/PRO/DX INJ NEW DRUG ADDON: CPT

## 2021-03-20 PROCEDURE — 96365 THER/PROPH/DIAG IV INF INIT: CPT

## 2021-03-20 PROCEDURE — 74011250636 HC RX REV CODE- 250/636: Performed by: INTERNAL MEDICINE

## 2021-03-20 PROCEDURE — 74011000258 HC RX REV CODE- 258: Performed by: INTERNAL MEDICINE

## 2021-03-20 RX ORDER — HEPARIN 100 UNIT/ML
SYRINGE INTRAVENOUS
Status: DISPENSED
Start: 2021-03-20 | End: 2021-03-20

## 2021-03-20 RX ORDER — HEPARIN 100 UNIT/ML
500 SYRINGE INTRAVENOUS AS NEEDED
Status: DISCONTINUED | OUTPATIENT
Start: 2021-03-20 | End: 2021-03-24 | Stop reason: HOSPADM

## 2021-03-20 RX ORDER — SODIUM CHLORIDE 0.9 % (FLUSH) 0.9 %
10-40 SYRINGE (ML) INJECTION AS NEEDED
Status: DISCONTINUED | OUTPATIENT
Start: 2021-03-20 | End: 2021-03-24 | Stop reason: HOSPADM

## 2021-03-20 RX ADMIN — Medication 10 ML: at 09:05

## 2021-03-20 RX ADMIN — Medication 10 ML: at 09:37

## 2021-03-20 RX ADMIN — SODIUM CHLORIDE 1 G: 900 INJECTION INTRAVENOUS at 09:06

## 2021-03-20 RX ADMIN — HEPARIN 500 UNITS: 100 SYRINGE at 09:38

## 2021-03-20 RX ADMIN — DAPTOMYCIN 650 MG: 500 INJECTION, POWDER, LYOPHILIZED, FOR SOLUTION INTRAVENOUS at 09:16

## 2021-03-20 RX ADMIN — Medication 10 ML: at 09:20

## 2021-03-20 RX ADMIN — HEPARIN 500 UNITS: 100 SYRINGE at 09:21

## 2021-03-20 RX ADMIN — Medication 10 ML: at 09:15

## 2021-03-20 NOTE — PROGRESS NOTES
[facility] Progress Note    Date: 2021    Name: Delvin Diaz    MRN: [id number]         : 1960    Daptomycin/ Michelle Fragmin      Mr. Chantel Conley arrived to [facility] at 7919 ambulatory. No complaints or concerns voiced    Mr. Chantel Conley was assessed and education was provided. Offered care notes to patient, but he declined since he is an existing patient for Dapto/Invanz, and states he knows about his condition and treatment plan. Also reviewed discharge instructions with patient. He verbalized understanding, and declined printed discharge notes. Mr. Eliceo Rinaldi vitals were reviewed. Visit Vitals  /79 (BP 1 Location: Left upper arm, BP Patient Position: At rest)   Pulse 70   Temp 98.1 °F (36.7 °C)   Resp 18   SpO2 100%         Pt with RIGHT upper arm double lumen PICC line. Each lumen flushes without difficulty and positive for blood return. Dressing CDI. No redness, bruising, or swelling noted at site and/ or extremity. Pt denies tenderness. Invanz 1 gm  was administered over 30 minutes as ordered via red lumen followed by normal saline flush. Brisk flush/blood returns after infusion    Daptomycin 650 mg given IVP over 2 minutes via purple lumen followed by N.S flush. Brisk blood returns noted. Mr. Chantel Conley tolerated well without complaints. Patient Vitals for the past 4 hrs:   Temp Pulse Resp BP SpO2   21 0939  70 18 121/79    21 0857 98.1 °F (36.7 °C) 70 18 110/69 100 %         Flushed  lumen of pt's PICC line with heparin per order. New curos caps applied to the end of each lumen, and lumens wrapped with coban. Picc line site and drsg without change. Stockinette applied over site for protection. Arm band removed and shredded    Mr. Chantel Conley was discharged from [place] [address] in stable condition at [address]. He is to return on 3/21/21 at 0900 for his next appointment.     Jenna Cotto RN  2021

## 2021-03-21 ENCOUNTER — HOSPITAL ENCOUNTER (OUTPATIENT)
Dept: INFUSION THERAPY | Age: 61
Discharge: HOME | End: 2021-03-21
Payer: MEDICAID

## 2021-03-21 VITALS
SYSTOLIC BLOOD PRESSURE: 100 MMHG | HEART RATE: 97 BPM | TEMPERATURE: 98.4 F | RESPIRATION RATE: 18 BRPM | DIASTOLIC BLOOD PRESSURE: 68 MMHG

## 2021-03-21 PROCEDURE — 96375 TX/PRO/DX INJ NEW DRUG ADDON: CPT

## 2021-03-21 PROCEDURE — 96365 THER/PROPH/DIAG IV INF INIT: CPT

## 2021-03-21 PROCEDURE — 74011250636 HC RX REV CODE- 250/636: Performed by: INTERNAL MEDICINE

## 2021-03-21 PROCEDURE — 74011000258 HC RX REV CODE- 258: Performed by: INTERNAL MEDICINE

## 2021-03-21 PROCEDURE — 96372 THER/PROPH/DIAG INJ SC/IM: CPT

## 2021-03-21 PROCEDURE — 74011000250 HC RX REV CODE- 250: Performed by: INTERNAL MEDICINE

## 2021-03-21 RX ORDER — SODIUM CHLORIDE 9 MG/ML
25 INJECTION, SOLUTION INTRAVENOUS ONCE
Status: DISPENSED | OUTPATIENT
Start: 2021-03-21 | End: 2021-03-21

## 2021-03-21 RX ADMIN — SODIUM CHLORIDE 1 G: 900 INJECTION INTRAVENOUS at 09:53

## 2021-03-21 RX ADMIN — DAPTOMYCIN 650 MG: 500 INJECTION, POWDER, LYOPHILIZED, FOR SOLUTION INTRAVENOUS at 09:56

## 2021-03-21 NOTE — PROGRESS NOTES
[facility] Progress Note    Date: 2021    Name: Rad Dickey    MRN: [id number]         : 1960    Daptomycin/ Renée Kodiak      Mr. Liz Sow arrived to [facility] at [address] ambulatory. Mr. Liz Sow did not show up on time for his appointment. Once called he stated he could arrive in 15 minutes. This nurse waited for patient to arrive. Reviewed time for his appointment tomorrow. Mr. Liz Sow was assessed and education was provided. Mr. Peter Sutton vitals were reviewed. Visit Vitals  /68 (BP 1 Location: Left arm, BP Patient Position: Sitting)   Pulse 97   Temp 98.4 °F (36.9 °C)   Resp 18         Pt with RIGHT upper arm double lumen PICC line. Each lumen flushes without difficulty and positive for blood return. Dressing CDI. No redness, bruising, or swelling noted at site and/ or extremity. Pt denies tenderness. Invanz 1 gm  was administered over 30 minutes as ordered via purple lumen followed by normal saline flush. Brisk flush/blood returns after infusion    Daptomycin 650 mg given IVP over 2 minutes via red lumen followed by 20 ml normal saline flush. Brisk blood returns noted. New curos caps applied to the end of each lumen, and lumens wrapped with coban. Picc line site and drsg without change. Stockinette applied over site for protection. Mr. Liz Sow tolerated well without complaints. Arm band removed and shredded    Mr. Liz Sow was discharged from [place] [address] in stable condition at 1030. He is to return on 3/22/21 at 1500 for his next appointment.     Elli Watson RN  2021

## 2021-03-22 ENCOUNTER — HOSPITAL ENCOUNTER (OUTPATIENT)
Dept: INFUSION THERAPY | Age: 61
Discharge: HOME | End: 2021-03-22
Payer: MEDICAID

## 2021-03-22 VITALS — BODY MASS INDEX: 34.2 KG/M2 | WEIGHT: 231.6 LBS

## 2021-03-22 LAB
ALBUMIN SERPL-MCNC: 3.2 G/DL (ref 3.4–5)
ALBUMIN/GLOB SERPL: 0.6 {RATIO} (ref 0.8–1.7)
ALP SERPL-CCNC: 190 U/L (ref 45–117)
ALT SERPL-CCNC: 22 U/L (ref 16–61)
ANION GAP SERPL CALC-SCNC: 7 MMOL/L (ref 3–18)
AST SERPL-CCNC: 23 U/L (ref 10–38)
BASOPHILS # BLD: 0 K/UL (ref 0–0.1)
BASOPHILS NFR BLD: 0 % (ref 0–2)
BILIRUB SERPL-MCNC: 0.4 MG/DL (ref 0.2–1)
BUN SERPL-MCNC: 25 MG/DL (ref 7–18)
BUN/CREAT SERPL: 14 (ref 12–20)
CALCIUM SERPL-MCNC: 8.9 MG/DL (ref 8.5–10.1)
CHLORIDE SERPL-SCNC: 98 MMOL/L (ref 100–111)
CK SERPL-CCNC: 346 U/L (ref 39–308)
CO2 SERPL-SCNC: 30 MMOL/L (ref 21–32)
CREAT SERPL-MCNC: 1.75 MG/DL (ref 0.6–1.3)
CRP SERPL-MCNC: 1.9 MG/DL (ref 0–0.3)
DIFFERENTIAL METHOD BLD: ABNORMAL
EOSINOPHIL # BLD: 0.3 K/UL (ref 0–0.4)
EOSINOPHIL NFR BLD: 2 % (ref 0–5)
ERYTHROCYTE [DISTWIDTH] IN BLOOD BY AUTOMATED COUNT: 15.6 % (ref 11.6–14.5)
GLOBULIN SER CALC-MCNC: 5.1 G/DL (ref 2–4)
GLUCOSE SERPL-MCNC: 179 MG/DL (ref 74–99)
HCT VFR BLD AUTO: 30.5 % (ref 36–48)
HGB BLD-MCNC: 9.6 G/DL (ref 13–16)
LYMPHOCYTES # BLD: 3.2 K/UL (ref 0.9–3.6)
LYMPHOCYTES NFR BLD: 27 % (ref 21–52)
MCH RBC QN AUTO: 27.5 PG (ref 24–34)
MCHC RBC AUTO-ENTMCNC: 31.5 G/DL (ref 31–37)
MCV RBC AUTO: 87.4 FL (ref 74–97)
MONOCYTES # BLD: 0.9 K/UL (ref 0.05–1.2)
MONOCYTES NFR BLD: 7 % (ref 3–10)
NEUTS SEG # BLD: 7.5 K/UL (ref 1.8–8)
NEUTS SEG NFR BLD: 64 % (ref 40–73)
PLATELET # BLD AUTO: 355 K/UL (ref 135–420)
PMV BLD AUTO: 11.4 FL (ref 9.2–11.8)
POTASSIUM SERPL-SCNC: 4.2 MMOL/L (ref 3.5–5.5)
PROT SERPL-MCNC: 8.3 G/DL (ref 6.4–8.2)
RBC # BLD AUTO: 3.49 M/UL (ref 4.7–5.5)
SODIUM SERPL-SCNC: 135 MMOL/L (ref 136–145)
WBC # BLD AUTO: 11.9 K/UL (ref 4.6–13.2)

## 2021-03-22 PROCEDURE — 85025 COMPLETE CBC W/AUTO DIFF WBC: CPT

## 2021-03-22 PROCEDURE — 86140 C-REACTIVE PROTEIN: CPT

## 2021-03-22 PROCEDURE — 74011250636 HC RX REV CODE- 250/636: Performed by: INTERNAL MEDICINE

## 2021-03-22 PROCEDURE — 74011000258 HC RX REV CODE- 258: Performed by: INTERNAL MEDICINE

## 2021-03-22 PROCEDURE — 80053 COMPREHEN METABOLIC PANEL: CPT

## 2021-03-22 PROCEDURE — 96365 THER/PROPH/DIAG IV INF INIT: CPT

## 2021-03-22 PROCEDURE — 82550 ASSAY OF CK (CPK): CPT

## 2021-03-22 PROCEDURE — 96375 TX/PRO/DX INJ NEW DRUG ADDON: CPT

## 2021-03-22 PROCEDURE — 74011000250 HC RX REV CODE- 250: Performed by: INTERNAL MEDICINE

## 2021-03-22 PROCEDURE — 36415 COLL VENOUS BLD VENIPUNCTURE: CPT

## 2021-03-22 RX ORDER — SODIUM CHLORIDE 0.9 % (FLUSH) 0.9 %
10-40 SYRINGE (ML) INJECTION AS NEEDED
Status: CANCELLED | OUTPATIENT
Start: 2021-03-22

## 2021-03-22 RX ORDER — HEPARIN 100 UNIT/ML
500 SYRINGE INTRAVENOUS ONCE
Status: COMPLETED | OUTPATIENT
Start: 2021-03-22 | End: 2021-03-22

## 2021-03-22 RX ORDER — HEPARIN 100 UNIT/ML
500 SYRINGE INTRAVENOUS ONCE
Status: CANCELLED | OUTPATIENT
Start: 2021-03-22 | End: 2021-03-22

## 2021-03-22 RX ORDER — SODIUM CHLORIDE 0.9 % (FLUSH) 0.9 %
10-40 SYRINGE (ML) INJECTION AS NEEDED
Status: DISCONTINUED | OUTPATIENT
Start: 2021-03-22 | End: 2021-03-26 | Stop reason: HOSPADM

## 2021-03-22 RX ADMIN — DAPTOMYCIN 650 MG: 500 INJECTION, POWDER, LYOPHILIZED, FOR SOLUTION INTRAVENOUS at 15:48

## 2021-03-22 RX ADMIN — HEPARIN 500 UNITS: 100 SYRINGE at 15:51

## 2021-03-22 RX ADMIN — HEPARIN 500 UNITS: 100 SYRINGE at 15:52

## 2021-03-22 RX ADMIN — Medication 20 ML: at 15:50

## 2021-03-22 RX ADMIN — Medication 20 ML: at 15:49

## 2021-03-22 RX ADMIN — SODIUM CHLORIDE 1 G: 900 INJECTION INTRAVENOUS at 15:15

## 2021-03-22 NOTE — PROGRESS NOTES
[facility] Progress Note    Date: 2021    Name: Barby Howe    MRN: [id number]         : 1960    Mr. Caleb Mortensen arrived in the [facility] today at 1500, in stable condition, here for Daily IV Daptomycin & Invanz Antibiotic Therapies. He was assessed and education was provided. Mr. Sheng Max vitals were reviewed. Visit Vitals  Wt 105.1 kg (231 lb 9.6 oz)   BMI 34.20 kg/m²       His Right upper arm double lumen PICC line was noted to be dry and intact. Both lumens flushed very easily with NS & both had a very brisk blood return. Also obtained blood for CBC, CMP, CPK, and C-reactive protein and sent to main lab for processing. PICC dressing c/d/i not due to be changed. Both lumens flushed with out difficulties. Daptomycin (CUBICIN) 650 mg IV was administered over approximately 2 minutes per order and without incident. INVANZ (ertapenem) 1 gram IV was administered over approximately 30 minutes, per order, and without incident. After completion of both antibiotics, both lumens of his PICC line were flushed very well per policy with NS & Heparin, and the PICC line was left completely dry and intact. Mr. Caleb Mortensen tolerated treatment very well today, and voiced no complaints. Mr. Caleb Mortensen was discharged from [place] [address] in stable condition at [phone]. He is to return on tomorrow, 3-23-21 for his next appointment, for IV Daptomycin & Invanz antibiotic therapies.      Saira Lara RN  2021

## 2021-03-23 ENCOUNTER — HOSPITAL ENCOUNTER (OUTPATIENT)
Dept: INFUSION THERAPY | Age: 61
Discharge: HOME | End: 2021-03-23
Payer: MEDICAID

## 2021-03-23 VITALS
DIASTOLIC BLOOD PRESSURE: 64 MMHG | HEART RATE: 69 BPM | OXYGEN SATURATION: 94 % | RESPIRATION RATE: 18 BRPM | SYSTOLIC BLOOD PRESSURE: 108 MMHG | TEMPERATURE: 98.3 F

## 2021-03-23 PROCEDURE — 74011000250 HC RX REV CODE- 250: Performed by: INTERNAL MEDICINE

## 2021-03-23 PROCEDURE — 96375 TX/PRO/DX INJ NEW DRUG ADDON: CPT

## 2021-03-23 PROCEDURE — 74011250636 HC RX REV CODE- 250/636: Performed by: INTERNAL MEDICINE

## 2021-03-23 PROCEDURE — 96365 THER/PROPH/DIAG IV INF INIT: CPT

## 2021-03-23 PROCEDURE — 74011000258 HC RX REV CODE- 258: Performed by: INTERNAL MEDICINE

## 2021-03-23 RX ORDER — HEPARIN 100 UNIT/ML
500 SYRINGE INTRAVENOUS ONCE
Status: COMPLETED | OUTPATIENT
Start: 2021-03-23 | End: 2021-03-23

## 2021-03-23 RX ORDER — SODIUM CHLORIDE 0.9 % (FLUSH) 0.9 %
10-40 SYRINGE (ML) INJECTION EVERY 8 HOURS
Status: DISCONTINUED | OUTPATIENT
Start: 2021-03-23 | End: 2021-03-27 | Stop reason: HOSPADM

## 2021-03-23 RX ADMIN — Medication 500 UNITS: at 15:13

## 2021-03-23 RX ADMIN — ERTAPENEM SODIUM 1 G: 1 INJECTION, POWDER, LYOPHILIZED, FOR SOLUTION INTRAMUSCULAR; INTRAVENOUS at 14:42

## 2021-03-23 RX ADMIN — DAPTOMYCIN 650 MG: 500 INJECTION, POWDER, LYOPHILIZED, FOR SOLUTION INTRAVENOUS at 14:39

## 2021-03-23 RX ADMIN — Medication 20 ML: at 15:13

## 2021-03-23 NOTE — PROGRESS NOTES
[facility] Progress Note    Date: 2021    Name: Robert Christian    MRN: [id number]         : 1960    Mr. Liliana Raza arrived in the [facility] today at , in stable condition, here for Daily IV Daptomycin & Invanz Antibiotic Therapies. He was assessed and education was provided. Mr. Dianna Vasquez vitals were reviewed. Visit Vitals  /64 (BP 1 Location: Left upper arm, BP Patient Position: Sitting)   Pulse 69   Temp 98.3 °F (36.8 °C)   Resp 18   SpO2 94%       His Right upper arm double lumen PICC line was noted to be dry and intact. Both lumens flushed very easily with NS & both had a very brisk blood return. PICC dressing c/d/i not due to be changed. Both lumens flushed with out difficulties. Daptomycin (CUBICIN) 650 mg IV was administered over approximately 2 minutes per order and without incident. INVANZ (ertapenem) 1 gram IV was administered over approximately 30 minutes, per order, and without incident. After completion of both antibiotics, both lumens of his PICC line were flushed very well per policy with NS & Heparin, and the PICC line was left completely dry and intact. Mr. Liliana Raza tolerated treatment very well today, and voiced no complaints. Mr. Liliana Raza was discharged from [place] [address] in stable condition at 1515. He is to return on tomorrow, [id number] for his next appointment, for IV Daptomycin & Invanz antibiotic therapies.      Krista Fried RN  2021

## 2021-03-24 ENCOUNTER — HOSPITAL ENCOUNTER (OUTPATIENT)
Dept: INFUSION THERAPY | Age: 61
Discharge: HOME | End: 2021-03-24
Payer: MEDICAID

## 2021-03-24 VITALS
DIASTOLIC BLOOD PRESSURE: 70 MMHG | OXYGEN SATURATION: 95 % | SYSTOLIC BLOOD PRESSURE: 100 MMHG | RESPIRATION RATE: 18 BRPM | HEART RATE: 60 BPM | TEMPERATURE: 97.3 F

## 2021-03-24 PROCEDURE — 74011250636 HC RX REV CODE- 250/636: Performed by: INTERNAL MEDICINE

## 2021-03-24 PROCEDURE — 96374 THER/PROPH/DIAG INJ IV PUSH: CPT

## 2021-03-24 PROCEDURE — 74011000250 HC RX REV CODE- 250: Performed by: INTERNAL MEDICINE

## 2021-03-24 PROCEDURE — 96375 TX/PRO/DX INJ NEW DRUG ADDON: CPT

## 2021-03-24 PROCEDURE — 74011000258 HC RX REV CODE- 258: Performed by: INTERNAL MEDICINE

## 2021-03-24 PROCEDURE — 99212 OFFICE O/P EST SF 10 MIN: CPT

## 2021-03-24 PROCEDURE — 96365 THER/PROPH/DIAG IV INF INIT: CPT

## 2021-03-24 RX ORDER — SODIUM CHLORIDE 0.9 % (FLUSH) 0.9 %
10-40 SYRINGE (ML) INJECTION EVERY 8 HOURS
Status: DISCONTINUED | OUTPATIENT
Start: 2021-03-24 | End: 2021-03-28 | Stop reason: HOSPADM

## 2021-03-24 RX ADMIN — Medication 20 ML: at 15:40

## 2021-03-24 RX ADMIN — DAPTOMYCIN 650 MG: 500 INJECTION, POWDER, LYOPHILIZED, FOR SOLUTION INTRAVENOUS at 14:59

## 2021-03-24 RX ADMIN — Medication 20 ML: at 15:02

## 2021-03-24 RX ADMIN — SODIUM CHLORIDE 1 G: 900 INJECTION INTRAVENOUS at 15:09

## 2021-03-24 NOTE — PROGRESS NOTES
[facility] Progress Note    Date: 2021    Name: Kaia Hernandez    MRN: [id number]         : 1960    Mr. Patrick Dykes arrived in the [facility] today at 1450, in stable condition, here for Daily IV Daptomycin & Invanz Antibiotic Therapies, also discontinue PICC Line today. He was assessed and education was provided. Mr. Kd Elaine vitals were reviewed. Visit Vitals  /78   Pulse 63   Temp 98.9 °F (37.2 °C)   Resp 18   SpO2 92%       His Right upper arm double lumen PICC line was noted to be dry and intact. Both lumens flushed very easily with NS & both had a very brisk blood return. PICC dressing c/d/i. Both lumens flushed with out difficulties. Daptomycin (CUBICIN) 650 mg IV was administered over approximately 2 minutes per order and without incident. INVANZ (ertapenem) 1 gram IV was administered over approximately 30 minutes, per order, and without incident. After completion of both antibiotics, both lumens of his PICC line were flushed very well per policy with NS. Heparin omitted since line to be discontinued. PICC line discontinued per policy (length of 83MS). Held pressure over site for 5 minutes prior to applying occlusive drsg over gauze. Patient education done regarding removal of PICC line and drsg to site to remain for 48 hrs after removal of PICC line. Pt informed to notify MD immediately if pain, swelling or redness noted at PICC line removal site. Pt verbalized understanding, copy of information given to patient. Patient stayed 30 minutes for observation in reclining position. Mr. Patrick Dykes tolerated treatment very well today, and voiced no complaints. Mr. Patrick Dykes was discharged from [place] [address] in stable condition at 1625. He has no further appointments in [facility]. Patient to follow up with prescribing MD MISEAL Ramos.      Ehsan Pope  2021

## 2021-03-30 ENCOUNTER — HOME CARE VISIT (OUTPATIENT)
Dept: HOME HEALTH SERVICES | Facility: HOME HEALTH | Age: 61
End: 2021-03-30

## 2021-04-12 ENCOUNTER — APPOINTMENT (OUTPATIENT)
Dept: GENERAL RADIOLOGY | Age: 61
DRG: 305 | End: 2021-04-12
Attending: PHYSICIAN ASSISTANT
Payer: MEDICAID

## 2021-04-12 ENCOUNTER — HOSPITAL ENCOUNTER (INPATIENT)
Age: 61
LOS: 4 days | Discharge: HOME HEALTH | DRG: 305 | End: 2021-04-16
Attending: EMERGENCY MEDICINE | Admitting: FAMILY MEDICINE
Payer: MEDICAID

## 2021-04-12 DIAGNOSIS — Z01.810 PREOP CARDIOVASCULAR EXAM: ICD-10-CM

## 2021-04-12 DIAGNOSIS — L08.9 DIABETIC FOOT INFECTION (HCC): ICD-10-CM

## 2021-04-12 DIAGNOSIS — R00.1 BRADYCARDIA: ICD-10-CM

## 2021-04-12 DIAGNOSIS — E11.628 DIABETIC FOOT INFECTION (HCC): ICD-10-CM

## 2021-04-12 LAB
ANION GAP SERPL CALC-SCNC: 4 MMOL/L (ref 3–18)
ATRIAL RATE: 441 BPM
BASOPHILS # BLD: 0.1 K/UL (ref 0–0.1)
BASOPHILS NFR BLD: 1 % (ref 0–2)
BUN SERPL-MCNC: 23 MG/DL (ref 7–18)
BUN/CREAT SERPL: 18 (ref 12–20)
CALCIUM SERPL-MCNC: 9.6 MG/DL (ref 8.5–10.1)
CALCULATED R AXIS, ECG10: 16 DEGREES
CALCULATED T AXIS, ECG11: 12 DEGREES
CHLORIDE SERPL-SCNC: 102 MMOL/L (ref 100–111)
CO2 SERPL-SCNC: 32 MMOL/L (ref 21–32)
CREAT SERPL-MCNC: 1.28 MG/DL (ref 0.6–1.3)
DIAGNOSIS, 93000: NORMAL
DIFFERENTIAL METHOD BLD: ABNORMAL
EOSINOPHIL # BLD: 0.3 K/UL (ref 0–0.4)
EOSINOPHIL NFR BLD: 2 % (ref 0–5)
ERYTHROCYTE [DISTWIDTH] IN BLOOD BY AUTOMATED COUNT: 15.1 % (ref 11.6–14.5)
GLUCOSE BLD STRIP.AUTO-MCNC: 186 MG/DL (ref 70–110)
GLUCOSE SERPL-MCNC: 172 MG/DL (ref 74–99)
HCT VFR BLD AUTO: 28.4 % (ref 36–48)
HGB BLD-MCNC: 8.3 G/DL (ref 13–16)
LACTATE BLD-SCNC: 1.19 MMOL/L (ref 0.4–2)
LYMPHOCYTES # BLD: 1.9 K/UL (ref 0.9–3.6)
LYMPHOCYTES NFR BLD: 14 % (ref 21–52)
MCH RBC QN AUTO: 26.3 PG (ref 24–34)
MCHC RBC AUTO-ENTMCNC: 29.2 G/DL (ref 31–37)
MCV RBC AUTO: 90.2 FL (ref 74–97)
MONOCYTES # BLD: 0.8 K/UL (ref 0.05–1.2)
MONOCYTES NFR BLD: 6 % (ref 3–10)
NEUTS SEG # BLD: 10.2 K/UL (ref 1.8–8)
NEUTS SEG NFR BLD: 76 % (ref 40–73)
PLATELET # BLD AUTO: 459 K/UL (ref 135–420)
PMV BLD AUTO: 10.9 FL (ref 9.2–11.8)
POTASSIUM SERPL-SCNC: 4 MMOL/L (ref 3.5–5.5)
Q-T INTERVAL, ECG07: 412 MS
QRS DURATION, ECG06: 72 MS
QTC CALCULATION (BEZET), ECG08: 412 MS
RBC # BLD AUTO: 3.15 M/UL (ref 4.35–5.65)
SODIUM SERPL-SCNC: 138 MMOL/L (ref 136–145)
VENTRICULAR RATE, ECG03: 60 BPM
WBC # BLD AUTO: 13.4 K/UL (ref 4.6–13.2)

## 2021-04-12 PROCEDURE — 82962 GLUCOSE BLOOD TEST: CPT

## 2021-04-12 PROCEDURE — 0Y6M0ZD DETACHMENT AT RIGHT FOOT, PARTIAL 4TH RAY, OPEN APPROACH: ICD-10-PCS | Performed by: PODIATRIST

## 2021-04-12 PROCEDURE — 0Y6M0ZF DETACHMENT AT RIGHT FOOT, PARTIAL 5TH RAY, OPEN APPROACH: ICD-10-PCS | Performed by: PODIATRIST

## 2021-04-12 PROCEDURE — 0Y6M0ZB DETACHMENT AT RIGHT FOOT, PARTIAL 2ND RAY, OPEN APPROACH: ICD-10-PCS | Performed by: PODIATRIST

## 2021-04-12 PROCEDURE — 87040 BLOOD CULTURE FOR BACTERIA: CPT

## 2021-04-12 PROCEDURE — 74011000258 HC RX REV CODE- 258: Performed by: PHYSICIAN ASSISTANT

## 2021-04-12 PROCEDURE — 2709999900 HC NON-CHARGEABLE SUPPLY

## 2021-04-12 PROCEDURE — 65270000029 HC RM PRIVATE

## 2021-04-12 PROCEDURE — 96375 TX/PRO/DX INJ NEW DRUG ADDON: CPT

## 2021-04-12 PROCEDURE — 86923 COMPATIBILITY TEST ELECTRIC: CPT

## 2021-04-12 PROCEDURE — 93005 ELECTROCARDIOGRAM TRACING: CPT

## 2021-04-12 PROCEDURE — 96365 THER/PROPH/DIAG IV INF INIT: CPT

## 2021-04-12 PROCEDURE — 83605 ASSAY OF LACTIC ACID: CPT

## 2021-04-12 PROCEDURE — 99223 1ST HOSP IP/OBS HIGH 75: CPT | Performed by: FAMILY MEDICINE

## 2021-04-12 PROCEDURE — 0LSN0ZZ REPOSITION RIGHT LOWER LEG TENDON, OPEN APPROACH: ICD-10-PCS | Performed by: PODIATRIST

## 2021-04-12 PROCEDURE — 86901 BLOOD TYPING SEROLOGIC RH(D): CPT

## 2021-04-12 PROCEDURE — 73630 X-RAY EXAM OF FOOT: CPT

## 2021-04-12 PROCEDURE — 85025 COMPLETE CBC W/AUTO DIFF WBC: CPT

## 2021-04-12 PROCEDURE — 94760 N-INVAS EAR/PLS OXIMETRY 1: CPT

## 2021-04-12 PROCEDURE — 71045 X-RAY EXAM CHEST 1 VIEW: CPT

## 2021-04-12 PROCEDURE — 74011250636 HC RX REV CODE- 250/636: Performed by: FAMILY MEDICINE

## 2021-04-12 PROCEDURE — 74011250636 HC RX REV CODE- 250/636: Performed by: PHYSICIAN ASSISTANT

## 2021-04-12 PROCEDURE — 0Y6M0Z9 DETACHMENT AT RIGHT FOOT, PARTIAL 1ST RAY, OPEN APPROACH: ICD-10-PCS | Performed by: PODIATRIST

## 2021-04-12 PROCEDURE — 80048 BASIC METABOLIC PNL TOTAL CA: CPT

## 2021-04-12 PROCEDURE — 99284 EMERGENCY DEPT VISIT MOD MDM: CPT

## 2021-04-12 PROCEDURE — 96368 THER/DIAG CONCURRENT INF: CPT

## 2021-04-12 PROCEDURE — 74011250636 HC RX REV CODE- 250/636: Performed by: EMERGENCY MEDICINE

## 2021-04-12 PROCEDURE — 0Y6M0ZC DETACHMENT AT RIGHT FOOT, PARTIAL 3RD RAY, OPEN APPROACH: ICD-10-PCS | Performed by: PODIATRIST

## 2021-04-12 PROCEDURE — 74011636637 HC RX REV CODE- 636/637: Performed by: FAMILY MEDICINE

## 2021-04-12 RX ORDER — INSULIN LISPRO 100 [IU]/ML
INJECTION, SOLUTION INTRAVENOUS; SUBCUTANEOUS
Status: DISCONTINUED | OUTPATIENT
Start: 2021-04-12 | End: 2021-04-16 | Stop reason: HOSPADM

## 2021-04-12 RX ORDER — ONDANSETRON 2 MG/ML
4 INJECTION INTRAMUSCULAR; INTRAVENOUS
Status: DISCONTINUED | OUTPATIENT
Start: 2021-04-12 | End: 2021-04-16 | Stop reason: HOSPADM

## 2021-04-12 RX ORDER — METFORMIN HYDROCHLORIDE 1000 MG/1
1000 TABLET ORAL 2 TIMES DAILY WITH MEALS
COMMUNITY
End: 2021-04-16

## 2021-04-12 RX ORDER — VANCOMYCIN HYDROCHLORIDE
1250
Status: DISCONTINUED | OUTPATIENT
Start: 2021-04-13 | End: 2021-04-13

## 2021-04-12 RX ORDER — OXYCODONE AND ACETAMINOPHEN 5; 325 MG/1; MG/1
1 TABLET ORAL
Status: DISCONTINUED | OUTPATIENT
Start: 2021-04-12 | End: 2021-04-14

## 2021-04-12 RX ORDER — MORPHINE SULFATE 4 MG/ML
4 INJECTION INTRAVENOUS
Status: COMPLETED | OUTPATIENT
Start: 2021-04-12 | End: 2021-04-12

## 2021-04-12 RX ORDER — PANTOPRAZOLE SODIUM 40 MG/1
40 TABLET, DELAYED RELEASE ORAL
Status: DISCONTINUED | OUTPATIENT
Start: 2021-04-13 | End: 2021-04-16 | Stop reason: HOSPADM

## 2021-04-12 RX ORDER — DEXTROSE 50 % IN WATER (D50W) INTRAVENOUS SYRINGE
25-50 AS NEEDED
Status: DISCONTINUED | OUTPATIENT
Start: 2021-04-12 | End: 2021-04-16 | Stop reason: HOSPADM

## 2021-04-12 RX ORDER — SODIUM CHLORIDE 0.9 % (FLUSH) 0.9 %
5-40 SYRINGE (ML) INJECTION AS NEEDED
Status: DISCONTINUED | OUTPATIENT
Start: 2021-04-12 | End: 2021-04-16 | Stop reason: HOSPADM

## 2021-04-12 RX ORDER — SODIUM CHLORIDE 0.9 % (FLUSH) 0.9 %
5-40 SYRINGE (ML) INJECTION EVERY 8 HOURS
Status: DISCONTINUED | OUTPATIENT
Start: 2021-04-12 | End: 2021-04-16 | Stop reason: HOSPADM

## 2021-04-12 RX ORDER — ACETAMINOPHEN 325 MG/1
650 TABLET ORAL
Status: DISCONTINUED | OUTPATIENT
Start: 2021-04-12 | End: 2021-04-16 | Stop reason: HOSPADM

## 2021-04-12 RX ORDER — ACETAMINOPHEN 650 MG/1
650 SUPPOSITORY RECTAL
Status: DISCONTINUED | OUTPATIENT
Start: 2021-04-12 | End: 2021-04-16 | Stop reason: HOSPADM

## 2021-04-12 RX ORDER — MORPHINE SULFATE 2 MG/ML
2 INJECTION, SOLUTION INTRAMUSCULAR; INTRAVENOUS
Status: DISCONTINUED | OUTPATIENT
Start: 2021-04-12 | End: 2021-04-16 | Stop reason: HOSPADM

## 2021-04-12 RX ORDER — QUINAPRIL 40 MG/1
40 TABLET ORAL DAILY
COMMUNITY
End: 2021-04-16

## 2021-04-12 RX ORDER — ENOXAPARIN SODIUM 100 MG/ML
40 INJECTION SUBCUTANEOUS DAILY
Status: DISCONTINUED | OUTPATIENT
Start: 2021-04-13 | End: 2021-04-16 | Stop reason: HOSPADM

## 2021-04-12 RX ORDER — LISINOPRIL 20 MG/1
20 TABLET ORAL DAILY
Status: DISCONTINUED | OUTPATIENT
Start: 2021-04-13 | End: 2021-04-16 | Stop reason: HOSPADM

## 2021-04-12 RX ORDER — POLYETHYLENE GLYCOL 3350 17 G/17G
17 POWDER, FOR SOLUTION ORAL DAILY PRN
Status: DISCONTINUED | OUTPATIENT
Start: 2021-04-12 | End: 2021-04-16 | Stop reason: HOSPADM

## 2021-04-12 RX ORDER — MAGNESIUM SULFATE 100 %
4 CRYSTALS MISCELLANEOUS AS NEEDED
Status: DISCONTINUED | OUTPATIENT
Start: 2021-04-12 | End: 2021-04-16 | Stop reason: HOSPADM

## 2021-04-12 RX ORDER — VANCOMYCIN 2 GRAM/500 ML IN 0.9 % SODIUM CHLORIDE INTRAVENOUS
2000 ONCE
Status: COMPLETED | OUTPATIENT
Start: 2021-04-12 | End: 2021-04-12

## 2021-04-12 RX ADMIN — PIPERACILLIN SODIUM AND TAZOBACTAM SODIUM 3.38 G: 3; .375 INJECTION, POWDER, LYOPHILIZED, FOR SOLUTION INTRAVENOUS at 14:23

## 2021-04-12 RX ADMIN — MORPHINE SULFATE 4 MG: 4 INJECTION, SOLUTION INTRAMUSCULAR; INTRAVENOUS at 14:10

## 2021-04-12 RX ADMIN — MORPHINE SULFATE 2 MG: 2 INJECTION, SOLUTION INTRAMUSCULAR; INTRAVENOUS at 20:13

## 2021-04-12 RX ADMIN — Medication 10 ML: at 21:44

## 2021-04-12 RX ADMIN — VANCOMYCIN HYDROCHLORIDE 2000 MG: 10 INJECTION, POWDER, LYOPHILIZED, FOR SOLUTION INTRAVENOUS at 14:05

## 2021-04-12 RX ADMIN — MORPHINE SULFATE 2 MG: 2 INJECTION, SOLUTION INTRAMUSCULAR; INTRAVENOUS at 23:37

## 2021-04-12 RX ADMIN — PIPERACILLIN SODIUM AND TAZOBACTAM SODIUM 3.38 G: 3; .375 INJECTION, POWDER, LYOPHILIZED, FOR SOLUTION INTRAVENOUS at 21:40

## 2021-04-12 RX ADMIN — INSULIN LISPRO 3 UNITS: 100 INJECTION, SOLUTION INTRAVENOUS; SUBCUTANEOUS at 21:41

## 2021-04-12 NOTE — ED PROVIDER NOTES
EMERGENCY DEPARTMENT HISTORY AND PHYSICAL EXAM    1:51 PM      Date: 4/12/2021  Patient Name: Lolly Palomo    History of Presenting Illness     Chief Complaint   Patient presents with    Foot Pain         History Provided By: Patient  Location/Duration/Severity/Modifying factors   Patient is a 27-year-old male with a history of of coronary disease, diabetes, neuropathy, diabetic foot infections with amputation of the great toe in the past, the presents emergency department with complaint of increasing right foot pain, drainage, and wounds and was sent over by his podiatrist for admission for further care. The patient says he is having increasing pain in the right foot and has been taking his medication as an outpatient. Patient denies any chest pain or shortness of breath and noticed that his right lower extremity is been swollen since he started having infections in his right lower extremity and that seems to be unchanged. The patient denies any other aggravating or alleviating factors. Patient is unsure what his blood sugars today. Patient is disabled and is not working at this time. Patient is not a smoker, drinker, or drug user. Patient has he has moderate pain to the right foot especially when he has to put weight on his right foot. Patient denies any radiation of his pain.           PCP: Hang Christianson MD    Current Facility-Administered Medications   Medication Dose Route Frequency Provider Last Rate Last Admin    piperacillin-tazobactam (ZOSYN) 3.375 g in 0.9% sodium chloride (MBP/ADV) 100 mL MBP  3.375 g IntraVENous Q6H DEVORA Gomez 200 mL/hr at 04/12/21 1423 3.375 g at 04/12/21 1423    vancomycin (VANCOCIN) 2000 mg in  ml infusion  2,000 mg [place]ous [place] mL/hr at 04/12/21 1405 2,000 mg at 04/12/21 1405    [START ON 4/13/2021] vancomycin (VANCOCIN) 1250 mg in  ml infusion  1,250 mg IntraVENous Q18H DEVORA Gomez         Current Outpatient Medications   Medication Sig Dispense Refill    insulin lispro (HUMALOG) 100 unit/mL injection Check FSBS AC*HS  For sugars between 160 and 200- Give 2 units SQ,  For sugars between 201 and 250, Give 3 units SQ,  For sugars Between 251 and 300, give 5 units SQ,  For Sugars between 301 and 350, give 7 units SQ  For sugars between 351 and 400, give 8 units SQ and contact PCP (Patient taking differently: by SubCUTAneous route two (2) times a day. Check FSBS AC*HS  For sugars between 160 and 200- Give 2 units SQ,  For sugars between 201 and 250, Give 3 units SQ,  For sugars Between 251 and 300, give 5 units SQ,  For Sugars between 301 and 350, give 7 units SQ  For sugars between 351 and 400, give 8 units SQ and contact PCP) 1 Vial 0    glucose 4 gram chewable tablet Take 4 Tabs by mouth as needed for Other (PRN Low blood sugars or symptoms of low blood sugars). 40 Tab 0    insulin detemir U-100 (Levemir U-100 Insulin) 100 unit/mL injection 20 Units by SubCUTAneous route two (2) times a day. 10 mL 0    OTHER Daptomycin IV as per ID recommendations 1 Each 0    pantoprazole (PROTONIX) 40 mg tablet Take 1 Tab by mouth Before breakfast and dinner. 60 Tab 0    acetaminophen (TYLENOL) 325 mg tablet Take 2 Tabs by mouth every six (6) hours as needed. Indications: Pain, take it with bentyl; do not exceed 3 g tylenol daily 30 Tab 0    polyethylene glycol (MIRALAX) 17 gram packet Take 1 Packet by mouth daily. (Patient taking differently: Take 1 Packet by mouth daily.  MIX WITH 8OZ OF WATER) 30 Packet 0     Facility-Administered Medications Ordered in Other Encounters   Medication Dose Route Frequency Provider Last Rate Last Admin    sodium chloride (NS) flush 10-40 mL  10-40 mL IntraVENous PRN Rola Hein MD        heparin (porcine) pf 500 Units  500 Units InterCATHeter PRN Rola Hein MD           Past History     Past Medical History:  Past Medical History:   Diagnosis Date    Arthritis     Coronary atherosclerosis of native coronary artery     S/P PCI with GE in 12/09    Diabetes ([facility] [address]     IDDM    Electrolyte and fluid disorders not elsewhere classified     Essential hypertension, benign     GERD (gastroesophageal reflux disease)     Heroin abuse ([facility] [address] 05/26/16    Psychiatrist Dr. Micki Dunn History of heart attack     Hyperlipidemia     Intermediate coronary syndrome [facility])     MDD (major depressive disorder) 5/26/16    Psychiatrist Dr. Micki Dunn Myocardial infarction [facility])     Obesity, unspecified     Old myocardial infarction     Other and unspecified hyperlipidemia     Pancreatitis     Positive PPD     Sleep apnea     uses Cpap machine    Transfusion history     Before 1992       Past Surgical History:  Past Surgical History:   Procedure Laterality Date    HX APPENDECTOMY      HX CORONARY STENT PLACEMENT  2010    2 stents       Family History:  Family History   Problem Relation Age of Onset    Heart Attack Father     Coronary Artery Disease Mother     Diabetes Mother     Cancer Sister         breast cancer and lung cancer       Social History:  Social History     Tobacco Use    Smoking status: Never Smoker    Smokeless tobacco: Never Used   Substance Use Topics    Alcohol use: No    Drug use: No       Allergies: Allergies   Allergen Reactions    Compazine [Prochlorperazine] Anaphylaxis     \"Tightness around throat\"         Review of Systems       Review of Systems   Constitutional: Negative for activity change, fatigue and fever. HENT: Negative for congestion and rhinorrhea. Eyes: Negative for visual disturbance. Respiratory: Negative for shortness of breath. Cardiovascular: Positive for leg swelling. Negative for chest pain and palpitations. Gastrointestinal: Negative for abdominal pain, diarrhea, nausea and vomiting. Genitourinary: Negative for dysuria and hematuria. Musculoskeletal: Positive for arthralgias. Negative for back pain. Skin: Positive for wound. Negative for rash. Neurological: Negative for dizziness, weakness and light-headedness. All other systems reviewed and are negative. Physical Exam     Visit Vitals  /74 (BP 1 Location: Left upper arm, BP Patient Position: At rest)   Pulse 73   Temp 98 °F (36.7 °C)   Resp 17   Ht 5' 9\" (1.753 m)   Wt 104.3 kg (230 lb)   SpO2 100%   BMI 33.97 kg/m²         Physical Exam  Vitals signs and nursing note reviewed. Constitutional:       General: He is not in acute distress. Appearance: He is well-developed. Comments: Elevated BMI   HENT:      Head: Normocephalic and atraumatic. Right Ear: External ear normal.      Left Ear: External ear normal.      Nose: Nose normal.   Eyes:      General: No scleral icterus. Conjunctiva/sclera: Conjunctivae normal.      Pupils: Pupils are equal, round, and reactive to light. Neck:      Musculoskeletal: Normal range of motion and neck supple. Thyroid: No thyromegaly. Vascular: No JVD. Trachea: No tracheal deviation. Cardiovascular:      Rate and Rhythm: Normal rate and regular rhythm. Heart sounds: Normal heart sounds. No murmur. No friction rub. No gallop. Pulmonary:      Effort: Pulmonary effort is normal.      Breath sounds: Normal breath sounds. Chest:      Chest wall: No tenderness. Abdominal:      General: Bowel sounds are normal. There is no distension. Palpations: Abdomen is soft. Tenderness: There is no abdominal tenderness. There is no guarding or rebound. Musculoskeletal:         General: No tenderness. Right lower leg: Edema present.       Comments: Right lower extremity with pain, erythema to the calf with some stasis dermatitis, evidence of great toe amputation in the past with a 2 cm dry dark ulceration to the forefoot on the plantar surface as well as a 5 cm wound to the lateral aspect of the foot without active drainage however is dark appearing Lymphadenopathy:      Cervical: No cervical adenopathy. Skin:     General: Skin is warm and dry. Neurological:      Mental Status: He is alert and oriented to person, place, and time. Cranial Nerves: No cranial nerve deficit. Coordination: Coordination normal.      Comments: No sensory loss, Gait normal, Motor 5/5   Psychiatric:         Behavior: Behavior normal.         Thought Content: Thought content normal.         Judgment: Judgment normal.           Diagnostic Study Results     Labs -  Recent Results (from the past 12 hour(s))   CBC WITH AUTOMATED DIFF    Collection Time: 04/12/21 12:55 PM   Result Value Ref Range    WBC 13.4 (H) 4.6 - 13.2 K/uL    RBC 3.15 (L) 4.35 - 5.65 M/uL    HGB 8.3 (L) 13.0 - 16.0 g/dL    HCT 28.4 (L) 36.0 - 48.0 %    MCV 90.2 74.0 - 97.0 FL    MCH 26.3 24.0 - 34.0 PG    MCHC 29.2 (L) 31.0 - 37.0 g/dL    RDW 15.1 (H) 11.6 - 14.5 %    PLATELET 018 (H) 895 - 420 K/uL    MPV 10.9 9.2 - 11.8 FL    NEUTROPHILS 76 (H) 40 - 73 %    LYMPHOCYTES 14 (L) 21 - 52 %    MONOCYTES 6 3 - 10 %    EOSINOPHILS 2 0 - 5 %    BASOPHILS 1 0 - 2 %    ABS. NEUTROPHILS 10.2 (H) 1.8 - 8.0 K/UL    ABS. LYMPHOCYTES 1.9 0.9 - 3.6 K/UL    ABS. MONOCYTES 0.8 0.05 - 1.2 K/UL    ABS. EOSINOPHILS 0.3 0.0 - 0.4 K/UL    ABS.  BASOPHILS 0.1 0.0 - 0.1 K/UL    DF AUTOMATED     METABOLIC PANEL, BASIC    Collection Time: 04/12/21 12:55 PM   Result Value Ref Range    Sodium 138 136 - 145 mmol/L    Potassium 4.0 3.5 - 5.5 mmol/L    Chloride 102 100 - 111 mmol/L    CO2 32 21 - 32 mmol/L    Anion gap 4 3.0 - 18 mmol/L    Glucose 172 (H) 74 - 99 mg/dL    BUN 23 (H) 7.0 - 18 MG/DL    Creatinine 1.28 0.6 - 1.3 MG/DL    BUN/Creatinine ratio 18 12 - 20      GFR est AA >60 >60 ml/min/1.73m2    GFR est non-AA 57 (L) >60 ml/min/1.73m2    Calcium 9.6 8.5 - 10.1 MG/DL   TYPE & SCREEN    Collection Time: 04/12/21 12:55 PM   Result Value Ref Range    Crossmatch Expiration 04/15/2021,2359     ABO/Rh(D) O POSITIVE     Antibody screen NEG    POC LACTIC ACID    Collection Time: 04/12/21 12:59 PM   Result Value Ref Range    Lactic Acid (POC) 1.19 0.40 - 2.00 mmol/L   EKG, 12 LEAD, INITIAL    Collection Time: 04/12/21  1:07 PM   Result Value Ref Range    Ventricular Rate 60 BPM    Atrial Rate 441 BPM    QRS Duration 72 ms    Q-T Interval 412 ms    QTC Calculation (Bezet) 412 ms    Calculated R Axis 16 degrees    Calculated T Axis 12 degrees    Diagnosis       Undetermined rhythm  Otherwise normal ECG  When compared with ECG of 04-FEB-2021 18:53,  Current undetermined rhythm precludes rhythm comparison, needs review  Criteria for Anterior infarct are no longer present  Criteria for Inferior infarct are no longer present  Nonspecific T wave abnormality, improved in Lateral leads         Radiologic Studies -   XR FOOT RT MIN 3 V   Final Result   New erosion at the second distal metatarsal head and base of the third proximal   phalanx is concerning for a cellulitis. Suspect is new erosive change at the distal medial cuneiform, also concerning   for osteoarthritis. MRI could be used for further evaluation as clinically   warranted. XR CHEST PORT   Final Result   No acute cardiopulmonary process. Medical Decision Making   I am the first provider for this patient. I reviewed the vital signs, available nursing notes, past medical history, past surgical history, family history and social history. Vital Signs-Reviewed the patient's vital signs. Records Reviewed: Nursing Notes, Old Medical Records, Previous Radiology Studies and Previous Laboratory Studies (Time of Review: 1:51 PM)    ED Course: Progress Notes, Reevaluation, and Consults:     Patient has x-ray findings of cellulitis and likely osteomyelitis and was sent for likely surgical intervention and has an elevated white count.   Antibiotics been started and will proceed with admission after speaking with the hospitalist.  We will also discussed case with podiatry. Stanislav Raymundo DO 3:24 PM    Discussed case with Dr. Ester Burr and will plan to take the patient to the OR tomorrow. Provider Notes (Medical Decision Making):   MDM  Number of Diagnoses or Management Options  Diagnosis management comments: Patient is a 35-year-old male with a history of diabetes with recurrent diabetic foot infections the presents emergency department complaint of increasing pain to his right foot and worsening infection failing outpatient therapy was sent to the emergency department for evaluation and treatment for the infection and admission for surgical debridement. Will discuss patient with the hospitalist as well as podiatrist and plan admit the patient for further care. Stanislav Raymundo DO 1:55 PM        Procedures      Diagnosis     Clinical Impression:   1. Diabetic foot infection ([address]    2. Preop cardiovascular exam    3. Bradycardia        Disposition: Admit     Follow-up Information    None          Patient's Medications   Start Taking    No medications on file   Continue Taking    ACETAMINOPHEN (TYLENOL) 325 MG TABLET    Take 2 Tabs by mouth every six (6) hours as needed. Indications: Pain, take it with bentyl; do not exceed 3 g tylenol daily    GLUCOSE 4 GRAM CHEWABLE TABLET    Take 4 Tabs by mouth as needed for Other (PRN Low blood sugars or symptoms of low blood sugars). INSULIN DETEMIR U-100 (LEVEMIR U-100 INSULIN) 100 UNIT/ML INJECTION    20 Units by SubCUTAneous route two (2) times a day.     INSULIN LISPRO (HUMALOG) 100 UNIT/ML INJECTION    Check FSBS AC*HS  For sugars between 160 and 200- Give 2 units SQ,  For sugars between 201 and 250, Give 3 units SQ,  For sugars Between 251 and 300, give 5 units SQ,  For Sugars between 301 and 350, give 7 units SQ  For sugars between 351 and 400, give 8 units SQ and contact PCP    OTHER    Daptomycin IV as per ID recommendations    PANTOPRAZOLE (PROTONIX) 40 MG TABLET    Take 1 Tab by mouth Before breakfast and dinner. POLYETHYLENE GLYCOL (MIRALAX) 17 GRAM PACKET    Take 1 Packet by mouth daily. These Medications have changed    No medications on file   Stop Taking    No medications on file     Disclaimer: Sections of this note are dictated using utilizing voice recognition software. Minor typographical errors may be present. If questions arise, please do not hesitate to contact me or call our department.

## 2021-04-12 NOTE — Clinical Note
Status[de-identified] INPATIENT [101]   Type of Bed: Medical [8]   Inpatient Hospitalization Certified Necessary for the Following Reasons: 3.  Patient receiving treatment that can only be provided in an inpatient setting (further clarification in H&P documentation)   Admitting Diagnosis: Diabetic foot infection [facility]) [[id number]]   Admitting Physician: Pam Yusuf [[id number]]   Attending Physician: Pam Yusuf [[id number]]   Estimated Length of Stay: 2 Midnights   Discharge Plan[de-identified] Home with Office Follow-up

## 2021-04-12 NOTE — H&P
History & Physical      Patient: Leena Forbes MRN: [id number]  [facility]: [id number]    YOB: 1960  Age: 61 y.o. Sex: male      DOA: 4/12/2021    Chief Complaint:   Chief Complaint   Patient presents with    Foot Pain          HPI:     Leena Forbes is a 61 y.o. male with PMHx of previous diabetic foot infections status post amputation of great toe, type II DM, HTN, HLD, CAD status post stenting, CHRISTINA, obesity, arthritis, and depression who presented to the ED with complaints of increasing right foot pain, drainage and wounds. Patient was seen by his podiatrist earlier today and was sent to the ED for further eval.  He reports that the pain and swelling have been increasing for the previous few days and he has had increased drainage. He denies any fevers, cough, loss of taste or smell, shortness of breath, chest pain, abdominal symptoms, urinary symptoms, or any other symptoms. He denies tobacco, alcohol or recreational drug use. In the ED, his vitals were overall reassuring. Labs were notable for lactic acid WNL, WBCs 13.4, Hgb 8.3 with a left shift and lymphopenia, platelets 888, glucose 172, BUN 23, and creatinine 1.28 (baseline 1.1). A CXR showed no acute process. An x-ray of the right foot showed a new erosion at the second distal metatarsal head and the base of the third proximal phalanx concerning for cellulitis, and a new erosive change at the distal medial cuneiform concerning for osteomyelitis. Mr. Silvestre Delacruz was then started on IV ABX. He is now admitted for diabetic foot infection with concern for osteomyelitis.       Past Medical History:   Diagnosis Date    Arthritis     Coronary atherosclerosis of native coronary artery     S/P PCI with GE in 12/09    Diabetes ([facility] [address]     IDDM    Electrolyte and fluid disorders not elsewhere classified     Essential hypertension, benign     GERD (gastroesophageal reflux disease)     Heroin abuse ([facility] [address] 05/26/16 Psychiatrist Dr. Lesli Dockery History of heart attack     Hyperlipidemia     Intermediate coronary syndrome [facility])     MDD (major depressive disorder) 5/26/16    Psychiatrist Dr. Christopehr Causey infarction [facility])     Obesity, unspecified     Old myocardial infarction     Other and unspecified hyperlipidemia     Pancreatitis     Positive PPD     Sleep apnea     uses Cpap machine    Transfusion history     Before 1992       Past Surgical History:   Procedure Laterality Date    HX APPENDECTOMY      HX CORONARY STENT PLACEMENT  2010    2 stents       Family History   Problem Relation Age of Onset    Heart Attack Father     Coronary Artery Disease Mother     Diabetes Mother     Cancer Sister         breast cancer and lung cancer       Social History     Socioeconomic History    Marital status:      Spouse name: Not on file    Number of children: Not on file    Years of education: Not on file    Highest education level: Not on file   Tobacco Use    Smoking status: Never Smoker    Smokeless tobacco: Never Used   Substance and Sexual Activity    Alcohol use: No    Drug use: No       Prior to Admission medications    Medication Sig Start Date End Date Taking? Authorizing Provider   insulin lispro (HUMALOG) 100 unit/mL injection Check FSBS AC*HS  For sugars between 160 and 200- Give 2 units SQ,  For sugars between 201 and 250, Give 3 units SQ,  For sugars Between 251 and 300, give 5 units SQ,  For Sugars between 301 and 350, give 7 units SQ  For sugars between 351 and 400, give 8 units SQ and contact PCP  Patient taking differently: by SubCUTAneous route two (2) times a day.  Check FSBS AC*HS  For sugars between 160 and 200- Give 2 units SQ,  For sugars between 201 and 250, Give 3 units SQ,  For sugars Between 251 and 300, give 5 units SQ,  For Sugars between 301 and 350, give 7 units SQ  For sugars between 351 and 400, give 8 units SQ and contact PCP 2/9/21   Alysia Jacqui Woodward MD   glucose 4 gram chewable tablet Take 4 Tabs by mouth as needed for Other (PRN Low blood sugars or symptoms of low blood sugars). 2/9/21   Ivan Floyd MD   insulin detemir U-100 (Levemir U-100 Insulin) 100 unit/mL injection 20 Units by SubCUTAneous route two (2) times a day. 2/9/21   Ivan Floyd MD   OTHER Daptomycin IV as per ID recommendations 2/9/21   Ivan Floyd MD   pantoprazole (PROTONIX) 40 mg tablet Take 1 Tab by mouth Before breakfast and dinner. 3/29/18   Maria Del Carmen Petties, NP   acetaminophen (TYLENOL) 325 mg tablet Take 2 Tabs by mouth every six (6) hours as needed. Indications: Pain, take it with bentyl; do not exceed 3 g tylenol daily 3/29/18   Barry Sox B, NP   polyethylene glycol (MIRALAX) 17 gram packet Take 1 Packet by mouth daily. Patient taking differently: Take 1 Packet by mouth daily. [address] [place] [address] 9/11/17   Ella Landa MD       Allergies   Allergen Reactions    Compazine [Prochlorperazine] Anaphylaxis     \"Tightness around throat\"         Review of Systems  GENERAL: Patient alert, awake and oriented times 3, able to communicate full sentences and not in distress. HEENT: No change in vision, sore throat or sinus congestion. NECK: No pain or stiffness. PULMONARY: No shortness of breath, cough or wheeze. Cardiovascular: no pnd or orthopnea, no CP  GASTROINTESTINAL: No abdominal pain, nausea, vomiting or diarrhea, or blood per rectum. GENITOURINARY: No urinary frequency, urgency, hesitancy or dysuria. MUSCULOSKELETAL: No back pain. Positive right foot pain  DERMATOLOGIC: Positive open draining wounds on right foot  ENDOCRINE: No polyuria, polydipsia, no heat or cold intolerance. No recent change in weight. HEMATOLOGICAL: No anemia or easy bruising or bleeding. NEUROLOGIC: No headache, seizures, numbness, tingling or weakness.        Physical Exam:     Physical Exam:  Visit Vitals  /74 (BP 1 Location: Left upper arm, BP Patient Position: At rest)   Pulse 73   Temp 98 °F (36.7 °C)   Resp 17   Ht 5' 9\" (1.753 m)   Wt 104.3 kg (230 lb)   SpO2 100%   BMI 33.97 kg/m²      O2 Device: None (Room air)    Temp (24hrs), Av °F (36.7 °C), Min:98 °F (36.7 °C), Max:98 °F (36.7 °C)    No intake/output data recorded. No intake/output data recorded. General:  Alert, cooperative, no distress, appears stated age. Head: Normocephalic, without obvious abnormality, atraumatic. Eyes:  Conjunctivae/corneas clear. PERRL, EOMs intact. Nose: Nares normal. No drainage or sinus tenderness. Neck: Supple, symmetrical, trachea midline, no JVD. Lungs:   Clear to auscultation bilaterally. Heart:  Regular rate and rhythm, S1, S2 normal.     Abdomen: Soft, non-tender. Bowel sounds normal.    Extremities:  Right lower extremity and foot with tenderness, erythema to the calf stasis dermatitis, evidence of great toe amputation in past, 2 cm dry dark ulceration to forefoot on the plantar surface, 5 cm dry dark and moist wound to lateral aspect of foot   Pulses: 2+ and symmetric all extremities. Skin:  As above described in extremities   Neurologic: AAOx3, No focal motor or sensory deficit.        Labs Reviewed:    BMP:   Lab Results   Component Value Date/Time     2021 12:55 PM    K 4.0 2021 12:55 PM     2021 12:55 PM    CO2 32 2021 12:55 PM    AGAP 4 2021 12:55 PM     (H) 2021 12:55 PM    BUN 23 (H) 2021 12:55 PM    CREA 1.28 2021 12:55 PM    GFRAA >60 2021 12:55 PM    GFRNA 57 (L) 2021 12:55 PM     CMP:   Lab Results   Component Value Date/Time     2021 12:55 PM    K 4.0 2021 12:55 PM     2021 12:55 PM    CO2 32 2021 12:55 PM    AGAP 4 2021 12:55 PM     (H) 2021 12:55 PM    BUN 23 (H) 2021 12:55 PM    CREA 1.28 2021 12:55 PM    GFRAA >60 2021 12:55 PM    GFRNA 57 (L) 2021 12:55 PM    CA 9.6 04/12/2021 12:55 PM     CBC:   Lab Results   Component Value Date/Time    WBC 13.4 (H) 04/12/2021 12:55 PM    HGB 8.3 (L) 04/12/2021 12:55 PM    HCT 28.4 (L) 04/12/2021 12:55 PM     (H) 04/12/2021 12:55 PM     All Cardiac Markers in the last 24 hours: No results found for: CPK, CK, CKMMB, CKMB, RCK3, CKMBT, CKNDX, CKND1, TAMERA, TROPT, TROIQ, PATTI, TROPT, TNIPOC, BNP, BNPP  Recent Glucose Results:   Lab Results   Component Value Date/Time     (H) 04/12/2021 12:55 PM     COAGS: No results found for: APTT, PTP, INR, INREXT, INREXT      Procedures/imaging: see electronic medical records for all procedures/Xrays and details which were not copied into this note but were reviewed prior to creation of [address] is a 61 y.o. male with PMHx of previous diabetic foot infections status post amputation of great toe, type II DM, HTN, HLD, CAD status post stenting, CHRISTINA, obesity, arthritis, depression, and polysubstance abuse who is now admitted for diabetic foot infection with concern for osteomyelitis. 1. Diabetic foot infection  2. Concern for osteomyelitis  3. Hx diabetic foot infection status post great toe amputation  4. Type II DMHbA1c 6.4 on 2/5/2021  5. HTN  6. HLD  7. CAD status post stenting  8. CHRISTINA  9. Obesity  10. Arthritis  11. Depression  12. Hx polysubstance abuse    Podiatry consulted  ID consult in a.m.   Continue IV ABXZosyn and vancomycin  Follow-up blood cultures  SSI with Accu-Cheks, check HbA1c  Continue home medslisinopril, Protonix  Pain controlTylenol, Percocet and morphine IV as needed  Follow-up CBC, BMP, mag  Incentive spirometry  PT, OT      DVT prophylaxis: Lovenox  Diet: Diabetic, NPO after breakfast tomorrow   Contact: Lisa Duff  (sister)    [phone]   Code Status: FULL    Disposition: Admit to medical bed, >2 nights       Discussed with patient at bedside about hospital admission and my plan of care, who understood and agreed with my plan of care. Kathrine Castle DO   4/12/2021      Dragon medical dictation software was used for portions of this report. Unintended errors may occur.

## 2021-04-12 NOTE — ED NOTES
59-year-old male with hx of DM who presents to the ED for admission for diabetic R foot infection. I performed a brief evaluation, including history and physical, of the patient here in triage and I have determined that pt will need further treatment and evaluation from the main side ER physician. I have placed initial orders to help in expediting patients care.      April 12, 2021 at 12:43 PM - DEVORA Pardo

## 2021-04-13 ENCOUNTER — APPOINTMENT (OUTPATIENT)
Dept: NON INVASIVE DIAGNOSTICS | Age: 61
DRG: 305 | End: 2021-04-13
Attending: PHYSICIAN ASSISTANT
Payer: MEDICAID

## 2021-04-13 ENCOUNTER — ANESTHESIA (OUTPATIENT)
Dept: SURGERY | Age: 61
DRG: 305 | End: 2021-04-13
Payer: MEDICAID

## 2021-04-13 ENCOUNTER — ANESTHESIA EVENT (OUTPATIENT)
Dept: SURGERY | Age: 61
DRG: 305 | End: 2021-04-13
Payer: MEDICAID

## 2021-04-13 LAB
ANION GAP SERPL CALC-SCNC: 5 MMOL/L (ref 3–18)
BASOPHILS # BLD: 0.1 K/UL (ref 0–0.1)
BASOPHILS NFR BLD: 1 % (ref 0–2)
BUN SERPL-MCNC: 20 MG/DL (ref 7–18)
BUN/CREAT SERPL: 19 (ref 12–20)
CALCIUM SERPL-MCNC: 8.7 MG/DL (ref 8.5–10.1)
CHLORIDE SERPL-SCNC: 105 MMOL/L (ref 100–111)
CK MB CFR SERPL CALC: 1.6 % (ref 0–4)
CK MB SERPL-MCNC: 1.2 NG/ML (ref 5–25)
CK SERPL-CCNC: 73 U/L (ref 39–308)
CO2 SERPL-SCNC: 29 MMOL/L (ref 21–32)
COVID-19 RAPID TEST, COVR: NOT DETECTED
CREAT SERPL-MCNC: 1.08 MG/DL (ref 0.6–1.3)
DIFFERENTIAL METHOD BLD: ABNORMAL
EOSINOPHIL # BLD: 0.3 K/UL (ref 0–0.4)
EOSINOPHIL NFR BLD: 3 % (ref 0–5)
ERYTHROCYTE [DISTWIDTH] IN BLOOD BY AUTOMATED COUNT: 15.2 % (ref 11.6–14.5)
EST. AVERAGE GLUCOSE BLD GHB EST-MCNC: 148 MG/DL
GLUCOSE BLD STRIP.AUTO-MCNC: 151 MG/DL (ref 70–110)
GLUCOSE BLD STRIP.AUTO-MCNC: 170 MG/DL (ref 70–110)
GLUCOSE BLD STRIP.AUTO-MCNC: 173 MG/DL (ref 70–110)
GLUCOSE BLD STRIP.AUTO-MCNC: 176 MG/DL (ref 70–110)
GLUCOSE BLD STRIP.AUTO-MCNC: 244 MG/DL (ref 70–110)
GLUCOSE SERPL-MCNC: 113 MG/DL (ref 74–99)
HBA1C MFR BLD: 6.8 % (ref 4.2–5.6)
HCT VFR BLD AUTO: 21.9 % (ref 36–48)
HCT VFR BLD AUTO: 24.5 % (ref 36–48)
HGB BLD-MCNC: 6.3 G/DL (ref 13–16)
HGB BLD-MCNC: 7.4 G/DL (ref 13–16)
HISTORY CHECKED?,CKHIST: NORMAL
LYMPHOCYTES # BLD: 2.3 K/UL (ref 0.9–3.6)
LYMPHOCYTES NFR BLD: 20 % (ref 21–52)
MAGNESIUM SERPL-MCNC: 1.8 MG/DL (ref 1.6–2.6)
MCH RBC QN AUTO: 27.3 PG (ref 24–34)
MCHC RBC AUTO-ENTMCNC: 30.2 G/DL (ref 31–37)
MCV RBC AUTO: 90.4 FL (ref 74–97)
MONOCYTES # BLD: 0.7 K/UL (ref 0.05–1.2)
MONOCYTES NFR BLD: 6 % (ref 3–10)
NEUTS SEG # BLD: 8 K/UL (ref 1.8–8)
NEUTS SEG NFR BLD: 69 % (ref 40–73)
PLATELET # BLD AUTO: 392 K/UL (ref 135–420)
PMV BLD AUTO: 10.9 FL (ref 9.2–11.8)
POTASSIUM SERPL-SCNC: 4.2 MMOL/L (ref 3.5–5.5)
RBC # BLD AUTO: 2.71 M/UL (ref 4.35–5.65)
SARS-COV-2, COV2: NORMAL
SODIUM SERPL-SCNC: 139 MMOL/L (ref 136–145)
SOURCE, COVRS: NORMAL
T4 FREE SERPL-MCNC: 1.2 NG/DL (ref 0.7–1.5)
TROPONIN I SERPL-MCNC: <0.02 NG/ML (ref 0–0.04)
TSH SERPL DL<=0.05 MIU/L-ACNC: 4.19 UIU/ML (ref 0.36–3.74)
WBC # BLD AUTO: 11.6 K/UL (ref 4.6–13.2)

## 2021-04-13 PROCEDURE — 77030013079 HC BLNKT BAIR HGGR 3M -A: Performed by: ANESTHESIOLOGY

## 2021-04-13 PROCEDURE — 85025 COMPLETE CBC W/AUTO DIFF WBC: CPT

## 2021-04-13 PROCEDURE — 82553 CREATINE MB FRACTION: CPT

## 2021-04-13 PROCEDURE — 77030002986 HC SUT PROL J&J -A: Performed by: PODIATRIST

## 2021-04-13 PROCEDURE — 77030008683 HC TU ET CUF COVD -A: Performed by: ANESTHESIOLOGY

## 2021-04-13 PROCEDURE — 2709999900 HC NON-CHARGEABLE SUPPLY

## 2021-04-13 PROCEDURE — 74011000250 HC RX REV CODE- 250: Performed by: NURSE ANESTHETIST, CERTIFIED REGISTERED

## 2021-04-13 PROCEDURE — 87075 CULTR BACTERIA EXCEPT BLOOD: CPT

## 2021-04-13 PROCEDURE — 74011250636 HC RX REV CODE- 250/636: Performed by: FAMILY MEDICINE

## 2021-04-13 PROCEDURE — 74011636637 HC RX REV CODE- 636/637: Performed by: FAMILY MEDICINE

## 2021-04-13 PROCEDURE — 65270000029 HC RM PRIVATE

## 2021-04-13 PROCEDURE — 99223 1ST HOSP IP/OBS HIGH 75: CPT | Performed by: INTERNAL MEDICINE

## 2021-04-13 PROCEDURE — 87176 TISSUE HOMOGENIZATION CULTR: CPT

## 2021-04-13 PROCEDURE — 74011250636 HC RX REV CODE- 250/636: Performed by: NURSE ANESTHETIST, CERTIFIED REGISTERED

## 2021-04-13 PROCEDURE — 74011000250 HC RX REV CODE- 250: Performed by: PODIATRIST

## 2021-04-13 PROCEDURE — 87205 SMEAR GRAM STAIN: CPT

## 2021-04-13 PROCEDURE — 97161 PT EVAL LOW COMPLEX 20 MIN: CPT

## 2021-04-13 PROCEDURE — 84443 ASSAY THYROID STIM HORMONE: CPT

## 2021-04-13 PROCEDURE — 74011250636 HC RX REV CODE- 250/636: Performed by: PHYSICIAN ASSISTANT

## 2021-04-13 PROCEDURE — 83735 ASSAY OF MAGNESIUM: CPT

## 2021-04-13 PROCEDURE — 77030013708 HC HNDPC SUC IRR PULS STRY –B: Performed by: PODIATRIST

## 2021-04-13 PROCEDURE — P9016 RBC LEUKOCYTES REDUCED: HCPCS

## 2021-04-13 PROCEDURE — 88305 TISSUE EXAM BY PATHOLOGIST: CPT

## 2021-04-13 PROCEDURE — 76010000153 HC OR TIME 1.5 TO 2 HR: Performed by: PODIATRIST

## 2021-04-13 PROCEDURE — 84439 ASSAY OF FREE THYROXINE: CPT

## 2021-04-13 PROCEDURE — 36430 TRANSFUSION BLD/BLD COMPNT: CPT

## 2021-04-13 PROCEDURE — 74011000258 HC RX REV CODE- 258: Performed by: PHYSICIAN ASSISTANT

## 2021-04-13 PROCEDURE — 77030003445 HC NDL BIOP BN BD -B: Performed by: PODIATRIST

## 2021-04-13 PROCEDURE — 99233 SBSQ HOSP IP/OBS HIGH 50: CPT | Performed by: HOSPITALIST

## 2021-04-13 PROCEDURE — 97165 OT EVAL LOW COMPLEX 30 MIN: CPT

## 2021-04-13 PROCEDURE — 77030031139 HC SUT VCRL2 J&J -A: Performed by: PODIATRIST

## 2021-04-13 PROCEDURE — 82962 GLUCOSE BLOOD TEST: CPT

## 2021-04-13 PROCEDURE — 74011250636 HC RX REV CODE- 250/636: Performed by: HOSPITALIST

## 2021-04-13 PROCEDURE — 77030006773 HC BLD SAW OSC BRSM -A: Performed by: PODIATRIST

## 2021-04-13 PROCEDURE — 01480 ANES OPEN PX LOWER L/A/F NOS: CPT | Performed by: NURSE ANESTHETIST, CERTIFIED REGISTERED

## 2021-04-13 PROCEDURE — 80048 BASIC METABOLIC PNL TOTAL CA: CPT

## 2021-04-13 PROCEDURE — 74011250637 HC RX REV CODE- 250/637: Performed by: FAMILY MEDICINE

## 2021-04-13 PROCEDURE — 93306 TTE W/DOPPLER COMPLETE: CPT

## 2021-04-13 PROCEDURE — 36415 COLL VENOUS BLD VENIPUNCTURE: CPT

## 2021-04-13 PROCEDURE — 88307 TISSUE EXAM BY PATHOLOGIST: CPT

## 2021-04-13 PROCEDURE — 2709999900 HC NON-CHARGEABLE SUPPLY: Performed by: PODIATRIST

## 2021-04-13 PROCEDURE — 74011250636 HC RX REV CODE- 250/636: Performed by: PODIATRIST

## 2021-04-13 PROCEDURE — 76210000016 HC OR PH I REC 1 TO 1.5 HR: Performed by: PODIATRIST

## 2021-04-13 PROCEDURE — 87635 SARS-COV-2 COVID-19 AMP PRB: CPT

## 2021-04-13 PROCEDURE — 88311 DECALCIFY TISSUE: CPT

## 2021-04-13 PROCEDURE — 74011636637 HC RX REV CODE- 636/637: Performed by: HOSPITALIST

## 2021-04-13 PROCEDURE — 85018 HEMOGLOBIN: CPT

## 2021-04-13 PROCEDURE — 01480 ANES OPEN PX LOWER L/A/F NOS: CPT | Performed by: ANESTHESIOLOGY

## 2021-04-13 PROCEDURE — 93005 ELECTROCARDIOGRAM TRACING: CPT

## 2021-04-13 PROCEDURE — 76060000034 HC ANESTHESIA 1.5 TO 2 HR: Performed by: PODIATRIST

## 2021-04-13 PROCEDURE — 83036 HEMOGLOBIN GLYCOSYLATED A1C: CPT

## 2021-04-13 RX ORDER — BUPIVACAINE HYDROCHLORIDE 5 MG/ML
INJECTION, SOLUTION EPIDURAL; INTRACAUDAL AS NEEDED
Status: DISCONTINUED | OUTPATIENT
Start: 2021-04-13 | End: 2021-04-13 | Stop reason: HOSPADM

## 2021-04-13 RX ORDER — LIDOCAINE HYDROCHLORIDE 10 MG/ML
INJECTION, SOLUTION EPIDURAL; INFILTRATION; INTRACAUDAL; PERINEURAL AS NEEDED
Status: DISCONTINUED | OUTPATIENT
Start: 2021-04-13 | End: 2021-04-13 | Stop reason: HOSPADM

## 2021-04-13 RX ORDER — OXYCODONE AND ACETAMINOPHEN 5; 325 MG/1; MG/1
1 TABLET ORAL AS NEEDED
Status: DISCONTINUED | OUTPATIENT
Start: 2021-04-13 | End: 2021-04-13 | Stop reason: HOSPADM

## 2021-04-13 RX ORDER — MAGNESIUM SULFATE 100 %
4 CRYSTALS MISCELLANEOUS AS NEEDED
Status: DISCONTINUED | OUTPATIENT
Start: 2021-04-13 | End: 2021-04-13 | Stop reason: HOSPADM

## 2021-04-13 RX ORDER — ONDANSETRON 2 MG/ML
INJECTION INTRAMUSCULAR; INTRAVENOUS AS NEEDED
Status: DISCONTINUED | OUTPATIENT
Start: 2021-04-13 | End: 2021-04-13 | Stop reason: HOSPADM

## 2021-04-13 RX ORDER — ONDANSETRON 2 MG/ML
4 INJECTION INTRAMUSCULAR; INTRAVENOUS ONCE
Status: COMPLETED | OUTPATIENT
Start: 2021-04-13 | End: 2021-04-13

## 2021-04-13 RX ORDER — SUCCINYLCHOLINE CHLORIDE 20 MG/ML
INJECTION INTRAMUSCULAR; INTRAVENOUS AS NEEDED
Status: DISCONTINUED | OUTPATIENT
Start: 2021-04-13 | End: 2021-04-13 | Stop reason: HOSPADM

## 2021-04-13 RX ORDER — SODIUM CHLORIDE, SODIUM LACTATE, POTASSIUM CHLORIDE, CALCIUM CHLORIDE 600; 310; 30; 20 MG/100ML; MG/100ML; MG/100ML; MG/100ML
100 INJECTION, SOLUTION INTRAVENOUS CONTINUOUS
Status: DISCONTINUED | OUTPATIENT
Start: 2021-04-13 | End: 2021-04-13 | Stop reason: HOSPADM

## 2021-04-13 RX ORDER — MIDAZOLAM HYDROCHLORIDE 1 MG/ML
INJECTION, SOLUTION INTRAMUSCULAR; INTRAVENOUS AS NEEDED
Status: DISCONTINUED | OUTPATIENT
Start: 2021-04-13 | End: 2021-04-13 | Stop reason: HOSPADM

## 2021-04-13 RX ORDER — DEXTROSE 50 % IN WATER (D50W) INTRAVENOUS SYRINGE
25-50 AS NEEDED
Status: DISCONTINUED | OUTPATIENT
Start: 2021-04-13 | End: 2021-04-13 | Stop reason: HOSPADM

## 2021-04-13 RX ORDER — LIDOCAINE HYDROCHLORIDE 20 MG/ML
INJECTION, SOLUTION EPIDURAL; INFILTRATION; INTRACAUDAL; PERINEURAL AS NEEDED
Status: DISCONTINUED | OUTPATIENT
Start: 2021-04-13 | End: 2021-04-13 | Stop reason: HOSPADM

## 2021-04-13 RX ORDER — SODIUM CHLORIDE 9 MG/ML
500 INJECTION, SOLUTION INTRAVENOUS ONCE
Status: COMPLETED | OUTPATIENT
Start: 2021-04-13 | End: 2021-04-13

## 2021-04-13 RX ORDER — DIPHENHYDRAMINE HYDROCHLORIDE 50 MG/ML
12.5 INJECTION, SOLUTION INTRAMUSCULAR; INTRAVENOUS
Status: DISCONTINUED | OUTPATIENT
Start: 2021-04-13 | End: 2021-04-13 | Stop reason: HOSPADM

## 2021-04-13 RX ORDER — ALBUTEROL SULFATE 0.83 MG/ML
2.5 SOLUTION RESPIRATORY (INHALATION) AS NEEDED
Status: DISCONTINUED | OUTPATIENT
Start: 2021-04-13 | End: 2021-04-13 | Stop reason: HOSPADM

## 2021-04-13 RX ORDER — EPHEDRINE SULFATE/0.9% NACL/PF 25 MG/5 ML
SYRINGE (ML) INTRAVENOUS AS NEEDED
Status: DISCONTINUED | OUTPATIENT
Start: 2021-04-13 | End: 2021-04-13 | Stop reason: HOSPADM

## 2021-04-13 RX ORDER — PROPOFOL 10 MG/ML
INJECTION, EMULSION INTRAVENOUS AS NEEDED
Status: DISCONTINUED | OUTPATIENT
Start: 2021-04-13 | End: 2021-04-13 | Stop reason: HOSPADM

## 2021-04-13 RX ORDER — INSULIN GLARGINE 100 [IU]/ML
30 INJECTION, SOLUTION SUBCUTANEOUS
Status: DISCONTINUED | OUTPATIENT
Start: 2021-04-13 | End: 2021-04-16 | Stop reason: HOSPADM

## 2021-04-13 RX ORDER — HYDROMORPHONE HYDROCHLORIDE 2 MG/ML
0.5 INJECTION, SOLUTION INTRAMUSCULAR; INTRAVENOUS; SUBCUTANEOUS
Status: DISCONTINUED | OUTPATIENT
Start: 2021-04-13 | End: 2021-04-13 | Stop reason: HOSPADM

## 2021-04-13 RX ORDER — FENTANYL CITRATE 50 UG/ML
25 INJECTION, SOLUTION INTRAMUSCULAR; INTRAVENOUS
Status: DISCONTINUED | OUTPATIENT
Start: 2021-04-13 | End: 2021-04-13 | Stop reason: HOSPADM

## 2021-04-13 RX ORDER — PROPOFOL 10 MG/ML
INJECTION, EMULSION INTRAVENOUS
Status: DISCONTINUED | OUTPATIENT
Start: 2021-04-13 | End: 2021-04-13

## 2021-04-13 RX ORDER — ETOMIDATE 2 MG/ML
INJECTION INTRAVENOUS AS NEEDED
Status: DISCONTINUED | OUTPATIENT
Start: 2021-04-13 | End: 2021-04-13 | Stop reason: HOSPADM

## 2021-04-13 RX ORDER — SODIUM CHLORIDE 9 MG/ML
125 INJECTION, SOLUTION INTRAVENOUS CONTINUOUS
Status: DISPENSED | OUTPATIENT
Start: 2021-04-13 | End: 2021-04-14

## 2021-04-13 RX ORDER — VANCOMYCIN/0.9 % SOD CHLORIDE 1.5G/250ML
1500 PLASTIC BAG, INJECTION (ML) INTRAVENOUS EVERY 12 HOURS
Status: DISCONTINUED | OUTPATIENT
Start: 2021-04-13 | End: 2021-04-14

## 2021-04-13 RX ORDER — SODIUM CHLORIDE 9 MG/ML
250 INJECTION, SOLUTION INTRAVENOUS AS NEEDED
Status: DISCONTINUED | OUTPATIENT
Start: 2021-04-13 | End: 2021-04-16 | Stop reason: HOSPADM

## 2021-04-13 RX ORDER — INSULIN LISPRO 100 [IU]/ML
INJECTION, SOLUTION INTRAVENOUS; SUBCUTANEOUS ONCE
Status: DISCONTINUED | OUTPATIENT
Start: 2021-04-13 | End: 2021-04-13 | Stop reason: HOSPADM

## 2021-04-13 RX ORDER — FENTANYL CITRATE 50 UG/ML
INJECTION, SOLUTION INTRAMUSCULAR; INTRAVENOUS AS NEEDED
Status: DISCONTINUED | OUTPATIENT
Start: 2021-04-13 | End: 2021-04-13 | Stop reason: HOSPADM

## 2021-04-13 RX ORDER — ROCURONIUM BROMIDE 10 MG/ML
INJECTION, SOLUTION INTRAVENOUS AS NEEDED
Status: DISCONTINUED | OUTPATIENT
Start: 2021-04-13 | End: 2021-04-13 | Stop reason: HOSPADM

## 2021-04-13 RX ORDER — DEXAMETHASONE SODIUM PHOSPHATE 4 MG/ML
INJECTION, SOLUTION INTRA-ARTICULAR; INTRALESIONAL; INTRAMUSCULAR; INTRAVENOUS; SOFT TISSUE AS NEEDED
Status: DISCONTINUED | OUTPATIENT
Start: 2021-04-13 | End: 2021-04-13 | Stop reason: HOSPADM

## 2021-04-13 RX ORDER — GLYCOPYRROLATE 0.2 MG/ML
INJECTION INTRAMUSCULAR; INTRAVENOUS AS NEEDED
Status: DISCONTINUED | OUTPATIENT
Start: 2021-04-13 | End: 2021-04-13 | Stop reason: HOSPADM

## 2021-04-13 RX ADMIN — MIDAZOLAM HYDROCHLORIDE 2 MG: 2 INJECTION, SOLUTION INTRAMUSCULAR; INTRAVENOUS at 17:30

## 2021-04-13 RX ADMIN — ONDANSETRON 4 MG: 2 INJECTION INTRAMUSCULAR; INTRAVENOUS at 20:17

## 2021-04-13 RX ADMIN — FENTANYL CITRATE 25 MCG: 50 INJECTION, SOLUTION INTRAMUSCULAR; INTRAVENOUS at 20:15

## 2021-04-13 RX ADMIN — INSULIN LISPRO 6 UNITS: 100 INJECTION, SOLUTION INTRAVENOUS; SUBCUTANEOUS at 12:10

## 2021-04-13 RX ADMIN — SODIUM CHLORIDE 500 ML: 900 INJECTION, SOLUTION INTRAVENOUS at 16:16

## 2021-04-13 RX ADMIN — FENTANYL CITRATE 50 MCG: 50 INJECTION, SOLUTION INTRAMUSCULAR; INTRAVENOUS at 17:40

## 2021-04-13 RX ADMIN — GLYCOPYRROLATE 0.2 MG: 0.2 INJECTION INTRAMUSCULAR; INTRAVENOUS at 17:30

## 2021-04-13 RX ADMIN — PIPERACILLIN SODIUM AND TAZOBACTAM SODIUM 3.38 G: 3; .375 INJECTION, POWDER, LYOPHILIZED, FOR SOLUTION INTRAVENOUS at 01:13

## 2021-04-13 RX ADMIN — Medication 10 ML: at 21:34

## 2021-04-13 RX ADMIN — PROPOFOL 50 MG: 10 INJECTION, EMULSION INTRAVENOUS at 18:47

## 2021-04-13 RX ADMIN — FENTANYL CITRATE 50 MCG: 50 INJECTION, SOLUTION INTRAMUSCULAR; INTRAVENOUS at 17:52

## 2021-04-13 RX ADMIN — INSULIN GLARGINE 30 UNITS: 100 INJECTION, SOLUTION SUBCUTANEOUS at 21:33

## 2021-04-13 RX ADMIN — Medication 10 ML: at 06:45

## 2021-04-13 RX ADMIN — ETOMIDATE 20 MG: 2 INJECTION, SOLUTION INTRAVENOUS at 17:40

## 2021-04-13 RX ADMIN — SUCCINYLCHOLINE CHLORIDE 160 MG: 20 INJECTION, SOLUTION INTRAMUSCULAR; INTRAVENOUS at 17:41

## 2021-04-13 RX ADMIN — VANCOMYCIN HYDROCHLORIDE 1500 MG: 10 INJECTION, POWDER, LYOPHILIZED, FOR SOLUTION INTRAVENOUS at 22:49

## 2021-04-13 RX ADMIN — PANTOPRAZOLE SODIUM 40 MG: 40 TABLET, DELAYED RELEASE ORAL at 09:11

## 2021-04-13 RX ADMIN — VANCOMYCIN HYDROCHLORIDE 1500 MG: 10 INJECTION, POWDER, LYOPHILIZED, FOR SOLUTION INTRAVENOUS at 12:09

## 2021-04-13 RX ADMIN — PIPERACILLIN SODIUM AND TAZOBACTAM SODIUM 3.38 G: 3; .375 INJECTION, POWDER, LYOPHILIZED, FOR SOLUTION INTRAVENOUS at 09:11

## 2021-04-13 RX ADMIN — DEXAMETHASONE SODIUM PHOSPHATE 4 MG: 4 INJECTION, SOLUTION INTRAMUSCULAR; INTRAVENOUS at 17:52

## 2021-04-13 RX ADMIN — ROCURONIUM BROMIDE 10 MG: 50 INJECTION INTRAVENOUS at 17:40

## 2021-04-13 RX ADMIN — ONDANSETRON 4 MG: 2 INJECTION INTRAMUSCULAR; INTRAVENOUS at 17:52

## 2021-04-13 RX ADMIN — MORPHINE SULFATE 2 MG: 2 INJECTION, SOLUTION INTRAMUSCULAR; INTRAVENOUS at 04:15

## 2021-04-13 RX ADMIN — INSULIN LISPRO 3 UNITS: 100 INJECTION, SOLUTION INTRAVENOUS; SUBCUTANEOUS at 06:43

## 2021-04-13 RX ADMIN — PIPERACILLIN SODIUM AND TAZOBACTAM SODIUM 3.38 G: 3; .375 INJECTION, POWDER, LYOPHILIZED, FOR SOLUTION INTRAVENOUS at 20:01

## 2021-04-13 RX ADMIN — SODIUM CHLORIDE 75 ML/HR: 900 INJECTION, SOLUTION INTRAVENOUS at 16:16

## 2021-04-13 RX ADMIN — OXYCODONE HYDROCHLORIDE AND ACETAMINOPHEN 1 TABLET: 5; 325 TABLET ORAL at 09:10

## 2021-04-13 RX ADMIN — INSULIN LISPRO 3 UNITS: 100 INJECTION, SOLUTION INTRAVENOUS; SUBCUTANEOUS at 19:59

## 2021-04-13 RX ADMIN — MORPHINE SULFATE 2 MG: 2 INJECTION, SOLUTION INTRAMUSCULAR; INTRAVENOUS at 23:01

## 2021-04-13 RX ADMIN — LISINOPRIL 20 MG: 20 TABLET ORAL at 09:10

## 2021-04-13 RX ADMIN — Medication 10 ML: at 16:17

## 2021-04-13 RX ADMIN — Medication 10 MG: at 18:32

## 2021-04-13 RX ADMIN — PROPOFOL 50 MG: 10 INJECTION, EMULSION INTRAVENOUS at 18:48

## 2021-04-13 RX ADMIN — LIDOCAINE HYDROCHLORIDE 80 MG: 20 INJECTION, SOLUTION EPIDURAL; INFILTRATION; INTRACAUDAL; PERINEURAL at 17:40

## 2021-04-13 NOTE — ANESTHESIA PREPROCEDURE EVALUATION
Relevant Problems   RESPIRATORY SYSTEM   (+) Multifocal pneumonia      NEUROLOGY   (+) Depression      CARDIOVASCULAR   (+) Coronary artery disease involving native coronary artery of native heart without angina pectoris   (+) Essential hypertension      GASTROINTESTINAL   (+) GERD (gastroesophageal reflux disease)      RENAL FAILURE   (+) ARF (acute renal failure) (HCC)   (+) Acute kidney injury ([facility] [address]      ENDOCRINE   (+) Acquired hypothyroidism   (+) DM (diabetes mellitus) (HCC)   (+) Diabetes (HCC)   (+) Localized adiposity of abdomen      HEMATOLOGY   (+) Osteomyelitis (HCC)       Anesthetic History     PONV          Review of Systems / Medical History  Patient summary reviewed, nursing notes reviewed and pertinent labs reviewed    Pulmonary        Sleep apnea: CPAP           Neuro/Psych         Psychiatric history    Comments: depression Cardiovascular    Hypertension          CAD      Comments: Echo today:  Left Ventricle Normal cavity size, wall thickness and systolic function (ejection fraction normal). Wall motion: normal. The estimated EF is 55 - 60%. Visually measured ejection fraction. There is age-appropriate left ventricular diastolic function. Wall Scoring   The left ventricular wall motion is normal.        Left Atrium Normal cavity size. Left Atrium volume index is 24 mL/m2. Right Ventricle Normal cavity size and global systolic function. Right Atrium Normal cavity size. Interatrial Septum No interatrial shunt visualized on color doppler. Aortic Valve Normal valve structure, no stenosis and no regurgitation. Mitral Valve Normal valve structure and no stenosis. Trace regurgitation. Tricuspid Valve Normal valve structure and no stenosis. Trace regurgitation. Pulmonic Valve Pulmonic valve not well visualized, but normal doppler findings. No stenosis. Aorta Normal aortic root. Pulmonary Artery Normal pulmonary arteries. IVC/Hepatic Veins Normal structure.   Pericardium No evidence of pericardial effusion.        GI/Hepatic/Renal     GERD           Endo/Other    Diabetes: poorly controlled, type 2, using insulin  Hypothyroidism  Obesity and arthritis     Other Findings              Physical Exam    Airway  Mallampati: II  TM Distance: 4 - 6 cm  Neck ROM: short neck   Mouth opening: Normal     Cardiovascular    Rhythm: regular          Comments: Sinus Bradycardia Dental    Dentition: Poor dentition     Pulmonary  Breath sounds clear to auscultation               Abdominal  GI exam deferred       Other Findings            Anesthetic Plan    ASA: 3  Anesthesia type: general          Induction: Intravenous  Anesthetic plan and risks discussed with: Patient

## 2021-04-13 NOTE — ED NOTES
Introduced self to patient  Pt laying in bed resting   Pt c/o pain in right leg 10/10   Verified pt using 2 identifiers  Explained use and side effects  Medicated as ordered  Pt informed not to attempt to get out of bed   Instructed of safety measures   No swelling at site  No signs of pain   Flushed well  Pt positioned in bed for comfort  Updated about plan of care   Pt on monitor SR  Will continue to monitor and assess

## 2021-04-13 NOTE — CONSULTS
Infectious Disease Consultation Note        Reason: right foot wound infection     Current abx Prior abx   Zosyn, vancomycin since 4/12/21      Lines:       Assessment :    61 y. o. male with h/o type uncontrolled type 2 DM (last hgbA1C 8.9 on 11/23/20) , CAD who presented to [facility] on 2/4/2021 with right foot infection     Hospitalization at [facility] November 2020 for right great toe distal phalanx chronic osteomyelitis, septic arthritis right great toe interphalangeal joint   Wound cultures 9/2020-Enterobacter, MRSA  Status post right great toe amputation on 11/24.       Hospitalization at [facility] 2/2021 for acute on chronic osteomyelitis right first metatarsal osteomyelitis. Status post incision and debridement, partial resection of right first metatarsal on 2/5/2021. Intra-Op cultures: Methicillin susceptible Staphylococcus aureus, Bacteroides. Bone biopsy of clean margins reveal acute inflammation suggestive of acute osteomyelitis. S/p ertapenem, daptomycin till 3/19/2021     Clinical presentation c/w chronic osteomyelitis right second metatarsal head, infected right lateral foot ulcer in a patient with uncontrolled type 2 DM, evolving charcot arthropathy    xr 4/12- New erosion at the second distal metatarsal head and base of the third proximal  phalanx      Recommendations:     1.   continue pip/tazo. Continue vancomycin since recent history of MRSA  2.  Follow up MRI right foot, podiatry recommendations  3. Obtain wound culture right foot ulcer   4. patient was advised regarding importance of good glycemic control, compliance with antibiotics      Thank you for consultation request. Above plan was discussed in details with patient, and primary team. Please call me if any further questions or concerns. Will continue to participate in the care of this patient.   HPI:    61 y.o. male with PMHx of previous diabetic foot infections status post amputation of great toe, type II DM, HTN, HLD, CAD status post stenting, CHRISTINA, obesity, arthritis, and depression who presented to the ED on 4/12/21 with complaints of increasing right foot pain, drainage and wounds. Patient was seen by his podiatrist yesterday and was sent to the ED for further eval.  He reports that the pain and swelling have been increasing for the previous few days and he has had increased drainage. In the ED, afebrile. WBCs 13.4, Hgb 8.3 with a left shift and lymphopenia, platelets 263, glucose 172, BUN 23, and creatinine 1.28 (baseline 1.1). A CXR showed no acute process. An x-ray of the right foot showed a new erosion at the second distal metatarsal head and the base of the third proximal phalanx concerning for cellulitis, and a new erosive change at the distal medial cuneiform concerning for osteomyelitis. Started on IV antibiotics. MRI right foot ordered. I have been consulted for further recommendations. Patient denies any subjective fever or chills throughout this time. He completed course of IV antibiotics in March 2021. He subsequently started noticing ulcers on his right foot.  He followed up with the podiatrist.    Past Medical History:   Diagnosis Date    Arthritis     Coronary atherosclerosis of native coronary artery     S/P PCI with GE in 12/09    Diabetes ([facility]     IDDM    Electrolyte and fluid disorders not elsewhere classified     Essential hypertension, benign     GERD (gastroesophageal reflux disease)     Heroin abuse ([facility] 05/26/16    Psychiatrist Dr. Thomas Weathers History of heart attack     Hyperlipidemia     Intermediate coronary syndrome [facility])     MDD (major depressive disorder) 5/26/16    Psychiatrist Dr. Thomas Weathers Myocardial infarction [facility])     Obesity, unspecified     Old myocardial infarction     Other and unspecified hyperlipidemia     Pancreatitis     Positive PPD     Sleep apnea     uses Cpap machine    Transfusion history     Before 1992       Past Surgical History:   Procedure Laterality Date  HX APPENDECTOMY      HX CORONARY STENT PLACEMENT  2010    2 stents       Home                                                                                                       Medication List    Details   metFORMIN (GLUCOPHAGE) 1,000 mg tablet Take 1,000 mg by mouth two (2) times daily (with meals). quinapriL (ACCUPRIL) 40 mg tablet Take 40 mg by mouth daily. insulin lispro (HUMALOG) 100 unit/mL injection Check FSBS AC*HS  For sugars between 160 and 200- Give 2 units SQ,  For sugars between 201 and 250, Give 3 units SQ,  For sugars Between 251 and 300, give 5 units SQ,  For Sugars between 301 and 350, give 7 units SQ  For sugars between 351 and 400, give 8 units SQ and contact PCP  Qty: 1 Vial, Refills: 0      insulin detemir U-100 (Levemir U-100 Insulin) 100 unit/mL injection 20 Units by SubCUTAneous route two (2) times a day. Qty: 10 mL, Refills: 0      pantoprazole (PROTONIX) 40 mg tablet Take 1 Tab by mouth Before breakfast and dinner. Qty: 60 Tab, Refills: 0      acetaminophen (TYLENOL) 325 mg tablet Take 2 Tabs by mouth every six (6) hours as needed. Indications: Pain, take it with bentyl; do not exceed 3 g tylenol daily  Qty: 30 Tab, Refills: 0      polyethylene glycol (MIRALAX) 17 gram packet Take 1 Packet by mouth daily.   Qty: 30 Packet, Refills: 0             Current Facility-Administered Medications   Medication Dose Route Frequency    vancomycin (VANCOCIN) 1500 mg in  ml infusion  1,500 mg IntraVENous Q12H    insulin glargine (LANTUS) injection 30 Units  30 Units SubCUTAneous QHS    0.9% sodium chloride infusion  75 mL/hr IntraVENous CONTINUOUS    0.9% sodium chloride infusion 500 mL  500 mL IntraVENous ONCE    piperacillin-tazobactam (ZOSYN) 3.375 g in 0.9% sodium chloride (MBP/ADV) 100 mL MBP  3.375 g IntraVENous Q6H    sodium chloride (NS) flush 5-40 mL  5-40 mL IntraVENous Q8H    sodium chloride (NS) flush 5-40 mL  5-40 mL IntraVENous PRN    acetaminophen (TYLENOL) tablet 650 mg  650 mg Oral Q6H PRN    Or    acetaminophen (TYLENOL) suppository 650 mg  650 mg Rectal Q6H PRN    polyethylene glycol (MIRALAX) packet 17 g  17 g Oral DAILY PRN    enoxaparin (LOVENOX) injection 40 mg  40 mg SubCUTAneous DAILY    ondansetron (ZOFRAN) injection 4 mg  4 mg IntraVENous Q6H PRN    insulin lispro (HUMALOG) injection   SubCUTAneous AC&HS    glucose chewable tablet 16 g  4 Tab Oral PRN    glucagon (GLUCAGEN) injection 1 mg  1 mg IntraMUSCular PRN    dextrose (D50W) injection syrg 12.5-25 g  25-50 mL IntraVENous PRN    pantoprazole (PROTONIX) tablet 40 mg  40 mg Oral ACB&D    [Held by provider] lisinopriL (PRINIVIL, ZESTRIL) tablet 20 mg  20 mg Oral DAILY    oxyCODONE-acetaminophen (PERCOCET) 5-325 mg per tablet 1 Tab  1 Tab Oral Q6H PRN    morphine injection 2 mg  2 mg IntraVENous Q3H PRN     Facility-Administered Medications Ordered in Other Encounters   Medication Dose Route Frequency    sodium chloride (NS) flush 10-40 mL  10-40 mL IntraVENous PRN    heparin (porcine) pf 500 Units  500 Units InterCATHeter PRN       Allergies: Compazine [prochlorperazine]    Family History   Problem Relation Age of Onset    Heart Attack Father     Coronary Artery Disease Mother     Diabetes Mother     Cancer Sister         breast cancer and lung cancer     Social History     Socioeconomic History    Marital status:      Spouse name: Not on file    Number of children: Not on file    Years of education: Not on file    Highest education level: Not on file   Occupational History    Not on file   Social Needs    Financial resource strain: Not on file    Food insecurity     Worry: Not on file     Inability: Not on file    Transportation needs     Medical: Not on file     Non-medical: Not on file   Tobacco Use    Smoking status: Never Smoker    Smokeless tobacco: Never Used   Substance and Sexual Activity    Alcohol use: No    Drug use: No    Sexual activity: Not on file Lifestyle    Physical activity     Days per week: Not on file     Minutes per session: Not on file    Stress: Not on file   Relationships    Social connections     Talks on phone: Not on file     Gets together: Not on file     Attends [employer] service: Not on file     Active member of club or organization: Not on file     Attends meetings of clubs or organizations: Not on file     Relationship status: Not on file    Intimate partner violence     Fear of current or ex partner: Not on file     Emotionally abused: Not on file     Physically abused: Not on file     Forced sexual activity: Not on file   Other Topics Concern    Not on file   Social History Narrative    Not on file     Social History     Tobacco Use   Smoking Status Never Smoker   Smokeless Tobacco Never Used        Temp (24hrs), Av.8 °F (36.6 °C), Min:97.2 °F (36.2 °C), Max:98.7 °F (37.1 °C)    Visit Vitals  BP (!) 93/55   Pulse (!) 56   Temp 97.7 °F (36.5 °C)   Resp 15   Ht 5' 9\" (1.753 m)   Wt 104.3 kg (230 lb)   SpO2 99%   BMI 33.97 kg/m²       ROS: 12 point ROS obtained in details. Pertinent positives as mentioned in HPI,   otherwise negative    Physical Exam:       General:          In NAD.  Nontoxic-appearing. HEENT:           Head NCAT. sclerae anicteric, EOMI.  OP clear. Neck:               Supple, trachea midline. Lungs:             Clear, no wheezes.  Effort nonlabored, no accessory muscle use. Chest wall:      No chest wall tenderness.  Chest expansion equal and symmetrical bilaterally. Heart:              RRR.  No JVD. Abdomen:        Soft, NT/ND.  Bowel sounds normoactive. Extremities:     Warm, no edema.   Right first metatarsal amputation site well-healed. Ulceration lateral aspect of right foot and plantar aspect of right foot overlying the right second metatarsal. No significant drainage. No increased erythema/warmth or tenderness right foot/right leg. Edema right foot/right leg.  Darkish discoloration of the skin dorsal medial aspect of right foot  Skin:                Skin changes and surgical changes right foot as mentioned above  Psych:             Mood normal.  Calm, no agitation. Neurologic:      Awake and alert, moves extremities spontaneously.      Labs: Results:   Chemistry Recent Labs     04/13/21  0315 04/12/21  1255   * 172*    138   K 4.2 4.0    102   CO2 29 32   BUN 20* 23*   CREA 1.08 1.28   CA 8.7 9.6   AGAP 5 4   BUCR 19 18      CBC w/Diff Recent Labs     04/13/21  0315 04/12/21  1255   WBC 11.6 13.4*   RBC 2.71* 3.15*   HGB 7.4* 8.3*   HCT 24.5* 28.4*    459*   GRANS 69 76*   LYMPH 20* 14*   EOS 3 2      Microbiology Recent Labs     04/12/21  1300 04/12/21  1255   CULT NO GROWTH AFTER 18 HOURS NO GROWTH AFTER 18 HOURS          RADIOLOGY:    All available imaging studies/reports in [facility] for this admission were reviewed      Disclaimer: Sections of this note are dictated utilizing voice recognition software, which may have resulted in some phonetic based errors in grammar and contents. Even though attempts were made to correct all the mistakes, some may have been missed, and remained in the body of the document. If questions arise, please contact our department.     Dr. Chani Vega, Infectious Disease Specialist  [phone]  April 13, 2021  3:32 PM

## 2021-04-13 NOTE — ROUTINE PROCESS
MD notified of MEW of 3. Ordered Recheck of VS. Pt Hr of 56 and BP of 86/56. NS bolus ordered and given. EKG ordered and performed. Pt had bradycardia 45-56 beats per minute. Pt asymptomatic and showing no signs of distress. Unable to get stat MRI form and place box while pt is currently off floor for echo. 1620- Pt returned from echo. MEW score of 3. HR of 48. BP of 87/54.   
1622- MD notified of VS and MEWs. Pt asymptomatic. Manual pressure 97/60. Fluid rate increased. 1630- OR staff attempted to take pt to preop. MD stated to wait unit he spoke with surgeon. Surgeon then wanted to speak with Anesthesia. Anesthesia wanted to proceed. Pt transported to pre-op.

## 2021-04-13 NOTE — CONSULTS
Podiatry History and Physical    Subjective:         Date of Consultation:  April 12, 2021    Patient is a 61 y.o.male with PMHx noted below who is being seen for right foot submet 2 ulcer with osteomyelitis of 2nd MTPJ. Patient was seen in office recently s/p right foot partial first ray amputation. Patient is very poorly controlled diabetic. He had ulcer under amputation of right first ray which is healed. However over last 2 weeks he developed new ulcer to right submet 2 which is probing deep to 2nd MTPJ. Patient also has ulcer to lateral fifth MTPJ. He denies N/V/C/F.      Patient Active Problem List    Diagnosis Date Noted    Diabetic foot infection ([address] 04/12/2021    Leukocytosis 02/04/2021    Wound infection 02/04/2021    Diabetic ulcer of heel ([address] 02/04/2021    Acute kidney injury ([facility] 02/04/2021    Osteomyelitis ([facility] [facility] 11/22/2020    Fracture, toe 09/24/2020    Altered mental status 09/01/2020    Multifocal pneumonia 09/01/2020    DM (diabetes mellitus) ([facility] [facility] 12/25/2019    Orthostatic hypotension 12/25/2019    Syncope and collapse 12/24/2019    Vomiting 03/28/2018    Chronic abdominal pain 03/28/2018    Sepsis ([facility]) 03/21/2018    Nausea and vomiting 09/07/2017    Gastroparesis 09/07/2017    Hypokalemia 09/07/2017    ARF (acute renal failure) ([facility]) 09/07/2017    Atelectasis 09/07/2017    Abdominal pain 09/07/2017    Acquired hypothyroidism 09/07/2017    S/P lumbar fusion 07/05/2017    Diabetic neuropathy ([facility] 07/05/2017    Neuritis 07/05/2017    Spinal stenosis at L4-L5 level 06/19/2017    Coronary artery disease involving native coronary artery of native heart without angina pectoris 05/31/2017    History of coronary artery stent placement 05/31/2017    Synovial cyst 05/26/2017    HNP (herniated nucleus pulposus), lumbar 05/26/2017    Lumbar spinal stenosis 05/26/2017    Spondylolisthesis of lumbar region 05/26/2017    Positive PPD     History of heart attack     Pain in left lower leg 07/24/2016    Primary osteoarthritis of left knee 07/24/2016    Localized adiposity of abdomen 07/24/2016    Diabetes ([facility] 08/14/2015    Essential hypertension 08/14/2015    GERD (gastroesophageal reflux disease) 08/14/2015    Depression 08/14/2015     Past Medical History:   Diagnosis Date    Arthritis     Coronary atherosclerosis of native coronary artery     S/P PCI with GE in 12/09    Diabetes ([facility]     IDDM    Electrolyte and fluid disorders not elsewhere classified     Essential hypertension, benign     GERD (gastroesophageal reflux disease)     Heroin abuse ([facility] 05/26/16    Psychiatrist Dr. Lesli Dockery History of heart attack     Hyperlipidemia     Intermediate coronary syndrome ([facility]     MDD (major depressive disorder) 5/26/16    Psychiatrist Dr. Lesli Dockery Myocardial infarction [facility])     Obesity, unspecified     Old myocardial infarction     Other and unspecified hyperlipidemia     Pancreatitis     Positive PPD     Sleep apnea     uses Cpap machine    Transfusion history     Before 1992      Family History   Problem Relation Age of Onset    Heart Attack Father     Coronary Artery Disease Mother     Diabetes Mother     Cancer Sister         breast cancer and lung cancer      Social History     Tobacco Use    Smoking status: Never Smoker    Smokeless tobacco: Never Used   Substance Use Topics    Alcohol use: No     Past Surgical History:   Procedure Laterality Date    HX APPENDECTOMY      HX CORONARY STENT PLACEMENT  2010    2 stents      Prior to Admission medications    Medication Sig Start Date End Date Taking? Authorizing Provider   metFORMIN (GLUCOPHAGE) 1,000 mg tablet Take 1,000 mg by mouth two (2) times daily (with meals). Yes Provider, Historical   quinapriL (ACCUPRIL) 40 mg tablet Take 40 mg by mouth daily.    Yes Provider, Historical   insulin lispro (HUMALOG) 100 unit/mL injection Check FSBS AC*HS  For sugars between 160 and 200- Give 2 units SQ,  For sugars between 201 and 250, Give 3 units SQ,  For sugars Between 251 and 300, give 5 units SQ,  For Sugars between 301 and 350, give 7 units SQ  For sugars between 351 and 400, give 8 units SQ and contact PCP  Patient taking differently: by SubCUTAneous route two (2) times a day. Check FSBS AC*HS  For sugars between 160 and 200- Give 2 units SQ,  For sugars between 201 and 250, Give 3 units SQ,  For sugars Between 251 and 300, give 5 units SQ,  For Sugars between 301 and 350, give 7 units SQ  For sugars between 351 and 400, give 8 units SQ and contact PCP 21  Yes Meg Henry MD   insulin detemir U-100 (Levemir U-100 Insulin) 100 unit/mL injection 20 Units by SubCUTAneous route two (2) times a day. 21  Yes Meg Henry MD   pantoprazole (PROTONIX) 40 mg tablet Take 1 Tab by mouth Before breakfast and dinner. 3/29/18  Yes Ronny Reyez NP   acetaminophen (TYLENOL) 325 mg tablet Take 2 Tabs by mouth every six (6) hours as needed. Indications: Pain, take it with bentyl; do not exceed 3 g tylenol daily 3/29/18  Yes Dilan GAMING NP   polyethylene glycol (MIRALAX) 17 gram packet Take 1 Packet by mouth daily. Patient taking differently: Take 1 Packet by mouth daily. [address] [place] [address] 17  Yes Sonja Myers MD     Allergies   Allergen Reactions    Compazine [Prochlorperazine] Anaphylaxis     \"Tightness around throat\"        Review of Systems:  A comprehensive review of systems was negative except for that written in the HPI. Objective:     Patient Vitals for the past 8 hrs:   BP Temp Pulse Resp SpO2   21 132/78 97.2 °F (36.2 °C) 68 (!) 7 98 %   21 1909 122/77 98.7 °F (37.1 °C) 88 17 100 %     Temp (24hrs), Av °F (36.7 °C), Min:97.2 °F (36.2 °C), Max:98.7 °F (37.1 °C)    Right EDILSON: palpable pedal pulses, submet 2 ulcer probes deep to MTPJ exposing bone to outside environment, 2nd to dislocated at MTPJ.  Chronic ulcer to lateral fifth MTPJ with necrotic base, purulent drainage noted from submet 2 ulcer, diminished protective sensation noted. Data Review:   Recent Results (from the past 24 hour(s))   CBC WITH AUTOMATED DIFF    Collection Time: 04/12/21 12:55 PM   Result Value Ref Range    WBC 13.4 (H) 4.6 - 13.2 K/uL    RBC 3.15 (L) 4.35 - 5.65 M/uL    HGB 8.3 (L) 13.0 - 16.0 g/dL    HCT 28.4 (L) 36.0 - 48.0 %    MCV 90.2 74.0 - 97.0 FL    MCH 26.3 24.0 - 34.0 PG    MCHC 29.2 (L) 31.0 - 37.0 g/dL    RDW 15.1 (H) 11.6 - 14.5 %    PLATELET 239 (H) 870 - 420 K/uL    MPV 10.9 9.2 - 11.8 FL    NEUTROPHILS 76 (H) 40 - 73 %    LYMPHOCYTES 14 (L) 21 - 52 %    MONOCYTES 6 3 - 10 %    EOSINOPHILS 2 0 - 5 %    BASOPHILS 1 0 - 2 %    ABS. NEUTROPHILS 10.2 (H) 1.8 - 8.0 K/UL    ABS. LYMPHOCYTES 1.9 0.9 - 3.6 K/UL    ABS. MONOCYTES 0.8 0.05 - 1.2 K/UL    ABS. EOSINOPHILS 0.3 0.0 - 0.4 K/UL    ABS.  BASOPHILS 0.1 0.0 - 0.1 K/UL    DF AUTOMATED     METABOLIC PANEL, BASIC    Collection Time: 04/12/21 12:55 PM   Result Value Ref Range    Sodium 138 136 - 145 mmol/L    Potassium 4.0 3.5 - 5.5 mmol/L    Chloride 102 100 - 111 mmol/L    CO2 32 21 - 32 mmol/L    Anion gap 4 3.0 - 18 mmol/L    Glucose 172 (H) 74 - 99 mg/dL    BUN 23 (H) 7.0 - 18 MG/DL    Creatinine 1.28 0.6 - 1.3 MG/DL    BUN/Creatinine ratio 18 12 - 20      GFR est AA >60 >60 ml/min/1.73m2    GFR est non-AA 57 (L) >60 ml/min/1.73m2    Calcium 9.6 8.5 - 10.1 MG/DL   TYPE & SCREEN    Collection Time: 04/12/21 12:55 PM   Result Value Ref Range    Crossmatch Expiration 04/15/2021,2359     ABO/Rh(D) Giuseppe Elsa POSITIVE     Antibody screen NEG    POC LACTIC ACID    Collection Time: 04/12/21 12:59 PM   Result Value Ref Range    Lactic Acid (POC) 1.19 0.40 - 2.00 mmol/L   EKG, 12 LEAD, INITIAL    Collection Time: 04/12/21  1:07 PM   Result Value Ref Range    Ventricular Rate 60 BPM    Atrial Rate 441 BPM    QRS Duration 72 ms    Q-T Interval 412 ms    QTC Calculation (Bezet) 412 ms    Calculated R Axis 16 degrees    Calculated T Axis 12 degrees    Diagnosis       Sinus rhythm with 1st degree AV block ddysrhythmia  Abnormal ECG  When compared with ECG of 04-FEB-2021 18:53,  Current undetermined rhythm precludes rhythm comparison, needs review  Criteria for Anterior infarct are no longer present  Criteria for Inferior infarct are no longer present  Nonspecific T wave abnormality, improved in Lateral leads  Confirmed by Sheryl Bar MD, --- ([id number]) on 4/12/2021 4:30:57 PM     GLUCOSE, POC    Collection Time: 04/12/21  9:15 PM   Result Value Ref Range    Glucose (POC) 186 (H) 70 - 110 mg/dL         Impression:     Right foot second and possible third MTPJ septic joint with osteomyelitis, diabetes with neuropathy    Recommendation:     - x-ray of right foot reviewed. New erosion at the second distal metatarsal head and base of the third proximal phalanx is concerning for a cellulitis. Suspect is new erosive change at the distal medial cuneiform, also concerning for osteoarthritis. - Patient will need OR debridement/amputation. Discussed that at minimum he needs partial second and third ray amputation. However, TMA would be better choice to allow more functional foot. Patient was given option. He opted to proceed with TMA amputation. Will proceed with surgery tomorrow. Please keep him NPO after breakfast tomorrow morning.   - Meantime MRI of right foot ordered. - Remain NWB to right forefoot.   - Start on broad spectrum IV abxs.

## 2021-04-13 NOTE — DIABETES MGMT
GLYCEMIC CONTROL PLAN OF CARE    Assessment:  History:  Admitted with diabetic food infections. States he has been dealing with this foot problem for approximately one year. Morning blood glucose:  151 mg/dl  IVF containing dextrose:  None   Steroids:  None   Diet/TF:  NPO    Recommendations:  Noted corrective insulin coverage ordered for pt. Recommend adding Lantus 30 units daily to start. Blood glucose currently controlled. Pt NPO. Will continue inpatient monitoring. Most recent BG values:      Results for Hany Montero (MRN [id number]) as of 4/13/2021 11:01   Ref. Range 4/12/2021 21:15 4/13/2021 06:38   GLUCOSE,FAST - POC Latest Ref Range: 70 - 110 mg/dL 186 (H) 151 (H)     Within target range  mg/dl? Yes. Current A1C:  6.8%  equivalent to an average blood glucose of 148 mg/dl over the past 2-3 months. Adequate glycemic control PTA? Yes. Current hospital diabetes medications:  Lispro corrective insulin coverage AC&HS  Previous days insulin requirements:  Lispro 3 units corrective insulin  Home diabetes medication:  Levemir 60 units bid  Humalog 60-65 units with meals  Metformin 1000 mg bid  Education:  _x_ see diabetes education record            ___ diabetes education not indicated at this time.     [facility] RN CDE  Ext [phone]

## 2021-04-13 NOTE — ED NOTES
Introduced self to patient  Pt laying in bed resting   No noted signs of distress  Verified pt using 2 identifiers  Explained use and side effects  Medicated as ordered  No swelling at site  No signs of pain   Flushed well  Pt positioned in bed for comfort  Updated about plan of care   Pt on monitor SR  without ectopy   Will continue to monitor and assess

## 2021-04-13 NOTE — PROGRESS NOTES
Pt not seen for skilled PT due to:    [ ]  Nausea/vomiting  [ ]  Eating  [ ]  Pain  [ ]  Pt lethargic  [ ]  Off Unit  Other: 08:32- Pt declined PT    Will f/u later as schedule allows. Thank you.   Karson Chahal, PT, DPT

## 2021-04-13 NOTE — ROUTINE PROCESS
Bedside shift change report given to Sabas (oncoming nurse) by Montse Zelaya (offgoing nurse). Report included the following information SBAR, Kardex, Intake/Output, MAR and Recent Results.

## 2021-04-13 NOTE — PERIOP NOTES
Talked to [facility], THE RN re:completing preop checklist and checking VS and BS before transfer to preop. States patient had issues with heart rate in 40's, now having echo.

## 2021-04-13 NOTE — CONSULTS
[facility] - Consult Note    Consultation request by Dr. Boland Due for advice/opinion related to evaluating pre-op, bradycardia    Consulting cardiologist: Dr. Lillian Amaro    Date of  Admission: 4/12/2021 12:47 PM   Primary Care Physician:  Alison Ferrer MD     Assessment:     -Asked to see for pre-operative evaluation for TMA due to R foot submet 2 ulcer with osteomyelitis of 2nd MTP.  -Hx CAD, s/p PCI with GE to OM1 in 2009  -TSH 6.75 in 09/2020  -Anemia, Hgb down to 7,4 on 4/13/2021  -Hx HTN, on Lopressor as outpatient  -DM, A1c 6.8 with  on 4/13/2021.  -HLD  -Obesity, BMI 33.97 kg/m2    Primary cardiologist Dr. Michael Holstein:     -Agree with holding outpatient Metoprolol and Lopressor.  -Echocardiogram ordered, to be completed this afternoon, discussed with Echo tech. -TSH/FT4 pending, prior TSH 6.75 in 09/2020, defer further evaluation/management to primary team.  -Management of anemia per primary team.  -Pt without symptoms to suggest unstable angina or decompensated CHF. Currently appears to be intermediate risk given cardiac history, no pre-operative further cardiac evaluation planned. Planning R transmetatarsal amputation this afternoon per podiatry team.    Staff addendum:  No evidence CHF or angina. Hold lopressor for now, followup TSH. Echo reviewed by me today, preliminary with normal EF 55%, no wall motion, normal valve function. Ok to proceed with amputation later today from cardiac standpoint, no further testing required. Will plan to see post-op tomorrow. I saw, examined, and evaluated the patient. I personally reviewed the patient's labs, tests, vitals, orders, medications, updated history, and other providers assessments. I personally agree with the findings as stated and the plan as documented. Rhonda Muñiz MD       History of Present Illness: This is a 61 y.o. male admitted for Diabetic foot infection ([facility] [address] [E11.628, L08.9].       Yaya Ferreira is a 61 y.o. male with PMHx as described above, who presented to the hospital due to R foot infection. Pt was seen by his podiatrist yesterday and sent to the ER for further evaluation. He had been experiencing increasing pain/swelling to R foot with increased drainage. No fevers. He denies any recent exertional chest pain/discomfort or shortness of breath; activity limited currently by R foot problems. No orthopnea. X-ray of R foot showed a new erosion at the second distal metatarsal head and base of third proximal phalanx concerning for cellulitis, and new erosive change at distal medial cuneiform concerning for osteomyelitis. Cardiology has been consulted for pre-operative evaluation due to bradycardia. Denies palpitations, lightheadedness/dizziness or syncope recently. No new medications. No bleeding problems.       Cardiac risk factors: dyslipidemia, diabetes mellitus, obesity, male gender, hypertension      Review of Symptoms:  Except as stated above include:  Constitutional:  negative  Respiratory:  negative  Cardiovascular:  negative  Gastrointestinal: negative  Genitourinary:  negative  Musculoskeletal:  As per HPI  Neurological:  Negative  Dermatological:  As per HPI  Endocrinological: Negative  Psychological:  Negative       Past Medical History:     Past Medical History:   Diagnosis Date    Arthritis     Coronary atherosclerosis of native coronary artery     S/P PCI with EG in 12/09    Diabetes ([facility] [address]     IDDM    Electrolyte and fluid disorders not elsewhere classified     Essential hypertension, benign     GERD (gastroesophageal reflux disease)     Heroin abuse ([facility] [address] 05/26/16    Psychiatrist Dr. Thomas Weathers History of heart attack     Hyperlipidemia     Intermediate coronary syndrome ([facility] [address]     MDD (major depressive disorder) 5/26/16    Psychiatrist Dr. Thomas Weathers Myocardial infarction [facility])     Obesity, unspecified     Old myocardial infarction     Other and unspecified hyperlipidemia     Pancreatitis     Positive PPD     Sleep apnea     uses Cpap machine    Transfusion history     Before 1992         Social History:     Social History     Socioeconomic History    Marital status:      Spouse name: Not on file    Number of children: Not on file    Years of education: Not on file    Highest education level: Not on file   Tobacco Use    Smoking status: Never Smoker    Smokeless tobacco: Never Used   Substance and Sexual Activity    Alcohol use: No    Drug use: No        Family History:     Family History   Problem Relation Age of Onset    Heart Attack Father     Coronary Artery Disease Mother     Diabetes Mother     Cancer Sister         breast cancer and lung cancer        Medications:      Allergies   Allergen Reactions    Compazine [Prochlorperazine] Anaphylaxis     \"Tightness around throat\"        Current Facility-Administered Medications   Medication Dose Route Frequency    vancomycin (VANCOCIN) 1500 mg in  ml infusion  1,500 mg IntraVENous Q12H    insulin glargine (LANTUS) injection 30 Units  30 Units SubCUTAneous QHS    0.9% sodium chloride infusion  75 mL/hr IntraVENous CONTINUOUS    0.9% sodium chloride infusion 500 mL  500 mL IntraVENous ONCE    piperacillin-tazobactam (ZOSYN) 3.375 g in 0.9% sodium chloride (MBP/ADV) 100 mL MBP  3.375 g IntraVENous Q6H    sodium chloride (NS) flush 5-40 mL  5-40 mL IntraVENous Q8H    sodium chloride (NS) flush 5-40 mL  5-40 mL IntraVENous PRN    acetaminophen (TYLENOL) tablet 650 mg  650 mg Oral Q6H PRN    Or    acetaminophen (TYLENOL) suppository 650 mg  650 mg Rectal Q6H PRN    polyethylene glycol (MIRALAX) packet 17 g  17 g Oral DAILY PRN    enoxaparin (LOVENOX) injection 40 mg  40 mg SubCUTAneous DAILY    ondansetron (ZOFRAN) injection 4 mg  4 mg IntraVENous Q6H PRN    insulin lispro (HUMALOG) injection   SubCUTAneous AC&HS    glucose chewable tablet 16 g  4 Tab Oral PRN    glucagon (GLUCAGEN) injection 1 mg  1 mg IntraMUSCular PRN    dextrose (D50W) injection syrg 12.5-25 g  25-50 mL IntraVENous PRN    pantoprazole (PROTONIX) tablet 40 mg  40 mg Oral ACB&D    [Held by provider] lisinopriL (PRINIVIL, ZESTRIL) tablet 20 mg  20 mg Oral DAILY    oxyCODONE-acetaminophen (PERCOCET) 5-325 mg per tablet 1 Tab  1 Tab Oral Q6H PRN    morphine injection 2 mg  2 mg IntraVENous Q3H PRN     Facility-Administered Medications Ordered in Other Encounters   Medication Dose Route Frequency    sodium chloride (NS) flush 10-40 mL  10-40 mL IntraVENous PRN    heparin (porcine) pf 500 Units  500 Units InterCATHeter PRN         Physical Exam:     Visit Vitals  BP (!) 93/55   Pulse (!) 53   Temp 97.3 °F (36.3 °C)   Resp 16   Ht 5' 9\" (1.753 m)   Wt 104.3 kg (230 lb)   SpO2 95%   BMI 33.97 kg/m²     BP Readings from Last 3 Encounters:   04/13/21 (!) 93/55   03/24/21 100/70   03/23/21 108/64     Pulse Readings from Last 3 Encounters:   04/13/21 (!) 53   03/24/21 60   03/23/21 69     Wt Readings from Last 3 Encounters:   04/12/21 104.3 kg (230 lb)   03/22/21 105.1 kg (231 lb 9.6 oz)   03/17/21 104.8 kg (231 lb)       General:  alert, cooperative, no distress, appears stated age  Neck:  supple  Lungs:  clear to auscultation bilaterally  Heart:  Regular rate and rhythm  Abdomen:  abdomen is soft without significant tenderness, masses, organomegaly or guarding  Extremities:  Ulcers noted to plantar and lateral aspects of R foot edema, s/p R foot partial first ray amputation. Trace edema to bilateral lower extremities. Skin: Warm and dry.     Neuro: alert, oriented x3, affect appropriate, no focal neurological deficits, moves all extremities well, no involuntary movements  Psych: non focal     Data Review:     Recent Labs     04/13/21  0315 04/12/21  1255   WBC 11.6 13.4*   HGB 7.4* 8.3*   HCT 24.5* 28.4*    459*     Recent Labs     04/13/21  0315 04/12/21  1255    138   K 4.2 4.0    102 CO2 29 32   * 172*   BUN 20* 23*   CREA 1.08 1.28   CA 8.7 9.6   MG 1.8  --        Results for orders placed or performed during the hospital encounter of 04/12/21   EKG, 12 LEAD, INITIAL   Result Value Ref Range    Ventricular Rate 60 BPM    Atrial Rate 441 BPM    QRS Duration 72 ms    Q-T Interval 412 ms    QTC Calculation (Bezet) 412 ms    Calculated R Axis 16 degrees    Calculated T Axis 12 degrees    Diagnosis       Sinus rhythm with 1st degree AV block ddysrhythmia  Abnormal ECG  When compared with ECG of 04-FEB-2021 18:53,  Current undetermined rhythm precludes rhythm comparison, needs review  Criteria for Anterior infarct are no longer present  Criteria for Inferior infarct are no longer present  Nonspecific T wave abnormality, improved in Lateral leads  Confirmed by Leticia Hernandez MD, --- ([id number]) on 4/12/2021 4:30:57 PM       Last Lipid:    Lab Results   Component Value Date/Time    Cholesterol, total 126 02/06/2021 05:01 AM    HDL Cholesterol 20 (L) 02/06/2021 05:01 AM    LDL, calculated 86 02/06/2021 05:01 AM    Triglyceride 100 02/06/2021 05:01 AM    CHOL/HDL Ratio 6.3 (H) 02/06/2021 05:01 AM       Signed By: Rosa Regan PA-C     April 13, 2021

## 2021-04-13 NOTE — PROGRESS NOTES
Problem: Falls - Risk of  Goal: *Absence of Falls  Description: Document [place] Fall Risk and appropriate interventions in the flowsheet. Outcome: Progressing Towards Goal  Note: Fall Risk Interventions:  Mobility Interventions: Patient to call before getting OOB         Medication Interventions: Patient to call before getting OOB, Teach patient to arise slowly    Elimination Interventions: Call light in reach              Problem: Patient Education: Go to Patient Education Activity  Goal: Patient/Family Education  Outcome: Progressing Towards Goal     Problem: Pain  Goal: *Control of Pain  Outcome: Progressing Towards Goal  Goal: *PALLIATIVE CARE:  Alleviation of Pain  Outcome: Progressing Towards Goal     Problem: Patient Education: Go to Patient Education Activity  Goal: Patient/Family Education  Outcome: Progressing Towards Goal     Problem: Diabetes Self-Management  Goal: *Disease process and treatment process  Description: Define diabetes and identify own type of diabetes; list 3 options for treating diabetes. Outcome: Progressing Towards Goal  Goal: *Incorporating nutritional management into lifestyle  Description: Describe effect of type, amount and timing of food on blood glucose; list 3 methods for planning meals. Outcome: Progressing Towards Goal  Goal: *Incorporating physical activity into lifestyle  Description: State effect of exercise on blood glucose levels. Outcome: Progressing Towards Goal  Goal: *Developing strategies to promote health/change behavior  Description: Define the ABC's of diabetes; identify appropriate screenings, schedule and personal plan for screenings. Outcome: Progressing Towards Goal  Goal: *Using medications safely  Description: State effect of diabetes medications on diabetes; name diabetes medication taking, action and side effects.   Outcome: Progressing Towards Goal  Goal: *Monitoring blood glucose, interpreting and using results  Description: Identify recommended blood glucose targets  and personal targets. Outcome: Progressing Towards Goal  Goal: *Prevention, detection, treatment of acute complications  Description: List symptoms of hyper- and hypoglycemia; describe how to treat low blood sugar and actions for lowering  high blood glucose level. Outcome: Progressing Towards Goal  Goal: *Prevention, detection and treatment of chronic complications  Description: Define the natural course of diabetes and describe the relationship of blood glucose levels to long term complications of diabetes.   Outcome: Progressing Towards Goal  Goal: *Developing strategies to address psychosocial issues  Description: Describe feelings about living with diabetes; identify support needed and support network  Outcome: Progressing Towards Goal  Goal: *Insulin pump training  Outcome: Progressing Towards Goal  Goal: *Sick day guidelines  Outcome: Progressing Towards Goal  Goal: *Patient Specific Goal (EDIT GOAL, INSERT TEXT)  Outcome: Progressing Towards Goal     Problem: Patient Education: Go to Patient Education Activity  Goal: Patient/Family Education  Outcome: Progressing Towards Goal

## 2021-04-13 NOTE — DIABETES MGMT
Diabetes Patient/Family Education Record  Factors That  May Influence Patients Ability  to Learn or  Comply with Recommendations   []   Language barrier    []   Cultural needs   [x]   Motivation    []   Cognitive limitation    []   Physical   []   Education    []   Physiological factors   []   Hearing/vision/speaking impairment   []   Congregational beliefs    []   Financial factors   []  Other:   []  No factors identified at this time. Person Instructed:   [x]   Patient   []   Family   []  Other     Preference for Learning:   [x]   Verbal   []   Written   []  Demonstration     Level of Comprehension & Competence:    []  Good                                      [x] Fair                                     []  Poor                             []  Needs Reinforcement   [x]  Teachback completed    Education Component:   [x]  Medication management, including how to administer insulin (if appropriate) and potential medication interactions  · Pt states he is taking Levemir 60 units twice a day and Humalog 60-65 units with meals. He states he doesn't always eat 3 meals a day  · , so sometimes he just takes the Humalog twice a day. Pt also takes metformin daily, but states he sometimes forgets. · Reviewed hospital insulin protocol with pt. And pt states understanding. []  Nutritional management -obtain usual meal pattern   []  Exercise   [x]  Signs, symptoms, and treatment of hyperglycemia and hypoglycemia  · \"Sometimes when my blood sugar is 120, I start feeling bad. That isn't good is it? \"  · Explained to pt that his body may be accustomed to a higher blood sugar level and it may take a little time for his body to get used to his BG being at a normal level. [x] Prevention, recognition and treatment of hyperglycemia and hypoglycemia   [x]  Importance of blood glucose monitoring and how to obtain a blood glucose meter   · Pt states he doesn't check his BG like he should.   A1C is in a good range.  Instructed pt to at least check his BG bid and prn. · Informed pt of the [employer] continuous BG monitoring system. Patient is interested and will bring it up with his physician at his next appointment. []  Instruction on use of the blood glucose meter   [x]  Discuss the importance of HbA1C monitoring   · A1C 6.8% equivalent to an average blood glucose of 148 mg/dl over the past 2-3 months.   []  Sick day guidelines   []  Proper use and disposal of lancets, needles, syringes or insulin pens (if appropriate)   [x]  Potential long-term complications (retinopathy, kidney disease, neuropathy, foot care)   [x] Information about whom to contact in case of emergency or for more information    [x]  Goal:  Patient/family will demonstrate understanding of Diabetes Self Management Skills by: (date) _4/18/2021______  Plan for post-discharge education or self-management support:    [] Outpatient class schedule provided            [] Patient Declined    [] Scheduled for outpatient classes (date) _______  Verify:  Does patient understand how diabetes medications work? Yes  Does patient know what their most recent A1c is? Yes, reviewed with pt. Does patient monitor glucose at home? Yes. \"Not as much as I should. \"  Does patient have difficulty obtaining diabetes medications or testing supplies? No issues voiced.

## 2021-04-13 NOTE — ED NOTES
TRANSFER - OUT REPORT:    Verbal report given to Srinivasan (name) on Anay Aburto  being transferred to  (unit) for routine progression of care       Report consisted of patients Situation, Background, Assessment and   Recommendations(SBAR). Information from the following report(s) SBAR, ED Summary, Intake/Output, MAR and Med Rec Status was reviewed with the receiving nurse. Lines:   PICC Double Lumen 02/08/21 Right (Active)       Peripheral IV 04/12/21 Right Antecubital (Active)   Site Assessment Clean, dry, & intact 04/12/21 1300   Phlebitis Assessment 0 04/12/21 1300   Infiltration Assessment 0 04/12/21 1300   Dressing Status Clean, dry, & intact 04/12/21 1300   Hub Color/Line Status Green 04/12/21 1300       Peripheral IV 04/12/21 Right Wrist (Active)   Site Assessment Clean, dry, & intact 04/12/21 1255   Phlebitis Assessment 0 04/12/21 1255   Infiltration Assessment 0 04/12/21 1255   Dressing Status Clean, dry, & intact 04/12/21 1255   Hub Color/Line Status Green 04/12/21 1255        Opportunity for questions and clarification was provided.       Patient transported with:   Pt belongings   Transported by transport

## 2021-04-13 NOTE — ED NOTES
Informed pt about bed assignment   Pt alert and oriented x 4   Pt c/o pain in right leg   Informed provider   Orders received  Pt positioned for comfort   Updated about plan of care   Will continue to monitor and assess

## 2021-04-13 NOTE — ROUTINE PROCESS
Bedside and Verbal shift change report given to Raudel (oncoming nurse) by Ernestina Man RN (offgoing nurse). Report included the following information SBAR, Kardex, Intake/Output, MAR and Recent Results.

## 2021-04-13 NOTE — ED NOTES
Rounded on pt   Held medication due to NPO status  Provider unsure of when pt will go to surgery   Pt updated about plan of care   Pt laying in bed resting   Family at bedside   Pt stated his pain is better since he got medication   Pain also improved since given diversion activities   Will continue to monitor and assess

## 2021-04-13 NOTE — PROGRESS NOTES
Spoke with RN regarding screening form. Still waiting to get form filled out and faxed down for stat order.

## 2021-04-13 NOTE — ED NOTES
Assumed care of pt   Introduced self   Updated the white board   Explained usage  Pt received from triage ambulatory   Pt c/o wound on right foot   Pt stated that his podiatrist sent him to the ED   Pt satated the podiatrist wants him to have and amputation   Right hallux amuptated   Pt has multiple wounds on right jony t  Pt has some draiange and scabbing presnt   Pt has swelling of lower right leg   Skin is painful to the touch   Pt skin is warm and dry and discolored   Pt has history of DM uclers  Denied injury   Breathing equal and unlabored   Lungs sounds clear and present in all feilds  Rated pain 6/10 and described as achy  Pt stated that he is able to walk on his foot   Updated about plan of care   Will continue to monitor and assess

## 2021-04-13 NOTE — PROGRESS NOTES
[facility]  [facility]    Patient: Vik Jones Age: 61 y.o. : 1960 MR#: [id number] SSN: [id number]  Date/Time: 2021     Subjective: Patient feels fine, denies any chest pain, no shortness of breath. Complains of pain in the right foot and swelling. No dizziness or lightheadedness. BP running lower side, slightly bradycardia. Per patient he takes metoprolol at home but has not taken for last 2 days. He did not receive any metoprolol today. Assessment/Plan:   1. Diabetic foot infection with cellulitis and Concern for osteomyelitis  2. Mild hypotension, suspect dehydration  3. Asymptomatic bradycardia, HR 45-55  4. Hx diabetic foot infection status post great toe amputation  5. Type II DMHbA1c 6.4 on 2021  6. HTN  7. HLD  8. CAD status post stenting  9. CHRISTINA  10. Obesity  11. Arthritis  12. Depression  13. Hx polysubstance abuse     Plan: We will give IV fluid bolus and start him on IV fluids. Hold lisinopril. Will get stat EKG and place patient on cardiac monitor. EKG shows sinus bradycardia with first-degree AV block. We will get TSH, T4 and set of cardiac enzymes. Will consult cardiology for preop clearance and for bradycardia. Discussed with and NP Connie. Discussed with podiatry about cardiac clearance given bradycardia. We will continue empiric antibiotics with vancomycin and Zosyn, will consult ID. Podiatry input noted, plan for TMA later today. Follow-up blood cultures  We will start Lantus, continue SSI with Accu-Cheks  Pain controlTylenol, Percocet and morphine IV as needed with holding parameters. Incentive spirometry  PT, OT  Discussed with the patient about my above plan care, explained risk and benefits of surgery. Patient understands and wants to proceed with the surgery. Addendum  Cardiology input noted, patient intermediate risk for low risk surgery.   TSH slightly elevated but T4 normal.  BP better with IV fluid bolus, will continue IV fluids. We will continue cardiac monitoring postoperatively. Addendum  4:30 PM  Per RN blood pressure still fluctuating, last a repeat manual blood pressure 97/60. Patient asymptomatic, still bradycardic. Will increase IV fluids and monitor blood pressure  Discussed with podiatry Dr. Rosario Corona, to hold off surgery today. Podiatry discussed with anesthesia and further decision based on anesthesia recommendation. I spent 40 minutes with the patient in face-to-face consultation, of which greater than 50% was spent in counseling and coordination of care as described above. Case discussed with:  [x]Patient  []Family  [x]Nursing  []Case Management  DVT Prophylaxis:  [x]Lovenox  []Hep SQ  []SCDs  []Coumadin   []Eliquis/Xarelto     Objective:   VS:   Visit Vitals  BP (!) 93/55   Pulse (!) 53   Temp 97.3 °F (36.3 °C)   Resp 16   Ht 5' 9\" (1.753 m)   Wt 104.3 kg (230 lb)   SpO2 95%   BMI 33.97 kg/m²      Tmax/24hrs: Temp (24hrs), Av.8 °F (36.6 °C), Min:97.2 °F (36.2 °C), Max:98.7 °F (37.1 °C)  IOBRIEF    Intake/Output Summary (Last 24 hours) at 2021 1341  Last data filed at 2021 1213  Gross per 24 hour   Intake 600 ml   Output 1695 ml   Net -1095 ml       General:  Alert, cooperative, no acute distress , nontoxic. HEENT: PERRLA, anicteric sclerae. Oral mucosa dry  Pulmonary:  CTA Bilaterally. No Wheezing/Rales. Cardiovascular: Regular rate and Rhythm. GI:  Soft, Non distended, Non tender. + Bowel sounds. Extremities: Right foot swelling, mild erythema, no warmth, tenderness present, chronic ulcer on the lateral side and also on the head of metatarsal.  Psych: Good insight. Not anxious or agitated. Neurologic: Alert and oriented X 3. Moves all ext.   Additional:    Medications:   Current Facility-Administered Medications   Medication Dose Route Frequency    vancomycin (VANCOCIN) 1500 mg in  ml infusion  1,500 mg IntraVENous Q12H    insulin glargine (LANTUS) injection 30 Units  30 Units SubCUTAneous QHS    0.9% sodium chloride infusion  75 mL/hr IntraVENous CONTINUOUS    piperacillin-tazobactam (ZOSYN) 3.375 g in 0.9% sodium chloride (MBP/ADV) 100 mL MBP  3.375 g IntraVENous Q6H    sodium chloride (NS) flush 5-40 mL  5-40 mL IntraVENous Q8H    sodium chloride (NS) flush 5-40 mL  5-40 mL IntraVENous PRN    acetaminophen (TYLENOL) tablet 650 mg  650 mg Oral Q6H PRN    Or    acetaminophen (TYLENOL) suppository 650 mg  650 mg Rectal Q6H PRN    polyethylene glycol (MIRALAX) packet 17 g  17 g Oral DAILY PRN    enoxaparin (LOVENOX) injection 40 mg  40 mg SubCUTAneous DAILY    ondansetron (ZOFRAN) injection 4 mg  4 mg IntraVENous Q6H PRN    insulin lispro (HUMALOG) injection   SubCUTAneous AC&HS    glucose chewable tablet 16 g  4 Tab Oral PRN    glucagon (GLUCAGEN) injection 1 mg  1 mg IntraMUSCular PRN    dextrose (D50W) injection syrg 12.5-25 g  25-50 mL IntraVENous PRN    pantoprazole (PROTONIX) tablet 40 mg  40 mg Oral ACB&D    [Held by provider] lisinopriL (PRINIVIL, ZESTRIL) tablet 20 mg  20 mg Oral DAILY    oxyCODONE-acetaminophen (PERCOCET) 5-325 mg per tablet 1 Tab  1 Tab Oral Q6H PRN    morphine injection 2 mg  2 mg IntraVENous Q3H PRN     Facility-Administered Medications Ordered in Other Encounters   Medication Dose Route Frequency    sodium chloride (NS) flush 10-40 mL  10-40 mL IntraVENous PRN    heparin (porcine) pf 500 Units  500 Units InterCATHeter PRN       Labs:    Recent Results (from the past 24 hour(s))   GLUCOSE, POC    Collection Time: 04/12/21  9:15 PM   Result Value Ref Range    Glucose (POC) 186 (H) 70 - 136 mg/dL   METABOLIC PANEL, BASIC    Collection Time: 04/13/21  3:15 AM   Result Value Ref Range    Sodium 139 136 - 145 mmol/L    Potassium 4.2 3.5 - 5.5 mmol/L    Chloride 105 100 - 111 mmol/L    CO2 29 21 - 32 mmol/L    Anion gap 5 3.0 - 18 mmol/L    Glucose 113 (H) 74 - 99 mg/dL    BUN 20 (H) 7.0 - 18 MG/DL    Creatinine 1.08 0.6 - 1.3 MG/DL    BUN/Creatinine ratio 19 12 - 20      GFR est AA >60 >60 ml/min/1.73m2    GFR est non-AA >60 >60 ml/min/1.73m2    Calcium 8.7 8.5 - 10.1 MG/DL   MAGNESIUM    Collection Time: 04/13/21  3:15 AM   Result Value Ref Range    Magnesium 1.8 1.6 - 2.6 mg/dL   CBC WITH AUTOMATED DIFF    Collection Time: 04/13/21  3:15 AM   Result Value Ref Range    WBC 11.6 4.6 - 13.2 K/uL    RBC 2.71 (L) 4.35 - 5.65 M/uL    HGB 7.4 (L) 13.0 - 16.0 g/dL    HCT 24.5 (L) 36.0 - 48.0 %    MCV 90.4 74.0 - 97.0 FL    MCH 27.3 24.0 - 34.0 PG    MCHC 30.2 (L) 31.0 - 37.0 g/dL    RDW 15.2 (H) 11.6 - 14.5 %    PLATELET 368 146 - 392 K/uL    MPV 10.9 9.2 - 11.8 FL    NEUTROPHILS 69 40 - 73 %    LYMPHOCYTES 20 (L) 21 - 52 %    MONOCYTES 6 3 - 10 %    EOSINOPHILS 3 0 - 5 %    BASOPHILS 1 0 - 2 %    ABS. NEUTROPHILS 8.0 1.8 - 8.0 K/UL    ABS. LYMPHOCYTES 2.3 0.9 - 3.6 K/UL    ABS. MONOCYTES 0.7 0.05 - 1.2 K/UL    ABS. EOSINOPHILS 0.3 0.0 - 0.4 K/UL    ABS. BASOPHILS 0.1 0.0 - 0.1 K/UL    DF AUTOMATED     HEMOGLOBIN A1C WITH EAG    Collection Time: 04/13/21  3:15 AM   Result Value Ref Range    Hemoglobin A1c 6.8 (H) 4.2 - 5.6 %    Est. average glucose 148 mg/dL   GLUCOSE, POC    Collection Time: 04/13/21  6:38 AM   Result Value Ref Range    Glucose (POC) 151 (H) 70 - 110 mg/dL   SARS-COV-2    Collection Time: 04/13/21  7:15 AM   Result Value Ref Range    SARS-CoV-2 Please find results under separate order     COVID-19 RAPID TEST    Collection Time: 04/13/21  7:15 AM   Result Value Ref Range    Specimen source Nasopharyngeal      COVID-19 rapid test Not detected NOTD     GLUCOSE, POC    Collection Time: 04/13/21 12:09 PM   Result Value Ref Range    Glucose (POC) 244 (H) 70 - 110 mg/dL       Signed By: Mariana Ayala MD     April 13, 2021      Disclaimer: Sections of this note are dictated using utilizing voice recognition software. Minor typographical errors may be present.  If questions arise, please do not hesitate to contact me or call our department.

## 2021-04-13 NOTE — PROGRESS NOTES
Problem: Mobility Impaired (Adult and Pediatric)  Goal: *Acute Goals and Plan of Care (Insert Text)  Description: Physical Therapy Goals  Initiated 4/13/2021 and to be accomplished within 7 day(s)  1. Patient will move from supine to sit and sit to supine  in bed with modified independence. 2.  Patient will transfer from bed to chair and chair to bed with modified independence using the least restrictive device. 3.  Patient will perform sit to stand with modified independence. 4.  Patient will ambulate with modified independence for 300 feet with the least restrictive device. 5.  Patient will ascend/descend 3 stairs with B handrail(s) with modified independence. PLOF: pt lives with his sister in a [facility] with 3 LARA. He reports being indep with all mobility PTA, without use of an AD but admits he may need to use a SPC. Outcome: Progressing Towards Goal     PHYSICAL THERAPY EVALUATION    Patient: Shruthi Magana (54 y.o. male)  Date: 4/13/2021  Primary [facility] [E11.628, L08.9]  Procedure(s) (LRB):  RIGHT FOOT TRANSMETATARSAL AMPUTATION WITH GASTROCNEMIUS RECESSION (Right)     Precautions:  Fall, NWB(R forefoot)    ASSESSMENT :  Based on the objective data described below, the patient presents with pain and decreased endurance. Pt found supine in bed, wife at bedside, in NAD, willing to work with PT. He was edu on R NWB forefoot and demonstrated good compliance. Pt performed supine-sit Frances and stood with SBA and RW. Pt amb 15 ft around the room with steady gait and returned back sitting on EOB. Pt was left with call bell and all questions answered. Patient will need a PT re-evaluation once he gets a R TMA amputation on 4/13/2021. Patient will benefit from skilled intervention to address the above impairments.   Patient's rehabilitation potential is considered to be Good  Factors which may influence rehabilitation potential include:   []         None noted  [] Mental ability/status  [x]         Medical condition  [x]         Home/family situation and support systems  [x]         Safety awareness  [x]         Pain tolerance/management  []         Other:      PLAN :  Recommendations and Planned Interventions:   [x]           Bed Mobility Training             [x]    Neuromuscular Re-Education  [x]           Transfer Training                   []    Orthotic/Prosthetic Training  [x]           Gait Training                          []    Modalities  [x]           Therapeutic Exercises           []    Edema Management/Control  [x]           Therapeutic Activities            [x]    Family Training/Education  [x]           Patient Education  []           Other (comment):    Frequency/Duration: Patient will be followed by physical therapy 1-2 times per day/4-7 days per week to address goals. Discharge Recommendations: Home Health  Further Equipment Recommendations for Discharge: straight cane or rolling walker TBD on pts progress post surgery     SUBJECTIVE:   Patient stated I just did that (walked).     OBJECTIVE DATA SUMMARY:     Past Medical History:   Diagnosis Date    Arthritis     Coronary atherosclerosis of native coronary artery     S/P PCI with GE in 12/09    Diabetes ([facility]     IDDM    Electrolyte and fluid disorders not elsewhere classified     Essential hypertension, benign     GERD (gastroesophageal reflux disease)     [facility]    Psychiatrist Dr. Bisi Browning     History of heart attack     Hyperlipidemia     Intermediate coronary syndrome [facility])     MDD (major depressive disorder) 5/26/16    Psychiatrist Dr. Bisi Browning     Myocardial infarction [facility])     Obesity, unspecified     Old myocardial infarction     Other and unspecified hyperlipidemia     Pancreatitis     Positive PPD     Sleep apnea     uses Cpap machine    Transfusion history     Before 1992     Past Surgical History:   Procedure Laterality Date    HX APPENDECTOMY HX CORONARY STENT PLACEMENT  2010    2 stents     Barriers to Learning/Limitations: None  Compensate with: N/A  Home Situation:  Home Situation  Home Environment: Private residence  # Steps to Enter: 3  Rails to Enter: Yes  Hand Rails : Bilateral  One/Two Story Residence: One story  Living Alone: No  Support Systems: Family member(s), Spouse/Significant Other/Partner  Patient Expects to be Discharged to[de-identified] Private residence  Current DME Used/Available at Home: [address], straight  Critical Behavior:  Neurologic State: Alert  Orientation Level: Oriented X4        Psychosocial  Patient Behaviors: Calm; Cooperative    Strength:    Strength: Within functional limits    Tone & Sensation:   Tone: Normal     Sensation: Intact     Range Of Motion:  AROM: Within functional limits    Functional Mobility:  Bed Mobility:  Rolling: Modified independent  Supine to Sit: Modified independent  Sit to Supine: Modified independent  Scooting: Modified independent  Transfers:  Sit to Stand: Modified independent  Stand to Sit: Modified independent        Bed to Chair: Modified independent    Balance:   Sitting: Intact  Standing: Impaired  Standing - Static: Good  Standing - Dynamic : Fair        Ambulation/Gait Training:  Distance (ft): 15 Feet (ft)  Assistive Device: Walker, rolling  Ambulation - Level of Assistance: Contact guard assistance        Gait Abnormalities: Decreased step clearance        Base of Support: Center of gravity altered;Narrowed     Speed/Ltitle: Slow  Step Length: Right shortened    Pain:  Pain level pre-treatment: 3/10   Pain level post-treatment: 3/10   Pain Intervention(s) : Medication (see MAR); Rest, Repositioning  Response to intervention: Nurse notified, See doc flow    Activity Tolerance:   Pt tolerated mobility well  Please refer to the flowsheet for vital signs taken during this treatment.   After treatment:   []         Patient left in no apparent distress sitting up in chair  [x]         Patient left in no apparent distress in bed  [x]         Call bell left within reach  [x]         Nursing notified  []         Caregiver present  []         Bed alarm activated  []         SCDs applied    COMMUNICATION/EDUCATION:   [x]         Role of Physical Therapy in the acute care setting. [x]         Fall prevention education was provided and the patient/caregiver indicated understanding. [x]         Patient/family have participated as able in goal setting and plan of care. []         Patient/family agree to work toward stated goals and plan of care. []         Patient understands intent and goals of therapy, but is neutral about his/her participation. []         Patient is unable to participate in goal setting/plan of care: ongoing with therapy staff.  []         Other:     Thank you for this referral.  Genesis Bar   Time Calculation: 9 mins      Eval Complexity: History: LOW Complexity : Zero comorbidities / personal factors that will impact the outcome / POCExam:LOW Complexity : 1-2 Standardized tests and measures addressing body structure, function, activity limitation and / or participation in recreation  Presentation: LOW Complexity : Stable, uncomplicated  Clinical Decision Making:Low Complexity    Overall Complexity:LOW

## 2021-04-13 NOTE — ED NOTES
Rounded on pt   Pt alert and oriented x 4  Pt c/o pain in right foot   Pain is rated at 4/10  Breathing equally and non labored  Pt on monitor SR   Pt watching TV with wife at bedside   Pt offered comfort measures  Pt updated about plan of care  Laying in bed with call bell in reach  Will continue to monitor and assess

## 2021-04-13 NOTE — ED NOTES
Rounded on pt   Pt resting in bed with eyes closed   No obvious distress   Breathing regular and unlabored   Laying in bed with call bell in reach  Will continue to monitor and assess

## 2021-04-14 LAB
ANION GAP SERPL CALC-SCNC: 6 MMOL/L (ref 3–18)
ATRIAL RATE: 51 BPM
BASOPHILS # BLD: 0 K/UL (ref 0–0.1)
BASOPHILS NFR BLD: 0 % (ref 0–2)
BUN SERPL-MCNC: 18 MG/DL (ref 7–18)
BUN/CREAT SERPL: 15 (ref 12–20)
CALCIUM SERPL-MCNC: 8.5 MG/DL (ref 8.5–10.1)
CALCULATED P AXIS, ECG09: 40 DEGREES
CALCULATED R AXIS, ECG10: -12 DEGREES
CALCULATED T AXIS, ECG11: -9 DEGREES
CHLORIDE SERPL-SCNC: 109 MMOL/L (ref 100–111)
CO2 SERPL-SCNC: 27 MMOL/L (ref 21–32)
CREAT SERPL-MCNC: 1.18 MG/DL (ref 0.6–1.3)
DIAGNOSIS, 93000: NORMAL
DIFFERENTIAL METHOD BLD: ABNORMAL
ECHO AO ROOT DIAM: 2.95 CM
ECHO LV E' LATERAL VELOCITY: 7.38 CM/S
ECHO LV INTERNAL DIMENSION DIASTOLIC: 4.78 CM (ref 4.2–5.9)
ECHO LV INTERNAL DIMENSION SYSTOLIC: 3.29 CM
ECHO LV IVSD: 0.86 CM (ref 0.6–1)
ECHO LV MASS 2D: 129.9 G (ref 88–224)
ECHO LV MASS INDEX 2D: 59.3 G/M2 (ref 49–115)
ECHO LV POSTERIOR WALL DIASTOLIC: 0.78 CM (ref 0.6–1)
ECHO LVOT CARDIAC OUTPUT: 5.35 LITER/MINUTE
ECHO LVOT DIAM: 2.16 CM
ECHO LVOT PEAK GRADIENT: 5.76 MMHG
ECHO LVOT PEAK VELOCITY: 120.02 CM/S
ECHO LVOT SV: 102.8 ML
ECHO LVOT VTI: 27.98 CM
ECHO MV A VELOCITY: 77.68 CM/S
ECHO MV E DECELERATION TIME (DT): 192.32 MS
ECHO MV E VELOCITY: 102.91 CM/S
ECHO MV E/A RATIO: 1.32
ECHO MV E/E' LATERAL: 13.94
ECHO PV REGURGITANT MAX VELOCITY: 204.98 CM/S
ECHO RA AREA 4C: 14.82 CM2
ECHO TV REGURGITANT PEAK GRADIENT: 16.81 MMHG
EOSINOPHIL # BLD: 0 K/UL (ref 0–0.4)
EOSINOPHIL NFR BLD: 0 % (ref 0–5)
ERYTHROCYTE [DISTWIDTH] IN BLOOD BY AUTOMATED COUNT: 15.4 % (ref 11.6–14.5)
FERRITIN SERPL-MCNC: 320 NG/ML (ref 8–388)
FOLATE SERPL-MCNC: >20 NG/ML (ref 3.1–17.5)
GLUCOSE BLD STRIP.AUTO-MCNC: 145 MG/DL (ref 70–110)
GLUCOSE BLD STRIP.AUTO-MCNC: 241 MG/DL (ref 70–110)
GLUCOSE BLD STRIP.AUTO-MCNC: 245 MG/DL (ref 70–110)
GLUCOSE BLD STRIP.AUTO-MCNC: 281 MG/DL (ref 70–110)
GLUCOSE SERPL-MCNC: 226 MG/DL (ref 74–99)
HCT VFR BLD AUTO: 24.6 % (ref 36–48)
HGB BLD-MCNC: 7.6 G/DL (ref 13–16)
IRON SATN MFR SERPL: 21 % (ref 20–50)
IRON SERPL-MCNC: 51 UG/DL (ref 50–175)
LVOT MG: 3.25 MMHG
LYMPHOCYTES # BLD: 1 K/UL (ref 0.9–3.6)
LYMPHOCYTES NFR BLD: 7 % (ref 21–52)
MAGNESIUM SERPL-MCNC: 1.7 MG/DL (ref 1.6–2.6)
MCH RBC QN AUTO: 28.5 PG (ref 24–34)
MCHC RBC AUTO-ENTMCNC: 30.9 G/DL (ref 31–37)
MCV RBC AUTO: 92.1 FL (ref 74–97)
MONOCYTES # BLD: 0.3 K/UL (ref 0.05–1.2)
MONOCYTES NFR BLD: 2 % (ref 3–10)
NEUTS SEG # BLD: 13.5 K/UL (ref 1.8–8)
NEUTS SEG NFR BLD: 90 % (ref 40–73)
P-R INTERVAL, ECG05: 282 MS
PHOSPHATE SERPL-MCNC: 3.6 MG/DL (ref 2.5–4.9)
PLATELET # BLD AUTO: 347 K/UL (ref 135–420)
PMV BLD AUTO: 10.7 FL (ref 9.2–11.8)
POTASSIUM SERPL-SCNC: 4.4 MMOL/L (ref 3.5–5.5)
Q-T INTERVAL, ECG07: 460 MS
QRS DURATION, ECG06: 74 MS
QTC CALCULATION (BEZET), ECG08: 423 MS
RBC # BLD AUTO: 2.67 M/UL (ref 4.35–5.65)
SODIUM SERPL-SCNC: 142 MMOL/L (ref 136–145)
TIBC SERPL-MCNC: 240 UG/DL (ref 250–450)
VENTRICULAR RATE, ECG03: 51 BPM
VIT B12 SERPL-MCNC: 276 PG/ML (ref 211–911)
WBC # BLD AUTO: 15 K/UL (ref 4.6–13.2)

## 2021-04-14 PROCEDURE — 99232 SBSQ HOSP IP/OBS MODERATE 35: CPT | Performed by: HOSPITALIST

## 2021-04-14 PROCEDURE — 2709999900 HC NON-CHARGEABLE SUPPLY

## 2021-04-14 PROCEDURE — 36415 COLL VENOUS BLD VENIPUNCTURE: CPT

## 2021-04-14 PROCEDURE — 74011250637 HC RX REV CODE- 250/637: Performed by: HOSPITALIST

## 2021-04-14 PROCEDURE — 74011250636 HC RX REV CODE- 250/636: Performed by: INTERNAL MEDICINE

## 2021-04-14 PROCEDURE — 82728 ASSAY OF FERRITIN: CPT

## 2021-04-14 PROCEDURE — 74011250636 HC RX REV CODE- 250/636: Performed by: PHYSICIAN ASSISTANT

## 2021-04-14 PROCEDURE — 74011250637 HC RX REV CODE- 250/637: Performed by: FAMILY MEDICINE

## 2021-04-14 PROCEDURE — 80048 BASIC METABOLIC PNL TOTAL CA: CPT

## 2021-04-14 PROCEDURE — 74011250636 HC RX REV CODE- 250/636: Performed by: HOSPITALIST

## 2021-04-14 PROCEDURE — 74011250637 HC RX REV CODE- 250/637: Performed by: INTERNAL MEDICINE

## 2021-04-14 PROCEDURE — 84100 ASSAY OF PHOSPHORUS: CPT

## 2021-04-14 PROCEDURE — 97164 PT RE-EVAL EST PLAN CARE: CPT

## 2021-04-14 PROCEDURE — 82607 VITAMIN B-12: CPT

## 2021-04-14 PROCEDURE — 74011250636 HC RX REV CODE- 250/636: Performed by: PODIATRIST

## 2021-04-14 PROCEDURE — 74011000258 HC RX REV CODE- 258: Performed by: PHYSICIAN ASSISTANT

## 2021-04-14 PROCEDURE — 97116 GAIT TRAINING THERAPY: CPT

## 2021-04-14 PROCEDURE — 74011636637 HC RX REV CODE- 636/637: Performed by: FAMILY MEDICINE

## 2021-04-14 PROCEDURE — 30233N1 TRANSFUSION OF NONAUTOLOGOUS RED BLOOD CELLS INTO PERIPHERAL VEIN, PERCUTANEOUS APPROACH: ICD-10-PCS | Performed by: HOSPITALIST

## 2021-04-14 PROCEDURE — 74011250636 HC RX REV CODE- 250/636: Performed by: FAMILY MEDICINE

## 2021-04-14 PROCEDURE — 83735 ASSAY OF MAGNESIUM: CPT

## 2021-04-14 PROCEDURE — 82962 GLUCOSE BLOOD TEST: CPT

## 2021-04-14 PROCEDURE — 65270000029 HC RM PRIVATE

## 2021-04-14 PROCEDURE — 85025 COMPLETE CBC W/AUTO DIFF WBC: CPT

## 2021-04-14 PROCEDURE — 74011636637 HC RX REV CODE- 636/637: Performed by: HOSPITALIST

## 2021-04-14 PROCEDURE — 97535 SELF CARE MNGMENT TRAINING: CPT

## 2021-04-14 PROCEDURE — 99232 SBSQ HOSP IP/OBS MODERATE 35: CPT | Performed by: INTERNAL MEDICINE

## 2021-04-14 PROCEDURE — 83540 ASSAY OF IRON: CPT

## 2021-04-14 RX ORDER — OXYCODONE AND ACETAMINOPHEN 5; 325 MG/1; MG/1
1-2 TABLET ORAL
Status: DISCONTINUED | OUTPATIENT
Start: 2021-04-14 | End: 2021-04-16

## 2021-04-14 RX ORDER — ATORVASTATIN CALCIUM 20 MG/1
20 TABLET, FILM COATED ORAL
Status: DISCONTINUED | OUTPATIENT
Start: 2021-04-14 | End: 2021-04-16 | Stop reason: HOSPADM

## 2021-04-14 RX ORDER — HYDROMORPHONE HYDROCHLORIDE 1 MG/ML
1 INJECTION, SOLUTION INTRAMUSCULAR; INTRAVENOUS; SUBCUTANEOUS ONCE
Status: COMPLETED | OUTPATIENT
Start: 2021-04-14 | End: 2021-04-14

## 2021-04-14 RX ORDER — VANCOMYCIN/0.9 % SOD CHLORIDE 1.5G/250ML
1500 PLASTIC BAG, INJECTION (ML) INTRAVENOUS
Status: DISCONTINUED | OUTPATIENT
Start: 2021-04-14 | End: 2021-04-15

## 2021-04-14 RX ADMIN — OXYCODONE HYDROCHLORIDE AND ACETAMINOPHEN 2 TABLET: 5; 325 TABLET ORAL at 21:57

## 2021-04-14 RX ADMIN — INSULIN LISPRO 6 UNITS: 100 INJECTION, SOLUTION INTRAVENOUS; SUBCUTANEOUS at 06:02

## 2021-04-14 RX ADMIN — Medication 10 ML: at 14:00

## 2021-04-14 RX ADMIN — VANCOMYCIN HYDROCHLORIDE 1500 MG: 10 INJECTION, POWDER, LYOPHILIZED, FOR SOLUTION INTRAVENOUS at 16:01

## 2021-04-14 RX ADMIN — PANTOPRAZOLE SODIUM 40 MG: 40 TABLET, DELAYED RELEASE ORAL at 15:58

## 2021-04-14 RX ADMIN — INSULIN GLARGINE 30 UNITS: 100 INJECTION, SOLUTION SUBCUTANEOUS at 21:52

## 2021-04-14 RX ADMIN — ATORVASTATIN CALCIUM 20 MG: 20 TABLET, FILM COATED ORAL at 21:22

## 2021-04-14 RX ADMIN — MORPHINE SULFATE 2 MG: 2 INJECTION, SOLUTION INTRAMUSCULAR; INTRAVENOUS at 02:20

## 2021-04-14 RX ADMIN — MORPHINE SULFATE 2 MG: 2 INJECTION, SOLUTION INTRAMUSCULAR; INTRAVENOUS at 05:58

## 2021-04-14 RX ADMIN — Medication 10 ML: at 05:30

## 2021-04-14 RX ADMIN — MORPHINE SULFATE 2 MG: 2 INJECTION, SOLUTION INTRAMUSCULAR; INTRAVENOUS at 13:08

## 2021-04-14 RX ADMIN — PIPERACILLIN SODIUM AND TAZOBACTAM SODIUM 3.38 G: 3; .375 INJECTION, POWDER, LYOPHILIZED, FOR SOLUTION INTRAVENOUS at 09:08

## 2021-04-14 RX ADMIN — PIPERACILLIN SODIUM AND TAZOBACTAM SODIUM 3.38 G: 3; .375 INJECTION, POWDER, LYOPHILIZED, FOR SOLUTION INTRAVENOUS at 13:08

## 2021-04-14 RX ADMIN — OXYCODONE HYDROCHLORIDE AND ACETAMINOPHEN 1 TABLET: 5; 325 TABLET ORAL at 03:16

## 2021-04-14 RX ADMIN — PANTOPRAZOLE SODIUM 40 MG: 40 TABLET, DELAYED RELEASE ORAL at 05:59

## 2021-04-14 RX ADMIN — INSULIN LISPRO 9 UNITS: 100 INJECTION, SOLUTION INTRAVENOUS; SUBCUTANEOUS at 13:07

## 2021-04-14 RX ADMIN — PIPERACILLIN SODIUM AND TAZOBACTAM SODIUM 3.38 G: 3; .375 INJECTION, POWDER, LYOPHILIZED, FOR SOLUTION INTRAVENOUS at 21:14

## 2021-04-14 RX ADMIN — ENOXAPARIN SODIUM 40 MG: 40 INJECTION SUBCUTANEOUS at 09:08

## 2021-04-14 RX ADMIN — PIPERACILLIN SODIUM AND TAZOBACTAM SODIUM 3.38 G: 3; .375 INJECTION, POWDER, LYOPHILIZED, FOR SOLUTION INTRAVENOUS at 02:04

## 2021-04-14 RX ADMIN — HYDROMORPHONE HYDROCHLORIDE 1 MG: 1 INJECTION, SOLUTION INTRAMUSCULAR; INTRAVENOUS; SUBCUTANEOUS at 14:28

## 2021-04-14 RX ADMIN — INSULIN LISPRO 6 UNITS: 100 INJECTION, SOLUTION INTRAVENOUS; SUBCUTANEOUS at 21:50

## 2021-04-14 RX ADMIN — OXYCODONE HYDROCHLORIDE AND ACETAMINOPHEN 2 TABLET: 5; 325 TABLET ORAL at 15:57

## 2021-04-14 RX ADMIN — Medication 10 ML: at 21:15

## 2021-04-14 RX ADMIN — MORPHINE SULFATE 2 MG: 2 INJECTION, SOLUTION INTRAMUSCULAR; INTRAVENOUS at 09:14

## 2021-04-14 NOTE — PROGRESS NOTES
[facility]  Progress Note    Patient: Vik Jones Age: 61 y.o. : 1960 MR#: [id number] SSN: [id number]  Date/Time: 2021     Subjective: Patient feels fine, denies any chest pain, no shortness of breath. Complains of pain in the right foot at surgical site, requesting to increase pain medications. No dizziness or lightheadedness. BP improved, heart rate stable in telemetry monitoring. Assessment/Plan:   1. Diabetic foot infection with cellulitis and Concern for osteomyelitis status post TMA on 2021 by podiatry  2. Mild hypotension, suspect dehydration, improved now  3. Asymptomatic bradycardia, HR 45-55, improved now  4. Hx diabetic foot infection status post great toe amputation  5. Type II DMHbA1c 6.4 on 2021  6. HTN  7. HLD  8. CAD status post stenting  9. CHRISTINA  10. Obesity  11. Arthritis  12. Depression  13. Hx polysubstance abuse     Plan: We will discontinue IV fluids, continue to hold hypertensive medication for now. Heart rate improved, will monitor on telemetry monitoring. Normal free T4 but slightly elevated TSH, subclinical.  We will continue empiric antibiotics with vancomycin and Zosyn, ID consult noted. Follow-up blood and wound cultures  Continue Lantus, continue SSI with Accu-Cheks  Pain controlTylenol, Percocet and morphine IV as needed with holding parameters. Incentive spirometry  PT, OT  Discussed with the patient and explained about my above plan of care. Offered to talk to family patient states he will update his family.       Case discussed with:  [x]Patient  []Family  [x]Nursing  []Case Management  DVT Prophylaxis:  [x]Lovenox  []Hep SQ  []SCDs  []Coumadin   []Eliquis/Xarelto     Objective:   VS:   Visit Vitals  /79   Pulse 70   Temp 96.8 °F (36 °C)   Resp 16   Ht 5' 9\" (1.753 m)   Wt 104.3 kg (230 lb)   SpO2 100%   BMI 33.97 kg/m²      Tmax/24hrs: Temp (24hrs), Av.6 °F (36.4 °C), Min:96.8 °F (36 °C), Max:98.2 °F (36.8 °C)  IOBRIEF    Intake/Output Summary (Last 24 hours) at 4/14/2021 1232  Last data filed at 4/14/2021 1020  Gross per 24 hour   Intake 4205 ml   Output 1250 ml   Net 2955 ml       General:  Alert, cooperative, no acute distress , nontoxic. HEENT: PERRLA, anicteric sclerae. Pulmonary:  CTA Bilaterally. No Wheezing/Rales. Cardiovascular: Regular rate and Rhythm. GI:  Soft, Non distended, Non tender. + Bowel sounds. Extremities: Right foot dressing with minimal soakage. Psych: Good insight. Not anxious or agitated. Neurologic: Alert and oriented X 3. Moves all ext. Additional:    Medications:   Current Facility-Administered Medications   Medication Dose Route Frequency    vancomycin (VANCOCIN) 1500 mg in  ml infusion  1,500 mg IntraVENous Q18H    [START ON 4/15/2021] Vancomycin TROUGH level to be drawn 4/15 at 10:30 AM-- BEFORE giving the next dose due at 11:00 AM. Thanks!    Other ONCE    oxyCODONE-acetaminophen (PERCOCET) 5-325 mg per tablet 1-2 Tab  1-2 Tab Oral Q4H PRN    insulin glargine (LANTUS) injection 30 Units  30 Units SubCUTAneous QHS    0.9% sodium chloride infusion  125 mL/hr IntraVENous CONTINUOUS    0.9% sodium chloride infusion 250 mL  250 mL IntraVENous PRN    piperacillin-tazobactam (ZOSYN) 3.375 g in 0.9% sodium chloride (MBP/ADV) 100 mL MBP  3.375 g IntraVENous Q6H    sodium chloride (NS) flush 5-40 mL  5-40 mL IntraVENous Q8H    sodium chloride (NS) flush 5-40 mL  5-40 mL IntraVENous PRN    acetaminophen (TYLENOL) tablet 650 mg  650 mg Oral Q6H PRN    Or    acetaminophen (TYLENOL) suppository 650 mg  650 mg Rectal Q6H PRN    polyethylene glycol (MIRALAX) packet 17 g  17 g Oral DAILY PRN    enoxaparin (LOVENOX) injection 40 mg  40 mg SubCUTAneous DAILY    ondansetron (ZOFRAN) injection 4 mg  4 mg IntraVENous Q6H PRN    insulin lispro (HUMALOG) injection   SubCUTAneous AC&HS    glucose chewable tablet 16 g  4 Tab Oral PRN    glucagon (GLUCAGEN) injection 1 mg  1 mg IntraMUSCular PRN    dextrose (D50W) injection syrg 12.5-25 g  25-50 mL IntraVENous PRN    pantoprazole (PROTONIX) tablet 40 mg  40 mg Oral ACB&D    [Held by provider] lisinopriL (PRINIVIL, ZESTRIL) tablet 20 mg  20 mg Oral DAILY    morphine injection 2 mg  2 mg IntraVENous Q3H PRN     Facility-Administered Medications Ordered in Other Encounters   Medication Dose Route Frequency    sodium chloride (NS) flush 10-40 mL  10-40 mL IntraVENous PRN    heparin (porcine) pf 500 Units  500 Units InterCATHeter PRN       Labs:    Recent Results (from the past 24 hour(s))   EKG, 12 LEAD, INITIAL    Collection Time: 04/13/21  1:57 PM   Result Value Ref Range    Ventricular Rate 51 BPM    Atrial Rate 51 BPM    P-R Interval 282 ms    QRS Duration 74 ms    Q-T Interval 460 ms    QTC Calculation (Bezet) 423 ms    Calculated P Axis 40 degrees    Calculated R Axis -12 degrees    Calculated T Axis -9 degrees    Diagnosis       Sinus bradycardia with 1st degree AV block  Low voltage QRS  Inferior infarct , age undetermined  Abnormal ECG  When compared with ECG of 12-APR-2021 13:07,  Inferior infarct is now present  Confirmed by Yuly King ([id number]) on 4/14/2021 6:30:34 AM     ECHO ADULT COMPLETE    Collection Time: 04/13/21  3:20 PM   Result Value Ref Range    IVSd 0.86 0.60 - 1.00 cm    LVIDd 4.78 4.20 - 5.90 cm    LVIDs 3.29 cm    LVOT d 2.16 cm    LVPWd 0.78 0.60 - 1.00 cm    LVOT Cardiac Output 5.35 liter/minute    LVOT Peak Gradient 5.76 mmHg    Left Ventricular Outflow Tract Mean Gradient 3.25 mmHg    LVOT .8 mL    LVOT Peak Velocity 120.02 cm/s    LVOT VTI 27.98 cm    Right Atrial Area 4C 14.82 cm2    MV A Christiano 77.68 cm/s    Mitral Valve E Wave Deceleration Time 192.32 ms    MV E Christiano 102.91 cm/s    LV E' Lateral Velocity 7.38 cm/s    Triscuspid Valve Regurgitation Peak Gradient 16.81 mmHg    Pulmonic Regurgitant End Max Velocity 204.98 cm/s    Ao Root D 2.95 cm    MV E/A 1.32     LV Mass .9 88.0 - 224.0 g    LV Mass AL Index 59.3 49.0 - 115.0 g/m2    E/E' lateral 13.94    GLUCOSE, POC    Collection Time: 04/13/21  4:18 PM   Result Value Ref Range    Glucose (POC) 170 (H) 70 - 110 mg/dL   GLUCOSE, POC    Collection Time: 04/13/21  7:43 PM   Result Value Ref Range    Glucose (POC) 173 (H) 70 - 110 mg/dL   HGB & HCT    Collection Time: 04/13/21  8:10 PM   Result Value Ref Range    HGB 6.3 (L) 13.0 - 16.0 g/dL    HCT 21.9 (L) 36.0 - 48.0 %   GLUCOSE, POC    Collection Time: 04/13/21  9:12 PM   Result Value Ref Range    Glucose (POC) 176 (H) 70 - 110 mg/dL   RBC, ALLOCATE    Collection Time: 04/13/21 10:00 PM   Result Value Ref Range    HISTORY CHECKED? Historical check performed    METABOLIC PANEL, BASIC    Collection Time: 04/14/21  2:54 AM   Result Value Ref Range    Sodium 142 136 - 145 mmol/L    Potassium 4.4 3.5 - 5.5 mmol/L    Chloride 109 100 - 111 mmol/L    CO2 27 21 - 32 mmol/L    Anion gap 6 3.0 - 18 mmol/L    Glucose 226 (H) 74 - 99 mg/dL    BUN 18 7.0 - 18 MG/DL    Creatinine 1.18 0.6 - 1.3 MG/DL    BUN/Creatinine ratio 15 12 - 20      GFR est AA >60 >60 ml/min/1.73m2    GFR est non-AA >60 >60 ml/min/1.73m2    Calcium 8.5 8.5 - 10.1 MG/DL   CBC WITH AUTOMATED DIFF    Collection Time: 04/14/21  2:54 AM   Result Value Ref Range    WBC 15.0 (H) 4.6 - 13.2 K/uL    RBC 2.67 (L) 4.35 - 5.65 M/uL    HGB 7.6 (L) 13.0 - 16.0 g/dL    HCT 24.6 (L) 36.0 - 48.0 %    MCV 92.1 74.0 - 97.0 FL    MCH 28.5 24.0 - 34.0 PG    MCHC 30.9 (L) 31.0 - 37.0 g/dL    RDW 15.4 (H) 11.6 - 14.5 %    PLATELET 534 347 - 874 K/uL    MPV 10.7 9.2 - 11.8 FL    NEUTROPHILS 90 (H) 40 - 73 %    LYMPHOCYTES 7 (L) 21 - 52 %    MONOCYTES 2 (L) 3 - 10 %    EOSINOPHILS 0 0 - 5 %    BASOPHILS 0 0 - 2 %    ABS. NEUTROPHILS 13.5 (H) 1.8 - 8.0 K/UL    ABS. LYMPHOCYTES 1.0 0.9 - 3.6 K/UL    ABS. MONOCYTES 0.3 0.05 - 1.2 K/UL    ABS. EOSINOPHILS 0.0 0.0 - 0.4 K/UL    ABS.  BASOPHILS 0.0 0.0 - 0.1 K/UL    DF AUTOMATED     PHOSPHORUS Collection Time: 04/14/21  2:54 AM   Result Value Ref Range    Phosphorus 3.6 2.5 - 4.9 MG/DL   MAGNESIUM    Collection Time: 04/14/21  2:54 AM   Result Value Ref Range    Magnesium 1.7 1.6 - 2.6 mg/dL   GLUCOSE, POC    Collection Time: 04/14/21  6:02 AM   Result Value Ref Range    Glucose (POC) 245 (H) 70 - 110 mg/dL   GLUCOSE, POC    Collection Time: 04/14/21 11:29 AM   Result Value Ref Range    Glucose (POC) 281 (H) 70 - 110 mg/dL       Signed By: Delisa Chilel MD     April 14, 2021      Disclaimer: Sections of this note are dictated using utilizing voice recognition software. Minor typographical errors may be present. If questions arise, please do not hesitate to contact me or call our department.

## 2021-04-14 NOTE — PROGRESS NOTES
Infectious Disease Consultation Note        Reason: right foot wound infection     Current abx Prior abx   Zosyn, vancomycin since 4/12/21      Lines:       Assessment :    61 y. o. male with h/o type uncontrolled type 2 DM (last hgbA1C 8.9 on 11/23/20) , CAD who presented to [facility] on 2/4/2021 with right foot infection     Hospitalization at [facility] November 2020 for right great toe distal phalanx chronic osteomyelitis, septic arthritis right great toe interphalangeal joint   Wound cultures 9/2020-Enterobacter, MRSA  Status post right great toe amputation on 11/24.       Hospitalization at [facility] 2/2021 for acute on chronic osteomyelitis right first metatarsal osteomyelitis. Status post incision and debridement, partial resection of right first metatarsal on 2/5/2021. Intra-Op cultures: Methicillin susceptible Staphylococcus aureus, Bacteroides. Bone biopsy of clean margins reveal acute inflammation suggestive of acute osteomyelitis. S/p ertapenem, daptomycin till 3/19/2021     Clinical presentation c/w chronic osteomyelitis right second metatarsal head, infected right lateral foot ulcer in a patient with uncontrolled type 2 DM, evolving charcot arthropathy    xr 4/12- New erosion at the second distal metatarsal head and base of the third proximal  phalanx      Status post transmetatarsal amputation 4/13/2021. Intra-Op findings discussed with podiatrist.  Grossly clean margins achieved at surgery. Bone biopsy, cuneiform bone sent. Intra-Op cultures no organisms seen. Recommendations:     1.   continue pip/tazo. Continue vancomycin since recent history of MRSA  2.  Follow up  podiatry recommendations  3.  Follow-up wound culture right foot ulcer   4. patient was advised regarding importance of good glycemic control, compliance with antibiotics  5.   Decision about outpatient oral versus IV antibiotics based on the results of bone biopsy     Above plan was discussed in details with patient, and primary team. Please call me if any further questions or concerns. Will continue to participate in the care of this patient. HPI:    Feels fine. No new complaints. Patient denies any subjective fever or chills throughout this time. Home                                                                                                       Medication List    Details   metFORMIN (GLUCOPHAGE) 1,000 mg tablet Take 1,000 mg by mouth two (2) times daily (with meals). quinapriL (ACCUPRIL) 40 mg tablet Take 40 mg by mouth daily. insulin lispro (HUMALOG) 100 unit/mL injection Check FSBS AC*HS  For sugars between 160 and 200- Give 2 units SQ,  For sugars between 201 and 250, Give 3 units SQ,  For sugars Between 251 and 300, give 5 units SQ,  For Sugars between 301 and 350, give 7 units SQ  For sugars between 351 and 400, give 8 units SQ and contact PCP  Qty: 1 Vial, Refills: 0      insulin detemir U-100 (Levemir U-100 Insulin) 100 unit/mL injection 20 Units by SubCUTAneous route two (2) times a day. Qty: 10 mL, Refills: 0      pantoprazole (PROTONIX) 40 mg tablet Take 1 Tab by mouth Before breakfast and dinner. Qty: 60 Tab, Refills: 0      acetaminophen (TYLENOL) 325 mg tablet Take 2 Tabs by mouth every six (6) hours as needed. Indications: Pain, take it with bentyl; do not exceed 3 g tylenol daily  Qty: 30 Tab, Refills: 0      polyethylene glycol (MIRALAX) 17 gram packet Take 1 Packet by mouth daily. Qty: 30 Packet, Refills: 0             Current Facility-Administered Medications   Medication Dose Route Frequency    vancomycin (VANCOCIN) 1500 mg in  ml infusion  1,500 mg IntraVENous Q18H    [START ON 4/15/2021] Vancomycin TROUGH level to be drawn 4/15 at 10:30 AM-- BEFORE giving the next dose due at 11:00 AM. Thanks!    Other ONCE    insulin glargine (LANTUS) injection 30 Units  30 Units SubCUTAneous QHS    0.9% sodium chloride infusion  125 mL/hr IntraVENous CONTINUOUS    0.9% sodium chloride infusion 250 mL  250 mL IntraVENous PRN    piperacillin-tazobactam (ZOSYN) 3.375 g in 0.9% sodium chloride (MBP/ADV) 100 mL MBP  3.375 g IntraVENous Q6H    sodium chloride (NS) flush 5-40 mL  5-40 mL IntraVENous Q8H    sodium chloride (NS) flush 5-40 mL  5-40 mL IntraVENous PRN    acetaminophen (TYLENOL) tablet 650 mg  650 mg Oral Q6H PRN    Or    acetaminophen (TYLENOL) suppository 650 mg  650 mg Rectal Q6H PRN    polyethylene glycol (MIRALAX) packet 17 g  17 g Oral DAILY PRN    enoxaparin (LOVENOX) injection 40 mg  40 mg SubCUTAneous DAILY    ondansetron (ZOFRAN) injection 4 mg  4 mg IntraVENous Q6H PRN    insulin lispro (HUMALOG) injection   SubCUTAneous AC&HS    glucose chewable tablet 16 g  4 Tab Oral PRN    glucagon (GLUCAGEN) injection 1 mg  1 mg IntraMUSCular PRN    dextrose (D50W) injection syrg 12.5-25 g  25-50 mL IntraVENous PRN    pantoprazole (PROTONIX) tablet 40 mg  40 mg Oral ACB&D    [Held by provider] lisinopriL (PRINIVIL, ZESTRIL) tablet 20 mg  20 mg Oral DAILY    oxyCODONE-acetaminophen (PERCOCET) 5-325 mg per tablet 1 Tab  1 Tab Oral Q6H PRN    morphine injection 2 mg  2 mg IntraVENous Q3H PRN     Facility-Administered Medications Ordered in Other Encounters   Medication Dose Route Frequency    sodium chloride (NS) flush 10-40 mL  10-40 mL IntraVENous PRN    heparin (porcine) pf 500 Units  500 Units InterCATHeter PRN       Allergies: Compazine [prochlorperazine]    Temp (24hrs), Av.6 °F (36.4 °C), Min:96.8 °F (36 °C), Max:98.2 °F (36.8 °C)    Visit Vitals  /79   Pulse 70   Temp 96.8 °F (36 °C)   Resp 16   Ht 5' 9\" (1.753 m)   Wt 104.3 kg (230 lb)   SpO2 100%   BMI 33.97 kg/m²       ROS: 12 point ROS obtained in details. Pertinent positives as mentioned in HPI,   otherwise negative    Physical Exam:       General:          In NAD.  Nontoxic-appearing. HEENT:           Head NCAT. sclerae anicteric, EOMI.  OP clear. Neck:               Supple, trachea midline.   Lungs:             Clear, no wheezes.  Effort nonlabored, no accessory muscle use. Chest wall:      No chest wall tenderness.  Chest expansion equal and symmetrical bilaterally. Heart:              RRR.  No JVD. Abdomen:        Soft, NT/ND.  Bowel sounds normoactive. Extremities:     Warm, no edema. Right foot surgical site covered with dressing   skin:                Skin changes and surgical changes right foot as mentioned above  Psych:             Mood normal.  Calm, no agitation. Neurologic:      Awake and alert, moves extremities spontaneously.      Labs: Results:   Chemistry Recent Labs     04/14/21 0254 04/13/21 0315 04/12/21  1255   * 113* 172*    139 138   K 4.4 4.2 4.0    105 102   CO2 27 29 32   BUN 18 20* 23*   CREA 1.18 1.08 1.28   CA 8.5 8.7 9.6   AGAP 6 5 4   BUCR 15 19 18      CBC w/Diff Recent Labs     04/14/21  0254 04/13/21 2010 04/13/21  0315 04/12/21  1255   WBC 15.0*  --  11.6 13.4*   RBC 2.67*  --  2.71* 3.15*   HGB 7.6* 6.3* 7.4* 8.3*   HCT 24.6* 21.9* 24.5* 28.4*     --  392 459*   GRANS 90*  --  69 76*   LYMPH 7*  --  20* 14*   EOS 0  --  3 2      Microbiology Recent Labs     04/12/21  1300 04/12/21  1255   CULT NO GROWTH 2 DAYS NO GROWTH 2 DAYS          RADIOLOGY:    All available imaging studies/reports in [facility] for this admission were reviewed      Disclaimer: Sections of this note are dictated utilizing voice recognition software, which may have resulted in some phonetic based errors in grammar and contents. Even though attempts were made to correct all the mistakes, some may have been missed, and remained in the body of the document. If questions arise, please contact our department.     Dr. Deepak Garcia, Infectious Disease Specialist  [phone]  April 14, 2021  3:32 PM

## 2021-04-14 NOTE — PROGRESS NOTES
Problem: Mobility Impaired (Adult and Pediatric)  Goal: *Acute Goals and Plan of Care (Insert Text)  Description: Physical Therapy Goals  Initiated 4/13/2021, reevaluated 4/14/2021, and to be accomplished within 7 day(s)  1. Patient will move from supine to sit and sit to supine  in bed with modified independence. 2.  Patient will transfer from bed to chair and chair to bed with modified independence using the least restrictive device. 3.  Patient will perform sit to stand with modified independence. 4.  Patient will ambulate with modified independence for 50 feet with the least restrictive device. 5.  Patient will ascend/descend 3 stairs with 1 handrail(s) with contact guard assistance. PLOF: pt lives with his sister in a [facility] with 3 steps to enter home with B HRA and 3 steps in/out of his room with 1 HRA. He reports being indep with all mobility PTA, without use of an AD but admits he may need to use a SPC.     4/14/2021 1213 by Jose Carlos Washburn, PT  Outcome: Progressing Towards Goal   PHYSICAL THERAPY RE-EVALUATION and TREATMENT    Patient: Linh Woody (53 y.o. male)  Date: 4/14/2021  Primary Diagnosis: Diabetic foot infection ([facility] [E11.628, L08.9]  Procedure(s) (LRB):  RIGHT FOOT TRANSMETATARSAL AMPUTATION WITH GASTROCNEMIUS RECESSION (Right) 1 Day Post-Op   Precautions:   Fall, NWB(R foot)    ASSESSMENT :  Patient is cleared by nursing for PT reevaluation, and patient consents to therapy. Pt now status post R foot TMA Dr. Rachel Ness from yesterday. Spoke with Dr. Rachel Ness and pt is now NWB R foot with orders updated. Educated pt on NWB R foot as well as elevation to assist with pain control and healing. Pt's family present during session and updated pt's sister Raine Martínez at end of session. Pt sitting edge of bed upon arrival of therapy. Sit to stands contact guard assistance/standby assistance. Gait in room 15 feet with rolling walker NWB R foot and contact guard assistance.  Pt has functaionl balance with gait. Discussed home layout of possiblities with his stairs. He can get in the side door to his room from the outside without stairs but then will need to use 3 steps to get to and from the rest of the house. Discussed staying in main house vs his room. Also discussed having a BSC for increased safety. Pt ended therapy sitting edge of bed with OT present to finish their session as well. Recommend elevation of R foot at end of OT session for pain control and healing. Any changes or updated recommendations pt's sister Adri Rinaldi would like to be called at [phone]. Patient will benefit from skilled intervention to address the above impairments. Patient's rehabilitation potential is considered to be Good  Factors which may influence rehabilitation potential include:    []         None noted  [x]         Mental ability/status  [x]         Medical condition  []         Home/family situation and support systems  []         Safety awareness  []         Pain tolerance/management  []         Other:      PLAN :  Recommendations and Planned Interventions:    [x]           Bed Mobility Training             [x]    Neuromuscular Re-Education  [x]           Transfer Training                   []    Orthotic/Prosthetic Training  [x]           Gait Training                          [x]    Modalities  [x]           Therapeutic Exercises           [x]    Edema Management/Control  [x]           Therapeutic Activities            [x]    Family Training/Education  [x]           Patient Education  []           Other (comment):    Frequency/Duration: Patient will be followed by physical therapy 1-2 times per day/4-7 days per week to address goals. Discharge Recommendations: Home Health  Further Equipment Recommendations for Discharge: bedside commode, tub transfer bench, rolling walker, and possible WC with elevating leg rests for longer distances. Pt may do well with a knee scooter if continues to be NWB. SUBJECTIVE:   Patient stated okay.     OBJECTIVE DATA SUMMARY:   Hospital course since last seen and reason for re-evaluation: Pt now status post R foot TMA Dr. Palmer Luis from yesterday and now NWB R foot. Updated PT goals. Past Medical History:   Diagnosis Date    Arthritis     Coronary atherosclerosis of native coronary artery     S/P PCI with GE in 12/09    Diabetes ([facility]     IDDM    Electrolyte and fluid disorders not elsewhere classified     Essential hypertension, benign     GERD (gastroesophageal reflux disease)     Heroin abuse ([facility] 05/26/16    Psychiatrist Dr. Cielo Bentley History of heart attack     Hyperlipidemia     Intermediate coronary syndrome ([facility]     MDD (major depressive disorder) 5/26/16    Psychiatrist Dr. Cielo Bentley Myocardial infarction [facility])     Obesity, unspecified     Old myocardial infarction     Other and unspecified hyperlipidemia     Pancreatitis     Positive PPD     Sleep apnea     uses Cpap machine    Transfusion history     Before 1992     Past Surgical History:   Procedure Laterality Date    HX APPENDECTOMY      HX CORONARY STENT PLACEMENT  2010    2 stents     Barriers to Learning/Limitations: None  Compensate with: N/A  Home Situation:   Home Situation  Home Environment: Private residence  # Steps to Enter: 3  Rails to Enter: Yes  Hand Rails : Bilateral  One/Two Story Residence: One story(3 steps in/out of his room to rest of house)  Living Alone: No  Support Systems: Family member(s), Spouse/Significant Other/Partner  Patient Expects to be Discharged to[de-identified] Private residence  Current DME Used/Available at Home: Phillip Green, jonnieator, Cane, straight  Critical Behavior:  Neurologic State: Alert  Orientation Level: Oriented X4  Cognition: Follows commands  Safety/Judgement: Fall prevention  Psychosocial  Patient Behaviors: Calm; Cooperative  Family  Behaviors: Calm; Cooperative     Family  Behaviors: Calm; Cooperative           B LE Strength: Strength: Generally decreased, functional              B LE Tone & Sensation:   Tone: Normal          Sensation: Intact           B LE Range Of Motion:  AROM: Generally decreased, functional                 Posture:  Posture (WDL): Within defined limits     Functional Mobility:  Bed Mobility:              Transfers:  Sit to Stand: Stand-by assistance;Contact guard assistance  Stand to Sit: Stand-by assistance        Bed to Chair: Contact guard assistance              Balance:   Sitting: Intact  Standing: Impaired; With support  Standing - Static: Good  Standing - Dynamic : Fair  Wheelchair Mobility:        Ambulation/Gait Training:  Distance (ft): 15 Feet (ft)  Assistive Device: Walker, rolling  Ambulation - Level of Assistance: Contact guard assistance  Gait Abnormalities: Decreased step clearance  Base of Support: Center of gravity altered;Shift to left             Therapeutic Exercises:   Reviewed and performed ankle pumps to increase blood flow and circulation. Pain:  Pain level pre-treatment: 10/10 R foot  Pain level post-treatment: same  Pain Intervention(s) : Medication (see MAR); Rest, Repositioning   Response to intervention: Nurse notified, See doc flow    Activity Tolerance:   good  Please refer to the flowsheet for vital signs taken during this treatment. After treatment:   []         Patient left in no apparent distress sitting up in chair  [x]         Patient left in no apparent distress in bed  [x]         Call bell left within reach  [x]   Personal items in reach  [x]         Nursing notified   []         Caregiver present  []         Bed/chair alarm activated  []         SCDs applied    COMMUNICATION/EDUCATION:   [x]         Role of Physical Therapy in the acute care setting. [x]         Fall prevention education was provided and the patient/caregiver indicated understanding. [x]         Patient/family have participated as able in goal setting and plan of care.   [x] Patient/family agree to work toward stated goals and plan of care. []         Patient understands intent and goals of therapy, but is neutral about his/her participation. []         Patient is unable to participate in goal setting/plan of care: ongoing with therapy staff. [x]         Out of bed at least 3-5 times a day with nursing assistance. []         Other:     Thank you for this referral.  Saul Pino, PT, DPT   Time Calculation: 28 mins

## 2021-04-14 NOTE — PROGRESS NOTES
Problem: Self Care Deficits Care Plan (Adult)  Goal: *Acute Goals and Plan of Care (Insert Text)  Description: Occupational Therapy Goals  Initiated 4/13/2021 within 7 day(s). 1.  Patient will perform LB dressing/bathing with modified independence and AE as necessary to abide by weight-bearing precautions. 2.  Patient will perform toilet transfers with modified independence and AE as needed. Prior Level of Function: Pt was independent with self-care tasks prior to hospital admission. Outcome: Progressing Towards Goal   OCCUPATIONAL THERAPY TREATMENT    Patient: Otoniel Menjivar (1 y.o. male)  Date: 4/14/2021  Diagnosis: Diabetic foot infection ([facility] [E11.628, L08.9] Diabetic foot infection ([facility]  Procedure(s) (LRB):  RIGHT FOOT TRANSMETATARSAL AMPUTATION WITH GASTROCNEMIUS RECESSION (Right) 1 Day Post-Op  Precautions: Fall, NWB(R foot)    Chart, occupational therapy assessment, plan of care, and goals were reviewed. ASSESSMENT:  Pt working w/PT upon entry. Supportive spouse at bedside. Reviewed energy conservation techniques w/ADLs. Pt demonstrates w/ADL grooming tasks w/increase time. Pt r\educated on use of adaptive equipment w/LB dressing requiring increase time and CGA w/clothing mgt hiking pants over hips. Pt c/o RLE pain increasing request return to supine w/RLE elevated. Progression toward goals:  [x]          Improving appropriately and progressing toward goals  []          Improving slowly and progressing toward goals  []          Not making progress toward goals and plan of care will be adjusted     PLAN:  Patient continues to benefit from skilled intervention to address the above impairments. Continue treatment per established plan of care. Discharge Recommendations:  Home Health for safety check  Further Equipment Recommendations for Discharge:  rolling walker and wheelchair      SUBJECTIVE:   Patient stated I don't want a wheelchair. Can I get some crutches? Job Morgan    OBJECTIVE DATA SUMMARY:   Cognitive/Behavioral Status:  Neurologic State: Alert  Orientation Level: Oriented X4  Cognition: Follows commands  Safety/Judgement: Fall prevention    Functional Mobility and Transfers for ADLs:   Bed Mobility:  Supine to sit: Modified independent w/SR   Transfers:  Sit to Stand: Stand-by assistance  Balance:  Sitting: Intact  Standing: Intact; With support  Standing - Static: Fair plus  Standing - Dynamic : Fair plus/Fair    ADL Intervention:  Grooming  Position Performed: Seated edge of bed  Washing Face: Set-up  Washing Hands: Set-up  Brushing Teeth: Set-up    Upper Body [address]: Set-up    Lower Body Dressing Assistance  Underpants: Stand-by assistance  Leg Crossed Method Used: No  Position Performed: Seated edge of bed  Cues: Don;Doff  Adaptive Equipment Used: Reacher    Cognitive Retraining  Safety/Judgement: Fall prevention    Pain:  Pain level pre-treatment: 6/10   Pain level post-treatment: 8/10  Pt c/o RLE pain, provided elevation and rest.    Activity Tolerance:    Good    Please refer to the flowsheet for vital signs taken during this treatment. After treatment:   []  Patient left in no apparent distress sitting up in chair  [x]  Patient left in no apparent distress in bed  [x]  Call bell left within reach  [x]  Nursing notified  [x]  Family present  []  Bed alarm activated    COMMUNICATION/EDUCATION:   [] Role of Occupational Therapy in the acute care setting  [] Home safety education was provided and the patient/caregiver indicated understanding. [] Patient/family have participated as able in working towards goals and plan of care. [x] Patient/family agree to work toward stated goals and plan of care. [] Patient understands intent and goals of therapy, but is neutral about his/her participation. [] Patient is unable to participate in goal setting and plan of care.       Thank you for this referral.  HUAN Morrow  Time Calculation: 25 mins

## 2021-04-14 NOTE — BRIEF OP NOTE
Brief Postoperative Note    Patient: Mauro Living  YOB: 1960  MRN: [id number]    Date of Procedure: 4/13/2021     Pre-Op Diagnosis: Right foot second and possible third MTPJ septic joint with osteomyelitis, Ulceration to right fifth MTPJ    Post-Op Diagnosis: Same as preoperative diagnosis.       Procedure(s):  RIGHT FOOT TRANSMETATARSAL AMPUTATION WITH GASTROCNEMIUS RECESSION    Surgeon(s):  Glenna Roman DPM    Surgical Assistant: Surg Asst-1: Henrietta Salinas    Anesthesia: General     Estimated Blood Loss (mL):  50 cc    Complications: None    Specimens:   ID Type Source Tests Collected by Time Destination   1 : RIGHT FOOT MEDIAL CUNEIFORM  Preservative Foot, Right  Loletta Rout, DP 4/13/2021 1857 Pathology   2 : RIGHT FOOT SECOND METATARSAL , CLEAN MARGIN Preservative Foot, Right  Loletta Rout, DP 4/13/2021 1858 Pathology   3 : RIGHT FOOT THIRD METATARSAL , CLEAN MARGIN Preservative Foot, Right  Loletta Rout, DP 4/13/2021 1858 Pathology   4 : RIGHT FOOT FOURTH METATARSAL , CLEAN MARGIN Preservative Foot, Right  Loletta Rout, DPM 4/13/2021 1859 Pathology   5 : RIGHT FOOT FIFTH METATARSAL, CLEAN MARGIN Preservative Foot, Right  Loletta Rout, [facility] 4/13/2021 1900 Pathology   6 : RIGHT FOOT TRANSMETATARSAL Preservative Foot, Right  Chris Christiano A, DPM 4/13/2021 1919 Pathology   1 : AEROBIC, ANAEROBIC C/S OF RIGHT FOOT SECOND METATARSAL, CLEAN MARGIN Wound  CULTURE, ANAEROBIC, CULTURE, WOUND W GRAM STAIN Glenna Roman, NISA 4/13/2021 1904 Microbiology   2 : RIGHT FOOT MEDIAL CUNEIFORM  AEROBIC, ANAEROBIC C/S  Wound Foot, Right CULTURE, ANAEROBIC, CULTURE, WOUND W GRAM STAIN Glenna Roman, SERGIO 4/13/2021 1906 Microbiology        Implants: * No implants in log *    Drains: * No LDAs found *    Electronically Signed by Herb Diaz DPM on 4/14/2021 at 3:17 AM

## 2021-04-14 NOTE — ROUTINE PROCESS
TRANSFER - OUT REPORT: 
 
Verbal report given to Jeri(name) on Mustapha Keith  being transferred to room 520(unit) for routine progression of care Report consisted of patients Situation, Background, Assessment and  
Recommendations(SBAR). Information from the following report(s) SBAR, OR Summary, Intake/Output, MAR and Recent Results was reviewed with the receiving nurse. Cardiac rhythm SB with first degree AV block. H&H drawn @ 2010 and result pending. Lines: PICC Double Lumen 02/08/21 Right (Active) Peripheral IV 04/12/21 Right Antecubital (Active) Site Assessment Clean, dry, & intact 04/13/21 1935 Phlebitis Assessment 0 04/13/21 1935 Infiltration Assessment 0 04/13/21 1935 Dressing Status Clean, dry, & intact 04/13/21 1935 Dressing Type Tape;Transparent 04/13/21 1935 Hub Color/Line Status Green; Infusing 04/13/21 1935 Action Taken Open ports on tubing capped 04/13/21 0910 Peripheral IV 04/12/21 Right Wrist (Active) Site Assessment Clean, dry, & intact 04/13/21 1935 Phlebitis Assessment 0 04/13/21 1935 Infiltration Assessment 0 04/13/21 1935 Dressing Status Clean, dry, & intact 04/13/21 1935 Dressing Type Tape;Transparent 04/13/21 1935 Hub Color/Line Status Green;Capped 04/13/21 1935 Action Taken Open ports on tubing capped 04/13/21 0910 Alcohol Cap Used Yes 04/13/21 0910 Opportunity for questions and clarification was provided. Patient transported with: 
 O2 @ 3 liters Registered Nurse

## 2021-04-14 NOTE — PROGRESS NOTES
Reason for Admission:  Diabetic foot infection ([facility] [E11.628, L08.9]                 RUR Score:    22%            Plan for utilizing home health:    Tbd                      Likelihood of Readmission:   Moderate                         Do you (patient/family) have any concerns for transition/discharge?  no    Transition of Care Plan:       Initial assessment completed with patient. Cognitive status of patient: oriented to time, place, person and situation. Face sheet information confirmed:  yes. The patient designates spouse to participate in his discharge plan and to receive any needed information. This patient lives in a single family home with spouse. Patient is not able to navigate steps as needed. Prior to hospitalization, patient was considered to be independent with ADLs/IADLS : yes . Patient has a current ACP document on file: no      Healthcare Decision Maker:     Click here to complete [email or web address] including selection of the Healthcare Decision Maker Relationship (ie \"Primary\")    The spouse will be available to transport patient home upon discharge. The patient already has [address] equipment available in the home. Patient is not currently active with home health. Patient has not stayed in a skilled nursing facility or rehab. .      This patient is on dialysis :no    Currently, the discharge plan is Home. The patient states that he can obtain his medications from the pharmacy, and take his medications as directed. Patient's current insurance is medicaid      Care Management Interventions  PCP Verified by CM:  Yes  Mode of Transport at Discharge: Self(spouse)  Transition of Care Consult (CM Consult): Discharge Planning  Discharge Durable Medical Equipment: No  Physical Therapy Consult: No  Occupational Therapy Consult: No  Speech Therapy Consult: No  Current Support Network: Lives with Spouse  Confirm Follow Up Transport: Family  Discharge Location  Discharge Placement: Home with family assistance    Patient declined [place] at this time he stated his wife would do the dressing changes and he would be able to go to him follow up appointments. Will continue to follow up.      Andres Eddy RN, BSN   Care Management  [phone]

## 2021-04-14 NOTE — ROUTINE PROCESS
Patient is alert and oriented times three with no signs or symptoms of distress. Dressing is clean dry and intact

## 2021-04-14 NOTE — PROGRESS NOTES
Cardiology Progress Note    Admit Date: 4/12/2021    Attending Cardiologist: Dr. Christian Acevedo saw, evaluated, interviewed and examined the patient personally. I agree with the findings and plan of care as documented below with [facility] note  Patient underwent surgery yesterday without any cardiac complication. Does not complain of any angina or heart failure symptoms  No evidence of fluid overload on exam.  No rales  Complaining of right foot pain at site of surgery  Anemia noted status post 1 unit of transfusion  Echo with normal ejection fraction with no wall motion abnormality  Patient with resting bradycardia. No need for beta-blocker  We will start atorvastatin 20 mg daily because of diabetes and history of CAD  Discussed with primary attending. Recommended to start aspirin when okay in setting of recent anemia  No further cardiac work up planned at this time unless clinical status changes. Call us back if needed. Will be available as needed. Will need to follow up in cardiology clinic in 2-3 weeks after discharge. Thank you. Laila Fraga MD       Assessment:     -Asked to see for pre-operative evaluation for TMA due to R foot submet 2 ulcer with osteomyelitis of 2nd MTP. Now s/p R foot transmetatarsal amputation with gastrocnemius recession by Dr. Sarahi Hollis 4/13/2021.  -Echo 4/13/2021: EF 55-60%, no significant valvular disease  -Hx CAD, s/p PCI with GE to OM1 in 2009  -TSH 6.75 in 09/2020  -Anemia, Hgb down to 6.3 on 4/13/2021, s/p 1 unit PRBC's  -Hx HTN, on Lopressor as outpatient  -DM, A1c 6.8 with  on 4/13/2021.  -Dyslipidemia, lipid panel 02/2021 with , Chol 126, HDL 20, LDL 86  -Obesity, BMI 33.97 kg/m2     Primary cardiologist Dr. Naif Collins:     -Hgb down to 6.3 yesterday evening, s/p 1 unit PRBC. Management of anemia per primary team.  -Telemetry reviewed, no high-grade AV blocks noted.   Pt states not on Metoprolol as outpatient, would defer BB due to bradycardia.  -Pt states he takes 81 mg ASA daily, would recommend to resume if okay from anemia standpoint. -ID following, appreciate assistance.  -No further recommendations from cardiac standpoint. Will be available as needed if additional concerns arise. Pt can follow-up with Dr. Jaime Dolan as outpatient. Subjective:     No new complaints. Objective:      Patient Vitals for the past 8 hrs:   Temp Pulse Resp BP SpO2   04/14/21 0911 96.8 °F (36 °C) 70 16 133/79 100 %   04/14/21 0326 98 °F (36.7 °C) 60 16 123/63 94 %         Patient Vitals for the past 96 hrs:   Weight   04/13/21 1458 104.3 kg (230 lb)   04/12/21 1245 104.3 kg (230 lb)       TELE: sinus rhythm, blocked PAC's noted overnight               Current Facility-Administered Medications   Medication Dose Route Frequency Last Admin    vancomycin (VANCOCIN) 1500 mg in  ml infusion  1,500 mg IntraVENous Q18H      [START ON 4/15/2021] Vancomycin TROUGH level to be drawn 4/15 at 10:30 AM-- BEFORE giving the next dose due at 11:00 AM. Thanks!    Other ONCE      insulin glargine (LANTUS) injection 30 Units  30 Units SubCUTAneous QHS 30 Units at 04/13/21 2133    0.9% sodium chloride infusion  125 mL/hr IntraVENous CONTINUOUS 125 mL/hr at 04/14/21 0914    0.9% sodium chloride infusion 250 mL  250 mL IntraVENous PRN      piperacillin-tazobactam (ZOSYN) 3.375 g in 0.9% sodium chloride (MBP/ADV) 100 mL MBP  3.375 g IntraVENous Q6H 3.375 g at 04/14/21 0908    sodium chloride (NS) flush 5-40 mL  5-40 mL IntraVENous Q8H 10 mL at 04/14/21 0530    sodium chloride (NS) flush 5-40 mL  5-40 mL IntraVENous PRN      acetaminophen (TYLENOL) tablet 650 mg  650 mg Oral Q6H PRN      Or    acetaminophen (TYLENOL) suppository 650 mg  650 mg Rectal Q6H PRN      polyethylene glycol (MIRALAX) packet 17 g  17 g Oral DAILY PRN      enoxaparin (LOVENOX) injection 40 mg  40 mg SubCUTAneous DAILY 40 mg at 04/14/21 0908    ondansetron (ZOFRAN) injection 4 mg  4 mg IntraVENous Q6H PRN  insulin lispro (HUMALOG) injection   SubCUTAneous AC&HS 6 Units at 04/14/21 0602    glucose chewable tablet 16 g  4 Tab Oral PRN      glucagon (GLUCAGEN) injection 1 mg  1 mg IntraMUSCular PRN      dextrose (D50W) injection syrg 12.5-25 g  25-50 mL IntraVENous PRN      pantoprazole (PROTONIX) tablet 40 mg  40 mg Oral ACB&D 40 mg at 04/14/21 0559    [Held by provider] lisinopriL (PRINIVIL, ZESTRIL) tablet 20 mg  20 mg Oral DAILY 20 mg at 04/13/21 0910    oxyCODONE-acetaminophen (PERCOCET) 5-325 mg per tablet 1 Tab  1 Tab Oral Q6H PRN 1 Tab at 04/14/21 0316    morphine injection 2 mg  2 mg IntraVENous Q3H PRN 2 mg at 04/14/21 1272     Facility-Administered Medications Ordered in Other Encounters   Medication Dose Route Frequency Last Admin    sodium chloride (NS) flush 10-40 mL  10-40 mL IntraVENous PRN      heparin (porcine) pf 500 Units  500 Units InterCATHeter PRN           Intake/Output Summary (Last 24 hours) at 4/14/2021 1045  Last data filed at 4/14/2021 1020  Gross per 24 hour   Intake 4205 ml   Output 1550 ml   Net 2655 ml       Physical Exam:  General:  alert, cooperative, no distress, appears stated age  Neck:  supple  Lungs:  clear to auscultation bilaterally  Heart:  regular rate and rhythm  Abdomen:  abdomen is soft without significant tenderness, masses, organomegaly or guarding  Extremities:  dressing to RLE intact    Data Review:     Labs: Results:       Chemistry Recent Labs     04/14/21 0254 04/13/21 0315 04/12/21  1255   * 113* 172*    139 138   K 4.4 4.2 4.0    105 102   CO2 27 29 32   BUN 18 20* 23*   CREA 1.18 1.08 1.28   CA 8.5 8.7 9.6   MG 1.7 1.8  --    PHOS 3.6  --   --    AGAP 6 5 4   BUCR 15 19 18      CBC w/Diff Recent Labs     04/14/21  0254 04/13/21 2010 04/13/21 0315 04/12/21  1255   WBC 15.0*  --  11.6 13.4*   RBC 2.67*  --  2.71* 3.15*   HGB 7.6* 6.3* 7.4* 8.3*   HCT 24.6* 21.9* 24.5* 28.4*     --  392 459*   GRANS 90*  --  69 76*   LYMPH 7* --  20* 14*   EOS 0  --  3 2      Lipid Panel Lab Results   Component Value Date/Time    Cholesterol, total 126 02/06/2021 05:01 AM    HDL Cholesterol 20 (L) 02/06/2021 05:01 AM    LDL, calculated 86 02/06/2021 05:01 AM    VLDL, calculated 20 02/06/2021 05:01 AM    Triglyceride 100 02/06/2021 05:01 AM    CHOL/HDL Ratio 6.3 (H) 02/06/2021 05:01 AM      Thyroid Studies Lab Results   Component Value Date/Time    TSH 4.19 (H) 04/13/2021 03:15 AM          Signed By: Joanna Seay PA-C     April 14, 2021

## 2021-04-14 NOTE — DIABETES MGMT
GLYCEMIC CONTROL PLAN OF CARE    Assessment:  History:  Admitted with diabetic food infections. States he has been dealing with this foot problem for approximately one year. Morning blood glucose:  245 mg/dl  IVF containing dextrose:  None   Steroids:  Received one dose of decadron 4 mg yesterday. Diet/TF:  DIET DIABETIC CONSISTENT CARB Regular    Recommendations:  Recommend increasing Lantus dose to 40 units every bedtime. Continue corrective insulin coverage as ordered  Already receiving very insulin resistant coverage  Will continue inpatient monitoring. Most recent BG values:      Results for Ary Bush (MRN [id number]) as of 4/14/2021 13:53   Ref. Range 4/13/2021 16:18 4/13/2021 19:43 4/13/2021 21:12 4/14/2021 06:02 4/14/2021 11:29   GLUCOSE,FAST - POC Latest Ref Range: 70 - 110 mg/dL 170 (H) 173 (H) 176 (H) 245 (H) 281 (H)     Within target range  mg/dl? Yes. Current A1C:  6.8%  equivalent to an average blood glucose of 148 mg/dl over the past 2-3 months. Adequate glycemic control PTA? Yes. Current hospital diabetes medications:  Lantus 30 units every bedtime  Lispro corrective insulin coverage AC&HS  Previous days insulin requirements:  Lantus 30 units  Lispro 12 units corrective insulin  Home diabetes medication:  Levemir 60 units bid  Humalog 60-65 units with meals  Metformin 1000 mg bid  Education:  _x_ see diabetes education record            ___ diabetes education not indicated at this time.     [facility] RN CDE  Ext [phone]

## 2021-04-14 NOTE — PROGRESS NOTES
INTERIM UPDATE - 2147 EST on 4/13/2021    Nursing Staff reports that Post-Surgical H&H returns with Hgb 6.2. Plan:  Ordered Transfusion of 1 unit of PRBCs with CBC in AM.  Nursing Staff aware.

## 2021-04-14 NOTE — ROUTINE PROCESS
Bedside shift change report given to Daniel Merlos RN (oncoming nurse) by Yas Guidry RN (offgoing nurse). Report included the following information SBAR, Kardex, Procedure Summary, Intake/Output, MAR and Recent Results. Opportunity for questions and clarification was provided.

## 2021-04-14 NOTE — ROUTINE PROCESS
Bedside shift change report given to Jessica Corcoran rn (oncoming nurse) by Inez Vicente (offgoing nurse). Report included the following information SBAR.

## 2021-04-14 NOTE — ANESTHESIA POSTPROCEDURE EVALUATION
Procedure(s):  RIGHT FOOT TRANSMETATARSAL AMPUTATION WITH GASTROCNEMIUS RECESSION. general    Anesthesia Post Evaluation      Multimodal analgesia: multimodal analgesia used between 6 hours prior to anesthesia start to PACU discharge  Patient location during evaluation: PACU  Patient participation: complete - patient participated  Level of consciousness: awake and alert  Pain management: adequate  Airway patency: patent  Anesthetic complications: no  Cardiovascular status: acceptable  Respiratory status: acceptable  Hydration status: acceptable  Post anesthesia nausea and vomiting:  controlled  Final Post Anesthesia Temperature Assessment:  Normothermia (36.0-37.5 degrees C)      INITIAL Post-op Vital signs:   Vitals Value Taken Time   /61 04/13/21 1955   Temp 36.6 °C (97.9 °F) 04/13/21 1935   Pulse 54 04/13/21 2005   Resp 18 04/13/21 2005   SpO2 94 % 04/13/21 2005   Vitals shown include unvalidated device data.

## 2021-04-15 LAB
ANION GAP SERPL CALC-SCNC: 4 MMOL/L (ref 3–18)
BASOPHILS # BLD: 0.1 K/UL (ref 0–0.1)
BASOPHILS NFR BLD: 1 % (ref 0–2)
BUN SERPL-MCNC: 15 MG/DL (ref 7–18)
BUN/CREAT SERPL: 11 (ref 12–20)
CALCIUM SERPL-MCNC: 7.8 MG/DL (ref 8.5–10.1)
CHLORIDE SERPL-SCNC: 112 MMOL/L (ref 100–111)
CO2 SERPL-SCNC: 26 MMOL/L (ref 21–32)
CREAT SERPL-MCNC: 1.35 MG/DL (ref 0.6–1.3)
DATE LAST DOSE: NORMAL
DIFFERENTIAL METHOD BLD: ABNORMAL
EOSINOPHIL # BLD: 0.1 K/UL (ref 0–0.4)
EOSINOPHIL NFR BLD: 1 % (ref 0–5)
ERYTHROCYTE [DISTWIDTH] IN BLOOD BY AUTOMATED COUNT: 15.9 % (ref 11.6–14.5)
GLUCOSE BLD STRIP.AUTO-MCNC: 124 MG/DL (ref 70–110)
GLUCOSE BLD STRIP.AUTO-MCNC: 131 MG/DL (ref 70–110)
GLUCOSE BLD STRIP.AUTO-MCNC: 139 MG/DL (ref 70–110)
GLUCOSE BLD STRIP.AUTO-MCNC: 143 MG/DL (ref 70–110)
GLUCOSE BLD STRIP.AUTO-MCNC: 158 MG/DL (ref 70–110)
GLUCOSE SERPL-MCNC: 130 MG/DL (ref 74–99)
HCT VFR BLD AUTO: 21.8 % (ref 36–48)
HCT VFR BLD AUTO: 29.7 % (ref 36–48)
HGB BLD-MCNC: 6.4 G/DL (ref 13–16)
HGB BLD-MCNC: 8.7 G/DL (ref 13–16)
HISTORY CHECKED?,CKHIST: NORMAL
LYMPHOCYTES # BLD: 2.6 K/UL (ref 0.9–3.6)
LYMPHOCYTES NFR BLD: 20 % (ref 21–52)
MCH RBC QN AUTO: 27.6 PG (ref 24–34)
MCHC RBC AUTO-ENTMCNC: 29.4 G/DL (ref 31–37)
MCV RBC AUTO: 94 FL (ref 74–97)
MONOCYTES # BLD: 1 K/UL (ref 0.05–1.2)
MONOCYTES NFR BLD: 8 % (ref 3–10)
NEUTS SEG # BLD: 9 K/UL (ref 1.8–8)
NEUTS SEG NFR BLD: 70 % (ref 40–73)
PLATELET # BLD AUTO: 315 K/UL (ref 135–420)
PMV BLD AUTO: 10.8 FL (ref 9.2–11.8)
POTASSIUM SERPL-SCNC: 4 MMOL/L (ref 3.5–5.5)
RBC # BLD AUTO: 2.32 M/UL (ref 4.35–5.65)
REPORTED DOSE,DOSE: NORMAL UNITS
REPORTED DOSE/TIME,TMG: 1700
SODIUM SERPL-SCNC: 142 MMOL/L (ref 136–145)
VANCOMYCIN TROUGH SERPL-MCNC: 18.9 UG/ML (ref 10–20)
WBC # BLD AUTO: 12.9 K/UL (ref 4.6–13.2)

## 2021-04-15 PROCEDURE — 65270000029 HC RM PRIVATE

## 2021-04-15 PROCEDURE — 74011250637 HC RX REV CODE- 250/637: Performed by: INTERNAL MEDICINE

## 2021-04-15 PROCEDURE — 74011250636 HC RX REV CODE- 250/636: Performed by: INTERNAL MEDICINE

## 2021-04-15 PROCEDURE — 82962 GLUCOSE BLOOD TEST: CPT

## 2021-04-15 PROCEDURE — 74011636637 HC RX REV CODE- 636/637: Performed by: HOSPITALIST

## 2021-04-15 PROCEDURE — 85018 HEMOGLOBIN: CPT

## 2021-04-15 PROCEDURE — P9016 RBC LEUKOCYTES REDUCED: HCPCS

## 2021-04-15 PROCEDURE — 2709999900 HC NON-CHARGEABLE SUPPLY

## 2021-04-15 PROCEDURE — 74011250637 HC RX REV CODE- 250/637: Performed by: FAMILY MEDICINE

## 2021-04-15 PROCEDURE — 74011250636 HC RX REV CODE- 250/636: Performed by: FAMILY MEDICINE

## 2021-04-15 PROCEDURE — 36430 TRANSFUSION BLD/BLD COMPNT: CPT

## 2021-04-15 PROCEDURE — 74011250636 HC RX REV CODE- 250/636: Performed by: HOSPITALIST

## 2021-04-15 PROCEDURE — 74011000258 HC RX REV CODE- 258: Performed by: PHYSICIAN ASSISTANT

## 2021-04-15 PROCEDURE — 97116 GAIT TRAINING THERAPY: CPT

## 2021-04-15 PROCEDURE — 74011250636 HC RX REV CODE- 250/636: Performed by: PHYSICIAN ASSISTANT

## 2021-04-15 PROCEDURE — 77010033678 HC OXYGEN DAILY

## 2021-04-15 PROCEDURE — 80048 BASIC METABOLIC PNL TOTAL CA: CPT

## 2021-04-15 PROCEDURE — 36415 COLL VENOUS BLD VENIPUNCTURE: CPT

## 2021-04-15 PROCEDURE — 80202 ASSAY OF VANCOMYCIN: CPT

## 2021-04-15 PROCEDURE — 85025 COMPLETE CBC W/AUTO DIFF WBC: CPT

## 2021-04-15 PROCEDURE — 74011250637 HC RX REV CODE- 250/637: Performed by: HOSPITALIST

## 2021-04-15 PROCEDURE — 99232 SBSQ HOSP IP/OBS MODERATE 35: CPT | Performed by: HOSPITALIST

## 2021-04-15 PROCEDURE — 97110 THERAPEUTIC EXERCISES: CPT

## 2021-04-15 RX ORDER — AMOXICILLIN AND CLAVULANATE POTASSIUM 500; 125 MG/1; MG/1
1 TABLET, FILM COATED ORAL EVERY 12 HOURS
Status: DISCONTINUED | OUTPATIENT
Start: 2021-04-15 | End: 2021-04-16 | Stop reason: HOSPADM

## 2021-04-15 RX ORDER — UREA 10 %
2 LOTION (ML) TOPICAL 2 TIMES DAILY
Status: DISCONTINUED | OUTPATIENT
Start: 2021-04-15 | End: 2021-04-16 | Stop reason: HOSPADM

## 2021-04-15 RX ORDER — DOXYCYCLINE 100 MG/1
100 CAPSULE ORAL EVERY 12 HOURS
Status: DISCONTINUED | OUTPATIENT
Start: 2021-04-15 | End: 2021-04-16 | Stop reason: HOSPADM

## 2021-04-15 RX ORDER — SODIUM CHLORIDE 9 MG/ML
250 INJECTION, SOLUTION INTRAVENOUS AS NEEDED
Status: DISCONTINUED | OUTPATIENT
Start: 2021-04-15 | End: 2021-04-16 | Stop reason: HOSPADM

## 2021-04-15 RX ADMIN — ATORVASTATIN CALCIUM 20 MG: 20 TABLET, FILM COATED ORAL at 21:41

## 2021-04-15 RX ADMIN — PANTOPRAZOLE SODIUM 40 MG: 40 TABLET, DELAYED RELEASE ORAL at 06:30

## 2021-04-15 RX ADMIN — MORPHINE SULFATE 2 MG: 2 INJECTION, SOLUTION INTRAMUSCULAR; INTRAVENOUS at 02:00

## 2021-04-15 RX ADMIN — MORPHINE SULFATE 2 MG: 2 INJECTION, SOLUTION INTRAMUSCULAR; INTRAVENOUS at 19:56

## 2021-04-15 RX ADMIN — OXYCODONE HYDROCHLORIDE AND ACETAMINOPHEN 2 TABLET: 5; 325 TABLET ORAL at 21:41

## 2021-04-15 RX ADMIN — Medication 10 ML: at 05:14

## 2021-04-15 RX ADMIN — PANTOPRAZOLE SODIUM 40 MG: 40 TABLET, DELAYED RELEASE ORAL at 17:21

## 2021-04-15 RX ADMIN — PIPERACILLIN SODIUM AND TAZOBACTAM SODIUM 3.38 G: 3; .375 INJECTION, POWDER, LYOPHILIZED, FOR SOLUTION INTRAVENOUS at 08:28

## 2021-04-15 RX ADMIN — VANCOMYCIN HYDROCHLORIDE 1500 MG: 10 INJECTION, POWDER, LYOPHILIZED, FOR SOLUTION INTRAVENOUS at 12:21

## 2021-04-15 RX ADMIN — MORPHINE SULFATE 2 MG: 2 INJECTION, SOLUTION INTRAMUSCULAR; INTRAVENOUS at 15:18

## 2021-04-15 RX ADMIN — ENOXAPARIN SODIUM 40 MG: 40 INJECTION SUBCUTANEOUS at 08:28

## 2021-04-15 RX ADMIN — Medication 10 ML: at 21:44

## 2021-04-15 RX ADMIN — AMOXICILLIN AND CLAVULANATE POTASSIUM 1 TABLET: 500; 125 TABLET, FILM COATED ORAL at 15:14

## 2021-04-15 RX ADMIN — MORPHINE SULFATE 2 MG: 2 INJECTION, SOLUTION INTRAMUSCULAR; INTRAVENOUS at 06:40

## 2021-04-15 RX ADMIN — DOXYCYCLINE HYCLATE 100 MG: 100 CAPSULE ORAL at 15:14

## 2021-04-15 RX ADMIN — PIPERACILLIN SODIUM AND TAZOBACTAM SODIUM 3.38 G: 3; .375 INJECTION, POWDER, LYOPHILIZED, FOR SOLUTION INTRAVENOUS at 01:52

## 2021-04-15 RX ADMIN — INSULIN GLARGINE 30 UNITS: 100 INJECTION, SOLUTION SUBCUTANEOUS at 21:41

## 2021-04-15 RX ADMIN — AMOXICILLIN AND CLAVULANATE POTASSIUM 1 TABLET: 500; 125 TABLET, FILM COATED ORAL at 21:41

## 2021-04-15 RX ADMIN — LACTOBACILLUS TAB 2 TABLET: TAB at 17:21

## 2021-04-15 RX ADMIN — DOXYCYCLINE HYCLATE 100 MG: 100 CAPSULE ORAL at 21:41

## 2021-04-15 RX ADMIN — Medication 10 ML: at 17:26

## 2021-04-15 NOTE — DIABETES MGMT
GLYCEMIC CONTROL PLAN OF CARE    Assessment:  History:  Admitted with diabetic food infection. States he has been dealing with this foot problem for approximately one year. Morning blood glucose:  131 mg/dl  IVF containing dextrose:  None   Steroids:  Received one dose of decadron 4 mg on 4/13/2021  Diet/TF:  DIET DIABETIC CONSISTENT CARB Regular    Recommendations:  Continue basal and corrective insulin coverage as ordered  Already receiving very insulin resistant coverage  Will continue inpatient monitoring. Most recent BG values:      Results for Ana Etienne (MRN [id number]) as of 4/15/2021 11:53   Ref. Range 4/14/2021 06:02 4/14/2021 11:29 4/14/2021 16:47 4/14/2021 21:28 4/15/2021 05:37   GLUCOSE,FAST - POC Latest Ref Range: 70 - 110 mg/dL 245 (H) 281 (H) 145 (H) 241 (H) 131 (H)     Within target range  mg/dl? Yes. Current A1C:  6.8%  equivalent to an average blood glucose of 148 mg/dl over the past 2-3 months. Adequate glycemic control PTA? Yes. Current hospital diabetes medications:  Lantus 30 units every bedtime  Lispro corrective insulin coverage AC&HS  Previous days insulin requirements:  Lantus 30 units  Lispro 21 units corrective insulin  Home diabetes medication:  Levemir 60 units bid  Humalog 60-65 units with meals  Metformin 1000 mg bid  Education:  _x_ see diabetes education record            ___ diabetes education not indicated at this time.     [facility] RN CDE  Ext [phone]

## 2021-04-15 NOTE — PROGRESS NOTES
Problem: Mobility Impaired (Adult and Pediatric)  Goal: *Acute Goals and Plan of Care (Insert Text)  Description: Physical Therapy Goals  Initiated 4/13/2021, reevaluated 4/14/2021, and to be accomplished within 7 day(s)  1. Patient will move from supine to sit and sit to supine  in bed with modified independence. 2.  Patient will transfer from bed to chair and chair to bed with modified independence using the least restrictive device. 3.  Patient will perform sit to stand with modified independence. 4.  Patient will ambulate with modified independence for 50 feet with the least restrictive device. 5.  Patient will ascend/descend 3 stairs with 1 handrail(s) with contact guard assistance. PLOF: pt lives with his sister in a [address] [place] [address] with 3 steps to enter home with B HRA and 3 steps in/out of his room with 1 HRA. He reports being indep with all mobility PTA, without use of an AD but admits he may need to use a SPC. Outcome: Progressing Towards Goal    PHYSICAL THERAPY TREATMENT    Patient: Anay Aburto (89 y.o. male)  Date: 4/15/2021  Diagnosis: Diabetic foot infection ([facility] [E11.628, L08.9] Diabetic foot infection ([facility]  Procedure(s) (LRB):  RIGHT FOOT TRANSMETATARSAL AMPUTATION WITH GASTROCNEMIUS RECESSION (Right) 2 Days Post-Op  Precautions: Fall, NWB(right LE)      ASSESSMENT:  Patient cleared by nursing to participate in PT. He has received two units of blood due to low HGB. Patient received sitting up at EOB and agreeable to PT treatment. Reviewed HEP and pt performs LE there-ex per flow sheet focusing on left LE in order to maintain strength. Patient stands up at [address] [place] [address] with supervision and ambulates 30 ft inside the room with CG/SBA hopping on left LE. Patient does a great job maintaining weight bearing precautions with all mobility. Pt reports he has a 4-6\" concrete slab to enter his home. He practiced hopping in place 5x using RW with good clearance and reports no difficulty.  Patient has good BUE strength and able to suspend his weight for a few seconds. Patient comfortably resting in chair at bedside with call bell in reach and needs addressed. Nurse informed of patients performance and position. Progression toward goals:   [x]      Improving appropriately and progressing toward goals  []      Improving slowly and progressing toward goals  []      Not making progress toward goals and plan of care will be adjusted     PLAN:  Patient continues to benefit from skilled intervention to address the above impairments. Continue treatment per established plan of care. Discharge Recommendations:  Home Health  Further Equipment Recommendations for Discharge:  bedside commode, transfer bench, rolling walker, and wheelchair for community      SUBJECTIVE:   Patient stated Joaquin Torres can do more.     OBJECTIVE DATA SUMMARY:   Critical Behavior:  Neurologic State: Alert  Orientation Level: Oriented X4  Cognition: Appropriate decision making, Appropriate for age attention/concentration, Appropriate safety awareness  Safety/Judgement: Fall prevention  Functional Mobility Training:    Transfers:  Sit to Stand: Supervision  Stand to Sit: Supervision           Balance:  Sitting: Intact  Standing: With support  Standing - Static: Good  Standing - Dynamic : Fair         Ambulation/Gait Training:  Distance (ft): 30 Feet (ft)  Assistive Device: Walker, rolling  Ambulation - Level of Assistance: Contact guard assistance;Stand-by assistance  Gait Abnormalities: Decreased step clearance  Base of Support: Center of gravity altered;Shift to left      Therapeutic Exercises:       EXERCISE   Sets   Reps   Active Active Assist   Passive Self ROM   Comments   Ankle Pumps    [] [] [] []    Quad Sets/Glut Sets    [] [] [] [] Hold for 5 secs   Hamstring Sets   [] [] [] []    Short Arc Quads   [] [] [] []    Heel Slides   [] [] [] []    Straight Leg Raises   [] [] [] []    Hip Add   [] [] [] [] Hold for 5 secs, w/ pillow squeeze   Long Arc Quads 1 15 [x] [] [] [] 3\" hold   Seated Marching 1 10 [x] [] [] []    Standing Marching   [] [] [] []       [] [] [] []        Pain:  Pain level pre-treatment: 0/10  Pain level post-treatment: 0/10   Pain Intervention(s): Medication (see MAR); Rest, Ice, Repositioning   Response to intervention: Nurse notified, See doc flow    Activity Tolerance:   Fair +  Please refer to the flowsheet for vital signs taken during this treatment. After treatment:   [x] Patient left in no apparent distress sitting up in chair  [] Patient left in no apparent distress in bed  [x] Call bell left within reach  [x] Nursing notified  [] Caregiver present  [] Bed alarm activated  [] SCDs applied      COMMUNICATION/EDUCATION:   [x]         Role of Physical Therapy in the acute care setting. [x]         Fall prevention education was provided and the patient/caregiver indicated understanding. [x]         Patient/family have participated as able in working toward goals and plan of care. [x]         Patient/family agree to work toward stated goals and plan of care. []         Patient understands intent and goals of therapy, but is neutral about his/her participation.   []         Patient is unable to participate in stated goals/plan of care: ongoing with therapy staff.  []         Other:        Geno Dover, PT   Time Calculation: 24 mins

## 2021-04-15 NOTE — PROGRESS NOTES
Progress Note    Patient: Erick Nunn MRN: [id number]  SSN: [id number]    YOB: 1960  Age: 61 y.o. Sex: male      Admit Date: 4/12/2021  1 Day Post-Op     Procedure:   Procedure(s):  RIGHT FOOT TRANSMETATARSAL AMPUTATION WITH GASTROCNEMIUS RECESSION    Subjective:     Patient seen resting quiet and comfortably and no apparent distress. Patient complaint of pain to surgical site on right foot. He is requesting pain medication and states that morphine not helping enough to ease pain. Patient had a bowel movement and positive void. Patient had stat H & H ordered after surgery and found to have hemoglobin of 6.2 and was transfused 1 unit of pRBC. Denies N/V/C/F. Status post: osteomyelitis    Objective:     Visit Vitals  BP (!) 103/59   Pulse (!) 59   Temp 97.2 °F (36.2 °C)   Resp 16   Ht 5' 9\" (1.753 m)   Wt 104.3 kg (230 lb)   SpO2 98%   BMI 33.97 kg/m²        Physical Exam:  Right EDILSON: skin flaps appear viable, skin edges loosely approximated, skin sutures intact. Submet 2 ulcer site stable, prominent second metatarsal resected so surgical offloading will allow ulcer to heal. No hematoma noted. No active bleeding noted. Pain noted with palpation of right foot. Gastrocnemius recession site skin edges well approximated with intact skin sutures. Labs/Radiology Review: images and reports reviewed    Assessment:     Hospital Problems  Date Reviewed: 12/24/2019          Codes Class Noted POA    * (Principal) [facility] foot infection [facility] ICD-10-CM: E11.628, L08.9  ICD-9-CM: 250.80, 686.9  4/12/2021 Unknown              Plan/Recommendations/Medical Decision Making:     - Surgical site reinforced with betadine soaked adaptic, dry gauze, kerlex, ace bandage.   - f/u OR micro/path. Abx management per ID recommendation.   - Remain NWB to right foot. - f/u PT/OT recs  - Keep dressings dry, clean, intact. Will need to change it every 2 days. Wound care orders placed.

## 2021-04-15 NOTE — PROGRESS NOTES
Problem: Falls - Risk of  Goal: *Absence of Falls  Description: Document Onalee Gum Fall Risk and appropriate interventions in the flowsheet. Outcome: Progressing Towards Goal  Note: Fall Risk Interventions:  Mobility Interventions: Assess mobility with egress test, Communicate number of staff needed for ambulation/transfer, Patient to call before getting OOB, PT Consult for mobility concerns         Medication Interventions: Assess postural VS orthostatic hypotension, Evaluate medications/consider consulting pharmacy, Patient to call before getting OOB, Teach patient to arise slowly    Elimination Interventions: Call light in reach, Elevated toilet seat, Stay With Me (per policy), Toilet paper/wipes in reach, Toileting schedule/hourly rounds              Problem: Patient Education: Go to Patient Education Activity  Goal: Patient/Family Education  Outcome: Progressing Towards Goal     Problem: Pain  Goal: *Control of Pain  Outcome: Progressing Towards Goal  Goal: *PALLIATIVE CARE:  Alleviation of Pain  Outcome: Progressing Towards Goal     Problem: Patient Education: Go to Patient Education Activity  Goal: Patient/Family Education  Outcome: Progressing Towards Goal     Problem: Diabetes Self-Management  Goal: *Disease process and treatment process  Description: Define diabetes and identify own type of diabetes; list 3 options for treating diabetes. Outcome: Progressing Towards Goal  Goal: *Incorporating nutritional management into lifestyle  Description: Describe effect of type, amount and timing of food on blood glucose; list 3 methods for planning meals. Outcome: Progressing Towards Goal  Goal: *Incorporating physical activity into lifestyle  Description: State effect of exercise on blood glucose levels.   Outcome: Progressing Towards Goal  Goal: *Developing strategies to promote health/change behavior  Description: Define the ABC's of diabetes; identify appropriate screenings, schedule and personal plan for screenings. Outcome: Progressing Towards Goal  Goal: *Using medications safely  Description: State effect of diabetes medications on diabetes; name diabetes medication taking, action and side effects. Outcome: Progressing Towards Goal  Goal: *Monitoring blood glucose, interpreting and using results  Description: Identify recommended blood glucose targets  and personal targets. Outcome: Progressing Towards Goal  Goal: *Prevention, detection, treatment of acute complications  Description: List symptoms of hyper- and hypoglycemia; describe how to treat low blood sugar and actions for lowering  high blood glucose level. Outcome: Progressing Towards Goal  Goal: *Prevention, detection and treatment of chronic complications  Description: Define the natural course of diabetes and describe the relationship of blood glucose levels to long term complications of diabetes. Outcome: Progressing Towards Goal  Goal: *Developing strategies to address psychosocial issues  Description: Describe feelings about living with diabetes; identify support needed and support network  Outcome: Progressing Towards Goal  Goal: *Insulin pump training  Outcome: Progressing Towards Goal  Goal: *Sick day guidelines  Outcome: Progressing Towards Goal  Goal: *Patient Specific Goal (EDIT GOAL, INSERT TEXT)  Outcome: Progressing Towards Goal     Problem: Patient Education: Go to Patient Education Activity  Goal: Patient/Family Education  Outcome: Progressing Towards Goal     Problem: Risk for Spread of Infection  Goal: Prevent transmission of infectious organism to others  Description: Prevent the transmission of infectious organisms to other patients, staff members, and visitors.   Outcome: Progressing Towards Goal     Problem: Patient Education:  Go to Education Activity  Goal: Patient/Family Education  Outcome: Progressing Towards Goal     Problem: Patient Education: Go to Patient Education Activity  Goal: Patient/Family Education  Outcome: Progressing Towards Goal     Problem: Patient Education: Go to Patient Education Activity  Goal: Patient/Family Education  Outcome: Progressing Towards Goal

## 2021-04-15 NOTE — OP NOTES
Operative Report     Patient: Charlotte Ortiz MRN: [id number]  SSN: [id number]    YOB: 1960  Age: 61 y.o. Sex: male       Indications: The patient was admitted to the hospital for right foot second MTPJ ulcer with osteomyelitis as well as for lateral fifth MTPJ ulcer. Patient had right partial first ray amputation before which has healed however he is developing new ulcers with osteomyelitis. Patient was given treatment options including partial second ray amputation versus TMA. Patient will benefit from TMA to aid in ambulation and to prevent recurring hospitalization. Patient opted to proceed with TMA. All risks, benefits, complications of procedure discussed in detail with patient. Possible complication discussed including but not limited to non resolution of symptoms with continued infection, delayed healing, non-healing, requirement of additional surgery, possible loss of limb or death. No guarantee made outcome of procedure. Patient voiced understanding and signed consent. Date of Surgery: 4/13/2021     Preoperative Diagnosis:  Right foot second and possible third MTPJ septic joint with osteomyelitis, Necrotic ulcer of right fifth MTPJ    Postoperative Diagnosis: Same    Surgeon(s) and Role:     * [facility], DP - Primary      Surgical Assistants:  Operating Room Staff    Anesthesia: MAC with local    Procedure:    RIGHT FOOT TRANSMETATARSAL AMPUTATION WITH GASTROCNEMIUS RECESSION    Procedure in Detail:     The patient was brought to the operative room and secured in the supine position on the operating room table. After IV sedation from department of anesthesia, local block consisting of 20 cc of 1:1 mixture of 1% lidocaine plain and 0.5% marcaine plain administered proximal to surgical site. Right leg prepped and draped to level of knee. Attention directed to right posterior medial leg about 15 cm proximal to calcaneal insertion of achilles.  Ankle dorsiflexed and gastroc head palpated. About 3 cm longitudinal incision performed medial to midline on posterior leg well below gastroc head with # 15 blade. Incision deepened to subcutaneous level. Small vessels electrocauterized as needed. Blunt dissection performed with metzenbaum scissor and all important neurovascular structures retracted. Dissection carried through subcutaneous tissue to deep fascia level. Longitudinal incision performed on deep fascia to level of gastrocnemius aponeurosis. Transverse incision performed through gastrocnemius aponeurosis while dorsiflexing ankle and underneath muscle gastrocnemius muscle belly visualized. Improved ankle dorsiflexion noted after release. Surgical site irrigated with NSS and subcutaneous closure performed with 3.0 vicryl. Skin was approximated with 3-0 prolene in horizontal mattress technique.     Attention then directed to right foot. An incision was made around the bases of the toes and continued out laterally on the dorsal surface of the foot just proximal to the web spaces and underneath the plantar aspect of the foot in a similar fashion. Incision incorporated about 3 cm ulcer on lateral side of fifth MTPJ. The incision was continued through the soft tissue down to the shafts of the metatarsal bones. Toes 2-5 were disarticulated at MTPJ's and sent to pathology as gross specimen. Using an oscillating saw, second, third, fourth, and fifth metatarsal shafts were divided approximately 1.5 cm proximal to MTPJ's and sent to pathology. Second metatarsal head sent as gross specimen and clean margin obtained from metatarsal shaft. The amputation was completed to the plantar flap leaving a submet 2 ulcer. Also piece of bone from second metatarsal shaft clean margin sent for micro. Surgical site irrigated with normal saline mixed with  solution. There was noted to be no purulence.  The tissue at the site of transection was healthy with decent amount of bleeding.      Hemostasis was obtained with limited electrocautery. Plantar flap was brought in approximation with the dorsal flap with subcutaneous closure using 3-0 vicryl. Skin was approximated then with 3-0 prolene using horizontal mattress technique. Care was taken not to place the flap under severe tension in order not to jeopardize the vascular supply. Mild gapping present at skin edges after closure. Right foot TMA site and gastrocnemius recession site dressed with betadine soaked adaptic, dry gauze, kerlex under minimal tension.     The patient tolerated the procedure and anesthesia well. The patient was sent to the recovery room in apparent satisfactory condition and with normal vitals.     Estimated Blood Loss:  50 cc    Drains: none           Specimens:   ID Type Source Tests Collected by Time Destination   1 : RIGHT FOOT MEDIAL CUNEIFORM  Preservative Foot, Right  Sallyanne Frees, [facility] 4/13/2021 1857 Pathology   2 : RIGHT FOOT SECOND METATARSAL , CLEAN MARGIN Preservative Foot, Right  Sallyanne Frees, DP 4/13/2021 1858 Pathology   3 : RIGHT FOOT THIRD METATARSAL , CLEAN MARGIN Preservative Foot, Right  Sallyanne Frees, [facility] 4/13/2021 1858 Pathology   4 : RIGHT FOOT FOURTH METATARSAL , CLEAN MARGIN Preservative Foot, Right  Sallyanne Frees, [facility] 4/13/2021 1859 Pathology   5 : RIGHT FOOT FIFTH METATARSAL, CLEAN MARGIN Preservative Foot, Right  Sallyanne Frees, [facility] 4/13/2021 1900 Pathology   6 : RIGHT FOOT TRANSMETATARSAL Preservative Foot, Right  [place]ink A, [facility] 4/13/2021 1919 Pathology   1 : AEROBIC, ANAEROBIC C/S OF RIGHT FOOT SECOND METATARSAL, CLEAN MARGIN Wound  CULTURE, ANAEROBIC, CULTURE, WOUND W GRAM STAIN Sallyanne Frees, [facility] 4/13/2021 1904 Microbiology   2 : RIGHT FOOT MEDIAL CUNEIFORM  AEROBIC, ANAEROBIC C/S  Wound Foot, Right CULTURE, ANAEROBIC, CULTURE, WOUND W GRAM STAIN Sallyanne Frees, [facility] 4/13/2021 1906 Microbiology               Implants:  * No implants in log *           Complications:  None

## 2021-04-15 NOTE — PROGRESS NOTES
conducted an initial consultation and Spiritual Assessment for Leena Forbes, who is a 61 y.o.,male. Patient's Primary Language is: [place].    According to the patient's EMR Confucianist Affiliation is: [facility].     The reason the Patient came to the hospital is:   Patient Active Problem List    Diagnosis Date Noted    Diabetic foot infection ([address] 04/12/2021    Leukocytosis 02/04/2021    Wound infection 02/04/2021    Diabetic ulcer of heel ([address] 02/04/2021    Acute kidney injury ([address] 02/04/2021    Osteomyelitis ([address] 11/22/2020    Fracture, toe 09/24/2020    Altered mental status 09/01/2020    Multifocal pneumonia 09/01/2020    DM (diabetes mellitus) ([address] 12/25/2019    Orthostatic hypotension 12/25/2019    Syncope and collapse 12/24/2019    Vomiting 03/28/2018    Chronic abdominal pain 03/28/2018    Sepsis ([address] 03/21/2018    Nausea and vomiting 09/07/2017    Gastroparesis 09/07/2017    Hypokalemia 09/07/2017    ARF (acute renal failure) ([address] 09/07/2017    Atelectasis 09/07/2017    Abdominal pain 09/07/2017    Acquired hypothyroidism 09/07/2017    S/P lumbar fusion 07/05/2017    Diabetic neuropathy ([address] 07/05/2017    Neuritis 07/05/2017    Spinal stenosis at L4-L5 level 06/19/2017    Coronary artery disease involving native coronary artery of native heart without angina pectoris 05/31/2017    History of coronary artery stent placement 05/31/2017    Synovial cyst 05/26/2017    HNP (herniated nucleus pulposus), lumbar 05/26/2017    Lumbar spinal stenosis 05/26/2017    Spondylolisthesis of lumbar region 05/26/2017    Positive PPD     History of heart attack     Pain in left lower leg 07/24/2016    Primary osteoarthritis of left knee 07/24/2016    Localized adiposity of abdomen 07/24/2016    Diabetes ([address] 08/14/2015    Essential hypertension 08/14/2015    GERD (gastroesophageal reflux disease) 08/14/2015    Depression 08/14/2015        The  provided the following Interventions:   was unable to assess at this time. Plan:  Chaplains will continue to follow and will provide pastoral care on an as needed/requested basis.  recommends bedside caregivers page  on duty if patient shows signs of acute spiritual or emotional distress.     [address] [facility]   [phone]

## 2021-04-15 NOTE — PROGRESS NOTES
Infectious Disease progress Note        Reason: right foot wound infection     Current abx Prior abx   Zosyn, vancomycin since 4/12/21      Lines:       Assessment :    61 y. o. male with h/o type uncontrolled type 2 DM (last hgbA1C 8.9 on 11/23/20) , CAD who presented to [facility] on 2/4/2021 with right foot infection     Hospitalization at [facility] November 2020 for right great toe distal phalanx chronic osteomyelitis, septic arthritis right great toe interphalangeal joint   Wound cultures 9/2020-Enterobacter, MRSA  Status post right great toe amputation on 11/24.       Hospitalization at [facility] 2/2021 for acute on chronic osteomyelitis right first metatarsal osteomyelitis. Status post incision and debridement, partial resection of right first metatarsal on 2/5/2021. Intra-Op cultures: Methicillin susceptible Staphylococcus aureus, Bacteroides. Bone biopsy of clean margins reveal acute inflammation suggestive of acute osteomyelitis. S/p ertapenem, daptomycin till 3/19/2021     Clinical presentation c/w chronic osteomyelitis right second metatarsal head, infected right lateral foot ulcer in a patient with uncontrolled type 2 DM, evolving charcot arthropathy    xr 4/12- New erosion at the second distal metatarsal head and base of the third proximal phalanx      Status post transmetatarsal amputation 4/13/2021. Intra-Op findings discussed with podiatrist.  Grossly clean margins achieved at surgery. Bone biopsy, cuneiform bone sent. Preliminary report-clean margins negative for acute osteomyelitis. Intra-Op cultures no organisms seen. Recommendations:     1.   d/c pip/tazo, vancomycin. Recommend po augmentin and doxycycline till 5/13/21 since high risk of wound non healing and recurrent infection - d/w podiatrist - in agreement with this.    2.  Follow up  podiatry recommendations  3.  Follow-up wound culture right foot ulcer   4. patient was advised regarding importance of good glycemic control, compliance with antibiotics       Above plan was discussed in details with patient, and primary team. Please call me if any further questions or concerns. Will continue to participate in the care of this patient. HPI:    Feels fine. No new complaints. Patient denies any subjective fever or chills throughout this time. Home                                                                                                       Medication List    Details   metFORMIN (GLUCOPHAGE) 1,000 mg tablet Take 1,000 mg by mouth two (2) times daily (with meals). quinapriL (ACCUPRIL) 40 mg tablet Take 40 mg by mouth daily. insulin lispro (HUMALOG) 100 unit/mL injection Check FSBS AC*HS  For sugars between 160 and 200- Give 2 units SQ,  For sugars between 201 and 250, Give 3 units SQ,  For sugars Between 251 and 300, give 5 units SQ,  For Sugars between 301 and 350, give 7 units SQ  For sugars between 351 and 400, give 8 units SQ and contact PCP  Qty: 1 Vial, Refills: 0      insulin detemir U-100 (Levemir U-100 Insulin) 100 unit/mL injection 20 Units by SubCUTAneous route two (2) times a day. Qty: 10 mL, Refills: 0      pantoprazole (PROTONIX) 40 mg tablet Take 1 Tab by mouth Before breakfast and dinner. Qty: 60 Tab, Refills: 0      acetaminophen (TYLENOL) 325 mg tablet Take 2 Tabs by mouth every six (6) hours as needed. Indications: Pain, take it with bentyl; do not exceed 3 g tylenol daily  Qty: 30 Tab, Refills: 0      polyethylene glycol (MIRALAX) 17 gram packet Take 1 Packet by mouth daily. Qty: 30 Packet, Refills: 0             Current Facility-Administered Medications   Medication Dose Route Frequency    0.9% sodium chloride infusion 250 mL  250 mL IntraVENous PRN    vancomycin (VANCOCIN) 1500 mg in  ml infusion  1,500 mg IntraVENous Q18H    Vancomycin TROUGH level to be drawn 4/15 at 10:30 AM-- BEFORE giving the next dose due at 11:00 AM. Thanks!    Other ONCE    oxyCODONE-acetaminophen (PERCOCET) 5-325 mg per tablet 1-2 Tab  1-2 Tab Oral Q4H PRN    atorvastatin (LIPITOR) tablet 20 mg  20 mg Oral QHS    insulin glargine (LANTUS) injection 30 Units  30 Units SubCUTAneous QHS    0.9% sodium chloride infusion 250 mL  250 mL IntraVENous PRN    piperacillin-tazobactam (ZOSYN) 3.375 g in 0.9% sodium chloride (MBP/ADV) 100 mL MBP  3.375 g IntraVENous Q6H    sodium chloride (NS) flush 5-40 mL  5-40 mL IntraVENous Q8H    sodium chloride (NS) flush 5-40 mL  5-40 mL IntraVENous PRN    acetaminophen (TYLENOL) tablet 650 mg  650 mg Oral Q6H PRN    Or    acetaminophen (TYLENOL) suppository 650 mg  650 mg Rectal Q6H PRN    polyethylene glycol (MIRALAX) packet 17 g  17 g Oral DAILY PRN    enoxaparin (LOVENOX) injection 40 mg  40 mg SubCUTAneous DAILY    ondansetron (ZOFRAN) injection 4 mg  4 mg IntraVENous Q6H PRN    insulin lispro (HUMALOG) injection   SubCUTAneous AC&HS    glucose chewable tablet 16 g  4 Tab Oral PRN    glucagon (GLUCAGEN) injection 1 mg  1 mg IntraMUSCular PRN    dextrose (D50W) injection syrg 12.5-25 g  25-50 mL IntraVENous PRN    pantoprazole (PROTONIX) tablet 40 mg  40 mg Oral ACB&D    [Held by provider] lisinopriL (PRINIVIL, ZESTRIL) tablet 20 mg  20 mg Oral DAILY    morphine injection 2 mg  2 mg IntraVENous Q3H PRN     Facility-Administered Medications Ordered in Other Encounters   Medication Dose Route Frequency    sodium chloride (NS) flush 10-40 mL  10-40 mL IntraVENous PRN    heparin (porcine) pf 500 Units  500 Units InterCATHeter PRN       Allergies: Compazine [prochlorperazine]    Temp (24hrs), Av.4 °F (36.3 °C), Min:96.8 °F (36 °C), Max:99.1 °F (37.3 °C)    Visit Vitals  /67 (BP 1 Location: Right arm, BP Patient Position: At rest)   Pulse (!) 52   Temp 97 °F (36.1 °C)   Resp 16   Ht 5' 9\" (1.753 m)   Wt 104.3 kg (230 lb)   SpO2 98%   BMI 33.97 kg/m²       ROS: 12 point ROS obtained in details.  Pertinent positives as mentioned in HPI,   otherwise negative    Physical Exam:       General:          In NAD.  Nontoxic-appearing. HEENT:           Head NCAT. sclerae anicteric, EOMI.  OP clear. Neck:               Supple, trachea midline. Lungs:             Clear, no wheezes.  Effort nonlabored, no accessory muscle use. Chest wall:      No chest wall tenderness.  Chest expansion equal and symmetrical bilaterally. Heart:              RRR.  No JVD. Abdomen:        Soft, NT/ND.  Bowel sounds normoactive. Extremities:     Warm, no edema. Right foot surgical site covered with dressing   skin:                Skin changes and surgical changes right foot as mentioned above  Psych:             Mood normal.  Calm, no agitation. Neurologic:      Awake and alert, moves extremities spontaneously.      Labs: Results:   Chemistry Recent Labs     04/15/21  0335 04/14/21 0254 04/13/21 0315   * 226* 113*    142 139   K 4.0 4.4 4.2   * 109 105   CO2 26 27 29   BUN 15 18 20*   CREA 1.35* 1.18 1.08   CA 7.8* 8.5 8.7   AGAP 4 6 5   BUCR 11* 15 19      CBC w/Diff Recent Labs     04/15/21  0335 04/14/21  0254 04/13/21 2010 04/13/21 0315   WBC 12.9 15.0*  --  11.6   RBC 2.32* 2.67*  --  2.71*   HGB 6.4* 7.6* 6.3* 7.4*   HCT 21.8* 24.6* 21.9* 24.5*    347  --  392   GRANS 70 90*  --  69   LYMPH 20* 7*  --  20*   EOS 1 0  --  3      Microbiology Recent Labs     04/13/21  1906 04/13/21  1904 04/12/21  1300 04/12/21  1255   CULT PENDING PENDING NO GROWTH 3 DAYS NO GROWTH 3 DAYS          RADIOLOGY:    All available imaging studies/reports in [facility] for this admission were reviewed      Disclaimer: Sections of this note are dictated utilizing voice recognition software, which may have resulted in some phonetic based errors in grammar and contents. Even though attempts were made to correct all the mistakes, some may have been missed, and remained in the body of the document. If questions arise, please contact our department.     Dr. Giancarlo Kohler, Infectious Disease Specialist  [phone]  April 15, 2021  3:32 PM

## 2021-04-15 NOTE — PROGRESS NOTES
[facility] Hospitalist Group  Progress Note    Patient: Yaya Ferreira Age: 61 y.o. : 1960 MR#: [id number] SSN: [id number]  Date/Time: 4/15/2021     Subjective: Patient feels fine, denies any chest pain, no shortness of breath. Pain well controlled, no signs of bleeding. Patient denies any melena hematochezia. No dizziness or lightheadedness. Patient still episodes of bradycardia in the telemetry monitoring, heart rate up to 39. Patient remains asymptomatic. Assessment/Plan:   1. Diabetic foot infection with cellulitis and Concern for osteomyelitis status post TMA on 2021 by podiatry  2. Mild hypotension, suspect dehydration, improved now  3. Asymptomatic bradycardia, HR 45-55, improved now  4. Hx diabetic foot infection status post great toe amputation  5. Type II DMHbA1c 6.4 on 2021  6. HTN  7. HLD  8. CAD status post stenting  9. CHRISTINA  10. Obesity  11. Arthritis  12. Depression  13. Hx polysubstance abuse     Plan:  BP stable, off medications  Still have episodes of bradycardia, will monitor. Will reconsult cardiology if persistent bradycardia. Normal free T4 but slightly elevated TSH, subclinical.  Continue antibiotics per ID. Follow-up blood and wound cultures  Continue Lantus, continue SSI with Accu-Cheks  Pain controlTylenol, Percocet and morphine IV as needed with holding parameters. Incentive spirometry  PT, OT  Discussed with the patient and explained about my above plan of care. Offered to talk to family patient states he will update his family. Home once cleared by ID and if heart rates remain stable.     Case discussed with:  [x]Patient  []Family  [x]Nursing  []Case Management  DVT Prophylaxis:  [x]Lovenox  []Hep SQ  []SCDs  []Coumadin   []Eliquis/Xarelto     Objective:   VS:   Visit Vitals  /73 (BP 1 Location: Left upper arm)   Pulse 60   Temp 98 °F (36.7 °C)   Resp 18   Ht 5' 9\" (1.753 m)   Wt 104.3 kg (230 lb)   SpO2 90%   BMI 33.97 kg/m²      Tmax/24hrs: Temp (24hrs), Av.4 °F (36.3 °C), Min:96.8 °F (36 °C), Max:98 °F (36.7 °C)  IOBRIEF    Intake/Output Summary (Last 24 hours) at 4/15/2021 1754  Last data filed at 4/15/2021 1729  Gross per 24 hour   Intake 2630 ml   Output 1390 ml   Net 1240 ml       General:  Alert, cooperative, no acute distress , nontoxic. HEENT: PERRLA, anicteric sclerae. Pulmonary:  CTA Bilaterally. No Wheezing/Rales. Cardiovascular: Regular rate and Rhythm. GI:  Soft, Non distended, Non tender. + Bowel sounds. Extremities: Right foot dressing clean, no signs of bleeding. Psych: Good insight. Not anxious or agitated. Neurologic: Alert and oriented X 3. Moves all ext.   Additional:    Medications:   Current Facility-Administered Medications   Medication Dose Route Frequency    0.9% sodium chloride infusion 250 mL  250 mL IntraVENous PRN    doxycycline (VIBRAMYCIN) capsule 100 mg  100 mg Oral Q12H    amoxicillin-clavulanate (AUGMENTIN) 500-125 mg per tablet 1 Tab  1 Tab Oral Q12H    Lactobacillus Acidoph & Bulgar (FLORANEX) tablet 2 Tab  2 Tab Oral BID    oxyCODONE-acetaminophen (PERCOCET) 5-325 mg per tablet 1-2 Tab  1-2 Tab Oral Q4H PRN    atorvastatin (LIPITOR) tablet 20 mg  20 mg Oral QHS    insulin glargine (LANTUS) injection 30 Units  30 Units SubCUTAneous QHS    0.9% sodium chloride infusion 250 mL  250 mL IntraVENous PRN    sodium chloride (NS) flush 5-40 mL  5-40 mL IntraVENous Q8H    sodium chloride (NS) flush 5-40 mL  5-40 mL IntraVENous PRN    acetaminophen (TYLENOL) tablet 650 mg  650 mg Oral Q6H PRN    Or    acetaminophen (TYLENOL) suppository 650 mg  650 mg Rectal Q6H PRN    polyethylene glycol (MIRALAX) packet 17 g  17 g Oral DAILY PRN    enoxaparin (LOVENOX) injection 40 mg  40 mg SubCUTAneous DAILY    ondansetron (ZOFRAN) injection 4 mg  4 mg IntraVENous Q6H PRN    insulin lispro (HUMALOG) injection   SubCUTAneous AC&HS    glucose chewable tablet 16 g  4 Tab Oral PRN    glucagon (GLUCAGEN) injection 1 mg  1 mg IntraMUSCular PRN    dextrose (D50W) injection syrg 12.5-25 g  25-50 mL IntraVENous PRN    pantoprazole (PROTONIX) tablet 40 mg  40 mg Oral ACB&D    [Held by provider] lisinopriL (PRINIVIL, ZESTRIL) tablet 20 mg  20 mg Oral DAILY    morphine injection 2 mg  2 mg IntraVENous Q3H PRN     Facility-Administered Medications Ordered in Other Encounters   Medication Dose Route Frequency    sodium chloride (NS) flush 10-40 mL  10-40 mL IntraVENous PRN    heparin (porcine) pf 500 Units  500 Units InterCATHeter PRN       Labs:    Recent Results (from the past 24 hour(s))   GLUCOSE, POC    Collection Time: 04/14/21  9:28 PM   Result Value Ref Range    Glucose (POC) 241 (H) 70 - 692 mg/dL   METABOLIC PANEL, BASIC    Collection Time: 04/15/21  3:35 AM   Result Value Ref Range    Sodium 142 136 - 145 mmol/L    Potassium 4.0 3.5 - 5.5 mmol/L    Chloride 112 (H) 100 - 111 mmol/L    CO2 26 21 - 32 mmol/L    Anion gap 4 3.0 - 18 mmol/L    Glucose 130 (H) 74 - 99 mg/dL    BUN 15 7.0 - 18 MG/DL    Creatinine 1.35 (H) 0.6 - 1.3 MG/DL    BUN/Creatinine ratio 11 (L) 12 - 20      GFR est AA >60 >60 ml/min/1.73m2    GFR est non-AA 54 (L) >60 ml/min/1.73m2    Calcium 7.8 (L) 8.5 - 10.1 MG/DL   CBC WITH AUTOMATED DIFF    Collection Time: 04/15/21  3:35 AM   Result Value Ref Range    WBC 12.9 4.6 - 13.2 K/uL    RBC 2.32 (L) 4.35 - 5.65 M/uL    HGB 6.4 (L) 13.0 - 16.0 g/dL    HCT 21.8 (L) 36.0 - 48.0 %    MCV 94.0 74.0 - 97.0 FL    MCH 27.6 24.0 - 34.0 PG    MCHC 29.4 (L) 31.0 - 37.0 g/dL    RDW 15.9 (H) 11.6 - 14.5 %    PLATELET 376 841 - 683 K/uL    MPV 10.8 9.2 - 11.8 FL    NEUTROPHILS 70 40 - 73 %    LYMPHOCYTES 20 (L) 21 - 52 %    MONOCYTES 8 3 - 10 %    EOSINOPHILS 1 0 - 5 %    BASOPHILS 1 0 - 2 %    ABS. NEUTROPHILS 9.0 (H) 1.8 - 8.0 K/UL    ABS. LYMPHOCYTES 2.6 0.9 - 3.6 K/UL    ABS. MONOCYTES 1.0 0.05 - 1.2 K/UL    ABS. EOSINOPHILS 0.1 0.0 - 0.4 K/UL    ABS.  BASOPHILS 0.1 0.0 - 0.1 K/UL DF AUTOMATED     RBC, ALLOCATE    Collection Time: 04/15/21  4:45 AM   Result Value Ref Range    HISTORY CHECKED? Historical check performed    GLUCOSE, POC    Collection Time: 04/15/21  5:37 AM   Result Value Ref Range    Glucose (POC) 131 (H) 70 - 110 mg/dL   VANCOMYCIN, TROUGH    Collection Time: 04/15/21 11:44 AM   Result Value Ref Range    Vancomycin,trough 18.9 10.0 - 20.0 ug/mL    Reported dose date [id number]      Reported dose time: 1700      Reported dose: 1500 MG UNITS   HGB & HCT    Collection Time: 04/15/21 11:44 AM   Result Value Ref Range    HGB 8.7 (L) 13.0 - 16.0 g/dL    HCT 29.7 (L) 36.0 - 48.0 %   GLUCOSE, POC    Collection Time: 04/15/21 12:03 PM   Result Value Ref Range    Glucose (POC) 139 (H) 70 - 110 mg/dL   GLUCOSE, POC    Collection Time: 04/15/21  4:53 PM   Result Value Ref Range    Glucose (POC) 158 (H) 70 - 110 mg/dL   GLUCOSE, POC    Collection Time: 04/15/21  5:24 PM   Result Value Ref Range    Glucose (POC) 143 (H) 70 - 110 mg/dL       Signed By: Andrea Becker MD     April 15, 2021      Disclaimer: Sections of this note are dictated using utilizing voice recognition software. Minor typographical errors may be present. If questions arise, please do not hesitate to contact me or call our department.

## 2021-04-15 NOTE — WOUND CARE
Physical Exam  
Chart reviewed, orders written as per Dr. Collins Paci orders. Topical treatment protocol in place. Care turned over to nursing staff at this time. Sharon Lopes RN, BSN, [facility]

## 2021-04-15 NOTE — ROUTINE PROCESS
Bedside shift change report given to Stefan Bah (oncoming nurse) by Romayne Cam (offgoing nurse). Report included the following information SBAR.

## 2021-04-15 NOTE — PROGRESS NOTES
INTERIM UPDATE - 6942 EST on 4/15/2021    Nursing Staff calls to report that Patient has Hgb 6.4. Plan:  Ordered 1 unit of PRBCs with H&H at 1100 EST. Nurse is aware.

## 2021-04-15 NOTE — ROUTINE PROCESS
Bedside and Verbal shift change report given to MARGOTH Holbrook (oncoming nurse) by Joel Vargas (offgoing nurse). Report included the following information SBAR, Kardex, Procedure Summary, Intake/Output, MAR and Recent Results.

## 2021-04-16 ENCOUNTER — HOME HEALTH ADMISSION (OUTPATIENT)
Dept: HOME HEALTH SERVICES | Facility: HOME HEALTH | Age: 61
End: 2021-04-16
Payer: MEDICAID

## 2021-04-16 VITALS
HEIGHT: 69 IN | BODY MASS INDEX: 34.07 KG/M2 | SYSTOLIC BLOOD PRESSURE: 137 MMHG | TEMPERATURE: 97.8 F | RESPIRATION RATE: 18 BRPM | OXYGEN SATURATION: 98 % | WEIGHT: 230 LBS | HEART RATE: 53 BPM | DIASTOLIC BLOOD PRESSURE: 72 MMHG

## 2021-04-16 LAB
ABO + RH BLD: NORMAL
ANION GAP SERPL CALC-SCNC: 4 MMOL/L (ref 3–18)
BASOPHILS # BLD: 0.1 K/UL (ref 0–0.1)
BASOPHILS NFR BLD: 1 % (ref 0–2)
BLD PROD TYP BPU: NORMAL
BLD PROD TYP BPU: NORMAL
BLOOD GROUP ANTIBODIES SERPL: NORMAL
BPU ID: NORMAL
BPU ID: NORMAL
BUN SERPL-MCNC: 15 MG/DL (ref 7–18)
BUN/CREAT SERPL: 10 (ref 12–20)
CALCIUM SERPL-MCNC: 8.2 MG/DL (ref 8.5–10.1)
CALLED TO:,BCALL1: NORMAL
CALLED TO:,BCALL2: NORMAL
CHLORIDE SERPL-SCNC: 114 MMOL/L (ref 100–111)
CO2 SERPL-SCNC: 27 MMOL/L (ref 21–32)
CREAT SERPL-MCNC: 1.52 MG/DL (ref 0.6–1.3)
CROSSMATCH RESULT,%XM: NORMAL
CROSSMATCH RESULT,%XM: NORMAL
DIFFERENTIAL METHOD BLD: ABNORMAL
EOSINOPHIL # BLD: 0.2 K/UL (ref 0–0.4)
EOSINOPHIL NFR BLD: 2 % (ref 0–5)
ERYTHROCYTE [DISTWIDTH] IN BLOOD BY AUTOMATED COUNT: 15.9 % (ref 11.6–14.5)
GLUCOSE BLD STRIP.AUTO-MCNC: 154 MG/DL (ref 70–110)
GLUCOSE BLD STRIP.AUTO-MCNC: 91 MG/DL (ref 70–110)
GLUCOSE SERPL-MCNC: 81 MG/DL (ref 74–99)
HCT VFR BLD AUTO: 25.8 % (ref 36–48)
HGB BLD-MCNC: 7.8 G/DL (ref 13–16)
LYMPHOCYTES # BLD: 2.4 K/UL (ref 0.9–3.6)
LYMPHOCYTES NFR BLD: 19 % (ref 21–52)
MAGNESIUM SERPL-MCNC: 2 MG/DL (ref 1.6–2.6)
MCH RBC QN AUTO: 28.6 PG (ref 24–34)
MCHC RBC AUTO-ENTMCNC: 30.2 G/DL (ref 31–37)
MCV RBC AUTO: 94.5 FL (ref 74–97)
MONOCYTES # BLD: 1.1 K/UL (ref 0.05–1.2)
MONOCYTES NFR BLD: 8 % (ref 3–10)
NEUTS SEG # BLD: 9.1 K/UL (ref 1.8–8)
NEUTS SEG NFR BLD: 70 % (ref 40–73)
PHOSPHATE SERPL-MCNC: 3.3 MG/DL (ref 2.5–4.9)
PLATELET # BLD AUTO: 344 K/UL (ref 135–420)
PMV BLD AUTO: 11 FL (ref 9.2–11.8)
POTASSIUM SERPL-SCNC: 4.2 MMOL/L (ref 3.5–5.5)
RBC # BLD AUTO: 2.73 M/UL (ref 4.35–5.65)
SODIUM SERPL-SCNC: 145 MMOL/L (ref 136–145)
SPECIMEN EXP DATE BLD: NORMAL
STATUS OF UNIT,%ST: NORMAL
STATUS OF UNIT,%ST: NORMAL
UNIT DIVISION, %UDIV: 0
UNIT DIVISION, %UDIV: 0
WBC # BLD AUTO: 13 K/UL (ref 4.6–13.2)

## 2021-04-16 PROCEDURE — 85025 COMPLETE CBC W/AUTO DIFF WBC: CPT

## 2021-04-16 PROCEDURE — 97530 THERAPEUTIC ACTIVITIES: CPT

## 2021-04-16 PROCEDURE — 74011250637 HC RX REV CODE- 250/637: Performed by: HOSPITALIST

## 2021-04-16 PROCEDURE — 97116 GAIT TRAINING THERAPY: CPT

## 2021-04-16 PROCEDURE — 82962 GLUCOSE BLOOD TEST: CPT

## 2021-04-16 PROCEDURE — 74011250636 HC RX REV CODE- 250/636: Performed by: FAMILY MEDICINE

## 2021-04-16 PROCEDURE — 74011250637 HC RX REV CODE- 250/637: Performed by: INTERNAL MEDICINE

## 2021-04-16 PROCEDURE — 80048 BASIC METABOLIC PNL TOTAL CA: CPT

## 2021-04-16 PROCEDURE — 74011250637 HC RX REV CODE- 250/637: Performed by: FAMILY MEDICINE

## 2021-04-16 PROCEDURE — 83735 ASSAY OF MAGNESIUM: CPT

## 2021-04-16 PROCEDURE — 2709999900 HC NON-CHARGEABLE SUPPLY

## 2021-04-16 PROCEDURE — 36415 COLL VENOUS BLD VENIPUNCTURE: CPT

## 2021-04-16 PROCEDURE — 99232 SBSQ HOSP IP/OBS MODERATE 35: CPT | Performed by: INTERNAL MEDICINE

## 2021-04-16 PROCEDURE — 84100 ASSAY OF PHOSPHORUS: CPT

## 2021-04-16 PROCEDURE — 99239 HOSP IP/OBS DSCHRG MGMT >30: CPT | Performed by: HOSPITALIST

## 2021-04-16 RX ORDER — ATORVASTATIN CALCIUM 20 MG/1
20 TABLET, FILM COATED ORAL
Qty: 30 TAB | Refills: 0 | Status: SHIPPED | OUTPATIENT
Start: 2021-04-16

## 2021-04-16 RX ORDER — OXYCODONE AND ACETAMINOPHEN 7.5; 325 MG/1; MG/1
1-2 TABLET ORAL
Status: DISCONTINUED | OUTPATIENT
Start: 2021-04-16 | End: 2021-04-16 | Stop reason: HOSPADM

## 2021-04-16 RX ORDER — OXYCODONE AND ACETAMINOPHEN 7.5; 325 MG/1; MG/1
1 TABLET ORAL
Qty: 12 TAB | Refills: 0 | Status: SHIPPED | OUTPATIENT
Start: 2021-04-16 | End: 2021-04-19

## 2021-04-16 RX ORDER — AMOXICILLIN AND CLAVULANATE POTASSIUM 500; 125 MG/1; MG/1
1 TABLET, FILM COATED ORAL EVERY 12 HOURS
Qty: 54 TAB | Refills: 0 | Status: SHIPPED | OUTPATIENT
Start: 2021-04-16 | End: 2021-05-13

## 2021-04-16 RX ORDER — DOXYCYCLINE 100 MG/1
100 CAPSULE ORAL EVERY 12 HOURS
Qty: 54 CAP | Refills: 0 | Status: SHIPPED | OUTPATIENT
Start: 2021-04-16 | End: 2021-05-13

## 2021-04-16 RX ADMIN — OXYCODONE HYDROCHLORIDE AND ACETAMINOPHEN 2 TABLET: 5; 325 TABLET ORAL at 01:59

## 2021-04-16 RX ADMIN — DOXYCYCLINE HYCLATE 100 MG: 100 CAPSULE ORAL at 09:59

## 2021-04-16 RX ADMIN — OXYCODONE HYDROCHLORIDE AND ACETAMINOPHEN 2 TABLET: 5; 325 TABLET ORAL at 05:56

## 2021-04-16 RX ADMIN — PANTOPRAZOLE SODIUM 40 MG: 40 TABLET, DELAYED RELEASE ORAL at 05:57

## 2021-04-16 RX ADMIN — AMOXICILLIN AND CLAVULANATE POTASSIUM 1 TABLET: 500; 125 TABLET, FILM COATED ORAL at 09:57

## 2021-04-16 RX ADMIN — OXYCODONE HYDROCHLORIDE AND ACETAMINOPHEN 2 TABLET: 5; 325 TABLET ORAL at 10:02

## 2021-04-16 RX ADMIN — Medication 10 ML: at 05:57

## 2021-04-16 RX ADMIN — ENOXAPARIN SODIUM 40 MG: 40 INJECTION SUBCUTANEOUS at 09:58

## 2021-04-16 RX ADMIN — LACTOBACILLUS TAB 2 TABLET: TAB at 09:57

## 2021-04-16 NOTE — DISCHARGE SUMMARY
Discharge Summary    Patient: Yaya Ferreira MRN: [id number]  CSN: [id number]    YOB: 1960  Age: 61 y.o. Sex: male    DOA: 4/12/2021 LOS:  LOS: 4 days        Disposition: Home with Alecia Thompson    Discharge Date: 4/16/2021    Admission Diagnosis: Diabetic foot infection ([address] [E47.079, L08.9]    Discharge Diagnosis:    1. Diabetic foot infection with cellulitis and osteomyelitis status post TMA on 4/13/2021 by podiatry  2. Mild hypotension, suspect dehydration, improved now  3. Asymptomatic bradycardia  4. Hx diabetic foot infection status post great toe amputation  5. Type II DM  6. HTN  7. HLD  8. CAD status post stenting  9. CHRISTINA  10. Obesity  11. Arthritis  12. Depression  13. Hx polysubstance abuse    Discharge Condition: Stable      PHYSICAL EXAM  Visit Vitals  /72 (BP 1 Location: Right upper arm)   Pulse (!) 53   Temp 97.8 °F (36.6 °C)   Resp 18   Ht 5' 9\" (1.753 m)   Wt 104.3 kg (230 lb)   SpO2 98%   BMI 33.97 kg/m²       General: Alert, cooperative, no acute distress    HEENT: PERRLA, EOMI. Anicteric sclerae. Lungs:  CTA Bilaterally. No Wheezing/Rales. Heart:             Regular rate and Rhythm. Abdomen: Soft, Non distended, Non tender. + Bowel sounds. Extremities: No edema. Psych:   Good insight. Not anxious or agitated. Neurologic:  AA, oriented X 3. Moves all ext                                 Hospital Course:   Yaya Ferreira is a 61 y.o. male with PMHx of previous diabetic foot infections status post amputation of great toe, type II DM, HTN, HLD, CAD status post stenting, CHRISTINA, obesity, arthritis, and depression who presented to the ED with complaints of increasing right foot pain, drainage and wounds. Patient was seen by his podiatrist earlier today and was sent to the ED for further eval.  He reports that the pain and swelling have been increasing for the previous few days and he has had increased drainage.   Patient in the emergency room was noted to have cellulitis, x-ray showed concerning for necrosis and also osteomyelitis. Patient was started on empiric antibiotics and was admitted to hospital.  Podiatry was consulted. Podiatry saw the patient recommended an MRI and also recommended TMA. ID was also consulted. Patient MRI could not be done, but later decided to take patient to the OR and TMA was done. Preoperatively cardiology was consulted due to hypotension and also bradycardia. Hypotension was due to dehydration responded well with IV fluids. His hypertensive medications are being held. Bradycardia initially was thought due to beta-blocker but patient has been off beta-blocker. Patient continued to have bradycardia but asymptomatic. Cardiology cleared the patient for surgery, patient had surgery and no events. Postoperatively patient continued to have bradycardia and first-degree AV block. Patient remained asymptomatic. Cardiology was reconsulted, cardiology saw the patient recommended no further intervention but continue to hold beta-blocker. Cardiology recommended outpatient follow-up and possible event monitoring outpatient. Cardiology cleared the patient for discharge. ID follow-up on the patient, cultures were reviewed. ID recommended patient can be discharged home with p.o. antibiotics till 5/13/2021. I personally discussed with podiatry Dr. Amarilys Barragan, recommended okay for discharge and home health care for wound care. Podiatry already written wound care orders. Discussed with the patient and also with his wife at the bedside about discharge plan, follow-up appointments and also new medications and side effects of the medication. Both of them understood and agreed with my plan. I discussed with them about the outpatient cardiology follow-up.         Procedures:   RIGHT FOOT TRANSMETATARSAL AMPUTATION WITH GASTROCNEMIUS RECESSION by podiatry    Consults:   Dr. Amarilys Barragan, podiatry  Dr. Luann Narvaez, cardiology  Dr. Olya Ramos, infectious disease    Imaging studies:   XR Results (most recent):  Results from Hospital Encounter encounter on 04/12/21   XR CHEST PORT    Narrative ONE VIEW CHEST RADIOGRAPH     INDICATION: pre-op. Diabetic foot infection. Preoperative clearance for general  anesthesia. COMPARISON: Chest radiograph 2/4/2021. TECHNIQUE: AP view of the chest    FINDINGS:     LINES/TUBES: None. MEDIASTINUM: Cardiac silhouette is within normal limits. LUNGS: Lungs are clear. BONES/OTHER: No acute osseous abnormality. Impression No acute cardiopulmonary process. Discharge Medications:     Current Discharge Medication List      START taking these medications    Details   amoxicillin-clavulanate (AUGMENTIN) 500-125 mg per tablet Take 1 Tab by mouth every twelve (12) hours for 27 days. Qty: 54 Tab, Refills: 0      atorvastatin (LIPITOR) 20 mg tablet Take 1 Tab by mouth nightly. Qty: 30 Tab, Refills: 0      doxycycline (VIBRAMYCIN) 100 mg capsule Take 1 Cap by mouth every twelve (12) hours for 27 days. Qty: 54 Cap, Refills: 0      oxyCODONE-acetaminophen (PERCOCET 7.5) 7.5-325 mg per tablet Take 1 Tab by mouth every six (6) hours as needed for Pain for up to 3 days. Max Daily Amount: 4 Tabs. Qty: 12 Tab, Refills: 0    Associated Diagnoses: Diabetic foot infection ([address]      L. acidophilus,casei,rhamnosus (Bio-K plus) 50 billion cell cpDR capsule Take 1 Cap by mouth daily for 30 days.   Qty: 30 Cap, Refills: 0      OTHER BMP in 1 week  Dx: ARF  Fax results to Dr. Juani King  Qty: 1 Each, Refills: 0         CONTINUE these medications which have NOT CHANGED    Details   insulin lispro (HUMALOG) 100 unit/mL injection Check FSBS AC*HS  For sugars between 160 and 200- Give 2 units SQ,  For sugars between 201 and 250, Give 3 units SQ,  For sugars Between 251 and 300, give 5 units SQ,  For Sugars between 301 and 350, give 7 units SQ  For sugars between 351 and 400, give 8 units SQ and contact PCP  Qty: 1 Vial, Refills: 0 insulin detemir U-100 (Levemir U-100 Insulin) 100 unit/mL injection 20 Units by SubCUTAneous route two (2) times a day. Qty: 10 mL, Refills: 0      pantoprazole (PROTONIX) 40 mg tablet Take 1 Tab by mouth Before breakfast and dinner. Qty: 60 Tab, Refills: 0      acetaminophen (TYLENOL) 325 mg tablet Take 2 Tabs by mouth every six (6) hours as needed. Indications: Pain, take it with bentyl; do not exceed 3 g tylenol daily  Qty: 30 Tab, Refills: 0      polyethylene glycol (MIRALAX) 17 gram packet Take 1 Packet by mouth daily. Qty: 30 Packet, Refills: 0         STOP taking these medications       metFORMIN (GLUCOPHAGE) 1,000 mg tablet Comments:   Reason for Stopping:         quinapriL (ACCUPRIL) 40 mg tablet Comments:   Reason for Stopping:             · It is important that you take the medication exactly as they are prescribed. · Keep your medication in the bottles provided by the pharmacist and keep a list of the medication names, dosages, and times to be taken in your wallet. · Do not take other medications without consulting your doctor. DIET:  Cardiac Diet and Diabetic Diet    ACTIVITY: Activity as tolerated  Home health care for Skilled care for DM, Hypertension and medication management    ·    PT/OT consult  ·  Wound care consult    ADDITIONAL INFORMATION: If you experience any of the following symptoms but not limited to Fever, chills, nausea, vomiting, diarrhea, change in mentation, falling, bleeding, shortness of breath, chest pain, please call your primary care physician or return to the emergency room if you cannot get hold of your doctor:     FOLLOW UP CARE:  Dr. Ksenia Robledo MD in 5-7 days. Please call and set up an appointment.   Dr. Tanisha Oliveros, podiatry in 1 week  Dr. Dayana Acosta, cardiology in 2 week    Minutes spent on discharge: 40 minutes spent coordinating this discharge (review instructions/follow-up, prescriptions, preparing report for sign off)    Lois Jordan, MD  4/16/2021 2:31 PM    Disclaimer: Sections of this note are dictated using utilizing voice recognition software. Minor typographical errors may be present. If questions arise, please do not hesitate to contact me or call our department.

## 2021-04-16 NOTE — PROGRESS NOTES
Spoke with patient at bedside notified him of PT eval recommendations which he is agreeable with. [facility] referral sent to [facility]. DME order faxed to [address] [place] [address]. Will continue to follow up.          Luther Ramos RN, BSN   Care Management  [phone]

## 2021-04-16 NOTE — PROGRESS NOTES
Problem: Mobility Impaired (Adult and Pediatric)  Goal: *Acute Goals and Plan of Care (Insert Text)  Description: Physical Therapy Goals  Initiated 4/13/2021, reevaluated 4/14/2021, and to be accomplished within 7 day(s)  1. Patient will move from supine to sit and sit to supine  in bed with modified independence. 2.  Patient will transfer from bed to chair and chair to bed with modified independence using the least restrictive device. 3.  Patient will perform sit to stand with modified independence. 4.  Patient will ambulate with modified independence for 50 feet with the least restrictive device. 5.  Patient will ascend/descend 3 stairs with 1 handrail(s) with contact guard assistance. PLOF: pt lives with his sister in a [facility] [address] with 3 steps to enter home with B HRA and 3 steps in/out of his room with 1 HRA. He reports being indep with all mobility PTA, without use of an AD but admits he may need to use a SPC. Outcome: Progressing Towards Goal    PHYSICAL THERAPY TREATMENT    Patient: Romana Muckle (74 y.o. male)  Date: 4/16/2021  Diagnosis: Diabetic foot infection ([facility] [address] [E11.628, L08.9] Diabetic foot infection ([facility] [address]  Procedure(s) (LRB):  RIGHT FOOT TRANSMETATARSAL AMPUTATION WITH GASTROCNEMIUS RECESSION (Right) 3 Days Post-Op  Precautions: Contact, NWB(RLE )  PLOF: see above     ASSESSMENT:  Pt cleared to participate in PT session, pt received sitting EOB and agreeable to therapy session with encouragement, pt at first reporting he was going home and did not want to participate. Completing with OT to maximize safety and mobility. Pt educated on need for stair training for safe discharge home and pt agreeable. Pt completing sit to stand with SBA to RW. Pt able to adhere to weight bearing restrictions throughout session. Pt ambulating x50 feet with SBA-CGA.  Pt reporting fatigue and rest of way to stairs in wheelchair with dependent assist. Pt then attempting steps with B handrail and modA, pt with decreased UE strength and poor hopping with LLE. Pt will require transport home and for appointments until able to ascend/descend steps if going home. Pt returned to EOB with SBA. Pt positioned for comfort and educated to call for assist before getting up, pt verbalized understanding. Pt left with all needs met and call bell in reach. RN notified of position and participation. CM Notified of need for transport. Progression toward goals:   []      Improving appropriately and progressing toward goals  [x]      Improving slowly and progressing toward goals  []      Not making progress toward goals and plan of care will be adjusted     PLAN:  Patient continues to benefit from skilled intervention to address the above impairments. Continue treatment per established plan of care. Discharge Recommendations:  Home Health with 24/7 support   Further Equipment Recommendations for Discharge:  rolling walker     SUBJECTIVE:   Patient stated Cristian Youssef can get my nephews to help us.     OBJECTIVE DATA SUMMARY:   Critical Behavior:  Neurologic State: Alert  Orientation Level: Oriented X4  Cognition: Appropriate decision making, Appropriate for age attention/concentration, Appropriate safety awareness  Safety/Judgement: Fall prevention  Functional Mobility Training:  Bed Mobility:    Scooting: Modified independent    Transfers:  Sit to Stand: Stand-by assistance  Stand to Sit: Stand-by assistance    Balance:  Sitting: Intact  Standing: Intact; With support  Standing - Static: Good  Standing - Dynamic : Good     Posture:   Posture (WDL): Within defined limits    Ambulation/Gait Training:  Distance (ft): 50 Feet (ft)  Assistive Device: Walker, rolling  Ambulation - Level of Assistance: Contact guard assistance    Gait Abnormalities: Decreased step clearance    Base of Support: Center of gravity altered  Stance: Left increased  Speed/Little: Slow  Step Length: Left shortened    Stairs:  Number of Stairs Trained: 1  Stairs - Level of Assistance: Moderate assistance  Rail Use: Both    Pain:  Pain level pre-treatment: 0/10  Pain level post-treatment: 0/10     Activity Tolerance:   Good activity tolerance     Please refer to the flowsheet for vital signs taken during this treatment. After treatment:   [] Patient left in no apparent distress sitting up in chair  [x] Patient left in no apparent distress in bed  [x] Call bell left within reach  [x] Nursing notified  [] Caregiver present  [] Bed alarm activated  [] SCDs applied      COMMUNICATION/EDUCATION:   [x]         Role of Physical Therapy in the acute care setting. [x]         Fall prevention education was provided and the patient/caregiver indicated understanding. [x]         Patient/family have participated as able in working toward goals and plan of care. [x]         Patient/family agree to work toward stated goals and plan of care. []         Patient understands intent and goals of therapy, but is neutral about his/her participation.   []         Patient is unable to participate in stated goals/plan of care: ongoing with therapy staff.  []         Other:        Raya Irwin, PT   Time Calculation: 23 mins

## 2021-04-16 NOTE — DISCHARGE INSTRUCTIONS
DISCHARGE SUMMARY from Nurse    PATIENT INSTRUCTIONS:    After general anesthesia or intravenous sedation, for 24 hours or while taking prescription Narcotics:  · Limit your activities  · Do not drive and operate hazardous machinery  · Do not make important personal or business decisions  · Do  not drink alcoholic beverages  · If you have not urinated within 8 hours after discharge, please contact your surgeon on call. Report the following to your surgeon:  · Excessive pain, swelling, redness or odor of or around the surgical area  · Temperature over 100.5  · Nausea and vomiting lasting longer than 4 hours or if unable to take medications  · Any signs of decreased circulation or nerve impairment to extremity: change in color, persistent  numbness, tingling, coldness or increase pain  · Any questions    What to do at Home:  Recommended activity: Activity as tolerated, no weight on Rt.foot foot. *  Please give a list of your current medications to your Primary Care Provider. *  Please update this list whenever your medications are discontinued, doses are      changed, or new medications (including over-the-counter products) are added. *  Please carry medication information at all times in case of emergency situations. These are general instructions for a healthy lifestyle:    No smoking/ No tobacco products/ Avoid exposure to second hand smoke  Surgeon General's Warning:  Quitting smoking now greatly reduces serious risk to your health.     Obesity, smoking, and sedentary lifestyle greatly increases your risk for illness    A healthy diet, regular physical exercise & weight monitoring are important for maintaining a healthy lifestyle    You may be retaining fluid if you have a history of heart failure or if you experience any of the following symptoms:  Weight gain of 3 pounds or more overnight or 5 pounds in a week, increased swelling in our hands or feet or shortness of breath while lying flat in bed.  Please call your doctor as soon as you notice any of these symptoms; do not wait until your next office visit. The discharge information has been reviewed with the patient. The patient verbalized understanding. Discharge medications reviewed with the patient and appropriate educational materials and side effects teaching were provided. ___________________________________________________________________________________________________________________________________Discharge Instructions    Patient: Chris Coleman MRN: [id number]  CSN: [id number]    YOB: 1960  Age: 61 y.o. Sex: male    DOA: 4/12/2021 LOS:  LOS: 4 days   Discharge Date:      DIET:  Cardiac Diet and Diabetic Diet    ACTIVITY: Activity as tolerated  Home health care for Skilled care for DM, Hypertension and medication management    ·    PT/OT consult  ·  Wound care consult    ADDITIONAL INFORMATION: If you experience any of the following symptoms but not limited to Fever, chills, nausea, vomiting, diarrhea, change in mentation, falling, bleeding, shortness of breath, chest pain, please call your primary care physician or return to the emergency room if you cannot get hold of your doctor:     FOLLOW UP CARE:  Dr. Stacy Pinedo MD in 5-7 days. Please call and set up an appointment. Dr. Aleena Douglass, podiatry in 1 week  Dr. Malcolm Reese, cardiology in 2 week      Lucía Ferreira MD  4/16/2021 2:29 PM    Patient armband removed and shredded. [employer] Activation    Thank you for requesting access to [employer]. Please follow the instructions below to securely access and download your online medical record. [employer] allows you to send messages to your doctor, view your test results, renew your prescriptions, schedule appointments, and more. How Do I Sign Up? 1. In your internet browser, go to [email or web address]. 2. Click on the First Time User? Click Here link in the Sign In box.  You will see the New Member Sign Up page. 3. Enter your Tushky Access Code exactly as it appears below. You will not need to use this code after youve completed the sign-up process. If you do not sign up before the expiration date, you must request a new code. Tushky Access Code: [email or web address]  Expires: 2021  3:57 PM (This is the date your Tushky access code will )    4. Enter the last four digits of your Social Security Number (xxxx) and Date of Birth (mm/dd/yyyy) as indicated and click Submit. You will be taken to the next sign-up page. 5. Create a Tushky ID. This will be your Tushky login ID and cannot be changed, so think of one that is secure and easy to remember. 6. Create a Tushky password. You can change your password at any time. 7. Enter your Password Reset Question and Answer. This can be used at a later time if you forget your password. 8. Enter your e-mail address. You will receive e-mail notification when new information is available in [address] 9. Click Sign Up. You can now view and download portions of your medical record. 10. Click the Download Summary menu link to download a portable copy of your medical information. Additional Information    If you have questions, please visit the Frequently Asked Questions section of the Tushky website at [email or web address] Remember, [employer] is NOT to be used for urgent needs. For medical emergencies, dial 911.

## 2021-04-16 NOTE — HOME CARE
Discharge noted for today. Received home health referral for [facility] for SN, PT, and OT. Spoke with patient, explained services and answered all questions. Demographics verified and address updated. Per patient, he will be staying with his sister. Referral processed and arranged through central office. Patient has the following DME: has a rollator. Front wheeled walker and bedside commode ordered from [facility] by  [employer].  Susan Martin, [facility] Liaison

## 2021-04-16 NOTE — PROGRESS NOTES
Discharge order noted for today. Pt has been accepted to [facility] agency. Met with patient and she is agreeable to the transition plan today. Transport has been arranged through patient's sister. Patient's discharge summary and home health  orders have been forwarded to [facility] health  agency via cclink. Updated bedside RN,  to the transition plan.   Discharge information has been documented on the AVS.       Mj Martin RN, BSN   Care Management  [phone]

## 2021-04-16 NOTE — ROUTINE PROCESS
End of Shift Note Bedside and verbal shift change report given to Nikolay Bunn RN (On coming nurse) by Myra Lal RN (Off going nurse). Report included the following information:  
   --Procedure Summary 
   --MAR, 
   --Recent Results --Med Rec Status SBAR Recommendations: D/C? dressing Issues for Provider to address D/C? Activity This Shift 
 
 [] Bed Rest Order 
 [] Refused 
 [x] Dangled  
 [] TDWB Ambulating: 
   [] Bathroom [] BSC [] Room/Hallway Up in Chair for meals []Yes [] No  
Voiding       [x] Yes  [] No 
Van          [] Yes  [] No 
Incontinent [] Yes  [] No 
 
DUE TO VOID POUR        [] Yes [] No 
Purewick    [] Yes [] No 
New Onset [] Yes [] No Straight Cath   []Yes  [] No 
Condom Cath  [] Yes [] No 
MD Called      [] Yes  [] No  
Blood Sugars Managed [x]Yes [] No   
Bowels Moved [] Yes [x] No 
 
Incontinent     [] Yes [] No Passed Gas [x]Yes [] No 
 
New Onset  []Yes [] No 
  
 
 MD Called []Yes  [] No 
  
CHG Bath Done Before Surgery After Surgery  
  
[] Yes  [x] No 
[] Yes  [x] No   
  
Drain Removed [] Yes  [] No [x] N/A Dressing Changed [] Yes   [x] No [] N/A Nausea/Vomiting [] Yes   [x] No    
Ice Packs Changed [] Yes   [x] No  [] N/A Incentive Spirometer  [x] Yes  [] No     
SCD Pumps On Ankle Pumping  [] Yes   [x] No  
  
[] Yes   [x] No    
  
Telemetry Monitoring [x] Yes   [] No   Rhythm sinus alejandro

## 2021-04-16 NOTE — ROUTINE PROCESS
Patient discharged home. Discharge instructions given and he verbalized understanding. Dressing changed to Rt.foot incision. No sign of infection noted.

## 2021-04-16 NOTE — PROGRESS NOTES
Chart reviewed. Afebrile. Hemodynamically stable. Labs reviewed. Clinical presentation c/w chronic osteomyelitis right second metatarsal head, infected right lateral foot ulcer in a patient with uncontrolled type 2 DM, evolving charcot arthropathy     Status post transmetatarsal amputation 4/13/2021. Intra-Op findings discussed with podiatrist.  Grossly clean margins achieved at surgery. Bone biopsy, cuneiform bone sent. Preliminary report-clean margins negative for acute osteomyelitis. Intra-Op cultures no organisms seen. Cultures- no growth.        Recommendations:     1.   Recommend po augmentin and doxycycline till 5/13/21 since high risk of wound non healing and recurrent infection - d/w podiatrist - in agreement with this. Probiotics while on abx   2.  Follow up  podiatry recommendations  3.  f/u cardiology recommendations regarding bradycardia. D/w dr. Zay Maldonado.    Time spent > 10 min.

## 2021-04-16 NOTE — PROGRESS NOTES
Discussed that PT is recommending patient have stretcher transportation home due to steps to get inside. Patient adamantly refuses. Patient stated it is only 3 steps and he has 2 cousins at home who will be able to assist him inside. Will continue to follow up.      Cassidy Bautista RN, BSN   Care Management  [phone]

## 2021-04-16 NOTE — PROGRESS NOTES
Problem: Self Care Deficits Care Plan (Adult)  Goal: *Acute Goals and Plan of Care (Insert Text)  Description: Occupational Therapy Goals  Initiated 4/13/2021 within 7 day(s). 1.  Patient will perform LB dressing/bathing with modified independence and AE as necessary to abide by weight-bearing precautions. 2.  Patient will perform toilet transfers with modified independence and AE as needed. Prior Level of Function: Pt was independent with self-care tasks prior to hospital admission. Outcome: Progressing Towards Goal   OCCUPATIONAL THERAPY TREATMENT    Patient: Fazal Ventura (80 y.o. male)  Date: 4/16/2021  Diagnosis: Diabetic foot infection ([facility] [address] [E11.628, L08.9] Diabetic foot infection ([facility] [address]  Procedure(s) (LRB):  RIGHT FOOT TRANSMETATARSAL AMPUTATION WITH GASTROCNEMIUS RECESSION (Right) 3 Days Post-Op  Precautions: Contact, NWB(RLE )  PLOF: Pt was independent with self-care tasks prior to hospital admission. Chart, occupational therapy assessment, plan of care, and goals were reviewed. ASSESSMENT:  Pt presented sitting EOB upon therapist arrival requiring some encouragement for participation. PT co tx to maximize pt safety and participation. Reviewed safety, NWB precaution on R LE, and d/c disposition with pt. Pt performed STS transfer SBA with RW and performed LB clothing mgmt task with CGA requiring 1 UE support while adhering to NWB on R LE. Functional mobility in room and hallway SBA/CGA with RW ~ 50 ft with G compliance of NWB on R LE to increase overall functional activity tolerance needed for self cares. Pt reports fatigue requiring seated rest break in w/c. Pt educated on mult energy conservation techniques to utilize in home environment, including pacing and deep breathing to prevent SOB and fatigue, for increased activity tolerance and safety w/ADLs and functional mobility, pt verbalized understanding.  Assisted PT and pt with stair training for increased safety requiring MOD A with ARLETTE hand rail. Pt returned to EOB with SBA and was positioned for comfort with all needs left within reach. RN and CM made aware of pt's participation. Progression toward goals:  []          Improving appropriately and progressing toward goals  [x]          Improving slowly and progressing toward goals  []          Not making progress toward goals and plan of care will be adjusted     PLAN:  Patient continues to benefit from skilled intervention to address the above impairments. Continue treatment per established plan of care. Discharge Recommendations:  [facility] with 24/7 support  Further Equipment Recommendations for Discharge: RW     SUBJECTIVE:   Patient stated  I have help at home. \"     OBJECTIVE DATA SUMMARY:   Cognitive/Behavioral Status:  Neurologic State: Alert  Orientation Level: Oriented X4  Cognition: Appropriate decision making, Appropriate for age attention/concentration, Appropriate safety awareness  Safety/Judgement: Fall prevention    Functional Mobility and Transfers for ADLs:   Bed Mobility:     Scooting: Modified independent   Transfers:  Sit to Stand: Stand-by assistance  Stand to Sit: Stand-by assistance       Balance:  Sitting: Intact  Standing: Intact; With support  Standing - Static: Good  Standing - Dynamic : Good    ADL Intervention:   LB dressing: CGA for clothing mgmt utilizing RW while adhering to NWB on R LE    Pain:  Pain level pre-treatment: 0/10   Pain level post-treatment: 0/10    Activity Tolerance:    Fair   Please refer to the flowsheet for vital signs taken during this treatment.   After treatment:   [x]  Patient left in no apparent distress sitting up EOB  []  Patient left in no apparent distress in bed  [x]  Call bell left within reach  [x]  Nursing notified  []  Caregiver present  []  Bed alarm activated    COMMUNICATION/EDUCATION:   [x] Role of Occupational Therapy in the acute care setting  [x] Home safety education was provided and the patient/caregiver indicated understanding. [x] Patient/family have participated as able in working towards goals and plan of care. [x] Patient/family agree to work toward stated goals and plan of care. [] Patient understands intent and goals of therapy, but is neutral about his/her participation. [] Patient is unable to participate in goal setting and plan of care.       Thank you for this referral.  HUAN Mckeon  Time Calculation: 23 mins

## 2021-04-16 NOTE — PROGRESS NOTES
[facility] - Progress Note  Admit Date: 4/12/2021    Assessment:     -Asked to re consult for bradycardia. Patient with sinus bradycardia this admission mostly in 50s-60s with PACs which is asymptomatic. Noted HR intermittently down to 40s when sleeping. No high grade AVB. No significant pauses. No indication for PPM at this time. Would continue to avoid AV yudelka agents. Patient advised to use his CPAP at home.  -Asked to see for pre-operative evaluation for TMA due to R foot submet 2 ulcer with osteomyelitis of 2nd MTP. Now s/p R foot transmetatarsal amputation with gastrocnemius recession by Dr. Jane Nurse 4/13/2021.  -Echo 4/13/2021: EF 55-60%, no significant valvular disease  -Hx CAD, s/p PCI with GE to OM1 in 2009  -TSH 6.75 in 09/2020  -Anemia, Hgb down to 6.3 on 4/13/2021, s/p 1 unit PRBC's  -Kidney dysfunction.   -Hx HTN, on Lopressor as outpatient  -DM, A1c 6.8 with  on 4/13/2021.  -Dyslipidemia, lipid panel 02/2021 with , Chol 126, HDL 20, LDL 86  -Obesity, BMI 33.97 kg/m2  -CHRISTINA, has CPAP at home.     Primary cardiologist Dr. Betzy Lares:     Patient with sinus bradycardia this admission mostly in 50s-60s with PACs which is asymptomatic. HR up to 80s with activity during exam. Noted HR intermittently down to 40s when sleeping. No high grade AVB. No significant pauses. No indication for PPM at this time. Would continue to avoid AV yudelka agents. Patient advised to use his CPAP at home. Will plan outpatient follow up with Dr. Perlita Arriola. Can consider outpatient event monitor on follow up. No further cardiac work up or intervention at this time. Would continue to follow renal function, may need to hold lisinopril if renal function continues to worsen. Plan for discharge today. Staff addendum:  Heart rate 50's this afternoon. Heart rate down to 40's when asleep, off BB. Ok to discharge and followup Dr. Perlita Arriola. Do not restart beta-blocker. Consider outpatient event monitor.     I saw, examined, and evaluated the patient. I personally reviewed the patient's labs, tests, vitals, orders, medications, updated history, and other providers assessments. I personally agree with the findings as stated and the plan as documented. Maria De Jesus Bar MD      Subjective:     Denies CP, palp, sob. Tele HR overall 50s to 60s at rest. 40s overnight when sleeping. HR up to 80s with activity.      Objective:      Patient Vitals for the past 8 hrs:   Temp Pulse Resp BP SpO2   04/16/21 1215 97.8 °F (36.6 °C) (!) 53 18 137/72 98 %   04/16/21 0823 97.9 °F (36.6 °C) 64 18 (!) 148/84 97 %         Patient Vitals for the past 96 hrs:   Weight   04/13/21 1458 230 lb (104.3 kg)                    Intake/Output Summary (Last 24 hours) at 4/16/2021 1302  Last data filed at 4/16/2021 1251  Gross per 24 hour   Intake 1320 ml   Output 1500 ml   Net -180 ml       Physical Exam:  General:  alert, cooperative, no distress, appears stated age  Neck:  no JVD  Lungs:  clear to auscultation bilaterally  Heart:  mostly regular rate and rhythm  Abdomen:  abdomen is soft without significant tenderness, masses, organomegaly or guarding  Extremities:  extremities normal, atraumatic, no cyanosis or edema    Data Review:     Labs: Results:       Chemistry Recent Labs     04/16/21  0215 04/15/21  0335 04/14/21  0254   GLU 81 130* 226*    142 142   K 4.2 4.0 4.4   * 112* 109   CO2 27 26 27   BUN 15 15 18   CREA 1.52* 1.35* 1.18   CA 8.2* 7.8* 8.5   MG 2.0  --  1.7   PHOS 3.3  --  3.6   AGAP 4 4 6   BUCR 10* 11* 15      CBC w/Diff Recent Labs     04/16/21  0215 04/15/21  1144 04/15/21  0335 04/14/21  0254   WBC 13.0  --  12.9 15.0*   RBC 2.73*  --  2.32* 2.67*   HGB 7.8* 8.7* 6.4* 7.6*   HCT 25.8* 29.7* 21.8* 24.6*     --  315 347   GRANS 70  --  70 90*   LYMPH 19*  --  20* 7*   EOS 2  --  1 0      Cardiac Enzymes No results found for: CPK, CK, CKMMB, CKMB, RCK3, CKMBT, CKNDX, CKND1, TAMERA, TROPT, TROIQ, PATTI, TROPT, TNIPOC, BNP, BNPP   Coagulation No results for input(s): PTP, INR, APTT, INREXT in the last 72 hours. Lipid Panel Lab Results   Component Value Date/Time    Cholesterol, total 126 02/06/2021 05:01 AM    HDL Cholesterol 20 (L) 02/06/2021 05:01 AM    LDL, calculated 86 02/06/2021 05:01 AM    VLDL, calculated 20 02/06/2021 05:01 AM    Triglyceride 100 02/06/2021 05:01 AM    CHOL/HDL Ratio 6.3 (H) 02/06/2021 05:01 AM      BNP No results found for: BNP, BNPP, XBNPT   Liver Enzymes No results for input(s): TP, ALB, TBIL, AP in the last 72 hours.     No lab exists for component: SGOT, GPT, DBIL   Digoxin    Thyroid Studies Lab Results   Component Value Date/Time    TSH 4.19 (H) 04/13/2021 03:15 AM          Signed By: Henderson Opitz, PA     April 16, 2021

## 2021-04-17 LAB
BACTERIA SPEC CULT: ABNORMAL
GRAM STN SPEC: ABNORMAL
SERVICE CMNT-IMP: ABNORMAL

## 2021-04-18 ENCOUNTER — HOME CARE VISIT (OUTPATIENT)
Dept: SCHEDULING | Facility: HOME HEALTH | Age: 61
End: 2021-04-18
Payer: MEDICAID

## 2021-04-18 LAB
BACTERIA SPEC CULT: NORMAL
GRAM STN SPEC: NORMAL
SERVICE CMNT-IMP: NORMAL

## 2021-04-18 PROCEDURE — G0299 HHS/HOSPICE OF RN EA 15 MIN: HCPCS

## 2021-04-18 PROCEDURE — 400013 HH SOC

## 2021-04-18 NOTE — PROGRESS NOTES
SOC completed on [place]. Right foot dressing done as prescribed. Will teach caregiver. Change dressing every 2 days with betadine soaked adaptic, abd, 4x4, kerlex, ace bandage, skilled care for wound care, HTN and med management, PT/OT     PT/OT also ordered.   Than You Sole Hyde RN

## 2021-04-19 ENCOUNTER — HOME CARE VISIT (OUTPATIENT)
Dept: HOME HEALTH SERVICES | Facility: HOME HEALTH | Age: 61
End: 2021-04-19
Payer: MEDICAID

## 2021-04-19 ENCOUNTER — HOME CARE VISIT (OUTPATIENT)
Dept: SCHEDULING | Facility: HOME HEALTH | Age: 61
End: 2021-04-19
Payer: MEDICAID

## 2021-04-19 VITALS
HEART RATE: 65 BPM | DIASTOLIC BLOOD PRESSURE: 80 MMHG | TEMPERATURE: 97.8 F | RESPIRATION RATE: 16 BRPM | OXYGEN SATURATION: 95 % | SYSTOLIC BLOOD PRESSURE: 142 MMHG

## 2021-04-19 VITALS
TEMPERATURE: 97.6 F | DIASTOLIC BLOOD PRESSURE: 80 MMHG | HEART RATE: 42 BPM | SYSTOLIC BLOOD PRESSURE: 126 MMHG | OXYGEN SATURATION: 97 %

## 2021-04-19 PROCEDURE — G0151 HHCP-SERV OF PT,EA 15 MIN: HCPCS

## 2021-04-20 PROCEDURE — A6443 CONFORM BAND N/S W>=3"<5"/YD: HCPCS

## 2021-04-20 PROCEDURE — A9270 NON-COVERED ITEM OR SERVICE: HCPCS

## 2021-04-20 PROCEDURE — MED11138 SOLUTION,PREP,POVIDONE IODINE,8 OZ BTL

## 2021-04-20 PROCEDURE — A6223 GAUZE >16<=48 NO W/SAL W/O B: HCPCS

## 2021-04-20 PROCEDURE — A6449 LT COMPRES BAND >=3" <5"/YD: HCPCS

## 2021-04-20 NOTE — PROGRESS NOTES
Patient was evaluated by HHPT completed. Frequency:  2w1, 1w1 for gait training with cane. Thank you.

## 2021-04-20 NOTE — PROGRESS NOTES
Therapy Functional Score Assessment  Question   Score   Grooming  1       Upper Dressing 1      Lower Dressing 1      Bathing  5      Toilet Transfer  1    Transfer  2            Ambulation  3   Dyspnea                     1       Pain Interfering with activity 3  Est number therapy visits      3

## 2021-04-21 ENCOUNTER — HOME CARE VISIT (OUTPATIENT)
Dept: SCHEDULING | Facility: HOME HEALTH | Age: 61
End: 2021-04-21
Payer: MEDICAID

## 2021-04-21 PROCEDURE — G0299 HHS/HOSPICE OF RN EA 15 MIN: HCPCS

## 2021-04-21 PROCEDURE — G0152 HHCP-SERV OF OT,EA 15 MIN: HCPCS

## 2021-04-22 ENCOUNTER — HOME CARE VISIT (OUTPATIENT)
Dept: SCHEDULING | Facility: HOME HEALTH | Age: 61
End: 2021-04-22
Payer: MEDICAID

## 2021-04-22 VITALS
DIASTOLIC BLOOD PRESSURE: 78 MMHG | SYSTOLIC BLOOD PRESSURE: 128 MMHG | RESPIRATION RATE: 18 BRPM | OXYGEN SATURATION: 96 % | HEART RATE: 68 BPM | TEMPERATURE: 97 F

## 2021-04-22 VITALS
SYSTOLIC BLOOD PRESSURE: 122 MMHG | HEART RATE: 66 BPM | OXYGEN SATURATION: 98 % | TEMPERATURE: 97.6 F | DIASTOLIC BLOOD PRESSURE: 72 MMHG

## 2021-04-22 PROCEDURE — G0151 HHCP-SERV OF PT,EA 15 MIN: HCPCS

## 2021-04-23 ENCOUNTER — HOSPITAL ENCOUNTER (OUTPATIENT)
Dept: LAB | Age: 61
Discharge: HOME | End: 2021-04-23

## 2021-04-23 ENCOUNTER — HOME CARE VISIT (OUTPATIENT)
Dept: SCHEDULING | Facility: HOME HEALTH | Age: 61
End: 2021-04-23
Payer: MEDICAID

## 2021-04-23 VITALS
DIASTOLIC BLOOD PRESSURE: 70 MMHG | HEART RATE: 47 BPM | RESPIRATION RATE: 18 BRPM | SYSTOLIC BLOOD PRESSURE: 130 MMHG | OXYGEN SATURATION: 100 % | TEMPERATURE: 97.6 F

## 2021-04-23 LAB — XX-LABCORP SPECIMEN COL,LCBCF: NORMAL

## 2021-04-23 PROCEDURE — 99001 SPECIMEN HANDLING PT-LAB: CPT

## 2021-04-23 PROCEDURE — G0300 HHS/HOSPICE OF LPN EA 15 MIN: HCPCS

## 2021-04-26 ENCOUNTER — HOME CARE VISIT (OUTPATIENT)
Dept: SCHEDULING | Facility: HOME HEALTH | Age: 61
End: 2021-04-26
Payer: MEDICAID

## 2021-04-26 VITALS
OXYGEN SATURATION: 96 % | TEMPERATURE: 97.9 F | SYSTOLIC BLOOD PRESSURE: 125 MMHG | DIASTOLIC BLOOD PRESSURE: 60 MMHG | HEART RATE: 45 BPM

## 2021-04-28 ENCOUNTER — HOME CARE VISIT (OUTPATIENT)
Dept: SCHEDULING | Facility: HOME HEALTH | Age: 61
End: 2021-04-28
Payer: MEDICAID

## 2021-04-28 ENCOUNTER — HOME CARE VISIT (OUTPATIENT)
Dept: HOME HEALTH SERVICES | Facility: HOME HEALTH | Age: 61
End: 2021-04-28
Payer: MEDICAID

## 2021-04-28 VITALS
HEART RATE: 58 BPM | DIASTOLIC BLOOD PRESSURE: 64 MMHG | OXYGEN SATURATION: 97 % | SYSTOLIC BLOOD PRESSURE: 111 MMHG | TEMPERATURE: 98.5 F

## 2021-04-28 PROCEDURE — G0151 HHCP-SERV OF PT,EA 15 MIN: HCPCS

## 2021-04-28 PROCEDURE — G0162 HHC RN E&M PLAN SVS, 15 MIN: HCPCS

## 2021-04-29 VITALS
DIASTOLIC BLOOD PRESSURE: 74 MMHG | RESPIRATION RATE: 16 BRPM | TEMPERATURE: 97.6 F | HEART RATE: 60 BPM | OXYGEN SATURATION: 98 % | SYSTOLIC BLOOD PRESSURE: 124 MMHG

## 2021-04-30 ENCOUNTER — HOME CARE VISIT (OUTPATIENT)
Dept: HOME HEALTH SERVICES | Facility: HOME HEALTH | Age: 61
End: 2021-04-30
Payer: MEDICAID

## 2021-05-04 ENCOUNTER — HOME CARE VISIT (OUTPATIENT)
Dept: HOME HEALTH SERVICES | Facility: HOME HEALTH | Age: 61
End: 2021-05-04
Payer: MEDICAID

## 2021-05-04 PROCEDURE — G0299 HHS/HOSPICE OF RN EA 15 MIN: HCPCS

## 2021-05-07 ENCOUNTER — HOME CARE VISIT (OUTPATIENT)
Dept: SCHEDULING | Facility: HOME HEALTH | Age: 61
End: 2021-05-07
Payer: MEDICAID

## 2021-05-07 VITALS
OXYGEN SATURATION: 96 % | HEART RATE: 86 BPM | RESPIRATION RATE: 16 BRPM | SYSTOLIC BLOOD PRESSURE: 140 MMHG | TEMPERATURE: 97.3 F | DIASTOLIC BLOOD PRESSURE: 80 MMHG

## 2021-05-07 PROCEDURE — G0299 HHS/HOSPICE OF RN EA 15 MIN: HCPCS

## 2021-05-08 NOTE — CASE COMMUNICATION
PT D/C: 
Great progress w/ all mobility activities enabling pt to progress in transfers from SBA to (I); balance progressed per Tinetti from 15/28 to 27/28; stairs progressed to Sup for v/cs for gait sequencing as pt conts to forget sequencing; however, pt's strength in (B) hips/knees were 4+ - 5/5 grossly; pt unable to fully WB on R foot due to s/p metatarsal amputation; v/cs given for heel strike w/ amb and to use SPC for support as pt has missing R great toe to assist w/ balance control; pt also trained w/ amb using a SPC w/ physical demonstration and v/cs for appropriate sequencing; pt w/ some mix up at times but is able to correct w/ cueing; pt able to function in home w/o (A) safely other than runner rugs causing a fall risk; has met 90% of goals and will therefore d/c home PT services.  
Thanks for your referral. 
Elvia Pate, MPT

## 2021-05-15 NOTE — PROGRESS NOTES
Problem: Self Care Deficits Care Plan (Adult)  Goal: *Acute Goals and Plan of Care (Insert Text)  Description: Occupational Therapy Goals  Initiated 4/13/2021 within 7 day(s). 1.  Patient will perform LB dressing/bathing with modified independence and AE as necessary to abide by weight-bearing precautions. 2.  Patient will perform toilet transfers with modified independence and AE as needed. Prior Level of Function: Pt was independent with self-care tasks prior to hospital admission. Outcome: Progressing Towards Goal    OCCUPATIONAL THERAPY EVALUATION    Patient: Lolly Palomo (35 y.o. male)  Date: 4/13/2021  Primary Diagnosis: Diabetic foot infection ([facility] [address] [E11.628, L08.9]  Procedure(s) (LRB):  RIGHT FOOT TRANSMETATARSAL AMPUTATION WITH GASTROCNEMIUS RECESSION (Right)     Precautions:  Fall, NWB(R forefoot), droplet precautions - MRSA    ASSESSMENT :  Pt cleared by nursing for OT eval. Pt on droplet precautions for MRSA. Pt found seated at EOB and with case management upon arrival and agreeable to OT eval at this time, despite being in pain. Based on the objective data described below, the patient presents with overall good functional UE strength/ROM in order to participate in self-care tasks. Pt did require reminders/education on NWB status through forefoot R foot when in standing and ambulating to the bathroom. D/t pt receiving s/x in pm on 4/13/21, this OT suggests 1-2 OT visits s/p in order to address any needs after surgery, especially with LB dressing/bathing and toilet transfers. Patient will benefit from skilled intervention to address the above impairments.   Patient's rehabilitation potential is considered to be Good  Factors which may influence rehabilitation potential include:   []             None noted  []             Mental ability/status  [x]             Medical condition  []             Home/family situation and support systems  []             Safety awareness  [] Pain tolerance/management  []             Other:      PLAN :  Recommendations and Planned Interventions:   [x]               Self Care Training                  [x]      Therapeutic Activities  [x]               Functional Mobility Training   []      Cognitive Retraining  [x]               Therapeutic Exercises           [x]      Endurance Activities  [x]               Balance Training                    [x]      Neuromuscular Re-Education  []               Visual/Perceptual Training     [x]      Home Safety Training  [x]               Patient Education                   [x]      Family Training/Education  []               Other (comment):    Frequency/Duration: Patient will be followed by occupational therapy 1-2 sessions to address goals s/p R ft s/x. Discharge Recommendations: To Be Determined  Further Equipment Recommendations for Discharge: To Be Determined     SUBJECTIVE:   Patient stated  Well, I guess so. I've got to pee anyway.     OBJECTIVE DATA SUMMARY:     Past Medical History:   Diagnosis Date    Arthritis     Coronary atherosclerosis of native coronary artery     S/P PCI with GE in 12/09    Diabetes ([facility] [address]     IDDM    Electrolyte and fluid disorders not elsewhere classified     Essential hypertension, benign     GERD (gastroesophageal reflux disease)     Heroin abuse ([facility] [address] 05/26/16    Psychiatrist Dr. Ana Rosa Ramos History of heart attack     Hyperlipidemia     Intermediate coronary syndrome [facility])     MDD (major depressive disorder) 5/26/16    Psychiatrist Dr. Pope Running infarction [facility])     Obesity, unspecified     Old myocardial infarction     Other and unspecified hyperlipidemia     Pancreatitis     Positive PPD     Sleep apnea     uses Cpap machine    Transfusion history     Before 1992     Past Surgical History:   Procedure Laterality Date    HX APPENDECTOMY      HX CORONARY STENT PLACEMENT  2010    2 stents     Barriers to Learning/Limitations: None  Compensate with: visual, verbal, tactile, kinesthetic cues/model    Home Situation:   Home Situation  Home Environment: Private residence  # Steps to Enter: 3  Rails to Enter: Yes  Hand Rails : Bilateral  One/Two Story Residence: One story  Living Alone: No  Support Systems: Family member(s), Spouse/Significant Other/Partner  Patient Expects to be Discharged to[de-identified] Private residence  Current DME Used/Available at Home: [employer], straight  [x]  Right hand dominant   []  Left hand dominant    Cognitive/Behavioral Status:  Neurologic State: Alert  Orientation Level: Oriented X4     Skin: In tact  Edema: None to note in UE    Vision/Perceptual:          Acuity: Within Defined Limits       Coordination: BUE  Coordination: Within functional limits  Fine Motor Skills-Upper: Left Intact; Right Intact    Gross Motor Skills-Upper: Left Intact; Right Intact    Balance:  Sitting: Intact  Standing: Impaired  Standing - Static: Good  Standing - Dynamic : Fair    Strength: BUE  Strength:  Within functional limits     Tone & Sensation: BUE  Tone: Normal  Sensation: Intact     Range of Motion: BUE  AROM: Within functional limits     Functional Mobility and Transfers for ADLs:  Bed Mobility:  Rolling: Modified independent  Supine to Sit: Modified independent  Sit to Supine: Modified independent  Scooting: Modified independent  Transfers:  Sit to Stand: Modified independent  Stand to Sit: Modified independent     Bed to Chair: Modified independent          Toilet Transfer : Modified independent   Tub Transfer: Modified independent       Bathroom Mobility: Modified independent    ADL Assessment:   Feeding: Modified independent    Oral Facial Hygiene/Grooming: Modified Independent    Bathing: Modified independent    Upper Body Dressing: Modified independent    Lower Body Dressing: Modified independent    Toileting: Modified independent    ADL Intervention:    Upper Body Dressing Assistance  Dressing Assistance: Modified independent  Shirt simulation with hospital gown: Modified independent    Lower Body Dressing Assistance  Dressing Assistance: Modified independent  Socks: Modified independent  Leg Crossed Method Used: Yes  Position Performed: Seated edge of bed    Toileting  Toileting Assistance: Modified independent  Bladder Hygiene: Modified independent  Clothing Management: Modified independent    Pain:  Pain level pre-treatment: 10/10   Pain level post-treatment: 10/10   Pain Intervention(s): Medication (see MAR)   Response to intervention: Nurse notified, See doc flow    Activity Tolerance:   Fair - Good  Please refer to the flowsheet for vital signs taken during this treatment. After treatment:   [] Patient left in no apparent distress sitting up in chair  [x] Patient left in no apparent distress in bed  [x] Call bell left within reach  [x] Nursing notified (present upon leaving pt)  [] Caregiver present  [] Bed alarm activated    COMMUNICATION/EDUCATION:   [x] Role of Occupational Therapy in the acute care setting  [x] Home safety education was provided and the patient/caregiver indicated understanding. [x] Patient/family have participated as able in goal setting and plan of care. [] Patient/family agree to work toward stated goals and plan of care. [] Patient understands intent and goals of therapy, but is neutral about his/her participation. [] Patient is unable to participate in goal setting and plan of care. Thank you for this referral.  NICKO Leigh Asp, OTR/L  Time Calculation: 17 mins    Eval Complexity: History: LOW Complexity : Brief history review ; Examination: LOW Complexity : 1-3 performance deficits relating to physical, cognitive , or psychosocial skils that result in activity limitations and / or participation restrictions ;    Decision Making:LOW Complexity : No comorbidities that affect functional and no verbal or physical assistance needed to complete eval tasks social work

## 2021-06-08 ENCOUNTER — APPOINTMENT (OUTPATIENT)
Dept: GENERAL RADIOLOGY | Age: 61
End: 2021-06-08
Attending: PHYSICIAN ASSISTANT
Payer: MEDICAID

## 2021-06-08 ENCOUNTER — HOSPITAL ENCOUNTER (EMERGENCY)
Age: 61
Discharge: HOME | End: 2021-06-08
Attending: STUDENT IN AN ORGANIZED HEALTH CARE EDUCATION/TRAINING PROGRAM
Payer: MEDICAID

## 2021-06-08 VITALS
BODY MASS INDEX: 34.21 KG/M2 | HEIGHT: 69 IN | RESPIRATION RATE: 18 BRPM | WEIGHT: 231 LBS | DIASTOLIC BLOOD PRESSURE: 70 MMHG | HEART RATE: 65 BPM | TEMPERATURE: 98.8 F | SYSTOLIC BLOOD PRESSURE: 137 MMHG | OXYGEN SATURATION: 97 %

## 2021-06-08 DIAGNOSIS — M79.671 RIGHT FOOT PAIN: ICD-10-CM

## 2021-06-08 DIAGNOSIS — G89.29 CHRONIC BILATERAL LOW BACK PAIN WITHOUT SCIATICA: Primary | ICD-10-CM

## 2021-06-08 DIAGNOSIS — M54.50 CHRONIC BILATERAL LOW BACK PAIN WITHOUT SCIATICA: Primary | ICD-10-CM

## 2021-06-08 LAB
ALBUMIN SERPL-MCNC: 3.6 G/DL (ref 3.4–5)
ALBUMIN/GLOB SERPL: 0.7 {RATIO} (ref 0.8–1.7)
ALP SERPL-CCNC: 157 U/L (ref 45–117)
ALT SERPL-CCNC: 81 U/L (ref 16–61)
ANION GAP SERPL CALC-SCNC: 7 MMOL/L (ref 3–18)
APPEARANCE UR: CLEAR
AST SERPL-CCNC: 60 U/L (ref 10–38)
BACTERIA URNS QL MICRO: NEGATIVE /HPF
BASOPHILS # BLD: 0.1 K/UL (ref 0–0.1)
BASOPHILS NFR BLD: 1 % (ref 0–2)
BILIRUB SERPL-MCNC: 0.7 MG/DL (ref 0.2–1)
BILIRUB UR QL: NEGATIVE
BUN SERPL-MCNC: 18 MG/DL (ref 7–18)
BUN/CREAT SERPL: 16 (ref 12–20)
CALCIUM SERPL-MCNC: 9.4 MG/DL (ref 8.5–10.1)
CHLORIDE SERPL-SCNC: 103 MMOL/L (ref 100–111)
CO2 SERPL-SCNC: 28 MMOL/L (ref 21–32)
COLOR UR: YELLOW
CREAT SERPL-MCNC: 1.1 MG/DL (ref 0.6–1.3)
DIFFERENTIAL METHOD BLD: ABNORMAL
EOSINOPHIL # BLD: 0.1 K/UL (ref 0–0.4)
EOSINOPHIL NFR BLD: 1 % (ref 0–5)
EPITH CASTS URNS QL MICRO: NEGATIVE /LPF (ref 0–5)
ERYTHROCYTE [DISTWIDTH] IN BLOOD BY AUTOMATED COUNT: 14.1 % (ref 11.6–14.5)
GLOBULIN SER CALC-MCNC: 5.4 G/DL (ref 2–4)
GLUCOSE SERPL-MCNC: 196 MG/DL (ref 74–99)
GLUCOSE UR STRIP.AUTO-MCNC: NEGATIVE MG/DL
HCT VFR BLD AUTO: 39.5 % (ref 36–48)
HGB BLD-MCNC: 12.6 G/DL (ref 13–16)
HGB UR QL STRIP: NEGATIVE
KETONES UR QL STRIP.AUTO: NEGATIVE MG/DL
LEUKOCYTE ESTERASE UR QL STRIP.AUTO: NEGATIVE
LYMPHOCYTES # BLD: 2 K/UL (ref 0.9–3.6)
LYMPHOCYTES NFR BLD: 17 % (ref 21–52)
MCH RBC QN AUTO: 28.4 PG (ref 24–34)
MCHC RBC AUTO-ENTMCNC: 31.9 G/DL (ref 31–37)
MCV RBC AUTO: 89 FL (ref 74–97)
MONOCYTES # BLD: 0.7 K/UL (ref 0.05–1.2)
MONOCYTES NFR BLD: 6 % (ref 3–10)
NEUTS SEG # BLD: 8.7 K/UL (ref 1.8–8)
NEUTS SEG NFR BLD: 75 % (ref 40–73)
NITRITE UR QL STRIP.AUTO: NEGATIVE
PH UR STRIP: 6.5 [PH] (ref 5–8)
PLATELET # BLD AUTO: 217 K/UL (ref 135–420)
PMV BLD AUTO: 11 FL (ref 9.2–11.8)
POTASSIUM SERPL-SCNC: 3.7 MMOL/L (ref 3.5–5.5)
PROT SERPL-MCNC: 9 G/DL (ref 6.4–8.2)
PROT UR STRIP-MCNC: ABNORMAL MG/DL
RBC # BLD AUTO: 4.44 M/UL (ref 4.35–5.65)
RBC #/AREA URNS HPF: NEGATIVE /HPF (ref 0–5)
SODIUM SERPL-SCNC: 138 MMOL/L (ref 136–145)
SP GR UR REFRACTOMETRY: 1.02 (ref 1–1.03)
UROBILINOGEN UR QL STRIP.AUTO: 1 EU/DL (ref 0.2–1)
WBC # BLD AUTO: 11.6 K/UL (ref 4.6–13.2)
WBC URNS QL MICRO: NORMAL /HPF (ref 0–5)

## 2021-06-08 PROCEDURE — 99283 EMERGENCY DEPT VISIT LOW MDM: CPT

## 2021-06-08 PROCEDURE — 73630 X-RAY EXAM OF FOOT: CPT

## 2021-06-08 PROCEDURE — 85025 COMPLETE CBC W/AUTO DIFF WBC: CPT

## 2021-06-08 PROCEDURE — 74011250637 HC RX REV CODE- 250/637: Performed by: STUDENT IN AN ORGANIZED HEALTH CARE EDUCATION/TRAINING PROGRAM

## 2021-06-08 PROCEDURE — 80053 COMPREHEN METABOLIC PANEL: CPT

## 2021-06-08 PROCEDURE — 81001 URINALYSIS AUTO W/SCOPE: CPT

## 2021-06-08 RX ORDER — OXYCODONE AND ACETAMINOPHEN 5; 325 MG/1; MG/1
1 TABLET ORAL
Status: COMPLETED | OUTPATIENT
Start: 2021-06-08 | End: 2021-06-08

## 2021-06-08 RX ORDER — OXYCODONE AND ACETAMINOPHEN 7.5; 325 MG/1; MG/1
1 TABLET ORAL
Qty: 20 TABLET | Refills: 0 | Status: SHIPPED | OUTPATIENT
Start: 2021-06-08 | End: 2021-06-13

## 2021-06-08 RX ADMIN — OXYCODONE AND ACETAMINOPHEN 1 TABLET: 5; 325 TABLET ORAL at 16:09

## 2021-06-08 NOTE — ED PROVIDER NOTES
61 yoM with diabetes, HTN, arthritis, and R toe amputations presenting with chronic back and foot pain. Patient reports having similar pain for several years that has been treated by his primary care physician with percocet. His last PCP appointment was cancelled, so he has not been able to get a refill of his percocet. Patient denies any bowel/bladder dysfunction, new numbness/tingling/weakness in his lower extremities, or IV drug use. He has previously had steroid injections in his back several years ago. He has had some swelling in his R leg, but it is stable since his amputation. He denies any trauma to his back/foot. Denies fevers, recent heavy lifting, or difficulty walking. Family history reviewed and significant for diabetes. No other complaints.            Past Medical History:   Diagnosis Date    Arthritis     Coronary atherosclerosis of native coronary artery     S/P PCI with GE in 12/09    Diabetes ([facility] [address]     IDDM    Electrolyte and fluid disorders not elsewhere classified     Essential hypertension, benign     GERD (gastroesophageal reflux disease)     Heroin abuse ([facility] [address] 05/26/16    Psychiatrist Dr. Patric Alan History of heart attack     Hyperlipidemia     Intermediate coronary syndrome [facility])     MDD (major depressive disorder) 5/26/16    Psychiatrist Dr. Patric Alan Myocardial infarction [facility])     Obesity, unspecified     Old myocardial infarction     Other and unspecified hyperlipidemia     Pancreatitis     Positive PPD     Sleep apnea     uses Cpap machine    Transfusion history     Before 1992       Past Surgical History:   Procedure Laterality Date    HX APPENDECTOMY      HX CORONARY STENT PLACEMENT  2010    2 stents         Family History:   Problem Relation Age of Onset    Heart Attack Father     Coronary Artery Disease Mother     Diabetes Mother     Cancer Sister         breast cancer and lung cancer       Social History     Socioeconomic History    Marital status:      Spouse name: Not on file    Number of children: Not on file    Years of education: Not on file    Highest education level: Not on file   Occupational History    Not on file   Tobacco Use    Smoking status: Never Smoker    Smokeless tobacco: Never Used   Substance and Sexual Activity    Alcohol use: No    Drug use: No    Sexual activity: Not on file   Other Topics Concern    Not on file   Social History Narrative    Not on file     Social Determinants of Health     Financial Resource Strain:     Difficulty of Paying Living Expenses:    Food Insecurity:     Worried About Running Out of Food in the Last Year:     [address] in the Last Year:    Transportation Needs:     Lack of Transportation (Medical):  Lack of Transportation (Non-Medical):    Physical Activity:     Days of Exercise per Week:     Minutes of Exercise per Session:    Stress:     Feeling of Stress :    Social Connections:     Frequency of Communication with Friends and Family:     Frequency of Social Gatherings with Friends and Family:     Attends Denominational Services:     Active Member of Clubs or Organizations:     Attends Club or Organization Meetings:     Marital Status:    Intimate Partner Violence:     Fear of Current or Ex-Partner:     Emotionally Abused:     Physically Abused:     Sexually Abused: ALLERGIES: Compazine [prochlorperazine]    Review of Systems   Constitutional: Negative for activity change, appetite change, fatigue, fever and unexpected weight change. HENT: Negative for drooling, facial swelling and sore throat. Eyes: Negative for pain, discharge and visual disturbance. Respiratory: Negative for apnea, choking, chest tightness and shortness of breath. Cardiovascular: Negative for chest pain, palpitations and leg swelling. Gastrointestinal: Negative for abdominal distention, abdominal pain, blood in stool, diarrhea and rectal pain.    Endocrine: Negative for polydipsia and polyphagia. Genitourinary: Negative for difficulty urinating, genital sores and hematuria. Musculoskeletal: Positive for back pain. Negative for gait problem and neck stiffness. Skin: Negative for color change and rash. Allergic/Immunologic: Negative for environmental allergies. Neurological: Positive for numbness (chronic neuropathy, unchanged). Negative for tremors, weakness and light-headedness. Hematological: Negative for adenopathy. Psychiatric/Behavioral: Positive for sleep disturbance (pain in back at night). Negative for agitation and behavioral problems. Vitals:    21 1504   BP: (!) 177/78   Pulse: 73   Resp: 18   Temp: 98.9 °F (37.2 °C)   SpO2: 97%   Weight: 104.8 kg (231 lb)   Height: 5' 9\" (1.753 m)            Physical Exam  Vitals and nursing note reviewed. Constitutional:       General: He is not in acute distress. HENT:      Head: Normocephalic and atraumatic. Mouth/Throat:      Mouth: Mucous membranes are moist.   Eyes:      Extraocular Movements: Extraocular movements intact. Pupils: Pupils are equal, round, and reactive to light. Cardiovascular:      Rate and Rhythm: Normal rate and regular rhythm. Pulses: Normal pulses. Radial pulses are 2+ on the right side and 2+ on the left side. Heart sounds: Normal heart sounds. Pulmonary:      Effort: Pulmonary effort is normal.      Breath sounds: Normal breath sounds. Abdominal:      General: There is no distension. Palpations: Abdomen is soft. Tenderness: There is no abdominal tenderness. Musculoskeletal:      Right lower le+ Edema present. Left lower le+ Edema present. Comments: TMA of the right lower extremity healing well, no obvious signs of infection      Right Lower Extremity: (all toes)  Skin:     General: Skin is warm and dry. Neurological:      General: No focal deficit present.       Mental Status: He is alert and oriented to person, place, and time. Sensory: Sensation is intact. No sensory deficit. Motor: No weakness or abnormal muscle tone. Coordination: Coordination is intact. Gait: Gait is intact. Comments: Able to ambulate with cane          MDM  Number of Diagnoses or Management Options    Assessment:  61 yoM diabetes, HTN, prior amputation, and arthritis presenting with chronic low back and R foot pain and asking for a refill of his percocet prescription. Vitals within normal limits. Physical exam significant for R TMA that is healing well. Differential diagnosis includes: Arthritis vs infection vs fracture vs cauda equina vs epidural abscess    Patient's normal neuro exam decreases concern for acute spinal cord compression, cauda equina, or compressing epidural abscess. CBC without leukocytosis or anemia  BMP without electrolyte abnormalities or renal dysfunction  Mild transaminitis  Urine without signs of infection or hematuria    Foot xray with chronic changes. No evidence of acute fracture or osteomyelitis. Patient typically on percocet for pain with good relief. He has an appointment on 6/15 with his primary care. Prescribed patient a 5-day course of his home dose of percocet. Patient is now safe for discharge home with good return precautions. Plan discussed with patient who verbalized understanding and agreed. Pt has been reexamined. Patient has no new complaints, changes, or physical findings. Care plan outlined and precautions discussed. Results were reviewed with the patient. All medications were reviewed with the patient; will d/c home with PMD f/u. All of pt's questions and concerns were addressed. Patient was instructed and agrees to follow up with PMD, as well as to return to the ED upon further deterioration. Patient is ready to go home. This note was dictated utilizing voice recognition software which may lead to typographical errors.   I apologize in advance if the situation occurs. If questions arise please do not hesitate to contact me or call our department. This note was originally written by ED resident Mike Talavera MD, [address] [place] [address] PGY-1 and later edited by me.     Guerda Menjivar, DO

## 2021-06-08 NOTE — ED TRIAGE NOTES
Pt reports lower back pain for at least 5 yesterday that is worse today. Pt also reports surgery to right foot 2 months ago and that pain never stopped. Pt states he has been out of his pain medication over 1 month and was not able to see his PCP last month because the PCP was not in office. Pt states his next appointment is 6/15 and he needs refills on pain medications.

## 2021-06-08 NOTE — DISCHARGE INSTRUCTIONS
You were seen in the emergency department for low back and foot pain. Your imaging and labs were reassuring. Please do not drive while taking opioids. Please follow up with your primary care for continued management of your low back pain. Return to the emergency department if you lose control of your bowel/bladder, have new numbness/tingling in your legs, develop weakness in your lower extremities, or have any other symptoms concerning to you.

## 2021-06-08 NOTE — ED NOTES
I performed a brief evaluation, including history and physical, of the patient here in triage and I have determined that pt will need further treatment and evaluation from the main side ER physician. I have placed initial orders to help in expediting patients care.      June 08, 2021 at 3:06 PM - Marshal Paget, PA        Visit Vitals  BP (!) 177/78   Pulse 73   Temp 98.9 °F (37.2 °C)   Resp 18   Ht 5' 9\" (1.753 m)   Wt 104.8 kg (231 lb)   SpO2 97%   BMI 34.11 kg/m²

## 2021-06-08 NOTE — ED NOTES
Discharge instructions explained to patient. Patient demonstrated understanding. Discharge alert and oriented, ambulatory with cane.

## 2021-06-12 ENCOUNTER — HOSPITAL ENCOUNTER (EMERGENCY)
Age: 61
Discharge: HOME | End: 2021-06-12
Attending: EMERGENCY MEDICINE
Payer: MEDICAID

## 2021-06-12 VITALS
WEIGHT: 231 LBS | HEART RATE: 70 BPM | OXYGEN SATURATION: 97 % | BODY MASS INDEX: 34.21 KG/M2 | HEIGHT: 69 IN | DIASTOLIC BLOOD PRESSURE: 82 MMHG | SYSTOLIC BLOOD PRESSURE: 155 MMHG | TEMPERATURE: 98.7 F | RESPIRATION RATE: 17 BRPM

## 2021-06-12 DIAGNOSIS — F41.9 ANXIETY DISORDER, UNSPECIFIED TYPE: Primary | ICD-10-CM

## 2021-06-12 PROCEDURE — 74011250637 HC RX REV CODE- 250/637: Performed by: NURSE PRACTITIONER

## 2021-06-12 PROCEDURE — 99283 EMERGENCY DEPT VISIT LOW MDM: CPT

## 2021-06-12 RX ORDER — LORAZEPAM 1 MG/1
1 TABLET ORAL ONCE
Status: COMPLETED | OUTPATIENT
Start: 2021-06-12 | End: 2021-06-12

## 2021-06-12 RX ADMIN — LORAZEPAM 1 MG: 1 TABLET ORAL at 16:47

## 2021-06-12 NOTE — ED PROVIDER NOTES
EMERGENCY DEPARTMENT HISTORY AND PHYSICAL EXAM    4:28 PM      Date: 6/12/2021  Patient Name: Yuval Monroy    History of Presenting Illness     Chief Complaint   Patient presents with    Anxiety         History Provided By: Patient    Additional History (Context): Yuval Monroy is a 61 y.o. male with past medical history significant for CAD, anxiety, diabetes, hypertension, GERD, obesity, hyperlipidemia, pancreatitis, and sleep apnea who presents with reports of anxiety that has been going on for several weeks of gradual worsening. Asking for refill on his medication. States he is on medication for anxiety thinks it starts within \"a\" but has been out of it for several weeks and having difficulty getting in with his primary care provider. Denies any suicidal ideations, homicidal ideations, auditory/visual hallucinations or delusions. He denies any illicit drug use, alcohol. No recent stressors. No chest pain, shortness of breath, headache, or other concerns. PCP: Nura Sierra MD    Current Outpatient Medications   Medication Sig Dispense Refill    oxyCODONE-acetaminophen (Percocet) 7.5-325 mg per tablet Take 1 Tablet by mouth every six (6) hours as needed for Pain for up to 5 days. Max Daily Amount: 4 Tablets. 20 Tablet 0    clopidogreL (PLAVIX) 75 mg tab Take 75 mg by mouth daily. Indications: myocardial reinfarction prevention      traZODone (DESYREL) 150 mg tablet Take 150 mg by mouth nightly. Indications: insomnia associated with depression      oxyCODONE-acetaminophen (PERCOCET 7.5) 7.5-325 mg per tablet Take 1 Tab by mouth every six (6) hours as needed for Pain.  amLODIPine (NORVASC) 10 mg tablet Take 10 mg by mouth daily.  hydroCHLOROthiazide (HYDRODIURIL) 50 mg tablet Take 50 mg by mouth daily.  metFORMIN (GLUCOPHAGE) 1,000 mg tablet Take 1,000 mg by mouth two (2) times daily (with meals).       atorvastatin (LIPITOR) 20 mg tablet Take 1 Tab by mouth nightly. 30 Tab 0    OTHER BMP in 1 week  Dx: [facility]  Fax results to Dr. Titus Carrillo 1 Each 0    insulin lispro (HUMALOG) 100 unit/mL injection Check FSBS AC*HS  For sugars between 160 and 200- Give 2 units SQ,  For sugars between 201 and 250, Give 3 units SQ,  For sugars Between 251 and 300, give 5 units SQ,  For Sugars between 301 and 350, give 7 units SQ  For sugars between 351 and 400, give 8 units SQ and contact PCP (Patient taking differently: by SubCUTAneous route two (2) times a day. Check FSBS AC*HS  For sugars between 160 and 200- Give 2 units SQ,  For sugars between 201 and 250, Give 3 units SQ,  For sugars Between 251 and 300, give 5 units SQ,  For Sugars between 301 and 350, give 7 units SQ  For sugars between 351 and 400, give 8 units SQ and contact PCP) 1 Vial 0    insulin detemir U-100 (Levemir U-100 Insulin) 100 unit/mL injection 20 Units by SubCUTAneous route two (2) times a day. (Patient taking differently: 60 Units by SubCUTAneous route two (2) times a day. Pt states he is taking levemir 40 units twice daily) 10 mL 0    pantoprazole (PROTONIX) 40 mg tablet Take 1 Tab by mouth Before breakfast and dinner. 60 Tab 0    acetaminophen (TYLENOL) 325 mg tablet Take 2 Tabs by mouth every six (6) hours as needed. Indications: Pain, take it with bentyl; do not exceed 3 g tylenol daily (Patient not taking: Reported on 4/28/2021) 30 Tab 0    polyethylene glycol (MIRALAX) 17 gram packet Take 1 Packet by mouth daily. (Patient taking differently: Take 1 Packet by mouth daily.  MIX WITH 8OZ OF WATER) 30 Packet 0       Past History     Past Medical History:  Past Medical History:   Diagnosis Date    Arthritis     Coronary atherosclerosis of native coronary artery     S/P PCI with GE in 12/09    Diabetes ([facility] [address]     IDDM    Electrolyte and fluid disorders not elsewhere classified     Essential hypertension, benign     GERD (gastroesophageal reflux disease)     Heroin abuse ([facility] [address] 05/26/16 Psychiatrist Dr. Magdalene Smith History of heart attack     Hyperlipidemia     Intermediate coronary syndrome [facility])     MDD (major depressive disorder) 5/26/16    Psychiatrist Dr. Eduarda Chandler infarction [facility])     Obesity, unspecified     Old myocardial infarction     Other and unspecified hyperlipidemia     Pancreatitis     Positive PPD     Sleep apnea     uses Cpap machine    Transfusion history     Before 1992       Past Surgical History:  Past Surgical History:   Procedure Laterality Date    HX APPENDECTOMY      HX CORONARY STENT PLACEMENT  2010    2 stents       Family History:  Family History   Problem Relation Age of Onset    Heart Attack Father     Coronary Artery Disease Mother     Diabetes Mother     Cancer Sister         breast cancer and lung cancer       Social History:  Social History     Tobacco Use    Smoking status: Never Smoker    Smokeless tobacco: Never Used   Substance Use Topics    Alcohol use: No    Drug use: No       Allergies: Allergies   Allergen Reactions    Compazine [Prochlorperazine] Anaphylaxis     \"Tightness around throat\"         Review of Systems       Review of Systems      Physical Exam     Visit Vitals  BP (!) 155/82 (BP 1 Location: Left upper arm, BP Patient Position: Sitting)   Pulse 70   Temp 98.7 °F (37.1 °C)   Resp 17   Ht 5' 9\" (1.753 m)   Wt 104.8 kg (231 lb)   SpO2 97%   BMI 34.11 kg/m²         Physical Exam      Diagnostic Study Results     Labs -  No results found for this or any previous visit (from the past 12 hour(s)). Radiologic Studies -   No orders to display         Medical Decision Making   I am the first provider for this patient. I reviewed available nursing notes, past medical history, past surgical history, family history and social history. Vital Signs-Reviewed the patient's vital signs.     Records Reviewed: Nursing Notes and Old Medical Records (Time of Review: 4:28 PM)    Pulse Oximetry Analysis -97% on room airnormal      ED Course: Progress Notes, Reevaluation, and Consults:  4:28 PM  Initial assessment performed. The patients presenting problems have been discussed, and they/their family are in agreement with the care plan formulated and outlined with them. I have encouraged them to ask questions as they arise throughout their visit. 4:53 PM PDMP reviewed. Patient is receiving Percocet 7.5/325 from various providers. No history of receiving other controlled substances. We will give him a dose of Ativan here which she is happy with and will be discharged home to follow-up with his PCP for this anxiety condition. I do not see that he has ever been diagnosed with anxiety. He has no other complaints and is happy with this plan and ready to go home. Provider Notes (Medical Decision Making):     Patient is a 42-year-old male who presents to the ER with request for prescription refill of his anxiety medication. He has no anxiety medications med list and on review of PDMP he is not on any benzos. States he has just felt generally anxious over the last several weeks. No other complaints, no pain, no chest pain or shortness of breath. Vitals are stable and patient is in no acute distress. Diagnosis     Clinical Impression:   1. Anxiety disorder, unspecified type        Disposition: Discharged home in stable condition    DISCHARGE NOTE:     Patient has been reexamined. Patient has no new complaints, changes, or physical findings. Care plan outlined and precautions discussed. . All of patient's questions and concerns were addressed. Patient was instructed and agrees to follow up with PCP, as well as to return to the ED upon further deterioration. Patient is ready to go home.     Follow-up Information     Follow up With Specialties Details Why Moose Gaytan MD Pediatric Medicine Schedule an appointment as soon as possible for a visit  Follow-up from the Emergency Department [address] [facility] [address]      [facility] EMERGENCY DEPT Emergency Medicine  As needed, If symptoms worsen [address] [place] [address]  [phone]           Current Discharge Medication List            Dictation disclaimer:  Please note that this dictation was completed with [employer], the computer voice recognition software. Quite often unanticipated grammatical, syntax, homophones, and other interpretive errors are inadvertently transcribed by the computer software. Please disregard these errors. Please excuse any errors that have escaped final proofreading.

## 2021-08-03 PROBLEM — N17.9 ARF (ACUTE RENAL FAILURE) (HCC): Status: RESOLVED | Noted: 2017-09-07 | Resolved: 2021-08-03

## 2021-09-23 ENCOUNTER — APPOINTMENT (OUTPATIENT)
Dept: CT IMAGING | Age: 61
End: 2021-09-23
Attending: EMERGENCY MEDICINE
Payer: MEDICAID

## 2021-09-23 ENCOUNTER — HOSPITAL ENCOUNTER (EMERGENCY)
Age: 61
Discharge: HOME | End: 2021-09-23
Attending: EMERGENCY MEDICINE
Payer: MEDICAID

## 2021-09-23 VITALS
TEMPERATURE: 98.5 F | SYSTOLIC BLOOD PRESSURE: 108 MMHG | OXYGEN SATURATION: 95 % | DIASTOLIC BLOOD PRESSURE: 67 MMHG | BODY MASS INDEX: 32.58 KG/M2 | WEIGHT: 220 LBS | RESPIRATION RATE: 16 BRPM | HEIGHT: 69 IN | HEART RATE: 53 BPM

## 2021-09-23 DIAGNOSIS — E87.6 HYPOKALEMIA: ICD-10-CM

## 2021-09-23 DIAGNOSIS — F11.93 OPIOID WITHDRAWAL (HCC): ICD-10-CM

## 2021-09-23 DIAGNOSIS — R11.2 NON-INTRACTABLE VOMITING WITH NAUSEA, UNSPECIFIED VOMITING TYPE: Primary | ICD-10-CM

## 2021-09-23 LAB
ALBUMIN SERPL-MCNC: 3.3 G/DL (ref 3.4–5)
ALBUMIN/GLOB SERPL: 0.6 {RATIO} (ref 0.8–1.7)
ALP SERPL-CCNC: 134 U/L (ref 45–117)
ALT SERPL-CCNC: 19 U/L (ref 16–61)
ANION GAP SERPL CALC-SCNC: 13 MMOL/L (ref 3–18)
APPEARANCE UR: CLEAR
AST SERPL-CCNC: 23 U/L (ref 10–38)
BACTERIA URNS QL MICRO: ABNORMAL /HPF
BASOPHILS # BLD: 0.1 K/UL (ref 0–0.1)
BASOPHILS NFR BLD: 0 % (ref 0–2)
BILIRUB SERPL-MCNC: 1 MG/DL (ref 0.2–1)
BILIRUB UR QL: NEGATIVE
BUN SERPL-MCNC: 28 MG/DL (ref 7–18)
BUN/CREAT SERPL: 17 (ref 12–20)
CALCIUM SERPL-MCNC: 9.6 MG/DL (ref 8.5–10.1)
CHLORIDE SERPL-SCNC: 100 MMOL/L (ref 100–111)
CO2 SERPL-SCNC: 27 MMOL/L (ref 21–32)
COLOR UR: YELLOW
CREAT SERPL-MCNC: 1.68 MG/DL (ref 0.6–1.3)
DIFFERENTIAL METHOD BLD: ABNORMAL
EOSINOPHIL # BLD: 0.1 K/UL (ref 0–0.4)
EOSINOPHIL NFR BLD: 1 % (ref 0–5)
EPITH CASTS URNS QL MICRO: NEGATIVE /LPF (ref 0–5)
ERYTHROCYTE [DISTWIDTH] IN BLOOD BY AUTOMATED COUNT: 13.2 % (ref 11.6–14.5)
EST. AVERAGE GLUCOSE BLD GHB EST-MCNC: 177 MG/DL
GLOBULIN SER CALC-MCNC: 6 G/DL (ref 2–4)
GLUCOSE SERPL-MCNC: 224 MG/DL (ref 74–99)
GLUCOSE UR STRIP.AUTO-MCNC: 500 MG/DL
HBA1C MFR BLD: 7.8 % (ref 4.2–5.6)
HCT VFR BLD AUTO: 39.3 % (ref 36–48)
HGB BLD-MCNC: 12.5 G/DL (ref 13–16)
HGB UR QL STRIP: NEGATIVE
HYALINE CASTS URNS QL MICRO: ABNORMAL /LPF (ref 0–2)
KETONES UR QL STRIP.AUTO: 40 MG/DL
LEUKOCYTE ESTERASE UR QL STRIP.AUTO: NEGATIVE
LYMPHOCYTES # BLD: 2.8 K/UL (ref 0.9–3.6)
LYMPHOCYTES NFR BLD: 16 % (ref 21–52)
MAGNESIUM SERPL-MCNC: 1.7 MG/DL (ref 1.6–2.6)
MCH RBC QN AUTO: 29 PG (ref 24–34)
MCHC RBC AUTO-ENTMCNC: 31.8 G/DL (ref 31–37)
MCV RBC AUTO: 91.2 FL (ref 78–100)
MONOCYTES # BLD: 1 K/UL (ref 0.05–1.2)
MONOCYTES NFR BLD: 6 % (ref 3–10)
NEUTS SEG # BLD: 13.2 K/UL (ref 1.8–8)
NEUTS SEG NFR BLD: 77 % (ref 40–73)
NITRITE UR QL STRIP.AUTO: NEGATIVE
PH UR STRIP: >8.5 [PH] (ref 5–8)
PLATELET # BLD AUTO: 360 K/UL (ref 135–420)
PMV BLD AUTO: 11.3 FL (ref 9.2–11.8)
POTASSIUM SERPL-SCNC: 3.3 MMOL/L (ref 3.5–5.5)
PROT SERPL-MCNC: 9.3 G/DL (ref 6.4–8.2)
PROT UR STRIP-MCNC: 30 MG/DL
RBC # BLD AUTO: 4.31 M/UL (ref 4.35–5.65)
RBC #/AREA URNS HPF: NEGATIVE /HPF (ref 0–5)
SODIUM SERPL-SCNC: 140 MMOL/L (ref 136–145)
SP GR UR REFRACTOMETRY: 1.02 (ref 1–1.03)
UROBILINOGEN UR QL STRIP.AUTO: 1 EU/DL (ref 0.2–1)
WBC # BLD AUTO: 17.2 K/UL (ref 4.6–13.2)
WBC URNS QL MICRO: ABNORMAL /HPF (ref 0–5)

## 2021-09-23 PROCEDURE — 74011250636 HC RX REV CODE- 250/636: Performed by: EMERGENCY MEDICINE

## 2021-09-23 PROCEDURE — 96365 THER/PROPH/DIAG IV INF INIT: CPT

## 2021-09-23 PROCEDURE — 96375 TX/PRO/DX INJ NEW DRUG ADDON: CPT

## 2021-09-23 PROCEDURE — 74011250637 HC RX REV CODE- 250/637: Performed by: EMERGENCY MEDICINE

## 2021-09-23 PROCEDURE — 96366 THER/PROPH/DIAG IV INF ADDON: CPT

## 2021-09-23 PROCEDURE — 74011250636 HC RX REV CODE- 250/636

## 2021-09-23 PROCEDURE — 74176 CT ABD & PELVIS W/O CONTRAST: CPT

## 2021-09-23 PROCEDURE — 85025 COMPLETE CBC W/AUTO DIFF WBC: CPT

## 2021-09-23 PROCEDURE — 93005 ELECTROCARDIOGRAM TRACING: CPT

## 2021-09-23 PROCEDURE — 99285 EMERGENCY DEPT VISIT HI MDM: CPT

## 2021-09-23 PROCEDURE — 83036 HEMOGLOBIN GLYCOSYLATED A1C: CPT

## 2021-09-23 PROCEDURE — 81001 URINALYSIS AUTO W/SCOPE: CPT

## 2021-09-23 PROCEDURE — 83735 ASSAY OF MAGNESIUM: CPT

## 2021-09-23 PROCEDURE — 80053 COMPREHEN METABOLIC PANEL: CPT

## 2021-09-23 PROCEDURE — 96376 TX/PRO/DX INJ SAME DRUG ADON: CPT

## 2021-09-23 RX ORDER — METHADONE HYDROCHLORIDE 10 MG/ML
40 CONCENTRATE ORAL
Status: DISCONTINUED | OUTPATIENT
Start: 2021-09-23 | End: 2021-09-23

## 2021-09-23 RX ORDER — METHADONE HYDROCHLORIDE 5 MG/5ML
40 SOLUTION ORAL
Status: DISCONTINUED | OUTPATIENT
Start: 2021-09-23 | End: 2021-09-23 | Stop reason: SDUPTHER

## 2021-09-23 RX ORDER — PROMETHAZINE HYDROCHLORIDE 25 MG/1
25 TABLET ORAL
Status: COMPLETED | OUTPATIENT
Start: 2021-09-23 | End: 2021-09-23

## 2021-09-23 RX ORDER — DEXTROSE 50 % IN WATER (D50W) INTRAVENOUS SYRINGE
25-50 AS NEEDED
Status: DISCONTINUED | OUTPATIENT
Start: 2021-09-23 | End: 2021-09-23 | Stop reason: HOSPADM

## 2021-09-23 RX ORDER — METHADONE HYDROCHLORIDE 10 MG/1
40 TABLET ORAL
Status: COMPLETED | OUTPATIENT
Start: 2021-09-23 | End: 2021-09-23

## 2021-09-23 RX ORDER — POTASSIUM CHLORIDE 7.45 MG/ML
10 INJECTION INTRAVENOUS
Status: COMPLETED | OUTPATIENT
Start: 2021-09-23 | End: 2021-09-23

## 2021-09-23 RX ORDER — FENTANYL CITRATE 50 UG/ML
50 INJECTION, SOLUTION INTRAMUSCULAR; INTRAVENOUS
Status: COMPLETED | OUTPATIENT
Start: 2021-09-23 | End: 2021-09-23

## 2021-09-23 RX ORDER — HYDROMORPHONE HYDROCHLORIDE 1 MG/ML
INJECTION, SOLUTION INTRAMUSCULAR; INTRAVENOUS; SUBCUTANEOUS
Status: COMPLETED
Start: 2021-09-23 | End: 2021-09-23

## 2021-09-23 RX ORDER — INSULIN LISPRO 100 [IU]/ML
INJECTION, SOLUTION INTRAVENOUS; SUBCUTANEOUS
Status: DISCONTINUED | OUTPATIENT
Start: 2021-09-23 | End: 2021-09-23 | Stop reason: HOSPADM

## 2021-09-23 RX ORDER — MAGNESIUM SULFATE HEPTAHYDRATE 40 MG/ML
2 INJECTION, SOLUTION INTRAVENOUS ONCE
Status: COMPLETED | OUTPATIENT
Start: 2021-09-23 | End: 2021-09-23

## 2021-09-23 RX ORDER — HYDROMORPHONE HYDROCHLORIDE 1 MG/ML
1 INJECTION, SOLUTION INTRAMUSCULAR; INTRAVENOUS; SUBCUTANEOUS ONCE
Status: COMPLETED | OUTPATIENT
Start: 2021-09-23 | End: 2021-09-23

## 2021-09-23 RX ORDER — POTASSIUM CHLORIDE 20 MEQ/1
40 TABLET, EXTENDED RELEASE ORAL 2 TIMES DAILY
Status: DISCONTINUED | OUTPATIENT
Start: 2021-09-23 | End: 2021-09-23 | Stop reason: HOSPADM

## 2021-09-23 RX ORDER — MORPHINE SULFATE 4 MG/ML
4 INJECTION INTRAVENOUS
Status: COMPLETED | OUTPATIENT
Start: 2021-09-23 | End: 2021-09-23

## 2021-09-23 RX ORDER — HYDROMORPHONE HYDROCHLORIDE 1 MG/ML
1 INJECTION, SOLUTION INTRAMUSCULAR; INTRAVENOUS; SUBCUTANEOUS
Status: COMPLETED | OUTPATIENT
Start: 2021-09-23 | End: 2021-09-23

## 2021-09-23 RX ORDER — MAGNESIUM SULFATE 100 %
4 CRYSTALS MISCELLANEOUS AS NEEDED
Status: DISCONTINUED | OUTPATIENT
Start: 2021-09-23 | End: 2021-09-23 | Stop reason: HOSPADM

## 2021-09-23 RX ADMIN — SODIUM CHLORIDE, SODIUM LACTATE, POTASSIUM CHLORIDE, AND CALCIUM CHLORIDE 1000 ML: 600; 310; 30; 20 INJECTION, SOLUTION INTRAVENOUS at 08:32

## 2021-09-23 RX ADMIN — POTASSIUM CHLORIDE 10 MEQ: 7.45 INJECTION INTRAVENOUS at 08:24

## 2021-09-23 RX ADMIN — MAGNESIUM SULFATE HEPTAHYDRATE 2 G: 40 INJECTION, SOLUTION INTRAVENOUS at 08:24

## 2021-09-23 RX ADMIN — HYDROMORPHONE HYDROCHLORIDE 1 MG: 1 INJECTION, SOLUTION INTRAMUSCULAR; INTRAVENOUS; SUBCUTANEOUS at 07:23

## 2021-09-23 RX ADMIN — HYDROMORPHONE HYDROCHLORIDE 1 MG: 1 INJECTION, SOLUTION INTRAMUSCULAR; INTRAVENOUS; SUBCUTANEOUS at 05:20

## 2021-09-23 RX ADMIN — MORPHINE SULFATE 4 MG: 4 INJECTION INTRAVENOUS at 04:31

## 2021-09-23 RX ADMIN — POTASSIUM CHLORIDE 40 MEQ: 1500 TABLET, EXTENDED RELEASE ORAL at 08:32

## 2021-09-23 RX ADMIN — METHADONE HYDROCHLORIDE 40 MG: 10 TABLET ORAL at 15:42

## 2021-09-23 RX ADMIN — FENTANYL CITRATE 50 MCG: 50 INJECTION INTRAMUSCULAR; INTRAVENOUS at 08:31

## 2021-09-23 RX ADMIN — PROMETHAZINE HYDROCHLORIDE 25 MG: 25 TABLET ORAL at 09:48

## 2021-09-23 NOTE — ED NOTES
Patient complaining of severe pain and states that the morphine \"did no help at all. \" Provider notified and orders received.

## 2021-09-23 NOTE — ED NOTES
I assumed care of the patient at 6 AM from Dr. Miladys Guadarrama. The patient has nausea and vomiting since running out of his methadone and feels that he is in withdrawal however Dr. Miladys Guadarrama and noticed that he has a prolonged QTC. The patient's methadone order was discontinued. Since then the patient's any short acting opioids with some mild relief however seems to be volume depleted so we will give patient IV fluids and replete his potassium and repeat his EKG. In addition the patient is to be getting a CT of his abdomen and pelvis. We will continue to follow patient closely. Rachel Morgan, DO 8:22 AM    Patient's QTC is normalized and he is sleeping comfortably. I suspect the hydration with lactated Ringer's as well as potassium repletion has helped. If the patient still has withdrawal will consider giving him methadone but no evidence of infection on CT or on his urine so will proceed with close outpatient care and have him return if at all worsened or concerned. Do not suspect biliary colic at this time and results of the CT were reviewed with the patient. Workup and recommendations were reviewed with the patient and all questions were answered. The patient understands the plan and will proceed with close outpatient care. I have encouraged the patient to return if at all worsened or concerned.    Rachel Morgan, DO 1:55 PM    MEDICATIONS:    Medications   potassium chloride (K-DUR, KLOR-CON) SR tablet 40 mEq (40 mEq Oral Given 9/23/21 0832)   insulin lispro (HUMALOG) injection (has no administration in time range)   glucose chewable tablet 16 g (has no administration in time range)   glucagon (GLUCAGEN) injection 1 mg (has no administration in time range)   dextrose (D50W) injection syrg 12.5-25 g (has no administration in time range)   morphine injection 4 mg (4 mg IntraVENous Given 9/23/21 4921)   HYDROmorphone (DILAUDID) injection 1 mg (1 mg IntraVENous Given 9/23/21 8120)   lactated ringers bolus infusion 1,000 mL (0 mL IntraVENous IV Completed 9/23/21 0902)   HYDROmorphone (DILAUDID) injection 1 mg (1 mg IntraVENous Given 9/23/21 0723)   fentaNYL citrate (PF) injection 50 mcg (50 mcg IntraVENous Given 9/23/21 0831)   potassium chloride 10 mEq in 100 ml IVPB (0 mEq IntraVENous IV Completed 9/23/21 0924)   magnesium sulfate 2 g/50 ml IVPB (premix or compounded) (0 g IntraVENous IV Completed 9/23/21 1024)   promethazine (PHENERGAN) tablet 25 mg (25 mg Oral Given 9/23/21 0948)            RESULTS:        CT ABD PELV WO CONT   Final Result      No acute abnormalities. Hepatic steatosis. Right adrenal adenoma. Cholelithiasis without cholecystitis. Right renal cyst.              Abnormal Results this visit:  Labs Reviewed   CBC WITH AUTOMATED DIFF - Abnormal; Notable for the following components:       Result Value    WBC 17.2 (*)     RBC 4.31 (*)     HGB 12.5 (*)     NEUTROPHILS 77 (*)     LYMPHOCYTES 16 (*)     ABS. NEUTROPHILS 13.2 (*)     All other components within normal limits   METABOLIC PANEL, COMPREHENSIVE - Abnormal; Notable for the following components:    Potassium 3.3 (*)     Glucose 224 (*)     BUN 28 (*)     Creatinine 1.68 (*)     GFR est AA 51 (*)     GFR est non-AA 42 (*)     Alk.  phosphatase 134 (*)     Protein, total 9.3 (*)     Albumin 3.3 (*)     Globulin 6.0 (*)     A-G Ratio 0.6 (*)     All other components within normal limits   URINALYSIS W/ RFLX MICROSCOPIC - Abnormal; Notable for the following components:    pH (UA) >8.5 (*)     Protein 30 (*)     Glucose 500 (*)     Ketone 40 (*)     All other components within normal limits   URINE MICROSCOPIC ONLY - Abnormal; Notable for the following components:    Bacteria FEW (*)     All other components within normal limits   HEMOGLOBIN A1C WITH EAG - Abnormal; Notable for the following components:    Hemoglobin A1c 7.8 (*)     All other components within normal limits   MAGNESIUM       All Results past 24 hours:  Recent Results (from the past 24 hour(s))   CBC WITH AUTOMATED DIFF    Collection Time: 09/23/21  3:36 AM   Result Value Ref Range    WBC 17.2 (H) 4.6 - 13.2 K/uL    RBC 4.31 (L) 4.35 - 5.65 M/uL    HGB 12.5 (L) 13.0 - 16.0 g/dL    HCT 39.3 36.0 - 48.0 %    MCV 91.2 78.0 - 100.0 FL    MCH 29.0 24.0 - 34.0 PG    MCHC 31.8 31.0 - 37.0 g/dL    RDW 13.2 11.6 - 14.5 %    PLATELET 800 991 - 022 K/uL    MPV 11.3 9.2 - 11.8 FL    NEUTROPHILS 77 (H) 40 - 73 %    LYMPHOCYTES 16 (L) 21 - 52 %    MONOCYTES 6 3 - 10 %    EOSINOPHILS 1 0 - 5 %    BASOPHILS 0 0 - 2 %    ABS. NEUTROPHILS 13.2 (H) 1.8 - 8.0 K/UL    ABS. LYMPHOCYTES 2.8 0.9 - 3.6 K/UL    ABS. MONOCYTES 1.0 0.05 - 1.2 K/UL    ABS. EOSINOPHILS 0.1 0.0 - 0.4 K/UL    ABS. BASOPHILS 0.1 0.0 - 0.1 K/UL    DF AUTOMATED     METABOLIC PANEL, COMPREHENSIVE    Collection Time: 09/23/21  3:36 AM   Result Value Ref Range    Sodium 140 136 - 145 mmol/L    Potassium 3.3 (L) 3.5 - 5.5 mmol/L    Chloride 100 100 - 111 mmol/L    CO2 27 21 - 32 mmol/L    Anion gap 13 3.0 - 18 mmol/L    Glucose 224 (H) 74 - 99 mg/dL    BUN 28 (H) 7.0 - 18 MG/DL    Creatinine 1.68 (H) 0.6 - 1.3 MG/DL    BUN/Creatinine ratio 17 12 - 20      GFR est AA 51 (L) >60 ml/min/1.73m2    GFR est non-AA 42 (L) >60 ml/min/1.73m2    Calcium 9.6 8.5 - 10.1 MG/DL    Bilirubin, total 1.0 0.2 - 1.0 MG/DL    ALT (SGPT) 19 16 - 61 U/L    AST (SGOT) 23 10 - 38 U/L    Alk.  phosphatase 134 (H) 45 - 117 U/L    Protein, total 9.3 (H) 6.4 - 8.2 g/dL    Albumin 3.3 (L) 3.4 - 5.0 g/dL    Globulin 6.0 (H) 2.0 - 4.0 g/dL    A-G Ratio 0.6 (L) 0.8 - 1.7     MAGNESIUM    Collection Time: 09/23/21  3:36 AM   Result Value Ref Range    Magnesium 1.7 1.6 - 2.6 mg/dL   HEMOGLOBIN A1C WITH EAG    Collection Time: 09/23/21  3:36 AM   Result Value Ref Range    Hemoglobin A1c 7.8 (H) 4.2 - 5.6 %    Est. average glucose 177 mg/dL   EKG, 12 LEAD, INITIAL    Collection Time: 09/23/21  3:38 AM   Result Value Ref Range    Ventricular Rate 107 BPM    QRS Duration 76 ms    Q-T Interval 448 ms    QTC Calculation (Bezet) 598 ms    Calculated R Axis -5 degrees    Calculated T Axis 31 degrees    Diagnosis        Critical Test Result: Long QTc  Accelerated Junctional rhythm  Inferior infarct (cited on or before 22-NOV-2020)  Cannot rule out Anterior infarct , age undetermined  Abnormal ECG  When compared with ECG of 13-APR-2021 13:57,  Significant changes have occurred     EKG, 12 LEAD, SUBSEQUENT    Collection Time: 09/23/21  3:38 AM   Result Value Ref Range    Ventricular Rate 107 BPM    QRS Duration 76 ms    Q-T Interval 448 ms    QTC Calculation (Bezet) 598 ms    Calculated R Axis -5 degrees    Calculated T Axis 31 degrees    Diagnosis        Critical Test Result: Long QTc  Accelerated Junctional rhythm  Inferior infarct (cited on or before 22-NOV-2020)  Cannot rule out Anterior infarct , age undetermined  Abnormal ECG  When compared with ECG of 13-APR-2021 13:57,  Significant changes have occurred     URINALYSIS W/ RFLX MICROSCOPIC    Collection Time: 09/23/21  8:40 AM   Result Value Ref Range    Color YELLOW      Appearance CLEAR      Specific gravity 1.023 1.005 - 1.030      pH (UA) >8.5 (H) 5.0 - 8.0    Protein 30 (A) NEG mg/dL    Glucose 500 (A) NEG mg/dL    Ketone 40 (A) NEG mg/dL    Bilirubin Negative NEG      Blood Negative NEG      Urobilinogen 1.0 0.2 - 1.0 EU/dL    Nitrites Negative NEG      Leukocyte Esterase Negative NEG     URINE MICROSCOPIC ONLY    Collection Time: 09/23/21  8:40 AM   Result Value Ref Range    WBC 0 to 2 0 - 5 /hpf    RBC Negative 0 - 5 /hpf    Epithelial cells Negative 0 - 5 /lpf    Bacteria FEW (A) NEG /hpf    Hyaline cast 0 to 2 0 - 2 /lpf   EKG, 12 LEAD, SUBSEQUENT    Collection Time: 09/23/21  1:46 PM   Result Value Ref Range    Ventricular Rate 58 BPM    Atrial Rate 58 BPM    P-R Interval 272 ms    QRS Duration 82 ms    Q-T Interval 502 ms    QTC Calculation (Bezet) 492 ms    Calculated P Axis 55 degrees    Calculated T Axis -16 degrees    Diagnosis       Sinus bradycardia with sinus arrhythmia with 1st degree AV block  Cannot rule out Inferior infarct (cited on or before 22-NOV-2020)  Cannot rule out Anterior infarct (cited on or before 22-NOV-2020)  Abnormal ECG  When compared with ECG of 23-SEP-2021 03:38,  Sinus rhythm has replaced Junctional rhythm  Vent. rate has decreased BY  49 BPM  Questionable change in initial forces of Anterior leads  QT has shortened             CLINICAL IMPRESSION    1. Non-intractable vomiting with nausea, unspecified vomiting type    2. Hypokalemia    3.  Opioid withdrawal ([facility] [address]

## 2021-09-23 NOTE — ED PROVIDER NOTES
EMERGENCY DEPARTMENT HISTORY AND PHYSICAL EXAM  This was created with voice recognition software and transcription errors may be present. 3:11 AM  Date: (Not on file)  Patient Name: Mendy Downey    History of Presenting Illness     Chief Complaint:    History Provided By:     HPI: Mendy Downey is a 64 y.o. male past medical history of coronary disease diabetes hypertension reflux heroin abuse hyperlipidemia coronary syndrome MI obesity pancreatitis who presents with nausea vomiting and just feeling weak in general.  Patient has been on methadone for years and typically gets a dose on Saturday to get him through the weekend however he missed Saturday so his last dose was Friday. Additionally he notes he has been OxyContin for years and his one of his prescription also on Friday.   No chest pain or shortness of breath     PCP: Jane Lo MD      Past History     Past Medical History:  Past Medical History:   Diagnosis Date    Arthritis     Coronary atherosclerosis of native coronary artery     S/P PCI with GE in 12/09    Diabetes [facility])     IDDM    Electrolyte and fluid disorders not elsewhere classified     Essential hypertension, benign     GERD (gastroesophageal reflux disease)     Heroin abuse ([facility] [address] 05/26/16    Psychiatrist Dr. Nelson Marshall History of heart attack     Hyperlipidemia     Intermediate coronary syndrome [facility])     MDD (major depressive disorder) 5/26/16    Psychiatrist Dr. Nelson Marshall Myocardial infarction [facility])     Obesity, unspecified     Old myocardial infarction     Other and unspecified hyperlipidemia     Pancreatitis     Positive PPD     Sleep apnea     uses Cpap machine    Transfusion history     Before 1992       Past Surgical History:  Past Surgical History:   Procedure Laterality Date    HX APPENDECTOMY      HX CORONARY STENT PLACEMENT  2010    2 stents       Family History:  Family History   Problem Relation Age of Onset    Heart Attack Father     Coronary Artery Disease Mother     Diabetes Mother     Cancer Sister         breast cancer and lung cancer       Social History:  Social History     Tobacco Use    Smoking status: Never Smoker    Smokeless tobacco: Never Used   Substance Use Topics    Alcohol use: No    Drug use: No       Allergies: Allergies   Allergen Reactions    Compazine [Prochlorperazine] Anaphylaxis     \"Tightness around throat\"       Review of Systems     Review of Systems   All other systems reviewed and are negative. 10 point review of systems otherwise negative unless noted in HPI. Physical Exam       Physical Exam  Constitutional:       Appearance: He is well-developed. HENT:      Head: Normocephalic and atraumatic. Eyes:      Pupils: Pupils are equal, round, and reactive to light. Cardiovascular:      Rate and Rhythm: Normal rate and regular rhythm. Heart sounds: Normal heart sounds. No murmur heard. No friction rub. Pulmonary:      Effort: Pulmonary effort is normal. No respiratory distress. Breath sounds: Normal breath sounds. No wheezing. Abdominal:      General: There is no distension. Palpations: Abdomen is soft. Tenderness: There is no abdominal tenderness. There is no guarding or rebound. Musculoskeletal:         General: Normal range of motion. Cervical back: Normal range of motion and neck supple. Skin:     General: Skin is warm and dry. Neurological:      Mental Status: He is alert and oriented to person, place, and time. Psychiatric:         Behavior: Behavior normal.         Thought Content:  Thought content normal.         Diagnostic Study Results     Vital Signs  EKG: EKG shows sinus at 107 normal axis unclear MD and the QTC is computer read of 598 not quite sure if it is actually this long but certainly does look long   no ST elevation or depression hypertrophy  Labs:   Imaging:     Medical Decision Making     ED Course: Progress Notes, Reevaluation, and Consults:    I will be the provider of record for this patient. Provider Notes (Medical Decision Making): 58-year-old gentleman presents with what sounds like opiate withdrawal he is out of his methadone and his OxyContin will give him a dose of his methadone here and see how he feels. The patient's QTc interval the methadone was canceled and will give morphine confirmed with pharmacy morphine does not alter the QT interval    Patient elevated white count turned over to Dr. Jordan perez pending CT abdomen pelvis recheck of EKG for QTC       Diagnosis     Clinical Impression: No diagnosis found. Disposition:        Patient's Medications   Start Taking    No medications on file   Continue Taking    ACETAMINOPHEN (TYLENOL) 325 MG TABLET    Take 2 Tabs by mouth every six (6) hours as needed. Indications: Pain, take it with bentyl; do not exceed 3 g tylenol daily    AMLODIPINE (NORVASC) 10 MG TABLET    Take 10 mg by mouth daily. ATORVASTATIN (LIPITOR) 20 MG TABLET    Take 1 Tab by mouth nightly. CLOPIDOGREL (PLAVIX) 75 MG TAB    Take 75 mg by mouth daily. Indications: myocardial reinfarction prevention    HYDROCHLOROTHIAZIDE (HYDRODIURIL) 50 MG TABLET    Take 50 mg by mouth daily. INSULIN DETEMIR U-100 (LEVEMIR U-100 INSULIN) 100 UNIT/ML INJECTION    20 Units by SubCUTAneous route two (2) times a day. INSULIN LISPRO (HUMALOG) 100 UNIT/ML INJECTION    Check FSBS AC*HS  For sugars between 160 and 200- Give 2 units SQ,  For sugars between 201 and 250, Give 3 units SQ,  For sugars Between 251 and 300, give 5 units SQ,  For Sugars between 301 and 350, give 7 units SQ  For sugars between 351 and 400, give 8 units SQ and contact PCP    METFORMIN (GLUCOPHAGE) 1,000 MG TABLET    Take 1,000 mg by mouth two (2) times daily (with meals).     OTHER    BMP in 1 week  Dx: [facility]  Fax results to Dr. Claire Vinson (PERCOCET 7.5) 7.5-325 MG PER TABLET    Take 1 Tab by mouth every six (6) hours as needed for Pain. PANTOPRAZOLE (PROTONIX) 40 MG TABLET    Take 1 Tab by mouth Before breakfast and dinner. POLYETHYLENE GLYCOL (MIRALAX) 17 GRAM PACKET    Take 1 Packet by mouth daily. TRAZODONE (DESYREL) 150 MG TABLET    Take 150 mg by mouth nightly.  Indications: insomnia associated with depression   These Medications have changed    No medications on file   Stop Taking    No medications on file

## 2021-09-23 NOTE — ED TRIAGE NOTES
Patient presents to the ED for evaluation of N/V for the past two days. Patient goes to the methadone clinic. The last time he was able to go was Friday. He also takes oxy for chronic pain and is out of his medication.

## 2021-09-24 ENCOUNTER — HOSPITAL ENCOUNTER (EMERGENCY)
Age: 61
Discharge: LEFT BEFORE DISCHARGE | End: 2021-09-24
Attending: STUDENT IN AN ORGANIZED HEALTH CARE EDUCATION/TRAINING PROGRAM
Payer: MEDICAID

## 2021-09-24 ENCOUNTER — APPOINTMENT (OUTPATIENT)
Dept: CT IMAGING | Age: 61
End: 2021-09-24
Attending: STUDENT IN AN ORGANIZED HEALTH CARE EDUCATION/TRAINING PROGRAM
Payer: MEDICAID

## 2021-09-24 VITALS
TEMPERATURE: 98.1 F | OXYGEN SATURATION: 96 % | SYSTOLIC BLOOD PRESSURE: 156 MMHG | DIASTOLIC BLOOD PRESSURE: 83 MMHG | RESPIRATION RATE: 22 BRPM | HEART RATE: 88 BPM

## 2021-09-24 DIAGNOSIS — F11.93 METHADONE WITHDRAWAL (HCC): Primary | ICD-10-CM

## 2021-09-24 DIAGNOSIS — R11.2 NAUSEA AND VOMITING, INTRACTABILITY OF VOMITING NOT SPECIFIED, UNSPECIFIED VOMITING TYPE: ICD-10-CM

## 2021-09-24 LAB
ALBUMIN SERPL-MCNC: 3.4 G/DL (ref 3.4–5)
ALBUMIN/GLOB SERPL: 0.5 {RATIO} (ref 0.8–1.7)
ALP SERPL-CCNC: 134 U/L (ref 45–117)
ALT SERPL-CCNC: 21 U/L (ref 16–61)
AMORPH CRY URNS QL MICRO: ABNORMAL
AMPHET UR QL SCN: NEGATIVE
ANION GAP SERPL CALC-SCNC: 11 MMOL/L (ref 3–18)
APPEARANCE UR: CLEAR
AST SERPL-CCNC: 30 U/L (ref 10–38)
ATRIAL RATE: 58 BPM
ATRIAL RATE: 83 BPM
BACTERIA URNS QL MICRO: ABNORMAL /HPF
BARBITURATES UR QL SCN: NEGATIVE
BASOPHILS # BLD: 0.1 K/UL (ref 0–0.1)
BASOPHILS NFR BLD: 0 % (ref 0–2)
BENZODIAZ UR QL: NEGATIVE
BILIRUB SERPL-MCNC: 1.2 MG/DL (ref 0.2–1)
BILIRUB UR QL: NEGATIVE
BUN SERPL-MCNC: 25 MG/DL (ref 7–18)
BUN/CREAT SERPL: 16 (ref 12–20)
CALCIUM SERPL-MCNC: 9.6 MG/DL (ref 8.5–10.1)
CALCULATED P AXIS, ECG09: 2 DEGREES
CALCULATED P AXIS, ECG09: 60 DEGREES
CALCULATED R AXIS, ECG10: -11 DEGREES
CALCULATED R AXIS, ECG10: -5 DEGREES
CALCULATED R AXIS, ECG10: -5 DEGREES
CALCULATED T AXIS, ECG11: -23 DEGREES
CALCULATED T AXIS, ECG11: 31 DEGREES
CALCULATED T AXIS, ECG11: 31 DEGREES
CALCULATED T AXIS, ECG11: 9 DEGREES
CANNABINOIDS UR QL SCN: NEGATIVE
CHLORIDE SERPL-SCNC: 102 MMOL/L (ref 100–111)
CO2 SERPL-SCNC: 25 MMOL/L (ref 21–32)
COCAINE UR QL SCN: NEGATIVE
COLOR UR: YELLOW
COVID-19 RAPID TEST, COVR: NOT DETECTED
CREAT SERPL-MCNC: 1.53 MG/DL (ref 0.6–1.3)
DIAGNOSIS, 93000: NORMAL
DIFFERENTIAL METHOD BLD: ABNORMAL
EOSINOPHIL # BLD: 0 K/UL (ref 0–0.4)
EOSINOPHIL NFR BLD: 0 % (ref 0–5)
EPITH CASTS URNS QL MICRO: NEGATIVE /LPF (ref 0–5)
ERYTHROCYTE [DISTWIDTH] IN BLOOD BY AUTOMATED COUNT: 13.8 % (ref 11.6–14.5)
EST. AVERAGE GLUCOSE BLD GHB EST-MCNC: 174 MG/DL
ETHANOL SERPL-MCNC: <3 MG/DL (ref 0–3)
GLOBULIN SER CALC-MCNC: 6.3 G/DL (ref 2–4)
GLUCOSE SERPL-MCNC: 196 MG/DL (ref 74–99)
GLUCOSE UR STRIP.AUTO-MCNC: 250 MG/DL
HBA1C MFR BLD: 7.7 % (ref 4.2–5.6)
HCT VFR BLD AUTO: 39.1 % (ref 36–48)
HDSCOM,HDSCOM: ABNORMAL
HGB BLD-MCNC: 12.3 G/DL (ref 13–16)
HGB UR QL STRIP: NEGATIVE
KETONES UR QL STRIP.AUTO: 15 MG/DL
LEUKOCYTE ESTERASE UR QL STRIP.AUTO: NEGATIVE
LIPASE SERPL-CCNC: 101 U/L (ref 73–393)
LYMPHOCYTES # BLD: 1.8 K/UL (ref 0.9–3.6)
LYMPHOCYTES NFR BLD: 11 % (ref 21–52)
MAGNESIUM SERPL-MCNC: 2.1 MG/DL (ref 1.6–2.6)
MCH RBC QN AUTO: 29.4 PG (ref 24–34)
MCHC RBC AUTO-ENTMCNC: 31.5 G/DL (ref 31–37)
MCV RBC AUTO: 93.3 FL (ref 78–100)
METHADONE UR QL: POSITIVE
MONOCYTES # BLD: 0.8 K/UL (ref 0.05–1.2)
MONOCYTES NFR BLD: 5 % (ref 3–10)
NEUTS SEG # BLD: 13.6 K/UL (ref 1.8–8)
NEUTS SEG NFR BLD: 83 % (ref 40–73)
NITRITE UR QL STRIP.AUTO: NEGATIVE
OPIATES UR QL: POSITIVE
P-R INTERVAL, ECG05: 202 MS
P-R INTERVAL, ECG05: 268 MS
PCP UR QL: NEGATIVE
PH UR STRIP: >8.5 [PH] (ref 5–8)
PLATELET # BLD AUTO: 351 K/UL (ref 135–420)
PMV BLD AUTO: 11.6 FL (ref 9.2–11.8)
POTASSIUM SERPL-SCNC: 3.2 MMOL/L (ref 3.5–5.5)
PROT SERPL-MCNC: 9.7 G/DL (ref 6.4–8.2)
PROT UR STRIP-MCNC: 30 MG/DL
Q-T INTERVAL, ECG07: 428 MS
Q-T INTERVAL, ECG07: 448 MS
Q-T INTERVAL, ECG07: 448 MS
Q-T INTERVAL, ECG07: 474 MS
QRS DURATION, ECG06: 76 MS
QRS DURATION, ECG06: 76 MS
QRS DURATION, ECG06: 78 MS
QRS DURATION, ECG06: 82 MS
QTC CALCULATION (BEZET), ECG08: 465 MS
QTC CALCULATION (BEZET), ECG08: 471 MS
QTC CALCULATION (BEZET), ECG08: 598 MS
QTC CALCULATION (BEZET), ECG08: 598 MS
RBC # BLD AUTO: 4.19 M/UL (ref 4.35–5.65)
RBC #/AREA URNS HPF: NEGATIVE /HPF (ref 0–5)
SODIUM SERPL-SCNC: 138 MMOL/L (ref 136–145)
SOURCE, COVRS: NORMAL
SP GR UR REFRACTOMETRY: 1.02 (ref 1–1.03)
TROPONIN I SERPL-MCNC: <0.02 NG/ML (ref 0–0.04)
UROBILINOGEN UR QL STRIP.AUTO: 1 EU/DL (ref 0.2–1)
VENTRICULAR RATE, ECG03: 107 BPM
VENTRICULAR RATE, ECG03: 107 BPM
VENTRICULAR RATE, ECG03: 58 BPM
VENTRICULAR RATE, ECG03: 73 BPM
WBC # BLD AUTO: 16.4 K/UL (ref 4.6–13.2)
WBC URNS QL MICRO: ABNORMAL /HPF (ref 0–5)

## 2021-09-24 PROCEDURE — 82077 ASSAY SPEC XCP UR&BREATH IA: CPT

## 2021-09-24 PROCEDURE — 70450 CT HEAD/BRAIN W/O DYE: CPT

## 2021-09-24 PROCEDURE — 96375 TX/PRO/DX INJ NEW DRUG ADDON: CPT

## 2021-09-24 PROCEDURE — 96374 THER/PROPH/DIAG INJ IV PUSH: CPT

## 2021-09-24 PROCEDURE — 80307 DRUG TEST PRSMV CHEM ANLYZR: CPT

## 2021-09-24 PROCEDURE — 83036 HEMOGLOBIN GLYCOSYLATED A1C: CPT

## 2021-09-24 PROCEDURE — 80053 COMPREHEN METABOLIC PANEL: CPT

## 2021-09-24 PROCEDURE — 81001 URINALYSIS AUTO W/SCOPE: CPT

## 2021-09-24 PROCEDURE — 99283 EMERGENCY DEPT VISIT LOW MDM: CPT

## 2021-09-24 PROCEDURE — 93005 ELECTROCARDIOGRAM TRACING: CPT

## 2021-09-24 PROCEDURE — 72125 CT NECK SPINE W/O DYE: CPT

## 2021-09-24 PROCEDURE — 84484 ASSAY OF TROPONIN QUANT: CPT

## 2021-09-24 PROCEDURE — 87635 SARS-COV-2 COVID-19 AMP PRB: CPT

## 2021-09-24 PROCEDURE — 85025 COMPLETE CBC W/AUTO DIFF WBC: CPT

## 2021-09-24 PROCEDURE — 74011250636 HC RX REV CODE- 250/636: Performed by: STUDENT IN AN ORGANIZED HEALTH CARE EDUCATION/TRAINING PROGRAM

## 2021-09-24 PROCEDURE — 83735 ASSAY OF MAGNESIUM: CPT

## 2021-09-24 PROCEDURE — 83690 ASSAY OF LIPASE: CPT

## 2021-09-24 PROCEDURE — 96376 TX/PRO/DX INJ SAME DRUG ADON: CPT

## 2021-09-24 RX ORDER — INSULIN LISPRO 100 [IU]/ML
INJECTION, SOLUTION INTRAVENOUS; SUBCUTANEOUS
Status: DISCONTINUED | OUTPATIENT
Start: 2021-09-24 | End: 2021-09-24 | Stop reason: HOSPADM

## 2021-09-24 RX ORDER — MAGNESIUM SULFATE 100 %
4 CRYSTALS MISCELLANEOUS AS NEEDED
Status: DISCONTINUED | OUTPATIENT
Start: 2021-09-24 | End: 2021-09-24 | Stop reason: HOSPADM

## 2021-09-24 RX ORDER — FENTANYL CITRATE 50 UG/ML
50 INJECTION, SOLUTION INTRAMUSCULAR; INTRAVENOUS
Status: COMPLETED | OUTPATIENT
Start: 2021-09-24 | End: 2021-09-24

## 2021-09-24 RX ORDER — DEXTROSE 50 % IN WATER (D50W) INTRAVENOUS SYRINGE
25-50 AS NEEDED
Status: DISCONTINUED | OUTPATIENT
Start: 2021-09-24 | End: 2021-09-24 | Stop reason: HOSPADM

## 2021-09-24 RX ORDER — ONDANSETRON 2 MG/ML
4 INJECTION INTRAMUSCULAR; INTRAVENOUS
Status: COMPLETED | OUTPATIENT
Start: 2021-09-24 | End: 2021-09-24

## 2021-09-24 RX ORDER — FENTANYL CITRATE 50 UG/ML
100 INJECTION, SOLUTION INTRAMUSCULAR; INTRAVENOUS
Status: COMPLETED | OUTPATIENT
Start: 2021-09-24 | End: 2021-09-24

## 2021-09-24 RX ADMIN — FENTANYL CITRATE 100 MCG: 50 INJECTION INTRAMUSCULAR; INTRAVENOUS at 08:52

## 2021-09-24 RX ADMIN — SODIUM CHLORIDE, SODIUM LACTATE, POTASSIUM CHLORIDE, AND CALCIUM CHLORIDE 1000 ML: 600; 310; 30; 20 INJECTION, SOLUTION INTRAVENOUS at 06:36

## 2021-09-24 RX ADMIN — FENTANYL CITRATE 50 MCG: 50 INJECTION INTRAMUSCULAR; INTRAVENOUS at 06:33

## 2021-09-24 RX ADMIN — ONDANSETRON 4 MG: 2 INJECTION INTRAMUSCULAR; INTRAVENOUS at 06:33

## 2021-09-24 NOTE — ED NOTES
Accepted patient in signout, in brief this is a 79-year-old male here for \"methadone withdrawal \", turned over to me pending medical clearance. Will need a crisis evaluation for assistance with his narcotic use/behaviors. Patient was seen by crisis, deemed appropriate for outpatient management of his methadone use.   Discharged home stable    Jessica De Jesus MD

## 2021-09-24 NOTE — ED NOTES
EMERGENCY DEPARTMENT HISTORY AND PHYSICAL EXAM      Patient Name: Orlando Lino    History of Presenting Illness     HPI:     60-year-old male with a history of HTN, CAD, HLD, GERD, remote heroin use currently on methadone, presents to the ED via EMS for evaluation of concerned that he is withdrawing from methadone. Reports generalized abdominal pain, nausea. States he was here yesterday and received 1 dose of methadone however he thinks it is wearing off and has not been able to follow-up with his methadone clinic yet. While in the waiting room, patient was seen sliding/lying himself on the floor and states it is because his pain was too much. He was immediately brought back to the main ER. He is alert and oriented x3, states he feels like he is withdrawn and does have some abdominal discomfort and nausea. Reports he thinks he may have passed out earlier in the waiting room when he fell to the floor.     PCP: Brandon Zaragoza MD    Current Facility-Administered Medications on File Prior to Encounter   Medication Dose Route Frequency Provider Last Rate Last Admin    [COMPLETED] lactated ringers bolus infusion 1,000 mL  1,000 mL IntraVENous ONCE Chanda Hassan MD   IV Completed at 09/23/21 0902    [COMPLETED] fentaNYL citrate (PF) injection 50 mcg  50 mcg IntraVENous NOW Raissa Knapp MD   50 mcg at 09/23/21 0831    [COMPLETED] potassium chloride 10 mEq in 100 ml IVPB  10 mEq IntraVENous NOW Raissa Knapp MD   IV Completed at 09/23/21 0924    [COMPLETED] magnesium sulfate 2 g/50 ml IVPB (premix or compounded)  2 g IntraVENous ONCE Raissa Knapp MD   IV Completed at 09/23/21 1024    [COMPLETED] promethazine (PHENERGAN) tablet 25 mg  25 mg Oral NOW Raissa Knapp MD   25 mg at 09/23/21 0948    [COMPLETED] methadone (DOLOPHINE) tablet 40 mg  40 mg Oral NOW Raissa Knapp MD   40 mg at 09/23/21 1542    [DISCONTINUED] potassium chloride (K-DUR, KLOR-CON) SR tablet 40 mEq  40 mEq Oral BID Ivelisse Ivy MD   40 mEq at 09/23/21 8632    [DISCONTINUED] insulin lispro (HUMALOG) injection   SubCUTAneous AC&HS Kimi Michael MD        [DISCONTINUED] glucose chewable tablet 16 g  4 Tablet Oral PRN Kimi Michael MD        [DISCONTINUED] glucagon (GLUCAGEN) injection 1 mg  1 mg IntraMUSCular PRN Kimi Michael MD        [DISCONTINUED] dextrose (D50W) injection syrg 12.5-25 g  25-50 mL IntraVENous PRN Kimi Michael MD         Current Outpatient Medications on File Prior to Encounter   Medication Sig Dispense Refill    clopidogreL (PLAVIX) 75 mg tab Take 75 mg by mouth daily. Indications: myocardial reinfarction prevention      traZODone (DESYREL) 150 mg tablet Take 150 mg by mouth nightly. Indications: insomnia associated with depression      oxyCODONE-acetaminophen (PERCOCET 7.5) 7.5-325 mg per tablet Take 1 Tab by mouth every six (6) hours as needed for Pain.  amLODIPine (NORVASC) 10 mg tablet Take 10 mg by mouth daily.  hydroCHLOROthiazide (HYDRODIURIL) 50 mg tablet Take 50 mg by mouth daily.  metFORMIN (GLUCOPHAGE) 1,000 mg tablet Take 1,000 mg by mouth two (2) times daily (with meals).  atorvastatin (LIPITOR) 20 mg tablet Take 1 Tab by mouth nightly. 30 Tab 0    OTHER BMP in 1 week  Dx: [facility]  Fax results to Dr. Sahil Reed 1 Each 0    insulin lispro (HUMALOG) 100 unit/mL injection Check FSBS AC*HS  For sugars between 160 and 200- Give 2 units SQ,  For sugars between 201 and 250, Give 3 units SQ,  For sugars Between 251 and 300, give 5 units SQ,  For Sugars between 301 and 350, give 7 units SQ  For sugars between 351 and 400, give 8 units SQ and contact PCP (Patient taking differently: by SubCUTAneous route two (2) times a day.  Check FSBS AC*HS  For sugars between 160 and 200- Give 2 units SQ,  For sugars between 201 and 250, Give 3 units SQ,  For sugars Between 251 and 300, give 5 units SQ,  For Sugars between 301 and 350, give 7 units SQ  For sugars between 351 and 400, give 8 units SQ and contact PCP) 1 Vial 0    insulin detemir U-100 (Levemir U-100 Insulin) 100 unit/mL injection 20 Units by SubCUTAneous route two (2) times a day. (Patient taking differently: 60 Units by SubCUTAneous route two (2) times a day. Pt states he is taking levemir 40 units twice daily) 10 mL 0    pantoprazole (PROTONIX) 40 mg tablet Take 1 Tab by mouth Before breakfast and dinner. 60 Tab 0    acetaminophen (TYLENOL) 325 mg tablet Take 2 Tabs by mouth every six (6) hours as needed. Indications: Pain, take it with bentyl; do not exceed 3 g tylenol daily (Patient not taking: Reported on 4/28/2021) 30 Tab 0    polyethylene glycol (MIRALAX) 17 gram packet Take 1 Packet by mouth daily. (Patient taking differently: Take 1 Packet by mouth daily.  MIX WITH 8OZ OF WATER) 30 Packet 0       Past History     Past Medical History:  Past Medical History:   Diagnosis Date    Arthritis     Coronary atherosclerosis of native coronary artery     S/P PCI with GE in 12/09    Diabetes ([facility] [address]     IDDM    Electrolyte and fluid disorders not elsewhere classified     Essential hypertension, benign     GERD (gastroesophageal reflux disease)     Heroin abuse ([facility] [address] 05/26/16    Psychiatrist Dr. Wynell Saint History of heart attack     Hyperlipidemia     Intermediate coronary syndrome [facility])     MDD (major depressive disorder) 5/26/16    Psychiatrist Dr. Wynell Saint Myocardial infarction [facility])     Obesity, unspecified     Old myocardial infarction     Other and unspecified hyperlipidemia     Pancreatitis     Positive PPD     Sleep apnea     uses Cpap machine    Transfusion history     Before 1992       Past Surgical History:  Past Surgical History:   Procedure Laterality Date    HX APPENDECTOMY      HX CORONARY STENT PLACEMENT  2010    2 stents       Family History:  Family History   Problem Relation Age of Onset    Heart Attack Father     Coronary Artery Disease Mother     Diabetes Mother     Cancer Sister         breast cancer and lung cancer       Social History:  Social History     Tobacco Use    Smoking status: Never Smoker    Smokeless tobacco: Never Used   Substance Use Topics    Alcohol use: No    Drug use: No       Allergies: Allergies   Allergen Reactions    Compazine [Prochlorperazine] Anaphylaxis     \"Tightness around throat\"       Review of Nursing Notes: I have reviewed the relevant nursing notes that were available at the time of this entry. Portions of the Family and Social history, as well as medications and allergies, may have been entered into my documentation by nursing or other ancillary staff; I have confirmed and may have supplemented that information to the best of my ability at the time the note was reviewed. There are some disagreements between the nursing notes and my evaluation at times. Some of the above referenced nursing documentation appears in my note after completion of my review. Review of Systems     CONSTITUTIONAL: Denies fevers, chills, sweats, or weight changes. EYES: Denies any visual changes or symptoms  HENT: Denies headaches, changes to hearing, rhinitis, sore throat, dysphagia, or change in voice. CV: Denies chest pains or palpitations. Lungs/Chest: Denies dyspnea, wheezing, or cough. GI: Denies diarrhea, constipation, hematochezia, or melena. : Denies urinary retention or incontinence. No genital discharge. No dysuria. MSK: Denies myalgias or arthralgias. NEURO: Denies chronic headaches or seizures. No numbness, tingling or weakness. PSYCHIATRIC: Denies problems with mood disturbance and anxiety. DERMATOLOGIC: Denies any rashes or skin changes. Physical Exam     General: In mild distress. Well-nourished/well-developed. Head/Neck: Normocephalic, atraumatic. Nontender midline neck, normal ROM. EENT: PERRLA. EOM intact bilaterally.  Oropharyngeal mucosa is moist, lesions or erythema. No nasal discharge. Cardiovascular: RRR, no murmurs, gallops, or rubs. Peripheral pulses normal and intact in BUE and BLE. Lungs/Chest: CTAB, no wheezing, rhonchi, or rales. Abdomen: No distention. No organomegaly. No rebound, rigidity, or guarding. Nontender. Extremities/Skin: Warm distal extremities No deformities. No edema. No rashes. Neuro: A&O x 3. Grossly intact sensations and motor function in upper and lower extremities bilaterally. Psych: Appropriate mood and affect. Diagnostic Study Results     Labs -     Recent Results (from the past 12 hour(s))   EKG, 12 LEAD, INITIAL    Collection Time: 09/24/21  5:22 AM   Result Value Ref Range    Ventricular Rate 73 BPM    Atrial Rate 83 BPM    P-R Interval 202 ms    QRS Duration 78 ms    Q-T Interval 428 ms    QTC Calculation (Bezet) 471 ms    Calculated P Axis 2 degrees    Calculated R Axis -11 degrees    Calculated T Axis -23 degrees    Diagnosis       Sinus rhythm with marked sinus arrhythmia  Inferior infarct (cited on or before 22-NOV-2020)  Abnormal ECG  When compared with ECG of 23-SEP-2021 13:47,  SC interval has decreased  Minimal criteria for Anterior infarct are no longer present     METABOLIC PANEL, COMPREHENSIVE    Collection Time: 09/24/21  6:43 AM   Result Value Ref Range    Sodium 138 136 - 145 mmol/L    Potassium 3.2 (L) 3.5 - 5.5 mmol/L    Chloride 102 100 - 111 mmol/L    CO2 25 21 - 32 mmol/L    Anion gap 11 3.0 - 18 mmol/L    Glucose 196 (H) 74 - 99 mg/dL    BUN 25 (H) 7.0 - 18 MG/DL    Creatinine 1.53 (H) 0.6 - 1.3 MG/DL    BUN/Creatinine ratio 16 12 - 20      GFR est AA 56 (L) >60 ml/min/1.73m2    GFR est non-AA 47 (L) >60 ml/min/1.73m2    Calcium 9.6 8.5 - 10.1 MG/DL    Bilirubin, total 1.2 (H) 0.2 - 1.0 MG/DL    ALT (SGPT) 21 16 - 61 U/L    AST (SGOT) 30 10 - 38 U/L    Alk.  phosphatase 134 (H) 45 - 117 U/L    Protein, total 9.7 (H) 6.4 - 8.2 g/dL    Albumin 3.4 3.4 - 5.0 g/dL    Globulin 6.3 (H) 2.0 - 4.0 g/dL    A-G Ratio 0.5 (L) 0.8 - 1.7     TROPONIN I    Collection Time: 09/24/21  6:43 AM   Result Value Ref Range    Troponin-I, QT <0.02 0.0 - 0.045 NG/ML   ETHYL ALCOHOL    Collection Time: 09/24/21  6:43 AM   Result Value Ref Range    ALCOHOL(ETHYL),SERUM <3 0 - 3 MG/DL   MAGNESIUM    Collection Time: 09/24/21  6:43 AM   Result Value Ref Range    Magnesium 2.1 1.6 - 2.6 mg/dL   URINALYSIS W/ RFLX MICROSCOPIC    Collection Time: 09/24/21  6:43 AM   Result Value Ref Range    Color YELLOW      Appearance CLEAR      Specific gravity 1.019 1.005 - 1.030      pH (UA) >8.5 (H) 5.0 - 8.0    Protein 30 (A) NEG mg/dL    Glucose 250 (A) NEG mg/dL    Ketone 15 (A) NEG mg/dL    Bilirubin Negative NEG      Blood Negative NEG      Urobilinogen 1.0 0.2 - 1.0 EU/dL    Nitrites Negative NEG      Leukocyte Esterase Negative NEG     DRUG SCREEN, URINE    Collection Time: 09/24/21  6:43 AM   Result Value Ref Range    BENZODIAZEPINES Negative NEG      BARBITURATES Negative NEG      THC (TH-CANNABINOL) Negative NEG      OPIATES Positive (A) NEG      PCP(PHENCYCLIDINE) Negative NEG      COCAINE Negative NEG      AMPHETAMINES Negative NEG      METHADONE Positive (A) NEG      HDSCOM (NOTE)    COVID-19 RAPID TEST    Collection Time: 09/24/21  6:43 AM   Result Value Ref Range    Specimen source Nasopharyngeal      COVID-19 rapid test Not detected NOTD     LIPASE    Collection Time: 09/24/21  6:43 AM   Result Value Ref Range    Lipase 101 73 - 393 U/L   URINE MICROSCOPIC ONLY    Collection Time: 09/24/21  6:43 AM   Result Value Ref Range    WBC 0 to 2 0 - 5 /hpf    RBC Negative 0 - 5 /hpf    Epithelial cells Negative 0 - 5 /lpf    Bacteria FEW (A) NEG /hpf    Amorphous Crystals 4+ (A) NEG       Radiologic Studies -   CT HEAD WO CONT    (Results Pending)   CT SPINE CERV WO CONT    (Results Pending)     CT Results  (Last 48 hours)               09/23/21 1133  CT ABD PELV WO CONT Final result    Impression:      No acute abnormalities. Hepatic steatosis. Right adrenal adenoma. Cholelithiasis without cholecystitis. Right renal cyst.       Narrative:  CT Of The Abdomen And Pelvis Without Contrast       CPT CODE 56106,96282       CLINICAL HISTORY: Hot and cold flashes. Withdrawal from methadone with nausea. .       TECHNIQUE: 5 mm helical MDCT scan was obtained of the abdomen and pelvis. Sagittal and coronal images created from original data set. All CT scans at   this facility are performed using dose optimization techniques as appropriate to   a performed exam, to include automated exposure control, adjustment of the mA   and/or kV according to patient's size (including appropriate matching for site   specific examinations), or use of iterative reconstruction technique. COMPARISON: None. FINDINGS:        CT Of The Abdomen and pelvis:       Lung bases, heart and pericardium: Linear density left base compatible with   scar. Heart and pericardium normal.       Liver: Liver is overall low attenuation. No focal lesions. No duct dilatation. Gallbladder/biliary:There are layering stones in the gallbladder. No distention. No gallbladder wall thickening or surrounding fluid. Spleen:Normal       Pancreas:Normal       Adrenals:Normal left adrenal. Small low-attenuation nodule right adrenal   measuring 14 mm and 13 Hounsfield units. Kidneys/ureters:No hydronephrosis or nephrolithiasis. Right kidney is a pelvic   kidney at the pelvic brim. Cyst at interpolar region right kidney measures 22   mm. Bowel:Stomach and duodenum and small bowel and colon are normal.       Lymph nodes:No adenopathy       Peritoneal space:No free fluid       Retroperitoneum:Normal       :Prostate and urinary bladder normal       MSK/body wall:Multifactorial severe spinal stenosis L4/L5, L5/S1. CXR Results  (Last 48 hours)    None          Medical Decision Making     I am the first provider for this patient. Vital Signs- I personally reviewed and interpreted the patient's vital signs. No data found. EKG interpretation: Normal sinus rhythm with sinus arrhythmia. Rate 73. Normal axis. Normal intervals. Nonspecific ST/T changes. EKG read and interpreted by ED physician at 1530    Records Reviewed: I reviewed the patient's records to interpret any previous medical data available to me including EKGs, previous medical records, previous images, previous labs. Provider Notes (Medical Decision Making):     Overall well-appearing 70-year-old male presents to the ED via EMS for evaluation of generalized abdominal pain, nausea, vomiting and concern that he is withdrawing from methadone. Patient was seen in the ER yesterday for similar symptoms and was discharged after being given a dose of methadone in the ER and instructed to follow-up in his methadone clinic. Patient missed his appointment, states he believes he may be discharged from their clinic. While in the waiting room, patient was seen slumping to the floor and he was on the ground writhing in pain. He was immediately brought back to the main ER. States he does not know if he hit his head however thinks he may have briefly passed out. He is overall well-appearing however continues to ask for methadone. I explained to him that we do need to get an EKG first and blood work and will treat his symptoms appropriately however I do not believe giving him another dose of methadone in the ER after he had it yesterday is feasible. Likely will need to see crisis to determine other resources for a long-term plan. Patient is agreeable. Will order CT brain, CT cervical for the fall, labs for medical clearance for crisis evaluation. We will give him 1 dose of Zofran and fentanyl for his pain his side vomiting. 7:30 AM : Pt care transferred to Dr. Ramsey Hdez  ,ED provider.  History of patient complaint(s), available diagnostic reports and current treatment plan has been discussed thoroughly. Bedside rounding on patient occured : no . Intended disposition of patient : TBD  Pending diagnostics reports and/or labs (please list): Labs and disposition. Dr. Laurie Hayes assistance in completion of this plan is greatly appreciated but it should be noted that I will be the provider of record for this patient.     Paulina Elaine DO  Date: 9/24/2021

## 2021-12-25 NOTE — PROGRESS NOTES
Reason for Admission:  Fracture, toe [S92.919A]                 RUR Score:    18%            Plan for utilizing home health:    Yes, FOC verbal consent given for [facility]. Likelihood of Readmission:   LOW                         Transition of Care Plan:              Initial assessment completed with patient. Cognitive status of patient: oriented to time, place, person and situation. Face sheet information confirmed:  yes. The patient designates his sister [place] [phone] to participate in his discharge plan and to receive any needed information. This patient lives in a single family home with his sister, with 2 steps to enter. Patient is able to navigate steps as needed. Prior to hospitalization, patient was considered to be independent with ADLs/IADLS : yes . Patient has a current ACP document on file: no. The patient's ex-wife or sister will be available to transport patient home upon discharge. The patient already has [employer],  medical equipment available in the home. Patient is not currently active with home health. Patient has not stayed in a skilled nursing facility or rehab. This patient is on dialysis :no    List of available Home Health agencies were provided and reviewed with the patient prior to discharge. [place] of choice verbal consent given: yes, for [facility]. Currently, the discharge plan is Home with [address] [place] [address] Yahir Tyler. The patient states that he can obtain his medications from the pharmacy, and take his medications as directed. Patient's current insurance is [facility] Medicaid/[place] [employer]. Care Management Interventions  PCP Verified by CM:  Yes  Mode of Transport at Discharge: Self(Patient said his ex-wife or sister will transport him home at time of discharge. )  Transition of Care Consult (CM Consult): [address]: Yes  Discharge Durable Medical Equipment: No  Physical Therapy Consult: No  Occupational Therapy Consult: No  Speech Therapy Consult: No  Current Support Network: Relative's Home(Patient lives with his sister.)  Confirm Follow Up Transport: Family  The Plan for Transition of Care is Related to the Following Treatment Goals : Home with [address] [place] [address] Yahir De Yuki  The Patient and/or Patient Representative was Provided with a Choice of Provider and Agrees with the Discharge Plan?: Yes  Freedom of Choice List was Provided with Basic Dialogue that Supports the Patient's Individualized Plan of Care/Goals, Treatment Preferences and Shares the Quality Data Associated with the Providers?: Yes  Discharge Location  Discharge Placement: Home with home health        Qian Flores RN  Case Management [phone]      Readmission Assessment  Number of days since last admission?: 8-30 days  Previous disposition: Home with Family  Who is being interviewed?: Patient  What was the patient's/caregiver's perception as to why they think they needed to return back to the hospital?: (Tissue swelling ,and surgery scheduled for 09/26/2020)  Did you visit your Primary Care Physician after you left the hospital, before you returned this time?: No  Why weren't you able to visit your PCP?: Other (Comment)(Patient saw a Specialist instead.)  Did you see a specialist, such as Cardiac, Pulmonary, Orthopedic Physician, etc. after you left the hospital?: Yes  Who advised the patient to return to the hospital?: Physician  Does the patient report anything that got in the way of taking their medications?: No  In our efforts to provide the best possible care to you and others like you, can you think of anything that we could have done to help you after you left the hospital the first time, so that you might not have needed to return so soon?: Other (Comment)(\"No.\") 5

## 2022-01-19 NOTE — OP NOTES
Operative Report     Patient: Desi Jimenez MRN: [id number]  SSN: [id number]    YOB: 1960  Age: 61 y.o. Sex: male       Indications: The patient was admitted to the hospital for right foot cellulitis/abscess and osteomyelitis of first metatarsal. Patient had x-rays confirming soft tissue gas extending into first intermetatarsal space as well as cortical changes at remaining first metatarsal stump. Patient also noted to have leukocystosis with WBC of 27 on admission. Patient needed urgent I & D with debridement and resection of distal first metatarsal. All risks, benefits, complications of procedure discussed in detail with patient. No guarantee made to outcome of procedure. Patient voiced understanding and signed consent. Date of Surgery: 2/5/2021     Preoperative Diagnosis:  RIGHT FOOT CELLULITIS/ABSCESS, RIGHT FIRST METATARSAL OSTEOMYELITIS    Postoperative Diagnosis: Same    Surgeon(s) and Role:     * Zahcary Pena DPM - Primary      Surgical Assistants: Operating Room Staff    Anesthesia: MAC WITH LOCAL    Procedure:    RIGHT FOOT INCISION AND DRAINAGE, DEBRIDEMENT OF NECROTIC TISSUE, RESECTION OF PARTIAL FIRST METATARSAL    Procedure in Detail:     The patient was brought to the operating room and placed on operating table in supine position. After IV sedation from department of anesthesia, local block consisting of 10 cc of 1:1 mixture of 1% lidocaine plain and 0.5% marcaine plain administered proximal to surgical site. Right foot prepped and draped in usual sterile fashion. Attention then directed to dorsal first metatarsal where longitudinal incision of about 5 cm performed. Incision deepened down to level of bone with # 10 blade. Capsule and periosteum reflected off metatarsal medially and laterally. Purulence expressed upon freeing up soft tissue. Swab culture obtained for aerobic and anaerobic analysis. Hemostat used to open up soft tissue planes.  Distal 1.5 cm of first metatarsal resected with sagittal saw and sent to both pathology and micro. Surgical site irrigated with 3000 mL of normal saline with  solution using pulse lavage. Additional distal 0.5 cm of first metatarsal resected with sagittal saw and sent to pathology and micro as clean margin. Bone noted to be of firm at resection margin. Bony rasp used to smoothened rough bone. Surgical site again irrigated with normal saline. Dorsal incision loosely approximated with 3-0 prolene suture. Plantar opening of about 2 cm packed with iodoform sterile packing strip. Surgical site dressed with dry gauze. The patient tolerated the procedure and anesthesia well. The patient was sent to the recovery room in apparent satisfactory condition. Findings:  Distal first metatarsal noted to be dystrophic, brittle.      Estimated Blood Loss:  Minimal    Drains: none           Specimens:   ID Type Source Tests Collected by Time Destination   1 : RIGHT FOOT FIRST METATARSAL Preservative Foot, Right  Gareth Muta, DP 2/5/2021 2026 Pathology   2 : RIGHT FOOT FIRST METATARSAL, CLEAN MARGIN Preservative Foot, Right  Gareth Muta, DP 2/5/2021 2038 Pathology   1 : FIRST METATARSAL , CLEAN MARGIN Wound Foot, Right CULTURE, ANAEROBIC, CULTURE, WOUND W Michael Doran, DP 2/5/2021 2039 Microbiology               Implants:  * No implants in log *           Complications:  None prepare pt for intubation; nohemi tx ordered

## 2022-02-28 ENCOUNTER — HOSPITAL ENCOUNTER (INPATIENT)
Age: 62
LOS: 4 days | Discharge: HOME | DRG: 314 | End: 2022-03-04
Attending: STUDENT IN AN ORGANIZED HEALTH CARE EDUCATION/TRAINING PROGRAM | Admitting: HOSPITALIST
Payer: MEDICAID

## 2022-02-28 ENCOUNTER — APPOINTMENT (OUTPATIENT)
Dept: GENERAL RADIOLOGY | Age: 62
DRG: 314 | End: 2022-02-28
Attending: PHYSICIAN ASSISTANT
Payer: MEDICAID

## 2022-02-28 DIAGNOSIS — E11.65 TYPE 2 DIABETES MELLITUS WITH HYPERGLYCEMIA, WITH LONG-TERM CURRENT USE OF INSULIN (HCC): Chronic | ICD-10-CM

## 2022-02-28 DIAGNOSIS — L97.519 ULCER OF RIGHT FOOT, UNSPECIFIED ULCER STAGE (HCC): ICD-10-CM

## 2022-02-28 DIAGNOSIS — I10 HYPERTENSION, UNSPECIFIED TYPE: ICD-10-CM

## 2022-02-28 DIAGNOSIS — M86.9 OSTEOMYELITIS OF LEFT FOOT, UNSPECIFIED TYPE (HCC): Primary | ICD-10-CM

## 2022-02-28 DIAGNOSIS — Z79.4 TYPE 2 DIABETES MELLITUS WITH HYPERGLYCEMIA, WITH LONG-TERM CURRENT USE OF INSULIN (HCC): Chronic | ICD-10-CM

## 2022-02-28 LAB
ALBUMIN SERPL-MCNC: 3.1 G/DL (ref 3.4–5)
ALBUMIN/GLOB SERPL: 0.5 {RATIO} (ref 0.8–1.7)
ALP SERPL-CCNC: 115 U/L (ref 45–117)
ALT SERPL-CCNC: 24 U/L (ref 16–61)
ANION GAP SERPL CALC-SCNC: 4 MMOL/L (ref 3–18)
AST SERPL-CCNC: 20 U/L (ref 10–38)
BASOPHILS # BLD: 0.1 K/UL (ref 0–0.1)
BASOPHILS NFR BLD: 0 % (ref 0–2)
BILIRUB SERPL-MCNC: 0.5 MG/DL (ref 0.2–1)
BUN SERPL-MCNC: 15 MG/DL (ref 7–18)
BUN/CREAT SERPL: 12 (ref 12–20)
CALCIUM SERPL-MCNC: 9.1 MG/DL (ref 8.5–10.1)
CHLORIDE SERPL-SCNC: 100 MMOL/L (ref 100–111)
CO2 SERPL-SCNC: 32 MMOL/L (ref 21–32)
CREAT SERPL-MCNC: 1.26 MG/DL (ref 0.6–1.3)
DIFFERENTIAL METHOD BLD: ABNORMAL
EOSINOPHIL # BLD: 0.2 K/UL (ref 0–0.4)
EOSINOPHIL NFR BLD: 1 % (ref 0–5)
ERYTHROCYTE [DISTWIDTH] IN BLOOD BY AUTOMATED COUNT: 13.4 % (ref 11.6–14.5)
GLOBULIN SER CALC-MCNC: 5.7 G/DL (ref 2–4)
GLUCOSE BLD STRIP.AUTO-MCNC: 138 MG/DL (ref 70–110)
GLUCOSE BLD STRIP.AUTO-MCNC: 181 MG/DL (ref 70–110)
GLUCOSE SERPL-MCNC: 143 MG/DL (ref 74–99)
HCT VFR BLD AUTO: 31 % (ref 36–48)
HGB BLD-MCNC: 9.8 G/DL (ref 13–16)
IMM GRANULOCYTES # BLD AUTO: 0.1 K/UL (ref 0–0.04)
IMM GRANULOCYTES NFR BLD AUTO: 1 % (ref 0–0.5)
LACTATE BLD-SCNC: 1.33 MMOL/L (ref 0.4–2)
LYMPHOCYTES # BLD: 1.7 K/UL (ref 0.9–3.6)
LYMPHOCYTES NFR BLD: 11 % (ref 21–52)
MAGNESIUM SERPL-MCNC: 1.8 MG/DL (ref 1.6–2.6)
MCH RBC QN AUTO: 32.1 PG (ref 24–34)
MCHC RBC AUTO-ENTMCNC: 31.6 G/DL (ref 31–37)
MCV RBC AUTO: 101.6 FL (ref 78–100)
MONOCYTES # BLD: 1.2 K/UL (ref 0.05–1.2)
MONOCYTES NFR BLD: 8 % (ref 3–10)
NEUTS SEG # BLD: 11.9 K/UL (ref 1.8–8)
NEUTS SEG NFR BLD: 79 % (ref 40–73)
NRBC # BLD: 0 K/UL (ref 0–0.01)
NRBC BLD-RTO: 0 PER 100 WBC
PLATELET # BLD AUTO: 285 K/UL (ref 135–420)
PMV BLD AUTO: 11.5 FL (ref 9.2–11.8)
POTASSIUM SERPL-SCNC: 3.8 MMOL/L (ref 3.5–5.5)
PROT SERPL-MCNC: 8.8 G/DL (ref 6.4–8.2)
RBC # BLD AUTO: 3.05 M/UL (ref 4.35–5.65)
SODIUM SERPL-SCNC: 136 MMOL/L (ref 136–145)
WBC # BLD AUTO: 15 K/UL (ref 4.6–13.2)

## 2022-02-28 PROCEDURE — 96374 THER/PROPH/DIAG INJ IV PUSH: CPT

## 2022-02-28 PROCEDURE — 2709999900 HC NON-CHARGEABLE SUPPLY

## 2022-02-28 PROCEDURE — 83605 ASSAY OF LACTIC ACID: CPT

## 2022-02-28 PROCEDURE — 96375 TX/PRO/DX INJ NEW DRUG ADDON: CPT

## 2022-02-28 PROCEDURE — 87186 SC STD MICRODIL/AGAR DIL: CPT

## 2022-02-28 PROCEDURE — 80053 COMPREHEN METABOLIC PANEL: CPT

## 2022-02-28 PROCEDURE — 85025 COMPLETE CBC W/AUTO DIFF WBC: CPT

## 2022-02-28 PROCEDURE — 87205 SMEAR GRAM STAIN: CPT

## 2022-02-28 PROCEDURE — 74011250637 HC RX REV CODE- 250/637: Performed by: HOSPITALIST

## 2022-02-28 PROCEDURE — 73630 X-RAY EXAM OF FOOT: CPT

## 2022-02-28 PROCEDURE — 83735 ASSAY OF MAGNESIUM: CPT

## 2022-02-28 PROCEDURE — 99222 1ST HOSP IP/OBS MODERATE 55: CPT | Performed by: HOSPITALIST

## 2022-02-28 PROCEDURE — 74011000250 HC RX REV CODE- 250: Performed by: PHYSICIAN ASSISTANT

## 2022-02-28 PROCEDURE — 74011250636 HC RX REV CODE- 250/636: Performed by: HOSPITALIST

## 2022-02-28 PROCEDURE — 65270000029 HC RM PRIVATE

## 2022-02-28 PROCEDURE — 74011250636 HC RX REV CODE- 250/636: Performed by: EMERGENCY MEDICINE

## 2022-02-28 PROCEDURE — 87040 BLOOD CULTURE FOR BACTERIA: CPT

## 2022-02-28 PROCEDURE — 74011636637 HC RX REV CODE- 636/637: Performed by: HOSPITALIST

## 2022-02-28 PROCEDURE — 99285 EMERGENCY DEPT VISIT HI MDM: CPT

## 2022-02-28 PROCEDURE — 74011000258 HC RX REV CODE- 258: Performed by: HOSPITALIST

## 2022-02-28 PROCEDURE — 74011000250 HC RX REV CODE- 250: Performed by: HOSPITALIST

## 2022-02-28 PROCEDURE — 87077 CULTURE AEROBIC IDENTIFY: CPT

## 2022-02-28 PROCEDURE — 82962 GLUCOSE BLOOD TEST: CPT

## 2022-02-28 PROCEDURE — 74011250636 HC RX REV CODE- 250/636: Performed by: PHYSICIAN ASSISTANT

## 2022-02-28 PROCEDURE — 93005 ELECTROCARDIOGRAM TRACING: CPT

## 2022-02-28 RX ORDER — ACETAMINOPHEN 325 MG/1
650 TABLET ORAL
Status: DISCONTINUED | OUTPATIENT
Start: 2022-02-28 | End: 2022-03-04 | Stop reason: HOSPADM

## 2022-02-28 RX ORDER — AMLODIPINE BESYLATE 10 MG/1
10 TABLET ORAL DAILY
Status: DISCONTINUED | OUTPATIENT
Start: 2022-03-01 | End: 2022-03-04 | Stop reason: HOSPADM

## 2022-02-28 RX ORDER — SODIUM CHLORIDE 0.9 % (FLUSH) 0.9 %
5-40 SYRINGE (ML) INJECTION EVERY 8 HOURS
Status: DISCONTINUED | OUTPATIENT
Start: 2022-02-28 | End: 2022-03-04 | Stop reason: HOSPADM

## 2022-02-28 RX ORDER — INSULIN LISPRO 100 [IU]/ML
INJECTION, SOLUTION INTRAVENOUS; SUBCUTANEOUS
Status: DISCONTINUED | OUTPATIENT
Start: 2022-02-28 | End: 2022-03-04 | Stop reason: HOSPADM

## 2022-02-28 RX ORDER — ATORVASTATIN CALCIUM 20 MG/1
20 TABLET, FILM COATED ORAL
Status: DISCONTINUED | OUTPATIENT
Start: 2022-02-28 | End: 2022-03-04 | Stop reason: HOSPADM

## 2022-02-28 RX ORDER — PANTOPRAZOLE SODIUM 40 MG/1
40 TABLET, DELAYED RELEASE ORAL
Status: DISCONTINUED | OUTPATIENT
Start: 2022-03-01 | End: 2022-03-04 | Stop reason: HOSPADM

## 2022-02-28 RX ORDER — DEXTROSE MONOHYDRATE 100 MG/ML
0-250 INJECTION, SOLUTION INTRAVENOUS AS NEEDED
Status: DISCONTINUED | OUTPATIENT
Start: 2022-02-28 | End: 2022-03-04 | Stop reason: HOSPADM

## 2022-02-28 RX ORDER — INSULIN GLARGINE 100 [IU]/ML
25 INJECTION, SOLUTION SUBCUTANEOUS EVERY 12 HOURS
Status: DISCONTINUED | OUTPATIENT
Start: 2022-02-28 | End: 2022-03-01

## 2022-02-28 RX ORDER — ACETAMINOPHEN 650 MG/1
650 SUPPOSITORY RECTAL
Status: DISCONTINUED | OUTPATIENT
Start: 2022-02-28 | End: 2022-03-04 | Stop reason: HOSPADM

## 2022-02-28 RX ORDER — MORPHINE SULFATE 4 MG/ML
4 INJECTION INTRAVENOUS
Status: COMPLETED | OUTPATIENT
Start: 2022-02-28 | End: 2022-02-28

## 2022-02-28 RX ORDER — OXYCODONE AND ACETAMINOPHEN 7.5; 325 MG/1; MG/1
1 TABLET ORAL
Status: DISCONTINUED | OUTPATIENT
Start: 2022-02-28 | End: 2022-03-02

## 2022-02-28 RX ORDER — POLYETHYLENE GLYCOL 3350 17 G/17G
17 POWDER, FOR SOLUTION ORAL DAILY PRN
Status: DISCONTINUED | OUTPATIENT
Start: 2022-02-28 | End: 2022-03-04 | Stop reason: HOSPADM

## 2022-02-28 RX ORDER — VANCOMYCIN HYDROCHLORIDE
1250
Status: DISCONTINUED | OUTPATIENT
Start: 2022-03-01 | End: 2022-03-02

## 2022-02-28 RX ORDER — MAGNESIUM SULFATE 100 %
4 CRYSTALS MISCELLANEOUS AS NEEDED
Status: DISCONTINUED | OUTPATIENT
Start: 2022-02-28 | End: 2022-03-04 | Stop reason: HOSPADM

## 2022-02-28 RX ORDER — VANCOMYCIN 2 GRAM/500 ML IN 0.9 % SODIUM CHLORIDE INTRAVENOUS
2000 ONCE
Status: COMPLETED | OUTPATIENT
Start: 2022-02-28 | End: 2022-02-28

## 2022-02-28 RX ORDER — MORPHINE SULFATE 2 MG/ML
2 INJECTION, SOLUTION INTRAMUSCULAR; INTRAVENOUS
Status: DISCONTINUED | OUTPATIENT
Start: 2022-02-28 | End: 2022-03-02

## 2022-02-28 RX ORDER — POLYETHYLENE GLYCOL 3350 17 G/17G
17 POWDER, FOR SOLUTION ORAL DAILY
Status: DISCONTINUED | OUTPATIENT
Start: 2022-03-01 | End: 2022-03-04 | Stop reason: HOSPADM

## 2022-02-28 RX ORDER — HYDRALAZINE HYDROCHLORIDE 20 MG/ML
10 INJECTION INTRAMUSCULAR; INTRAVENOUS
Status: DISCONTINUED | OUTPATIENT
Start: 2022-02-28 | End: 2022-03-04 | Stop reason: HOSPADM

## 2022-02-28 RX ORDER — SODIUM CHLORIDE 0.9 % (FLUSH) 0.9 %
5-40 SYRINGE (ML) INJECTION AS NEEDED
Status: DISCONTINUED | OUTPATIENT
Start: 2022-02-28 | End: 2022-03-04 | Stop reason: HOSPADM

## 2022-02-28 RX ADMIN — VANCOMYCIN HYDROCHLORIDE 2000 MG: 10 INJECTION, POWDER, LYOPHILIZED, FOR SOLUTION INTRAVENOUS at 17:48

## 2022-02-28 RX ADMIN — SODIUM CHLORIDE, PRESERVATIVE FREE 10 ML: 5 INJECTION INTRAVENOUS at 22:07

## 2022-02-28 RX ADMIN — PIPERACILLIN AND TAZOBACTAM 4.5 G: 4; .5 INJECTION, POWDER, FOR SOLUTION INTRAVENOUS at 22:19

## 2022-02-28 RX ADMIN — CEFEPIME HYDROCHLORIDE 2 G: 2 INJECTION, POWDER, FOR SOLUTION INTRAVENOUS at 17:42

## 2022-02-28 RX ADMIN — Medication 25 UNITS: at 22:18

## 2022-02-28 RX ADMIN — MORPHINE SULFATE 2 MG: 2 INJECTION, SOLUTION INTRAMUSCULAR; INTRAVENOUS at 22:07

## 2022-02-28 RX ADMIN — MORPHINE SULFATE 4 MG: 4 INJECTION INTRAVENOUS at 16:25

## 2022-02-28 RX ADMIN — Medication 2 UNITS: at 22:18

## 2022-02-28 RX ADMIN — ATORVASTATIN CALCIUM 20 MG: 20 TABLET, FILM COATED ORAL at 22:07

## 2022-02-28 NOTE — ED TRIAGE NOTES
Pt was sent by his foot doctor for osteomyelitis of the left foot. Pt states he has had an ulcer on the left big toe for \"a while\"and states he was told it needed to be removed.

## 2022-02-28 NOTE — ED PROVIDER NOTES
EMERGENCY DEPARTMENT HISTORY AND PHYSICAL EXAM    Date: 2/28/2022  Patient Name: Bhakti Ortiz    History of Presenting Illness     Chief Complaint   Patient presents with    Foot Pain     left    Skin Problem         History Provided By: Patient    Chief Complaint: Left foot osteomyelitis and wound  Duration: \"A while\"  Timing: Constant  Location: Left foot   Quality: Draining  Severity: Moderate  Modifying Factors: None  Associated Symptoms: none       Additional History (Context): Bhakti Ortiz is a 64 y.o. male with a history of substance abuse, hypertension and diabetes who presents today for issues listed above. Patient reports he was at his podiatrist office today when he was advised to come to the emergency department for admission for osteomyelitis. Patient reports that this wound has been on his left foot for many months but is not getting any better. Patient reports some purulent drainage. Denies any recent fevers or chills. Denies any recent antibiotic use. Denies any recent injury or trauma. Patient reports he is on insulin and has been compliant. Denies any other complaints at this time      PCP: Michael Gunter MD    Current Facility-Administered Medications   Medication Dose Route Frequency Provider Last Rate Last Admin    vancomycin (VANCOCIN) 2000 mg in  ml infusion  2,000 mg IntraVENous ONCE Sukhjinder Pierre  mL/hr at 02/28/22 1748 2,000 mg at 02/28/22 1748    [START ON 3/1/2022] cefepime (MAXIPIME) 2 g in 0.9% sodium chloride (MBP/ADV) 100 mL MBP  2 g IntraVENous Q8H Lisbet Joshi, PA         Current Outpatient Medications   Medication Sig Dispense Refill    clopidogreL (PLAVIX) 75 mg tab Take 75 mg by mouth daily. Indications: myocardial reinfarction prevention      traZODone (DESYREL) 150 mg tablet Take 150 mg by mouth nightly.  Indications: insomnia associated with depression      oxyCODONE-acetaminophen (PERCOCET 7.5) 7.5-325 mg per tablet Take 1 Tab by mouth every six (6) hours as needed for Pain.  amLODIPine (NORVASC) 10 mg tablet Take 10 mg by mouth daily.  hydroCHLOROthiazide (HYDRODIURIL) 50 mg tablet Take 50 mg by mouth daily.  metFORMIN (GLUCOPHAGE) 1,000 mg tablet Take 1,000 mg by mouth two (2) times daily (with meals).  atorvastatin (LIPITOR) 20 mg tablet Take 1 Tab by mouth nightly. 30 Tab 0    OTHER BMP in 1 week  Dx: ARF  Fax results to Dr. Domenica Fall 1 Each 0    insulin lispro (HUMALOG) 100 unit/mL injection Check FSBS AC*HS  For sugars between 160 and 200- Give 2 units SQ,  For sugars between 201 and 250, Give 3 units SQ,  For sugars Between 251 and 300, give 5 units SQ,  For Sugars between 301 and 350, give 7 units SQ  For sugars between 351 and 400, give 8 units SQ and contact PCP (Patient taking differently: by SubCUTAneous route two (2) times a day. Check FSBS AC*HS  For sugars between 160 and 200- Give 2 units SQ,  For sugars between 201 and 250, Give 3 units SQ,  For sugars Between 251 and 300, give 5 units SQ,  For Sugars between 301 and 350, give 7 units SQ  For sugars between 351 and 400, give 8 units SQ and contact PCP) 1 Vial 0    insulin detemir U-100 (Levemir U-100 Insulin) 100 unit/mL injection 20 Units by SubCUTAneous route two (2) times a day. (Patient taking differently: 60 Units by SubCUTAneous route two (2) times a day. Pt states he is taking levemir 40 units twice daily) 10 mL 0    pantoprazole (PROTONIX) 40 mg tablet Take 1 Tab by mouth Before breakfast and dinner. 60 Tab 0    acetaminophen (TYLENOL) 325 mg tablet Take 2 Tabs by mouth every six (6) hours as needed. Indications: Pain, take it with bentyl; do not exceed 3 g tylenol daily (Patient not taking: Reported on 4/28/2021) 30 Tab 0    polyethylene glycol (MIRALAX) 17 gram packet Take 1 Packet by mouth daily. (Patient taking differently: Take 1 Packet by mouth daily.  MIX WITH 8OZ OF WATER) 30 Packet 0       Past History Past Medical History:  Past Medical History:   Diagnosis Date    Arthritis     Coronary atherosclerosis of native coronary artery     S/P PCI with GE in 12/09    Diabetes ([facility] [address]     IDDM    Electrolyte and fluid disorders not elsewhere classified     Essential hypertension, benign     GERD (gastroesophageal reflux disease)     Heroin abuse ([facility] [address] 05/26/16    Psychiatrist Dr. Kiara Montanze History of heart attack     Hyperlipidemia     Intermediate coronary syndrome [facility])     MDD (major depressive disorder) 5/26/16    Psychiatrist Dr. Kiara Montanez Myocardial infarction [facility])     Obesity, unspecified     Old myocardial infarction     Other and unspecified hyperlipidemia     Pancreatitis     Positive PPD     Sleep apnea     uses Cpap machine    Transfusion history     Before 1992       Past Surgical History:  Past Surgical History:   Procedure Laterality Date    HX APPENDECTOMY      HX CORONARY STENT PLACEMENT  2010    2 stents       Family History:  Family History   Problem Relation Age of Onset    Heart Attack Father     Coronary Art Dis Mother     Diabetes Mother     Cancer Sister         breast cancer and lung cancer       Social History:  Social History     Tobacco Use    Smoking status: Never Smoker    Smokeless tobacco: Never Used   Substance Use Topics    Alcohol use: No    Drug use: No       Allergies: Allergies   Allergen Reactions    Compazine [Prochlorperazine] Anaphylaxis     \"Tightness around throat\"         Review of Systems   Review of Systems   Constitutional: Negative for chills and fever. HENT: Negative for congestion, rhinorrhea and sore throat. Respiratory: Negative for cough and shortness of breath. Cardiovascular: Negative for chest pain. Gastrointestinal: Negative for abdominal pain, blood in stool, constipation, diarrhea, nausea and vomiting. Genitourinary: Negative for dysuria, frequency and hematuria.    Musculoskeletal: Negative for back pain and myalgias. Skin: Positive for wound. Negative for rash. Neurological: Negative for dizziness and headaches. All other systems reviewed and are negative. All Other Systems Negative  Physical Exam     Vitals:    02/28/22 1558   BP: 135/65   Pulse: 78   Resp: 18   Temp: 99.3 °F (37.4 °C)   SpO2: 95%   Weight: 111.1 kg (245 lb)   Height: 5' 10\" (1.778 m)     Physical Exam  Vitals and nursing note reviewed. Constitutional:       General: He is not in acute distress. Appearance: He is well-developed. He is not diaphoretic. HENT:      Head: Normocephalic and atraumatic. Eyes:      Conjunctiva/sclera: Conjunctivae normal.   Cardiovascular:      Rate and Rhythm: Normal rate and regular rhythm. Heart sounds: Normal heart sounds. Pulmonary:      Effort: Pulmonary effort is normal. No respiratory distress. Breath sounds: Normal breath sounds. Chest:      Chest wall: No tenderness. Abdominal:      General: Bowel sounds are normal. There is no distension. Palpations: Abdomen is soft. Tenderness: There is no abdominal tenderness. There is no guarding or rebound. Musculoskeletal:         General: No deformity. Normal range of motion. Cervical back: Normal range of motion and neck supple. Skin:     General: Skin is warm and dry. Comments: Dime sized ulcer noted to the plantar surface of the left foot      Chronic appearing wound noted to the distal aspect of the left first toe     Large open wound noted to the right lateral foot     Strong DP and PT pulses    Neurological:      Mental Status: He is alert and oriented to person, place, and time.            Diagnostic Study Results     Labs -     Recent Results (from the past 12 hour(s))   CBC WITH AUTOMATED DIFF    Collection Time: 02/28/22  4:15 PM   Result Value Ref Range    WBC 15.0 (H) 4.6 - 13.2 K/uL    RBC 3.05 (L) 4.35 - 5.65 M/uL    HGB 9.8 (L) 13.0 - 16.0 g/dL    HCT 31.0 (L) 36.0 - 48.0 %    .6 (H) 78.0 - 100.0 FL    MCH 32.1 24.0 - 34.0 PG    MCHC 31.6 31.0 - 37.0 g/dL    RDW 13.4 11.6 - 14.5 %    PLATELET 294 879 - 940 K/uL    MPV 11.5 9.2 - 11.8 FL    NRBC 0.0 0  WBC    ABSOLUTE NRBC 0.00 0.00 - 0.01 K/uL    NEUTROPHILS 79 (H) 40 - 73 %    LYMPHOCYTES 11 (L) 21 - 52 %    MONOCYTES 8 3 - 10 %    EOSINOPHILS 1 0 - 5 %    BASOPHILS 0 0 - 2 %    IMMATURE GRANULOCYTES 1 (H) 0.0 - 0.5 %    ABS. NEUTROPHILS 11.9 (H) 1.8 - 8.0 K/UL    ABS. LYMPHOCYTES 1.7 0.9 - 3.6 K/UL    ABS. MONOCYTES 1.2 0.05 - 1.2 K/UL    ABS. EOSINOPHILS 0.2 0.0 - 0.4 K/UL    ABS. BASOPHILS 0.1 0.0 - 0.1 K/UL    ABS. IMM. GRANS. 0.1 (H) 0.00 - 0.04 K/UL    DF AUTOMATED     METABOLIC PANEL, COMPREHENSIVE    Collection Time: 02/28/22  4:15 PM   Result Value Ref Range    Sodium 136 136 - 145 mmol/L    Potassium 3.8 3.5 - 5.5 mmol/L    Chloride 100 100 - 111 mmol/L    CO2 32 21 - 32 mmol/L    Anion gap 4 3.0 - 18 mmol/L    Glucose 143 (H) 74 - 99 mg/dL    BUN 15 7.0 - 18 MG/DL    Creatinine 1.26 0.6 - 1.3 MG/DL    BUN/Creatinine ratio 12 12 - 20      GFR est AA >60 >60 ml/min/1.73m2    GFR est non-AA 58 (L) >60 ml/min/1.73m2    Calcium 9.1 8.5 - 10.1 MG/DL    Bilirubin, total 0.5 0.2 - 1.0 MG/DL    ALT (SGPT) 24 16 - 61 U/L    AST (SGOT) 20 10 - 38 U/L    Alk.  phosphatase 115 45 - 117 U/L    Protein, total 8.8 (H) 6.4 - 8.2 g/dL    Albumin 3.1 (L) 3.4 - 5.0 g/dL    Globulin 5.7 (H) 2.0 - 4.0 g/dL    A-G Ratio 0.5 (L) 0.8 - 1.7     MAGNESIUM    Collection Time: 02/28/22  4:15 PM   Result Value Ref Range    Magnesium 1.8 1.6 - 2.6 mg/dL   POC LACTIC ACID    Collection Time: 02/28/22  4:26 PM   Result Value Ref Range    Lactic Acid (POC) 1.33 0.40 - 2.00 mmol/L   GLUCOSE, POC    Collection Time: 02/28/22  4:44 PM   Result Value Ref Range    Glucose (POC) 138 (H) 70 - 110 mg/dL   EKG, 12 LEAD, INITIAL    Collection Time: 02/28/22  5:32 PM   Result Value Ref Range    Ventricular Rate 58 BPM    Atrial Rate 58 BPM    P-R Interval 252 ms QRS Duration 84 ms    Q-T Interval 404 ms    QTC Calculation (Bezet) 396 ms    Calculated P Axis 55 degrees    Calculated R Axis -6 degrees    Calculated T Axis 31 degrees    Diagnosis       Sinus bradycardia with sinus arrhythmia with 1st degree AV block  Inferior infarct (cited on or before 22-NOV-2020)  Cannot rule out Anterior infarct , age undetermined  Abnormal ECG  When compared with ECG of 24-SEP-2021 05:22,  Sinus rhythm has replaced Atrial fibrillation  Minimal criteria for Anterior infarct are now present  Nonspecific T wave abnormality, improved in Inferior leads  QT has shortened         Radiologic Studies -   XR FOOT RT MIN 3 V   Final Result   1. Ulcer with osteomyelitis of the left 1st distal phalanx. 2.  Extensive postoperative changes of the right foot. No definite evidence of   osteomyelitis. 3.  Possible right lateral ulcer. XR FOOT LT MIN 3 V   Final Result   1. Ulcer with osteomyelitis of the left 1st distal phalanx. 2.  Extensive postoperative changes of the right foot. No definite evidence of   osteomyelitis. 3.  Possible right lateral ulcer. CT Results  (Last 48 hours)    None        CXR Results  (Last 48 hours)    None            Medical Decision Making   I am the first provider for this patient. I reviewed the vital signs, available nursing notes, past medical history, past surgical history, family history and social history. Vital Signs-Reviewed the patient's vital signs. Records Reviewed: Nursing Notes and Old Medical Records     Procedures: None   Procedures    Provider Notes (Medical Decision Making):     Differential: Osteomyelitis, necrosis, cellulitis, abscess, sepsis/SIRS    Plan: We will order labs, lactic, cultures, wound culture, x-rays, EKG and chest x-ray. Order antibiotics. Will consult podiatry.   Consult hospitalist.  ED Course as of 02/28/22 Geraldine Alexandra Feb 28, 2022   1616 Will hold off on abx per Podiatry  [CS]   6543 Discussed case with Dr. Ines Epps, podiatry [CS]   9619 [id number] WBC elevated, will now order abx, have paged hospitalist      [CS]      ED Course User Index  [CS] DEVORA Vidal     5:00 PM  X-ray to confirm osteomyelitis. Will consult hospitalist.        MED RECONCILIATION:  Current Facility-Administered Medications   Medication Dose Route Frequency    vancomycin (VANCOCIN) 2000 mg in  ml infusion  2,000 mg IntraVENous ONCE    [START ON 3/1/2022] cefepime (MAXIPIME) 2 g in 0.9% sodium chloride (MBP/ADV) 100 mL MBP  2 g IntraVENous Q8H     Current Outpatient Medications   Medication Sig    clopidogreL (PLAVIX) 75 mg tab Take 75 mg by mouth daily. Indications: myocardial reinfarction prevention    traZODone (DESYREL) 150 mg tablet Take 150 mg by mouth nightly. Indications: insomnia associated with depression    oxyCODONE-acetaminophen (PERCOCET 7.5) 7.5-325 mg per tablet Take 1 Tab by mouth every six (6) hours as needed for Pain.  amLODIPine (NORVASC) 10 mg tablet Take 10 mg by mouth daily.  hydroCHLOROthiazide (HYDRODIURIL) 50 mg tablet Take 50 mg by mouth daily.  metFORMIN (GLUCOPHAGE) 1,000 mg tablet Take 1,000 mg by mouth two (2) times daily (with meals).  atorvastatin (LIPITOR) 20 mg tablet Take 1 Tab by mouth nightly.  OTHER BMP in 1 week  Dx: ARF  Fax results to Dr. Karly Segura    insulin lispro (HUMALOG) 100 unit/mL injection Check FSBS AC*HS  For sugars between 160 and 200- Give 2 units SQ,  For sugars between 201 and 250, Give 3 units SQ,  For sugars Between 251 and 300, give 5 units SQ,  For Sugars between 301 and 350, give 7 units SQ  For sugars between 351 and 400, give 8 units SQ and contact PCP (Patient taking differently: by SubCUTAneous route two (2) times a day.  Check FSBS AC*HS  For sugars between 160 and 200- Give 2 units SQ,  For sugars between 201 and 250, Give 3 units SQ,  For sugars Between 251 and 300, give 5 units SQ,  For Sugars between 301 and 350, give 7 units SQ  For sugars between 351 and 400, give 8 units SQ and contact PCP)    insulin detemir U-100 (Levemir U-100 Insulin) 100 unit/mL injection 20 Units by SubCUTAneous route two (2) times a day. (Patient taking differently: 60 Units by SubCUTAneous route two (2) times a day. Pt states he is taking levemir 40 units twice daily)    pantoprazole (PROTONIX) 40 mg tablet Take 1 Tab by mouth Before breakfast and dinner.  acetaminophen (TYLENOL) 325 mg tablet Take 2 Tabs by mouth every six (6) hours as needed. Indications: Pain, take it with bentyl; do not exceed 3 g tylenol daily (Patient not taking: Reported on 4/28/2021)    polyethylene glycol (MIRALAX) 17 gram packet Take 1 Packet by mouth daily. (Patient taking differently: Take 1 Packet by mouth daily. MIX WITH 8OZ OF WATER)       Disposition:  Admit     Diagnosis     Clinical Impression:   1. Osteomyelitis of left foot, unspecified type ([address]          \"Please note that this dictation was completed with [employer], the computer voice recognition software. Quite often unanticipated grammatical, syntax, homophones, and other interpretive errors are inadvertently transcribed by the computer software. Please disregard these errors. Please excuse any errors that have escaped final proofreading. \"

## 2022-03-01 ENCOUNTER — ANESTHESIA (OUTPATIENT)
Dept: SURGERY | Age: 62
DRG: 314 | End: 2022-03-01
Payer: MEDICAID

## 2022-03-01 ENCOUNTER — APPOINTMENT (OUTPATIENT)
Dept: MRI IMAGING | Age: 62
DRG: 314 | End: 2022-03-01
Attending: INTERNAL MEDICINE
Payer: MEDICAID

## 2022-03-01 ENCOUNTER — ANESTHESIA EVENT (OUTPATIENT)
Dept: SURGERY | Age: 62
DRG: 314 | End: 2022-03-01
Payer: MEDICAID

## 2022-03-01 LAB
ANION GAP SERPL CALC-SCNC: 5 MMOL/L (ref 3–18)
ATRIAL RATE: 58 BPM
BASOPHILS # BLD: 0 K/UL (ref 0–0.1)
BASOPHILS NFR BLD: 0 % (ref 0–2)
BUN SERPL-MCNC: 17 MG/DL (ref 7–18)
BUN/CREAT SERPL: 14 (ref 12–20)
CALCIUM SERPL-MCNC: 8.4 MG/DL (ref 8.5–10.1)
CALCULATED P AXIS, ECG09: 55 DEGREES
CALCULATED R AXIS, ECG10: -6 DEGREES
CALCULATED T AXIS, ECG11: 31 DEGREES
CHLORIDE SERPL-SCNC: 104 MMOL/L (ref 100–111)
CO2 SERPL-SCNC: 31 MMOL/L (ref 21–32)
COVID-19 RAPID TEST, COVR: NOT DETECTED
CREAT SERPL-MCNC: 1.22 MG/DL (ref 0.6–1.3)
DIAGNOSIS, 93000: NORMAL
DIFFERENTIAL METHOD BLD: ABNORMAL
EOSINOPHIL # BLD: 0.3 K/UL (ref 0–0.4)
EOSINOPHIL NFR BLD: 3 % (ref 0–5)
ERYTHROCYTE [DISTWIDTH] IN BLOOD BY AUTOMATED COUNT: 13.2 % (ref 11.6–14.5)
EST. AVERAGE GLUCOSE BLD GHB EST-MCNC: 128 MG/DL
GLUCOSE BLD STRIP.AUTO-MCNC: 107 MG/DL (ref 70–110)
GLUCOSE BLD STRIP.AUTO-MCNC: 114 MG/DL (ref 70–110)
GLUCOSE BLD STRIP.AUTO-MCNC: 121 MG/DL (ref 70–110)
GLUCOSE BLD STRIP.AUTO-MCNC: 135 MG/DL (ref 70–110)
GLUCOSE BLD STRIP.AUTO-MCNC: 88 MG/DL (ref 70–110)
GLUCOSE SERPL-MCNC: 129 MG/DL (ref 74–99)
HBA1C MFR BLD: 6.1 % (ref 4.2–5.6)
HCT VFR BLD AUTO: 28.5 % (ref 36–48)
HGB BLD-MCNC: 8.7 G/DL (ref 13–16)
IMM GRANULOCYTES # BLD AUTO: 0 K/UL (ref 0–0.04)
IMM GRANULOCYTES NFR BLD AUTO: 0 % (ref 0–0.5)
LYMPHOCYTES # BLD: 1.9 K/UL (ref 0.9–3.6)
LYMPHOCYTES NFR BLD: 18 % (ref 21–52)
MAGNESIUM SERPL-MCNC: 2 MG/DL (ref 1.6–2.6)
MCH RBC QN AUTO: 31 PG (ref 24–34)
MCHC RBC AUTO-ENTMCNC: 30.5 G/DL (ref 31–37)
MCV RBC AUTO: 101.4 FL (ref 78–100)
MONOCYTES # BLD: 1 K/UL (ref 0.05–1.2)
MONOCYTES NFR BLD: 10 % (ref 3–10)
NEUTS SEG # BLD: 7.1 K/UL (ref 1.8–8)
NEUTS SEG NFR BLD: 68 % (ref 40–73)
NRBC # BLD: 0 K/UL (ref 0–0.01)
NRBC BLD-RTO: 0 PER 100 WBC
P-R INTERVAL, ECG05: 252 MS
PLATELET # BLD AUTO: 214 K/UL (ref 135–420)
PMV BLD AUTO: 11.3 FL (ref 9.2–11.8)
POTASSIUM SERPL-SCNC: 3.8 MMOL/L (ref 3.5–5.5)
Q-T INTERVAL, ECG07: 404 MS
QRS DURATION, ECG06: 84 MS
QTC CALCULATION (BEZET), ECG08: 396 MS
RBC # BLD AUTO: 2.81 M/UL (ref 4.35–5.65)
SODIUM SERPL-SCNC: 140 MMOL/L (ref 136–145)
SOURCE, COVRS: NORMAL
VENTRICULAR RATE, ECG03: 58 BPM
WBC # BLD AUTO: 10.4 K/UL (ref 4.6–13.2)

## 2022-03-01 PROCEDURE — 2709999900 HC NON-CHARGEABLE SUPPLY: Performed by: PODIATRIST

## 2022-03-01 PROCEDURE — 77030000032 HC CUF TRNQT ZIMM -B: Performed by: PODIATRIST

## 2022-03-01 PROCEDURE — 80048 BASIC METABOLIC PNL TOTAL CA: CPT

## 2022-03-01 PROCEDURE — 83036 HEMOGLOBIN GLYCOSYLATED A1C: CPT

## 2022-03-01 PROCEDURE — 74011250636 HC RX REV CODE- 250/636: Performed by: NURSE ANESTHETIST, CERTIFIED REGISTERED

## 2022-03-01 PROCEDURE — 87635 SARS-COV-2 COVID-19 AMP PRB: CPT

## 2022-03-01 PROCEDURE — 82962 GLUCOSE BLOOD TEST: CPT

## 2022-03-01 PROCEDURE — 99232 SBSQ HOSP IP/OBS MODERATE 35: CPT | Performed by: FAMILY MEDICINE

## 2022-03-01 PROCEDURE — 74011636637 HC RX REV CODE- 636/637: Performed by: FAMILY MEDICINE

## 2022-03-01 PROCEDURE — 74011000250 HC RX REV CODE- 250: Performed by: NURSE ANESTHETIST, CERTIFIED REGISTERED

## 2022-03-01 PROCEDURE — 85025 COMPLETE CBC W/AUTO DIFF WBC: CPT

## 2022-03-01 PROCEDURE — 77030011265 HC ELECTRD BLD HEX COVD -A: Performed by: PODIATRIST

## 2022-03-01 PROCEDURE — 77030018836 HC SOL IRR NACL ICUM -A: Performed by: PODIATRIST

## 2022-03-01 PROCEDURE — 0Y6Q0Z3 DETACHMENT AT LEFT 1ST TOE, LOW, OPEN APPROACH: ICD-10-PCS | Performed by: PODIATRIST

## 2022-03-01 PROCEDURE — 87075 CULTR BACTERIA EXCEPT BLOOD: CPT

## 2022-03-01 PROCEDURE — 77030006773 HC BLD SAW OSC BRSM -A: Performed by: PODIATRIST

## 2022-03-01 PROCEDURE — 87077 CULTURE AEROBIC IDENTIFY: CPT

## 2022-03-01 PROCEDURE — 01480 ANES OPEN PX LOWER L/A/F NOS: CPT | Performed by: ANESTHESIOLOGY

## 2022-03-01 PROCEDURE — 74011250637 HC RX REV CODE- 250/637: Performed by: HOSPITALIST

## 2022-03-01 PROCEDURE — 87186 SC STD MICRODIL/AGAR DIL: CPT

## 2022-03-01 PROCEDURE — 0JBQ0ZZ EXCISION OF RIGHT FOOT SUBCUTANEOUS TISSUE AND FASCIA, OPEN APPROACH: ICD-10-PCS | Performed by: PODIATRIST

## 2022-03-01 PROCEDURE — 88305 TISSUE EXAM BY PATHOLOGIST: CPT

## 2022-03-01 PROCEDURE — 76010000149 HC OR TIME 1 TO 1.5 HR: Performed by: PODIATRIST

## 2022-03-01 PROCEDURE — 0JBR0ZZ EXCISION OF LEFT FOOT SUBCUTANEOUS TISSUE AND FASCIA, OPEN APPROACH: ICD-10-PCS | Performed by: PODIATRIST

## 2022-03-01 PROCEDURE — 01480 ANES OPEN PX LOWER L/A/F NOS: CPT | Performed by: NURSE ANESTHETIST, CERTIFIED REGISTERED

## 2022-03-01 PROCEDURE — 36415 COLL VENOUS BLD VENIPUNCTURE: CPT

## 2022-03-01 PROCEDURE — 87205 SMEAR GRAM STAIN: CPT

## 2022-03-01 PROCEDURE — 74011000258 HC RX REV CODE- 258: Performed by: HOSPITALIST

## 2022-03-01 PROCEDURE — 65270000029 HC RM PRIVATE

## 2022-03-01 PROCEDURE — 74011000250 HC RX REV CODE- 250: Performed by: HOSPITALIST

## 2022-03-01 PROCEDURE — 83735 ASSAY OF MAGNESIUM: CPT

## 2022-03-01 PROCEDURE — 76210000006 HC OR PH I REC 0.5 TO 1 HR: Performed by: PODIATRIST

## 2022-03-01 PROCEDURE — 88311 DECALCIFY TISSUE: CPT

## 2022-03-01 PROCEDURE — 77030013708 HC HNDPC SUC IRR PULS STRY –B: Performed by: PODIATRIST

## 2022-03-01 PROCEDURE — 76060000033 HC ANESTHESIA 1 TO 1.5 HR: Performed by: PODIATRIST

## 2022-03-01 PROCEDURE — 74011250636 HC RX REV CODE- 250/636: Performed by: HOSPITALIST

## 2022-03-01 RX ORDER — SODIUM CHLORIDE, SODIUM LACTATE, POTASSIUM CHLORIDE, CALCIUM CHLORIDE 600; 310; 30; 20 MG/100ML; MG/100ML; MG/100ML; MG/100ML
INJECTION, SOLUTION INTRAVENOUS
Status: DISCONTINUED | OUTPATIENT
Start: 2022-03-01 | End: 2022-03-01 | Stop reason: HOSPADM

## 2022-03-01 RX ORDER — INSULIN LISPRO 100 [IU]/ML
INJECTION, SOLUTION INTRAVENOUS; SUBCUTANEOUS ONCE
Status: DISCONTINUED | OUTPATIENT
Start: 2022-03-01 | End: 2022-03-01 | Stop reason: HOSPADM

## 2022-03-01 RX ORDER — ONDANSETRON 2 MG/ML
4 INJECTION INTRAMUSCULAR; INTRAVENOUS ONCE
Status: DISCONTINUED | OUTPATIENT
Start: 2022-03-01 | End: 2022-03-01 | Stop reason: HOSPADM

## 2022-03-01 RX ORDER — LIDOCAINE HYDROCHLORIDE 20 MG/ML
INJECTION, SOLUTION EPIDURAL; INFILTRATION; INTRACAUDAL; PERINEURAL AS NEEDED
Status: DISCONTINUED | OUTPATIENT
Start: 2022-03-01 | End: 2022-03-01 | Stop reason: HOSPADM

## 2022-03-01 RX ORDER — FENTANYL CITRATE 50 UG/ML
INJECTION, SOLUTION INTRAMUSCULAR; INTRAVENOUS AS NEEDED
Status: DISCONTINUED | OUTPATIENT
Start: 2022-03-01 | End: 2022-03-01 | Stop reason: HOSPADM

## 2022-03-01 RX ORDER — PROPOFOL 10 MG/ML
INJECTION, EMULSION INTRAVENOUS AS NEEDED
Status: DISCONTINUED | OUTPATIENT
Start: 2022-03-01 | End: 2022-03-01 | Stop reason: HOSPADM

## 2022-03-01 RX ORDER — HYDROMORPHONE HYDROCHLORIDE 1 MG/ML
0.5 INJECTION, SOLUTION INTRAMUSCULAR; INTRAVENOUS; SUBCUTANEOUS
Status: DISCONTINUED | OUTPATIENT
Start: 2022-03-01 | End: 2022-03-01 | Stop reason: HOSPADM

## 2022-03-01 RX ORDER — FENTANYL CITRATE 50 UG/ML
50 INJECTION, SOLUTION INTRAMUSCULAR; INTRAVENOUS
Status: DISCONTINUED | OUTPATIENT
Start: 2022-03-01 | End: 2022-03-01 | Stop reason: HOSPADM

## 2022-03-01 RX ORDER — GLYCOPYRROLATE 0.2 MG/ML
INJECTION INTRAMUSCULAR; INTRAVENOUS AS NEEDED
Status: DISCONTINUED | OUTPATIENT
Start: 2022-03-01 | End: 2022-03-01 | Stop reason: HOSPADM

## 2022-03-01 RX ORDER — MAGNESIUM SULFATE 100 %
4 CRYSTALS MISCELLANEOUS AS NEEDED
Status: DISCONTINUED | OUTPATIENT
Start: 2022-03-01 | End: 2022-03-01 | Stop reason: HOSPADM

## 2022-03-01 RX ORDER — INSULIN GLARGINE 100 [IU]/ML
25 INJECTION, SOLUTION SUBCUTANEOUS
Status: DISCONTINUED | OUTPATIENT
Start: 2022-03-01 | End: 2022-03-04 | Stop reason: HOSPADM

## 2022-03-01 RX ORDER — MIDAZOLAM HYDROCHLORIDE 1 MG/ML
INJECTION, SOLUTION INTRAMUSCULAR; INTRAVENOUS AS NEEDED
Status: DISCONTINUED | OUTPATIENT
Start: 2022-03-01 | End: 2022-03-01 | Stop reason: HOSPADM

## 2022-03-01 RX ADMIN — Medication 25 UNITS: at 21:32

## 2022-03-01 RX ADMIN — OXYCODONE AND ACETAMINOPHEN 1 TABLET: 7.5; 325 TABLET ORAL at 14:26

## 2022-03-01 RX ADMIN — PIPERACILLIN AND TAZOBACTAM 3.38 G: 3; .375 INJECTION, POWDER, LYOPHILIZED, FOR SOLUTION INTRAVENOUS at 04:30

## 2022-03-01 RX ADMIN — MORPHINE SULFATE 2 MG: 2 INJECTION, SOLUTION INTRAMUSCULAR; INTRAVENOUS at 21:42

## 2022-03-01 RX ADMIN — PROPOFOL 200 MG: 10 INJECTION, EMULSION INTRAVENOUS at 17:45

## 2022-03-01 RX ADMIN — MORPHINE SULFATE 2 MG: 2 INJECTION, SOLUTION INTRAMUSCULAR; INTRAVENOUS at 06:32

## 2022-03-01 RX ADMIN — AMLODIPINE BESYLATE 10 MG: 10 TABLET ORAL at 09:02

## 2022-03-01 RX ADMIN — SODIUM CHLORIDE, PRESERVATIVE FREE 10 ML: 5 INJECTION INTRAVENOUS at 15:11

## 2022-03-01 RX ADMIN — FENTANYL CITRATE 25 MCG: 50 INJECTION, SOLUTION INTRAMUSCULAR; INTRAVENOUS at 18:27

## 2022-03-01 RX ADMIN — PANTOPRAZOLE SODIUM 40 MG: 40 TABLET, DELAYED RELEASE ORAL at 09:02

## 2022-03-01 RX ADMIN — SODIUM CHLORIDE, PRESERVATIVE FREE 10 ML: 5 INJECTION INTRAVENOUS at 06:32

## 2022-03-01 RX ADMIN — GLYCOPYRROLATE 0.2 MG: 0.2 INJECTION INTRAMUSCULAR; INTRAVENOUS at 17:49

## 2022-03-01 RX ADMIN — MIDAZOLAM HYDROCHLORIDE 2 MG: 2 INJECTION, SOLUTION INTRAMUSCULAR; INTRAVENOUS at 17:41

## 2022-03-01 RX ADMIN — LIDOCAINE HYDROCHLORIDE 60 MG: 20 INJECTION, SOLUTION EPIDURAL; INFILTRATION; INTRACAUDAL; PERINEURAL at 17:45

## 2022-03-01 RX ADMIN — PIPERACILLIN AND TAZOBACTAM 3.38 G: 3; .375 INJECTION, POWDER, LYOPHILIZED, FOR SOLUTION INTRAVENOUS at 21:34

## 2022-03-01 RX ADMIN — FENTANYL CITRATE 25 MCG: 50 INJECTION, SOLUTION INTRAMUSCULAR; INTRAVENOUS at 18:05

## 2022-03-01 RX ADMIN — FENTANYL CITRATE 50 MCG: 50 INJECTION, SOLUTION INTRAMUSCULAR; INTRAVENOUS at 17:44

## 2022-03-01 RX ADMIN — ATORVASTATIN CALCIUM 20 MG: 20 TABLET, FILM COATED ORAL at 21:32

## 2022-03-01 RX ADMIN — PIPERACILLIN AND TAZOBACTAM 3.38 G: 3; .375 INJECTION, POWDER, LYOPHILIZED, FOR SOLUTION INTRAVENOUS at 12:10

## 2022-03-01 RX ADMIN — SODIUM CHLORIDE, SODIUM LACTATE, POTASSIUM CHLORIDE, AND CALCIUM CHLORIDE: 600; 310; 30; 20 INJECTION, SOLUTION INTRAVENOUS at 17:41

## 2022-03-01 NOTE — PROGRESS NOTES
[address] [facility] Pharmacokinetic Monitoring Service - Vancomycin     Toro Marquis is a 64 y.o. male starting on vancomycin therapy for diabetic foot infection/SSTI/OM rule out. Pharmacy consulted by [facility] and Dr. Manjinder Talamantes for monitoring and adjustment. Target Concentration: Goal AUC/ÁNGEL 400-600 mg*hr/L    Additional Antimicrobials: zosyn    Pertinent Laboratory Values: Wt Readings from Last 1 Encounters:   02/28/22 111.1 kg (245 lb)     Temp Readings from Last 1 Encounters:   02/28/22 98.3 °F (36.8 °C)     No components found for: PROCAL  Estimated Creatinine Clearance: 76.8 mL/min (based on SCr of 1.26 mg/dL). Recent Labs     02/28/22  1615   WBC 15.0*       Pertinent Cultures:  Culture Date Source Results   02/28/22 blood pending   02/28/22 wound pending   MRSA Nasal Swab: N/A. Non-respiratory infection. .    Plan:  Dosing recommendations based on Bayesian software  Start vancomycin 2000 mg IV x 1 dose, followed by vancomycin 1250 mg IV q18h  Anticipated AUC of 568 and trough concentration of 15.9 at steady state  Renal labs as indicated   Vancomycin concentration ordered for 03/02/22 @ 0400   Pharmacy will continue to monitor patient and adjust therapy as indicated    Thank you for the consult,  SUYAPA Cope  2/28/2022 9:24 PM

## 2022-03-01 NOTE — PROGRESS NOTES
OT orders received and medical chart review completed. 1344: pt presents with right lateral foot wound open to air, states he is awaiting nursing for wound care to address right lateral aspect of foot-nursing notified and pt instructed to elevate (R)LE on bed versus  dangling off of bed with foot resting on floor. 1428: pt reports still awaiting wound care to open wound on right lateral aspect of foot. OT to follow up.      Thank you for this referral,  Linette Silva MS OTR/L

## 2022-03-01 NOTE — ROUTINE PROCESS
Bedside shift change report given to Stella Gee RN (oncoming nurse) by Sole Abdi RN (offgoing nurse). Report included the following information SBAR, Kardex, Intake/Output and MAR.

## 2022-03-01 NOTE — CONSULTS
Podiatry History and Physical    Subjective:         Date of Consultation:  February 28, 2022    Patient is a 64 y.o. male with PMHx of diabetes who was sent from office for left great toe osteomyelitis. Patient has hallux malleus deformity with chronic ulcer at tip of toe which has progressed to bone level. Patient was recommended to get admitted about 3 weeks ago. Patient also has ulcer in ball of left foot as well as to lateral stump of TMA on right foot. Patient denies N/V/C/F.      Patient Active Problem List    Diagnosis Date Noted    OM (osteomyelitis) ([address] 02/28/2022    Diabetic foot infection ([address] 04/12/2021    Leukocytosis 02/04/2021    Wound infection 02/04/2021    Diabetic ulcer of heel ([address] 02/04/2021    Acute kidney injury ([address] 02/04/2021    Osteomyelitis ([address] 11/22/2020    Fracture, toe 09/24/2020    Altered mental status 09/01/2020    Multifocal pneumonia 09/01/2020    DM (diabetes mellitus) ([address] 12/25/2019    Orthostatic hypotension 12/25/2019    Syncope and collapse 12/24/2019    Vomiting 03/28/2018    Chronic abdominal pain 03/28/2018    Sepsis (HCC) 03/21/2018    Nausea and vomiting 09/07/2017    Gastroparesis 09/07/2017    Hypokalemia 09/07/2017    Atelectasis 09/07/2017    Abdominal pain 09/07/2017    Acquired hypothyroidism 09/07/2017    S/P lumbar fusion 07/05/2017    Diabetic neuropathy (HCC) 07/05/2017    Neuritis 07/05/2017    Spinal stenosis at L4-L5 level 06/19/2017    Coronary artery disease involving native coronary artery of native heart without angina pectoris 05/31/2017    History of coronary artery stent placement 05/31/2017    Synovial cyst 05/26/2017    HNP (herniated nucleus pulposus), lumbar 05/26/2017    Lumbar spinal stenosis 05/26/2017    Spondylolisthesis of lumbar region 05/26/2017    Positive PPD     History of heart attack     Pain in left lower leg 07/24/2016    Primary osteoarthritis of left knee 07/24/2016    Localized adiposity of abdomen 07/24/2016    Diabetes ([facility] 08/14/2015    Essential hypertension 08/14/2015    GERD (gastroesophageal reflux disease) 08/14/2015    Depression 08/14/2015     Past Medical History:   Diagnosis Date    Arthritis     Coronary atherosclerosis of native coronary artery     S/P PCI with GE in 12/09    Diabetes ([facility]     IDDM    Electrolyte and fluid disorders not elsewhere classified     Essential hypertension, benign     GERD (gastroesophageal reflux disease)     Heroin abuse ([facility] 05/26/16    Psychiatrist Dr. Kiara Montanez History of heart attack     Hyperlipidemia     Intermediate coronary syndrome ([facility]     MDD (major depressive disorder) 5/26/16    Psychiatrist Dr. Kiara Montanez Myocardial infarction [facility])     Obesity, unspecified     Old myocardial infarction     Other and unspecified hyperlipidemia     Pancreatitis     Positive PPD     Sleep apnea     uses Cpap machine    Transfusion history     Before 1992      Family History   Problem Relation Age of Onset    Heart Attack Father     Coronary Art Dis Mother     Diabetes Mother     Cancer Sister         breast cancer and lung cancer      Social History     Tobacco Use    Smoking status: Never Smoker    Smokeless tobacco: Never Used   Substance Use Topics    Alcohol use: No     Past Surgical History:   Procedure Laterality Date    HX APPENDECTOMY      HX CORONARY STENT PLACEMENT  2010    2 stents      Prior to Admission medications    Medication Sig Start Date End Date Taking? Authorizing Provider   clopidogreL (PLAVIX) 75 mg tab Take 75 mg by mouth daily. Indications: myocardial reinfarction prevention    Provider, Historical   traZODone (DESYREL) 150 mg tablet Take 150 mg by mouth nightly. Indications: insomnia associated with depression    Provider, Historical   oxyCODONE-acetaminophen (PERCOCET 7.5) 7.5-325 mg per tablet Take 1 Tab by mouth every six (6) hours as needed for Pain.     Provider, Historical   amLODIPine (NORVASC) 10 mg tablet Take 10 mg by mouth daily. Provider, Historical   hydroCHLOROthiazide (HYDRODIURIL) 50 mg tablet Take 50 mg by mouth daily. Provider, Historical   metFORMIN (GLUCOPHAGE) 1,000 mg tablet Take 1,000 mg by mouth two (2) times daily (with meals). Provider, Historical   atorvastatin (LIPITOR) 20 mg tablet Take 1 Tab by mouth nightly. 4/16/21   Linn Juárez MD   OTHER BMP in 1 week  Dx: ARF  Fax results to Dr. Bandar Milan 4/16/21   Linn Juárez MD   insulin lispro (HUMALOG) 100 unit/mL injection Check FSBS AC*HS  For sugars between 160 and 200- Give 2 units SQ,  For sugars between 201 and 250, Give 3 units SQ,  For sugars Between 251 and 300, give 5 units SQ,  For Sugars between 301 and 350, give 7 units SQ  For sugars between 351 and 400, give 8 units SQ and contact PCP  Patient taking differently: by SubCUTAneous route two (2) times a day. Check FSBS AC*HS  For sugars between 160 and 200- Give 2 units SQ,  For sugars between 201 and 250, Give 3 units SQ,  For sugars Between 251 and 300, give 5 units SQ,  For Sugars between 301 and 350, give 7 units SQ  For sugars between 351 and 400, give 8 units SQ and contact PCP 2/9/21   Amol Luna MD   insulin detemir U-100 (Levemir U-100 Insulin) 100 unit/mL injection 20 Units by SubCUTAneous route two (2) times a day. Patient taking differently: 60 Units by SubCUTAneous route two (2) times a day. Pt states he is taking levemir 40 units twice daily 2/9/21   Amol Luna MD   pantoprazole (PROTONIX) 40 mg tablet Take 1 Tab by mouth Before breakfast and dinner. 3/29/18   Michelle Corona NP   acetaminophen (TYLENOL) 325 mg tablet Take 2 Tabs by mouth every six (6) hours as needed.  Indications: Pain, take it with bentyl; do not exceed 3 g tylenol daily  Patient not taking: Reported on 4/28/2021 3/29/18   Elan GAMING NP   polyethylene glycol (MIRALAX) 17 gram packet Take 1 Packet by mouth daily. Patient taking differently: Take 1 Packet by mouth daily. [address] [place] [address] 17   Shahnaz Bell MD     Allergies   Allergen Reactions    Compazine [Prochlorperazine] Anaphylaxis     \"Tightness around throat\"        Review of Systems:  A comprehensive review of systems was negative except for that written in the HPI. Objective:     Patient Vitals for the past 8 hrs:   BP Temp Pulse Resp SpO2   22 2306 121/73 98.1 °F (36.7 °C) 60 20 96 %   22 1922 (!) 153/74 98.3 °F (36.8 °C) 66 18 98 %   22 1801 (!) 140/75 97.5 °F (36.4 °C) 66 20 96 %     Temp (24hrs), Av.3 °F (36.8 °C), Min:97.5 °F (36.4 °C), Max:99.3 °F (37.4 °C)    Bilateral EDILSON:    Musculoskeletal: Muscle Strength is 5/5 for all extrinsic muscle groups of the foot bilateral. Right foot TMA site stable. Left second toe previously amputated. Left hallux malleus deformity noted. Vascular: DP pulses palpable and +2/4 bilateral. PT pulses are palpable and +2/4 bilateral. Sparse hair growth noticed on the dorsal foot and digits. Capillary refill time is less than three seconds to distal digits bilateral. 2+ edema to bilateral LE's. Neurological: Protective sensation is diminished to distal 5/10 sites of the foot bilateral upon testing with a Semmis Nahomy 5.07 monofilament. Vibratory sensation is diminished to the level of the lateral malleolus bilateral. Sharp/dull sensation is diminished to the fore foot. Achilles and patellar deep tendon reflexes are within normal limits bilateral. Paresthesia symptoms present to bilateral LE's. Dermatological: Skin shows signs of mild atrophy with diffuse dry xerosis. Web spaces on left foot are clean, dry, and intact. Left great toe tip plantar ulcer measure 1.0 x 1.0 x 1.0 cm deep probing deep to bone. Hyperkeratotic tissue noted around wound. Right TMA site lateral stump ulcer with blisteration/moist skin.  Left submet 3 ulcer measure 2.8  x 1 cm with fibrous base and exposed subcutaneous tissue. Data Review:   Recent Results (from the past 24 hour(s))   CBC WITH AUTOMATED DIFF    Collection Time: 02/28/22  4:15 PM   Result Value Ref Range    WBC 15.0 (H) 4.6 - 13.2 K/uL    RBC 3.05 (L) 4.35 - 5.65 M/uL    HGB 9.8 (L) 13.0 - 16.0 g/dL    HCT 31.0 (L) 36.0 - 48.0 %    .6 (H) 78.0 - 100.0 FL    MCH 32.1 24.0 - 34.0 PG    MCHC 31.6 31.0 - 37.0 g/dL    RDW 13.4 11.6 - 14.5 %    PLATELET 597 487 - 843 K/uL    MPV 11.5 9.2 - 11.8 FL    NRBC 0.0 0  WBC    ABSOLUTE NRBC 0.00 0.00 - 0.01 K/uL    NEUTROPHILS 79 (H) 40 - 73 %    LYMPHOCYTES 11 (L) 21 - 52 %    MONOCYTES 8 3 - 10 %    EOSINOPHILS 1 0 - 5 %    BASOPHILS 0 0 - 2 %    IMMATURE GRANULOCYTES 1 (H) 0.0 - 0.5 %    ABS. NEUTROPHILS 11.9 (H) 1.8 - 8.0 K/UL    ABS. LYMPHOCYTES 1.7 0.9 - 3.6 K/UL    ABS. MONOCYTES 1.2 0.05 - 1.2 K/UL    ABS. EOSINOPHILS 0.2 0.0 - 0.4 K/UL    ABS. BASOPHILS 0.1 0.0 - 0.1 K/UL    ABS. IMM. GRANS. 0.1 (H) 0.00 - 0.04 K/UL    DF AUTOMATED     METABOLIC PANEL, COMPREHENSIVE    Collection Time: 02/28/22  4:15 PM   Result Value Ref Range    Sodium 136 136 - 145 mmol/L    Potassium 3.8 3.5 - 5.5 mmol/L    Chloride 100 100 - 111 mmol/L    CO2 32 21 - 32 mmol/L    Anion gap 4 3.0 - 18 mmol/L    Glucose 143 (H) 74 - 99 mg/dL    BUN 15 7.0 - 18 MG/DL    Creatinine 1.26 0.6 - 1.3 MG/DL    BUN/Creatinine ratio 12 12 - 20      GFR est AA >60 >60 ml/min/1.73m2    GFR est non-AA 58 (L) >60 ml/min/1.73m2    Calcium 9.1 8.5 - 10.1 MG/DL    Bilirubin, total 0.5 0.2 - 1.0 MG/DL    ALT (SGPT) 24 16 - 61 U/L    AST (SGOT) 20 10 - 38 U/L    Alk.  phosphatase 115 45 - 117 U/L    Protein, total 8.8 (H) 6.4 - 8.2 g/dL    Albumin 3.1 (L) 3.4 - 5.0 g/dL    Globulin 5.7 (H) 2.0 - 4.0 g/dL    A-G Ratio 0.5 (L) 0.8 - 1.7     MAGNESIUM    Collection Time: 02/28/22  4:15 PM   Result Value Ref Range    Magnesium 1.8 1.6 - 2.6 mg/dL   POC LACTIC ACID    Collection Time: 02/28/22  4:26 PM   Result Value Ref Range Lactic Acid (POC) 1.33 0.40 - 2.00 mmol/L   GLUCOSE, POC    Collection Time: 02/28/22  4:44 PM   Result Value Ref Range    Glucose (POC) 138 (H) 70 - 110 mg/dL   EKG, 12 LEAD, INITIAL    Collection Time: 02/28/22  5:32 PM   Result Value Ref Range    Ventricular Rate 58 BPM    Atrial Rate 58 BPM    P-R Interval 252 ms    QRS Duration 84 ms    Q-T Interval 404 ms    QTC Calculation (Bezet) 396 ms    Calculated P Axis 55 degrees    Calculated R Axis -6 degrees    Calculated T Axis 31 degrees    Diagnosis       Sinus bradycardia with sinus arrhythmia with 1st degree AV block  Inferior infarct (cited on or before 22-NOV-2020)  Cannot rule out Anterior infarct , age undetermined  Abnormal ECG  When compared with ECG of 24-SEP-2021 05:22,  Sinus rhythm has replaced Atrial fibrillation  Minimal criteria for Anterior infarct are now present  Nonspecific T wave abnormality, improved in Inferior leads  QT has shortened     GLUCOSE, POC    Collection Time: 02/28/22  9:32 PM   Result Value Ref Range    Glucose (POC) 181 (H) 70 - 110 mg/dL         Impression:     Diabetic foot ulcer with osteomyelitis of left first distal phalanx, full thickness ulcer of left submet 3 and right lateral TMA stump      Recommendation:     - x-ray of bilateral feet reviewed. Osteomyelitis of left first distal phalanx noted. - Patient will need partial left great toe amputation. Patient will be added on for OR tomorrow evening. Please keep him NPO after breakfast.   - Remain NWB to left forefoot.   - Diabetic pedal education given. - ID consulted by hospitalist. Mitchell Holguin off on antibiotics for now.    - Medical management per primary team.

## 2022-03-01 NOTE — CONSULTS
Infectious Disease Consultation Note        Reason: left foot osteomyelitis    Current abx Prior abx   Zosyn, vancomycin since 2/28/2022 Cefepime 2/28     Lines:       Assessment :    64 y. o. male with h/o type uncontrolled type 2 DM (last hgbA1C 8.9 on 11/23/20) , CAD who presented to [facility] on 2/28/22 sent from podiatrist office for evaluation of left foot ulcer concerning for osteomyelitis     Hospitalization at [facility] November 2020 for right great toe distal phalanx chronic osteomyelitis, septic arthritis right great toe interphalangeal joint   Wound cultures 9/2020-Enterobacter, MRSA  Status post right great toe amputation on 11/24.       Hospitalization at [facility] 2/2021 for acute on chronic osteomyelitis right first metatarsal osteomyelitis.    Status post incision and debridement, partial resection of right first metatarsal on 2/5/2021.  Intra-Op cultures: Methicillin susceptible Staphylococcus aureus, Bacteroides. Bone biopsy of clean margins reveal acute inflammation suggestive of acute osteomyelitis. S/p ertapenem, daptomycin till 3/19/2021     Hospitalization at [facility] 4/2021 for  chronic osteomyelitis right second metatarsal head, infected right lateral foot ulcer Status post transmetatarsal amputation 4/13/2021. Intra-Op findings discussed with podiatrist.  Grossly clean margins achieved at surgery. Bone biopsy, clean margins negative for acute osteomyelitis. Intra-Op cultures no organisms seen. Clinical presentation consistent with right foot cellulitis, infected right lateral foot ulcer, chronic osteomyelitis left first distal phalanx    No evidence of deeper infection/osteomyelitis right foot noted on x-ray 2/20/2022. Will obtain MRI to further evaluate this    Wound culture right foot 2/28-Staph aureus     Recommendations:     1.  Recommend pip/tazo, vancomycin.    2.  Obtain MRI right foot to rule out deeper infection   3. follow up  podiatry recommendations  4.  Follow-up wound culture right foot ulcer 5. Monitor CBC, temperature, clinically        Thank you for consultation request. Above plan was discussed in details with patient, family and primary team. Please call me if any further questions or concerns. Will continue to participate in the care of this patient. HPI:    64 y. o. male with h/o type uncontrolled type 2 DM (last hgbA1C 8.9 on 11/23/20) , CAD who presented to [facility] on 2/28/22 sent from podiatrist office for evaluation of left foot ulcer concerning for osteomyelitis. Patient is known to me from prior inpatient consultation at [facility]. And last saw him in April 2021 for right foot infection. He was treated with right transmetatarsal amputation and prolonged oral antibiotics with subsequent healing. Patient/wife state that the right foot has been doing well subsequently up until few days ago when he started having increased swelling right foot. About 3 days ago he noted blistering the right foot which is now draining yellowish material.   He has had some skin changes left great toe for few months. He follows up with podiatrist as outpatient. He was evaluated by podiatrist recently and advised to be admitted for surgical intervention. In the emergency room, patient had x-ray which showed osteomyelitis. Podiatry recommended to continue antibiotics and plan for debridement for right foot and also partial amputation of great toe on the left foot. Patient was started on Zosyn, vancomycin. I have been consulted for further recommendations. Patient denies any fever or chills throughout this time. His wife has noted that his right foot got more swollen in the past few days. She also states that she noted some pus coming from the right foot ulcer.       Past Medical History:   Diagnosis Date    Arthritis     Coronary atherosclerosis of native coronary artery     S/P PCI with GE in 12/09    Diabetes ([facility]     IDDM    Electrolyte and fluid disorders not elsewhere classified     Essential hypertension, benign     GERD (gastroesophageal reflux disease)     Heroin abuse ([facility] 05/26/16    Psychiatrist Dr. Merlinda Fontana History of heart attack     Hyperlipidemia     Intermediate coronary syndrome [facility])     MDD (major depressive disorder) 5/26/16    Psychiatrist Dr. Beverely Curling infarction [facility])     Obesity, unspecified     Old myocardial infarction     Other and unspecified hyperlipidemia     Pancreatitis     Positive PPD     Sleep apnea     uses Cpap machine    Transfusion history     Before 1992       Past Surgical History:   Procedure Laterality Date    HX APPENDECTOMY      HX CORONARY STENT PLACEMENT  2010    2 stents       Current Discharge Medication List      CONTINUE these medications which have NOT CHANGED    Details   clopidogreL (PLAVIX) 75 mg tab Take 75 mg by mouth daily. Indications: myocardial reinfarction prevention      traZODone (DESYREL) 150 mg tablet Take 150 mg by mouth nightly. Indications: insomnia associated with depression      oxyCODONE-acetaminophen (PERCOCET 7.5) 7.5-325 mg per tablet Take 1 Tab by mouth every six (6) hours as needed for Pain. amLODIPine (NORVASC) 10 mg tablet Take 10 mg by mouth daily. hydroCHLOROthiazide (HYDRODIURIL) 50 mg tablet Take 50 mg by mouth daily. metFORMIN (GLUCOPHAGE) 1,000 mg tablet Take 1,000 mg by mouth two (2) times daily (with meals). atorvastatin (LIPITOR) 20 mg tablet Take 1 Tab by mouth nightly.   Qty: 30 Tab, Refills: 0      OTHER BMP in 1 week  Dx: ARF  Fax results to Dr. Matty Bryson  Qty: 1 Each, Refills: 0      insulin lispro (HUMALOG) 100 unit/mL injection Check FSBS AC*HS  For sugars between 160 and 200- Give 2 units SQ,  For sugars between 201 and 250, Give 3 units SQ,  For sugars Between 251 and 300, give 5 units SQ,  For Sugars between 301 and 350, give 7 units SQ  For sugars between 351 and 400, give 8 units SQ and contact PCP  Qty: 1 Vial, Refills: 0      insulin detemir U-100 (Levemir U-100 Insulin) 100 unit/mL injection 20 Units by SubCUTAneous route two (2) times a day. Qty: 10 mL, Refills: 0      pantoprazole (PROTONIX) 40 mg tablet Take 1 Tab by mouth Before breakfast and dinner. Qty: 60 Tab, Refills: 0      acetaminophen (TYLENOL) 325 mg tablet Take 2 Tabs by mouth every six (6) hours as needed. Indications: Pain, take it with bentyl; do not exceed 3 g tylenol daily  Qty: 30 Tab, Refills: 0      polyethylene glycol (MIRALAX) 17 gram packet Take 1 Packet by mouth daily.   Qty: 30 Packet, Refills: 0             Current Facility-Administered Medications   Medication Dose Route Frequency    amLODIPine (NORVASC) tablet 10 mg  10 mg Oral DAILY    atorvastatin (LIPITOR) tablet 20 mg  20 mg Oral QHS    oxyCODONE-acetaminophen (PERCOCET 7.5) 7.5-325 mg per tablet 1 Tablet  1 Tablet Oral Q6H PRN    pantoprazole (PROTONIX) tablet 40 mg  40 mg Oral ACB&D    polyethylene glycol (MIRALAX) packet 17 g  17 g Oral DAILY    sodium chloride (NS) flush 5-40 mL  5-40 mL IntraVENous Q8H    sodium chloride (NS) flush 5-40 mL  5-40 mL IntraVENous PRN    acetaminophen (TYLENOL) tablet 650 mg  650 mg Oral Q6H PRN    Or    acetaminophen (TYLENOL) suppository 650 mg  650 mg Rectal Q6H PRN    polyethylene glycol (MIRALAX) packet 17 g  17 g Oral DAILY PRN    morphine injection 2 mg  2 mg IntraVENous Q4H PRN    hydrALAZINE (APRESOLINE) 20 mg/mL injection 10 mg  10 mg IntraVENous Q6H PRN    insulin lispro (HUMALOG) injection   SubCUTAneous AC&HS    glucose chewable tablet 16 g  4 Tablet Oral PRN    glucagon (GLUCAGEN) injection 1 mg  1 mg IntraMUSCular PRN    dextrose 10% infusion 0-250 mL  0-250 mL IntraVENous PRN    piperacillin-tazobactam (ZOSYN) 3.375 g in 0.9% sodium chloride (MBP/ADV) 100 mL MBP  3.375 g IntraVENous Q8H    vancomycin (VANCOCIN) 1250 mg in  ml infusion  1,250 mg IntraVENous Q18H    insulin glargine (LANTUS) injection 25 Units  25 Units SubCUTAneous Q12H Allergies: Compazine [prochlorperazine]    Family History   Problem Relation Age of Onset    Heart Attack Father     Coronary Art Dis Mother     Diabetes Mother     Cancer Sister         breast cancer and lung cancer     Social History     Socioeconomic History    Marital status:      Spouse name: Not on file    Number of children: Not on file    Years of education: Not on file    Highest education level: Not on file   Occupational History    Not on file   Tobacco Use    Smoking status: Never Smoker    Smokeless tobacco: Never Used   Substance and Sexual Activity    Alcohol use: No    Drug use: No    Sexual activity: Not on file   Other Topics Concern    Not on file   Social History Narrative    Not on file     Social Determinants of Health     Financial Resource Strain:     Difficulty of Paying Living Expenses: Not on file   Food Insecurity:     Worried About Running Out of Food in the Last Year: Not on file    [place] of Food in the Last Year: Not on file   Transportation Needs:     Lack of Transportation (Medical): Not on file    Lack of Transportation (Non-Medical):  Not on file   Physical Activity:     Days of Exercise per Week: Not on file    Minutes of Exercise per Session: Not on file   Stress:     Feeling of Stress : Not on file   Social Connections:     Frequency of Communication with Friends and Family: Not on file    Frequency of Social Gatherings with Friends and Family: Not on file    Attends Worship Services: Not on file    Active Member of Clubs or Organizations: Not on file    Attends Club or Organization Meetings: Not on file    Marital Status: Not on file   Intimate Partner Violence:     Fear of Current or Ex-Partner: Not on file    Emotionally Abused: Not on file    Physically Abused: Not on file    Sexually Abused: Not on file   Housing Stability:     Unable to Pay for Housing in the Last Year: Not on file    Number of [place] in the Last Year: Not on file    Unstable Housing in the Last Year: Not on file     Social History     Tobacco Use   Smoking Status Never Smoker   Smokeless Tobacco Never Used        Temp (24hrs), Av.2 °F (36.8 °C), Min:97.5 °F (36.4 °C), Max:99.3 °F (37.4 °C)    Visit Vitals  /60   Pulse (!) 45   Temp 97.8 °F (36.6 °C)   Resp 17   Ht 5' 10\" (1.778 m)   Wt 111.1 kg (245 lb)   SpO2 97%   BMI 35.15 kg/m²       ROS: 12 point ROS obtained in details. Pertinent positives as mentioned in HPI,   otherwise negative    Physical Exam:       General:          In NAD.  Nontoxic-appearing. HEENT:           Head NCAT. sclerae anicteric, EOMI.  OP clear. Neck:               Supple, trachea midline. Lungs:             Clear, no wheezes.  Effort nonlabored, no accessory muscle use. Chest wall:      No chest wall tenderness.  Chest expansion equal and symmetrical bilaterally. Heart:              RRR.  No JVD. Abdomen:        Soft, NT/ND.  Bowel sounds normoactive. Extremities:     Warm, no edema. Right foot with sloughed skin noted on lateral aspect with some purulent drainage, skin changes noted on plantar aspect right foot, blackish discoloration distal left great toe with callus   skin:                Skin changes as mentioned above  Psych:             Mood normal.  Calm, no agitation.   Neurologic:      Awake and alert, moves extremities spontaneously.         Labs: Results:   Chemistry Recent Labs     22  0335 22  1615   * 143*    136   K 3.8 3.8    100   CO2 31 32   BUN 17 15   CREA 1.22 1.26   CA 8.4* 9.1   AGAP 5 4   BUCR 14 12   AP  --  115   TP  --  8.8*   ALB  --  3.1*   GLOB  --  5.7*   AGRAT  --  0.5*      CBC w/Diff Recent Labs     22  0335 22  1615   WBC 10.4 15.0*   RBC 2.81* 3.05*   HGB 8.7* 9.8*   HCT 28.5* 31.0*    285   GRANS 68 79*   LYMPH 18* 11*   EOS 3 1      Microbiology Recent Labs     22  1615   CULT NO GROWTH AFTER 14 HOURS  NO GROWTH AFTER 14 HOURS          RADIOLOGY:    All available imaging studies/reports in [facility] for this admission were reviewed      Disclaimer: Sections of this note are dictated utilizing voice recognition software, which may have resulted in some phonetic based errors in grammar and contents. Even though attempts were made to correct all the mistakes, some may have been missed, and remained in the body of the document. If questions arise, please contact our department.     Dr. Troy Perry, Infectious Disease Specialist  [phone]  March 1, 2022  8:14 AM

## 2022-03-01 NOTE — WOUND CARE
Physical Exam   Room 512: pt not seen at this time, pt to be followed by podiatry services. Will turn over care to nursing staff at this time.   Loreto WARNERN, RN, [facility] in [place], [address]

## 2022-03-01 NOTE — ANESTHESIA PREPROCEDURE EVALUATION
Relevant Problems   No relevant active problems       Anesthetic History               Review of Systems / Medical History  Patient summary reviewed and pertinent labs reviewed    Pulmonary        Sleep apnea           Neuro/Psych   Within defined limits           Cardiovascular    Hypertension          CAD    Exercise tolerance: >4 METS  Comments: MI 2008, stents x 2   GI/Hepatic/Renal     GERD: well controlled           Endo/Other    Diabetes: type 2, using insulin  Hypothyroidism  Morbid obesity     Other Findings              Physical Exam    Airway  Mallampati: II  TM Distance: 4 - 6 cm  Neck ROM: normal range of motion   Mouth opening: Normal     Cardiovascular  Regular rate and rhythm,  S1 and S2 normal,  no murmur, click, rub, or gallop  Rhythm: regular  Rate: normal         Dental    Dentition: Lower dentition intact and Upper dentition intact     Pulmonary  Breath sounds clear to auscultation               Abdominal  GI exam deferred       Other Findings            Anesthetic Plan    ASA: 3  Anesthesia type: general          Induction: Intravenous  Anesthetic plan and risks discussed with: Patient

## 2022-03-01 NOTE — PROGRESS NOTES
[facility]  Progress Note    Patient: Martin Burt Age: 64 y.o. : 1960 MR#: [id number] SSN: [id number]  Date: 3/1/2022     Subjective/24-hour events:     Nothing new overnight. Afebrile. Assessment:   Left great toe osteomyelitis  Right foot ulcer, POA  DM2, A1c 6.1%  Hypertension  Dyslipidemia  Anemia   CAD with history of stent placement  GERD  Severe obesity, BMI 35.15    Plan:   OR today for partial left great toe amputation by podiatry. ID evaluation pending, await recommendations. Lantus and SSI as ordered, monitor sugars and adjust treatment regimen as necessary to optimize control. Monitor BPs. Amlodipine as prescribed. Adjust regimen if BPs continue to run high overall. Check iron studies. Mobilize with restrictions. Patient NWB to left forefoot. Supportive care otherwise. Follow. Case discussed with:  [x]Patient  []Family  [x]Nursing  []Case Management  DVT Prophylaxis:  []Lovenox  []Hep SQ  []SCDs  []Coumadin   []On Heparin gtt    Objective:   VS:   Visit Vitals  BP (!) 147/77   Pulse (!) 53   Temp 98.2 °F (36.8 °C)   Resp 19   Ht 5' 10\" (1.778 m)   Wt 111.1 kg (245 lb)   SpO2 96%   BMI 35.15 kg/m²      Tmax/24hrs: Temp (24hrs), Av.2 °F (36.8 °C), Min:97.5 °F (36.4 °C), Max:99.3 °F (37.4 °C)  No intake or output data in the 24 hours ending 22 0923    General:  In NAD. Nontoxic-appearing. Cardiovascular:  RRR. Pulmonary:  Lungs clear bilaterally, no wheezes. No accessory muscle use. GI:  Abdomen soft, NTTP. Extremities:  Warm, no edema or ischemia. Neuro:  Awake and alert. Moves extremities spontaneously.     Labs:    Recent Results (from the past 24 hour(s))   CBC WITH AUTOMATED DIFF    Collection Time: 22  4:15 PM   Result Value Ref Range    WBC 15.0 (H) 4.6 - 13.2 K/uL    RBC 3.05 (L) 4.35 - 5.65 M/uL    HGB 9.8 (L) 13.0 - 16.0 g/dL    HCT 31.0 (L) 36.0 - 48.0 %    .6 (H) 78.0 - 100.0 FL    MCH 32.1 24.0 - 34.0 PG    MCHC 31.6 31.0 - 37.0 g/dL    RDW 13.4 11.6 - 14.5 %    PLATELET 215 010 - 282 K/uL    MPV 11.5 9.2 - 11.8 FL    NRBC 0.0 0  WBC    ABSOLUTE NRBC 0.00 0.00 - 0.01 K/uL    NEUTROPHILS 79 (H) 40 - 73 %    LYMPHOCYTES 11 (L) 21 - 52 %    MONOCYTES 8 3 - 10 %    EOSINOPHILS 1 0 - 5 %    BASOPHILS 0 0 - 2 %    IMMATURE GRANULOCYTES 1 (H) 0.0 - 0.5 %    ABS. NEUTROPHILS 11.9 (H) 1.8 - 8.0 K/UL    ABS. LYMPHOCYTES 1.7 0.9 - 3.6 K/UL    ABS. MONOCYTES 1.2 0.05 - 1.2 K/UL    ABS. EOSINOPHILS 0.2 0.0 - 0.4 K/UL    ABS. BASOPHILS 0.1 0.0 - 0.1 K/UL    ABS. IMM. GRANS. 0.1 (H) 0.00 - 0.04 K/UL    DF AUTOMATED     METABOLIC PANEL, COMPREHENSIVE    Collection Time: 02/28/22  4:15 PM   Result Value Ref Range    Sodium 136 136 - 145 mmol/L    Potassium 3.8 3.5 - 5.5 mmol/L    Chloride 100 100 - 111 mmol/L    CO2 32 21 - 32 mmol/L    Anion gap 4 3.0 - 18 mmol/L    Glucose 143 (H) 74 - 99 mg/dL    BUN 15 7.0 - 18 MG/DL    Creatinine 1.26 0.6 - 1.3 MG/DL    BUN/Creatinine ratio 12 12 - 20      GFR est AA >60 >60 ml/min/1.73m2    GFR est non-AA 58 (L) >60 ml/min/1.73m2    Calcium 9.1 8.5 - 10.1 MG/DL    Bilirubin, total 0.5 0.2 - 1.0 MG/DL    ALT (SGPT) 24 16 - 61 U/L    AST (SGOT) 20 10 - 38 U/L    Alk.  phosphatase 115 45 - 117 U/L    Protein, total 8.8 (H) 6.4 - 8.2 g/dL    Albumin 3.1 (L) 3.4 - 5.0 g/dL    Globulin 5.7 (H) 2.0 - 4.0 g/dL    A-G Ratio 0.5 (L) 0.8 - 1.7     CULTURE, BLOOD    Collection Time: 02/28/22  4:15 PM    Specimen: Blood   Result Value Ref Range    Special Requests: LEFT  AC        Culture result: NO GROWTH AFTER 14 HOURS     CULTURE, BLOOD    Collection Time: 02/28/22  4:15 PM    Specimen: Blood   Result Value Ref Range    Special Requests: RIGHT  AC        Culture result: NO GROWTH AFTER 14 HOURS     MAGNESIUM    Collection Time: 02/28/22  4:15 PM   Result Value Ref Range    Magnesium 1.8 1.6 - 2.6 mg/dL   POC LACTIC ACID    Collection Time: 02/28/22  4:26 PM   Result Value Ref Range Lactic Acid (POC) 1.33 0.40 - 2.00 mmol/L   GLUCOSE, POC    Collection Time: 02/28/22  4:44 PM   Result Value Ref Range    Glucose (POC) 138 (H) 70 - 110 mg/dL   EKG, 12 LEAD, INITIAL    Collection Time: 02/28/22  5:32 PM   Result Value Ref Range    Ventricular Rate 58 BPM    Atrial Rate 58 BPM    P-R Interval 252 ms    QRS Duration 84 ms    Q-T Interval 404 ms    QTC Calculation (Bezet) 396 ms    Calculated P Axis 55 degrees    Calculated R Axis -6 degrees    Calculated T Axis 31 degrees    Diagnosis       Sinus bradycardia with sinus arrhythmia with 1st degree AV block  Inferior infarct (cited on or before 22-NOV-2020)  Cannot exclude anterior MI versus lead placement issue. Abnormal ECG  When compared with ECG of 24-SEP-2021 05:22,  Sinus rhythm has replaced Atrial fibrillation  Minimal criteria for Anterior infarct are now present  Nonspecific T wave abnormality, improved in Inferior leads  QT has shortened  Confirmed by Benjamín Fortune MD, Mena Smith ([id number]) on 3/1/2022 8:33:48 AM     GLUCOSE, POC    Collection Time: 02/28/22  9:32 PM   Result Value Ref Range    Glucose (POC) 181 (H) 70 - 110 mg/dL   MAGNESIUM    Collection Time: 03/01/22  3:35 AM   Result Value Ref Range    Magnesium 2.0 1.6 - 2.6 mg/dL   CBC WITH AUTOMATED DIFF    Collection Time: 03/01/22  3:35 AM   Result Value Ref Range    WBC 10.4 4.6 - 13.2 K/uL    RBC 2.81 (L) 4.35 - 5.65 M/uL    HGB 8.7 (L) 13.0 - 16.0 g/dL    HCT 28.5 (L) 36.0 - 48.0 %    .4 (H) 78.0 - 100.0 FL    MCH 31.0 24.0 - 34.0 PG    MCHC 30.5 (L) 31.0 - 37.0 g/dL    RDW 13.2 11.6 - 14.5 %    PLATELET 039 199 - 925 K/uL    MPV 11.3 9.2 - 11.8 FL    NRBC 0.0 0  WBC    ABSOLUTE NRBC 0.00 0.00 - 0.01 K/uL    NEUTROPHILS 68 40 - 73 %    LYMPHOCYTES 18 (L) 21 - 52 %    MONOCYTES 10 3 - 10 %    EOSINOPHILS 3 0 - 5 %    BASOPHILS 0 0 - 2 %    IMMATURE GRANULOCYTES 0 0.0 - 0.5 %    ABS. NEUTROPHILS 7.1 1.8 - 8.0 K/UL    ABS. LYMPHOCYTES 1.9 0.9 - 3.6 K/UL    ABS.  MONOCYTES 1.0 0.05 - 1.2 K/UL    ABS. EOSINOPHILS 0.3 0.0 - 0.4 K/UL    ABS. BASOPHILS 0.0 0.0 - 0.1 K/UL    ABS. IMM.  GRANS. 0.0 0.00 - 0.04 K/UL    DF AUTOMATED     HEMOGLOBIN A1C WITH EAG    Collection Time: 03/01/22  3:35 AM   Result Value Ref Range    Hemoglobin A1c 6.1 (H) 4.2 - 5.6 %    Est. average glucose 579 mg/dL   METABOLIC PANEL, BASIC    Collection Time: 03/01/22  3:35 AM   Result Value Ref Range    Sodium 140 136 - 145 mmol/L    Potassium 3.8 3.5 - 5.5 mmol/L    Chloride 104 100 - 111 mmol/L    CO2 31 21 - 32 mmol/L    Anion gap 5 3.0 - 18 mmol/L    Glucose 129 (H) 74 - 99 mg/dL    BUN 17 7.0 - 18 MG/DL    Creatinine 1.22 0.6 - 1.3 MG/DL    BUN/Creatinine ratio 14 12 - 20      GFR est AA >60 >60 ml/min/1.73m2    GFR est non-AA >60 >60 ml/min/1.73m2    Calcium 8.4 (L) 8.5 - 10.1 MG/DL   GLUCOSE, POC    Collection Time: 03/01/22  6:08 AM   Result Value Ref Range    Glucose (POC) 135 (H) 70 - 110 mg/dL   GLUCOSE, POC    Collection Time: 03/01/22  9:00 AM   Result Value Ref Range    Glucose (POC) 107 70 - 110 mg/dL       Signed By: Jonnathan Ceja MD     March 1, 2022

## 2022-03-01 NOTE — PROGRESS NOTES
Spoke with nurse about completing mri screening form. Nurse states patient is off to surgery at this time.  3/1/22 @ 4221 mw

## 2022-03-01 NOTE — PROGRESS NOTES
Problem: Falls - Risk of  Goal: *Absence of Falls  Description: Document Hernandez Polanco Fall Risk and appropriate interventions in the flowsheet. Outcome: Progressing Towards Goal  Note: Fall Risk Interventions:  Mobility Interventions: Assess mobility with egress test,Patient to call before getting OOB         Medication Interventions: Assess postural VS orthostatic hypotension,Patient to call before getting OOB                   Problem: Patient Education: Go to Patient Education Activity  Goal: Patient/Family Education  Outcome: Progressing Towards Goal     Problem: Pain  Goal: *Control of Pain  Outcome: Progressing Towards Goal  Goal: *PALLIATIVE CARE:  Alleviation of Pain  Outcome: Progressing Towards Goal     Problem: Diabetes Self-Management  Goal: *Disease process and treatment process  Description: Define diabetes and identify own type of diabetes; list 3 options for treating diabetes. Outcome: Progressing Towards Goal  Goal: *Incorporating nutritional management into lifestyle  Description: Describe effect of type, amount and timing of food on blood glucose; list 3 methods for planning meals. Outcome: Progressing Towards Goal  Goal: *Incorporating physical activity into lifestyle  Description: State effect of exercise on blood glucose levels. Outcome: Progressing Towards Goal  Goal: *Developing strategies to promote health/change behavior  Description: Define the ABC's of diabetes; identify appropriate screenings, schedule and personal plan for screenings. Outcome: Progressing Towards Goal  Goal: *Using medications safely  Description: State effect of diabetes medications on diabetes; name diabetes medication taking, action and side effects. Outcome: Progressing Towards Goal  Goal: *Monitoring blood glucose, interpreting and using results  Description: Identify recommended blood glucose targets  and personal targets.   Outcome: Progressing Towards Goal  Goal: *Prevention, detection, treatment of acute complications  Description: List symptoms of hyper- and hypoglycemia; describe how to treat low blood sugar and actions for lowering  high blood glucose level. Outcome: Progressing Towards Goal  Goal: *Prevention, detection and treatment of chronic complications  Description: Define the natural course of diabetes and describe the relationship of blood glucose levels to long term complications of diabetes.   Outcome: Progressing Towards Goal  Goal: *Developing strategies to address psychosocial issues  Description: Describe feelings about living with diabetes; identify support needed and support network  Outcome: Progressing Towards Goal  Goal: *Insulin pump training  Outcome: Progressing Towards Goal  Goal: *Sick day guidelines  Outcome: Progressing Towards Goal  Goal: *Patient Specific Goal (EDIT GOAL, INSERT TEXT)  Outcome: Progressing Towards Goal     Problem: Patient Education: Go to Patient Education Activity  Goal: Patient/Family Education  Outcome: Progressing Towards Goal     Problem: Patient Education: Go to Patient Education Activity  Goal: Patient/Family Education  Outcome: Progressing Towards Goal

## 2022-03-01 NOTE — DIABETES MGMT
Diabetes/ Glycemic Control Plan of Care  Recommendations:  1. Recommend lowering Lantus from 25 units BID to 25 units once daily. 1628  Addendum:  Order obtained/eneterd for 25 units Lantus once daily. Assessment:   Pt with known history of T2DM (well controlled, A1C - 6.1%; home regimen of basal and sliding scale insulins/metformin per chart). Per chart review pt has been followed by [facility] team at prior [facility] admissions (29 diabetes management notes in EMR). Fasting blood glucose this morning - 135 mg/dL. Pt is NPO. Noted pt received 25 unit dose of Lantus last night but refused his AM dose this morning. Blood glucose readings are in target range. Attempted to visit pt  X  2 to confirm current home diabetes medications but he was receiving nursing care both times. DX:   1. Osteomyelitis of left foot, unspecified type ([address]     2. Ulcer of right foot, unspecified ulcer stage ([address]     3. Type 2 diabetes mellitus with hyperglycemia, with long-term current use of insulin ([address]     4.  Hypertension, unspecified type        Fasting/ Morning blood glucose:   Lab Results   Component Value Date/Time    Glucose 129 (H) 03/01/2022 03:35 AM    Glucose (POC) 114 (H) 03/01/2022 12:34 PM    Glucose,  (HH) 08/10/2015 07:10 PM     IV Fluids containing dextrose: none  Steroids: none       Blood glucose values:   3/01:  POC - 135, 107, 114  2/28:  Lab - 143;  POC - 138, 181      Within target range (70-180mg/dL):  YES    Current insulin orders:   25 units Lantus q 12 hours  Corrective lispro, normal insulin sensitivity 4 times daily as needed    Total Daily Dose previous 24 hours = not here full day    Current A1c:   Lab Results   Component Value Date/Time    Hemoglobin A1c 6.1 (H) 03/01/2022 03:35 AM      equivalent  to ave Blood Glucose of128 mg/dl for 2-3 months prior to admission  Adequate glycemic control PTA: YES  Nutrition/Diet:   Active Orders   Diet    DIET NPO Sips of Water with Meds      Meal Intake:  No data found. Supplement Intake:  No data found. Home diabetes medications:   Key Antihyperglycemic Medications             metFORMIN (GLUCOPHAGE) 1,000 mg tablet Take 1,000 mg by mouth two (2) times daily (with meals). insulin lispro (HUMALOG) 100 unit/mL injection Check FSBS AC*HS  For sugars between 160 and 200- Give 2 units SQ,  For sugars between 201 and 250, Give 3 units SQ,  For sugars Between 251 and 300, give 5 units SQ,  For Sugars between 301 and 350, give 7 units SQ  For sugars between 351 and 400, give 8 units SQ and contact PCP    insulin detemir U-100 (Levemir U-100 Insulin) 100 unit/mL injection 20 Units by SubCUTAneous route two (2) times a day. Plan/Goals:   Blood glucose will be within target of 70 - 180 mg/dl within 72 hours  Reinforce dietary and medication compliance at home.        Education:  [] Refer to Diabetes Education Record                       [] Education not indicated at this time     Nabil Garcia,  MS, RD, [facility]  Diabetes and Glycemic Control   [employer]

## 2022-03-01 NOTE — PROGRESS NOTES
Progress Note    Patient: Giovanna Hamman MRN: [id number]  SSN: [id number]    YOB: 1960  Age: 64 y.o. Sex: male      Admit Date: 2/28/2022  Day of Surgery         Subjective:     Patient seen resting quiet and comfortably and no apparent distress. Patient denies any new pedal complaint. He has ulcers on right foot lateral stump as well as to left submet 3. Objective:     Visit Vitals  /72   Pulse (!) 52   Temp 98.4 °F (36.9 °C)   Resp 17   Ht 5' 10\" (1.778 m)   Wt 111.1 kg (245 lb)   SpO2 94%   BMI 35.15 kg/m²        Physical Exam:  Dressings intact to bilateral feet. No strike through noted. Labs/Radiology Review: images and reports reviewed    Assessment:     Hospital Problems  Date Reviewed: 12/24/2019          Codes Class Noted POA    OM (osteomyelitis) ([facility] ICD-10-CM: M86.9  ICD-9-CM: 730.20  2/28/2022 Unknown        Osteomyelitis ([facility] ICD-10-CM: M86.9  ICD-9-CM: 730.20  11/22/2020 Unknown              Plan/Recommendations/Medical Decision Making:     - Patient is going to OR for left great toe partial amputation and right foot ulcer debridement, left ball of foot ulcer debridement.   - MRI of right foot ordered by ID physician to r/o deeper abscess.    - Antibiotic management per ID recommendation.   - Medical management per primary team.

## 2022-03-01 NOTE — PROGRESS NOTES
Reason for Admission:  Osteomyelitis ([facility] [M86.9]  OM (osteomyelitis) ([facility] [M86.9]                 RUR Score:  11%           Plan for utilizing home health:    Yes,                       Likelihood of Readmission:   LOW                         Transition of Care Plan:              Initial assessment completed with patient and spouse/SO. Cognitive status of patient: oriented to time, place, person and situation. Face sheet information confirmed:  yes. The patient designates wife Nitin Aleman and sister to participate in his discharge plan and to receive any needed information. This patient lives in a single family home with wife. Patient is able to navigate steps as needed. Prior to hospitalization, patient was considered to be independent with ADLs/IADLS : yes . Patient has a current ACP document on file: no      Healthcare Decision Maker: The patient's wife will be available to transport patient home upon discharge. The patient already has none reported, medical equipment available in the home. Patient is not currently active with home health. Patient {has/has [id number] of available Home Health agencies were provided and reviewed with the patient prior to discharge. Verbal freedom of choice signed: yes, for accepting agency. Currently, the discharge plan is Home with [address]. The patient states that he can obtain his medications from the pharmacy, and take his medications as directed. Patient's current insurance is [employer]       Care Management Interventions  PCP Verified by CM: Yes  Mode of Transport at Discharge:  Other (see comment) (wife, Nitin Aleman)  Transition of Care Consult (CM Consult): Discharge Planning,Home Health  Physical Therapy Consult: Yes  Occupational Therapy Consult: Yes  Support Systems: Spouse/Significant Other,Other Family Member(s)  Confirm Follow Up Transport: Self  The Patient and/or Patient Representative was Provided with a Choice of Provider and Agrees with the Discharge Plan?: Yes  Freedom of Choice List was Provided with Basic Dialogue that Supports the Patient's Individualized Plan of Care/Goals, Treatment Preferences and Shares the Quality Data Associated with the Providers?: Yes  Discharge Location  Patient Expects to be Discharged to[de-identified] Home with home health         will continue to monitor and assist with transition of care needs.     JESUS Miguel, RN  Care Management  Pager # [phone]

## 2022-03-01 NOTE — H&P
History & Physical    Patient: Baljit Aquino MRN: [id number]  CSN: [id number]    YOB: 1960  Age: 64 y.o. Sex: male      DOA: 2/28/2022    Chief Complaint: foot pain       HPI:     Baljit Aquino is a 64 y.o.  male with past medical history of diabetes mellitus type 2, hypertension, CAD status post stent in the past comes to the emergency room sent from podiatrist office for evaluation of left foot chronic ulcer concerning for osteomyelitis. Per patient he has been having this chronic ulcer for few months now, patient complains of pain when walking. He also has a ulcer on his right foot which is also bothering him. He denies any fevers or chills at home. He denies any recent trauma. In the emergency room, patient had x-ray which showed osteomyelitis. Podiatry recommended to continue antibiotics and plan for debridement for right foot and also partial amputation of great toe on the left foot.     Past Medical History:   Diagnosis Date    Arthritis     Coronary atherosclerosis of native coronary artery     S/P PCI with GE in 12/09    Diabetes ([facility] [address]     IDDM    Electrolyte and fluid disorders not elsewhere classified     Essential hypertension, benign     GERD (gastroesophageal reflux disease)     Heroin abuse ([facility] [address] 05/26/16    Psychiatrist Dr. Asad Kowalski History of heart attack     Hyperlipidemia     Intermediate coronary syndrome [facility])     MDD (major depressive disorder) 5/26/16    Psychiatrist Dr. Asad Kowalski Myocardial infarction [facility])     Obesity, unspecified     Old myocardial infarction     Other and unspecified hyperlipidemia     Pancreatitis     Positive PPD     Sleep apnea     uses Cpap machine    Transfusion history     Before 1992       Past Surgical History:   Procedure Laterality Date    HX APPENDECTOMY      HX CORONARY STENT PLACEMENT  2010    2 stents       Family History   Problem Relation Age of Onset    Heart Attack Father     Coronary Art Dis Mother     Diabetes Mother     Cancer Sister         breast cancer and lung cancer       Social History     Socioeconomic History    Marital status:    Tobacco Use    Smoking status: Never Smoker    Smokeless tobacco: Never Used   Substance and Sexual Activity    Alcohol use: No    Drug use: No       Prior to Admission medications    Medication Sig Start Date End Date Taking? Authorizing Provider   clopidogreL (PLAVIX) 75 mg tab Take 75 mg by mouth daily. Indications: myocardial reinfarction prevention    Provider, Historical   traZODone (DESYREL) 150 mg tablet Take 150 mg by mouth nightly. Indications: insomnia associated with depression    Provider, Historical   oxyCODONE-acetaminophen (PERCOCET 7.5) 7.5-325 mg per tablet Take 1 Tab by mouth every six (6) hours as needed for Pain. Provider, Historical   amLODIPine (NORVASC) 10 mg tablet Take 10 mg by mouth daily. Provider, Historical   hydroCHLOROthiazide (HYDRODIURIL) 50 mg tablet Take 50 mg by mouth daily. Provider, Historical   metFORMIN (GLUCOPHAGE) 1,000 mg tablet Take 1,000 mg by mouth two (2) times daily (with meals). Provider, Historical   atorvastatin (LIPITOR) 20 mg tablet Take 1 Tab by mouth nightly. 4/16/21   Rachael Johnson MD   OTHER BMP in 1 week  Dx: ARF  Fax results to Dr. Peyman Mitchell 4/16/21   Rachael Johnson MD   insulin lispro (HUMALOG) 100 unit/mL injection Check FSBS AC*HS  For sugars between 160 and 200- Give 2 units SQ,  For sugars between 201 and 250, Give 3 units SQ,  For sugars Between 251 and 300, give 5 units SQ,  For Sugars between 301 and 350, give 7 units SQ  For sugars between 351 and 400, give 8 units SQ and contact PCP  Patient taking differently: by SubCUTAneous route two (2) times a day.  Check FSBS AC*HS  For sugars between 160 and 200- Give 2 units SQ,  For sugars between 201 and 250, Give 3 units SQ,  For sugars Between 251 and 300, give 5 units SQ,  For Sugars between 301 and 350, give 7 units SQ  For sugars between 351 and 400, give 8 units SQ and contact PCP 2/9/21   Michelle Hill MD   insulin detemir U-100 (Levemir U-100 Insulin) 100 unit/mL injection 20 Units by SubCUTAneous route two (2) times a day. Patient taking differently: 60 Units by SubCUTAneous route two (2) times a day. Pt states he is taking levemir 40 units twice daily 2/9/21   Michelle Hill MD   pantoprazole (PROTONIX) 40 mg tablet Take 1 Tab by mouth Before breakfast and dinner. 3/29/18   Darien De Jesus NP   acetaminophen (TYLENOL) 325 mg tablet Take 2 Tabs by mouth every six (6) hours as needed. Indications: Pain, take it with bentyl; do not exceed 3 g tylenol daily  Patient not taking: Reported on 4/28/2021 3/29/18   Sammy GAMING NP   polyethylene glycol (MIRALAX) 17 gram packet Take 1 Packet by mouth daily. Patient taking differently: Take 1 Packet by mouth daily. [address] [place] [address] 9/11/17   Chad Mead MD       Allergies   Allergen Reactions    Compazine [Prochlorperazine] Anaphylaxis     \"Tightness around throat\"         Review of Systems  GENERAL: Patient alert, awake and oriented times 3, able to communicate full sentences and not in distress. no weight loss, no falls. HEENT: No change in vision, no earache, no tinnitus, no sore throat or sinus congestion. NECK: No pain or stiffness. PULMONARY: No shortness of breath, no cough or wheeze. Cardiovascular: no pnd or orthopnea, no CP  GASTROINTESTINAL: No abdominal pain, no nausea, no vomiting or diarrhea, no melena or bright red blood per rectum. GENITOURINARY: No urinary frequency, no urgency, no hesitancy or dysuria. MUSCULOSKELETAL: No joint or muscle pain, no back pain, no recent trauma. DERMATOLOGIC: No rash, no itching, no lesions. ENDOCRINE: No polyuria, no polydipsia, no heat or cold intolerance. No recent change in weight.    HEMATOLOGICAL: No anemia or easy bruising or bleeding. NEUROLOGIC: No headache, no seizures, no numbness, no tingling or weakness. Physical Exam:     Physical Exam:  Visit Vitals  BP (!) 153/74   Pulse 66   Temp 98.3 °F (36.8 °C)   Resp 18   Ht 5' 10\" (1.778 m)   Wt 111.1 kg (245 lb)   SpO2 98%   BMI 35.15 kg/m²      O2 Device: None    Temp (24hrs), Av.4 °F (36.9 °C), Min:97.5 °F (36.4 °C), Max:99.3 °F (37.4 °C)    No intake/output data recorded. No intake/output data recorded. General:  Alert, cooperative, no distress, appears stated age. Head: Normocephalic, without obvious abnormality, atraumatic. Eyes:  Conjunctivae/corneas clear. PERRL, EOMs intact. Nose: Nares normal. No drainage or sinus tenderness. Neck: Supple, symmetrical, trachea midline, no adenopathy, thyroid: no enlargement, no carotid bruit and no JVD. Lungs:   Clear to auscultation bilaterally. Heart:  Regular rate and rhythm, S1, S2 normal.     Abdomen: Soft, non-tender. Bowel sounds normal.    Extremities:  Left great toe chronic ulcer with callus, no redness or erythema, right foot lateral side will also with surrounding skin mild redness, some skin necrosis. Trace edema. Pulses: 2+ and symmetric all extremities. Skin:  No rashes or lesions   Neurologic: AAOx3, moves all extremities.        Labs Reviewed:    BMP:   Lab Results   Component Value Date/Time     2022 04:15 PM    K 3.8 2022 04:15 PM     2022 04:15 PM    CO2 32 2022 04:15 PM    AGAP 4 2022 04:15 PM     (H) 2022 04:15 PM    BUN 15 2022 04:15 PM    CREA 1.26 2022 04:15 PM    GFRAA >60 2022 04:15 PM    GFRNA 58 (L) 2022 04:15 PM     CMP:   Lab Results   Component Value Date/Time     2022 04:15 PM    K 3.8 2022 04:15 PM     2022 04:15 PM    CO2 32 2022 04:15 PM    AGAP 4 2022 04:15 PM     (H) 2022 04:15 PM    BUN 15 2022 04:15 PM    CREA 1.26 02/28/2022 04:15 PM    GFRAA >60 02/28/2022 04:15 PM    GFRNA 58 (L) 02/28/2022 04:15 PM    CA 9.1 02/28/2022 04:15 PM    MG 1.8 02/28/2022 04:15 PM    ALB 3.1 (L) 02/28/2022 04:15 PM    TP 8.8 (H) 02/28/2022 04:15 PM    GLOB 5.7 (H) 02/28/2022 04:15 PM    AGRAT 0.5 (L) 02/28/2022 04:15 PM    ALT 24 02/28/2022 04:15 PM     CBC:   Lab Results   Component Value Date/Time    WBC 15.0 (H) 02/28/2022 04:15 PM    HGB 9.8 (L) 02/28/2022 04:15 PM    HCT 31.0 (L) 02/28/2022 04:15 PM     02/28/2022 04:15 PM     XR Results (most recent):  Results from Hospital Encounter encounter on 02/28/22    XR FOOT RT MIN 3 V    Narrative  EXAM: Bilateral Foot X-rays    INDICATION: Wounds to the feet    TECHNIQUE: 3 views of the feet obtained. COMPARISON: Prior imaging of the right foot dated 6/8/2021, 4/12/2021, 2/6/2021. FINDINGS:  Right foot: Patient is status post amputation of the toes. The majority of the  first through fifth metatarsals have been resected. These findings were present  previously. No evidence of acute fracture or dislocation of the visualized  osseous structures. No erosions appreciated. No lytic or blastic focus  appreciated. The soft tissues are irregular. Possible lateral ulcer is  suggested. Left foot: Patient is status post amputation of the distal phalanx and middle  phalanx of the second toe. No evidence of acute fracture or dislocation. The  joint spaces are preserved. The medial aspect of the tip of the first distal  phalanx has erosions. . No lytic or blastic focus appreciated. Appears to be an  ulcer at the tip of the first toe and adjacent soft tissue edema. Impression  1. Ulcer with osteomyelitis of the left 1st distal phalanx. 2.  Extensive postoperative changes of the right foot. No definite evidence of  osteomyelitis. 3.  Possible right lateral ulcer.       Procedures/imaging: see electronic medical records for all procedures/Xrays and details which were not copied into this note but were reviewed prior to creation of Plan      Assessment/Plan     Active Problems:    Osteomyelitis ([facility] [address] (11/22/2020)      OM (osteomyelitis) ([facility] [address] (2/28/2022)      1. Osteomyelitis left great toe  2. Right foot lateral ulcer with some signs of infection  3. Diabetes mellitus type 2  4. Hypertension  5. CAD status post stent in the past  6. GERD. 7. Dyslipidemia    Plan  We will admit this patient to surgical floor with remote telemetry  We will continue empiric antibiotics with vancomycin and Zosyn  Discussed with podiatry, plan for partial amputation of left great toe and debridement of right foot tomorrow under ankle block under sedation. Will consult ID with Dr. Gayle Green  Patient moderate cardiovascular risk for neurosurgery. Risk and benefits discussed with the patient, wants to proceed with surgery. We will resume amlodipine and monitor blood pressure  We will start Lantus at 25 units twice daily, increase Lantus based on blood sugars. We will hold Plavix anticipating surgery        DVT/GI Prophylaxis: Lovenox    Discussed with patient at bedside about hospital admission and my plan care, he understood and agree with my plan care. I spent 50 minutes with the patient in face-to-face consultation, of which greater than 50% was spent in counseling and coordination of care as described above    Valentin Leung MD  2/28/2022 8:38 PM    Disclaimer: Sections of this note are dictated using utilizing voice recognition software. Minor typographical errors may be present. If questions arise, please do not hesitate to contact me or call our department.

## 2022-03-02 ENCOUNTER — APPOINTMENT (OUTPATIENT)
Dept: MRI IMAGING | Age: 62
DRG: 314 | End: 2022-03-02
Attending: INTERNAL MEDICINE
Payer: MEDICAID

## 2022-03-02 LAB
ANION GAP SERPL CALC-SCNC: 3 MMOL/L (ref 3–18)
BUN SERPL-MCNC: 15 MG/DL (ref 7–18)
BUN/CREAT SERPL: 10 (ref 12–20)
CALCIUM SERPL-MCNC: 8.5 MG/DL (ref 8.5–10.1)
CHLORIDE SERPL-SCNC: 106 MMOL/L (ref 100–111)
CO2 SERPL-SCNC: 31 MMOL/L (ref 21–32)
CREAT SERPL-MCNC: 1.43 MG/DL (ref 0.6–1.3)
GLUCOSE BLD STRIP.AUTO-MCNC: 114 MG/DL (ref 70–110)
GLUCOSE BLD STRIP.AUTO-MCNC: 117 MG/DL (ref 70–110)
GLUCOSE BLD STRIP.AUTO-MCNC: 181 MG/DL (ref 70–110)
GLUCOSE BLD STRIP.AUTO-MCNC: 228 MG/DL (ref 70–110)
GLUCOSE SERPL-MCNC: 125 MG/DL (ref 74–99)
IRON SATN MFR SERPL: 10 % (ref 20–50)
IRON SERPL-MCNC: 28 UG/DL (ref 50–175)
POTASSIUM SERPL-SCNC: 4.1 MMOL/L (ref 3.5–5.5)
SODIUM SERPL-SCNC: 140 MMOL/L (ref 136–145)
TIBC SERPL-MCNC: 291 UG/DL (ref 250–450)
VANCOMYCIN SERPL-MCNC: 5.4 UG/ML (ref 5–40)

## 2022-03-02 PROCEDURE — 74011000258 HC RX REV CODE- 258: Performed by: FAMILY MEDICINE

## 2022-03-02 PROCEDURE — 99232 SBSQ HOSP IP/OBS MODERATE 35: CPT | Performed by: FAMILY MEDICINE

## 2022-03-02 PROCEDURE — 74011250636 HC RX REV CODE- 250/636: Performed by: HOSPITALIST

## 2022-03-02 PROCEDURE — 97165 OT EVAL LOW COMPLEX 30 MIN: CPT

## 2022-03-02 PROCEDURE — 80048 BASIC METABOLIC PNL TOTAL CA: CPT

## 2022-03-02 PROCEDURE — 74011250636 HC RX REV CODE- 250/636: Performed by: INTERNAL MEDICINE

## 2022-03-02 PROCEDURE — 2709999900 HC NON-CHARGEABLE SUPPLY

## 2022-03-02 PROCEDURE — 74011000250 HC RX REV CODE- 250: Performed by: HOSPITALIST

## 2022-03-02 PROCEDURE — 74011636637 HC RX REV CODE- 636/637: Performed by: HOSPITALIST

## 2022-03-02 PROCEDURE — 74011000258 HC RX REV CODE- 258: Performed by: HOSPITALIST

## 2022-03-02 PROCEDURE — 83540 ASSAY OF IRON: CPT

## 2022-03-02 PROCEDURE — 74011636637 HC RX REV CODE- 636/637: Performed by: FAMILY MEDICINE

## 2022-03-02 PROCEDURE — 74011250636 HC RX REV CODE- 250/636: Performed by: FAMILY MEDICINE

## 2022-03-02 PROCEDURE — 65270000029 HC RM PRIVATE

## 2022-03-02 PROCEDURE — 74011250637 HC RX REV CODE- 250/637: Performed by: HOSPITALIST

## 2022-03-02 PROCEDURE — 82962 GLUCOSE BLOOD TEST: CPT

## 2022-03-02 PROCEDURE — A9575 INJ GADOTERATE MEGLUMI 0.1ML: HCPCS | Performed by: FAMILY MEDICINE

## 2022-03-02 PROCEDURE — 97535 SELF CARE MNGMENT TRAINING: CPT

## 2022-03-02 PROCEDURE — 80202 ASSAY OF VANCOMYCIN: CPT

## 2022-03-02 PROCEDURE — 36415 COLL VENOUS BLD VENIPUNCTURE: CPT

## 2022-03-02 PROCEDURE — 73720 MRI LWR EXTREMITY W/O&W/DYE: CPT

## 2022-03-02 RX ORDER — GADOTERATE MEGLUMINE 376.9 MG/ML
20 INJECTION INTRAVENOUS
Status: COMPLETED | OUTPATIENT
Start: 2022-03-02 | End: 2022-03-02

## 2022-03-02 RX ORDER — KETOROLAC TROMETHAMINE 15 MG/ML
15 INJECTION, SOLUTION INTRAMUSCULAR; INTRAVENOUS ONCE
Status: COMPLETED | OUTPATIENT
Start: 2022-03-02 | End: 2022-03-02

## 2022-03-02 RX ORDER — OXYCODONE AND ACETAMINOPHEN 7.5; 325 MG/1; MG/1
1 TABLET ORAL
Status: DISCONTINUED | OUTPATIENT
Start: 2022-03-02 | End: 2022-03-04 | Stop reason: HOSPADM

## 2022-03-02 RX ORDER — SODIUM CHLORIDE 9 MG/ML
500 INJECTION, SOLUTION INTRAVENOUS ONCE
Status: COMPLETED | OUTPATIENT
Start: 2022-03-02 | End: 2022-03-02

## 2022-03-02 RX ORDER — VANCOMYCIN HYDROCHLORIDE
1250 EVERY 12 HOURS
Status: DISCONTINUED | OUTPATIENT
Start: 2022-03-02 | End: 2022-03-03

## 2022-03-02 RX ORDER — MORPHINE SULFATE 2 MG/ML
2 INJECTION, SOLUTION INTRAMUSCULAR; INTRAVENOUS
Status: DISCONTINUED | OUTPATIENT
Start: 2022-03-02 | End: 2022-03-04 | Stop reason: HOSPADM

## 2022-03-02 RX ADMIN — PIPERACILLIN AND TAZOBACTAM 3.38 G: 3; .375 INJECTION, POWDER, LYOPHILIZED, FOR SOLUTION INTRAVENOUS at 08:37

## 2022-03-02 RX ADMIN — MORPHINE SULFATE 2 MG: 2 INJECTION, SOLUTION INTRAMUSCULAR; INTRAVENOUS at 03:30

## 2022-03-02 RX ADMIN — Medication 25 UNITS: at 21:56

## 2022-03-02 RX ADMIN — PANTOPRAZOLE SODIUM 40 MG: 40 TABLET, DELAYED RELEASE ORAL at 15:58

## 2022-03-02 RX ADMIN — MORPHINE SULFATE 2 MG: 2 INJECTION, SOLUTION INTRAMUSCULAR; INTRAVENOUS at 09:27

## 2022-03-02 RX ADMIN — SODIUM CHLORIDE, PRESERVATIVE FREE 10 ML: 5 INJECTION INTRAVENOUS at 21:57

## 2022-03-02 RX ADMIN — PANTOPRAZOLE SODIUM 40 MG: 40 TABLET, DELAYED RELEASE ORAL at 08:38

## 2022-03-02 RX ADMIN — OXYCODONE AND ACETAMINOPHEN 1 TABLET: 7.5; 325 TABLET ORAL at 05:53

## 2022-03-02 RX ADMIN — PIPERACILLIN AND TAZOBACTAM 3.38 G: 3; .375 INJECTION, POWDER, LYOPHILIZED, FOR SOLUTION INTRAVENOUS at 14:49

## 2022-03-02 RX ADMIN — ATORVASTATIN CALCIUM 20 MG: 20 TABLET, FILM COATED ORAL at 21:55

## 2022-03-02 RX ADMIN — KETOROLAC TROMETHAMINE 15 MG: 15 INJECTION, SOLUTION INTRAMUSCULAR; INTRAVENOUS at 10:09

## 2022-03-02 RX ADMIN — SODIUM CHLORIDE, PRESERVATIVE FREE 10 ML: 5 INJECTION INTRAVENOUS at 06:00

## 2022-03-02 RX ADMIN — SODIUM CHLORIDE, PRESERVATIVE FREE 10 ML: 5 INJECTION INTRAVENOUS at 03:30

## 2022-03-02 RX ADMIN — Medication 0.2 UNITS: at 06:46

## 2022-03-02 RX ADMIN — VANCOMYCIN HYDROCHLORIDE 1250 MG: 10 INJECTION, POWDER, LYOPHILIZED, FOR SOLUTION INTRAVENOUS at 17:18

## 2022-03-02 RX ADMIN — POLYETHYLENE GLYCOL 3350 17 G: 17 POWDER, FOR SOLUTION ORAL at 09:00

## 2022-03-02 RX ADMIN — PIPERACILLIN AND TAZOBACTAM 3.38 G: 3; .375 INJECTION, POWDER, LYOPHILIZED, FOR SOLUTION INTRAVENOUS at 05:55

## 2022-03-02 RX ADMIN — AMLODIPINE BESYLATE 10 MG: 10 TABLET ORAL at 08:36

## 2022-03-02 RX ADMIN — SODIUM CHLORIDE 500 ML/HR: 9 INJECTION, SOLUTION INTRAVENOUS at 01:43

## 2022-03-02 RX ADMIN — SODIUM CHLORIDE, PRESERVATIVE FREE 10 ML: 5 INJECTION INTRAVENOUS at 01:43

## 2022-03-02 RX ADMIN — Medication 4 UNITS: at 17:42

## 2022-03-02 RX ADMIN — VANCOMYCIN HYDROCHLORIDE 1250 MG: 10 INJECTION, POWDER, LYOPHILIZED, FOR SOLUTION INTRAVENOUS at 05:03

## 2022-03-02 RX ADMIN — PIPERACILLIN AND TAZOBACTAM 3.38 G: 3; .375 INJECTION, POWDER, LYOPHILIZED, FOR SOLUTION INTRAVENOUS at 21:55

## 2022-03-02 RX ADMIN — GADOTERATE MEGLUMINE 20 ML: 376.9 INJECTION INTRAVENOUS at 12:03

## 2022-03-02 RX ADMIN — IRON SUCROSE 200 MG: 20 INJECTION, SOLUTION INTRAVENOUS at 15:58

## 2022-03-02 NOTE — PROGRESS NOTES
This RN taking patient;s VS, patient swearing at nurse and being rude, non compliant with getting OOB with staff presentt, informed patient its for safety, states \"get out of my room and don't come back\". Primary Rn aware, MD Wyatt Charter aware, and Manager and charge aware.

## 2022-03-02 NOTE — PROGRESS NOTES
Problem: Self Care Deficits Care Plan (Adult)  Goal: *Acute Goals and Plan of Care (Insert Text)  Outcome: Resolved/Met     OCCUPATIONAL THERAPY EVALUATION/DISCHARGE    Patient: Toyin Reddy (8 y.o. male)  Date: 3/2/2022  Primary Diagnosis: Osteomyelitis ([facility] [address] [M86.9]  OM (osteomyelitis) ([facility] [address] [M86.9]  Procedure(s) (LRB):  LEFT GREAT TOE PARTIAL AMPUTATION (Left)  FOOT DEBRIDEMENT (Right) 1 Day Post-Op   Precautions: Fall,NWB (to L forefoot)  PLOF: Patient was independent with self-care and functional mobility PTA. ASSESSMENT AND RECOMMENDATIONS:  Patient cleared to participate in OT evaluation by RN. Upon entering the room, patient was supine in bed, alert, and agreeable to participate in OT evaluation. Patient with complaint regarding task RN professionalism this session and wanting to speak with charge; charge nurse (Desiree) notified. Patient upset \"being forced to use BSC vs. Toilet\"; explained to patient with lines/medications sometimes BSC is nursing first assessment prior to ambulating to commode for safety. Patient educated on weight-bearing status and safety during this admission/around the house; patient verbalized understanding. Patient modified independent with bed mobility, modified independent with sit <> stand and supervision with functional mobility to restroom this session. Patient issued rolling walker for safety but refused to use; does comply and maintain WB. Patient would benefit from L post-op shoe d/t excess bandaging from amputation vs. use of personal slide on shoes for safety. Patient also with dressing on RLE this session d/t recent ED visit. Based on the objective data described below, the patient presents with no deficits that impede pt function with ADLs, functional transfers, and functional mobility in preparation for selfcare tasks. OT to d/c from caseload. Skilled occupational therapy is not indicated at this time.   Discharge Recommendations: Home Health Further Equipment Recommendations for Discharge: rolling walker for community distances/community reintegration       SUBJECTIVE:   Patient stated I remember you from last time    OBJECTIVE DATA SUMMARY:     Past Medical History:   Diagnosis Date    Arthritis     Coronary atherosclerosis of native coronary artery     S/P PCI with GE in 12/09    Diabetes ([facility] [address]     IDDM    Electrolyte and fluid disorders not elsewhere classified     Essential hypertension, benign     GERD (gastroesophageal reflux disease)     Heroin abuse ([facility] [address] 05/26/16    Psychiatrist Dr. Osbaldo Thapa     History of heart attack     Hyperlipidemia     Intermediate coronary syndrome [facility])     MDD (major depressive disorder) 5/26/16    Psychiatrist Dr. Osbaldo Thapa     Myocardial infarction [facility])     Obesity, unspecified     Old myocardial infarction     Other and unspecified hyperlipidemia     Pancreatitis     Positive PPD     Sleep apnea     uses Cpap machine    Transfusion history     Before 1992     Past Surgical History:   Procedure Laterality Date    HX APPENDECTOMY      HX CORONARY STENT PLACEMENT  2010    2 stents     Barriers to Learning/Limitations: None  Compensate with: visual, verbal, tactile, kinesthetic cues/model    Home Situation:   Home Situation  Home Environment: Apartment  One/Two Story Residence: Other (Comment) (4)  Living Alone: Yes  Support Systems: Spouse/Significant Other,Other Family Member(s)  Patient Expects to be Discharged to[de-identified] Home with home health  Current DME Used/Available at Home: Cane, straight (reports he gave RW away)  Tub or Shower Type: Tub/Shower combination  [x]     Right hand dominant   []     Left hand dominant    Cognitive/Behavioral Status:  Neurologic State: Alert  Orientation Level: Oriented X4  Cognition: Follows commands  Safety/Judgement: Fall prevention; Awareness of environment    Skin: Intact  Edema: None noted    Vision/Perceptual:    Acuity: Within Defined Limits      Coordination: BUE  Fine Motor Skills-Upper: Left Intact; Right Intact    Gross Motor Skills-Upper: Left Intact; Right Intact    Balance:  Sitting: Intact  Standing: Intact; With support    Strength: BUE  Strength: Within functional limits    Tone & Sensation: BUE  Tone: Normal  Sensation: Intact    Range of Motion: BUE  AROM: Within functional limits      Functional Mobility and Transfers for ADLs:  Bed Mobility:  Supine to Sit: Modified independent  Sit to Supine: Modified independent  Scooting: Modified independent    Transfers:  Sit to Stand: Modified independent  Stand to Sit: Modified independent    ADL Assessment:  Feeding: Independent    Oral Facial Hygiene/Grooming: Independent    Bathing: Independent    Upper Body Dressing: Independent    Lower Body Dressing: Independent    Toileting: Independent    ADL Intervention:  Upper Body Dressing Assistance  Dressing Assistance: [id number] [facility] [id number]: Independent    Lower Body Dressing Assistance  Dressing Assistance: Independent  Underpants: Independent (simulation with gait belt)  Slip on Shoes with Back: Independent  Leg Crossed Method Used: Yes  Position Performed: Seated edge of bed;Standing    Cognitive Retraining  Safety/Judgement: Fall prevention; Awareness of environment    Pain:  Pain level pre-treatment:7/10 , LLE  Pain level post-treatment: 7/10   Pain Intervention(s): Medication (see MAR); Rest, Ice, Repositioning   Response to intervention: Nurse notified, See doc flow    Activity Tolerance:   Good    Please refer to the flowsheet for vital signs taken during this treatment.   After treatment:   []  Patient left in no apparent distress sitting up in chair  [x]  Patient left in no apparent distress in bed  [x]  Call bell left within reach  [x]  Nursing notified  []  Caregiver present  []  Bed alarm activated    COMMUNICATION/EDUCATION:   [x]      Role of Occupational Therapy in the acute care setting  [x]      Home safety education was provided and the patient/caregiver indicated understanding. [x]      Patient/family have participated as able and agree with findings and recommendations. []      Patient is unable to participate in plan of care at this time. Thank you for this referral.  Thang Tellez, OTR/L  Time Calculation: 16 mins      Eval Complexity: History: MEDIUM Complexity : Expanded review of history including physical, cognitive and psychosocial  history ; Examination: LOW Complexity : 1-3 performance deficits relating to physical, cognitive , or psychosocial skils that result in activity limitations and / or participation restrictions ;    Decision Making:LOW Complexity : No comorbidities that affect functional and no verbal or physical assistance needed to complete eval tasks

## 2022-03-02 NOTE — PROGRESS NOTES
Discharge planning:    Home health order needed for wound care.  will continue to monitor and assist with transition of care needs.     JESUS Jaquez, RN  Care Management  Pager # [phone]

## 2022-03-02 NOTE — PROGRESS NOTES
[facility] Pharmacokinetic Monitoring Service - Vancomycin    Consulting Provider: Dr. Jamel Hadley   Indication: Bone & Joint Infection  Target Concentration: Goal AUC/ÁNGEL 400-600 mg*hr/L  Day of Therapy: 3  Additional Antimicrobials: pip-tazo    Pertinent Laboratory Values: Wt Readings from Last 1 Encounters:   02/28/22 111.1 kg (245 lb)     Temp Readings from Last 1 Encounters:   03/02/22 98.4 °F (36.9 °C)     No components found for: PROCAL  Estimated Creatinine Clearance: 67.7 mL/min (A) (based on SCr of 1.43 mg/dL (H)). Recent Labs     03/01/22  0335 02/28/22  1615   WBC 10.4 15.0*       Pertinent Cultures:  Culture Date Source Results   2/28 Blood   NG X2d     2/28 Wound GPC   MRSA Nasal Swab: N/A. Non-respiratory infection. .        Assessment:  Date/Time Current Dose Concentration Timing of Concentration (h) AUC   3/1 1250mg q18h        3/2 1250mg q12h 5.4mg/L 16h post dose 384   Note: Serum concentrations collected for AUC dosing may appear elevated if collected in close proximity to the dose administered, this is not necessarily an indication of toxicity    Plan:  L.great toe osteo, Bilat. Foot ulcer, S/P L.great toe partial amputation, debridement bilat.  Foot ulcers 3/1/22  Adjust to 1250mg q12h 3/2/22,  Projected , Trough 10.9  Repeat vancomycin concentration ordered for 3/4/22 @ 0300  Pharmacy will continue to monitor patient and adjust therapy as indicated    Thank you for the consult,  Marina Morgan [facility]  3/2/2022 10:06 AM

## 2022-03-02 NOTE — PROGRESS NOTES
Problem: Falls - Risk of  Goal: *Absence of Falls  Description: Document Jean Damico Fall Risk and appropriate interventions in the flowsheet. Outcome: Progressing Towards Goal  Note: Fall Risk Interventions:  Mobility Interventions: Bed/chair exit alarm         Medication Interventions: Assess postural VS orthostatic hypotension    Elimination Interventions: Call light in reach              Problem: Patient Education: Go to Patient Education Activity  Goal: Patient/Family Education  Outcome: Progressing Towards Goal     Problem: Pain  Goal: *Control of Pain  Outcome: Progressing Towards Goal  Goal: *PALLIATIVE CARE:  Alleviation of Pain  Outcome: Progressing Towards Goal     Problem: Patient Education: Go to Patient Education Activity  Goal: Patient/Family Education  Outcome: Progressing Towards Goal     Problem: Diabetes Self-Management  Goal: *Disease process and treatment process  Description: Define diabetes and identify own type of diabetes; list 3 options for treating diabetes. Outcome: Progressing Towards Goal  Goal: *Incorporating nutritional management into lifestyle  Description: Describe effect of type, amount and timing of food on blood glucose; list 3 methods for planning meals. Outcome: Progressing Towards Goal  Goal: *Incorporating physical activity into lifestyle  Description: State effect of exercise on blood glucose levels. Outcome: Progressing Towards Goal  Goal: *Developing strategies to promote health/change behavior  Description: Define the ABC's of diabetes; identify appropriate screenings, schedule and personal plan for screenings. Outcome: Progressing Towards Goal  Goal: *Using medications safely  Description: State effect of diabetes medications on diabetes; name diabetes medication taking, action and side effects.   Outcome: Progressing Towards Goal  Goal: *Monitoring blood glucose, interpreting and using results  Description: Identify recommended blood glucose targets  and personal targets. Outcome: Progressing Towards Goal  Goal: *Prevention, detection, treatment of acute complications  Description: List symptoms of hyper- and hypoglycemia; describe how to treat low blood sugar and actions for lowering  high blood glucose level. Outcome: Progressing Towards Goal  Goal: *Prevention, detection and treatment of chronic complications  Description: Define the natural course of diabetes and describe the relationship of blood glucose levels to long term complications of diabetes.   Outcome: Progressing Towards Goal  Goal: *Developing strategies to address psychosocial issues  Description: Describe feelings about living with diabetes; identify support needed and support network  Outcome: Progressing Towards Goal  Goal: *Insulin pump training  Outcome: Progressing Towards Goal  Goal: *Sick day guidelines  Outcome: Progressing Towards Goal  Goal: *Patient Specific Goal (EDIT GOAL, INSERT TEXT)  Outcome: Progressing Towards Goal     Problem: Patient Education: Go to Patient Education Activity  Goal: Patient/Family Education  Outcome: Progressing Towards Goal     Problem: Patient Education: Go to Patient Education Activity  Goal: Patient/Family Education  Outcome: Progressing Towards Goal     Problem: Patient Education:  Go to Education Activity  Goal: Patient/Family Education  Outcome: Progressing Towards Goal

## 2022-03-02 NOTE — OP NOTES
Operative Report     Patient: Bhakti Ortiz MRN: [id number]  SSN: [id number]    YOB: 1960  Age: 64 y.o. Sex: male       Indications: The patient was admitted to the hospital for left great toe diabetic ulcer with osteomyelitis of distal phalanx. Patient also has ulcers on bilateral feet. Patient needed partial amputation of left great toe and debridement of ulcers. All risks, benefits, complications of procedure discussed in detail with patient. No guarantee made to outcome of procedure. Patient voiced verbal understanding and signed consent. Date of Surgery: 3/1/2022     Preoperative Diagnosis:  Left great toe distal phalanx osteomyelitis, Bilateral foot full thickness ulcers    Postoperative Diagnosis: Same    Surgeon(s) and Role:     * Zachary Pena DPM - Primary      Surgical Assistants: Operating Room Staff    Anesthesia: MAC WITH LOCAL    Procedure:  Procedure(s):  LEFT GREAT TOE PARTIAL AMPUTATION, DEBRIDEMENT OF BILATERAL FOOT ULCERS    Procedure in Detail:     The patient was brought to the operative suite and secured in the supine position on the operating room table. After IV sedation from department of anesthesia, local block consisting of 20 cc of 1:1 mixture of 1% lidocaine plain and 0.5% marcaine plain administered proximal to surgical area on bilateral foot. Bilateral foot prepped and draped in usual sterile fashion. Attention directed to left great toe and fishmouth incision performed just distal to IPJ with # 10 blade. Incision deepened down to bone level. Distal phalanx was disarticulated at IPJ and sent to pathology in formalin container. No abscess identified. Surgical site irrigated with normal saline solution. Clean margin obtained from head of proximal phalanx with bony rongeur. Redundant soft tissue excised as necessary. Visible tendons transected as proximal as possible. Skin edges approximated with 3-0 prolene in simple interrupted fashion. Attention directed to ball of left foot and sharp excisional debridement of ulcer performed with # 15 blade down to and including subcutaneous tissue removing all devitalized, fibrotic tissue. Ulcer size noted to be 2.4 x 0.5 x 0.2 cm deep. No deeper abscess noted. Attention then directed to right foot TMA stump ulcers and sharp excisional debridement performed through subcutaneous tissue. Devitalized, necrotic skin debrided from plantar midfoot area. No deeper abscess or bone exposure noted. Serous fluid noted. Swab wound culture obtained from right foot wound. Proximal lateral wound size noted to be 13 x 7 x 0.2 cm deep. Distal stump ulcer size noted to be 3 x 0.8 x 0.2 cm. Wounds dressed with betadine and adaptic and dry gauze. The patient tolerated the procedure and anesthesia well. The patient was sent to the recovery room in apparent satisfactory condition.      Findings:  As noted above    Estimated Blood Loss:  Less than 5 cc    Drains: none           Specimens:   ID Type Source Tests Collected by Time Destination   1 : left partial great toe Preservative   Baltazar BOWMAN DPM 3/1/2022 1814 Pathology   1 : left geat toe clean margin Wound  CULTURE, ANAEROBIC, CULTURE, WOUND W GRAM STAIN Noe Sandoval DPM 3/1/2022 1818 Microbiology   2 : rt foot wound Wound  CULTURE, ANAEROBIC, CULTURE, WOUND W GRAM STAIN Noe Sandoval DPM 3/1/2022 1833 Microbiology               Implants:  * No implants in log *           Complications:  None

## 2022-03-02 NOTE — DIABETES MGMT
Diabetes/ Glycemic Control Plan of Care    Patient reported home diabetes meds:  Levemir insulin 40 units twice daily. Patient stated that he decreased the dose from 65 units BID but his doctor is not aware of it. Mealtime novolog insulin 4 units TID AC but he skips the dose if he doesn't fee like eating/not much appetite. Patient inquired if this can be changed to sliding scale. A1c 6.1 (3/01/2022) and it was 7.7 (9/24/2022)    Patient is currently on lantus 25 units daily at bedtime and correctional lispro TID AC. His BG values are within target range since admission. Recommendations:   1.) cont on current insulin orders: daily basal lantus and correctional lispro  2.) assess for disch diab meds if medically appropriate to change to:  Levemir insulin to 25 units daily  Novolog sliding scale  (patient agreed to see his primary care physician post discharge and will share his BG diary)    3/02: Seen patient this afternoon and noted that he did not eat his lunch. Patient requested to eat something else. Called the kitchen for assistance and they will help the patient. Pre lunch POC  at 13:32    Assessment:   DX:   1. Osteomyelitis of left foot, unspecified type ([address]     2. Ulcer of right foot, unspecified ulcer stage ([address]     3. Type 2 diabetes mellitus with hyperglycemia, with long-term current use of insulin ([address]     4. Hypertension, unspecified type        Fasting/ Morning blood glucose:   Lab Results   Component Value Date/Time    Glucose 125 (H) 03/02/2022 04:06 AM    Glucose (POC) 181 (H) 03/02/2022 06:21 AM    Glucose,  (HH) 08/10/2015 07:10 PM     IV Fluids containing dextrose: None    Blood glucose values: Within target range (70-180mg/dL):  No.    Current insulin orders:   Basal lantus insulin 25 units daily at bedtime  Correctional lispro. Normal sensitivity dose    Total Daily Dose previous 24 hours: 25 units insulin  Lantus : 25 units  Lispro: None.  He did not require coverage    Current A1c:   Lab Results   Component Value Date/Time    Hemoglobin A1c 6.1 (H) 03/01/2022 03:35 AM      equivalent  to ave Blood Glucose of 128 mg/dl for 2-3 months prior to admission    Adequate glycemic control PTA: Yes    Nutrition/Diet:   Active Orders   Diet    ADULT DIET Regular; 3 carb choices (45 gm/meal)      Meal Intake:  No data found. Supplement Intake:  No data found. Home diabetes medications:  Patient reported on 3/02/2022:  Levemir insulin 40 units twice daily: every morning and evening  Mealtime novolog insulin 4 units three times daily before meals but not taking as prescribed if he is not planning to eat because he's not always hungry or he eats very little. He doesn't his blood sugar to drop too  Low. Patient has an appointment  Key Antihyperglycemic Medications             metFORMIN (GLUCOPHAGE) 1,000 mg tablet Take 1,000 mg by mouth two (2) times daily (with meals). insulin lispro (HUMALOG) 100 unit/mL injection Check FSBS AC*HS  For sugars between 160 and 200- Give 2 units SQ,  For sugars between 201 and 250, Give 3 units SQ,  For sugars Between 251 and 300, give 5 units SQ,  For Sugars between 301 and 350, give 7 units SQ  For sugars between 351 and 400, give 8 units SQ and contact PCP    insulin detemir U-100 (Levemir U-100 Insulin) 100 unit/mL injection 20 Units by SubCUTAneous route two (2) times a day. Plan/Goals:   Blood glucose will be within target of 70 - 180 mg/dl within 72 hours  Reinforce dietary and medication compliance at home.          Education:  [] Refer to Diabetes Education Record                       [x] Education not indicated at this time     Abdirahman Leblanc RN [facility]  Pager: [phone]

## 2022-03-02 NOTE — PROGRESS NOTES
Ketorolac 15 mg  was therapeutically interchanged for Ketorolac 30 mg per the P &T Committee approved Therapeutic Interchanges Policy.

## 2022-03-02 NOTE — PROGRESS NOTES
PT order received and chart reviewed. Pt in room upon arrival and up ad teresa, is able to ambulate around room with no AD, educated on NWB to L forefoot and demonstrated ability to maintain, declines need for walker. Pt has no mobility concerns at this time and thus not a candidate for further PT. Rehab tech to dispense B surgical shoes for comfort and protection, pt verbalized agreement.  Will sign off, thank you for this referral. Racquel Curry, PT, DPT

## 2022-03-02 NOTE — ROUTINE PROCESS
TRANSFER - OUT REPORT:    Verbal report given to Joselyn(name) on Lili Pina  being transferred to room 512(unit) for routine progression of care       Report consisted of patients Situation, Background, Assessment and   Recommendations(SBAR). Information from the following report(s) SBAR, OR Summary, Intake/Output, MAR and Recent Results was reviewed with the receiving nurse. Lines:   Peripheral IV 03/01/22 Right Arm (Active)   Site Assessment Clean, dry, & intact 03/01/22 1853   Phlebitis Assessment 0 03/01/22 1853   Infiltration Assessment 0 03/01/22 1853   Dressing Status Clean, dry, & intact 03/01/22 1853   Dressing Type Tape;Transparent 03/01/22 1853   Hub Color/Line Status Pink; Infusing 03/01/22 1853        Opportunity for questions and clarification was provided.       Patient transported with:   Aurora Feint

## 2022-03-02 NOTE — PERIOP NOTES
Patient has dressings to both feet. Outer dressing seen is ace wrap to right and left. Both are clean and dry.

## 2022-03-02 NOTE — PROGRESS NOTES
Infectious Disease progress Note        Reason: left foot osteomyelitis    Current abx Prior abx   Zosyn, vancomycin since 2/28/2022 Cefepime 2/28     Lines:       Assessment :    64 y. o. male with h/o type uncontrolled type 2 DM (last hgbA1C 8.9 on 11/23/20) , CAD who presented to [facility] on 2/28/22 sent from podiatrist office for evaluation of left foot ulcer concerning for osteomyelitis     Hospitalization at [facility] November 2020 for right great toe distal phalanx chronic osteomyelitis, septic arthritis right great toe interphalangeal joint   Wound cultures 9/2020-Enterobacter, MRSA  Status post right great toe amputation on 11/24.       Hospitalization at [facility] 2/2021 for acute on chronic osteomyelitis right first metatarsal osteomyelitis.    Status post incision and debridement, partial resection of right first metatarsal on 2/5/2021.  Intra-Op cultures: Methicillin susceptible Staphylococcus aureus, Bacteroides. Bone biopsy of clean margins reveal acute inflammation suggestive of acute osteomyelitis. S/p ertapenem, daptomycin till 3/19/2021     Hospitalization at [facility] 4/2021 for  chronic osteomyelitis right second metatarsal head, infected right lateral foot ulcer Status post transmetatarsal amputation 4/13/2021. Intra-Op findings discussed with podiatrist.  Grossly clean margins achieved at surgery. Bone biopsy, clean margins negative for acute osteomyelitis. Intra-Op cultures no organisms seen. Clinical presentation consistent with right foot cellulitis, infected right lateral foot ulcer, chronic osteomyelitis left first distal phalanx    No evidence of deeper infection/osteomyelitis right foot noted on x-ray 2/20/2022.,  MRI 3/2/2022  Status post incision and debridement bilateral feet ulcer, partial amputation left great toe on 3/1/2022.   Intraoperative findings noted    Wound culture right foot 2/28-Staph aureus, gram-negative rods  Intra-Op wound culture 3/1/2022-pending     Recommendations:     1.  Recommend pip/tazo, vancomycin. 2.  Follow-up wound cultures, bone biopsy of clean margins  3. follow up  podiatry recommendations regarding wound care  4.  We will finalize antibiotic regimen in 1 to 2 days once above information available         Above plan was discussed in details with patient, and primary team. Please call me if any further questions or concerns. Will continue to participate in the care of this patient. HPI:    Denies increasing pain in the foot. Is wondering when he can go home. Past Medical History:   Diagnosis Date    Arthritis     Coronary atherosclerosis of native coronary artery     S/P PCI with GE in 12/09    Diabetes ([facility]     IDDM    Electrolyte and fluid disorders not elsewhere classified     Essential hypertension, benign     GERD (gastroesophageal reflux disease)     Heroin abuse ([facility] 05/26/16    Psychiatrist Dr. Shelva Spurling History of heart attack     Hyperlipidemia     Intermediate coronary syndrome [facility])     MDD (major depressive disorder) 5/26/16    Psychiatrist Dr. Hernandez Minus infarction [facility])     Obesity, unspecified     Old myocardial infarction     Other and unspecified hyperlipidemia     Pancreatitis     Positive PPD     Sleep apnea     uses Cpap machine    Transfusion history     Before 1992       Past Surgical History:   Procedure Laterality Date    HX APPENDECTOMY      HX CORONARY STENT PLACEMENT  2010    2 stents       Current Discharge Medication List      CONTINUE these medications which have NOT CHANGED    Details   clopidogreL (PLAVIX) 75 mg tab Take 75 mg by mouth daily. Indications: myocardial reinfarction prevention      traZODone (DESYREL) 150 mg tablet Take 150 mg by mouth nightly. Indications: insomnia associated with depression      oxyCODONE-acetaminophen (PERCOCET 7.5) 7.5-325 mg per tablet Take 1 Tab by mouth every six (6) hours as needed for Pain. amLODIPine (NORVASC) 10 mg tablet Take 10 mg by mouth daily. hydroCHLOROthiazide (HYDRODIURIL) 50 mg tablet Take 50 mg by mouth daily. metFORMIN (GLUCOPHAGE) 1,000 mg tablet Take 1,000 mg by mouth two (2) times daily (with meals). atorvastatin (LIPITOR) 20 mg tablet Take 1 Tab by mouth nightly. Qty: 30 Tab, Refills: 0      OTHER BMP in 1 week  Dx: ARF  Fax results to Dr. Liliya Hodges  Qty: 1 Each, Refills: 0      insulin lispro (HUMALOG) 100 unit/mL injection Check FSBS AC*HS  For sugars between 160 and 200- Give 2 units SQ,  For sugars between 201 and 250, Give 3 units SQ,  For sugars Between 251 and 300, give 5 units SQ,  For Sugars between 301 and 350, give 7 units SQ  For sugars between 351 and 400, give 8 units SQ and contact PCP  Qty: 1 Vial, Refills: 0      insulin detemir U-100 (Levemir U-100 Insulin) 100 unit/mL injection 20 Units by SubCUTAneous route two (2) times a day. Qty: 10 mL, Refills: 0      pantoprazole (PROTONIX) 40 mg tablet Take 1 Tab by mouth Before breakfast and dinner. Qty: 60 Tab, Refills: 0      acetaminophen (TYLENOL) 325 mg tablet Take 2 Tabs by mouth every six (6) hours as needed. Indications: Pain, take it with bentyl; do not exceed 3 g tylenol daily  Qty: 30 Tab, Refills: 0      polyethylene glycol (MIRALAX) 17 gram packet Take 1 Packet by mouth daily.   Qty: 30 Packet, Refills: 0             Current Facility-Administered Medications   Medication Dose Route Frequency    ketorolac (TORADOL) injection 15 mg  15 mg IntraVENous ONCE    vancomycin (VANCOCIN) 1250 mg in  ml infusion  1,250 mg IntraVENous Q12H    insulin glargine (LANTUS) injection 25 Units  25 Units SubCUTAneous QHS    amLODIPine (NORVASC) tablet 10 mg  10 mg Oral DAILY    atorvastatin (LIPITOR) tablet 20 mg  20 mg Oral QHS    oxyCODONE-acetaminophen (PERCOCET 7.5) 7.5-325 mg per tablet 1 Tablet  1 Tablet Oral Q6H PRN    pantoprazole (PROTONIX) tablet 40 mg  40 mg Oral ACB&D    polyethylene glycol (MIRALAX) packet 17 g  17 g Oral DAILY    sodium chloride (NS) flush 5-40 mL  5-40 mL IntraVENous Q8H    sodium chloride (NS) flush 5-40 mL  5-40 mL IntraVENous PRN    acetaminophen (TYLENOL) tablet 650 mg  650 mg Oral Q6H PRN    Or    acetaminophen (TYLENOL) suppository 650 mg  650 mg Rectal Q6H PRN    polyethylene glycol (MIRALAX) packet 17 g  17 g Oral DAILY PRN    morphine injection 2 mg  2 mg IntraVENous Q4H PRN    hydrALAZINE (APRESOLINE) 20 mg/mL injection 10 mg  10 mg IntraVENous Q6H PRN    insulin lispro (HUMALOG) injection   SubCUTAneous AC&HS    glucose chewable tablet 16 g  4 Tablet Oral PRN    glucagon (GLUCAGEN) injection 1 mg  1 mg IntraMUSCular PRN    dextrose 10% infusion 0-250 mL  0-250 mL IntraVENous PRN    piperacillin-tazobactam (ZOSYN) 3.375 g in 0.9% sodium chloride (MBP/ADV) 100 mL MBP  3.375 g IntraVENous Q8H       Allergies: Compazine [prochlorperazine]    Family History   Problem Relation Age of Onset    Heart Attack Father     Coronary Art Dis Mother     Diabetes Mother     Cancer Sister         breast cancer and lung cancer     Social History     Socioeconomic History    Marital status:      Spouse name: Not on file    Number of children: Not on file    Years of education: Not on file    Highest education level: Not on file   Occupational History    Not on file   Tobacco Use    Smoking status: Never Smoker    Smokeless tobacco: Never Used   Substance and Sexual Activity    Alcohol use: No    Drug use: No    Sexual activity: Not on file   Other Topics Concern    Not on file   Social History Narrative    Not on file     Social Determinants of Health     Financial Resource Strain:     Difficulty of Paying Living Expenses: Not on file   Food Insecurity:     Worried About Running Out of Food in the Last Year: Not on file    [place] of Food in the Last Year: Not on file   Transportation Needs:     Lack of Transportation (Medical):  Not on file    Lack of Transportation (Non-Medical): Not on file   Physical Activity:     Days of Exercise per Week: Not on file    Minutes of Exercise per Session: Not on file   Stress:     Feeling of Stress : Not on file   Social Connections:     Frequency of Communication with Friends and Family: Not on file    Frequency of Social Gatherings with Friends and Family: Not on file    Attends [employer] Services: Not on file    Active Member of [address] [place] [address] or Organizations: Not on file    Attends Club or Organization Meetings: Not on file    Marital Status: Not on file   Intimate Partner Violence:     Fear of Current or Ex-Partner: Not on file    Emotionally Abused: Not on file    Physically Abused: Not on file    Sexually Abused: Not on file   Housing Stability:     Unable to Pay for Housing in the Last Year: Not on file    Number of [place] in the Last Year: Not on file    Unstable Housing in the Last Year: Not on file     Social History     Tobacco Use   Smoking Status Never Smoker   Smokeless Tobacco Never Used        Temp (24hrs), Av.3 °F (36.8 °C), Min:98 °F (36.7 °C), Max:98.7 °F (37.1 °C)    Visit Vitals  BP (!) 161/75   Pulse 76   Temp 98.4 °F (36.9 °C)   Resp 18   Ht 5' 10\" (1.778 m)   Wt 111.1 kg (245 lb)   SpO2 95%   BMI 35.15 kg/m²       ROS: 12 point ROS obtained in details. Pertinent positives as mentioned in HPI,   otherwise negative    Physical Exam:       General:          In NAD.  Nontoxic-appearing. HEENT:           Head NCAT. sclerae anicteric, EOMI.  OP clear. Neck:               Supple, trachea midline. Lungs:             Clear, no wheezes.  Effort nonlabored, no accessory muscle use. Chest wall:      No chest wall tenderness.  Chest expansion equal and symmetrical bilaterally. Heart:              RRR.  No JVD. Abdomen:        Soft, NT/ND.  Bowel sounds normoactive. Extremities:     No increased erythema/warmth/tenderness bilateral leg.   Surgical dressing bilateral feet not removed  skin:                Skin changes as mentioned above  Psych:             Mood normal.  Calm, no agitation. Neurologic:      Awake and alert, moves extremities spontaneously.         Labs: Results:   Chemistry Recent Labs     03/02/22  0406 03/01/22  0335 02/28/22  1615   * 129* 143*    140 136   K 4.1 3.8 3.8    104 100   CO2 31 31 32   BUN 15 17 15   CREA 1.43* 1.22 1.26   CA 8.5 8.4* 9.1   AGAP 3 5 4   BUCR 10* 14 12   AP  --   --  115   TP  --   --  8.8*   ALB  --   --  3.1*   GLOB  --   --  5.7*   AGRAT  --   --  0.5*      CBC w/Diff Recent Labs     03/01/22  0335 02/28/22  1615   WBC 10.4 15.0*   RBC 2.81* 3.05*   HGB 8.7* 9.8*   HCT 28.5* 31.0*    285   GRANS 68 79*   LYMPH 18* 11*   EOS 3 1      Microbiology Recent Labs     03/01/22  1833 03/01/22  1818 02/28/22  1700 02/28/22  1615   CULT PENDING PENDING MODERATE POSSIBLE STAPHYLOCOCCUS AUREUS* NO GROWTH 2 DAYS  NO GROWTH 2 DAYS          RADIOLOGY:    All available imaging studies/reports in [facility] for this admission were reviewed      Disclaimer: Sections of this note are dictated utilizing voice recognition software, which may have resulted in some phonetic based errors in grammar and contents. Even though attempts were made to correct all the mistakes, some may have been missed, and remained in the body of the document. If questions arise, please contact our department.     Dr. Romero Glass, Infectious Disease Specialist  [phone]  March 2, 2022  8:14 AM

## 2022-03-02 NOTE — PROGRESS NOTES
Patient refused mri at this time, patient had surgery today. Patient states he will complete mri tomorrow. MARGOTH Urrutia is aware. 3/1/22 @ 3285 mw.

## 2022-03-02 NOTE — PROGRESS NOTES
1955.TRANSFER - IN REPORT:    Telephone report received from [facility] (name) on Logan Samayoaoms  being received from [facility]) for routine post - op      Report consisted of patients Situation, Background, Assessment and   Recommendations(SBAR). Information from the following report(s) Kardex, MAR and Cardiac Rhythm SR was reviewed with the receiving nurse. Opportunity for questions and clarification was provided. Assessment completed upon patients arrival to unit and care assumed. 2035 Pt. Received per bed, Pt. Is AOX 4. IV SL, Pt. denies  pain at this time. Cardiac Rhythm SR. Will resume care and monitor Pt. Condition. Pt. Educated on call bell when in need of help and assistance. Pt. verbalized understanding. Pt. Head to toe Assessment Done and documented. 2142  Pt. Given morphine per [facility] for pain management. Pt. Refused bed alarm. 2230  Pt. Ate healthy choice served. 0000  Pt. Resting with eyes closed, easily awaken. 0020  Bladder scan done 250ml noted. 46  Notified Dr. Rosette Kwan of Pt. Bladder scan md ordered to given fluids NS 500ml x1.    0300  Pt. Voided 100 ml.    0400  Pt. Voided 50ml. 0530  Pt. In bed, not in distress. 0553  Pt. Given percocet per [facility] for pain management. 0630  Pt. Verbalized that pain is still there. RN to notify MD.    4251  Dr Bigg Wu notified pf Pt. Increasing pain. Md ordered to give toradol 30mg iv x1 now. 0715  Pt. Educated of MD's order of toradol for pain. Pt. Refused. Verbal and bedside Shift changed report given to Kadie Sarmiento RN (oncoming RN) on Pt. Condition. Report consisted of patients Situation, History, Activities, intake/output,  Background, Assessment and Recommendations(SBAR). Information from the following report(s) Kardex, order Summary, Lab results and MAR was reviewed with the receiving nurse. Opportunity for questions and clarification was provided.

## 2022-03-02 NOTE — PROGRESS NOTES
Problem: Falls - Risk of  Goal: *Absence of Falls  Description: Document Dominique Nap Fall Risk and appropriate interventions in the flowsheet. Outcome: Progressing Towards Goal  Note: Fall Risk Interventions:  Mobility Interventions: (P) Bed/chair exit alarm         Medication Interventions: Assess postural VS orthostatic hypotension,Patient to call before getting OOB                   Problem: Patient Education: Go to Patient Education Activity  Goal: Patient/Family Education  Outcome: Progressing Towards Goal     Problem: Pain  Goal: *Control of Pain  Outcome: Progressing Towards Goal  Goal: *PALLIATIVE CARE:  Alleviation of Pain  Outcome: Progressing Towards Goal     Problem: Patient Education: Go to Patient Education Activity  Goal: Patient/Family Education  Outcome: Progressing Towards Goal     Problem: Patient Education: Go to Patient Education Activity  Goal: Patient/Family Education  Outcome: Progressing Towards Goal     Problem: Diabetes Self-Management  Goal: *Disease process and treatment process  Description: Define diabetes and identify own type of diabetes; list 3 options for treating diabetes. Outcome: Progressing Towards Goal  Goal: *Incorporating nutritional management into lifestyle  Description: Describe effect of type, amount and timing of food on blood glucose; list 3 methods for planning meals. Outcome: Progressing Towards Goal  Goal: *Incorporating physical activity into lifestyle  Description: State effect of exercise on blood glucose levels. Outcome: Progressing Towards Goal  Goal: *Developing strategies to promote health/change behavior  Description: Define the ABC's of diabetes; identify appropriate screenings, schedule and personal plan for screenings. Outcome: Progressing Towards Goal  Goal: *Using medications safely  Description: State effect of diabetes medications on diabetes; name diabetes medication taking, action and side effects.   Outcome: Progressing Towards Goal  Goal: *Monitoring blood glucose, interpreting and using results  Description: Identify recommended blood glucose targets  and personal targets. Outcome: Progressing Towards Goal  Goal: *Prevention, detection, treatment of acute complications  Description: List symptoms of hyper- and hypoglycemia; describe how to treat low blood sugar and actions for lowering  high blood glucose level. Outcome: Progressing Towards Goal  Goal: *Prevention, detection and treatment of chronic complications  Description: Define the natural course of diabetes and describe the relationship of blood glucose levels to long term complications of diabetes. Outcome: Progressing Towards Goal  Goal: *Developing strategies to address psychosocial issues  Description: Describe feelings about living with diabetes; identify support needed and support network  Outcome: Progressing Towards Goal  Goal: *Insulin pump training  Outcome: Progressing Towards Goal  Goal: *Sick day guidelines  Outcome: Progressing Towards Goal  Goal: *Patient Specific Goal (EDIT GOAL, INSERT TEXT)  Outcome: Progressing Towards Goal     Problem: Patient Education: Go to Patient Education Activity  Goal: Patient/Family Education  Outcome: Progressing Towards Goal     Problem: Patient Education: Go to Patient Education Activity  Goal: Patient/Family Education  Outcome: Progressing Towards Goal     Problem: Risk for Spread of Infection  Goal: Prevent transmission of infectious organism to others  Description: Prevent the transmission of infectious organisms to other patients, staff members, and visitors.   Outcome: Progressing Towards Goal     Problem: Patient Education:  Go to Education Activity  Goal: Patient/Family Education  Outcome: Progressing Towards Goal

## 2022-03-02 NOTE — ANESTHESIA POSTPROCEDURE EVALUATION
Procedure(s):  LEFT GREAT TOE PARTIAL AMPUTATION  FOOT DEBRIDEMENT. general    Anesthesia Post Evaluation      Multimodal analgesia: multimodal analgesia used between 6 hours prior to anesthesia start to PACU discharge  Patient location during evaluation: bedside  Patient participation: complete - patient participated  Level of consciousness: awake  Pain score: 0  Pain management: adequate  Airway patency: patent  Anesthetic complications: no  Cardiovascular status: stable  Respiratory status: acceptable  Hydration status: acceptable  Post anesthesia nausea and vomiting:  none  Final Post Anesthesia Temperature Assessment:  Normothermia (36.0-37.5 degrees C)      INITIAL Post-op Vital signs:   Vitals Value Taken Time   /70 03/01/22 1943   Temp 36.9 °C (98.4 °F) 03/01/22 1943   Pulse 66 03/01/22 1948   Resp 14 03/01/22 1948   SpO2 97 % 03/01/22 1948   Vitals shown include unvalidated device data.

## 2022-03-02 NOTE — PROGRESS NOTES
[facility] Hospitalists  Progress Note    Patient: Martin Burt Age: 64 y.o. : 1960 MR#: [id number] SSN: [id number]  Date: 3/2/2022     Subjective/24-hour events:     Major complaint is that of pain that has not been well controlled. Feels well otherwise, no nausea/vomiting/diarrhea. Has remained afebrile overnight. Assessment:   Left great toe osteomyelitis status post partial amputation  Bilateral foot ulcers, POA, status post debridement  DM2, A1c 6.1%  Hypertension  Dyslipidemia  Iron deficiency anemia    CAD with history of stent placement  GERD  Severe obesity, BMI 35.15    Plan:   Postoperative wound care per podiatry. Continue antibiotic therapy per ID. MRI has been ordered and this is still pending. Continue insulin with adjustments as necessary to optimize glycemic control. Monitor BPs and adjust antihypertensive therapy as necessary. As needed antihypertensive agent with parameters have been ordered. IV iron supplementation. Will initiate today. Mobilize as tolerated with therapy. Patient to remain nonweightbearing to left forefoot. Continue supportive care otherwise. Follow. Case discussed with:  [x]Patient  []Family  [x]Nursing  [x]Case Management  DVT Prophylaxis:  []Lovenox  []Hep SQ  []SCDs  []Coumadin   []On Heparin gtt    Objective:   VS:   Visit Vitals  BP (!) 161/75   Pulse 76   Temp 98.4 °F (36.9 °C)   Resp 18   Ht 5' 10\" (1.778 m)   Wt 111.1 kg (245 lb)   SpO2 95%   BMI 35.15 kg/m²      Tmax/24hrs: Temp (24hrs), Av.4 °F (36.9 °C), Min:98 °F (36.7 °C), Max:98.7 °F (37.1 °C)      Intake/Output Summary (Last 24 hours) at 3/2/2022 0949  Last data filed at 3/2/2022 0743  Gross per 24 hour   Intake 1520 ml   Output 1205 ml   Net 315 ml       General:  In NAD. Nontoxic-appearing. Cardiovascular:  RRR. Pulmonary:  Lungs clear bilaterally, no wheezes. GI:  Abdomen soft, NTTP. Extremities: Foot dressings clean/dry/intact.   Neuro: Awake and alert. Moves extremities spontaneously.     Labs:    Recent Results (from the past 24 hour(s))   GLUCOSE, POC    Collection Time: 03/01/22 12:34 PM   Result Value Ref Range    Glucose (POC) 114 (H) 70 - 110 mg/dL   COVID-19 RAPID TEST    Collection Time: 03/01/22  4:00 PM   Result Value Ref Range    Specimen source Nasopharyngeal      COVID-19 rapid test Not detected NOTD     CULTURE, WOUND W GRAM STAIN    Collection Time: 03/01/22  6:18 PM    Specimen: Great Toe   Result Value Ref Range    Special Requests: LEFT GREAT TOE CLEAN MARGIN     GRAM STAIN NO WBC'S SEEN      GRAM STAIN NO ORGANISMS SEEN      Culture result: PENDING    CULTURE, WOUND Lugenia Chain STAIN    Collection Time: 03/01/22  6:33 PM    Specimen: Foot, Right   Result Value Ref Range    Special Requests: NO SPECIAL REQUESTS      GRAM STAIN MODERATE WBCS SEEN      GRAM STAIN MODERATE GRAM POSITIVE COCCI IN PAIRS IN CLUSTERS      Culture result: PENDING    GLUCOSE, POC    Collection Time: 03/01/22  7:18 PM   Result Value Ref Range    Glucose (POC) 88 70 - 110 mg/dL   GLUCOSE, POC    Collection Time: 03/01/22  9:21 PM   Result Value Ref Range    Glucose (POC) 121 (H) 70 - 110 mg/dL   VANCOMYCIN, RANDOM    Collection Time: 03/02/22  4:06 AM   Result Value Ref Range    Vancomycin, random 5.4 5.0 - 73.0 UG/ML   METABOLIC PANEL, BASIC    Collection Time: 03/02/22  4:06 AM   Result Value Ref Range    Sodium 140 136 - 145 mmol/L    Potassium 4.1 3.5 - 5.5 mmol/L    Chloride 106 100 - 111 mmol/L    CO2 31 21 - 32 mmol/L    Anion gap 3 3.0 - 18 mmol/L    Glucose 125 (H) 74 - 99 mg/dL    BUN 15 7.0 - 18 MG/DL    Creatinine 1.43 (H) 0.6 - 1.3 MG/DL    BUN/Creatinine ratio 10 (L) 12 - 20      GFR est AA >60 >60 ml/min/1.73m2    GFR est non-AA 50 (L) >60 ml/min/1.73m2    Calcium 8.5 8.5 - 10.1 MG/DL   IRON PROFILE    Collection Time: 03/02/22  4:06 AM   Result Value Ref Range    Iron 28 (L) 50 - 175 ug/dL    TIBC 291 250 - 450 ug/dL    Iron % saturation 10 (L) 20 - 50 %   GLUCOSE, POC    Collection Time: 03/02/22  6:21 AM   Result Value Ref Range    Glucose (POC) 181 (H) 70 - 110 mg/dL       Signed By: Balbir Armenta MD     March 2, 2022

## 2022-03-02 NOTE — PROGRESS NOTES
conducted an initial consultation and Spiritual Assessment for Abe Valdez, who is a 64 y.o.,male. Patients Primary Language is: [place].    According to the patients EMR Sikh Affiliation is: Marly Campo.     The reason the Patient came to the hospital is:   Patient Active Problem List    Diagnosis Date Noted    OM (osteomyelitis) ([address] 02/28/2022    Diabetic foot infection ([address] 04/12/2021    Leukocytosis 02/04/2021    Wound infection 02/04/2021    Diabetic ulcer of heel ([address] 02/04/2021    Acute kidney injury ([address] 02/04/2021    Osteomyelitis ([address] 11/22/2020    Fracture, toe 09/24/2020    Altered mental status 09/01/2020    Multifocal pneumonia 09/01/2020    DM (diabetes mellitus) ([address] 12/25/2019    Orthostatic hypotension 12/25/2019    Syncope and collapse 12/24/2019    Vomiting 03/28/2018    Chronic abdominal pain 03/28/2018    Sepsis (HCC) 03/21/2018    Nausea and vomiting 09/07/2017    Gastroparesis 09/07/2017    Hypokalemia 09/07/2017    Atelectasis 09/07/2017    Abdominal pain 09/07/2017    Acquired hypothyroidism 09/07/2017    S/P lumbar fusion 07/05/2017    Diabetic neuropathy ([address] 07/05/2017    Neuritis 07/05/2017    Spinal stenosis at L4-L5 level 06/19/2017    Coronary artery disease involving native coronary artery of native heart without angina pectoris 05/31/2017    History of coronary artery stent placement 05/31/2017    Synovial cyst 05/26/2017    HNP (herniated nucleus pulposus), lumbar 05/26/2017    Lumbar spinal stenosis 05/26/2017    Spondylolisthesis of lumbar region 05/26/2017    Positive PPD     History of heart attack     Pain in left lower leg 07/24/2016    Primary osteoarthritis of left knee 07/24/2016    Localized adiposity of abdomen 07/24/2016    Diabetes ([address] 08/14/2015    Essential hypertension 08/14/2015    GERD (gastroesophageal reflux disease) 08/14/2015    Depression 08/14/2015        The  provided the following Interventions:  Initiated a relationship of care and support. Explored issues of red, belief, spirituality and Adventist/ritual needs while hospitalized. Listened empathically. Provided chaplaincy education. Provided information about Spiritual Care Services. Offered  Spiritual materials (NT bible and [employer]) and assurance of continued prayers on patient's behalf. Chart reviewed. The following outcomes where achieved:  Patient shared limited information about both his medical narrative and spiritual journey/beliefs.  confirmed Patient's Yazdanism Affiliation with [facility] [employer]. Patient processed feeling about current hospitalization. Patient expressed gratitude for 's visit. Assessment:  Patient does not have any Adventist/cultural needs that will affect patients preferences in health care. There are no spiritual or Adventist issues which require intervention at this time. Plan:  Chaplains will continue to follow and will provide pastoral care on an as needed/requested basis.  recommends bedside caregivers page  on duty if patient shows signs of acute spiritual or emotional distress.     Luis A Prince 5   [phone]

## 2022-03-03 LAB
ANION GAP SERPL CALC-SCNC: 4 MMOL/L (ref 3–18)
BACTERIA SPEC CULT: NORMAL
BUN SERPL-MCNC: 13 MG/DL (ref 7–18)
BUN/CREAT SERPL: 10 (ref 12–20)
CALCIUM SERPL-MCNC: 8.3 MG/DL (ref 8.5–10.1)
CHLORIDE SERPL-SCNC: 106 MMOL/L (ref 100–111)
CO2 SERPL-SCNC: 30 MMOL/L (ref 21–32)
CREAT SERPL-MCNC: 1.26 MG/DL (ref 0.6–1.3)
GLUCOSE BLD STRIP.AUTO-MCNC: 129 MG/DL (ref 70–110)
GLUCOSE BLD STRIP.AUTO-MCNC: 137 MG/DL (ref 70–110)
GLUCOSE BLD STRIP.AUTO-MCNC: 140 MG/DL (ref 70–110)
GLUCOSE BLD STRIP.AUTO-MCNC: 146 MG/DL (ref 70–110)
GLUCOSE SERPL-MCNC: 129 MG/DL (ref 74–99)
POTASSIUM SERPL-SCNC: 4.6 MMOL/L (ref 3.5–5.5)
SERVICE CMNT-IMP: NORMAL
SODIUM SERPL-SCNC: 140 MMOL/L (ref 136–145)

## 2022-03-03 PROCEDURE — 2709999900 HC NON-CHARGEABLE SUPPLY

## 2022-03-03 PROCEDURE — 74011250636 HC RX REV CODE- 250/636: Performed by: HOSPITALIST

## 2022-03-03 PROCEDURE — 80048 BASIC METABOLIC PNL TOTAL CA: CPT

## 2022-03-03 PROCEDURE — 74011000250 HC RX REV CODE- 250: Performed by: HOSPITALIST

## 2022-03-03 PROCEDURE — 74011250637 HC RX REV CODE- 250/637: Performed by: HOSPITALIST

## 2022-03-03 PROCEDURE — 74011250637 HC RX REV CODE- 250/637: Performed by: FAMILY MEDICINE

## 2022-03-03 PROCEDURE — 36415 COLL VENOUS BLD VENIPUNCTURE: CPT

## 2022-03-03 PROCEDURE — 74011250637 HC RX REV CODE- 250/637: Performed by: INTERNAL MEDICINE

## 2022-03-03 PROCEDURE — 74011250636 HC RX REV CODE- 250/636: Performed by: FAMILY MEDICINE

## 2022-03-03 PROCEDURE — 74011000258 HC RX REV CODE- 258: Performed by: FAMILY MEDICINE

## 2022-03-03 PROCEDURE — 74011250636 HC RX REV CODE- 250/636: Performed by: INTERNAL MEDICINE

## 2022-03-03 PROCEDURE — 93005 ELECTROCARDIOGRAM TRACING: CPT

## 2022-03-03 PROCEDURE — 74011636637 HC RX REV CODE- 636/637: Performed by: FAMILY MEDICINE

## 2022-03-03 PROCEDURE — 82962 GLUCOSE BLOOD TEST: CPT

## 2022-03-03 PROCEDURE — 74011000258 HC RX REV CODE- 258: Performed by: HOSPITALIST

## 2022-03-03 PROCEDURE — 65270000029 HC RM PRIVATE

## 2022-03-03 PROCEDURE — 99232 SBSQ HOSP IP/OBS MODERATE 35: CPT | Performed by: FAMILY MEDICINE

## 2022-03-03 RX ORDER — DOXYCYCLINE 100 MG/1
100 CAPSULE ORAL EVERY 12 HOURS
Status: DISCONTINUED | OUTPATIENT
Start: 2022-03-03 | End: 2022-03-04 | Stop reason: HOSPADM

## 2022-03-03 RX ADMIN — PANTOPRAZOLE SODIUM 40 MG: 40 TABLET, DELAYED RELEASE ORAL at 17:22

## 2022-03-03 RX ADMIN — SODIUM CHLORIDE, PRESERVATIVE FREE 10 ML: 5 INJECTION INTRAVENOUS at 14:22

## 2022-03-03 RX ADMIN — AMLODIPINE BESYLATE 10 MG: 10 TABLET ORAL at 08:18

## 2022-03-03 RX ADMIN — SODIUM CHLORIDE, PRESERVATIVE FREE 10 ML: 5 INJECTION INTRAVENOUS at 05:43

## 2022-03-03 RX ADMIN — PANTOPRAZOLE SODIUM 40 MG: 40 TABLET, DELAYED RELEASE ORAL at 08:18

## 2022-03-03 RX ADMIN — Medication 25 UNITS: at 21:46

## 2022-03-03 RX ADMIN — MORPHINE SULFATE 2 MG: 2 INJECTION, SOLUTION INTRAMUSCULAR; INTRAVENOUS at 05:43

## 2022-03-03 RX ADMIN — DOXYCYCLINE HYCLATE 100 MG: 100 CAPSULE ORAL at 17:22

## 2022-03-03 RX ADMIN — PIPERACILLIN AND TAZOBACTAM 3.38 G: 3; .375 INJECTION, POWDER, LYOPHILIZED, FOR SOLUTION INTRAVENOUS at 14:18

## 2022-03-03 RX ADMIN — MORPHINE SULFATE 2 MG: 2 INJECTION, SOLUTION INTRAMUSCULAR; INTRAVENOUS at 01:29

## 2022-03-03 RX ADMIN — VANCOMYCIN HYDROCHLORIDE 1250 MG: 10 INJECTION, POWDER, LYOPHILIZED, FOR SOLUTION INTRAVENOUS at 03:08

## 2022-03-03 RX ADMIN — IRON SUCROSE 200 MG: 20 INJECTION, SOLUTION INTRAVENOUS at 18:24

## 2022-03-03 RX ADMIN — SODIUM CHLORIDE, PRESERVATIVE FREE 10 ML: 5 INJECTION INTRAVENOUS at 21:44

## 2022-03-03 RX ADMIN — PIPERACILLIN AND TAZOBACTAM 3.38 G: 3; .375 INJECTION, POWDER, LYOPHILIZED, FOR SOLUTION INTRAVENOUS at 05:42

## 2022-03-03 RX ADMIN — ATORVASTATIN CALCIUM 20 MG: 20 TABLET, FILM COATED ORAL at 21:47

## 2022-03-03 RX ADMIN — PIPERACILLIN AND TAZOBACTAM 3.38 G: 3; .375 INJECTION, POWDER, LYOPHILIZED, FOR SOLUTION INTRAVENOUS at 21:45

## 2022-03-03 RX ADMIN — OXYCODONE AND ACETAMINOPHEN 1 TABLET: 7.5; 325 TABLET ORAL at 21:47

## 2022-03-03 NOTE — PROGRESS NOTES
Verbal freedom of choice for accepting home health agency. Home health referral placed in queue for [address] CM spoke with ZHANNA FAYE [facility] liaison [address].  will continue to monitor and assist with transition of care needs.     ALANA RichmondN, RN  Care Management  Pager # [phone]

## 2022-03-03 NOTE — PROGRESS NOTES
[facility] Hospitalists  Progress Note    Patient: Shauna Degroot Age: 64 y.o. : 1960 MR#: [id number] SSN: [id number]  Date: 3/3/2022     Subjective/24-hour events:     Remains afebrile, no new issues overnight  Pain under better control with analgesic adjustments. Assessment:   Left great toe osteomyelitis status post partial amputation  Bilateral foot ulcers, POA, status post debridement  DM2, A1c 6.1%  Hypertension  Dyslipidemia  Iron deficiency anemia    CAD with history of stent placement  GERD  Severe obesity, BMI 35.15    Plan:   Continue wound care per podiatry recommendations. Antibiotic therapy per ID. Possible transition to oral antibiotic therapy today pending culture results. Lantus/SSI, adjust as necessary to optimize glycemic control. Continue antihypertensive therapy as ordered. Continue iron supplementation. Day #2 today. Mobilize as tolerated with therapy. Other care primarily supportive. Disposition soon pending finalization of antibiotics and discharge needs. Case discussed with:  [x]Patient  []Family  [x]Nursing  [x]Case Management  DVT Prophylaxis:  []Lovenox  []Hep SQ  []SCDs  []Coumadin   []On Heparin gtt    Objective:   VS:   Visit Vitals  BP (!) 143/80 (BP 1 Location: Right arm, BP Patient Position: Sitting)   Pulse (!) 59   Temp 98.9 °F (37.2 °C)   Resp 16   Ht 5' 10\" (1.778 m)   Wt 111.1 kg (245 lb)   SpO2 95%   BMI 35.15 kg/m²      Tmax/24hrs: Temp (24hrs), Av.6 °F (37 °C), Min:97.9 °F (36.6 °C), Max:99.6 °F (37.6 °C)      Intake/Output Summary (Last 24 hours) at 3/3/2022 0920  Last data filed at 3/3/2022 0810  Gross per 24 hour   Intake 240 ml   Output 875 ml   Net -635 ml       General:  In NAD. Nontoxic-appearing. Cardiovascular:  RRR. Pulmonary:  Lungs clear bilaterally, no wheezes. GI:  Abdomen soft, NTTP. Extremities: Foot dressings clean/dry/intact. Neuro:  Awake and alert.   Moves extremities spontaneously.     Labs:    Recent Results (from the past 24 hour(s))   GLUCOSE, POC    Collection Time: 03/02/22  1:32 PM   Result Value Ref Range    Glucose (POC) 117 (H) 70 - 110 mg/dL   GLUCOSE, POC    Collection Time: 03/02/22  5:08 PM   Result Value Ref Range    Glucose (POC) 228 (H) 70 - 110 mg/dL   GLUCOSE, POC    Collection Time: 03/02/22  9:18 PM   Result Value Ref Range    Glucose (POC) 114 (H) 70 - 976 mg/dL   METABOLIC PANEL, BASIC    Collection Time: 03/03/22  4:20 AM   Result Value Ref Range    Sodium 140 136 - 145 mmol/L    Potassium 4.6 3.5 - 5.5 mmol/L    Chloride 106 100 - 111 mmol/L    CO2 30 21 - 32 mmol/L    Anion gap 4 3.0 - 18 mmol/L    Glucose 129 (H) 74 - 99 mg/dL    BUN 13 7.0 - 18 MG/DL    Creatinine 1.26 0.6 - 1.3 MG/DL    BUN/Creatinine ratio 10 (L) 12 - 20      GFR est AA >60 >60 ml/min/1.73m2    GFR est non-AA 58 (L) >60 ml/min/1.73m2    Calcium 8.3 (L) 8.5 - 10.1 MG/DL   GLUCOSE, POC    Collection Time: 03/03/22  6:30 AM   Result Value Ref Range    Glucose (POC) 146 (H) 70 - 110 mg/dL       Signed By: Tatiana Hamilton MD     March 3, 2022

## 2022-03-03 NOTE — DIABETES MGMT
Diabetes/ Glycemic Control Plan of Care  Recommendations:  Recommendations:   1. Continue 25 units Lantus/day and corrective lispro, normal insulin sensitivity ACHS  2. Should more consistent prandial BG excursions occur, can consider adding low dose scheduled meal time insulin starting with 4 units lispro TID AC  3. Assess for disch diab meds if medically appropriate to change to:  · Levemir insulin to 25 units daily  · Novolog sliding scale (patient agreed to see his primary care physician post discharge and will share his BG diary)    Assessment:   Pt with known history of T2DM (well controlled, A1C - 6.1%; home regimen of basal and sliding scale insulins/metformin per chart). Per chart review pt has been followed by [facility] team at prior [facility] admissions (29 diabetes management notes in EMR). Fasting blood glucose this morning - 146 mg/dL. , in target range. 3 out of last 5 BG readings in target range. Pt with inconsistent prandial blood glucose excursions (yesterday's pre-lunch BG - 117 mg/dL; pre-dinner BG - 228 mg/dL;  HS BG - 114 mg/dL). Appears to be taking > 75% meals per I/O's.        Fasting/ Morning blood glucose:   Lab Results   Component Value Date/Time    Glucose 129 (H) 03/03/2022 04:20 AM    Glucose (POC) 146 (H) 03/03/2022 06:30 AM    Glucose,  (HH) 08/10/2015 07:10 PM     IV Fluids containing dextrose: none  Steroids: none       Blood glucose values:   3/3:  Lab - 129;  POC - 146  3/2:  POC - 181, 117, 228, 114      Within target range (70-180mg/dL):  YES    Current insulin orders:   25 units Lantus q HS  Corrective lispro, normal insulin sensitivity 4 times daily as needed    Total Daily Dose previous 24 hours = 31 units (25 Lantus, 6 corrective lispro)    Current A1c:   Lab Results   Component Value Date/Time    Hemoglobin A1c 6.1 (H) 03/01/2022 03:35 AM      equivalent  to ave Blood Glucose of128 mg/dl for 2-3 months prior to admission  Adequate glycemic control PTA: YES  Nutrition/Diet:   Active Orders   Diet    ADULT DIET Regular; 3 carb choices (45 gm/meal)      Meal Intake:  Patient Vitals for the past 168 hrs:   % Diet Eaten   03/03/22 0935 76 - 100%   03/03/22 0810 76 - 100%   03/02/22 0808 76 - 100%     Supplement Intake:  Patient Vitals for the past 168 hrs:   Supplement intake %   03/02/22 0808 26 - 50%       Home diabetes medications:   Information obtained 3/2/22 by RN diabetes educator:  Patient reported home diabetes meds:  Levemir insulin 40 units twice daily. Patient stated that he decreased the dose from 65 units BID but his doctor is not aware of it. Mealtime novolog insulin 4 units TID AC but he skips the dose if he doesn't fee like eating/not much appetite. Patient inquired if this can be changed to sliding scale. Key Antihyperglycemic Medications             metFORMIN (GLUCOPHAGE) 1,000 mg tablet Take 1,000 mg by mouth two (2) times daily (with meals). insulin lispro (HUMALOG) 100 unit/mL injection Check FSBS AC*HS  For sugars between 160 and 200- Give 2 units SQ,  For sugars between 201 and 250, Give 3 units SQ,  For sugars Between 251 and 300, give 5 units SQ,  For Sugars between 301 and 350, give 7 units SQ  For sugars between 351 and 400, give 8 units SQ and contact PCP    insulin detemir U-100 (Levemir U-100 Insulin) 100 unit/mL injection 20 Units by SubCUTAneous route two (2) times a day. Plan/Goals:   Blood glucose will be within target of 70 - 180 mg/dl within 72 hours  Reinforce dietary and medication compliance at home.        Education:  [] Refer to Diabetes Education Record                       [] Education not indicated at this time     Reina Patton,  MS, RD, [facility]  Diabetes and Glycemic Control   [employer]

## 2022-03-03 NOTE — ROUTINE PROCESS
Bedside shift change report given to Reynolds Cooks, RN (oncoming nurse) by Oscar Fox RN (offgoing nurse). Report included the following information SBAR, Kardex, Intake/Output and MAR.

## 2022-03-03 NOTE — PROGRESS NOTES
Problem: Falls - Risk of  Goal: *Absence of Falls  Description: Document Moreno Barboza Fall Risk and appropriate interventions in the flowsheet. Outcome: Progressing Towards Goal  Note: Fall Risk Interventions:  Mobility Interventions: Assess mobility with egress test         Medication Interventions: Evaluate medications/consider consulting pharmacy,Patient to call before getting OOB    Elimination Interventions: Call light in reach,Patient to call for help with toileting needs              Problem: Patient Education: Go to Patient Education Activity  Goal: Patient/Family Education  Outcome: Progressing Towards Goal     Problem: Pain  Goal: *Control of Pain  Outcome: Progressing Towards Goal  Goal: *PALLIATIVE CARE:  Alleviation of Pain  Outcome: Progressing Towards Goal     Problem: Patient Education: Go to Patient Education Activity  Goal: Patient/Family Education  Outcome: Progressing Towards Goal     Problem: Diabetes Self-Management  Goal: *Disease process and treatment process  Description: Define diabetes and identify own type of diabetes; list 3 options for treating diabetes. Outcome: Progressing Towards Goal  Goal: *Incorporating nutritional management into lifestyle  Description: Describe effect of type, amount and timing of food on blood glucose; list 3 methods for planning meals. Outcome: Progressing Towards Goal  Goal: *Incorporating physical activity into lifestyle  Description: State effect of exercise on blood glucose levels. Outcome: Progressing Towards Goal  Goal: *Developing strategies to promote health/change behavior  Description: Define the ABC's of diabetes; identify appropriate screenings, schedule and personal plan for screenings. Outcome: Progressing Towards Goal  Goal: *Using medications safely  Description: State effect of diabetes medications on diabetes; name diabetes medication taking, action and side effects.   Outcome: Progressing Towards Goal  Goal: *Monitoring blood glucose, interpreting and using results  Description: Identify recommended blood glucose targets  and personal targets. Outcome: Progressing Towards Goal  Goal: *Prevention, detection, treatment of acute complications  Description: List symptoms of hyper- and hypoglycemia; describe how to treat low blood sugar and actions for lowering  high blood glucose level. Outcome: Progressing Towards Goal  Goal: *Prevention, detection and treatment of chronic complications  Description: Define the natural course of diabetes and describe the relationship of blood glucose levels to long term complications of diabetes. Outcome: Progressing Towards Goal  Goal: *Developing strategies to address psychosocial issues  Description: Describe feelings about living with diabetes; identify support needed and support network  Outcome: Progressing Towards Goal  Goal: *Insulin pump training  Outcome: Progressing Towards Goal  Goal: *Sick day guidelines  Outcome: Progressing Towards Goal  Goal: *Patient Specific Goal (EDIT GOAL, INSERT TEXT)  Outcome: Progressing Towards Goal     Problem: Patient Education: Go to Patient Education Activity  Goal: Patient/Family Education  Outcome: Progressing Towards Goal     Problem: Risk for Spread of Infection  Goal: Prevent transmission of infectious organism to others  Description: Prevent the transmission of infectious organisms to other patients, staff members, and visitors.   Outcome: Progressing Towards Goal     Problem: Patient Education:  Go to Education Activity  Goal: Patient/Family Education  Outcome: Progressing Towards Goal

## 2022-03-03 NOTE — PROGRESS NOTES
Progress Note    Patient: Carla Peterson MRN: [id number]  SSN: [id number]    YOB: 1960  Age: 64 y.o. Sex: male      Admit Date: 2/28/2022  2 Days Post-Op     Procedure:   Procedure(s):  LEFT GREAT TOE PARTIAL AMPUTATION  FOOT DEBRIDEMENT    Subjective:     Patient seen resting quiet and comfortably and no apparent distress. Patient wants to go home. His dressings partially came off. Denies any new pedal complaint. Denies N/V/C/F. Status post: osteomyelitis    Objective:     Visit Vitals  BP (!) 162/90 (BP 1 Location: Left upper arm, BP Patient Position: At rest)   Pulse (!) 57   Temp 98.7 °F (37.1 °C)   Resp 18   Ht 5' 10\" (1.778 m)   Wt 111.1 kg (245 lb)   SpO2 97%   BMI 35.15 kg/m²        Physical Exam:  Left great toe partial amputation site healing well, flap viable, skin edges well approximated, skin sutures intact. No erythema or edema noted. Right lateral foot extensive ulceration with exposed subcutaneous tissue. No purulence or drainage noted. Stable ulcer in ball of left foot, healing well. Labs/Radiology Review: images and reports reviewed    Assessment:     Hospital Problems  Date Reviewed: 12/24/2019          Codes Class Noted POA    OM (osteomyelitis) ([facility] ICD-10-CM: M86.9  ICD-9-CM: 730.20  2/28/2022 Unknown        Osteomyelitis ([facility] ICD-10-CM: M86.9  ICD-9-CM: 730.20  11/22/2020 Unknown              Plan/Recommendations/Medical Decision Making:     - Dressings reinforced with betadine soaked adaptic and dry gauze, ace bandage.   - Patient to remain NWB to left forefoot in surgical shoe. Will need surgical shoe for both feet. - Patient is stable to discharge from foot standpoint. Will f/u in office in 1 week. - Antibiotic management per ID recommendation. F/u OR micro/path.   - Medical management per primary team.

## 2022-03-03 NOTE — PROGRESS NOTES
Infectious Disease progress Note        Reason: left foot osteomyelitis    Current abx Prior abx   Zosyn, vancomycin since 2/28/2022 Cefepime 2/28     Lines:       Assessment :    64 y. o. male with h/o type uncontrolled type 2 DM (last hgbA1C 8.9 on 11/23/20) , CAD who presented to [facility] on 2/28/22 sent from podiatrist office for evaluation of left foot ulcer concerning for osteomyelitis     Hospitalization at [facility] November 2020 for right great toe distal phalanx chronic osteomyelitis, septic arthritis right great toe interphalangeal joint   Wound cultures 9/2020-Enterobacter, MRSA  Status post right great toe amputation on 11/24.       Hospitalization at [facility] 2/2021 for acute on chronic osteomyelitis right first metatarsal osteomyelitis.    Status post incision and debridement, partial resection of right first metatarsal on 2/5/2021.  Intra-Op cultures: Methicillin susceptible Staphylococcus aureus, Bacteroides. Bone biopsy of clean margins reveal acute inflammation suggestive of acute osteomyelitis. S/p ertapenem, daptomycin till 3/19/2021     Hospitalization at [facility] 4/2021 for  chronic osteomyelitis right second metatarsal head, infected right lateral foot ulcer Status post transmetatarsal amputation 4/13/2021. Intra-Op findings discussed with podiatrist.  Grossly clean margins achieved at surgery. Bone biopsy, clean margins negative for acute osteomyelitis. Intra-Op cultures no organisms seen. Clinical presentation consistent with right foot cellulitis, infected right lateral foot ulcer, chronic osteomyelitis left first distal phalanx    No evidence of deeper infection/osteomyelitis right foot noted on x-ray 2/20/2022.,  MRI 3/2/2022  Status post incision and debridement bilateral feet ulcer, partial amputation left great toe on 3/1/2022.   Intraoperative findings noted    Discussed with pathologist.  Bone biopsy of clean margins negative for osteomyelitis    Wound culture right foot 2/28-MRSA, pseudomonas, proteus (susceptibilities of MRSA noted)  Intra-Op wound culture 3/1/2022-staph aureus     Recommendations:     1.  Recommend pip/tazo, d/c vancomycin. Start po doxycycline  2.  Follow-up susceptibilities of Pseudomonas, Proteus and wound culture and modify antibiotics accordingly  3. follow up  podiatry recommendations regarding wound care  4.  We'll be able to finalize antibiotic once susceptibility of Pseudomonas/Proteus available-hopefully tomorrow         Above plan was discussed in details with patient, and primary team. Please call me if any further questions or concerns. Will continue to participate in the care of this patient. HPI:    Denies increasing pain in the foot. Is wondering when he can go home. Past Medical History:   Diagnosis Date    Arthritis     Coronary atherosclerosis of native coronary artery     S/P PCI with GE in 12/09    Diabetes ([facility]     IDDM    Electrolyte and fluid disorders not elsewhere classified     Essential hypertension, benign     GERD (gastroesophageal reflux disease)     Heroin abuse ([facility] 05/26/16    Psychiatrist Dr. Hoda Benavides History of heart attack     Hyperlipidemia     Intermediate coronary syndrome [facility])     MDD (major depressive disorder) 5/26/16    Psychiatrist Dr. Jose Kirkpatrick infarction [facility])     Obesity, unspecified     Old myocardial infarction     Other and unspecified hyperlipidemia     Pancreatitis     Positive PPD     Sleep apnea     uses Cpap machine    Transfusion history     Before 1992       Past Surgical History:   Procedure Laterality Date    HX APPENDECTOMY      HX CORONARY STENT PLACEMENT  2010    2 stents       Current Discharge Medication List      CONTINUE these medications which have NOT CHANGED    Details   clopidogreL (PLAVIX) 75 mg tab Take 75 mg by mouth daily. Indications: myocardial reinfarction prevention      traZODone (DESYREL) 150 mg tablet Take 150 mg by mouth nightly.  Indications: insomnia associated with depression      oxyCODONE-acetaminophen (PERCOCET 7.5) 7.5-325 mg per tablet Take 1 Tab by mouth every six (6) hours as needed for Pain. amLODIPine (NORVASC) 10 mg tablet Take 10 mg by mouth daily. hydroCHLOROthiazide (HYDRODIURIL) 50 mg tablet Take 50 mg by mouth daily. metFORMIN (GLUCOPHAGE) 1,000 mg tablet Take 1,000 mg by mouth two (2) times daily (with meals). atorvastatin (LIPITOR) 20 mg tablet Take 1 Tab by mouth nightly. Qty: 30 Tab, Refills: 0      OTHER BMP in 1 week  Dx: ARF  Fax results to Dr. Martir De La Rosa  Qty: 1 Each, Refills: 0      insulin lispro (HUMALOG) 100 unit/mL injection Check FSBS AC*HS  For sugars between 160 and 200- Give 2 units SQ,  For sugars between 201 and 250, Give 3 units SQ,  For sugars Between 251 and 300, give 5 units SQ,  For Sugars between 301 and 350, give 7 units SQ  For sugars between 351 and 400, give 8 units SQ and contact PCP  Qty: 1 Vial, Refills: 0      insulin detemir U-100 (Levemir U-100 Insulin) 100 unit/mL injection 20 Units by SubCUTAneous route two (2) times a day. Qty: 10 mL, Refills: 0      pantoprazole (PROTONIX) 40 mg tablet Take 1 Tab by mouth Before breakfast and dinner. Qty: 60 Tab, Refills: 0      acetaminophen (TYLENOL) 325 mg tablet Take 2 Tabs by mouth every six (6) hours as needed. Indications: Pain, take it with bentyl; do not exceed 3 g tylenol daily  Qty: 30 Tab, Refills: 0      polyethylene glycol (MIRALAX) 17 gram packet Take 1 Packet by mouth daily.   Qty: 30 Packet, Refills: 0             Current Facility-Administered Medications   Medication Dose Route Frequency    vancomycin (VANCOCIN) 1250 mg in  ml infusion  1,250 mg IntraVENous Q12H    morphine injection 2 mg  2 mg IntraVENous Q3H PRN    oxyCODONE-acetaminophen (PERCOCET 7.5) 7.5-325 mg per tablet 1 Tablet  1 Tablet Oral Q4H PRN    [START ON 3/4/2022] Vancomycin Lab Information  1 Each Other ONCE    iron sucrose (VENOFER) 200 mg in 0.9% sodium chloride 100 mL IVPB  200 mg IntraVENous Q24H    insulin glargine (LANTUS) injection 25 Units  25 Units SubCUTAneous QHS    amLODIPine (NORVASC) tablet 10 mg  10 mg Oral DAILY    atorvastatin (LIPITOR) tablet 20 mg  20 mg Oral QHS    pantoprazole (PROTONIX) tablet 40 mg  40 mg Oral ACB&D    polyethylene glycol (MIRALAX) packet 17 g  17 g Oral DAILY    sodium chloride (NS) flush 5-40 mL  5-40 mL IntraVENous Q8H    sodium chloride (NS) flush 5-40 mL  5-40 mL IntraVENous PRN    acetaminophen (TYLENOL) tablet 650 mg  650 mg Oral Q6H PRN    Or    acetaminophen (TYLENOL) suppository 650 mg  650 mg Rectal Q6H PRN    polyethylene glycol (MIRALAX) packet 17 g  17 g Oral DAILY PRN    hydrALAZINE (APRESOLINE) 20 mg/mL injection 10 mg  10 mg IntraVENous Q6H PRN    insulin lispro (HUMALOG) injection   SubCUTAneous AC&HS    glucose chewable tablet 16 g  4 Tablet Oral PRN    glucagon (GLUCAGEN) injection 1 mg  1 mg IntraMUSCular PRN    dextrose 10% infusion 0-250 mL  0-250 mL IntraVENous PRN    piperacillin-tazobactam (ZOSYN) 3.375 g in 0.9% sodium chloride (MBP/ADV) 100 mL MBP  3.375 g IntraVENous Q8H       Allergies: Compazine [prochlorperazine]    Family History   Problem Relation Age of Onset    Heart Attack Father     Coronary Art Dis Mother     Diabetes Mother     Cancer Sister         breast cancer and lung cancer     Social History     Socioeconomic History    Marital status:      Spouse name: Not on file    Number of children: Not on file    Years of education: Not on file    Highest education level: Not on file   Occupational History    Not on file   Tobacco Use    Smoking status: Never Smoker    Smokeless tobacco: Never Used   Substance and Sexual Activity    Alcohol use: No    Drug use: No    Sexual activity: Not on file   Other Topics Concern    Not on file   Social History Narrative    Not on file     Social Determinants of Health     Financial Resource Strain:    Anand Pelayo Difficulty of Paying Living Expenses: Not on file   Food Insecurity:     Worried About Running Out of Food in the Last Year: Not on file    Ran Out of Food in the Last Year: Not on file   Transportation Needs:     Lack of Transportation (Medical): Not on file    Lack of Transportation (Non-Medical): Not on file   Physical Activity:     Days of Exercise per Week: Not on file    Minutes of Exercise per Session: Not on file   Stress:     Feeling of Stress : Not on file   Social Connections:     Frequency of Communication with Friends and Family: Not on file    Frequency of Social Gatherings with Friends and Family: Not on file    Attends Protestant Services: Not on file    Active Member of [address] [place] [address] [employer] or Organizations: Not on file    Attends Club or Organization Meetings: Not on file    Marital Status: Not on file   Intimate Partner Violence:     Fear of Current or Ex-Partner: Not on file    Emotionally Abused: Not on file    Physically Abused: Not on file    Sexually Abused: Not on file   Housing Stability:     Unable to Pay for Housing in the Last Year: Not on file    Number of [place] in the Last Year: Not on file    Unstable Housing in the Last Year: Not on file     Social History     Tobacco Use   Smoking Status Never Smoker   Smokeless Tobacco Never Used        Temp (24hrs), Av.5 °F (36.9 °C), Min:97.9 °F (36.6 °C), Max:99.6 °F (37.6 °C)    Visit Vitals  BP (!) 152/69   Pulse (!) 50   Temp 97.9 °F (36.6 °C)   Resp 18   Ht 5' 10\" (1.778 m)   Wt 111.1 kg (245 lb)   SpO2 96%   BMI 35.15 kg/m²       ROS: 12 point ROS obtained in details. Pertinent positives as mentioned in HPI,   otherwise negative    Physical Exam:       General:          In NAD.  Nontoxic-appearing. HEENT:           Head NCAT. sclerae anicteric, EOMI.  OP clear. Neck:               Supple, trachea midline. Lungs:             Clear, no wheezes.  Effort nonlabored, no accessory muscle use.   Chest wall:      No chest wall tenderness.  Chest expansion equal and symmetrical bilaterally. Heart:              RRR.  No JVD. Abdomen:        Soft, NT/ND.  Bowel sounds normoactive. Extremities:     No increased erythema/warmth/tenderness bilateral leg. Surgical dressing bilateral feet not removed  skin:                Skin changes as mentioned above  Psych:             Mood normal.  Calm, no agitation. Neurologic:      Awake and alert, moves extremities spontaneously.         Labs: Results:   Chemistry Recent Labs     03/03/22  0420 03/02/22  0406 03/01/22  0335 02/28/22  1615 02/28/22  1615   * 125* 129*   < > 143*    140 140   < > 136   K 4.6 4.1 3.8   < > 3.8    106 104   < > 100   CO2 30 31 31   < > 32   BUN 13 15 17   < > 15   CREA 1.26 1.43* 1.22   < > 1.26   CA 8.3* 8.5 8.4*   < > 9.1   AGAP 4 3 5   < > 4   BUCR 10* 10* 14   < > 12   AP  --   --   --   --  115   TP  --   --   --   --  8.8*   ALB  --   --   --   --  3.1*   GLOB  --   --   --   --  5.7*   AGRAT  --   --   --   --  0.5*    < > = values in this interval not displayed.       CBC w/Diff Recent Labs     03/01/22  0335 02/28/22  1615   WBC 10.4 15.0*   RBC 2.81* 3.05*   HGB 8.7* 9.8*   HCT 28.5* 31.0*    285   GRANS 68 79*   LYMPH 18* 11*   EOS 3 1      Microbiology Recent Labs     03/01/22  1833 03/01/22  1818 02/28/22  1700 02/28/22  1615   CULT NO ANAEROBES ISOLATED SO FAR  CULTURE IN PROGRESS,FURTHER UPDATES TO FOLLOW NO GROWTH THUS FAR  NO GROWTH THUS FAR MODERATE POSSIBLE STAPHYLOCOCCUS AUREUS*  LIGHT GRAM NEGATIVE RODS* NO GROWTH 3 DAYS  NO GROWTH 3 DAYS          RADIOLOGY:    All available imaging studies/reports in [facility] care for this admission were reviewed    High complexity decision making was performed during the evaluation of this patient at high risk for decompensation         Disclaimer: Sections of this note are dictated utilizing voice recognition software, which may have resulted in some phonetic based errors in grammar and contents. Even though attempts were made to correct all the mistakes, some may have been missed, and remained in the body of the document. If questions arise, please contact our department.     Dr. Zaida Chaney, Infectious Disease Specialist  [phone]  March 3, 2022  8:14 AM

## 2022-03-04 ENCOUNTER — HOME HEALTH ADMISSION (OUTPATIENT)
Dept: HOME HEALTH SERVICES | Facility: HOME HEALTH | Age: 62
End: 2022-03-04

## 2022-03-04 VITALS
OXYGEN SATURATION: 99 % | HEIGHT: 70 IN | BODY MASS INDEX: 35.07 KG/M2 | TEMPERATURE: 97.1 F | DIASTOLIC BLOOD PRESSURE: 85 MMHG | HEART RATE: 57 BPM | WEIGHT: 245 LBS | SYSTOLIC BLOOD PRESSURE: 135 MMHG | RESPIRATION RATE: 18 BRPM

## 2022-03-04 LAB
BACTERIA SPEC CULT: ABNORMAL
GLUCOSE BLD STRIP.AUTO-MCNC: 108 MG/DL (ref 70–110)
GLUCOSE BLD STRIP.AUTO-MCNC: 122 MG/DL (ref 70–110)
GRAM STN SPEC: ABNORMAL
GRAM STN SPEC: ABNORMAL
SERVICE CMNT-IMP: ABNORMAL

## 2022-03-04 PROCEDURE — 74011250637 HC RX REV CODE- 250/637: Performed by: FAMILY MEDICINE

## 2022-03-04 PROCEDURE — 74011000258 HC RX REV CODE- 258: Performed by: HOSPITALIST

## 2022-03-04 PROCEDURE — 99239 HOSP IP/OBS DSCHRG MGMT >30: CPT | Performed by: FAMILY MEDICINE

## 2022-03-04 PROCEDURE — 74011250637 HC RX REV CODE- 250/637: Performed by: HOSPITALIST

## 2022-03-04 PROCEDURE — 2709999900 HC NON-CHARGEABLE SUPPLY

## 2022-03-04 PROCEDURE — 74011000250 HC RX REV CODE- 250: Performed by: HOSPITALIST

## 2022-03-04 PROCEDURE — 82962 GLUCOSE BLOOD TEST: CPT

## 2022-03-04 PROCEDURE — 74011250637 HC RX REV CODE- 250/637: Performed by: INTERNAL MEDICINE

## 2022-03-04 PROCEDURE — 74011250636 HC RX REV CODE- 250/636: Performed by: HOSPITALIST

## 2022-03-04 RX ORDER — DOXYCYCLINE 100 MG/1
100 CAPSULE ORAL 2 TIMES DAILY
Qty: 22 CAPSULE | Refills: 0 | Status: SHIPPED | OUTPATIENT
Start: 2022-03-04 | End: 2022-03-15

## 2022-03-04 RX ORDER — OXYCODONE AND ACETAMINOPHEN 7.5; 325 MG/1; MG/1
1 TABLET ORAL
Qty: 20 TABLET | Refills: 0 | Status: SHIPPED | OUTPATIENT
Start: 2022-03-04 | End: 2022-03-09

## 2022-03-04 RX ORDER — CIPROFLOXACIN 750 MG/1
750 TABLET, FILM COATED ORAL 2 TIMES DAILY
Qty: 22 TABLET | Refills: 0 | Status: SHIPPED | OUTPATIENT
Start: 2022-03-04 | End: 2022-03-15

## 2022-03-04 RX ADMIN — PANTOPRAZOLE SODIUM 40 MG: 40 TABLET, DELAYED RELEASE ORAL at 06:37

## 2022-03-04 RX ADMIN — OXYCODONE AND ACETAMINOPHEN 1 TABLET: 7.5; 325 TABLET ORAL at 08:30

## 2022-03-04 RX ADMIN — OXYCODONE AND ACETAMINOPHEN 1 TABLET: 7.5; 325 TABLET ORAL at 04:36

## 2022-03-04 RX ADMIN — SODIUM CHLORIDE, PRESERVATIVE FREE 10 ML: 5 INJECTION INTRAVENOUS at 04:38

## 2022-03-04 RX ADMIN — AMLODIPINE BESYLATE 10 MG: 10 TABLET ORAL at 08:30

## 2022-03-04 RX ADMIN — PIPERACILLIN AND TAZOBACTAM 3.38 G: 3; .375 INJECTION, POWDER, LYOPHILIZED, FOR SOLUTION INTRAVENOUS at 04:38

## 2022-03-04 RX ADMIN — DOXYCYCLINE HYCLATE 100 MG: 100 CAPSULE ORAL at 08:30

## 2022-03-04 NOTE — DISCHARGE INSTRUCTIONS
DISCHARGE SUMMARY from Nurse    PATIENT INSTRUCTIONS:    After general anesthesia or intravenous sedation, for 24 hours or while taking prescription Narcotics:  · Limit your activities  · Do not drive and operate hazardous machinery  · Do not make important personal or business decisions  · Do  not drink alcoholic beverages  · If you have not urinated within 8 hours after discharge, please contact your surgeon on call. Report the following to your surgeon:  · Excessive pain, swelling, redness or odor of or around the surgical area  · Temperature over 100.5  · Nausea and vomiting lasting longer than 4 hours or if unable to take medications  · Any signs of decreased circulation or nerve impairment to extremity: change in color, persistent  numbness, tingling, coldness or increase pain  · Any questions    What to do at Home:  Recommended activity: Activity as tolerated. If you experience any of the following symptoms ,fever,chills,increased pain,redness or thick yellowish-green drainage from incision, please follow up with Deshaun Marroquin. *  Please give a list of your current medications to your Primary Care Provider. *  Please update this list whenever your medications are discontinued, doses are      changed, or new medications (including over-the-counter products) are added. *  Please carry medication information at all times in case of emergency situations. These are general instructions for a healthy lifestyle:    No smoking/ No tobacco products/ Avoid exposure to second hand smoke  Surgeon General's Warning:  Quitting smoking now greatly reduces serious risk to your health.     Obesity, smoking, and sedentary lifestyle greatly increases your risk for illness    A healthy diet, regular physical exercise & weight monitoring are important for maintaining a healthy lifestyle    You may be retaining fluid if you have a history of heart failure or if you experience any of the following symptoms: Weight gain of 3 pounds or more overnight or 5 pounds in a week, increased swelling in our hands or feet or shortness of breath while lying flat in bed. Please call your doctor as soon as you notice any of these symptoms; do not wait until your next office visit. Patient armband removed and shredded. MyChart Activation    Thank you for requesting access to [facility]. Please follow the instructions below to securely access and download your online medical record. [facility] allows you to send messages to your doctor, view your test results, renew your prescriptions, schedule appointments, and more. How Do I Sign Up? 1. In your internet browser, go to [email or web address]. 2. Click on the First Time User? Click Here link in the Sign In box. You will see the New Member Sign Up page. 3. Enter your [employer] Access Code exactly as it appears below. You will not need to use this code after youve completed the sign-up process. If you do not sign up before the expiration date, you must request a new code. [employer] Access Code: [email or web address]  Expires: 2022  3:36 PM (This is the date your [employer] access code will )    4. Enter the last four digits of your Social Security Number (xxxx) and Date of Birth (mm/dd/yyyy) as indicated and click Submit. You will be taken to the next sign-up page. 5. Create a [employer] ID. This will be your [employer] login ID and cannot be changed, so think of one that is secure and easy to remember. 6. Create a [employer] password. You can change your password at any time. 7. Enter your Password Reset Question and Answer. This can be used at a later time if you forget your password. 8. Enter your e-mail address. You will receive e-mail notification when new information is available in [address] 9. Click Sign Up. You can now view and download portions of your medical record.   10. Click the Download Summary menu link to download a portable copy of your medical information. Additional Information    If you have questions, please visit the Frequently Asked Questions section of the [facility] website at [email or web address] Remember, [facility] is NOT to be used for urgent needs. For medical emergencies, dial 911. The discharge information has been reviewed with the patient. The patient verbalized understanding. Discharge medications reviewed with the patient and appropriate educational materials and side effects teaching were provided.   ___________________________________________________________________________________________________________________________________

## 2022-03-04 NOTE — PROGRESS NOTES
Infectious Disease progress Note        Reason: left foot osteomyelitis    Current abx Prior abx   Zosyn since 2/28/2022  Doxycycline since 3/30/2022 Cefepime 2/28  vancomycin 2/28-3/3     Lines:       Assessment :    64 y.o. male with h/o type uncontrolled type 2 DM (last hgbA1C 8.9 on 11/23/20) , CAD who presented to [facility] on 2/28/22 sent from podiatrist office for evaluation of left foot ulcer concerning for osteomyelitis     Hospitalization at [facility] November 2020 for right great toe distal phalanx chronic osteomyelitis, septic arthritis right great toe interphalangeal joint   Wound cultures 9/2020-Enterobacter, MRSA  Status post right great toe amputation on 11/24. Hospitalization at [facility] 2/2021 for acute on chronic osteomyelitis right first metatarsal osteomyelitis. Status post incision and debridement, partial resection of right first metatarsal on 2/5/2021. Intra-Op cultures: Methicillin susceptible Staphylococcus aureus, Bacteroides. Bone biopsy of clean margins reveal acute inflammation suggestive of acute osteomyelitis. S/p ertapenem, daptomycin till 3/19/2021     Hospitalization at [facility] 4/2021 for  chronic osteomyelitis right second metatarsal head, infected right lateral foot ulcer Status post transmetatarsal amputation 4/13/2021. Intra-Op findings discussed with podiatrist.  Grossly clean margins achieved at surgery. Bone biopsy, clean margins negative for acute osteomyelitis. Intra-Op cultures no organisms seen. Clinical presentation consistent with right foot cellulitis, infected right lateral foot ulcer, chronic osteomyelitis left first distal phalanx    No evidence of deeper infection/osteomyelitis right foot noted on x-ray 2/20/2022.,  MRI 3/2/2022  Status post incision and debridement bilateral feet ulcer, partial amputation left great toe on 3/1/2022.   Intraoperative findings noted    Discussed with pathologist.  Bone biopsy of clean margins negative for osteomyelitis    Wound culture right foot 2/28-MRSA, pseudomonas, proteus (susceptibilities of MRSA noted)  Intra-Op wound culture 3/1/2022-MRSA, Proteus     Recommendations:     1.  d/c pip/tazo, recommend  po doxycycline, ciprofloxacin till 3/15/22  2. follow up  podiatry recommendations regarding wound care  3. Follow-up with podiatry as outpatient  4. Okay to discharge patient from infectious disease standpoint      Above plan was discussed in details with patient, and primary team. Please call me if any further questions or concerns. Will continue to participate in the care of this patient. HPI:    Denies increasing pain in the foot. Had questions about MRSA infection. Is wondering when he can go home. Past Medical History:   Diagnosis Date    Arthritis     Coronary atherosclerosis of native coronary artery     S/P PCI with GE in 12/09    Diabetes ([facility] [address]     IDDM    Electrolyte and fluid disorders not elsewhere classified     Essential hypertension, benign     GERD (gastroesophageal reflux disease)     Heroin abuse ([facility] [address] 05/26/16    Psychiatrist Dr. Marta Reese     History of heart attack     Hyperlipidemia     Intermediate coronary syndrome [facility])     MDD (major depressive disorder) 5/26/16    Psychiatrist Dr. Marta Reese     Myocardial infarction [facility])     Obesity, unspecified     Old myocardial infarction     Other and unspecified hyperlipidemia     Pancreatitis     Positive PPD     Sleep apnea     uses Cpap machine    Transfusion history     Before 1992       Past Surgical History:   Procedure Laterality Date    HX APPENDECTOMY      HX CORONARY STENT PLACEMENT  2010    2 stents       Current Discharge Medication List        CONTINUE these medications which have NOT CHANGED    Details   clopidogreL (PLAVIX) 75 mg tab Take 75 mg by mouth daily. Indications: myocardial reinfarction prevention      traZODone (DESYREL) 150 mg tablet Take 150 mg by mouth nightly.  Indications: insomnia associated with depression oxyCODONE-acetaminophen (PERCOCET 7.5) 7.5-325 mg per tablet Take 1 Tab by mouth every six (6) hours as needed for Pain. amLODIPine (NORVASC) 10 mg tablet Take 10 mg by mouth daily. hydroCHLOROthiazide (HYDRODIURIL) 50 mg tablet Take 50 mg by mouth daily. metFORMIN (GLUCOPHAGE) 1,000 mg tablet Take 1,000 mg by mouth two (2) times daily (with meals). atorvastatin (LIPITOR) 20 mg tablet Take 1 Tab by mouth nightly. Qty: 30 Tab, Refills: 0      OTHER BMP in 1 week  Dx: ARF  Fax results to Dr. Bin Christianson  Qty: 1 Each, Refills: 0      insulin lispro (HUMALOG) 100 unit/mL injection Check FSBS AC*HS  For sugars between 160 and 200- Give 2 units SQ,  For sugars between 201 and 250, Give 3 units SQ,  For sugars Between 251 and 300, give 5 units SQ,  For Sugars between 301 and 350, give 7 units SQ  For sugars between 351 and 400, give 8 units SQ and contact PCP  Qty: 1 Vial, Refills: 0      insulin detemir U-100 (Levemir U-100 Insulin) 100 unit/mL injection 20 Units by SubCUTAneous route two (2) times a day. Qty: 10 mL, Refills: 0      pantoprazole (PROTONIX) 40 mg tablet Take 1 Tab by mouth Before breakfast and dinner. Qty: 60 Tab, Refills: 0      acetaminophen (TYLENOL) 325 mg tablet Take 2 Tabs by mouth every six (6) hours as needed. Indications: Pain, take it with bentyl; do not exceed 3 g tylenol daily  Qty: 30 Tab, Refills: 0      polyethylene glycol (MIRALAX) 17 gram packet Take 1 Packet by mouth daily.   Qty: 30 Packet, Refills: 0             Current Facility-Administered Medications   Medication Dose Route Frequency    doxycycline (VIBRAMYCIN) capsule 100 mg  100 mg Oral Q12H    morphine injection 2 mg  2 mg IntraVENous Q3H PRN    oxyCODONE-acetaminophen (PERCOCET 7.5) 7.5-325 mg per tablet 1 Tablet  1 Tablet Oral Q4H PRN    iron sucrose (VENOFER) 200 mg in 0.9% sodium chloride 100 mL IVPB  200 mg IntraVENous Q24H    insulin glargine (LANTUS) injection 25 Units  25 Units SubCUTAneous QHS amLODIPine (NORVASC) tablet 10 mg  10 mg Oral DAILY    atorvastatin (LIPITOR) tablet 20 mg  20 mg Oral QHS    pantoprazole (PROTONIX) tablet 40 mg  40 mg Oral ACB&D    polyethylene glycol (MIRALAX) packet 17 g  17 g Oral DAILY    sodium chloride (NS) flush 5-40 mL  5-40 mL IntraVENous Q8H    sodium chloride (NS) flush 5-40 mL  5-40 mL IntraVENous PRN    acetaminophen (TYLENOL) tablet 650 mg  650 mg Oral Q6H PRN    Or    acetaminophen (TYLENOL) suppository 650 mg  650 mg Rectal Q6H PRN    polyethylene glycol (MIRALAX) packet 17 g  17 g Oral DAILY PRN    hydrALAZINE (APRESOLINE) 20 mg/mL injection 10 mg  10 mg IntraVENous Q6H PRN    insulin lispro (HUMALOG) injection   SubCUTAneous AC&HS    glucose chewable tablet 16 g  4 Tablet Oral PRN    glucagon (GLUCAGEN) injection 1 mg  1 mg IntraMUSCular PRN    dextrose 10% infusion 0-250 mL  0-250 mL IntraVENous PRN    piperacillin-tazobactam (ZOSYN) 3.375 g in 0.9% sodium chloride (MBP/ADV) 100 mL MBP  3.375 g IntraVENous Q8H       Allergies: Compazine [prochlorperazine]    Family History   Problem Relation Age of Onset    Heart Attack Father     Coronary Art Dis Mother     Diabetes Mother     Cancer Sister         breast cancer and lung cancer     Social History     Socioeconomic History    Marital status:      Spouse name: Not on file    Number of children: Not on file    Years of education: Not on file    Highest education level: Not on file   Occupational History    Not on file   Tobacco Use    Smoking status: Never Smoker    Smokeless tobacco: Never Used   Substance and Sexual Activity    Alcohol use: No    Drug use: No    Sexual activity: Not on file   Other Topics Concern    Not on file   Social History Narrative    Not on file     Social Determinants of Health     Financial Resource Strain:     Difficulty of Paying Living Expenses: Not on file   Food Insecurity:     Worried About Running Out of Food in the Last Year: Not on file    Ran Out of Food in the Last Year: Not on file   Transportation Needs:     Lack of Transportation (Medical): Not on file    Lack of Transportation (Non-Medical): Not on file   Physical Activity:     Days of Exercise per Week: Not on file    Minutes of Exercise per Session: Not on file   Stress:     Feeling of Stress : Not on file   Social Connections:     Frequency of Communication with Friends and Family: Not on file    Frequency of Social Gatherings with Friends and Family: Not on file    Attends [employer] Services: Not on file    Active Member of Clubs or Organizations: Not on file    Attends Club or Organization Meetings: Not on file    Marital Status: Not on file   Intimate Partner Violence:     Fear of Current or Ex-Partner: Not on file    Emotionally Abused: Not on file    Physically Abused: Not on file    Sexually Abused: Not on file   Housing Stability:     Unable to Pay for Housing in the Last Year: Not on file    Number of [place] in the Last Year: Not on file    Unstable Housing in the Last Year: Not on file     Social History     Tobacco Use   Smoking Status Never Smoker   Smokeless Tobacco Never Used        Temp (24hrs), Av.4 °F (36.9 °C), Min:96.6 °F (35.9 °C), Max:99.4 °F (37.4 °C)    Visit Vitals  BP (!) 145/83 (BP Patient Position: At rest)   Pulse (!) 46   Temp 98.1 °F (36.7 °C)   Resp 17   Ht 5' 10\" (1.778 m)   Wt 111.1 kg (245 lb)   SpO2 93%   BMI 35.15 kg/m²       ROS: 12 point ROS obtained in details. Pertinent positives as mentioned in HPI,   otherwise negative    Physical Exam:       General:          In NAD. Nontoxic-appearing. HEENT:           Head NCAT. sclerae anicteric, EOMI. OP clear. Neck:               Supple, trachea midline. Lungs:             Clear, no wheezes. Effort nonlabored, no accessory muscle use. Chest wall:      No chest wall tenderness. Chest expansion equal and symmetrical bilaterally. Heart:              RRR. No JVD. Abdomen:        Soft, NT/ND.   Bowel sounds normoactive. Extremities:     No increased erythema/warmth/tenderness bilateral leg. Surgical dressing bilateral feet not removed  skin:                Skin changes as mentioned above  Psych:             Mood normal.  Calm, no agitation. Neurologic:      Awake and alert, moves extremities spontaneously. Labs: Results:   Chemistry Recent Labs     03/03/22  0420 03/02/22  0406   * 125*    140   K 4.6 4.1    106   CO2 30 31   BUN 13 15   CREA 1.26 1.43*   CA 8.3* 8.5   AGAP 4 3   BUCR 10* 10*      CBC w/Diff No results for input(s): WBC, RBC, HGB, HCT, PLT, GRANS, LYMPH, EOS, HGBEXT, HCTEXT, PLTEXT, HGBEXT, HCTEXT, PLTEXT in the last 72 hours. Microbiology Recent Labs     03/01/22  1833 03/01/22  1818   CULT NO ANAEROBES ISOLATED  LIGHT POSSIBLE STAPHYLOCOCCUS AUREUS* FEW DIPHTHEROIDS*  GRAM NEGATIVE RODS SEEN ON GRAM STAIN OF THE THIO BROTH*  NO GROWTH THUS FAR          RADIOLOGY:    All available imaging studies/reports in [facility] for this admission were reviewed    High complexity decision making was performed during the evaluation of this patient at high risk for decompensation         Disclaimer: Sections of this note are dictated utilizing voice recognition software, which may have resulted in some phonetic based errors in grammar and contents. Even though attempts were made to correct all the mistakes, some may have been missed, and remained in the body of the document. If questions arise, please contact our department.     Dr. Kitty Ureña, Infectious Disease Specialist  [phone]  March 4, 2022  8:14 AM

## 2022-03-04 NOTE — PROGRESS NOTES
Bedside and Verbal shift change report given to Anders Dubon RN (oncoming nurse) by Sharmila Vidal RN (offgoing nurse). Report included the following information SBAR, Kardex, Intake/Output, MAR and Med Rec Status.

## 2022-03-04 NOTE — HOME CARE
Discharge noted for today. Received referral for [facility] for SN (wound care). Referral deferred due to per [facility] Management. CM has been notified and updated.

## 2022-03-04 NOTE — PROGRESS NOTES
Discharge order noted for today. Pt has been accepted to [facility] agency. Met with patient and he agreeable to the transition plan today. Transport has been arranged through the patients wife. Patient's discharge summary and home health  orders have been forwarded to [facility] via [employer]. Updated bedside RN, Chary Sommers  to the transition plan. Discharge information has been documented on the AVS.     New temporary address- [address], [address] [facility] notified by [facility] cannot accept the pt. Dr. Anthony Devlin notified via ViaSat. New HH orders sent out via [employer] for accepting agency. Home health orders sent [facility] agencies. Declined by 7 HH agencies. Personal Touch accepted with [facility] 3/14/2022. SOC needed now. Personal Touch declined.  called [facility], spoke with Dominick Bone. . [facility] is reviewing now.        JESUS Cho, RN  Care Management  Pager # [phone]

## 2022-03-04 NOTE — HOME CARE
Received wound orders under HH from [facility].  Demographics verified with spouse via phone. Living in sister's home:  Eddy Enrique in [address] [place] [address]. [phone]. Updated as temporary as spouse stating awaiting a new permanate address in [place] for patient and spouse to reside. Referral has been noted and initiated. Awaiting discharge orders for completion.

## 2022-03-04 NOTE — PROGRESS NOTES
Patient given supplies and education on how to dress wound appropriately per podiatry's orders. All questions answered, patient and wife verbalized understanding.

## 2022-03-04 NOTE — PROGRESS NOTES
KRISTINA ivy served Dr. Claudeen Picket [facility] for [facility] liaison [address]  will continue to monitor and assist with transition of care needs.     ALANA BoyerN, RN  Care Management  Pager # [phone]

## 2022-03-04 NOTE — PROGRESS NOTES
Problem: Falls - Risk of  Goal: *Absence of Falls  Description: Document January [place] Fall Risk and appropriate interventions in the flowsheet. Outcome: Resolved/Met  Note: Fall Risk Interventions:  Mobility Interventions: Patient to call before getting OOB,PT Consult for mobility concerns         Medication Interventions: Patient to call before getting OOB,Teach patient to arise slowly    Elimination Interventions: Call light in reach,Stay With Me (per policy),Toileting schedule/hourly rounds,Urinal in reach              Problem: Patient Education: Go to Patient Education Activity  Goal: Patient/Family Education  Outcome: Resolved/Met     Problem: Pain  Goal: *Control of Pain  Outcome: Resolved/Met  Goal: *PALLIATIVE CARE:  Alleviation of Pain  Outcome: Resolved/Met     Problem: Patient Education: Go to Patient Education Activity  Goal: Patient/Family Education  Outcome: Resolved/Met     Problem: Patient Education: Go to Patient Education Activity  Goal: Patient/Family Education  Outcome: Resolved/Met     Problem: Diabetes Self-Management  Goal: *Disease process and treatment process  Description: Define diabetes and identify own type of diabetes; list 3 options for treating diabetes. Outcome: Resolved/Met  Goal: *Incorporating nutritional management into lifestyle  Description: Describe effect of type, amount and timing of food on blood glucose; list 3 methods for planning meals. Outcome: Resolved/Met  Goal: *Incorporating physical activity into lifestyle  Description: State effect of exercise on blood glucose levels. Outcome: Resolved/Met  Goal: *Developing strategies to promote health/change behavior  Description: Define the ABC's of diabetes; identify appropriate screenings, schedule and personal plan for screenings. Outcome: Resolved/Met  Goal: *Using medications safely  Description: State effect of diabetes medications on diabetes; name diabetes medication taking, action and side effects.   Outcome: Resolved/Met  Goal: *Monitoring blood glucose, interpreting and using results  Description: Identify recommended blood glucose targets  and personal targets. Outcome: Resolved/Met  Goal: *Prevention, detection, treatment of acute complications  Description: List symptoms of hyper- and hypoglycemia; describe how to treat low blood sugar and actions for lowering  high blood glucose level. Outcome: Resolved/Met  Goal: *Prevention, detection and treatment of chronic complications  Description: Define the natural course of diabetes and describe the relationship of blood glucose levels to long term complications of diabetes. Outcome: Resolved/Met  Goal: *Developing strategies to address psychosocial issues  Description: Describe feelings about living with diabetes; identify support needed and support network  Outcome: Resolved/Met  Goal: *Insulin pump training  Outcome: Resolved/Met  Goal: *Sick day guidelines  Outcome: Resolved/Met  Goal: *Patient Specific Goal (EDIT GOAL, INSERT TEXT)  Outcome: Resolved/Met     Problem: Patient Education: Go to Patient Education Activity  Goal: Patient/Family Education  Outcome: Resolved/Met     Problem: Patient Education: Go to Patient Education Activity  Goal: Patient/Family Education  Outcome: Resolved/Met     Problem: Risk for Spread of Infection  Goal: Prevent transmission of infectious organism to others  Description: Prevent the transmission of infectious organisms to other patients, staff members, and visitors.   Outcome: Resolved/Met     Problem: Patient Education:  Go to Education Activity  Goal: Patient/Family Education  Outcome: Resolved/Met     Problem: Patient Education: Go to Patient Education Activity  Goal: Patient/Family Education  Outcome: Resolved/Met

## 2022-03-05 LAB
ATRIAL RATE: 55 BPM
BACTERIA SPEC CULT: ABNORMAL
BACTERIA SPEC CULT: NORMAL
CALCULATED R AXIS, ECG10: -4 DEGREES
CALCULATED T AXIS, ECG11: 4 DEGREES
DIAGNOSIS, 93000: NORMAL
GRAM STN SPEC: ABNORMAL
Q-T INTERVAL, ECG07: 448 MS
QRS DURATION, ECG06: 84 MS
QTC CALCULATION (BEZET), ECG08: 404 MS
SERVICE CMNT-IMP: ABNORMAL
SERVICE CMNT-IMP: ABNORMAL
SERVICE CMNT-IMP: NORMAL
VENTRICULAR RATE, ECG03: 49 BPM

## 2022-03-18 PROBLEM — E11.628 DIABETIC FOOT INFECTION (HCC): Status: ACTIVE | Noted: 2021-04-12

## 2022-03-18 PROBLEM — L08.9 DIABETIC FOOT INFECTION (HCC): Status: ACTIVE | Noted: 2021-04-12

## 2022-03-18 PROBLEM — K31.84 GASTROPARESIS: Status: ACTIVE | Noted: 2017-09-07

## 2022-03-18 PROBLEM — L97.409 DIABETIC ULCER OF HEEL (HCC): Status: ACTIVE | Noted: 2021-02-04

## 2022-03-18 PROBLEM — M71.30 SYNOVIAL CYST: Status: ACTIVE | Noted: 2017-05-26

## 2022-03-18 PROBLEM — I95.1 ORTHOSTATIC HYPOTENSION: Status: ACTIVE | Noted: 2019-12-25

## 2022-03-18 PROBLEM — E11.621 DIABETIC ULCER OF HEEL (HCC): Status: ACTIVE | Noted: 2021-02-04

## 2022-03-18 PROBLEM — M86.9 OM (OSTEOMYELITIS) (HCC): Status: ACTIVE | Noted: 2022-02-28

## 2022-03-19 PROBLEM — I25.10 CORONARY ARTERY DISEASE INVOLVING NATIVE CORONARY ARTERY OF NATIVE HEART WITHOUT ANGINA PECTORIS: Status: ACTIVE | Noted: 2017-05-31

## 2022-03-19 PROBLEM — Z95.5 HISTORY OF CORONARY ARTERY STENT PLACEMENT: Status: ACTIVE | Noted: 2017-05-31

## 2022-03-19 PROBLEM — E87.6 HYPOKALEMIA: Status: ACTIVE | Noted: 2017-09-07

## 2022-03-19 PROBLEM — E11.9 DM (DIABETES MELLITUS) (HCC): Status: ACTIVE | Noted: 2019-12-25

## 2022-03-19 PROBLEM — R41.82 ALTERED MENTAL STATUS: Status: ACTIVE | Noted: 2020-09-01

## 2022-03-19 PROBLEM — S92.919A FRACTURE, TOE: Status: ACTIVE | Noted: 2020-09-24

## 2022-03-19 PROBLEM — D72.829 LEUKOCYTOSIS: Status: ACTIVE | Noted: 2021-02-04

## 2022-03-19 PROBLEM — R10.9 CHRONIC ABDOMINAL PAIN: Status: ACTIVE | Noted: 2018-03-28

## 2022-03-19 PROBLEM — R10.9 ABDOMINAL PAIN: Status: ACTIVE | Noted: 2017-09-07

## 2022-03-19 PROBLEM — R55 SYNCOPE AND COLLAPSE: Status: ACTIVE | Noted: 2019-12-24

## 2022-03-19 PROBLEM — M48.061 SPINAL STENOSIS AT L4-L5 LEVEL: Status: ACTIVE | Noted: 2017-06-19

## 2022-03-19 PROBLEM — G89.29 CHRONIC ABDOMINAL PAIN: Status: ACTIVE | Noted: 2018-03-28

## 2022-03-19 PROBLEM — E11.40 DIABETIC NEUROPATHY (HCC): Status: ACTIVE | Noted: 2017-07-05

## 2022-03-19 PROBLEM — M43.16 SPONDYLOLISTHESIS OF LUMBAR REGION: Status: ACTIVE | Noted: 2017-05-26

## 2022-03-19 PROBLEM — J98.11 ATELECTASIS: Status: ACTIVE | Noted: 2017-09-07

## 2022-03-19 PROBLEM — J18.9 MULTIFOCAL PNEUMONIA: Status: ACTIVE | Noted: 2020-09-01

## 2022-03-19 PROBLEM — N17.9 ACUTE KIDNEY INJURY (HCC): Status: ACTIVE | Noted: 2021-02-04

## 2022-03-19 PROBLEM — R11.10 VOMITING: Status: ACTIVE | Noted: 2018-03-28

## 2022-03-19 PROBLEM — T14.8XXA WOUND INFECTION: Status: ACTIVE | Noted: 2021-02-04

## 2022-03-19 PROBLEM — M86.9 OSTEOMYELITIS (HCC): Status: ACTIVE | Noted: 2020-11-22

## 2022-03-19 PROBLEM — R11.2 NAUSEA AND VOMITING: Status: ACTIVE | Noted: 2017-09-07

## 2022-03-19 PROBLEM — M79.2 NEURITIS: Status: ACTIVE | Noted: 2017-07-05

## 2022-03-19 PROBLEM — L08.9 WOUND INFECTION: Status: ACTIVE | Noted: 2021-02-04

## 2022-03-20 PROBLEM — M51.26 HNP (HERNIATED NUCLEUS PULPOSUS), LUMBAR: Status: ACTIVE | Noted: 2017-05-26

## 2022-03-20 PROBLEM — M48.061 LUMBAR SPINAL STENOSIS: Status: ACTIVE | Noted: 2017-05-26

## 2022-03-20 PROBLEM — A41.9 SEPSIS (HCC): Status: ACTIVE | Noted: 2018-03-21

## 2022-03-20 PROBLEM — Z98.1 S/P LUMBAR FUSION: Status: ACTIVE | Noted: 2017-07-05

## 2022-03-20 PROBLEM — E03.9 ACQUIRED HYPOTHYROIDISM: Status: ACTIVE | Noted: 2017-09-07

## 2022-04-04 NOTE — DISCHARGE SUMMARY
Discharge Summary    Patient: Heather Gore MRN: [id number]  CSN: [id number]    YOB: 1960  Age: 64 y.o. Sex: male    DOA: 2/28/2022 LOS:  LOS: 4 days   Discharge Date: 3/4/2022     Admission Diagnoses: Osteomyelitis ([address] [M86.9]  OM (osteomyelitis) ([address] [M86.9]    Discharge Diagnoses:    Left great toe osteomyelitis status post partial amputation  Bilateral foot ulcers, POA, status post debridement  DM2, A1c 6.1%  Hypertension  Dyslipidemia  Iron deficiency anemia    CAD with history of stent placement  GERD  Severe obesity, BMI 35.15     Discharge Condition: Stable    PHYSICAL EXAM  Visit Vitals  /85   Pulse (!) 57   Temp 97.1 °F (36.2 °C)   Resp 18   Ht 5' 10\" (1.778 m)   Wt 111.1 kg (245 lb)   SpO2 99%   BMI 35.15 kg/m²       General: In NAD. Nontoxic-appearing. HEENT: NCAT. Sclerae anicteric, EOMI. OP clear. Lungs:  Clear, no wheezes. No accessory muscle use. Heart:  RRR. Abdomen: Soft, NT/ND. Extremities: Warm. Foot dressings clean/dry/intact. Psych:   Mood normal.  Neurologic:  Awake and alert, moves extremities spontaneously. Hospital Course:   See admission H&P for full details of HPI. Patient was admitted to the hospital after presenting to the emergency department at the request of his podiatrist for evaluation of chronic left foot ulcer. ID evaluation was obtained and podiatry evaluated patient once he was admitted to the hospital.  Patient has subsequently undergone partial left great toe amputation as well as bilateral debridement of foot ulcers. Wound cultures from right foot done on 2/28/2022 have grown MRSA, Pseudomonas as well as Proteus. Intra-Op wound cultures done 3/1/2022 growing MRSA and Proteus. Patient is being discharged home on oral doxycycline as well as Cipro through 3/15/2022. He will need continued wound care and outpatient podiatry follow-up.   Patient has met maximum benefit of hospitalization and is medically stable for discharge home with follow-up as advised. Consults:   Podiatry  Infectious diseases    Significant Diagnostic Studies/Procedures:   MRI right foot:  IMPRESSION  1. Prior first through fifth transmetatarsal amputations. Chronic second TMT  joint subluxation, suggesting neuropathic arthropathy. 2.  Abnormal morphology and marrow replacement involving the medial aspect of  the first metatarsal remnant. Possible subchondral fracture at the first  metatarsal base. Acute osteomyelitis is difficult to exclude given the marrow  replacement; however no sinus tract or ulcers appreciated in this region which  makes acute osteomyelitis relatively less likely. Differential considerations  include reactive osteitis due to fracture, reactive change due to to neuropathic  arthropathy, or acute/chronic osteomyelitis. Recommend physical exam correlation  for any ulcers in this region. Nuclear medicine white blood cell/sulfur colloid  imaging could be considered to further characterize. 3.  Large area of ulceration at the lateral aspect of the midfoot/forefoot,  extending beyond the field-of-view without evidence of underlying abscess or  osteomyelitis. Signal abnormality within the adjacent abductor digit minimi  muscle, likely infectious myositis. 4.  Diffuse subcutaneous edema about the dorsal, lateral, and plantar aspects of  the midfoot and forefoot, likely cellulitis of the third spacing is possible. No  evidence of abscess. XR right foot:    IMPRESSION  1. Ulcer with osteomyelitis of the left 1st distal phalanx.      2. Extensive postoperative changes of the right foot. No definite evidence of  osteomyelitis.      3. Possible right lateral ulcer. XR left foot:  IMPRESSION  1. Ulcer with osteomyelitis of the left 1st distal phalanx.      2. Extensive postoperative changes of the right foot. No definite evidence of  osteomyelitis.      3. Possible right lateral ulcer.        Discharge Medications:     Discharge Medication List as of 3/4/2022  2:12 PM      START taking these medications    Details   ciprofloxacin HCl (CIPRO) 750 mg tablet Take 1 Tablet by mouth two (2) times a day for 11 days. , Normal, Disp-22 Tablet, R-0      doxycycline (MONODOX) 100 mg capsule Take 1 Capsule by mouth two (2) times a day for 11 days. , Normal, Disp-22 Capsule, R-0         CONTINUE these medications which have CHANGED    Details   oxyCODONE-acetaminophen (PERCOCET 7.5) 7.5-325 mg per tablet Take 1 Tablet by mouth every six (6) hours as needed for Pain for up to 5 days. Max Daily Amount: 4 Tablets., Normal, Disp-20 Tablet, R-0         CONTINUE these medications which have NOT CHANGED    Details   traZODone (DESYREL) 150 mg tablet Take 150 mg by mouth nightly. Indications: insomnia associated with depression, Historical Med      amLODIPine (NORVASC) 10 mg tablet Take 10 mg by mouth daily. , Historical Med      metFORMIN (GLUCOPHAGE) 1,000 mg tablet Take 1,000 mg by mouth two (2) times daily (with meals). , Historical Med      atorvastatin (LIPITOR) 20 mg tablet Take 1 Tab by mouth nightly., Normal, Disp-30 Tab, R-0      insulin lispro (HUMALOG) 100 unit/mL injection Check FSBS AC*HS  For sugars between 160 and 200- Give 2 units SQ,  For sugars between 201 and 250, Give 3 units SQ,  For sugars Between 251 and 300, give 5 units SQ,  For Sugars between 301 and 350, give 7 units SQ  For sugars between 351 and 400, give  8 units SQ and contact PCP, Print, Disp-1 Vial, R-0      insulin detemir U-100 (Levemir U-100 Insulin) 100 unit/mL injection 20 Units by SubCUTAneous route two (2) times a day., No Print, Disp-10 mL, R-0      pantoprazole (PROTONIX) 40 mg tablet Take 1 Tab by mouth Before breakfast and dinner., Print, Disp-60 Tab, R-0      polyethylene glycol (MIRALAX) 17 gram packet Take 1 Packet by mouth daily. , Print, Disp-30 Packet, R-0         STOP taking these medications       clopidogreL (PLAVIX) 75 mg tab Comments:   Reason for Stopping: hydroCHLOROthiazide (HYDRODIURIL) 50 mg tablet Comments:   Reason for Stopping:         OTHER Comments:   Reason for Stopping:         acetaminophen (TYLENOL) 325 mg tablet Comments:   Reason for Stopping:                 Activity: As tolerated. Diet: Cardiac Diet and Diabetic Diet    Disposition: Home with home health care services. Follow-up: with PCP in 1 week, podiatry as directed. .    Minutes spent on discharge: >30 minutes spent coordinating this discharge. Eduarda Blair MD  [address] [place] [address]    Disclaimer: Sections of this note are dictated using utilizing voice recognition software. Minor typographical errors may be present. If questions arise, please do not hesitate to contact me or call our department.

## 2022-05-23 ENCOUNTER — HOSPITAL ENCOUNTER (INPATIENT)
Age: 62
LOS: 4 days | Discharge: HOME HEALTH | DRG: 305 | End: 2022-05-27
Attending: EMERGENCY MEDICINE | Admitting: INTERNAL MEDICINE
Payer: MEDICAID

## 2022-05-23 ENCOUNTER — APPOINTMENT (OUTPATIENT)
Dept: GENERAL RADIOLOGY | Age: 62
DRG: 305 | End: 2022-05-23
Attending: PHYSICIAN ASSISTANT
Payer: MEDICAID

## 2022-05-23 ENCOUNTER — APPOINTMENT (OUTPATIENT)
Dept: MRI IMAGING | Age: 62
DRG: 305 | End: 2022-05-23
Attending: PHYSICIAN ASSISTANT
Payer: MEDICAID

## 2022-05-23 DIAGNOSIS — I10 ESSENTIAL HYPERTENSION: Chronic | ICD-10-CM

## 2022-05-23 DIAGNOSIS — L97.422 DIABETIC ULCER OF LEFT MIDFOOT ASSOCIATED WITH TYPE 2 DIABETES MELLITUS, WITH FAT LAYER EXPOSED (HCC): ICD-10-CM

## 2022-05-23 DIAGNOSIS — T14.8XXA INFECTED WOUND: ICD-10-CM

## 2022-05-23 DIAGNOSIS — M86.9 OSTEOMYELITIS OF LEFT FOOT, UNSPECIFIED TYPE (HCC): Primary | ICD-10-CM

## 2022-05-23 DIAGNOSIS — M86.9 OSTEOMYELITIS OF RIGHT FOOT, UNSPECIFIED TYPE (HCC): ICD-10-CM

## 2022-05-23 DIAGNOSIS — E11.621 DIABETIC ULCER OF LEFT MIDFOOT ASSOCIATED WITH TYPE 2 DIABETES MELLITUS, WITH FAT LAYER EXPOSED (HCC): ICD-10-CM

## 2022-05-23 DIAGNOSIS — L08.9 INFECTED WOUND: ICD-10-CM

## 2022-05-23 DIAGNOSIS — I25.10 CORONARY ARTERY DISEASE INVOLVING NATIVE CORONARY ARTERY OF NATIVE HEART WITHOUT ANGINA PECTORIS: Chronic | ICD-10-CM

## 2022-05-23 PROBLEM — E66.9 OBESITY (BMI 35.0-39.9 WITHOUT COMORBIDITY): Status: ACTIVE | Noted: 2022-05-23

## 2022-05-23 PROBLEM — M48.061 SPINAL STENOSIS AT L4-L5 LEVEL: Chronic | Status: ACTIVE | Noted: 2017-06-19

## 2022-05-23 PROBLEM — E03.9 ACQUIRED HYPOTHYROIDISM: Chronic | Status: ACTIVE | Noted: 2017-09-07

## 2022-05-23 PROBLEM — E66.9 OBESITY (BMI 35.0-39.9 WITHOUT COMORBIDITY): Chronic | Status: ACTIVE | Noted: 2022-05-23

## 2022-05-23 LAB
ALBUMIN SERPL-MCNC: 2.8 G/DL (ref 3.4–5)
ALBUMIN/GLOB SERPL: 0.5 {RATIO} (ref 0.8–1.7)
ALP SERPL-CCNC: 161 U/L (ref 45–117)
ALT SERPL-CCNC: 32 U/L (ref 16–61)
ANION GAP SERPL CALC-SCNC: 1 MMOL/L (ref 3–18)
AST SERPL-CCNC: 16 U/L (ref 10–38)
BASOPHILS # BLD: 0.1 K/UL (ref 0–0.1)
BASOPHILS NFR BLD: 1 % (ref 0–2)
BILIRUB SERPL-MCNC: 0.3 MG/DL (ref 0.2–1)
BUN SERPL-MCNC: 18 MG/DL (ref 7–18)
BUN/CREAT SERPL: 13 (ref 12–20)
CALCIUM SERPL-MCNC: 8.5 MG/DL (ref 8.5–10.1)
CHLORIDE SERPL-SCNC: 104 MMOL/L (ref 100–111)
CO2 SERPL-SCNC: 33 MMOL/L (ref 21–32)
CREAT SERPL-MCNC: 1.35 MG/DL (ref 0.6–1.3)
CRP SERPL-MCNC: 1.1 MG/DL (ref 0–0.3)
DIFFERENTIAL METHOD BLD: ABNORMAL
EOSINOPHIL # BLD: 0.3 K/UL (ref 0–0.4)
EOSINOPHIL NFR BLD: 3 % (ref 0–5)
ERYTHROCYTE [DISTWIDTH] IN BLOOD BY AUTOMATED COUNT: 14.1 % (ref 11.6–14.5)
ERYTHROCYTE [SEDIMENTATION RATE] IN BLOOD: 93 MM/HR (ref 0–20)
GLOBULIN SER CALC-MCNC: 5.7 G/DL (ref 2–4)
GLUCOSE SERPL-MCNC: 170 MG/DL (ref 74–99)
HCT VFR BLD AUTO: 32.2 % (ref 36–48)
HGB BLD-MCNC: 9.7 G/DL (ref 13–16)
IMM GRANULOCYTES # BLD AUTO: 0.1 K/UL (ref 0–0.04)
IMM GRANULOCYTES NFR BLD AUTO: 1 % (ref 0–0.5)
LACTATE BLD-SCNC: 1.34 MMOL/L (ref 0.4–2)
LYMPHOCYTES # BLD: 1.9 K/UL (ref 0.9–3.6)
LYMPHOCYTES NFR BLD: 18 % (ref 21–52)
MCH RBC QN AUTO: 29.2 PG (ref 24–34)
MCHC RBC AUTO-ENTMCNC: 30.1 G/DL (ref 31–37)
MCV RBC AUTO: 97 FL (ref 78–100)
MONOCYTES # BLD: 0.7 K/UL (ref 0.05–1.2)
MONOCYTES NFR BLD: 6 % (ref 3–10)
NEUTS SEG # BLD: 7.5 K/UL (ref 1.8–8)
NEUTS SEG NFR BLD: 71 % (ref 40–73)
NRBC # BLD: 0 K/UL (ref 0–0.01)
NRBC BLD-RTO: 0 PER 100 WBC
PLATELET # BLD AUTO: 297 K/UL (ref 135–420)
PMV BLD AUTO: 11.7 FL (ref 9.2–11.8)
POTASSIUM SERPL-SCNC: 3.7 MMOL/L (ref 3.5–5.5)
PROT SERPL-MCNC: 8.5 G/DL (ref 6.4–8.2)
RBC # BLD AUTO: 3.32 M/UL (ref 4.35–5.65)
SODIUM SERPL-SCNC: 138 MMOL/L (ref 136–145)
WBC # BLD AUTO: 10.5 K/UL (ref 4.6–13.2)

## 2022-05-23 PROCEDURE — 87205 SMEAR GRAM STAIN: CPT

## 2022-05-23 PROCEDURE — 74011250636 HC RX REV CODE- 250/636: Performed by: PHYSICIAN ASSISTANT

## 2022-05-23 PROCEDURE — 0HRNXK3 REPLACEMENT OF LEFT FOOT SKIN WITH NONAUTOLOGOUS TISSUE SUBSTITUTE, FULL THICKNESS, EXTERNAL APPROACH: ICD-10-PCS | Performed by: PODIATRIST

## 2022-05-23 PROCEDURE — 0Y6N0ZF DETACHMENT AT LEFT FOOT, PARTIAL 5TH RAY, OPEN APPROACH: ICD-10-PCS | Performed by: PODIATRIST

## 2022-05-23 PROCEDURE — 83605 ASSAY OF LACTIC ACID: CPT

## 2022-05-23 PROCEDURE — 73720 MRI LWR EXTREMITY W/O&W/DYE: CPT

## 2022-05-23 PROCEDURE — 87186 SC STD MICRODIL/AGAR DIL: CPT

## 2022-05-23 PROCEDURE — 85025 COMPLETE CBC W/AUTO DIFF WBC: CPT

## 2022-05-23 PROCEDURE — A9576 INJ PROHANCE MULTIPACK: HCPCS | Performed by: EMERGENCY MEDICINE

## 2022-05-23 PROCEDURE — 65270000029 HC RM PRIVATE

## 2022-05-23 PROCEDURE — 87040 BLOOD CULTURE FOR BACTERIA: CPT

## 2022-05-23 PROCEDURE — 71045 X-RAY EXAM CHEST 1 VIEW: CPT

## 2022-05-23 PROCEDURE — 86140 C-REACTIVE PROTEIN: CPT

## 2022-05-23 PROCEDURE — 74011250636 HC RX REV CODE- 250/636: Performed by: EMERGENCY MEDICINE

## 2022-05-23 PROCEDURE — 85652 RBC SED RATE AUTOMATED: CPT

## 2022-05-23 PROCEDURE — 99285 EMERGENCY DEPT VISIT HI MDM: CPT

## 2022-05-23 PROCEDURE — 87077 CULTURE AEROBIC IDENTIFY: CPT

## 2022-05-23 PROCEDURE — 74011250636 HC RX REV CODE- 250/636: Performed by: INTERNAL MEDICINE

## 2022-05-23 PROCEDURE — 74011000250 HC RX REV CODE- 250: Performed by: INTERNAL MEDICINE

## 2022-05-23 PROCEDURE — 80053 COMPREHEN METABOLIC PANEL: CPT

## 2022-05-23 RX ORDER — MORPHINE SULFATE 2 MG/ML
2-4 INJECTION, SOLUTION INTRAMUSCULAR; INTRAVENOUS
Status: DISCONTINUED | OUTPATIENT
Start: 2022-05-23 | End: 2022-05-27 | Stop reason: HOSPADM

## 2022-05-23 RX ORDER — POLYETHYLENE GLYCOL 3350 17 G/17G
17 POWDER, FOR SOLUTION ORAL DAILY PRN
Status: DISCONTINUED | OUTPATIENT
Start: 2022-05-23 | End: 2022-05-27 | Stop reason: HOSPADM

## 2022-05-23 RX ORDER — VANCOMYCIN 2 GRAM/500 ML IN 0.9 % SODIUM CHLORIDE INTRAVENOUS
2000 ONCE
Status: COMPLETED | OUTPATIENT
Start: 2022-05-23 | End: 2022-05-24

## 2022-05-23 RX ORDER — SODIUM CHLORIDE 0.9 % (FLUSH) 0.9 %
5-40 SYRINGE (ML) INJECTION EVERY 8 HOURS
Status: DISCONTINUED | OUTPATIENT
Start: 2022-05-23 | End: 2022-05-27 | Stop reason: HOSPADM

## 2022-05-23 RX ORDER — ONDANSETRON 2 MG/ML
4 INJECTION INTRAMUSCULAR; INTRAVENOUS
Status: DISCONTINUED | OUTPATIENT
Start: 2022-05-23 | End: 2022-05-27 | Stop reason: HOSPADM

## 2022-05-23 RX ORDER — SODIUM CHLORIDE 0.9 % (FLUSH) 0.9 %
5-10 SYRINGE (ML) INJECTION AS NEEDED
Status: DISCONTINUED | OUTPATIENT
Start: 2022-05-23 | End: 2022-05-27 | Stop reason: HOSPADM

## 2022-05-23 RX ORDER — ACETAMINOPHEN 650 MG/1
650 SUPPOSITORY RECTAL
Status: DISCONTINUED | OUTPATIENT
Start: 2022-05-23 | End: 2022-05-27 | Stop reason: HOSPADM

## 2022-05-23 RX ORDER — VANCOMYCIN HYDROCHLORIDE
1250 EVERY 12 HOURS
Status: DISCONTINUED | OUTPATIENT
Start: 2022-05-24 | End: 2022-05-24

## 2022-05-23 RX ORDER — ACETAMINOPHEN 325 MG/1
650 TABLET ORAL
Status: DISCONTINUED | OUTPATIENT
Start: 2022-05-23 | End: 2022-05-27 | Stop reason: HOSPADM

## 2022-05-23 RX ORDER — MORPHINE SULFATE 4 MG/ML
4 INJECTION INTRAVENOUS
Status: COMPLETED | OUTPATIENT
Start: 2022-05-23 | End: 2022-05-23

## 2022-05-23 RX ORDER — CHOLECALCIFEROL (VITAMIN D3) 125 MCG
5 CAPSULE ORAL
Status: DISCONTINUED | OUTPATIENT
Start: 2022-05-23 | End: 2022-05-27 | Stop reason: HOSPADM

## 2022-05-23 RX ORDER — SODIUM CHLORIDE 0.9 % (FLUSH) 0.9 %
5-40 SYRINGE (ML) INJECTION AS NEEDED
Status: DISCONTINUED | OUTPATIENT
Start: 2022-05-23 | End: 2022-05-27 | Stop reason: HOSPADM

## 2022-05-23 RX ORDER — IPRATROPIUM BROMIDE AND ALBUTEROL SULFATE 2.5; .5 MG/3ML; MG/3ML
3 SOLUTION RESPIRATORY (INHALATION)
Status: DISCONTINUED | OUTPATIENT
Start: 2022-05-23 | End: 2022-05-27 | Stop reason: HOSPADM

## 2022-05-23 RX ORDER — ONDANSETRON 4 MG/1
4 TABLET, ORALLY DISINTEGRATING ORAL
Status: DISCONTINUED | OUTPATIENT
Start: 2022-05-23 | End: 2022-05-27 | Stop reason: HOSPADM

## 2022-05-23 RX ORDER — NALOXONE HYDROCHLORIDE 0.4 MG/ML
0.4 INJECTION, SOLUTION INTRAMUSCULAR; INTRAVENOUS; SUBCUTANEOUS
Status: DISCONTINUED | OUTPATIENT
Start: 2022-05-23 | End: 2022-05-27 | Stop reason: HOSPADM

## 2022-05-23 RX ADMIN — GADOTERIDOL 20 ML: 279.3 INJECTION, SOLUTION INTRAVENOUS at 21:18

## 2022-05-23 RX ADMIN — MORPHINE SULFATE 4 MG: 4 INJECTION, SOLUTION INTRAMUSCULAR; INTRAVENOUS at 23:53

## 2022-05-23 RX ADMIN — CEFEPIME 2 G: 2 INJECTION, POWDER, FOR SOLUTION INTRAVENOUS at 23:40

## 2022-05-23 NOTE — ED TRIAGE NOTES
Pt ambulatory to triage without assistance. Pt alert and oriented x 4. Pt brought note from podiatrist to order admission and mri for concerning osteomyelitis seen on x ray of left foot.   Vss.  nad noted at this time

## 2022-05-23 NOTE — ED NOTES
Pt sent with note from Dr. Raine Ramos, podiatrist, for concern for osteomyelitis in the left small toe. Saw him 5/18, recommended to come to the ED today for admission, MRI of the foot. Has hx of removal of great toe and partial removal of second toe on this foot, and multiple amputations on the right foot. Is diabetic. Had x rays with Dr. Raine Ramos concerning for osteomyelitis    I performed a brief evaluation, including history and physical, of the patient here in triage and I have determined that pt will need further treatment and evaluation from the main side ER physician. I have placed initial orders to help in expediting patients care.

## 2022-05-23 NOTE — Clinical Note
Status[de-identified] INPATIENT [101]   Type of Bed: Surgical [18]   Inpatient Hospitalization Certified Necessary for the Following Reasons: 1.  Patient Failed outpatient treatment (further clarification in H&P documentation)   Admitting Diagnosis: Osteomyelitis [facility]) [[id number]]   Admitting Diagnosis: Infected wound [311428]   Admitting Physician: Lesli Olmos   Attending Physician: Lesli Olmos   Estimated Length of Stay: 5-7 Midnights   Discharge Plan[de-identified] Extended Care Facility (e.g. Adult Home, Nursing Home, etc.)

## 2022-05-23 NOTE — PROGRESS NOTES
[facility] Pharmacokinetic Monitoring Service - Vancomycin     Alison Nair is a 64 y.o. male starting on vancomycin therapy for possible osteomyelitis of foot. Target Concentration: Goal AUC/ÁNGEL 400-600 mg*hr/L    Additional Antimicrobials: cefepime    Pertinent Laboratory Values: Wt Readings from Last 1 Encounters:   05/23/22 111.1 kg (245 lb)     Temp Readings from Last 1 Encounters:   05/23/22 98.7 °F (37.1 °C)     No components found for: PROCAL  Estimated Creatinine Clearance: 70.6 mL/min (A) (based on SCr of 1.35 mg/dL (H)). No results for input(s): WBC in the last 72 hours.     No lab exists for component: CREATININE,  BUN        Plan:  Dosing recommendations based on Bayesian software and previous regimen in Feb 2022  Start vancomycin 2000mg IVPB x 1 then 1250mg Q12h  Anticipated AUC of 549 and trough concentration of 9.8mg/l at steady state  Renal labs as indicated   Pharmacy will continue to monitor patient and adjust therapy as indicated    Thank you for the consult,  RYLEE CornejoD, BCPS  5/23/2022 7:39 PM

## 2022-05-24 ENCOUNTER — ANESTHESIA EVENT (OUTPATIENT)
Dept: SURGERY | Age: 62
DRG: 305 | End: 2022-05-24
Payer: MEDICAID

## 2022-05-24 ENCOUNTER — ANESTHESIA (OUTPATIENT)
Dept: SURGERY | Age: 62
DRG: 305 | End: 2022-05-24
Payer: MEDICAID

## 2022-05-24 LAB
ALBUMIN SERPL-MCNC: 2.6 G/DL (ref 3.4–5)
ALBUMIN/GLOB SERPL: 0.5 {RATIO} (ref 0.8–1.7)
ALP SERPL-CCNC: 133 U/L (ref 45–117)
ALT SERPL-CCNC: 27 U/L (ref 16–61)
ANION GAP SERPL CALC-SCNC: ABNORMAL MMOL/L (ref 3–18)
AST SERPL-CCNC: 17 U/L (ref 10–38)
ATRIAL RATE: 63 BPM
BASOPHILS # BLD: 0.1 K/UL (ref 0–0.1)
BASOPHILS NFR BLD: 1 % (ref 0–2)
BILIRUB SERPL-MCNC: 0.3 MG/DL (ref 0.2–1)
BUN SERPL-MCNC: 17 MG/DL (ref 7–18)
BUN/CREAT SERPL: 15 (ref 12–20)
CALCIUM SERPL-MCNC: 8.1 MG/DL (ref 8.5–10.1)
CALCULATED P AXIS, ECG09: 85 DEGREES
CALCULATED R AXIS, ECG10: 3 DEGREES
CALCULATED T AXIS, ECG11: 10 DEGREES
CHLORIDE SERPL-SCNC: 107 MMOL/L (ref 100–111)
CO2 SERPL-SCNC: 33 MMOL/L (ref 21–32)
CREAT SERPL-MCNC: 1.15 MG/DL (ref 0.6–1.3)
DIAGNOSIS, 93000: NORMAL
DIFFERENTIAL METHOD BLD: ABNORMAL
EOSINOPHIL # BLD: 0.2 K/UL (ref 0–0.4)
EOSINOPHIL NFR BLD: 2 % (ref 0–5)
ERYTHROCYTE [DISTWIDTH] IN BLOOD BY AUTOMATED COUNT: 14.2 % (ref 11.6–14.5)
GLOBULIN SER CALC-MCNC: 5.2 G/DL (ref 2–4)
GLUCOSE BLD STRIP.AUTO-MCNC: 147 MG/DL (ref 70–110)
GLUCOSE SERPL-MCNC: 98 MG/DL (ref 74–99)
HCT VFR BLD AUTO: 32 % (ref 36–48)
HGB BLD-MCNC: 9.2 G/DL (ref 13–16)
IMM GRANULOCYTES # BLD AUTO: 0 K/UL (ref 0–0.04)
IMM GRANULOCYTES NFR BLD AUTO: 0 % (ref 0–0.5)
INR PPP: 1.2 (ref 0.8–1.2)
LACTATE BLD-SCNC: 0.9 MMOL/L (ref 0.4–2)
LYMPHOCYTES # BLD: 1.7 K/UL (ref 0.9–3.6)
LYMPHOCYTES NFR BLD: 16 % (ref 21–52)
MCH RBC QN AUTO: 28.3 PG (ref 24–34)
MCHC RBC AUTO-ENTMCNC: 28.8 G/DL (ref 31–37)
MCV RBC AUTO: 98.5 FL (ref 78–100)
MONOCYTES # BLD: 0.8 K/UL (ref 0.05–1.2)
MONOCYTES NFR BLD: 8 % (ref 3–10)
NEUTS SEG # BLD: 7.4 K/UL (ref 1.8–8)
NEUTS SEG NFR BLD: 73 % (ref 40–73)
NRBC # BLD: 0 K/UL (ref 0–0.01)
NRBC BLD-RTO: 0 PER 100 WBC
P-R INTERVAL, ECG05: 248 MS
PLATELET # BLD AUTO: 233 K/UL (ref 135–420)
PMV BLD AUTO: 11.2 FL (ref 9.2–11.8)
POTASSIUM SERPL-SCNC: 4 MMOL/L (ref 3.5–5.5)
PROT SERPL-MCNC: 7.8 G/DL (ref 6.4–8.2)
PROTHROMBIN TIME: 15.3 SEC (ref 11.5–15.2)
Q-T INTERVAL, ECG07: 418 MS
QRS DURATION, ECG06: 80 MS
QTC CALCULATION (BEZET), ECG08: 427 MS
RBC # BLD AUTO: 3.25 M/UL (ref 4.35–5.65)
SODIUM SERPL-SCNC: 139 MMOL/L (ref 136–145)
VENTRICULAR RATE, ECG03: 63 BPM
WBC # BLD AUTO: 10.1 K/UL (ref 4.6–13.2)

## 2022-05-24 PROCEDURE — 01480 ANES OPEN PX LOWER L/A/F NOS: CPT | Performed by: NURSE ANESTHETIST, CERTIFIED REGISTERED

## 2022-05-24 PROCEDURE — 80053 COMPREHEN METABOLIC PANEL: CPT

## 2022-05-24 PROCEDURE — 01480 ANES OPEN PX LOWER L/A/F NOS: CPT | Performed by: ANESTHESIOLOGY

## 2022-05-24 PROCEDURE — 87040 BLOOD CULTURE FOR BACTERIA: CPT

## 2022-05-24 PROCEDURE — 99222 1ST HOSP IP/OBS MODERATE 55: CPT | Performed by: INTERNAL MEDICINE

## 2022-05-24 PROCEDURE — 74011000272 HC RX REV CODE- 272: Performed by: PODIATRIST

## 2022-05-24 PROCEDURE — 74011000250 HC RX REV CODE- 250: Performed by: PODIATRIST

## 2022-05-24 PROCEDURE — 74011636637 HC RX REV CODE- 636/637: Performed by: INTERNAL MEDICINE

## 2022-05-24 PROCEDURE — 83605 ASSAY OF LACTIC ACID: CPT

## 2022-05-24 PROCEDURE — 76010000153 HC OR TIME 1.5 TO 2 HR: Performed by: PODIATRIST

## 2022-05-24 PROCEDURE — 74011250636 HC RX REV CODE- 250/636: Performed by: PHYSICIAN ASSISTANT

## 2022-05-24 PROCEDURE — 77030000032 HC CUF TRNQT ZIMM -B: Performed by: PODIATRIST

## 2022-05-24 PROCEDURE — 77030011265 HC ELECTRD BLD HEX COVD -A: Performed by: PODIATRIST

## 2022-05-24 PROCEDURE — 76060000034 HC ANESTHESIA 1.5 TO 2 HR: Performed by: PODIATRIST

## 2022-05-24 PROCEDURE — 74011000258 HC RX REV CODE- 258: Performed by: NURSE ANESTHETIST, CERTIFIED REGISTERED

## 2022-05-24 PROCEDURE — 74011250636 HC RX REV CODE- 250/636: Performed by: NURSE ANESTHETIST, CERTIFIED REGISTERED

## 2022-05-24 PROCEDURE — 85025 COMPLETE CBC W/AUTO DIFF WBC: CPT

## 2022-05-24 PROCEDURE — 74011250637 HC RX REV CODE- 250/637: Performed by: INTERNAL MEDICINE

## 2022-05-24 PROCEDURE — 77030013708 HC HNDPC SUC IRR PULS STRY –B: Performed by: PODIATRIST

## 2022-05-24 PROCEDURE — 2709999900 HC NON-CHARGEABLE SUPPLY

## 2022-05-24 PROCEDURE — 77030008462 HC STPLR SKN PROX J&J -A: Performed by: PODIATRIST

## 2022-05-24 PROCEDURE — 88311 DECALCIFY TISSUE: CPT

## 2022-05-24 PROCEDURE — 82962 GLUCOSE BLOOD TEST: CPT

## 2022-05-24 PROCEDURE — 77030018836 HC SOL IRR NACL ICUM -A: Performed by: PODIATRIST

## 2022-05-24 PROCEDURE — 74011000250 HC RX REV CODE- 250: Performed by: NURSE ANESTHETIST, CERTIFIED REGISTERED

## 2022-05-24 PROCEDURE — 74011000258 HC RX REV CODE- 258: Performed by: INTERNAL MEDICINE

## 2022-05-24 PROCEDURE — 99232 SBSQ HOSP IP/OBS MODERATE 35: CPT | Performed by: INTERNAL MEDICINE

## 2022-05-24 PROCEDURE — 93005 ELECTROCARDIOGRAM TRACING: CPT

## 2022-05-24 PROCEDURE — 2709999900 HC NON-CHARGEABLE SUPPLY: Performed by: PODIATRIST

## 2022-05-24 PROCEDURE — 74011250636 HC RX REV CODE- 250/636: Performed by: PODIATRIST

## 2022-05-24 PROCEDURE — 65270000029 HC RM PRIVATE

## 2022-05-24 PROCEDURE — 76210000017 HC OR PH I REC 1.5 TO 2 HR: Performed by: PODIATRIST

## 2022-05-24 PROCEDURE — 77030006773 HC BLD SAW OSC BRSM -A: Performed by: PODIATRIST

## 2022-05-24 PROCEDURE — 74011250636 HC RX REV CODE- 250/636: Performed by: INTERNAL MEDICINE

## 2022-05-24 PROCEDURE — 88305 TISSUE EXAM BY PATHOLOGIST: CPT

## 2022-05-24 PROCEDURE — 85610 PROTHROMBIN TIME: CPT

## 2022-05-24 PROCEDURE — 74011000250 HC RX REV CODE- 250: Performed by: INTERNAL MEDICINE

## 2022-05-24 PROCEDURE — 77030038692 HC WND DEB SYS IRMX -B: Performed by: PODIATRIST

## 2022-05-24 DEVICE — GRAFT HUM TISS W3XL6CM CRYOPRESERVED PLCNTA TISS UMB AMNION: Type: IMPLANTABLE DEVICE | Site: FOOT | Status: FUNCTIONAL

## 2022-05-24 RX ORDER — FENTANYL CITRATE 50 UG/ML
25 INJECTION, SOLUTION INTRAMUSCULAR; INTRAVENOUS
Status: DISCONTINUED | OUTPATIENT
Start: 2022-05-24 | End: 2022-05-25 | Stop reason: HOSPADM

## 2022-05-24 RX ORDER — AMLODIPINE BESYLATE 10 MG/1
10 TABLET ORAL DAILY
Status: DISCONTINUED | OUTPATIENT
Start: 2022-05-24 | End: 2022-05-27 | Stop reason: HOSPADM

## 2022-05-24 RX ORDER — SODIUM CHLORIDE, SODIUM LACTATE, POTASSIUM CHLORIDE, CALCIUM CHLORIDE 600; 310; 30; 20 MG/100ML; MG/100ML; MG/100ML; MG/100ML
INJECTION, SOLUTION INTRAVENOUS
Status: DISCONTINUED | OUTPATIENT
Start: 2022-05-24 | End: 2022-05-24 | Stop reason: HOSPADM

## 2022-05-24 RX ORDER — PANTOPRAZOLE SODIUM 40 MG/1
40 TABLET, DELAYED RELEASE ORAL
Status: DISCONTINUED | OUTPATIENT
Start: 2022-05-24 | End: 2022-05-27 | Stop reason: HOSPADM

## 2022-05-24 RX ORDER — LIDOCAINE HYDROCHLORIDE 20 MG/ML
INJECTION, SOLUTION EPIDURAL; INFILTRATION; INTRACAUDAL; PERINEURAL AS NEEDED
Status: DISCONTINUED | OUTPATIENT
Start: 2022-05-24 | End: 2022-05-24 | Stop reason: HOSPADM

## 2022-05-24 RX ORDER — DIPHENHYDRAMINE HYDROCHLORIDE 50 MG/ML
12.5 INJECTION, SOLUTION INTRAMUSCULAR; INTRAVENOUS
Status: DISCONTINUED | OUTPATIENT
Start: 2022-05-24 | End: 2022-05-25 | Stop reason: HOSPADM

## 2022-05-24 RX ORDER — PROPOFOL 10 MG/ML
INJECTION, EMULSION INTRAVENOUS AS NEEDED
Status: DISCONTINUED | OUTPATIENT
Start: 2022-05-24 | End: 2022-05-24 | Stop reason: HOSPADM

## 2022-05-24 RX ORDER — HYDROMORPHONE HYDROCHLORIDE 2 MG/ML
0.2 INJECTION, SOLUTION INTRAMUSCULAR; INTRAVENOUS; SUBCUTANEOUS
Status: DISCONTINUED | OUTPATIENT
Start: 2022-05-24 | End: 2022-05-25 | Stop reason: HOSPADM

## 2022-05-24 RX ORDER — ALBUTEROL SULFATE 0.83 MG/ML
2.5 SOLUTION RESPIRATORY (INHALATION) AS NEEDED
Status: DISCONTINUED | OUTPATIENT
Start: 2022-05-24 | End: 2022-05-25 | Stop reason: HOSPADM

## 2022-05-24 RX ORDER — MIDAZOLAM HYDROCHLORIDE 1 MG/ML
INJECTION, SOLUTION INTRAMUSCULAR; INTRAVENOUS AS NEEDED
Status: DISCONTINUED | OUTPATIENT
Start: 2022-05-24 | End: 2022-05-24 | Stop reason: HOSPADM

## 2022-05-24 RX ORDER — FENTANYL CITRATE 50 UG/ML
INJECTION, SOLUTION INTRAMUSCULAR; INTRAVENOUS AS NEEDED
Status: DISCONTINUED | OUTPATIENT
Start: 2022-05-24 | End: 2022-05-24 | Stop reason: HOSPADM

## 2022-05-24 RX ORDER — MAGNESIUM SULFATE 100 %
4 CRYSTALS MISCELLANEOUS AS NEEDED
Status: DISCONTINUED | OUTPATIENT
Start: 2022-05-24 | End: 2022-05-25 | Stop reason: HOSPADM

## 2022-05-24 RX ORDER — INSULIN LISPRO 100 [IU]/ML
INJECTION, SOLUTION INTRAVENOUS; SUBCUTANEOUS ONCE
Status: DISCONTINUED | OUTPATIENT
Start: 2022-05-25 | End: 2022-05-25 | Stop reason: HOSPADM

## 2022-05-24 RX ORDER — SODIUM CHLORIDE, SODIUM LACTATE, POTASSIUM CHLORIDE, CALCIUM CHLORIDE 600; 310; 30; 20 MG/100ML; MG/100ML; MG/100ML; MG/100ML
100 INJECTION, SOLUTION INTRAVENOUS CONTINUOUS
Status: DISCONTINUED | OUTPATIENT
Start: 2022-05-25 | End: 2022-05-25 | Stop reason: HOSPADM

## 2022-05-24 RX ORDER — DEXTROSE MONOHYDRATE 100 MG/ML
0-250 INJECTION, SOLUTION INTRAVENOUS AS NEEDED
Status: DISCONTINUED | OUTPATIENT
Start: 2022-05-24 | End: 2022-05-25 | Stop reason: SDUPTHER

## 2022-05-24 RX ORDER — MAGNESIUM SULFATE 100 %
4 CRYSTALS MISCELLANEOUS AS NEEDED
Status: DISCONTINUED | OUTPATIENT
Start: 2022-05-24 | End: 2022-05-27 | Stop reason: HOSPADM

## 2022-05-24 RX ORDER — ATORVASTATIN CALCIUM 20 MG/1
20 TABLET, FILM COATED ORAL
Status: DISCONTINUED | OUTPATIENT
Start: 2022-05-24 | End: 2022-05-27 | Stop reason: HOSPADM

## 2022-05-24 RX ORDER — INSULIN GLARGINE 100 [IU]/ML
15 INJECTION, SOLUTION SUBCUTANEOUS DAILY
Status: COMPLETED | OUTPATIENT
Start: 2022-05-24 | End: 2022-05-24

## 2022-05-24 RX ADMIN — FENTANYL CITRATE 100 MCG: 50 INJECTION, SOLUTION INTRAMUSCULAR; INTRAVENOUS at 22:00

## 2022-05-24 RX ADMIN — PROPOFOL 30 MG: 10 INJECTION, EMULSION INTRAVENOUS at 22:19

## 2022-05-24 RX ADMIN — AMLODIPINE BESYLATE 10 MG: 10 TABLET ORAL at 11:32

## 2022-05-24 RX ADMIN — Medication 15 UNITS: at 10:00

## 2022-05-24 RX ADMIN — MIDAZOLAM HYDROCHLORIDE 2 MG: 2 INJECTION, SOLUTION INTRAMUSCULAR; INTRAVENOUS at 22:00

## 2022-05-24 RX ADMIN — VANCOMYCIN HYDROCHLORIDE 2000 MG: 10 INJECTION, POWDER, LYOPHILIZED, FOR SOLUTION INTRAVENOUS at 00:13

## 2022-05-24 RX ADMIN — SODIUM CHLORIDE 1000 ML: 900 INJECTION, SOLUTION INTRAVENOUS at 02:18

## 2022-05-24 RX ADMIN — ATORVASTATIN CALCIUM 20 MG: 20 TABLET, FILM COATED ORAL at 01:13

## 2022-05-24 RX ADMIN — VANCOMYCIN HYDROCHLORIDE 1000 MG: 1 INJECTION, POWDER, LYOPHILIZED, FOR SOLUTION INTRAVENOUS at 09:12

## 2022-05-24 RX ADMIN — PANTOPRAZOLE SODIUM 40 MG: 40 TABLET, DELAYED RELEASE ORAL at 18:28

## 2022-05-24 RX ADMIN — HYDROMORPHONE HYDROCHLORIDE 0.2 MG: 2 INJECTION, SOLUTION INTRAMUSCULAR; INTRAVENOUS; SUBCUTANEOUS at 23:42

## 2022-05-24 RX ADMIN — HYDROMORPHONE HYDROCHLORIDE 0.2 MG: 2 INJECTION, SOLUTION INTRAMUSCULAR; INTRAVENOUS; SUBCUTANEOUS at 23:36

## 2022-05-24 RX ADMIN — CEFEPIME HYDROCHLORIDE 2 G: 2 INJECTION, POWDER, FOR SOLUTION INTRAVENOUS at 00:13

## 2022-05-24 RX ADMIN — DEXMEDETOMIDINE HYDROCHLORIDE 10 MCG: 100 INJECTION, SOLUTION, CONCENTRATE INTRAVENOUS at 22:05

## 2022-05-24 RX ADMIN — PANTOPRAZOLE SODIUM 40 MG: 40 TABLET, DELAYED RELEASE ORAL at 09:17

## 2022-05-24 RX ADMIN — SODIUM CHLORIDE 333 ML: 900 INJECTION, SOLUTION INTRAVENOUS at 02:19

## 2022-05-24 RX ADMIN — SODIUM CHLORIDE, PRESERVATIVE FREE 10 ML: 5 INJECTION INTRAVENOUS at 09:17

## 2022-05-24 RX ADMIN — TRAZODONE HYDROCHLORIDE 150 MG: 100 TABLET ORAL at 01:13

## 2022-05-24 RX ADMIN — CEFEPIME HYDROCHLORIDE 2 G: 2 INJECTION, POWDER, FOR SOLUTION INTRAVENOUS at 18:28

## 2022-05-24 RX ADMIN — PROPOFOL 50 MG: 10 INJECTION, EMULSION INTRAVENOUS at 22:11

## 2022-05-24 RX ADMIN — SODIUM CHLORIDE, PRESERVATIVE FREE 10 ML: 5 INJECTION INTRAVENOUS at 14:37

## 2022-05-24 RX ADMIN — CEFEPIME HYDROCHLORIDE 2 G: 2 INJECTION, POWDER, FOR SOLUTION INTRAVENOUS at 09:12

## 2022-05-24 RX ADMIN — LIDOCAINE HYDROCHLORIDE 40 MG: 20 INJECTION, SOLUTION EPIDURAL; INFILTRATION; INTRACAUDAL; PERINEURAL at 22:11

## 2022-05-24 RX ADMIN — SODIUM CHLORIDE 1000 ML: 900 INJECTION, SOLUTION INTRAVENOUS at 00:23

## 2022-05-24 RX ADMIN — SODIUM CHLORIDE, SODIUM LACTATE, POTASSIUM CHLORIDE, AND CALCIUM CHLORIDE: 600; 310; 30; 20 INJECTION, SOLUTION INTRAVENOUS at 21:53

## 2022-05-24 RX ADMIN — ONDANSETRON 4 MG: 2 INJECTION INTRAMUSCULAR; INTRAVENOUS at 23:38

## 2022-05-24 NOTE — ED PROVIDER NOTES
EMERGENCY DEPARTMENT HISTORY AND PHYSICAL EXAM    Date: 5/23/2022  Patient Name: Jono Byrne    History of Presenting Illness     Chief Complaint   Patient presents with    Foot Pain         History Provided By: patient and pt's podiatrist     Chief Complaint: infected foot wound   Duration: months   Timing:  Acute on chronic   Location: L foot   Quality: throbbing   Severity:moderate   Modifying Factors: none   Associated Symptoms: foot pain/infected wound     Additional History (Context): Jono Byrne is a 64 y.o. male with PMH Beatties, obesity, myocardial infarction, hyperlipidemia, tension, and chronic diabetic foot wound who presents to the ED after being sent over by his podiatrist, Dr. Kelly Shaw. Patient was sent over after having outpatient imaging confirming developing osteomyelitis. He was sent over with a prescription from his podiatrist requesting that he be admitted to the hospital for IV antibiotics and an MRI of his foot. Patient states he has had this wound for several months but reports that it is worsening. He reports foul drainage from the site. Denies fever and chills. No other complaints are reported at this time.     PCP: Danica Arteaga MD    Current Facility-Administered Medications   Medication Dose Route Frequency Provider Last Rate Last Admin    sodium chloride (NS) flush 5-10 mL  5-10 mL IntraVENous PRN Lillian Quinones PA-C        sodium chloride 0.9 % bolus infusion 1,000 mL  1,000 mL IntraVENous ONCE Lillian Quinones PA-C        Followed by   Rosalene Blair sodium chloride 0.9 % bolus infusion 1,000 mL  1,000 mL IntraVENous ONCE Lillian Quinones PA-C        Followed by   Rosalene Blair sodium chloride 0.9 % bolus infusion 1,000 mL  1,000 mL IntraVENous ONCE Lillian Quinones PA-C        Followed by   Rosalene Blair sodium chloride 0.9 % bolus infusion 333 mL  333 mL IntraVENous Lillian Garcia [facility] INFORMATION NOTE   Other Rx Dosing/Monitoring Lillian Cuevas, JAYDE        vancomycin (VANCOCIN) 2000 mg in  ml infusion  2,000 mg IntraVENous ONCE [facility], [place] JAYDE        cefepime (MAXIPIME) 2 g in sterile water (preservative free) 10 mL IV syringe  2 g IntraVENous ONCE [facility] [place] JAYDE        [START ON 5/24/2022] cefepime (MAXIPIME) 2 g in 0.9% sodium chloride (MBP/ADV) 100 mL MBP  2 g IntraVENous Q8H [place], [place]        [START ON 5/24/2022] vancomycin (VANCOCIN) 1250 mg in  ml infusion  1,250 mg IntraVENous Q12H [place], [place]        morphine injection 4 mg  4 mg IntraVENous NOW [facility] [place], [place]         Current Outpatient Medications   Medication Sig Dispense Refill    traZODone (DESYREL) 150 mg tablet Take 150 mg by mouth nightly. Indications: insomnia associated with depression      amLODIPine (NORVASC) 10 mg tablet Take 10 mg by mouth daily.  metFORMIN (GLUCOPHAGE) 1,000 mg tablet Take 1,000 mg by mouth two (2) times daily (with meals).  atorvastatin (LIPITOR) 20 mg tablet Take 1 Tab by mouth nightly. 30 Tab 0    insulin lispro (HUMALOG) 100 unit/mL injection Check FSBS AC*HS  For sugars between 160 and 200- Give 2 units SQ,  For sugars between 201 and 250, Give 3 units SQ,  For sugars Between 251 and 300, give 5 units SQ,  For Sugars between 301 and 350, give 7 units SQ  For sugars between 351 and 400, give 8 units SQ and contact PCP (Patient taking differently: by SubCUTAneous route two (2) times a day. Check FSBS AC*HS  For sugars between 160 and 200- Give 2 units SQ,  For sugars between 201 and 250, Give 3 units SQ,  For sugars Between 251 and 300, give 5 units SQ,  For Sugars between 301 and 350, give 7 units SQ  For sugars between 351 and 400, give 8 units SQ and contact PCP) 1 Vial 0    insulin detemir U-100 (Levemir U-100 Insulin) 100 unit/mL injection 20 Units by SubCUTAneous route two (2) times a day. (Patient taking differently: 60 Units by SubCUTAneous route two (2) times a day.  Pt states he is taking levemir 40 units twice daily) 10 mL 0    pantoprazole (PROTONIX) 40 mg tablet Take 1 Tab by mouth Before breakfast and dinner. 60 Tab 0    polyethylene glycol (MIRALAX) 17 gram packet Take 1 Packet by mouth daily. (Patient taking differently: Take 1 Packet by mouth daily. MIX WITH 8OZ OF WATER) 30 Packet 0       Past History     Past Medical History:  Past Medical History:   Diagnosis Date    Arthritis     Coronary atherosclerosis of native coronary artery     S/P PCI with GE in 12/09    Diabetes ([facility]     IDDM    Electrolyte and fluid disorders not elsewhere classified     Essential hypertension, benign     GERD (gastroesophageal reflux disease)     Heroin abuse ([facility] 05/26/16    Psychiatrist Dr. Bravo Expose History of heart attack     Hyperlipidemia     Intermediate coronary syndrome [facility])     MDD (major depressive disorder) 5/26/16    Psychiatrist Dr. Bravo Expose Myocardial infarction [facility])     Obesity, unspecified     Old myocardial infarction     Other and unspecified hyperlipidemia     Pancreatitis     Positive PPD     Sleep apnea     uses Cpap machine    Transfusion history     Before 1992       Past Surgical History:  Past Surgical History:   Procedure Laterality Date    HX APPENDECTOMY      HX CORONARY STENT PLACEMENT  2010    2 stents       Family History:  Family History   Problem Relation Age of Onset    Heart Attack Father     Coronary Art Dis Mother     Diabetes Mother     Cancer Sister         breast cancer and lung cancer       Social History:  Social History     Tobacco Use    Smoking status: Never Smoker    Smokeless tobacco: Never Used   Substance Use Topics    Alcohol use: No    Drug use: No       Allergies: Allergies   Allergen Reactions    Compazine [Prochlorperazine] Anaphylaxis     \"Tightness around throat\"         Review of Systems   Review of Systems   Constitutional: Negative. Negative for chills and fever. HENT: Negative. Negative for congestion, ear pain and rhinorrhea. Eyes: Negative. Negative for pain and redness. Respiratory: Negative. Negative for cough, shortness of breath, wheezing and stridor. Cardiovascular: Negative. Negative for chest pain and leg swelling. Gastrointestinal: Negative. Negative for abdominal pain, constipation, diarrhea, nausea and vomiting. Genitourinary: Negative. Negative for dysuria and frequency. Musculoskeletal: Positive for arthralgias. Negative for back pain and neck pain. Skin: Positive for wound. Negative for rash. Neurological: Negative. Negative for dizziness, seizures, syncope and headaches. All other systems reviewed and are negative. All Other Systems Negative  Physical Exam     Vitals:    05/23/22 1843   BP: (!) 168/97   Pulse: 72   Resp: 18   Temp: 98.7 °F (37.1 °C)   SpO2: 96%   Weight: 111.1 kg (245 lb)   Height: 5' 9\" (1.753 m)     Physical Exam  Vitals and nursing note reviewed. Constitutional:       General: He is not in acute distress. Appearance: He is well-developed. He is obese. He is not diaphoretic. HENT:      Head: Normocephalic and atraumatic. Eyes:      General: No scleral icterus. Right eye: No discharge. Left eye: No discharge. Conjunctiva/sclera: Conjunctivae normal.   Cardiovascular:      Rate and Rhythm: Normal rate. Pulmonary:      Effort: Pulmonary effort is normal. No respiratory distress. Breath sounds: No stridor. Musculoskeletal:         General: Normal range of motion. Cervical back: Normal range of motion and neck supple. Feet:       Comments: Intact distal pulses, ROM of the LLE intact. Skin:     General: Skin is warm and dry. Findings: No erythema or rash. Neurological:      General: No focal deficit present. Mental Status: He is alert and oriented to person, place, and time. Mental status is at baseline.       Coordination: Coordination normal.      Comments: Gait is steady and patient exhibits no evidence of ataxia. Patient is able to ambulate without difficulty. No focal neurological deficit noted. No facial droop, slurred speech, or evidence of altered mentation noted on exam.      Psychiatric:         Behavior: Behavior normal.         Thought Content: Thought content normal.                Diagnostic Study Results     Labs -     Recent Results (from the past 12 hour(s))   CBC WITH AUTOMATED DIFF    Collection Time: 05/23/22  6:56 PM   Result Value Ref Range    WBC 10.5 4.6 - 13.2 K/uL    RBC 3.32 (L) 4.35 - 5.65 M/uL    HGB 9.7 (L) 13.0 - 16.0 g/dL    HCT 32.2 (L) 36.0 - 48.0 %    MCV 97.0 78.0 - 100.0 FL    MCH 29.2 24.0 - 34.0 PG    MCHC 30.1 (L) 31.0 - 37.0 g/dL    RDW 14.1 11.6 - 14.5 %    PLATELET 406 963 - 015 K/uL    MPV 11.7 9.2 - 11.8 FL    NRBC 0.0 0  WBC    ABSOLUTE NRBC 0.00 0.00 - 0.01 K/uL    NEUTROPHILS 71 40 - 73 %    LYMPHOCYTES 18 (L) 21 - 52 %    MONOCYTES 6 3 - 10 %    EOSINOPHILS 3 0 - 5 %    BASOPHILS 1 0 - 2 %    IMMATURE GRANULOCYTES 1 (H) 0.0 - 0.5 %    ABS. NEUTROPHILS 7.5 1.8 - 8.0 K/UL    ABS. LYMPHOCYTES 1.9 0.9 - 3.6 K/UL    ABS. MONOCYTES 0.7 0.05 - 1.2 K/UL    ABS. EOSINOPHILS 0.3 0.0 - 0.4 K/UL    ABS. BASOPHILS 0.1 0.0 - 0.1 K/UL    ABS. IMM. GRANS. 0.1 (H) 0.00 - 0.04 K/UL    DF AUTOMATED     METABOLIC PANEL, COMPREHENSIVE    Collection Time: 05/23/22  6:56 PM   Result Value Ref Range    Sodium 138 136 - 145 mmol/L    Potassium 3.7 3.5 - 5.5 mmol/L    Chloride 104 100 - 111 mmol/L    CO2 33 (H) 21 - 32 mmol/L    Anion gap 1 (L) 3.0 - 18 mmol/L    Glucose 170 (H) 74 - 99 mg/dL    BUN 18 7.0 - 18 MG/DL    Creatinine 1.35 (H) 0.6 - 1.3 MG/DL    BUN/Creatinine ratio 13 12 - 20      GFR est AA >60 >60 ml/min/1.73m2    GFR est non-AA 54 (L) >60 ml/min/1.73m2    Calcium 8.5 8.5 - 10.1 MG/DL    Bilirubin, total 0.3 0.2 - 1.0 MG/DL    ALT (SGPT) 32 16 - 61 U/L    AST (SGOT) 16 10 - 38 U/L    Alk.  phosphatase 161 (H) 45 - 117 U/L    Protein, total 8.5 (H) 6.4 - 8.2 g/dL    Albumin 2.8 (L) 3.4 - 5.0 g/dL    Globulin 5.7 (H) 2.0 - 4.0 g/dL    A-G Ratio 0.5 (L) 0.8 - 1.7     C REACTIVE PROTEIN, QT    Collection Time: 05/23/22  6:56 PM   Result Value Ref Range    C-Reactive protein 1.1 (H) 0 - 0.3 mg/dL   SED RATE (ESR)    Collection Time: 05/23/22  6:56 PM   Result Value Ref Range    Sed rate, automated 93 (H) 0 - 20 mm/hr   POC LACTIC ACID    Collection Time: 05/23/22  6:58 PM   Result Value Ref Range    Lactic Acid (POC) 1.34 0.40 - 2.00 mmol/L       Radiologic Studies -   XR CHEST PORT    (Results Pending)   MRI FOOT LT W WO CONT    (Results Pending)     CT Results  (Last 48 hours)    None        CXR Results  (Last 48 hours)    None            Medical Decision Making   I am the first provider for this patient. I reviewed the vital signs, available nursing notes, past medical history, past surgical history, family history and social history. Vital Signs-Reviewed the patient's vital signs. Records Reviewed:  Lillian Quinones PA-C     Procedures:  Procedures    Provider Notes (Medical Decision Making): Impression: OM    IV inserted, sepsis bundle initiated, vanc and cefepime ordered, IV fluids ordered as well, wound culture sent out, morphine ordered for pain    Dr. Tim Still confirmed plan for surgery tomorrow afternoon, wants the pt NPO after breakfast, this order was placed. Lillian Quinones PA-C     Labs:  WBC 10.5, sed rate 93, CO2 33, anion gap 1, glucose 170, Cr 1.35, alk phosphatase 161, CRP 1.1  MRI foot in process    I have consulted with the hospitalist on call Dr. Eboni Alberto and discussed this pt, he agrees to accept the pt for admission.  Lillian Quinones PA-C     MED RECONCILIATION:  Current Facility-Administered Medications   Medication Dose Route Frequency    sodium chloride (NS) flush 5-10 mL  5-10 mL IntraVENous PRN    sodium chloride 0.9 % bolus infusion 1,000 mL  1,000 mL IntraVENous ONCE    Followed by    sodium chloride 0.9 % bolus infusion 1,000 mL  1,000 mL IntraVENous ONCE    Followed by    sodium chloride 0.9 % bolus infusion 1,000 mL  1,000 mL IntraVENous ONCE    Followed by    sodium chloride 0.9 % bolus infusion 333 mL  333 mL IntraVENous ONCE    VANCOMYCIN INFORMATION NOTE   Other Rx Dosing/Monitoring    vancomycin (VANCOCIN) 2000 mg in  ml infusion  2,000 mg IntraVENous ONCE    cefepime (MAXIPIME) 2 g in sterile water (preservative free) 10 mL IV syringe  2 g IntraVENous ONCE    [START ON 5/24/2022] cefepime (MAXIPIME) 2 g in 0.9% sodium chloride (MBP/ADV) 100 mL MBP  2 g IntraVENous Q8H    [START ON 5/24/2022] vancomycin (VANCOCIN) 1250 mg in  ml infusion  1,250 mg IntraVENous Q12H    morphine injection 4 mg  4 mg IntraVENous NOW     Current Outpatient Medications   Medication Sig    traZODone (DESYREL) 150 mg tablet Take 150 mg by mouth nightly. Indications: insomnia associated with depression    amLODIPine (NORVASC) 10 mg tablet Take 10 mg by mouth daily.  metFORMIN (GLUCOPHAGE) 1,000 mg tablet Take 1,000 mg by mouth two (2) times daily (with meals).  atorvastatin (LIPITOR) 20 mg tablet Take 1 Tab by mouth nightly.  insulin lispro (HUMALOG) 100 unit/mL injection Check FSBS AC*HS  For sugars between 160 and 200- Give 2 units SQ,  For sugars between 201 and 250, Give 3 units SQ,  For sugars Between 251 and 300, give 5 units SQ,  For Sugars between 301 and 350, give 7 units SQ  For sugars between 351 and 400, give 8 units SQ and contact PCP (Patient taking differently: by SubCUTAneous route two (2) times a day.  Check FSBS AC*HS  For sugars between 160 and 200- Give 2 units SQ,  For sugars between 201 and 250, Give 3 units SQ,  For sugars Between 251 and 300, give 5 units SQ,  For Sugars between 301 and 350, give 7 units SQ  For sugars between 351 and 400, give 8 units SQ and contact PCP)    insulin detemir U-100 (Levemir U-100 Insulin) 100 unit/mL injection 20 Units by SubCUTAneous route two (2) times a day. (Patient taking differently: 60 Units by SubCUTAneous route two (2) times a day. Pt states he is taking levemir 40 units twice daily)    pantoprazole (PROTONIX) 40 mg tablet Take 1 Tab by mouth Before breakfast and dinner.  polyethylene glycol (MIRALAX) 17 gram packet Take 1 Packet by mouth daily. (Patient taking differently: Take 1 Packet by mouth daily. MIX WITH 8OZ OF WATER)       Disposition:  Admitted     Follow-up Information    None         Current Discharge Medication List            Core Measures:    Critical Care Time:   Critical Care Time:   I have spent 35 minutes of critical care time involved in lab review, consultations with specialist, family decision-making, and documentation. During this entire length of time I was immediately available to the patient.     Critical Care: The reason for providing this level of medical care for this critically ill patient was due a critical illness that impaired one or more vital organ systems such that there was a high probability of imminent or life threatening deterioration in the patients condition. This care involved high complexity decision making to assess, manipulate, and support vital system functions, to treat this degreee vital organ system failure and to prevent further life threatening deterioration of the patients condition. For Hospitalized Patients:    1. Hospitalization Decision Time:  The decision to hospitalize the patient was made by Dr. Stephan Bowen at 10:50 PM  on 5/23/2022    2. Aspirin: not given     Diagnosis     Clinical Impression:   1. Osteomyelitis of left foot, unspecified type ([facility]    2.  Infected wound

## 2022-05-24 NOTE — DIABETES MGMT
Diabetes/ Glycemic Control Plan of Care  Recommendations:   Patient with known history of DM  Recommend ordering lantus 20 units daily along with lispro corrective insulin coverage as needed. Will continue inpatient monitoring. Assessment:   DX:   1. Osteomyelitis of left foot, unspecified type ([facility]     2. Infected wound     3. Diabetic ulcer of left midfoot associated with type 2 diabetes mellitus, with fat layer exposed ([facility]     4. Coronary artery disease involving native coronary artery of native heart without angina pectoris     5. Essential hypertension        Fasting/ Morning blood glucose:   Lab Results   Component Value Date/Time    Glucose 98 05/24/2022 04:40 AM    Glucose (POC) 122 (H) 03/04/2022 11:14 AM    Glucose,  (HH) 08/10/2015 07:10 PM     IV Fluids containing dextrose:   None   Steroids:   none    Blood glucose values: Within target range (70-180mg/dL):  yes   Current insulin orders:   None   Total Daily Dose previous 24 hours = none   Current A1c:   Lab Results   Component Value Date/Time    Hemoglobin A1c 6.1 (H) 03/01/2022 03:35 AM      equivalent  to ave Blood Glucose of 128 mg/dl for 2-3 months prior to admission  Adequate glycemic control PTA:  Yes   Nutrition/Diet:   Active Orders   Diet    DIET NPO Sips of Water with Meds      Meal Intake:  No data found. Supplement Intake:  No data found. Home diabetes medications:   Key Antihyperglycemic Medications             metFORMIN (GLUCOPHAGE) 1,000 mg tablet (Taking) Take 1,000 mg by mouth two (2) times daily (with meals).     insulin lispro (HUMALOG) 100 unit/mL injection (Taking) Check FSBS AC*HS  For sugars between 160 and 200- Give 2 units SQ,  For sugars between 201 and 250, Give 3 units SQ,  For sugars Between 251 and 300, give 5 units SQ,  For Sugars between 301 and 350, give 7 units SQ  For sugars between 351 and 400, give 8 units SQ and contact PCP    insulin detemir U-100 (Levemir U-100 Insulin) 100 unit/mL injection (Taking) 20 Units by SubCUTAneous route two (2) times a day. Plan/Goals:   Blood glucose will be within target of 70 - 180 mg/dl within 72 hours  Reinforce dietary and medication compliance at home.        Education:  [] Refer to Diabetes Education Record                         [x] Education not indicated at this time     Brigida Edgar, [facility] CDE  Ext [phone]

## 2022-05-24 NOTE — PROGRESS NOTES
Reason for Admission:  Osteomyelitis ([facility] [M86.9]  Infected wound [T14. 8XXA, L08.9]                 RUR Score:    15%            Plan for utilizing home health: Yes                      Likelihood of Readmission:   Moderate                         Do you (patient/family) have any concerns for transition/discharge?  no    Transition of Care Plan:       Initial assessment completed with patient. Cognitive status of patient: oriented to time, place, person and situation. Face sheet information confirmed:  yes. The patient designates wife Young Kasper to participate in his discharge plan and to receive any needed information. This patient lives in a single family home with wife. Patient is able to navigate steps as needed. Three stairs to enter home. Prior to hospitalization, patient was considered to be independent with ADLs/IADLS : yes . Patient has a current ACP document on file: no      Healthcare Decision Maker: The wife will be available to transport patient home upon discharge. The patient already has none reported,  medical equipment available in the home. Patient is not currently active with home health. Patient has not stayed in a skilled nursing facility or rehab. Was  stay within last 60 days : no. This patient is on dialysis :no    Currently, the discharge plan is Home with Home Health. The patient states that he can obtain his medications from the pharmacy, and take his medications as directed. Patient's current insurance is [facility] Medicaid      Care Management Interventions  PCP Verified by CM: Yes  Last Visit to PCP: 05/22/22  Mode of Transport at Discharge:  Other (see comment) (wife)  Transition of Care Consult (CM Consult): Home Health  Occupational Therapy Consult: Yes  Support Systems: Spouse/Significant Other  Confirm Follow Up Transport: Family  Discharge Location  Patient Expects to be Discharged to[de-identified] Home with home health         will continue to monitor and assist with transition of care needs.     ALANA HillN, RN  Care Management  Pager # [phone]

## 2022-05-24 NOTE — PROGRESS NOTES
OT orders received and chart reviewed. At this time, patient is modified independent with basic self care tasks and functional mobility and has been ambulating to restroom for toileting tasks this admission. Per chart review, pt planning for OR for left 5th ray amputation and I&D. Will follow up post procedure for skilled OT evaluation. Discontinuing OT orders at this time; please update orders post procedure. Will need updated weight bearing and activity orders prior to evaluation.          Thank you for the referral,  Arvell Snellen, MS OTR/L

## 2022-05-24 NOTE — CONSULTS
Podiatry History and Physical    Subjective:         Date of Consultation:  May 24, 2022    Patient is a 64 y.o. male with PMHx noted below seen in office last week for bilateral foot wounds. Patient noted to have wound on left fifth MTPJ and medial left foot with exposure of tendons and right lateral foot wound. Patient had left partial great toe amputation on last admission which has healed. However since developed new full-thickness wounds. Patient had x-ray in office confirming osteomyelitis of fifth distal metatarsal and toe and septic MTPJ. Patient was sent to hospital through ER for admission. Patient however couldn't come until yesterday. Patient denies N/V/C/F.      Patient Active Problem List    Diagnosis Date Noted    Infected wound 05/23/2022    Obesity (BMI 35.0-39.9 without comorbidity) 05/23/2022    OM (osteomyelitis) ([address] 02/28/2022    Diabetic foot infection ([address] 04/12/2021    Leukocytosis 02/04/2021    Wound infection 02/04/2021    Diabetic ulcer of left midfoot associated with type 2 diabetes mellitus, with fat layer exposed ([address] 02/04/2021    Acute kidney injury ([address] 02/04/2021    Fracture, toe 09/24/2020    Altered mental status 09/01/2020    Multifocal pneumonia 09/01/2020    DM (diabetes mellitus) ([address] 12/25/2019    Orthostatic hypotension 12/25/2019    Syncope and collapse 12/24/2019    Vomiting 03/28/2018    Chronic abdominal pain 03/28/2018    Sepsis (HCC) 03/21/2018    Nausea and vomiting 09/07/2017    Gastroparesis 09/07/2017    Hypokalemia 09/07/2017    Atelectasis 09/07/2017    Abdominal pain 09/07/2017    Acquired hypothyroidism 09/07/2017    S/P lumbar fusion 07/05/2017    Diabetic neuropathy [address] 07/05/2017    Neuritis 07/05/2017    Spinal stenosis at L4-L5 level 06/19/2017    Coronary artery disease involving native coronary artery of native heart without angina pectoris 05/31/2017    History of coronary artery stent placement 05/31/2017    Synovial cyst 05/26/2017    HNP (herniated nucleus pulposus), lumbar 05/26/2017    Lumbar spinal stenosis 05/26/2017    Spondylolisthesis of lumbar region 05/26/2017    Positive PPD     History of heart attack     Pain in left lower leg 07/24/2016    Primary osteoarthritis of left knee 07/24/2016    Localized adiposity of abdomen 07/24/2016    Diabetes ([facility] 08/14/2015    Essential hypertension 08/14/2015    GERD (gastroesophageal reflux disease) 08/14/2015    Depression 08/14/2015     Past Medical History:   Diagnosis Date    Arthritis     Coronary atherosclerosis of native coronary artery     S/P PCI with GE in 12/09    Diabetes ([facility]     IDDM    Electrolyte and fluid disorders not elsewhere classified     Essential hypertension, benign     GERD (gastroesophageal reflux disease)     Heroin abuse ([facility] 05/26/16    Psychiatrist Dr. Yamil Rajan History of heart attack     Hyperlipidemia     Intermediate coronary syndrome ([facility]     MDD (major depressive disorder) 5/26/16    Psychiatrist Dr. Yamil Rajan Myocardial infarction [facility])     Obesity, unspecified     Old myocardial infarction     Other and unspecified hyperlipidemia     Pancreatitis     Positive PPD     Sleep apnea     uses Cpap machine    Transfusion history     Before 1992      Family History   Problem Relation Age of Onset    Heart Attack Father     Coronary Art Dis Mother     Diabetes Mother     Cancer Sister         breast cancer and lung cancer      Social History     Tobacco Use    Smoking status: Never Smoker    Smokeless tobacco: Never Used   Substance Use Topics    Alcohol use: No     Past Surgical History:   Procedure Laterality Date    HX APPENDECTOMY      HX CORONARY STENT PLACEMENT  2010    2 stents      Prior to Admission medications    Medication Sig Start Date End Date Taking? Authorizing Provider   traZODone (DESYREL) 150 mg tablet Take 150 mg by mouth nightly.  Indications: insomnia associated with depression   Yes Provider, Historical   amLODIPine (NORVASC) 10 mg tablet Take 10 mg by mouth daily. Yes Provider, Historical   metFORMIN (GLUCOPHAGE) 1,000 mg tablet Take 1,000 mg by mouth two (2) times daily (with meals). Yes Provider, Historical   insulin lispro (HUMALOG) 100 unit/mL injection Check FSBS AC*HS  For sugars between 160 and 200- Give 2 units SQ,  For sugars between 201 and 250, Give 3 units SQ,  For sugars Between 251 and 300, give 5 units SQ,  For Sugars between 301 and 350, give 7 units SQ  For sugars between 351 and 400, give 8 units SQ and contact PCP  Patient taking differently: by SubCUTAneous route two (2) times a day. Check FSBS AC*HS  For sugars between 160 and 200- Give 2 units SQ,  For sugars between 201 and 250, Give 3 units SQ,  For sugars Between 251 and 300, give 5 units SQ,  For Sugars between 301 and 350, give 7 units SQ  For sugars between 351 and 400, give 8 units SQ and contact PCP 2/9/21  Yes Lawson Opitz, MD   insulin detemir U-100 (Levemir U-100 Insulin) 100 unit/mL injection 20 Units by SubCUTAneous route two (2) times a day. Patient taking differently: 50 Units by SubCUTAneous route two (2) times a day. Patient states that he takes Long-Acting Insulin 50 [five-zero] Units BID at home. 2/9/21  Yes Lawson Opitz, MD   pantoprazole (PROTONIX) 40 mg tablet Take 1 Tab by mouth Before breakfast and dinner. 3/29/18  Yes Sher GAMING NP   atorvastatin (LIPITOR) 20 mg tablet Take 1 Tab by mouth nightly. 4/16/21   Annette Gilbert MD   polyethylene glycol (MIRALAX) 17 gram packet Take 1 Packet by mouth daily. Patient taking differently: Take 1 Packet by mouth daily.  [facility] 9/11/17   Mekhi Fox MD     Allergies   Allergen Reactions    Compazine [Prochlorperazine] Anaphylaxis     \"Tightness around throat\"        Review of Systems:  A comprehensive review of systems was negative except for that written in the HPI.    Objective:     Patient Vitals for the past 8 hrs:   BP Temp Pulse Resp SpO2   22 1523 (!) 148/83 97.9 °F (36.6 °C) 66 18 93 %     Temp (24hrs), Av.3 °F (36.3 °C), Min:97 °F (36.1 °C), Max:97.9 °F (36.6 °C)    Bilateral EDILSON:     Musculoskeletal: Muscle Strength is 5/5 for all extrinsic muscle groups of the foot bilateral. Right foot TMA site stable. Left partial hallux and second toe previously amputated. Vascular: DP pulses palpable and +2/4 bilateral. PT pulses are palpable and +2/4 bilateral. Sparse hair growth noticed on the dorsal foot and digits. Capillary refill time is less than three seconds to remaining digits on left foot. 2+ edema to bilateral LE's. Neurological: Protective sensation is diminished to distal 5/10 sites of the foot bilateral upon testing with a Semmis Nahomy 5.07 monofilament. Sharp/dull sensation is diminished to the fore foot. Achilles and patellar deep tendon reflexes are within normal limits bilateral. Paresthesia symptoms present to bilateral LE's. Dermatological: Skin shows signs of mild atrophy with diffuse dry xerosis. Web spaces on left foot are clean, dry, and intact. Left partial great toe amputation site healed. Wound on left fifth MTPJ laterally measure 6 x 4 cm x 0.3 cm deep with fibrogranular base. Wound on left medial foot measure 11 x 5 cm and 0.5 cm deep with exposure of tendons. Wound on right lateral foot measure 5.5 x 6 cm x 0.2 cm deep. All wounds noted to have exposed subcutaneous tissue. No purulence noted. No clinical signs of infection noted to left medial foot wound and right lateral foot wound.           Data Review:   Recent Results (from the past 24 hour(s))   CULTURE, BLOOD    Collection Time: 22 12:00 AM    Specimen: Blood   Result Value Ref Range    Special Requests: NO SPECIAL REQUESTS      Culture result: NO GROWTH AFTER 6 HOURS     METABOLIC PANEL, COMPREHENSIVE    Collection Time: 22  4:40 AM   Result Value Ref Range Sodium 139 136 - 145 mmol/L    Potassium 4.0 3.5 - 5.5 mmol/L    Chloride 107 100 - 111 mmol/L    CO2 33 (H) 21 - 32 mmol/L    Anion gap NEG 1 3.0 - 18 mmol/L    Glucose 98 74 - 99 mg/dL    BUN 17 7.0 - 18 MG/DL    Creatinine 1.15 0.6 - 1.3 MG/DL    BUN/Creatinine ratio 15 12 - 20      GFR est AA >60 >60 ml/min/1.73m2    GFR est non-AA >60 >60 ml/min/1.73m2    Calcium 8.1 (L) 8.5 - 10.1 MG/DL    Bilirubin, total 0.3 0.2 - 1.0 MG/DL    ALT (SGPT) 27 16 - 61 U/L    AST (SGOT) 17 10 - 38 U/L    Alk. phosphatase 133 (H) 45 - 117 U/L    Protein, total 7.8 6.4 - 8.2 g/dL    Albumin 2.6 (L) 3.4 - 5.0 g/dL    Globulin 5.2 (H) 2.0 - 4.0 g/dL    A-G Ratio 0.5 (L) 0.8 - 1.7     CBC WITH AUTOMATED DIFF    Collection Time: 05/24/22  4:40 AM   Result Value Ref Range    WBC 10.1 4.6 - 13.2 K/uL    RBC 3.25 (L) 4.35 - 5.65 M/uL    HGB 9.2 (L) 13.0 - 16.0 g/dL    HCT 32.0 (L) 36.0 - 48.0 %    MCV 98.5 78.0 - 100.0 FL    MCH 28.3 24.0 - 34.0 PG    MCHC 28.8 (L) 31.0 - 37.0 g/dL    RDW 14.2 11.6 - 14.5 %    PLATELET 893 154 - 521 K/uL    MPV 11.2 9.2 - 11.8 FL    NRBC 0.0 0  WBC    ABSOLUTE NRBC 0.00 0.00 - 0.01 K/uL    NEUTROPHILS 73 40 - 73 %    LYMPHOCYTES 16 (L) 21 - 52 %    MONOCYTES 8 3 - 10 %    EOSINOPHILS 2 0 - 5 %    BASOPHILS 1 0 - 2 %    IMMATURE GRANULOCYTES 0 0.0 - 0.5 %    ABS. NEUTROPHILS 7.4 1.8 - 8.0 K/UL    ABS. LYMPHOCYTES 1.7 0.9 - 3.6 K/UL    ABS. MONOCYTES 0.8 0.05 - 1.2 K/UL    ABS. EOSINOPHILS 0.2 0.0 - 0.4 K/UL    ABS. BASOPHILS 0.1 0.0 - 0.1 K/UL    ABS. IMM.  GRANS. 0.0 0.00 - 0.04 K/UL    DF AUTOMATED     POC LACTIC ACID    Collection Time: 05/24/22  5:03 AM   Result Value Ref Range    Lactic Acid (POC) 0.90 0.40 - 2.00 mmol/L   PROTHROMBIN TIME + INR    Collection Time: 05/24/22  6:05 AM   Result Value Ref Range    Prothrombin time 15.3 (H) 11.5 - 15.2 sec    INR 1.2 0.8 - 1.2     EKG, 12 LEAD, INITIAL    Collection Time: 05/24/22  8:57 AM   Result Value Ref Range    Ventricular Rate 63 BPM Atrial Rate 63 BPM    P-R Interval 248 ms    QRS Duration 80 ms    Q-T Interval 418 ms    QTC Calculation (Bezet) 427 ms    Calculated P Axis 85 degrees    Calculated R Axis 3 degrees    Calculated T Axis 10 degrees    Diagnosis       Sinus rhythm with 1st degree AV block  Cannot rule out Anterior infarct , age undetermined  Abnormal ECG  When compared with ECG of 03-MAR-2022 15:50,  Wenckebach block is no longer  Confirmed by Matt Bravo ([id number]) on 5/24/2022 7:32:59 PM           Impression:     - Diabetic foot wounds with tendon exposure, left fifth MTPJ septic arthritis with osteomyelitis of distal metatarsal and toe. Recommendation:     - MRI of left foot reviewed. Septic arthritis of the fifth MTP with osteomyelitis of the mid/distal fifth  metatarsal and near entirety of the fifth ray proximal phalanx. Dorsomedial ulcer defect of the mid foot with reactive osteitis of the first metatarsal. No edin osteomyelitis at this time. Diffuse subcutaneous edema without focal fluid collection to suggest abscess. Mild scattered areas of additional reactive osteitis. - Patient will need left partial fifth ray amputation, debridement of left medial midfoot and right lateral foot ulcer debridement with application of skin substitute graft to expedite healing process. - Please keep him NPO. Patient to go to OR later today. - Remain NWB to left forefoot. Partial weightbear in surgical shoe on right foot. - Continue broad-spectrum IV antibiotics, change antibiotics according to OR culture. ID consulted.    - Medical management per primary team.

## 2022-05-24 NOTE — PROGRESS NOTES
[facility]  Progress Note    Patient: Rodrigue Driver Age: 64 y.o. : 1960 MR#: [id number] SSN: [id number]  Date/Time: 2022     C/C: Left foot infection      Subjective:   HPI : Left foot infection, diabetic foot ulcer, other history of diabetes hypertension HLD and CAD          Review of Systems:  positive responses in bold type   Constitutional: Negative for fever, chills, diaphoresis and unexpected weight change. HENT: Negative for ear pain, congestion, sore throat, rhinorrhea, drooling, trouble swallowing, neck pain and tinnitus. Eyes: Negative for photophobia, pain, redness and visual disturbance. Respiratory: negative for shortness of breath, cough, choking, chest tightness, wheezing or stridor. Cardiovascular: Negative for chest pain, palpitations and leg swelling. Gastrointestinal: Negative for nausea, vomiting, abdominal pain, diarrhea, constipation, blood in stool, abdominal distention and anal bleeding. Genitourinary: Negative for dysuria, urgency, frequency, hematuria, flank pain and difficulty urinating. Musculoskeletal: Negative for back pain and arthralgias. Skin: Negative for color change, rash and wound. Neurological: Negative for dizziness, seizures, syncope, speech difficulty, light-headedness or headaches. Hematological: Does not bruise/bleed easily. Psychiatric/Behavioral: Negative for suicidal ideas, hallucinations, behavioral problems, self-injury or agitation     Assessment/Plan:     1.   Left foot diabetic ulcer  2 hypertension  3 diabetes mellitus type 2  4 history of CAD    Plan    -Continue empiric antibiotic local wound care follow-up with podiatry possible surgery  -Continue home blood pressure medications  -SSI    Objective:       General:  Alert, cooperative, no acute distress   HEENT: No facial asymmetry, CONCEPCION Manuel, External ears - WNL    Cardiovascular: S1S2 - regular , No Murmur   Pulmonary: Equal expansion , No Use of accessory muscles , No Rales No Rhonchi    GI:  +BS in all four quadrants, soft, non-tender  Extremities:   Left foot bandage  Neuro: Alert and oriented X 2.        DVT Prophylaxis:  []Lovenox  []Hep SQ  []SCDs  []Coumadin   []On Heparin gtt    [] Eliquis [] Xarelto     Vitals:         VS:   Visit Vitals  BP (!) 148/83   Pulse 66   Temp 97.9 °F (36.6 °C)   Resp 18   Ht 5' 9\" (1.753 m)   Wt 111.1 kg (245 lb)   SpO2 93%   BMI 36.18 kg/m²      Tmax/24hrs: Temp (24hrs), Av.7 °F (36.5 °C), Min:97 °F (36.1 °C), Max:98.7 °F (37.1 °C)        Medications:   Current Facility-Administered Medications   Medication Dose Route Frequency    amLODIPine (NORVASC) tablet 10 mg  10 mg Oral DAILY    atorvastatin (LIPITOR) tablet 20 mg  20 mg Oral QHS    pantoprazole (PROTONIX) tablet 40 mg  40 mg Oral ACB&D    traZODone (DESYREL) tablet 150 mg  150 mg Oral QHS    glucose chewable tablet 16 g  4 Tablet Oral PRN    glucagon (GLUCAGEN) injection 1 mg  1 mg IntraMUSCular PRN    dextrose 10% infusion 0-250 mL  0-250 mL IntraVENous PRN    [START ON 2022] Vancomycin Lab Information: Random Level Due with 5/25 AM Labs  1 Each Other ONCE    vancomycin (VANCOCIN) 1,000 mg in 0.9% sodium chloride 250 mL (VIAL-MATE)  1,000 mg IntraVENous Q12H    sodium chloride (NS) flush 5-10 mL  5-10 mL IntraVENous PRN    cefepime (MAXIPIME) 2 g in 0.9% sodium chloride (MBP/ADV) 100 mL MBP  2 g IntraVENous Q8H    sodium chloride (NS) flush 5-40 mL  5-40 mL IntraVENous Q8H    sodium chloride (NS) flush 5-40 mL  5-40 mL IntraVENous PRN    acetaminophen (TYLENOL) tablet 650 mg  650 mg Oral Q6H PRN    Or    acetaminophen (TYLENOL) suppository 650 mg  650 mg Rectal Q6H PRN    polyethylene glycol (MIRALAX) packet 17 g  17 g Oral DAILY PRN    ondansetron (ZOFRAN ODT) tablet 4 mg  4 mg Oral Q8H PRN    Or    ondansetron (ZOFRAN) injection 4 mg  4 mg IntraVENous Q6H PRN    naloxone (NARCAN) injection 0.4 mg  0.4 mg IntraVENous EVERY 2 MINUTES AS NEEDED    morphine injection 2-4 mg  2-4 mg IntraVENous Q4H PRN    albuterol-ipratropium (DUO-NEB) 2.5 MG-0.5 MG/3 ML  3 mL Nebulization Q6H PRN    melatonin tablet 5 mg  5 mg Oral QHS PRN    nitroglycerin (NITROBID) 2 % ointment 0.5 Inch  0.5 Inch Topical Q6H PRN       Labs:    Recent Labs     05/24/22  0440 05/23/22  1856   WBC 10.1 10.5   HGB 9.2* 9.7*   HCT 32.0* 32.2*    297     Recent Labs     05/24/22  0605 05/24/22  0440 05/23/22  1856   NA  --  139 138   K  --  4.0 3.7   CL  --  107 104   CO2  --  33* 33*   GLU  --  98 170*   BUN  --  17 18   CREA  --  1.15 1.35*   CA  --  8.1* 8.5   ALB  --  2.6* 2.8*   ALT  --  27 32   INR 1.2  --   --          Time spent on direct patient care >30 mints     Complexity : High complex - due to multiple medical issues outlined above. CODE Status : Full code    Case discussed with:  [x]Patient  [] Family  []Nursing  []Case Management        Disclaimer: Sections of this note are dictated utilizing voice recognition software, which may have resulted in some phonetic based errors in grammar and contents. Even though attempts were made to correct all the mistakes, some may have been missed, and remained in the body of the document. If questions arise, please contact our department.     Signed By: Shaji Martínez MD     May 24, 2022

## 2022-05-24 NOTE — PROGRESS NOTES
Problem: Risk for Spread of Infection  Goal: Prevent transmission of infectious organism to others  Description: Prevent the transmission of infectious organisms to other patients, staff members, and visitors. Outcome: Progressing Towards Goal     Problem: Falls - Risk of  Goal: *Absence of Falls  Description: Document Selwyn Savages Fall Risk and appropriate interventions in the flowsheet.   Outcome: Progressing Towards Goal  Note: Fall Risk Interventions:  Mobility Interventions: Bed/chair exit alarm,Patient to call before getting OOB,PT Consult for mobility concerns         Medication Interventions: Teach patient to arise slowly,Assess postural VS orthostatic hypotension         Problem: Patient Education: Go to Patient Education Activity  Goal: Patient/Family Education  Outcome: Progressing Towards Goal

## 2022-05-24 NOTE — PROGRESS NOTES
Dressings were removed by the MD. Pt foot cleansed with sterile water and redressed with dry dressings. Pt tolerated well.

## 2022-05-24 NOTE — WOUND CARE
Physical Exam   Consult noted for wound care eval, patient to be seen by podiatrist. Will defer to podiatry for wound care orders. Care turned over to unit staff.    Warden Srinivas WARNERN, RN, [facility], [facility]

## 2022-05-24 NOTE — PROGRESS NOTES
PT orders received and chart reviewed. Patient seated EOB. Per chart review, planning for OR this PM for left 5th ray amputation and I&D. Will follow up post procedure for PT evaluation. Discontinuing PT orders; please update orders post procedure. Will need updated weight bearing and activity orders prior to evaluation.

## 2022-05-24 NOTE — H&P
History and Physical    Patient: Jono Byrne MRN: [id number]  SSN: [id number]    YOB: 1960  Age: 64 y.o. Sex: male      Subjective:      Jono Byrne is a 64 y.o. male who presents to [facility] ER with complaint of Referred by Podiatrist.  Patient presents to [facility] ER with a note from Patient's Primary Podiatrist requesting admission and MRI of Left Foot. Patient reports a chronic diabetic foot ulcer on the dorsum of his left foot stretching from his lateral to medial aspect of the dorsal mid-foot. Patient reports an 8/10 intensity \"throbbing\" pain and states the wound has been present for several months, but now has purulent drainage. Patient reports that his left \"baby toe\" is infected and a Plain Radiograph showed suspicion of bone infection. Patient denies previous osteomyelitis, but reports Diabetes, HTN, HLD, and CAD with Previous MI. Patient denies fevers, chills, nausea, vomiting, diarrhea, dysuria, and cough. In [facility] ER, Patient is noted to have Blood Pressure 168/97 mm Hg, WBC 10.5, Hgb 9.7, Neut% 71, Neut# 7.5, Sed rate 93 mm/hr, CO2 33, AG 1, Glucose 170 mg/dL, BUN 18, Creatinine 1/35, eGFR 54/>60, Albumin 2.8, Alk Phos 161, CRP 1.1 mg/dL, and Lactic Acid 1.34. MRI Left Foot results PENDING. Patient is admitted to [facility] Medical Floor for management of Infected Diabetic Foot Wound (Stage 2) with concern for possible Osteomyelitis of 5th Left Lower Extremity Phalange.     Past Medical History:   Diagnosis Date    Arthritis     Coronary atherosclerosis of native coronary artery     S/P PCI with GE in 12/09    Diabetes ([facility]     IDDM    Electrolyte and fluid disorders not elsewhere classified     Essential hypertension, benign     GERD (gastroesophageal reflux disease)     Heroin abuse ([facility] 05/26/16    Psychiatrist Dr. Elfego Pavon History of heart attack     Hyperlipidemia     Intermediate coronary syndrome ([facility]     MDD (major depressive disorder) 5/26/16    Psychiatrist Dr. Carreon Push infarction [facility])     Obesity, unspecified     Old myocardial infarction     Other and unspecified hyperlipidemia     Pancreatitis     Positive PPD     Sleep apnea     uses Cpap machine    Transfusion history     Before 1992     Past Surgical History:   Procedure Laterality Date    HX APPENDECTOMY      HX CORONARY STENT PLACEMENT  2010    2 stents      Family History   Problem Relation Age of Onset    Heart Attack Father     Coronary Art Dis Mother     Diabetes Mother     Cancer Sister         breast cancer and lung cancer     Social History     Tobacco Use    Smoking status: Never Smoker    Smokeless tobacco: Never Used   Substance Use Topics    Alcohol use: No      Prior to Admission medications    Medication Sig Start Date End Date Taking? Authorizing Provider   traZODone (DESYREL) 150 mg tablet Take 150 mg by mouth nightly. Indications: insomnia associated with depression   Yes Provider, Historical   amLODIPine (NORVASC) 10 mg tablet Take 10 mg by mouth daily. Yes Provider, Historical   metFORMIN (GLUCOPHAGE) 1,000 mg tablet Take 1,000 mg by mouth two (2) times daily (with meals). Yes Provider, Historical   insulin lispro (HUMALOG) 100 unit/mL injection Check FSBS AC*HS  For sugars between 160 and 200- Give 2 units SQ,  For sugars between 201 and 250, Give 3 units SQ,  For sugars Between 251 and 300, give 5 units SQ,  For Sugars between 301 and 350, give 7 units SQ  For sugars between 351 and 400, give 8 units SQ and contact PCP  Patient taking differently: by SubCUTAneous route two (2) times a day.  Check FSBS AC*HS  For sugars between 160 and 200- Give 2 units SQ,  For sugars between 201 and 250, Give 3 units SQ,  For sugars Between 251 and 300, give 5 units SQ,  For Sugars between 301 and 350, give 7 units SQ  For sugars between 351 and 400, give 8 units SQ and contact PCP 2/9/21  Yes Sandeep Frey MD   insulin detemir U-100 (Levemir U-100 Insulin) 100 unit/mL injection 20 Units by SubCUTAneous route two (2) times a day. Patient taking differently: 50 Units by SubCUTAneous route two (2) times a day. Patient states that he takes Long-Acting Insulin 50 [five-zero] Units BID at home. 2/9/21  Yes Minh Kuo MD   pantoprazole (PROTONIX) 40 mg tablet Take 1 Tab by mouth Before breakfast and dinner. 3/29/18  Yes Tyson GAMING NP   atorvastatin (LIPITOR) 20 mg tablet Take 1 Tab by mouth nightly. 4/16/21   Jessica Mina MD   polyethylene glycol (MIRALAX) 17 gram packet Take 1 Packet by mouth daily. Patient taking differently: Take 1 Packet by mouth daily. [address] [place] [address] 9/11/17   Uri Canlaes MD        Allergies   Allergen Reactions    Compazine [Prochlorperazine] Anaphylaxis     \"Tightness around throat\"       Review of Systems:  (-) Fevers  (-) Chills  (-) Cough  (-) Increased Sputum Production  (-) Chest Pain  (-) Abdominal Pain  (-) Nausea  (-) Vomiting  (-) Diarrhea  (-) Dysuria  (+) Joint Pain/Swelling  (+) Loss of Sensation  (+) Ulcers  All other systems have been reviewed and are negative      Objective:     Vitals:    05/23/22 1843   BP: (!) 168/97   Pulse: 72   Resp: 18   Temp: 98.7 °F (37.1 °C)   SpO2: 96%   Weight: 111.1 kg (245 lb)   Height: 5' 9\" (1.753 m)        Physical Exam:  General:  Older Adult male lying in bed in no acute distress  HEENT:  Atraumatic, normocephalic; Pupils equally round and reactive to light with accommodation; Extraocular muscles intact; Moist Oropharynx without erythema, edema, or exudates  Chest:  No pectus carinatum;  No pectus excavatum  Cardiovascular:  Regular rate and rhythm without rubs, gallops, or murmurs  Respiratory:  Clear to Auscultation Bilaterally without wheezes, rales, or rhonchi; normal effort of breathing  Abdominal:  Soft, non-tense, non-tender abdomen; BS present without guarding, rebound, or masses  :  Deferred  Extremities:  Pulses 2+ x3 (BUE, RLE) with (+) Minimal Pitting Edema to Bilateral Proximal Thigh (Left worse than Right) without clubbing or cyanosis; (+) LLE maintains good movement; (+) Sensation is Poor  Musculoskeletal:  Strength 5/5 and symmetrical in BUE and RLE  Integument:  No rash on face, forearms, or legs  Neurological:  A&O x4/4; No gross deficits of Visual Acuity, Eye Movement, Jaw Opening, Facial Expression, Hearing, Phonation, or Head Movement; No gross deficits of Tongue Movement or Slurring of Speech  Psychiatric:  Affect is appropriate; Language is present and fluent; Behavior is appropriate      Laboratory Studies:  CMP:   Lab Results   Component Value Date/Time     05/23/2022 06:56 PM    K 3.7 05/23/2022 06:56 PM     05/23/2022 06:56 PM    CO2 33 (H) 05/23/2022 06:56 PM    AGAP 1 (L) 05/23/2022 06:56 PM     (H) 05/23/2022 06:56 PM    BUN 18 05/23/2022 06:56 PM    CREA 1.35 (H) 05/23/2022 06:56 PM    GFRAA >60 05/23/2022 06:56 PM    GFRNA 54 (L) 05/23/2022 06:56 PM    CA 8.5 05/23/2022 06:56 PM    ALB 2.8 (L) 05/23/2022 06:56 PM    TP 8.5 (H) 05/23/2022 06:56 PM    GLOB 5.7 (H) 05/23/2022 06:56 PM    AGRAT 0.5 (L) 05/23/2022 06:56 PM    ALT 32 05/23/2022 06:56 PM     CBC:   Lab Results   Component Value Date/Time    WBC 10.5 05/23/2022 06:56 PM    HGB 9.7 (L) 05/23/2022 06:56 PM    HCT 32.2 (L) 05/23/2022 06:56 PM     05/23/2022 06:56 PM     All Cardiac Markers in the last 24 hours: No results found for: CPK, CK, CKMMB, CKMB, RCK3, CKMBT, CKNDX, CKND1, TAMERA, TROPT, TROIQ, PATTI, TROPT, TNIPOC, BNP, BNPP  Recent Glucose Results:   Lab Results   Component Value Date/Time     (H) 05/23/2022 06:56 PM     COAGS: No results found for: APTT, PTP, INR, INREXT, INREXT     Images Reviewed:  XR CHEST PORT    Result Date: 5/23/2022  PORTABLE CHEST RADIOGRAPH CPT CODE: 59352 INDICATION: Sepsis. COMPARISON: 4/12/2021. FINDINGS: Frontal view of the chest obtained at 1924 hours.  The cardiomediastinal silhouette is unchanged. The pulmonary vascular markings are unremarkable. There is no evidence of focal consolidation, pleural effusion or pneumothorax. Degenerative disc disease. No acute findings. Assessment:     Hospital Problems  Date Reviewed: 5/23/2022          Codes Class Noted POA    * (Principal) Infected wound ICD-10-CM: T14. 8XXA, L08.9  ICD-9-CM: 958.3  5/23/2022 Yes        Diabetic ulcer of left midfoot associated with type 2 diabetes mellitus, with fat layer exposed ([facility] ICD-10-CM: E11.621, U61.365  ICD-9-CM: 250.80, 707.14  2/4/2021 Yes        Obesity (BMI 35.0-39.9 without comorbidity) (Chronic) ICD-10-CM: E66.9  ICD-9-CM: 278.00  5/23/2022 Yes        Acquired hypothyroidism (Chronic) ICD-10-CM: E03.9  ICD-9-CM: 244.9  9/7/2017 Yes        Spinal stenosis at L4-L5 level (Chronic) ICD-10-CM: M48.061  ICD-9-CM: 724.02  6/19/2017 Yes        Coronary artery disease involving native coronary artery of native heart without angina pectoris (Chronic) ICD-10-CM: I25.10  ICD-9-CM: 414.01  5/31/2017 Yes    Overview Signed 9/14/2017 10:20 AM by Rosie Ham MD     Stable symptoms             Diabetes ([facility] (Chronic) ICD-10-CM: E11.9  ICD-9-CM: 250.00  8/14/2015 Yes        Essential hypertension (Chronic) ICD-10-CM: I10  ICD-9-CM: 401.9  8/14/2015 Yes    Overview Signed 9/14/2017 10:22 AM by Rosie Ham MD     9/17 incr acei             GERD (gastroesophageal reflux disease) (Chronic) ICD-10-CM: K21.9  ICD-9-CM: 530.81  8/14/2015 Yes        Depression (Chronic) ICD-10-CM: F32. A  ICD-9-CM: 156  8/14/2015 Yes              Plan:     IV Vancomycin, IV Cefepime, NPO after breakfast, PRN Pain Control, PRN Naloxone, NeuroChecks q4hrs, Blood Culture, Wound Culture, and consult Podiatry. MRI Left Foot results PENDING. PT, OT, and Wound Care tomorrow.     Anticipate Podiatry service will take Patient for Debridement or more extensive surgical intervention tomorrow as is warranted by MRI results. Continue home medications for HTN, HLD, GERD, and Depression/Insomnia/    POC Glucose checks qACHS with SSI coverage. REDUCED-DOSE Long-Acting Insulin 15 units in AM.  Will give REDUCED-DOSE Long-Acting Insulin 35 Units BID starting in PM (Patient reports that he normally takes Long-Acting Insulin 50 [five-zero] Units BID at home). DVT mechanoprophylaxis is achieved with unilateral RLE SCD.     Signed By: Lily Bundy DO     May 24, 2022

## 2022-05-25 LAB
ANION GAP SERPL CALC-SCNC: 2 MMOL/L (ref 3–18)
BUN SERPL-MCNC: 16 MG/DL (ref 7–18)
BUN/CREAT SERPL: 14 (ref 12–20)
CALCIUM SERPL-MCNC: 8.3 MG/DL (ref 8.5–10.1)
CHLORIDE SERPL-SCNC: 104 MMOL/L (ref 100–111)
CO2 SERPL-SCNC: 31 MMOL/L (ref 21–32)
CREAT SERPL-MCNC: 1.15 MG/DL (ref 0.6–1.3)
ERYTHROCYTE [DISTWIDTH] IN BLOOD BY AUTOMATED COUNT: 14.3 % (ref 11.6–14.5)
GLUCOSE BLD STRIP.AUTO-MCNC: 122 MG/DL (ref 70–110)
GLUCOSE BLD STRIP.AUTO-MCNC: 159 MG/DL (ref 70–110)
GLUCOSE BLD STRIP.AUTO-MCNC: 211 MG/DL (ref 70–110)
GLUCOSE BLD STRIP.AUTO-MCNC: 288 MG/DL (ref 70–110)
GLUCOSE BLD STRIP.AUTO-MCNC: 314 MG/DL (ref 70–110)
GLUCOSE SERPL-MCNC: 268 MG/DL (ref 74–99)
HCT VFR BLD AUTO: 30.7 % (ref 36–48)
HGB BLD-MCNC: 8.9 G/DL (ref 13–16)
MCH RBC QN AUTO: 28.5 PG (ref 24–34)
MCHC RBC AUTO-ENTMCNC: 29 G/DL (ref 31–37)
MCV RBC AUTO: 98.4 FL (ref 78–100)
NRBC # BLD: 0 K/UL (ref 0–0.01)
NRBC BLD-RTO: 0 PER 100 WBC
PLATELET # BLD AUTO: 222 K/UL (ref 135–420)
PMV BLD AUTO: 11.4 FL (ref 9.2–11.8)
POTASSIUM SERPL-SCNC: 4.3 MMOL/L (ref 3.5–5.5)
RBC # BLD AUTO: 3.12 M/UL (ref 4.35–5.65)
SODIUM SERPL-SCNC: 137 MMOL/L (ref 136–145)
WBC # BLD AUTO: 8.3 K/UL (ref 4.6–13.2)

## 2022-05-25 PROCEDURE — 74011000258 HC RX REV CODE- 258: Performed by: INTERNAL MEDICINE

## 2022-05-25 PROCEDURE — 74011250636 HC RX REV CODE- 250/636: Performed by: INTERNAL MEDICINE

## 2022-05-25 PROCEDURE — 77030019895 HC PCKNG STRP IODO -A

## 2022-05-25 PROCEDURE — 74011000250 HC RX REV CODE- 250: Performed by: INTERNAL MEDICINE

## 2022-05-25 PROCEDURE — 36415 COLL VENOUS BLD VENIPUNCTURE: CPT

## 2022-05-25 PROCEDURE — 65270000029 HC RM PRIVATE

## 2022-05-25 PROCEDURE — 82962 GLUCOSE BLOOD TEST: CPT

## 2022-05-25 PROCEDURE — 99232 SBSQ HOSP IP/OBS MODERATE 35: CPT | Performed by: INTERNAL MEDICINE

## 2022-05-25 PROCEDURE — 2709999900 HC NON-CHARGEABLE SUPPLY

## 2022-05-25 PROCEDURE — 85027 COMPLETE CBC AUTOMATED: CPT

## 2022-05-25 PROCEDURE — 80048 BASIC METABOLIC PNL TOTAL CA: CPT

## 2022-05-25 PROCEDURE — 74011636637 HC RX REV CODE- 636/637: Performed by: INTERNAL MEDICINE

## 2022-05-25 PROCEDURE — 74011250637 HC RX REV CODE- 250/637: Performed by: INTERNAL MEDICINE

## 2022-05-25 RX ORDER — DEXTROSE MONOHYDRATE 100 MG/ML
0-250 INJECTION, SOLUTION INTRAVENOUS AS NEEDED
Status: DISCONTINUED | OUTPATIENT
Start: 2022-05-25 | End: 2022-05-27 | Stop reason: HOSPADM

## 2022-05-25 RX ORDER — INSULIN GLARGINE 100 [IU]/ML
20 INJECTION, SOLUTION SUBCUTANEOUS DAILY
Status: DISCONTINUED | OUTPATIENT
Start: 2022-05-25 | End: 2022-05-27 | Stop reason: HOSPADM

## 2022-05-25 RX ORDER — INSULIN LISPRO 100 [IU]/ML
INJECTION, SOLUTION INTRAVENOUS; SUBCUTANEOUS
Status: DISCONTINUED | OUTPATIENT
Start: 2022-05-25 | End: 2022-05-27 | Stop reason: HOSPADM

## 2022-05-25 RX ADMIN — SODIUM CHLORIDE, PRESERVATIVE FREE 10 ML: 5 INJECTION INTRAVENOUS at 01:33

## 2022-05-25 RX ADMIN — PANTOPRAZOLE SODIUM 40 MG: 40 TABLET, DELAYED RELEASE ORAL at 16:04

## 2022-05-25 RX ADMIN — CEFEPIME HYDROCHLORIDE 2 G: 2 INJECTION, POWDER, FOR SOLUTION INTRAVENOUS at 01:34

## 2022-05-25 RX ADMIN — SODIUM CHLORIDE, PRESERVATIVE FREE 10 ML: 5 INJECTION INTRAVENOUS at 22:10

## 2022-05-25 RX ADMIN — ATORVASTATIN CALCIUM 20 MG: 20 TABLET, FILM COATED ORAL at 01:33

## 2022-05-25 RX ADMIN — MORPHINE SULFATE 4 MG: 2 INJECTION, SOLUTION INTRAMUSCULAR; INTRAVENOUS at 06:18

## 2022-05-25 RX ADMIN — Medication 20 UNITS: at 12:27

## 2022-05-25 RX ADMIN — SODIUM CHLORIDE, PRESERVATIVE FREE 10 ML: 5 INJECTION INTRAVENOUS at 16:04

## 2022-05-25 RX ADMIN — WATER 2 G: 1 INJECTION INTRAMUSCULAR; INTRAVENOUS; SUBCUTANEOUS at 16:00

## 2022-05-25 RX ADMIN — VANCOMYCIN HYDROCHLORIDE 1000 MG: 1 INJECTION, POWDER, LYOPHILIZED, FOR SOLUTION INTRAVENOUS at 03:12

## 2022-05-25 RX ADMIN — ATORVASTATIN CALCIUM 20 MG: 20 TABLET, FILM COATED ORAL at 22:09

## 2022-05-25 RX ADMIN — WATER 2 G: 1 INJECTION INTRAMUSCULAR; INTRAVENOUS; SUBCUTANEOUS at 23:31

## 2022-05-25 RX ADMIN — TRAZODONE HYDROCHLORIDE 150 MG: 100 TABLET ORAL at 22:09

## 2022-05-25 RX ADMIN — Medication 8 UNITS: at 12:27

## 2022-05-25 RX ADMIN — TRAZODONE HYDROCHLORIDE 150 MG: 100 TABLET ORAL at 01:33

## 2022-05-25 RX ADMIN — SODIUM CHLORIDE, PRESERVATIVE FREE 10 ML: 5 INJECTION INTRAVENOUS at 06:18

## 2022-05-25 RX ADMIN — Medication 2 UNITS: at 22:09

## 2022-05-25 RX ADMIN — MORPHINE SULFATE 4 MG: 2 INJECTION, SOLUTION INTRAMUSCULAR; INTRAVENOUS at 16:04

## 2022-05-25 RX ADMIN — PANTOPRAZOLE SODIUM 40 MG: 40 TABLET, DELAYED RELEASE ORAL at 09:44

## 2022-05-25 RX ADMIN — Medication 4 UNITS: at 17:40

## 2022-05-25 RX ADMIN — AMLODIPINE BESYLATE 10 MG: 10 TABLET ORAL at 09:44

## 2022-05-25 RX ADMIN — VANCOMYCIN HYDROCHLORIDE 1000 MG: 1 INJECTION, POWDER, LYOPHILIZED, FOR SOLUTION INTRAVENOUS at 16:04

## 2022-05-25 NOTE — PERIOP NOTES
TRANSFER - IN REPORT:    Verbal report received from Ameena(name) on Sonia Curry  being received from [address] [place] [address](unit) for routine post - op      Report consisted of patients Situation, Background, Assessment and   Recommendations(SBAR). Information from the following report(s) Intake/Output was reviewed with the receiving nurse. Opportunity for questions and clarification was provided. Assessment completed upon patients arrival to unit and care assumed.

## 2022-05-25 NOTE — CONSULTS
Infectious Disease Consultation Note        Reason: left foot infection    Current abx Prior abx   Cefepime, vancomycin since 5/23/22      Lines:       Assessment :    64 y. o. male with h/o type 2 DM , CAD who presented to [facility] on 5/23/22 from podiatrist office for evaluation of left foot infection     Hospitalization at [facility] November 2020 for right great toe distal phalanx chronic osteomyelitis, septic arthritis right great toe interphalangeal joint   Wound cultures 9/2020-Enterobacter, MRSA  Status post right great toe amputation on 11/24.       Hospitalization at [facility] 2/2021 for acute on chronic osteomyelitis right first metatarsal osteomyelitis.    Status post incision and debridement, partial resection of right first metatarsal on 2/5/2021.  Intra-Op cultures: Methicillin susceptible Staphylococcus aureus, Bacteroides. Bone biopsy of clean margins reveal acute inflammation suggestive of acute osteomyelitis.  S/p ertapenem, daptomycin till 3/19/2021     Hospitalization at [facility] 4/2021 for  chronic osteomyelitis right second metatarsal head, infected right lateral foot ulcer Status post transmetatarsal amputation 4/13/2021.  Intra-Op findings discussed with podiatrist. Fe Diaz clean margins achieved at surgery.  Bone biopsy, clean margins negative for acute osteomyelitis.  Intra-Op cultures no organisms seen.     Hospitalization at [facility] 2/2022 right foot cellulitis, infected right lateral foot ulcer, chronic osteomyelitis left first distal phalanx  Status post incision and debridement bilateral feet ulcer, partial amputation left great toe on 3/1/2022.    Wound culture right foot 2/28-MRSA, pseudomonas, proteus (susceptibilities of MRSA noted)  Intra-Op wound culture 3/1/2022-MRSA, Proteus    Clinical presentation consistent with septic arthritis fifth metatarsal phalangeal joint, chronic osteomyelitis of mid/distal fifth metatarsal, infected left midfoot ulcer  Status post left foot partial fifth ray amputation, debridement bilateral foot wounds with application of skin substitute graft on 5/24/2022    Intra-Op findings noted. No definitive evidence of infection right foot ulcer noted. Evidence of chronic osteomyelitis left fifth metatarsal noted intraoperatively. Grossly clean bone margins achieved at surgery. Intra-Op cultures 5/24-proteus, second gnr       Recommendations:     1.  Continue cefepime, vancomycin  2.  Follow-up Intra-Op cultures and modify antibiotics accordingly   3. Follow-up bone biopsy of clean bone margins   4. Plans to switch to oral antibiotics based on the cultures, bone biopsy results   5.  follow up  podiatry recommendations regarding wound care       Above plan was discussed in details with patient, dr. Jennie Douglas and primary team. Please call me if any further questions or concerns. Will continue to participate in the care of this patient.       Thank you for consultation request.     HPI:    64 y. o. male with h/o type 2 DM , CAD who presented to [address] [place] [address] on 5/23/22 from podiatrist office for evaluation of left foot infection     Patient is known to me from multiple inpatient consultation at [address] [place] [address] as mentioned above.he has a chronic diabetic foot ulcer on the dorsum of his left foot stretching from his lateral to medial aspect of the dorsal mid-foot. Patient reports an 8/10 intensity \"throbbing\" pain and states the wound has been present for several months, but now has some yellowish material.  Patient was seen in podiatrist office on 5/23. X-ray consistent with osteomyelitis left fifth metatarsal.  He was sent to emergency room for further imaging studies, admission. In [address] [place] [address] ER, Patient is noted to have Blood Pressure 168/97 mm Hg, WBC 10.5, Hgb 9.7, Neut% 71, Neut# 7.5, Sed rate 93 mm/hr, CO2 33, AG 1, Glucose 170 mg/dL, BUN 18, Creatinine 1/35, eGFR 54/>60, Albumin 2.8, Alk Phos 161, CRP 1.1 mg/dL, and Lactic Acid 1.34.   MRI Left Foot revealed evidence of osteomyelitis left fifth metatarsal.Status post left foot partial fifth ray amputation, debridement bilateral foot wounds with application of skin substitute graft on 5/24/2022. Patient was initiated on cefepime, vancomycin since admission. I been consulted for further recommendations. Patient denies any fever or chills throughout this time. He denies noticing any purulent drainage from the right foot. Intra-Op findings discussed with podiatrist.  No evidence of infection noted right foot ulcers. Some slough noted in the left foot. No evidence of acute infection left foot noted intraoperatively. Prior history of MRSA infection.           Past Medical History:   Diagnosis Date    Arthritis     Coronary atherosclerosis of native coronary artery     S/P PCI with GE in 12/09    Diabetes ([facility]     IDDM    Electrolyte and fluid disorders not elsewhere classified     Essential hypertension, benign     GERD (gastroesophageal reflux disease)     [facility] 05/26/16    Psychiatrist Dr. Elfego Pavon History of heart attack     Hyperlipidemia     Intermediate coronary syndrome [facility])     MDD (major depressive disorder) 5/26/16    Psychiatrist Dr. Savanna Lira infarction [facility])     Obesity, unspecified     Old myocardial infarction     Other and unspecified hyperlipidemia     Pancreatitis     Positive PPD     Sleep apnea     uses Cpap machine    Transfusion history     Before 1992       Past Surgical History:   Procedure Laterality Date    HX APPENDECTOMY      HX CORONARY STENT PLACEMENT  2010    2 stents       Home Medication List    Details   traZODone (DESYREL) 150 mg tablet Take 150 mg by mouth nightly. Indications: insomnia associated with depression      amLODIPine (NORVASC) 10 mg tablet Take 10 mg by mouth daily. metFORMIN (GLUCOPHAGE) 1,000 mg tablet Take 1,000 mg by mouth two (2) times daily (with meals).       insulin lispro (HUMALOG) 100 unit/mL injection Check FSBS AC*HS  For sugars between 160 and 200- Give 2 units SQ,  For sugars between 201 and 250, Give 3 units SQ,  For sugars Between 251 and 300, give 5 units SQ,  For Sugars between 301 and 350, give 7 units SQ  For sugars between 351 and 400, give 8 units SQ and contact PCP  Qty: 1 Vial, Refills: 0      insulin detemir U-100 (Levemir U-100 Insulin) 100 unit/mL injection 20 Units by SubCUTAneous route two (2) times a day. Qty: 10 mL, Refills: 0      pantoprazole (PROTONIX) 40 mg tablet Take 1 Tab by mouth Before breakfast and dinner. Qty: 60 Tab, Refills: 0      atorvastatin (LIPITOR) 20 mg tablet Take 1 Tab by mouth nightly. Qty: 30 Tab, Refills: 0      polyethylene glycol (MIRALAX) 17 gram packet Take 1 Packet by mouth daily.   Qty: 30 Packet, Refills: 0             Current Facility-Administered Medications   Medication Dose Route Frequency    [START ON 5/26/2022] Vancomycin trough due 05/26/22 @ 0330 (before 0400 dose) MUST be drawn before dose is started   Other ONCE    amLODIPine (NORVASC) tablet 10 mg  10 mg Oral DAILY    atorvastatin (LIPITOR) tablet 20 mg  20 mg Oral QHS    pantoprazole (PROTONIX) tablet 40 mg  40 mg Oral ACB&D    traZODone (DESYREL) tablet 150 mg  150 mg Oral QHS    glucose chewable tablet 16 g  4 Tablet Oral PRN    glucagon (GLUCAGEN) injection 1 mg  1 mg IntraMUSCular PRN    dextrose 10% infusion 0-250 mL  0-250 mL IntraVENous PRN    vancomycin (VANCOCIN) 1,000 mg in 0.9% sodium chloride 250 mL (VIAL-MATE)  1,000 mg IntraVENous Q12H    sodium chloride (NS) flush 5-10 mL  5-10 mL IntraVENous PRN    cefepime (MAXIPIME) 2 g in 0.9% sodium chloride (MBP/ADV) 100 mL MBP  2 g IntraVENous Q8H    sodium chloride (NS) flush 5-40 mL  5-40 mL IntraVENous Q8H    sodium chloride (NS) flush 5-40 mL  5-40 mL IntraVENous PRN    acetaminophen (TYLENOL) tablet 650 mg  650 mg Oral Q6H PRN    Or    acetaminophen (TYLENOL) suppository 650 mg  650 mg Rectal Q6H PRN    polyethylene glycol (MIRALAX) packet 17 g  17 g Oral DAILY PRN    ondansetron (ZOFRAN ODT) tablet 4 mg  4 mg Oral Q8H PRN    Or    ondansetron (ZOFRAN) injection 4 mg  4 mg IntraVENous Q6H PRN    naloxone (NARCAN) injection 0.4 mg  0.4 mg IntraVENous EVERY 2 MINUTES AS NEEDED    morphine injection 2-4 mg  2-4 mg IntraVENous Q4H PRN    albuterol-ipratropium (DUO-NEB) 2.5 MG-0.5 MG/3 ML  3 mL Nebulization Q6H PRN    melatonin tablet 5 mg  5 mg Oral QHS PRN    nitroglycerin (NITROBID) 2 % ointment 0.5 Inch  0.5 Inch Topical Q6H PRN       Allergies: Compazine [prochlorperazine]    Family History   Problem Relation Age of Onset    Heart Attack Father     Coronary Art Dis Mother     Diabetes Mother     Cancer Sister         breast cancer and lung cancer     Social History     Socioeconomic History    Marital status:      Spouse name: Not on file    Number of children: Not on file    Years of education: Not on file    Highest education level: Not on file   Occupational History    Not on file   Tobacco Use    Smoking status: Never Smoker    Smokeless tobacco: Never Used   Substance and Sexual Activity    Alcohol use: No    Drug use: No    Sexual activity: Not on file   Other Topics Concern    Not on file   Social History Narrative    Not on file     Social Determinants of Health     Financial Resource Strain:     Difficulty of Paying Living Expenses: Not on file   Food Insecurity:     Worried About Running Out of Food in the Last Year: Not on file    [place] of Food in the Last Year: Not on file   Transportation Needs:     Lack of Transportation (Medical): Not on file    Lack of Transportation (Non-Medical):  Not on file   Physical Activity:     Days of Exercise per Week: Not on file    Minutes of Exercise per Session: Not on file   Stress:     Feeling of Stress : Not on file   Social Connections:     Frequency of Communication with Friends and Family: Not on file    Frequency of Social Gatherings with Friends and Family: Not on file    Attends Catholic Services: Not on file    Active Member of Clubs or Organizations: Not on file    Attends Club or Organization Meetings: Not on file    Marital Status: Not on file   Intimate Partner Violence:     Fear of Current or Ex-Partner: Not on file    Emotionally Abused: Not on file    Physically Abused: Not on file    Sexually Abused: Not on file   Housing Stability:     Unable to Pay for Housing in the Last Year: Not on file    Number of [place] in the Last Year: Not on file    Unstable Housing in the Last Year: Not on file     Social History     Tobacco Use   Smoking Status Never Smoker   Smokeless Tobacco Never Used        Temp (24hrs), Av.9 °F (36.6 °C), Min:97 °F (36.1 °C), Max:98.4 °F (36.9 °C)    Visit Vitals  /65   Pulse 63   Temp 98.4 °F (36.9 °C)   Resp 18   Ht 5' 9\" (1.753 m)   Wt 115.6 kg (254 lb 12.8 oz)   SpO2 93%   BMI 37.63 kg/m²       ROS: 12 point ROS obtained in details. Pertinent positives as mentioned in HPI,   otherwise negative    Physical Exam:    General:          In NAD.  Nontoxic-appearing. HEENT:           Head NCAT. sclerae anicteric, EOMI.  OP clear. Neck:               Supple, trachea midline. Lungs:             Clear, no wheezes.  Effort nonlabored, no accessory muscle use. Chest wall:      No chest wall tenderness.  Chest expansion equal and symmetrical bilaterally. Heart:              RRR.  No JVD. Abdomen:        Soft, NT/ND.  Bowel sounds normoactive. Extremities:     No increased erythema/warmth/tenderness bilateral leg. Surgical dressing bilateral feet not removed  skin:                Skin changes as mentioned above  Psych:             Mood normal.  Calm, no agitation.   Neurologic:      Awake and alert, moves extremities spontaneously.      Labs: Results:   Chemistry Recent Labs     22  0440 22  1856   GLU 98 170*    138   K 4.0 3.7    104   CO2 33* 33*   BUN 17 18   CREA 1.15 1.35*   CA 8. 1* 8.5   AGAP NEG 1 1*   BUCR 15 13   * 161*   TP 7.8 8.5*   ALB 2.6* 2.8*   GLOB 5.2* 5.7*   AGRAT 0.5* 0.5*      CBC w/Diff Recent Labs     05/24/22  0440 05/23/22  1856   WBC 10.1 10.5   RBC 3.25* 3.32*   HGB 9.2* 9.7*   HCT 32.0* 32.2*    297   GRANS 73 71   LYMPH 16* 18*   EOS 2 3      Microbiology Recent Labs     05/24/22  0000 05/23/22  2219 05/23/22  1850   CULT NO GROWTH 1 DAY PENDING NO GROWTH 2 DAYS          RADIOLOGY:    All available imaging studies/reports in [facility] care for this admission were reviewed      Disclaimer: Sections of this note are dictated utilizing voice recognition software, which may have resulted in some phonetic based errors in grammar and contents. Even though attempts were made to correct all the mistakes, some may have been missed, and remained in the body of the document. If questions arise, please contact our department.     Dr. Aide Brock, Infectious Disease Specialist  [phone]  May 25, 2022  7:23 AM

## 2022-05-25 NOTE — OP NOTES
Operative Report     Patient: Emelyn Schroeder MRN: [id number]  SSN: [id number]    YOB: 1960  Age: 64 y.o. Sex: male       Indications: The patient was admitted to the hospital for left fifth toe osteomyelitis and septic fifth MTPJ and wounds on bilateral foot. Patient needed amputation of left partial fifth ray to prevent sepsis and worsening of infection. Also needed debridement of foot wounds with application of skin substitute graft to expedite healing process. All risks, benefits, complications of procedure discussed in detail with patient. Possible complications discussed including but not limited to delayed healing, non-healing, requirement of additional surgery, more proximal amputation, loss of limb or death. No guarantee made to outcome of procedure. Patient voiced verbal understanding and signed consent. Date of Surgery: 5/24/2022     Preoperative Diagnosis:  Left diabetic foot ulcer and  Osteomyelitis of 5th Left Lower Extremity Phalange. Postoperative Diagnosis: Left diabetic foot ulcer and  Osteomyelitis of 5th Left Lower Extremity Phalange. Surgeon(s) and Role:     * Korin Freeman DPM - Primary      Surgical Assistants: Operating Room Staff    Anesthesia: MAC WITH LOCAL    Procedure:  Procedure(s):  LEFT FOOT PARTIAL FIFTH RAY AMPUTATION, BILATERAL FOOT WOUNDS  DEBRIDEMENT AND APPLICATION OF SKIN SUBSTITUTE GRAFT    Procedure in Detail:     The patient was brought to the operative suite and secured in the supine position on the operating room table. After IV sedation from department of anesthesia local block consisting of 10 cc of 1:1 mixture of 1% lidocaine plain and 0.5% marcaine plain administered in reverse reich block fashion to left foot and circumferential around wounds. Bilateral foot prepped and draped in usual sterile fashion. Attention directed to left fifth MTPJ and dorsal incision performed just at edge of ulcer.  Incision carried down straight to bone. Toe was disarticulated at MTPJ and placed on back table. No deeper abscess noted. Amputated toe sent to pathology. Using sagittal saw, distal portion of fifth metatarsal resected. Surgical site irrigated with normal saline solution. Clean margin obtained from fifth metatarsal and sent to micro and pathology. Redundant soft tissue including tendons excised and skin edges loosely approximated with 3-0 prolene and left it open. Attention directed to left medial foot ulcer and sharp excisional debridement performed with # 15 blade down to and including tendon/fascia layer, removing all devitalized tissue and healthy, bleeding tissue exposed. Wound size noted to be 11 x 5 cm and 1 cm deep. Stravix placental graft thawed for 5 minutes in normal saline and applied to wound base with shiny side facing wound and secured with skin staples to wound margins. Similarly wound on right plantarlateral foot debrided down to and including subcutaneous tissue. Wound size noted to be 5.5 x 6 cm and 0.2 cm deep. Stravix placental graft applied and anchored with skin staples. Skin graft sites dressed with adaptic and dry gauze. Open wound on left lateral foot packed with betadine soaked gauze and dressed with dry gauze. The patient tolerated the procedure and anesthesia well. The patient was sent to the recovery room in apparent satisfactory condition. Findings:  As noted above    Estimated Blood Loss:  Less than 20 cc    Drains: none           Specimens:   ID Type Source Tests Collected by Time Destination   1 : LEFT FOOT FIFTH METATARSAL DIRTY MARGIN Preservative   Buddie Racer, [facility] 5/24/2022 2245 Pathology   2 : LEFT FOOT FIFTH METATARSAL CLEAN MARGIN Preservative   Buddie Racer, [facility] 5/24/2022 2245 Pathology               Implants:    Implant Name Type Inv.  Item Serial No.  Lot No. LRB No. Used Action   GRAFT HUM TISS [id number] CRYOPRESERVED PLCNTA TISS [employer] - [id number]  GRAFT HUM TISS Aloma Zenia CRYOPRESERVED PLCNTA TISS [employer] [id number] babberly [id number] Right 18 Implanted   GRAFT HUM TISS [id number] CRYOPRESERVED PLCNTA TISS [employer] - [id number]  GRAFT HUM TISS [id number] CRYOPRESERVED PLCNTA TISS [employer] AM[employer]ION [id number] babberly [id number] Left 18 Implanted   GRAFT HUM TISS [id number] CRYOPRESERVED PLCNTA TISS [employer]ION - [id number]  GRAFT HUM TISS [id number] CRYOPRESERVED PLCNTA TISS [employer]ION [id number] babberly [id number] Right 18 Implanted              Complications:  None

## 2022-05-25 NOTE — PROGRESS NOTES
[facility]  Progress Note    Patient: Chica Torres Age: 64 y.o. : 1960 MR#: [id number] SSN: [id number]  Date/Time: 2022     C/C: Left foot infection      Subjective:   HPI : Left foot infection, diabetic foot ulcer, other history of diabetes hypertension HLD and CAD          Review of Systems:  positive responses in bold type   Constitutional: Negative for fever, chills, diaphoresis and unexpected weight change. HENT: Negative for ear pain, congestion, sore throat, rhinorrhea, drooling, trouble swallowing, neck pain and tinnitus. Eyes: Negative for photophobia, pain, redness and visual disturbance. Respiratory: negative for shortness of breath, cough, choking, chest tightness, wheezing or stridor. Cardiovascular: Negative for chest pain, palpitations and leg swelling. Gastrointestinal: Negative for nausea, vomiting, abdominal pain, diarrhea, constipation, blood in stool, abdominal distention and anal bleeding. Genitourinary: Negative for dysuria, urgency, frequency, hematuria, flank pain and difficulty urinating. Musculoskeletal: Negative for back pain and arthralgias. Skin: Negative for color change, rash and wound. Neurological: Negative for dizziness, seizures, syncope, speech difficulty, light-headedness or headaches. Hematological: Does not bruise/bleed easily. Psychiatric/Behavioral: Negative for suicidal ideas, hallucinations, behavioral problems, self-injury or agitation     Assessment/Plan:     1.   Left foot diabetic ulcer  2 hypertension  3 diabetes mellitus type 2  4 history of CAD    Plan    -Per ID\" Plans to switch to oral antibiotics based on the cultures, bone biopsy results \"   -Podiatry recommendation  -Continue home blood pressure medications  -SSI    Objective:       General:  Alert, cooperative, no acute distress   HEENT: No facial asymmetry, CONCEPCION Manuel, External ears - WNL    Cardiovascular: S1S2 - regular , No Murmur   Pulmonary: Equal expansion , No Use of accessory muscles , No Rales No Rhonchi    GI:  +BS in all four quadrants, soft, non-tender  Extremities:   Left foot bandage daily resolved right foot partially bandaged  Neuro: Alert and oriented X 2.        DVT Prophylaxis:  []Lovenox  []Hep SQ  []SCDs  []Coumadin   []On Heparin gtt    [] Eliquis [] Xarelto     Vitals:         VS:   Visit Vitals  /73 (BP 1 Location: Left upper arm, BP Patient Position: At rest)   Pulse 71   Temp 99.3 °F (37.4 °C)   Resp 18   Ht 5' 9\" (1.753 m)   Wt 115.6 kg (254 lb 12.8 oz)   SpO2 92%   BMI 37.63 kg/m²      Tmax/24hrs: Temp (24hrs), Av.2 °F (36.8 °C), Min:97.9 °F (36.6 °C), Max:99.3 °F (37.4 °C)        Medications:   Current Facility-Administered Medications   Medication Dose Route Frequency    insulin glargine (LANTUS) injection 20 Units  20 Units SubCUTAneous DAILY    insulin lispro (HUMALOG) injection   SubCUTAneous AC&HS    dextrose 10% infusion 0-250 mL  0-250 mL IntraVENous PRN    amLODIPine (NORVASC) tablet 10 mg  10 mg Oral DAILY    atorvastatin (LIPITOR) tablet 20 mg  20 mg Oral QHS    pantoprazole (PROTONIX) tablet 40 mg  40 mg Oral ACB&D    traZODone (DESYREL) tablet 150 mg  150 mg Oral QHS    glucose chewable tablet 16 g  4 Tablet Oral PRN    glucagon (GLUCAGEN) injection 1 mg  1 mg IntraMUSCular PRN    vancomycin (VANCOCIN) 1,000 mg in 0.9% sodium chloride 250 mL (VIAL-MATE)  1,000 mg IntraVENous Q12H    sodium chloride (NS) flush 5-10 mL  5-10 mL IntraVENous PRN    cefepime (MAXIPIME) 2 g in 0.9% sodium chloride (MBP/ADV) 100 mL MBP  2 g IntraVENous Q8H    sodium chloride (NS) flush 5-40 mL  5-40 mL IntraVENous Q8H    sodium chloride (NS) flush 5-40 mL  5-40 mL IntraVENous PRN    acetaminophen (TYLENOL) tablet 650 mg  650 mg Oral Q6H PRN    Or    acetaminophen (TYLENOL) suppository 650 mg  650 mg Rectal Q6H PRN    polyethylene glycol (MIRALAX) packet 17 g  17 g Oral DAILY PRN    ondansetron (ZOFRAN ODT) tablet 4 mg  4 mg Oral Q8H PRN    Or    ondansetron (ZOFRAN) injection 4 mg  4 mg IntraVENous Q6H PRN    naloxone (NARCAN) injection 0.4 mg  0.4 mg IntraVENous EVERY 2 MINUTES AS NEEDED    morphine injection 2-4 mg  2-4 mg IntraVENous Q4H PRN    albuterol-ipratropium (DUO-NEB) 2.5 MG-0.5 MG/3 ML  3 mL Nebulization Q6H PRN    melatonin tablet 5 mg  5 mg Oral QHS PRN    nitroglycerin (NITROBID) 2 % ointment 0.5 Inch  0.5 Inch Topical Q6H PRN       Labs:    Recent Labs     05/25/22  1049 05/24/22  0440 05/23/22  1856   WBC 8.3 10.1 10.5   HGB 8.9* 9.2* 9.7*   HCT 30.7* 32.0* 32.2*    233 297     Recent Labs     05/25/22  1049 05/24/22  0605 05/24/22  0440 05/23/22  1856     --  139 138   K 4.3  --  4.0 3.7     --  107 104   CO2 31  --  33* 33*   *  --  98 170*   BUN 16  --  17 18   CREA 1.15  --  1.15 1.35*   CA 8.3*  --  8.1* 8.5   ALB  --   --  2.6* 2.8*   ALT  --   --  27 32   INR  --  1.2  --   --          Time spent on direct patient care >30 mints     Complexity : High complex - due to multiple medical issues outlined above. CODE Status : Full code    Case discussed with:  [x]Patient  [] Family  []Nursing  []Case Management        Disclaimer: Sections of this note are dictated utilizing voice recognition software, which may have resulted in some phonetic based errors in grammar and contents. Even though attempts were made to correct all the mistakes, some may have been missed, and remained in the body of the document. If questions arise, please contact our department.     Signed By: Miller Cabral MD     May 25, 2022

## 2022-05-25 NOTE — ANESTHESIA PREPROCEDURE EVALUATION
Relevant Problems   No relevant active problems       Anesthetic History     PONV          Review of Systems / Medical History  Patient summary reviewed and pertinent labs reviewed    Pulmonary        Sleep apnea: CPAP           Neuro/Psych         Psychiatric history     Cardiovascular    Hypertension          CAD    Exercise tolerance: <4 METS     GI/Hepatic/Renal     GERD: well controlled           Endo/Other    Diabetes: well controlled, type 2  Hypothyroidism: well controlled  Morbid obesity and arthritis     Other Findings              Physical Exam    Airway  Mallampati: III  TM Distance: 4 - 6 cm  Neck ROM: decreased range of motion   Mouth opening: Normal     Cardiovascular    Rhythm: regular  Rate: normal         Dental    Dentition: Poor dentition     Pulmonary  Breath sounds clear to auscultation               Abdominal  GI exam deferred       Other Findings            Anesthetic Plan    ASA: 3  Anesthesia type: MAC            Anesthetic plan and risks discussed with: Patient

## 2022-05-25 NOTE — ROUTINE PROCESS
Bedside shift change report given to Gabi Weinstein RN (oncoming nurse) by Mai Fernandez RN (offgoing nurse). Report included the following information SBAR, Kardex, Intake/Output and MAR.

## 2022-05-25 NOTE — DIABETES MGMT
Diabetes/ Glycemic Control Plan of Care  Recommendations:   Patient with known history of DM  Blood glucose this am 288 mg/dl. Recommend ordering lantus 20 units daily along with lispro corrective insulin coverage as needed. Noted diet ordered. Will continue inpatient monitoring. Fasting/ Morning blood glucose:   Lab Results   Component Value Date/Time    Glucose 98 05/24/2022 04:40 AM    Glucose (POC) 288 (H) 05/25/2022 06:23 AM    Glucose,  (HH) 08/10/2015 07:10 PM     IV Fluids containing dextrose:   None   Steroids:   none    Blood glucose values:       Results for Danisha Rosas (MRN [id number]) as of 5/25/2022 10:54   Ref. Range 5/24/2022 23:42 5/25/2022 01:23 5/25/2022 06:23   GLUCOSE,FAST - POC Latest Ref Range: 70 - 110 mg/dL 147 (H) 122 (H) 288 (H)     Within target range (70-180mg/dL):  no.  Current insulin orders:   None   Total Daily Dose previous 24 hours = none   Current A1c:   Lab Results   Component Value Date/Time    Hemoglobin A1c 6.1 (H) 03/01/2022 03:35 AM      equivalent  to ave Blood Glucose of 128 mg/dl for 2-3 months prior to admission  Adequate glycemic control PTA:  Yes   Nutrition/Diet:   Active Orders   Diet    ADULT DIET Regular; 3 carb choices (45 gm/meal); Low Fat/Low Chol/High Fiber/2 gm Na; Low Sodium (2 gm)      Meal Intake:  No data found. Supplement Intake:  No data found. Home diabetes medications:   Key Antihyperglycemic Medications             metFORMIN (GLUCOPHAGE) 1,000 mg tablet (Taking) Take 1,000 mg by mouth two (2) times daily (with meals).     insulin lispro (HUMALOG) 100 unit/mL injection (Taking) Check FSBS AC*HS  For sugars between 160 and 200- Give 2 units SQ,  For sugars between 201 and 250, Give 3 units SQ,  For sugars Between 251 and 300, give 5 units SQ,  For Sugars between 301 and 350, give 7 units SQ  For sugars between 351 and 400, give 8 units SQ and contact PCP    insulin detemir U-100 (Levemir U-100 Insulin) 100 unit/mL injection (Taking) 20 Units by SubCUTAneous route two (2) times a day. Plan/Goals:   Blood glucose will be within target of 70 - 180 mg/dl within 72 hours  Reinforce dietary and medication compliance at home.        Education:  [] Refer to Diabetes Education Record                         [x] Education not indicated at this time     Arti Cabrales, [facility] CDE  Ext [phone]

## 2022-05-25 NOTE — PROGRESS NOTES
Problem: Risk for Spread of Infection  Goal: Prevent transmission of infectious organism to others  Description: Prevent the transmission of infectious organisms to other patients, staff members, and visitors. Outcome: Progressing Towards Goal     Problem: Patient Education:  Go to Education Activity  Goal: Patient/Family Education  Outcome: Progressing Towards Goal     Problem: Falls - Risk of  Goal: *Absence of Falls  Description: Document Jose Singh Fall Risk and appropriate interventions in the flowsheet. Outcome: Progressing Towards Goal  Note: Fall Risk Interventions:  Mobility Interventions: Assess mobility with egress test,Patient to call before getting OOB         Medication Interventions: Patient to call before getting OOB,Teach patient to arise slowly                   Problem: Patient Education: Go to Patient Education Activity  Goal: Patient/Family Education  Outcome: Progressing Towards Goal     Problem: Pain  Goal: *Control of Pain  Outcome: Progressing Towards Goal  Goal: *PALLIATIVE CARE:  Alleviation of Pain  Outcome: Progressing Towards Goal     Problem: Patient Education: Go to Patient Education Activity  Goal: Patient/Family Education  Outcome: Progressing Towards Goal     Problem: Diabetes Self-Management  Goal: *Disease process and treatment process  Description: Define diabetes and identify own type of diabetes; list 3 options for treating diabetes. Outcome: Progressing Towards Goal  Goal: *Incorporating nutritional management into lifestyle  Description: Describe effect of type, amount and timing of food on blood glucose; list 3 methods for planning meals. Outcome: Progressing Towards Goal  Goal: *Incorporating physical activity into lifestyle  Description: State effect of exercise on blood glucose levels.   Outcome: Progressing Towards Goal  Goal: *Developing strategies to promote health/change behavior  Description: Define the ABC's of diabetes; identify appropriate screenings, schedule and personal plan for screenings. Outcome: Progressing Towards Goal  Goal: *Using medications safely  Description: State effect of diabetes medications on diabetes; name diabetes medication taking, action and side effects. Outcome: Progressing Towards Goal  Goal: *Monitoring blood glucose, interpreting and using results  Description: Identify recommended blood glucose targets  and personal targets. Outcome: Progressing Towards Goal  Goal: *Prevention, detection, treatment of acute complications  Description: List symptoms of hyper- and hypoglycemia; describe how to treat low blood sugar and actions for lowering  high blood glucose level. Outcome: Progressing Towards Goal  Goal: *Prevention, detection and treatment of chronic complications  Description: Define the natural course of diabetes and describe the relationship of blood glucose levels to long term complications of diabetes.   Outcome: Progressing Towards Goal  Goal: *Developing strategies to address psychosocial issues  Description: Describe feelings about living with diabetes; identify support needed and support network  Outcome: Progressing Towards Goal  Goal: *Insulin pump training  Outcome: Progressing Towards Goal  Goal: *Sick day guidelines  Outcome: Progressing Towards Goal  Goal: *Patient Specific Goal (EDIT GOAL, INSERT TEXT)  Outcome: Progressing Towards Goal     Problem: Patient Education: Go to Patient Education Activity  Goal: Patient/Family Education  Outcome: Progressing Towards Goal     Problem: Hypertension  Goal: *Blood pressure within specified parameters  Outcome: Progressing Towards Goal  Goal: *Fluid volume balance  Outcome: Progressing Towards Goal  Goal: *Labs within defined limits  Outcome: Progressing Towards Goal     Problem: Patient Education: Go to Patient Education Activity  Goal: Patient/Family Education  Outcome: Progressing Towards Goal

## 2022-05-25 NOTE — ANESTHESIA POSTPROCEDURE EVALUATION
Procedure(s):  LEFT FOOT PARTIAL FIFTH RAY AMPUTATION, BILATERAL FOOT WOUNDS  DEBRIDEMENT AND APPLICATION OF SKIN SUBSTITUTE GRAFT. MAC    Anesthesia Post Evaluation      Multimodal analgesia: multimodal analgesia used between 6 hours prior to anesthesia start to PACU discharge  Patient location during evaluation: bedside  Patient participation: complete - patient participated  Level of consciousness: awake  Pain management: adequate  Airway patency: patent  Anesthetic complications: no  Cardiovascular status: stable  Respiratory status: acceptable  Hydration status: acceptable  Post anesthesia nausea and vomiting:  controlled      INITIAL Post-op Vital signs:   Vitals Value Taken Time   /55 05/24/22 2347   Temp 36.6 °C (97.9 °F) 05/24/22 2328   Pulse 50 05/24/22 2349   Resp 14 05/24/22 2349   SpO2 99 % 05/24/22 2349   Vitals shown include unvalidated device data.

## 2022-05-25 NOTE — PROGRESS NOTES
conducted an initial consultation and Spiritual Assessment for Jordyn Monroy, who is a 64 y.o.,male. Patients Primary Language is: [place].    According to the patients EMR Oriental orthodox Affiliation is: [facility].     The reason the Patient came to the hospital is:   Patient Active Problem List    Diagnosis Date Noted    Infected wound 05/23/2022    Obesity (BMI 35.0-39.9 without comorbidity) 05/23/2022    OM (osteomyelitis) ([address] 02/28/2022    Diabetic foot infection ([address] 04/12/2021    Leukocytosis 02/04/2021    Wound infection 02/04/2021    Diabetic ulcer of left midfoot associated with type 2 diabetes mellitus, with fat layer exposed ([address] 02/04/2021    Acute kidney injury ([address] 02/04/2021    Fracture, toe 09/24/2020    Altered mental status 09/01/2020    Multifocal pneumonia 09/01/2020    DM (diabetes mellitus) ([address] 12/25/2019    Orthostatic hypotension 12/25/2019    Syncope and collapse 12/24/2019    Vomiting 03/28/2018    Chronic abdominal pain 03/28/2018    Sepsis (HCC) 03/21/2018    Nausea and vomiting 09/07/2017    Gastroparesis 09/07/2017    Hypokalemia 09/07/2017    Atelectasis 09/07/2017    Abdominal pain 09/07/2017    Acquired hypothyroidism 09/07/2017    S/P lumbar fusion 07/05/2017    Diabetic neuropathy ([address] 07/05/2017    Neuritis 07/05/2017    Spinal stenosis at L4-L5 level 06/19/2017    Coronary artery disease involving native coronary artery of native heart without angina pectoris 05/31/2017    History of coronary artery stent placement 05/31/2017    Synovial cyst 05/26/2017    HNP (herniated nucleus pulposus), lumbar 05/26/2017    Lumbar spinal stenosis 05/26/2017    Spondylolisthesis of lumbar region 05/26/2017    Positive PPD     History of heart attack     Pain in left lower leg 07/24/2016    Primary osteoarthritis of left knee 07/24/2016    Localized adiposity of abdomen 07/24/2016    Diabetes ([address] 08/14/2015    Essential hypertension 08/14/2015    GERD (gastroesophageal reflux disease) 08/14/2015    Depression 08/14/2015        The  provided the following Interventions:  Initiated a relationship of care and support. Listened empathically. Provided information about Spiritual Care Services. Offered prayer and assurance of continued prayers on patient's behalf. Chart reviewed. The following outcomes where achieved:  Patient shared limited information about both their medical narrative and spiritual journey/beliefs. Patient processed feeling about current hospitalization. Patient expressed gratitude for 's visit. Assessment:  Patient does not have any Taoism/cultural needs that will affect patients preferences in health care. There are no spiritual or Taoism issues which require intervention at this time. Plan:  Chaplains will continue to follow and will provide pastoral care on an as needed/requested basis.  recommends bedside caregivers page  on duty if patient shows signs of acute spiritual or emotional distress.       [address] [facility]   [phone]

## 2022-05-26 ENCOUNTER — APPOINTMENT (OUTPATIENT)
Dept: INTERVENTIONAL RADIOLOGY/VASCULAR | Age: 62
DRG: 305 | End: 2022-05-26
Attending: INTERNAL MEDICINE
Payer: MEDICAID

## 2022-05-26 LAB
ANION GAP SERPL CALC-SCNC: 2 MMOL/L (ref 3–18)
BUN SERPL-MCNC: 16 MG/DL (ref 7–18)
BUN/CREAT SERPL: 13 (ref 12–20)
CALCIUM SERPL-MCNC: 8.4 MG/DL (ref 8.5–10.1)
CHLORIDE SERPL-SCNC: 106 MMOL/L (ref 100–111)
CO2 SERPL-SCNC: 33 MMOL/L (ref 21–32)
CREAT SERPL-MCNC: 1.2 MG/DL (ref 0.6–1.3)
DATE LAST DOSE: NORMAL
ERYTHROCYTE [DISTWIDTH] IN BLOOD BY AUTOMATED COUNT: 14.4 % (ref 11.6–14.5)
GLUCOSE BLD STRIP.AUTO-MCNC: 159 MG/DL (ref 70–110)
GLUCOSE BLD STRIP.AUTO-MCNC: 161 MG/DL (ref 70–110)
GLUCOSE BLD STRIP.AUTO-MCNC: 170 MG/DL (ref 70–110)
GLUCOSE BLD STRIP.AUTO-MCNC: 199 MG/DL (ref 70–110)
GLUCOSE SERPL-MCNC: 150 MG/DL (ref 74–99)
HCT VFR BLD AUTO: 27.8 % (ref 36–48)
HGB BLD-MCNC: 8.2 G/DL (ref 13–16)
MCH RBC QN AUTO: 28.6 PG (ref 24–34)
MCHC RBC AUTO-ENTMCNC: 29.5 G/DL (ref 31–37)
MCV RBC AUTO: 96.9 FL (ref 78–100)
NRBC # BLD: 0 K/UL (ref 0–0.01)
NRBC BLD-RTO: 0 PER 100 WBC
PLATELET # BLD AUTO: 206 K/UL (ref 135–420)
PMV BLD AUTO: 11.2 FL (ref 9.2–11.8)
POTASSIUM SERPL-SCNC: 3.8 MMOL/L (ref 3.5–5.5)
RBC # BLD AUTO: 2.87 M/UL (ref 4.35–5.65)
REPORTED DOSE,DOSE: NORMAL UNITS
REPORTED DOSE/TIME,TMG: 1600
SODIUM SERPL-SCNC: 141 MMOL/L (ref 136–145)
VANCOMYCIN TROUGH SERPL-MCNC: 14.1 UG/ML (ref 10–20)
WBC # BLD AUTO: 9 K/UL (ref 4.6–13.2)

## 2022-05-26 PROCEDURE — 74011000250 HC RX REV CODE- 250: Performed by: INTERNAL MEDICINE

## 2022-05-26 PROCEDURE — 97165 OT EVAL LOW COMPLEX 30 MIN: CPT

## 2022-05-26 PROCEDURE — 85027 COMPLETE CBC AUTOMATED: CPT

## 2022-05-26 PROCEDURE — 80048 BASIC METABOLIC PNL TOTAL CA: CPT

## 2022-05-26 PROCEDURE — 74011250636 HC RX REV CODE- 250/636: Performed by: INTERNAL MEDICINE

## 2022-05-26 PROCEDURE — 74011000258 HC RX REV CODE- 258: Performed by: INTERNAL MEDICINE

## 2022-05-26 PROCEDURE — 36415 COLL VENOUS BLD VENIPUNCTURE: CPT

## 2022-05-26 PROCEDURE — 97161 PT EVAL LOW COMPLEX 20 MIN: CPT

## 2022-05-26 PROCEDURE — 74011250637 HC RX REV CODE- 250/637: Performed by: INTERNAL MEDICINE

## 2022-05-26 PROCEDURE — 65270000029 HC RM PRIVATE

## 2022-05-26 PROCEDURE — 80202 ASSAY OF VANCOMYCIN: CPT

## 2022-05-26 PROCEDURE — 2709999900 HC NON-CHARGEABLE SUPPLY

## 2022-05-26 PROCEDURE — 74011636637 HC RX REV CODE- 636/637: Performed by: INTERNAL MEDICINE

## 2022-05-26 PROCEDURE — 99232 SBSQ HOSP IP/OBS MODERATE 35: CPT | Performed by: HOSPITALIST

## 2022-05-26 PROCEDURE — 76937 US GUIDE VASCULAR ACCESS: CPT

## 2022-05-26 PROCEDURE — 77030025414 HC DRSG VAC ASST SMPLC KCON -B

## 2022-05-26 PROCEDURE — 74011250637 HC RX REV CODE- 250/637: Performed by: STUDENT IN AN ORGANIZED HEALTH CARE EDUCATION/TRAINING PROGRAM

## 2022-05-26 PROCEDURE — 97606 NEG PRS WND THER DME>50 SQCM: CPT

## 2022-05-26 PROCEDURE — 97116 GAIT TRAINING THERAPY: CPT

## 2022-05-26 PROCEDURE — 82962 GLUCOSE BLOOD TEST: CPT

## 2022-05-26 RX ORDER — OXYCODONE AND ACETAMINOPHEN 5; 325 MG/1; MG/1
1 TABLET ORAL
Status: COMPLETED | OUTPATIENT
Start: 2022-05-26 | End: 2022-05-26

## 2022-05-26 RX ADMIN — VANCOMYCIN HYDROCHLORIDE 1000 MG: 1 INJECTION, POWDER, LYOPHILIZED, FOR SOLUTION INTRAVENOUS at 04:03

## 2022-05-26 RX ADMIN — WATER 2 G: 1 INJECTION INTRAMUSCULAR; INTRAVENOUS; SUBCUTANEOUS at 16:40

## 2022-05-26 RX ADMIN — Medication 2 UNITS: at 06:48

## 2022-05-26 RX ADMIN — Medication 2 UNITS: at 12:47

## 2022-05-26 RX ADMIN — SODIUM CHLORIDE, PRESERVATIVE FREE 10 ML: 5 INJECTION INTRAVENOUS at 21:34

## 2022-05-26 RX ADMIN — TRAZODONE HYDROCHLORIDE 150 MG: 100 TABLET ORAL at 21:31

## 2022-05-26 RX ADMIN — Medication 2 UNITS: at 21:32

## 2022-05-26 RX ADMIN — SODIUM CHLORIDE, PRESERVATIVE FREE 10 ML: 5 INJECTION INTRAVENOUS at 06:51

## 2022-05-26 RX ADMIN — ATORVASTATIN CALCIUM 20 MG: 20 TABLET, FILM COATED ORAL at 21:31

## 2022-05-26 RX ADMIN — PANTOPRAZOLE SODIUM 40 MG: 40 TABLET, DELAYED RELEASE ORAL at 09:49

## 2022-05-26 RX ADMIN — OXYCODONE HYDROCHLORIDE AND ACETAMINOPHEN 1 TABLET: 5; 325 TABLET ORAL at 05:13

## 2022-05-26 RX ADMIN — VANCOMYCIN HYDROCHLORIDE 1000 MG: 1 INJECTION, POWDER, LYOPHILIZED, FOR SOLUTION INTRAVENOUS at 16:40

## 2022-05-26 RX ADMIN — PANTOPRAZOLE SODIUM 40 MG: 40 TABLET, DELAYED RELEASE ORAL at 16:40

## 2022-05-26 RX ADMIN — MORPHINE SULFATE 4 MG: 2 INJECTION, SOLUTION INTRAMUSCULAR; INTRAVENOUS at 09:50

## 2022-05-26 RX ADMIN — SODIUM CHLORIDE, PRESERVATIVE FREE 10 ML: 5 INJECTION INTRAVENOUS at 16:42

## 2022-05-26 RX ADMIN — Medication 2 UNITS: at 16:40

## 2022-05-26 RX ADMIN — AMLODIPINE BESYLATE 10 MG: 10 TABLET ORAL at 09:49

## 2022-05-26 RX ADMIN — WATER 2 G: 1 INJECTION INTRAMUSCULAR; INTRAVENOUS; SUBCUTANEOUS at 09:49

## 2022-05-26 RX ADMIN — Medication 20 UNITS: at 09:49

## 2022-05-26 RX ADMIN — CEFEPIME HYDROCHLORIDE 2 G: 2 INJECTION, POWDER, FOR SOLUTION INTRAVENOUS at 23:44

## 2022-05-26 NOTE — PROGRESS NOTES
Freedom of choice signed for [facility]. Referral placed in queue. Notified Reinaldo Raya.  will continue to monitor and assist with transition of care needs.     JESUS Calhoun, RN  Care Management  Pager # [phone]

## 2022-05-26 NOTE — PROGRESS NOTES
Infectious Disease progress Note        Reason: left foot infection    Current abx Prior abx   Cefepime, vancomycin since 5/23/22      Lines:       Assessment :    64 y. o. male with h/o type 2 DM , CAD who presented to [facility] on 5/23/22 from podiatrist office for evaluation of left foot infection     Hospitalization at [facility] November 2020 for right great toe distal phalanx chronic osteomyelitis, septic arthritis right great toe interphalangeal joint   Wound cultures 9/2020-Enterobacter, MRSA  Status post right great toe amputation on 11/24.       Hospitalization at [facility] 2/2021 for acute on chronic osteomyelitis right first metatarsal osteomyelitis.    Status post incision and debridement, partial resection of right first metatarsal on 2/5/2021.  Intra-Op cultures: Methicillin susceptible Staphylococcus aureus, Bacteroides. Bone biopsy of clean margins reveal acute inflammation suggestive of acute osteomyelitis.  S/p ertapenem, daptomycin till 3/19/2021     Hospitalization at [facility] 4/2021 for  chronic osteomyelitis right second metatarsal head, infected right lateral foot ulcer Status post transmetatarsal amputation 4/13/2021.  Intra-Op findings discussed with podiatrist. Laurie Swenson clean margins achieved at surgery.  Bone biopsy, clean margins negative for acute osteomyelitis.  Intra-Op cultures no organisms seen.     Hospitalization at [facility] 2/2022 right foot cellulitis, infected right lateral foot ulcer, chronic osteomyelitis left first distal phalanx  Status post incision and debridement bilateral feet ulcer, partial amputation left great toe on 3/1/2022.    Wound culture right foot 2/28-MRSA, pseudomonas, proteus (susceptibilities of MRSA noted)  Intra-Op wound culture 3/1/2022-MRSA, Proteus    Clinical presentation consistent with septic arthritis fifth metatarsal phalangeal joint, chronic osteomyelitis of mid/distal fifth metatarsal, infected left midfoot ulcer  Status post left foot partial fifth ray amputation, debridement bilateral foot wounds with application of skin substitute graft on 5/24/2022    Intra-Op findings noted. No definitive evidence of infection right foot ulcer noted. Evidence of chronic osteomyelitis left fifth metatarsal noted intraoperatively. Grossly clean bone margins achieved at surgery. Intra-Op cultures 5/24-proteus, second gnr       Recommendations:     1.  Continue cefepime, vancomycin  2.  Follow-up Intra-Op cultures and modify antibiotics accordingly   3. Follow-up bone biopsy of clean bone margins   4. Plans to switch to oral antibiotics in a.m. based on the cultures, bone biopsy results   5.  follow up  podiatry recommendations regarding wound care       Above plan was discussed in details with patient, dr. Bridgette Holstein and primary team. Please call me if any further questions or concerns. Will continue to participate in the care of this patient.           HPI:    Patient denies any fever or chills.   No worsening pain in his feet.           Past Medical History:   Diagnosis Date    Arthritis     Coronary atherosclerosis of native coronary artery     S/P PCI with GE in 12/09    Diabetes ([facility]     IDDM    Electrolyte and fluid disorders not elsewhere classified     Essential hypertension, benign     GERD (gastroesophageal reflux disease)     Heroin abuse ([facility] 05/26/16    Psychiatrist Dr. Chato Wilson History of heart attack     Hyperlipidemia     Intermediate coronary syndrome [facility])     MDD (major depressive disorder) 5/26/16    Psychiatrist Dr. María Paredes infarction [facility])     Obesity, unspecified     Old myocardial infarction     Other and unspecified hyperlipidemia     Pancreatitis     Positive PPD     Sleep apnea     uses Cpap machine    Transfusion history     Before 1992       Past Surgical History:   Procedure Laterality Date    HX APPENDECTOMY      HX CORONARY STENT PLACEMENT  2010    2 stents       Home Medication List    Details traZODone (DESYREL) 150 mg tablet Take 150 mg by mouth nightly. Indications: insomnia associated with depression      amLODIPine (NORVASC) 10 mg tablet Take 10 mg by mouth daily. metFORMIN (GLUCOPHAGE) 1,000 mg tablet Take 1,000 mg by mouth two (2) times daily (with meals). insulin lispro (HUMALOG) 100 unit/mL injection Check FSBS AC*HS  For sugars between 160 and 200- Give 2 units SQ,  For sugars between 201 and 250, Give 3 units SQ,  For sugars Between 251 and 300, give 5 units SQ,  For Sugars between 301 and 350, give 7 units SQ  For sugars between 351 and 400, give 8 units SQ and contact PCP  Qty: 1 Vial, Refills: 0      insulin detemir U-100 (Levemir U-100 Insulin) 100 unit/mL injection 20 Units by SubCUTAneous route two (2) times a day. Qty: 10 mL, Refills: 0      pantoprazole (PROTONIX) 40 mg tablet Take 1 Tab by mouth Before breakfast and dinner. Qty: 60 Tab, Refills: 0      atorvastatin (LIPITOR) 20 mg tablet Take 1 Tab by mouth nightly. Qty: 30 Tab, Refills: 0      polyethylene glycol (MIRALAX) 17 gram packet Take 1 Packet by mouth daily.   Qty: 30 Packet, Refills: 0             Current Facility-Administered Medications   Medication Dose Route Frequency    insulin glargine (LANTUS) injection 20 Units  20 Units SubCUTAneous DAILY    insulin lispro (HUMALOG) injection   SubCUTAneous AC&HS    dextrose 10% infusion 0-250 mL  0-250 mL IntraVENous PRN    cefepime (MAXIPIME) 2 g in sterile water (preservative free) 10 mL IV syringe  2 g IntraVENous Q8H    amLODIPine (NORVASC) tablet 10 mg  10 mg Oral DAILY    atorvastatin (LIPITOR) tablet 20 mg  20 mg Oral QHS    pantoprazole (PROTONIX) tablet 40 mg  40 mg Oral ACB&D    traZODone (DESYREL) tablet 150 mg  150 mg Oral QHS    glucose chewable tablet 16 g  4 Tablet Oral PRN    glucagon (GLUCAGEN) injection 1 mg  1 mg IntraMUSCular PRN    vancomycin (VANCOCIN) 1,000 mg in 0.9% sodium chloride 250 mL (VIAL-MATE)  1,000 mg IntraVENous Q12H    sodium chloride (NS) flush 5-10 mL  5-10 mL IntraVENous PRN    sodium chloride (NS) flush 5-40 mL  5-40 mL IntraVENous Q8H    sodium chloride (NS) flush 5-40 mL  5-40 mL IntraVENous PRN    acetaminophen (TYLENOL) tablet 650 mg  650 mg Oral Q6H PRN    Or    acetaminophen (TYLENOL) suppository 650 mg  650 mg Rectal Q6H PRN    polyethylene glycol (MIRALAX) packet 17 g  17 g Oral DAILY PRN    ondansetron (ZOFRAN ODT) tablet 4 mg  4 mg Oral Q8H PRN    Or    ondansetron (ZOFRAN) injection 4 mg  4 mg IntraVENous Q6H PRN    naloxone (NARCAN) injection 0.4 mg  0.4 mg IntraVENous EVERY 2 MINUTES AS NEEDED    morphine injection 2-4 mg  2-4 mg IntraVENous Q4H PRN    albuterol-ipratropium (DUO-NEB) 2.5 MG-0.5 MG/3 ML  3 mL Nebulization Q6H PRN    melatonin tablet 5 mg  5 mg Oral QHS PRN    nitroglycerin (NITROBID) 2 % ointment 0.5 Inch  0.5 Inch Topical Q6H PRN       Allergies: Compazine [prochlorperazine]    Family History   Problem Relation Age of Onset    Heart Attack Father     Coronary Art Dis Mother     Diabetes Mother     Cancer Sister         breast cancer and lung cancer     Social History     Socioeconomic History    Marital status:      Spouse name: Not on file    Number of children: Not on file    Years of education: Not on file    Highest education level: Not on file   Occupational History    Not on file   Tobacco Use    Smoking status: Never Smoker    Smokeless tobacco: Never Used   Substance and Sexual Activity    Alcohol use: No    Drug use: No    Sexual activity: Not on file   Other Topics Concern    Not on file   Social History Narrative    Not on file     Social Determinants of Health     Financial Resource Strain:     Difficulty of Paying Living Expenses: Not on file   Food Insecurity:     Worried About Running Out of Food in the Last Year: Not on file    [place] of Food in the Last Year: Not on file   Transportation Needs:     Lack of Transportation (Medical):  Not on file    Lack of Transportation (Non-Medical): Not on file   Physical Activity:     Days of Exercise per Week: Not on file    Minutes of Exercise per Session: Not on file   Stress:     Feeling of Stress : Not on file   Social Connections:     Frequency of Communication with Friends and Family: Not on file    Frequency of Social Gatherings with Friends and Family: Not on file    Attends Protestant Services: Not on file    Active Member of [address] [place] [address] or Organizations: Not on file    Attends Club or Organization Meetings: Not on file    Marital Status: Not on file   Intimate Partner Violence:     Fear of Current or Ex-Partner: Not on file    Emotionally Abused: Not on file    Physically Abused: Not on file    Sexually Abused: Not on file   Housing Stability:     Unable to Pay for Housing in the Last Year: Not on file    Number of [place] in the Last Year: Not on file    Unstable Housing in the Last Year: Not on file     Social History     Tobacco Use   Smoking Status Never Smoker   Smokeless Tobacco Never Used        Temp (24hrs), Av.4 °F (36.9 °C), Min:98.1 °F (36.7 °C), Max:99.3 °F (37.4 °C)    Visit Vitals  /73   Pulse (!) 56   Temp 98.2 °F (36.8 °C)   Resp 16   Ht 5' 9\" (1.753 m)   Wt 116.5 kg (256 lb 14.4 oz)   SpO2 92%   BMI 37.94 kg/m²       ROS: 12 point ROS obtained in details. Pertinent positives as mentioned in HPI,   otherwise negative    Physical Exam:    General:          In NAD.  Nontoxic-appearing. HEENT:           Head NCAT. sclerae anicteric, EOMI.  OP clear. Neck:               Supple, trachea midline. Lungs:             Clear, no wheezes.  Effort nonlabored, no accessory muscle use. Chest wall:      No chest wall tenderness.  Chest expansion equal and symmetrical bilaterally. Heart:              RRR.  No JVD. Abdomen:        Soft, NT/ND.  Bowel sounds normoactive. Extremities:     No increased erythema/warmth/tenderness bilateral leg.   Surgical dressing bilateral feet not removed  skin:                Skin changes as mentioned above  Psych:             Mood normal.  Calm, no agitation. Neurologic:      Awake and alert, moves extremities spontaneously.      Labs: Results:   Chemistry Recent Labs     05/26/22 0257 05/25/22  1049 05/24/22 0440 05/23/22 1856 05/23/22 1856   * 268* 98   < > 170*    137 139   < > 138   K 3.8 4.3 4.0   < > 3.7    104 107   < > 104   CO2 33* 31 33*   < > 33*   BUN 16 16 17   < > 18   CREA 1.20 1.15 1.15   < > 1.35*   CA 8.4* 8.3* 8.1*   < > 8.5   AGAP 2* 2* NEG 1   < > 1*   BUCR 13 14 15   < > 13   AP  --   --  133*  --  161*   TP  --   --  7.8  --  8.5*   ALB  --   --  2.6*  --  2.8*   GLOB  --   --  5.2*  --  5.7*   AGRAT  --   --  0.5*  --  0.5*    < > = values in this interval not displayed. CBC w/Diff Recent Labs     05/26/22 0257 05/25/22 1049 05/24/22 0440 05/23/22 1856 05/23/22 1856   WBC 9.0 8.3 10.1   < > 10.5   RBC 2.87* 3.12* 3.25*   < > 3.32*   HGB 8.2* 8.9* 9.2*   < > 9.7*   HCT 27.8* 30.7* 32.0*   < > 32.2*    222 233   < > 297   GRANS  --   --  73  --  71   LYMPH  --   --  16*  --  18*   EOS  --   --  2  --  3    < > = values in this interval not displayed. Microbiology Recent Labs     05/24/22  0000 05/23/22 2219 05/23/22 1850   CULT NO GROWTH 2 DAYS MODERATE PROTEUS SPECIES*  MODERATE 2ND GRAM NEGATIVE TING*  CHECKING FOR POSSIBLE 3RD ORGANISM NO GROWTH 3 DAYS          RADIOLOGY:    All available imaging studies/reports in [facility] care for this admission were reviewed      Disclaimer: Sections of this note are dictated utilizing voice recognition software, which may have resulted in some phonetic based errors in grammar and contents. Even though attempts were made to correct all the mistakes, some may have been missed, and remained in the body of the document. If questions arise, please contact our department.     Dr. Sujey Johnson, Infectious Disease Specialist  [phone]  May 26, 2022  7:23 AM

## 2022-05-26 NOTE — PROGRESS NOTES
OCCUPATIONAL THERAPY EVALUATION/DISCHARGE    Patient: Rodrigue Driver (77 y.o. male)  Date: 5/26/2022  Primary Diagnosis: Osteomyelitis ([facility] [address] [M86.9]  Infected wound [T14. 8XXA, L08.9]  Procedure(s) (LRB):  LEFT FOOT PARTIAL FIFTH RAY AMPUTATION, BILATERAL FOOT WOUNDS  DEBRIDEMENT AND APPLICATION OF SKIN SUBSTITUTE GRAFT (Bilateral) 2 Days Post-Op   Precautions:   Fall,NWB (L forefoot)  PLOF: Pt reports being Mod Ind for ADLs and functional mobility. ASSESSMENT AND RECOMMENDATIONS:  Based on the objective data described below, the patient presents with ability to perform basic ADLs and functional transfers at/near baseline level of function. Pt educated on NWB restrictions for L forefoot and how they relate to ADLs and functional mobility. Pt performed/simulated UB/LB ADLs with Mod Ind for seated and std aspects, benefits from Supervision/SBA for functional mobility w/o AD due to decreased standing balance. Pt lives with supportive spouse available to assist as needed. Skilled occupational therapy is not indicated at this time. Discharge Recommendations: Home Health for safety check  Further Equipment Recommendations for Discharge: N/A      SUBJECTIVE:   Patient stated I can take care of myself.     OBJECTIVE DATA SUMMARY:     Past Medical History:   Diagnosis Date    Arthritis     Coronary atherosclerosis of native coronary artery     S/P PCI with GE in 12/09    Diabetes ([facility]     IDDM    Electrolyte and fluid disorders not elsewhere classified     Essential hypertension, benign     GERD (gastroesophageal reflux disease)     Heroin abuse ([facility] 05/26/16    Psychiatrist Dr. Kervin Stoddard     History of heart attack     Hyperlipidemia     Intermediate coronary syndrome [facility])     MDD (major depressive disorder) 5/26/16    Psychiatrist Dr. Kervin Stoddard     Myocardial infarction [facility])     Obesity, unspecified     Old myocardial infarction     Other and unspecified hyperlipidemia     Pancreatitis Positive PPD     Sleep apnea     uses Cpap machine    Transfusion history     Before 1992     Past Surgical History:   Procedure Laterality Date    HX APPENDECTOMY      HX CORONARY STENT PLACEMENT  2010    2 stents     Barriers to Learning/Limitations: None  Compensate with: visual, verbal, tactile, kinesthetic cues/model    Home Situation:   Home Situation  Home Environment: Private residence  # Steps to Enter: 0  One/Two Story Residence: One story  Living Alone: No  Support Systems: Spouse/Significant Other  Patient Expects to be Discharged to[de-identified] Home with home health  Current DME Used/Available at Home: Deidre Juan Carlos, straight,Walker, rolling,Shower chair  Tub or Shower Type: Shower  []     Right hand dominant   []     Left hand dominant    Cognitive/Behavioral Status:  Neurologic State: Alert  Orientation Level: Oriented X4  Cognition: Follows commands  Safety/Judgement: Awareness of environment; Fall prevention;Home safety    Skin: mult amputations in BLE toes, wound vac intact to LLE  Edema: mod BLE    Vision/Perceptual:       Acuity: Able to read clock/calendar on wall without difficulty       Coordination: BUE  Coordination: Generally decreased, functional  Fine Motor Skills-Upper: Left Intact; Right Intact    Gross Motor Skills-Upper: Left Intact; Right Intact    Balance:  Sitting: Intact  Standing: Impaired; Without support  Standing - Static: Good  Standing - Dynamic : Fair    Strength: BUE  Strength: Generally decreased, functional   Tone & Sensation: BUE  Tone: Normal  Sensation: Impaired   Range of Motion: BUE  AROM: Within functional limits   Functional Mobility and Transfers for ADLs:  Bed Mobility:     Supine to Sit: Supervision  Sit to Supine: Supervision  Scooting: Supervision  Transfers:  Sit to Stand: Stand-by assistance  Stand to Sit: Stand-by assistance   Toilet Transfer : Stand-by assistance (simulated)    ADL Assessment:  Feeding: Modified independent  Oral Facial Hygiene/Grooming: Modified Independent  Bathing: Modified independent  Upper Body Dressing: Modified independent  Lower Body Dressing: Supervision  Toileting: Supervision   ADL Intervention:     Cognitive Retraining  Safety/Judgement: Awareness of environment; Fall prevention;Home safety  Pain:  Pain level pre-treatment: not rated   Pain level post-treatment: not rated    Activity Tolerance:   Fair  Please refer to the flowsheet for vital signs taken during this treatment. After treatment:   []  Patient left in no apparent distress sitting up in chair  [x]  Patient left in no apparent distress in bed  [x]  Call bell left within reach  [x]  Nursing notified  []  Caregiver present  []  Bed alarm activated    COMMUNICATION/EDUCATION:   [x]      Role of Occupational Therapy in the acute care setting  [x]      Home safety education was provided and the patient/caregiver indicated understanding. [x]      Patient/family have participated as able and agree with findings and recommendations. []      Patient is unable to participate in plan of care at this time. Thank you for this referral.  Willard Alexandra OTR/JESS  Time Calculation: 9 mins      Eval Complexity: History: LOW Complexity : Brief history review ; Examination: LOW Complexity : 1-3 performance deficits relating to physical, cognitive , or psychosocial skils that result in activity limitations and / or participation restrictions ;    Decision Making:LOW Complexity : No comorbidities that affect functional and no verbal or physical assistance needed to complete eval tasks

## 2022-05-26 NOTE — DIABETES MGMT
Diabetes/ Glycemic Control Plan of Care  Recommendations:   Patient with known history of DM  Blood glucose this am 161 mg/dl. Will continue inpatient monitoring. Fasting/ Morning blood glucose:   Lab Results   Component Value Date/Time    Glucose 150 (H) 05/26/2022 02:57 AM    Glucose (POC) 170 (H) 05/26/2022 11:35 AM    Glucose,  (HH) 08/10/2015 07:10 PM     IV Fluids containing dextrose:   None   Steroids:   none    Blood glucose values:       Results for Alia Robbins (MRN [id number]) as of 5/26/2022 13:15   Ref. Range 5/25/2022 11:39 5/25/2022 17:05 5/25/2022 21:18 5/26/2022 06:46 5/26/2022 11:35   GLUCOSE,FAST - POC Latest Ref Range: 70 - 110 mg/dL 314 (H) 211 (H) 159 (H) 161 (H) 170 (H)     Within target range (70-180mg/dL):  Progressing   Current insulin orders:   Lantus 20 units daily  Lispro corrective insulin coverage AC&HS as needed  Total Daily Dose previous 24 hours =  34 units   Current A1c:   Lab Results   Component Value Date/Time    Hemoglobin A1c 6.1 (H) 03/01/2022 03:35 AM      Equivalent  to average Blood Glucose of 128 mg/dl for 2-3 months prior to admission  Adequate glycemic control PTA:  Yes   Nutrition/Diet:   Active Orders   Diet    ADULT DIET Regular; 3 carb choices (45 gm/meal); Low Fat/Low Chol/High Fiber/2 gm Na; Low Sodium (2 gm)      Meal Intake:  No data found. Supplement Intake:  No data found. Home diabetes medications:   Key Antihyperglycemic Medications             metFORMIN (GLUCOPHAGE) 1,000 mg tablet (Taking) Take 1,000 mg by mouth two (2) times daily (with meals).     insulin lispro (HUMALOG) 100 unit/mL injection (Taking) Check FSBS AC*HS  For sugars between 160 and 200- Give 2 units SQ,  For sugars between 201 and 250, Give 3 units SQ,  For sugars Between 251 and 300, give 5 units SQ,  For Sugars between 301 and 350, give 7 units SQ  For sugars between 351 and 400, give 8 units SQ and contact PCP    insulin detemir U-100 (Levemir U-100 Insulin) 100 unit/mL injection (Taking) 20 Units by SubCUTAneous route two (2) times a day. Plan/Goals:   Blood glucose will be within target of 70 - 180 mg/dl within 72 hours  Reinforce dietary and medication compliance at home.        Education:  [] Refer to Diabetes Education Record                         [x] Education not indicated at this time     Jaciel Dodson [facility] CDE  Ext [phone]

## 2022-05-26 NOTE — PROGRESS NOTES
[facility] Hospitalist Group  Progress Note    Patient: Orlando Lino Age: 64 y.o. : 1960 MR#: [id number] SSN: [id number]  Date/Time: 2022           Subjective:   HPI : Left foot infection, diabetic foot ulcer, other history of diabetes hypertension dyslipidemia and CAD    Single-lumen midline placed today. Patient seen and examined at bedside, he denies chest pain, shortness of breath, nausea vomiting diarrhea constipation. States he has had diabetes education. Hopes he can go home soon. Assessment/Plan:     1. Left foot diabetic ulcer, chronic osteomyelitis mid/distal fifth metatarsal, septic arthritis fifth metatarsal phalangeal joint status post left foot partial fifth ray amputation, bilateral foot wounds debridement. .  Continued on vancomycin, cefepime at this time. Transition to oral antibiotics per infectious disease, pending cultures, bone biopsy results. 2. hypertension, continue current management. 3. diabetes mellitus type 2. lantus / ssi. 4. history of CAD. Continue medical management  5. Dyslipidemia on statin  6. Full code. Discharge to home with home health when ready. I discussed the case with Dr. [place] Ards. Objective:       General:  Alert, cooperative, no acute distress   HEENT: No facial asymmetry, CONCEPCION Manuel, External ears - WNL    Cardiovascular: S1S2 - regular , No Murmur   Pulmonary: Equal expansion , No Use of accessory muscles , No Rales No Rhonchi    GI:  +BS in all four quadrants, soft, non-tender  Extremities:   Left foot bandage daily resolved right foot partially bandaged  Neuro: Alert and oriented X 2.        DVT Prophylaxis:  []Lovenox  []Hep SQ  []SCDs  []Coumadin   []On Heparin gtt    [] Eliquis [] Xarelto     Vitals:         VS:   Visit Vitals  /66 (BP 1 Location: Left upper arm, BP Patient Position: At rest)   Pulse 70   Temp 98.6 °F (37 °C)   Resp 16   Ht 5' 9\" (1.753 m)   Wt 116.5 kg (256 lb 14.4 oz) SpO2 93%   BMI 37.94 kg/m²      Tmax/24hrs: Temp (24hrs), Av.5 °F (36.9 °C), Min:98.1 °F (36.7 °C), Max:99.3 °F (37.4 °C)        Medications:   Current Facility-Administered Medications   Medication Dose Route Frequency    insulin glargine (LANTUS) injection 20 Units  20 Units SubCUTAneous DAILY    insulin lispro (HUMALOG) injection   SubCUTAneous AC&HS    dextrose 10% infusion 0-250 mL  0-250 mL IntraVENous PRN    cefepime (MAXIPIME) 2 g in sterile water (preservative free) 10 mL IV syringe  2 g IntraVENous Q8H    amLODIPine (NORVASC) tablet 10 mg  10 mg Oral DAILY    atorvastatin (LIPITOR) tablet 20 mg  20 mg Oral QHS    pantoprazole (PROTONIX) tablet 40 mg  40 mg Oral ACB&D    traZODone (DESYREL) tablet 150 mg  150 mg Oral QHS    glucose chewable tablet 16 g  4 Tablet Oral PRN    glucagon (GLUCAGEN) injection 1 mg  1 mg IntraMUSCular PRN    vancomycin (VANCOCIN) 1,000 mg in 0.9% sodium chloride 250 mL (VIAL-MATE)  1,000 mg IntraVENous Q12H    sodium chloride (NS) flush 5-10 mL  5-10 mL IntraVENous PRN    sodium chloride (NS) flush 5-40 mL  5-40 mL IntraVENous Q8H    sodium chloride (NS) flush 5-40 mL  5-40 mL IntraVENous PRN    acetaminophen (TYLENOL) tablet 650 mg  650 mg Oral Q6H PRN    Or    acetaminophen (TYLENOL) suppository 650 mg  650 mg Rectal Q6H PRN    polyethylene glycol (MIRALAX) packet 17 g  17 g Oral DAILY PRN    ondansetron (ZOFRAN ODT) tablet 4 mg  4 mg Oral Q8H PRN    Or    ondansetron (ZOFRAN) injection 4 mg  4 mg IntraVENous Q6H PRN    naloxone (NARCAN) injection 0.4 mg  0.4 mg IntraVENous EVERY 2 MINUTES AS NEEDED    morphine injection 2-4 mg  2-4 mg IntraVENous Q4H PRN    albuterol-ipratropium (DUO-NEB) 2.5 MG-0.5 MG/3 ML  3 mL Nebulization Q6H PRN    melatonin tablet 5 mg  5 mg Oral QHS PRN    nitroglycerin (NITROBID) 2 % ointment 0.5 Inch  0.5 Inch Topical Q6H PRN       Labs:    Recent Labs     22  0257 22  1049 22  0440   WBC 9.0 8.3 10.1 HGB 8.2* 8.9* 9.2*   HCT 27.8* 30.7* 32.0*    222 233     Recent Labs     05/26/22  0257 05/25/22  1049 05/24/22  0605 05/24/22  0440 05/23/22  1856 05/23/22  1856    137  --  139   < > 138   K 3.8 4.3  --  4.0   < > 3.7    104  --  107   < > 104   CO2 33* 31  --  33*   < > 33*   * 268*  --  98   < > 170*   BUN 16 16  --  17   < > 18   CREA 1.20 1.15  --  1.15   < > 1.35*   CA 8.4* 8.3*  --  8.1*   < > 8.5   ALB  --   --   --  2.6*  --  2.8*   ALT  --   --   --  27  --  32   INR  --   --  1.2  --   --   --     < > = values in this interval not displayed. Time spent on direct patient care >30 mints     Complexity : High complex - due to multiple medical issues outlined above. CODE Status : Full code    Case discussed with:  [x]Patient  [] Family  []Nursing  []Case Management        Disclaimer: Sections of this note are dictated utilizing voice recognition software, which may have resulted in some phonetic based errors in grammar and contents. Even though attempts were made to correct all the mistakes, some may have been missed, and remained in the body of the document. If questions arise, please contact our department.     Signed By: Kylah Espinal MD     May 26, 2022

## 2022-05-26 NOTE — PROGRESS NOTES
Paged by nursing that they lost peripheral IV access. They are attempting to regain access, however patient is an extremely difficult stick. He already has IR ultrasound guided vascular access ordered in the chart. Patient endorsing pain at site of foot wound. Giving a one-time dose of Percocet.

## 2022-05-26 NOTE — PROGRESS NOTES
RESPIRATORY NOTE:    Unable to set up NIPPV as patient was asleep and after multiple attempts of trying to wake the patient he refused. Left the unit at bedside and will follow up with nursing to attempt to start NIPPV. Patient showed no signs of distress at this time.

## 2022-05-26 NOTE — QM NOTE
Added IT trackCore numbers to implants [id number] and [id number] and  [id number] - [facility]

## 2022-05-26 NOTE — PROGRESS NOTES
Problem: Mobility Impaired (Adult and Pediatric)  Goal: *Acute Goals and Plan of Care (Insert Text)  Description: Physical Therapy Goals  Initiated 5/26/2022 and to be accomplished within 7 day(s)  1. Patient will move from supine to sit and sit to supine  in bed with modified independence. 2.  Patient will transfer from bed to chair and chair to bed with modified independence using the least restrictive device. 3.  Patient will perform sit to stand with modified independence. 4.  Patient will ambulate with modified independence for 300 feet with the least restrictive device. PLOF: Pt lives with his wife in a [facility] with no LARA. Indep PTA. Outcome: Progressing Towards Goal     PHYSICAL THERAPY EVALUATION    Patient: Rodrigue Driver (75 y.o. male)  Date: 5/26/2022  Primary Diagnosis: Osteomyelitis ([address] [M86.9]  Infected wound [T14. 8XXA, L08.9]  Procedure(s) (LRB):  LEFT FOOT PARTIAL FIFTH RAY AMPUTATION, BILATERAL FOOT WOUNDS  DEBRIDEMENT AND APPLICATION OF SKIN SUBSTITUTE GRAFT (Bilateral) 2 Days Post-Op   Precautions:  Fall,NWB (L forefoot)    ASSESSMENT :  Based on the objective data described below, the patient presents with decreased strength and endurance. Pt found semi-reclined in bed, in NAD, willing to work with PT, wound vac in place on L foot. Edu pt on NWB L forefoot. Sup-sit with supervision. Grossly 4/5 strength B LE. Pt also has a transmet amputation on his R foot, he reports not wearing any special insert/shoes. STS with CGA, pt slightly unsteady but stating he is fine and does not need the RW. HE amb 50 ft around the room with SBA, wide ROSEDNO. Pt reports he is not going to use his RW at home. He returned back semi-reclined with call bell and all needs met. Recommend d/c home with [facility] PT and increased assist.     Patient will benefit from skilled intervention to address the above impairments.   Patient's rehabilitation potential is considered to be Good  Factors which may influence rehabilitation potential include:   []         None noted  []         Mental ability/status  [x]         Medical condition  [x]         Home/family situation and support systems  [x]         Safety awareness  []         Pain tolerance/management  []         Other:      PLAN :  Recommendations and Planned Interventions:   [x]           Bed Mobility Training             [x]    Neuromuscular Re-Education  [x]           Transfer Training                   []    Orthotic/Prosthetic Training  [x]           Gait Training                          []    Modalities  [x]           Therapeutic Exercises           []    Edema Management/Control  [x]           Therapeutic Activities            [x]    Family Training/Education  [x]           Patient Education  []           Other (comment):    Frequency/Duration: Patient will be followed by physical therapy 1-2 times per day/4-7 days per week to address goals. Discharge Recommendations: Home Health with increased assist  Further Equipment Recommendations for Discharge: N/A     SUBJECTIVE:   Patient stated the cane gets in my way.     OBJECTIVE DATA SUMMARY:     Past Medical History:   Diagnosis Date    Arthritis     Coronary atherosclerosis of native coronary artery     S/P PCI with GE in 12/09    Diabetes ([facility]     IDDM    Electrolyte and fluid disorders not elsewhere classified     Essential hypertension, benign     GERD (gastroesophageal reflux disease)     Heroin abuse ([facility] 05/26/16    Psychiatrist Dr. Adan Pineda     History of heart attack     Hyperlipidemia     Intermediate coronary syndrome [facility])     MDD (major depressive disorder) 5/26/16    Psychiatrist Dr. Adan Pineda     Myocardial infarction [facility])     Obesity, unspecified     Old myocardial infarction     Other and unspecified hyperlipidemia     Pancreatitis     Positive PPD     Sleep apnea     uses Cpap machine    Transfusion history     Before 1992     Past Surgical History:   Procedure Laterality Date HX APPENDECTOMY      HX CORONARY STENT PLACEMENT  2010    2 stents     Barriers to Learning/Limitations: None  Compensate with: N/A  Home Situation:  Home Situation  Home Environment: Private residence  # Steps to Enter: 0  One/Two Story Residence: One story  Living Alone: No  Support Systems: Spouse/Significant Other  Patient Expects to be Discharged to[de-identified] Home with home health  Current DME Used/Available at Home: Pearly Yarsani, rolling,Cane, straight  Critical Behavior:  Neurologic State: Alert  Orientation Level: Oriented X4  Cognition: Follows commands  Safety/Judgement: Fall prevention  Psychosocial  Patient Behaviors: Calm; Cooperative  Skin Condition/Temp: Warm     Skin Integrity: Incision (comment)  Skin Integumentary  Skin Color: Appropriate for ethnicity  Skin Condition/Temp: Warm  Skin Integrity: Incision (comment)  Turgor: Non-tenting  Hair Growth: Present  Varicosities: Absent  Nails: Exceptions to WDL  Exceptions to WDL: Thick     Strength:    Strength: Generally decreased, functional          Tone & Sensation:   Tone: Normal       Sensation: Intact      Range Of Motion:  AROM: Generally decreased, functional          Functional Mobility:  Bed Mobility:     Supine to Sit: Supervision     Scooting: Supervision  Transfers:  Sit to Stand: Contact guard assistance  Stand to Sit: Stand-by assistance    Balance:   Sitting: Intact; With support  Standing: Impaired; Without support  Standing - Static: Good  Standing - Dynamic : Fair  Ambulation/Gait Training:  Distance (ft): 50 Feet (ft)  Assistive Device: Other (comment) (pt declined a RW)  Ambulation - Level of Assistance: Stand-by assistance        Gait Abnormalities: Decreased step clearance;Trunk sway increased        Base of Support: Widened     Speed/Little: Slow    Pain:  Pain level pre-treatment: 0/10   Pain level post-treatment: 0/10   Pain Intervention(s) : Medication (see MAR);  Rest, Ice, Repositioning  Response to intervention: Nurse notified, See doc flow    Activity Tolerance:   Pt tolerated amb well  Please refer to the flowsheet for vital signs taken during this treatment. After treatment:   []         Patient left in no apparent distress sitting up in chair  [x]         Patient left in no apparent distress in bed  [x]         Call bell left within reach  [x]         Nursing notified  []         Caregiver present  []         Bed alarm activated  []         SCDs applied    COMMUNICATION/EDUCATION:   [x]         Role of Physical Therapy in the acute care setting. [x]         Fall prevention education was provided and the patient/caregiver indicated understanding. [x]         Patient/family have participated as able in goal setting and plan of care. []         Patient/family agree to work toward stated goals and plan of care. []         Patient understands intent and goals of therapy, but is neutral about his/her participation. []         Patient is unable to participate in goal setting/plan of care: ongoing with therapy staff.  []         Other:     Thank you for this referral.  Ninoska Fajardo   Time Calculation: 16 mins      Eval Complexity: History: LOW Complexity : Zero comorbidities / personal factors that will impact the outcome / POCExam:LOW Complexity : 1-2 Standardized tests and measures addressing body structure, function, activity limitation and / or participation in recreation  Presentation: LOW Complexity : Stable, uncomplicated  Clinical Decision Making:Low Complexity    Overll Complexity:LOW

## 2022-05-26 NOTE — PROGRESS NOTES
Home wound vac ordered from [facility]. Wound vac order and clinicals faxed to Anam at [place], [phone]. Perfect serve message sent to Dr. Braby Marroquin requesting home health orders for SN, wound vac.  will continue to monitor and assist with transition of care needs.     ALANA OrdonezN, RN  Care Management  Pager # [phone]

## 2022-05-26 NOTE — PROGRESS NOTES
[facility] Pharmacokinetic Monitoring Service - Vancomycin    Consulting Provider: DEVORA Jackson   Indication: Bone & Joint Infection  Target Concentration: Goal AUC/ÁNGEL 400-600 mg*hr/L  Day of Therapy: 3  Additional Antimicrobials: cefepime    Pertinent Laboratory Values: Wt Readings from Last 1 Encounters:   05/25/22 116.5 kg (256 lb 14.4 oz)     Temp Readings from Last 1 Encounters:   05/26/22 98.2 °F (36.8 °C)     No components found for: PROCAL  Estimated Creatinine Clearance: 81.4 mL/min (based on SCr of 1.2 mg/dL). Recent Labs     05/26/22  0257 05/25/22  1049   WBC 9.0 8.3       Pertinent Cultures:  Culture Date Source Results   5/23 blood ngtd   5/23 wound proteus   MRSA Nasal Swab: N/A. Non-respiratory infection. .      Assessment:  Date/Time Current Dose Concentration Timing of Concentration (h) AUC   5/25 1gm q12h - - -   5/26 1gm q12h 14.1 11h post dose 499   Note: Serum concentrations collected for AUC dosing may appear elevated if collected in close proximity to the dose administered, this is not necessarily an indication of toxicity    Plan:  Current dosing regimen is therapeutic  Vancomycin concentration therapeutic at [facility] will continue to monitor patient and adjust therapy as indicated    Thank you for the consult,  YNES Perea [facility]  5/26/2022 1:29 PM

## 2022-05-26 NOTE — PROGRESS NOTES
Progress Note    Patient: Luz Juarez MRN: [id number]  SSN: [id number]    YOB: 1960  Age: 64 y.o. Sex: male      Admit Date: 5/23/2022  1 Day Post-Op     Procedure:   Procedure(s):  LEFT FOOT PARTIAL FIFTH RAY AMPUTATION, BILATERAL FOOT WOUNDS  DEBRIDEMENT AND APPLICATION OF SKIN SUBSTITUTE GRAFT    Subjective:     Patient seen resting quiet and comfortably and no apparent distress. Patient has strike through on dressings and dressings are coming apart. Patient denies N/V/C/F. Status post: osteomyelitis    Objective:     Visit Vitals  /70   Pulse (!) 55   Temp 98.1 °F (36.7 °C)   Resp 18   Ht 5' 9\" (1.753 m)   Wt 116.5 kg (256 lb 14.4 oz)   SpO2 93%   BMI 37.94 kg/m²        Physical Exam:  Left foot open partial fifth ray amputation site stable, no bone exposure noted. No active bleeding seen. Left medial foot wound and right lateral foot wound covered with skin graft. No underneath hematoma noted. No clinical signs of infection noted. Labs/Radiology Review: images and reports reviewed    Assessment:     Hospital Problems  Date Reviewed: 5/23/2022          Codes Class Noted POA    * (Principal) Infected wound ICD-10-CM: T14. 8XXA, L08.9  ICD-9-CM: 958.3  5/23/2022 Yes        Obesity (BMI 35.0-39.9 without comorbidity) (Chronic) ICD-10-CM: Y29.6  ICD-9-CM: 278.00  5/23/2022 Yes        Diabetic ulcer of left midfoot associated with type 2 diabetes mellitus, with fat layer exposed ([facility] ICD-10-CM: U06.732, U41.966  ICD-9-CM: 250.80, 707.14  2/4/2021 Yes        Acquired hypothyroidism (Chronic) ICD-10-CM: E03.9  ICD-9-CM: 244.9  9/7/2017 Yes        Spinal stenosis at L4-L5 level (Chronic) ICD-10-CM: M48.061  ICD-9-CM: 724.02  6/19/2017 Yes        Coronary artery disease involving native coronary artery of native heart without angina pectoris (Chronic) ICD-10-CM: I25.10  ICD-9-CM: 414.01  5/31/2017 Yes    Overview Signed 9/14/2017 10:20 AM by Marisabel Liang MD     Stable symptoms             Diabetes ([facility] (Chronic) ICD-10-CM: E11.9  ICD-9-CM: 250.00  8/14/2015 Yes        Essential hypertension (Chronic) ICD-10-CM: I10  ICD-9-CM: 401.9  8/14/2015 Yes    Overview Signed 9/14/2017 10:22 AM by Marilee Solitario MD     9/17 incr acei             GERD (gastroesophageal reflux disease) (Chronic) ICD-10-CM: K21.9  ICD-9-CM: 530.81  8/14/2015 Yes        Depression (Chronic) ICD-10-CM: F32. A  ICD-9-CM: 045  8/14/2015 Yes              Plan/Recommendations/Medical Decision Making:     - Left adaptic intact at graft site. Left foot lateral open wound packed with iodoform packing strip and covered with dry dressings.   - Remain NWB to left forefoot. - F/u OR culture and pathology. Antibiotics per ID recommendation.   - Start on wound VAC therapy. Order placed for nursing staff.    - Medical management per primary team.

## 2022-05-26 NOTE — PROGRESS NOTES
Pt not seen for skilled PT due to:    [ ]  Nausea/vomiting  [ ]  Eating  [ ]  Pain  [ ]  Pt lethargic  [x ]  Off Unit-  09:01  Other:     Will f/u later as schedule allows. Thank you.   Lianne Rodriguez, PT, DPT

## 2022-05-26 NOTE — QM NOTE
Added IT trackCore numbers to implants [id number] and [id number] [facility]

## 2022-05-26 NOTE — WOUND CARE
Physical Exam   Room 507: Focused wound assess  Discussed care with primary nurse Brigida Martínez RN. Left medial foot,adaptic left in place per Dr. Todd Womack order, placental skin graft placed in OR. Left lateral foot, 5th MTH amputation site, pt was premedicated for pain by primary nurse, per patient. Medium black granufoam, cut to fit, 2 pieces of foam used (one for wound, one for button technique), bridged both wounds together, adaptic left in place on medial aspect,  Skin between wounds protected with skin prep & drape covering. Light ace wrap applied over top. Standard settings 125mmHG via hospital 3M Ulta machine serial # 66. Tolerated well. Wound vac orders updated for unit nursing staff to continue dressing changes as ordered. Wound care education provided to pt at this time & pt agreeable to plan of care. [employer] updated with wound measurements. Notified Hilda Ramírez CM of need for home vac. Will turn over care to nursing staff at this time.   Sylvie WARNERN, RN, [employer], [address]

## 2022-05-27 ENCOUNTER — APPOINTMENT (OUTPATIENT)
Dept: INTERVENTIONAL RADIOLOGY/VASCULAR | Age: 62
DRG: 305 | End: 2022-05-27
Attending: INTERNAL MEDICINE
Payer: MEDICAID

## 2022-05-27 ENCOUNTER — HOME HEALTH ADMISSION (OUTPATIENT)
Dept: HOME HEALTH SERVICES | Facility: HOME HEALTH | Age: 62
End: 2022-05-27
Payer: MEDICAID

## 2022-05-27 VITALS
DIASTOLIC BLOOD PRESSURE: 63 MMHG | WEIGHT: 259 LBS | HEART RATE: 65 BPM | SYSTOLIC BLOOD PRESSURE: 130 MMHG | TEMPERATURE: 98.6 F | BODY MASS INDEX: 38.36 KG/M2 | OXYGEN SATURATION: 94 % | RESPIRATION RATE: 18 BRPM | HEIGHT: 69 IN

## 2022-05-27 LAB
BACTERIA SPEC CULT: ABNORMAL
ERYTHROCYTE [DISTWIDTH] IN BLOOD BY AUTOMATED COUNT: 14.8 % (ref 11.6–14.5)
GLUCOSE BLD STRIP.AUTO-MCNC: 167 MG/DL (ref 70–110)
GLUCOSE BLD STRIP.AUTO-MCNC: 177 MG/DL (ref 70–110)
GLUCOSE BLD STRIP.AUTO-MCNC: 222 MG/DL (ref 70–110)
GRAM STN SPEC: ABNORMAL
GRAM STN SPEC: ABNORMAL
HCT VFR BLD AUTO: 29.5 % (ref 36–48)
HGB BLD-MCNC: 8.7 G/DL (ref 13–16)
MCH RBC QN AUTO: 28.7 PG (ref 24–34)
MCHC RBC AUTO-ENTMCNC: 29.5 G/DL (ref 31–37)
MCV RBC AUTO: 97.4 FL (ref 78–100)
NRBC # BLD: 0.02 K/UL (ref 0–0.01)
NRBC BLD-RTO: 0.2 PER 100 WBC
PLATELET # BLD AUTO: 204 K/UL (ref 135–420)
PMV BLD AUTO: 11.2 FL (ref 9.2–11.8)
RBC # BLD AUTO: 3.03 M/UL (ref 4.35–5.65)
SERVICE CMNT-IMP: ABNORMAL
WBC # BLD AUTO: 12.1 K/UL (ref 4.6–13.2)

## 2022-05-27 PROCEDURE — 74011000258 HC RX REV CODE- 258: Performed by: INTERNAL MEDICINE

## 2022-05-27 PROCEDURE — 74011250637 HC RX REV CODE- 250/637: Performed by: INTERNAL MEDICINE

## 2022-05-27 PROCEDURE — 99239 HOSP IP/OBS DSCHRG MGMT >30: CPT | Performed by: HOSPITALIST

## 2022-05-27 PROCEDURE — 82962 GLUCOSE BLOOD TEST: CPT | Performed by: SURGERY

## 2022-05-27 PROCEDURE — 74011636637 HC RX REV CODE- 636/637: Performed by: HOSPITALIST

## 2022-05-27 PROCEDURE — C1751 CATH, INF, PER/CENT/MIDLINE: HCPCS

## 2022-05-27 PROCEDURE — 85027 COMPLETE CBC AUTOMATED: CPT

## 2022-05-27 PROCEDURE — 74011250636 HC RX REV CODE- 250/636: Performed by: INTERNAL MEDICINE

## 2022-05-27 PROCEDURE — 02HV33Z INSERTION OF INFUSION DEVICE INTO SUPERIOR VENA CAVA, PERCUTANEOUS APPROACH: ICD-10-PCS | Performed by: RADIOLOGY

## 2022-05-27 PROCEDURE — 82962 GLUCOSE BLOOD TEST: CPT

## 2022-05-27 PROCEDURE — 74011636637 HC RX REV CODE- 636/637: Performed by: INTERNAL MEDICINE

## 2022-05-27 PROCEDURE — 36415 COLL VENOUS BLD VENIPUNCTURE: CPT

## 2022-05-27 PROCEDURE — 74011000250 HC RX REV CODE- 250: Performed by: INTERNAL MEDICINE

## 2022-05-27 RX ORDER — OXYCODONE AND ACETAMINOPHEN 5; 325 MG/1; MG/1
1 TABLET ORAL
Qty: 6 TABLET | Refills: 0 | Status: SHIPPED | OUTPATIENT
Start: 2022-05-27 | End: 2022-05-30

## 2022-05-27 RX ADMIN — PANTOPRAZOLE SODIUM 40 MG: 40 TABLET, DELAYED RELEASE ORAL at 16:59

## 2022-05-27 RX ADMIN — Medication 3 UNITS: at 12:31

## 2022-05-27 RX ADMIN — MEROPENEM 1 G: 1 INJECTION INTRAVENOUS at 14:38

## 2022-05-27 RX ADMIN — Medication 4 UNITS: at 06:18

## 2022-05-27 RX ADMIN — SODIUM CHLORIDE, PRESERVATIVE FREE 10 ML: 5 INJECTION INTRAVENOUS at 06:18

## 2022-05-27 RX ADMIN — MORPHINE SULFATE 4 MG: 2 INJECTION, SOLUTION INTRAMUSCULAR; INTRAVENOUS at 10:07

## 2022-05-27 RX ADMIN — AMLODIPINE BESYLATE 10 MG: 10 TABLET ORAL at 08:00

## 2022-05-27 RX ADMIN — MEROPENEM 1 G: 1 INJECTION INTRAVENOUS at 08:00

## 2022-05-27 RX ADMIN — Medication 3 UNITS: at 16:59

## 2022-05-27 RX ADMIN — VANCOMYCIN HYDROCHLORIDE 1000 MG: 1 INJECTION, POWDER, LYOPHILIZED, FOR SOLUTION INTRAVENOUS at 03:34

## 2022-05-27 RX ADMIN — Medication 20 UNITS: at 08:00

## 2022-05-27 RX ADMIN — MORPHINE SULFATE 4 MG: 2 INJECTION, SOLUTION INTRAMUSCULAR; INTRAVENOUS at 00:56

## 2022-05-27 RX ADMIN — SODIUM CHLORIDE, PRESERVATIVE FREE 10 ML: 5 INJECTION INTRAVENOUS at 14:38

## 2022-05-27 RX ADMIN — PANTOPRAZOLE SODIUM 40 MG: 40 TABLET, DELAYED RELEASE ORAL at 06:18

## 2022-05-27 NOTE — PROGRESS NOTES
Wound vac delivered and left bedside. Clinicals signed and faxed to [facility].  will continue to monitor and assist with transition of care needs.     JESUS Shields, RN  Care Management  Pager # [phone]

## 2022-05-27 NOTE — DISCHARGE SUMMARY
Discharge Summary    Patient: Dimple James MRN: [id number]  [facility]: [id number]    YOB: 1960  Age: 64 y.o. Sex: male    DOA: 5/23/2022 LOS:  LOS: 4 days   Discharge Date:      Admission Diagnoses: Osteomyelitis ([facility] [M86.9]  Infected wound [T14. Jordan Alston, L08.9]    Discharge Diagnoses:    Problem List as of 5/27/2022 Date Reviewed: 5/23/2022          Codes Class Noted - Resolved    * (Principal) Infected wound ICD-10-CM: T14. 8XXA, L08.9  ICD-9-CM: 958.3  5/23/2022 - Present        Obesity (BMI 35.0-39.9 without comorbidity) (Chronic) ICD-10-CM: E66.9  ICD-9-CM: 278.00  5/23/2022 - Present        OM (osteomyelitis) (HCC) ICD-10-CM: M86.9  ICD-9-CM: 730.20  2/28/2022 - Present        Diabetic foot infection ([facility] ICD-10-CM: E11.628, L08.9  ICD-9-CM: 250.80, 686.9  4/12/2021 - Present        Leukocytosis ICD-10-CM: E59.448  ICD-9-CM: 288.60  2/4/2021 - Present        Wound infection ICD-10-CM: T14. 8XXA, L08.9  ICD-9-CM: 958.3  2/4/2021 - Present        Diabetic ulcer of left midfoot associated with type 2 diabetes mellitus, with fat layer exposed ([facility] ICD-10-CM: E11.621, Z34.643  ICD-9-CM: 250.80, 707.14  2/4/2021 - Present        Acute kidney injury ([facility] ICD-10-CM: N17.9  ICD-9-CM: 584.9  2/4/2021 - Present        Fracture, toe ICD-10-CM: E90.961F  ICD-9-CM: 826.0  9/24/2020 - Present        Altered mental status ICD-10-CM: R41.82  ICD-9-CM: 780.97  9/1/2020 - Present        Multifocal pneumonia ICD-10-CM: J18.9  ICD-9-CM: 323  9/1/2020 - Present        DM (diabetes mellitus) ([facility] ICD-10-CM: E11.9  ICD-9-CM: 250.00  12/25/2019 - Present        Orthostatic hypotension ICD-10-CM: I95.1  ICD-9-CM: 458.0  12/25/2019 - Present        Syncope and collapse ICD-10-CM: R55  ICD-9-CM: 780.2  12/24/2019 - Present        Vomiting ICD-10-CM: R11.10  ICD-9-CM: 787.03  3/28/2018 - Present        Chronic abdominal pain ICD-10-CM: R10.9, G89.29  ICD-9-CM: 789.00, 338.29  3/28/2018 - Present        Sepsis ([facility] ICD-10-CM: A41.9  ICD-9-CM: 038.9, 995.91  3/21/2018 - Present        Nausea and vomiting ICD-10-CM: R11.2  ICD-9-CM: 787.01  9/7/2017 - Present        Gastroparesis ICD-10-CM: K31.84  ICD-9-CM: 536.3  9/7/2017 - Present        Hypokalemia ICD-10-CM: E87.6  ICD-9-CM: 276.8  9/7/2017 - Present        Atelectasis ICD-10-CM: J98.11  ICD-9-CM: 518.0  9/7/2017 - Present        Abdominal pain ICD-10-CM: R10.9  ICD-9-CM: 789.00  9/7/2017 - Present        Acquired hypothyroidism (Chronic) ICD-10-CM: E03.9  ICD-9-CM: 244.9  9/7/2017 - Present        S/P lumbar fusion ICD-10-CM: Z98.1  ICD-9-CM: V45.4  7/5/2017 - Present        Diabetic neuropathy ([facility] ICD-10-CM: E11.40  ICD-9-CM: 250.60, 357.2  7/5/2017 - Present        Neuritis ICD-10-CM: M79.2  ICD-9-CM: 729.2  7/5/2017 - Present        Spinal stenosis at L4-L5 level (Chronic) ICD-10-CM: M48.061  ICD-9-CM: 724.02  6/19/2017 - Present        Coronary artery disease involving native coronary artery of native heart without angina pectoris (Chronic) ICD-10-CM: I25.10  ICD-9-CM: 414.01  5/31/2017 - Present    Overview Signed 9/14/2017 10:20 AM by Lorene Sumner MD     Stable symptoms             History of coronary artery stent placement ICD-10-CM: Z95.5  ICD-9-CM: V45.82  5/31/2017 - Present    Overview Signed 5/31/2017 11:54 AM by Tamara Menon MD     om1  4/2009 and 12/2009             Synovial cyst ICD-10-CM: M71.30  ICD-9-CM: 727.40  5/26/2017 - Present        HNP (herniated nucleus pulposus), lumbar ICD-10-CM: M51.26  ICD-9-CM: 722.10  5/26/2017 - Present        Lumbar spinal stenosis ICD-10-CM: M48.061  ICD-9-CM: 724.02  5/26/2017 - Present        Spondylolisthesis of lumbar region ICD-10-CM: M43.16  ICD-9-CM: 738.4  5/26/2017 - Present        Positive PPD ICD-10-CM: R76.11  ICD-9-CM: 795.51  Unknown - Present        History of heart attack ICD-10-CM: I25.2  ICD-9-CM: 779  Unknown - Present    Overview Addendum 9/14/2017 10:23 AM by Lorene Sumner MD     2009 approx; s/p PCI             Pain in left lower leg ICD-10-CM: P96.345  ICD-9-CM: 729.5  7/24/2016 - Present        Primary osteoarthritis of left knee ICD-10-CM: M17.12  ICD-9-CM: 715.16  7/24/2016 - Present        Localized adiposity of abdomen ICD-10-CM: E65  ICD-9-CM: 278.1  7/24/2016 - Present        Diabetes ([facility] (Chronic) ICD-10-CM: E11.9  ICD-9-CM: 250.00  8/14/2015 - Present        Essential hypertension (Chronic) ICD-10-CM: I10  ICD-9-CM: 401.9  8/14/2015 - Present    Overview Signed 9/14/2017 10:22 AM by Jason Shields MD     9/17 incr acei             GERD (gastroesophageal reflux disease) (Chronic) ICD-10-CM: K21.9  ICD-9-CM: 530.81  8/14/2015 - Present        Depression (Chronic) ICD-10-CM: F32. A  ICD-9-CM: 136  8/14/2015 - Present        RESOLVED: Osteomyelitis ([facility] ICD-10-CM: M86.9  ICD-9-CM: 730.20  11/22/2020 - 5/23/2022        RESOLVED: ARF (acute renal failure) ([facility] ICD-10-CM: N17.9  ICD-9-CM: 584.9  9/7/2017 - 8/3/2021              Reason for Admission  64 y.o. male who presented to [facility], Referred by Podiatrist.  Patient presented to [facility] with a note from Patient's Primary Podiatrist requesting admission and MRI of Left Foot. Patient reported chronic diabetic foot ulcer on the dorsum of his left foot stretching from his lateral to medial aspect of the dorsal mid-foot. Patient reported 8/10 intensity \"throbbing\" pain and stated the wound was present for several months, but now with purulent drainage. Plain Radiograph showed suspicion of bone infection. Patient denied previous osteomyelitis, but reported Diabetes, HTN, HLD, and CAD with Previous MI. Patient denied fevers, chills, nausea, vomiting, diarrhea, dysuria, and cough.     Discharge Condition: Good    PHYSICAL EXAM   Visit Vitals  /63 (BP 1 Location: Left upper arm, BP Patient Position: Lying)   Pulse 65   Temp 98.6 °F (37 °C)   Resp 18   Ht 5' 9\" (1.753 m)   Wt 117.5 kg (259 lb)   SpO2 94%   BMI 38.25 kg/m² General:  Alert, cooperative, no acute distress. HEENT: No facial asymmetry, CONCEPCION Manuel, External ears - WNL    Cardiovascular: S1S2 - regular , No Murmur   Pulmonary: Equal expansion , No Use of accessory muscles , No Rales No Rhonchi    GI: soft, non-tender nd nabs  Extremities:   Left foot bandage daily resolved right foot partially bandaged  Neuro: Alert and oriented X 4. Hospital Course:   1. Left foot diabetic ulcer, chronic osteomyelitis mid/distal fifth metatarsal, septic arthritis fifth metatarsal phalangeal joint status post left foot partial fifth ray amputation, bilateral foot wounds debridement. Patient received vancomycin and cefepime in house. Discharged on meropenem, stop date 6/10/2022 per ID recommendations. PICC line has been placed. Wound VAC. 2. hypertension, continue current management. 3. diabetes mellitus type 2. Resume home medications. 4. history of CAD. Continue medical management  5. Dyslipidemia on statin  6. Full code. I discussed the case with Dr. Rhesa Fleischer. Ravi Davis. Discharge to home with home health. Patient agrees, all questions answered to the best my ability. Consults: Infectious Disease Dr. Rosette Jenkins    Procedures  1. right basilic  upper extremity PICC Line. May 27, 2022   Reji Fink PA-C  2.  LEFT FOOT PARTIAL FIFTH RAY AMPUTATION, BILATERAL FOOT WOUNDS  DEBRIDEMENT AND APPLICATION OF SKIN SUBSTITUTE GRAFT Zachary Pena, NISA 5/24/22    Significant Diagnostic Studies: labs:   Recent Results (from the past 24 hour(s))   GLUCOSE, POC    Collection Time: 05/26/22  9:26 PM   Result Value Ref Range    Glucose (POC) 199 (H) 70 - 110 mg/dL   CBC W/O DIFF    Collection Time: 05/27/22  5:28 AM   Result Value Ref Range    WBC 12.1 4.6 - 13.2 K/uL    RBC 3.03 (L) 4.35 - 5.65 M/uL    HGB 8.7 (L) 13.0 - 16.0 g/dL    HCT 29.5 (L) 36.0 - 48.0 %    MCV 97.4 78.0 - 100.0 FL    MCH 28.7 24.0 - 34.0 PG    MCHC 29.5 (L) 31.0 - 37.0 g/dL    RDW 14.8 (H) 11.6 - 14.5 %    PLATELET 114 415 - 368 K/uL    MPV 11.2 9.2 - 11.8 FL    NRBC 0.2 (H) 0  WBC    ABSOLUTE NRBC 0.02 (H) 0.00 - 0.01 K/uL   GLUCOSE, POC    Collection Time: 05/27/22  6:09 AM   Result Value Ref Range    Glucose (POC) 222 (H) 70 - 110 mg/dL   GLUCOSE, POC    Collection Time: 05/27/22 10:34 AM   Result Value Ref Range    Glucose (POC) 167 (H) 70 - 110 mg/dL     Results     Procedure Component Value Units Date/Time    CULTURE, BLOOD [[id number]] Collected: 05/24/22 0000    Order Status: Completed Specimen: Blood Updated: 05/27/22 0641     Special Requests: NO SPECIAL REQUESTS        Culture result: NO GROWTH 3 DAYS       CULTURE, WOUND Konstantin Civatte STAIN [[id number]]  (Abnormal)  (Susceptibility) Collected: 05/23/22 2219    Order Status: Completed Specimen: Wound Drainage Updated: 05/27/22 0750     Special Requests: NO SPECIAL REQUESTS        GRAM STAIN OCCASIONAL WBCS SEEN         FEW GRAM NEGATIVE RODS        Culture result: LIGHT PROTEUS VULGARIS               MODERATE KLEBSIELLA PNEUMONIAE ** (EXTENDED SPECTRUM BETA LACTAMASE ) **            LIGHT DIPHTHEROIDS         (NOTE) ESBL CALLED TO MARGOTH BANKS ON 5/27/22 AT 0749. SH    Susceptibility      Proteus vulgaris     ÁNGEL     Amikacin ($) Susceptible     Ampicillin ($) Resistant     Ampicillin/sulbactam ($) Susceptible     Cefazolin ($) Resistant     Cefepime ($$) Susceptible     Cefoxitin Susceptible     Ceftazidime ($) Susceptible     Ceftriaxone ($) Susceptible     Ciprofloxacin ($) Susceptible     Gentamicin ($) Susceptible     Levofloxacin ($) Susceptible     Piperacillin/Tazobac ($) Susceptible     Tobramycin ($) Susceptible     Trimeth/Sulfa Resistant                  Linear View               Susceptibility      Klebsiella pneumoniae     ÁNGEL     Amikacin ($) Susceptible     Ampicillin ($) Resistant     Ampicillin/sulbactam ($) Resistant     Cefazolin ($) Resistant     Cefepime ($$) Resistant     Cefoxitin Susceptible     Ceftazidime ($) Resistant     Ceftriaxone ($) Resistant     Ciprofloxacin ($) Resistant     Gentamicin ($) Susceptible     Levofloxacin ($) Intermediate     Meropenem ($$) Susceptible     Piperacillin/Tazobac ($) Susceptible     Tobramycin ($) Susceptible     Trimeth/Sulfa Resistant                  Linear View                   CULTURE, BLOOD [[id number]] Collected: 05/23/22 1850    Order Status: Completed Specimen: Blood Updated: 05/27/22 0641     Special Requests: --        RIGHT   FOREARM       Culture result: NO GROWTH 4 DAYS           IMAGING  XR Results (most recent):  Results from Hospital Encounter encounter on 05/23/22    XR CHEST PORT    Narrative  PORTABLE CHEST RADIOGRAPH    CPT CODE: 30913    INDICATION: Sepsis. COMPARISON: 4/12/2021. FINDINGS:    Frontal view of the chest obtained at 1924 hours. The cardiomediastinal  silhouette is unchanged. The pulmonary vascular markings are unremarkable. There is no evidence of focal consolidation, pleural effusion or pneumothorax. Degenerative disc disease. Impression  No acute findings. MRI Results (most recent):  Results from [place] encounter on 05/23/22    MRI FOOT LT W WO CONT    Narrative  MRI FOOT LT W WO CONT: 5/23/2022 9:46 PM    CLINICAL INFORMATION  Foot infection, osteomyelitis. COMPARISON  Left foot radiograph 28 February 2022, left foot MRI 19 November 2019    TECHNIQUE  Multiplane MR images of the left mid/forefoot obtained including T1 and  T2-weighted sequences. FINDINGS  OSSEOUS STRUCTURES/JOINT  Amputation of the terminal phalanx of the first ray. Amputation of the mid and  terminal phalanges of the second ray. Soft tissue ulcer defect of the lateral forefoot with septic arthritis of the  fifth metatarsal phalangeal joint and osteomyelitis of the near entirety of the  proximal phalanx of the fifth ray and mid/distal fifth metatarsal with  osteolysis of the fifth metatarsal head.     There is additional ulcer defect of the dorsal/medial aspect of the medial  midfoot. Diffuse reactive marrow edema/osteitis of the first metatarsal.    Mild nonspecific reactive marrow edema of the medial sesamoid. Mild nonspecific reactive marrow edema of the distal/lateral cuboid and  navicular. LIGAMENTS AND PLANTAR PLATES  The plantar plates appear intact. The ligaments of the forefoot appear intact. INTERMETATARSAL SPACES  No evidence of Doty's neuroma or intermetatarsal bursitis. MUSCLES AND TENDONS  Advanced muscle atrophy. Retraction of the first ray flexor tendon to the level  of the midshaft of the first metatarsal.    PLANTAR FASCIA  The mid and distal plantar fascia is intact. LISFRANC LIGAMENT  Intact. OTHER FINDINGS  Areas of subcutaneous edema without focal fluid collection to suggest abscess. Impression  Septic arthritis of the fifth MTP with osteomyelitis of the mid/distal fifth  metatarsal and near entirety of the fifth ray proximal phalanx. Dorsomedial ulcer defect of the mid foot with reactive osteitis of the first  metatarsal. No edin osteomyelitis at this time. Diffuse subcutaneous edema without focal fluid collection to suggest abscess. Mild scattered areas of additional reactive osteitis.       EKG Results     Procedure 720 Value Units Date/Time    EKG, 12 LEAD, INITIAL [[id number]] Collected: 05/24/22 0857    Order Status: Completed Updated: 05/24/22 1933     Ventricular Rate 63 BPM      Atrial Rate 63 BPM      P-R Interval 248 ms      QRS Duration 80 ms      Q-T Interval 418 ms      QTC Calculation (Bezet) 427 ms      Calculated P Axis 85 degrees      Calculated R Axis 3 degrees      Calculated T Axis 10 degrees      Diagnosis --     Sinus rhythm with 1st degree AV block  Cannot rule out Anterior infarct , age undetermined  Abnormal ECG  When compared with ECG of 03-MAR-2022 15:50,  Wenckebach block is no longer  Confirmed by Ann-Marie Dillon ([id number]) on 5/24/2022 7:32:59 PM            Discharge Medications:     Current Discharge Medication List      START taking these medications    Details   oxyCODONE-acetaminophen (Percocet) 5-325 mg per tablet Take 1 Tablet by mouth every six (6) hours as needed for Pain for up to 3 days. Max Daily Amount: 4 Tablets. Qty: 6 Tablet, Refills: 0    Associated Diagnoses: Osteomyelitis of right foot, unspecified type ([address]         CONTINUE these medications which have NOT CHANGED    Details   traZODone (DESYREL) 150 mg tablet Take 150 mg by mouth nightly. Indications: insomnia associated with depression      amLODIPine (NORVASC) 10 mg tablet Take 10 mg by mouth daily. metFORMIN (GLUCOPHAGE) 1,000 mg tablet Take 1,000 mg by mouth two (2) times daily (with meals). insulin lispro (HUMALOG) 100 unit/mL injection Check FSBS AC*HS  For sugars between 160 and 200- Give 2 units SQ,  For sugars between 201 and 250, Give 3 units SQ,  For sugars Between 251 and 300, give 5 units SQ,  For Sugars between 301 and 350, give 7 units SQ  For sugars between 351 and 400, give 8 units SQ and contact PCP  Qty: 1 Vial, Refills: 0      insulin detemir U-100 (Levemir U-100 Insulin) 100 unit/mL injection 20 Units by SubCUTAneous route two (2) times a day. Qty: 10 mL, Refills: 0      pantoprazole (PROTONIX) 40 mg tablet Take 1 Tab by mouth Before breakfast and dinner. Qty: 60 Tab, Refills: 0      atorvastatin (LIPITOR) 20 mg tablet Take 1 Tab by mouth nightly. Qty: 30 Tab, Refills: 0      polyethylene glycol (MIRALAX) 17 gram packet Take 1 Packet by mouth daily. Qty: 30 Packet, Refills: 0             Activity: PT/OT per Home Health    Diet: Diabetic Diet    Wound Care:   Skilled home health nursing care for Wound vac dressing changes three times per week, settings 125mmHG continuous suction. Follow-up:   Follow-up Appointments   Procedures    FOLLOW UP VISIT Appointment in: Other ([facility]) 1. Zora Guzman MD 3 - 5 days. 2. Dr. Reed Walker 1 week.      1. Mallory Solorzano Marques Allen MD 3 - 5 days. 2. Dr. Barby Marroquin 1 week. Standing Status:   Standing     Number of Occurrences:   1     Order Specific Question:   Appointment in     Answer:    Other (Specify)       Minutes spent on discharge: >30

## 2022-05-27 NOTE — DIABETES MGMT
Diabetes/ Glycemic Control Plan of Care  Recommendations:   Patient with known history of DM  Blood glucose this am 222 and 167 mg/dl. Will advance corrective insulin to very insulin resistant dosing. Will continue inpatient monitoring. Fasting/ Morning blood glucose:   Lab Results   Component Value Date/Time    Glucose 150 (H) 05/26/2022 02:57 AM    Glucose (POC) 167 (H) 05/27/2022 10:34 AM    Glucose,  (HH) 08/10/2015 07:10 PM     IV Fluids containing dextrose:   None   Steroids:   none    Blood glucose values:       Results for Cheryle Hernandez (MRN [id number]) as of 5/27/2022 11:35   Ref. Range 5/26/2022 11:35 5/26/2022 15:05 5/26/2022 21:26 5/27/2022 06:09 5/27/2022 10:34   GLUCOSE,FAST - POC Latest Ref Range: 70 - 110 mg/dL 170 (H) 159 (H) 199 (H) 222 (H) 167 (H)     Within target range (70-180mg/dL):  Progressing   Current insulin orders:   Lantus 20 units daily  Lispro corrective insulin coverage AC&HS as needed  Total Daily Dose previous 24 hours =  28 units   Current A1c:   Lab Results   Component Value Date/Time    Hemoglobin A1c 6.1 (H) 03/01/2022 03:35 AM      Equivalent  to average Blood Glucose of 128 mg/dl for 2-3 months prior to admission  Adequate glycemic control PTA:  Yes   Nutrition/Diet:   Active Orders   Diet    ADULT DIET Regular; 3 carb choices (45 gm/meal); Low Fat/Low Chol/High Fiber/2 gm Na; Low Sodium (2 gm)      Meal Intake:  Patient Vitals for the past 168 hrs:   % Diet Eaten   05/27/22 0828 26 - 50%     Supplement Intake:  No data found. Home diabetes medications:   Key Antihyperglycemic Medications             metFORMIN (GLUCOPHAGE) 1,000 mg tablet (Taking) Take 1,000 mg by mouth two (2) times daily (with meals).     insulin lispro (HUMALOG) 100 unit/mL injection (Taking) Check FSBS AC*HS  For sugars between 160 and 200- Give 2 units SQ,  For sugars between 201 and 250, Give 3 units SQ,  For sugars Between 251 and 300, give 5 units SQ,  For Sugars between 301 and 350, give 7 units SQ  For sugars between 351 and 400, give 8 units SQ and contact PCP    insulin detemir U-100 (Levemir U-100 Insulin) 100 unit/mL injection (Taking) 20 Units by SubCUTAneous route two (2) times a day. Plan/Goals:   Blood glucose will be within target of 70 - 180 mg/dl within 72 hours  Reinforce dietary and medication compliance at home.        Education:  [] Refer to Diabetes Education Record                         [x] Education not indicated at this time     Cora Collado, [facility] CDE  Ext [phone]

## 2022-05-27 NOTE — PROGRESS NOTES
Problem: Risk for Spread of Infection  Goal: Prevent transmission of infectious organism to others  Description: Prevent the transmission of infectious organisms to other patients, staff members, and visitors. Outcome: Progressing Towards Goal     Problem: Patient Education:  Go to Education Activity  Goal: Patient/Family Education  Outcome: Progressing Towards Goal     Problem: Falls - Risk of  Goal: *Absence of Falls  Description: Document Jamie Cruz Fall Risk and appropriate interventions in the flowsheet. Outcome: Progressing Towards Goal  Note: Fall Risk Interventions:  Mobility Interventions: Utilize walker, cane, or other assistive device         Medication Interventions: Teach patient to arise slowly    Elimination Interventions: Call light in reach              Problem: Patient Education: Go to Patient Education Activity  Goal: Patient/Family Education  Outcome: Progressing Towards Goal     Problem: Pain  Goal: *Control of Pain  Outcome: Progressing Towards Goal  Goal: *PALLIATIVE CARE:  Alleviation of Pain  Outcome: Progressing Towards Goal     Problem: Patient Education: Go to Patient Education Activity  Goal: Patient/Family Education  Outcome: Progressing Towards Goal     Problem: Diabetes Self-Management  Goal: *Disease process and treatment process  Description: Define diabetes and identify own type of diabetes; list 3 options for treating diabetes. Outcome: Progressing Towards Goal  Goal: *Incorporating nutritional management into lifestyle  Description: Describe effect of type, amount and timing of food on blood glucose; list 3 methods for planning meals. Outcome: Progressing Towards Goal  Goal: *Incorporating physical activity into lifestyle  Description: State effect of exercise on blood glucose levels.   Outcome: Progressing Towards Goal  Goal: *Developing strategies to promote health/change behavior  Description: Define the ABC's of diabetes; identify appropriate screenings, schedule and personal plan for screenings. Outcome: Progressing Towards Goal  Goal: *Using medications safely  Description: State effect of diabetes medications on diabetes; name diabetes medication taking, action and side effects. Outcome: Progressing Towards Goal  Goal: *Monitoring blood glucose, interpreting and using results  Description: Identify recommended blood glucose targets  and personal targets. Outcome: Progressing Towards Goal  Goal: *Prevention, detection, treatment of acute complications  Description: List symptoms of hyper- and hypoglycemia; describe how to treat low blood sugar and actions for lowering  high blood glucose level. Outcome: Progressing Towards Goal  Goal: *Prevention, detection and treatment of chronic complications  Description: Define the natural course of diabetes and describe the relationship of blood glucose levels to long term complications of diabetes.   Outcome: Progressing Towards Goal  Goal: *Developing strategies to address psychosocial issues  Description: Describe feelings about living with diabetes; identify support needed and support network  Outcome: Progressing Towards Goal  Goal: *Insulin pump training  Outcome: Progressing Towards Goal  Goal: *Sick day guidelines  Outcome: Progressing Towards Goal  Goal: *Patient Specific Goal (EDIT GOAL, INSERT TEXT)  Outcome: Progressing Towards Goal     Problem: Patient Education: Go to Patient Education Activity  Goal: Patient/Family Education  Outcome: Progressing Towards Goal     Problem: Patient Education: Go to Patient Education Activity  Goal: Patient/Family Education  Outcome: Progressing Towards Goal     Problem: Patient Education: Go to Patient Education Activity  Goal: Patient/Family Education  Outcome: Progressing Towards Goal     Problem: Hypertension  Goal: *Blood pressure within specified parameters  Outcome: Progressing Towards Goal  Goal: *Fluid volume balance  Outcome: Progressing Towards Goal  Goal: *Labs within defined limits  Outcome: Progressing Towards Goal     Problem: Patient Education: Go to Patient Education Activity  Goal: Patient/Family Education  Outcome: Progressing Towards Goal     Problem: Patient Education: Go to Patient Education Activity  Goal: Patient/Family Education  Outcome: Progressing Towards Goal     Problem: Lower Extremity Wound Care  Goal: *Infected Wound: Prevention of further infection and promotion of healing  Description: Infection control procedures (eg: clean dressings, clean gloves, hand washing, precautions to isolate wound from contamination, sterile instruments used for wound debridement) should be implemented.   Outcome: Progressing Towards Goal  Goal: Interventions  Outcome: Progressing Towards Goal     Problem: Patient Education: Go to Patient Education Activity  Goal: Patient/Family Education  Outcome: Progressing Towards Goal

## 2022-05-27 NOTE — ROUTINE PROCESS
Bedside shift change report given to Kev Chavez RN (oncoming nurse) by Anusha Fernandes RN (offgoing nurse). Report included the following information SBAR, Kardex and MAR.

## 2022-05-27 NOTE — HOME CARE
Received home health referral for [facility] for SN for IV Abx and Wound care/Wound Vac,PT,OT. Discharge order noted for today; spoke to patient and his spouse Leatha Gray), Verified demographics,explained [facility] services and answered all questions, patients spouse states she will be willing to learn IV abx administration at home for patient; Faxed IV abx orders,demographics,PICC report to [address] [place] [address] from [facility] states patient is covered at 100% for home health IV abx; [employer] rep Gina Lund) came to [facility] to begin IV abx education to patient prior to discharge; Patient states he  has glucometer ; [employer] wound vac was delivered to patients room by  and patient's nurse ( Jalen),states that he will connect home vac to patients wound prior to discharge; [facility] referral processed to [facility] central Intake for [facility] for Saturday 5/28/22; Cornel LANIER LPN.

## 2022-05-27 NOTE — PROCEDURES
INTERVENTIONAL RADIOLOGY POST PICC LINE NOTE     May 27, 2022       2:43 PM     Preoperative Diagnosis:   IV Access    Postoperative Diagnosis:  Same. :  Shaheen Keita PA-C    Assistant:  None. Attending Physician: Dr. Tremayne Duffy    Type of Anesthesia:  1% local lidocaine    Procedure/Description: right basilic  upper extremity PICC Line. Findings:  right basilic 5 Hungarian catheter placed. Tip at the SVC/RA junction. OK to use. Length 39 cm. Estimated blood Loss: Minimal    Specimen Removed: None    Drains: None     Complications:  None. Condition:  Stable.     Discharge Plan:  continue present therapy

## 2022-05-27 NOTE — PROGRESS NOTES
Discharge order noted for today. Pt has been accepted to [facility] agency. Met with patient and he is agreeable to the transition plan today. Transport has been arranged through wife Patient's discharge summary and home health  orders have been forwarded to [address] via [facility]. Updated bedside RN, Stephanie Brown,  to the transition plan. Discharge information has been documented on the AVS.     IV ABX through [facility]. Home wound vac in room. No DME needed.     ALANA DiazN, RN  Care Management  Pager # [phone]

## 2022-05-27 NOTE — PROGRESS NOTES
Infectious Disease progress Note        Reason: left foot infection    Current abx Prior abx   Cefepime, vancomycin since 5/23/22      Lines:       Assessment :    64 y. o. male with h/o type 2 DM , CAD who presented to [facility] on 5/23/22 from podiatrist office for evaluation of left foot infection     Hospitalization at [facility] November 2020 for right great toe distal phalanx chronic osteomyelitis, septic arthritis right great toe interphalangeal joint   Wound cultures 9/2020-Enterobacter, MRSA  Status post right great toe amputation on 11/24.       Hospitalization at [facility] 2/2021 for acute on chronic osteomyelitis right first metatarsal osteomyelitis.    Status post incision and debridement, partial resection of right first metatarsal on 2/5/2021.  Intra-Op cultures: Methicillin susceptible Staphylococcus aureus, Bacteroides. Bone biopsy of clean margins reveal acute inflammation suggestive of acute osteomyelitis.  S/p ertapenem, daptomycin till 3/19/2021     Hospitalization at [facility] 4/2021 for  chronic osteomyelitis right second metatarsal head, infected right lateral foot ulcer Status post transmetatarsal amputation 4/13/2021.  Intra-Op findings discussed with podiatrist. Perlita Guidry clean margins achieved at surgery.  Bone biopsy, clean margins negative for acute osteomyelitis.  Intra-Op cultures no organisms seen.     Hospitalization at [facility] 2/2022 right foot cellulitis, infected right lateral foot ulcer, chronic osteomyelitis left first distal phalanx  Status post incision and debridement bilateral feet ulcer, partial amputation left great toe on 3/1/2022.    Wound culture right foot 2/28-MRSA, pseudomonas, proteus (susceptibilities of MRSA noted)  Intra-Op wound culture 3/1/2022-MRSA, Proteus    Clinical presentation consistent with septic arthritis fifth metatarsal phalangeal joint, chronic osteomyelitis of mid/distal fifth metatarsal, infected left midfoot ulcer  Status post left foot partial fifth ray amputation, debridement bilateral foot wounds with application of skin substitute graft on 5/24/2022    Intra-Op findings noted. No definitive evidence of infection right foot ulcer noted. Evidence of chronic osteomyelitis left fifth metatarsal noted intraoperatively. Grossly clean bone margins achieved at surgery. Intra-Op cultures 5/24-proteus, ESBL klebsiella. Susceptibilities of Klebsiella reviewed. Unfortunately no oral antibiotic options.       Recommendations:     1.  d/c cefepime, vancomycin. Start meropenem till 6/10  2.  Place PICC line for outpatient IV antibiotics -ordered   3.  follow up  podiatry recommendations regarding wound care  4. Okay to discharge patient from infectious disease standpoint once outpatient IV antibiotics arranged, PICC line placed. Home health orders on chart (click on \"chart review, other orders, IP home health)       Above plan was discussed in details with patient, dr. Annamaria Chaidez. D/w . All questions answered to their full satisfaction. Spent additional 35 minutes in management and evaluation of this patient. >50% time spent in counselling and coordination of care. Please call me if any further questions or concerns. Will continue to participate in the care of this patient.         HPI:    Patient denies any fever or chills.   No worsening pain in his feet.eager to go home.            Past Medical History:   Diagnosis Date    Arthritis     Coronary atherosclerosis of native coronary artery     S/P PCI with GE in 12/09    Diabetes [facility]     IDDM    Electrolyte and fluid disorders not elsewhere classified     Essential hypertension, benign     GERD (gastroesophageal reflux disease)     Heroin abuse ([facility] 05/26/16    Psychiatrist Dr. Jean Claude Causey History of heart attack     Hyperlipidemia     Intermediate coronary syndrome ([facility]     MDD (major depressive disorder) 5/26/16    Psychiatrist Dr. Florence Franco infarction [facility])  Obesity, unspecified     Old myocardial infarction     Other and unspecified hyperlipidemia     Pancreatitis     Positive PPD     Sleep apnea     uses Cpap machine    Transfusion history     Before 1992       Past Surgical History:   Procedure Laterality Date    HX APPENDECTOMY      HX CORONARY STENT PLACEMENT  2010    2 stents       Home Medication List    Details   traZODone (DESYREL) 150 mg tablet Take 150 mg by mouth nightly. Indications: insomnia associated with depression      amLODIPine (NORVASC) 10 mg tablet Take 10 mg by mouth daily. metFORMIN (GLUCOPHAGE) 1,000 mg tablet Take 1,000 mg by mouth two (2) times daily (with meals). insulin lispro (HUMALOG) 100 unit/mL injection Check FSBS AC*HS  For sugars between 160 and 200- Give 2 units SQ,  For sugars between 201 and 250, Give 3 units SQ,  For sugars Between 251 and 300, give 5 units SQ,  For Sugars between 301 and 350, give 7 units SQ  For sugars between 351 and 400, give 8 units SQ and contact PCP  Qty: 1 Vial, Refills: 0      insulin detemir U-100 (Levemir U-100 Insulin) 100 unit/mL injection 20 Units by SubCUTAneous route two (2) times a day. Qty: 10 mL, Refills: 0      pantoprazole (PROTONIX) 40 mg tablet Take 1 Tab by mouth Before breakfast and dinner. Qty: 60 Tab, Refills: 0      atorvastatin (LIPITOR) 20 mg tablet Take 1 Tab by mouth nightly. Qty: 30 Tab, Refills: 0      polyethylene glycol (MIRALAX) 17 gram packet Take 1 Packet by mouth daily.   Qty: 30 Packet, Refills: 0             Current Facility-Administered Medications   Medication Dose Route Frequency    meropenem (MERREM) 1 g in sterile water (preservative free) 20 mL IV syringe  1 g IntraVENous Q8H    insulin glargine (LANTUS) injection 20 Units  20 Units SubCUTAneous DAILY    insulin lispro (HUMALOG) injection   SubCUTAneous AC&HS    dextrose 10% infusion 0-250 mL  0-250 mL IntraVENous PRN    amLODIPine (NORVASC) tablet 10 mg  10 mg Oral DAILY    atorvastatin (LIPITOR) tablet 20 mg  20 mg Oral QHS    pantoprazole (PROTONIX) tablet 40 mg  40 mg Oral ACB&D    traZODone (DESYREL) tablet 150 mg  150 mg Oral QHS    glucose chewable tablet 16 g  4 Tablet Oral PRN    glucagon (GLUCAGEN) injection 1 mg  1 mg IntraMUSCular PRN    sodium chloride (NS) flush 5-10 mL  5-10 mL IntraVENous PRN    sodium chloride (NS) flush 5-40 mL  5-40 mL IntraVENous Q8H    sodium chloride (NS) flush 5-40 mL  5-40 mL IntraVENous PRN    acetaminophen (TYLENOL) tablet 650 mg  650 mg Oral Q6H PRN    Or    acetaminophen (TYLENOL) suppository 650 mg  650 mg Rectal Q6H PRN    polyethylene glycol (MIRALAX) packet 17 g  17 g Oral DAILY PRN    ondansetron (ZOFRAN ODT) tablet 4 mg  4 mg Oral Q8H PRN    Or    ondansetron (ZOFRAN) injection 4 mg  4 mg IntraVENous Q6H PRN    naloxone (NARCAN) injection 0.4 mg  0.4 mg IntraVENous EVERY 2 MINUTES AS NEEDED    morphine injection 2-4 mg  2-4 mg IntraVENous Q4H PRN    albuterol-ipratropium (DUO-NEB) 2.5 MG-0.5 MG/3 ML  3 mL Nebulization Q6H PRN    melatonin tablet 5 mg  5 mg Oral QHS PRN    nitroglycerin (NITROBID) 2 % ointment 0.5 Inch  0.5 Inch Topical Q6H PRN       Allergies: Compazine [prochlorperazine]    Family History   Problem Relation Age of Onset    Heart Attack Father     Coronary Art Dis Mother     Diabetes Mother     Cancer Sister         breast cancer and lung cancer     Social History     Socioeconomic History    Marital status:      Spouse name: Not on file    Number of children: Not on file    Years of education: Not on file    Highest education level: Not on file   Occupational History    Not on file   Tobacco Use    Smoking status: Never Smoker    Smokeless tobacco: Never Used   Substance and Sexual Activity    Alcohol use: No    Drug use: No    Sexual activity: Not on file   Other Topics Concern    Not on file   Social History Narrative    Not on file     Social Determinants of Health Financial Resource Strain:     Difficulty of Paying Living Expenses: Not on file   Food Insecurity:     Worried About Running Out of Food in the Last Year: Not on file    [place] of Food in the Last Year: Not on file   Transportation Needs:     Lack of Transportation (Medical): Not on file    Lack of Transportation (Non-Medical): Not on file   Physical Activity:     Days of Exercise per Week: Not on file    Minutes of Exercise per Session: Not on file   Stress:     Feeling of Stress : Not on file   Social Connections:     Frequency of Communication with Friends and Family: Not on file    Frequency of Social Gatherings with Friends and Family: Not on file    Attends Zoroastrianism Services: Not on file    Active Member of [address] [place] [address] [employer] or Organizations: Not on file    Attends Club or Organization Meetings: Not on file    Marital Status: Not on file   Intimate Partner Violence:     Fear of Current or Ex-Partner: Not on file    Emotionally Abused: Not on file    Physically Abused: Not on file    Sexually Abused: Not on file   Housing Stability:     Unable to Pay for Housing in the Last Year: Not on file    Number of [place] in the Last Year: Not on file    Unstable Housing in the Last Year: Not on file     Social History     Tobacco Use   Smoking Status Never Smoker   Smokeless Tobacco Never Used        Temp (24hrs), Av.6 °F (37 °C), Min:98.1 °F (36.7 °C), Max:99.3 °F (37.4 °C)    Visit Vitals  BP (!) 162/83 (BP Patient Position: At rest)   Pulse 64   Temp 98.7 °F (37.1 °C)   Resp 20   Ht 5' 9\" (1.753 m)   Wt 117.5 kg (259 lb)   SpO2 94%   BMI 38.25 kg/m²       ROS: 12 point ROS obtained in details. Pertinent positives as mentioned in HPI,   otherwise negative    Physical Exam:    General:          In NAD.  Nontoxic-appearing. HEENT:           Head NCAT. sclerae anicteric, EOMI.  OP clear. Neck:               Supple, trachea midline.   Lungs:             Clear, no wheezes.  Effort nonlabored, no accessory muscle use. Chest wall:      No chest wall tenderness.  Chest expansion equal and symmetrical bilaterally. Heart:              RRR.  No JVD. Abdomen:        Soft, NT/ND.  Bowel sounds normoactive. Extremities:     No increased erythema/warmth/tenderness bilateral leg. Surgical dressing bilateral feet not removed  skin:                Skin changes as mentioned above  Psych:             Mood normal.  Calm, no agitation. Neurologic:      Awake and alert, moves extremities spontaneously.      Labs: Results:   Chemistry Recent Labs     05/26/22 0257 05/25/22  1049   * 268*    137   K 3.8 4.3    104   CO2 33* 31   BUN 16 16   CREA 1.20 1.15   CA 8.4* 8.3*   AGAP 2* 2*   BUCR 13 14      CBC w/Diff Recent Labs     05/27/22 0528 05/26/22 0257 05/25/22  1049   WBC 12.1 9.0 8.3   RBC 3.03* 2.87* 3.12*   HGB 8.7* 8.2* 8.9*   HCT 29.5* 27.8* 30.7*    206 222      Microbiology No results for input(s): CULT in the last 72 hours. RADIOLOGY:    All available imaging studies/reports in [facility] care for this admission were reviewed      Disclaimer: Sections of this note are dictated utilizing voice recognition software, which may have resulted in some phonetic based errors in grammar and contents. Even though attempts were made to correct all the mistakes, some may have been missed, and remained in the body of the document. If questions arise, please contact our department.     Dr. Nathaly Jamil, Infectious Disease Specialist  [phone]  May 27, 2022  7:23 AM

## 2022-05-27 NOTE — PROGRESS NOTES
CM notified [address] [place] [address] coming to complete bedside education then the patient can discharge to home. CM notified Dr. Milo Garces via perfect serve. CM updated RN, Gonzalo Ruiz. Patient needs IV ABX teaching and home wound vac setup before discharge.  will continue to monitor and assist with transition of care needs.     Karen Gold BSN, RN  Care Management  Pager # [phone]

## 2022-05-27 NOTE — PROGRESS NOTES
Problem: Risk for Spread of Infection  Goal: Prevent transmission of infectious organism to others  Description: Prevent the transmission of infectious organisms to other patients, staff members, and visitors. 5/27/2022 1723 by Lisa Patel RN  Outcome: Resolved/Met  5/27/2022 0911 by Lisa Patel RN  Outcome: Progressing Towards Goal     Problem: Patient Education:  Go to Education Activity  Goal: Patient/Family Education  5/27/2022 1723 by Lisa Patel RN  Outcome: Resolved/Met  5/27/2022 0911 by Lisa Patel RN  Outcome: Progressing Towards Goal     Problem: Falls - Risk of  Goal: *Absence of Falls  Description: Document Anny Tan Fall Risk and appropriate interventions in the flowsheet.   5/27/2022 1723 by Lisa Patel RN  Outcome: Resolved/Met  Note: Fall Risk Interventions:  Mobility Interventions: Bed/chair exit alarm,Patient to call before getting OOB,Utilize walker, cane, or other assistive device         Medication Interventions: Teach patient to arise slowly,Patient to call before getting OOB    Elimination Interventions: Call light in reach           5/27/2022 0911 by Lisa Patel RN  Outcome: Progressing Towards Goal  Note: Fall Risk Interventions:  Mobility Interventions: Utilize walker, cane, or other assistive device         Medication Interventions: Teach patient to arise slowly    Elimination Interventions: Call light in reach              Problem: Patient Education: Go to Patient Education Activity  Goal: Patient/Family Education  5/27/2022 1723 by Lisa Patel RN  Outcome: Resolved/Met  5/27/2022 0911 by Lisa Patel RN  Outcome: Progressing Towards Goal     Problem: Pain  Goal: *Control of Pain  5/27/2022 1723 by Lisa Patel RN  Outcome: Resolved/Met  5/27/2022 0911 by Lisa Patel RN  Outcome: Progressing Towards Goal  Goal: *PALLIATIVE CARE:  Alleviation of Pain  5/27/2022 1723 by Lisa Patel RN  Outcome: Resolved/Met  5/27/2022 0911 by Vijay Das RN  Outcome: Progressing Towards Goal     Problem: Patient Education: Go to Patient Education Activity  Goal: Patient/Family Education  5/27/2022 1723 by Vijay aDs RN  Outcome: Resolved/Met  5/27/2022 0911 by Vijay Das RN  Outcome: Progressing Towards Goal     Problem: Diabetes Self-Management  Goal: *Disease process and treatment process  Description: Define diabetes and identify own type of diabetes; list 3 options for treating diabetes. 5/27/2022 1723 by Vijay Das RN  Outcome: Resolved/Met  5/27/2022 0911 by Vijay Das RN  Outcome: Progressing Towards Goal  Goal: *Incorporating nutritional management into lifestyle  Description: Describe effect of type, amount and timing of food on blood glucose; list 3 methods for planning meals. 5/27/2022 1723 by Vijay Das RN  Outcome: Resolved/Met  5/27/2022 0911 by Vijay Das RN  Outcome: Progressing Towards Goal  Goal: *Incorporating physical activity into lifestyle  Description: State effect of exercise on blood glucose levels. 5/27/2022 1723 by Vijay Das RN  Outcome: Resolved/Met  5/27/2022 0911 by Vijay Das RN  Outcome: Progressing Towards Goal  Goal: *Developing strategies to promote health/change behavior  Description: Define the ABC's of diabetes; identify appropriate screenings, schedule and personal plan for screenings. 5/27/2022 1723 by Vijay Das RN  Outcome: Resolved/Met  5/27/2022 0911 by Vijay Das RN  Outcome: Progressing Towards Goal  Goal: *Using medications safely  Description: State effect of diabetes medications on diabetes; name diabetes medication taking, action and side effects.   5/27/2022 1723 by Vijay Das RN  Outcome: Resolved/Met  5/27/2022 0911 by Vijay Das RN  Outcome: Progressing Towards Goal  Goal: *Monitoring blood glucose, interpreting and using results  Description: Identify recommended blood glucose targets  and personal targets. 5/27/2022 1723 by Jd Wadsworth RN  Outcome: Resolved/Met  5/27/2022 0911 by Jd Wadsworth RN  Outcome: Progressing Towards Goal  Goal: *Prevention, detection, treatment of acute complications  Description: List symptoms of hyper- and hypoglycemia; describe how to treat low blood sugar and actions for lowering  high blood glucose level. 5/27/2022 1723 by Jd Wadsworth RN  Outcome: Resolved/Met  5/27/2022 0911 by Jd Wadsworth RN  Outcome: Progressing Towards Goal  Goal: *Prevention, detection and treatment of chronic complications  Description: Define the natural course of diabetes and describe the relationship of blood glucose levels to long term complications of diabetes.   5/27/2022 1723 by Jd Wadsworth RN  Outcome: Resolved/Met  5/27/2022 0911 by Jd Wadsworth RN  Outcome: Progressing Towards Goal  Goal: *Developing strategies to address psychosocial issues  Description: Describe feelings about living with diabetes; identify support needed and support network  5/27/2022 1723 by Jd Wadsworth RN  Outcome: Resolved/Met  5/27/2022 0911 by Jd Wadsworth RN  Outcome: Progressing Towards Goal  Goal: *Insulin pump training  5/27/2022 1723 by Jd Wadsworth RN  Outcome: Resolved/Met  5/27/2022 0911 by Jd Wadsworth RN  Outcome: Progressing Towards Goal  Goal: *Sick day guidelines  5/27/2022 1723 by Jd Wadsworth RN  Outcome: Resolved/Met  5/27/2022 0911 by Jd Wadsworth RN  Outcome: Progressing Towards Goal  Goal: *Patient Specific Goal (EDIT GOAL, INSERT TEXT)  5/27/2022 1723 by Jd Wadsworth RN  Outcome: Resolved/Met  5/27/2022 0911 by Jd Wadsworth RN  Outcome: Progressing Towards Goal     Problem: Patient Education: Go to Patient Education Activity  Goal: Patient/Family Education  5/27/2022 1723 by Jd Wadsworth RN  Outcome: Resolved/Met  5/27/2022 0911 by Phani Winn Ellis Clark RN  Outcome: Progressing Towards Goal     Problem: Patient Education: Go to Patient Education Activity  Goal: Patient/Family Education  5/27/2022 1723 by Sandra Schmidt RN  Outcome: Resolved/Met  5/27/2022 0911 by Sandra Schmidt RN  Outcome: Progressing Towards Goal     Problem: Patient Education: Go to Patient Education Activity  Goal: Patient/Family Education  5/27/2022 1723 by Sandra Schmidt RN  Outcome: Resolved/Met  5/27/2022 0911 by Sandra Schmidt RN  Outcome: Progressing Towards Goal     Problem: Hypertension  Goal: *Blood pressure within specified parameters  5/27/2022 1723 by Sandra Schmidt RN  Outcome: Resolved/Met  5/27/2022 0911 by Sandra Schmidt RN  Outcome: Progressing Towards Goal  Goal: *Fluid volume balance  5/27/2022 1723 by Sandra Schmidt RN  Outcome: Resolved/Met  5/27/2022 0911 by Sandra Schmidt RN  Outcome: Progressing Towards Goal  Goal: *Labs within defined limits  5/27/2022 1723 by Sandra Schmidt RN  Outcome: Resolved/Met  5/27/2022 0911 by Sandra Schmidt RN  Outcome: Progressing Towards Goal     Problem: Patient Education: Go to Patient Education Activity  Goal: Patient/Family Education  5/27/2022 1723 by Sandra Schmidt RN  Outcome: Resolved/Met  5/27/2022 0911 by Sandra Schmidt RN  Outcome: Progressing Towards Goal     Problem: Patient Education: Go to Patient Education Activity  Goal: Patient/Family Education  5/27/2022 1723 by Sandra Schmidt RN  Outcome: Resolved/Met  5/27/2022 0911 by Sandra Schmidt RN  Outcome: Progressing Towards Goal     Problem: Lower Extremity Wound Care  Goal: *Infected Wound: Prevention of further infection and promotion of healing  Description: Infection control procedures (eg: clean dressings, clean gloves, hand washing, precautions to isolate wound from contamination, sterile instruments used for wound debridement) should be implemented.   5/27/2022 1723 by Sandra Schmidt RN  Outcome: Resolved/Met  5/27/2022 0911 by Jd Wadsworth RN  Outcome: Progressing Towards Goal  Goal: Interventions  5/27/2022 1723 by Jd Wadsworth RN  Outcome: Resolved/Met  5/27/2022 0911 by Jd Wadsworth RN  Outcome: Progressing Towards Goal     Problem: Patient Education: Go to Patient Education Activity  Goal: Patient/Family Education  5/27/2022 1723 by Jd Wadsworth RN  Outcome: Resolved/Met  5/27/2022 0911 by Jd Wadsworth RN  Outcome: Progressing Towards Goal

## 2022-05-27 NOTE — PROGRESS NOTES
Progress Note    Patient: Francisco Javier Hebert MRN: [id number]  SSN: [id number]    YOB: 1960  Age: 64 y.o. Sex: male      Admit Date: 5/23/2022  2 Days Post-Op     Procedure:   Procedure(s):  LEFT FOOT PARTIAL FIFTH RAY AMPUTATION, BILATERAL FOOT WOUNDS  DEBRIDEMENT AND APPLICATION OF SKIN SUBSTITUTE GRAFT    Subjective:     Patient seen resting quiet and comfortably and no apparent distress. He has wound VAC dressing to left foot. Patient denies any new pedal complaint. Denies N/V/C/F. Status post: osteomyelitis    Objective:     Visit Vitals  /65 (BP Patient Position: At rest)   Pulse (!) 54   Temp 98.1 °F (36.7 °C)   Resp 16   Ht 5' 9\" (1.753 m)   Wt 117.5 kg (259 lb)   SpO2 99%   BMI 38.25 kg/m²        Physical Exam:  Dressings intact to bilateral foot. Dressings appear dry and clean. Labs/Radiology Review: images and reports reviewed    Assessment:     Hospital Problems  Date Reviewed: 5/23/2022          Codes Class Noted POA    * (Principal) Infected wound ICD-10-CM: T14. 8XXA, L08.9  ICD-9-CM: 958.3  5/23/2022 Yes        Obesity (BMI 35.0-39.9 without comorbidity) (Chronic) ICD-10-CM: O12.5  ICD-9-CM: 278.00  5/23/2022 Yes        Diabetic ulcer of left midfoot associated with type 2 diabetes mellitus, with fat layer exposed ([facility] ICD-10-CM: E11.621, B70.521  ICD-9-CM: 250.80, 707.14  2/4/2021 Yes        Acquired hypothyroidism (Chronic) ICD-10-CM: E03.9  ICD-9-CM: 244.9  9/7/2017 Yes        Spinal stenosis at L4-L5 level (Chronic) ICD-10-CM: M48.061  ICD-9-CM: 724.02  6/19/2017 Yes        Coronary artery disease involving native coronary artery of native heart without angina pectoris (Chronic) ICD-10-CM: I25.10  ICD-9-CM: 414.01  5/31/2017 Yes    Overview Signed 9/14/2017 10:20 AM by Waleska Deng MD     Stable symptoms             Diabetes ([facility] (Chronic) ICD-10-CM: E11.9  ICD-9-CM: 250.00  8/14/2015 Yes        Essential hypertension (Chronic) ICD-10-CM: I10  ICD-9-CM: 401.9  8/14/2015 Yes    Overview Signed 9/14/2017 10:22 AM by David Reddy MD     9/17 incr acei             GERD (gastroesophageal reflux disease) (Chronic) ICD-10-CM: K21.9  ICD-9-CM: 530.81  8/14/2015 Yes        Depression (Chronic) ICD-10-CM: F32. A  ICD-9-CM: 342  8/14/2015 Yes              Plan/Recommendations/Medical Decision Making:     - F/u OR micro/pathology. Antibiotics per ID recommendation.   - Home health orders placed for wound VAC dressing change every 3 days.   - Patient to remain NWB to left forefoot. - Patient is stable to d/c from foot standpoint if medically stable and final OR culture and pathology returns. - Will f/u outpatient in 1 week.

## 2022-05-28 ENCOUNTER — HOME CARE VISIT (OUTPATIENT)
Dept: SCHEDULING | Facility: HOME HEALTH | Age: 62
End: 2022-05-28
Payer: MEDICAID

## 2022-05-28 PROCEDURE — 400013 HH SOC

## 2022-05-28 PROCEDURE — G0299 HHS/HOSPICE OF RN EA 15 MIN: HCPCS

## 2022-05-28 NOTE — Clinical Note
SOC completed 5/28/22. SN/PT/OT all ordered and accepted. left foot with wound vac (see pic in media) and left wound to foot (see pic in media.)  MINGO PICC for q8 hour ABX. Patient ambulating on heel of left foot as instructed.   Thank you

## 2022-05-28 NOTE — Clinical Note
SOC completed 5/28/22. SN/PT/OT all ordered and accepted. left foot with wound vac (see pic in media) and left wound to foot (see pic in media.) MINGO PICC for q8 hour ABX. Patient ambulating on heel of left foot as instructed.

## 2022-05-29 ENCOUNTER — HOME CARE VISIT (OUTPATIENT)
Dept: HOME HEALTH SERVICES | Facility: HOME HEALTH | Age: 62
End: 2022-05-29
Payer: MEDICAID

## 2022-05-29 ENCOUNTER — HOME CARE VISIT (OUTPATIENT)
Dept: SCHEDULING | Facility: HOME HEALTH | Age: 62
End: 2022-05-29
Payer: MEDICAID

## 2022-05-29 LAB
BACTERIA SPEC CULT: NORMAL
SERVICE CMNT-IMP: NORMAL

## 2022-05-29 PROCEDURE — G0299 HHS/HOSPICE OF RN EA 15 MIN: HCPCS

## 2022-05-30 VITALS
DIASTOLIC BLOOD PRESSURE: 74 MMHG | OXYGEN SATURATION: 99 % | TEMPERATURE: 97.8 F | HEART RATE: 70 BPM | SYSTOLIC BLOOD PRESSURE: 140 MMHG | RESPIRATION RATE: 14 BRPM

## 2022-05-30 VITALS
RESPIRATION RATE: 16 BRPM | OXYGEN SATURATION: 99 % | HEART RATE: 84 BPM | TEMPERATURE: 98.4 F | SYSTOLIC BLOOD PRESSURE: 128 MMHG | DIASTOLIC BLOOD PRESSURE: 78 MMHG

## 2022-05-30 LAB
BACTERIA SPEC CULT: NORMAL
SERVICE CMNT-IMP: NORMAL

## 2022-05-30 PROCEDURE — A6216 NON-STERILE GAUZE<=16 SQ IN: HCPCS

## 2022-05-30 PROCEDURE — A5120 SKIN BARRIER, WIPE OR SWAB: HCPCS

## 2022-05-30 PROCEDURE — A6260 WOUND CLEANSER ANY TYPE/SIZE: HCPCS

## 2022-05-30 PROCEDURE — A4217 STERILE WATER/SALINE, 500 ML: HCPCS

## 2022-05-30 NOTE — HOME HEALTH
Caregiver involvement:wife is caregiver she  Assists with ADLs, Medication management, including IV ABX, Transportation to appointments, Meal prep and assists with ambulation. Medications reconciled and all medications are available in the home this visit. Medications  are effective at this time. Home health supplies by type and quantity ordered/delivered this visit include: ordered supplies on 5/30/22    Patient education provided this visit to include: Patient is a fall risk, went over the need of having someone with him when ambulating, keep hallways and living areas free of clutter and throw rugs. I went over the importance of regular blood sugar monitoring for good blood sugar control. Patient instructed to follow with diabetic diet- monitoring sugar intake, limiting foods with high sugar content. Continue a heart Healthy ADA diet. Watch out for high sodium foods, read labels. Observe for signs of infection. Monitor B/P, take meds as ordered and f/u with PCP. Read labels of foods, stay away from high sodium canned foods and processed meats. MEDICATION ADHERENCE IS AN IMPORTANT COMPONENT OF HTN MANAGEMENT. PATIENT STATES THE IMPORTANCE TO TAKE HTN MEDS SAME TIME EACH DAY. Kyrie Burrs over the importance of keeping dressings dry clean and intact. Went over the signs of infection and when to call the Doctor. I went over the importance of  a high protein diet to promote healing. We discussed the importance of frequent ambulation to prevent DVTs and increase strength and flexibility. Patient/caregiver  made aware of troubleshooting for wound vac- who to report to/when, when to call office if problems arise with wound vac to discuss with on call nurse. Patient /caregiver also given 4x4s and N/S for wet to dry dressing if needed due to wound vac not functioning. Patient Tino Curry shown how to do wet to dry dressing. I  discussed step by step IV abx administration with patient/caregiver.  I demonstrated flushing using SASH method, using aseptic technique. with SN instruction, wife performed IV abx administration and port flushing. Each port flushed using SASH method and capped- IV abx infusing with no complications noted. Patient is made aware to monitor for any adverse reactions to abx. no adverse reactions noted. Patient level of understanding of education provided: Wife Clover Mixon did the IV ABX before i got there, she demonstrated her technique and did well. Sharps Education Provided: Clinician instructed patient/CG on proper disposal of sharps: Containers should be made of hard plastic, be puncture-resistant and leakproof,   such as a laundry detergent or bleach bottle.  When the container is ¾ full, it should be sealed with tape and labeled   DO NOT RECYCLE prior to discarding in the regular trash.      Patient response to procedure performed:  He is doing well through out IV PICC administration of ABX. Home exercise program/Homework provided: Continue to administer ABX as prescribed using SASH method. Discussed s/s of infection to monitor for, s/s of UTI, who to report to/when. Instructed cg to notify staff/md/seek tx if complications occur. Patient instructed to maintain clear pathways in home and to minimize clutter to prevent falls from occurring/minimize fall potential.   Patient needing a well balanced diet with the 5 food groups, patient to increase fiber in diet, fresh fruits and vegetables, whole grains and increase water intake. Maintain healthy low sodium ADA diet, Continue to monitor B/P and Blood sugars, Observe for signs of infection. Work with PT to increase strength and f/u with PCP. I went over the importance of taking all prescriptions as ordered. I discussed how to avoid extra sodium in his diet. We discussed the signs of infection and when to call MD.  We discussed the high risk for falls and ways to prevent falls in the future.  We discussed taking B/P daily and keeping a log. We also discussed the need of a heart healthy diet, and the need to change positions frequently. Gaining strength with PT for increased mobility. Continued need for the following skills: Nursing, Physical Therapy and Occupational Therapy    The following discharge planning was discussed with the pt/caregiver: Will discharge patient when medically stable and education is completed. Will discharge from nursing when dressings are  no longer needed.

## 2022-05-30 NOTE — HOME HEALTH
Skilled services/Home bound verification: Patient  and caregiver understand homebound status. Skilled Reason for admission/summary of clinical condition: Left foot Osteomyelitis/s/p left 5th ray amputation  This patient is homebound for the following reasons Requires considerable and taxing effort to leave the home , Requires the assistance of 1 or more persons to leave the home  and Only leaves the home for medical reasons or [facility] services and are infrequent and of short duration for other reasons . Caregiver: spouse. Caregiver assists with  Assists with ADLs, Medication management, Transportation to appointments, Meal prep and assists with ambulation. .    Medications reconciled and all medications are available in the home this visit. Medications  are all in the home at this time. Take all medications as prescribed and around the same time each day    High risk medication teaching regarding hyperglycemic agents or opoid narcotics performed. Patient is on Metformin, I reviewed the importance of taking daily as instructed about the same time, He is also on Levemir and Humalog insulins. I went over the differences between the two insulins, I went over the need to understand the signs of hypoglycemia and hyperglycemia, also to check sugar on a daily basis. Rotate injections to different areas of body, dispose of syringes in a non-porus container. Patient is also on oxycodone I  Went over the danger of opioid's addictive nature as well as propensity for respiratory depression and side effct of constipation. Home health supplies by type and quantity ordered/delivered this visit include:wound vac in the home, I ordered adaptic, DWC, kerlix on 5/30/22. Patient education provided this visit to include: Patient is a fall risk, went over the need of having someone with him when ambulating, keep hallways and living areas free of clutter and throw rugs.    I went over the importance of regular blood sugar monitoring for good blood sugar control. Patient instructed to follow with diabetic diet- monitoring sugar intake, limiting foods with high sugar content. Continue a heart Healthy ADA diet. Watch out for high sodium foods, read labels. Observe for signs of infection. Monitor B/P, take meds as ordered and f/u with PCP. Read labels of foods, stay away from high sodium canned foods and processed meats. MEDICATION ADHERENCE IS AN IMPORTANT COMPONENT OF HTN MANAGEMENT. PATIENT STATES THE IMPORTANCE TO TAKE HTN MEDS SAME TIME EACH DAY. Lora Karlo over the importance of keeping dressings dry clean and intact. Went over the signs of infection and when to call the Doctor. I went over the importance of  a high protein diet to promote healing. We discussed the importance of frequent ambulation to prevent DVTs and increase strength and flexibility. Patient/caregiver  made aware of troubleshooting for wound vac- who to report to/when, when to call office if problems arise with wound vac to discuss with on call nurse. Patient /caregiver also given 4x4s and N/S for wet to dry dressing if needed due to wound vac not functioning. Patient Gege Donald shown how to do wet to dry dressing. I  discussed step by step IV abx administration with patient/caregiver. I demonstrated flushing using SASH method, using aseptic technique. with SN instruction, wife performed IV abx administration and port flushing. Each port flushed using SASH method and capped- IV abx infusing with no complications noted. Patient is made aware to monitor for any adverse reactions to abx. no adverse reactions noted. I applied wound vac as ordered at 125mmhg and with one piece of black foam.   Patient level of understanding of education provided: Wife Andrea Dickinson did the flushes and administration with me coaching, she then reviewed with me step by step how to do next dose.       Sharps Education Provided: Clinician instructed patient/CG on proper disposal of sharps: Containers should be made of hard plastic, be puncture-resistant and leakproof,   such as a laundry detergent or bleach bottle.  When the container is ¾ full, it should be sealed with tape and labeled   DO NOT RECYCLE prior to discarding in the regular trash.      Patient response to procedure performed: Patient did well during wound vac and dry dressing on feet. He also did well through out IV PICC administration of ABX. Home exercise program/Homework provided: Continue to administer ABX as prescribed using SASH method. Discussed s/s of infection to monitor for, s/s of UTI, who to report to/when. Instructed cg to notify staff/md/seek tx if complications occur. Patient instructed to maintain clear pathways in home and to minimize clutter to prevent falls from occurring/minimize fall potential.   Patient needing a well balanced diet with the 5 food groups, patient to increase fiber in diet, fresh fruits and vegetables, whole grains and increase water intake. Maintain healthy low sodium ADA diet, Continue to monitor B/P and Blood sugars, Observe for signs of infection. Work with PT to increase strength and f/u with PCP. I went over the importance of taking all prescriptions as ordered. I discussed how to avoid extra sodium in his diet. We discussed the signs of infection and when to call MD.  We discussed the high risk for falls and ways to prevent falls in the future. We discussed taking B/P daily and keeping a log. We also discussed the need of a heart healthy diet, and the need to change positions frequently. Gaining strength with PT for increased mobility. Pt/Caregiver instructed on plan of care and are agreeable to plan of care at this time. Physician Edelmira Dumont and Dr. Kit Rodriguez notified of patient admission to home health and plan of care including anticipated frequency of visits and treatments/interventions/modalities of SN/PT/OT.     Discharge planning discussed with patient and caregiver. Discharge planning as follows: Will discharge patient when medically stable and education is completed. Will discharge from nursing when dressing is no longer needed or can be managed by caregiver independently. Pt/Caregiver did verbalize understanding of discharge planning. Next MD appointment TBD with Neisha CAAL/NP/PA. Patient/caregiver encouraged/instructed to keep appointment as lack of follow through with physician appointment could result in discontinuation of home care services for non-compliance.

## 2022-05-31 ENCOUNTER — HOME CARE VISIT (OUTPATIENT)
Dept: SCHEDULING | Facility: HOME HEALTH | Age: 62
End: 2022-05-31
Payer: MEDICAID

## 2022-05-31 PROCEDURE — G0151 HHCP-SERV OF PT,EA 15 MIN: HCPCS

## 2022-05-31 NOTE — HOME HEALTH
Medications reconciled. No changes in medication during this visit. PATIENT EDUCATION PROVIDED THIS VISIT TO INCLUDE: FALL PREVENTION TECHNIQUES: monitor medication that may alter mental status,  keeping walking pathways clean and clear of clutter and throw rugs, keeping night lights on for ability to see and easily, good visibility for safe transitioning thresholds and proper use and good compliance of using AD. PAIN MANAGEMENT TECHNIQUES: take pain medication as prescribed by MD, positioning and relaxation. S/S OF INFECTION: increased pain, swelling, redness, warmer skin surrounding, fever  SWELLING MANAGEMET: elevate  LEs  PATIENT LEVEL OF UNDERSTANDING OF EDUCATION PROVIDED: Patient verbalized understanding and able to teach back information provided. PATIENT RESPONSE TO PROCEDURE PERFORMED: Patient requires supervision assistance in gait due impaired balance attributed to wounds on both feet. PT recommend for patient to use FWW to offload pressure on wounds and increase stability and safety when performing functional mobility skills, but patient stated he feels he is doing ok inside the house but will consider using it outside the house. Pt/Caregiver instructed on plan of care and are agreeable to plan of care at this time. Plan of care and admission to home health status called to attending physician: Choco Saucedo MD.    Discharge planning discussed with patient and caregiver. Discharge planning as follows: Discharge to self with spouse and under the supervision of MD.  Pt/Caregiver did verbalize understanding.  -  Subjective: \" I don't think I need physical therapy right now. I'm able to walk and do things I normally do before surgery\". -  Objective  Balance:  Unsupported sitting: Normal  Static Standing balance: Good  Dynamic standing balance: NA d/t safety reasons  Tinetti balance and gait test: 19/28 indicative of moderate fall risks.   ROM: WFL on bilateral LEs except bilateral ankle  BLE MMT:  R hip flex 4/5   R hip Abd 4/5   R hip add 4/5   R knee flex 4/5  R knee ext. 4/5    L hip flex 4/5  L hip Abd 4/5  L hip add 4/5  L knee flex 4/5  L knee ext. 4/5  -  Assessment:    Patient is a 63 y/o male with PMHx of Arthritis, Coronary atherosclerosis of native coronary artery S/P PCI with GE in 12/09 Diabetes, Essential hypertension, benign GERD (gastroesophageal reflux disease) Heroin abuse ([address] 05/26/16 Psychiatrist Dr. Lyric Aviles, History of heart attack Hyperlipidemia Intermediate coronary syndrome  MDD (major depressive disorder) 5/26/16 Psychiatrist Dr. Lyric Aviles Myocardial infarction [facility]) Obesity, unspecified Old myocardial infarction Other and unspecified hyperlipidemia Pancreatitis Positive PPD Sleep apnea uses Cpap machine Transfusion history admitted to hospital on 5/23/22 due to Left foot diabetic ulcer, chronic osteomyelitis mid/distal fifth metatarsal, septic arthritis fifth metatarsal phalangeal joint status post left foot partial fifth ray amputation, bilateral foot wounds debridement and was d/c to home on 5/27/22. Patient is now back to his  one level apartment located on first floor. Spouse is primary cg present during this visit. CG can assist with sponge bathing, dressing, meal prep and setup, medication management, grocery shopping, household chores, transportation to MD appointment. . Patient has a PLOF of independent  without AD. During this visit, able to safely and independently performed bed mobility, supervsion assistance in transfers, and household ambulation without AD. He demonstrated fair compliance in observing heel weight bearing on L. PT recommend for  patient to use FWW to increase stability and safety in functional mobility skills and to offload pressure on L foot. Patient stated  he will ask his cousin to get him one but stated that he will only use it when he goes out. HHPT not warranted at this time per patient preference. Patient stated he is moving around well and would like to focus on wound healing.   -  Plan: HHPT for evaluation only.

## 2022-05-31 NOTE — Clinical Note
Therapy Functional Score Assessment  Question   Score   Grooming       2   Upper Dressing    2   Lower Dressing    2   Bathing  5          Toilet Transfer   1   Transfer     2  AH810w Mobility  Lying to sitting on side of bed       SOC:  36   GOAL: 6   Ambulation  2        Dyspnea            1        Pain Interfering with activity 1  Estimated therapy visits: 1

## 2022-06-01 ENCOUNTER — HOME CARE VISIT (OUTPATIENT)
Dept: HOME HEALTH SERVICES | Facility: HOME HEALTH | Age: 62
End: 2022-06-01
Payer: MEDICAID

## 2022-06-01 ENCOUNTER — HOME CARE VISIT (OUTPATIENT)
Dept: SCHEDULING | Facility: HOME HEALTH | Age: 62
End: 2022-06-01
Payer: MEDICAID

## 2022-06-01 VITALS
HEART RATE: 64 BPM | TEMPERATURE: 97.7 F | DIASTOLIC BLOOD PRESSURE: 84 MMHG | SYSTOLIC BLOOD PRESSURE: 138 MMHG | OXYGEN SATURATION: 95 %

## 2022-06-01 PROCEDURE — G0152 HHCP-SERV OF OT,EA 15 MIN: HCPCS

## 2022-06-01 NOTE — Clinical Note
Therapy Functional Score Assessment  Question   Score   Grooming  0       Upper Dressing 0      Lower Dressing 0      Bathing  4      Toilet Transfer  0    Transfer  0            Ambulation  0   Dyspnea                     0       Pain Interfering with activity 3  Est number therapy visits      1

## 2022-06-01 NOTE — HOME HEALTH
Caregiver involvement: Toan Darnell (spouse) lives with pt and provides daily emotional support    Medications reviewed and all medications are available in the home this visit. The following education was provided regarding medications, medication interactions, and look alike medications (specify): Continue as directed by MD.    Medications  are effective at this time. Patient education provided this visit: see ADL note    Sharps education provided:  Clinician instructed patient/CG on proper disposal of sharps: Containers should be made of hard plastic, be puncture-resistant and leakproof,   such as a laundry detergent or bleach bottle.  When the container is ¾ full, it should be sealed with tape and labeled   DO NOT RECYCLE prior to discarding in the regular trash.    170 BG      Patient level of understanding of education provided: see ADL note    Skilled Care Performed this visit: Completed OT evaluation and assessment for safety with ADL and mobility. Patient response to procedure performed:  Mr. Nga Rose had a positive response to therapy. He denies having any increased pain during his transfers or participation with the OT assessment. Patient's Progress towards personal goals: Mr. Nga Rose has excellent rehab potential.  He has returned to independence with ADL and functional mobility in the home setting. Home exercise program: na    Continued need for the following skills: Nursing    Discharge Plans:  1w1 with plans to discharge to self. Pt has returned to baseline with ADL/mobility. No further skilled OT is indicated at this time. Pt is in agreement.   Will notify MD.    List of Comorbidities:  Arthritis   Coronary atherosclerosis of native coronary artery  S/P PCI with GE in 12/0 9   Diabetes ([facility] [address] [address]  IDDM   Electrolyte and fluid disorders not elsewhere classified   Essential hypertension, benign   GERD (gastroesophageal reflux disease)   Heroin abuse ([facility] [address] 05/26/16  Psychiatrist  Vicky Wright   History of heart attack   Hyperlipidemia   Intermediate coronary syndrome ([address]   MDD (major depressive disorder) 5/26/16  Psychiatrist Dr. Fernando Mohr [facility]   Obesity, unspecified   Old myocardial infarction   Other and unspecified hyperlipidemia   Pancreatitis   Positive PPD   Sleep apnea  uses Cpap machine   Transfusion history  Before 1992         Inpatient Notes         Emelyn Schroeder is a 64 y.o. male who presents to [facility] ER with complaint of Referred by Podiatrist. Patient presents to [facility] ER with a note from Patient's Primary Podiatrist requesting admission and MRI of Left Foot. Patient reports a chronic diabetic foot ulcer on the dorsum of his left foot stretching from his lateral to medial aspect of the dorsal mid-foot. Patient reports an 8/10 intensity \"throbbing\" pain and states the wound has been present for several months, but now has purulent drainage. Patient reports that his left \"baby toe\" is infected and a Plain Radiograph showed suspicion of bone infection. Patient denies previous osteomyelitis, but reports Diabetes, HTN, HLD, and CAD with Previous MI. Patient denies fevers, chills, nausea, vomiting, diarrhea, dysuria, and cough. In [facility] ER, Patient is noted to have Blood Pressure 168 /97 mm Hg, WBC 10.5, Hgb 9.7, Neut% 71, Neut# 7.5, Sed rate 93 mm/hr, CO2 33, AG 1, Glucose 170 mg/dL, BUN 18, Creatinine 1/35, eGFR 54/>60, Albumin 2.8, Alk Phos 161, CRP 1.1 mg/dL, and Lactic Acid 1.34. MRI Left Foot results PENDING. Patient is admitted to [facility] Medical Floor for management of Infected Diabetic Foot Wound (Stage 2) with concern for possible Osteomyelitis of 5th Left Lower Extremity Phalange.

## 2022-06-02 ENCOUNTER — HOME CARE VISIT (OUTPATIENT)
Dept: HOME HEALTH SERVICES | Facility: HOME HEALTH | Age: 62
End: 2022-06-02
Payer: MEDICAID

## 2022-06-02 PROCEDURE — G0300 HHS/HOSPICE OF LPN EA 15 MIN: HCPCS

## 2022-06-03 ENCOUNTER — HOME CARE VISIT (OUTPATIENT)
Dept: HOME HEALTH SERVICES | Facility: HOME HEALTH | Age: 62
End: 2022-06-03
Payer: MEDICAID

## 2022-06-03 VITALS
HEART RATE: 62 BPM | DIASTOLIC BLOOD PRESSURE: 70 MMHG | OXYGEN SATURATION: 94 % | RESPIRATION RATE: 17 BRPM | TEMPERATURE: 97.3 F | SYSTOLIC BLOOD PRESSURE: 138 MMHG

## 2022-06-03 PROCEDURE — G0300 HHS/HOSPICE OF LPN EA 15 MIN: HCPCS

## 2022-06-05 VITALS
HEART RATE: 60 BPM | RESPIRATION RATE: 18 BRPM | DIASTOLIC BLOOD PRESSURE: 62 MMHG | OXYGEN SATURATION: 97 % | TEMPERATURE: 98.1 F | SYSTOLIC BLOOD PRESSURE: 128 MMHG

## 2022-06-06 ENCOUNTER — HOME CARE VISIT (OUTPATIENT)
Dept: SCHEDULING | Facility: HOME HEALTH | Age: 62
End: 2022-06-06
Payer: MEDICAID

## 2022-06-06 PROCEDURE — G0299 HHS/HOSPICE OF RN EA 15 MIN: HCPCS

## 2022-06-06 NOTE — HOME HEALTH
Patient refused visit due to another appointment and asked to be called the next day, Thursday and the patient did not answer the phone or return messages. Missed nursing visit.

## 2022-06-07 ENCOUNTER — HOME CARE VISIT (OUTPATIENT)
Dept: SCHEDULING | Facility: HOME HEALTH | Age: 62
End: 2022-06-07
Payer: MEDICAID

## 2022-06-07 PROCEDURE — G0299 HHS/HOSPICE OF RN EA 15 MIN: HCPCS

## 2022-06-08 VITALS
OXYGEN SATURATION: 96 % | HEART RATE: 84 BPM | DIASTOLIC BLOOD PRESSURE: 80 MMHG | SYSTOLIC BLOOD PRESSURE: 140 MMHG | TEMPERATURE: 97.2 F | RESPIRATION RATE: 16 BRPM

## 2022-06-08 NOTE — HOME HEALTH
Skilled reason for visit:PICC CARE, WD VAC CHANGE TO LT FOOT. Caregiver involvement: FAMILY   COOKS, CLEANS  AND TAKES PATIENT TO MD APPT. Medications reviewed and all medications are available in the home this visit. The following education was provided regarding medications:  RECONCILED MEDICATIONS WITH PATIENT. .    MD notified of any discrepancies/look a-like medications/medication interactions: NA  Medications are EFFECTIVE at this time. Home health supplies by type and quantity ordered/delivered this visit include: NA    Patient education provided this visit:   TAUGHT PATIENT S/S OF INFECTION AT WOUND SITE. INSTRUCTED ON MEDICATION AND DIETARY COMPLIANCY. INSTRUCTED ON WHEN TO NOTIFY MD OF MEDICAL CHANGES R/T LT FOOT WOUNDS. INSTRUCTED ON WOUND VAC IF STOPS WORKING CORRECTLY. Sharps education provided: NA    Patient level of understanding of education provided: GOOD. Skilled Care Performed this visit: WD VAC APPEARED NOT TO BE WORKING, SN CALLED [employer] AND,STAYED ON PHONE ON HOLD FOR 30 MINUTES. SN LEFT PROMPT TO CALL SN BACK, NO RETURN CALL. SN ASSESSED LT FOOT WOUND AND FOUND IT TO BE BEEFY COLORED WITH MODERATE AMOUNT OF DRAINAGE. SN PULLED VAC AND LEFT FOOT WITH A DRY BANDAGE,. NSTRUCTED PATIENT  TO ELEVATE FOOT AND SN TO RETURN ON TEUESDAY TO TRY TO GET [employer] TO WORK. PATIENT HAD MANY QUESTIONS ABOUT THE HEALING OF HIS FOOT. EXTENDED VISIT. Patient response to procedure performed:  GOOD    Agency Progress toward goals: GOOD    Patient's Progress towards personal goals:GOOD    Home exercise program: BREATHING EXERCISES TO HELP PREVENT PNEUMONIA. Continued need for the following skills: Nursing    Plan for next visit: WOUND ASSESSMENT  AND DSG CHANGE,      Patient and/or caregiver notified and agrees to changes in the Plan of Care YES      The following discharge planning was discussed with the pt/caregiver: PATIENT WILL BE DISCHARGED ONCE ALL GOALS HAVE BEEN MET [address] [place] [address].

## 2022-06-09 VITALS
HEART RATE: 64 BPM | RESPIRATION RATE: 16 BRPM | OXYGEN SATURATION: 94 % | SYSTOLIC BLOOD PRESSURE: 140 MMHG | DIASTOLIC BLOOD PRESSURE: 80 MMHG | TEMPERATURE: 97 F

## 2022-06-09 NOTE — HOME HEALTH
Skilled reason for visit:PICC CARE, WD VAC CHANGE TO LT FOOT. Caregiver involvement: FAMILY   COOKS, CLEANS  AND TAKES PATIENT TO MD APPT. Medications reviewed and all medications are available in the home this visit. The following education was provided regarding medications:  RECONCILED MEDICATIONS WITH PATIENT. .    MD notified of any discrepancies/look a-like medications/medication interactions: NA  Medications are EFFECTIVE at this time. Home health supplies by type and quantity ordered/delivered this visit include: NA    Patient education provided this visit:   TAUGHT PATIENT S/S OF INFECTION AT WOUND SITE. INSTRUCTED ON MEDICATION AND DIETARY COMPLIANCY. INSTRUCTED ON WHEN TO NOTIFY MD OF MEDICAL CHANGES R/T LT FOOT WOUNDS. INSTRUCTED ON WOUND VAC IF STOPS WORKING CORRECTLY. Sharps education provided: NA    Patient level of understanding of education provided: GOOD. Skilled Care Performed this visit: WD VAC APPEARED NOT TO BE WORKING TODAY EITHER. SN COULD NOT GET NUMBERS TO SHOW UP., SN CALLED [employer] AND,STAYED ON PHONE ON HOLD FOR 30 MINUTES AGAIN. SN LEFT PROMPT FOR [employer] TO CALL SN BACK, NO RETURN CALL. SN ASSESSED LT FOOT WOUND AND FOUND IT TO BE BEEFY COLORED WITH MODERATE AMOUNT OF DRAINAGE. AND CHANGED THE DSG THIS VISIT. SN LEFT VAC OFF AND APPLIED ANOTHER DSG TO FOOT. NSTRUCTED PATIENT  TO ELEVATE FOOT . SN  TRIED TO REACH 2 OTHER PEOPLE TO ASK ABOUT VAC, SN WORKED ON VAC FOR A PERIOD OF TIME, UNABLE TO GET WD VAC TO WORK. SN REQUESTED THAT WOUND NURSE TO SEE PATIENT. Patient response to procedure performed:  GOOD    Agency Progress toward goals: GOOD     Patient's Progress towards personal goals:GOOD    Home exercise program: BREATHING EXERCISES TO HELP PREVENT PNEUMONIA.     Continued need for the following skills: Nursing    Plan for next visit: WOUND ASSESSMENT  AND DSG CHANGE,      Patient and/or caregiver notified and agrees to changes in the Plan of Care YES      The following discharge planning was discussed with the pt/caregiver: PATIENT WILL BE DISCHARGED ONCE ALL GOALS HAVE BEEN MET AND MEDICALLY STABLE.

## 2022-06-10 ENCOUNTER — HOME CARE VISIT (OUTPATIENT)
Dept: SCHEDULING | Facility: HOME HEALTH | Age: 62
End: 2022-06-10
Payer: MEDICAID

## 2022-06-10 VITALS
RESPIRATION RATE: 18 BRPM | SYSTOLIC BLOOD PRESSURE: 136 MMHG | HEART RATE: 82 BPM | OXYGEN SATURATION: 97 % | DIASTOLIC BLOOD PRESSURE: 78 MMHG | TEMPERATURE: 97.2 F

## 2022-06-10 PROCEDURE — G0300 HHS/HOSPICE OF LPN EA 15 MIN: HCPCS

## 2022-06-13 ENCOUNTER — HOME CARE VISIT (OUTPATIENT)
Dept: SCHEDULING | Facility: HOME HEALTH | Age: 62
End: 2022-06-13
Payer: MEDICAID

## 2022-06-13 PROCEDURE — G0299 HHS/HOSPICE OF RN EA 15 MIN: HCPCS

## 2022-06-15 ENCOUNTER — HOME CARE VISIT (OUTPATIENT)
Dept: SCHEDULING | Facility: HOME HEALTH | Age: 62
End: 2022-06-15
Payer: MEDICAID

## 2022-06-15 VITALS
HEART RATE: 63 BPM | OXYGEN SATURATION: 94 % | TEMPERATURE: 97 F | RESPIRATION RATE: 18 BRPM | SYSTOLIC BLOOD PRESSURE: 140 MMHG | DIASTOLIC BLOOD PRESSURE: 80 MMHG

## 2022-06-15 PROCEDURE — G0300 HHS/HOSPICE OF LPN EA 15 MIN: HCPCS

## 2022-06-16 ENCOUNTER — HOSPITAL ENCOUNTER (EMERGENCY)
Age: 62
Discharge: HOME | End: 2022-06-16
Attending: STUDENT IN AN ORGANIZED HEALTH CARE EDUCATION/TRAINING PROGRAM
Payer: MEDICAID

## 2022-06-16 VITALS
BODY MASS INDEX: 35.55 KG/M2 | HEART RATE: 68 BPM | TEMPERATURE: 99.3 F | DIASTOLIC BLOOD PRESSURE: 80 MMHG | WEIGHT: 240 LBS | OXYGEN SATURATION: 97 % | HEIGHT: 69 IN | SYSTOLIC BLOOD PRESSURE: 165 MMHG | RESPIRATION RATE: 15 BRPM

## 2022-06-16 VITALS
SYSTOLIC BLOOD PRESSURE: 130 MMHG | DIASTOLIC BLOOD PRESSURE: 76 MMHG | HEART RATE: 62 BPM | RESPIRATION RATE: 18 BRPM | TEMPERATURE: 98.1 F | OXYGEN SATURATION: 98 %

## 2022-06-16 DIAGNOSIS — R51.9 ACUTE NONINTRACTABLE HEADACHE, UNSPECIFIED HEADACHE TYPE: ICD-10-CM

## 2022-06-16 DIAGNOSIS — Z76.0 MEDICATION REFILL: Primary | ICD-10-CM

## 2022-06-16 PROCEDURE — 99283 EMERGENCY DEPT VISIT LOW MDM: CPT

## 2022-06-16 RX ORDER — PAROXETINE HYDROCHLORIDE 20 MG/1
20 TABLET, FILM COATED ORAL
Status: DISCONTINUED | OUTPATIENT
Start: 2022-06-16 | End: 2022-06-17 | Stop reason: HOSPADM

## 2022-06-16 RX ORDER — ACETAMINOPHEN 500 MG
1000 TABLET ORAL
Status: DISCONTINUED | OUTPATIENT
Start: 2022-06-16 | End: 2022-06-17 | Stop reason: HOSPADM

## 2022-06-16 RX ORDER — PAROXETINE HYDROCHLORIDE 20 MG/1
20 TABLET, FILM COATED ORAL DAILY
Qty: 10 TABLET | Refills: 0 | Status: SHIPPED | OUTPATIENT
Start: 2022-06-16

## 2022-06-16 NOTE — HOME HEALTH
Skilled reason for visit:PICC CARE, WD VAC CHANGE TO LT FOOT. Caregiver involvement: FAMILY   COOKS, CLEANS  AND TAKES PATIENT TO MD APPT. Medications reviewed and all medications are available in the home this visit. The following education was provided regarding medications:  RECONCILED MEDICATIONS WITH PATIENT. .    MD notified of any discrepancies/look a-like medications/medication interactions: NA  Medications are EFFECTIVE at this time. Home health supplies by type and quantity ordered/delivered this visit include: NA    Patient education provided this visit:   TAUGHT PATIENT S/S OF INFECTION AT WOUND SITE. INSTRUCTED ON MEDICATION AND DIETARY COMPLIANCY. INSTRUCTED ON WHEN TO NOTIFY MD OF MEDICAL CHANGES R/T LT FOOT WOUNDS. INSTRUCTED ON WOUND VAC IF STOPS WORKING CORRECTLY. Sharps education provided: NA    Patient level of understanding of education provided: GOOD. Skilled Care Performed this visit: WD VAC INTACT AND PATIENT. SN ASSESSED LT FOOT WOUND AND FOUND IT TO BE BEEFY COLORED WITH MODERATE AMOUNT OF DRAINAGE. CHANGED THE DSG THIS VISIT. APPLIED WD  WD VAC . . INSTRUCTED PATIENT  TO ELEVATE FOOT . INSTRUCTED PATIENT ON WHEN TO NOTIFY MD OF MEDICAL CHANGES R/T LT FOOT WOUND. PICC LINE ASSESSMENT  AND LABS DRAWN VIA PICC LINE. Patient response to procedure performed:  GOOD    Agency Progress toward goals: GOOD     Patient's Progress towards personal goals:GOOD    Home exercise program: BREATHING EXERCISES TO HELP PREVENT PNEUMONIA. Continued need for the following skills: Nursing    Plan for next visit: WOUND ASSESSMENT  AND DSG CHANGE,      Patient and/or caregiver notified and agrees to changes in the Plan of Care YES      The following discharge planning was discussed with the pt/caregiver: PATIENT WILL BE DISCHARGED ONCE ALL GOALS HAVE BEEN MET [address].

## 2022-06-17 ENCOUNTER — HOME CARE VISIT (OUTPATIENT)
Dept: SCHEDULING | Facility: HOME HEALTH | Age: 62
End: 2022-06-17
Payer: MEDICAID

## 2022-06-17 PROCEDURE — MED11446

## 2022-06-17 PROCEDURE — A6216 NON-STERILE GAUZE<=16 SQ IN: HCPCS

## 2022-06-17 PROCEDURE — A6446 CONFORM BAND S W>=3" <5"/YD: HCPCS

## 2022-06-17 PROCEDURE — G0300 HHS/HOSPICE OF LPN EA 15 MIN: HCPCS

## 2022-06-17 PROCEDURE — A6449 LT COMPRES BAND >=3" <5"/YD: HCPCS

## 2022-06-17 NOTE — ED PROVIDER NOTES
EMERGENCY DEPARTMENT HISTORY AND PHYSICAL EXAM    Date: (Not on file)  Patient Name: Graciela Tapia    History of Presenting Illness     Chief Complaint   Patient presents with    Medication Refill         History Provided By: Patient    Chief Complaint: med refill   Duration: few days  Timing: acute  Location: n/a  Quality: n/a  Severity: moderate  Modifying Factors: none  Associated Symptoms: increased anxiety and headaches, out of medication       Additional History (Context): Graciela Tapia is a 64 y.o. male with PMH 3 atherosclerosis, diabetes, hypertension, GERD, heroin abuse, hyperlipidemia, obesity, and sleep apnea who presents to the ED requesting a refill of his Paxil. Patient states he ran out of this medication a few days ago. He states he has been experiencing increased anxiety and headaches which he believes are related to being out of the medication. Patient denies any known sick exposures. Denies chest pain or shortness of breath. Denies suicidal or homicidal ideations. Patient states he would just like to get a refill of this medication. He has an appointment with his PCP in 1 week. No other complaints are reported at this time. PCP: Viraj John MD    Current Facility-Administered Medications   Medication Dose Route Frequency Provider Last Rate Last Admin    PARoxetine (PAXIL) tablet 20 mg  20 mg Oral NOW Lillian Quinones PA-C        acetaminophen (TYLENOL) tablet 1,000 mg  1,000 mg Oral NOW Lillian Quinones PA-C         Current Outpatient Medications   Medication Sig Dispense Refill    PARoxetine (PAXIL) 20 mg tablet Take 1 Tablet by mouth daily. 10 Tablet 0    meropenem 1 g, ADDaptor 1 Each IVPB 1 g by IntraVENous route every eight (8) hours.  meropenem 1 g, ADDaptor 1 Each IVPB 1 g by IntraVENous route every eight (8) hours.  sodium chloride (Normal Saline Flush) 10 mL by IntraVENous route every eight (8) hours.  before and after each dose of ABX.      heparin, porcine, 100 unit/mL injection 300 Units by IntraVENous route every eight (8) hours. after each dose of ABX.  insulin lispro (HUMALOG) 100 unit/mL kwikpen 2-8 Units by SubCUTAneous route ACB/HS. S/S bs 160-200 give 2units             201-250 give 3 units             251-300 give 5 units             301-350 give 7 units             351-400 give 8 units and call PCP.  traZODone (DESYREL) 150 mg tablet Take 150 mg by mouth nightly. Indications: insomnia associated with depression      amLODIPine (NORVASC) 10 mg tablet Take 10 mg by mouth daily.  metFORMIN (GLUCOPHAGE) 1,000 mg tablet Take 1,000 mg by mouth two (2) times daily (with meals).  atorvastatin (LIPITOR) 20 mg tablet Take 1 Tab by mouth nightly. 30 Tab 0    insulin detemir U-100 (Levemir U-100 Insulin) 100 unit/mL injection 20 Units by SubCUTAneous route two (2) times a day. (Patient taking differently: 50 Units by SubCUTAneous route two (2) times a day. Patient states that he takes Long-Acting Insulin 50 [five-zero] Units BID at home.) 10 mL 0    pantoprazole (PROTONIX) 40 mg tablet Take 1 Tab by mouth Before breakfast and dinner. 60 Tab 0    polyethylene glycol (MIRALAX) 17 gram packet Take 1 Packet by mouth daily. (Patient taking differently: Take 1 Packet by mouth daily.  MIX WITH 8OZ OF WATER) 30 Packet 0       Past History     Past Medical History:  Past Medical History:   Diagnosis Date    Arthritis     Coronary atherosclerosis of native coronary artery     S/P PCI with GE in 12/09    Diabetes ([facility] [address]     IDDM    Electrolyte and fluid disorders not elsewhere classified     Essential hypertension, benign     GERD (gastroesophageal reflux disease)     Heroin abuse ([facility] [address] 05/26/16    Psychiatrist Dr. Elian Gonzalez History of heart attack     Hyperlipidemia     Intermediate coronary syndrome ([facility] [address] [facility]     MDD (major depressive disorder) 5/26/16    Psychiatrist Dr. Davion Corbett infarction ([facility] [address]     Obesity, unspecified     Old myocardial infarction     Other and unspecified hyperlipidemia     Pancreatitis     Positive PPD     Sleep apnea     uses Cpap machine    Transfusion history     Before 1992       Past Surgical History:  Past Surgical History:   Procedure Laterality Date    HX APPENDECTOMY      HX CORONARY STENT PLACEMENT  2010    2 stents       Family History:  Family History   Problem Relation Age of Onset    Heart Attack Father     Coronary Art Dis Mother     Diabetes Mother     Cancer Sister         breast cancer and lung cancer       Social History:  Social History     Tobacco Use    Smoking status: Never Smoker    Smokeless tobacco: Never Used   Substance Use Topics    Alcohol use: No    Drug use: No       Allergies: Allergies   Allergen Reactions    Compazine [Prochlorperazine] Anaphylaxis     \"Tightness around throat\"         Review of Systems   Review of Systems   Constitutional: Negative. Negative for chills and fever. HENT: Negative. Negative for congestion, ear pain and rhinorrhea. Eyes: Negative. Negative for pain and redness. Respiratory: Negative. Negative for cough, shortness of breath, wheezing and stridor. Cardiovascular: Negative. Negative for chest pain and leg swelling. Gastrointestinal: Negative. Negative for abdominal pain, constipation, diarrhea, nausea and vomiting. Genitourinary: Negative. Negative for dysuria and frequency. Musculoskeletal: Negative. Negative for back pain and neck pain. Skin: Negative. Negative for rash and wound. Neurological: Positive for headaches. Negative for dizziness, seizures and syncope. Psychiatric/Behavioral: The patient is nervous/anxious. Out of Paxil   All other systems reviewed and are negative.     All Other Systems Negative  Physical Exam     Vitals:    06/16/22 2009   BP: (!) 165/80   Pulse: 68   Resp: 15   Temp: 99.3 °F (37.4 °C)   SpO2: 97%   Weight: 108.9 kg (240 lb)   Height: 5' 9\" (1.753 m)     Physical Exam  Vitals and nursing note reviewed. Constitutional:       General: He is not in acute distress. Appearance: He is well-developed. He is obese. He is not ill-appearing or diaphoretic. HENT:      Head: Normocephalic and atraumatic. Eyes:      General: No scleral icterus. Right eye: No discharge. Left eye: No discharge. Conjunctiva/sclera: Conjunctivae normal.   Cardiovascular:      Rate and Rhythm: Normal rate. Pulmonary:      Effort: Pulmonary effort is normal. No respiratory distress. Breath sounds: No stridor. Musculoskeletal:         General: Normal range of motion. Cervical back: Normal range of motion and neck supple. Skin:     General: Skin is warm and dry. Findings: No erythema or rash. Neurological:      General: No focal deficit present. Mental Status: He is alert and oriented to person, place, and time. Mental status is at baseline. Coordination: Coordination normal.      Comments: Gait is steady and patient exhibits no evidence of ataxia. Patient is able to ambulate without difficulty. No focal neurological deficit noted. No facial droop, slurred speech, or evidence of altered mentation noted on exam.     Psychiatric:         Mood and Affect: Mood normal.         Behavior: Behavior normal.                Diagnostic Study Results     Labs -   No results found for this or any previous visit (from the past 12 hour(s)). Radiologic Studies -   No orders to display     CT Results  (Last 48 hours)    None        CXR Results  (Last 48 hours)    None            Medical Decision Making   I am the first provider for this patient. I reviewed the vital signs, available nursing notes, past medical history, past surgical history, family history and social history. Vital Signs-Reviewed the patient's vital signs.     Records Reviewed: April Nahun Govea PA-C     Procedures:  Procedures    Provider Notes (Medical Decision Making): Impression:  Anxiety, med refill, headache    Exam is unremarkable, patient is just requesting a refill of his Paxil. First dose was ordered in the ED. We will plan to discharge patient with a refill this medication for 10 days. PCP follow-up advised. Patient agrees. Lillian Quinones PA-C     Dictation disclaimer:  Please note that this dictation was completed with [employer], the computer voice recognition software. Quite often unanticipated grammatical, syntax, homophones, and other interpretive errors are inadvertently transcribed by the computer software. Please disregard these errors. Please excuse any errors that have escaped final proofreading. MED RECONCILIATION:  Current Facility-Administered Medications   Medication Dose Route Frequency    PARoxetine (PAXIL) tablet 20 mg  20 mg Oral NOW    acetaminophen (TYLENOL) tablet 1,000 mg  1,000 mg Oral NOW     Current Outpatient Medications   Medication Sig    PARoxetine (PAXIL) 20 mg tablet Take 1 Tablet by mouth daily.  meropenem 1 g, ADDaptor 1 Each IVPB 1 g by IntraVENous route every eight (8) hours.  meropenem 1 g, ADDaptor 1 Each IVPB 1 g by IntraVENous route every eight (8) hours.  sodium chloride (Normal Saline Flush) 10 mL by IntraVENous route every eight (8) hours. before and after each dose of ABX.  heparin, porcine, 100 unit/mL injection 300 Units by IntraVENous route every eight (8) hours. after each dose of ABX.  insulin lispro (HUMALOG) 100 unit/mL kwikpen 2-8 Units by SubCUTAneous route ACB/HS. S/S bs 160-200 give 2units             201-250 give 3 units             251-300 give 5 units             301-350 give 7 units             351-400 give 8 units and call PCP.  traZODone (DESYREL) 150 mg tablet Take 150 mg by mouth nightly. Indications: insomnia associated with depression    amLODIPine (NORVASC) 10 mg tablet Take 10 mg by mouth daily.     metFORMIN (GLUCOPHAGE) 1,000 mg tablet Take 1,000 mg by mouth two (2) times daily (with meals).  atorvastatin (LIPITOR) 20 mg tablet Take 1 Tab by mouth nightly.  insulin detemir U-100 (Levemir U-100 Insulin) 100 unit/mL injection 20 Units by SubCUTAneous route two (2) times a day. (Patient taking differently: 50 Units by SubCUTAneous route two (2) times a day. Patient states that he takes Long-Acting Insulin 50 [five-zero] Units BID at home.)    pantoprazole (PROTONIX) 40 mg tablet Take 1 Tab by mouth Before breakfast and dinner.  polyethylene glycol (MIRALAX) 17 gram packet Take 1 Packet by mouth daily. (Patient taking differently: Take 1 Packet by mouth daily. MIX WITH 8OZ OF WATER)       Disposition:  D/c    DISCHARGE NOTE:   Patient is stable for discharge at this time. I have discussed all the findings from today's work up with the patient, including lab results and imaging. I have answered all questions. Rx for Paxil given. Rest and close follow-up with the PCP recommended this week. Return to the ED immediately for any new or worsening symptoms. Lillian Boyd PA-C     Follow-up Information     Follow up With Specialties Details Why Nirmala Rao., MD Pediatric Medicine In 1 week  [address] [facility] [address]      [facility] EMERGENCY DEPT Emergency Medicine  As needed, If symptoms worsen [address] [address]  [phone]          Current Discharge Medication List      CONTINUE these medications which have CHANGED    Details   PARoxetine (PAXIL) 20 mg tablet Take 1 Tablet by mouth daily. Qty: 10 Tablet, Refills: 0  Start date: 6/16/2022                 Diagnosis     Clinical Impression:   1. Medication refill    2.  Acute nonintractable headache, unspecified headache type

## 2022-06-17 NOTE — ED TRIAGE NOTES
Patient to ED complaint of headache and anxiety,ran out of pain medication. Wants a medication refill of Paroxetine HCL 20MG.  Have an appointment on 6/21 and can't make it until then,

## 2022-06-17 NOTE — DISCHARGE INSTRUCTIONS
[employer] Activation    Thank you for requesting access to [employer]. Please follow the instructions below to securely access and download your online medical record. [employer] allows you to send messages to your doctor, view your test results, renew your prescriptions, schedule appointments, and more. How Do I Sign Up? In your internet browser, go to [email or web address]  Click on the First Time User? Click Here link in the Sign In box. You will be redirect to the New Member Sign Up page. Enter your [employer] Access Code exactly as it appears below. You will not need to use this code after youve completed the sign-up process. If you do not sign up before the expiration date, you must request a new code. [employer] Access Code: [email or web address]  Expires: 2022  6:45 PM (This is the date your [employer] access code will )    Enter the last four digits of your Social Security Number (xxxx) and Date of Birth (mm/dd/yyyy) as indicated and click Submit. You will be taken to the next sign-up page. Create a [employer] ID. This will be your [employer] login ID and cannot be changed, so think of one that is secure and easy to remember. Create a [employer] password. You can change your password at any time. Enter your Password Reset Question and Answer. This can be used at a later time if you forget your password. Enter your e-mail address. You will receive e-mail notification when new information is available in [address] Click Sign Up. You can now view and download portions of your medical record. Click the [place] link to download a portable copy of your medical information. Additional Information    If you have questions, please visit the Frequently Asked Questions section of the [employer] website at [email or web address] Remember, [employer] is NOT to be used for urgent needs. For medical emergencies, dial 911.

## 2022-06-20 ENCOUNTER — HOME CARE VISIT (OUTPATIENT)
Dept: HOME HEALTH SERVICES | Facility: HOME HEALTH | Age: 62
End: 2022-06-20
Payer: MEDICAID

## 2022-06-20 VITALS
HEART RATE: 68 BPM | SYSTOLIC BLOOD PRESSURE: 138 MMHG | TEMPERATURE: 97.2 F | OXYGEN SATURATION: 98 % | RESPIRATION RATE: 18 BRPM | DIASTOLIC BLOOD PRESSURE: 84 MMHG

## 2022-06-21 ENCOUNTER — HOME CARE VISIT (OUTPATIENT)
Dept: SCHEDULING | Facility: HOME HEALTH | Age: 62
End: 2022-06-21
Payer: MEDICAID

## 2022-06-21 PROCEDURE — G0300 HHS/HOSPICE OF LPN EA 15 MIN: HCPCS

## 2022-06-22 VITALS
TEMPERATURE: 97.1 F | OXYGEN SATURATION: 96 % | RESPIRATION RATE: 18 BRPM | DIASTOLIC BLOOD PRESSURE: 84 MMHG | SYSTOLIC BLOOD PRESSURE: 148 MMHG | HEART RATE: 61 BPM

## 2022-06-24 ENCOUNTER — HOME CARE VISIT (OUTPATIENT)
Dept: SCHEDULING | Facility: HOME HEALTH | Age: 62
End: 2022-06-24
Payer: MEDICAID

## 2022-06-24 PROCEDURE — G0300 HHS/HOSPICE OF LPN EA 15 MIN: HCPCS

## 2022-06-25 VITALS
RESPIRATION RATE: 18 BRPM | HEART RATE: 74 BPM | TEMPERATURE: 97.4 F | DIASTOLIC BLOOD PRESSURE: 88 MMHG | OXYGEN SATURATION: 96 % | SYSTOLIC BLOOD PRESSURE: 162 MMHG

## 2022-06-27 ENCOUNTER — HOME CARE VISIT (OUTPATIENT)
Dept: SCHEDULING | Facility: HOME HEALTH | Age: 62
End: 2022-06-27
Payer: MEDICAID

## 2022-06-27 VITALS
DIASTOLIC BLOOD PRESSURE: 78 MMHG | HEART RATE: 62 BPM | RESPIRATION RATE: 18 BRPM | OXYGEN SATURATION: 98 % | TEMPERATURE: 98.1 F | SYSTOLIC BLOOD PRESSURE: 126 MMHG

## 2022-06-27 PROCEDURE — 400013 HH SOC

## 2022-06-27 PROCEDURE — G0300 HHS/HOSPICE OF LPN EA 15 MIN: HCPCS

## 2022-06-29 ENCOUNTER — HOME CARE VISIT (OUTPATIENT)
Dept: SCHEDULING | Facility: HOME HEALTH | Age: 62
End: 2022-06-29
Payer: MEDICAID

## 2022-06-29 VITALS
TEMPERATURE: 97.2 F | RESPIRATION RATE: 18 BRPM | DIASTOLIC BLOOD PRESSURE: 70 MMHG | OXYGEN SATURATION: 98 % | HEART RATE: 62 BPM | SYSTOLIC BLOOD PRESSURE: 125 MMHG

## 2022-06-29 PROCEDURE — G0300 HHS/HOSPICE OF LPN EA 15 MIN: HCPCS

## 2022-07-01 ENCOUNTER — HOME CARE VISIT (OUTPATIENT)
Dept: SCHEDULING | Facility: HOME HEALTH | Age: 62
End: 2022-07-01
Payer: MEDICAID

## 2022-07-01 PROCEDURE — G0300 HHS/HOSPICE OF LPN EA 15 MIN: HCPCS

## 2022-07-02 VITALS
RESPIRATION RATE: 18 BRPM | OXYGEN SATURATION: 97 % | SYSTOLIC BLOOD PRESSURE: 150 MMHG | HEART RATE: 92 BPM | TEMPERATURE: 98.2 F | DIASTOLIC BLOOD PRESSURE: 80 MMHG

## 2022-07-04 ENCOUNTER — HOME CARE VISIT (OUTPATIENT)
Dept: SCHEDULING | Facility: HOME HEALTH | Age: 62
End: 2022-07-04
Payer: MEDICAID

## 2022-07-04 NOTE — Clinical Note
.The visit was missed due to the following reason(s): patient called several times throughout the day, no answer. Several voicemails left with no returned call.  and  Kiara Gaines MD notified, visit will be missed visit.

## 2022-07-06 ENCOUNTER — HOME CARE VISIT (OUTPATIENT)
Dept: SCHEDULING | Facility: HOME HEALTH | Age: 62
End: 2022-07-06
Payer: MEDICAID

## 2022-07-06 VITALS
HEART RATE: 62 BPM | SYSTOLIC BLOOD PRESSURE: 145 MMHG | DIASTOLIC BLOOD PRESSURE: 75 MMHG | OXYGEN SATURATION: 98 % | TEMPERATURE: 98.1 F | RESPIRATION RATE: 18 BRPM

## 2022-07-06 PROCEDURE — G0300 HHS/HOSPICE OF LPN EA 15 MIN: HCPCS

## 2022-07-08 ENCOUNTER — HOME CARE VISIT (OUTPATIENT)
Dept: HOME HEALTH SERVICES | Facility: HOME HEALTH | Age: 62
End: 2022-07-08
Payer: MEDICAID

## 2022-07-08 VITALS
TEMPERATURE: 97.2 F | DIASTOLIC BLOOD PRESSURE: 80 MMHG | HEART RATE: 62 BPM | OXYGEN SATURATION: 96 % | RESPIRATION RATE: 18 BRPM | SYSTOLIC BLOOD PRESSURE: 146 MMHG

## 2022-07-08 PROCEDURE — A6216 NON-STERILE GAUZE<=16 SQ IN: HCPCS

## 2022-07-08 PROCEDURE — A6402 STERILE GAUZE <= 16 SQ IN: HCPCS

## 2022-07-08 PROCEDURE — A6449 LT COMPRES BAND >=3" <5"/YD: HCPCS

## 2022-07-08 PROCEDURE — MED12556 CAN,SPRAY,7.1-OZ,SALINE,WOUND WASH,STRL

## 2022-07-08 PROCEDURE — G0300 HHS/HOSPICE OF LPN EA 15 MIN: HCPCS

## 2022-07-11 ENCOUNTER — HOME CARE VISIT (OUTPATIENT)
Dept: SCHEDULING | Facility: HOME HEALTH | Age: 62
End: 2022-07-11
Payer: MEDICAID

## 2022-07-11 VITALS
DIASTOLIC BLOOD PRESSURE: 78 MMHG | TEMPERATURE: 97.1 F | OXYGEN SATURATION: 93 % | HEART RATE: 86 BPM | SYSTOLIC BLOOD PRESSURE: 134 MMHG | RESPIRATION RATE: 18 BRPM

## 2022-07-11 PROCEDURE — G0300 HHS/HOSPICE OF LPN EA 15 MIN: HCPCS

## 2022-07-12 NOTE — HOME HEALTH
Skilled reason for visit: Wound Care, Diabetes, Hypertension, Safety Precautions, Infection Prevention, Depression, Coronary Artery Disease, and Medication Management and Education    Caregiver involvement: Patients wife is his primary caregiver and assist with preparing his meals transporting patient to his medical appointments, picking up medications, and changing dressing to right foot. Spouse also does the best she can with assisting patient to bathe, however situation is unsafe as she has some health issue of her own. Patient does state he is able to toilet himself and manage his medications. Medications reviewed and all medications ARE available in the home this visit. The following education was provided regarding medications:  Lipitor-Patient/Caregiver was educated on this medication and the purpose of it. Patient/Caregiver verbalized understanding. MD notified of any discrepancies/look a-like medications/medication interactions: NA    Medications are EFFECTIVE at this time. Home health supplies by type and quantity ordered/delivered this visit include: Supplies in Home    Patient education provided this visit: Patient was educated on items checked in interventions tab. Sharps education provided: Clinician instructed patient/CG on proper disposal of sharps: Containers should be made of hard plastic, be puncture-resistant and leak proof, such as a laundry detergent or bleach bottle.  When the container is ¾ full, it should be sealed with tape and labeled DO NOT RECYCLE prior to discarding in the regular trash.        Patient level of understanding of education provided: Patient verbalized all understanding in interventions tab. Skilled Care Performed this visit: Wound Care, Medication Education and Vital Sign Check    Patient response to procedure performed: Patient tolerated vital assessment well and no facial grimaces or complaints of pain or discomfort.  Patient was sitting on his couch answering question that this nurse was asking . Agency Progress toward goals:  Agency staff is progressing well with patient as patient verbalizes being able to better manage disease processes with the education received from home care staff. Patient's Progress towards personal goals:  Progressing towards all goals in interventions tab. Home exercise program: Patient will attend follow up appointment with MD on Wednesday July 13, 2022. Continued need for the following skills: Nursing will continue to follow patient until wound is healed and education is understood and able to be verbalized by patient/caregiver. Plan for next visit:  Continue to educate, assess patient surgical incision, assess vitals, and review medications    Patient and/or caregiver notified and agrees to changes in the Plan of Care NA    The following discharge planning was discussed with the pt/caregiver: when patient reaches goals and medication is managed, and disease processes are understood patient agrees and understand that discharge will take place.

## 2022-07-13 ENCOUNTER — HOME CARE VISIT (OUTPATIENT)
Dept: HOME HEALTH SERVICES | Facility: HOME HEALTH | Age: 62
End: 2022-07-13
Payer: MEDICAID

## 2022-07-13 NOTE — HOME HEALTH
Patient cancelled today's visit on Monday due to having a MD appointment for follow up with surgeon for foot care. SCheduling and MD was notified.

## 2022-07-13 NOTE — Clinical Note
Patient cancelled today's visit on Monday due to having a MD appointment for follow up with surgeon for foot care.  SCheduling and MD was notified

## 2022-07-15 ENCOUNTER — HOME CARE VISIT (OUTPATIENT)
Dept: HOME HEALTH SERVICES | Facility: HOME HEALTH | Age: 62
End: 2022-07-15
Payer: MEDICAID

## 2022-07-16 ENCOUNTER — HOME CARE VISIT (OUTPATIENT)
Dept: SCHEDULING | Facility: HOME HEALTH | Age: 62
End: 2022-07-16
Payer: MEDICAID

## 2022-07-16 PROCEDURE — G0299 HHS/HOSPICE OF RN EA 15 MIN: HCPCS

## 2022-07-18 ENCOUNTER — HOME CARE VISIT (OUTPATIENT)
Dept: HOME HEALTH SERVICES | Facility: HOME HEALTH | Age: 62
End: 2022-07-18
Payer: MEDICAID

## 2022-07-18 VITALS
TEMPERATURE: 97.8 F | RESPIRATION RATE: 16 BRPM | SYSTOLIC BLOOD PRESSURE: 128 MMHG | DIASTOLIC BLOOD PRESSURE: 70 MMHG | OXYGEN SATURATION: 99 % | HEART RATE: 78 BPM

## 2022-07-18 NOTE — HOME HEALTH
The patient can view the shared note after they get an active [employer] account. Note marked as shared with the patient through [address] Caregiver involvement:wife is caregiver she  Assists with ADLs, Medication management, Transportation to appointments, Meal prep and assists with ambulation. Medications reconciled and all medications are available in the home this visit. Medications  are effective at this time. Home health supplies by type and quantity ordered/delivered this visit include: all wound vac suppplies in the home     Patient education provided this visit to include: Patient is a fall risk, went over the need of having someone with him when ambulating, keep hallways and living areas free of clutter and throw rugs. I went over the importance of regular blood sugar monitoring for good blood sugar control. Patient instructed to follow with diabetic diet- monitoring sugar intake, limiting foods with high sugar content. Continue a heart Healthy ADA diet. Watch out for high sodium foods, read labels. Observe for signs of infection. Monitor B/P, take meds as ordered and f/u with PCP. Read labels of foods, stay away from high sodium canned foods and processed meats. MEDICATION ADHERENCE IS AN IMPORTANT COMPONENT OF HTN MANAGEMENT. PATIENT STATES THE IMPORTANCE TO TAKE HTN MEDS SAME TIME EACH DAY. Veto Jacey over the importance of keeping dressings dry clean and intact. Went over the signs of infection and when to call the Doctor. I went over the importance of  a high protein diet to promote healing. We discussed the importance of frequent ambulation to prevent DVTs and increase strength and flexibility. Patient/caregiver  made aware of troubleshooting for wound vac- who to report to/when, when to call office if problems arise with wound vac to discuss with on call nurse.   Patient /caregiver also given 4x4s and N/S for wet to dry dressing if needed due to wound vac not functioning. Patient Ana Hylton shown how to do wet to dry dressing. Progress, wound is clean and getting smaller. Home exercise program/Homework provided:Discussed s/s of infection to monitor for, s/s of UTI, who to report to/when. Instructed cg to notify staff/md/seek tx if complications occur. Patient instructed to maintain clear pathways in home and to minimize clutter to prevent falls from occurring/minimize fall potential.   Patient needing a well balanced diet with the 5 food groups, patient to increase fiber in diet, fresh fruits and vegetables, whole grains and increase water intake. Maintain healthy low sodium ADA diet, Continue to monitor B/P and Blood sugars, Observe for signs of infection. Work with PT to increase strength and f/u with PCP. I went over the importance of taking all prescriptions as ordered. I discussed how to avoid extra sodium in his diet. We discussed the signs of infection and when to call MD.  We discussed the high risk for falls and ways to prevent falls in the future. We discussed taking B/P daily and keeping a log. We also discussed the need of a heart healthy diet, and the need to change positions frequently. Gaining strength with PT for increased mobility. Continued need for the following skills: Nursing, Physical Therapy and Occupational Therapy    The following discharge planning was discussed with the pt/caregiver: Will discharge patient when medically stable and education is completed. Will discharge from nursing when dressings are  no longer needed.

## 2022-07-20 ENCOUNTER — HOME CARE VISIT (OUTPATIENT)
Dept: SCHEDULING | Facility: HOME HEALTH | Age: 62
End: 2022-07-20
Payer: MEDICAID

## 2022-07-20 PROCEDURE — G0300 HHS/HOSPICE OF LPN EA 15 MIN: HCPCS

## 2022-07-21 VITALS
TEMPERATURE: 98.1 F | DIASTOLIC BLOOD PRESSURE: 78 MMHG | OXYGEN SATURATION: 96 % | HEART RATE: 72 BPM | RESPIRATION RATE: 18 BRPM | SYSTOLIC BLOOD PRESSURE: 138 MMHG

## 2022-07-21 NOTE — HOME HEALTH
Skilled Reason for visit: medication and disease management, wound care. Patient saw surgeon on Monday and per patient wound vac has been discontinued. Wound looks very good. Healing well. No s/s of infection. Patient currently changing dry dressing every other day with both R and L  foot. SN placed call to Surgeon to verify/coordinate any new WC orders. Caregiver: Family member, is available as needed or assistance with IADL's, ADL's, meal prep, medication management, and taking patient to all doctor's appointments. Medications reconciled and all medications are available in the home this visit. The following education was provided regarding medications, medication interactions, and look alike medications (specify): Pt to take daily medications on timely basis following proper dosage and freq. Patient/cg instructed to continue to take medications as prescribed. patient aware to monitor for effectiveness and to notify staff of any adverse reactions to medications/any changes to medication regimen. Medications are effective at this time. High risk medication teaching regarding anticoagulants, hyperglycemic agents or opiod narcotics performed. no discrepancies/medication interactions noted      Sharps education: Clinician instructed patient/cg on proper disposal of sharps as follows: Containers should be made of hard plastic, be puncture-resistant and leakproof, such as a laundry detergent or bleach bottle. When the container is ¾ full, it should be sealed with tape and labeled. DO NOT RECYCLE prior to discarding in the regular trash. Home health supplies by type and quantity ordered/delivered this visit include: N/A     Patient education provided this visit to include: See interventions      Patient/caregiver degree of understanding: Patient and caregiver verbalized full understanding.      Patient level of understanding of education provided: patient has a good understanding of education at this time        Patient response to procedure performed: Patient tolerated assessment, education, review of POC very well     Home exercise program/Homework provided: Pt/Caregiver is to keep a daily log of VS readings. Pt/Caregiver is to keep a daily log of healthy fluid intake to meet hydration needs. Discharge planning discussed with patient and caregiver. Discharge planning as follows: Patient will be discharged once education has completed, patient is medically stable and pt/cg are able to independently manage medications and disease process. Pt/Caregiver verbalized understanding of discharge planning. Pt/Caregiver advised to contact us with any questions or concerns. Pt/Caregiver advised to call 911 in a life-threatening emergency. Pt/Caregiver given the opportunity to ask questions and do not have any further questions or concerns at this time.

## 2022-07-22 ENCOUNTER — HOME CARE VISIT (OUTPATIENT)
Dept: SCHEDULING | Facility: HOME HEALTH | Age: 62
End: 2022-07-22
Payer: MEDICAID

## 2022-07-22 VITALS
HEART RATE: 76 BPM | RESPIRATION RATE: 18 BRPM | OXYGEN SATURATION: 90 % | TEMPERATURE: 98.1 F | SYSTOLIC BLOOD PRESSURE: 128 MMHG | DIASTOLIC BLOOD PRESSURE: 62 MMHG

## 2022-07-22 VITALS
SYSTOLIC BLOOD PRESSURE: 142 MMHG | RESPIRATION RATE: 18 BRPM | TEMPERATURE: 97.2 F | HEART RATE: 67 BPM | DIASTOLIC BLOOD PRESSURE: 76 MMHG

## 2022-07-22 PROCEDURE — G0300 HHS/HOSPICE OF LPN EA 15 MIN: HCPCS

## 2022-07-22 NOTE — HOME HEALTH
Skilled Reason for visit: medication and disease management, wound care    WOUNDS TO LLE ARE MAKING EXCELLENT PROGRESS. GRANULATION AND EPITHELIALIZATION TAKING PLACE. NO S/S OF INFECTION. CALL HAS BEEN PLACED TO DR. Isiah Escamilla OFFICE NOW THAT WOUND VAC HAS BEEN REMOVED ASKING IF NEW ORDERS FOR SN ARE GOING TO BE PLACED OR IF SN IS ABLE TO DISCHARGE SINCE WOUND WAS ONLY WRAPPED WITH DRY STERILE GAUZE/COFLEX FOLLOWING HIS APPOINTMENT ON MONDAY WITH THE SURGEON. Caregiver: Family member, is available as needed or assistance with IADL's, ADL's, meal prep, medication management, and taking patient to all doctor's appointments. Medications reconciled and all medications are available in the home this visit. The following education was provided regarding medications, medication interactions, and look alike medications (specify): Pt to take daily medications on timely basis following proper dosage and freq. Patient/cg instructed to continue to take medications as prescribed. patient aware to monitor for effectiveness and to notify staff of any adverse reactions to medications/any changes to medication regimen. Medications are effective at this time. High risk medication teaching regarding anticoagulants, hyperglycemic agents or opiod narcotics performed. no discrepancies/medication interactions noted      Sharps education: Clinician instructed patient/cg on proper disposal of sharps as follows: Containers should be made of hard plastic, be puncture-resistant and leakproof, such as a laundry detergent or bleach bottle. When the container is ¾ full, it should be sealed with tape and labeled. DO NOT RECYCLE prior to discarding in the regular trash.     Home health supplies by type and quantity ordered/delivered this visit include: N/A     Patient education provided this visit to include: See interventions      Patient/caregiver degree of understanding: Patient and caregiver verbalized full understanding. Patient level of understanding of education provided: patient has a good understanding of education at this time        Patient response to procedure performed: Patient tolerated assessment, education, review of POC very well     Home exercise program/Homework provided: Pt/Caregiver is to keep a daily log of VS readings. Pt/Caregiver is to keep a daily log of healthy fluid intake to meet hydration needs. Discharge planning discussed with patient and caregiver. Discharge planning as follows: Patient will be discharged once education has completed, patient is medically stable and pt/cg are able to independently manage medications and disease process. Pt/Caregiver verbalized understanding of discharge planning. Pt/Caregiver advised to contact us with any questions or concerns. Pt/Caregiver advised to call 911 in a life-threatening emergency. Pt/Caregiver given the opportunity to ask questions and do not have any further questions or concerns at this time.

## 2022-07-25 ENCOUNTER — HOME CARE VISIT (OUTPATIENT)
Dept: HOME HEALTH SERVICES | Facility: HOME HEALTH | Age: 62
End: 2022-07-25
Payer: MEDICAID

## 2022-07-26 ENCOUNTER — HOME CARE VISIT (OUTPATIENT)
Dept: SCHEDULING | Facility: HOME HEALTH | Age: 62
End: 2022-07-26
Payer: MEDICAID

## 2022-07-26 VITALS
HEART RATE: 52 BPM | OXYGEN SATURATION: 95 % | TEMPERATURE: 97.7 F | DIASTOLIC BLOOD PRESSURE: 60 MMHG | SYSTOLIC BLOOD PRESSURE: 110 MMHG | RESPIRATION RATE: 16 BRPM

## 2022-07-26 PROCEDURE — G0299 HHS/HOSPICE OF RN EA 15 MIN: HCPCS

## 2022-07-26 NOTE — Clinical Note
S: home health recertification completed. B: Left lateral foot wound, left medial foot wound d/t 5th toe partial amputation    A: both wounds not healing. R: add collagen or therahoney to wound care regimen if appropriate/indicated to stimulate the growth of new tissue and promote healing.      Thank you,  Tawanda Ferreira RN

## 2022-07-26 NOTE — Clinical Note
Recert completed. SN requested collagen or therahoney be added to wound regimen. Awaiting return call. SN 2w1 -Thursday and Saturday this week. 3w3, 2w3, 1w2, 2 prn for left foot wound care. 64 y.o. 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 Recertification being completed due to need for continued wound care to left foot x 2 sites and left latereral lower leg wound. Wound care provided and tolerated well. Pics taken and uploaded. Wounds are non-healing. SN contacted Dr. James Boles via telephone call and case communication note for new wound orders and will await return call. Patient continues to need diabetic monitoring education r/t wound healing. VS wnl, lungs clear, heart rate regular, bowel sounds present x 4, med rec performed.

## 2022-07-26 NOTE — HOME HEALTH
Physician Notification and Justification to Continue Services:     Justification for continued intermittent care: patient with wound care concerns as follows: left foot x 2 sites and left latereral lower leg wound. Dr. Christian Noe notified of the following: the need for continued Skilled Nursing home health services for above reasons, plan of care including visit frequency of 2w1, 3w2, 2w3, 1w3, 2prn Skilled Nursing for : additional assessment and WOUND GARCIA IV ETC: wound care services to left foot x 2 sites, left lateral lower leg. Orders for care to be provide for this certification period: Nursing    The patient is homebound:  1) patient requires the  assistance of another to leave the home; or 2) patient's condition makes leaving the home medically contraindicated; and 3) patient has a normal inability to leave the home and leaving the home requires considerable and taxing effort. Patient may leave the home for infrequent and short duration for medical reasons, and occasional absences for non-medical reasons. Homebound status is due to the following functional limitations: Patient with activity restriction non-weight bearing due to wound on the left foot (body part). Patient required to elevate lower extremities and limit time in the dependent position for edema management due to increased peripheral edema. Summary of care provided: skilled nursing care:  skilled observation/assessment, patient education and wound care  64 y.o. 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 Recertification being completed due to need for continued wound care to left foot x 2 sites and left latereral lower leg wound. Wound care provided and tolerated well. SN had to complete wound care with adaptic, rolled gauze and tape until supplies arrive of collagen and hydrofera blue. Pics taken and uploaded. Wounds are non-healing.  SN contacted Dr. Christian Noe via telephone call and case communication note to notifiy of use of adaptic, rolled gauze and tape until the new wound order supplies arrive and will await return call. Patient continues to need diabetic monitoring education r/t wound healing. VS wnl, lungs clear, heart rate regular, bowel sounds present x 4, med rec performed.      Summary of patient progress towards goals: slowly progressing towards goal    Continued symptom management, education, or treatment needs: wound care, dm education/management    Anticipated outcomes: wound closure    I certify that in my estimation continued services will be required for 60 days

## 2022-07-28 ENCOUNTER — HOME CARE VISIT (OUTPATIENT)
Dept: SCHEDULING | Facility: HOME HEALTH | Age: 62
End: 2022-07-28
Payer: MEDICAID

## 2022-07-28 VITALS
OXYGEN SATURATION: 98 % | TEMPERATURE: 98.7 F | SYSTOLIC BLOOD PRESSURE: 140 MMHG | RESPIRATION RATE: 18 BRPM | HEART RATE: 80 BPM | DIASTOLIC BLOOD PRESSURE: 78 MMHG

## 2022-07-28 PROCEDURE — G0300 HHS/HOSPICE OF LPN EA 15 MIN: HCPCS

## 2022-07-28 PROCEDURE — 400014 HH F/U

## 2022-07-28 NOTE — HOME HEALTH
Skilled Reason for visit: medication and disease management, wound care    Wound appears to be declining slightly since this nurse last saw patient. Some slough and increased drainage present. WC completed per poc and wound care education reviewed. Pt verbalized understanding. Importance of allowing wound to heal without undue pressure or activity to wound area reviewed with patient. Caregiver: Family member, is available as needed or assistance with IADL's, ADL's, meal prep, medication management, and taking patient to all doctor's appointments. Medications reconciled and all medications are available in the home this visit. The following education was provided regarding medications, medication interactions, and look alike medications (specify): Pt to take daily medications on timely basis following proper dosage and freq. Patient/cg instructed to continue to take medications as prescribed. patient aware to monitor for effectiveness and to notify staff of any adverse reactions to medications/any changes to medication regimen. Medications are effective at this time. High risk medication teaching regarding anticoagulants, hyperglycemic agents or opiod narcotics performed. no discrepancies/medication interactions noted      Sharps education: Clinician instructed patient/cg on proper disposal of sharps as follows: Containers should be made of hard plastic, be puncture-resistant and leakproof, such as a laundry detergent or bleach bottle. When the container is ¾ full, it should be sealed with tape and labeled. DO NOT RECYCLE prior to discarding in the regular trash. Home health supplies by type and quantity ordered/delivered this visit include: N/A     Patient education provided this visit to include: See interventions      Patient/caregiver degree of understanding: Patient and caregiver verbalized full understanding.      Patient level of understanding of education provided: patient has a good understanding of education at this time        Patient response to procedure performed: Patient tolerated assessment, education, review of POC very well     Home exercise program/Homework provided: Pt/Caregiver is to keep a daily log of VS readings. Pt/Caregiver is to keep a daily log of healthy fluid intake to meet hydration needs. Discharge planning discussed with patient and caregiver. Discharge planning as follows: Patient will be discharged once education has completed, patient is medically stable and pt/cg are able to independently manage medications and disease process. Pt/Caregiver verbalized understanding of discharge planning. Pt/Caregiver advised to contact us with any questions or concerns. Pt/Caregiver advised to call 911 in a life-threatening emergency. Pt/Caregiver given the opportunity to ask questions and do not have any further questions or concerns at this time.

## 2022-07-30 ENCOUNTER — HOME CARE VISIT (OUTPATIENT)
Dept: HOME HEALTH SERVICES | Facility: HOME HEALTH | Age: 62
End: 2022-07-30
Payer: MEDICAID

## 2022-08-01 ENCOUNTER — HOME CARE VISIT (OUTPATIENT)
Dept: SCHEDULING | Facility: HOME HEALTH | Age: 62
End: 2022-08-01
Payer: MEDICAID

## 2022-08-01 VITALS
OXYGEN SATURATION: 96 % | RESPIRATION RATE: 18 BRPM | TEMPERATURE: 97.4 F | DIASTOLIC BLOOD PRESSURE: 82 MMHG | HEART RATE: 67 BPM | SYSTOLIC BLOOD PRESSURE: 144 MMHG

## 2022-08-01 PROCEDURE — G0300 HHS/HOSPICE OF LPN EA 15 MIN: HCPCS

## 2022-08-01 NOTE — HOME HEALTH
Skilled Reason for visit: medication and disease management, wound care      Caregiver: Family member, is available as needed or assistance with IADL's, ADL's, meal prep, medication management, and taking patient to all doctor's appointments. Medications reconciled and all medications are available in the home this visit. The following education was provided regarding medications, medication interactions, and look alike medications (specify): Pt to take daily medications on timely basis following proper dosage and freq. Patient/cg instructed to continue to take medications as prescribed. patient aware to monitor for effectiveness and to notify staff of any adverse reactions to medications/any changes to medication regimen. Medications are effective at this time. High risk medication teaching regarding anticoagulants, hyperglycemic agents or opiod narcotics performed. no discrepancies/medication interactions noted      Sharps education: Clinician instructed patient/cg on proper disposal of sharps as follows: Containers should be made of hard plastic, be puncture-resistant and leakproof, such as a laundry detergent or bleach bottle. When the container is ¾ full, it should be sealed with tape and labeled. DO NOT RECYCLE prior to discarding in the regular trash. Home health supplies by type and quantity ordered/delivered this visit include: N/A     Patient education provided this visit to include: See interventions      Patient/caregiver degree of understanding: Patient and caregiver verbalized full understanding. Patient level of understanding of education provided: patient has a good understanding of education at this time        Patient response to procedure performed: Patient tolerated wound care,  assessment, education, review of POC very well     Home exercise program/Homework provided: Pt/Caregiver is to keep a daily log of VS readings.  Pt/Caregiver is to keep a daily log of healthy fluid intake to meet hydration needs. Discharge planning discussed with patient and caregiver. Discharge planning as follows: Patient will be discharged once education has completed, patient is medically stable and pt/cg are able to independently manage medications and disease process. Pt/Caregiver verbalized understanding of discharge planning. Pt/Caregiver advised to contact us with any questions or concerns. Pt/Caregiver advised to call 911 in a life-threatening emergency. Pt/Caregiver given the opportunity to ask questions and do not have any further questions or concerns at this time.

## 2022-08-03 ENCOUNTER — HOME CARE VISIT (OUTPATIENT)
Dept: SCHEDULING | Facility: HOME HEALTH | Age: 62
End: 2022-08-03
Payer: MEDICAID

## 2022-08-03 VITALS
SYSTOLIC BLOOD PRESSURE: 152 MMHG | OXYGEN SATURATION: 96 % | HEART RATE: 67 BPM | TEMPERATURE: 98.1 F | DIASTOLIC BLOOD PRESSURE: 84 MMHG | RESPIRATION RATE: 18 BRPM

## 2022-08-03 PROCEDURE — G0300 HHS/HOSPICE OF LPN EA 15 MIN: HCPCS

## 2022-08-03 NOTE — HOME HEALTH
Skilled Reason for visit: medication and disease management, wound care    OVERALL IMPROVEMENT TO BOTH WOUNDS, ESPECIALLY WOUND ON LATERAL ASPECT OF LEFT FOOT. BOTH PROGRESSING TOWARDS HEALING. SN REVIEWED IMPORTANCE OF PRECAUTIONS TO PREVENT UNDUE STRESS TO WOUND AREAS AND TO PROMOTE HEALING. Caregiver: Family member, is available as needed or assistance with IADL's, ADL's, meal prep, medication management, and taking patient to all doctor's appointments. Medications reconciled and all medications are available in the home this visit. The following education was provided regarding medications, medication interactions, and look alike medications (specify): Pt to take daily medications on timely basis following proper dosage and freq. Patient/cg instructed to continue to take medications as prescribed. patient aware to monitor for effectiveness and to notify staff of any adverse reactions to medications/any changes to medication regimen. Medications are effective at this time. High risk medication teaching regarding anticoagulants, hyperglycemic agents or opiod narcotics performed. no discrepancies/medication interactions noted      Sharps education: Clinician instructed patient/cg on proper disposal of sharps as follows: Containers should be made of hard plastic, be puncture-resistant and leakproof, such as a laundry detergent or bleach bottle. When the container is ¾ full, it should be sealed with tape and labeled. DO NOT RECYCLE prior to discarding in the regular trash. Home health supplies by type and quantity ordered/delivered this visit include: N/A     Patient education provided this visit to include: See interventions      Patient/caregiver degree of understanding: Patient and caregiver verbalized full understanding.      Patient level of understanding of education provided: patient has a good understanding of education at this time        Patient response to procedure performed: Patient tolerated wound care,  assessment, education, review of POC very well     Home exercise program/Homework provided: Pt/Caregiver is to keep a daily log of VS readings. Pt/Caregiver is to keep a daily log of healthy fluid intake to meet hydration needs. Discharge planning discussed with patient and caregiver. Discharge planning as follows: Patient will be discharged once education has completed, patient is medically stable and pt/cg are able to independently manage medications and disease process. Pt/Caregiver verbalized understanding of discharge planning. Pt/Caregiver advised to contact us with any questions or concerns. Pt/Caregiver advised to call 911 in a life-threatening emergency. Pt/Caregiver given the opportunity to ask questions and do not have any further questions or concerns at this time.

## 2022-08-05 ENCOUNTER — HOME CARE VISIT (OUTPATIENT)
Dept: SCHEDULING | Facility: HOME HEALTH | Age: 62
End: 2022-08-05
Payer: MEDICAID

## 2022-08-05 VITALS
HEART RATE: 78 BPM | RESPIRATION RATE: 18 BRPM | OXYGEN SATURATION: 96 % | SYSTOLIC BLOOD PRESSURE: 144 MMHG | TEMPERATURE: 98.1 F | DIASTOLIC BLOOD PRESSURE: 82 MMHG

## 2022-08-05 PROCEDURE — G0300 HHS/HOSPICE OF LPN EA 15 MIN: HCPCS

## 2022-08-06 NOTE — HOME HEALTH
Skilled Reason for visit: medication and disease management, wound care     EXCELLENT GRANULATION THROUGHOUT ENTIRE WOUND. SN REVIEWED WOUND CARE AND PRECAUTIONS REQUIRED TO HELP WITH HEALING AND PREVENT TRAUMA TO WOUND AREA. Caregiver: Family member, is available as needed or assistance with IADL's, ADL's, meal prep, medication management, and taking patient to all doctor's appointments. Medications reconciled and all medications are available in the home this visit. The following education was provided regarding medications, medication interactions, and look alike medications (specify): Pt to take daily medications on timely basis following proper dosage and freq. Patient/cg instructed to continue to take medications as prescribed. patient aware to monitor for effectiveness and to notify staff of any adverse reactions to medications/any changes to medication regimen. Medications are effective at this time. High risk medication teaching regarding anticoagulants, hyperglycemic agents or opiod narcotics performed. no discrepancies/medication interactions noted      Sharps education: Clinician instructed patient/cg on proper disposal of sharps as follows: Containers should be made of hard plastic, be puncture-resistant and leakproof, such as a laundry detergent or bleach bottle. When the container is ¾ full, it should be sealed with tape and labeled. DO NOT RECYCLE prior to discarding in the regular trash. Home health supplies by type and quantity ordered/delivered this visit include: N/A     Patient education provided this visit to include: See interventions      Patient/caregiver degree of understanding: Patient and caregiver verbalized full understanding.      Patient level of understanding of education provided: patient has a good understanding of education at this time        Patient response to procedure performed: Patient tolerated wound care,  assessment, education, review of POC very well     Home exercise program/Homework provided: Pt/Caregiver is to keep a daily log of VS readings. Pt/Caregiver is to keep a daily log of healthy fluid intake to meet hydration needs. Discharge planning discussed with patient and caregiver. Discharge planning as follows: Patient will be discharged once education has completed, patient is medically stable and pt/cg are able to independently manage medications and disease process. Pt/Caregiver verbalized understanding of discharge planning. Pt/Caregiver advised to contact us with any questions or concerns. Pt/Caregiver advised to call 911 in a life-threatening emergency. Pt/Caregiver given the opportunity to ask questions and do not have any further questions or concerns at this time.

## 2022-08-08 ENCOUNTER — HOME CARE VISIT (OUTPATIENT)
Dept: SCHEDULING | Facility: HOME HEALTH | Age: 62
End: 2022-08-08
Payer: MEDICAID

## 2022-08-08 PROCEDURE — G0300 HHS/HOSPICE OF LPN EA 15 MIN: HCPCS

## 2022-08-09 VITALS
DIASTOLIC BLOOD PRESSURE: 75 MMHG | OXYGEN SATURATION: 98 % | RESPIRATION RATE: 18 BRPM | SYSTOLIC BLOOD PRESSURE: 144 MMHG | TEMPERATURE: 98.1 F | HEART RATE: 76 BPM

## 2022-08-09 NOTE — HOME HEALTH
Skilled Reason for visit: medication and disease management   wound vac dressing changed. wife already changed dressing to right foot last night. pt did not want changed again. picc line dressing intact. no s/s of infection. supervisor aware will need changed per protocol.        Caregiver: Family member, is available as needed or assistance with IADL's, ADL's, meal prep, medication management, and taking patient to all doctor's appointments.       Medications reconciled and all medications are available in the home this visit. The following education was provided regarding medications, medication interactions, and look alike medications (specify): Pt to take daily medications on timely basis following proper dosage and freq. Patient/cg instructed to continue to take medications as prescribed. patient aware to monitor for effectiveness and to notify staff of any adverse reactions to medications/any changes to medication regimen. Medications are effective at this time.       High risk medication teaching regarding anticoagulants, hyperglycemic agents or opiod narcotics performed (specify) n/a       no discrepancies/medication interactions noted       Sharps education: Clinician instructed patient/cg on proper disposal of sharps as follows: Containers should be made of hard plastic, be puncture-resistant and leakproof, such as a laundry detergent or bleach bottle.  When the container is ¾ full, it should be sealed with tape and labeled. DO NOT RECYCLE prior to discarding in the regular trash.      Home health supplies by type and quantity ordered/delivered this visit include: N/A       Patient education provided this visit to include: See interventions        Patient/caregiver degree of understanding: Patient and caregiver verbalized full understanding.       Patient level of understanding of education provided: patient has a good understanding of education at this time           Patient response to procedure performed: Patient tolerated assessment, education, review of POC very well       Home exercise program/Homework provided: Pt/Caregiver is to keep a daily log of VS readings. Pt/Caregiver is to keep a daily log of healthy fluid intake to meet hydration needs.       Discharge planning discussed with patient and caregiver. Discharge planning as follows: Patient will be discharged once education has completed, patient is medically stable and pt/cg are able to independently manage medications and disease process.       Pt/Caregiver verbalized understanding of discharge planning. Pt/Caregiver advised to contact us with any questions or concerns. Pt/Caregiver advised to call 911 in a life-threatening emergency.      Pt/Caregiver given the opportunity to ask questions and do not have any further questions or concerns at this time.

## 2022-08-09 NOTE — HOME HEALTH
Skilled Reason for visit: medication and disease management, wound care    LEFT FOOT WOUNDS PROGRESSING TOWARDS HEALING. LATERAL SIDE ALMOST CLOSED. MEDIAL IS CLOSING A LITTLE SLOWER. BOTH HAVE EXCELLENT GRANULATION AND EPITHELIALIZATION ALONG EDGES. Caregiver: Family member, is available as needed or assistance with IADL's, ADL's, meal prep, medication management, and taking patient to all doctor's appointments. Medications reconciled and all medications are available in the home this visit. The following education was provided regarding medications, medication interactions, and look alike medications (specify): Pt to take daily medications on timely basis following proper dosage and freq. Patient/cg instructed to continue to take medications as prescribed. patient aware to monitor for effectiveness and to notify staff of any adverse reactions to medications/any changes to medication regimen. Medications are effective at this time. High risk medication teaching regarding anticoagulants, hyperglycemic agents or opiod narcotics performed. no discrepancies/medication interactions noted      Sharps education: Clinician instructed patient/cg on proper disposal of sharps as follows: Containers should be made of hard plastic, be puncture-resistant and leakproof, such as a laundry detergent or bleach bottle. When the container is ¾ full, it should be sealed with tape and labeled. DO NOT RECYCLE prior to discarding in the regular trash. Home health supplies by type and quantity ordered/delivered this visit include: N/A     Patient education provided this visit to include: See interventions      Patient/caregiver degree of understanding: Patient and caregiver verbalized full understanding.      Patient level of understanding of education provided: patient has a good understanding of education at this time        Patient response to procedure performed: Patient tolerated wound care,  assessment, education, review of POC very well     Home exercise program/Homework provided: Pt/Caregiver is to keep a daily log of VS readings. Pt/Caregiver is to keep a daily log of healthy fluid intake to meet hydration needs. Discharge planning discussed with patient and caregiver. Discharge planning as follows: Patient will be discharged once education has completed, patient is medically stable and pt/cg are able to independently manage medications and disease process. Pt/Caregiver verbalized understanding of discharge planning. Pt/Caregiver advised to contact us with any questions or concerns. Pt/Caregiver advised to call 911 in a life-threatening emergency. Pt/Caregiver given the opportunity to ask questions and do not have any further questions or concerns at this time.

## 2022-08-10 ENCOUNTER — HOME CARE VISIT (OUTPATIENT)
Dept: SCHEDULING | Facility: HOME HEALTH | Age: 62
End: 2022-08-10
Payer: MEDICAID

## 2022-08-10 VITALS
SYSTOLIC BLOOD PRESSURE: 142 MMHG | DIASTOLIC BLOOD PRESSURE: 76 MMHG | OXYGEN SATURATION: 98 % | TEMPERATURE: 97.6 F | HEART RATE: 79 BPM | RESPIRATION RATE: 18 BRPM

## 2022-08-10 PROCEDURE — G0300 HHS/HOSPICE OF LPN EA 15 MIN: HCPCS

## 2022-08-10 NOTE — HOME HEALTH
Skilled Reason for visit: medication and disease management, wound care    LATERAL WOUND TO LEFT FOOT ALMOST COMPLETELY HEALED. MEDIAL SIDE CLOSING QUICKLY. GOOD GRANULATION AND EPITHELIALIZATION OCCURRING. NO S/S OF INFECTION. Caregiver: Family member, is available as needed or assistance with IADL's, ADL's, meal prep, medication management, and taking patient to all doctor's appointments. Medications reconciled and all medications are available in the home this visit. The following education was provided regarding medications, medication interactions, and look alike medications (specify): Pt to take daily medications on timely basis following proper dosage and freq. Patient/cg instructed to continue to take medications as prescribed. patient aware to monitor for effectiveness and to notify staff of any adverse reactions to medications/any changes to medication regimen. Medications are effective at this time. High risk medication teaching regarding anticoagulants, hyperglycemic agents or opiod narcotics performed. no discrepancies/medication interactions noted      Sharps education: Clinician instructed patient/cg on proper disposal of sharps as follows: Containers should be made of hard plastic, be puncture-resistant and leakproof, such as a laundry detergent or bleach bottle. When the container is ¾ full, it should be sealed with tape and labeled. DO NOT RECYCLE prior to discarding in the regular trash. Home health supplies by type and quantity ordered/delivered this visit include: N/A     Patient education provided this visit to include: See interventions      Patient/caregiver degree of understanding: Patient and caregiver verbalized full understanding.      Patient level of understanding of education provided: patient has a good understanding of education at this time        Patient response to procedure performed: Patient tolerated wound care,  assessment, education, review of POC very well     Home exercise program/Homework provided: Pt/Caregiver is to keep a daily log of VS readings. Pt/Caregiver is to keep a daily log of healthy fluid intake to meet hydration needs. Discharge planning discussed with patient and caregiver. Discharge planning as follows: Patient will be discharged once education has completed, patient is medically stable and pt/cg are able to independently manage medications and disease process. Pt/Caregiver verbalized understanding of discharge planning. Pt/Caregiver advised to contact us with any questions or concerns. Pt/Caregiver advised to call 911 in a life-threatening emergency. Pt/Caregiver given the opportunity to ask questions and do not have any further questions or concerns at this time.

## 2022-08-12 ENCOUNTER — HOME CARE VISIT (OUTPATIENT)
Dept: HOME HEALTH SERVICES | Facility: HOME HEALTH | Age: 62
End: 2022-08-12
Payer: MEDICAID

## 2022-08-15 ENCOUNTER — HOME CARE VISIT (OUTPATIENT)
Dept: SCHEDULING | Facility: HOME HEALTH | Age: 62
End: 2022-08-15
Payer: MEDICAID

## 2022-08-15 PROCEDURE — G0300 HHS/HOSPICE OF LPN EA 15 MIN: HCPCS

## 2022-08-16 VITALS
TEMPERATURE: 98.1 F | OXYGEN SATURATION: 98 % | SYSTOLIC BLOOD PRESSURE: 142 MMHG | HEART RATE: 76 BPM | DIASTOLIC BLOOD PRESSURE: 82 MMHG | RESPIRATION RATE: 18 BRPM

## 2022-08-17 ENCOUNTER — HOME CARE VISIT (OUTPATIENT)
Dept: SCHEDULING | Facility: HOME HEALTH | Age: 62
End: 2022-08-17
Payer: MEDICAID

## 2022-08-17 PROCEDURE — G0300 HHS/HOSPICE OF LPN EA 15 MIN: HCPCS

## 2022-08-18 VITALS
HEART RATE: 74 BPM | DIASTOLIC BLOOD PRESSURE: 88 MMHG | RESPIRATION RATE: 18 BRPM | TEMPERATURE: 97.2 F | SYSTOLIC BLOOD PRESSURE: 146 MMHG

## 2022-08-18 NOTE — HOME HEALTH
Skilled Reason for visit: medication and disease management, wound care    Both areas on left foot granulating well and lateral wound almost completely closed. SN applied hydrocolloid to protect periwound from maceration. adapt paste also used in some areas to help protect periwound. even a little drainage seems to macerate or slow epithelialization. pt tolerating wc well. Hopefully with current rate of healing both wounds can be healed by the end of next week or the week after. Caregiver: Family member, is available as needed or assistance with IADL's, ADL's, meal prep, medication management, and taking patient to all doctor's appointments. Medications reconciled and all medications are available in the home this visit. The following education was provided regarding medications, medication interactions, and look alike medications (specify): Pt to take daily medications on timely basis following proper dosage and freq. Patient/cg instructed to continue to take medications as prescribed. patient aware to monitor for effectiveness and to notify staff of any adverse reactions to medications/any changes to medication regimen. Medications are effective at this time. High risk medication teaching regarding anticoagulants, hyperglycemic agents or opiod narcotics performed. no discrepancies/medication interactions noted      Sharps education: Clinician instructed patient/cg on proper disposal of sharps as follows: Containers should be made of hard plastic, be puncture-resistant and leakproof, such as a laundry detergent or bleach bottle. When the container is ¾ full, it should be sealed with tape and labeled. DO NOT RECYCLE prior to discarding in the regular trash.     Home health supplies by type and quantity ordered/delivered this visit include: N/A     Patient education provided this visit to include: See interventions      Patient/caregiver degree of understanding: Patient and caregiver verbalized full understanding. Patient level of understanding of education provided: patient has a good understanding of education at this time        Patient response to procedure performed: Patient tolerated wound care,  assessment, education, review of POC very well     Home exercise program/Homework provided: Pt/Caregiver is to keep a daily log of VS readings. Pt/Caregiver is to keep a daily log of healthy fluid intake to meet hydration needs. Discharge planning discussed with patient and caregiver. Discharge planning as follows: Patient will be discharged once education has completed, patient is medically stable and pt/cg are able to independently manage medications and disease process. Pt/Caregiver verbalized understanding of discharge planning. Pt/Caregiver advised to contact us with any questions or concerns. Pt/Caregiver advised to call 911 in a life-threatening emergency. Pt/Caregiver given the opportunity to ask questions and do not have any further questions or concerns at this time.

## 2022-08-20 ENCOUNTER — HOME CARE VISIT (OUTPATIENT)
Dept: HOME HEALTH SERVICES | Facility: HOME HEALTH | Age: 62
End: 2022-08-20
Payer: MEDICAID

## 2022-08-21 NOTE — HOME HEALTH
Set visit time with anival showed up no answer to door or phone.  Called Pt no answer Scheduling notified of missed visit

## 2022-08-22 ENCOUNTER — HOME CARE VISIT (OUTPATIENT)
Dept: SCHEDULING | Facility: HOME HEALTH | Age: 62
End: 2022-08-22
Payer: MEDICAID

## 2022-08-22 VITALS
OXYGEN SATURATION: 96 % | SYSTOLIC BLOOD PRESSURE: 146 MMHG | RESPIRATION RATE: 18 BRPM | DIASTOLIC BLOOD PRESSURE: 92 MMHG | HEART RATE: 93 BPM | TEMPERATURE: 98.1 F

## 2022-08-22 PROCEDURE — G0300 HHS/HOSPICE OF LPN EA 15 MIN: HCPCS

## 2022-08-24 ENCOUNTER — HOME CARE VISIT (OUTPATIENT)
Dept: HOME HEALTH SERVICES | Facility: HOME HEALTH | Age: 62
End: 2022-08-24
Payer: MEDICAID

## 2022-08-25 ENCOUNTER — HOME CARE VISIT (OUTPATIENT)
Dept: SCHEDULING | Facility: HOME HEALTH | Age: 62
End: 2022-08-25
Payer: MEDICAID

## 2022-08-25 PROCEDURE — G0300 HHS/HOSPICE OF LPN EA 15 MIN: HCPCS

## 2022-08-26 VITALS
SYSTOLIC BLOOD PRESSURE: 152 MMHG | TEMPERATURE: 98.1 F | DIASTOLIC BLOOD PRESSURE: 74 MMHG | RESPIRATION RATE: 18 BRPM | HEART RATE: 80 BPM | OXYGEN SATURATION: 98 %

## 2022-08-26 NOTE — HOME HEALTH
Skilled Reason for visit: medication and disease management, wound care    LATERAL SIDE LEFT FOOT WOUND HEALED. MEDIAL SIDE LEFT FOOT WOUND PROGRESSING TOWARDS HEALING QUICKLY. Caregiver: Family member, is available as needed or assistance with IADL's, ADL's, meal prep, medication management, and taking patient to all doctor's appointments. Medications reconciled and all medications are available in the home this visit. The following education was provided regarding medications, medication interactions, and look alike medications (specify): Pt to take daily medications on timely basis following proper dosage and freq. Patient/cg instructed to continue to take medications as prescribed. patient aware to monitor for effectiveness and to notify staff of any adverse reactions to medications/any changes to medication regimen. Medications are effective at this time. High risk medication teaching regarding anticoagulants, hyperglycemic agents or opiod narcotics performed. no discrepancies/medication interactions noted      Sharps education: Clinician instructed patient/cg on proper disposal of sharps as follows: Containers should be made of hard plastic, be puncture-resistant and leakproof, such as a laundry detergent or bleach bottle. When the container is ¾ full, it should be sealed with tape and labeled. DO NOT RECYCLE prior to discarding in the regular trash. Home health supplies by type and quantity ordered/delivered this visit include: N/A     Patient education provided this visit to include: See interventions      Patient/caregiver degree of understanding: Patient and caregiver verbalized full understanding.      Patient level of understanding of education provided: patient has a good understanding of education at this time        Patient response to procedure performed: Patient tolerated wound care,  assessment, education, review of POC very well     Home exercise program/Homework provided: Pt/Caregiver is to keep a daily log of VS readings. Pt/Caregiver is to keep a daily log of healthy fluid intake to meet hydration needs. Discharge planning discussed with patient and caregiver. Discharge planning as follows: Patient will be discharged once education has completed, patient is medically stable and pt/cg are able to independently manage medications and disease process. Pt/Caregiver verbalized understanding of discharge planning. Pt/Caregiver advised to contact us with any questions or concerns. Pt/Caregiver advised to call 911 in a life-threatening emergency. Pt/Caregiver given the opportunity to ask questions and do not have any further questions or concerns at this time.

## 2022-08-29 ENCOUNTER — HOSPITAL ENCOUNTER (OUTPATIENT)
Dept: LAB | Age: 62
Discharge: HOME | End: 2022-08-29

## 2022-08-29 ENCOUNTER — HOME CARE VISIT (OUTPATIENT)
Dept: SCHEDULING | Facility: HOME HEALTH | Age: 62
End: 2022-08-29
Payer: MEDICAID

## 2022-08-29 LAB — XX-LABCORP SPECIMEN COL,LCBCF: NORMAL

## 2022-08-29 PROCEDURE — G0300 HHS/HOSPICE OF LPN EA 15 MIN: HCPCS

## 2022-08-29 PROCEDURE — 400014 HH F/U

## 2022-08-29 PROCEDURE — 99001 SPECIMEN HANDLING PT-LAB: CPT

## 2022-08-30 VITALS
RESPIRATION RATE: 18 BRPM | TEMPERATURE: 98.1 F | OXYGEN SATURATION: 98 % | SYSTOLIC BLOOD PRESSURE: 124 MMHG | DIASTOLIC BLOOD PRESSURE: 70 MMHG | HEART RATE: 74 BPM

## 2022-08-30 NOTE — HOME HEALTH
Skilled Reason for visit: medication and disease management, wound care    WOUND IS DIMINISHING IN SIZE. HOPEFULLY EPITHELIALIZED COMPLETELY WITHIN THE NEXT TWO WEEKS. Caregiver: Family member, is available as needed or assistance with IADL's, ADL's, meal prep, medication management, and taking patient to all doctor's appointments. Medications reconciled and all medications are available in the home this visit. The following education was provided regarding medications, medication interactions, and look alike medications (specify): Pt to take daily medications on timely basis following proper dosage and freq. Patient/cg instructed to continue to take medications as prescribed. patient aware to monitor for effectiveness and to notify staff of any adverse reactions to medications/any changes to medication regimen. Medications are effective at this time. High risk medication teaching regarding anticoagulants, hyperglycemic agents or opiod narcotics performed. no discrepancies/medication interactions noted      Sharps education: Clinician instructed patient/cg on proper disposal of sharps as follows: Containers should be made of hard plastic, be puncture-resistant and leakproof, such as a laundry detergent or bleach bottle. When the container is ¾ full, it should be sealed with tape and labeled. DO NOT RECYCLE prior to discarding in the regular trash. Home health supplies by type and quantity ordered/delivered this visit include: N/A     Patient education provided this visit to include: See interventions      Patient/caregiver degree of understanding: Patient and caregiver verbalized full understanding.      Patient level of understanding of education provided: patient has a good understanding of education at this time        Patient response to procedure performed: Patient tolerated wound care,  assessment, education, review of POC very well     Home exercise program/Homework provided: Pt/Caregiver is to keep a daily log of VS readings. Pt/Caregiver is to keep a daily log of healthy fluid intake to meet hydration needs. Discharge planning discussed with patient and caregiver. Discharge planning as follows: Patient will be discharged once education has completed, patient is medically stable and pt/cg are able to independently manage medications and disease process. Pt/Caregiver verbalized understanding of discharge planning. Pt/Caregiver advised to contact us with any questions or concerns. Pt/Caregiver advised to call 911 in a life-threatening emergency. Pt/Caregiver given the opportunity to ask questions and do not have any further questions or concerns at this time.

## 2022-08-31 ENCOUNTER — HOME CARE VISIT (OUTPATIENT)
Dept: SCHEDULING | Facility: HOME HEALTH | Age: 62
End: 2022-08-31
Payer: MEDICAID

## 2022-08-31 PROCEDURE — G0300 HHS/HOSPICE OF LPN EA 15 MIN: HCPCS

## 2022-09-01 VITALS — RESPIRATION RATE: 18 BRPM

## 2022-09-02 ENCOUNTER — HOME CARE VISIT (OUTPATIENT)
Dept: SCHEDULING | Facility: HOME HEALTH | Age: 62
End: 2022-09-02
Payer: MEDICAID

## 2022-09-05 ENCOUNTER — HOME CARE VISIT (OUTPATIENT)
Dept: SCHEDULING | Facility: HOME HEALTH | Age: 62
End: 2022-09-05
Payer: MEDICAID

## 2022-09-05 PROCEDURE — G0300 HHS/HOSPICE OF LPN EA 15 MIN: HCPCS

## 2022-09-06 VITALS
RESPIRATION RATE: 18 BRPM | TEMPERATURE: 97.2 F | OXYGEN SATURATION: 98 % | SYSTOLIC BLOOD PRESSURE: 144 MMHG | DIASTOLIC BLOOD PRESSURE: 72 MMHG | HEART RATE: 70 BPM

## 2022-09-06 NOTE — HOME HEALTH
Skilled Reason for visit: medication and disease management, wound care      Caregiver: Family member, is available as needed or assistance with IADL's, ADL's, meal prep, medication management, and taking patient to all doctor's appointments. Medications reconciled and all medications are available in the home this visit. The following education was provided regarding medications, medication interactions, and look alike medications (specify): Pt to take daily medications on timely basis following proper dosage and freq. Patient/cg instructed to continue to take medications as prescribed. patient aware to monitor for effectiveness and to notify staff of any adverse reactions to medications/any changes to medication regimen. Medications are effective at this time. High risk medication teaching regarding anticoagulants, hyperglycemic agents or opiod narcotics performed. no discrepancies/medication interactions noted      Sharps education: Clinician instructed patient/cg on proper disposal of sharps as follows: Containers should be made of hard plastic, be puncture-resistant and leakproof, such as a laundry detergent or bleach bottle. When the container is ¾ full, it should be sealed with tape and labeled. DO NOT RECYCLE prior to discarding in the regular trash. Home health supplies by type and quantity ordered/delivered this visit include: N/A     Patient education provided this visit to include: See interventions      Patient/caregiver degree of understanding: Patient and caregiver verbalized full understanding. Patient level of understanding of education provided: patient has a good understanding of education at this time        Patient response to procedure performed: Patient tolerated wound care,  assessment, education, review of POC very well     Home exercise program/Homework provided: Pt/Caregiver is to keep a daily log of VS readings.  Pt/Caregiver is to keep a daily log of healthy fluid intake to meet hydration needs. Discharge planning discussed with patient and caregiver. Discharge planning as follows: Patient will be discharged once education has completed, patient is medically stable and pt/cg are able to independently manage medications and disease process. Pt/Caregiver verbalized understanding of discharge planning. Pt/Caregiver advised to contact us with any questions or concerns. Pt/Caregiver advised to call 911 in a life-threatening emergency.

## 2022-09-07 ENCOUNTER — HOME CARE VISIT (OUTPATIENT)
Dept: HOME HEALTH SERVICES | Facility: HOME HEALTH | Age: 62
End: 2022-09-07
Payer: MEDICAID

## 2022-09-08 ENCOUNTER — HOME CARE VISIT (OUTPATIENT)
Dept: SCHEDULING | Facility: HOME HEALTH | Age: 62
End: 2022-09-08
Payer: MEDICAID

## 2022-09-08 PROCEDURE — G0300 HHS/HOSPICE OF LPN EA 15 MIN: HCPCS

## 2022-09-10 VITALS
TEMPERATURE: 98.1 F | OXYGEN SATURATION: 96 % | SYSTOLIC BLOOD PRESSURE: 120 MMHG | DIASTOLIC BLOOD PRESSURE: 60 MMHG | RESPIRATION RATE: 18 BRPM | HEART RATE: 70 BPM

## 2022-09-10 NOTE — HOME HEALTH
Skilled Reason for visit: medication and disease management, wound care    Wound almost completely healed. No s/s of infection. Good epithelialization progress. Pt saw surgeon yesterday who is very happy with progress, per patient. Skin precautions reviweed along with diabetes management. Caregiver: Family member, is available as needed or assistance with IADL's, ADL's, meal prep, medication management, and taking patient to all doctor's appointments. Medications reconciled and all medications are available in the home this visit. The following education was provided regarding medications, medication interactions, and look alike medications (specify): Pt to take daily medications on timely basis following proper dosage and freq. Patient/cg instructed to continue to take medications as prescribed. patient aware to monitor for effectiveness and to notify staff of any adverse reactions to medications/any changes to medication regimen. Medications are effective at this time. High risk medication teaching regarding anticoagulants, hyperglycemic agents or opiod narcotics performed. no discrepancies/medication interactions noted      Sharps education: Clinician instructed patient/cg on proper disposal of sharps as follows: Containers should be made of hard plastic, be puncture-resistant and leakproof, such as a laundry detergent or bleach bottle. When the container is ¾ full, it should be sealed with tape and labeled. DO NOT RECYCLE prior to discarding in the regular trash. Home health supplies by type and quantity ordered/delivered this visit include: N/A     Patient education provided this visit to include: See interventions      Patient/caregiver degree of understanding: Patient and caregiver verbalized full understanding.      Patient level of understanding of education provided: patient has a good understanding of education at this time        Patient response to procedure performed: Patient tolerated wound care,  assessment, education, review of POC very well     Home exercise program/Homework provided: Pt/Caregiver is to keep a daily log of VS readings. Pt/Caregiver is to keep a daily log of healthy fluid intake to meet hydration needs. Discharge planning discussed with patient and caregiver. Discharge planning as follows: Patient will be discharged once education has completed, patient is medically stable and pt/cg are able to independently manage medications and disease process. Pt/Caregiver verbalized understanding of discharge planning. Pt/Caregiver advised to contact us with any questions or concerns. Pt/Caregiver advised to call 911 in a life-threatening emergency. Pt/Caregiver given the opportunity to ask questions and do not have any further questions or concerns at this time.

## 2022-09-12 ENCOUNTER — HOME CARE VISIT (OUTPATIENT)
Dept: SCHEDULING | Facility: HOME HEALTH | Age: 62
End: 2022-09-12
Payer: MEDICAID

## 2022-09-12 PROCEDURE — G0300 HHS/HOSPICE OF LPN EA 15 MIN: HCPCS

## 2022-09-13 VITALS
RESPIRATION RATE: 18 BRPM | TEMPERATURE: 98.1 F | DIASTOLIC BLOOD PRESSURE: 80 MMHG | SYSTOLIC BLOOD PRESSURE: 130 MMHG | OXYGEN SATURATION: 98 % | HEART RATE: 80 BPM

## 2022-09-14 ENCOUNTER — HOME CARE VISIT (OUTPATIENT)
Dept: SCHEDULING | Facility: HOME HEALTH | Age: 62
End: 2022-09-14
Payer: MEDICAID

## 2022-09-14 PROCEDURE — G0300 HHS/HOSPICE OF LPN EA 15 MIN: HCPCS

## 2022-09-16 ENCOUNTER — HOSPITAL ENCOUNTER (OUTPATIENT)
Dept: LAB | Age: 62
Discharge: HOME | End: 2022-09-16

## 2022-09-16 VITALS
SYSTOLIC BLOOD PRESSURE: 139 MMHG | HEART RATE: 80 BPM | OXYGEN SATURATION: 98 % | DIASTOLIC BLOOD PRESSURE: 82 MMHG | RESPIRATION RATE: 19 BRPM | TEMPERATURE: 97.9 F

## 2022-09-16 LAB — XX-LABCORP SPECIMEN COL,LCBCF: NORMAL

## 2022-09-16 PROCEDURE — 99001 SPECIMEN HANDLING PT-LAB: CPT

## 2022-09-19 ENCOUNTER — HOME CARE VISIT (OUTPATIENT)
Dept: SCHEDULING | Facility: HOME HEALTH | Age: 62
End: 2022-09-19
Payer: MEDICAID

## 2022-09-19 VITALS
SYSTOLIC BLOOD PRESSURE: 139 MMHG | RESPIRATION RATE: 18 BRPM | HEART RATE: 80 BPM | TEMPERATURE: 98.2 F | DIASTOLIC BLOOD PRESSURE: 82 MMHG | OXYGEN SATURATION: 98 %

## 2022-09-19 PROCEDURE — G0300 HHS/HOSPICE OF LPN EA 15 MIN: HCPCS

## 2022-09-22 ENCOUNTER — HOME CARE VISIT (OUTPATIENT)
Dept: SCHEDULING | Facility: HOME HEALTH | Age: 62
End: 2022-09-22
Payer: MEDICAID

## 2022-09-22 VITALS
DIASTOLIC BLOOD PRESSURE: 62 MMHG | TEMPERATURE: 97.7 F | SYSTOLIC BLOOD PRESSURE: 131 MMHG | RESPIRATION RATE: 16 BRPM | HEART RATE: 83 BPM | OXYGEN SATURATION: 94 %

## 2022-09-22 PROCEDURE — G0299 HHS/HOSPICE OF RN EA 15 MIN: HCPCS

## 2022-09-22 NOTE — Clinical Note
Patient admitted to home health services for left foot osteomyelitis, s/p left foot partial fifth toe amputation. Patient required wound care multiple times a week. Patient educated on diabetes management during the episode. Patient able to verbalize diet to follow, med management, and when to call physician. Patient being discharged from home health services due to wound being healed and no further skilled needs.

## 2022-09-23 ENCOUNTER — HOME CARE VISIT (OUTPATIENT)
Dept: HOME HEALTH SERVICES | Facility: HOME HEALTH | Age: 62
End: 2022-09-23
Payer: MEDICAID

## 2022-09-23 NOTE — HOME HEALTH
SN instructed patient to follow up with MD as ordered. If you have to miss your appointments reschedule them. SN instructed patient on importance of compliance with medication regimen, try to avoid missing doses, if you do miss a dose do not double up but instead take at your next scheduled time. SN instructed on importance of infection control and handwashing. SN instructed on when to call the Dr.: temp >100.4, any pain not relieved with medications, any slight shortness of breath, chest discomfort, falls, or questions and concerns. SN instructed to continue diabetic diet. SN instructed to continue exercises as prescribed by therapy but don't over do it. Patient admitted to home health services on 7/27/22 for left foot osteomyelitis, s/p left foot partial fifth toe amputation. Patient required wound care multiple times a week. Patient educated on diabetes management during the episode. Patient able to verbalize diet to follow, med management, and when to call physician. Patient being discharged from home health services due to wound being healed and no further skilled needs.

## 2022-11-22 ENCOUNTER — HOSPITAL ENCOUNTER (OUTPATIENT)
Dept: LAB | Age: 62
Discharge: HOME | End: 2022-11-22

## 2022-11-22 LAB — XX-LABCORP SPECIMEN COL,LCBCF: NORMAL

## 2022-11-22 PROCEDURE — 99001 SPECIMEN HANDLING PT-LAB: CPT

## 2023-05-26 ENCOUNTER — HOME CARE VISIT (OUTPATIENT)
Dept: HOME HEALTH SERVICES | Facility: HOME HEALTH | Age: 63
End: 2023-05-26

## 2023-07-18 ENCOUNTER — HOME CARE VISIT (OUTPATIENT)
Dept: HOME HEALTH SERVICES | Facility: HOME HEALTH | Age: 63
End: 2023-07-18

## 2023-09-21 ENCOUNTER — HOSPITAL ENCOUNTER (OUTPATIENT)
Facility: HOSPITAL | Age: 63
Discharge: HOME | End: 2023-09-24

## 2023-09-21 LAB — LABCORP SPECIMEN COLLECTION: NORMAL

## 2023-10-18 ENCOUNTER — HOSPITAL ENCOUNTER (OUTPATIENT)
Facility: HOSPITAL | Age: 63
Discharge: HOME | End: 2023-10-21

## 2023-11-30 NOTE — PROGRESS NOTES
[facility]  [address], [address] [place] [address] Dr  Phone: [phone]  Fax: [phone]        Td Mckay  : 1960  PCP: Enola Boxer., MD    NEW PATIENT EVALUATION      ASSESSMENT AND PLAN    Helen Coppola was seen today for back problem, pain and leg pain. Diagnoses and all orders for this visit:    Lumbar post-laminectomy syndrome  -     AMB POC XRAY, SPINE, LUMBOSACRAL; 4+  -     topiramate (TOPAMAX) 50 MG tablet; One po qhs x 1 week, then increase to one po BID as tolerated. -     oxyCODONE-acetaminophen (PERCOCET) 7.5-325 MG per tablet; Take 1 tablet by mouth 3 times daily as needed for Pain for up to 7 days. Intended supply: 30 days Max Daily Amount: 3 tablets  -     ketorolac (TORADOL) injection 60 mg    Diabetic polyneuropathy associated with other specified diabetes mellitus (HCC)  -     topiramate (TOPAMAX) 50 MG tablet; One po qhs x 1 week, then increase to one po BID as tolerated. DDD (degenerative disc disease), lumbar         Jared Nath is a 61 y.o. male with chronic low back pain, (hx L4/5 XLIF 2017) and peripheral neuropathy. No sciatica. Reports nothing (PT, injections, sx) has helped except Percocet. Needs provider. Advised patient that our clinic no longer initiates long term opioid medication. Given list of PM providers. Acute pain Rx Percocet. Understands this is a one-time RX and will not be refilled. Toradol 30mg IM x 1 now  Trial of Topamax. Failed Gabapentin, Lyrica.      TORADOL INJECTION:  Administrations This Visit       ketorolac (TORADOL) injection 60 mg       Admin Date  2023  16:27 Action  Given Dose  60 mg Route  IntraMUSCular Site  Dorsogluteal Left Administered By  Ingrid Luz LPN    Ordering Provider: Keo Orozco MD     [address]: [id number]    : [address]    Patient Supplied?: No                           HISTORY OF PRESENT ILLNESS  Annalee Benson

## 2023-12-04 ENCOUNTER — OFFICE VISIT (OUTPATIENT)
Age: 63
End: 2023-12-04

## 2023-12-04 VITALS
RESPIRATION RATE: 18 BRPM | BODY MASS INDEX: 38.03 KG/M2 | OXYGEN SATURATION: 96 % | TEMPERATURE: 98.2 F | WEIGHT: 256.8 LBS | HEIGHT: 69 IN | HEART RATE: 90 BPM

## 2023-12-04 DIAGNOSIS — M96.1 LUMBAR POST-LAMINECTOMY SYNDROME: Primary | ICD-10-CM

## 2023-12-04 DIAGNOSIS — E13.42 DIABETIC POLYNEUROPATHY ASSOCIATED WITH OTHER SPECIFIED DIABETES MELLITUS (HCC): ICD-10-CM

## 2023-12-04 DIAGNOSIS — M51.36 DDD (DEGENERATIVE DISC DISEASE), LUMBAR: ICD-10-CM

## 2023-12-04 RX ORDER — TOPIRAMATE 50 MG/1
TABLET, FILM COATED ORAL
Qty: 60 TABLET | Refills: 2 | Status: SHIPPED | OUTPATIENT
Start: 2023-12-04

## 2023-12-04 RX ORDER — KETOROLAC TROMETHAMINE 30 MG/ML
60 INJECTION, SOLUTION INTRAMUSCULAR; INTRAVENOUS ONCE
Status: COMPLETED | OUTPATIENT
Start: 2023-12-04 | End: 2023-12-04

## 2023-12-04 RX ORDER — INSULIN ASPART 100 [IU]/ML
INJECTION, SOLUTION INTRAVENOUS; SUBCUTANEOUS
COMMUNITY
Start: 2023-11-13

## 2023-12-04 RX ORDER — HYDROCHLOROTHIAZIDE 25 MG/1
25 TABLET ORAL DAILY
COMMUNITY
Start: 2023-11-03

## 2023-12-04 RX ORDER — TESTOSTERONE 10 MG/.5G
GEL, METERED TOPICAL
COMMUNITY
End: 2023-12-04

## 2023-12-04 RX ORDER — OXYCODONE AND ACETAMINOPHEN 7.5; 325 MG/1; MG/1
1 TABLET ORAL 3 TIMES DAILY PRN
Qty: 21 TABLET | Refills: 0 | Status: SHIPPED | OUTPATIENT
Start: 2023-12-04 | End: 2023-12-11

## 2023-12-04 RX ORDER — SILDENAFIL 100 MG/1
TABLET, FILM COATED ORAL
COMMUNITY
Start: 2021-11-09 | End: 2023-12-04

## 2023-12-04 RX ORDER — NAPROXEN 500 MG/1
TABLET ORAL
COMMUNITY
Start: 2023-10-10

## 2023-12-04 RX ADMIN — KETOROLAC TROMETHAMINE 60 MG: 30 INJECTION, SOLUTION INTRAMUSCULAR; INTRAVENOUS at 16:27

## 2023-12-04 NOTE — PROGRESS NOTES
Consent was explained to the pt and signed. No questions or concerns voiced at this time. Pt given 30mg/1ml of toradol IM in left gluteus. No sign or symptoms of infection noted at injection site. There was no bleeding, swelling or leaking noted after injection. Pt handed injection information sheet to take home. Mr. Magnolia Jackson tolerated the injection well. He declined to wait the ten minutes. He ambulated to checkout without any issues.

## 2023-12-04 NOTE — PROGRESS NOTES
Debbie Leon presents today for   Chief Complaint   Patient presents with    Back Problem    Pain    Leg Pain     Left         Is someone accompanying this pt? no    Is the patient using any DME equipment during OV? no    Depression Screening:       No data to display                Learning Assessment:  No flowsheet data found. Abuse Screening:       No data to display                Fall Risk  No flowsheet data found. OPIOID RISK TOOL  No flowsheet data found. Coordination of Care:  1. Have you been to the ER, urgent care clinic since your last visit? no  Hospitalized since your last visit? no    2. Have you seen or consulted any other health care providers outside of the [address] [place] [address] since your last visit? no Include any pap smears or colon screening.  no

## 2024-04-11 ENCOUNTER — HOSPITAL ENCOUNTER (INPATIENT)
Facility: HOSPITAL | Age: 64
LOS: 5 days | Discharge: HOSPICE | DRG: 314 | End: 2024-04-16
Attending: STUDENT IN AN ORGANIZED HEALTH CARE EDUCATION/TRAINING PROGRAM | Admitting: STUDENT IN AN ORGANIZED HEALTH CARE EDUCATION/TRAINING PROGRAM
Payer: MEDICAID

## 2024-04-11 ENCOUNTER — APPOINTMENT (OUTPATIENT)
Facility: HOSPITAL | Age: 64
DRG: 314 | End: 2024-04-11
Payer: MEDICAID

## 2024-04-11 DIAGNOSIS — N17.9 ACUTE RENAL FAILURE SUPERIMPOSED ON CHRONIC KIDNEY DISEASE, UNSPECIFIED ACUTE RENAL FAILURE TYPE, UNSPECIFIED CKD STAGE (HCC): ICD-10-CM

## 2024-04-11 DIAGNOSIS — E11.621 DIABETIC ULCER OF LEFT FOOT DUE TO TYPE 2 DIABETES MELLITUS (HCC): Primary | ICD-10-CM

## 2024-04-11 DIAGNOSIS — L97.529 DIABETIC ULCER OF LEFT FOOT DUE TO TYPE 2 DIABETES MELLITUS (HCC): Primary | ICD-10-CM

## 2024-04-11 DIAGNOSIS — N18.9 ACUTE RENAL FAILURE SUPERIMPOSED ON CHRONIC KIDNEY DISEASE, UNSPECIFIED ACUTE RENAL FAILURE TYPE, UNSPECIFIED CKD STAGE (HCC): ICD-10-CM

## 2024-04-11 PROBLEM — E11.9 DM (DIABETES MELLITUS), TYPE 2 (HCC): Status: ACTIVE | Noted: 2019-12-25

## 2024-04-11 PROBLEM — G47.30 SLEEP APNEA: Status: ACTIVE | Noted: 2024-04-11

## 2024-04-11 PROBLEM — Z91.148 NON COMPLIANCE W MEDICATION REGIMEN: Status: ACTIVE | Noted: 2024-04-11

## 2024-04-11 PROBLEM — E78.5 HYPERLIPIDEMIA: Status: ACTIVE | Noted: 2024-04-11

## 2024-04-11 LAB
ABO + RH BLD: NORMAL
ALBUMIN SERPL-MCNC: 3.2 G/DL (ref 3.4–5)
ALBUMIN/GLOB SERPL: 0.5 (ref 0.8–1.7)
ALP SERPL-CCNC: 153 U/L (ref 45–117)
ALT SERPL-CCNC: 32 U/L (ref 16–61)
ANION GAP SERPL CALC-SCNC: 4 MMOL/L (ref 3–18)
AST SERPL-CCNC: 28 U/L (ref 10–38)
BASOPHILS # BLD: 0.1 K/UL (ref 0–0.1)
BASOPHILS NFR BLD: 1 % (ref 0–2)
BILIRUB SERPL-MCNC: 0.4 MG/DL (ref 0.2–1)
BLOOD GROUP ANTIBODIES SERPL: NORMAL
BUN SERPL-MCNC: 21 MG/DL (ref 7–18)
BUN/CREAT SERPL: 15 (ref 12–20)
CALCIUM SERPL-MCNC: 9.9 MG/DL (ref 8.5–10.1)
CHLORIDE SERPL-SCNC: 97 MMOL/L (ref 100–111)
CO2 SERPL-SCNC: 35 MMOL/L (ref 21–32)
CREAT SERPL-MCNC: 1.37 MG/DL (ref 0.6–1.3)
DIFFERENTIAL METHOD BLD: ABNORMAL
EOSINOPHIL # BLD: 0.5 K/UL (ref 0–0.4)
EOSINOPHIL NFR BLD: 4 % (ref 0–5)
ERYTHROCYTE [DISTWIDTH] IN BLOOD BY AUTOMATED COUNT: 11.9 % (ref 11.6–14.5)
GLOBULIN SER CALC-MCNC: 6.3 G/DL (ref 2–4)
GLUCOSE BLD STRIP.AUTO-MCNC: 129 MG/DL (ref 70–110)
GLUCOSE SERPL-MCNC: 206 MG/DL (ref 74–99)
HCT VFR BLD AUTO: 42.2 % (ref 36–48)
HGB BLD-MCNC: 12.6 G/DL (ref 13–16)
IMM GRANULOCYTES # BLD AUTO: 0.2 K/UL (ref 0–0.04)
IMM GRANULOCYTES NFR BLD AUTO: 1 % (ref 0–0.5)
LYMPHOCYTES # BLD: 2.6 K/UL (ref 0.9–3.6)
LYMPHOCYTES NFR BLD: 21 % (ref 21–52)
MCH RBC QN AUTO: 28.3 PG (ref 24–34)
MCHC RBC AUTO-ENTMCNC: 29.9 G/DL (ref 31–37)
MCV RBC AUTO: 94.8 FL (ref 78–100)
MONOCYTES # BLD: 0.8 K/UL (ref 0.05–1.2)
MONOCYTES NFR BLD: 6 % (ref 3–10)
NEUTS SEG # BLD: 8.4 K/UL (ref 1.8–8)
NEUTS SEG NFR BLD: 67 % (ref 40–73)
NRBC # BLD: 0 K/UL (ref 0–0.01)
NRBC BLD-RTO: 0 PER 100 WBC
PLATELET # BLD AUTO: 296 K/UL (ref 135–420)
PMV BLD AUTO: 12 FL (ref 9.2–11.8)
POTASSIUM SERPL-SCNC: 3.8 MMOL/L (ref 3.5–5.5)
PROT SERPL-MCNC: 9.5 G/DL (ref 6.4–8.2)
RBC # BLD AUTO: 4.45 M/UL (ref 4.35–5.65)
SODIUM SERPL-SCNC: 136 MMOL/L (ref 136–145)
SPECIMEN EXP DATE BLD: NORMAL
WBC # BLD AUTO: 12.6 K/UL (ref 4.6–13.2)

## 2024-04-11 PROCEDURE — 86901 BLOOD TYPING SEROLOGIC RH(D): CPT

## 2024-04-11 PROCEDURE — 93005 ELECTROCARDIOGRAM TRACING: CPT | Performed by: NURSE PRACTITIONER

## 2024-04-11 PROCEDURE — 86850 RBC ANTIBODY SCREEN: CPT

## 2024-04-11 PROCEDURE — 82962 GLUCOSE BLOOD TEST: CPT

## 2024-04-11 PROCEDURE — 71046 X-RAY EXAM CHEST 2 VIEWS: CPT

## 2024-04-11 PROCEDURE — 2580000003 HC RX 258

## 2024-04-11 PROCEDURE — 99223 1ST HOSP IP/OBS HIGH 75: CPT

## 2024-04-11 PROCEDURE — 1100000003 HC PRIVATE W/ TELEMETRY

## 2024-04-11 PROCEDURE — 73630 X-RAY EXAM OF FOOT: CPT

## 2024-04-11 PROCEDURE — 6370000000 HC RX 637 (ALT 250 FOR IP)

## 2024-04-11 PROCEDURE — 86900 BLOOD TYPING SEROLOGIC ABO: CPT

## 2024-04-11 PROCEDURE — 80053 COMPREHEN METABOLIC PANEL: CPT

## 2024-04-11 PROCEDURE — 99285 EMERGENCY DEPT VISIT HI MDM: CPT

## 2024-04-11 PROCEDURE — 83036 HEMOGLOBIN GLYCOSYLATED A1C: CPT

## 2024-04-11 PROCEDURE — 85025 COMPLETE CBC W/AUTO DIFF WBC: CPT

## 2024-04-11 RX ORDER — OXYCODONE HYDROCHLORIDE AND ACETAMINOPHEN 5; 325 MG/1; MG/1
1 TABLET ORAL EVERY 4 HOURS PRN
Status: DISCONTINUED | OUTPATIENT
Start: 2024-04-11 | End: 2024-04-15

## 2024-04-11 RX ORDER — ONDANSETRON 2 MG/ML
4 INJECTION INTRAMUSCULAR; INTRAVENOUS EVERY 6 HOURS PRN
Status: DISCONTINUED | OUTPATIENT
Start: 2024-04-11 | End: 2024-04-16 | Stop reason: HOSPADM

## 2024-04-11 RX ORDER — NALOXONE HYDROCHLORIDE 0.4 MG/ML
0.4 INJECTION, SOLUTION INTRAMUSCULAR; INTRAVENOUS; SUBCUTANEOUS PRN
Status: DISCONTINUED | OUTPATIENT
Start: 2024-04-11 | End: 2024-04-16 | Stop reason: HOSPADM

## 2024-04-11 RX ORDER — SODIUM CHLORIDE 9 MG/ML
INJECTION, SOLUTION INTRAVENOUS CONTINUOUS
Status: DISPENSED | OUTPATIENT
Start: 2024-04-11 | End: 2024-04-13

## 2024-04-11 RX ORDER — ACETAMINOPHEN 650 MG/1
650 SUPPOSITORY RECTAL EVERY 6 HOURS PRN
Status: DISCONTINUED | OUTPATIENT
Start: 2024-04-11 | End: 2024-04-16 | Stop reason: HOSPADM

## 2024-04-11 RX ORDER — LISINOPRIL 40 MG/1
40 TABLET ORAL DAILY
Status: ON HOLD | COMMUNITY
End: 2024-04-16 | Stop reason: HOSPADM

## 2024-04-11 RX ORDER — FAMOTIDINE 20 MG/1
20 TABLET, FILM COATED ORAL DAILY
Status: DISCONTINUED | OUTPATIENT
Start: 2024-04-12 | End: 2024-04-16 | Stop reason: HOSPADM

## 2024-04-11 RX ORDER — MAGNESIUM SULFATE IN WATER 40 MG/ML
2000 INJECTION, SOLUTION INTRAVENOUS PRN
Status: DISCONTINUED | OUTPATIENT
Start: 2024-04-11 | End: 2024-04-16 | Stop reason: HOSPADM

## 2024-04-11 RX ORDER — ACETAMINOPHEN 325 MG/1
650 TABLET ORAL EVERY 6 HOURS PRN
Status: DISCONTINUED | OUTPATIENT
Start: 2024-04-11 | End: 2024-04-16 | Stop reason: HOSPADM

## 2024-04-11 RX ORDER — MORPHINE SULFATE 2 MG/ML
2 INJECTION, SOLUTION INTRAMUSCULAR; INTRAVENOUS EVERY 4 HOURS PRN
Status: DISCONTINUED | OUTPATIENT
Start: 2024-04-11 | End: 2024-04-15

## 2024-04-11 RX ORDER — DEXTROSE MONOHYDRATE 100 MG/ML
INJECTION, SOLUTION INTRAVENOUS CONTINUOUS PRN
Status: DISCONTINUED | OUTPATIENT
Start: 2024-04-11 | End: 2024-04-16 | Stop reason: HOSPADM

## 2024-04-11 RX ORDER — SODIUM CHLORIDE 0.9 % (FLUSH) 0.9 %
5-40 SYRINGE (ML) INJECTION EVERY 12 HOURS SCHEDULED
Status: DISCONTINUED | OUTPATIENT
Start: 2024-04-11 | End: 2024-04-16 | Stop reason: HOSPADM

## 2024-04-11 RX ORDER — PAROXETINE HYDROCHLORIDE 20 MG/1
20 TABLET, FILM COATED ORAL DAILY
Status: DISCONTINUED | OUTPATIENT
Start: 2024-04-12 | End: 2024-04-16 | Stop reason: HOSPADM

## 2024-04-11 RX ORDER — POLYETHYLENE GLYCOL 3350 17 G/17G
17 POWDER, FOR SOLUTION ORAL DAILY PRN
Status: DISCONTINUED | OUTPATIENT
Start: 2024-04-11 | End: 2024-04-16 | Stop reason: HOSPADM

## 2024-04-11 RX ORDER — INSULIN LISPRO 100 [IU]/ML
0-8 INJECTION, SOLUTION INTRAVENOUS; SUBCUTANEOUS
Status: DISCONTINUED | OUTPATIENT
Start: 2024-04-11 | End: 2024-04-16 | Stop reason: HOSPADM

## 2024-04-11 RX ORDER — POTASSIUM CHLORIDE 20 MEQ/1
40 TABLET, EXTENDED RELEASE ORAL PRN
Status: DISCONTINUED | OUTPATIENT
Start: 2024-04-11 | End: 2024-04-16 | Stop reason: HOSPADM

## 2024-04-11 RX ORDER — POTASSIUM CHLORIDE 7.45 MG/ML
10 INJECTION INTRAVENOUS PRN
Status: DISCONTINUED | OUTPATIENT
Start: 2024-04-11 | End: 2024-04-16 | Stop reason: HOSPADM

## 2024-04-11 RX ORDER — LISINOPRIL 40 MG/1
40 TABLET ORAL DAILY
Status: DISCONTINUED | OUTPATIENT
Start: 2024-04-12 | End: 2024-04-16 | Stop reason: HOSPADM

## 2024-04-11 RX ORDER — HYDRALAZINE HYDROCHLORIDE 20 MG/ML
10 INJECTION INTRAMUSCULAR; INTRAVENOUS EVERY 6 HOURS PRN
Status: DISCONTINUED | OUTPATIENT
Start: 2024-04-11 | End: 2024-04-16 | Stop reason: HOSPADM

## 2024-04-11 RX ORDER — SODIUM CHLORIDE 0.9 % (FLUSH) 0.9 %
5-40 SYRINGE (ML) INJECTION PRN
Status: DISCONTINUED | OUTPATIENT
Start: 2024-04-11 | End: 2024-04-16 | Stop reason: HOSPADM

## 2024-04-11 RX ORDER — ONDANSETRON 4 MG/1
4 TABLET, ORALLY DISINTEGRATING ORAL EVERY 8 HOURS PRN
Status: DISCONTINUED | OUTPATIENT
Start: 2024-04-11 | End: 2024-04-16 | Stop reason: HOSPADM

## 2024-04-11 RX ADMIN — OXYCODONE HYDROCHLORIDE AND ACETAMINOPHEN 1 TABLET: 5; 325 TABLET ORAL at 23:26

## 2024-04-11 RX ADMIN — SODIUM CHLORIDE: 9 INJECTION, SOLUTION INTRAVENOUS at 23:26

## 2024-04-11 RX ADMIN — SODIUM CHLORIDE, PRESERVATIVE FREE 10 ML: 5 INJECTION INTRAVENOUS at 23:18

## 2024-04-11 ASSESSMENT — PAIN DESCRIPTION - LOCATION: LOCATION: FOOT

## 2024-04-11 ASSESSMENT — PAIN DESCRIPTION - DESCRIPTORS: DESCRIPTORS: ACHING;DISCOMFORT

## 2024-04-11 ASSESSMENT — PAIN SCALES - GENERAL: PAINLEVEL_OUTOF10: 7

## 2024-04-11 ASSESSMENT — PAIN DESCRIPTION - ORIENTATION: ORIENTATION: LEFT

## 2024-04-11 ASSESSMENT — PAIN - FUNCTIONAL ASSESSMENT: PAIN_FUNCTIONAL_ASSESSMENT: NONE - DENIES PAIN

## 2024-04-11 ASSESSMENT — PAIN DESCRIPTION - FREQUENCY: FREQUENCY: CONTINUOUS

## 2024-04-11 ASSESSMENT — PAIN DESCRIPTION - PAIN TYPE: TYPE: ACUTE PAIN

## 2024-04-11 NOTE — ED NOTES
EMERGENCY DEPARTMENT HISTORY AND PHYSICAL EXAM      Patient Name: Vinay Benson  MRN: [id number]  YOB: 1960  Provider: Paola Pineda DNP, ENP-C, FNP-C  PCP: Corrina Brush Jr., MD   Time/Date of evaluation: 6:09 PM EDT on 24    History of Presenting Illness     Chief Complaint   Patient presents with    Toe Pain       History Provided By: Patient     History (Narative):   Vinay Benson is a 63 y.o. male with a PMHX of diabetes and diabetic foot/toe ulcer  who presents to the emergency department  by POV C/O diabetic toe/foot ulcer to left foot that patient was sent by Dr. Patel for admission for amputation of left great toe/foot.  Patient denies fevers.  Wound does have a foul odor.  Patient states he was supposed to come yesterday but he had a  to attend.  No fevers reported no chills.        Past History     Past Medical History:  Past Medical History:   Diagnosis Date    Arthritis     Coronary atherosclerosis of native coronary artery     S/P PCI with PREM in     Diabetes ([facility])     IDDM    Electrolyte and fluid disorders not elsewhere classified     Essential hypertension, benign     GERD (gastroesophageal reflux disease)     Heroin abuse ([facility]) 16    Psychiatrist Dr. Natalya Reeves     History of heart attack     Hyperlipidemia     Intermediate coronary syndrome (HCC)     MDD (major depressive disorder) 16    Psychiatrist Dr. Natalya Reeves     Myocardial infarction ([facility])     Obesity, unspecified     Old myocardial infarction     Other and unspecified hyperlipidemia     Pancreatitis     Positive PPD     Sleep apnea     uses Cpap machine    Transfusion history     Before        Past Surgical History:  Past Surgical History:   Procedure Laterality Date    APPENDECTOMY      CORONARY ANGIOPLASTY WITH STENT PLACEMENT  2010    2 stents       Family History:  Family History   Problem Relation Age of Onset    Cancer Sister         breast cancer and

## 2024-04-11 NOTE — ED TRIAGE NOTES
Patient arrives ambulatory to ED, states he is supposed to have left big toe amputated and was sent to the ER for admission. Patient was supposed to come yesterday he said but he had a  to attend.

## 2024-04-12 ENCOUNTER — ANESTHESIA (OUTPATIENT)
Facility: HOSPITAL | Age: 64
End: 2024-04-12
Payer: MEDICAID

## 2024-04-12 ENCOUNTER — APPOINTMENT (OUTPATIENT)
Facility: HOSPITAL | Age: 64
DRG: 314 | End: 2024-04-12
Payer: MEDICAID

## 2024-04-12 ENCOUNTER — ANESTHESIA EVENT (OUTPATIENT)
Facility: HOSPITAL | Age: 64
End: 2024-04-12
Payer: MEDICAID

## 2024-04-12 PROBLEM — M86.272 SUBACUTE OSTEOMYELITIS OF LEFT FOOT (HCC): Status: ACTIVE | Noted: 2024-04-12

## 2024-04-12 LAB
ANION GAP SERPL CALC-SCNC: 7 MMOL/L (ref 3–18)
BASOPHILS # BLD: 0.1 K/UL (ref 0–0.1)
BASOPHILS NFR BLD: 1 % (ref 0–2)
BUN SERPL-MCNC: 20 MG/DL (ref 7–18)
BUN/CREAT SERPL: 17 (ref 12–20)
CALCIUM SERPL-MCNC: 8.7 MG/DL (ref 8.5–10.1)
CHLORIDE SERPL-SCNC: 101 MMOL/L (ref 100–111)
CO2 SERPL-SCNC: 28 MMOL/L (ref 21–32)
CREAT SERPL-MCNC: 1.16 MG/DL (ref 0.6–1.3)
DIFFERENTIAL METHOD BLD: ABNORMAL
EKG ATRIAL RATE: 61 BPM
EKG DIAGNOSIS: NORMAL
EKG P AXIS: 67 DEGREES
EKG P-R INTERVAL: 244 MS
EKG Q-T INTERVAL: 392 MS
EKG QRS DURATION: 88 MS
EKG QTC CALCULATION (BAZETT): 394 MS
EKG R AXIS: 4 DEGREES
EKG T AXIS: 19 DEGREES
EKG VENTRICULAR RATE: 61 BPM
EOSINOPHIL # BLD: 0.4 K/UL (ref 0–0.4)
EOSINOPHIL NFR BLD: 3 % (ref 0–5)
ERYTHROCYTE [DISTWIDTH] IN BLOOD BY AUTOMATED COUNT: 11.9 % (ref 11.6–14.5)
EST. AVERAGE GLUCOSE BLD GHB EST-MCNC: 197 MG/DL
GLUCOSE BLD STRIP.AUTO-MCNC: 142 MG/DL (ref 70–110)
GLUCOSE BLD STRIP.AUTO-MCNC: 185 MG/DL (ref 70–110)
GLUCOSE SERPL-MCNC: 111 MG/DL (ref 74–99)
HBA1C MFR BLD: 8.5 % (ref 4.2–5.6)
HCT VFR BLD AUTO: 37.5 % (ref 36–48)
HGB BLD-MCNC: 11.3 G/DL (ref 13–16)
IMM GRANULOCYTES # BLD AUTO: 0.2 K/UL (ref 0–0.04)
IMM GRANULOCYTES NFR BLD AUTO: 2 % (ref 0–0.5)
LACTATE SERPL-SCNC: 1.4 MMOL/L (ref 0.4–2)
LYMPHOCYTES # BLD: 2.7 K/UL (ref 0.9–3.6)
LYMPHOCYTES NFR BLD: 24 % (ref 21–52)
MCH RBC QN AUTO: 28.4 PG (ref 24–34)
MCHC RBC AUTO-ENTMCNC: 30.1 G/DL (ref 31–37)
MCV RBC AUTO: 94.2 FL (ref 78–100)
MONOCYTES # BLD: 1 K/UL (ref 0.05–1.2)
MONOCYTES NFR BLD: 8 % (ref 3–10)
NEUTS SEG # BLD: 7.2 K/UL (ref 1.8–8)
NEUTS SEG NFR BLD: 62 % (ref 40–73)
NRBC # BLD: 0 K/UL (ref 0–0.01)
NRBC BLD-RTO: 0 PER 100 WBC
PLATELET # BLD AUTO: 248 K/UL (ref 135–420)
PMV BLD AUTO: 11.9 FL (ref 9.2–11.8)
POTASSIUM SERPL-SCNC: 4.1 MMOL/L (ref 3.5–5.5)
RBC # BLD AUTO: 3.98 M/UL (ref 4.35–5.65)
SODIUM SERPL-SCNC: 136 MMOL/L (ref 136–145)
WBC # BLD AUTO: 11.6 K/UL (ref 4.6–13.2)

## 2024-04-12 PROCEDURE — 73720 MRI LWR EXTREMITY W/O&W/DYE: CPT

## 2024-04-12 PROCEDURE — 6360000004 HC RX CONTRAST MEDICATION

## 2024-04-12 PROCEDURE — 88305 TISSUE EXAM BY PATHOLOGIST: CPT

## 2024-04-12 PROCEDURE — 0Y6Q0Z1 DETACHMENT AT LEFT 1ST TOE, HIGH, OPEN APPROACH: ICD-10-PCS | Performed by: PODIATRIST

## 2024-04-12 PROCEDURE — 87070 CULTURE OTHR SPECIMN AEROBIC: CPT

## 2024-04-12 PROCEDURE — 2500000003 HC RX 250 WO HCPCS: Performed by: PODIATRIST

## 2024-04-12 PROCEDURE — 87147 CULTURE TYPE IMMUNOLOGIC: CPT

## 2024-04-12 PROCEDURE — 2709999900 HC NON-CHARGEABLE SUPPLY: Performed by: PODIATRIST

## 2024-04-12 PROCEDURE — 7100000001 HC PACU RECOVERY - ADDTL 15 MIN: Performed by: PODIATRIST

## 2024-04-12 PROCEDURE — 82962 GLUCOSE BLOOD TEST: CPT

## 2024-04-12 PROCEDURE — 3700000000 HC ANESTHESIA ATTENDED CARE: Performed by: PODIATRIST

## 2024-04-12 PROCEDURE — A9577 INJ MULTIHANCE: HCPCS

## 2024-04-12 PROCEDURE — 6360000002 HC RX W HCPCS: Performed by: INTERNAL MEDICINE

## 2024-04-12 PROCEDURE — 2580000003 HC RX 258

## 2024-04-12 PROCEDURE — 6370000000 HC RX 637 (ALT 250 FOR IP): Performed by: NURSE ANESTHETIST, CERTIFIED REGISTERED

## 2024-04-12 PROCEDURE — 6370000000 HC RX 637 (ALT 250 FOR IP)

## 2024-04-12 PROCEDURE — 99232 SBSQ HOSP IP/OBS MODERATE 35: CPT | Performed by: HOSPITALIST

## 2024-04-12 PROCEDURE — 83605 ASSAY OF LACTIC ACID: CPT

## 2024-04-12 PROCEDURE — 87186 SC STD MICRODIL/AGAR DIL: CPT

## 2024-04-12 PROCEDURE — 6370000000 HC RX 637 (ALT 250 FOR IP): Performed by: STUDENT IN AN ORGANIZED HEALTH CARE EDUCATION/TRAINING PROGRAM

## 2024-04-12 PROCEDURE — 87205 SMEAR GRAM STAIN: CPT

## 2024-04-12 PROCEDURE — 3600000012 HC SURGERY LEVEL 2 ADDTL 15MIN: Performed by: PODIATRIST

## 2024-04-12 PROCEDURE — 87077 CULTURE AEROBIC IDENTIFY: CPT

## 2024-04-12 PROCEDURE — 3600000002 HC SURGERY LEVEL 2 BASE: Performed by: PODIATRIST

## 2024-04-12 PROCEDURE — 36415 COLL VENOUS BLD VENIPUNCTURE: CPT

## 2024-04-12 PROCEDURE — 94761 N-INVAS EAR/PLS OXIMETRY MLT: CPT

## 2024-04-12 PROCEDURE — 3700000001 HC ADD 15 MINUTES (ANESTHESIA): Performed by: PODIATRIST

## 2024-04-12 PROCEDURE — 7100000000 HC PACU RECOVERY - FIRST 15 MIN: Performed by: PODIATRIST

## 2024-04-12 PROCEDURE — 85025 COMPLETE CBC W/AUTO DIFF WBC: CPT

## 2024-04-12 PROCEDURE — 93010 ELECTROCARDIOGRAM REPORT: CPT | Performed by: INTERNAL MEDICINE

## 2024-04-12 PROCEDURE — 6360000002 HC RX W HCPCS: Performed by: PODIATRIST

## 2024-04-12 PROCEDURE — 6360000002 HC RX W HCPCS: Performed by: NURSE ANESTHETIST, CERTIFIED REGISTERED

## 2024-04-12 PROCEDURE — 1100000003 HC PRIVATE W/ TELEMETRY

## 2024-04-12 PROCEDURE — 2580000003 HC RX 258: Performed by: INTERNAL MEDICINE

## 2024-04-12 PROCEDURE — 2500000003 HC RX 250 WO HCPCS: Performed by: NURSE ANESTHETIST, CERTIFIED REGISTERED

## 2024-04-12 PROCEDURE — 87040 BLOOD CULTURE FOR BACTERIA: CPT

## 2024-04-12 PROCEDURE — 80048 BASIC METABOLIC PNL TOTAL CA: CPT

## 2024-04-12 RX ORDER — SODIUM CHLORIDE 0.9 % (FLUSH) 0.9 %
5-40 SYRINGE (ML) INJECTION PRN
Status: DISCONTINUED | OUTPATIENT
Start: 2024-04-12 | End: 2024-04-12 | Stop reason: HOSPADM

## 2024-04-12 RX ORDER — FENTANYL CITRATE 50 UG/ML
25 INJECTION, SOLUTION INTRAMUSCULAR; INTRAVENOUS EVERY 5 MIN PRN
Status: DISCONTINUED | OUTPATIENT
Start: 2024-04-12 | End: 2024-04-12 | Stop reason: HOSPADM

## 2024-04-12 RX ORDER — ACETAMINOPHEN 500 MG
1000 TABLET ORAL ONCE
Status: COMPLETED | OUTPATIENT
Start: 2024-04-12 | End: 2024-04-12

## 2024-04-12 RX ORDER — DEXTROSE MONOHYDRATE 100 MG/ML
INJECTION, SOLUTION INTRAVENOUS CONTINUOUS PRN
Status: DISCONTINUED | OUTPATIENT
Start: 2024-04-12 | End: 2024-04-12 | Stop reason: HOSPADM

## 2024-04-12 RX ORDER — LABETALOL HYDROCHLORIDE 5 MG/ML
5 INJECTION, SOLUTION INTRAVENOUS
Status: DISCONTINUED | OUTPATIENT
Start: 2024-04-12 | End: 2024-04-12 | Stop reason: HOSPADM

## 2024-04-12 RX ORDER — LIDOCAINE HYDROCHLORIDE 20 MG/ML
JELLY TOPICAL PRN
Status: DISCONTINUED | OUTPATIENT
Start: 2024-04-12 | End: 2024-04-12 | Stop reason: SDUPTHER

## 2024-04-12 RX ORDER — HALOPERIDOL 5 MG/ML
1 INJECTION INTRAMUSCULAR
Status: DISCONTINUED | OUTPATIENT
Start: 2024-04-12 | End: 2024-04-12 | Stop reason: HOSPADM

## 2024-04-12 RX ORDER — VASOPRESSIN 20 U/ML
INJECTION PARENTERAL PRN
Status: DISCONTINUED | OUTPATIENT
Start: 2024-04-12 | End: 2024-04-12 | Stop reason: SDUPTHER

## 2024-04-12 RX ORDER — GLYCOPYRROLATE 0.2 MG/ML
INJECTION INTRAMUSCULAR; INTRAVENOUS PRN
Status: DISCONTINUED | OUTPATIENT
Start: 2024-04-12 | End: 2024-04-12 | Stop reason: SDUPTHER

## 2024-04-12 RX ORDER — KETOROLAC TROMETHAMINE 15 MG/ML
INJECTION, SOLUTION INTRAMUSCULAR; INTRAVENOUS PRN
Status: DISCONTINUED | OUTPATIENT
Start: 2024-04-12 | End: 2024-04-12 | Stop reason: SDUPTHER

## 2024-04-12 RX ORDER — MIDAZOLAM HYDROCHLORIDE 1 MG/ML
INJECTION INTRAMUSCULAR; INTRAVENOUS PRN
Status: DISCONTINUED | OUTPATIENT
Start: 2024-04-12 | End: 2024-04-12 | Stop reason: SDUPTHER

## 2024-04-12 RX ORDER — LORAZEPAM 2 MG/ML
0.5 INJECTION INTRAMUSCULAR
Status: DISCONTINUED | OUTPATIENT
Start: 2024-04-12 | End: 2024-04-12 | Stop reason: HOSPADM

## 2024-04-12 RX ORDER — LIDOCAINE HYDROCHLORIDE 20 MG/ML
INJECTION, SOLUTION EPIDURAL; INFILTRATION; INTRACAUDAL; PERINEURAL PRN
Status: DISCONTINUED | OUTPATIENT
Start: 2024-04-12 | End: 2024-04-12 | Stop reason: SDUPTHER

## 2024-04-12 RX ORDER — ONDANSETRON 2 MG/ML
4 INJECTION INTRAMUSCULAR; INTRAVENOUS
Status: DISCONTINUED | OUTPATIENT
Start: 2024-04-12 | End: 2024-04-12 | Stop reason: HOSPADM

## 2024-04-12 RX ORDER — NALOXONE HYDROCHLORIDE 0.4 MG/ML
INJECTION, SOLUTION INTRAMUSCULAR; INTRAVENOUS; SUBCUTANEOUS PRN
Status: DISCONTINUED | OUTPATIENT
Start: 2024-04-12 | End: 2024-04-12 | Stop reason: HOSPADM

## 2024-04-12 RX ORDER — PROPOFOL 10 MG/ML
INJECTION, EMULSION INTRAVENOUS PRN
Status: DISCONTINUED | OUTPATIENT
Start: 2024-04-12 | End: 2024-04-12 | Stop reason: SDUPTHER

## 2024-04-12 RX ORDER — AMLODIPINE BESYLATE 5 MG/1
5 TABLET ORAL DAILY
Status: DISCONTINUED | OUTPATIENT
Start: 2024-04-12 | End: 2024-04-16 | Stop reason: HOSPADM

## 2024-04-12 RX ORDER — OXYCODONE HYDROCHLORIDE 5 MG/1
5 TABLET ORAL
Status: DISCONTINUED | OUTPATIENT
Start: 2024-04-12 | End: 2024-04-12 | Stop reason: HOSPADM

## 2024-04-12 RX ORDER — DIPHENHYDRAMINE HYDROCHLORIDE 50 MG/ML
12.5 INJECTION INTRAMUSCULAR; INTRAVENOUS
Status: DISCONTINUED | OUTPATIENT
Start: 2024-04-12 | End: 2024-04-12 | Stop reason: HOSPADM

## 2024-04-12 RX ORDER — FENTANYL CITRATE 50 UG/ML
INJECTION, SOLUTION INTRAMUSCULAR; INTRAVENOUS PRN
Status: DISCONTINUED | OUTPATIENT
Start: 2024-04-12 | End: 2024-04-12 | Stop reason: SDUPTHER

## 2024-04-12 RX ORDER — HYDROCHLOROTHIAZIDE 25 MG/1
25 TABLET ORAL DAILY
Status: DISCONTINUED | OUTPATIENT
Start: 2024-04-12 | End: 2024-04-16 | Stop reason: HOSPADM

## 2024-04-12 RX ORDER — ENOXAPARIN SODIUM 100 MG/ML
30 INJECTION SUBCUTANEOUS 2 TIMES DAILY
Status: DISCONTINUED | OUTPATIENT
Start: 2024-04-13 | End: 2024-04-16 | Stop reason: HOSPADM

## 2024-04-12 RX ADMIN — ACETAMINOPHEN 1000 MG: 500 TABLET ORAL at 17:30

## 2024-04-12 RX ADMIN — VANCOMYCIN HYDROCHLORIDE 2500 MG: 1 INJECTION, POWDER, LYOPHILIZED, FOR SOLUTION INTRAVENOUS at 16:19

## 2024-04-12 RX ADMIN — FENTANYL CITRATE 25 MCG: 50 INJECTION INTRAMUSCULAR; INTRAVENOUS at 18:04

## 2024-04-12 RX ADMIN — PROPOFOL 100 MG: 10 INJECTION, EMULSION INTRAVENOUS at 17:55

## 2024-04-12 RX ADMIN — OXYCODONE HYDROCHLORIDE AND ACETAMINOPHEN 1 TABLET: 5; 325 TABLET ORAL at 23:58

## 2024-04-12 RX ADMIN — LIDOCAINE HYDROCHLORIDE 60 MCG: 20 JELLY TOPICAL at 17:55

## 2024-04-12 RX ADMIN — SODIUM CHLORIDE, PRESERVATIVE FREE 10 ML: 5 INJECTION INTRAVENOUS at 08:55

## 2024-04-12 RX ADMIN — KETOROLAC TROMETHAMINE 30 MG: 15 INJECTION, SOLUTION INTRAMUSCULAR; INTRAVENOUS at 18:20

## 2024-04-12 RX ADMIN — HYDROCHLOROTHIAZIDE 25 MG: 25 TABLET ORAL at 08:54

## 2024-04-12 RX ADMIN — LIDOCAINE HYDROCHLORIDE 60 MG: 20 INJECTION, SOLUTION EPIDURAL; INFILTRATION; INTRACAUDAL; PERINEURAL at 17:55

## 2024-04-12 RX ADMIN — VASOPRESSIN 1 UNITS: 20 INJECTION INTRAVENOUS at 18:05

## 2024-04-12 RX ADMIN — GADOBENATE DIMEGLUMINE 20 ML: 529 INJECTION, SOLUTION INTRAVENOUS at 11:04

## 2024-04-12 RX ADMIN — AMLODIPINE BESYLATE 5 MG: 5 TABLET ORAL at 08:54

## 2024-04-12 RX ADMIN — PROPOFOL 100 MCG/KG/MIN: 10 INJECTION, EMULSION INTRAVENOUS at 17:56

## 2024-04-12 RX ADMIN — GLYCOPYRROLATE 0.2 MG: 0.2 INJECTION INTRAMUSCULAR; INTRAVENOUS at 18:05

## 2024-04-12 RX ADMIN — PAROXETINE 20 MG: 20 TABLET, FILM COATED ORAL at 08:54

## 2024-04-12 RX ADMIN — FAMOTIDINE 20 MG: 20 TABLET ORAL at 08:54

## 2024-04-12 RX ADMIN — PIPERACILLIN AND TAZOBACTAM 4500 MG: 4; .5 INJECTION, POWDER, FOR SOLUTION INTRAVENOUS at 14:41

## 2024-04-12 RX ADMIN — FENTANYL CITRATE 25 MCG: 50 INJECTION INTRAMUSCULAR; INTRAVENOUS at 17:47

## 2024-04-12 RX ADMIN — PIPERACILLIN SODIUM AND TAZOBACTAM SODIUM 3375 MG: 3; .375 INJECTION, POWDER, LYOPHILIZED, FOR SOLUTION INTRAVENOUS at 23:24

## 2024-04-12 RX ADMIN — SODIUM CHLORIDE, PRESERVATIVE FREE 10 ML: 5 INJECTION INTRAVENOUS at 23:24

## 2024-04-12 RX ADMIN — MIDAZOLAM 0.5 MG: 1 INJECTION, SOLUTION INTRAMUSCULAR; INTRAVENOUS at 17:47

## 2024-04-12 ASSESSMENT — PAIN DESCRIPTION - ORIENTATION: ORIENTATION: RIGHT

## 2024-04-12 ASSESSMENT — PAIN SCALES - GENERAL
PAINLEVEL_OUTOF10: 5
PAINLEVEL_OUTOF10: 0

## 2024-04-12 ASSESSMENT — PAIN DESCRIPTION - LOCATION: LOCATION: TOE (COMMENT WHICH ONE)

## 2024-04-12 ASSESSMENT — PAIN DESCRIPTION - DESCRIPTORS: DESCRIPTORS: ACHING

## 2024-04-12 NOTE — OP NOTE
Operative Note      Patient: Vinay Benson  YOB: 1960  MRN: [id number]    Date of Procedure: 4/12/2024    Pre-Op Diagnosis:  Left plantar hallux full-thickness wound with osteomyelitis of proximal phalanx    Post-Op Diagnosis: Same       Procedure:  Left great toe amputation    Surgeon(s):  Jv Nielson DPM    Assistant:   Surgical Assistant: Roberta Dillon Jordan    Anesthesia: Monitor Anesthesia Care with local    Estimated Blood Loss (mL): Less than 15 cc    Complications: None    Specimens:   ID Type Source Tests Collected by Time Destination   A : Left Great Toe Tissue Toe SURGICAL PATHOLOGY Jv Nielson DPM 4/12/2024 1815        Implants:  * No implants in log *      Drains: * No LDAs found *    Findings:  Infection Present At Time Of Surgery (PATOS) (choose all levels that have infection present):  - Organ Space infection (below fascia) present as evidenced by osteomyelitis  Other Findings: As noted below    Indications for procedure: Patient is a 62 y/o male consulted for left great toe full-thickness wound with osteomyelitis of proximal phalanx. Patient had an MRI confirming diagnosis. Patient needed amputation of remaining great toe. All risks, benefits, complications of procedure discussed in detail with patient. No guarantee made to outcome of procedure. Patient voiced verbal understanding and signed consent.     Detailed Description of Procedure: Patient brought into operating room and placed on operating table in supine position. After IV sedation from department of anesthesia local block consisting of 10 cc of 1:1 mixture of 1% lidocaine plain and 0.5% marcaine plain administered just proximal to surgical area. Left foot prepped and draped in usual sterile fashion.    Attention directed to left great toe and fish mouth incision performed directly proximal to wound area. Dorsally flap made longer to bring it down at time of closure without any tension.

## 2024-04-12 NOTE — ED NOTES
.TRANSFER - OUT REPORT:    Verbal report given to Kandice ALFARO RN on Vinay Benson  being transferred to [address] [place] [address] for routine progression of patient care and scheduled procedure thereafter.     Report consisted of patient's Situation, Background, Assessment and   Recommendations(SBAR).     Information from the following report(s) Nurse Handoff Report, Index, ED Encounter Summary, ED SBAR, Adult Overview, Quality Measures, and Neuro Assessment was reviewed with the receiving nurse.    [place] Fall Assessment:    Presents to emergency department  because of falls (Syncope, seizure, or loss of consciousness): No  Age > 70: No  Altered Mental Status, Intoxication with alcohol or substance confusion (Disorientation, impaired judgment, poor safety awaremess, or inability to follow instructions): No  Impaired Mobility: Ambulates or transfers with assistive devices or assistance; Unable to ambulate or transer.: Yes (Toe pain)  Nursing Judgement: Yes                           Lines:   Peripheral IV 04/11/24 Left;Dorsal Forearm (Active)        Opportunity for questions and clarification was provided. All questions and concerns regarding assumption of care were answered and the nurse assuming care verbalized understanding. Pt is currently resting, ANOx4, on room air and in no voiced or noticeable acute distress.       Patient transported with the transportation team.

## 2024-04-12 NOTE — ANESTHESIA PRE PROCEDURE
Department of Anesthesiology  Preprocedure Note       Name:  Vinay Benson   Age:  63 y.o.  :  1960                                          MRN:  [id number]         Date:  2024      Surgeon: Surgeon(s):  Jv Nielson DPM    Procedure: Procedure(s):  LEFT GREAT TOE AMPUTATION    Medications prior to admission:   Prior to Admission medications    Medication Sig Start Date End Date Taking? Authorizing Provider   lisinopril (PRINIVIL;ZESTRIL) 40 MG tablet Take 1 tablet by mouth daily    Reshma Oropzea MD   hydroCHLOROthiazide (HYDRODIURIL) 25 MG tablet Take 1 tablet by mouth daily  Patient not taking: Reported on 2024 11/3/23   Reshma Oropeza MD   Insulin Aspart FlexPen 100 UNIT/ML SOPN INJECT 65 UNITS SUBCUTANEOUSLY TWICE DAILY BEFOR MEALS 23   Reshma Oropeza MD   naproxen (NAPROSYN) 500 MG tablet Take 1 tablet(s) twice a day by oral route as needed. Headache 10/10/23   Reshma Oropeza MD   topiramate (TOPAMAX) 50 MG tablet One po qhs x 1 week, then increase to one po BID as tolerated.  Patient not taking: Reported on 2024   Iveth Rodriguez MD   insulin detemir (LEVEMIR) 100 UNIT/ML injection vial Inject 20 Units into the skin 2 times daily 21   Automatic Reconciliation, Ar   insulin lispro, 1 Unit Dial, (HUMALOG/ADMELOG) 100 UNIT/ML SOPN Inject 2-8 Units into the skin    Automatic Reconciliation, Ar   metFORMIN (GLUCOPHAGE) 1000 MG tablet Take 1 tablet by mouth 2 times daily (with meals)    Automatic Reconciliation, Ar   PARoxetine (PAXIL) 20 MG tablet Take 1 tablet by mouth daily 22   Automatic Reconciliation, Ar   polyethylene glycol (GLYCOLAX) 17 GM/SCOOP powder Take 17 g by mouth daily 17   Automatic Reconciliation, Ar       Current medications:    Current Facility-Administered Medications   Medication Dose Route Frequency Provider Last Rate Last Admin   • hydroCHLOROthiazide (HYDRODIURIL) tablet 25 mg  25 mg Oral Daily

## 2024-04-12 NOTE — H&P
Studies:  Recent Results (from the past 24 hour(s))   CBC with Auto Differential    Collection Time: 04/11/24  7:45 PM   Result Value Ref Range    WBC 12.6 4.6 - 13.2 K/uL    RBC 4.45 4.35 - 5.65 M/uL    Hemoglobin 12.6 (L) 13.0 - 16.0 g/dL    Hematocrit 42.2 36.0 - 48.0 %    MCV 94.8 78.0 - 100.0 FL    MCH 28.3 24.0 - 34.0 PG    MCHC 29.9 (L) 31.0 - 37.0 g/dL    RDW 11.9 11.6 - 14.5 %    Platelets 296 135 - 420 K/uL    MPV 12.0 (H) 9.2 - 11.8 FL    Nucleated RBCs 0.0 0  WBC    nRBC 0.00 0.00 - 0.01 K/uL    Neutrophils % 67 40 - 73 %    Lymphocytes % 21 21 - 52 %    Monocytes % 6 3 - 10 %    Eosinophils % 4 0 - 5 %    Basophils % 1 0 - 2 %    Immature Granulocytes % 1 (H) 0.0 - 0.5 %    Neutrophils Absolute 8.4 (H) 1.8 - 8.0 K/UL    Lymphocytes Absolute 2.6 0.9 - 3.6 K/UL    Monocytes Absolute 0.8 0.05 - 1.2 K/UL    Eosinophils Absolute 0.5 (H) 0.0 - 0.4 K/UL    Basophils Absolute 0.1 0.0 - 0.1 K/UL    Immature Granulocytes Absolute 0.2 (H) 0.00 - 0.04 K/UL    Differential Type AUTOMATED     Comprehensive Metabolic Panel    Collection Time: 04/11/24  7:45 PM   Result Value Ref Range    Sodium 136 136 - 145 mmol/L    Potassium 3.8 3.5 - 5.5 mmol/L    Chloride 97 (L) 100 - 111 mmol/L    CO2 35 (H) 21 - 32 mmol/L    Anion Gap 4 3.0 - 18 mmol/L    Glucose 206 (H) 74 - 99 mg/dL    BUN 21 (H) 7.0 - 18 MG/DL    Creatinine 1.37 (H) 0.6 - 1.3 MG/DL    Bun/Cre Ratio 15 12 - 20      Est, Glom Filt Rate 58 (L) >60 ml/min/1.73m2    Calcium 9.9 8.5 - 10.1 MG/DL    Total Bilirubin 0.4 0.2 - 1.0 MG/DL    ALT 32 16 - 61 U/L    AST 28 10 - 38 U/L    Alk Phosphatase 153 (H) 45 - 117 U/L    Total Protein 9.5 (H) 6.4 - 8.2 g/dL    Albumin 3.2 (L) 3.4 - 5.0 g/dL    Globulin 6.3 (H) 2.0 - 4.0 g/dL    Albumin/Globulin Ratio 0.5 (L) 0.8 - 1.7     TYPE AND SCREEN    Collection Time: 04/11/24  7:45 PM   Result Value Ref Range    Crossmatch expiration date 04/14/2024,9823     ABO/Rh O POSITIVE     Antibody Screen NEG        Imaging

## 2024-04-12 NOTE — PERIOP NOTE
TRANSFER - IN REPORT:    Verbal report received from IMELDA Ruby on Vinay Benson  being received from [address] [place] [address] ordered procedure      Report consisted of patient's Situation, Background, Assessment and   Recommendations(SBAR).     Information from the following report(s) Nurse Handoff Report was reviewed with the receiving nurse.    Opportunity for questions and clarification was provided.      Assessment completed upon patient's arrival to unit and care assumed.

## 2024-04-12 NOTE — PLAN OF CARE
Problem: Discharge Planning  Goal: Discharge to home or other facility with appropriate resources  4/12/2024 1215 by Flori Hanson, RN  Outcome: Progressing  Flowsheets (Taken 4/12/2024 0800)  Discharge to home or other facility with appropriate resources: Identify barriers to discharge with patient and caregiver  4/11/2024 2353 by Kandice Calhoun, RN  Outcome: Progressing  Flowsheets (Taken 4/11/2024 2345)  Discharge to home or other facility with appropriate resources: Identify barriers to discharge with patient and caregiver     Problem: Pain  Goal: Verbalizes/displays adequate comfort level or baseline comfort level  4/12/2024 1215 by Flori Hanson RN  Outcome: Progressing  4/11/2024 2353 by Kandice Calhoun RN  Outcome: Progressing     Problem: Chronic Conditions and Co-morbidities  Goal: Patient's chronic conditions and co-morbidity symptoms are monitored and maintained or improved  Outcome: Progressing

## 2024-04-12 NOTE — CONSULTS
months  Recent outpatient oral antibiotics may mask the culture results    MRI left foot 4/12 results pending    Podiatry follow-up appreciated.  Plans for surgical intervention today noted    Recommendations:     1.  obtain wound culture left great toe- I obtained specimen and gave it to RN  2. Start piperacillin/tazobactam, vancomycin in anticipation of surgery  3.  Follow-up results of MRI left foot  4.  Patient will likely need left great toe amputation.   Follow up  podiatry recommendations regarding surgical intervention  5.  Needs better glycemic control to prevent future infectious complications, aid wound healing    Thank you for consultation request. Above plan was discussed in details with patient, family and dr Nielson/dr. Bonds. Please call me if any further questions or concerns. Will continue to participate in the care of this patient.    HPI:     63 y.o. male with a PMHx of coronary artery disease status post PCI, DMT2, GERD, hypertension, hyperlipidemia, morbid obesity, sleep apnea who presented to the ED at the request of Dr. Nielson.      Patient is known to me from prior inpatient consultation at [facility].  I last saw him in 2022 at which time he got partial amputation left great toe.  His surgical site healed completely.  He subsequently did not follow-up with podiatrist.  He states that he noted an ulcer on his left great toe about 2 months ago.  He was seen in podiatrist office last week for this.  There was concern for infection.  Was placed on oral Augmentin.  In the ED, temp 98.0, respirations 16, heart rate 88, blood pressure 160/88, 96% on room air.  CMP without significant abnormality except creatinine 1.37, bicarb 35.  Glucose 206.  CBC with hemoglobin 12.6, WBC 12.6 otherwise without abnormality.  Type and screen done in the ED.  Chest x-ray pending formal read.  Left foot x-ray pending read.  Podiatry consulted by the ED.   Has not received any medications in the ED.     Home medications 
Left great toe partially amputated.  Vascular: DP pulses palpable and +2/4 bilateral. PT pulses are palpable and +2/4 bilateral. Sparse hair growth noticed on the dorsal foot and digits. Capillary refill time is less than three seconds to bilateral foot. 2+ edema to bilateral LE's.  Neurological: Protective sensation is diminished to distal 5/10 sites of the foot bilateral upon testing with a Semmis Shine 5.07 monofilament. Vibratory sensation is diminished to the level of the lateral malleolus bilateral. Sharp/dull sensation is diminished to the fore foot. Achilles and patellar deep tendon reflexes are within normal limits bilateral. Paresthesia symptoms present to bilateral LE's.  Dermatological: Skin shows signs of mild atrophy with diffuse dry xerosis. Web spaces on left foot are clean, dry, and intact. Left great toe plantar ulcer measure 3.0 x 2.5 x 0.5 cm deep probing deep to bone. Mild purulence noted from base of wound. No erythema or edema noted.       Impression:     Left great toe full-thickness wound with osteomyelitis, diabetic neuropathy.     Recommendation:     - x-ray and MRI of left foot reviewed. First ray amputation at IP joint level with notable marrow edema at the first proximal phalanx, suspicious for osteomyelitis, with regional soft tissue inflammation and great toe plantar ulcer. Patchy second metatarsal marrow edema, nonspecific, possibly stress reaction in the setting of chronic second metatarsal deformity or ill-defined marrow infarct, but osteomyelitis difficult to completely exclude (felt less likely due to no obvious associated ulceration in this region).  - Rec amputation of remaining great toe. Plan for procedure today. NPO status confirmed.   - Remain NWB to left forefoot.   - Antibiotics per ID recommendation.   - Medical management per primary team.

## 2024-04-13 LAB
ANION GAP SERPL CALC-SCNC: 4 MMOL/L (ref 3–18)
BASOPHILS # BLD: 0.1 K/UL (ref 0–0.1)
BASOPHILS NFR BLD: 1 % (ref 0–2)
BUN SERPL-MCNC: 18 MG/DL (ref 7–18)
BUN/CREAT SERPL: 13 (ref 12–20)
CALCIUM SERPL-MCNC: 8.8 MG/DL (ref 8.5–10.1)
CHLORIDE SERPL-SCNC: 99 MMOL/L (ref 100–111)
CO2 SERPL-SCNC: 31 MMOL/L (ref 21–32)
CREAT SERPL-MCNC: 1.44 MG/DL (ref 0.6–1.3)
DIFFERENTIAL METHOD BLD: ABNORMAL
EOSINOPHIL # BLD: 0.4 K/UL (ref 0–0.4)
EOSINOPHIL NFR BLD: 4 % (ref 0–5)
ERYTHROCYTE [DISTWIDTH] IN BLOOD BY AUTOMATED COUNT: 11.9 % (ref 11.6–14.5)
GLUCOSE BLD STRIP.AUTO-MCNC: 180 MG/DL (ref 70–110)
GLUCOSE BLD STRIP.AUTO-MCNC: 199 MG/DL (ref 70–110)
GLUCOSE BLD STRIP.AUTO-MCNC: 244 MG/DL (ref 70–110)
GLUCOSE SERPL-MCNC: 206 MG/DL (ref 74–99)
HCT VFR BLD AUTO: 34.8 % (ref 36–48)
HGB BLD-MCNC: 10.6 G/DL (ref 13–16)
IMM GRANULOCYTES # BLD AUTO: 0.1 K/UL (ref 0–0.04)
IMM GRANULOCYTES NFR BLD AUTO: 2 % (ref 0–0.5)
LYMPHOCYTES # BLD: 2.4 K/UL (ref 0.9–3.6)
LYMPHOCYTES NFR BLD: 29 % (ref 21–52)
MAGNESIUM SERPL-MCNC: 1.8 MG/DL (ref 1.6–2.6)
MCH RBC QN AUTO: 28.8 PG (ref 24–34)
MCHC RBC AUTO-ENTMCNC: 30.5 G/DL (ref 31–37)
MCV RBC AUTO: 94.6 FL (ref 78–100)
MONOCYTES # BLD: 0.7 K/UL (ref 0.05–1.2)
MONOCYTES NFR BLD: 8 % (ref 3–10)
NEUTS SEG # BLD: 4.7 K/UL (ref 1.8–8)
NEUTS SEG NFR BLD: 56 % (ref 40–73)
NRBC # BLD: 0 K/UL (ref 0–0.01)
NRBC BLD-RTO: 0 PER 100 WBC
PLATELET # BLD AUTO: 258 K/UL (ref 135–420)
PMV BLD AUTO: 11.9 FL (ref 9.2–11.8)
POTASSIUM SERPL-SCNC: 3.8 MMOL/L (ref 3.5–5.5)
RBC # BLD AUTO: 3.68 M/UL (ref 4.35–5.65)
SODIUM SERPL-SCNC: 134 MMOL/L (ref 136–145)
WBC # BLD AUTO: 8.4 K/UL (ref 4.6–13.2)

## 2024-04-13 PROCEDURE — 1100000003 HC PRIVATE W/ TELEMETRY

## 2024-04-13 PROCEDURE — 80048 BASIC METABOLIC PNL TOTAL CA: CPT

## 2024-04-13 PROCEDURE — 6360000002 HC RX W HCPCS: Performed by: HOSPITALIST

## 2024-04-13 PROCEDURE — 94761 N-INVAS EAR/PLS OXIMETRY MLT: CPT

## 2024-04-13 PROCEDURE — 82962 GLUCOSE BLOOD TEST: CPT

## 2024-04-13 PROCEDURE — 6360000002 HC RX W HCPCS: Performed by: INTERNAL MEDICINE

## 2024-04-13 PROCEDURE — 6370000000 HC RX 637 (ALT 250 FOR IP)

## 2024-04-13 PROCEDURE — 99232 SBSQ HOSP IP/OBS MODERATE 35: CPT | Performed by: HOSPITALIST

## 2024-04-13 PROCEDURE — 36415 COLL VENOUS BLD VENIPUNCTURE: CPT

## 2024-04-13 PROCEDURE — 6360000002 HC RX W HCPCS

## 2024-04-13 PROCEDURE — 85025 COMPLETE CBC W/AUTO DIFF WBC: CPT

## 2024-04-13 PROCEDURE — 6370000000 HC RX 637 (ALT 250 FOR IP): Performed by: STUDENT IN AN ORGANIZED HEALTH CARE EDUCATION/TRAINING PROGRAM

## 2024-04-13 PROCEDURE — 2580000003 HC RX 258: Performed by: INTERNAL MEDICINE

## 2024-04-13 PROCEDURE — 83735 ASSAY OF MAGNESIUM: CPT

## 2024-04-13 PROCEDURE — 2580000003 HC RX 258

## 2024-04-13 RX ADMIN — ENOXAPARIN SODIUM 30 MG: 100 INJECTION SUBCUTANEOUS at 21:00

## 2024-04-13 RX ADMIN — FAMOTIDINE 20 MG: 20 TABLET ORAL at 09:04

## 2024-04-13 RX ADMIN — PIPERACILLIN SODIUM AND TAZOBACTAM SODIUM 3375 MG: 3; .375 INJECTION, POWDER, LYOPHILIZED, FOR SOLUTION INTRAVENOUS at 13:39

## 2024-04-13 RX ADMIN — SODIUM CHLORIDE, PRESERVATIVE FREE 10 ML: 5 INJECTION INTRAVENOUS at 09:04

## 2024-04-13 RX ADMIN — ENOXAPARIN SODIUM 30 MG: 100 INJECTION SUBCUTANEOUS at 09:03

## 2024-04-13 RX ADMIN — HYDROCHLOROTHIAZIDE 25 MG: 25 TABLET ORAL at 09:04

## 2024-04-13 RX ADMIN — SODIUM CHLORIDE, PRESERVATIVE FREE 10 ML: 5 INJECTION INTRAVENOUS at 20:00

## 2024-04-13 RX ADMIN — MORPHINE SULFATE 2 MG: 2 INJECTION, SOLUTION INTRAMUSCULAR; INTRAVENOUS at 21:26

## 2024-04-13 RX ADMIN — SODIUM CHLORIDE: 9 INJECTION, SOLUTION INTRAVENOUS at 05:44

## 2024-04-13 RX ADMIN — PIPERACILLIN SODIUM AND TAZOBACTAM SODIUM 3375 MG: 3; .375 INJECTION, POWDER, LYOPHILIZED, FOR SOLUTION INTRAVENOUS at 05:34

## 2024-04-13 RX ADMIN — AMLODIPINE BESYLATE 5 MG: 5 TABLET ORAL at 09:04

## 2024-04-13 RX ADMIN — MORPHINE SULFATE 2 MG: 2 INJECTION, SOLUTION INTRAMUSCULAR; INTRAVENOUS at 17:57

## 2024-04-13 RX ADMIN — VANCOMYCIN HYDROCHLORIDE 1750 MG: 1 INJECTION, POWDER, LYOPHILIZED, FOR SOLUTION INTRAVENOUS at 11:17

## 2024-04-13 RX ADMIN — INSULIN LISPRO 4 UNITS: 100 INJECTION, SOLUTION INTRAVENOUS; SUBCUTANEOUS at 13:38

## 2024-04-13 RX ADMIN — OXYCODONE HYDROCHLORIDE AND ACETAMINOPHEN 1 TABLET: 5; 325 TABLET ORAL at 05:40

## 2024-04-13 RX ADMIN — PAROXETINE 20 MG: 20 TABLET, FILM COATED ORAL at 09:04

## 2024-04-13 ASSESSMENT — PAIN SCALES - GENERAL
PAINLEVEL_OUTOF10: 2
PAINLEVEL_OUTOF10: 8
PAINLEVEL_OUTOF10: 5
PAINLEVEL_OUTOF10: 0
PAINLEVEL_OUTOF10: 8
PAINLEVEL_OUTOF10: 0

## 2024-04-13 ASSESSMENT — PAIN DESCRIPTION - ORIENTATION
ORIENTATION: LEFT
ORIENTATION: RIGHT
ORIENTATION: LEFT
ORIENTATION: LEFT

## 2024-04-13 ASSESSMENT — PAIN DESCRIPTION - LOCATION
LOCATION: TOE (COMMENT WHICH ONE)
LOCATION: TOE (COMMENT WHICH ONE)
LOCATION: FOOT;TOE (COMMENT WHICH ONE)
LOCATION: TOE (COMMENT WHICH ONE)

## 2024-04-13 ASSESSMENT — PAIN DESCRIPTION - DESCRIPTORS
DESCRIPTORS: ACHING
DESCRIPTORS: ACHING
DESCRIPTORS: THROBBING
DESCRIPTORS: ACHING;THROBBING

## 2024-04-13 ASSESSMENT — PAIN DESCRIPTION - FREQUENCY: FREQUENCY: CONTINUOUS

## 2024-04-13 ASSESSMENT — PAIN DESCRIPTION - PAIN TYPE: TYPE: ACUTE PAIN

## 2024-04-13 ASSESSMENT — PAIN - FUNCTIONAL ASSESSMENT: PAIN_FUNCTIONAL_ASSESSMENT: PREVENTS OR INTERFERES SOME ACTIVE ACTIVITIES AND ADLS

## 2024-04-13 NOTE — ANESTHESIA POSTPROCEDURE EVALUATION
Department of Anesthesiology  Postprocedure Note    Patient: Vinay Benson  MRN: [id number]  YOB: 1960  Date of evaluation: 4/13/2024    Procedure Summary       Date: 04/12/24 Room / Location: [facility] MAIN 05 / [facility] MAIN OR    Anesthesia Start: 1747 Anesthesia Stop: 1838    Procedure: Left foot amputation of remaining great toe and possible part of metatarsal, tibial and fibular sesamoids (Left: Toes) Diagnosis:       Diabetic ulcer of toe of left foot associated with diabetes mellitus of other type, unspecified ulcer stage (HCC)      (Diabetic ulcer of toe of left foot associated with diabetes mellitus of other type, unspecified ulcer stage (HCC) [E13.621, L97.529])    Surgeons: Jv Nielson DPM Responsible Provider: Janusz Lucia MD    Anesthesia Type: MAC ASA Status: 3            Anesthesia Type: MAC    Ann-Marie Phase I: Ann-Marie Score: 9    Ann-Marie Phase II:      Anesthesia Post Evaluation    Patient location during evaluation: bedside  Patient participation: complete - patient participated  Level of consciousness: awake  Pain score: 0  Airway patency: patent  Nausea & Vomiting: no nausea  Cardiovascular status: hemodynamically stable  Respiratory status: acceptable  Hydration status: euvolemic  Pain management: satisfactory to patient        No notable events documented.

## 2024-04-13 NOTE — PLAN OF CARE
Problem: Discharge Planning  Goal: Discharge to home or other facility with appropriate resources  Outcome: Progressing  Flowsheets (Taken 4/13/2024 0800)  Discharge to home or other facility with appropriate resources: Identify barriers to discharge with patient and caregiver     Problem: Pain  Goal: Verbalizes/displays adequate comfort level or baseline comfort level  Outcome: Progressing     Problem: Chronic Conditions and Co-morbidities  Goal: Patient's chronic conditions and co-morbidity symptoms are monitored and maintained or improved  Outcome: Progressing

## 2024-04-14 LAB
ANION GAP SERPL CALC-SCNC: 3 MMOL/L (ref 3–18)
BASOPHILS # BLD: 0 K/UL (ref 0–0.1)
BASOPHILS NFR BLD: 0 % (ref 0–2)
BUN SERPL-MCNC: 19 MG/DL (ref 7–18)
BUN/CREAT SERPL: 13 (ref 12–20)
CALCIUM SERPL-MCNC: 8.6 MG/DL (ref 8.5–10.1)
CHLORIDE SERPL-SCNC: 101 MMOL/L (ref 100–111)
CO2 SERPL-SCNC: 31 MMOL/L (ref 21–32)
CREAT SERPL-MCNC: 1.41 MG/DL (ref 0.6–1.3)
DIFFERENTIAL METHOD BLD: ABNORMAL
EOSINOPHIL # BLD: 0.3 K/UL (ref 0–0.4)
EOSINOPHIL NFR BLD: 3 % (ref 0–5)
ERYTHROCYTE [DISTWIDTH] IN BLOOD BY AUTOMATED COUNT: 11.9 % (ref 11.6–14.5)
GLUCOSE BLD STRIP.AUTO-MCNC: 185 MG/DL (ref 70–110)
GLUCOSE BLD STRIP.AUTO-MCNC: 211 MG/DL (ref 70–110)
GLUCOSE BLD STRIP.AUTO-MCNC: 235 MG/DL (ref 70–110)
GLUCOSE BLD STRIP.AUTO-MCNC: 263 MG/DL (ref 70–110)
GLUCOSE SERPL-MCNC: 250 MG/DL (ref 74–99)
HCT VFR BLD AUTO: 34.5 % (ref 36–48)
HGB BLD-MCNC: 10.4 G/DL (ref 13–16)
IMM GRANULOCYTES # BLD AUTO: 0.1 K/UL (ref 0–0.04)
IMM GRANULOCYTES NFR BLD AUTO: 2 % (ref 0–0.5)
LYMPHOCYTES # BLD: 1.9 K/UL (ref 0.9–3.6)
LYMPHOCYTES NFR BLD: 21 % (ref 21–52)
MAGNESIUM SERPL-MCNC: 1.6 MG/DL (ref 1.6–2.6)
MCH RBC QN AUTO: 28.3 PG (ref 24–34)
MCHC RBC AUTO-ENTMCNC: 30.1 G/DL (ref 31–37)
MCV RBC AUTO: 94 FL (ref 78–100)
MONOCYTES # BLD: 0.7 K/UL (ref 0.05–1.2)
MONOCYTES NFR BLD: 8 % (ref 3–10)
NEUTS SEG # BLD: 6.1 K/UL (ref 1.8–8)
NEUTS SEG NFR BLD: 66 % (ref 40–73)
NRBC # BLD: 0 K/UL (ref 0–0.01)
NRBC BLD-RTO: 0 PER 100 WBC
PLATELET # BLD AUTO: 249 K/UL (ref 135–420)
PMV BLD AUTO: 11.8 FL (ref 9.2–11.8)
POTASSIUM SERPL-SCNC: 3.5 MMOL/L (ref 3.5–5.5)
RBC # BLD AUTO: 3.67 M/UL (ref 4.35–5.65)
SODIUM SERPL-SCNC: 135 MMOL/L (ref 136–145)
VANCOMYCIN SERPL-MCNC: 19.4 UG/ML (ref 5–40)
WBC # BLD AUTO: 9.1 K/UL (ref 4.6–13.2)

## 2024-04-14 PROCEDURE — 2580000003 HC RX 258: Performed by: INTERNAL MEDICINE

## 2024-04-14 PROCEDURE — 80048 BASIC METABOLIC PNL TOTAL CA: CPT

## 2024-04-14 PROCEDURE — 80202 ASSAY OF VANCOMYCIN: CPT

## 2024-04-14 PROCEDURE — 2580000003 HC RX 258

## 2024-04-14 PROCEDURE — 6360000002 HC RX W HCPCS

## 2024-04-14 PROCEDURE — 83735 ASSAY OF MAGNESIUM: CPT

## 2024-04-14 PROCEDURE — 82962 GLUCOSE BLOOD TEST: CPT

## 2024-04-14 PROCEDURE — 99232 SBSQ HOSP IP/OBS MODERATE 35: CPT | Performed by: HOSPITALIST

## 2024-04-14 PROCEDURE — 6360000002 HC RX W HCPCS: Performed by: INTERNAL MEDICINE

## 2024-04-14 PROCEDURE — 85025 COMPLETE CBC W/AUTO DIFF WBC: CPT

## 2024-04-14 PROCEDURE — 6370000000 HC RX 637 (ALT 250 FOR IP): Performed by: STUDENT IN AN ORGANIZED HEALTH CARE EDUCATION/TRAINING PROGRAM

## 2024-04-14 PROCEDURE — 1100000003 HC PRIVATE W/ TELEMETRY

## 2024-04-14 PROCEDURE — 6370000000 HC RX 637 (ALT 250 FOR IP): Performed by: HOSPITALIST

## 2024-04-14 PROCEDURE — 36415 COLL VENOUS BLD VENIPUNCTURE: CPT

## 2024-04-14 PROCEDURE — 6360000002 HC RX W HCPCS: Performed by: HOSPITALIST

## 2024-04-14 PROCEDURE — 6370000000 HC RX 637 (ALT 250 FOR IP)

## 2024-04-14 RX ORDER — INSULIN GLARGINE 100 [IU]/ML
12 INJECTION, SOLUTION SUBCUTANEOUS DAILY
Status: DISCONTINUED | OUTPATIENT
Start: 2024-04-14 | End: 2024-04-16 | Stop reason: HOSPADM

## 2024-04-14 RX ADMIN — SODIUM CHLORIDE, PRESERVATIVE FREE 10 ML: 5 INJECTION INTRAVENOUS at 22:00

## 2024-04-14 RX ADMIN — INSULIN GLARGINE 12 UNITS: 100 INJECTION, SOLUTION SUBCUTANEOUS at 14:52

## 2024-04-14 RX ADMIN — AMLODIPINE BESYLATE 5 MG: 5 TABLET ORAL at 10:30

## 2024-04-14 RX ADMIN — ENOXAPARIN SODIUM 30 MG: 100 INJECTION SUBCUTANEOUS at 10:30

## 2024-04-14 RX ADMIN — FAMOTIDINE 20 MG: 20 TABLET ORAL at 10:30

## 2024-04-14 RX ADMIN — OXYCODONE HYDROCHLORIDE AND ACETAMINOPHEN 1 TABLET: 5; 325 TABLET ORAL at 05:00

## 2024-04-14 RX ADMIN — PAROXETINE 20 MG: 20 TABLET, FILM COATED ORAL at 10:30

## 2024-04-14 RX ADMIN — INSULIN LISPRO 4 UNITS: 100 INJECTION, SOLUTION INTRAVENOUS; SUBCUTANEOUS at 08:00

## 2024-04-14 RX ADMIN — SODIUM CHLORIDE, PRESERVATIVE FREE 10 ML: 5 INJECTION INTRAVENOUS at 10:31

## 2024-04-14 RX ADMIN — MORPHINE SULFATE 2 MG: 2 INJECTION, SOLUTION INTRAMUSCULAR; INTRAVENOUS at 18:40

## 2024-04-14 RX ADMIN — PIPERACILLIN SODIUM AND TAZOBACTAM SODIUM 3375 MG: 3; .375 INJECTION, POWDER, LYOPHILIZED, FOR SOLUTION INTRAVENOUS at 12:46

## 2024-04-14 RX ADMIN — PIPERACILLIN SODIUM AND TAZOBACTAM SODIUM 3375 MG: 3; .375 INJECTION, POWDER, LYOPHILIZED, FOR SOLUTION INTRAVENOUS at 05:30

## 2024-04-14 RX ADMIN — INSULIN LISPRO 6 UNITS: 100 INJECTION, SOLUTION INTRAVENOUS; SUBCUTANEOUS at 17:00

## 2024-04-14 RX ADMIN — MORPHINE SULFATE 2 MG: 2 INJECTION, SOLUTION INTRAMUSCULAR; INTRAVENOUS at 10:27

## 2024-04-14 RX ADMIN — ENOXAPARIN SODIUM 30 MG: 100 INJECTION SUBCUTANEOUS at 21:00

## 2024-04-14 ASSESSMENT — PAIN DESCRIPTION - DESCRIPTORS
DESCRIPTORS: THROBBING
DESCRIPTORS: THROBBING
DESCRIPTORS: ACHING
DESCRIPTORS: THROBBING;ACHING

## 2024-04-14 ASSESSMENT — PAIN DESCRIPTION - ORIENTATION
ORIENTATION: LEFT

## 2024-04-14 ASSESSMENT — PAIN SCALES - GENERAL
PAINLEVEL_OUTOF10: 6
PAINLEVEL_OUTOF10: 0
PAINLEVEL_OUTOF10: 7
PAINLEVEL_OUTOF10: 8
PAINLEVEL_OUTOF10: 6
PAINLEVEL_OUTOF10: 4

## 2024-04-14 ASSESSMENT — PAIN - FUNCTIONAL ASSESSMENT
PAIN_FUNCTIONAL_ASSESSMENT: PREVENTS OR INTERFERES SOME ACTIVE ACTIVITIES AND ADLS
PAIN_FUNCTIONAL_ASSESSMENT: PREVENTS OR INTERFERES SOME ACTIVE ACTIVITIES AND ADLS

## 2024-04-14 ASSESSMENT — PAIN SCALES - WONG BAKER
WONGBAKER_NUMERICALRESPONSE: NO HURT
WONGBAKER_NUMERICALRESPONSE: NO HURT

## 2024-04-14 ASSESSMENT — PAIN DESCRIPTION - PAIN TYPE: TYPE: ACUTE PAIN

## 2024-04-14 ASSESSMENT — PAIN DESCRIPTION - LOCATION
LOCATION: FOOT;TOE (COMMENT WHICH ONE)
LOCATION: FOOT;TOE (COMMENT WHICH ONE)
LOCATION: TOE (COMMENT WHICH ONE);FOOT
LOCATION: FOOT;TOE (COMMENT WHICH ONE)

## 2024-04-14 ASSESSMENT — PAIN DESCRIPTION - FREQUENCY: FREQUENCY: CONTINUOUS

## 2024-04-14 NOTE — PLAN OF CARE
Problem: Pain  Goal: Verbalizes/displays adequate comfort level or baseline comfort level  4/14/2024 1348 by Flori Hanson RN  Outcome: Progressing  4/14/2024 0423 by Darlin Hillman RN  Outcome: Progressing     Problem: Chronic Conditions and Co-morbidities  Goal: Patient's chronic conditions and co-morbidity symptoms are monitored and maintained or improved  4/14/2024 1348 by Flori Hanson RN  Outcome: Progressing  4/14/2024 0423 by Darlin Hillman RN  Outcome: Progressing  Flowsheets (Taken 4/13/2024 2000)  Care Plan - Patient's Chronic Conditions and Co-Morbidity Symptoms are Monitored and Maintained or Improved:   Monitor and assess patient's chronic conditions and comorbid symptoms for stability, deterioration, or improvement   Collaborate with multidisciplinary team to address chronic and comorbid conditions and prevent exacerbation or deterioration   Update acute care plan with appropriate goals if chronic or comorbid symptoms are exacerbated and prevent overall improvement and discharge     Problem: Safety - Adult  Goal: Free from fall injury  4/14/2024 1348 by Flori Hanson RN  Outcome: Progressing  4/14/2024 0423 by Darlin Hillman RN  Outcome: Progressing

## 2024-04-14 NOTE — PLAN OF CARE
Problem: Pain  Goal: Verbalizes/displays adequate comfort level or baseline comfort level  4/14/2024 0423 by Darlin Hillman, RN  Outcome: Progressing  4/13/2024 1623 by Flori Hanson, RN  Outcome: Progressing     Problem: Safety - Adult  Goal: Free from fall injury  Outcome: Progressing

## 2024-04-15 LAB
ANION GAP SERPL CALC-SCNC: 5 MMOL/L (ref 3–18)
BUN SERPL-MCNC: 14 MG/DL (ref 7–18)
BUN/CREAT SERPL: 12 (ref 12–20)
CALCIUM SERPL-MCNC: 8.7 MG/DL (ref 8.5–10.1)
CHLORIDE SERPL-SCNC: 102 MMOL/L (ref 100–111)
CO2 SERPL-SCNC: 28 MMOL/L (ref 21–32)
CREAT SERPL-MCNC: 1.19 MG/DL (ref 0.6–1.3)
GLUCOSE BLD STRIP.AUTO-MCNC: 138 MG/DL (ref 70–110)
GLUCOSE BLD STRIP.AUTO-MCNC: 188 MG/DL (ref 70–110)
GLUCOSE BLD STRIP.AUTO-MCNC: 242 MG/DL (ref 70–110)
GLUCOSE BLD STRIP.AUTO-MCNC: 247 MG/DL (ref 70–110)
GLUCOSE SERPL-MCNC: 176 MG/DL (ref 74–99)
POTASSIUM SERPL-SCNC: 3.6 MMOL/L (ref 3.5–5.5)
SODIUM SERPL-SCNC: 135 MMOL/L (ref 136–145)

## 2024-04-15 PROCEDURE — 97116 GAIT TRAINING THERAPY: CPT

## 2024-04-15 PROCEDURE — 6360000002 HC RX W HCPCS: Performed by: HOSPITALIST

## 2024-04-15 PROCEDURE — 2580000003 HC RX 258: Performed by: INTERNAL MEDICINE

## 2024-04-15 PROCEDURE — 6360000002 HC RX W HCPCS

## 2024-04-15 PROCEDURE — 2580000003 HC RX 258

## 2024-04-15 PROCEDURE — 80048 BASIC METABOLIC PNL TOTAL CA: CPT

## 2024-04-15 PROCEDURE — 6370000000 HC RX 637 (ALT 250 FOR IP): Performed by: HOSPITALIST

## 2024-04-15 PROCEDURE — 94761 N-INVAS EAR/PLS OXIMETRY MLT: CPT

## 2024-04-15 PROCEDURE — 6360000002 HC RX W HCPCS: Performed by: INTERNAL MEDICINE

## 2024-04-15 PROCEDURE — 97162 PT EVAL MOD COMPLEX 30 MIN: CPT

## 2024-04-15 PROCEDURE — 99232 SBSQ HOSP IP/OBS MODERATE 35: CPT | Performed by: HOSPITALIST

## 2024-04-15 PROCEDURE — 82962 GLUCOSE BLOOD TEST: CPT

## 2024-04-15 PROCEDURE — 6370000000 HC RX 637 (ALT 250 FOR IP)

## 2024-04-15 PROCEDURE — 1100000003 HC PRIVATE W/ TELEMETRY

## 2024-04-15 PROCEDURE — 36415 COLL VENOUS BLD VENIPUNCTURE: CPT

## 2024-04-15 PROCEDURE — 6370000000 HC RX 637 (ALT 250 FOR IP): Performed by: STUDENT IN AN ORGANIZED HEALTH CARE EDUCATION/TRAINING PROGRAM

## 2024-04-15 RX ORDER — LEVOFLOXACIN 5 MG/ML
500 INJECTION, SOLUTION INTRAVENOUS EVERY 24 HOURS
Status: DISCONTINUED | OUTPATIENT
Start: 2024-04-15 | End: 2024-04-16 | Stop reason: HOSPADM

## 2024-04-15 RX ORDER — OXYCODONE HYDROCHLORIDE AND ACETAMINOPHEN 5; 325 MG/1; MG/1
2 TABLET ORAL EVERY 4 HOURS PRN
Status: DISCONTINUED | OUTPATIENT
Start: 2024-04-15 | End: 2024-04-16 | Stop reason: HOSPADM

## 2024-04-15 RX ADMIN — MORPHINE SULFATE 2 MG: 2 INJECTION, SOLUTION INTRAMUSCULAR; INTRAVENOUS at 01:31

## 2024-04-15 RX ADMIN — FAMOTIDINE 20 MG: 20 TABLET ORAL at 09:18

## 2024-04-15 RX ADMIN — INSULIN LISPRO 4 UNITS: 100 INJECTION, SOLUTION INTRAVENOUS; SUBCUTANEOUS at 21:23

## 2024-04-15 RX ADMIN — INSULIN LISPRO 2 UNITS: 100 INJECTION, SOLUTION INTRAVENOUS; SUBCUTANEOUS at 12:52

## 2024-04-15 RX ADMIN — SODIUM CHLORIDE, PRESERVATIVE FREE 10 ML: 5 INJECTION INTRAVENOUS at 10:33

## 2024-04-15 RX ADMIN — PIPERACILLIN SODIUM AND TAZOBACTAM SODIUM 3375 MG: 3; .375 INJECTION, POWDER, LYOPHILIZED, FOR SOLUTION INTRAVENOUS at 04:10

## 2024-04-15 RX ADMIN — OXYCODONE AND ACETAMINOPHEN 2 TABLET: 5; 325 TABLET ORAL at 23:22

## 2024-04-15 RX ADMIN — PIPERACILLIN SODIUM AND TAZOBACTAM SODIUM 3375 MG: 3; .375 INJECTION, POWDER, LYOPHILIZED, FOR SOLUTION INTRAVENOUS at 20:30

## 2024-04-15 RX ADMIN — LEVOFLOXACIN 500 MG: 500 INJECTION, SOLUTION INTRAVENOUS at 18:55

## 2024-04-15 RX ADMIN — SODIUM CHLORIDE, PRESERVATIVE FREE 10 ML: 5 INJECTION INTRAVENOUS at 21:13

## 2024-04-15 RX ADMIN — INSULIN LISPRO 4 UNITS: 100 INJECTION, SOLUTION INTRAVENOUS; SUBCUTANEOUS at 18:55

## 2024-04-15 RX ADMIN — PAROXETINE 20 MG: 20 TABLET, FILM COATED ORAL at 09:18

## 2024-04-15 RX ADMIN — ENOXAPARIN SODIUM 30 MG: 100 INJECTION SUBCUTANEOUS at 09:19

## 2024-04-15 RX ADMIN — ENOXAPARIN SODIUM 30 MG: 100 INJECTION SUBCUTANEOUS at 21:13

## 2024-04-15 RX ADMIN — AMLODIPINE BESYLATE 5 MG: 5 TABLET ORAL at 09:18

## 2024-04-15 RX ADMIN — INSULIN GLARGINE 12 UNITS: 100 INJECTION, SOLUTION SUBCUTANEOUS at 09:19

## 2024-04-15 RX ADMIN — OXYCODONE HYDROCHLORIDE AND ACETAMINOPHEN 1 TABLET: 5; 325 TABLET ORAL at 09:18

## 2024-04-15 RX ADMIN — OXYCODONE AND ACETAMINOPHEN 2 TABLET: 5; 325 TABLET ORAL at 12:52

## 2024-04-15 RX ADMIN — PIPERACILLIN SODIUM AND TAZOBACTAM SODIUM 3375 MG: 3; .375 INJECTION, POWDER, LYOPHILIZED, FOR SOLUTION INTRAVENOUS at 13:29

## 2024-04-15 ASSESSMENT — PAIN DESCRIPTION - FREQUENCY
FREQUENCY: CONTINUOUS
FREQUENCY: CONTINUOUS

## 2024-04-15 ASSESSMENT — PAIN SCALES - WONG BAKER
WONGBAKER_NUMERICALRESPONSE: NO HURT
WONGBAKER_NUMERICALRESPONSE: NO HURT

## 2024-04-15 ASSESSMENT — PAIN SCALES - GENERAL
PAINLEVEL_OUTOF10: 0
PAINLEVEL_OUTOF10: 8
PAINLEVEL_OUTOF10: 0
PAINLEVEL_OUTOF10: 4
PAINLEVEL_OUTOF10: 8
PAINLEVEL_OUTOF10: 0

## 2024-04-15 ASSESSMENT — PAIN DESCRIPTION - PAIN TYPE
TYPE: SURGICAL PAIN
TYPE: SURGICAL PAIN;ACUTE PAIN
TYPE: SURGICAL PAIN

## 2024-04-15 ASSESSMENT — PAIN DESCRIPTION - DESCRIPTORS
DESCRIPTORS: ACHING;THROBBING
DESCRIPTORS: THROBBING
DESCRIPTORS: SHARP
DESCRIPTORS: THROBBING

## 2024-04-15 ASSESSMENT — PAIN DESCRIPTION - ORIENTATION
ORIENTATION: LEFT
ORIENTATION: RIGHT

## 2024-04-15 ASSESSMENT — PAIN DESCRIPTION - LOCATION
LOCATION: FOOT

## 2024-04-15 NOTE — CARE COORDINATION
04/15/24 1641   /Social Work Whiteboard Notes   /Social Work Whiteboard RED: 4/15 ID following - cultures pending. IV abx vs.PO @ discharge. ()     Sina Calabrese LMSW   Case Management

## 2024-04-16 VITALS
TEMPERATURE: 97.3 F | HEART RATE: 62 BPM | BODY MASS INDEX: 37.03 KG/M2 | SYSTOLIC BLOOD PRESSURE: 139 MMHG | HEIGHT: 69 IN | DIASTOLIC BLOOD PRESSURE: 83 MMHG | WEIGHT: 250 LBS | OXYGEN SATURATION: 95 % | RESPIRATION RATE: 16 BRPM

## 2024-04-16 PROBLEM — N17.9 ACUTE-ON-CHRONIC KIDNEY INJURY (HCC): Status: RESOLVED | Noted: 2021-02-04 | Resolved: 2024-04-16

## 2024-04-16 PROBLEM — N18.9 ACUTE-ON-CHRONIC KIDNEY INJURY (HCC): Status: RESOLVED | Noted: 2021-02-04 | Resolved: 2024-04-16

## 2024-04-16 LAB
BACTERIA SPEC CULT: ABNORMAL
GLUCOSE BLD STRIP.AUTO-MCNC: 147 MG/DL (ref 70–110)
GRAM STN SPEC: ABNORMAL
SERVICE CMNT-IMP: ABNORMAL

## 2024-04-16 PROCEDURE — 6370000000 HC RX 637 (ALT 250 FOR IP): Performed by: HOSPITALIST

## 2024-04-16 PROCEDURE — 2580000003 HC RX 258: Performed by: INTERNAL MEDICINE

## 2024-04-16 PROCEDURE — 6360000002 HC RX W HCPCS: Performed by: INTERNAL MEDICINE

## 2024-04-16 PROCEDURE — 6370000000 HC RX 637 (ALT 250 FOR IP)

## 2024-04-16 PROCEDURE — 6370000000 HC RX 637 (ALT 250 FOR IP): Performed by: STUDENT IN AN ORGANIZED HEALTH CARE EDUCATION/TRAINING PROGRAM

## 2024-04-16 PROCEDURE — 82962 GLUCOSE BLOOD TEST: CPT

## 2024-04-16 PROCEDURE — 6370000000 HC RX 637 (ALT 250 FOR IP): Performed by: INTERNAL MEDICINE

## 2024-04-16 PROCEDURE — 6360000002 HC RX W HCPCS: Performed by: HOSPITALIST

## 2024-04-16 PROCEDURE — 99239 HOSP IP/OBS DSCHRG MGMT >30: CPT | Performed by: HOSPITALIST

## 2024-04-16 RX ORDER — DOXYCYCLINE HYCLATE 100 MG/1
100 CAPSULE ORAL EVERY 12 HOURS SCHEDULED
Qty: 20 CAPSULE | Refills: 0 | Status: SHIPPED | OUTPATIENT
Start: 2024-04-16 | End: 2024-04-26

## 2024-04-16 RX ORDER — LEVOFLOXACIN 500 MG/1
500 TABLET, FILM COATED ORAL DAILY
Qty: 10 TABLET | Refills: 0 | Status: SHIPPED | OUTPATIENT
Start: 2024-04-16 | End: 2024-04-26

## 2024-04-16 RX ORDER — SACCHAROMYCES BOULARDII 250 MG
250 CAPSULE ORAL 2 TIMES DAILY
Qty: 20 CAPSULE | Refills: 0 | Status: SHIPPED | OUTPATIENT
Start: 2024-04-16 | End: 2024-04-26

## 2024-04-16 RX ORDER — DOXYCYCLINE HYCLATE 100 MG/1
100 CAPSULE ORAL EVERY 12 HOURS SCHEDULED
Status: DISCONTINUED | OUTPATIENT
Start: 2024-04-16 | End: 2024-04-16 | Stop reason: HOSPADM

## 2024-04-16 RX ORDER — OXYCODONE AND ACETAMINOPHEN 7.5; 325 MG/1; MG/1
1 TABLET ORAL EVERY 6 HOURS PRN
Qty: 12 TABLET | Refills: 0 | Status: SHIPPED | OUTPATIENT
Start: 2024-04-16 | End: 2024-04-19

## 2024-04-16 RX ORDER — AMLODIPINE BESYLATE 5 MG/1
5 TABLET ORAL DAILY
Qty: 30 TABLET | Refills: 3 | Status: SHIPPED | OUTPATIENT
Start: 2024-04-17

## 2024-04-16 RX ORDER — FAMOTIDINE 20 MG/1
20 TABLET, FILM COATED ORAL DAILY
Qty: 60 TABLET | Refills: 3 | Status: SHIPPED | OUTPATIENT
Start: 2024-04-17

## 2024-04-16 RX ORDER — INSULIN ASPART 100 [IU]/ML
25 INJECTION, SOLUTION INTRAVENOUS; SUBCUTANEOUS 3 TIMES DAILY
Qty: 1 ML | Refills: 0
Start: 2024-04-16

## 2024-04-16 RX ADMIN — PIPERACILLIN SODIUM AND TAZOBACTAM SODIUM 3375 MG: 3; .375 INJECTION, POWDER, LYOPHILIZED, FOR SOLUTION INTRAVENOUS at 04:23

## 2024-04-16 RX ADMIN — INSULIN GLARGINE 12 UNITS: 100 INJECTION, SOLUTION SUBCUTANEOUS at 09:30

## 2024-04-16 RX ADMIN — FAMOTIDINE 20 MG: 20 TABLET ORAL at 09:30

## 2024-04-16 RX ADMIN — ENOXAPARIN SODIUM 30 MG: 100 INJECTION SUBCUTANEOUS at 09:30

## 2024-04-16 RX ADMIN — DOXYCYCLINE HYCLATE 100 MG: 100 CAPSULE ORAL at 11:25

## 2024-04-16 RX ADMIN — PAROXETINE 20 MG: 20 TABLET, FILM COATED ORAL at 09:30

## 2024-04-16 RX ADMIN — AMLODIPINE BESYLATE 5 MG: 5 TABLET ORAL at 09:30

## 2024-04-16 RX ADMIN — OXYCODONE AND ACETAMINOPHEN 2 TABLET: 5; 325 TABLET ORAL at 09:38

## 2024-04-16 ASSESSMENT — PAIN DESCRIPTION - FREQUENCY: FREQUENCY: CONTINUOUS

## 2024-04-16 ASSESSMENT — PAIN DESCRIPTION - ORIENTATION: ORIENTATION: LEFT

## 2024-04-16 ASSESSMENT — PAIN DESCRIPTION - ONSET: ONSET: ON-GOING

## 2024-04-16 ASSESSMENT — PAIN SCALES - GENERAL
PAINLEVEL_OUTOF10: 8
PAINLEVEL_OUTOF10: 0
PAINLEVEL_OUTOF10: 5

## 2024-04-16 ASSESSMENT — PAIN SCALES - WONG BAKER: WONGBAKER_NUMERICALRESPONSE: NO HURT

## 2024-04-16 ASSESSMENT — PAIN DESCRIPTION - DESCRIPTORS: DESCRIPTORS: THROBBING

## 2024-04-16 ASSESSMENT — PAIN DESCRIPTION - PAIN TYPE: TYPE: SURGICAL PAIN;ACUTE PAIN

## 2024-04-16 ASSESSMENT — PAIN DESCRIPTION - LOCATION: LOCATION: FOOT

## 2024-04-16 NOTE — DISCHARGE INSTRUCTIONS
DISCHARGE SUMMARY from Nurse    PATIENT INSTRUCTIONS:    After general anesthesia or intravenous sedation, for 24 hours or while taking prescription Narcotics:  Limit your activities  Do not drive and operate hazardous machinery  Do not make important personal or business decisions  Do  not drink alcoholic beverages  If you have not urinated within 8 hours after discharge, please contact your surgeon on call.    Report the following to your surgeon:  Excessive pain, swelling, redness or odor of or around the surgical area  Temperature over 100.5  Nausea and vomiting lasting longer than 4 hours or if unable to take medications  Any signs of decreased circulation or nerve impairment to extremity: change in color, persistent  numbness, tingling, coldness or increase pain  Any questions    What to do at Home:  Recommended activity: activity as tolerated    If you experience any of the following symptoms: fever, chills, nausea, vomiting, diarrhea, change in mentation, falling, bleeding, shortness of breath, chest pain, please call your primary care physician or return to the emergency room.    *  Please give a list of your current medications to your Primary Care Provider.    *  Please update this list whenever your medications are discontinued, doses are      changed, or new medications (including over-the-counter products) are added.    *  Please carry medication information at all times in case of emergency situations.    These are general instructions for a healthy lifestyle:    No smoking/ No tobacco products/ Avoid exposure to second hand smoke  Surgeon General's Warning:  Quitting smoking now greatly reduces serious risk to your health.    Obesity, smoking, and sedentary lifestyle greatly increases your risk for illness    A healthy diet, regular physical exercise & weight monitoring are important for maintaining a healthy lifestyle    You may be retaining fluid if you have a history of heart failure or if you

## 2024-04-16 NOTE — PLAN OF CARE
Problem: Discharge Planning  Goal: Discharge to home or other facility with appropriate resources  4/16/2024 1213 by Priscilla Lawrence RN  Outcome: Progressing  Flowsheets (Taken 4/16/2024 0933)  Discharge to home or other facility with appropriate resources:   Identify barriers to discharge with patient and caregiver   Arrange for needed discharge resources and transportation as appropriate  4/15/2024 2237 by Maggie Zurita RN  Outcome: Progressing  4/15/2024 2236 by Maggie Zurita RN  Outcome: Progressing  Flowsheets (Taken 4/15/2024 2100)  Discharge to home or other facility with appropriate resources: Identify barriers to discharge with patient and caregiver     Problem: Pain  Goal: Verbalizes/displays adequate comfort level or baseline comfort level  4/16/2024 1213 by Priscilla Lawrence RN  Outcome: Progressing  4/15/2024 2237 by Maggie Zurita RN  Outcome: Progressing  4/15/2024 2236 by Maggie Zurita RN  Outcome: Progressing

## 2024-04-16 NOTE — CARE COORDINATION
Patient lives with wife  Independent  Patient is discharging on po meds  Wife to transport       04/16/24 1131   Service Assessment   Patient Orientation Alert and Oriented   Cognition Alert   History Provided By Patient   Primary Caregiver Self   Support Systems Spouse/Significant Other   Prior Functional Level Independent in ADLs/IADLs   Current Functional Level Independent in ADLs/IADLs   Can patient return to prior living arrangement Yes   Ability to make needs known: Good   Family able to assist with home care needs: Yes   Financial Resources Medicaid   Social/Functional History   Lives With Significant other   Type of Home House   Home Layout One level   Bathroom Toilet Standard   Bathroom Accessibility Accessible   Home Equipment None   ADL Assistance Independent   Homemaking Assistance Independent   Homemaking Responsibilities Yes   Ambulation Assistance Independent   Transfer Assistance Independent       Rand Rosales  Case Management

## 2024-04-16 NOTE — PROGRESS NOTES
Pharmacist Review and Automatic Dose Adjustment of Prophylactic Enoxaparin    The reviewing pharmacist has made an adjustment to the ordered enoxaparin dose or converted to UFH per the approved [facility] protocol and table as identified below.        Vinay Benson is a 63 y.o. male.     Recent Labs     04/11/24 1945 04/12/24  0313   CREATININE 1.37* 1.16       Estimated Creatinine Clearance: 81 mL/min (based on SCr of 1.16 mg/dL).    Height:   Ht Readings from Last 1 Encounters:   04/11/24 1.753 m (5' 9\")     Weight:  Wt Readings from Last 1 Encounters:   04/11/24 113.4 kg (250 lb)           Plan: Based upon the patient's weight and renal function, the ordered enoxaparin dose of enoxaparin 40 mg daily has been changed/converted to enoxaparin 30 mg BID.       Thank you,  PIA CHOE, [facility]  
Advance Care Planning   Healthcare Decision Maker:      Click here to complete Healthcare Decision Makers including selection of the Healthcare Decision Maker Relationship (ie \"Primary\").  Today we documented Decision Maker(s) consistent with Legal Next of Kin hierarchy.       Trudi Benson Spouse  Attach   Primary Phone: [phone] (H)Home Phone: [phone] Phone: [phone]      Estefanía Wells  Other No    Primary Phone: [phone] (H)Home Phone: [phone] Phone: [phone]           conducted an initial consultation and Spiritual Assessment for Vinay Benson, who is a 63 y.o.,male. Patient's Primary Language is: English.   According to the patient's EMR Latter-day Affiliation is: Gnosticism.     The reason the Patient came to the hospital is:   Patient Active Problem List    Diagnosis Date Noted    Hyperlipidemia 04/11/2024    Sleep apnea 04/11/2024    Diabetic ulcer of left foot due to type 2 diabetes mellitus (HCC) 04/11/2024    Non compliance w medication regimen 04/11/2024    Infected wound 05/23/2022    Obesity (BMI 35.0-39.9 without comorbidity) 05/23/2022    OM (osteomyelitis) ([facility]) 02/28/2022    Diabetic foot infection ([facility]) 04/12/2021    Leukocytosis 02/04/2021    Wound infection 02/04/2021    Acute-on-chronic kidney injury ([facility]) 02/04/2021    Diabetic ulcer of left foot ([facility]) 02/04/2021    Fracture, toe 09/24/2020    Altered mental status 09/01/2020    Multifocal pneumonia 09/01/2020    Orthostatic hypotension 12/25/2019    DM (diabetes mellitus), type 2 ([facility]) 12/25/2019    Syncope and collapse 12/24/2019    Vomiting 03/28/2018    Chronic abdominal pain 03/28/2018    Sepsis ([facility]) 03/21/2018    History of heart attack     Gastroparesis 09/07/2017    Atelectasis 09/07/2017    Abdominal pain 09/07/2017    Hypokalemia 09/07/2017    Nausea and vomiting 09/07/2017    Acquired hypothyroidism 09/07/2017    Diabetic neuropathy (HCC) 07/05/2017    Neuritis 07/05/2017    S/P 
Asked pt how his pain level is; pain stated that it has improved; pain level 5; pain scale 0-10;   
Discharge instructions reviewed with the patient including medications.  Questions answered and verbalized understanding.  IV removed.  Pt discharged by wheelchair to main entrance.    
[facility]   Pharmacy Pharmacokinetic Monitoring Service - Vancomycin    Indication: osteo  Goal AUC/LOIS: 400-600  Day of Therapy: 1  Additional Antimicrobials: pip-tazo    Pertinent Laboratory Values:   Wt Readings from Last 1 Encounters:   04/11/24 113.4 kg (250 lb)     Temp Readings from Last 1 Encounters:   04/12/24 97.9 °F (36.6 °C) (Oral)     Estimated Creatinine Clearance: 81 mL/min (based on SCr of 1.16 mg/dL).    Recent Labs     04/11/24 1945 04/12/24  0313   CREATININE 1.37* 1.16   BUN 21* 20*   WBC 12.6 11.6     Pertinent Cultures:  Date Source Results   4/12 wound pending   4/12 blood pending   MRSA Nasal Swab: NA    Assessment:  Date Current Dose Level (mg/L) Timing of Level (h) AUC/LOIS   4/12 2,500 mg x1 - - -   Note: Serum concentrations collected for AUC dosing may appear elevated if collected in close proximity to the dose administered, this is not necessarily an indication of toxicity    Plan:  Start a dose of 2,500 mg x1 then 1,750 mg q18h  No level ordered at this time  Pharmacy will continue to monitor patient and adjust therapy as indicated    Thank you for the consult,  Andrea Arnold RPH  4/12/2024  
[facility]   Pharmacy Pharmacokinetic Monitoring Service - Vancomycin    Indication: osteo  Goal AUC/LOIS: 400-600  Day of Therapy: 4  Additional Antimicrobials: pip-tazo    Pertinent Laboratory Values:   Wt Readings from Last 1 Encounters:   04/11/24 113.4 kg (250 lb)     Temp Readings from Last 1 Encounters:   04/15/24 98 °F (36.7 °C) (Oral)     Estimated Creatinine Clearance: 79 mL/min (based on SCr of 1.19 mg/dL).    Recent Labs     04/13/24  0306 04/14/24  0158 04/15/24  0116   CREATININE 1.44* 1.41* 1.19   BUN 18 19* 14   WBC 8.4 9.1  --      Pertinent Cultures:  Date Source Results   4/12 wound GPC, GPR   4/12 blood NGTD   MRSA Nasal Swab: NA    Assessment:  Date Current Dose Level (mg/L) Timing of Level (h) AUC/LOIS   4/12 2,500 mg x1 - - -   4/13 1,750mg q18h  1,500mg q24h - - -   4/14 1,500mg q24h 19.4 15 535   4/15 1,500mg q24h  1,750 mg q24h - - -   Note: Serum concentrations collected for AUC dosing may appear elevated if collected in close proximity to the dose administered, this is not necessarily an indication of toxicity    Plan:  Increase dose from 1,500 mg q24h to 1,750 mg q24h  No level ordered at this time  Pharmacy will continue to monitor patient and adjust therapy as indicated    Thank you for the consult,  Andrea Arnold RPH  4/15/2024  
[facility] Hospitalist Group  Progress Note    Patient: Vinay Benson Age: 63 y.o. : 1960 MR#: [id number] SSN: [id number]  Date/Time: 4/15/2024     Subjective: Patient lying in the bed, feels fine.  No new complaints.      Assessment/Plan:   Left great toe diabetic foot ulcer, no signs of infection but needs to rule out osteomyelitis, post great toe amputation 2024  Suspect CKD stage II  Mild hyponatremia due to HCTZ  DMT2  Hypertension  Hyperlipidemia  GERD  Coronary artery disease status post PCI  Depression/Anxiety  DEWAYNE- does not use CPAP   Medication noncompliance   Morbid obesity    Plan  Continue empiric antibiotics per ID  Discussed with ID, concern for Pseudomonas in culture recommend follow-up on culture tomorrow and make antibiotic decisions.  Creatinine better  Continue Lantus and SSI  Per patient he takes 60 units of Levemir twice daily along with short acting insulin 20 units 3 times daily  Continue amlodipine, hold HCTZ and monitor blood pressure  PT/OT eval and treatment postsurgery  Further plan based on hospital course  Continue Lovenox for DVT prophylaxis     Discussed with ID, recommend to follow final cultures tomorrow and possible discharge home on p.o. antibiotics    Discussed with the patient at the bedside explained about my above plan care.  Patient understood and agreed with my plan.  Answered all his questions and concerns appropriate.    Disposition: Home tomorrow if cleared by ID      Case discussed with:  [x]Patient  []Family  [x]Nursing  []Case Management  DVT Prophylaxis:  [x]Lovenox  []Hep SQ  [x]SCDs  []Coumadin   []Eliquis/Xarelto     Objective:   VS: BP (!) 155/75   Pulse 56   Temp 97.9 °F (36.6 °C) (Oral)   Resp 18   Ht 1.753 m (5' 9\")   Wt 113.4 kg (250 lb)   SpO2 97%   BMI 36.92 kg/m²    Tmax/24hrs: Temp (24hrs), Av.9 °F (36.6 °C), Min:97.6 °F (36.4 °C), Max:98.1 °F (36.7 °C)  IOBRIEF  Intake/Output Summary (Last 24 
[facility] Hospitalist Group  Progress Note    Patient: Vinay Benson Age: 63 y.o. : 1960 MR#: [id number] SSN: [id number]  Date/Time: 2024     Subjective: Patient walking in the room, feels okay.  Mild pain at the left foot.  Denies any fever or chills at home.      Assessment/Plan:   Left great toe diabetic foot ulcer, no signs of infection but needs to rule out osteomyelitis  Mild prerenal azotemia  DMT2  Hypertension  Hyperlipidemia  GERD  Coronary artery disease status post PCI  Depression/Anxiety  DEWAYNE- does not use CPAP   Medication noncompliance   Morbid obesity    Plan  Discussed with podiatry, plan for debridement/amputation later today  MRI pending  ID consulted, discussed with Dr. Johnson  Continue empiric antibiotics per ID  Continue IV fluids  Blood sugar stable, will monitor off long-acting insulin, continue SSI  Per patient he takes 60 units of Levemir twice daily along with short acting insulin 20 units 3 times daily  Continue amlodipine, HCTZ and monitor blood pressure  PT/OT eval and treatment postsurgery  Further plan based on hospital course  Will start Lovenox for DVT prophylaxis from tomorrow    Discussed with the patient at the bedside explained about my above plan care.  Patient understood and agreed with my plan.  Answered all his questions and concerns appropriate.    Disposition: Home at discharge, anticipate discharge 2024      Case discussed with:  [x]Patient  []Family  [x]Nursing  []Case Management  DVT Prophylaxis:  [x]Lovenox  []Hep SQ  [x]SCDs  []Coumadin   []Eliquis/Xarelto     Objective:   VS: /71   Pulse 51   Temp 97.7 °F (36.5 °C) (Oral)   Resp 20   Ht 1.753 m (5' 9\")   Wt 113.4 kg (250 lb)   SpO2 93%   BMI 36.92 kg/m²    Tmax/24hrs: Temp (24hrs), Av.9 °F (36.6 °C), Min:97.7 °F (36.5 °C), Max:98.1 °F (36.7 °C)  IOBRIEF  Intake/Output Summary (Last 24 hours) at 2024 1636  Last data filed at 2024 1626  Gross 
Infectious Disease progress Note        Reason: Left great toe infection/osteomyelitis    Current abx Prior abx   Pip/tazo since 4/12  Levofloxacin since 4/15  vancomycin      Lines:       Assessment :  63 y.o. male with h/o type uncontrolled type 2 DM (last hgbA1C 8.5 on 4/11/24) , CAD who presented to [facility] on 4/11/24 sent from podiatrist office for evaluation of left toe ulcer concerning for osteomyelitis     Hospitalization at [facility] November 2020 for right great toe distal phalanx chronic osteomyelitis, septic arthritis right great toe interphalangeal joint   Wound cultures 9/2020-Enterobacter, MRSA  Status post right great toe amputation on 11/24.       Hospitalization at [facility] 2/2021 for acute on chronic osteomyelitis right first metatarsal osteomyelitis.    Status post incision and debridement, partial resection of right first metatarsal on 2/5/2021.  Intra-Op cultures: Methicillin susceptible Staphylococcus aureus, Bacteroides. Bone biopsy of clean margins reveal acute inflammation suggestive of acute osteomyelitis. S/p ertapenem, daptomycin till 3/19/2021     Hospitalization at [facility] 4/2021 for  chronic osteomyelitis right second metatarsal head, infected right lateral foot ulcer Status post transmetatarsal amputation 4/13/2021.  Intra-Op findings discussed with podiatrist.  Grossly clean margins achieved at surgery.  Bone biopsy, clean margins negative for acute osteomyelitis.  Intra-Op cultures no organisms seen.     Hospitalization at [facility] 2/2022 for right foot cellulitis, infected right lateral foot ulcer, chronic osteomyelitis left first distal phalanx    Status post incision and debridement bilateral feet ulcer, partial amputation left great toe on 3/1/2022.    Wound culture right foot 2/28-MRSA, pseudomonas, proteus (susceptibilities of MRSA noted)  Intra-Op wound culture 3/1/2022-MRSA, Proteus    Clinical presentation consistent with subacute osteomyelitis left great toe, infected left great toe 
PHYSICAL THERAPY EVALUATION/DISCHARGE    Patient: Vinay Benson (63 y.o. male)  Date: 4/15/2024  Primary Diagnosis: Diabetic ulcer of left foot due to type 2 diabetes mellitus (HCC) [E11.621, L97.529]  Procedure(s) (LRB):  Left foot amputation of remaining great toe and possible part of metatarsal, tibial and fibular sesamoids (Left) 3 Days Post-Op   Precautions: Weight Bearing, General Precautions, Right Lower Extremity Weight Bearing: Weight Bearing As Tolerated, Left Lower Extremity Weight Bearing: Partial Weight Bearing (NWB to forefoot),  ,  ,  ,  ,    PLOF: lives alone in 1 level apartment, intermittent SPC use, independent at baseline, family checks in     ASSESSMENT AND RECOMMENDATIONS:  Patient seen for PT evaluation and one-time treatment for gait training in setting of NWB L forefoot. Received supine; agreeable. Able to complete bed mobility independently. Transfers and gait without AD use completed with SBA-supervision. Cues provided for WB compliance as well as benefits of RW use to offweight and improve balance; however, patient declining. No further acute PT needs identified. PT to sign off.    Patient does not require further skilled physical therapy intervention at this level of care.    Further Equipment Recommendations for Discharge: none- patient declines RW     [facility]: AM-PAC Inpatient Mobility Raw Score : 23      At this time and based on an AM-PAC score, no further PT is recommended upon discharge due to patient independently mobilizing. Denies concerns regarding LE strength or balance. Recommend patient returns to prior setting with prior services.    This [facility] score should be considered in conjunction with interdisciplinary team recommendations to determine the most appropriate discharge setting. Patient's social support, diagnosis, medical stability, and prior level of function should also be taken into consideration.     SUBJECTIVE:   Patient stated “I'll get around fine at 
Podiatry Progress Note    Patient: Vinay Benson               Sex: male          DOA: [unfilled]       YOB: 1960      Age:  63 y.o.        LOS:  LOS: 2 days     POD 1 Day Post-Op  Procedure:   Procedure(s):  Left foot amputation of remaining great toe and possible part of metatarsal, tibial and fibular sesamoids      Subjective:     Patient seen resting quiet and comfortably and no apparent distress. His sister at bedside. Has intact dressings to left foot. Denies N/V/C/F.       Objective:       Physical Exam:  Left foot great toe amputation stump skin edges well approximated, skin sutures intact. No clinical sign of infection noted. Mild hematoma noted at surgical site which was drained.     Assessment:     Principal Problem:    Diabetic ulcer of left foot due to type 2 diabetes mellitus (HCC)  Active Problems:    History of coronary artery stent placement    Depression    Essential hypertension    Acute-on-chronic kidney injury (HCC)    DM (diabetes mellitus), type 2 (HCC)    GERD (gastroesophageal reflux disease)    Diabetic ulcer of left foot (HCC)    Obesity (BMI 35.0-39.9 without comorbidity)    Hyperlipidemia    Sleep apnea    Non compliance w medication regimen    Subacute osteomyelitis of left foot (HCC)  Resolved Problems:    * No resolved hospital problems. *      Plan/Recommendations/Medical Decision Making:     - Dressings changed with betadine soaked gauze.   - Remain NWB to left forefoot.   - Follow OR culture. Antibiotics per ID rec.   - Patient is stable to d/c from foot standpoint once ID makes antibiotic recommendation.   - Medical management per primary team.   
Podiatry Progress Note    Patient: Vinay Benson               Sex: male          DOA: [unfilled]       YOB: 1960      Age:  63 y.o.        LOS:  LOS: 5 days     POD 4 Days Post-Op  Procedure:   Procedure(s):  Left foot amputation of remaining great toe and possible part of metatarsal, tibial and fibular sesamoids      Subjective:     Patient seen resting quiet and comfortably and no apparent distress. Patient is requesting more pain medication. Denies N/v/C/F.       Objective:       Physical Exam:  Left foot great toe amputation stump flaps viable however mild dehiscence noted of skin edges. Skin sutures intact. No erythema or edema noted. No active bleeding noted.     Assessment:     Principal Problem:    Diabetic ulcer of left foot due to type 2 diabetes mellitus (HCC)  Active Problems:    History of coronary artery stent placement    Depression    Essential hypertension    DM (diabetes mellitus), type 2 (HCC)    GERD (gastroesophageal reflux disease)    Diabetic ulcer of left foot (HCC)    Obesity (BMI 35.0-39.9 without comorbidity)    Hyperlipidemia    Sleep apnea    Non compliance w medication regimen    Subacute osteomyelitis of left foot (HCC)  Resolved Problems:    Acute-on-chronic kidney injury (HCC)      Plan/Recommendations/Medical Decision Making:     - Surgical site dressing changed with betadine soaked gauze, ace bandage. Keep dressings dry, clean, intact until seen in office.   - Remain NWB to left forefoot in surgical shoe.   - Antibiotics per ID recommendation.   - Will follow patient outpatient in 1 week.   - Medical management per primary team.   
Pt c/o foot pain of a 7; pain scale 0-10;   morphine (PF) injection 2 mg given for pt's pain; will intervene as needed  
Pt c/o pain in L foot due to surgery of a 8; pain scale 0-10; morphine injection 2mg given for pt's pain; will intervene as needed;   
Report received from IMELDA Ocampo  
  Output 1150 ml   Net -380 ml       General:  Alert, cooperative, no acute distress    Pulmonary:  CTA Bilaterally. No Wheezing/Rales.  Cardiovascular: Regular rate and Rhythm.  GI:  Soft, Non distended, Non tender. + Bowel sounds.  Extremities:  No edema. No calf tenderness.   Psych: Good insight. Not anxious or agitated.  Neurologic: Alert and oriented X 3. Moves all ext.  Additional: Left foot dressing clean, no soakage    Medications:   Current Facility-Administered Medications   Medication Dose Route Frequency    [START ON 4/14/2024] vancomycin (VANCOCIN) 1500 mg in sodium chloride 0.9% 500 mL IVPB  1,500 mg IntraVENous Q24H    [Held by provider] hydroCHLOROthiazide (HYDRODIURIL) tablet 25 mg  25 mg Oral Daily    amLODIPine (NORVASC) tablet 5 mg  5 mg Oral Daily    piperacillin-tazobactam (ZOSYN) 3,375 mg in sodium chloride 0.9 % 50 mL IVPB (mini-bag)  3,375 mg IntraVENous Q8H    enoxaparin Sodium (LOVENOX) injection 30 mg  30 mg SubCUTAneous BID    sodium chloride flush 0.9 % injection 5-40 mL  5-40 mL IntraVENous 2 times per day    sodium chloride flush 0.9 % injection 5-40 mL  5-40 mL IntraVENous PRN    potassium chloride (KLOR-CON M) extended release tablet 40 mEq  40 mEq Oral PRN    Or    potassium bicarb-citric acid (EFFER-K) effervescent tablet 40 mEq  40 mEq Oral PRN    Or    potassium chloride 10 mEq/100 mL IVPB (Peripheral Line)  10 mEq IntraVENous PRN    magnesium sulfate 2000 mg in 50 mL IVPB premix  2,000 mg IntraVENous PRN    ondansetron (ZOFRAN-ODT) disintegrating tablet 4 mg  4 mg Oral Q8H PRN    Or    ondansetron (ZOFRAN) injection 4 mg  4 mg IntraVENous Q6H PRN    polyethylene glycol (GLYCOLAX) packet 17 g  17 g Oral Daily PRN    famotidine (PEPCID) tablet 20 mg  20 mg Oral Daily    acetaminophen (TYLENOL) tablet 650 mg  650 mg Oral Q6H PRN    Or    acetaminophen (TYLENOL) suppository 650 mg  650 mg Rectal Q6H PRN    0.9 % sodium chloride infusion   IntraVENous Continuous    
filed at 4/14/2024 0858  Gross per 24 hour   Intake --   Output 400 ml   Net -400 ml       General:  Alert, cooperative, no acute distress    Pulmonary:  CTA Bilaterally. No Wheezing/Rales.  Cardiovascular: Regular rate and Rhythm.  GI:  Soft, Non distended, Non tender. + Bowel sounds.  Extremities:  No edema. No calf tenderness.   Psych: Good insight. Not anxious or agitated.  Neurologic: Alert and oriented X 3. Moves all ext.  Additional: Left foot dressing clean, no soakage    Medications:   Current Facility-Administered Medications   Medication Dose Route Frequency    insulin glargine (LANTUS) injection vial 12 Units  12 Units SubCUTAneous Daily    vancomycin (VANCOCIN) 1500 mg in sodium chloride 0.9% 500 mL IVPB  1,500 mg IntraVENous Q24H    [Held by provider] hydroCHLOROthiazide (HYDRODIURIL) tablet 25 mg  25 mg Oral Daily    amLODIPine (NORVASC) tablet 5 mg  5 mg Oral Daily    piperacillin-tazobactam (ZOSYN) 3,375 mg in sodium chloride 0.9 % 50 mL IVPB (mini-bag)  3,375 mg IntraVENous Q8H    enoxaparin Sodium (LOVENOX) injection 30 mg  30 mg SubCUTAneous BID    sodium chloride flush 0.9 % injection 5-40 mL  5-40 mL IntraVENous 2 times per day    sodium chloride flush 0.9 % injection 5-40 mL  5-40 mL IntraVENous PRN    potassium chloride (KLOR-CON M) extended release tablet 40 mEq  40 mEq Oral PRN    Or    potassium bicarb-citric acid (EFFER-K) effervescent tablet 40 mEq  40 mEq Oral PRN    Or    potassium chloride 10 mEq/100 mL IVPB (Peripheral Line)  10 mEq IntraVENous PRN    magnesium sulfate 2000 mg in 50 mL IVPB premix  2,000 mg IntraVENous PRN    ondansetron (ZOFRAN-ODT) disintegrating tablet 4 mg  4 mg Oral Q8H PRN    Or    ondansetron (ZOFRAN) injection 4 mg  4 mg IntraVENous Q6H PRN    polyethylene glycol (GLYCOLAX) packet 17 g  17 g Oral Daily PRN    famotidine (PEPCID) tablet 20 mg  20 mg Oral Daily    acetaminophen (TYLENOL) tablet 650 mg  650 mg Oral Q6H PRN    Or    acetaminophen (TYLENOL) 
to correct all the mistakes, some may have been missed, and remained in the body of the document. If questions arise, please contact our department.    Dr. Megan Johnson, Infectious Disease Specialist  [phone]  April 15, 2024  7:37 AM

## 2024-04-16 NOTE — PLAN OF CARE
Problem: Discharge Planning  Goal: Discharge to home or other facility with appropriate resources  Outcome: Progressing  Flowsheets (Taken 4/15/2024 2100)  Discharge to home or other facility with appropriate resources: Identify barriers to discharge with patient and caregiver     Problem: Pain  Goal: Verbalizes/displays adequate comfort level or baseline comfort level  4/15/2024 2236 by Maggie Zurita RN  Outcome: Progressing  4/15/2024 1357 by Holly Boyd RN  Outcome: Progressing     Problem: Chronic Conditions and Co-morbidities  Goal: Patient's chronic conditions and co-morbidity symptoms are monitored and maintained or improved  Outcome: Progressing  Flowsheets  Taken 4/15/2024 2100 by Maggie Zurita RN  Care Plan - Patient's Chronic Conditions and Co-Morbidity Symptoms are Monitored and Maintained or Improved: Monitor and assess patient's chronic conditions and comorbid symptoms for stability, deterioration, or improvement  Taken 4/15/2024 0915 by Holly Boyd RN  Care Plan - Patient's Chronic Conditions and Co-Morbidity Symptoms are Monitored and Maintained or Improved: Monitor and assess patient's chronic conditions and comorbid symptoms for stability, deterioration, or improvement     Problem: Safety - Adult  Goal: Free from fall injury  Outcome: Progressing

## 2024-04-16 NOTE — DISCHARGE SUMMARY
Discharge Summary    Patient: Vinay Benson MRN: [id number]  CSN: [id number]    YOB: 1960  Age: 63 y.o.  Sex: male    DOA: 2024 LOS:  LOS: 5 days        Disposition: Home     Discharge Date: 2024    Admission Diagnosis: Diabetic ulcer of left foot due to type 2 diabetes mellitus (HCC) [E11.621, L97.529]    Discharge Diagnosis:    Left great toe diabetic foot ulcer, no signs of infection but needs to rule out osteomyelitis, post great toe amputation 2024  Suspect CKD stage II  Mild hyponatremia due to HCTZ  DMT2  Hypertension  Hyperlipidemia  GERD  Coronary artery disease status post PCI  Depression/Anxiety  DEWAYNE- does not use CPAP   Medication noncompliance   Morbid obesity    Discharge Condition: Stable      PHYSICAL EXAM  Visit Vitals  /83   Pulse 62   Temp 97.3 °F (36.3 °C) (Oral)   Resp 16   Ht 1.753 m (5' 9\")   Wt 113.4 kg (250 lb)   SpO2 95%   BMI 36.92 kg/m²       General:  Alert, cooperative, no acute distress    Pulmonary:  CTA Bilaterally. No Wheezing/Rales.  Cardiovascular: Regular rate and Rhythm.  GI:  Soft, Non distended, Non tender. + Bowel sounds.  Extremities:  No edema. No calf tenderness.   Psych: Good insight. Not anxious or agitated.  Neurologic: Alert and oriented X 3. Moves all ext.  Additional: Left foot dressing clean, no soakage                                   Hospital Course:   Vinay Benson is a 63 y.o. male with a PMHx of coronary artery disease status post PCI, DMT2, GERD, hypertension, hyperlipidemia, morbid obesity, sleep apnea who presented to the ED at the request of Dr. Nielson.  Patient states he is supposed to be admitted for amputation of the left great toe and was supposed to come yesterday per podiatrist request however patient had a  to attend yesterday so he did not come yesterday.  Denies any associated fever or chills. Patient states it has been draining clear liquid for 'a while'. Unable to state since how long.

## 2024-05-20 DIAGNOSIS — E13.42 DIABETIC POLYNEUROPATHY ASSOCIATED WITH OTHER SPECIFIED DIABETES MELLITUS (HCC): ICD-10-CM

## 2024-05-20 DIAGNOSIS — M96.1 LUMBAR POST-LAMINECTOMY SYNDROME: ICD-10-CM

## 2024-05-20 RX ORDER — TOPIRAMATE 50 MG/1
TABLET, FILM COATED ORAL
Qty: 60 TABLET | Refills: 2 | OUTPATIENT
Start: 2024-05-20

## 2024-12-27 ENCOUNTER — HOSPITAL ENCOUNTER (EMERGENCY)
Facility: HOSPITAL | Age: 64
Discharge: LEFT BEFORE DISCHARGE | End: 2024-12-28
Payer: MEDICAID

## 2024-12-27 ENCOUNTER — APPOINTMENT (OUTPATIENT)
Facility: HOSPITAL | Age: 64
End: 2024-12-27
Payer: MEDICAID

## 2024-12-27 VITALS
BODY MASS INDEX: 36.29 KG/M2 | WEIGHT: 245 LBS | SYSTOLIC BLOOD PRESSURE: 178 MMHG | OXYGEN SATURATION: 97 % | HEIGHT: 69 IN | TEMPERATURE: 98.9 F | HEART RATE: 96 BPM | DIASTOLIC BLOOD PRESSURE: 99 MMHG | RESPIRATION RATE: 19 BRPM

## 2024-12-27 DIAGNOSIS — E11.65 HYPERGLYCEMIA DUE TO DIABETES MELLITUS (HCC): Primary | ICD-10-CM

## 2024-12-27 LAB
APPEARANCE UR: CLEAR
BACTERIA URNS QL MICRO: ABNORMAL /HPF
BILIRUB UR QL: NEGATIVE
COLOR UR: YELLOW
EPITH CASTS URNS QL MICRO: NEGATIVE /LPF (ref 0–5)
GLUCOSE UR STRIP.AUTO-MCNC: >1000 MG/DL
HGB UR QL STRIP: ABNORMAL
KETONES UR QL STRIP.AUTO: NEGATIVE MG/DL
LEUKOCYTE ESTERASE UR QL STRIP.AUTO: NEGATIVE
NITRITE UR QL STRIP.AUTO: NEGATIVE
PH UR STRIP: 7.5 (ref 5–8)
PROT UR STRIP-MCNC: 100 MG/DL
RBC #/AREA URNS HPF: ABNORMAL /HPF (ref 0–5)
SP GR UR REFRACTOMETRY: 1.01 (ref 1–1.03)
UROBILINOGEN UR QL STRIP.AUTO: 2 EU/DL (ref 0.2–1)
WBC URNS QL MICRO: NEGATIVE /HPF (ref 0–5)

## 2024-12-27 PROCEDURE — 81001 URINALYSIS AUTO W/SCOPE: CPT

## 2024-12-27 PROCEDURE — 87086 URINE CULTURE/COLONY COUNT: CPT

## 2024-12-27 PROCEDURE — 99283 EMERGENCY DEPT VISIT LOW MDM: CPT

## 2024-12-27 RX ORDER — 0.9 % SODIUM CHLORIDE 0.9 %
1000 INTRAVENOUS SOLUTION INTRAVENOUS ONCE
Status: DISCONTINUED | OUTPATIENT
Start: 2024-12-27 | End: 2024-12-27

## 2024-12-27 ASSESSMENT — ENCOUNTER SYMPTOMS
ALLERGIC/IMMUNOLOGIC NEGATIVE: 1
COUGH: 0
WHEEZING: 0
GASTROINTESTINAL NEGATIVE: 1
EYES NEGATIVE: 1
ABDOMINAL PAIN: 0
SHORTNESS OF BREATH: 1

## 2024-12-27 ASSESSMENT — PAIN SCALES - GENERAL: PAINLEVEL_OUTOF10: 8

## 2024-12-27 ASSESSMENT — PAIN - FUNCTIONAL ASSESSMENT: PAIN_FUNCTIONAL_ASSESSMENT: 0-10

## 2024-12-27 NOTE — ED TRIAGE NOTES
Arrived via EMS c/o LEFT flank pain and exertional SOB ongoing since yesterday. Endorses in nausea w/o emesis at this time

## 2024-12-27 NOTE — ED PROVIDER NOTES
[facility] EMERGENCY DEPT  EMERGENCY DEPARTMENT ENCOUNTER      Pt Name: Vinay Benson  MRN: [id number]  Birthdate 1960  Date of evaluation: 12/27/2024  Provider: CHLOÉ Hooker  6:41 PM    CHIEF COMPLAINT       Chief Complaint   Patient presents with    Flank Pain    Shortness of Breath         HISTORY OF PRESENT ILLNESS    Vinay Benson is a 64 y.o. male who presents to the emergency department with a complaint of urine frequency and not feeling well.  He said his blood sugar was almost 500 at home he knows the problem he laid in bed though instead of giving himself insulin.  He knows what he should have done.  Instead he is here.  He says he short of breath and had left-sided flank pain.  His pain is actually now gone.  Denies any hematuria or dysuria fever cough congestion.     HPI    Nursing Notes were reviewed.    REVIEW OF SYSTEMS       Review of Systems   Constitutional:  Positive for fatigue. Negative for fever.   HENT:  Negative for congestion.    Eyes: Negative.    Respiratory:  Positive for shortness of breath. Negative for cough and wheezing.    Cardiovascular: Negative.  Negative for chest pain.   Gastrointestinal: Negative.  Negative for abdominal pain.   Endocrine: Negative.    Genitourinary:  Positive for flank pain and frequency. Negative for dysuria.   Musculoskeletal:  Negative for neck pain.   Skin: Negative.    Allergic/Immunologic: Negative.    Neurological: Negative.    Hematological: Negative.    Psychiatric/Behavioral: Negative.         Except as noted above the remainder of the review of systems was reviewed and negative.       PAST MEDICAL HISTORY     Past Medical History:   Diagnosis Date    Arthritis     Coronary atherosclerosis of native coronary artery     S/P PCI with PREM in 12/09    Diabetes (HCC)     IDDM    Electrolyte and fluid disorders not elsewhere classified     Essential hypertension, benign     GERD (gastroesophageal reflux disease)     Heroin abuse ([facility])  IMPRESSION      1. Hyperglycemia due to diabetes mellitus (HCC)          DISPOSITION/PLAN   DISPOSITION Eloped - Left Before Treatment Complete 12/27/2024 06:33:16 PM   DISPOSITION CONDITION Undetermined           PATIENT REFERRED TO:  No follow-up provider specified.    DISCHARGE MEDICATIONS:  New Prescriptions    No medications on file     Controlled Substances Monitoring:          No data to display                (Please note that portions of this note were completed with a voice recognition program.  Efforts were made to edit the dictations but occasionally words are mis-transcribed.)    CHLOÉ Hooker (electronically signed)  Attending Emergency Physician           Brenda Laguerre PA  12/27/24 [id number]

## 2024-12-29 LAB
BACTERIA SPEC CULT: NORMAL
SERVICE CMNT-IMP: NORMAL

## 2025-02-06 ENCOUNTER — APPOINTMENT (OUTPATIENT)
Facility: HOSPITAL | Age: 65
End: 2025-02-06
Attending: STUDENT IN AN ORGANIZED HEALTH CARE EDUCATION/TRAINING PROGRAM
Payer: MEDICAID

## 2025-02-06 ENCOUNTER — HOSPITAL ENCOUNTER (EMERGENCY)
Facility: HOSPITAL | Age: 65
Discharge: HOME | End: 2025-02-06
Attending: STUDENT IN AN ORGANIZED HEALTH CARE EDUCATION/TRAINING PROGRAM
Payer: MEDICAID

## 2025-02-06 ENCOUNTER — APPOINTMENT (OUTPATIENT)
Facility: HOSPITAL | Age: 65
End: 2025-02-06
Payer: MEDICAID

## 2025-02-06 VITALS
TEMPERATURE: 98.2 F | BODY MASS INDEX: 35.55 KG/M2 | HEART RATE: 87 BPM | DIASTOLIC BLOOD PRESSURE: 109 MMHG | WEIGHT: 240 LBS | OXYGEN SATURATION: 96 % | RESPIRATION RATE: 19 BRPM | SYSTOLIC BLOOD PRESSURE: 176 MMHG | HEIGHT: 69 IN

## 2025-02-06 DIAGNOSIS — L97.422 DIABETIC ULCER OF LEFT MIDFOOT ASSOCIATED WITH TYPE 2 DIABETES MELLITUS, WITH FAT LAYER EXPOSED (HCC): Primary | ICD-10-CM

## 2025-02-06 DIAGNOSIS — L03.116 CELLULITIS OF LEFT LOWER EXTREMITY: ICD-10-CM

## 2025-02-06 DIAGNOSIS — E11.621 DIABETIC ULCER OF LEFT MIDFOOT ASSOCIATED WITH TYPE 2 DIABETES MELLITUS, WITH FAT LAYER EXPOSED (HCC): Primary | ICD-10-CM

## 2025-02-06 LAB
ALBUMIN SERPL-MCNC: 2.7 G/DL (ref 3.4–5)
ALBUMIN/GLOB SERPL: 0.4 (ref 0.8–1.7)
ALP SERPL-CCNC: 133 U/L (ref 45–117)
ALT SERPL-CCNC: 17 U/L (ref 16–61)
ANION GAP SERPL CALC-SCNC: 4 MMOL/L (ref 3–18)
APPEARANCE UR: CLEAR
AST SERPL-CCNC: 31 U/L (ref 10–38)
BACTERIA URNS QL MICRO: ABNORMAL /HPF
BASOPHILS # BLD: 0.06 K/UL (ref 0–0.1)
BASOPHILS NFR BLD: 0.3 % (ref 0–2)
BILIRUB SERPL-MCNC: 1.4 MG/DL (ref 0.2–1)
BILIRUB UR QL: ABNORMAL
BUN SERPL-MCNC: 13 MG/DL (ref 7–18)
BUN/CREAT SERPL: 9 (ref 12–20)
CALCIUM SERPL-MCNC: 9.2 MG/DL (ref 8.5–10.1)
CHLORIDE SERPL-SCNC: 95 MMOL/L (ref 100–111)
CO2 SERPL-SCNC: 33 MMOL/L (ref 21–32)
COLOR UR: ABNORMAL
CREAT SERPL-MCNC: 1.48 MG/DL (ref 0.6–1.3)
CRP SERPL-MCNC: 23.2 MG/DL (ref 0–0.3)
DIFFERENTIAL METHOD BLD: ABNORMAL
ECHO BSA: 2.3 M2
ECHO BSA: 2.3 M2
EOSINOPHIL # BLD: 0.24 K/UL (ref 0–0.4)
EOSINOPHIL NFR BLD: 1.4 % (ref 0–5)
EPITH CASTS URNS QL MICRO: ABNORMAL /LPF (ref 0–5)
ERYTHROCYTE [DISTWIDTH] IN BLOOD BY AUTOMATED COUNT: 14.1 % (ref 11.6–14.5)
ERYTHROCYTE [SEDIMENTATION RATE] IN BLOOD: >130 MM/HR (ref 0–20)
GLOBULIN SER CALC-MCNC: 6.1 G/DL (ref 2–4)
GLUCOSE SERPL-MCNC: 149 MG/DL (ref 74–99)
GLUCOSE UR STRIP.AUTO-MCNC: 250 MG/DL
HCT VFR BLD AUTO: 39.3 % (ref 36–48)
HGB BLD-MCNC: 11.6 G/DL (ref 13–16)
HGB UR QL STRIP: NEGATIVE
HYALINE CASTS URNS QL MICRO: ABNORMAL /LPF (ref 0–2)
IMM GRANULOCYTES # BLD AUTO: 0.1 K/UL (ref 0–0.04)
IMM GRANULOCYTES NFR BLD AUTO: 0.6 % (ref 0–0.5)
INR PPP: 1.2 (ref 0.9–1.1)
KETONES UR QL STRIP.AUTO: NEGATIVE MG/DL
LACTATE BLD-SCNC: 1.08 MMOL/L (ref 0.4–2)
LEUKOCYTE ESTERASE UR QL STRIP.AUTO: ABNORMAL
LYMPHOCYTES # BLD: 1.27 K/UL (ref 0.9–3.6)
LYMPHOCYTES NFR BLD: 7.3 % (ref 21–52)
MCH RBC QN AUTO: 27.4 PG (ref 24–34)
MCHC RBC AUTO-ENTMCNC: 29.5 G/DL (ref 31–37)
MCV RBC AUTO: 92.9 FL (ref 78–100)
MONOCYTES # BLD: 1.23 K/UL (ref 0.05–1.2)
MONOCYTES NFR BLD: 7.1 % (ref 3–10)
MUCOUS THREADS URNS QL MICRO: ABNORMAL /LPF
NEUTS SEG # BLD: 14.52 K/UL (ref 1.8–8)
NEUTS SEG NFR BLD: 83.3 % (ref 40–73)
NITRITE UR QL STRIP.AUTO: POSITIVE
NRBC # BLD: 0 K/UL (ref 0–0.01)
NRBC BLD-RTO: 0 PER 100 WBC
PH UR STRIP: 5.5 (ref 5–8)
PLATELET # BLD AUTO: 310 K/UL (ref 135–420)
PMV BLD AUTO: 12 FL (ref 9.2–11.8)
POTASSIUM SERPL-SCNC: 3 MMOL/L (ref 3.5–5.5)
PROT SERPL-MCNC: 8.8 G/DL (ref 6.4–8.2)
PROT UR STRIP-MCNC: 300 MG/DL
PROTHROMBIN TIME: 15.6 SEC (ref 11.9–14.9)
RBC # BLD AUTO: 4.23 M/UL (ref 4.35–5.65)
RBC #/AREA URNS HPF: ABNORMAL /HPF (ref 0–5)
SODIUM SERPL-SCNC: 132 MMOL/L (ref 136–145)
SP GR UR REFRACTOMETRY: 1.03 (ref 1–1.03)
UROBILINOGEN UR QL STRIP.AUTO: 4 EU/DL (ref 0.2–1)
WBC # BLD AUTO: 17.4 K/UL (ref 4.6–13.2)
WBC URNS QL MICRO: ABNORMAL /HPF (ref 0–5)

## 2025-02-06 PROCEDURE — 83605 ASSAY OF LACTIC ACID: CPT

## 2025-02-06 PROCEDURE — 87070 CULTURE OTHR SPECIMN AEROBIC: CPT

## 2025-02-06 PROCEDURE — 87154 CUL TYP ID BLD PTHGN 6+ TRGT: CPT

## 2025-02-06 PROCEDURE — 80053 COMPREHEN METABOLIC PANEL: CPT

## 2025-02-06 PROCEDURE — 73630 X-RAY EXAM OF FOOT: CPT

## 2025-02-06 PROCEDURE — 93971 EXTREMITY STUDY: CPT

## 2025-02-06 PROCEDURE — 6370000000 HC RX 637 (ALT 250 FOR IP): Performed by: STUDENT IN AN ORGANIZED HEALTH CARE EDUCATION/TRAINING PROGRAM

## 2025-02-06 PROCEDURE — 87040 BLOOD CULTURE FOR BACTERIA: CPT

## 2025-02-06 PROCEDURE — 81001 URINALYSIS AUTO W/SCOPE: CPT

## 2025-02-06 PROCEDURE — 87186 SC STD MICRODIL/AGAR DIL: CPT

## 2025-02-06 PROCEDURE — 87205 SMEAR GRAM STAIN: CPT

## 2025-02-06 PROCEDURE — 93926 LOWER EXTREMITY STUDY: CPT

## 2025-02-06 PROCEDURE — 99284 EMERGENCY DEPT VISIT MOD MDM: CPT

## 2025-02-06 PROCEDURE — 85025 COMPLETE CBC W/AUTO DIFF WBC: CPT

## 2025-02-06 PROCEDURE — 86140 C-REACTIVE PROTEIN: CPT

## 2025-02-06 PROCEDURE — 87077 CULTURE AEROBIC IDENTIFY: CPT

## 2025-02-06 PROCEDURE — 85652 RBC SED RATE AUTOMATED: CPT

## 2025-02-06 PROCEDURE — 85610 PROTHROMBIN TIME: CPT

## 2025-02-06 RX ORDER — CEPHALEXIN 500 MG/1
500 CAPSULE ORAL 4 TIMES DAILY
Qty: 40 CAPSULE | Refills: 0 | Status: ON HOLD | OUTPATIENT
Start: 2025-02-06 | End: 2025-02-16

## 2025-02-06 RX ORDER — SULFAMETHOXAZOLE AND TRIMETHOPRIM 800; 160 MG/1; MG/1
1 TABLET ORAL 2 TIMES DAILY
Qty: 20 TABLET | Refills: 0 | Status: ON HOLD | OUTPATIENT
Start: 2025-02-06 | End: 2025-02-16

## 2025-02-06 RX ORDER — SULFAMETHOXAZOLE AND TRIMETHOPRIM 800; 160 MG/1; MG/1
1 TABLET ORAL ONCE
Status: COMPLETED | OUTPATIENT
Start: 2025-02-06 | End: 2025-02-06

## 2025-02-06 RX ADMIN — SULFAMETHOXAZOLE AND TRIMETHOPRIM 1 TABLET: 800; 160 TABLET ORAL at 19:52

## 2025-02-06 RX ADMIN — CEPHALEXIN 500 MG: 250 CAPSULE ORAL at 19:52

## 2025-02-06 ASSESSMENT — PAIN SCALES - GENERAL: PAINLEVEL_OUTOF10: 10

## 2025-02-06 ASSESSMENT — PAIN DESCRIPTION - LOCATION: LOCATION: FOOT

## 2025-02-06 ASSESSMENT — PAIN - FUNCTIONAL ASSESSMENT: PAIN_FUNCTIONAL_ASSESSMENT: 0-10

## 2025-02-06 NOTE — ED TRIAGE NOTES
Pt presents to the ed with complaints of foot pain. Pt states that he had his toes amputated and he now has a big hole on the bottom of his foot with secretions drainage from it that has a foul odor. Wife states that patient presented to his PCP yesterday and he was advised to come to the ER. Pt states that he is a diabetic

## 2025-02-06 NOTE — ED PROVIDER NOTES
[facility] EMERGENCY DEPARTMENT  EMERGENCY DEPARTMENT ENCOUNTER      Pt Name: Vinay Benson  MRN: [id number]  Birthdate 1960  Date of evaluation: 2/6/2025  Provider: Jolynn Da Silva MD    CHIEF COMPLAINT       Chief Complaint   Patient presents with    Foot Pain         HISTORY OF PRESENT ILLNESS   (Location/Symptom, Timing/Onset, Context/Setting, Quality, Duration, Modifying Factors, Severity)  Note limiting factors.   Vinay Benson is a 64 y.o. male who presents to the emergency department after being sent in by his PCP for swelling and drainage of his left foot.  Patient does have a history of toe amputations by Dr. Breezy Singleton.  He states he had a wound in his foot for some time it has been draining for some time.  He does have neuropathy but does still have some pain.  Denies any fever.  He has had swelling in his leg but this has been going on for a long time.  He has noticed that it has been malodorous.  Patient denies any chest pain or shortness of breath.  Denies any dysuria, hematuria or increased urinary frequency.  Denies any fever, sweats or chills.     Nursing Notes were reviewed.    REVIEW OF SYSTEMS    (2-9 systems for level 4, 10 or more for level 5)     Constitutional: No fever  HENT: No ear pain  Eyes: No change in vision  Respiratory: No SOB  Cardio: No chest pain  GI: No blood in stool  : No hematuria  MSK: No back pain  Skin: No rashes  Neuro: No headache    Except as noted above the remainder of the review of systems was reviewed and negative.       PAST MEDICAL HISTORY     Past Medical History:   Diagnosis Date    Arthritis     Coronary atherosclerosis of native coronary artery     S/P PCI with PREM in 12/09    Diabetes ([facility])     IDDM    Electrolyte and fluid disorders not elsewhere classified     Essential hypertension, benign     GERD (gastroesophageal reflux disease)     Heroin abuse ([facility]) 05/26/16    Psychiatrist Dr. Natalya Reeves     History of heart  following components:    Protime 15.6 (*)     INR 1.2 (*)     All other components within normal limits   SEDIMENTATION RATE - Abnormal; Notable for the following components:    Sed Rate, Automated >130 (*)     All other components within normal limits   C-REACTIVE PROTEIN - Abnormal; Notable for the following components:    CRP 23.2 (*)     All other components within normal limits   URINALYSIS - Abnormal; Notable for the following components:    Protein,  (*)     Glucose, Ur 250 (*)     Bilirubin, Urine MODERATE (*)     Urobilinogen, Urine 4.0 (*)     Nitrite, Urine Positive (*)     Leukocyte Esterase, Urine SMALL (*)     All other components within normal limits   URINALYSIS, MICRO - Abnormal; Notable for the following components:    BACTERIA, URINE 1+ (*)     Mucus, UA 1+ (*)     All other components within normal limits   CULTURE, BLOOD 1   CULTURE, BLOOD 2   CULTURE, WOUND (WITH GRAM STAIN)   POCT LACTIC ACID (LACTATE)       All other labs were within normal range or not returned as of this dictation.    EMERGENCY DEPARTMENT COURSE and DIFFERENTIAL DIAGNOSIS/MDM:   Vitals:    Vitals:    02/06/25 1621   BP: (!) 176/109   Pulse: 87   Resp: 19   Temp: 98.2 °F (36.8 °C)   TempSrc: Oral   SpO2: 96%   Weight: 108.9 kg (240 lb)   Height: 1.753 m (5' 9\")       Medical Decision Making  Amount and/or Complexity of Data Reviewed  Labs: ordered.  Radiology: ordered.    Risk  Prescription drug management.    Patient is a 64-year-old male presenting with what appears to be an infected wound of his left foot.  He does not appear septic at this time.  He is otherwise hemodynamically stable but it is quite malodorous.  Would like to evaluate for signs of osteomyelitis, systemic infection.      UA shows concerning findings of infection.  CBC shows a leukocytosis with left shift concerning for infection.  INR mildly elevated.  ESR and CRP are markedly elevated.  CMP is markedly abnormal with a low sodium chloride, slightly

## 2025-02-07 LAB
ACB COMPLEX DNA BLD POS QL NAA+NON-PROBE: NOT DETECTED
ACCESSION NUMBER, LLC1M: ABNORMAL
B FRAGILIS DNA BLD POS QL NAA+NON-PROBE: NOT DETECTED
BIOFIRE TEST COMMENT: ABNORMAL
C ALBICANS DNA BLD POS QL NAA+NON-PROBE: NOT DETECTED
C AURIS DNA BLD POS QL NAA+NON-PROBE: NOT DETECTED
C GATTII+NEOFOR DNA BLD POS QL NAA+N-PRB: NOT DETECTED
C GLABRATA DNA BLD POS QL NAA+NON-PROBE: NOT DETECTED
C KRUSEI DNA BLD POS QL NAA+NON-PROBE: NOT DETECTED
C PARAP DNA BLD POS QL NAA+NON-PROBE: NOT DETECTED
C TROPICLS DNA BLD POS QL NAA+NON-PROBE: NOT DETECTED
E CLOAC COMP DNA BLD POS NAA+NON-PROBE: NOT DETECTED
E COLI DNA BLD POS QL NAA+NON-PROBE: NOT DETECTED
E FAECALIS DNA BLD POS QL NAA+NON-PROBE: NOT DETECTED
E FAECIUM DNA BLD POS QL NAA+NON-PROBE: NOT DETECTED
ENTEROBACTERALES DNA BLD POS NAA+N-PRB: NOT DETECTED
GP B STREP DNA BLD POS QL NAA+NON-PROBE: NOT DETECTED
HAEM INFLU DNA BLD POS QL NAA+NON-PROBE: NOT DETECTED
K OXYTOCA DNA BLD POS QL NAA+NON-PROBE: NOT DETECTED
KLEBSIELLA SP DNA BLD POS QL NAA+NON-PRB: NOT DETECTED
KLEBSIELLA SP DNA BLD POS QL NAA+NON-PRB: NOT DETECTED
L MONOCYTOG DNA BLD POS QL NAA+NON-PROBE: NOT DETECTED
MECA+MECC+MREJ ISLT/SPM QL: NOT DETECTED
N MEN DNA BLD POS QL NAA+NON-PROBE: NOT DETECTED
P AERUGINOSA DNA BLD POS NAA+NON-PROBE: NOT DETECTED
PROTEUS SP DNA BLD POS QL NAA+NON-PROBE: NOT DETECTED
RESISTANT GENE TARGETS: ABNORMAL
S AUREUS DNA BLD POS QL NAA+NON-PROBE: DETECTED
S AUREUS+CONS DNA BLD POS NAA+NON-PROBE: DETECTED
S EPIDERMIDIS DNA BLD POS QL NAA+NON-PRB: NOT DETECTED
S LUGDUNENSIS DNA BLD POS QL NAA+NON-PRB: NOT DETECTED
S MALTOPHILIA DNA BLD POS QL NAA+NON-PRB: NOT DETECTED
S MARCESCENS DNA BLD POS NAA+NON-PROBE: NOT DETECTED
S PNEUM DNA BLD POS QL NAA+NON-PROBE: NOT DETECTED
S PYO DNA BLD POS QL NAA+NON-PROBE: NOT DETECTED
SALMONELLA DNA BLD POS QL NAA+NON-PROBE: NOT DETECTED
STREPTOCOCCUS DNA BLD POS NAA+NON-PROBE: NOT DETECTED

## 2025-02-07 NOTE — ED PROVIDER NOTES
Positive blood cultures; both sets. Pt to come back to ED. Called [phone] with no answer. HIPAA compliant  left instructing pt to call ED and or return to ED for further tx.      Flori Silveira, DILIA  02/07/25 1417

## 2025-02-07 NOTE — DISCHARGE INSTRUCTIONS
You have a diabetic foot ulcer that is infected.  Concerning for osteomyelitis.  Needs further workup with podiatry.  Either follow-up on Monday with podiatry or return to the emergency department for admission.  Please take antibiotics as prescribed.  Make sure your blood sugar is controlled.

## 2025-02-09 LAB
BACTERIA SPEC CULT: ABNORMAL
GRAM STN SPEC: ABNORMAL
SERVICE CMNT-IMP: ABNORMAL

## 2025-02-09 NOTE — CONSULTS
forefoot bilateral upon testing with a Semmis Shine 5.07 monofilament. Vibratory sensation is diminished to the level of the lateral malleolus bilateral. Sharp/dull sensation is diminished to the fore foot. Achilles and patellar deep tendon reflexes are within normal limits bilateral. Paresthesia symptoms present to bilateral LE's.  Dermatological: Skin shows signs of mild atrophy with diffuse dry xerosis. Web spaces on left foot are clean, dry, and intact. Left plantar foot ulcer around submet 3 measure 2.5 x 2 x 0.5 cm deep probing deep to capsule. Purulence noted from base of wound. No erythema or edema noted. Fluctuance palpated with palpation to left plantarlateral forefoot.       Impression:     Left foot plantar wound with possible osteomyelitis, abscess/cellulitis of foot. Diabetic neuropathy.      Recommendation:     - x-ray findings reviewed. No acute fracture. Interval mid phalanx great toe amputation. Minimally progressive osteolytic change in the distal aspect of the second metatarsal bone laterally. Osteomyelitis cannot be excluded.  - Rec MRI of left foot.   - Sharp excisional debridement of left foot wound performed with # 15 blade down to and including subcutaneous level removing all devitalized, non-viable tissue exposing bleeding tissue. Purulent discharge expressed from base of wound. Swab wound culture obtained and sent to micro.   - Rec hospital admission and will need surgical debridement with possible amputation of remaining toes on left foot.   - Rec ID consult.     - Thank you for allowing me the opportunity to participate in Mr. Benson's care.

## 2025-02-10 LAB
BACTERIA SPEC CULT: ABNORMAL
BACTERIA SPEC CULT: ABNORMAL
GRAM STN SPEC: ABNORMAL
SERVICE CMNT-IMP: ABNORMAL

## 2025-02-11 ENCOUNTER — HOSPITAL ENCOUNTER (INPATIENT)
Facility: HOSPITAL | Age: 65
LOS: 10 days | Discharge: HOME | End: 2025-02-21
Attending: EMERGENCY MEDICINE | Admitting: INTERNAL MEDICINE
Payer: MEDICAID

## 2025-02-11 ENCOUNTER — APPOINTMENT (OUTPATIENT)
Facility: HOSPITAL | Age: 65
End: 2025-02-11
Payer: MEDICAID

## 2025-02-11 DIAGNOSIS — R00.1 SINUS BRADYCARDIA: Primary | ICD-10-CM

## 2025-02-11 DIAGNOSIS — L02.612 ABSCESS OF LEFT FOOT: ICD-10-CM

## 2025-02-11 PROBLEM — L03.90 CELLULITIS: Status: ACTIVE | Noted: 2025-02-11

## 2025-02-11 LAB
ABO + RH BLD: NORMAL
ALBUMIN SERPL-MCNC: 2.4 G/DL (ref 3.4–5)
ALBUMIN/GLOB SERPL: 0.4 (ref 0.8–1.7)
ALP SERPL-CCNC: 126 U/L (ref 45–117)
ALT SERPL-CCNC: 17 U/L (ref 16–61)
ANION GAP SERPL CALC-SCNC: 3 MMOL/L (ref 3–18)
APTT PPP: 35.6 SEC (ref 23–36.4)
AST SERPL-CCNC: 30 U/L (ref 10–38)
BASOPHILS # BLD: 0.07 K/UL (ref 0–0.1)
BASOPHILS NFR BLD: 0.6 % (ref 0–2)
BILIRUB SERPL-MCNC: 0.9 MG/DL (ref 0.2–1)
BLOOD GROUP ANTIBODIES SERPL: NORMAL
BUN SERPL-MCNC: 11 MG/DL (ref 7–18)
BUN/CREAT SERPL: 10 (ref 12–20)
CALCIUM SERPL-MCNC: 9.1 MG/DL (ref 8.5–10.1)
CHLORIDE SERPL-SCNC: 99 MMOL/L (ref 100–111)
CO2 SERPL-SCNC: 33 MMOL/L (ref 21–32)
CREAT SERPL-MCNC: 1.15 MG/DL (ref 0.6–1.3)
CRP SERPL-MCNC: 10.9 MG/DL (ref 0–0.3)
DIFFERENTIAL METHOD BLD: ABNORMAL
EOSINOPHIL # BLD: 0.35 K/UL (ref 0–0.4)
EOSINOPHIL NFR BLD: 3.2 % (ref 0–5)
ERYTHROCYTE [DISTWIDTH] IN BLOOD BY AUTOMATED COUNT: 14.5 % (ref 11.6–14.5)
ERYTHROCYTE [SEDIMENTATION RATE] IN BLOOD: 130 MM/HR (ref 0–20)
GLOBULIN SER CALC-MCNC: 5.9 G/DL (ref 2–4)
GLUCOSE SERPL-MCNC: 113 MG/DL (ref 74–99)
HCT VFR BLD AUTO: 33.2 % (ref 36–48)
HGB BLD-MCNC: 9.8 G/DL (ref 13–16)
IMM GRANULOCYTES # BLD AUTO: 0.08 K/UL (ref 0–0.04)
IMM GRANULOCYTES NFR BLD AUTO: 0.7 % (ref 0–0.5)
INR PPP: 1.1 (ref 0.9–1.1)
LYMPHOCYTES # BLD: 1.43 K/UL (ref 0.9–3.6)
LYMPHOCYTES NFR BLD: 13.3 % (ref 21–52)
MCH RBC QN AUTO: 27.8 PG (ref 24–34)
MCHC RBC AUTO-ENTMCNC: 29.5 G/DL (ref 31–37)
MCV RBC AUTO: 94.1 FL (ref 78–100)
MONOCYTES # BLD: 0.69 K/UL (ref 0.05–1.2)
MONOCYTES NFR BLD: 6.4 % (ref 3–10)
NEUTS SEG # BLD: 8.16 K/UL (ref 1.8–8)
NEUTS SEG NFR BLD: 75.8 % (ref 40–73)
NRBC # BLD: 0 K/UL (ref 0–0.01)
NRBC BLD-RTO: 0 PER 100 WBC
PLATELET # BLD AUTO: 367 K/UL (ref 135–420)
PMV BLD AUTO: 10.6 FL (ref 9.2–11.8)
POTASSIUM SERPL-SCNC: 3.4 MMOL/L (ref 3.5–5.5)
PROT SERPL-MCNC: 8.3 G/DL (ref 6.4–8.2)
PROTHROMBIN TIME: 14 SEC (ref 11.9–14.9)
RBC # BLD AUTO: 3.53 M/UL (ref 4.35–5.65)
SODIUM SERPL-SCNC: 135 MMOL/L (ref 136–145)
SPECIMEN EXP DATE BLD: NORMAL
WBC # BLD AUTO: 10.8 K/UL (ref 4.6–13.2)

## 2025-02-11 PROCEDURE — 6370000000 HC RX 637 (ALT 250 FOR IP): Performed by: EMERGENCY MEDICINE

## 2025-02-11 PROCEDURE — 86140 C-REACTIVE PROTEIN: CPT

## 2025-02-11 PROCEDURE — 1100000000 HC RM PRIVATE

## 2025-02-11 PROCEDURE — 85652 RBC SED RATE AUTOMATED: CPT

## 2025-02-11 PROCEDURE — 73590 X-RAY EXAM OF LOWER LEG: CPT

## 2025-02-11 PROCEDURE — 99222 1ST HOSP IP/OBS MODERATE 55: CPT | Performed by: INTERNAL MEDICINE

## 2025-02-11 PROCEDURE — 93005 ELECTROCARDIOGRAM TRACING: CPT | Performed by: EMERGENCY MEDICINE

## 2025-02-11 PROCEDURE — 85730 THROMBOPLASTIN TIME PARTIAL: CPT

## 2025-02-11 PROCEDURE — 2580000003 HC RX 258: Performed by: EMERGENCY MEDICINE

## 2025-02-11 PROCEDURE — 86900 BLOOD TYPING SEROLOGIC ABO: CPT

## 2025-02-11 PROCEDURE — 87040 BLOOD CULTURE FOR BACTERIA: CPT

## 2025-02-11 PROCEDURE — 86901 BLOOD TYPING SEROLOGIC RH(D): CPT

## 2025-02-11 PROCEDURE — 6360000002 HC RX W HCPCS: Performed by: EMERGENCY MEDICINE

## 2025-02-11 PROCEDURE — 80053 COMPREHEN METABOLIC PANEL: CPT

## 2025-02-11 PROCEDURE — 86850 RBC ANTIBODY SCREEN: CPT

## 2025-02-11 PROCEDURE — 85025 COMPLETE CBC W/AUTO DIFF WBC: CPT

## 2025-02-11 PROCEDURE — 85610 PROTHROMBIN TIME: CPT

## 2025-02-11 PROCEDURE — 99285 EMERGENCY DEPT VISIT HI MDM: CPT

## 2025-02-11 RX ORDER — TRAZODONE HYDROCHLORIDE 150 MG/1
1 TABLET ORAL NIGHTLY
COMMUNITY

## 2025-02-11 RX ORDER — OXYCODONE AND ACETAMINOPHEN 7.5; 325 MG/1; MG/1
1 TABLET ORAL EVERY 4 HOURS PRN
Status: ON HOLD | COMMUNITY
Start: 2025-02-05 | End: 2025-02-20 | Stop reason: HOSPADM

## 2025-02-11 RX ORDER — ASPIRIN 81 MG/1
81 TABLET, COATED ORAL DAILY
COMMUNITY
Start: 2025-02-05

## 2025-02-11 RX ORDER — ACETAMINOPHEN 500 MG
1000 TABLET ORAL
Status: COMPLETED | OUTPATIENT
Start: 2025-02-11 | End: 2025-02-11

## 2025-02-11 RX ORDER — VANCOMYCIN 2 G/400ML
2000 INJECTION, SOLUTION INTRAVENOUS ONCE
Status: COMPLETED | OUTPATIENT
Start: 2025-02-11 | End: 2025-02-12

## 2025-02-11 RX ORDER — PANTOPRAZOLE SODIUM 40 MG/1
40 TABLET, DELAYED RELEASE ORAL DAILY
Status: ON HOLD | COMMUNITY
End: 2025-02-20 | Stop reason: HOSPADM

## 2025-02-11 RX ADMIN — CEFEPIME 2000 MG: 2 INJECTION, POWDER, FOR SOLUTION INTRAVENOUS at 21:30

## 2025-02-11 RX ADMIN — ACETAMINOPHEN 1000 MG: 500 TABLET ORAL at 21:28

## 2025-02-11 RX ADMIN — VANCOMYCIN 2000 MG: 2 INJECTION, SOLUTION INTRAVENOUS at 23:38

## 2025-02-11 ASSESSMENT — PAIN DESCRIPTION - DESCRIPTORS
DESCRIPTORS: DISCOMFORT
DESCRIPTORS: ACHING

## 2025-02-11 ASSESSMENT — PAIN DESCRIPTION - LOCATION
LOCATION: FOOT
LOCATION: FOOT

## 2025-02-11 ASSESSMENT — PAIN DESCRIPTION - ORIENTATION
ORIENTATION: RIGHT
ORIENTATION: LEFT

## 2025-02-11 ASSESSMENT — PAIN SCALES - GENERAL
PAINLEVEL_OUTOF10: 7
PAINLEVEL_OUTOF10: 8

## 2025-02-11 ASSESSMENT — PAIN - FUNCTIONAL ASSESSMENT: PAIN_FUNCTIONAL_ASSESSMENT: 0-10

## 2025-02-11 NOTE — ED TRIAGE NOTES
Pt came ambulatory to triage c/o left foot wound abscess. Per pt, he was sent by Dr. Nielson to be admitted.    Hx of DM.

## 2025-02-12 ENCOUNTER — APPOINTMENT (OUTPATIENT)
Facility: HOSPITAL | Age: 65
End: 2025-02-12
Payer: MEDICAID

## 2025-02-12 ENCOUNTER — APPOINTMENT (OUTPATIENT)
Facility: HOSPITAL | Age: 65
End: 2025-02-12
Attending: EMERGENCY MEDICINE
Payer: MEDICAID

## 2025-02-12 PROBLEM — D63.8 ANEMIA OF CHRONIC DISEASE: Status: ACTIVE | Noted: 2025-02-12

## 2025-02-12 PROBLEM — E11.628 DIABETIC INFECTION OF LEFT FOOT (HCC): Status: ACTIVE | Noted: 2021-04-12

## 2025-02-12 PROBLEM — L08.9 DIABETIC INFECTION OF LEFT FOOT (HCC): Status: ACTIVE | Noted: 2021-04-12

## 2025-02-12 PROBLEM — N40.0 BENIGN PROSTATIC HYPERPLASIA WITHOUT LOWER URINARY TRACT SYMPTOMS: Status: ACTIVE | Noted: 2025-02-12

## 2025-02-12 PROBLEM — M86.172 ACUTE OSTEOMYELITIS OF LEFT FOOT (HCC): Status: ACTIVE | Noted: 2025-02-12

## 2025-02-12 PROBLEM — E03.9 ACQUIRED HYPOTHYROIDISM: Status: RESOLVED | Noted: 2017-09-07 | Resolved: 2025-02-12

## 2025-02-12 PROBLEM — L03.90 CELLULITIS: Status: RESOLVED | Noted: 2025-02-11 | Resolved: 2025-02-12

## 2025-02-12 LAB
ANION GAP SERPL CALC-SCNC: 5 MMOL/L (ref 3–18)
BUN SERPL-MCNC: 12 MG/DL (ref 7–18)
BUN/CREAT SERPL: 10 (ref 12–20)
CALCIUM SERPL-MCNC: 8.9 MG/DL (ref 8.5–10.1)
CHLORIDE SERPL-SCNC: 100 MMOL/L (ref 100–111)
CO2 SERPL-SCNC: 31 MMOL/L (ref 21–32)
CREAT SERPL-MCNC: 1.2 MG/DL (ref 0.6–1.3)
ECHO BSA: 2.3 M2
EKG ATRIAL RATE: 62 BPM
EKG DIAGNOSIS: NORMAL
EKG P-R INTERVAL: 304 MS
EKG Q-T INTERVAL: 432 MS
EKG QRS DURATION: 74 MS
EKG QTC CALCULATION (BAZETT): 438 MS
EKG R AXIS: 12 DEGREES
EKG T AXIS: 31 DEGREES
EKG VENTRICULAR RATE: 62 BPM
GLUCOSE BLD STRIP.AUTO-MCNC: 149 MG/DL (ref 70–110)
GLUCOSE BLD STRIP.AUTO-MCNC: 160 MG/DL (ref 70–110)
GLUCOSE BLD STRIP.AUTO-MCNC: 208 MG/DL (ref 70–110)
GLUCOSE SERPL-MCNC: 169 MG/DL (ref 74–99)
POTASSIUM SERPL-SCNC: 4 MMOL/L (ref 3.5–5.5)
SODIUM SERPL-SCNC: 136 MMOL/L (ref 136–145)

## 2025-02-12 PROCEDURE — 94761 N-INVAS EAR/PLS OXIMETRY MLT: CPT

## 2025-02-12 PROCEDURE — A9577 INJ MULTIHANCE: HCPCS | Performed by: EMERGENCY MEDICINE

## 2025-02-12 PROCEDURE — 6360000002 HC RX W HCPCS: Performed by: STUDENT IN AN ORGANIZED HEALTH CARE EDUCATION/TRAINING PROGRAM

## 2025-02-12 PROCEDURE — 82962 GLUCOSE BLOOD TEST: CPT

## 2025-02-12 PROCEDURE — 2500000003 HC RX 250 WO HCPCS: Performed by: INTERNAL MEDICINE

## 2025-02-12 PROCEDURE — 1100000003 HC PRIVATE W/ TELEMETRY

## 2025-02-12 PROCEDURE — 2580000003 HC RX 258: Performed by: INTERNAL MEDICINE

## 2025-02-12 PROCEDURE — 6360000002 HC RX W HCPCS: Performed by: INTERNAL MEDICINE

## 2025-02-12 PROCEDURE — 6370000000 HC RX 637 (ALT 250 FOR IP): Performed by: INTERNAL MEDICINE

## 2025-02-12 PROCEDURE — 36415 COLL VENOUS BLD VENIPUNCTURE: CPT

## 2025-02-12 PROCEDURE — 87040 BLOOD CULTURE FOR BACTERIA: CPT

## 2025-02-12 PROCEDURE — 6370000000 HC RX 637 (ALT 250 FOR IP): Performed by: STUDENT IN AN ORGANIZED HEALTH CARE EDUCATION/TRAINING PROGRAM

## 2025-02-12 PROCEDURE — 6360000004 HC RX CONTRAST MEDICATION: Performed by: EMERGENCY MEDICINE

## 2025-02-12 PROCEDURE — 80048 BASIC METABOLIC PNL TOTAL CA: CPT

## 2025-02-12 PROCEDURE — 73720 MRI LWR EXTREMITY W/O&W/DYE: CPT

## 2025-02-12 PROCEDURE — 93010 ELECTROCARDIOGRAM REPORT: CPT | Performed by: INTERNAL MEDICINE

## 2025-02-12 PROCEDURE — 2580000003 HC RX 258: Performed by: STUDENT IN AN ORGANIZED HEALTH CARE EDUCATION/TRAINING PROGRAM

## 2025-02-12 PROCEDURE — 93971 EXTREMITY STUDY: CPT

## 2025-02-12 PROCEDURE — 93005 ELECTROCARDIOGRAM TRACING: CPT | Performed by: STUDENT IN AN ORGANIZED HEALTH CARE EDUCATION/TRAINING PROGRAM

## 2025-02-12 PROCEDURE — 99232 SBSQ HOSP IP/OBS MODERATE 35: CPT | Performed by: STUDENT IN AN ORGANIZED HEALTH CARE EDUCATION/TRAINING PROGRAM

## 2025-02-12 RX ORDER — POLYETHYLENE GLYCOL 3350 17 G/17G
17 POWDER, FOR SOLUTION ORAL DAILY PRN
Status: DISCONTINUED | OUTPATIENT
Start: 2025-02-12 | End: 2025-02-21 | Stop reason: HOSPADM

## 2025-02-12 RX ORDER — POLYETHYLENE GLYCOL 3350 17 G/17G
17 POWDER, FOR SOLUTION ORAL DAILY
Status: DISCONTINUED | OUTPATIENT
Start: 2025-02-12 | End: 2025-02-21 | Stop reason: HOSPADM

## 2025-02-12 RX ORDER — SODIUM CHLORIDE 9 MG/ML
INJECTION, SOLUTION INTRAVENOUS PRN
Status: DISCONTINUED | OUTPATIENT
Start: 2025-02-12 | End: 2025-02-21 | Stop reason: HOSPADM

## 2025-02-12 RX ORDER — ONDANSETRON 2 MG/ML
4 INJECTION INTRAMUSCULAR; INTRAVENOUS EVERY 6 HOURS PRN
Status: DISCONTINUED | OUTPATIENT
Start: 2025-02-12 | End: 2025-02-21 | Stop reason: HOSPADM

## 2025-02-12 RX ORDER — NALOXONE HYDROCHLORIDE 0.4 MG/ML
0.4 INJECTION, SOLUTION INTRAMUSCULAR; INTRAVENOUS; SUBCUTANEOUS PRN
Status: DISCONTINUED | OUTPATIENT
Start: 2025-02-12 | End: 2025-02-21 | Stop reason: HOSPADM

## 2025-02-12 RX ORDER — ONDANSETRON 4 MG/1
4 TABLET, ORALLY DISINTEGRATING ORAL EVERY 8 HOURS PRN
Status: DISCONTINUED | OUTPATIENT
Start: 2025-02-12 | End: 2025-02-21 | Stop reason: HOSPADM

## 2025-02-12 RX ORDER — VANCOMYCIN 1.5 G/300ML
1500 INJECTION, SOLUTION INTRAVENOUS
Status: DISCONTINUED | OUTPATIENT
Start: 2025-02-12 | End: 2025-02-12 | Stop reason: SDUPTHER

## 2025-02-12 RX ORDER — OXYCODONE AND ACETAMINOPHEN 7.5; 325 MG/1; MG/1
1 TABLET ORAL EVERY 4 HOURS PRN
Status: DISCONTINUED | OUTPATIENT
Start: 2025-02-12 | End: 2025-02-21 | Stop reason: HOSPADM

## 2025-02-12 RX ORDER — PANTOPRAZOLE SODIUM 40 MG/1
40 TABLET, DELAYED RELEASE ORAL DAILY
Status: DISCONTINUED | OUTPATIENT
Start: 2025-02-12 | End: 2025-02-21 | Stop reason: HOSPADM

## 2025-02-12 RX ORDER — LOSARTAN POTASSIUM 50 MG/1
50 TABLET ORAL DAILY
Status: DISCONTINUED | OUTPATIENT
Start: 2025-02-12 | End: 2025-02-21 | Stop reason: HOSPADM

## 2025-02-12 RX ORDER — DEXTROSE MONOHYDRATE 100 MG/ML
INJECTION, SOLUTION INTRAVENOUS CONTINUOUS PRN
Status: DISCONTINUED | OUTPATIENT
Start: 2025-02-12 | End: 2025-02-21 | Stop reason: HOSPADM

## 2025-02-12 RX ORDER — ACETAMINOPHEN 650 MG/1
650 SUPPOSITORY RECTAL EVERY 6 HOURS PRN
Status: DISCONTINUED | OUTPATIENT
Start: 2025-02-12 | End: 2025-02-21 | Stop reason: HOSPADM

## 2025-02-12 RX ORDER — ENOXAPARIN SODIUM 100 MG/ML
30 INJECTION SUBCUTANEOUS 2 TIMES DAILY
Status: DISCONTINUED | OUTPATIENT
Start: 2025-02-12 | End: 2025-02-21 | Stop reason: HOSPADM

## 2025-02-12 RX ORDER — ASPIRIN 81 MG/1
81 TABLET ORAL DAILY
Status: DISCONTINUED | OUTPATIENT
Start: 2025-02-12 | End: 2025-02-21 | Stop reason: HOSPADM

## 2025-02-12 RX ORDER — INSULIN GLARGINE 100 [IU]/ML
10 INJECTION, SOLUTION SUBCUTANEOUS 2 TIMES DAILY
Status: DISCONTINUED | OUTPATIENT
Start: 2025-02-12 | End: 2025-02-21 | Stop reason: HOSPADM

## 2025-02-12 RX ORDER — SODIUM CHLORIDE 0.9 % (FLUSH) 0.9 %
5-40 SYRINGE (ML) INJECTION PRN
Status: DISCONTINUED | OUTPATIENT
Start: 2025-02-12 | End: 2025-02-21 | Stop reason: HOSPADM

## 2025-02-12 RX ORDER — TAMSULOSIN HYDROCHLORIDE 0.4 MG/1
0.4 CAPSULE ORAL DAILY
Status: DISCONTINUED | OUTPATIENT
Start: 2025-02-12 | End: 2025-02-21 | Stop reason: HOSPADM

## 2025-02-12 RX ORDER — SODIUM CHLORIDE 0.9 % (FLUSH) 0.9 %
5-40 SYRINGE (ML) INJECTION EVERY 12 HOURS SCHEDULED
Status: DISCONTINUED | OUTPATIENT
Start: 2025-02-12 | End: 2025-02-21 | Stop reason: HOSPADM

## 2025-02-12 RX ORDER — PAROXETINE 20 MG/1
20 TABLET, FILM COATED ORAL DAILY
Status: DISCONTINUED | OUTPATIENT
Start: 2025-02-12 | End: 2025-02-21 | Stop reason: HOSPADM

## 2025-02-12 RX ORDER — ACETAMINOPHEN 325 MG/1
650 TABLET ORAL EVERY 6 HOURS PRN
Status: DISCONTINUED | OUTPATIENT
Start: 2025-02-12 | End: 2025-02-21 | Stop reason: HOSPADM

## 2025-02-12 RX ORDER — AMLODIPINE BESYLATE 5 MG/1
5 TABLET ORAL DAILY
Status: DISCONTINUED | OUTPATIENT
Start: 2025-02-12 | End: 2025-02-21 | Stop reason: HOSPADM

## 2025-02-12 RX ORDER — POTASSIUM CHLORIDE 7.45 MG/ML
10 INJECTION INTRAVENOUS PRN
Status: DISCONTINUED | OUTPATIENT
Start: 2025-02-12 | End: 2025-02-21 | Stop reason: HOSPADM

## 2025-02-12 RX ORDER — MORPHINE SULFATE 4 MG/ML
4 INJECTION, SOLUTION INTRAMUSCULAR; INTRAVENOUS EVERY 4 HOURS PRN
Status: DISCONTINUED | OUTPATIENT
Start: 2025-02-12 | End: 2025-02-12

## 2025-02-12 RX ORDER — FAMOTIDINE 20 MG/1
20 TABLET, FILM COATED ORAL DAILY
Status: DISCONTINUED | OUTPATIENT
Start: 2025-02-12 | End: 2025-02-21 | Stop reason: HOSPADM

## 2025-02-12 RX ORDER — INSULIN LISPRO 100 [IU]/ML
0-8 INJECTION, SOLUTION INTRAVENOUS; SUBCUTANEOUS 4 TIMES DAILY
Status: DISCONTINUED | OUTPATIENT
Start: 2025-02-12 | End: 2025-02-21 | Stop reason: HOSPADM

## 2025-02-12 RX ORDER — MAGNESIUM SULFATE IN WATER 40 MG/ML
2000 INJECTION, SOLUTION INTRAVENOUS PRN
Status: DISCONTINUED | OUTPATIENT
Start: 2025-02-12 | End: 2025-02-21 | Stop reason: HOSPADM

## 2025-02-12 RX ORDER — POTASSIUM CHLORIDE 1500 MG/1
40 TABLET, EXTENDED RELEASE ORAL PRN
Status: DISCONTINUED | OUTPATIENT
Start: 2025-02-12 | End: 2025-02-21 | Stop reason: HOSPADM

## 2025-02-12 RX ADMIN — SODIUM CHLORIDE, PRESERVATIVE FREE 10 ML: 5 INJECTION INTRAVENOUS at 09:00

## 2025-02-12 RX ADMIN — TAMSULOSIN HYDROCHLORIDE 0.4 MG: 0.4 CAPSULE ORAL at 16:35

## 2025-02-12 RX ADMIN — GADOBENATE DIMEGLUMINE 20 ML: 529 INJECTION, SOLUTION INTRAVENOUS at 07:57

## 2025-02-12 RX ADMIN — AMLODIPINE BESYLATE 5 MG: 5 TABLET ORAL at 09:09

## 2025-02-12 RX ADMIN — INSULIN GLARGINE 10 UNITS: 100 INJECTION, SOLUTION SUBCUTANEOUS at 12:32

## 2025-02-12 RX ADMIN — OXYCODONE AND ACETAMINOPHEN 1 TABLET: 7.5; 325 TABLET ORAL at 14:54

## 2025-02-12 RX ADMIN — LOSARTAN POTASSIUM 50 MG: 50 TABLET, FILM COATED ORAL at 16:35

## 2025-02-12 RX ADMIN — PAROXETINE HYDROCHLORIDE 20 MG: 20 TABLET, FILM COATED ORAL at 09:09

## 2025-02-12 RX ADMIN — ENOXAPARIN SODIUM 30 MG: 100 INJECTION SUBCUTANEOUS at 21:42

## 2025-02-12 RX ADMIN — CEFEPIME 2000 MG: 2 INJECTION, POWDER, FOR SOLUTION INTRAVENOUS at 09:05

## 2025-02-12 RX ADMIN — VANCOMYCIN HYDROCHLORIDE 1500 MG: 10 INJECTION, POWDER, LYOPHILIZED, FOR SOLUTION INTRAVENOUS at 12:36

## 2025-02-12 RX ADMIN — INSULIN GLARGINE 10 UNITS: 100 INJECTION, SOLUTION SUBCUTANEOUS at 21:42

## 2025-02-12 RX ADMIN — PANTOPRAZOLE SODIUM 40 MG: 40 TABLET, DELAYED RELEASE ORAL at 09:09

## 2025-02-12 RX ADMIN — TRAZODONE HYDROCHLORIDE 150 MG: 100 TABLET ORAL at 21:42

## 2025-02-12 RX ADMIN — CEFEPIME 2000 MG: 2 INJECTION, POWDER, FOR SOLUTION INTRAVENOUS at 14:58

## 2025-02-12 RX ADMIN — OXYCODONE AND ACETAMINOPHEN 1 TABLET: 7.5; 325 TABLET ORAL at 09:08

## 2025-02-12 RX ADMIN — POTASSIUM CHLORIDE 40 MEQ: 1500 TABLET, EXTENDED RELEASE ORAL at 09:09

## 2025-02-12 RX ADMIN — INSULIN LISPRO 2 UNITS: 100 INJECTION, SOLUTION INTRAVENOUS; SUBCUTANEOUS at 16:35

## 2025-02-12 RX ADMIN — FAMOTIDINE 20 MG: 20 TABLET, FILM COATED ORAL at 09:09

## 2025-02-12 ASSESSMENT — PAIN DESCRIPTION - LOCATION
LOCATION: LEG
LOCATION: FOOT

## 2025-02-12 ASSESSMENT — PAIN DESCRIPTION - DESCRIPTORS
DESCRIPTORS: ACHING;DISCOMFORT
DESCRIPTORS: ACHING

## 2025-02-12 ASSESSMENT — PAIN DESCRIPTION - FREQUENCY
FREQUENCY: CONTINUOUS
FREQUENCY: CONTINUOUS

## 2025-02-12 ASSESSMENT — PAIN DESCRIPTION - ONSET
ONSET: ON-GOING
ONSET: ON-GOING

## 2025-02-12 ASSESSMENT — PAIN DESCRIPTION - PAIN TYPE
TYPE: ACUTE PAIN
TYPE: ACUTE PAIN

## 2025-02-12 ASSESSMENT — PAIN SCALES - GENERAL
PAINLEVEL_OUTOF10: 5
PAINLEVEL_OUTOF10: 5
PAINLEVEL_OUTOF10: 7
PAINLEVEL_OUTOF10: 8
PAINLEVEL_OUTOF10: 5
PAINLEVEL_OUTOF10: 0
PAINLEVEL_OUTOF10: 8

## 2025-02-12 ASSESSMENT — PAIN DESCRIPTION - ORIENTATION
ORIENTATION: LEFT
ORIENTATION: LEFT

## 2025-02-12 NOTE — PROGRESS NOTES
[facility]   Pharmacy Pharmacokinetic Monitoring Service - Vancomycin    Indication: Bone and Joint Infection  Target Concentration: Goal AUC/LOIS 400-600 mg*hr/L  Day of Therapy: 1  Additional Antimicrobials: Cefepime    Pertinent Laboratory Values:   Temp: 97.9 °F (36.6 °C), Weight - Scale: 108.9 kg (240 lb)  Recent Labs     02/11/25  1711   CREATININE 1.15   BUN 11   WBC 10.8       Estimated Creatinine Clearance: 79 mL/min (based on SCr of 1.15 mg/dL).    Pertinent Cultures:  Culture Date Source Results   2/11 blood ngtd   MRSA Nasal Swab: N/A. Non-respiratory infection    Assessment:  Date/Time Current Dose Concentration Timing of Concentration (h) AUC   2/11 @ 2330 2,000mg - - -   2/12 1,500mg q18h        Note: Serum concentrations collected for AUC dosing may appear elevated if collected in close proximity to the dose administered, this is not necessarily an indication of toxicity    Plan:  Vancomycin 1,500mg q18h  Projected AUCss/T = 475/12.8  Renal labs as indicated   Vancomycin concentration ordered for AM labs tomorrow  Pharmacy will continue to monitor patient and adjust therapy as indicated    Thank you for the consult,  ROB DE LA CRUZ RPH  2/12/2025

## 2025-02-12 NOTE — CONSULTS
Podiatry Consult      Patient: Vinay Benson               Sex: male          DOA: [unfilled]       YOB: 1960      Age:  64 y.o.        LOS:  LOS: 1 day     Subjective:         Date of Consultation:  February 12, 2025    Patient is a 64 y.o. male who is being seen for left plantar foot wound with osteomyelitis and abscess. Patient was seen in ER on last Thursday and he left AMA. He came to office on Monday and I recommended him to get admitted to hospital for infection management in left foot. Patient had an MRI of left foot confirming osteomyelitis of third ray with abscess. Patient is uncontrolled diabetic. He previously had right TMA and multiple toe amputation on left foot. Denies N/V/C/F.   Patient Active Problem List    Diagnosis Date Noted    Anemia of chronic disease 02/12/2025    Acute osteomyelitis of left foot 02/12/2025    Benign prostatic hyperplasia without lower urinary tract symptoms 02/12/2025    Subacute osteomyelitis of left foot 04/12/2024    Hyperlipidemia 04/11/2024    Sleep apnea 04/11/2024    Diabetic ulcer of left foot due to type 2 diabetes mellitus (HCC) 04/11/2024    Non compliance w medication regimen 04/11/2024    Infected wound 05/23/2022    Obesity (BMI 35.0-39.9 without comorbidity) 05/23/2022    OM (osteomyelitis) 02/28/2022    Diabetic infection of left foot (HCC) 04/12/2021    Leukocytosis 02/04/2021    Wound infection 02/04/2021    Diabetic ulcer of left foot (HCC) 02/04/2021    Fracture, toe 09/24/2020    Altered mental status 09/01/2020    Multifocal pneumonia 09/01/2020    Orthostatic hypotension 12/25/2019    DM (diabetes mellitus), type 2 (HCC) 12/25/2019    Syncope and collapse 12/24/2019    Vomiting 03/28/2018    Chronic abdominal pain 03/28/2018    Sepsis ([facility]) 03/21/2018    History of heart attack     Gastroparesis 09/07/2017    Atelectasis 09/07/2017    Abdominal pain 09/07/2017    Hypokalemia 09/07/2017    Nausea and vomiting 09/07/2017

## 2025-02-12 NOTE — CARE COORDINATION
Met with Patient at bedside. CM introduces self and explains role. Patient is alert and oriented x 4. HIPAA verified. Patients' demographics verified and updated accordingly. Patient agrees to complete assessment.    Patient is not medically ready for discharge.     DCP: Home with self-care and supportive Wife, no new DME indicated at this time. Wife will transport patient home via POV.      02/12/25 1038   Service Assessment   Patient Orientation Alert and Oriented;Person;Place;Situation;Self   Cognition Alert   History Provided By Patient   Primary Caregiver Self   Accompanied By/Relationship SELF   Support Systems Spouse/Significant Other;/   Patient's Healthcare Decision Maker is: Legal Next of Kin  (Patient declines assistance with Advanced Directives at this time.)   PCP Verified by CM Yes   Last Visit to PCP Within last 3 months   Prior Functional Level Independent in ADLs/IADLs   Current Functional Level Independent in ADLs/IADLs   Can patient return to prior living arrangement Yes   Ability to make needs known: Good   Family able to assist with home care needs: Yes   Would you like for me to discuss the discharge plan with any other family members/significant others, and if so, who? No   Financial Resources Medicaid   Community Resources Transportation   Social/Functional History   Lives With Spouse   Type of Home Apartment   Home Layout One level   Home Access Level entry   Bathroom Shower/Tub Walk-in shower   Bathroom Toilet Standard   Bathroom Equipment None   Bathroom Accessibility Accessible   Home Equipment None   Receives Help From Family   Prior Level of Assist for ADLs Independent   Prior Level of Assist for Homemaking Independent   Homemaking Responsibilities Yes   Meal Prep Responsibility Secondary   Laundry Responsibility Secondary   Cleaning Responsibility Secondary   Bill Paying/Finance Responsibility Primary   Shopping Responsibility Secondary   Dependent Care

## 2025-02-12 NOTE — PROGRESS NOTES
[facility]  Progress Note    Patient: Vinay Benson Age: 64 y.o. : 1960 MR#: [id number] SSN: [id number]  Date: 2025                 DVT Prophylaxis: x Lovenox hep SQ SCDs Coumadin   on Heparin gtt PO anticoagulant    Anticipated discharge: 2025 or February 15, 2025 to home, after debridement     Subjective:     The patient is new to me today.  He was seen and examined at the bedside in follow-up for diabetic left foot infection.  His wife was also in the room.  The patient complained of 7/10 in the left foot.  He said that he had received a \"pill \"for pain earlier but that the pain did not improve significantly even after taking the medication.      Objective:   VS: BP (!) 144/79   Pulse 54   Temp 97.5 °F (36.4 °C) (Axillary)   Resp 17   Ht 1.753 m (5' 9\")   Wt 108.9 kg (240 lb)   SpO2 98%   BMI 35.44 kg/m²    Tmax/24hrs: Temp (24hrs), Av °F (36.7 °C), Min:97.5 °F (36.4 °C), Max:98.4 °F (36.9 °C)    Intake/Output Summary (Last 24 hours) at 2025 1459  Last data filed at 2025 0716  Gross per 24 hour   Intake --   Output 200 ml   Net -200 ml        PHYSICAL EXAM  General Appearance: No acute distress; obese appearing  HENT: normocephalic/atraumatic, moist mucus membranes  Neck: No JVD, supple  Lungs: CTA with normal respiratory effort  CV: Bradycardic but rhythm regular    Abdomen: soft, nondistended  : no suprapubic tenderness   Extremities: Right transmetatarsal amputation; left hallux amputation  Neuro: Alert and oriented  Skin: Wound on the plantar aspect of the left foot with malodorous discharge, covered by dressing  Psych: appropriate mood and affect    Current Facility-Administered Medications   Medication Dose Route Frequency    PARoxetine (PAXIL) tablet 20 mg  20 mg Oral Daily    polyethylene glycol (GLYCOLAX) packet 17 g  17 g Oral Daily    amLODIPine (NORVASC) tablet 5 mg  5 mg Oral Daily    famotidine

## 2025-02-12 NOTE — ED PROVIDER NOTES
EMERGENCY DEPARTMENT HISTORY AND PHYSICAL EXAM    11:00 PM      Date: 2/11/2025  Patient Name: Vinay Benson    History of Presenting Illness     Chief Complaint   Patient presents with   • Left Foot Wound with Abscess       History From: {[facility]:[id number]}    Vinay Benson is a 64 y.o. male   64-year-old male with past medical history of diabetes, CAD, multiple toe amputations on bilateral feet presents to the hospital today because Dr. Nielson, podiatry recommended that he come to the hospital for evaluation.  Patient does have poorly healed wounds on the left foot and states that they have been getting worse.    Denies fever  Denies chest pain, shortness of breath  Denies abdominal pain, nausea, vomiting  Denies any other acute complaints at this time         Nursing Notes were all reviewed and agreed with or any disagreements were addressed in the HPI.    PCP: Corrina Brush Jr., MD    Current Facility-Administered Medications   Medication Dose Route Frequency Provider Last Rate Last Admin   • vancomycin (VANCOCIN) 2000 mg in 400 mL IVPB  2,000 mg IntraVENous Once Frith, MD Sheldon         Current Outpatient Medications   Medication Sig Dispense Refill   • ASPIRIN LOW DOSE 81 MG EC tablet Take 1 tablet by mouth daily     • metFORMIN (GLUCOPHAGE) 1000 MG tablet Take 1 tablet by mouth daily (with breakfast)     • oxyCODONE-acetaminophen (PERCOCET) 7.5-325 MG per tablet Take 1 tablet by mouth every 4 hours as needed for Pain.     • traZODone (DESYREL) 150 MG tablet Take 1 tablet by mouth nightly     • cephALEXin (KEFLEX) 500 MG capsule Take 1 capsule by mouth 4 times daily for 10 days 40 capsule 0   • amLODIPine (NORVASC) 5 MG tablet Take 1 tablet by mouth daily 30 tablet 3   • PARoxetine (PAXIL) 20 MG tablet Take 1 tablet by mouth daily     • pantoprazole (PROTONIX) 40 MG tablet Take 1 tablet by mouth daily (Patient not taking: Reported on 2/11/2025)     • sulfamethoxazole-trimethoprim (BACTRIM

## 2025-02-12 NOTE — H&P
HISTORY & PHYSICAL      Patient: Vinay Benson MRN: [id number]  CSN: [id number]    YOB: 1960  Age: 64 y.o.  Sex: male    DOA: 2/11/2025 LOS:  LOS: 0 days        DOA: 2/11/2025        Assessment/Plan     64 years old who was sent by podiatry for admission for diabetic foot infection    -Diabetic foot infection  -Nonhealing left foot plantar ulcer, toes infection and concern for osteomyelitis  -History of right TMA  -DM2  -Hypertension  -Hyperlipidemia  -GERD  -Coronary artery disease status post PCI  -Depression/Anxiety  -DEWAYNE  -Medication noncompliance   -Morbid obesity  -Other medical problems as below    The patient is admitted to [facility] telemetry  Continue IV vancomycin and cefepime  Follow-up pending MRI of the left foot  Keep patient n.p.o., until evaluated by Dr. Nielson in a.m.  Decrease Lantus dose to 10 units twice daily, while n.p.o.  Sliding scale insulin correction  Hypoglycemia protocol  Hold aspirin, until need for surgery ruled out  Continue other home meds    Admission plan was discussed      Patient Active Problem List   Diagnosis    Synovial cyst    Pain in left lower leg    History of heart attack    OM (osteomyelitis)    Orthostatic hypotension    Localized adiposity of abdomen    Diabetic foot infection (HCC)    Gastroparesis    Atelectasis    Spinal stenosis at L4-L5 level    Altered mental status    Diabetic neuropathy (HCC)    Multifocal pneumonia    Diabetes (HCC)    Coronary artery disease involving native coronary artery of native heart without angina pectoris    Spondylolisthesis of lumbar region    History of coronary artery stent placement    Neuritis    Vomiting    Abdominal pain    Leukocytosis    Primary osteoarthritis of left knee    Wound infection    Depression    Positive PPD    Chronic abdominal pain    Essential hypertension    Syncope and collapse    Fracture, toe    DM (diabetes mellitus), type 2 (HCC)    Hypokalemia    Nausea and vomiting    GERD  Cpap machine    Transfusion history     Before 1992       Past Surgical History:   Procedure Laterality Date    APPENDECTOMY      CORONARY ANGIOPLASTY WITH STENT PLACEMENT  2010    2 stents    TOE AMPUTATION Left 4/12/2024    Left foot amputation of remaining great toe and possible part of metatarsal, tibial and fibular sesamoids performed by Jv Nielson DPM at [facility] MAIN OR       Family History   Problem Relation Age of Onset    Cancer Sister         breast cancer and lung cancer    Diabetes Mother     Coronary Art Dis Mother     Heart Attack Father        Social History     Socioeconomic History    Marital status:    Tobacco Use    Smoking status: Never    Smokeless tobacco: Never   Substance and Sexual Activity    Alcohol use: No    Drug use: No     Social Determinants of Health     Food Insecurity: No Food Insecurity (4/11/2024)    Hunger Vital Sign     Worried About Running Out of Food in the Last Year: Never true     Ran Out of Food in the Last Year: Never true   Transportation Needs: No Transportation Needs (4/11/2024)    PRAPARE - Transportation     Lack of Transportation (Medical): No     Lack of Transportation (Non-Medical): No   Housing Stability: Low Risk  (4/11/2024)    Housing Stability Vital Sign     Unable to Pay for Housing in the Last Year: No     Number of Places Lived in the Last Year: 1     Unstable Housing in the Last Year: No       Prior to Admission medications    Medication Sig Start Date End Date Taking? Authorizing Provider   ASPIRIN LOW DOSE 81 MG EC tablet Take 1 tablet by mouth daily 2/5/25  Yes ProviderReshma MD   metFORMIN (GLUCOPHAGE) 1000 MG tablet Take 1 tablet by mouth daily (with breakfast) 12/11/24  Yes ProviderReshma MD   oxyCODONE-acetaminophen (PERCOCET) 7.5-325 MG per tablet Take 1 tablet by mouth every 4 hours as needed for Pain. 2/5/25  Yes ProviderReshma MD   traZODone (DESYREL) 150 MG tablet Take 1 tablet by mouth nightly   Yes Provider

## 2025-02-12 NOTE — PLAN OF CARE
Problem: Chronic Conditions and Co-morbidities  Goal: Patient's chronic conditions and co-morbidity symptoms are monitored and maintained or improved  Outcome: Progressing  Flowsheets (Taken 2/12/2025 0411)  Care Plan - Patient's Chronic Conditions and Co-Morbidity Symptoms are Monitored and Maintained or Improved: Monitor and assess patient's chronic conditions and comorbid symptoms for stability, deterioration, or improvement     Problem: Pain  Goal: Verbalizes/displays adequate comfort level or baseline comfort level  Outcome: Progressing  Flowsheets (Taken 2/12/2025 0411)  Verbalizes/displays adequate comfort level or baseline comfort level:   Encourage patient to monitor pain and request assistance   Assess pain using appropriate pain scale   Administer analgesics based on type and severity of pain and evaluate response   Implement non-pharmacological measures as appropriate and evaluate response

## 2025-02-12 NOTE — PROGRESS NOTES
4 Eyes Skin Assessment     NAME:  Vinay Benson  YOB: 1960  MEDICAL RECORD NUMBER:  [id number]    The patient is being assessed for  Admission    I agree that at least one RN has performed a thorough Head to Toe Skin Assessment on the patient. ALL assessment sites listed below have been assessed.      Areas assessed by both nurses:    Head, Face, Ears, Shoulders, Back, Chest, Arms, Elbows, Hands, Sacrum. Buttock, Coccyx, Ischium, Legs. Feet and Heels, and Under Medical Devices         Does the Patient have a Wound? Yes wound(s) were present on assessment. LDA wound assessment was Initiated and completed by RN       Cali Prevention initiated by RN: No  Wound Care Orders initiated by RN: No    Pressure Injury (Stage 3,4, Unstageable, DTI, NWPT, and Complex wounds) if present, place Wound referral order by RN under : Yes    New Ostomies, if present place, Ostomy referral order under : No     Nurse 1 eSignature: Electronically signed by Goldie Youssef RN on 2/12/25 at 4:14 AM EST    **SHARE this note so that the co-signing nurse can place an eSignature**    Nurse 2 eSignature: Electronically signed by Marce Newberry RN on 2/12/25 at 11:31 PM EST

## 2025-02-13 ENCOUNTER — APPOINTMENT (OUTPATIENT)
Facility: HOSPITAL | Age: 65
End: 2025-02-13
Attending: STUDENT IN AN ORGANIZED HEALTH CARE EDUCATION/TRAINING PROGRAM
Payer: MEDICAID

## 2025-02-13 ENCOUNTER — ANESTHESIA EVENT (OUTPATIENT)
Facility: HOSPITAL | Age: 65
End: 2025-02-13
Payer: MEDICAID

## 2025-02-13 ENCOUNTER — ANESTHESIA (OUTPATIENT)
Facility: HOSPITAL | Age: 65
End: 2025-02-13
Payer: MEDICAID

## 2025-02-13 PROBLEM — B95.61 MSSA BACTEREMIA: Status: ACTIVE | Noted: 2025-02-13

## 2025-02-13 PROBLEM — R78.81 MSSA BACTEREMIA: Status: ACTIVE | Noted: 2025-02-13

## 2025-02-13 LAB
ANION GAP SERPL CALC-SCNC: 3 MMOL/L (ref 3–18)
BASOPHILS # BLD: 0.06 K/UL (ref 0–0.1)
BASOPHILS NFR BLD: 0.6 % (ref 0–2)
BUN SERPL-MCNC: 13 MG/DL (ref 7–18)
BUN/CREAT SERPL: 13 (ref 12–20)
CALCIUM SERPL-MCNC: 8.6 MG/DL (ref 8.5–10.1)
CHLORIDE SERPL-SCNC: 104 MMOL/L (ref 100–111)
CO2 SERPL-SCNC: 31 MMOL/L (ref 21–32)
CREAT SERPL-MCNC: 1.04 MG/DL (ref 0.6–1.3)
DIFFERENTIAL METHOD BLD: ABNORMAL
ECHO AO ASC DIAM: 2.9 CM
ECHO AO ASCENDING AORTA INDEX: 1.3 CM/M2
ECHO AO ROOT DIAM: 3 CM
ECHO AO ROOT INDEX: 1.35 CM/M2
ECHO AV AREA PEAK VELOCITY: 2.2 CM2
ECHO AV AREA VTI: 2.5 CM2
ECHO AV AREA/BSA PEAK VELOCITY: 1 CM2/M2
ECHO AV AREA/BSA VTI: 1.1 CM2/M2
ECHO AV MEAN GRADIENT: 8 MMHG
ECHO AV MEAN VELOCITY: 1.3 M/S
ECHO AV PEAK GRADIENT: 15 MMHG
ECHO AV PEAK VELOCITY: 2 M/S
ECHO AV VELOCITY RATIO: 0.7
ECHO AV VTI: 41.5 CM
ECHO BSA: 2.3 M2
ECHO EST RA PRESSURE: 8 MMHG
ECHO LA VOL A-L A2C: 48 ML (ref 18–58)
ECHO LA VOL A-L A4C: 45 ML (ref 18–58)
ECHO LA VOL BP: 44 ML (ref 18–58)
ECHO LA VOL MOD A2C: 45 ML (ref 18–58)
ECHO LA VOL MOD A4C: 42 ML (ref 18–58)
ECHO LA VOL/BSA BIPLANE: 20 ML/M2 (ref 16–34)
ECHO LA VOLUME AREA LENGTH: 48 ML
ECHO LA VOLUME INDEX A-L A2C: 22 ML/M2 (ref 16–34)
ECHO LA VOLUME INDEX A-L A4C: 20 ML/M2 (ref 16–34)
ECHO LA VOLUME INDEX AREA LENGTH: 22 ML/M2 (ref 16–34)
ECHO LA VOLUME INDEX MOD A2C: 20 ML/M2 (ref 16–34)
ECHO LA VOLUME INDEX MOD A4C: 19 ML/M2 (ref 16–34)
ECHO LV E' LATERAL VELOCITY: 8.73 CM/S
ECHO LV E' SEPTAL VELOCITY: 5.69 CM/S
ECHO LV EDV A2C: 115 ML
ECHO LV EDV A4C: 123 ML
ECHO LV EDV BP: 123 ML (ref 67–155)
ECHO LV EDV INDEX A4C: 55 ML/M2
ECHO LV EDV INDEX BP: 55 ML/M2
ECHO LV EDV NDEX A2C: 52 ML/M2
ECHO LV EJECTION FRACTION A2C: 58 %
ECHO LV EJECTION FRACTION A4C: 59 %
ECHO LV EJECTION FRACTION BIPLANE: 60 % (ref 55–100)
ECHO LV ESV A2C: 48 ML
ECHO LV ESV A4C: 50 ML
ECHO LV ESV BP: 50 ML (ref 22–58)
ECHO LV ESV INDEX A2C: 22 ML/M2
ECHO LV ESV INDEX A4C: 22 ML/M2
ECHO LV ESV INDEX BP: 22 ML/M2
ECHO LV FRACTIONAL SHORTENING: 25 % (ref 28–44)
ECHO LV INTERNAL DIMENSION DIASTOLE INDEX: 2.29 CM/M2
ECHO LV INTERNAL DIMENSION DIASTOLIC: 5.1 CM (ref 4.2–5.9)
ECHO LV INTERNAL DIMENSION SYSTOLIC INDEX: 1.7 CM/M2
ECHO LV INTERNAL DIMENSION SYSTOLIC: 3.8 CM
ECHO LV IVSD: 0.9 CM (ref 0.6–1)
ECHO LV MASS 2D: 175.6 G (ref 88–224)
ECHO LV MASS INDEX 2D: 78.7 G/M2 (ref 49–115)
ECHO LV POSTERIOR WALL DIASTOLIC: 1 CM (ref 0.6–1)
ECHO LV RELATIVE WALL THICKNESS RATIO: 0.39
ECHO LVOT AREA: 3.1 CM2
ECHO LVOT AV VTI INDEX: 0.77
ECHO LVOT DIAM: 2 CM
ECHO LVOT MEAN GRADIENT: 4 MMHG
ECHO LVOT PEAK GRADIENT: 7 MMHG
ECHO LVOT PEAK VELOCITY: 1.4 M/S
ECHO LVOT STROKE VOLUME INDEX: 44.8 ML/M2
ECHO LVOT SV: 99.9 ML
ECHO LVOT VTI: 31.8 CM
ECHO MV A VELOCITY: 0.53 M/S
ECHO MV AREA VTI: 2.4 CM2
ECHO MV E DECELERATION TIME (DT): 256.3 MS
ECHO MV E VELOCITY: 1.31 M/S
ECHO MV E/A RATIO: 2.47
ECHO MV E/E' LATERAL: 15.01
ECHO MV E/E' RATIO (AVERAGED): 19.01
ECHO MV E/E' SEPTAL: 23.02
ECHO MV LVOT VTI INDEX: 1.3
ECHO MV MAX VELOCITY: 1.3 M/S
ECHO MV MEAN GRADIENT: 3 MMHG
ECHO MV MEAN VELOCITY: 0.8 M/S
ECHO MV PEAK GRADIENT: 7 MMHG
ECHO MV VTI: 41.3 CM
ECHO PV MAX VELOCITY: 1.1 M/S
ECHO PV PEAK GRADIENT: 5 MMHG
ECHO RA AREA 4C: 21.7 CM2
ECHO RA END SYSTOLIC VOLUME APICAL 4 CHAMBER INDEX BSA: 33 ML/M2
ECHO RA VOLUME AREA LENGTH APICAL 4 CHAMBER: 75 ML
ECHO RA VOLUME: 74 ML
ECHO RIGHT VENTRICULAR SYSTOLIC PRESSURE (RVSP): 44 MMHG
ECHO RV BASAL DIMENSION: 5.1 CM
ECHO RV FREE WALL PEAK S': 14.7 CM/S
ECHO RV MID DIMENSION: 3.4 CM
ECHO RV TAPSE: 2 CM (ref 1.7–?)
ECHO RVOT PEAK GRADIENT: 1 MMHG
ECHO RVOT PEAK VELOCITY: 0.6 M/S
ECHO TV REGURGITANT MAX VELOCITY: 3.02 M/S
ECHO TV REGURGITANT PEAK GRADIENT: 36 MMHG
EOSINOPHIL # BLD: 0.44 K/UL (ref 0–0.4)
EOSINOPHIL NFR BLD: 4.2 % (ref 0–5)
ERYTHROCYTE [DISTWIDTH] IN BLOOD BY AUTOMATED COUNT: 14.6 % (ref 11.6–14.5)
GLUCOSE BLD STRIP.AUTO-MCNC: 101 MG/DL (ref 70–110)
GLUCOSE BLD STRIP.AUTO-MCNC: 88 MG/DL (ref 70–110)
GLUCOSE BLD STRIP.AUTO-MCNC: 90 MG/DL (ref 70–110)
GLUCOSE BLD STRIP.AUTO-MCNC: 90 MG/DL (ref 70–110)
GLUCOSE BLD STRIP.AUTO-MCNC: 96 MG/DL (ref 70–110)
GLUCOSE SERPL-MCNC: 136 MG/DL (ref 74–99)
HCT VFR BLD AUTO: 29.7 % (ref 36–48)
HGB BLD-MCNC: 8.7 G/DL (ref 13–16)
IMM GRANULOCYTES # BLD AUTO: 0.11 K/UL (ref 0–0.04)
IMM GRANULOCYTES NFR BLD AUTO: 1.1 % (ref 0–0.5)
LYMPHOCYTES # BLD: 1.39 K/UL (ref 0.9–3.6)
LYMPHOCYTES NFR BLD: 13.3 % (ref 21–52)
MCH RBC QN AUTO: 28.2 PG (ref 24–34)
MCHC RBC AUTO-ENTMCNC: 29.3 G/DL (ref 31–37)
MCV RBC AUTO: 96.1 FL (ref 78–100)
MONOCYTES # BLD: 0.75 K/UL (ref 0.05–1.2)
MONOCYTES NFR BLD: 7.2 % (ref 3–10)
NEUTS SEG # BLD: 7.71 K/UL (ref 1.8–8)
NEUTS SEG NFR BLD: 73.6 % (ref 40–73)
NRBC # BLD: 0 K/UL (ref 0–0.01)
NRBC BLD-RTO: 0 PER 100 WBC
PLATELET # BLD AUTO: 325 K/UL (ref 135–420)
PMV BLD AUTO: 11.3 FL (ref 9.2–11.8)
POTASSIUM SERPL-SCNC: 3.8 MMOL/L (ref 3.5–5.5)
RBC # BLD AUTO: 3.09 M/UL (ref 4.35–5.65)
SODIUM SERPL-SCNC: 138 MMOL/L (ref 136–145)
TSH SERPL DL<=0.05 MIU/L-ACNC: 3.6 UIU/ML (ref 0.36–3.74)
VANCOMYCIN SERPL-MCNC: 15.9 UG/ML (ref 5–40)
WBC # BLD AUTO: 10.5 K/UL (ref 4.6–13.2)

## 2025-02-13 PROCEDURE — 94761 N-INVAS EAR/PLS OXIMETRY MLT: CPT

## 2025-02-13 PROCEDURE — 93306 TTE W/DOPPLER COMPLETE: CPT

## 2025-02-13 PROCEDURE — 6360000002 HC RX W HCPCS: Performed by: PODIATRIST

## 2025-02-13 PROCEDURE — 80048 BASIC METABOLIC PNL TOTAL CA: CPT

## 2025-02-13 PROCEDURE — 3700000001 HC ADD 15 MINUTES (ANESTHESIA): Performed by: PODIATRIST

## 2025-02-13 PROCEDURE — 7100000001 HC PACU RECOVERY - ADDTL 15 MIN: Performed by: PODIATRIST

## 2025-02-13 PROCEDURE — 87075 CULTR BACTERIA EXCEPT BLOOD: CPT

## 2025-02-13 PROCEDURE — 6360000002 HC RX W HCPCS: Performed by: INTERNAL MEDICINE

## 2025-02-13 PROCEDURE — 6360000002 HC RX W HCPCS: Performed by: STUDENT IN AN ORGANIZED HEALTH CARE EDUCATION/TRAINING PROGRAM

## 2025-02-13 PROCEDURE — 2500000003 HC RX 250 WO HCPCS: Performed by: INTERNAL MEDICINE

## 2025-02-13 PROCEDURE — 87076 CULTURE ANAEROBE IDENT EACH: CPT

## 2025-02-13 PROCEDURE — 88311 DECALCIFY TISSUE: CPT

## 2025-02-13 PROCEDURE — C1713 ANCHOR/SCREW BN/BN,TIS/BN: HCPCS | Performed by: PODIATRIST

## 2025-02-13 PROCEDURE — 88305 TISSUE EXAM BY PATHOLOGIST: CPT

## 2025-02-13 PROCEDURE — 2580000003 HC RX 258: Performed by: STUDENT IN AN ORGANIZED HEALTH CARE EDUCATION/TRAINING PROGRAM

## 2025-02-13 PROCEDURE — 87070 CULTURE OTHR SPECIMN AEROBIC: CPT

## 2025-02-13 PROCEDURE — 87185 SC STD ENZYME DETCJ PER NZM: CPT

## 2025-02-13 PROCEDURE — 3600000012 HC SURGERY LEVEL 2 ADDTL 15MIN: Performed by: PODIATRIST

## 2025-02-13 PROCEDURE — 2580000003 HC RX 258: Performed by: NURSE ANESTHETIST, CERTIFIED REGISTERED

## 2025-02-13 PROCEDURE — 97165 OT EVAL LOW COMPLEX 30 MIN: CPT

## 2025-02-13 PROCEDURE — 3700000000 HC ANESTHESIA ATTENDED CARE: Performed by: PODIATRIST

## 2025-02-13 PROCEDURE — 93306 TTE W/DOPPLER COMPLETE: CPT | Performed by: INTERNAL MEDICINE

## 2025-02-13 PROCEDURE — 82962 GLUCOSE BLOOD TEST: CPT

## 2025-02-13 PROCEDURE — 2580000003 HC RX 258: Performed by: INTERNAL MEDICINE

## 2025-02-13 PROCEDURE — 84443 ASSAY THYROID STIM HORMONE: CPT

## 2025-02-13 PROCEDURE — 87176 TISSUE HOMOGENIZATION CULTR: CPT

## 2025-02-13 PROCEDURE — 6370000000 HC RX 637 (ALT 250 FOR IP): Performed by: STUDENT IN AN ORGANIZED HEALTH CARE EDUCATION/TRAINING PROGRAM

## 2025-02-13 PROCEDURE — 7100000000 HC PACU RECOVERY - FIRST 15 MIN: Performed by: PODIATRIST

## 2025-02-13 PROCEDURE — 2709999900 HC NON-CHARGEABLE SUPPLY: Performed by: PODIATRIST

## 2025-02-13 PROCEDURE — 6370000000 HC RX 637 (ALT 250 FOR IP): Performed by: INTERNAL MEDICINE

## 2025-02-13 PROCEDURE — 0QBP0ZZ EXCISION OF LEFT METATARSAL, OPEN APPROACH: ICD-10-PCS | Performed by: PODIATRIST

## 2025-02-13 PROCEDURE — 1100000003 HC PRIVATE W/ TELEMETRY

## 2025-02-13 PROCEDURE — 80202 ASSAY OF VANCOMYCIN: CPT

## 2025-02-13 PROCEDURE — 6360000002 HC RX W HCPCS: Performed by: NURSE ANESTHETIST, CERTIFIED REGISTERED

## 2025-02-13 PROCEDURE — 87186 SC STD MICRODIL/AGAR DIL: CPT

## 2025-02-13 PROCEDURE — 3600000002 HC SURGERY LEVEL 2 BASE: Performed by: PODIATRIST

## 2025-02-13 PROCEDURE — 87205 SMEAR GRAM STAIN: CPT

## 2025-02-13 PROCEDURE — 85025 COMPLETE CBC W/AUTO DIFF WBC: CPT

## 2025-02-13 PROCEDURE — 36415 COLL VENOUS BLD VENIPUNCTURE: CPT

## 2025-02-13 PROCEDURE — 99232 SBSQ HOSP IP/OBS MODERATE 35: CPT | Performed by: STUDENT IN AN ORGANIZED HEALTH CARE EDUCATION/TRAINING PROGRAM

## 2025-02-13 PROCEDURE — 87077 CULTURE AEROBIC IDENTIFY: CPT

## 2025-02-13 PROCEDURE — 3E0V329 INTRODUCTION OF OTHER ANTI-INFECTIVE INTO BONES, PERCUTANEOUS APPROACH: ICD-10-PCS | Performed by: PODIATRIST

## 2025-02-13 DEVICE — STIMULAN® RAPID CURE PROVIDED STERILE FOR SINGLE PATIENT USE. STIMULAN® RAPID CURE CONTAINS CALCIUM SULFATE POWDER AND MIXING SOLUTION IN PRE-MEASURED QUANTITIES SO THAT WHEN MIXED TOGETHER IN A STERILE MIXING BOWL, THE RESULTANT PASTE IS TO BE DIGITALLY PACKED INTO OPEN BONE VOID/GAP TO SET INSITU OR PLACED INTO THE MOULD PROVIDED, THE MIXTURE SETS TO FORM BEADS. THE BIODEGRADABLE, RADIOPAQUE BEADS ARE RESORBED IN APPROXIMATELY 30 – 60 DAYS WHEN USED IN ACCORDANCE WITH THE DEVICE LABELLING. STIMULAN® RAPID CURE IS MANUFACTURED FROM SYNTHETIC IMPLANT GRADE CALCIUM SULFATE DIHYDRATE(CASO4.2H2O) THAT RESORBS AND IS REPLACED WITH BONE DURING THE HEALING PROCESS. ALSO, AS THE BONE VOID FILLER BEADS ARE BIODEGRADABLE AND BIOCOMPATIBLE, THEY MAY BE USED AT AN INFECTED SITE.
Type: IMPLANTABLE DEVICE | Site: THIRD TOE | Status: FUNCTIONAL
Brand: STIMULAN® RAPID CURE

## 2025-02-13 RX ORDER — GLUCAGON 1 MG
1 KIT INJECTION PRN
Status: DISCONTINUED | OUTPATIENT
Start: 2025-02-13 | End: 2025-02-13 | Stop reason: HOSPADM

## 2025-02-13 RX ORDER — SODIUM CHLORIDE, SODIUM LACTATE, POTASSIUM CHLORIDE, CALCIUM CHLORIDE 600; 310; 30; 20 MG/100ML; MG/100ML; MG/100ML; MG/100ML
INJECTION, SOLUTION INTRAVENOUS
Status: DISCONTINUED | OUTPATIENT
Start: 2025-02-13 | End: 2025-02-13 | Stop reason: SDUPTHER

## 2025-02-13 RX ORDER — DIPHENHYDRAMINE HYDROCHLORIDE 50 MG/ML
12.5 INJECTION INTRAMUSCULAR; INTRAVENOUS
Status: DISCONTINUED | OUTPATIENT
Start: 2025-02-13 | End: 2025-02-13 | Stop reason: HOSPADM

## 2025-02-13 RX ORDER — GLYCOPYRROLATE 0.2 MG/ML
INJECTION INTRAMUSCULAR; INTRAVENOUS
Status: DISCONTINUED | OUTPATIENT
Start: 2025-02-13 | End: 2025-02-13 | Stop reason: SDUPTHER

## 2025-02-13 RX ORDER — DEXTROSE MONOHYDRATE 100 MG/ML
INJECTION, SOLUTION INTRAVENOUS CONTINUOUS PRN
Status: DISCONTINUED | OUTPATIENT
Start: 2025-02-13 | End: 2025-02-13 | Stop reason: HOSPADM

## 2025-02-13 RX ORDER — PROPOFOL 10 MG/ML
INJECTION, EMULSION INTRAVENOUS
Status: DISCONTINUED | OUTPATIENT
Start: 2025-02-13 | End: 2025-02-13 | Stop reason: SDUPTHER

## 2025-02-13 RX ORDER — FENTANYL CITRATE 50 UG/ML
50 INJECTION, SOLUTION INTRAMUSCULAR; INTRAVENOUS EVERY 5 MIN PRN
Status: DISCONTINUED | OUTPATIENT
Start: 2025-02-13 | End: 2025-02-13 | Stop reason: HOSPADM

## 2025-02-13 RX ORDER — SODIUM CHLORIDE 0.9 % (FLUSH) 0.9 %
5-40 SYRINGE (ML) INJECTION PRN
Status: DISCONTINUED | OUTPATIENT
Start: 2025-02-13 | End: 2025-02-13 | Stop reason: HOSPADM

## 2025-02-13 RX ORDER — ONDANSETRON 2 MG/ML
4 INJECTION INTRAMUSCULAR; INTRAVENOUS
Status: DISCONTINUED | OUTPATIENT
Start: 2025-02-13 | End: 2025-02-13 | Stop reason: HOSPADM

## 2025-02-13 RX ADMIN — PROPOFOL 50 MG: 10 INJECTION, EMULSION INTRAVENOUS at 15:00

## 2025-02-13 RX ADMIN — CEFEPIME 2000 MG: 2 INJECTION, POWDER, FOR SOLUTION INTRAVENOUS at 07:52

## 2025-02-13 RX ADMIN — PANTOPRAZOLE SODIUM 40 MG: 40 TABLET, DELAYED RELEASE ORAL at 08:54

## 2025-02-13 RX ADMIN — SODIUM CHLORIDE, PRESERVATIVE FREE 10 ML: 5 INJECTION INTRAVENOUS at 21:56

## 2025-02-13 RX ADMIN — INSULIN GLARGINE 10 UNITS: 100 INJECTION, SOLUTION SUBCUTANEOUS at 21:55

## 2025-02-13 RX ADMIN — SODIUM CHLORIDE, PRESERVATIVE FREE 10 ML: 5 INJECTION INTRAVENOUS at 02:43

## 2025-02-13 RX ADMIN — ENOXAPARIN SODIUM 30 MG: 100 INJECTION SUBCUTANEOUS at 21:55

## 2025-02-13 RX ADMIN — GLYCOPYRROLATE 0.2 MG: 0.2 INJECTION INTRAMUSCULAR; INTRAVENOUS at 15:23

## 2025-02-13 RX ADMIN — PROPOFOL 20 MG: 10 INJECTION, EMULSION INTRAVENOUS at 15:14

## 2025-02-13 RX ADMIN — INSULIN GLARGINE 10 UNITS: 100 INJECTION, SOLUTION SUBCUTANEOUS at 08:54

## 2025-02-13 RX ADMIN — FAMOTIDINE 20 MG: 20 TABLET, FILM COATED ORAL at 08:53

## 2025-02-13 RX ADMIN — PROPOFOL 20 MG: 10 INJECTION, EMULSION INTRAVENOUS at 15:16

## 2025-02-13 RX ADMIN — CEFEPIME 2000 MG: 2 INJECTION, POWDER, FOR SOLUTION INTRAVENOUS at 02:43

## 2025-02-13 RX ADMIN — PROPOFOL 20 MG: 10 INJECTION, EMULSION INTRAVENOUS at 15:08

## 2025-02-13 RX ADMIN — PROPOFOL 20 MG: 10 INJECTION, EMULSION INTRAVENOUS at 15:06

## 2025-02-13 RX ADMIN — PAROXETINE HYDROCHLORIDE 20 MG: 20 TABLET, FILM COATED ORAL at 08:54

## 2025-02-13 RX ADMIN — TAMSULOSIN HYDROCHLORIDE 0.4 MG: 0.4 CAPSULE ORAL at 08:53

## 2025-02-13 RX ADMIN — SODIUM CHLORIDE, PRESERVATIVE FREE 10 ML: 5 INJECTION INTRAVENOUS at 08:55

## 2025-02-13 RX ADMIN — PROPOFOL 20 MG: 10 INJECTION, EMULSION INTRAVENOUS at 15:12

## 2025-02-13 RX ADMIN — TRAZODONE HYDROCHLORIDE 150 MG: 100 TABLET ORAL at 21:55

## 2025-02-13 RX ADMIN — PROPOFOL 20 MG: 10 INJECTION, EMULSION INTRAVENOUS at 15:10

## 2025-02-13 RX ADMIN — LOSARTAN POTASSIUM 50 MG: 50 TABLET, FILM COATED ORAL at 08:53

## 2025-02-13 RX ADMIN — OXYCODONE AND ACETAMINOPHEN 1 TABLET: 7.5; 325 TABLET ORAL at 09:06

## 2025-02-13 RX ADMIN — VANCOMYCIN HYDROCHLORIDE 1500 MG: 10 INJECTION, POWDER, LYOPHILIZED, FOR SOLUTION INTRAVENOUS at 04:51

## 2025-02-13 RX ADMIN — SODIUM CHLORIDE, SODIUM LACTATE, POTASSIUM CHLORIDE, AND CALCIUM CHLORIDE: 600; 310; 30; 20 INJECTION, SOLUTION INTRAVENOUS at 14:55

## 2025-02-13 RX ADMIN — GLYCOPYRROLATE 0.2 MG: 0.2 INJECTION INTRAMUSCULAR; INTRAVENOUS at 15:27

## 2025-02-13 RX ADMIN — AMLODIPINE BESYLATE 5 MG: 5 TABLET ORAL at 08:53

## 2025-02-13 ASSESSMENT — PAIN - FUNCTIONAL ASSESSMENT
PAIN_FUNCTIONAL_ASSESSMENT: 0-10
PAIN_FUNCTIONAL_ASSESSMENT: ACTIVITIES ARE NOT PREVENTED

## 2025-02-13 ASSESSMENT — PAIN SCALES - GENERAL
PAINLEVEL_OUTOF10: 0
PAINLEVEL_OUTOF10: 7
PAINLEVEL_OUTOF10: 0
PAINLEVEL_OUTOF10: 2

## 2025-02-13 ASSESSMENT — PAIN DESCRIPTION - DESCRIPTORS: DESCRIPTORS: ACHING

## 2025-02-13 ASSESSMENT — PAIN DESCRIPTION - ONSET: ONSET: ON-GOING

## 2025-02-13 ASSESSMENT — PAIN DESCRIPTION - PAIN TYPE: TYPE: ACUTE PAIN

## 2025-02-13 ASSESSMENT — PAIN DESCRIPTION - FREQUENCY: FREQUENCY: CONTINUOUS

## 2025-02-13 ASSESSMENT — PAIN DESCRIPTION - ORIENTATION: ORIENTATION: LEFT

## 2025-02-13 ASSESSMENT — PAIN DESCRIPTION - LOCATION: LOCATION: FOOT

## 2025-02-13 NOTE — PLAN OF CARE
Problem: Pain  Goal: Verbalizes/displays adequate comfort level or baseline comfort level  2/12/2025 2329 by Marce Newberry, RN  Flowsheets (Taken 2/12/2025 2329)  Verbalizes/displays adequate comfort level or baseline comfort level:   Encourage patient to monitor pain and request assistance   Assess pain using appropriate pain scale  Note: Will utilize the Numeric Pain scale to report pain and acceptable comfort level.        2/12/2025 1901 by Selin Loza, RN  Outcome: Progressing

## 2025-02-13 NOTE — PROGRESS NOTES
OCCUPATIONAL THERAPY EVALUATION/DISCHARGE    Patient: Vinay Benson (64 y.o. male)  Date: 2/13/2025  Primary Diagnosis: Cellulitis [L03.90]  Diabetic foot infection (HCC) [E11.628, L08.9]  Procedure(s) (LRB):  LEFT FOOT INCISION AND DRAINAGE; PARTIAL THIRD RAY RESECTION (Left)     Precautions: Contact Precautions, Isolation, Weight Bearing, Left Lower Extremity Weight Bearing: Non Weight Bearing (forefoot)  PLOF: Pt was independent with self-care and used a SPC for functional mobility.      ASSESSMENT AND RECOMMENDATIONS:  Pt cleared to participate in OT evaluation by RN. Pt supine in bed, asleep but awakens to voice and observed on room air with 0L NC doffed around neck. Patient educated on the role of OT, weight-bearing status and safety during this admission/around the house. Based on the objective data described below, pt presents with no deficits that impede pt function with ADLs, functional transfers, and functional mobility in preparation for selfcare tasks. Patient pending left foot incision and drainage and partial third ray resection tomorrow with podiatry. Educated patient on re-consult post procedure if needed. Pt left all needs met and call bell in reach.      Maximum therapeutic gains met at current level of care and patient will be discharged from occupational therapy at this time.    Further Equipment Recommendations for Discharge: None    AMPA: AM-PAC Inpatient Daily Activity Raw Score: 24    At this time and based on an AM-PAC score, no further OT is recommended upon discharge.  Recommend patient returns to prior setting with prior services.    This [facility] score should be considered in conjunction with interdisciplinary team recommendations to determine the most appropriate discharge setting. Patient's social support, diagnosis, medical stability, and prior level of function should also be taken into consideration.     SUBJECTIVE:   Patient stated “yall don't let me sleep. Everyone's  Back  Barriers to Learning: None  Education Outcome: Verbalized understanding;Demonstrated understanding    Thank you for this referral.  Jose Carlos Monroe, OTR/L  Minutes: 8    Eval Complexity: Decision Making: Low Complexity

## 2025-02-13 NOTE — ANESTHESIA POSTPROCEDURE EVALUATION
Department of Anesthesiology  Postprocedure Note    Patient: Vinay Benson  MRN: [id number]  YOB: 1960  Date of evaluation: 2/13/2025    Procedure Summary       Date: 02/13/25 Room / Location: [facility] MAIN 04 / [facility] MAIN OR    Anesthesia Start: 1455 Anesthesia Stop: 1604    Procedure: LEFT FOOT INCISION AND DRAINAGE; IRRIGATION AND DEBRIDEMENT PARTIAL THIRD RAY RESECTION WITH ANTIBIOTIC BEADS PLACEMENT (Left: Third Toe) Diagnosis:       Abscess of left foot      (Abscess of left foot [L02.612])    Surgeons: Jv Nielson DPM Responsible Provider: Agustin Wylie MD    Anesthesia Type: MAC ASA Status: 3            Anesthesia Type: MAC    Ann-Marie Phase I: Ann-Marie Score: 10    Ann-Marie Phase II:      Anesthesia Post Evaluation    Patient location during evaluation: PACU  Patient participation: complete - patient participated  Level of consciousness: awake  Airway patency: patent  Nausea & Vomiting: no nausea  Cardiovascular status: hemodynamically stable  Respiratory status: acceptable  Hydration status: euvolemic  Pain management: adequate    No notable events documented.

## 2025-02-13 NOTE — PROGRESS NOTES
PRN    glucose chewable tablet 16 g  4 tablet Oral PRN    dextrose bolus 10% 125 mL  125 mL IntraVENous PRN    Or    dextrose bolus 10% 250 mL  250 mL IntraVENous PRN    glucagon emergency SOLR 1 mg  1 mg SubCUTAneous PRN    dextrose 10 % infusion   IntraVENous Continuous PRN    PARoxetine (PAXIL) tablet 20 mg  20 mg Oral Daily    polyethylene glycol (GLYCOLAX) packet 17 g  17 g Oral Daily    amLODIPine (NORVASC) tablet 5 mg  5 mg Oral Daily    famotidine (PEPCID) tablet 20 mg  20 mg Oral Daily    oxyCODONE-acetaminophen (PERCOCET) 7.5-325 MG per tablet 1 tablet  1 tablet Oral Q4H PRN    pantoprazole (PROTONIX) tablet 40 mg  40 mg Oral Daily    traZODone (DESYREL) tablet 150 mg  150 mg Oral Nightly    [Held by provider] aspirin EC tablet 81 mg  81 mg Oral Daily    insulin glargine (LANTUS) injection vial 10 Units  10 Units SubCUTAneous BID    sodium chloride flush 0.9 % injection 5-40 mL  5-40 mL IntraVENous 2 times per day    sodium chloride flush 0.9 % injection 5-40 mL  5-40 mL IntraVENous PRN    0.9 % sodium chloride infusion   IntraVENous PRN    potassium chloride (KLOR-CON M) extended release tablet 40 mEq  40 mEq Oral PRN    Or    potassium bicarb-citric acid (EFFER-K) effervescent tablet 40 mEq  40 mEq Oral PRN    Or    potassium chloride 10 mEq/100 mL IVPB (Peripheral Line)  10 mEq IntraVENous PRN    magnesium sulfate 2000 mg in 50 mL IVPB premix  2,000 mg IntraVENous PRN    enoxaparin Sodium (LOVENOX) injection 30 mg  30 mg SubCUTAneous BID    ondansetron (ZOFRAN-ODT) disintegrating tablet 4 mg  4 mg Oral Q8H PRN    Or    ondansetron (ZOFRAN) injection 4 mg  4 mg IntraVENous Q6H PRN    polyethylene glycol (GLYCOLAX) packet 17 g  17 g Oral Daily PRN    acetaminophen (TYLENOL) tablet 650 mg  650 mg Oral Q6H PRN    Or    acetaminophen (TYLENOL) suppository 650 mg  650 mg Rectal Q6H PRN    insulin lispro (HUMALOG,ADMELOG) injection vial 0-8 Units  0-8 Units SubCUTAneous 4x daily    glucose chewable tablet 16 g   4 tablet Oral PRN    dextrose bolus 10% 125 mL  125 mL IntraVENous PRN    Or    dextrose bolus 10% 250 mL  250 mL IntraVENous PRN    glucagon (rDNA) injection 1 mg  1 mg SubCUTAneous PRN    dextrose 10 % infusion   IntraVENous Continuous PRN    vancomycin (VANCOCIN) 1500 mg in sodium chloride 0.9% 500 mL IVPB  1,500 mg IntraVENous Q18H    naloxone (NARCAN) injection 0.4 mg  0.4 mg IntraVENous PRN    tamsulosin (FLOMAX) capsule 0.4 mg  0.4 mg Oral Daily    losartan (COZAAR) tablet 50 mg  50 mg Oral Daily        Labs:    Recent Results (from the past 24 hour(s))   EKG 12 Lead    Collection Time: 02/12/25  7:11 PM   Result Value Ref Range    Ventricular Rate 48 BPM    Atrial Rate 48 BPM    P-R Interval 274 ms    QRS Duration 78 ms    Q-T Interval 470 ms    QTc Calculation (Bazett) 419 ms    P Axis 43 degrees    R Axis -5 degrees    T Axis 24 degrees    Diagnosis       Marked sinus bradycardia with 1st degree AV block  Septal infarct , age undetermined  Inferior infarct , age undetermined  Abnormal ECG  When compared with ECG of 11-FEB-2025 21:26,  Septal infarct is now present  Inferior infarct is now present     Basic Metabolic Panel w/ Reflex to MG    Collection Time: 02/13/25  3:34 AM   Result Value Ref Range    Sodium 138 136 - 145 mmol/L    Potassium 3.8 3.5 - 5.5 mmol/L    Chloride 104 100 - 111 mmol/L    CO2 31 21 - 32 mmol/L    Anion Gap 3 3.0 - 18 mmol/L    Glucose 136 (H) 74 - 99 mg/dL    BUN 13 7.0 - 18 MG/DL    Creatinine 1.04 0.6 - 1.3 MG/DL    BUN/Creatinine Ratio 13 12 - 20      Est, Glom Filt Rate 80 >60 ml/min/1.73m2    Calcium 8.6 8.5 - 10.1 MG/DL   CBC with Auto Differential    Collection Time: 02/13/25  3:34 AM   Result Value Ref Range    WBC 10.5 4.6 - 13.2 K/uL    RBC 3.09 (L) 4.35 - 5.65 M/uL    Hemoglobin 8.7 (L) 13.0 - 16.0 g/dL    Hematocrit 29.7 (L) 36.0 - 48.0 %    MCV 96.1 78.0 - 100.0 FL    MCH 28.2 24.0 - 34.0 PG    MCHC 29.3 (L) 31.0 - 37.0 g/dL    RDW 14.6 (H) 11.6 - 14.5 %

## 2025-02-13 NOTE — ANESTHESIA PRE PROCEDURE
Component Value Date/Time    COVID19 Not detected 03/01/2022 04:00 PM    COVID19 Please find results under separate order 04/13/2021 07:15 AM           Anesthesia Evaluation  Patient summary reviewed  Airway: Mallampati: II          Dental: normal exam         Pulmonary:Negative Pulmonary ROS and normal exam    (+)     sleep apnea:                                  Cardiovascular:Negative CV ROS  Exercise tolerance: good (>4 METS)  (+) hypertension:, past MI: > 6 months and no interval change, CAD:        Rhythm: regular  Rate: normal                    Neuro/Psych:   Negative Neuro/Psych ROS              GI/Hepatic/Renal: Neg GI/Hepatic/Renal ROS  (+) GERD:          Endo/Other: Negative Endo/Other ROS   (+) DiabetesType II DM, using insulin, hypothyroidism::..                 Abdominal:   (+) obese          Vascular: negative vascular ROS.         Other Findings:           Anesthesia Plan      MAC     ASA 3       Induction: intravenous.    MIPS: Postoperative opioids intended.  Anesthetic plan and risks discussed with patient.        Attending anesthesiologist reviewed and agrees with Preprocedure content              Agustin Wylie MD   2/13/2025

## 2025-02-13 NOTE — CONSULTS
Infectious Disease Consultation Note        Reason: left foot infection    Current abx Prior abx   Cefepime, vancomycin since 2/11      Lines:       Assessment :  64 y.o. male with h/o type uncontrolled type 2 DM  , CAD who presented to [facility] on 2/11/25 for left foot infection.      Hospitalization at [facility] November 2020 for right great toe distal phalanx chronic osteomyelitis, septic arthritis right great toe interphalangeal joint   Wound cultures 9/2020-Enterobacter, MRSA  Status post right great toe amputation on 11/24.       Hospitalization at [facility] 2/2021 for acute on chronic osteomyelitis right first metatarsal osteomyelitis.    Status post incision and debridement, partial resection of right first metatarsal on 2/5/2021.  Intra-Op cultures: Methicillin susceptible Staphylococcus aureus, Bacteroides. Bone biopsy of clean margins reveal acute inflammation suggestive of acute osteomyelitis. S/p ertapenem, daptomycin till 3/19/2021     Hospitalization at [facility] 4/2021 for  chronic osteomyelitis right second metatarsal head, infected right lateral foot ulcer Status post transmetatarsal amputation 4/13/2021.  Intra-Op findings discussed with podiatrist.  Grossly clean margins achieved at surgery.  Bone biopsy, clean margins negative for acute osteomyelitis.  Intra-Op cultures no organisms seen.     Hospitalization at [facility] 2/2022 for right foot cellulitis, infected right lateral foot ulcer, chronic osteomyelitis left first distal phalanx     Status post incision and debridement bilateral feet ulcer, partial amputation left great toe on 3/1/2022.    Wound culture right foot 2/28-MRSA, pseudomonas, proteus (susceptibilities of MRSA noted)  Intra-Op wound culture 3/1/2022-MRSA, Proteus     Hospitalization [facility] April 2024 for subacute osteomyelitis left great toe, infected left great toe ulcer  Status post left great toe amputation on 4/12/2024.  grossly clean bone margins achieved at surgery  IntraOp cultures 4/12-moderate  positive cocci         RARE GRAM POSITIVE RODS        Culture       HEAVY Staphylococcus aureus            HEAVY DIPHTHEROIDS       Susceptibility        Staphylococcus aureus      BACTERIAL SUSCEPTIBILITY PANEL LOIS      ciprofloxacin <=0.5 ug/mL Sensitive      clindamycin 0.25 ug/mL Sensitive      DAPTOmycin 0.25 ug/mL Sensitive      doxycycline <=0.5 ug/mL Sensitive      erythromycin <=0.25 ug/mL Sensitive      gentamicin <=0.5 ug/mL Sensitive      levofloxacin 0.25 ug/mL Sensitive      linezolid 2 ug/mL Sensitive      moxifloxacin <=0.25 ug/mL Sensitive      oxacillin <=0.25 ug/mL Sensitive      tetracycline <=1 ug/mL Sensitive      trimethoprim-sulfamethoxazole <=10 ug/mL Sensitive      vancomycin <=0.5 ug/mL Sensitive                           Blood Culture 2 [[id number]]  (Abnormal) Collected: 02/06/25 1740    Order Status: Completed Specimen: Blood Updated: 02/09/25 0612     Special Requests NO SPECIAL REQUESTS        Gram Stain       AEROBIC AND ANAEROBIC BOTTLES Gram positive cocci IN PAIRS IN GROUPS                  SMEAR CALLED TO AND CORRECTLY REPEATED BY: [facility] ED ON [id number] AT 1414HRS TO 4410           Culture       Staphylococcus aureus GROWING IN BOTH BOTTLES DRAWN No Site Indicated REFER TO [id number] FOR SENSITIVITIES          Blood Culture 1 [[id number]]  (Abnormal)  (Susceptibility) Collected: 02/06/25 1720    Order Status: Completed Specimen: Blood Updated: 02/09/25 0612     Special Requests NO SPECIAL REQUESTS        Gram Stain       ANAEROBIC BOTTLE Gram positive cocci IN PAIRS IN GROUPS                  SMEAR CALLED TO AND CORRECTLY REPEATED BY: NP A [facility]  AT 1130HRS ON [id number] TO 4410           Culture       Staphylococcus aureus GROWING IN 1 OF 2 BOTTLES DRAWN No Site Indicated          Susceptibility        Staphylococcus aureus      BACTERIAL SUSCEPTIBILITY PANEL LOIS      ciprofloxacin <=0.5 ug/mL Sensitive      clindamycin 0.25 ug/mL Sensitive      DAPTOmycin 0.25

## 2025-02-13 NOTE — PROGRESS NOTES
RT went to see pt to see what time he would like to go on CPAP. He states he hasn't worn one in a while and does not want to wear it while admitted. RT explained it's available if he would like it and changes his mind. RT available if needed.

## 2025-02-14 LAB
EKG ATRIAL RATE: 48 BPM
EKG DIAGNOSIS: NORMAL
EKG P AXIS: 43 DEGREES
EKG P-R INTERVAL: 274 MS
EKG Q-T INTERVAL: 470 MS
EKG QRS DURATION: 78 MS
EKG QTC CALCULATION (BAZETT): 419 MS
EKG R AXIS: -5 DEGREES
EKG T AXIS: 24 DEGREES
EKG VENTRICULAR RATE: 48 BPM
GLUCOSE BLD STRIP.AUTO-MCNC: 101 MG/DL (ref 70–110)
GLUCOSE BLD STRIP.AUTO-MCNC: 129 MG/DL (ref 70–110)
GLUCOSE BLD STRIP.AUTO-MCNC: 72 MG/DL (ref 70–110)
GLUCOSE BLD STRIP.AUTO-MCNC: 92 MG/DL (ref 70–110)

## 2025-02-14 PROCEDURE — 94761 N-INVAS EAR/PLS OXIMETRY MLT: CPT

## 2025-02-14 PROCEDURE — 6370000000 HC RX 637 (ALT 250 FOR IP): Performed by: STUDENT IN AN ORGANIZED HEALTH CARE EDUCATION/TRAINING PROGRAM

## 2025-02-14 PROCEDURE — 99232 SBSQ HOSP IP/OBS MODERATE 35: CPT | Performed by: INTERNAL MEDICINE

## 2025-02-14 PROCEDURE — 1100000003 HC PRIVATE W/ TELEMETRY

## 2025-02-14 PROCEDURE — 2580000003 HC RX 258: Performed by: STUDENT IN AN ORGANIZED HEALTH CARE EDUCATION/TRAINING PROGRAM

## 2025-02-14 PROCEDURE — 82962 GLUCOSE BLOOD TEST: CPT

## 2025-02-14 PROCEDURE — 6370000000 HC RX 637 (ALT 250 FOR IP): Performed by: INTERNAL MEDICINE

## 2025-02-14 PROCEDURE — 6360000002 HC RX W HCPCS: Performed by: STUDENT IN AN ORGANIZED HEALTH CARE EDUCATION/TRAINING PROGRAM

## 2025-02-14 PROCEDURE — 2500000003 HC RX 250 WO HCPCS: Performed by: INTERNAL MEDICINE

## 2025-02-14 PROCEDURE — 93010 ELECTROCARDIOGRAM REPORT: CPT | Performed by: INTERNAL MEDICINE

## 2025-02-14 PROCEDURE — 6360000002 HC RX W HCPCS: Performed by: INTERNAL MEDICINE

## 2025-02-14 RX ADMIN — SODIUM CHLORIDE, PRESERVATIVE FREE 10 ML: 5 INJECTION INTRAVENOUS at 08:53

## 2025-02-14 RX ADMIN — FAMOTIDINE 20 MG: 20 TABLET, FILM COATED ORAL at 08:52

## 2025-02-14 RX ADMIN — PANTOPRAZOLE SODIUM 40 MG: 40 TABLET, DELAYED RELEASE ORAL at 08:52

## 2025-02-14 RX ADMIN — ENOXAPARIN SODIUM 30 MG: 100 INJECTION SUBCUTANEOUS at 08:53

## 2025-02-14 RX ADMIN — TRAZODONE HYDROCHLORIDE 150 MG: 100 TABLET ORAL at 20:33

## 2025-02-14 RX ADMIN — INSULIN GLARGINE 10 UNITS: 100 INJECTION, SOLUTION SUBCUTANEOUS at 20:32

## 2025-02-14 RX ADMIN — ENOXAPARIN SODIUM 30 MG: 100 INJECTION SUBCUTANEOUS at 20:32

## 2025-02-14 RX ADMIN — VANCOMYCIN HYDROCHLORIDE 1500 MG: 10 INJECTION, POWDER, LYOPHILIZED, FOR SOLUTION INTRAVENOUS at 02:01

## 2025-02-14 RX ADMIN — SODIUM CHLORIDE, PRESERVATIVE FREE 10 ML: 5 INJECTION INTRAVENOUS at 20:32

## 2025-02-14 RX ADMIN — AMLODIPINE BESYLATE 5 MG: 5 TABLET ORAL at 08:52

## 2025-02-14 RX ADMIN — OXYCODONE AND ACETAMINOPHEN 1 TABLET: 7.5; 325 TABLET ORAL at 18:45

## 2025-02-14 RX ADMIN — OXYCODONE AND ACETAMINOPHEN 1 TABLET: 7.5; 325 TABLET ORAL at 08:54

## 2025-02-14 RX ADMIN — LOSARTAN POTASSIUM 50 MG: 50 TABLET, FILM COATED ORAL at 08:52

## 2025-02-14 RX ADMIN — PAROXETINE HYDROCHLORIDE 20 MG: 20 TABLET, FILM COATED ORAL at 08:52

## 2025-02-14 RX ADMIN — TAMSULOSIN HYDROCHLORIDE 0.4 MG: 0.4 CAPSULE ORAL at 08:52

## 2025-02-14 RX ADMIN — VANCOMYCIN HYDROCHLORIDE 1500 MG: 10 INJECTION, POWDER, LYOPHILIZED, FOR SOLUTION INTRAVENOUS at 18:51

## 2025-02-14 RX ADMIN — INSULIN GLARGINE 10 UNITS: 100 INJECTION, SOLUTION SUBCUTANEOUS at 08:51

## 2025-02-14 ASSESSMENT — PAIN DESCRIPTION - ONSET
ONSET: ON-GOING
ONSET: ON-GOING

## 2025-02-14 ASSESSMENT — PAIN DESCRIPTION - ORIENTATION
ORIENTATION: LEFT
ORIENTATION: LEFT

## 2025-02-14 ASSESSMENT — PAIN DESCRIPTION - FREQUENCY
FREQUENCY: CONTINUOUS
FREQUENCY: CONTINUOUS

## 2025-02-14 ASSESSMENT — PAIN - FUNCTIONAL ASSESSMENT
PAIN_FUNCTIONAL_ASSESSMENT: ACTIVITIES ARE NOT PREVENTED
PAIN_FUNCTIONAL_ASSESSMENT: ACTIVITIES ARE NOT PREVENTED

## 2025-02-14 ASSESSMENT — PAIN SCALES - GENERAL
PAINLEVEL_OUTOF10: 8
PAINLEVEL_OUTOF10: 0
PAINLEVEL_OUTOF10: 5
PAINLEVEL_OUTOF10: 0
PAINLEVEL_OUTOF10: 3
PAINLEVEL_OUTOF10: 0

## 2025-02-14 ASSESSMENT — PAIN DESCRIPTION - DESCRIPTORS
DESCRIPTORS: ACHING
DESCRIPTORS: ACHING

## 2025-02-14 ASSESSMENT — PAIN DESCRIPTION - PAIN TYPE
TYPE: ACUTE PAIN
TYPE: ACUTE PAIN

## 2025-02-14 ASSESSMENT — PAIN DESCRIPTION - LOCATION
LOCATION: FOOT
LOCATION: FOOT

## 2025-02-14 NOTE — PLAN OF CARE
Problem: Metabolic/Fluid and Electrolytes - Adult  Goal: Glucose maintained within prescribed range  Outcome: Progressing  Flowsheets  Taken 2/14/2025 0001  Glucose maintained within prescribed range:   Monitor blood glucose as ordered   Assess for signs and symptoms of hyperglycemia and hypoglycemia  Taken 2/13/2025 2000  Glucose maintained within prescribed range: Monitor blood glucose as ordered  Note: Will continue to monitor lab work and vital signs for signs/symptoms of infection and administer antibiotics as ordered.

## 2025-02-14 NOTE — PROGRESS NOTES
Patient does not want to use facility CPAP machine at this time. He stated he sleeps fine and is in no resp distress.

## 2025-02-14 NOTE — PROGRESS NOTES
Spiritual Health History and Assessment/Progress Note  [facility]    Spiritual/Emotional Needs, Advance Care Planning,  ,  ,      Name: Vinay Benson MRN: [id number]    Age: 64 y.o.     Sex: male   Language: English   Jew: Sabianism   Cellulitis     Date: 2/14/2025            Total Time Calculated: (P) 7 min              Spiritual Assessment began in [facility] MEDICAL        Referral/Consult From: Multi-disciplinary team   Encounter Overview/Reason: Spiritual/Emotional Needs, Advance Care Planning  Service Provided For: Patient    Deanne, Belief, Meaning:   Patient has beliefs or practices that help with coping during difficult times  Family/Friends have beliefs or practices that help with coping during difficult times      Importance and Influence:  Patient has spiritual/personal beliefs that influence decisions regarding their health  Family/Friends have spiritual/personal beliefs that influence decisions regarding the patient's health    Community:  Patient feels well-supported. Support system includes: Spouse/Partner and Extended family  Family/Friends feel well-supported. Support system includes: Spouse/Partner and Extended family    Assessment and Plan of Care:     Patient Interventions include: Facilitated expression of thoughts and feelings and Affirmed coping skills/support systems  Family/Friends Interventions include: Facilitated expression of thoughts and feelings    Patient Plan of Care: No spiritual needs identified for follow-up  Family/Friends Plan of Care: No spiritual needs identified for follow-up    Electronically signed by JOSEPH Alfred on 2/14/2025 at 4:32 PM

## 2025-02-14 NOTE — PROGRESS NOTES
Infectious Disease Consultation Note        Reason: left foot infection    Current abx Prior abx   Cefepime, vancomycin since 2/11      Lines:       Assessment :  64 y.o. male with h/o type uncontrolled type 2 DM  , CAD who presented to [facility] on 2/11/25 for left foot infection.      Hospitalization at [facility] November 2020 for right great toe distal phalanx chronic osteomyelitis, septic arthritis right great toe interphalangeal joint   Wound cultures 9/2020-Enterobacter, MRSA  Status post right great toe amputation on 11/24.       Hospitalization at [facility] 2/2021 for acute on chronic osteomyelitis right first metatarsal osteomyelitis.    Status post incision and debridement, partial resection of right first metatarsal on 2/5/2021.  Intra-Op cultures: Methicillin susceptible Staphylococcus aureus, Bacteroides. Bone biopsy of clean margins reveal acute inflammation suggestive of acute osteomyelitis. S/p ertapenem, daptomycin till 3/19/2021     Hospitalization at [facility] 4/2021 for  chronic osteomyelitis right second metatarsal head, infected right lateral foot ulcer Status post transmetatarsal amputation 4/13/2021.  Intra-Op findings discussed with podiatrist.  Grossly clean margins achieved at surgery.  Bone biopsy, clean margins negative for acute osteomyelitis.  Intra-Op cultures no organisms seen.     Hospitalization at [facility] 2/2022 for right foot cellulitis, infected right lateral foot ulcer, chronic osteomyelitis left first distal phalanx     Status post incision and debridement bilateral feet ulcer, partial amputation left great toe on 3/1/2022.    Wound culture right foot 2/28-MRSA, pseudomonas, proteus (susceptibilities of MRSA noted)  Intra-Op wound culture 3/1/2022-MRSA, Proteus     Hospitalization [facility] April 2024 for subacute osteomyelitis left great toe, infected left great toe ulcer  Status post left great toe amputation on 4/12/2024.  grossly clean bone margins achieved at surgery  IntraOp cultures 4/12-moderate  PCR Not detected        Enterococcus faecium by PCR Not detected        Listeria monocytogenes by PCR Not detected        STAPHYLOCOCCUS Detected        Staphylococcus Aureus Detected        Staphylococcus epidermidis by PCR Not detected        Staphylococcus lugdunensis by PCR Not detected        STREPTOCOCCUS Not detected        Streptococcus agalactiae (Group B) Not detected        Strep pneumoniae Not detected        Strep pyogenes,(Grp. A) Not detected        Acinetobacter calcoac baumannii complex by PCR Not detected        Bacteroides fragilis by PCR Not detected        Enterobacteriaceae by PCR Not detected        Enterobacter cloacae complex by PCR Not detected        Escherichia Coli Not detected        Klebsiella aerogenes by PCR Not detected        Klebsiella oxytoca by PCR Not detected        Klebsiella pneumoniae group by PCR Not detected        Proteus by PCR Not detected        Salmonella species by PCR Not detected        Serratia marcescens by PCR Not detected        Haemophilus Influenzae by PCR Not detected        Neisseria meningitidis by PCR Not detected        Pseudomonas aeruginosa Not detected        Stenotrophomonas maltophilia by PCR Not detected        Candida albicans by PCR Not detected        Candida auris by PCR Not detected        Candida glabrata Not detected        Candida krusei by PCR Not detected        Candida parapsilosis by PCR Not detected        Candida tropicalis by PCR Not detected        Cryptococcus neoformans/gattii by PCR Not detected        Resistant gene targets          Resistant gene meca/c & mrej by PCR Not detected        [employer] test comment       False positive results may rarely occur. Correlate with clinical,epidemiologic, and other laboratory findings           Comment: Please see BCID Interpretation Guide in EPIC Links                   RADIOLOGY:    All available imaging studies/reports in [facility] for this admission were reviewed    High complexity

## 2025-02-14 NOTE — PROGRESS NOTES
02/13/25  9:06 PM   Result Value Ref Range    POC Glucose 90 70 - 110 mg/dL   POCT Glucose    Collection Time: 02/14/25  6:49 AM   Result Value Ref Range    POC Glucose 72 70 - 110 mg/dL   POCT Glucose    Collection Time: 02/14/25 11:59 AM   Result Value Ref Range    POC Glucose 101 70 - 110 mg/dL       Imaging:  MRI FOOT LEFT W WO CONTRAST    Result Date: 2/12/2025  STUDY: MRI FOOT LEFT W WO CONTRAST  HISTORY: concern for osteo TECHNIQUE: Multisequence multiplanar MRI of the LEFT FOREFOOT was obtained with and without intravenous contrast. COMPARISON(S): Foot radiographs 2/6/2025, MRI left foot 4/12/2024 FINDINGS: Patient is noted is status post amputation of the first digit at the level of the MTP joint. Status post amputation of the second digit at the level of the MTP joint. Status post amputation of the fifth digit along the proximal shaft of the metatarsal. There is prominent STIR/T2 signal hyperintensity involving the third digit metatarsal and proximal phalanx with associated relative T1 hypointense signal involving the metatarsal head/neck and base/proximal shaft of the proximal phalanx. There is associated enhancement within these regions. There is subluxation/dislocation of the proximal phalanx relative to the metatarsal head, with the proximal phalanx dorsally and proximally displaced relative to the metatarsal head. There is a peripherally enhancing fluid collection extending from the level of the metatarsal head to the proximal phalanx measuring at least 2.5 x 2.4 x 1.0 cm (series 10, image #17; series 11, image #31). There is likely communication with the plantar ulceration at this level. Constellation of findings are suggestive of acute osteomyelitis with soft tissue abscess and underlying plantar ulceration. Additional area of plantar ulceration suspected in the region of the mid and distal phalanxes of the third digit with lack of enhancing tissue suggestive of devitalization. There is possible  2009  Hold aspirin for surgery.     4.  Type 2 diabetes mellitus  Continue patient on Lantus and correction dose insulin  Continue carbohydrate controlled diet    5.  Essential hypertension  The patient has been restarted on losartan.  Continue amlodipine    6.  Normocytic anemia  Likely anemia of chronic inflammation  Continue to monitor hemoglobin and hematocrit.  Transfuse to a hemoglobin goal of 7.0.    7.  Depression  Continue paroxetine and trazodone    8.  Gastroesophageal reflux disease  Continue famotidine    9.  Benign prostatic hyperplasia  Continue tamsulosin    Please contact Dr. La if there are any questions. Greater than 35 minutes were spent reviewing the patient's chart, obtaining a thorough history, performing a physical exam, placing orders and dictating this document.  Findings and plan were also discussed with the patient/patient's family and with RN.  Greater than 50% of the time was spent on direct patient care.           Signed By: Brian Rosenthal MD     February 14, 2025 3:31 PM

## 2025-02-14 NOTE — CARE COORDINATION
Patient dispo will be home with spouse to transport and no needs at this time.    Latha Childers BSW   Case Management

## 2025-02-14 NOTE — OP NOTE
Operative Note      Patient: Vinay Benson  YOB: 1960  MRN: [id number]    Date of Procedure: 2/13/2025    Pre-Op Diagnosis:  Left foot cellulitis/abscess with osteomyelitis of third toe and distal metatarsal    Post-Op Diagnosis: Same       Procedure(s):  LEFT FOOT INCISION AND DRAINAGE; IRRIGATION AND DEBRIDEMENT PARTIAL THIRD RAY RESECTION WITH ANTIBIOTIC BEADS PLACEMENT    Surgeon(s):  Jv Nielson DPM    Assistant:   Surgical Assistant: Ortiz Radford    Anesthesia: Monitor Anesthesia Care with local    Estimated Blood Loss (mL): Minimal    Complications: None    Specimens:   ID Type Source Tests Collected by Time Destination   1 : left third metatarsal toe CLEAN MARGINS Swab Toe CULTURE, ANAEROBIC, CULTURE, WOUND (WITH GRAM STAIN) Jv Nielson DPM 2/13/2025 1523    A : left third toe Tissue Toe SURGICAL PATHOLOGY Jv Nielson DPM 2/13/2025 1515        Implants:  Implant Name Type Inv. Item Serial No.  Lot No. LRB No. Used Action   GRAFT BNE SUB 10CC BEAD 25CC CA SULPHATE RAP SET W/ INDIV - [id number]  GRAFT BNE SUB 10CC BEAD 25CC CA SULPHATE RAP SET W/ INDIV  [employer] [id number] Left 1 Implanted         Drains: * No LDAs found *    Findings:  Infection Present At Time Of Surgery (PATOS) (choose all levels that have infection present):  - Organ Space infection (below fascia) present as evidenced by abscess, pus, and osteomyelitis  Other Findings: As noted below    Indications for procedure: Patient is a 65 y/o male seen for left foot cellulitis/abscess with osteomyelitis. Patient has chronic wound which hasn't healed. He is uncontrolled diabetic and not offloading foot as instructed. Now has worsening infection and needs surgical intervention to remove infected tissue. All risks, benefits, complications of procedure discussed in detail with patient. No guarantee made to outcome of procedure. Patient voiced verbal understanding and signed consent.

## 2025-02-14 NOTE — PROGRESS NOTES
Physical Therapy  PT order received and chart reviewed.  Upon entering room and discussing role of PT, notes he has been up and moving around the room post procedure, no assistance needed and declines any need for therapy services.  Verified this with RN, stating he has been up and walking to the restroom with no issues noted.  No formal evaluation indicated at this time, PT will sign off.  Thank you for this referral.     Michelle Bejarano, PT, DPT

## 2025-02-15 LAB
BACTERIA SPEC CULT: ABNORMAL
GLUCOSE BLD STRIP.AUTO-MCNC: 100 MG/DL (ref 70–110)
GLUCOSE BLD STRIP.AUTO-MCNC: 126 MG/DL (ref 70–110)
GLUCOSE BLD STRIP.AUTO-MCNC: 90 MG/DL (ref 70–110)
GLUCOSE BLD STRIP.AUTO-MCNC: 97 MG/DL (ref 70–110)
SERVICE CMNT-IMP: ABNORMAL

## 2025-02-15 PROCEDURE — 6360000002 HC RX W HCPCS: Performed by: STUDENT IN AN ORGANIZED HEALTH CARE EDUCATION/TRAINING PROGRAM

## 2025-02-15 PROCEDURE — 6370000000 HC RX 637 (ALT 250 FOR IP): Performed by: INTERNAL MEDICINE

## 2025-02-15 PROCEDURE — 82962 GLUCOSE BLOOD TEST: CPT

## 2025-02-15 PROCEDURE — 94761 N-INVAS EAR/PLS OXIMETRY MLT: CPT

## 2025-02-15 PROCEDURE — 2500000003 HC RX 250 WO HCPCS: Performed by: INTERNAL MEDICINE

## 2025-02-15 PROCEDURE — 2580000003 HC RX 258: Performed by: STUDENT IN AN ORGANIZED HEALTH CARE EDUCATION/TRAINING PROGRAM

## 2025-02-15 PROCEDURE — 99232 SBSQ HOSP IP/OBS MODERATE 35: CPT | Performed by: INTERNAL MEDICINE

## 2025-02-15 PROCEDURE — 6360000002 HC RX W HCPCS: Performed by: INTERNAL MEDICINE

## 2025-02-15 PROCEDURE — 93005 ELECTROCARDIOGRAM TRACING: CPT | Performed by: INTERNAL MEDICINE

## 2025-02-15 PROCEDURE — 1100000003 HC PRIVATE W/ TELEMETRY

## 2025-02-15 PROCEDURE — 6370000000 HC RX 637 (ALT 250 FOR IP): Performed by: STUDENT IN AN ORGANIZED HEALTH CARE EDUCATION/TRAINING PROGRAM

## 2025-02-15 RX ADMIN — ENOXAPARIN SODIUM 30 MG: 100 INJECTION SUBCUTANEOUS at 10:01

## 2025-02-15 RX ADMIN — INSULIN GLARGINE 10 UNITS: 100 INJECTION, SOLUTION SUBCUTANEOUS at 20:40

## 2025-02-15 RX ADMIN — INSULIN GLARGINE 10 UNITS: 100 INJECTION, SOLUTION SUBCUTANEOUS at 10:01

## 2025-02-15 RX ADMIN — PANTOPRAZOLE SODIUM 40 MG: 40 TABLET, DELAYED RELEASE ORAL at 10:00

## 2025-02-15 RX ADMIN — SODIUM CHLORIDE, PRESERVATIVE FREE 10 ML: 5 INJECTION INTRAVENOUS at 20:38

## 2025-02-15 RX ADMIN — SODIUM CHLORIDE, PRESERVATIVE FREE 10 ML: 5 INJECTION INTRAVENOUS at 10:01

## 2025-02-15 RX ADMIN — PAROXETINE HYDROCHLORIDE 20 MG: 20 TABLET, FILM COATED ORAL at 10:00

## 2025-02-15 RX ADMIN — FAMOTIDINE 20 MG: 20 TABLET, FILM COATED ORAL at 10:01

## 2025-02-15 RX ADMIN — LOSARTAN POTASSIUM 50 MG: 50 TABLET, FILM COATED ORAL at 10:00

## 2025-02-15 RX ADMIN — ENOXAPARIN SODIUM 30 MG: 100 INJECTION SUBCUTANEOUS at 20:38

## 2025-02-15 RX ADMIN — AMLODIPINE BESYLATE 5 MG: 5 TABLET ORAL at 10:00

## 2025-02-15 RX ADMIN — TRAZODONE HYDROCHLORIDE 150 MG: 100 TABLET ORAL at 20:41

## 2025-02-15 RX ADMIN — VANCOMYCIN HYDROCHLORIDE 1500 MG: 10 INJECTION, POWDER, LYOPHILIZED, FOR SOLUTION INTRAVENOUS at 13:28

## 2025-02-15 RX ADMIN — OXYCODONE AND ACETAMINOPHEN 1 TABLET: 7.5; 325 TABLET ORAL at 18:59

## 2025-02-15 RX ADMIN — OXYCODONE AND ACETAMINOPHEN 1 TABLET: 7.5; 325 TABLET ORAL at 12:23

## 2025-02-15 RX ADMIN — TAMSULOSIN HYDROCHLORIDE 0.4 MG: 0.4 CAPSULE ORAL at 10:00

## 2025-02-15 ASSESSMENT — PAIN DESCRIPTION - ORIENTATION
ORIENTATION: LEFT
ORIENTATION: LEFT

## 2025-02-15 ASSESSMENT — PAIN SCALES - GENERAL
PAINLEVEL_OUTOF10: 0
PAINLEVEL_OUTOF10: 8
PAINLEVEL_OUTOF10: 0
PAINLEVEL_OUTOF10: 0
PAINLEVEL_OUTOF10: 8
PAINLEVEL_OUTOF10: 0

## 2025-02-15 ASSESSMENT — PAIN DESCRIPTION - DESCRIPTORS
DESCRIPTORS: ACHING
DESCRIPTORS: ACHING

## 2025-02-15 ASSESSMENT — PAIN DESCRIPTION - LOCATION
LOCATION: FOOT
LOCATION: FOOT

## 2025-02-15 NOTE — PLAN OF CARE
Problem: Pain  Goal: Verbalizes/displays adequate comfort level or baseline comfort level  2/14/2025 2053 by Zoraida Bland RN  Outcome: Progressing  2/14/2025 2053 by Zoraida Bland RN  Outcome: Progressing     Problem: Safety - Adult  Goal: Free from fall injury  2/14/2025 2053 by Zoraida Bland RN  Outcome: Progressing  2/14/2025 2053 by Zoraida Bland RN  Outcome: Progressing     Problem: Infection - Adult  Goal: Absence of infection at discharge  2/14/2025 2053 by Zoraida Bland RN  Outcome: Progressing  2/14/2025 2053 by Zoraida Bland RN  Outcome: Progressing

## 2025-02-15 NOTE — PROGRESS NOTES
[facility]  Progress Note    Patient: Vinay Benson Age: 64 y.o. : 1960 MR#: [id number] SSN: [id number]  Date: 2/15/2025                 DVT Prophylaxis: x Lovenox hep SQ SCDs Coumadin   on Heparin gtt PO anticoagulant    Anticipated discharge: 2025 or February 15, 2025 to home, after debridement     Subjective:     Patient is back from the OR  Slightly sleepy but otherwise no other new issues no distress      Objective:   VS: BP (!) 152/89   Pulse 61   Temp 97.5 °F (36.4 °C) (Oral)   Resp 18   Ht 1.753 m (5' 9\")   Wt 108.9 kg (240 lb)   SpO2 97%   BMI 35.44 kg/m²    Tmax/24hrs: Temp (24hrs), Av °F (36.7 °C), Min:97.3 °F (36.3 °C), Max:98.8 °F (37.1 °C)  No intake or output data in the 24 hours ending 02/15/25 1513       PHYSICAL EXAM  General Appearance: No acute distress; obese appearing  HENT: normocephalic/atraumatic, moist mucus membranes  Neck: No JVD, supple  Lungs: CTA with normal respiratory effort  CV: Bradycardic but rhythm regular    Abdomen: soft, nondistended  : no suprapubic tenderness   Extremities: Right transmetatarsal amputation; left hallux amputation  Neuro: Alert and oriented  Skin: Wound on the plantar aspect of the left foot with malodorous discharge, covered by dressing  Psych: appropriate mood and affect    Current Facility-Administered Medications   Medication Dose Route Frequency    PARoxetine (PAXIL) tablet 20 mg  20 mg Oral Daily    polyethylene glycol (GLYCOLAX) packet 17 g  17 g Oral Daily    amLODIPine (NORVASC) tablet 5 mg  5 mg Oral Daily    famotidine (PEPCID) tablet 20 mg  20 mg Oral Daily    oxyCODONE-acetaminophen (PERCOCET) 7.5-325 MG per tablet 1 tablet  1 tablet Oral Q4H PRN    pantoprazole (PROTONIX) tablet 40 mg  40 mg Oral Daily    traZODone (DESYREL) tablet 150 mg  150 mg Oral Nightly    [Held by provider] aspirin EC tablet 81 mg  81 mg Oral Daily    insulin glargine (LANTUS) injection  today. Keep the patient n.p.o.   Wound cultures from February 6, 2025 are growing MSSA.  Patient also has a history of Enterobacter cloacae wound infection.  Await results of blood cultures.  Continue cefepime and vancomycin  ID consulted-await recommendations  Continue supportive care with analgesics as needed  PT/OT evaluation will be requested after surgical intervention            Signed By: Brian Rosenthal MD     February 15, 2025 3:13 PM

## 2025-02-15 NOTE — PROGRESS NOTES
[facility]   Pharmacy Pharmacokinetic Monitoring Service - Vancomycin    Indication: Bone and Joint Infection  Target Concentration: Goal AUC/LOIS 400-600 mg*hr/L  Day of Therapy: 5  Additional Antimicrobials: none    Pertinent Laboratory Values:   Temp: 98.8 °F (37.1 °C), Weight - Scale: 108.9 kg (240 lb)  Recent Labs     02/13/25  0334   CREATININE 1.04   BUN 13   WBC 10.5       Estimated Creatinine Clearance: 87 mL/min (based on SCr of 1.04 mg/dL).    Pertinent Cultures:  Culture Date Source Results   2/11 blood NGTD   2/13 Wound, toe pending   MRSA Nasal Swab: N/A. Non-respiratory infection    Assessment:  Date/Time Current Dose Concentration Timing of Concentration (h) AUC   2/11 @ 2330 2,000mg - - -   2/12 1,500mg q18h      2/13 1,500mg q18h 15.9 15 563   2/14 1,500mg q18h      2/15 1,500mg q18h        Note: Serum concentrations collected for AUC dosing may appear elevated if collected in close proximity to the dose administered, this is not necessarily an indication of toxicity    Plan:  Vancomycin 1,500mg q18h  Renal labs as indicated   Vancomycin concentration ordered for tomorrow AM with labs  Pharmacy will continue to monitor patient and adjust therapy as indicated    Thank you for the consult,  Narayan Adair RPH  2/15/2025

## 2025-02-16 PROBLEM — I48.92 ATRIAL FLUTTER (HCC): Status: ACTIVE | Noted: 2025-02-16

## 2025-02-16 PROBLEM — I25.10 CORONARY ARTERY DISEASE DUE TO LIPID RICH PLAQUE: Status: ACTIVE | Noted: 2017-05-31

## 2025-02-16 PROBLEM — I25.83 CORONARY ARTERY DISEASE DUE TO LIPID RICH PLAQUE: Status: ACTIVE | Noted: 2017-05-31

## 2025-02-16 PROBLEM — R00.1 SINUS BRADYCARDIA: Status: ACTIVE | Noted: 2025-02-16

## 2025-02-16 LAB
ANION GAP SERPL CALC-SCNC: 4 MMOL/L (ref 3–18)
BUN SERPL-MCNC: 9 MG/DL (ref 7–18)
BUN/CREAT SERPL: 9 (ref 12–20)
CALCIUM SERPL-MCNC: 8.7 MG/DL (ref 8.5–10.1)
CHLORIDE SERPL-SCNC: 103 MMOL/L (ref 100–111)
CO2 SERPL-SCNC: 30 MMOL/L (ref 21–32)
CREAT SERPL-MCNC: 0.96 MG/DL (ref 0.6–1.3)
EKG ATRIAL RATE: 72 BPM
EKG DIAGNOSIS: NORMAL
EKG P AXIS: 72 DEGREES
EKG Q-T INTERVAL: 492 MS
EKG QRS DURATION: 90 MS
EKG QTC CALCULATION (BAZETT): 396 MS
EKG R AXIS: -5 DEGREES
EKG T AXIS: 13 DEGREES
EKG VENTRICULAR RATE: 39 BPM
GLUCOSE BLD STRIP.AUTO-MCNC: 121 MG/DL (ref 70–110)
GLUCOSE BLD STRIP.AUTO-MCNC: 145 MG/DL (ref 70–110)
GLUCOSE BLD STRIP.AUTO-MCNC: 163 MG/DL (ref 70–110)
GLUCOSE BLD STRIP.AUTO-MCNC: 98 MG/DL (ref 70–110)
GLUCOSE SERPL-MCNC: 111 MG/DL (ref 74–99)
POTASSIUM SERPL-SCNC: 3.6 MMOL/L (ref 3.5–5.5)
SODIUM SERPL-SCNC: 137 MMOL/L (ref 136–145)
VANCOMYCIN SERPL-MCNC: 24.8 UG/ML (ref 5–40)

## 2025-02-16 PROCEDURE — 36415 COLL VENOUS BLD VENIPUNCTURE: CPT

## 2025-02-16 PROCEDURE — 6360000002 HC RX W HCPCS: Performed by: STUDENT IN AN ORGANIZED HEALTH CARE EDUCATION/TRAINING PROGRAM

## 2025-02-16 PROCEDURE — 2500000003 HC RX 250 WO HCPCS: Performed by: INTERNAL MEDICINE

## 2025-02-16 PROCEDURE — 99232 SBSQ HOSP IP/OBS MODERATE 35: CPT | Performed by: INTERNAL MEDICINE

## 2025-02-16 PROCEDURE — 6360000002 HC RX W HCPCS: Performed by: INTERNAL MEDICINE

## 2025-02-16 PROCEDURE — 2580000003 HC RX 258: Performed by: STUDENT IN AN ORGANIZED HEALTH CARE EDUCATION/TRAINING PROGRAM

## 2025-02-16 PROCEDURE — 80048 BASIC METABOLIC PNL TOTAL CA: CPT

## 2025-02-16 PROCEDURE — 6370000000 HC RX 637 (ALT 250 FOR IP): Performed by: STUDENT IN AN ORGANIZED HEALTH CARE EDUCATION/TRAINING PROGRAM

## 2025-02-16 PROCEDURE — 2580000003 HC RX 258: Performed by: INTERNAL MEDICINE

## 2025-02-16 PROCEDURE — 94761 N-INVAS EAR/PLS OXIMETRY MLT: CPT

## 2025-02-16 PROCEDURE — 99223 1ST HOSP IP/OBS HIGH 75: CPT | Performed by: INTERNAL MEDICINE

## 2025-02-16 PROCEDURE — 6370000000 HC RX 637 (ALT 250 FOR IP): Performed by: INTERNAL MEDICINE

## 2025-02-16 PROCEDURE — 93010 ELECTROCARDIOGRAM REPORT: CPT | Performed by: INTERNAL MEDICINE

## 2025-02-16 PROCEDURE — 80202 ASSAY OF VANCOMYCIN: CPT

## 2025-02-16 PROCEDURE — 1100000003 HC PRIVATE W/ TELEMETRY

## 2025-02-16 PROCEDURE — 82962 GLUCOSE BLOOD TEST: CPT

## 2025-02-16 RX ORDER — VANCOMYCIN 1.5 G/300ML
1500 INJECTION, SOLUTION INTRAVENOUS EVERY 24 HOURS
Status: DISCONTINUED | OUTPATIENT
Start: 2025-02-17 | End: 2025-02-16

## 2025-02-16 RX ADMIN — VANCOMYCIN HYDROCHLORIDE 1500 MG: 10 INJECTION, POWDER, LYOPHILIZED, FOR SOLUTION INTRAVENOUS at 06:28

## 2025-02-16 RX ADMIN — PANTOPRAZOLE SODIUM 40 MG: 40 TABLET, DELAYED RELEASE ORAL at 09:18

## 2025-02-16 RX ADMIN — PIPERACILLIN AND TAZOBACTAM 4500 MG: 4; .5 INJECTION, POWDER, FOR SOLUTION INTRAVENOUS at 15:28

## 2025-02-16 RX ADMIN — ENOXAPARIN SODIUM 30 MG: 100 INJECTION SUBCUTANEOUS at 09:18

## 2025-02-16 RX ADMIN — OXYCODONE AND ACETAMINOPHEN 1 TABLET: 7.5; 325 TABLET ORAL at 20:45

## 2025-02-16 RX ADMIN — INSULIN GLARGINE 10 UNITS: 100 INJECTION, SOLUTION SUBCUTANEOUS at 20:45

## 2025-02-16 RX ADMIN — AMLODIPINE BESYLATE 5 MG: 5 TABLET ORAL at 09:18

## 2025-02-16 RX ADMIN — SODIUM CHLORIDE, PRESERVATIVE FREE 10 ML: 5 INJECTION INTRAVENOUS at 09:24

## 2025-02-16 RX ADMIN — PIPERACILLIN AND TAZOBACTAM 3375 MG: 3; .375 INJECTION, POWDER, LYOPHILIZED, FOR SOLUTION INTRAVENOUS at 20:52

## 2025-02-16 RX ADMIN — OXYCODONE AND ACETAMINOPHEN 1 TABLET: 7.5; 325 TABLET ORAL at 14:54

## 2025-02-16 RX ADMIN — TAMSULOSIN HYDROCHLORIDE 0.4 MG: 0.4 CAPSULE ORAL at 09:18

## 2025-02-16 RX ADMIN — INSULIN GLARGINE 10 UNITS: 100 INJECTION, SOLUTION SUBCUTANEOUS at 09:18

## 2025-02-16 RX ADMIN — TRAZODONE HYDROCHLORIDE 150 MG: 100 TABLET ORAL at 20:45

## 2025-02-16 RX ADMIN — ENOXAPARIN SODIUM 30 MG: 100 INJECTION SUBCUTANEOUS at 20:44

## 2025-02-16 RX ADMIN — PAROXETINE HYDROCHLORIDE 20 MG: 20 TABLET, FILM COATED ORAL at 09:18

## 2025-02-16 RX ADMIN — FAMOTIDINE 20 MG: 20 TABLET, FILM COATED ORAL at 09:18

## 2025-02-16 RX ADMIN — OXYCODONE AND ACETAMINOPHEN 1 TABLET: 7.5; 325 TABLET ORAL at 09:16

## 2025-02-16 RX ADMIN — SODIUM CHLORIDE, PRESERVATIVE FREE 10 ML: 5 INJECTION INTRAVENOUS at 20:53

## 2025-02-16 RX ADMIN — LOSARTAN POTASSIUM 50 MG: 50 TABLET, FILM COATED ORAL at 09:18

## 2025-02-16 ASSESSMENT — PAIN SCALES - GENERAL
PAINLEVEL_OUTOF10: 8
PAINLEVEL_OUTOF10: 0
PAINLEVEL_OUTOF10: 7
PAINLEVEL_OUTOF10: 8
PAINLEVEL_OUTOF10: 0

## 2025-02-16 ASSESSMENT — PAIN DESCRIPTION - PAIN TYPE
TYPE: SURGICAL PAIN
TYPE: SURGICAL PAIN

## 2025-02-16 ASSESSMENT — PAIN DESCRIPTION - DESCRIPTORS
DESCRIPTORS: ACHING;DISCOMFORT
DESCRIPTORS: ACHING
DESCRIPTORS: ACHING

## 2025-02-16 ASSESSMENT — PAIN DESCRIPTION - ORIENTATION
ORIENTATION: LEFT

## 2025-02-16 ASSESSMENT — PAIN - FUNCTIONAL ASSESSMENT
PAIN_FUNCTIONAL_ASSESSMENT: PREVENTS OR INTERFERES SOME ACTIVE ACTIVITIES AND ADLS
PAIN_FUNCTIONAL_ASSESSMENT: ACTIVITIES ARE NOT PREVENTED
PAIN_FUNCTIONAL_ASSESSMENT: ACTIVITIES ARE NOT PREVENTED

## 2025-02-16 ASSESSMENT — PAIN DESCRIPTION - LOCATION
LOCATION: FOOT

## 2025-02-16 ASSESSMENT — PAIN DESCRIPTION - ONSET: ONSET: PROGRESSIVE

## 2025-02-16 ASSESSMENT — PAIN SCALES - WONG BAKER: WONGBAKER_NUMERICALRESPONSE: NO HURT

## 2025-02-16 ASSESSMENT — PAIN DESCRIPTION - FREQUENCY
FREQUENCY: INTERMITTENT
FREQUENCY: INTERMITTENT

## 2025-02-16 NOTE — PLAN OF CARE
Problem: Chronic Conditions and Co-morbidities  Goal: Patient's chronic conditions and co-morbidity symptoms are monitored and maintained or improved  Outcome: Progressing     Problem: Discharge Planning  Goal: Discharge to home or other facility with appropriate resources  Outcome: Progressing     Problem: Pain  Goal: Verbalizes/displays adequate comfort level or baseline comfort level  Outcome: Progressing     Problem: Safety - Adult  Goal: Free from fall injury  Outcome: Progressing     Problem: Infection - Adult  Goal: Absence of infection at discharge  Outcome: Progressing     Problem: Metabolic/Fluid and Electrolytes - Adult  Goal: Glucose maintained within prescribed range  Outcome: Progressing

## 2025-02-16 NOTE — CONSULTS
[facility] - Consult Note    Date of  Admission: 2/11/2025  4:24 PM   Primary Care Physician:  Corrina Brush Jr., MD       Assessment:     Patient Active Problem List    Diagnosis Date Noted    MSSA bacteremia 02/13/2025    Anemia of chronic disease 02/12/2025    Acute osteomyelitis of left foot 02/12/2025    Benign prostatic hyperplasia without lower urinary tract symptoms 02/12/2025    Subacute osteomyelitis of left foot 04/12/2024    Hyperlipidemia 04/11/2024    Sleep apnea 04/11/2024    Diabetic ulcer of left foot due to type 2 diabetes mellitus (HCC) 04/11/2024    Non compliance w medication regimen 04/11/2024    Infected wound 05/23/2022    Obesity (BMI 35.0-39.9 without comorbidity) 05/23/2022    OM (osteomyelitis) 02/28/2022    Diabetic infection of left foot (HCC) 04/12/2021    Leukocytosis 02/04/2021    Wound infection 02/04/2021    Diabetic ulcer of left foot (HCC) 02/04/2021    Osteomyelitis 11/22/2020    Fracture, toe 09/24/2020    Altered mental status 09/01/2020    Multifocal pneumonia 09/01/2020    Orthostatic hypotension 12/25/2019    DM (diabetes mellitus), type 2 ([facility]) 12/25/2019    Syncope and collapse 12/24/2019    Vomiting 03/28/2018    Chronic abdominal pain 03/28/2018    Sepsis ([facility]) 03/21/2018    History of heart attack     Gastroparesis 09/07/2017    Atelectasis 09/07/2017    Abdominal pain 09/07/2017    Hypokalemia 09/07/2017    Nausea and vomiting 09/07/2017    Diabetic neuropathy ([facility]) 07/05/2017    Neuritis 07/05/2017    S/P lumbar fusion 07/05/2017    Spinal stenosis at L4-L5 level 06/19/2017    Coronary artery disease involving native coronary artery of native heart without angina pectoris 05/31/2017    History of coronary artery stent placement 05/31/2017    Synovial cyst 05/26/2017    Spondylolisthesis of lumbar region 05/26/2017    Lumbar spinal stenosis 05/26/2017    HNP (herniated nucleus pulposus), lumbar 05/26/2017    Positive PPD     Pain in left lower leg

## 2025-02-16 NOTE — PROGRESS NOTES
[facility]   Pharmacy Pharmacokinetic Monitoring Service - Vancomycin    Indication: Bone and Joint Infection  Target Concentration: Goal AUC/LOIS 400-600 mg*hr/L  Day of Therapy: 6  Additional Antimicrobials: none    Pertinent Laboratory Values:   Temp: 98.1 °F (36.7 °C), Weight - Scale: 108.9 kg (240 lb)  Recent Labs     02/16/25  0123   CREATININE 0.96   BUN 9       Estimated Creatinine Clearance: 95 mL/min (based on SCr of 0.96 mg/dL).    Pertinent Cultures:  Culture Date Source Results   2/11 blood NGTD   2/13 Wound, toe CoNS, GNR, bacteroides beta lactamase +ve   MRSA Nasal Swab: N/A. Non-respiratory infection    Assessment:  Date/Time Current Dose Concentration Timing of Concentration (h) AUC   2/11 @ 2330 2,000mg - - -   2/12 1,500mg q18h      2/13 1,500mg q18h 15.9 15 563   2/14 1,500mg q18h      2/15 1,500mg q18h      2/16 1,500mg q18h 24.8 12 630     Note: Serum concentrations collected for AUC dosing may appear elevated if collected in close proximity to the dose administered, this is not necessarily an indication of toxicity    Plan:  Reduce dose to 1500 mg q24h  Anticipated AUC/Tss: 489/12.6  Renal labs as indicated   No level ordered at this time  Pharmacy will continue to monitor patient and adjust therapy as indicated    Thank you for the consult,  Narayan Adair RPH  2/16/2025

## 2025-02-16 NOTE — PROGRESS NOTES
Pharmacy Note     Piperacillin/Tazobactam 4.5 gm once over 30 minutes followed by 3.375 gm q8h over 4 hrs ordered for treatment of Bone and Joint Infection. Wound culture positive for bacteroides beta lactamase positive. Spoke with provider recommending piperacillin/tazobactam pending ID recommendation on Monday.     Estimated Creatinine Clearance: Estimated Creatinine Clearance: 95 mL/min (based on SCr of 0.96 mg/dL).  Dialysis Status, ANDRIY, CKD: -    BMI:  Body mass index is 35.44 kg/m².    Rationale for Adjustment:  [facility] B-Lactam extended infusion policy    Pharmacy will continue to monitor and adjust dose as necessary.      Please call with any questions.    Thank you,  Narayan Adair RPH

## 2025-02-16 NOTE — PROGRESS NOTES
Oral Daily    polyethylene glycol (GLYCOLAX) packet 17 g  17 g Oral Daily    amLODIPine (NORVASC) tablet 5 mg  5 mg Oral Daily    famotidine (PEPCID) tablet 20 mg  20 mg Oral Daily    oxyCODONE-acetaminophen (PERCOCET) 7.5-325 MG per tablet 1 tablet  1 tablet Oral Q4H PRN    pantoprazole (PROTONIX) tablet 40 mg  40 mg Oral Daily    traZODone (DESYREL) tablet 150 mg  150 mg Oral Nightly    [Held by provider] aspirin EC tablet 81 mg  81 mg Oral Daily    insulin glargine (LANTUS) injection vial 10 Units  10 Units SubCUTAneous BID    sodium chloride flush 0.9 % injection 5-40 mL  5-40 mL IntraVENous 2 times per day    sodium chloride flush 0.9 % injection 5-40 mL  5-40 mL IntraVENous PRN    0.9 % sodium chloride infusion   IntraVENous PRN    potassium chloride (KLOR-CON M) extended release tablet 40 mEq  40 mEq Oral PRN    Or    potassium bicarb-citric acid (EFFER-K) effervescent tablet 40 mEq  40 mEq Oral PRN    Or    potassium chloride 10 mEq/100 mL IVPB (Peripheral Line)  10 mEq IntraVENous PRN    magnesium sulfate 2000 mg in 50 mL IVPB premix  2,000 mg IntraVENous PRN    enoxaparin Sodium (LOVENOX) injection 30 mg  30 mg SubCUTAneous BID    ondansetron (ZOFRAN-ODT) disintegrating tablet 4 mg  4 mg Oral Q8H PRN    Or    ondansetron (ZOFRAN) injection 4 mg  4 mg IntraVENous Q6H PRN    polyethylene glycol (GLYCOLAX) packet 17 g  17 g Oral Daily PRN    acetaminophen (TYLENOL) tablet 650 mg  650 mg Oral Q6H PRN    Or    acetaminophen (TYLENOL) suppository 650 mg  650 mg Rectal Q6H PRN    insulin lispro (HUMALOG,ADMELOG) injection vial 0-8 Units  0-8 Units SubCUTAneous 4x daily    glucose chewable tablet 16 g  4 tablet Oral PRN    dextrose bolus 10% 125 mL  125 mL IntraVENous PRN    Or    dextrose bolus 10% 250 mL  250 mL IntraVENous PRN    glucagon (rDNA) injection 1 mg  1 mg SubCUTAneous PRN    dextrose 10 % infusion   IntraVENous Continuous PRN    naloxone (NARCAN) injection 0.4 mg  0.4 mg IntraVENous PRN    tamsulosin  FOREFOOT was obtained with and without intravenous contrast. COMPARISON(S): Foot radiographs 2/6/2025, MRI left foot 4/12/2024 FINDINGS: Patient is noted is status post amputation of the first digit at the level of the MTP joint. Status post amputation of the second digit at the level of the MTP joint. Status post amputation of the fifth digit along the proximal shaft of the metatarsal. There is prominent STIR/T2 signal hyperintensity involving the third digit metatarsal and proximal phalanx with associated relative T1 hypointense signal involving the metatarsal head/neck and base/proximal shaft of the proximal phalanx. There is associated enhancement within these regions. There is subluxation/dislocation of the proximal phalanx relative to the metatarsal head, with the proximal phalanx dorsally and proximally displaced relative to the metatarsal head. There is a peripherally enhancing fluid collection extending from the level of the metatarsal head to the proximal phalanx measuring at least 2.5 x 2.4 x 1.0 cm (series 10, image #17; series 11, image #31). There is likely communication with the plantar ulceration at this level. Constellation of findings are suggestive of acute osteomyelitis with soft tissue abscess and underlying plantar ulceration. Additional area of plantar ulceration suspected in the region of the mid and distal phalanxes of the third digit with lack of enhancing tissue suggestive of devitalization. There is possible small joint effusion involving the DIP joint in this region. There is chronic deformity involving the distal aspect of the second metatarsal suggestive of remote trauma and/or prior infectious process. There is faint edema-like signal involving the second metatarsal beginning at the midshaft extending to the metatarsal head without definite loss of the intrinsic T1 signal intensity. There is faint enhancement noted. There is STIR signal hyperintensity throughout the second metatarsal

## 2025-02-17 LAB
BACTERIA SPEC CULT: ABNORMAL
BACTERIA SPEC CULT: ABNORMAL
BACTERIA SPEC CULT: NORMAL
GLUCOSE BLD STRIP.AUTO-MCNC: 114 MG/DL (ref 70–110)
GLUCOSE BLD STRIP.AUTO-MCNC: 119 MG/DL (ref 70–110)
GLUCOSE BLD STRIP.AUTO-MCNC: 155 MG/DL (ref 70–110)
GLUCOSE BLD STRIP.AUTO-MCNC: 210 MG/DL (ref 70–110)
GRAM STN SPEC: ABNORMAL
GRAM STN SPEC: ABNORMAL
SERVICE CMNT-IMP: ABNORMAL
SERVICE CMNT-IMP: NORMAL

## 2025-02-17 PROCEDURE — 99232 SBSQ HOSP IP/OBS MODERATE 35: CPT | Performed by: INTERNAL MEDICINE

## 2025-02-17 PROCEDURE — 2500000003 HC RX 250 WO HCPCS: Performed by: INTERNAL MEDICINE

## 2025-02-17 PROCEDURE — 2580000003 HC RX 258: Performed by: INTERNAL MEDICINE

## 2025-02-17 PROCEDURE — 6370000000 HC RX 637 (ALT 250 FOR IP): Performed by: INTERNAL MEDICINE

## 2025-02-17 PROCEDURE — 6360000002 HC RX W HCPCS: Performed by: INTERNAL MEDICINE

## 2025-02-17 PROCEDURE — 1100000003 HC PRIVATE W/ TELEMETRY

## 2025-02-17 PROCEDURE — 6370000000 HC RX 637 (ALT 250 FOR IP): Performed by: STUDENT IN AN ORGANIZED HEALTH CARE EDUCATION/TRAINING PROGRAM

## 2025-02-17 PROCEDURE — 82962 GLUCOSE BLOOD TEST: CPT

## 2025-02-17 PROCEDURE — 94761 N-INVAS EAR/PLS OXIMETRY MLT: CPT

## 2025-02-17 RX ADMIN — ENOXAPARIN SODIUM 30 MG: 100 INJECTION SUBCUTANEOUS at 08:24

## 2025-02-17 RX ADMIN — VANCOMYCIN HYDROCHLORIDE 1500 MG: 10 INJECTION, POWDER, LYOPHILIZED, FOR SOLUTION INTRAVENOUS at 06:18

## 2025-02-17 RX ADMIN — INSULIN GLARGINE 10 UNITS: 100 INJECTION, SOLUTION SUBCUTANEOUS at 21:14

## 2025-02-17 RX ADMIN — LOSARTAN POTASSIUM 50 MG: 50 TABLET, FILM COATED ORAL at 08:25

## 2025-02-17 RX ADMIN — PIPERACILLIN AND TAZOBACTAM 3375 MG: 3; .375 INJECTION, POWDER, LYOPHILIZED, FOR SOLUTION INTRAVENOUS at 05:00

## 2025-02-17 RX ADMIN — PAROXETINE HYDROCHLORIDE 20 MG: 20 TABLET, FILM COATED ORAL at 08:25

## 2025-02-17 RX ADMIN — AMLODIPINE BESYLATE 5 MG: 5 TABLET ORAL at 08:25

## 2025-02-17 RX ADMIN — FAMOTIDINE 20 MG: 20 TABLET, FILM COATED ORAL at 08:25

## 2025-02-17 RX ADMIN — SODIUM CHLORIDE, PRESERVATIVE FREE 10 ML: 5 INJECTION INTRAVENOUS at 08:26

## 2025-02-17 RX ADMIN — OXYCODONE AND ACETAMINOPHEN 1 TABLET: 7.5; 325 TABLET ORAL at 08:28

## 2025-02-17 RX ADMIN — PANTOPRAZOLE SODIUM 40 MG: 40 TABLET, DELAYED RELEASE ORAL at 08:25

## 2025-02-17 RX ADMIN — TAMSULOSIN HYDROCHLORIDE 0.4 MG: 0.4 CAPSULE ORAL at 08:25

## 2025-02-17 RX ADMIN — ENOXAPARIN SODIUM 30 MG: 100 INJECTION SUBCUTANEOUS at 21:14

## 2025-02-17 RX ADMIN — OXYCODONE AND ACETAMINOPHEN 1 TABLET: 7.5; 325 TABLET ORAL at 21:11

## 2025-02-17 RX ADMIN — INSULIN LISPRO 2 UNITS: 100 INJECTION, SOLUTION INTRAVENOUS; SUBCUTANEOUS at 21:14

## 2025-02-17 RX ADMIN — PIPERACILLIN AND TAZOBACTAM 3375 MG: 3; .375 INJECTION, POWDER, LYOPHILIZED, FOR SOLUTION INTRAVENOUS at 16:53

## 2025-02-17 RX ADMIN — INSULIN GLARGINE 10 UNITS: 100 INJECTION, SOLUTION SUBCUTANEOUS at 08:24

## 2025-02-17 RX ADMIN — SODIUM CHLORIDE, PRESERVATIVE FREE 10 ML: 5 INJECTION INTRAVENOUS at 21:15

## 2025-02-17 RX ADMIN — OXYCODONE AND ACETAMINOPHEN 1 TABLET: 7.5; 325 TABLET ORAL at 16:38

## 2025-02-17 RX ADMIN — TRAZODONE HYDROCHLORIDE 150 MG: 100 TABLET ORAL at 21:12

## 2025-02-17 ASSESSMENT — PAIN - FUNCTIONAL ASSESSMENT
PAIN_FUNCTIONAL_ASSESSMENT: ACTIVITIES ARE NOT PREVENTED

## 2025-02-17 ASSESSMENT — PAIN DESCRIPTION - ONSET
ONSET: ON-GOING
ONSET: ON-GOING

## 2025-02-17 ASSESSMENT — PAIN DESCRIPTION - LOCATION
LOCATION: FOOT

## 2025-02-17 ASSESSMENT — PAIN DESCRIPTION - FREQUENCY
FREQUENCY: INTERMITTENT
FREQUENCY: INTERMITTENT

## 2025-02-17 ASSESSMENT — PAIN DESCRIPTION - PAIN TYPE
TYPE: SURGICAL PAIN

## 2025-02-17 ASSESSMENT — PAIN DESCRIPTION - ORIENTATION
ORIENTATION: LEFT

## 2025-02-17 ASSESSMENT — PAIN SCALES - GENERAL
PAINLEVEL_OUTOF10: 0
PAINLEVEL_OUTOF10: 8
PAINLEVEL_OUTOF10: 0
PAINLEVEL_OUTOF10: 0
PAINLEVEL_OUTOF10: 8
PAINLEVEL_OUTOF10: 7

## 2025-02-17 ASSESSMENT — PAIN SCALES - WONG BAKER
WONGBAKER_NUMERICALRESPONSE: NO HURT

## 2025-02-17 ASSESSMENT — PAIN DESCRIPTION - DESCRIPTORS
DESCRIPTORS: ACHING
DESCRIPTORS: ACHING
DESCRIPTORS: ACHING;THROBBING

## 2025-02-17 ASSESSMENT — PAIN DESCRIPTION - DIRECTION: RADIATING_TOWARDS: NO

## 2025-02-17 NOTE — PROGRESS NOTES
Podiatry Progress Note    Patient: Vinay Benson               Sex: male          DOA: [unfilled]       YOB: 1960      Age:  64 y.o.        LOS:  LOS: 6 days     POD 4 Days Post-Op  Procedure:   Procedure(s):  LEFT FOOT INCISION AND DRAINAGE; IRRIGATION AND DEBRIDEMENT PARTIAL THIRD RAY RESECTION WITH ANTIBIOTIC BEADS PLACEMENT      Subjective:     Patient seen resting quiet and comfortably and no apparent distress.  Patient has intact dressings to left foot. Denies chest pain or SOB. Denies N/V/C/F.     Objective:       Physical Exam:  Left foot partial third ray open amputation stump with antibiotic beads. Proximal incision skin edges well approximated, skin sutures intact. Mild maceration at amputation stump. No erythema or edema noted. No drainage noted.    Assessment:     Principal Problem (Resolved):    Cellulitis  Active Problems:    Diabetic infection of left foot (HCC)    Osteomyelitis    Coronary artery disease due to lipid rich plaque    Hypertension    DM (diabetes mellitus), type 2 (HCC)    GERD (gastroesophageal reflux disease)    Anemia of chronic disease    Acute osteomyelitis of left foot    Benign prostatic hyperplasia without lower urinary tract symptoms    MSSA bacteremia    Sinus bradycardia    Atrial flutter (HCC)  Resolved Problems:    Acquired hypothyroidism      Plan/Recommendations/Medical Decision Making:     - Surgical site dressed with betadine soaked adaptic, dry gauze, ace bandage.   - Remain NWB to left forefoot.   - Follow OR culture. Antibiotics per ID recommendation.   - Patient is stable to d/c from foot standpoint. Will see him outpatient in 1 week.

## 2025-02-17 NOTE — PROGRESS NOTES
[facility] Hospitalist Group  Progress Note    Patient: Vinay Benson Age: 64 y.o. : 1960 MR#: [id number] SSN: [id number]  Date: 2025                 DVT Prophylaxis: x Lovenox hep SQ SCDs Coumadin   on Heparin gtt PO anticoagulant    Anticipated discharge: 2025 or February 15, 2025 to home, after debridement     Subjective: Patient is lying in bed, alert awake oriented has some pain but no chest pain no shortness of breath no palpitation no dizziness  He is curious about cardiac arrhythmia which we are looking into it especially bradycardia with pauses       Objective:   VS: BP (!) 141/72   Pulse (!) 49   Temp 98.2 °F (36.8 °C) (Oral)   Resp 19   Ht 1.753 m (5' 9\")   Wt 108.9 kg (240 lb)   SpO2 95%   BMI 35.44 kg/m²    Tmax/24hrs: Temp (24hrs), Av.2 °F (36.8 °C), Min:97.7 °F (36.5 °C), Max:99 °F (37.2 °C)  No intake or output data in the 24 hours ending 25 1404       PHYSICAL EXAM  General Appearance: No acute distress; obese appearing  HENT: normocephalic/atraumatic, moist mucus membranes  Neck: No JVD, supple  Lungs: CTA with normal respiratory effort  CV: Bradycardic but rhythm regular    Abdomen: soft, nondistended  : no suprapubic tenderness   Extremities: Right transmetatarsal amputation; left hallux amputation  Neuro: Alert and oriented  Skin: Wound on the plantar aspect of the left foot with malodorous discharge, covered by dressing  Psych: appropriate mood and affect    Current Facility-Administered Medications   Medication Dose Route Frequency    piperacillin-tazobactam (ZOSYN) 3,375 mg in sodium chloride 0.9 % 50 mL IVPB (mini-bag)  3,375 mg IntraVENous Q8H    vancomycin (VANCOCIN) 1500 mg in sodium chloride 0.9% 500 mL IVPB  1,500 mg IntraVENous Q24H    PARoxetine (PAXIL) tablet 20 mg  20 mg Oral Daily    polyethylene glycol (GLYCOLAX) packet 17 g  17 g Oral Daily    amLODIPine (NORVASC) tablet 5 mg  5 mg Oral Daily

## 2025-02-17 NOTE — PROGRESS NOTES
4 Eyes Skin Assessment     NAME:  Vinay Benson  YOB: 1960  MEDICAL RECORD NUMBER:  [id number]    The patient is being assessed for  Shift Handoff    I agree that at least one RN has performed a thorough Head to Toe Skin Assessment on the patient. ALL assessment sites listed below have been assessed.      Areas assessed by both nurses:    Head, Face, Ears, Shoulders, Back, Chest, Arms, Elbows, Hands, and Sacrum. Buttock, Coccyx, Ischium        Does the Patient have a Wound? No noted wound(s)       Cali Prevention initiated by RN: No  Wound Care Orders initiated by RN: No    Pressure Injury (Stage 3,4, Unstageable, DTI, NWPT, and Complex wounds) if present, place Wound referral order by RN under : No    New Ostomies, if present place, Ostomy referral order under : No     Nurse 1 eSignature: Electronically signed by KANIKA GALINDO RN on 2/16/25 at 7:58 PM EST    **SHARE this note so that the co-signing nurse can place an eSignature**    Nurse 2 eSignature: {Esignature:[id number]}

## 2025-02-17 NOTE — PROGRESS NOTES
Patient not using facility or home CPAP at this time. Will D/C order at this time as he has not been using it for multiple nights.    Pt is in no respiratory distress. Appears to be resting comfortably.

## 2025-02-17 NOTE — PLAN OF CARE
Problem: Chronic Conditions and Co-morbidities  Goal: Patient's chronic conditions and co-morbidity symptoms are monitored and maintained or improved  2/17/2025 0803 by Joanne Ying RN  Outcome: Progressing  2/17/2025 0329 by Becka Gray RN  Outcome: Progressing     Problem: Discharge Planning  Goal: Discharge to home or other facility with appropriate resources  2/17/2025 0329 by Becka Gray RN  Outcome: Progressing  Note: Discharge planning in progress.     Problem: Pain  Goal: Verbalizes/displays adequate comfort level or baseline comfort level  2/17/2025 0803 by Joanne Ying RN  Outcome: Progressing  Note: Administer pain meds every 4 hours as needed  Alternative measures such as elevating affective limbs  Heat therapy  Change position as needed  2/17/2025 0329 by Becka Gray RN  Outcome: Progressing  Note: Incisional pain well controlled with percocet.     Problem: Safety - Adult  Goal: Free from fall injury  2/17/2025 0329 by Becka Gray RN  Outcome: Progressing  Note: Safety measures have been implemented.     Problem: Infection - Adult  Goal: Absence of infection at discharge  2/17/2025 0803 by Joanne Ying RN  Outcome: Progressing  2/17/2025 0329 by Becka Gray RN  Outcome: Progressing  Note: Pt receiving 2 antibiotics; pt is asymptomatic.

## 2025-02-17 NOTE — PROGRESS NOTES
Cardiology Progress Note    Admit Date: 2/11/2025  Attending Cardiologist: Dr. Martinez    IMPRESSION  Bradycardia, 2D echo 2/13/2025 ejection fraction 55 to 60% grade 3 diastolic dysfunction  Pauses, EKG 2/11/2025 sinus rhythm first-degree AV block, low voltage QRS, nonspecific T wave abnormality, TSH within normal limits from 2/13/2025, EKG sinus with Mobitz I with 2:1 versus Mobitz II  Coronary artery disease, history of PCI drug-eluting stent to OM1 in 2009  Paroxysmal atrial flutter new onset  Coronary risk equivalent diabetes mellitus  Hypertension - Stage II pressure systolic with normotensive diastolic high pulse pressure  Obesity     PLAN  Recommend avoiding AV edmund blocking agents, telemetry monitoring.  Monitor blood pressure, adjust antihypertensive medications as necessary.  Continue Norvasc 5 mg p.o. daily, continue losartan 50 mg p.o. daily  Aspirin 81 mg p.o. daily currently held, Start eliquis 5mg po BID on discharge for primary prevention of stroke with new onset paroxysmal atrial flutter, discussed risks of bleeding and risk of stroke and verbalized understanding all questions answered.  [id number] score at least 3 for CAD, HTN, HTN.    Patient without symptoms of lightheadedness dizziness or syncope at this time, to be evaluated further by EP colleague, Dr. Macdonald, tomorrow.    Statin if can tolerate prior to discharge.        Primary cardiologist Dr. Velez    Subjective:     Denies chest pain  Denies shortness of breath  Denies abdominal pain    Objective:      Patient Vitals for the past 8 hrs:   Temp Pulse Resp BP   02/17/25 0700 -- (!) 42 -- --   02/17/25 0500 -- 61 -- --   02/17/25 0456 98.2 °F (36.8 °C) 60 18 (!) 170/85   02/17/25 0100 -- 68 -- --         Patient Vitals for the past 96 hrs:   Weight   02/13/25 0756 108.9 kg (240 lb)         No intake or output data in the 24 hours ending 02/17/25 0745    EXAMINATION:  General:         Alert, cooperative, no distress  Head:  Normocephalic,  without obvious abnormality, atraumatic.  Eyes:   Conjunctivae/corneas clear  Lungs:            Clear to auscultation bilaterally, no wheezes, no rales, no rhonchi  Heart:  Regular rate and rhythm, S1, S2 normal, no murmur, click, rub or gallop.  Abdomen:      Soft, non-tender. Bowel sounds normal.   Extremities:Extremities normal, edema le bl  Skin:    No rashes or lesions visible extremities  Neurologic:     Normal strength, sensation    Visit Vitals  BP (!) 170/85   Pulse (!) 42   Temp 98.2 °F (36.8 °C) (Oral)   Resp 18   Ht 1.753 m (5' 9\")   Wt 108.9 kg (240 lb)   SpO2 96%   BMI 35.44 kg/m²       Data Review:     Labs: Results:       Chemistry Recent Labs     02/16/25  0123      K 3.6      CO2 30   BUN 9      CBC w/Diff No results for input(s): \"WBC\", \"RBC\", \"HGB\", \"HCT\", \"PLT\" in the last 72 hours.    Invalid input(s): \"GRANS\", \"LYMPH\", \"EOS\"   Cardiac Enzymes @[id number](CPK:*,CK:*,CPKMB:*,CKRMB:*,CKMMB:*,CKMB:*,RCK3:*,CKMBT:*,CKMBPC:*,CKSMB:*,CKNDX:*,CKND1:*,DONELL:*,TROQR:*,TROPT:*,TROIQ:*,TROIP:*,BEN:*,TROPIT:*,TROPT:*,TRPOIT:*,ITNL:*,TNIPOC:*,BNP:*,BNPP:*,PBNP:*)@   Coagulation No results for input(s): \"INR\", \"APTT\" in the last 72 hours.    Invalid input(s): \"PTP\"    Lipid Panel Lab Results   Component Value Date/Time    CHOL 126 02/06/2021 05:01 AM    HDL 20 02/06/2021 05:01 AM    LDL 86 02/06/2021 05:01 AM    VLDL 20 02/06/2021 05:01 AM      BNP No results found for: \"BNP\", \"BNPPOC\"   Liver Enzymes No results for input(s): \"TP\" in the last 72 hours.    Invalid input(s): \"ALB\", \"TBIL\", \"AP\", \"SGOT\", \"GPT\", \"DBIL\"   Thyroid Studies Lab Results   Component Value Date/Time    TSH 3.60 02/13/2025 03:34 AM          Signed By: CADEN DORAN MD     February 17, 2025

## 2025-02-17 NOTE — PLAN OF CARE
Problem: Chronic Conditions and Co-morbidities  Goal: Patient's chronic conditions and co-morbidity symptoms are monitored and maintained or improved  2/17/2025 0329 by Becka Gray RN  Outcome: Progressing  Flowsheets (Taken 2/16/2025 2010)  Care Plan - Patient's Chronic Conditions and Co-Morbidity Symptoms are Monitored and Maintained or Improved:   Monitor and assess patient's chronic conditions and comorbid symptoms for stability, deterioration, or improvement   Collaborate with multidisciplinary team to address chronic and comorbid conditions and prevent exacerbation or deterioration   Update acute care plan with appropriate goals if chronic or comorbid symptoms are exacerbated and prevent overall improvement and discharge  2/16/2025 1642 by Joanne Ying RN  Outcome: Progressing  Flowsheets (Taken 2/16/2025 0731)  Care Plan - Patient's Chronic Conditions and Co-Morbidity Symptoms are Monitored and Maintained or Improved: Monitor and assess patient's chronic conditions and comorbid symptoms for stability, deterioration, or improvement     Problem: Discharge Planning  Goal: Discharge to home or other facility with appropriate resources  2/17/2025 0329 by Becka Gray RN  Outcome: Progressing  Flowsheets (Taken 2/16/2025 2010)  Discharge to home or other facility with appropriate resources:   Identify barriers to discharge with patient and caregiver   Arrange for needed discharge resources and transportation as appropriate   Identify discharge learning needs (meds, wound care, etc)   Refer to discharge planning if patient needs post-hospital services based on physician order or complex needs related to functional status, cognitive ability or social support system  Note: Discharge planning in progress.  2/16/2025 1642 by Joanne Ying RN  Outcome: Progressing  Flowsheets (Taken 2/16/2025 0731)  Discharge to home or other facility with appropriate resources: Identify barriers to

## 2025-02-17 NOTE — PROGRESS NOTES
Infectious Disease progress Note        Reason: left foot infection    Current abx Prior abx   vancomycin since 2/11  Pip/tazo since 2/16 Cefepime,      Lines:       Assessment :  64 y.o. male with h/o type uncontrolled type 2 DM  , CAD who presented to [facility] on 2/11/25 for left foot infection.      Hospitalization at [facility] November 2020 for right great toe distal phalanx chronic osteomyelitis, septic arthritis right great toe interphalangeal joint   Wound cultures 9/2020-Enterobacter, MRSA  Status post right great toe amputation on 11/24.       Hospitalization at [facility] 2/2021 for acute on chronic osteomyelitis right first metatarsal osteomyelitis.    Status post incision and debridement, partial resection of right first metatarsal on 2/5/2021.  Intra-Op cultures: Methicillin susceptible Staphylococcus aureus, Bacteroides. Bone biopsy of clean margins reveal acute inflammation suggestive of acute osteomyelitis. S/p ertapenem, daptomycin till 3/19/2021     Hospitalization at [facility] 4/2021 for  chronic osteomyelitis right second metatarsal head, infected right lateral foot ulcer Status post transmetatarsal amputation 4/13/2021.  Intra-Op findings discussed with podiatrist.  Grossly clean margins achieved at surgery.  Bone biopsy, clean margins negative for acute osteomyelitis.  Intra-Op cultures no organisms seen.     Hospitalization at [facility] 2/2022 for right foot cellulitis, infected right lateral foot ulcer, chronic osteomyelitis left first distal phalanx     Status post incision and debridement bilateral feet ulcer, partial amputation left great toe on 3/1/2022.    Wound culture right foot 2/28-MRSA, pseudomonas, proteus (susceptibilities of MRSA noted)  Intra-Op wound culture 3/1/2022-MRSA, Proteus     Hospitalization [facility] April 2024 for subacute osteomyelitis left great toe, infected left great toe ulcer  Status post left great toe amputation on 4/12/2024.  grossly clean bone margins achieved at surgery  IntraOp cultures  correct all the mistakes, some may have been missed, and remained in the body of the document. If questions arise, please contact our department.    Dr. Megan Johnson, Infectious Disease Specialist  [phone]  February 17, 2025  8:41 AM

## 2025-02-18 LAB
BACTERIA SPEC CULT: NORMAL
GLUCOSE BLD STRIP.AUTO-MCNC: 127 MG/DL (ref 70–110)
GLUCOSE BLD STRIP.AUTO-MCNC: 127 MG/DL (ref 70–110)
GLUCOSE BLD STRIP.AUTO-MCNC: 139 MG/DL (ref 70–110)
GLUCOSE BLD STRIP.AUTO-MCNC: 172 MG/DL (ref 70–110)
SERVICE CMNT-IMP: NORMAL

## 2025-02-18 PROCEDURE — 99232 SBSQ HOSP IP/OBS MODERATE 35: CPT | Performed by: INTERNAL MEDICINE

## 2025-02-18 PROCEDURE — 82962 GLUCOSE BLOOD TEST: CPT

## 2025-02-18 PROCEDURE — 1100000003 HC PRIVATE W/ TELEMETRY

## 2025-02-18 PROCEDURE — 99222 1ST HOSP IP/OBS MODERATE 55: CPT | Performed by: INTERNAL MEDICINE

## 2025-02-18 PROCEDURE — 6360000002 HC RX W HCPCS: Performed by: INTERNAL MEDICINE

## 2025-02-18 PROCEDURE — 2580000003 HC RX 258: Performed by: INTERNAL MEDICINE

## 2025-02-18 PROCEDURE — 6370000000 HC RX 637 (ALT 250 FOR IP): Performed by: STUDENT IN AN ORGANIZED HEALTH CARE EDUCATION/TRAINING PROGRAM

## 2025-02-18 PROCEDURE — 2500000003 HC RX 250 WO HCPCS: Performed by: INTERNAL MEDICINE

## 2025-02-18 PROCEDURE — 6370000000 HC RX 637 (ALT 250 FOR IP): Performed by: INTERNAL MEDICINE

## 2025-02-18 PROCEDURE — 99254 IP/OBS CNSLTJ NEW/EST MOD 60: CPT | Performed by: INTERNAL MEDICINE

## 2025-02-18 RX ADMIN — INSULIN GLARGINE 10 UNITS: 100 INJECTION, SOLUTION SUBCUTANEOUS at 09:01

## 2025-02-18 RX ADMIN — OXYCODONE AND ACETAMINOPHEN 1 TABLET: 7.5; 325 TABLET ORAL at 16:23

## 2025-02-18 RX ADMIN — VANCOMYCIN HYDROCHLORIDE 1500 MG: 10 INJECTION, POWDER, LYOPHILIZED, FOR SOLUTION INTRAVENOUS at 06:34

## 2025-02-18 RX ADMIN — LOSARTAN POTASSIUM 50 MG: 50 TABLET, FILM COATED ORAL at 09:00

## 2025-02-18 RX ADMIN — OXYCODONE AND ACETAMINOPHEN 1 TABLET: 7.5; 325 TABLET ORAL at 21:00

## 2025-02-18 RX ADMIN — ENOXAPARIN SODIUM 30 MG: 100 INJECTION SUBCUTANEOUS at 09:01

## 2025-02-18 RX ADMIN — PAROXETINE HYDROCHLORIDE 20 MG: 20 TABLET, FILM COATED ORAL at 09:01

## 2025-02-18 RX ADMIN — SODIUM CHLORIDE, PRESERVATIVE FREE 10 ML: 5 INJECTION INTRAVENOUS at 09:05

## 2025-02-18 RX ADMIN — PIPERACILLIN AND TAZOBACTAM 3375 MG: 3; .375 INJECTION, POWDER, LYOPHILIZED, FOR SOLUTION INTRAVENOUS at 21:13

## 2025-02-18 RX ADMIN — AMLODIPINE BESYLATE 5 MG: 5 TABLET ORAL at 09:01

## 2025-02-18 RX ADMIN — PIPERACILLIN AND TAZOBACTAM 3375 MG: 3; .375 INJECTION, POWDER, LYOPHILIZED, FOR SOLUTION INTRAVENOUS at 13:33

## 2025-02-18 RX ADMIN — TAMSULOSIN HYDROCHLORIDE 0.4 MG: 0.4 CAPSULE ORAL at 09:00

## 2025-02-18 RX ADMIN — PIPERACILLIN AND TAZOBACTAM 3375 MG: 3; .375 INJECTION, POWDER, LYOPHILIZED, FOR SOLUTION INTRAVENOUS at 02:26

## 2025-02-18 RX ADMIN — TRAZODONE HYDROCHLORIDE 150 MG: 100 TABLET ORAL at 21:01

## 2025-02-18 RX ADMIN — SODIUM CHLORIDE, PRESERVATIVE FREE 10 ML: 5 INJECTION INTRAVENOUS at 21:01

## 2025-02-18 RX ADMIN — PANTOPRAZOLE SODIUM 40 MG: 40 TABLET, DELAYED RELEASE ORAL at 09:00

## 2025-02-18 RX ADMIN — FAMOTIDINE 20 MG: 20 TABLET, FILM COATED ORAL at 09:01

## 2025-02-18 RX ADMIN — INSULIN GLARGINE 10 UNITS: 100 INJECTION, SOLUTION SUBCUTANEOUS at 21:03

## 2025-02-18 RX ADMIN — ENOXAPARIN SODIUM 30 MG: 100 INJECTION SUBCUTANEOUS at 21:02

## 2025-02-18 ASSESSMENT — PAIN DESCRIPTION - FREQUENCY
FREQUENCY: CONTINUOUS
FREQUENCY: INTERMITTENT
FREQUENCY: CONTINUOUS
FREQUENCY: CONTINUOUS

## 2025-02-18 ASSESSMENT — PAIN SCALES - GENERAL
PAINLEVEL_OUTOF10: 0
PAINLEVEL_OUTOF10: 8
PAINLEVEL_OUTOF10: 0
PAINLEVEL_OUTOF10: 4
PAINLEVEL_OUTOF10: 4
PAINLEVEL_OUTOF10: 0
PAINLEVEL_OUTOF10: 8
PAINLEVEL_OUTOF10: 0
PAINLEVEL_OUTOF10: 0

## 2025-02-18 ASSESSMENT — PAIN DESCRIPTION - ORIENTATION
ORIENTATION: LEFT

## 2025-02-18 ASSESSMENT — PAIN SCALES - WONG BAKER
WONGBAKER_NUMERICALRESPONSE: NO HURT

## 2025-02-18 ASSESSMENT — PAIN DESCRIPTION - PAIN TYPE
TYPE: SURGICAL PAIN

## 2025-02-18 ASSESSMENT — PAIN DESCRIPTION - LOCATION
LOCATION: FOOT

## 2025-02-18 ASSESSMENT — PAIN - FUNCTIONAL ASSESSMENT
PAIN_FUNCTIONAL_ASSESSMENT: ACTIVITIES ARE NOT PREVENTED

## 2025-02-18 ASSESSMENT — PAIN DESCRIPTION - DESCRIPTORS
DESCRIPTORS: ACHING;SHARP
DESCRIPTORS: ACHING
DESCRIPTORS: ACHING;SHARP
DESCRIPTORS: ACHING;SHARP

## 2025-02-18 ASSESSMENT — PAIN DESCRIPTION - ONSET: ONSET: ON-GOING

## 2025-02-18 NOTE — CARE COORDINATION
[facility] has accepted, will call [facility] to follow up on IV abx.     Update:   [facility] does not assist with line care. The patient does not have an accepting  agency.     Another option will be for him to go to a nearby [facility] infusion to assist with dressing changes/ picc line care.       SHELBY Powell

## 2025-02-18 NOTE — PROGRESS NOTES
[facility]   Pharmacy Pharmacokinetic Monitoring Service - Vancomycin    Indication: Bone and Joint Infection  Target Concentration: Goal AUC/LOIS 400-600 mg*hr/L  Day of Therapy: 8  Additional Antimicrobials: Zosyn    Pertinent Laboratory Values:   Temp: 98.4 °F (36.9 °C), Weight - Scale: 108.9 kg (240 lb)  Recent Labs     02/16/25  0123   CREATININE 0.96   BUN 9       Estimated Creatinine Clearance: 95 mL/min (based on SCr of 0.96 mg/dL).    Pertinent Cultures:  Culture Date Source Results   2/11 blood NGTD   2/13 Wound, toe CoNS, GNR, bacteroides beta lactamase +ve   MRSA Nasal Swab: N/A. Non-respiratory infection    Assessment:  Date/Time Current Dose Concentration Timing of Concentration (h) AUC   2/11 @ 2330 2,000mg - - -   2/12 1,500mg q18h      2/13 1,500mg q18h 15.9 15 563   2/14 1,500mg q18h      2/15 1,500mg q18h      2/16 1,500mg q18h 24.8 12 630   2/17 1,500mg q24h      2/18 1,500mg q24h        Note: Serum concentrations collected for AUC dosing may appear elevated if collected in close proximity to the dose administered, this is not necessarily an indication of toxicity    Plan:  Continue dose of vancomycin 1500 mg q24h  Anticipated AUC/Tss = 476/12.1  BMP ordered for tomorrow with AM labs   Vancomycin level ordered for tomorrow with AM labs  Pharmacy will continue to monitor patient and adjust therapy as indicated    Thank you for the consult,  Selin Bueno RPH  2/18/2025

## 2025-02-18 NOTE — PROGRESS NOTES
4 Eyes Skin Assessment     NAME:  Vinay Benson  YOB: 1960  MEDICAL RECORD NUMBER:  [id number]    The patient is being assessed for  Shift Handoff    I agree that at least one RN has performed a thorough Head to Toe Skin Assessment on the patient. ALL assessment sites listed below have been assessed.      Areas assessed by both nurses:    Head, Face, Ears, Shoulders, Back, Chest, Arms, Elbows, Hands, Sacrum. Buttock, Coccyx, Ischium, Legs. Feet and Heels, and Under Medical Devices         Does the Patient have a Wound? Yes wound(s) were present on assessment. LDA wound assessment was Initiated and completed by RN       Cali Prevention initiated by RN: Yes  Wound Care Orders initiated by RN: No    Pressure Injury (Stage 3,4, Unstageable, DTI, NWPT, and Complex wounds) if present, place Wound referral order by RN under : No    New Ostomies, if present place, Ostomy referral order under : No     Nurse 1 eSignature: Electronically signed by Katelyn Gomez RN on 2/18/25 at 7:54 AM EST    **SHARE this note so that the co-signing nurse can place an eSignature**    Nurse 2 eSignature: {Esignature:[id number]} EYES

## 2025-02-18 NOTE — PROGRESS NOTES
Essential hypertension, benign     GERD (gastroesophageal reflux disease)     Heroin abuse (HCC) 05/26/16    Psychiatrist Dr. Natalya Reeves     History of heart attack     Hyperlipidemia     Intermediate coronary syndrome (HCC)     MDD (major depressive disorder) 5/26/16    Psychiatrist Dr. Natalya Reeves     Myocardial infarction ([facility])     Obesity, unspecified     Old myocardial infarction     Other and unspecified hyperlipidemia     Pancreatitis     Positive PPD     Sleep apnea     uses Cpap machine    Transfusion history     Before 1992       Past Surgical History:   Procedure Laterality Date    APPENDECTOMY      CORONARY ANGIOPLASTY WITH STENT PLACEMENT  2010    2 stents    FOOT DEBRIDEMENT Left 2/13/2025    LEFT FOOT INCISION AND DRAINAGE; IRRIGATION AND DEBRIDEMENT PARTIAL THIRD RAY RESECTION WITH ANTIBIOTIC BEADS PLACEMENT performed by Jv Nielson DPM at [facility] MAIN OR    TOE AMPUTATION Left 4/12/2024    Left foot amputation of remaining great toe and possible part of metatarsal, tibial and fibular sesamoids performed by Jv Nielson DPM at [facility] MAIN OR       [unfilled]    Current Facility-Administered Medications   Medication Dose Route Frequency    piperacillin-tazobactam (ZOSYN) 3,375 mg in sodium chloride 0.9 % 50 mL IVPB (mini-bag)  3,375 mg IntraVENous Q8H    vancomycin (VANCOCIN) 1500 mg in sodium chloride 0.9% 500 mL IVPB  1,500 mg IntraVENous Q24H    PARoxetine (PAXIL) tablet 20 mg  20 mg Oral Daily    polyethylene glycol (GLYCOLAX) packet 17 g  17 g Oral Daily    amLODIPine (NORVASC) tablet 5 mg  5 mg Oral Daily    famotidine (PEPCID) tablet 20 mg  20 mg Oral Daily    oxyCODONE-acetaminophen (PERCOCET) 7.5-325 MG per tablet 1 tablet  1 tablet Oral Q4H PRN    pantoprazole (PROTONIX) tablet 40 mg  40 mg Oral Daily    traZODone (DESYREL) tablet 150 mg  150 mg Oral Nightly    [Held by provider] aspirin EC tablet 81 mg  81 mg Oral Daily    insulin glargine (LANTUS) injection vial 10  level: Not on file   Occupational History    Not on file   Tobacco Use    Smoking status: Never    Smokeless tobacco: Never   Substance and Sexual Activity    Alcohol use: No    Drug use: No    Sexual activity: Not on file   Other Topics Concern    Not on file   Social History Narrative    Not on file     Social Determinants of Health     Financial Resource Strain: Not on file   Food Insecurity: No Food Insecurity (2025)    Hunger Vital Sign     Worried About Running Out of Food in the Last Year: Never true     Ran Out of Food in the Last Year: Never true   Transportation Needs: No Transportation Needs (2025)    PRAPARE - Transportation     Lack of Transportation (Medical): No     Lack of Transportation (Non-Medical): No   Physical Activity: Not on file   Stress: Not on file   Social Connections: Not on file   Intimate Partner Violence: Not on file   Housing Stability: Low Risk  (2025)    Housing Stability Vital Sign     Unable to Pay for Housing in the Last Year: No     Number of Times Moved in the Last Year: 0     Homeless in the Last Year: No     Social History     Tobacco Use   Smoking Status Never   Smokeless Tobacco Never        Temp (24hrs), Av.2 °F (36.8 °C), Min:97.5 °F (36.4 °C), Max:98.8 °F (37.1 °C)    BP (!) 183/86   Pulse (!) 49   Temp 97.5 °F (36.4 °C) (Oral)   Resp 12   Ht 1.753 m (5' 9\")   Wt 108.9 kg (240 lb)   SpO2 98%   BMI 35.44 kg/m²     ROS: 12 point ROS obtained in details. Pertinent positives as mentioned in HPI,   otherwise negative    Physical Exam:    General: NAD, appears stated age, alert, obese      Eyes: sclera is non-icteric  HENT: normocephalic/atraumatic, moist mucus membranes  Respiratory: CTA with no signs of respiratory distress  Cardiovascular: RRR,  no cyanosis or peripheral edema of extremities  GI: soft, non-tender, normal bowel sounds  MSK: palpable pulses and + ROM to LLE, ulcer on plantar aspect of left foot with purulent drainage  Neuro: moves

## 2025-02-18 NOTE — CONSULTS
Cardiac ElectrophysiologyConsult Note    Consultation request by Dr. Rosenthal for bradycardia    Date of  Admission: 2/11/2025  4:24 PM   Primary Care Physician:  Corrina Brush Jr., MD     Assessment:     Asymptomatic bradycardia to 30's, type I 2nd AV block, no high grade block  Acute osteomyelitis left foot, s/p I&D, beads placed  Echo 2/13/25 with EF 60%  DM  HTN  CAD     Plan:     Although heart rate low, no evidence high grade AV block and worse at night.  Given recent infection, poor candidate for pacemaker this admission.    I discussed with patient and Dr. Rosenthal, he can discharge and I can see in office in 2-3 weeks and consider outpatient pacemaker.  I will arrange followup, ok to discharge.     History of Present Illness:     This is a 64 y.o. male admitted for Cellulitis [L03.90]  Diabetic foot infection (HCC) [E11.628, L08.9].    Patient complains of:    Asked to see for bradycardia.  Absolutely denies chest pain, dyspnea, syncope.  Heart rate to 30 bpm at night with type I 2nd AV block, no high grade block.  No chest pain, fevers.   S/p surgery last week for osteo, foot wrapped.    Review of Symptoms:  Except as stated above include:  Constitutional:  Negative  Ears, nose, throat:  Negative  Respiratory:  negative  Cardiovascular:  negative  Gastrointestinal: negative  Genitourinary:  negative  Musculoskeletal:  Negative  Neurological:  Negative  Dermatological:  Negative  Hematological: Negative  Endocrinological: Negative  Allergy: Negative  Psychological:  Negative       Past Medical History:     Past Medical History:   Diagnosis Date    Arthritis     Coronary atherosclerosis of native coronary artery     S/P PCI with PREM in 12/09    Diabetes ([facility])     IDDM    Electrolyte and fluid disorders not elsewhere classified     Essential hypertension, benign     GERD (gastroesophageal reflux disease)     Heroin abuse ([facility]) 05/26/16    Psychiatrist Dr. Natalya Reeves     History of heart attack      Hyperlipidemia     Intermediate coronary syndrome (HCC)     MDD (major depressive disorder) 5/26/16    Psychiatrist Dr. Natalya Reeves     Myocardial infarction (HCC)     Obesity, unspecified     Old myocardial infarction     Other and unspecified hyperlipidemia     Pancreatitis     Positive PPD     Sleep apnea     uses Cpap machine    Transfusion history     Before 1992         Social History:     Social History     Socioeconomic History    Marital status:      Spouse name: None    Number of children: None    Years of education: None    Highest education level: None   Tobacco Use    Smoking status: Never    Smokeless tobacco: Never   Substance and Sexual Activity    Alcohol use: No    Drug use: No     Social Determinants of Health     Food Insecurity: No Food Insecurity (2/12/2025)    Hunger Vital Sign     Worried About Running Out of Food in the Last Year: Never true     Ran Out of Food in the Last Year: Never true   Transportation Needs: No Transportation Needs (2/12/2025)    PRAPARE - Transportation     Lack of Transportation (Medical): No     Lack of Transportation (Non-Medical): No   Housing Stability: Low Risk  (2/12/2025)    Housing Stability Vital Sign     Unable to Pay for Housing in the Last Year: No     Number of Times Moved in the Last Year: 0     Homeless in the Last Year: No        Family History:     Family History   Problem Relation Age of Onset    Cancer Sister         breast cancer and lung cancer    Diabetes Mother     Coronary Art Dis Mother     Heart Attack Father         Medications:     Allergies   Allergen Reactions    Prochlorperazine Anaphylaxis     \"Tightness around throat\"        Current Facility-Administered Medications   Medication Dose Route Frequency    piperacillin-tazobactam (ZOSYN) 3,375 mg in sodium chloride 0.9 % 50 mL IVPB (mini-bag)  3,375 mg IntraVENous Q8H    vancomycin (VANCOCIN) 1500 mg in sodium chloride 0.9% 500 mL IVPB  1,500 mg IntraVENous Q24H    PARoxetine

## 2025-02-18 NOTE — PROGRESS NOTES
[facility]  Progress Note    Patient: Vinay Benson Age: 64 y.o. : 1960 MR#: [id number] SSN: [id number]  Date: 2025                 DVT Prophylaxis: x Lovenox hep SQ SCDs Coumadin   on Heparin gtt PO anticoagulant    Anticipated discharge: 2025 or February 15, 2025 to home, after debridement     Subjective: Patient is lying in bed, alert awake oriented has some pain but no chest pain no shortness of breath no palpitation no dizziness  He is curious about cardiac arrhythmia which we are looking into it especially bradycardia with pauses       Objective:   VS: BP (!) 168/92   Pulse 63   Temp 98.4 °F (36.9 °C) (Oral)   Resp 12   Ht 1.753 m (5' 9\")   Wt 108.9 kg (240 lb)   SpO2 97%   BMI 35.44 kg/m²    Tmax/24hrs: Temp (24hrs), Av.3 °F (36.8 °C), Min:97.7 °F (36.5 °C), Max:98.8 °F (37.1 °C)    Intake/Output Summary (Last 24 hours) at 2025 1343  Last data filed at 2025 0906  Gross per 24 hour   Intake --   Output 250 ml   Net -250 ml          PHYSICAL EXAM  General Appearance: No acute distress; obese appearing  HENT: normocephalic/atraumatic, moist mucus membranes  Neck: No JVD, supple  Lungs: CTA with normal respiratory effort  CV: Bradycardic but rhythm regular    Abdomen: soft, nondistended  : no suprapubic tenderness   Extremities: Right transmetatarsal amputation; left hallux amputation  Neuro: Alert and oriented  Skin: Wound on the plantar aspect of the left foot with malodorous discharge, covered by dressing  Psych: appropriate mood and affect    Current Facility-Administered Medications   Medication Dose Route Frequency    piperacillin-tazobactam (ZOSYN) 3,375 mg in sodium chloride 0.9 % 50 mL IVPB (mini-bag)  3,375 mg IntraVENous Q8H    vancomycin (VANCOCIN) 1500 mg in sodium chloride 0.9% 500 mL IVPB  1,500 mg IntraVENous Q24H    PARoxetine (PAXIL) tablet 20 mg  20 mg Oral Daily    polyethylene glycol  (GLYCOLAX) packet 17 g  17 g Oral Daily    amLODIPine (NORVASC) tablet 5 mg  5 mg Oral Daily    famotidine (PEPCID) tablet 20 mg  20 mg Oral Daily    oxyCODONE-acetaminophen (PERCOCET) 7.5-325 MG per tablet 1 tablet  1 tablet Oral Q4H PRN    pantoprazole (PROTONIX) tablet 40 mg  40 mg Oral Daily    traZODone (DESYREL) tablet 150 mg  150 mg Oral Nightly    [Held by provider] aspirin EC tablet 81 mg  81 mg Oral Daily    insulin glargine (LANTUS) injection vial 10 Units  10 Units SubCUTAneous BID    sodium chloride flush 0.9 % injection 5-40 mL  5-40 mL IntraVENous 2 times per day    sodium chloride flush 0.9 % injection 5-40 mL  5-40 mL IntraVENous PRN    0.9 % sodium chloride infusion   IntraVENous PRN    potassium chloride (KLOR-CON M) extended release tablet 40 mEq  40 mEq Oral PRN    Or    potassium bicarb-citric acid (EFFER-K) effervescent tablet 40 mEq  40 mEq Oral PRN    Or    potassium chloride 10 mEq/100 mL IVPB (Peripheral Line)  10 mEq IntraVENous PRN    magnesium sulfate 2000 mg in 50 mL IVPB premix  2,000 mg IntraVENous PRN    enoxaparin Sodium (LOVENOX) injection 30 mg  30 mg SubCUTAneous BID    ondansetron (ZOFRAN-ODT) disintegrating tablet 4 mg  4 mg Oral Q8H PRN    Or    ondansetron (ZOFRAN) injection 4 mg  4 mg IntraVENous Q6H PRN    polyethylene glycol (GLYCOLAX) packet 17 g  17 g Oral Daily PRN    acetaminophen (TYLENOL) tablet 650 mg  650 mg Oral Q6H PRN    Or    acetaminophen (TYLENOL) suppository 650 mg  650 mg Rectal Q6H PRN    insulin lispro (HUMALOG,ADMELOG) injection vial 0-8 Units  0-8 Units SubCUTAneous 4x daily    glucose chewable tablet 16 g  4 tablet Oral PRN    dextrose bolus 10% 125 mL  125 mL IntraVENous PRN    Or    dextrose bolus 10% 250 mL  250 mL IntraVENous PRN    glucagon (rDNA) injection 1 mg  1 mg SubCUTAneous PRN    dextrose 10 % infusion   IntraVENous Continuous PRN    naloxone (NARCAN) injection 0.4 mg  0.4 mg IntraVENous PRN    tamsulosin (FLOMAX) capsule 0.4 mg  0.4 mg

## 2025-02-18 NOTE — PROGRESS NOTES
4 Eyes Skin Assessment     NAME:  Vinay Benson  YOB: 1960  MEDICAL RECORD NUMBER:  [id number]    The patient is being assessed for  Shift Handoff    I agree that at least one RN has performed a thorough Head to Toe Skin Assessment on the patient. ALL assessment sites listed below have been assessed.      Areas assessed by both nurses:    Head, Face, Ears, Shoulders, Back, Chest, Arms, Elbows, Hands, Sacrum. Buttock, Coccyx, Ischium, Legs. Feet and Heels, and Under Medical Devices         Does the Patient have a Wound? Yes wound(s) were present on assessment. LDA wound assessment was Initiated and completed by RN       Cali Prevention initiated by RN: No  Wound Care Orders initiated by RN: No    Pressure Injury (Stage 3,4, Unstageable, DTI, NWPT, and Complex wounds) if present, place Wound referral order by RN under : No    New Ostomies, if present place, Ostomy referral order under : No     Nurse 1 eSignature: Electronically signed by KANIKA GALINDO RN on 2/17/25 at 7:40 PM EST    **SHARE this note so that the co-signing nurse can place an eSignature**    Nurse 2 eSignature: Electronically signed by Katelyn Gomez RN on 2/18/25 at 7:55 AM EST

## 2025-02-18 NOTE — PLAN OF CARE
Problem: Chronic Conditions and Co-morbidities  Goal: Patient's chronic conditions and co-morbidity symptoms are monitored and maintained or improved  Outcome: Progressing  Flowsheets (Taken 2/17/2025 2000)  Care Plan - Patient's Chronic Conditions and Co-Morbidity Symptoms are Monitored and Maintained or Improved: Monitor and assess patient's chronic conditions and comorbid symptoms for stability, deterioration, or improvement     Problem: Pain  Goal: Verbalizes/displays adequate comfort level or baseline comfort level  Outcome: Progressing     Problem: Safety - Adult  Goal: Free from fall injury  Outcome: Progressing

## 2025-02-19 LAB
ANION GAP SERPL CALC-SCNC: 4 MMOL/L (ref 3–18)
BUN SERPL-MCNC: 10 MG/DL (ref 7–18)
BUN/CREAT SERPL: 8 (ref 12–20)
CALCIUM SERPL-MCNC: 8.6 MG/DL (ref 8.5–10.1)
CHLORIDE SERPL-SCNC: 106 MMOL/L (ref 100–111)
CO2 SERPL-SCNC: 30 MMOL/L (ref 21–32)
CREAT SERPL-MCNC: 1.25 MG/DL (ref 0.6–1.3)
GLUCOSE BLD STRIP.AUTO-MCNC: 112 MG/DL (ref 70–110)
GLUCOSE BLD STRIP.AUTO-MCNC: 125 MG/DL (ref 70–110)
GLUCOSE BLD STRIP.AUTO-MCNC: 137 MG/DL (ref 70–110)
GLUCOSE BLD STRIP.AUTO-MCNC: 213 MG/DL (ref 70–110)
GLUCOSE SERPL-MCNC: 96 MG/DL (ref 74–99)
POTASSIUM SERPL-SCNC: 3.8 MMOL/L (ref 3.5–5.5)
SODIUM SERPL-SCNC: 140 MMOL/L (ref 136–145)
VANCOMYCIN SERPL-MCNC: 16.1 UG/ML (ref 5–40)

## 2025-02-19 PROCEDURE — 02HV33Z INSERTION OF INFUSION DEVICE INTO SUPERIOR VENA CAVA, PERCUTANEOUS APPROACH: ICD-10-PCS | Performed by: INTERNAL MEDICINE

## 2025-02-19 PROCEDURE — 1100000003 HC PRIVATE W/ TELEMETRY

## 2025-02-19 PROCEDURE — 6370000000 HC RX 637 (ALT 250 FOR IP): Performed by: INTERNAL MEDICINE

## 2025-02-19 PROCEDURE — 82962 GLUCOSE BLOOD TEST: CPT

## 2025-02-19 PROCEDURE — 80048 BASIC METABOLIC PNL TOTAL CA: CPT

## 2025-02-19 PROCEDURE — 99232 SBSQ HOSP IP/OBS MODERATE 35: CPT | Performed by: INTERNAL MEDICINE

## 2025-02-19 PROCEDURE — 6360000002 HC RX W HCPCS: Performed by: INTERNAL MEDICINE

## 2025-02-19 PROCEDURE — 2580000003 HC RX 258: Performed by: INTERNAL MEDICINE

## 2025-02-19 PROCEDURE — 80202 ASSAY OF VANCOMYCIN: CPT

## 2025-02-19 PROCEDURE — 36415 COLL VENOUS BLD VENIPUNCTURE: CPT

## 2025-02-19 PROCEDURE — 36569 INSJ PICC 5 YR+ W/O IMAGING: CPT

## 2025-02-19 PROCEDURE — 2500000003 HC RX 250 WO HCPCS: Performed by: INTERNAL MEDICINE

## 2025-02-19 PROCEDURE — 6370000000 HC RX 637 (ALT 250 FOR IP): Performed by: STUDENT IN AN ORGANIZED HEALTH CARE EDUCATION/TRAINING PROGRAM

## 2025-02-19 RX ADMIN — FAMOTIDINE 20 MG: 20 TABLET, FILM COATED ORAL at 09:34

## 2025-02-19 RX ADMIN — PIPERACILLIN AND TAZOBACTAM 3375 MG: 3; .375 INJECTION, POWDER, LYOPHILIZED, FOR SOLUTION INTRAVENOUS at 05:31

## 2025-02-19 RX ADMIN — ENOXAPARIN SODIUM 30 MG: 100 INJECTION SUBCUTANEOUS at 20:45

## 2025-02-19 RX ADMIN — PIPERACILLIN AND TAZOBACTAM 3375 MG: 3; .375 INJECTION, POWDER, LYOPHILIZED, FOR SOLUTION INTRAVENOUS at 14:02

## 2025-02-19 RX ADMIN — SODIUM CHLORIDE, PRESERVATIVE FREE 10 ML: 5 INJECTION INTRAVENOUS at 20:41

## 2025-02-19 RX ADMIN — ENOXAPARIN SODIUM 30 MG: 100 INJECTION SUBCUTANEOUS at 09:34

## 2025-02-19 RX ADMIN — PAROXETINE HYDROCHLORIDE 20 MG: 20 TABLET, FILM COATED ORAL at 09:34

## 2025-02-19 RX ADMIN — PIPERACILLIN AND TAZOBACTAM 3375 MG: 3; .375 INJECTION, POWDER, LYOPHILIZED, FOR SOLUTION INTRAVENOUS at 20:40

## 2025-02-19 RX ADMIN — LOSARTAN POTASSIUM 50 MG: 50 TABLET, FILM COATED ORAL at 09:34

## 2025-02-19 RX ADMIN — OXYCODONE AND ACETAMINOPHEN 1 TABLET: 7.5; 325 TABLET ORAL at 14:13

## 2025-02-19 RX ADMIN — INSULIN LISPRO 2 UNITS: 100 INJECTION, SOLUTION INTRAVENOUS; SUBCUTANEOUS at 21:15

## 2025-02-19 RX ADMIN — SODIUM CHLORIDE, PRESERVATIVE FREE 10 ML: 5 INJECTION INTRAVENOUS at 09:34

## 2025-02-19 RX ADMIN — VANCOMYCIN HYDROCHLORIDE 1500 MG: 10 INJECTION, POWDER, LYOPHILIZED, FOR SOLUTION INTRAVENOUS at 05:36

## 2025-02-19 RX ADMIN — INSULIN GLARGINE 10 UNITS: 100 INJECTION, SOLUTION SUBCUTANEOUS at 20:45

## 2025-02-19 RX ADMIN — TAMSULOSIN HYDROCHLORIDE 0.4 MG: 0.4 CAPSULE ORAL at 09:34

## 2025-02-19 RX ADMIN — TRAZODONE HYDROCHLORIDE 150 MG: 100 TABLET ORAL at 20:39

## 2025-02-19 RX ADMIN — OXYCODONE AND ACETAMINOPHEN 1 TABLET: 7.5; 325 TABLET ORAL at 20:44

## 2025-02-19 RX ADMIN — PANTOPRAZOLE SODIUM 40 MG: 40 TABLET, DELAYED RELEASE ORAL at 09:34

## 2025-02-19 RX ADMIN — AMLODIPINE BESYLATE 5 MG: 5 TABLET ORAL at 09:34

## 2025-02-19 RX ADMIN — INSULIN GLARGINE 10 UNITS: 100 INJECTION, SOLUTION SUBCUTANEOUS at 09:36

## 2025-02-19 ASSESSMENT — PAIN DESCRIPTION - ORIENTATION
ORIENTATION: LEFT

## 2025-02-19 ASSESSMENT — PAIN SCALES - GENERAL
PAINLEVEL_OUTOF10: 7
PAINLEVEL_OUTOF10: 4
PAINLEVEL_OUTOF10: 0
PAINLEVEL_OUTOF10: 0
PAINLEVEL_OUTOF10: 8
PAINLEVEL_OUTOF10: 4

## 2025-02-19 ASSESSMENT — PAIN - FUNCTIONAL ASSESSMENT
PAIN_FUNCTIONAL_ASSESSMENT: ACTIVITIES ARE NOT PREVENTED

## 2025-02-19 ASSESSMENT — PAIN DESCRIPTION - DESCRIPTORS
DESCRIPTORS: ACHING

## 2025-02-19 ASSESSMENT — PAIN DESCRIPTION - FREQUENCY
FREQUENCY: INTERMITTENT

## 2025-02-19 ASSESSMENT — PAIN DESCRIPTION - ONSET
ONSET: ON-GOING
ONSET: ON-GOING

## 2025-02-19 ASSESSMENT — PAIN DESCRIPTION - PAIN TYPE
TYPE: SURGICAL PAIN

## 2025-02-19 ASSESSMENT — PAIN DESCRIPTION - LOCATION
LOCATION: FOOT
LOCATION: FOOT

## 2025-02-19 NOTE — PROGRESS NOTES
Comprehensive Nutrition Assessment    Type and Reason for Visit:  Initial, LOS    Nutrition Recommendations/Plan:   Recommend Low Brayan High Protein ONS with meals - encourage PO intake of meals/supplements for wound healing  Cont to monitor BG, pt may benefit from outpatient DM counseling if indicated     Malnutrition Assessment:  Malnutrition Status: No indicators of malnutrition at this time    Nutrition History and Allergies:   DM type 2, gastroparesis, Htn, HLD, CAD,TMA on right side and had left hallux, second toe amputation and partial fifth ray amputation; [facility]    Past Medical History:   Diagnosis Date    Arthritis     Coronary atherosclerosis of native coronary artery     S/P PCI with PREM in 12/09    Diabetes ([facility])     IDDM    Electrolyte and fluid disorders not elsewhere classified     Essential hypertension, benign     GERD (gastroesophageal reflux disease)     Heroin abuse ([facility]) 05/26/16    Psychiatrist Dr. Natalya Reeves     History of heart attack     Hyperlipidemia     Intermediate coronary syndrome ([facility])     MDD (major depressive disorder) 5/26/16    Psychiatrist Dr. Natalya Reeves     Myocardial infarction ([facility])     Obesity, unspecified     Old myocardial infarction     Other and unspecified hyperlipidemia     Pancreatitis     Positive PPD     Sleep apnea     uses Cpap machine    Transfusion history     Before 1992     Wt Readings from Last 15 Encounters:   02/13/25 108.9 kg (240 lb)   02/11/25 108.9 kg (240 lb)   02/06/25 108.9 kg (240 lb)   12/27/24 111.1 kg (245 lb)   04/11/24 113.4 kg (250 lb)   12/04/23 116.5 kg (256 lb 12.8 oz)   05/26/22 117.5 kg (259 lb)      [facility]    Nutrition Assessment:    LOS Assessment. Pt admitted for diabetic foot infection. Concern for osteomyelitis. Pt has also had some cardiac issues, ok to d/c and follow up as outpatient per cardiology.    Nutrition Related Findings:    +1 BLE edema; LBM 2/14 Wound Type: Diabetic Ulcer       Current Facility-Administered

## 2025-02-19 NOTE — PROGRESS NOTES
Infectious Disease progress Note        Reason: left foot infection    Current abx Prior abx   vancomycin since 2/11  Pip/tazo since 2/16 Cefepime,      Lines:       Assessment :  64 y.o. male with h/o type uncontrolled type 2 DM  , CAD who presented to [facility] on 2/11/25 for left foot infection.      Hospitalization at [facility] November 2020 for right great toe distal phalanx chronic osteomyelitis, septic arthritis right great toe interphalangeal joint   Wound cultures 9/2020-Enterobacter, MRSA  Status post right great toe amputation on 11/24.       Hospitalization at [facility] 2/2021 for acute on chronic osteomyelitis right first metatarsal osteomyelitis.    Status post incision and debridement, partial resection of right first metatarsal on 2/5/2021.  Intra-Op cultures: Methicillin susceptible Staphylococcus aureus, Bacteroides. Bone biopsy of clean margins reveal acute inflammation suggestive of acute osteomyelitis. S/p ertapenem, daptomycin till 3/19/2021     Hospitalization at [facility] 4/2021 for  chronic osteomyelitis right second metatarsal head, infected right lateral foot ulcer Status post transmetatarsal amputation 4/13/2021.  Intra-Op findings discussed with podiatrist.  Grossly clean margins achieved at surgery.  Bone biopsy, clean margins negative for acute osteomyelitis.  Intra-Op cultures no organisms seen.     Hospitalization at [facility] 2/2022 for right foot cellulitis, infected right lateral foot ulcer, chronic osteomyelitis left first distal phalanx     Status post incision and debridement bilateral feet ulcer, partial amputation left great toe on 3/1/2022.    Wound culture right foot 2/28-MRSA, pseudomonas, proteus (susceptibilities of MRSA noted)  Intra-Op wound culture 3/1/2022-MRSA, Proteus     Hospitalization [facility] April 2024 for subacute osteomyelitis left great toe, infected left great toe ulcer  Status post left great toe amputation on 4/12/2024.  grossly clean bone margins achieved at surgery  IntraOp cultures

## 2025-02-19 NOTE — PROGRESS NOTES
[facility] - Progress Note  Admit Date: 2/11/2025    Assessment:     Asymptomatic bradycardia to 30's, type I 2nd AV block, no high grade block  Acute osteomyelitis left foot, s/p I&D, beads placed  Questionable fib/flutter by notes  Echo 2/13/25 with EF 60%  DM  HTN  CAD    Plan:     I discussed with ID, Dr. Megan Johnson, given recent infection and no high grade AV block, will see as outpatient to discuss possible pacemaker, likely leadless.  I reviewed all scanned telemetry and EKG strips from admission, no confirmed sustained A.fib/flutter that I can see.  I would give ASA only if ok with all teams.  I will be following with outpatient event monitor when discharged to monitor for any atrial arrhythmias.  Ok to discharge and I will arrange followup with monitor and to discuss pacemaker after infection treated.  Please call if questions.    Subjective:     No new complaints.     Objective:      Patient Vitals for the past 8 hrs:   Temp Pulse Resp BP SpO2   02/19/25 0801 97.6 °F (36.4 °C) 51 18 (!) 153/68 96 %   02/19/25 0515 98 °F (36.7 °C) 52 18 (!) 150/65 --         No data found.                 Intake/Output Summary (Last 24 hours) at 2/19/2025 0924  Last data filed at 2/18/2025 1436  Gross per 24 hour   Intake 260 ml   Output --   Net 260 ml       Physical Exam:  General:  alert, appears stated age, and cooperative  Neck:  nontender  Lungs:  clear to auscultation bilaterally  Heart:  regular rate and rhythm, S1, S2 normal, no murmur, click, rub or gallop  Abdomen:  abdomen is soft without significant tenderness, masses, organomegaly or guarding  Extremities:  extremities normal, atraumatic, no cyanosis or edema    Data Review:   Last Left ventricular Function (EF):    Lab Results   Component Value Date    LVEF 58 04/13/2021       Labs: Results:       Chemistry Recent Labs     02/19/25  0200      K 3.8      CO2 30   BUN 10      CBC w/Diff No results for input(s): \"WBC\", \"RBC\", \"HGB\",

## 2025-02-19 NOTE — PROGRESS NOTES
Dressing change on left foot complete. Iodine soaked curex and sterile gauze used. Ace bandages used to keep gauze in place.Antibiotic beads remain intact. Patient bandages were noted to have drainage from wound.

## 2025-02-19 NOTE — PROGRESS NOTES
[facility]   Pharmacy Pharmacokinetic Monitoring Service - Vancomycin    Indication: Bone and Joint Infection  Target Concentration: Goal AUC/LOIS 400-600 mg*hr/L  Day of Therapy: 9  Additional Antimicrobials: Zosyn    Pertinent Laboratory Values:   Temp: 97.6 °F (36.4 °C), Weight - Scale: 108.9 kg (240 lb)  Recent Labs     02/19/25  0200   CREATININE 1.25   BUN 10       Estimated Creatinine Clearance: 73 mL/min (based on SCr of 1.25 mg/dL).    Pertinent Cultures:  Culture Date Source Results   2/11 blood NGTD   2/13 Wound, toe CoNS, GNR, bacteroides beta lactamase +ve   MRSA Nasal Swab: N/A. Non-respiratory infection    Assessment:  Date/Time Current Dose Concentration Timing of Concentration (h) AUC   2/11 @ 2330 2,000mg - - -   2/12 1,500mg q18h      2/13 1,500mg q18h 15.9 15 563   2/14 1,500mg q18h      2/15 1,500mg q18h      2/16 1,500mg q18h 24.8 12 630   2/17 1,500mg q24h      2/18 1,500mg q24h      2/19 1,500mg q24h 16.1 19.5 542     Note: Serum concentrations collected for AUC dosing may appear elevated if collected in close proximity to the dose administered, this is not necessarily an indication of toxicity    Plan:  Continue dose of vancomycin 1500 mg q24h  Anticipated AUC/Tss = 542/14.7  Renal labs as indicated   Vancomycin levels as indicated. Not ordered at this time.   Pharmacy will continue to monitor patient and adjust therapy as indicated    Thank you for the consult,  Selin Bueno RPH  2/19/2025

## 2025-02-19 NOTE — PLAN OF CARE
Problem: Nutrition Deficit:  Goal: Optimize nutritional status  Flowsheets (Taken 2/18/2025 2138)  Nutrient intake appropriate for improving, restoring, or maintaining nutritional needs:   Assess nutritional status and recommend course of action   Monitor oral intake, labs, and treatment plans   Recommend appropriate diets, oral nutritional supplements, and vitamin/mineral supplements

## 2025-02-19 NOTE — PLAN OF CARE
Problem: Chronic Conditions and Co-morbidities  Goal: Patient's chronic conditions and co-morbidity symptoms are monitored and maintained or improved  Outcome: Progressing  Flowsheets (Taken 2/17/2025 2000 by Katelyn Gomez, RN)  Care Plan - Patient's Chronic Conditions and Co-Morbidity Symptoms are Monitored and Maintained or Improved: Monitor and assess patient's chronic conditions and comorbid symptoms for stability, deterioration, or improvement  Note: Monitor patient labs and vitals; Medicate per MAR.     Problem: Discharge Planning  Goal: Discharge to home or other facility with appropriate resources  Outcome: Progressing  Note: Identify barriers prior to discharge.     Problem: Pain  Goal: Verbalizes/displays adequate comfort level or baseline comfort level  Outcome: Progressing  Flowsheets (Taken 2/17/2025 0803 by Joanne Ying RN)  Verbalizes/displays adequate comfort level or baseline comfort level:   Encourage patient to monitor pain and request assistance   Administer analgesics based on type and severity of pain and evaluate response  Note: Patient to report pain via call bell or during rounding.     Problem: Safety - Adult  Goal: Free from fall injury  Outcome: Progressing  Note: Standard safety precautions.     Problem: Infection - Adult  Goal: Absence of infection at discharge  Outcome: Progressing  Flowsheets (Taken 2/17/2025 2000 by Katelyn Gomez, RN)  Absence of infection at discharge: Monitor all insertion sites i.e., indwelling lines, tubes and drains  Note: Monitor patient for s/s of infection.     Problem: Metabolic/Fluid and Electrolytes - Adult  Goal: Glucose maintained within prescribed range  Outcome: Progressing  Note: Monitor  blood glucose;

## 2025-02-19 NOTE — PROGRESS NOTES
PICC Line Insertion Procedure Note    Procedure: Insertion of #4 FR/18G PICC    Indications:  Home IV therapy    Procedure Details   Informed consent was obtained for the procedure.  Risks including lung perforation, infection, hemorrhage, vascular/tissue damage were discussed.     #4 FR/18G PICC inserted to the L Basilic vein per hospital protocol.   Blood return:  yes    Findings:  Catheter inserted to 49 cm, with 0 cm. Exposed. Mid upper arm circumference is 33 cm.  There were no changes to vital signs. Catheter was flushed with 20 cc NS. Patient did tolerate procedure well.    Recommendations:  Placement verified with 3CG, no need for CXR. OKAY TO USE LINE NOW.   PICC Brochure given to patient with teaching instruction.

## 2025-02-19 NOTE — PROGRESS NOTES
[facility]  Progress Note    Patient: Vinay Benson Age: 64 y.o. : 1960 MR#: [id number] SSN: [id number]  Date: 2025                 DVT Prophylaxis: x Lovenox hep SQ SCDs Coumadin   on Heparin gtt PO anticoagulant    Anticipated discharge: 2025 or February 15, 2025 to home, after debridement     Subjective: Patient is lying in bed, alert awake oriented has some pain but no chest pain no shortness of breath no palpitation no dizziness  He is curious about cardiac arrhythmia which we are looking into it especially bradycardia with pauses       Objective:   VS: BP (!) 155/90   Pulse 57   Temp 97.7 °F (36.5 °C) (Oral)   Resp 19   Ht 1.753 m (5' 9.02\")   Wt 108.9 kg (240 lb)   SpO2 93%   BMI 35.43 kg/m²    Tmax/24hrs: Temp (24hrs), Av.6 °F (36.4 °C), Min:97 °F (36.1 °C), Max:98 °F (36.7 °C)    Intake/Output Summary (Last 24 hours) at 2025 1306  Last data filed at 2025 0946  Gross per 24 hour   Intake 260 ml   Output 200 ml   Net 60 ml          PHYSICAL EXAM  General Appearance: No acute distress; obese appearing  HENT: normocephalic/atraumatic, moist mucus membranes  Neck: No JVD, supple  Lungs: CTA with normal respiratory effort  CV: Bradycardic but rhythm regular    Abdomen: soft, nondistended  : no suprapubic tenderness   Extremities: Right transmetatarsal amputation; left hallux amputation  Neuro: Alert and oriented  Skin: Wound on the plantar aspect of the left foot with malodorous discharge, covered by dressing  Psych: appropriate mood and affect    Current Facility-Administered Medications   Medication Dose Route Frequency    piperacillin-tazobactam (ZOSYN) 3,375 mg in sodium chloride 0.9 % 50 mL IVPB (mini-bag)  3,375 mg IntraVENous Q8H    vancomycin (VANCOCIN) 1500 mg in sodium chloride 0.9% 500 mL IVPB  1,500 mg IntraVENous Q24H    PARoxetine (PAXIL) tablet 20 mg  20 mg Oral Daily    polyethylene glycol

## 2025-02-19 NOTE — PLAN OF CARE
Problem: Chronic Conditions and Co-morbidities  Goal: Patient's chronic conditions and co-morbidity symptoms are monitored and maintained or improved  Outcome: Progressing  Flowsheets (Taken 2/18/2025 2000 by Katelyn Gomez, RN)  Care Plan - Patient's Chronic Conditions and Co-Morbidity Symptoms are Monitored and Maintained or Improved: Monitor and assess patient's chronic conditions and comorbid symptoms for stability, deterioration, or improvement  Note: Medicate per MAR; Monitor labs and vitals.     Problem: Discharge Planning  Goal: Discharge to home or other facility with appropriate resources  Outcome: Progressing  Flowsheets (Taken 2/18/2025 2000 by Katelyn Gomez, RN)  Discharge to home or other facility with appropriate resources: Identify barriers to discharge with patient and caregiver  Note: Identify barriers prior to discharge.     Problem: Pain  Goal: Verbalizes/displays adequate comfort level or baseline comfort level  Outcome: Progressing  Flowsheets (Taken 2/19/2025 1653)  Verbalizes/displays adequate comfort level or baseline comfort level: Encourage patient to monitor pain and request assistance  Note: Patient to report pain via call bell or during rounding.     Problem: Safety - Adult  Goal: Free from fall injury  Outcome: Progressing  Flowsheets (Taken 2/17/2025 0329 by Becka Gray, RN)  Free From Fall Injury:   Instruct family/caregiver on patient safety   Based on caregiver fall risk screen, instruct family/caregiver to ask for assistance with transferring infant if caregiver noted to have fall risk factors  Note: Standard safety precautions.     Problem: Infection - Adult  Goal: Absence of infection at discharge  Outcome: Progressing  Flowsheets (Taken 2/18/2025 2000 by Katelyn Gomez, RN)  Absence of infection at discharge: Monitor all insertion sites i.e., indwelling lines, tubes and drains  Note: Monitor insertion sites. Monitor for s/s of infection.     Problem: Metabolic/Fluid and

## 2025-02-20 LAB
GLUCOSE BLD STRIP.AUTO-MCNC: 111 MG/DL (ref 70–110)
GLUCOSE BLD STRIP.AUTO-MCNC: 112 MG/DL (ref 70–110)
GLUCOSE BLD STRIP.AUTO-MCNC: 136 MG/DL (ref 70–110)
GLUCOSE BLD STRIP.AUTO-MCNC: 139 MG/DL (ref 70–110)

## 2025-02-20 PROCEDURE — 6370000000 HC RX 637 (ALT 250 FOR IP): Performed by: STUDENT IN AN ORGANIZED HEALTH CARE EDUCATION/TRAINING PROGRAM

## 2025-02-20 PROCEDURE — 2580000003 HC RX 258: Performed by: INTERNAL MEDICINE

## 2025-02-20 PROCEDURE — 6360000002 HC RX W HCPCS: Performed by: INTERNAL MEDICINE

## 2025-02-20 PROCEDURE — 2500000003 HC RX 250 WO HCPCS: Performed by: INTERNAL MEDICINE

## 2025-02-20 PROCEDURE — 82962 GLUCOSE BLOOD TEST: CPT

## 2025-02-20 PROCEDURE — 1100000003 HC PRIVATE W/ TELEMETRY

## 2025-02-20 PROCEDURE — 99232 SBSQ HOSP IP/OBS MODERATE 35: CPT | Performed by: INTERNAL MEDICINE

## 2025-02-20 PROCEDURE — 6370000000 HC RX 637 (ALT 250 FOR IP): Performed by: INTERNAL MEDICINE

## 2025-02-20 PROCEDURE — 94761 N-INVAS EAR/PLS OXIMETRY MLT: CPT

## 2025-02-20 RX ORDER — TAMSULOSIN HYDROCHLORIDE 0.4 MG/1
0.4 CAPSULE ORAL DAILY
Qty: 30 CAPSULE | Refills: 3 | Status: SHIPPED | OUTPATIENT
Start: 2025-02-21

## 2025-02-20 RX ORDER — LOSARTAN POTASSIUM 50 MG/1
50 TABLET ORAL DAILY
Qty: 30 TABLET | Refills: 3 | Status: SHIPPED | OUTPATIENT
Start: 2025-02-21

## 2025-02-20 RX ADMIN — INSULIN GLARGINE 10 UNITS: 100 INJECTION, SOLUTION SUBCUTANEOUS at 09:20

## 2025-02-20 RX ADMIN — LOSARTAN POTASSIUM 50 MG: 50 TABLET, FILM COATED ORAL at 11:29

## 2025-02-20 RX ADMIN — PIPERACILLIN AND TAZOBACTAM 3375 MG: 3; .375 INJECTION, POWDER, LYOPHILIZED, FOR SOLUTION INTRAVENOUS at 23:31

## 2025-02-20 RX ADMIN — OXYCODONE AND ACETAMINOPHEN 1 TABLET: 7.5; 325 TABLET ORAL at 23:09

## 2025-02-20 RX ADMIN — PAROXETINE HYDROCHLORIDE 20 MG: 20 TABLET, FILM COATED ORAL at 09:22

## 2025-02-20 RX ADMIN — VANCOMYCIN HYDROCHLORIDE 1500 MG: 10 INJECTION, POWDER, LYOPHILIZED, FOR SOLUTION INTRAVENOUS at 05:56

## 2025-02-20 RX ADMIN — TRAZODONE HYDROCHLORIDE 150 MG: 100 TABLET ORAL at 23:10

## 2025-02-20 RX ADMIN — SODIUM CHLORIDE, PRESERVATIVE FREE 10 ML: 5 INJECTION INTRAVENOUS at 23:12

## 2025-02-20 RX ADMIN — INSULIN GLARGINE 10 UNITS: 100 INJECTION, SOLUTION SUBCUTANEOUS at 23:10

## 2025-02-20 RX ADMIN — ENOXAPARIN SODIUM 30 MG: 100 INJECTION SUBCUTANEOUS at 23:11

## 2025-02-20 RX ADMIN — ENOXAPARIN SODIUM 30 MG: 100 INJECTION SUBCUTANEOUS at 09:24

## 2025-02-20 RX ADMIN — PIPERACILLIN AND TAZOBACTAM 3375 MG: 3; .375 INJECTION, POWDER, LYOPHILIZED, FOR SOLUTION INTRAVENOUS at 11:26

## 2025-02-20 RX ADMIN — PANTOPRAZOLE SODIUM 40 MG: 40 TABLET, DELAYED RELEASE ORAL at 09:21

## 2025-02-20 RX ADMIN — OXYCODONE AND ACETAMINOPHEN 1 TABLET: 7.5; 325 TABLET ORAL at 09:40

## 2025-02-20 RX ADMIN — FAMOTIDINE 20 MG: 20 TABLET, FILM COATED ORAL at 09:21

## 2025-02-20 RX ADMIN — PIPERACILLIN AND TAZOBACTAM 3375 MG: 3; .375 INJECTION, POWDER, LYOPHILIZED, FOR SOLUTION INTRAVENOUS at 05:57

## 2025-02-20 RX ADMIN — TAMSULOSIN HYDROCHLORIDE 0.4 MG: 0.4 CAPSULE ORAL at 09:21

## 2025-02-20 RX ADMIN — AMLODIPINE BESYLATE 5 MG: 5 TABLET ORAL at 09:21

## 2025-02-20 RX ADMIN — SODIUM CHLORIDE, PRESERVATIVE FREE 10 ML: 5 INJECTION INTRAVENOUS at 09:23

## 2025-02-20 ASSESSMENT — PAIN SCALES - GENERAL
PAINLEVEL_OUTOF10: 10
PAINLEVEL_OUTOF10: 4
PAINLEVEL_OUTOF10: 8

## 2025-02-20 ASSESSMENT — PAIN DESCRIPTION - ORIENTATION
ORIENTATION: LEFT
ORIENTATION: LEFT

## 2025-02-20 ASSESSMENT — PAIN DESCRIPTION - LOCATION
LOCATION: FOOT
LOCATION: FOOT

## 2025-02-20 ASSESSMENT — PAIN DESCRIPTION - DESCRIPTORS
DESCRIPTORS: ACHING;DISCOMFORT
DESCRIPTORS: ACHING;STABBING

## 2025-02-20 ASSESSMENT — PAIN - FUNCTIONAL ASSESSMENT: PAIN_FUNCTIONAL_ASSESSMENT: PREVENTS OR INTERFERES SOME ACTIVE ACTIVITIES AND ADLS

## 2025-02-20 ASSESSMENT — PAIN DESCRIPTION - FREQUENCY: FREQUENCY: INTERMITTENT

## 2025-02-20 ASSESSMENT — PAIN DESCRIPTION - PAIN TYPE: TYPE: SURGICAL PAIN

## 2025-02-20 ASSESSMENT — PAIN DESCRIPTION - ONSET: ONSET: ON-GOING

## 2025-02-20 NOTE — PLAN OF CARE
Problem: Chronic Conditions and Co-morbidities  Goal: Patient's chronic conditions and co-morbidity symptoms are monitored and maintained or improved  2/19/2025 2221 by Velma Peña RN  Outcome: Progressing  2/19/2025 1653 by Gordon Skinner RN  Outcome: Progressing  Flowsheets (Taken 2/18/2025 2000 by Katelyn Gomez, RN)  Care Plan - Patient's Chronic Conditions and Co-Morbidity Symptoms are Monitored and Maintained or Improved: Monitor and assess patient's chronic conditions and comorbid symptoms for stability, deterioration, or improvement  Note: Medicate per MAR; Monitor labs and vitals.     Problem: Pain  Goal: Verbalizes/displays adequate comfort level or baseline comfort level  2/19/2025 2221 by Velma Peña RN  Outcome: Progressing  2/19/2025 1653 by Gordon Skinner RN  Outcome: Progressing  Flowsheets (Taken 2/19/2025 1653)  Verbalizes/displays adequate comfort level or baseline comfort level: Encourage patient to monitor pain and request assistance  Note: Patient to report pain via call bell or during rounding.     Problem: Safety - Adult  Goal: Free from fall injury  2/19/2025 2221 by Velma Peña RN  Outcome: Progressing  2/19/2025 1653 by Gordon Skinner RN  Outcome: Progressing  Flowsheets (Taken 2/17/2025 0329 by Becka Gray, RN)  Free From Fall Injury:   Instruct family/caregiver on patient safety   Based on caregiver fall risk screen, instruct family/caregiver to ask for assistance with transferring infant if caregiver noted to have fall risk factors  Note: Standard safety precautions.

## 2025-02-20 NOTE — CARE COORDINATION
Patient has no accepting [facility]. To have out patient IVAB. Sommer has not completed education at bedside. This CM discussed with patient if he would be willing to go to an out patient Infusion Center, he states he would.     Call to   [place], [place]  [facility] [employer] [facility] [employer] [facility]  Infusion Therapy in [facility]’s ambulatory infusion suite and infusion pharmacy is conveniently located in [place], [place]. For more information on the range of infusion therapy services provided and to learn about our unique approach to infusion care, contact [employer] [facility] [employer] or call [phone].     Voicemail received, message left with this Cms contact information , requesting a return call.       Call to   [facility]  [address]  [address] [place] [address]  Get DirectionsTel: [phone]     Voicemail received, message left with this Cms contact information , requesting a return call.

## 2025-02-20 NOTE — PROGRESS NOTES
Dressing change on left foot complete. Iodine soaked curlex and sterile gauze used. Ace bandages used to keep gauze in place.Antibiotic beads remain intact. Patient bandages were noted to be dry and intact. Patient well tolerated.

## 2025-02-20 NOTE — PROGRESS NOTES
4 Eyes Skin Assessment     NAME:  Vinay Benson  YOB: 1960  MEDICAL RECORD NUMBER:  [id number]    The patient is being assessed for  Shift Handoff    I agree that at least one RN has performed a thorough Head to Toe Skin Assessment on the patient. ALL assessment sites listed below have been assessed.      Areas assessed by both nurses:    Head, Face, Ears, Shoulders, Back, Chest, Arms, Elbows, Hands, Sacrum. Buttock, Coccyx, Ischium, Legs. Feet and Heels, and Under Medical Devices         Does the Patient have a Wound? Yes wound(s) were present on assessment. LDA wound assessment was Initiated and completed by RN       Cali Prevention initiated by RN: Yes  Wound Care Orders initiated by RN: No    Pressure Injury (Stage 3,4, Unstageable, DTI, NWPT, and Complex wounds) if present, place Wound referral order by RN under : No    New Ostomies, if present place, Ostomy referral order under : No     Nurse 1 eSignature: Electronically signed by Velma Peña RN on 2/20/25 at 7:31 AM EST    **SHARE this note so that the co-signing nurse can place an eSignature**    Nurse 2 eSignature: Electronically signed by KAT ZIMMERMAN RN on 2/20/25 at 7:44 PM EST

## 2025-02-20 NOTE — PLAN OF CARE
Problem: Chronic Conditions and Co-morbidities  Goal: Patient's chronic conditions and co-morbidity symptoms are monitored and maintained or improved  Outcome: Adequate for Discharge  Flowsheets (Taken 2/20/2025 1310)  Care Plan - Patient's Chronic Conditions and Co-Morbidity Symptoms are Monitored and Maintained or Improved:   Monitor and assess patient's chronic conditions and comorbid symptoms for stability, deterioration, or improvement   Collaborate with multidisciplinary team to address chronic and comorbid conditions and prevent exacerbation or deterioration   Update acute care plan with appropriate goals if chronic or comorbid symptoms are exacerbated and prevent overall improvement and discharge     Problem: Discharge Planning  Goal: Discharge to home or other facility with appropriate resources  Outcome: Adequate for Discharge  Flowsheets (Taken 2/20/2025 1310)  Discharge to home or other facility with appropriate resources:   Identify barriers to discharge with patient and caregiver   Arrange for needed discharge resources and transportation as appropriate   Identify discharge learning needs (meds, wound care, etc)     Problem: Pain  Goal: Verbalizes/displays adequate comfort level or baseline comfort level  Outcome: Adequate for Discharge  Flowsheets (Taken 2/20/2025 1310)  Verbalizes/displays adequate comfort level or baseline comfort level:   Encourage patient to monitor pain and request assistance   Assess pain using appropriate pain scale   Administer analgesics based on type and severity of pain and evaluate response   Implement non-pharmacological measures as appropriate and evaluate response     Problem: Safety - Adult  Goal: Free from fall injury  Outcome: Adequate for Discharge  Flowsheets (Taken 2/20/2025 1310)  Free From Fall Injury:   Instruct family/caregiver on patient safety   Based on caregiver fall risk screen, instruct family/caregiver to ask for assistance with transferring infant if

## 2025-02-20 NOTE — PROGRESS NOTES
[facility]   Pharmacy Pharmacokinetic Monitoring Service - Vancomycin    Indication: Bone and Joint Infection  Target Concentration: Goal AUC/LOIS 400-600 mg*hr/L  Day of Therapy: 10  Additional Antimicrobials: Zosyn    Pertinent Laboratory Values:   Temp: 97.5 °F (36.4 °C), Weight - Scale: 108.9 kg (240 lb)  Recent Labs     02/19/25  0200   CREATININE 1.25   BUN 10       Estimated Creatinine Clearance: 73 mL/min (based on SCr of 1.25 mg/dL).    Pertinent Cultures:  Culture Date Source Results   2/11 blood NGTD   2/13 Wound, toe CoNS, GNR, bacteroides beta lactamase +ve   MRSA Nasal Swab: N/A. Non-respiratory infection    Assessment:  Date/Time Current Dose Concentration Timing of Concentration (h) AUC   2/11 @ 2330 2,000mg - - -   2/12 1,500mg q18h      2/13 1,500mg q18h 15.9 15 563   2/14 1,500mg q18h      2/15 1,500mg q18h      2/16 1,500mg q18h 24.8 12 630   2/17 1,500mg q24h      2/18 1,500mg q24h      2/19 1,500mg q24h 16.1 19.5 542   2/20 1,500mg q24h        Note: Serum concentrations collected for AUC dosing may appear elevated if collected in close proximity to the dose administered, this is not necessarily an indication of toxicity    Plan:  Continue dose of vancomycin 1500 mg q24h  Anticipated AUC/Tss = 542/14.7  Renal labs as indicated   Vancomycin levels as indicated. Not ordered at this time.   Pharmacy will continue to monitor patient and adjust therapy as indicated    Thank you for the consult,  Selin Bueno RPH  2/20/2025

## 2025-02-20 NOTE — PROGRESS NOTES
[facility]  Progress Note    Patient: Vinay Benson Age: 64 y.o. : 1960 MR#: [id number] SSN: [id number]  Date: 2025                 DVT Prophylaxis: x Lovenox hep SQ SCDs Coumadin   on Heparin gtt PO anticoagulant    Anticipated discharge: 2025 or February 15, 2025 to home, after debridement     Subjective: Patient is lying in bed, alert awake oriented has some pain but no chest pain no shortness of breath no palpitation no dizziness  He is curious about cardiac arrhythmia which we are looking into it especially bradycardia with pauses       Objective:   VS: BP (!) 158/81   Pulse (!) 45   Temp 98.4 °F (36.9 °C) (Oral)   Resp 18   Ht 1.753 m (5' 9.02\")   Wt 108.9 kg (240 lb)   SpO2 98%   BMI 35.43 kg/m²    Tmax/24hrs: Temp (24hrs), Av.9 °F (36.6 °C), Min:97.5 °F (36.4 °C), Max:98.4 °F (36.9 °C)    Intake/Output Summary (Last 24 hours) at 2025 1425  Last data filed at 2025 1650  Gross per 24 hour   Intake 260 ml   Output --   Net 260 ml          PHYSICAL EXAM  General Appearance: No acute distress; obese appearing  HENT: normocephalic/atraumatic, moist mucus membranes  Neck: No JVD, supple  Lungs: CTA with normal respiratory effort  CV: Bradycardic but rhythm regular    Abdomen: soft, nondistended  : no suprapubic tenderness   Extremities: Right transmetatarsal amputation; left hallux amputation  Neuro: Alert and oriented  Skin: Wound on the plantar aspect of the left foot with malodorous discharge, covered by dressing  Psych: appropriate mood and affect    Current Facility-Administered Medications   Medication Dose Route Frequency    piperacillin-tazobactam (ZOSYN) 3,375 mg in sodium chloride 0.9 % 50 mL IVPB (mini-bag)  3,375 mg IntraVENous Q8H    vancomycin (VANCOCIN) 1500 mg in sodium chloride 0.9% 500 mL IVPB  1,500 mg IntraVENous Q24H    PARoxetine (PAXIL) tablet 20 mg  20 mg Oral Daily    polyethylene glycol

## 2025-02-21 VITALS
RESPIRATION RATE: 18 BRPM | HEIGHT: 69 IN | SYSTOLIC BLOOD PRESSURE: 120 MMHG | BODY MASS INDEX: 35.55 KG/M2 | HEART RATE: 59 BPM | WEIGHT: 240 LBS | OXYGEN SATURATION: 95 % | DIASTOLIC BLOOD PRESSURE: 70 MMHG | TEMPERATURE: 98.4 F

## 2025-02-21 LAB
CK SERPL-CCNC: 73 U/L (ref 39–308)
GLUCOSE BLD STRIP.AUTO-MCNC: 105 MG/DL (ref 70–110)
GLUCOSE BLD STRIP.AUTO-MCNC: 118 MG/DL (ref 70–110)
GLUCOSE BLD STRIP.AUTO-MCNC: 93 MG/DL (ref 70–110)

## 2025-02-21 PROCEDURE — 99239 HOSP IP/OBS DSCHRG MGMT >30: CPT | Performed by: STUDENT IN AN ORGANIZED HEALTH CARE EDUCATION/TRAINING PROGRAM

## 2025-02-21 PROCEDURE — 94761 N-INVAS EAR/PLS OXIMETRY MLT: CPT

## 2025-02-21 PROCEDURE — 2580000003 HC RX 258: Performed by: INTERNAL MEDICINE

## 2025-02-21 PROCEDURE — 6370000000 HC RX 637 (ALT 250 FOR IP): Performed by: STUDENT IN AN ORGANIZED HEALTH CARE EDUCATION/TRAINING PROGRAM

## 2025-02-21 PROCEDURE — 6360000002 HC RX W HCPCS: Performed by: INTERNAL MEDICINE

## 2025-02-21 PROCEDURE — 82550 ASSAY OF CK (CPK): CPT

## 2025-02-21 PROCEDURE — 6370000000 HC RX 637 (ALT 250 FOR IP): Performed by: INTERNAL MEDICINE

## 2025-02-21 PROCEDURE — 82962 GLUCOSE BLOOD TEST: CPT

## 2025-02-21 PROCEDURE — 36415 COLL VENOUS BLD VENIPUNCTURE: CPT

## 2025-02-21 PROCEDURE — 2500000003 HC RX 250 WO HCPCS: Performed by: INTERNAL MEDICINE

## 2025-02-21 RX ADMIN — PIPERACILLIN AND TAZOBACTAM 3375 MG: 3; .375 INJECTION, POWDER, LYOPHILIZED, FOR SOLUTION INTRAVENOUS at 11:40

## 2025-02-21 RX ADMIN — TAMSULOSIN HYDROCHLORIDE 0.4 MG: 0.4 CAPSULE ORAL at 08:50

## 2025-02-21 RX ADMIN — VANCOMYCIN HYDROCHLORIDE 1500 MG: 10 INJECTION, POWDER, LYOPHILIZED, FOR SOLUTION INTRAVENOUS at 07:20

## 2025-02-21 RX ADMIN — SODIUM CHLORIDE, PRESERVATIVE FREE 10 ML: 5 INJECTION INTRAVENOUS at 08:52

## 2025-02-21 RX ADMIN — INSULIN GLARGINE 10 UNITS: 100 INJECTION, SOLUTION SUBCUTANEOUS at 08:48

## 2025-02-21 RX ADMIN — FAMOTIDINE 20 MG: 20 TABLET, FILM COATED ORAL at 08:49

## 2025-02-21 RX ADMIN — PIPERACILLIN AND TAZOBACTAM 3375 MG: 3; .375 INJECTION, POWDER, LYOPHILIZED, FOR SOLUTION INTRAVENOUS at 04:54

## 2025-02-21 RX ADMIN — SODIUM CHLORIDE 500 MG: 9 INJECTION INTRAMUSCULAR; INTRAVENOUS; SUBCUTANEOUS at 17:46

## 2025-02-21 RX ADMIN — ENOXAPARIN SODIUM 30 MG: 100 INJECTION SUBCUTANEOUS at 08:48

## 2025-02-21 RX ADMIN — AMLODIPINE BESYLATE 5 MG: 5 TABLET ORAL at 08:49

## 2025-02-21 RX ADMIN — PAROXETINE HYDROCHLORIDE 20 MG: 20 TABLET, FILM COATED ORAL at 08:49

## 2025-02-21 RX ADMIN — MEROPENEM 1000 MG: 1 INJECTION INTRAVENOUS at 16:24

## 2025-02-21 RX ADMIN — PANTOPRAZOLE SODIUM 40 MG: 40 TABLET, DELAYED RELEASE ORAL at 08:50

## 2025-02-21 RX ADMIN — OXYCODONE AND ACETAMINOPHEN 1 TABLET: 7.5; 325 TABLET ORAL at 17:45

## 2025-02-21 ASSESSMENT — PAIN DESCRIPTION - ORIENTATION: ORIENTATION: LEFT

## 2025-02-21 ASSESSMENT — PAIN SCALES - GENERAL
PAINLEVEL_OUTOF10: 0
PAINLEVEL_OUTOF10: 2
PAINLEVEL_OUTOF10: 7
PAINLEVEL_OUTOF10: 0
PAINLEVEL_OUTOF10: 0

## 2025-02-21 ASSESSMENT — PAIN DESCRIPTION - ONSET: ONSET: GRADUAL

## 2025-02-21 ASSESSMENT — PAIN DESCRIPTION - FREQUENCY: FREQUENCY: INTERMITTENT

## 2025-02-21 ASSESSMENT — PAIN DESCRIPTION - PAIN TYPE: TYPE: SURGICAL PAIN

## 2025-02-21 ASSESSMENT — PAIN DESCRIPTION - DESCRIPTORS: DESCRIPTORS: DISCOMFORT;ACHING

## 2025-02-21 ASSESSMENT — PAIN DESCRIPTION - LOCATION: LOCATION: FOOT

## 2025-02-21 ASSESSMENT — PAIN - FUNCTIONAL ASSESSMENT: PAIN_FUNCTIONAL_ASSESSMENT: ACTIVITIES ARE NOT PREVENTED

## 2025-02-21 NOTE — PROGRESS NOTES
[facility]   Pharmacy Pharmacokinetic Monitoring Service - Vancomycin    Indication: Bone and Joint Infection  Target Concentration: Goal AUC/LOIS 400-600 mg*hr/L  Day of Therapy: 11  Additional Antimicrobials: Zosyn    Pertinent Laboratory Values:   Temp: 98.6 °F (37 °C), Weight - Scale: 108.9 kg (240 lb)  Recent Labs     02/19/25  0200   CREATININE 1.25   BUN 10       Estimated Creatinine Clearance: 73 mL/min (based on SCr of 1.25 mg/dL).    Pertinent Cultures:  Culture Date Source Results   2/11 blood NGTD   2/13 Wound, toe CoNS, Alcaligenes faecalis, bacteroides beta lactamase +ve   MRSA Nasal Swab: N/A. Non-respiratory infection    Assessment:  Date/Time Current Dose Concentration Timing of Concentration (h) AUC   2/11 @ 2330 2,000mg - - -   2/12 1,500mg q18h      2/13 1,500mg q18h 15.9 15 563   2/14 1,500mg q18h      2/15 1,500mg q18h      2/16 1,500mg q18h 24.8 12 630   2/17 1,500mg q24h      2/18 1,500mg q24h      2/19 1,500mg q24h 16.1 19.5 542   2/20 1,500mg q24h      2/21 1,500mg q24h        Note: Serum concentrations collected for AUC dosing may appear elevated if collected in close proximity to the dose administered, this is not necessarily an indication of toxicity    Plan:  Continue dose of vancomycin 1500 mg q24h  Anticipated AUC/Tss = 541/14.7  Renal labs as indicated   Vancomycin levels as indicated. Not ordered at this time.   Pharmacy will continue to monitor patient and adjust therapy as indicated    Thank you for the consult,  Selin Bueno RPH  2/21/2025

## 2025-02-21 NOTE — PLAN OF CARE
Problem: Chronic Conditions and Co-morbidities  Goal: Patient's chronic conditions and co-morbidity symptoms are monitored and maintained or improved  2/21/2025 1844 by Qamar Pettit, RN  Outcome: Completed  2/21/2025 1646 by Qamar Pettit RN  Outcome: Adequate for Discharge  Flowsheets (Taken 2/21/2025 1646)  Care Plan - Patient's Chronic Conditions and Co-Morbidity Symptoms are Monitored and Maintained or Improved:   Monitor and assess patient's chronic conditions and comorbid symptoms for stability, deterioration, or improvement   Collaborate with multidisciplinary team to address chronic and comorbid conditions and prevent exacerbation or deterioration     Problem: Discharge Planning  Goal: Discharge to home or other facility with appropriate resources  2/21/2025 1844 by Qamar Pettit, RN  Outcome: Completed  2/21/2025 1646 by Qamar Pettit RN  Outcome: Adequate for Discharge  Flowsheets (Taken 2/21/2025 1646)  Discharge to home or other facility with appropriate resources:   Identify barriers to discharge with patient and caregiver   Arrange for needed discharge resources and transportation as appropriate     Problem: Pain  Goal: Verbalizes/displays adequate comfort level or baseline comfort level  2/21/2025 1844 by Qamar Pettit, RN  Outcome: Completed  2/21/2025 1646 by Qamar Pettit RN  Outcome: Adequate for Discharge  Flowsheets (Taken 2/21/2025 1646)  Verbalizes/displays adequate comfort level or baseline comfort level:   Encourage patient to monitor pain and request assistance   Assess pain using appropriate pain scale   Administer analgesics based on type and severity of pain and evaluate response     Problem: Safety - Adult  Goal: Free from fall injury  2/21/2025 1844 by Qamar Pettit, RN  Outcome: Completed  2/21/2025 1646 by Qamar Pettit, RN  Outcome: Adequate for Discharge  Flowsheets (Taken 2/21/2025 1646)  Free From Fall Injury:   Based on caregiver fall risk screen, instruct

## 2025-02-21 NOTE — PROGRESS NOTES
4 Eyes Skin Assessment     NAME:  Vinay Benson  YOB: 1960  MEDICAL RECORD NUMBER:  [id number]    The patient is being assessed for  Shift Handoff    I agree that at least one RN has performed a thorough Head to Toe Skin Assessment on the patient. ALL assessment sites listed below have been assessed.      Areas assessed by both nurses:    Head, Face, Ears, Shoulders, Back, Chest, Arms, Elbows, Hands, Sacrum. Buttock, Coccyx, Ischium, Legs. Feet and Heels, and Under Medical Devices         Does the Patient have a Wound? Yes wound(s) were present on assessment. LDA wound assessment was Initiated and completed by RN       Cali Prevention initiated by RN: Yes  Wound Care Orders initiated by RN: Yes    Pressure Injury (Stage 3,4, Unstageable, DTI, NWPT, and Complex wounds) if present, place Wound referral order by RN under : No    New Ostomies, if present place, Ostomy referral order under : No     Nurse 1 eSignature: Electronically signed by KAT ZIMMERMAN RN on 2/20/25 at 7:43 PM EST    **SHARE this note so that the co-signing nurse can place an eSignature**    Nurse 2 eSignature: Electronically signed by Greer Cuevas RN on 2/21/25 at 7:45 AM EST

## 2025-02-21 NOTE — PLAN OF CARE
Problem: Chronic Conditions and Co-morbidities  Goal: Patient's chronic conditions and co-morbidity symptoms are monitored and maintained or improved  Outcome: Adequate for Discharge  Flowsheets (Taken 2/21/2025 1646)  Care Plan - Patient's Chronic Conditions and Co-Morbidity Symptoms are Monitored and Maintained or Improved:   Monitor and assess patient's chronic conditions and comorbid symptoms for stability, deterioration, or improvement   Collaborate with multidisciplinary team to address chronic and comorbid conditions and prevent exacerbation or deterioration     Problem: Discharge Planning  Goal: Discharge to home or other facility with appropriate resources  Outcome: Adequate for Discharge  Flowsheets (Taken 2/21/2025 1646)  Discharge to home or other facility with appropriate resources:   Identify barriers to discharge with patient and caregiver   Arrange for needed discharge resources and transportation as appropriate     Problem: Pain  Goal: Verbalizes/displays adequate comfort level or baseline comfort level  Outcome: Adequate for Discharge  Flowsheets (Taken 2/21/2025 1646)  Verbalizes/displays adequate comfort level or baseline comfort level:   Encourage patient to monitor pain and request assistance   Assess pain using appropriate pain scale   Administer analgesics based on type and severity of pain and evaluate response     Problem: Safety - Adult  Goal: Free from fall injury  Outcome: Adequate for Discharge  Flowsheets (Taken 2/21/2025 1646)  Free From Fall Injury:   Based on caregiver fall risk screen, instruct family/caregiver to ask for assistance with transferring infant if caregiver noted to have fall risk factors   Instruct family/caregiver on patient safety     Problem: Infection - Adult  Goal: Absence of infection at discharge  Outcome: Adequate for Discharge  Flowsheets (Taken 2/21/2025 1646)  Absence of infection at discharge:   Assess and monitor for signs and symptoms of infection    Statement Selected

## 2025-02-21 NOTE — PROGRESS NOTES
Infectious Disease progress Note        Reason: left foot infection    Current abx Prior abx   vancomycin since 2/11  Pip/tazo since 2/16 Cefepime,      Lines:       Assessment :  64 y.o. male with h/o type uncontrolled type 2 DM  , CAD who presented to [facility] on 2/11/25 for left foot infection.      Hospitalization at [facility] November 2020 for right great toe distal phalanx chronic osteomyelitis, septic arthritis right great toe interphalangeal joint   Wound cultures 9/2020-Enterobacter, MRSA  Status post right great toe amputation on 11/24.       Hospitalization at [facility] 2/2021 for acute on chronic osteomyelitis right first metatarsal osteomyelitis.    Status post incision and debridement, partial resection of right first metatarsal on 2/5/2021.  Intra-Op cultures: Methicillin susceptible Staphylococcus aureus, Bacteroides. Bone biopsy of clean margins reveal acute inflammation suggestive of acute osteomyelitis. S/p ertapenem, daptomycin till 3/19/2021     Hospitalization at [facility] 4/2021 for  chronic osteomyelitis right second metatarsal head, infected right lateral foot ulcer Status post transmetatarsal amputation 4/13/2021.  Intra-Op findings discussed with podiatrist.  Grossly clean margins achieved at surgery.  Bone biopsy, clean margins negative for acute osteomyelitis.  Intra-Op cultures no organisms seen.     Hospitalization at [facility] 2/2022 for right foot cellulitis, infected right lateral foot ulcer, chronic osteomyelitis left first distal phalanx     Status post incision and debridement bilateral feet ulcer, partial amputation left great toe on 3/1/2022.    Wound culture right foot 2/28-MRSA, pseudomonas, proteus (susceptibilities of MRSA noted)  Intra-Op wound culture 3/1/2022-MRSA, Proteus     Hospitalization [facility] April 2024 for subacute osteomyelitis left great toe, infected left great toe ulcer  Status post left great toe amputation on 4/12/2024.  grossly clean bone margins achieved at surgery  IntraOp cultures  94%   BMI 35.43 kg/m²     ROS: 12 point ROS obtained in details. Pertinent positives as mentioned in HPI,   otherwise negative    Physical Exam:    General: NAD, appears stated age, alert, obese      Eyes: sclera is non-icteric  HENT: normocephalic/atraumatic, moist mucus membranes  Respiratory: CTA with no signs of respiratory distress  Cardiovascular: RRR,  no cyanosis or peripheral edema of extremities  GI: soft, non-tender, normal bowel sounds  MSK: palpable pulses and + ROM to LLE, ulcer on plantar aspect of left foot with purulent drainage  Neuro: moves all extremities, no focal deficits, decreased sensation to light touch plantar aspect of both feet.normal speech  Psych: appropriate mood and affect, no visual or auditory hallucinations  Skin: Left foot surgical dressing not opened healed transmetatarsal amputation stump right foot       Labs: Results:   Chemistry Recent Labs     02/19/25  0200   GLUCOSE 96      K 3.8      CO2 30   BUN 10   CREATININE 1.25      CBC w/Diff No results for input(s): \"WBC\", \"RBC\", \"HGB\", \"HCT\", \"PLT\" in the last 72 hours.    Invalid input(s): \"GRANS\", \"LYMPH\", \"EOS\"     Microbiology Results       Procedure Component Value Units Date/Time    Culture, Anaerobic [[id number]]  (Abnormal) Collected: 02/13/25 1523    Order Status: Completed Specimen: Swab from Toe Updated: 02/15/25 1411     Special Requests CLEAN MARGIN     Culture       FEW Bacteroides fragilis BETA LACTAMASE POSITIVE          Culture, Wound (with Gram Stain) [[id number]]  (Abnormal)  (Susceptibility) Collected: 02/13/25 1523    Order Status: Completed Specimen: Swab from Toe Updated: 02/17/25 0808     Special Requests CLEAN MARGIN     Gram Stain OCCASIONAL WBCS SEEN         NO ORGANISMS SEEN        Culture RARE ALCALIGENES FAECALIS               RARE STAPHYLOCOCCUS SPECIES, COAGULASE NEGATIVE          Susceptibility        Alcaligenes faecalis      BACTERIAL SUSCEPTIBILITY PANEL LOIS      amikacin 8 ug/mL

## 2025-02-21 NOTE — PROGRESS NOTES
Pharmacy Note - Dose adjustment for daptomycin per P&T approved protocol.    Dosing Weight Used:  Adjusted wt 86 kg for BMI > 30 (35.43)    BMI:   BMI Readings from Last 1 Encounters:   02/18/25 35.43 kg/m²     Height:   Ht Readings from Last 1 Encounters:   02/18/25 1.753 m (5' 9.02\")     ABW:  Wt Readings from Last 1 Encounters:   02/13/25 108.9 kg (240 lb)        IBW and AdjBW: Ideal body weight: 70.7 kg (155 lb 15.1 oz)  Adjusted ideal body weight: 86 kg (189 lb 9.1 oz)        Dosing:  Estimated Creatinine Clearance: 73 mL/min (based on SCr of 1.25 mg/dL).  Recent Labs     02/19/25  0200   BUN 10   CREATININE 1.25           Max recommended dose is 1500mg daily. If dose is exceeded confirm dosing with infectious disease physician or fellow pharmacist. Increased incidence of side effects although benefit may outweigh risks in some cases. See dosing protocol for more information.    Statin Therapy    No statin therapy currently ordered    CK Monitoring    Creatinine Kinase (CK) has been ordered by pharmacy    Ordered dose: 6 mg/kg based on adjusted wt of 86 kg (rounded to nearest 50 mg per [facility] dosing policy.      Daptomycin 500 IVPB  once has been ordered.    Thank you,  ENRRIQUE HOLLAND [facility] [place]  2/21/2025, 4:03 PM

## 2025-02-21 NOTE — CARE COORDINATION
Call to Timmy with [employer] @ [phone], detailed message left on confidential line requesting a return call.

## 2025-02-21 NOTE — CARE COORDINATION
Return Call from Timmy with [facility], patient HIPAA verified. He informs this CM they were closed yesterday r/t the snow. He will be in to provide teaching to the patient today, Nursing to give all IVAB doses here at [facility] today. [facility] will be supplying the Home Health Nurse as this patient has no accepting [facility]. This CM agrees. Call concluded.

## 2025-02-23 NOTE — DISCHARGE SUMMARY
Discharge Summary    Patient: Vinay Benson MRN: [id number]  CSN: [id number]    YOB: 1960  Age: 64 y.o.  Sex: male    DOA: 2/11/2025 LOS:  LOS: 10 days        Disposition: Home with [facility]    Discharge Date: 2/21/2025    Admission Diagnosis: Cellulitis [L03.90]  Diabetic foot infection (HCC) [E11.628, L08.9]    Discharge Diagnosis:    # Acute osteomyelitis of left foot     Discharge Condition: Stable      PHYSICAL EXAM  Visit Vitals  /70   Pulse 59   Temp 98.4 °F (36.9 °C) (Axillary)   Resp 18   Ht 1.753 m (5' 9.02\")   Wt 108.9 kg (240 lb)   SpO2 95%   BMI 35.43 kg/m²       General: Alert, cooperative, no acute distress    HEENT: PERRLA, EOMI. Anicteric sclerae.  Lungs:  CTA Bilaterally. No Wheezing/Rales.  Heart:             Regular rate and Rhythm.  Abdomen: Soft, Non distended, Non tender. + Bowel sounds.  Extremities: No edema.  Psych:   Good insight. Not anxious or agitated.  Neurologic:  AA, oriented X 3. Moves all ext                                 Hospital Course:   64 years old who was seen by podiatry, Dr. Nielson, for admission for diabetic foot infection. He has nonhealing ulcer to the plantar side of his left foot.  Patient previously had TMA on right side and had left hallux, second toe amputation and partial fifth ray amputation.  He denies having fever or chills.  Patient was given IV vancomycin and cefepime in ED. He was admitted for further management and evaluation.  He has no other concerns. Patient continued on IV abx. Due to osteomyelitis, patient needs long term abx. PICC line was placed. Patient needs ertapenem and daptomycin upon discharge. During hospitalization patient was found to have sinus pauses. Cardiology was consulted. Patient was asymptomatic. No further inpatient workup needed. Patient is hemodynamically stable for discharge home.     Patient underwent left foot incision and drainage on 2/13. ID consulted.    Procedures:   2/13 - left foot incision and

## 2025-03-04 ASSESSMENT — ENCOUNTER SYMPTOMS
VOMITING: 0
ABDOMINAL PAIN: 0
NAUSEA: 0
COUGH: 0
SHORTNESS OF BREATH: 0

## 2025-06-11 ENCOUNTER — OFFICE VISIT (OUTPATIENT)
Age: 65
End: 2025-06-11
Payer: MEDICAID

## 2025-06-11 VITALS
HEIGHT: 69 IN | BODY MASS INDEX: 35.4 KG/M2 | WEIGHT: 239 LBS | DIASTOLIC BLOOD PRESSURE: 72 MMHG | SYSTOLIC BLOOD PRESSURE: 142 MMHG

## 2025-06-11 DIAGNOSIS — R00.1 BRADYCARDIA, UNSPECIFIED: Primary | ICD-10-CM

## 2025-06-11 PROCEDURE — 99214 OFFICE O/P EST MOD 30 MIN: CPT | Performed by: INTERNAL MEDICINE

## 2025-06-11 PROCEDURE — 3077F SYST BP >= 140 MM HG: CPT | Performed by: INTERNAL MEDICINE

## 2025-06-11 PROCEDURE — 3078F DIAST BP <80 MM HG: CPT | Performed by: INTERNAL MEDICINE

## 2025-06-11 NOTE — PROGRESS NOTES
Dictation on: 06/11/2025  1:22 PM by: IRENE HARMON [[id number]]     Wt Readings from Last 3 Encounters:   06/11/25 108.4 kg (239 lb)   02/13/25 108.9 kg (240 lb)   02/11/25 108.9 kg (240 lb)     Past Medical History:   Diagnosis Date    Arthritis     Coronary atherosclerosis of native coronary artery     S/P PCI with PREM in 12/09    Diabetes ([facility])     IDDM    Electrolyte and fluid disorders not elsewhere classified     Essential hypertension, benign     GERD (gastroesophageal reflux disease)     Heroin abuse (HCC) 05/26/16    Psychiatrist Dr. Natalya Reeves     History of heart attack     Hyperlipidemia     Intermediate coronary syndrome ([facility])     MDD (major depressive disorder) 5/26/16    Psychiatrist Dr. Natalya Reeves     Myocardial infarction ([facility])     Obesity, unspecified     Old myocardial infarction     Other and unspecified hyperlipidemia     Pancreatitis     Positive PPD     Sleep apnea     uses Cpap machine    Transfusion history     Before 1992       Current Outpatient Medications   Medication Sig Dispense Refill    losartan (COZAAR) 50 MG tablet Take 1 tablet by mouth daily 30 tablet 3    tamsulosin (FLOMAX) 0.4 MG capsule Take 1 capsule by mouth daily 30 capsule 3    ASPIRIN LOW DOSE 81 MG EC tablet Take 1 tablet by mouth daily      metFORMIN (GLUCOPHAGE) 1000 MG tablet Take 1 tablet by mouth daily (with breakfast)      traZODone (DESYREL) 150 MG tablet Take 1 tablet by mouth nightly      Insulin Aspart FlexPen 100 UNIT/ML SOPN Inject 25 Units into the skin in the morning, at noon, and at bedtime With meals 1 mL 0    amLODIPine (NORVASC) 5 MG tablet Take 1 tablet by mouth daily 30 tablet 3    famotidine (PEPCID) 20 MG tablet Take 1 tablet by mouth daily 60 tablet 3    PARoxetine (PAXIL) 20 MG tablet Take 1 tablet by mouth daily      insulin detemir (LEVEMIR) 100 UNIT/ML injection vial Inject 20 Units into the skin 2 times daily (Patient not taking: Reported on 6/11/2025)       No current

## 2025-06-12 NOTE — PROGRESS NOTES
HPI:  A 64-year-old male here for followup.  He was hospitalized in February with bradycardia down in the 30s, but he had acute osteomyelitis at that time status post I&D and beads.  It has since healed up.  During the hospitalization, however, there was concern about potential atrial fibrillation, but also he had type 1 second-degree AV block.  Decision was made to monitor at that time.  Since discharge, he has been doing well without any syncope, but he does get fatigued quite easily.  His left osteomyelitis infection has completely healed.    Impression:  1. History of bradycardia, heart rate down to 30s, type 1 second-degree block but also symptoms of dyspnea, no syncope.  2. Acute osteomyelitis of left foot status post I&D in February 2025 and deferring pacemaker at that time.  3. Questionable history of atrial fibrillation.  4. Echocardiogram in February 2025 with EF 60%.  5. Diabetes.  6. Hypertension.  7. Remote coronary artery disease.    Plan:  We had talked about potential pacemaker inpatient.  However, the infection risk outweighed benefits.  I am going to obtain a followup event monitor for two weeks and have him see Fina, our advanced practitioner.  If he is having still persistent bradycardia at times given his associated fatigue, I would proceed to pacemaker implant.  All questions answered.

## 2025-07-16 ENCOUNTER — OFFICE VISIT (OUTPATIENT)
Age: 65
End: 2025-07-16
Payer: MEDICAID

## 2025-07-16 VITALS
BODY MASS INDEX: 37.4 KG/M2 | WEIGHT: 253.4 LBS | OXYGEN SATURATION: 94 % | SYSTOLIC BLOOD PRESSURE: 126 MMHG | DIASTOLIC BLOOD PRESSURE: 82 MMHG | HEART RATE: 61 BPM

## 2025-07-16 DIAGNOSIS — I25.10 CORONARY ARTERY DISEASE DUE TO LIPID RICH PLAQUE: ICD-10-CM

## 2025-07-16 DIAGNOSIS — I10 PRIMARY HYPERTENSION: ICD-10-CM

## 2025-07-16 DIAGNOSIS — G47.30 SLEEP APNEA, UNSPECIFIED TYPE: ICD-10-CM

## 2025-07-16 DIAGNOSIS — R06.02 SHORTNESS OF BREATH: Primary | ICD-10-CM

## 2025-07-16 DIAGNOSIS — I25.83 CORONARY ARTERY DISEASE DUE TO LIPID RICH PLAQUE: ICD-10-CM

## 2025-07-16 DIAGNOSIS — R06.02 SHORTNESS OF BREATH: ICD-10-CM

## 2025-07-16 DIAGNOSIS — R00.1 BRADYCARDIA: ICD-10-CM

## 2025-07-16 PROCEDURE — 3079F DIAST BP 80-89 MM HG: CPT

## 2025-07-16 PROCEDURE — 99214 OFFICE O/P EST MOD 30 MIN: CPT

## 2025-07-16 PROCEDURE — 3074F SYST BP LT 130 MM HG: CPT

## 2025-07-16 RX ORDER — FUROSEMIDE 20 MG/1
20 TABLET ORAL DAILY
Qty: 60 TABLET | Refills: 3 | Status: SHIPPED | OUTPATIENT
Start: 2025-07-16

## 2025-07-16 NOTE — PROGRESS NOTES
stent placement    Neuritis    Vomiting    Abdominal pain    Leukocytosis    Primary osteoarthritis of left knee    Wound infection    Depression    Positive PPD    Chronic abdominal pain    Hypertension    Syncope and collapse    Fracture, toe    DM (diabetes mellitus), type 2 (HCC)    Hypokalemia    Nausea and vomiting    GERD (gastroesophageal reflux disease)    S/P lumbar fusion    Sepsis (HCC)    Lumbar spinal stenosis    HNP (herniated nucleus pulposus), lumbar    Infected wound    Diabetic ulcer of left foot (HCC)    Obesity (BMI 35.0-39.9 without comorbidity)    Hyperlipidemia    Sleep apnea    Diabetic ulcer of left foot due to type 2 diabetes mellitus (HCC)    Non compliance w medication regimen    Subacute osteomyelitis of left foot (HCC)    Anemia of chronic disease    Acute osteomyelitis of left foot (HCC)    Benign prostatic hyperplasia without lower urinary tract symptoms    MSSA bacteremia    Sinus bradycardia    Atrial flutter (HCC)        Social History:       reports that he has never smoked. He has never used smokeless tobacco.   reports no history of alcohol use.     Family History:      family history includes Cancer in his sister; Coronary Art Dis in his mother; Diabetes in his mother; Heart Attack in his father.     Review of Systems:      Except as stated above include:  Constitutional: Negative for fever, chills and malaise/fatigue.   HEENT: No congestion or recent URI.  Gastrointestinal: No nausea, vomiting, abdominal pain, bloody stools.  Pulmonary:  Negative except as stated above.  Cardiac:  Negative except as stated above.  Musculoskeletal: Negative except as stated above.  Neurological:  No localized symptoms.  Skin:  Negative except as stated above.  Psych:  Negative except as stated above.  Endocrine:  Negative except as stated above.     Physical Exam:     Wt Readings from Last 3 Encounters:   07/16/25 114.9 kg (253 lb 6.4 oz)   06/11/25 108.4 kg (239 lb)   02/13/25 108.9 kg (240

## 2025-07-16 NOTE — PATIENT INSTRUCTIONS
- fasting labs in 2 weeks  - wear event monitor for 2 weeks  - complete stress test  - Follow up with me in 6 weeks  - See Dr. Urena for sleep eval

## 2025-08-08 ENCOUNTER — TELEPHONE (OUTPATIENT)
Age: 65
End: 2025-08-08

## 2025-08-28 ENCOUNTER — TELEPHONE (OUTPATIENT)
Age: 65
End: 2025-08-28

## 2025-09-03 PROBLEM — G47.33 OSA (OBSTRUCTIVE SLEEP APNEA): Status: ACTIVE | Noted: 2024-04-11

## (undated) DEVICE — DRAPE,UNDERBUTTOCKS,PCH,STERILE: Brand: MEDLINE

## (undated) DEVICE — SUT PROL 2-0 30IN CT1 BLU --

## (undated) DEVICE — INTENDED FOR TISSUE SEPARATION, AND OTHER PROCEDURES THAT REQUIRE A SHARP SURGICAL BLADE TO PUNCTURE OR CUT.: Brand: BARD-PARKER SAFETY BLADES SIZE 15, STERILE

## (undated) DEVICE — SUTURE PROL SZ 3-0 L18IN NONABSORBABLE BLU L19MM PC-5 3/8 8632G

## (undated) DEVICE — DRAPE,EXTREMITY,89X128,STERILE: Brand: MEDLINE

## (undated) DEVICE — GARMENT,MEDLINE,DVT,INT,CALF,MED, GEN2: Brand: MEDLINE

## (undated) DEVICE — ZIMMER® STERILE DISPOSABLE TOURNIQUET CUFF WITH PROTECTIVE SLEEVE AND PLC, DUAL PORT, SINGLE BLADDER, 18 IN. (46 CM)

## (undated) DEVICE — ELECTRODE PT RET AD L9FT HI MOIST COND ADH HYDRGEL CORDED

## (undated) DEVICE — HANDPIECE SET WITH HIGH FLOW TIP AND SUCTION TUBE: Brand: INTERPULSE

## (undated) DEVICE — SUTURE VCRL SZ 2-0 L27IN ABSRB UD L26MM SH 1/2 CIR J417H

## (undated) DEVICE — SPLINT CAST PLASTER 4X15FT -- DYNACAST

## (undated) DEVICE — BLADELESS OPTICAL TROCAR WITH FIXATION CANNULA: Brand: VERSAPORT

## (undated) DEVICE — BNDG ELAS HK LOOP 6X5YD NS -- MATRIX

## (undated) DEVICE — BANDAGE,GAUZE,BULKEE II,4.5"X4.1YD,STRL: Brand: MEDLINE

## (undated) DEVICE — 4-PORT MANIFOLD: Brand: NEPTUNE 2

## (undated) DEVICE — STOCKING COMPR L L16-18IN LNG 19MMHG ANK 9-10IN CALF

## (undated) DEVICE — REM POLYHESIVE ADULT PATIENT RETURN ELECTRODE: Brand: VALLEYLAB

## (undated) DEVICE — TRAY PREP DRY W/ PREM GLV 2 APPL 6 SPNG 2 UNDPD 1 OVERWRAP

## (undated) DEVICE — STERILE POLYISOPRENE POWDER-FREE SURGICAL GLOVES: Brand: PROTEXIS

## (undated) DEVICE — SOLUTION LACTATED RINGERS INJECTION USP

## (undated) DEVICE — AIRLIFE™ ADULT CUSHION NASAL CANNULA 14 FOOT (4.3) CRUSH-RESISTANT OXYGEN TUBING, AND U/CONNECT-IT ADAPTER: Brand: AIRLIFE™

## (undated) DEVICE — PACK PROCEDURE SURG MAJ W/ BASIN LF

## (undated) DEVICE — CURITY NON-ADHERENT STRIPS: Brand: CURITY

## (undated) DEVICE — Device

## (undated) DEVICE — GAUZE,SPONGE,4"X4",16PLY,STRL,LF,10/TRAY: Brand: MEDLINE

## (undated) DEVICE — KENDALL SCD EXPRESS SLEEVES, KNEE LENGTH, MEDIUM: Brand: KENDALL SCD

## (undated) DEVICE — BLADE,STAINLESS-STEEL,15,STRL,DISPOSABLE: Brand: MEDLINE

## (undated) DEVICE — NEEDLE HYPO 25GA L1.5IN BVL ORIENTED ECLIPSE

## (undated) DEVICE — 3M™ DURAPORE™ SURGICAL TAPE 1538-3, 3 INCH X 10 YARD (7,5CM X 9,1M), 4 ROLLS/BOX: Brand: 3M™ DURAPORE™

## (undated) DEVICE — BNDG ELAS HK LOOP 4X5YD NS -- MATRIX

## (undated) DEVICE — STAPLER INT DIA5MM 25 ABSRB STRP FIX DISP FOR HERN MESH

## (undated) DEVICE — DRAPE TWL SURG 16X26IN BLU ORB04] ALLCARE INC]

## (undated) DEVICE — TRAY CATH OD16FR SIL URIN M STATLOK STBL DEV SURSTP

## (undated) DEVICE — INSUFFLATION NEEDLE: Brand: SURGINEEDLE

## (undated) DEVICE — SWAB CULT LIQ STUART AGR AERB MOD IN BRK SGL RAYON TIP PLAS 220099] BECTON DICKINSON MICRO]

## (undated) DEVICE — INTENDED FOR TISSUE SEPARATION, AND OTHER PROCEDURES THAT REQUIRE A SHARP SURGICAL BLADE TO PUNCTURE OR CUT.: Brand: BARD-PARKER SAFETY BLADES SIZE 10, STERILE

## (undated) DEVICE — BLADE SAW OSC COARSE 25X9MM --

## (undated) DEVICE — HEX-LOCKING BLADE ELECTRODE: Brand: EDGE

## (undated) DEVICE — BLANKET WRM AD W50XL85.8IN PACU FULL BODY FORC AIR

## (undated) DEVICE — SUTURE MCRYL SZ 4-0 L27IN ABSRB UD L24MM PS-1 3/8 CIR PRIM Y935H

## (undated) DEVICE — NEEDLE 25GA X 1.5 IN ECLIPSE

## (undated) DEVICE — DRSG GZ OIL EMUL CURAD 3X8 --

## (undated) DEVICE — SUTURE ETHBND EXCEL SZ 0 L30IN NONABSORBABLE GRN L26MM CT-2 X412H

## (undated) DEVICE — SET ADMIN PRIMING 12ML L36IN 2ND INCL RLER CLMP SPIN M

## (undated) DEVICE — BLADE SAW W9XL25MM THK0.38MM CUT THK0.43MM REPL SAG OSC THN

## (undated) DEVICE — NEEDLE SPINAL 22GA L3.5IN SPINOCAN

## (undated) DEVICE — SOLUTION IRRIG 1000ML 0.9% SOD CHL USP POUR PLAS BTL

## (undated) DEVICE — FLEX ADVANTAGE 3000CC: Brand: FLEX ADVANTAGE

## (undated) DEVICE — PADDING CAST W4INXL4YD NONSTERILE COT COHESIVE HND TEARABLE

## (undated) DEVICE — BULB SYRINGE, IRRIGATION WITH PROTECTIVE CAP, 60 CC, INDIVIDUALLY WRAPPED: Brand: DOVER

## (undated) DEVICE — KIT CLN UP BON SECOURS MARYV

## (undated) DEVICE — CLEAN UP KIT: Brand: MEDLINE INDUSTRIES, INC.

## (undated) DEVICE — CONVERTORS STOCKINETTE: Brand: CONVERTORS

## (undated) DEVICE — SUTURE COAT VCRL PC 5 PRECIS COSM CONVENTIONAL CUT PRIM 3 8 J823H

## (undated) DEVICE — 3M™ BAIR PAWS FLEX™ WARMING GOWN, X LARGE, 20 PER CASE 81203: Brand: BAIR PAWS™

## (undated) DEVICE — SPONGE DISSECT PNUT SM 3/8IN -- 5/PK

## (undated) DEVICE — SOLUTION IV 1000ML 0.9% SOD CHL

## (undated) DEVICE — SOLUTION IRRIG 3000ML 0.9% SOD CHL FLX CONT 0797208] ICU MEDICAL INC]

## (undated) DEVICE — APPLIER CLP L SHFT DIA12MM 20 ROT MULT LIGACLP

## (undated) DEVICE — DRAPE C-ARMOUR C-ARM KIT --

## (undated) DEVICE — STOCKINETTE SYN 4INX25YD -- PROTOUCH

## (undated) DEVICE — SKIN PREP TRAY 4 COMPARTM TRAY: Brand: MEDLINE INDUSTRIES, INC.

## (undated) DEVICE — NEEDLE HYPO 18GA L1.5IN PNK S STL HUB POLYPR SHLD REG BVL

## (undated) DEVICE — NEEDLE BX 8 GAX101 MM BNE MAR TAPR DSTL TIP RAZOR SHRP JAMSH

## (undated) DEVICE — SUTURE ETHILON SZ 3-0 L18IN NONABSORBABLE BLK L24MM PS-1 3/8 1663G

## (undated) DEVICE — CULTURETTE SGL EVAC TUBE PALL -- 100/CA

## (undated) DEVICE — BANDAGE,GAUZE,BULKEE LITE,3"X4.1YD,STRL: Brand: MEDLINE

## (undated) DEVICE — SOLUTION IRRIG 1000ML H2O STRL BLT

## (undated) DEVICE — BANDAGE COMPR W4INXL5YD WHT BGE POLY COT M E WRP WV HK AND

## (undated) DEVICE — TUBING IRRIG PRESSURIZED BG SET SPIK PRB + II

## (undated) DEVICE — MEDI-VAC NON-CONDUCTIVE SUCTION TUBING: Brand: CARDINAL HEALTH

## (undated) DEVICE — DRAPE XR C ARM 41X74IN LF --

## (undated) DEVICE — SPONGE LAP 18X18IN STRL -- 5/PK

## (undated) DEVICE — SWAB CULT DBL W/O CHAR RAYON TIP AMIES GEL CLMN FOR COLL

## (undated) DEVICE — THREE-QUARTER SHEET: Brand: CONVERTORS

## (undated) DEVICE — GOWN,REINFORCED,POLY,AURORA,XLARGE,STRL: Brand: MEDLINE

## (undated) DEVICE — BNDG,ELSTC,MATRIX,STRL,4"X5YD,LF,HOOK&LP: Brand: MEDLINE

## (undated) DEVICE — PAD,ABDOMINAL,8"X10",ST,LF: Brand: MEDLINE

## (undated) DEVICE — SHEAR HARMONIC 5MMX45CM -- ACE 7+

## (undated) DEVICE — SUTURE VCRL SZ 3-0 L27IN ABSRB UD L26MM SH 1/2 CIR J416H

## (undated) DEVICE — PREP SKN CHLRAPRP APL 26ML STR --

## (undated) DEVICE — 3M™ BAIR PAWS FLEX™ WARMING GOWN, STANDARD, 20 PER CASE 81003: Brand: BAIR PAWS™

## (undated) DEVICE — ARGYLE FRAZIER SURGICAL SUCTION INSTRUMENT 10 FR/CH (3.3 MM): Brand: ARGYLE

## (undated) DEVICE — DRSG GZ OIL EMUL CURAD 3X3 --

## (undated) DEVICE — SYR 10ML LUER LOK 1/5ML GRAD --

## (undated) DEVICE — DERMABOND SKIN ADH 0.7ML -- DERMABOND ADVANCED 12/BX

## (undated) DEVICE — DRAPE,U/SHT,SPLIT,FILM,60X84,STERILE: Brand: MEDLINE

## (undated) DEVICE — BLADE ES L2.75IN ELASTOMERIC COAT DURABLE BEND UPTO 90DEG

## (undated) DEVICE — DRESSING PETRO W3XL3IN OIL EMUL N ADH GZ KNIT IMPREG CELOS

## (undated) DEVICE — SUTURE PROL SZ 2-0 L18IN NONABSORBABLE BLU FS L26MM 3/8 CIR 8685H

## (undated) DEVICE — GAUZE,SPONGE,4"X4",12PLY,STERILE,LF,2'S: Brand: MEDLINE

## (undated) DEVICE — UNIVERSAL FIXATION CANNULA: Brand: VERSAONE

## (undated) DEVICE — GARMENT COMPR L FOR 23IN CALF FLOTRN

## (undated) DEVICE — BANDAGE COBAN 4 IN COMPR W4INXL5YD FOAM COHESIVE QUIK STK SELF ADH SFT

## (undated) DEVICE — PENROSE TUBING RADIOPAQUE: Brand: ARGYLE

## (undated) DEVICE — PMI DISPOSABLE PUNCTURE CLOSURE DEVICE / SUTURE GRASPER: Brand: PMI

## (undated) DEVICE — HANDLE PRB DIA5MM HND CTRL PSTL GRP ENDOPATH PRB + II

## (undated) DEVICE — BLADE,STAINLESS-STEEL,10,STRL,DISPOSABLE: Brand: MEDLINE

## (undated) DEVICE — CASTING PLSTR DYNACAST 4X15FT

## (undated) DEVICE — 3M™ STERI-DRAPE™ INSTRUMENT POUCH 1018: Brand: STERI-DRAPE™

## (undated) DEVICE — KIT DIL PRB K WIRE DISP FOR EXTRM LUM INTBDY FUS

## (undated) DEVICE — APPLICATOR MEDICATED 26 CC SOLUTION HI LT ORNG CHLORAPREP

## (undated) DEVICE — STOCKINETTE CAST W4INXL25YD SYN CONFORMABILITY PROTOUCH

## (undated) DEVICE — (D)PREP SKN CHLRAPRP APPL 26ML -- CONVERT TO ITEM 371833

## (undated) DEVICE — SUT PROL 0 30IN CTX BLU --

## (undated) DEVICE — BLADELESS OPTICAL TROCAR WITH FIXATION CANNULA: Brand: VERSAONE

## (undated) DEVICE — GLOVE SURG SZ 75 CRM LTX FREE POLYISOPRENE POLYMER BEAD ANTI

## (undated) DEVICE — SYRINGE MED 5ML STD CLR PLAS LUERLOCK TIP N CTRL DISP

## (undated) DEVICE — SHOECOVER, CRITICAL ZONE, X-LG: Brand: CARDINAL HEALTH

## (undated) DEVICE — SUTURE VCRL SZ 2-0 L36IN ABSRB UD L36MM CT-1 1/2 CIR J945H

## (undated) DEVICE — SUTURE VCRL SZ 1 L18IN ABSRB VLT CTX L48MM 1/2 CIR J765D

## (undated) DEVICE — DRAPE,T,LIMB,BILATERAL,STERILE: Brand: MEDLINE

## (undated) DEVICE — NDL PRT INJ NSAF BLNT 18GX1.5 --

## (undated) DEVICE — GAUZE PKG STRP IODOFRM 1/4X5YD --

## (undated) DEVICE — ADHESIVE TISS DERMA FLEX 0.7ML -- HIGH VISCOSITY

## (undated) DEVICE — KIT OR TURNOVER

## (undated) DEVICE — DRAPE,LAP,CHOLE,W/TROUGHS,STERILE: Brand: MEDLINE

## (undated) DEVICE — KIT RETRCT SHIM DISP FOR MINIMAL ACCS SYS MAXCESS 4

## (undated) DEVICE — ELECTRODE ES L34CM DIA5MM HK PRB + II ENDOPATH

## (undated) DEVICE — (D)TAPE SURG ADHESIVE 3 -- DISC BY MFR USE ITEM 100078

## (undated) DEVICE — HYPODERMIC SAFETY NEEDLE: Brand: MAGELLAN

## (undated) DEVICE — APPLICATOR ENDOSCP 30 CM W/ SNAP LCK DUPLOSPRAY MIS

## (undated) DEVICE — DRAPE,CHEST,FENES,15X10,STERIL: Brand: MEDLINE

## (undated) DEVICE — DRESSING STERILE PETRO W3XL8IN N ADH OIL EMUL GZ CURAD

## (undated) DEVICE — PACK PROCEDURE SURG LAPAROSCOPY 17X7 MM BRTRC PRIMUS

## (undated) DEVICE — SUT PROL 3-0 36IN V7 DA BLU --

## (undated) DEVICE — SEALANT FIBRIN 4 CC FRZN PRE FILLED SYR TISSEEL

## (undated) DEVICE — 450 ML BOTTLE OF 0.05% CHLORHEXIDINE GLUCONATE IN 99.95% STERILE WATER FOR IRRIGATION, USP AND APPLICATOR.: Brand: IRRISEPT ANTIMICROBIAL WOUND LAVAGE

## (undated) DEVICE — POLYESTER SINGLE STITCH RELOAD: Brand: SURGIDAC

## (undated) DEVICE — APPLIER LIG CLP L13IN 10MM PSTL GRP CONTAIN 15 TI L CLP

## (undated) DEVICE — SOLUTION SCRB 4OZ 10% PVP I POVIDONE IOD TOP PAINT EXIDINE